# Patient Record
Sex: FEMALE | Race: WHITE | NOT HISPANIC OR LATINO | Employment: OTHER | URBAN - NONMETROPOLITAN AREA
[De-identification: names, ages, dates, MRNs, and addresses within clinical notes are randomized per-mention and may not be internally consistent; named-entity substitution may affect disease eponyms.]

---

## 2017-01-09 ENCOUNTER — HOSPITAL ENCOUNTER (OUTPATIENT)
Dept: GENERAL RADIOLOGY | Facility: HOSPITAL | Age: 82
Discharge: HOME | End: 2017-01-09
Attending: PHYSICIAN ASSISTANT

## 2017-01-19 ENCOUNTER — OFFICE VISIT (OUTPATIENT)
Dept: CARDIOLOGY | Facility: CLINIC | Age: 82
End: 2017-01-19

## 2017-01-19 ENCOUNTER — HOSPITAL ENCOUNTER (OUTPATIENT)
Dept: CARDIOLOGY | Facility: HOSPITAL | Age: 82
Discharge: HOME | End: 2017-01-19
Admitting: PHYSICIAN ASSISTANT

## 2017-01-19 VITALS
WEIGHT: 158 LBS | HEIGHT: 66 IN | BODY MASS INDEX: 25.39 KG/M2 | SYSTOLIC BLOOD PRESSURE: 128 MMHG | DIASTOLIC BLOOD PRESSURE: 50 MMHG

## 2017-01-19 VITALS
WEIGHT: 159.8 LBS | BODY MASS INDEX: 25.68 KG/M2 | HEART RATE: 70 BPM | DIASTOLIC BLOOD PRESSURE: 56 MMHG | HEIGHT: 66 IN | SYSTOLIC BLOOD PRESSURE: 122 MMHG

## 2017-01-19 DIAGNOSIS — I38 VALVULAR HEART DISEASE: ICD-10-CM

## 2017-01-19 DIAGNOSIS — I48.20 CHRONIC ATRIAL FIBRILLATION (HCC): ICD-10-CM

## 2017-01-19 DIAGNOSIS — I48.20 CHRONIC ATRIAL FIBRILLATION (HCC): Primary | ICD-10-CM

## 2017-01-19 LAB
BH CV ECHO MEAS - AO ROOT AREA (BSA CORRECTED): 1.7
BH CV ECHO MEAS - AO ROOT AREA: 7.1 CM^2
BH CV ECHO MEAS - AO ROOT DIAM: 3 CM
BH CV ECHO MEAS - BSA(HAYCOCK): 1.8 M^2
BH CV ECHO MEAS - BSA: 1.8 M^2
BH CV ECHO MEAS - BZI_BMI: 25.5 KILOGRAMS/M^2
BH CV ECHO MEAS - BZI_METRIC_HEIGHT: 167.6 CM
BH CV ECHO MEAS - BZI_METRIC_WEIGHT: 71.7 KG
BH CV ECHO MEAS - CONTRAST EF (2CH): 54 ML/M^2
BH CV ECHO MEAS - CONTRAST EF 4CH: 51.7 ML/M^2
BH CV ECHO MEAS - EDV(CUBED): 63 ML
BH CV ECHO MEAS - EDV(MOD-SP2): 63 ML
BH CV ECHO MEAS - EDV(MOD-SP4): 58 ML
BH CV ECHO MEAS - EDV(TEICH): 69.2 ML
BH CV ECHO MEAS - EF(CUBED): 51.8 %
BH CV ECHO MEAS - EF(MOD-SP2): 54 %
BH CV ECHO MEAS - EF(MOD-SP4): 51 %
BH CV ECHO MEAS - EF(TEICH): 44.3 %
BH CV ECHO MEAS - ESV(CUBED): 30.4 ML
BH CV ECHO MEAS - ESV(MOD-SP2): 29 ML
BH CV ECHO MEAS - ESV(MOD-SP4): 28 ML
BH CV ECHO MEAS - ESV(TEICH): 38.5 ML
BH CV ECHO MEAS - FS: 21.6 %
BH CV ECHO MEAS - IVS/LVPW: 0.91
BH CV ECHO MEAS - IVSD: 1.2 CM
BH CV ECHO MEAS - LA DIMENSION: 4.6 CM
BH CV ECHO MEAS - LA/AO: 1.5
BH CV ECHO MEAS - LV DIASTOLIC VOL/BSA (35-75): 32.1 ML/M^2
BH CV ECHO MEAS - LV MASS(C)D: 168.3 GRAMS
BH CV ECHO MEAS - LV MASS(C)DI: 93 GRAMS/M^2
BH CV ECHO MEAS - LV SYSTOLIC VOL/BSA (12-30): 15.5 ML/M^2
BH CV ECHO MEAS - LVIDD: 4 CM
BH CV ECHO MEAS - LVIDS: 3.1 CM
BH CV ECHO MEAS - LVLD AP2: 5.8 CM
BH CV ECHO MEAS - LVLD AP4: 5.8 CM
BH CV ECHO MEAS - LVLS AP2: 5.1 CM
BH CV ECHO MEAS - LVLS AP4: 5 CM
BH CV ECHO MEAS - LVPWD: 1.3 CM
BH CV ECHO MEAS - MV E MAX VEL: 111 CM/SEC
BH CV ECHO MEAS - PA ACC SLOPE: 449 CM/SEC^2
BH CV ECHO MEAS - PA ACC TIME: 0.12 SEC
BH CV ECHO MEAS - PA PR(ACCEL): 23.4 MMHG
BH CV ECHO MEAS - RAP SYSTOLE: 10 MMHG
BH CV ECHO MEAS - RVDD: 3 CM
BH CV ECHO MEAS - RVSP: 51 MMHG
BH CV ECHO MEAS - SI(CUBED): 18.1 ML/M^2
BH CV ECHO MEAS - SI(MOD-SP2): 18.8 ML/M^2
BH CV ECHO MEAS - SI(MOD-SP4): 16.6 ML/M^2
BH CV ECHO MEAS - SI(TEICH): 16.9 ML/M^2
BH CV ECHO MEAS - SV(CUBED): 32.7 ML
BH CV ECHO MEAS - SV(MOD-SP2): 34 ML
BH CV ECHO MEAS - SV(MOD-SP4): 30 ML
BH CV ECHO MEAS - SV(TEICH): 30.7 ML
BH CV ECHO MEAS - TAPSE (>1.6): 2 CM2
BH CV ECHO MEAS - TR MAX VEL: 315.8 CM/SEC
BH CV XLRA - RV BASE: 4.2 CM
BH CV XLRA - RV LENGTH: 7 CM
BH CV XLRA - RV MID: 4.4 CM
BH CV XLRA - TDI S': 7.6 CM/SEC
LEFT ATRIUM VOLUME INDEX: 43.6 ML/M2
LV EF 2D ECHO EST: 60 %

## 2017-01-19 PROCEDURE — 93306 TTE W/DOPPLER COMPLETE: CPT | Performed by: INTERNAL MEDICINE

## 2017-01-19 PROCEDURE — 93288 INTERROG EVL PM/LDLS PM IP: CPT | Performed by: PHYSICIAN ASSISTANT

## 2017-01-19 PROCEDURE — 93306 TTE W/DOPPLER COMPLETE: CPT

## 2017-01-19 PROCEDURE — 99213 OFFICE O/P EST LOW 20 MIN: CPT | Performed by: PHYSICIAN ASSISTANT

## 2017-01-19 NOTE — LETTER
January 19, 2017     ANN Gonsalez  [address] [place] [id number]    Patient: Lynne Sanchez   YOB: 1934   Date of Visit: 1/19/2017       Dear ANN Goss:    Thank you for referring Lynne Sacnhez to me for evaluation. Below are the relevant portions of my assessment and plan of care.    If you have questions, please do not hesitate to call me. I look forward to following Lynne along with you.         Sincerely,        ANN Lagunas        CC: No Recipients  ANN Lagunas  1/19/2017 10:43 AM  Signed       [facility] - Office Note  Lynne Sanchez         [address] [place] [address]  1934   [phone] (home)      LOCATION:  [facility].  Visit Type: Follow Up.    PCP:  ANN Gonsalez    01/19/17   Lynne Sanchez is a 82 y.o.  female  retired.      Chief Complaint: Follow Up on Afib, CHF with echo today. C/O dyspnea, fatigue.  1. Chronic atrial fibrillation, status post [employer]. Laureano pacemaker implantation and AV node ablation:  a. Diagnosed June 2005.  b. Status post ECV restoring normal sinus rhythm, June 2005.  c. Rythmol initiated 05/01/2006.  d. Previously digoxin toxicity.  e. GXT Cardiolite, 09/08/2005: No reversible ischemia. LV function not performed secondary to atrial fibrillation.   f. Status post successful external cardioversion on 10/01/2008, Tessa Downey MD.  g. EKG on 10/21/2008: Recurrence of atrial fibrillation with rate of 109.  h. Recurrent atrial fibrillation by EKG, 11/03/2008, asymptomatic.  i. Status post [employer]. Laureano pacemaker implantation and AV node ablation.   j. Echocardiogram 02/15/2010: Moderate MR, moderate TR. RVSP 50. EF greater than 55%, left atrial enlargement at 4.3 cm.  k. Echo 1/18/17:  EF 60%, Mod-severe MR, Mod-severe TR.  2. History of congestive heart failure.  3. Diabetes mellitus, type 2.  4. Surgical history - appendectomy.              No Known  "Allergies      Current Outpatient Prescriptions:   •  ferrous gluconate (FERGON) 324 MG tablet, daily., Disp: , Rfl:   •  furosemide (LASIX) 20 MG tablet, daily., Disp: , Rfl:   •  glyBURIDE (DIABETA) 5 MG tablet, Take 5 mg by mouth 2 (two) times a day., Disp: , Rfl:   •  latanoprost (XALATAN) 0.005 % ophthalmic solution, daily., Disp: , Rfl:   •  metFORMIN XR (GLUCOPHAGE-XR) 500 MG 24 hr tablet, 750 mg 2 (Two) Times a Day., Disp: , Rfl:   •  metoprolol tartrate (LOPRESSOR) 100 MG tablet, Take 1 tablet by mouth 2 (two) times a day., Disp: , Rfl:   •  ONE TOUCH ULTRA TEST test strip, daily., Disp: , Rfl:   •  warfarin (COUMADIN) 5 MG tablet, Daily. Pt takes 5 mg 6 days a week and 7.5 mg the other day, Disp: , Rfl:     HPI            The following portions of the patient's history were reviewed in the chart and updated as appropriate: allergies, current medications, past family history, past medical history, past social history, past surgical history and problem list.    Review of Systems   Constitution: Positive for malaise/fatigue.   Cardiovascular: Positive for dyspnea on exertion and leg swelling.   All other systems reviewed and are negative.            height is 66\" (167.6 cm) and weight is 159 lb 12.8 oz (72.5 kg). Her blood pressure is 122/56 and her pulse is 70.   Physical Exam   Constitutional: Vital signs are normal. She appears well-developed and well-nourished.   Cardiovascular: Normal rate, regular rhythm, S1 normal, S2 normal, normal heart sounds, intact distal pulses and normal pulses.    Pulmonary/Chest: Effort normal and breath sounds normal. She has no wheezes. She has no rhonchi. She has no rales.   Abdominal: Soft. Normal appearance and bowel sounds are normal. There is no hepatosplenomegaly.   Neurological: She is alert.   Extremities:  Trace pre-tibial and ankle edema BLE.        ECG 12 Lead  Date/Time: 1/19/2017 10:25 AM  Performed by: ABE CROWDER  Authorized by: ABE CROWDER "   Previous ECG: no previous ECG available  Rhythm: paced  Rate: normal  Clinical impression: abnormal ECG           St. Laureano pacemaker: The jugular pacing greater than 99%, threshold 0.875, impedance 350 ohms.  Battery voltage 2.75 which is approximate 2 years.    Assessment/ Plan   Chronic atrial fibrillation:  Stable and normal device check.  Continue warfarin.  RTC as directed.    Valvular heart disease:  Pt is now having symptoms worrisome for symptomatic MR.  I am going to discuss the case with Dr. Downey - i believe the patient is too well for a MitraClip.  Pt wants to wait and return from [place] and follow up then before discussing surgical options.      Other orders  -     ECG 12 Lead      Leslye Estrella PA-C

## 2017-01-19 NOTE — MR AVS SNAPSHOT
Lynne Sanchez   1/19/2017 10:00 AM   Office Visit    Dept Phone:  [phone]   Encounter #:  [id number]    Provider:  ANN Nolasco   Department:  [facility] CARDIOLOGY                Your Full Care Plan              Your Updated Medication List          This list is accurate as of: 1/19/17 10:39 AM.  Always use your most recent med list.                ferrous gluconate 324 MG tablet   Commonly known as:  FERGON       furosemide 20 MG tablet   Commonly known as:  LASIX       glyBURIDE 5 MG tablet   Commonly known as:  DIAbeta       latanoprost 0.005 % ophthalmic solution   Commonly known as:  XALATAN       metFORMIN  MG 24 hr tablet   Commonly known as:  GLUCOPHAGE-XR       metoprolol tartrate 100 MG tablet   Commonly known as:  LOPRESSOR       ONE TOUCH ULTRA TEST test strip   Generic drug:  glucose blood       warfarin 5 MG tablet   Commonly known as:  COUMADIN               We Performed the Following     ECG 12 Lead       You Were Diagnosed With        Codes Comments    Chronic atrial fibrillation    -  Primary ICD-10-CM: I48.2  ICD-9-CM: 427.31     Valvular heart disease     ICD-10-CM: I38  ICD-9-CM: 424.90       Instructions     None    Patient Instructions History      Upcoming Appointments     Visit Type Date Time Department    FOLLOW UP 1/19/2017 10:00 AM [facility]    [facility] ECHO 2D COMPLETE VT 1/19/2017  9:00 AM [facility] NONINVASIVE LAB    FOLLOW UP 5/4/2017 11:00 AM [facility]      MyChart Signup     Our records indicate that you have declined [facility] MyChart signup. If you would like to sign up for [employer], please email [email or web address] or call [phone] to obtain an activation code.             Other Info from Your Visit           Your Appointments     May 04, 2017 11:00 AM EDT   Follow Up with ANN Nolasco   [facility] CARDIOLOGY (--)    [facility]  "[address] [place] [id number] [phone]           Arrive 15 minutes prior to appointment.              Allergies     No Known Allergies      Vital Signs     Blood Pressure Pulse Height Weight Body Mass Index Smoking Status    122/56 (BP Location: Left arm, Patient Position: Sitting) 70 66\" (167.6 cm) 159 lb 12.8 oz (72.5 kg) 25.79 kg/m2 Never Smoker      Problems and Diagnoses Noted     Atrial fibrillation (irregular heartbeat)    Valvular heart disease        "

## 2017-05-04 ENCOUNTER — OFFICE VISIT (OUTPATIENT)
Dept: CARDIOLOGY | Facility: CLINIC | Age: 82
End: 2017-05-04

## 2017-05-04 VITALS
BODY MASS INDEX: 25.13 KG/M2 | SYSTOLIC BLOOD PRESSURE: 90 MMHG | WEIGHT: 156.4 LBS | HEART RATE: 70 BPM | DIASTOLIC BLOOD PRESSURE: 62 MMHG | HEIGHT: 66 IN

## 2017-05-04 DIAGNOSIS — I38 VALVULAR HEART DISEASE: ICD-10-CM

## 2017-05-04 DIAGNOSIS — I48.20 CHRONIC ATRIAL FIBRILLATION (HCC): Primary | ICD-10-CM

## 2017-05-04 PROCEDURE — 93288 INTERROG EVL PM/LDLS PM IP: CPT | Performed by: PHYSICIAN ASSISTANT

## 2017-05-04 RX ORDER — GLIMEPIRIDE 4 MG/1
4 TABLET ORAL 2 TIMES DAILY
COMMUNITY
Start: 2017-04-03 | End: 2020-02-04 | Stop reason: SDUPTHER

## 2017-05-04 RX ORDER — METOPROLOL TARTRATE 100 MG/1
100 TABLET ORAL 2 TIMES DAILY
Qty: 180 TABLET | Refills: 3 | Status: SHIPPED | OUTPATIENT
Start: 2017-05-04 | End: 2020-02-07 | Stop reason: SDUPTHER

## 2017-08-04 ENCOUNTER — OFFICE VISIT (OUTPATIENT)
Dept: ORTHOPEDIC SURGERY | Facility: CLINIC | Age: 82
End: 2017-08-04

## 2017-08-04 VITALS — BODY MASS INDEX: 25.23 KG/M2 | HEIGHT: 66 IN | RESPIRATION RATE: 16 BRPM | WEIGHT: 157 LBS

## 2017-08-04 DIAGNOSIS — L60.1 ONYCHOLYSIS: ICD-10-CM

## 2017-08-04 DIAGNOSIS — L97.521 ULCER OF FOOT, LEFT, LIMITED TO BREAKDOWN OF SKIN (HCC): Primary | ICD-10-CM

## 2017-08-04 DIAGNOSIS — M20.5X2 HALLUX LIMITUS, LEFT: ICD-10-CM

## 2017-08-04 PROCEDURE — 11730 AVULSION NAIL PLATE SIMPLE 1: CPT | Performed by: PODIATRIST

## 2017-08-04 PROCEDURE — 11042 DBRDMT SUBQ TIS 1ST 20SQCM/<: CPT | Performed by: PODIATRIST

## 2017-08-04 RX ORDER — METFORMIN HYDROCHLORIDE 750 MG/1
TABLET, EXTENDED RELEASE ORAL
Status: ON HOLD | COMMUNITY
Start: 2017-05-25 | End: 2017-10-03

## 2017-08-04 RX ORDER — WARFARIN SODIUM 1 MG/1
5 TABLET ORAL TAKE AS DIRECTED
Status: ON HOLD | COMMUNITY
Start: 2017-08-03 | End: 2017-10-04

## 2017-08-04 NOTE — PROGRESS NOTES
Subjective   Patient ID: Lynne Sanchez is a 82 y.o. female   Foot Ulcer of the Left Foot    She comes in today stating that she has a diabetic wound on her left great toe and she also has the toenail on the left great toe that's bothering her.  She tells me that the wound is been there couple weeks she thinks and she's not really sure how it happened where it came from but it started as a big callus and then turned to a crack and opened up.  She says her daughter is a nurse and told her to start soaking it and cleaning it with peroxide and boiling water and that's been which she's doing with it.  She also tells me the great toenail on the left foot is crooked and loose and hump Uche and bugs her.  History of Present Illness    Review of Systems   Constitutional: Negative for diaphoresis, fever and unexpected weight change.   HENT: Positive for sore throat. Negative for dental problem.    Eyes: Negative for visual disturbance.   Respiratory: Negative for shortness of breath.    Cardiovascular: Negative for chest pain.   Gastrointestinal: Positive for vomiting. Negative for abdominal pain, constipation, diarrhea and nausea.   Genitourinary: Negative for difficulty urinating and frequency.   Skin: Positive for wound.   Neurological: Negative for headaches.   Hematological: Bruises/bleeds easily.   All other systems reviewed and are negative.      Past Medical History:   Diagnosis Date   • Chronic atrial fibrillation    • Diabetes mellitus, type 2    • History of congestive heart failure         Past Surgical History:   Procedure Laterality Date   • APPENDECTOMY         No Known Allergies    Family History   Problem Relation Age of Onset   • No Known Problems Mother    • No Known Problems Father        Objective   Physical Exam   Constitutional: She is oriented to person, place, and time. She appears well-developed and well-nourished.   HENT:   Head: Normocephalic and atraumatic.   Eyes: EOM are normal. Pupils are  equal, round, and reactive to light.   Neck: Normal range of motion.   Cardiovascular: Normal rate.    Pulmonary/Chest: Effort normal.   Abdominal: Soft. Bowel sounds are normal.   Musculoskeletal: Normal range of motion.   Neurological: She is alert and oriented to person, place, and time. She has normal reflexes.   Skin: Skin is warm.   Psychiatric: She has a normal mood and affect. Her behavior is normal. Judgment and thought content normal.     Ortho Exam  Ortho Exam left  Lower extremity exam:  Vascular: Pulses are palpable faintly to the left lower extremity.  There are tortuous bulbous and engorged varicose veins about both lower extremities.  She has some mild purplish bruised ecchymotic areas on the medial and lateral aspect of the left calf.  Capillary refill time is less than 5 seconds, some pedal hair is noted  Neuro: Gross sensations intact.  There is also protective sensation.  Derm: There is normal proximal to distal cooling.  Nails on the left foot are thickened and incurvated and discolored and dystrophic with subungual debris.  The entire left hallux nail is loose there is a full-thickness ulceration on the plantar medial aspect of the left hallux IPJ that's dry and flaky around the perimeter the wound and even the base that as this dry white flaky appearance but once debrided it's granular and healthy.  It measures 2 x 1.  She also had a dry hyperkeratotic lesion on the tip of the left second digit  MSK: With the foot unloaded there is 30° of first MTPJ dorsiflexion but loaded there is roughly 5° of dorsiflexion.  Ankle joint range of motion is normal.  Manual muscle testing is normal.  She has rigid digital contractures of digits 2 through 5 on the left    Assessment/Plan left hallux onycholysis, left hallux wound, diabetes, functional hallux limitus left second digit callus  Independent Review of Radiographic Studies:      Laboratory and Other Studies:     Medical Decision Making:         Procedures full thickness excisional wound debridement was performed with a 15 blade of the left plantar medial hallux wound down into subcutaneous tissue removing callus dry flaky scaly tissue creating a healthy bleeding granular wound bed that measured 1 x 2.  Tissue nippers were also then used 2 trim and cut the loose portion of the left hallux nail and this resulted in a complete nail avulsion of the left first digit.  Was done without incident.  Marta dry sterile dressing was then applied over the left hallux nail bed and wound.  A Band-Aid was applied over the left second digit tip after the corners debrided.  [] No procedures were performed in office today.    Lynne was seen today for foot ulcer.    Diagnoses and all orders for this visit:    Ulcer of foot, left, limited to breakdown of skin  -     Ambulatory Referral to Home Health    Hallux limitus, left  -     Ambulatory Referral to Home Health    Onycholysis  -     Ambulatory Referral to Home Health        Recommendations/Plan:  Order home health for dressing supplies for her.  I also showed her and instructed her on how to do this herself if needed.  I don't want her to soak the foot or the toes and discontinue the use of the peroxide.  Want her to use the Marta every other day.  I'll see her back next week.  I also gave her a flat Darco shoe for weightbearing in hopes that that'll help offload the foot some.  Call with any questions or concerns.    Return in about 1 week (around 8/11/2017).  Patient agreeable to call or return sooner for any concerns.

## 2017-08-11 ENCOUNTER — OFFICE VISIT (OUTPATIENT)
Dept: ORTHOPEDIC SURGERY | Facility: CLINIC | Age: 82
End: 2017-08-11

## 2017-08-11 VITALS — WEIGHT: 157 LBS | BODY MASS INDEX: 24.64 KG/M2 | HEIGHT: 67 IN | RESPIRATION RATE: 16 BRPM

## 2017-08-11 DIAGNOSIS — M20.5X2 HALLUX LIMITUS, LEFT: Primary | ICD-10-CM

## 2017-08-11 DIAGNOSIS — L97.521 ULCER OF FOOT, LEFT, LIMITED TO BREAKDOWN OF SKIN (HCC): ICD-10-CM

## 2017-08-11 PROCEDURE — 11042 DBRDMT SUBQ TIS 1ST 20SQCM/<: CPT | Performed by: PODIATRIST

## 2017-08-11 NOTE — PROGRESS NOTES
Subjective   Patient ID: Lynne Sanchez is a 82 y.o. female with diabetes who returns again for a left great toe ulceration.  She is wearing her postoperative shoe.  She states that home health's been using the collagen dressing.  She's concerned slightly about the redness and swelling but says she's been up on it a lot agustin tomatoes.  She denies any constitutional symptoms.      History of Present Illness    Review of Systems   Constitutional: Negative for diaphoresis, fever and unexpected weight change.   HENT: Negative for dental problem and sore throat.    Eyes: Negative for visual disturbance.   Respiratory: Negative for shortness of breath.    Cardiovascular: Negative for chest pain.   Gastrointestinal: Negative for abdominal pain, constipation, diarrhea, nausea and vomiting.   Genitourinary: Negative for difficulty urinating and frequency.   Neurological: Negative for headaches.   Hematological: Does not bruise/bleed easily.   All other systems reviewed and are negative.      Past Medical History:   Diagnosis Date   • Chronic atrial fibrillation    • Diabetes mellitus, type 2    • History of congestive heart failure         Past Surgical History:   Procedure Laterality Date   • APPENDECTOMY         No Known Allergies    Objective   Physical Exam   Constitutional: She is oriented to person, place, and time. She appears well-developed and well-nourished.   HENT:   Head: Normocephalic and atraumatic.   Eyes: EOM are normal. Pupils are equal, round, and reactive to light.   Neck: Normal range of motion.   Cardiovascular: Normal rate.    Pulmonary/Chest: Effort normal.   Abdominal: Soft. Bowel sounds are normal.   Musculoskeletal: Normal range of motion.   Neurological: She is alert and oriented to person, place, and time. She has normal reflexes.   Skin: Skin is warm.   Psychiatric: She has a normal mood and affect. Her behavior is normal. Judgment and thought content normal.     Ortho Exam  Ortho Exam  The  left great toe has a slight amount of increased edema and erythema but this is blanchable.  The wound does not probe deep.  It's barely full-thickness.  There is a slight increase in hyperkeratosis around the perimeter the wound but the wound is granular in the base.  The hyperkeratotic area does have some slight loosening and is not well adhered underneath.  She is otherwise grossly neurovascularly intact to the left lower extremity    Assessment/Plan  left great toe ulceration, diabetes  Independent Review of Radiographic Studies:      Laboratory and Other Studies:     Medical Decision Making:        Procedures  Debridement Note    Location of debridement: Left great toe  Description of procedure: excisional  Type of instrument used: scalpel  Type of tissue removed: devitalized and non-viable  Appearance and size of the wound: down to fresh bleeding tissue  Depth of debridement: subcutaneous tissue    Wound measures one by one.  I sharply and excisionally debrided the wounds down to healthy bleeding tissue with a good healthy granular base we will continue with the local wound care.    There are no diagnoses linked to this encounter.      Recommendations/Plan:  After debridement I applied a Marta dressing with 4 x 4 and Coban and.  She says she has a hard time keeping Band-Aids on so I recommend she rapid with this.  I explained her that the redness and edema is a little bit concerning but it's most likely from her being up on it more.  We'll watch and monitor this closely and next week I feel like antibiotics were needed we can do that.  I discussed that we may also need to change her dressing/wound care to Santyl or something different depending on how it looks next week.  She is again been reminded to keep this clean and dry and not soak the toe or get it wet.  I'll see her back next week.    No Follow-up on file.  Patient agreeable to call or return sooner for any concerns.

## 2017-10-03 ENCOUNTER — APPOINTMENT (OUTPATIENT)
Dept: ULTRASOUND IMAGING | Facility: HOSPITAL | Age: 82
End: 2017-10-03

## 2017-10-03 ENCOUNTER — HOSPITAL ENCOUNTER (INPATIENT)
Facility: HOSPITAL | Age: 82
LOS: 7 days | Discharge: HOME HEALTH | End: 2017-10-12
Attending: EMERGENCY MEDICINE | Admitting: INTERNAL MEDICINE

## 2017-10-03 ENCOUNTER — APPOINTMENT (OUTPATIENT)
Dept: CT IMAGING | Facility: HOSPITAL | Age: 82
End: 2017-10-03

## 2017-10-03 DIAGNOSIS — D64.9 ANEMIA, UNSPECIFIED TYPE: ICD-10-CM

## 2017-10-03 DIAGNOSIS — D64.9 NORMOCYTIC ANEMIA: ICD-10-CM

## 2017-10-03 DIAGNOSIS — R79.1 SUPRATHERAPEUTIC INR: ICD-10-CM

## 2017-10-03 DIAGNOSIS — E11.9 TYPE 2 DIABETES MELLITUS TREATED WITHOUT INSULIN (HCC): Chronic | ICD-10-CM

## 2017-10-03 DIAGNOSIS — K29.50 CHRONIC GASTRITIS WITHOUT BLEEDING, UNSPECIFIED GASTRITIS TYPE: ICD-10-CM

## 2017-10-03 DIAGNOSIS — I48.20 CHRONIC ATRIAL FIBRILLATION (HCC): ICD-10-CM

## 2017-10-03 DIAGNOSIS — K92.1 MELENA: ICD-10-CM

## 2017-10-03 DIAGNOSIS — I73.9 PERIPHERAL ARTERIAL DISEASE (HCC): ICD-10-CM

## 2017-10-03 DIAGNOSIS — M86.9 OSTEOMYELITIS OF LEFT FOOT, UNSPECIFIED TYPE (HCC): Primary | ICD-10-CM

## 2017-10-03 PROBLEM — D68.318 COAGULATION DISORDER DUE TO CIRCULATING ANTICOAGULANTS (HCC): Chronic | Status: ACTIVE | Noted: 2017-10-03

## 2017-10-03 LAB
ALBUMIN SERPL-MCNC: 4 G/DL (ref 3.5–5)
ALBUMIN/GLOB SERPL: 1.2 G/DL (ref 1–2)
ALP SERPL-CCNC: 66 U/L (ref 38–126)
ALT SERPL W P-5'-P-CCNC: 22 U/L (ref 13–69)
ANION GAP SERPL CALCULATED.3IONS-SCNC: 18 MMOL/L
AST SERPL-CCNC: 21 U/L (ref 15–46)
BASOPHILS # BLD AUTO: 0.04 10*3/MM3 (ref 0–0.2)
BASOPHILS NFR BLD AUTO: 0.7 % (ref 0–2.5)
BILIRUB SERPL-MCNC: 0.4 MG/DL (ref 0.2–1.3)
BUN BLD-MCNC: 27 MG/DL (ref 7–20)
BUN/CREAT SERPL: 20.8 (ref 7.1–23.5)
CALCIUM SPEC-SCNC: 9.5 MG/DL (ref 8.4–10.2)
CHLORIDE SERPL-SCNC: 105 MMOL/L (ref 98–107)
CO2 SERPL-SCNC: 25 MMOL/L (ref 26–30)
CREAT BLD-MCNC: 1.3 MG/DL (ref 0.6–1.3)
D-LACTATE SERPL-SCNC: 1.8 MMOL/L (ref 0.5–2)
DEPRECATED RDW RBC AUTO: 50.7 FL (ref 37–54)
EOSINOPHIL # BLD AUTO: 0.13 10*3/MM3 (ref 0–0.7)
EOSINOPHIL NFR BLD AUTO: 2.2 % (ref 0–7)
ERYTHROCYTE [DISTWIDTH] IN BLOOD BY AUTOMATED COUNT: 16.7 % (ref 11.5–14.5)
GFR SERPL CREATININE-BSD FRML MDRD: 39 ML/MIN/1.73
GLOBULIN UR ELPH-MCNC: 3.3 GM/DL
GLUCOSE BLD-MCNC: 156 MG/DL (ref 74–98)
GLUCOSE BLDC GLUCOMTR-MCNC: 222 MG/DL (ref 70–130)
GLUCOSE BLDC GLUCOMTR-MCNC: 74 MG/DL (ref 70–130)
GLUCOSE BLDC GLUCOMTR-MCNC: 93 MG/DL (ref 70–130)
HCT VFR BLD AUTO: 25.4 % (ref 37–47)
HGB BLD-MCNC: 7.7 G/DL (ref 12–16)
IMM GRANULOCYTES # BLD: 0.04 10*3/MM3 (ref 0–0.06)
IMM GRANULOCYTES NFR BLD: 0.7 % (ref 0–0.6)
INR PPP: 4.22 (ref 0.9–1.1)
LYMPHOCYTES # BLD AUTO: 1.3 10*3/MM3 (ref 0.6–3.4)
LYMPHOCYTES NFR BLD AUTO: 22.1 % (ref 10–50)
MCH RBC QN AUTO: 25.2 PG (ref 27–31)
MCHC RBC AUTO-ENTMCNC: 30.3 G/DL (ref 30–37)
MCV RBC AUTO: 83 FL (ref 81–99)
MONOCYTES # BLD AUTO: 0.55 10*3/MM3 (ref 0–0.9)
MONOCYTES NFR BLD AUTO: 9.3 % (ref 0–12)
NEUTROPHILS # BLD AUTO: 3.83 10*3/MM3 (ref 2–6.9)
NEUTROPHILS NFR BLD AUTO: 65 % (ref 37–80)
NRBC BLD MANUAL-RTO: 0 /100 WBC (ref 0–0)
PLATELET # BLD AUTO: 217 10*3/MM3 (ref 130–400)
PMV BLD AUTO: 9.9 FL (ref 6–12)
POTASSIUM BLD-SCNC: 4 MMOL/L (ref 3.5–5.1)
PROT SERPL-MCNC: 7.3 G/DL (ref 6.3–8.2)
PROTHROMBIN TIME: 46.2 SECONDS (ref 9.3–12.1)
RBC # BLD AUTO: 3.06 10*6/MM3 (ref 4.2–5.4)
SODIUM BLD-SCNC: 144 MMOL/L (ref 137–145)
WBC NRBC COR # BLD: 5.89 10*3/MM3 (ref 4.8–10.8)

## 2017-10-03 PROCEDURE — 87186 SC STD MICRODIL/AGAR DIL: CPT | Performed by: NURSE PRACTITIONER

## 2017-10-03 PROCEDURE — G0378 HOSPITAL OBSERVATION PER HR: HCPCS

## 2017-10-03 PROCEDURE — 25010000002 VANCOMYCIN PER 500 MG: Performed by: FAMILY MEDICINE

## 2017-10-03 PROCEDURE — 99220 PR INITIAL OBSERVATION CARE/DAY 70 MINUTES: CPT | Performed by: FAMILY MEDICINE

## 2017-10-03 PROCEDURE — 82962 GLUCOSE BLOOD TEST: CPT

## 2017-10-03 PROCEDURE — 87070 CULTURE OTHR SPECIMN AEROBIC: CPT | Performed by: NURSE PRACTITIONER

## 2017-10-03 PROCEDURE — 80053 COMPREHEN METABOLIC PANEL: CPT | Performed by: NURSE PRACTITIONER

## 2017-10-03 PROCEDURE — 85025 COMPLETE CBC W/AUTO DIFF WBC: CPT | Performed by: NURSE PRACTITIONER

## 2017-10-03 PROCEDURE — 63710000001 INSULIN ASPART PER 5 UNITS: Performed by: FAMILY MEDICINE

## 2017-10-03 PROCEDURE — 73700 CT LOWER EXTREMITY W/O DYE: CPT

## 2017-10-03 PROCEDURE — 87186 SC STD MICRODIL/AGAR DIL: CPT | Performed by: INTERNAL MEDICINE

## 2017-10-03 PROCEDURE — 99221 1ST HOSP IP/OBS SF/LOW 40: CPT | Performed by: PODIATRIST

## 2017-10-03 PROCEDURE — 85610 PROTHROMBIN TIME: CPT | Performed by: NURSE PRACTITIONER

## 2017-10-03 PROCEDURE — 87070 CULTURE OTHR SPECIMN AEROBIC: CPT | Performed by: INTERNAL MEDICINE

## 2017-10-03 PROCEDURE — 87077 CULTURE AEROBIC IDENTIFY: CPT | Performed by: INTERNAL MEDICINE

## 2017-10-03 PROCEDURE — 93923 UPR/LXTR ART STDY 3+ LVLS: CPT

## 2017-10-03 PROCEDURE — 87077 CULTURE AEROBIC IDENTIFY: CPT | Performed by: NURSE PRACTITIONER

## 2017-10-03 PROCEDURE — 99284 EMERGENCY DEPT VISIT MOD MDM: CPT

## 2017-10-03 PROCEDURE — 83605 ASSAY OF LACTIC ACID: CPT | Performed by: NURSE PRACTITIONER

## 2017-10-03 RX ORDER — FERROUS SULFATE TAB EC 324 MG (65 MG FE EQUIVALENT) 324 (65 FE) MG
324 TABLET DELAYED RESPONSE ORAL 2 TIMES DAILY WITH MEALS
Status: DISCONTINUED | OUTPATIENT
Start: 2017-10-03 | End: 2017-10-12 | Stop reason: HOSPADM

## 2017-10-03 RX ORDER — SODIUM CHLORIDE 0.9 % (FLUSH) 0.9 %
1-10 SYRINGE (ML) INJECTION AS NEEDED
Status: DISCONTINUED | OUTPATIENT
Start: 2017-10-03 | End: 2017-10-12 | Stop reason: HOSPADM

## 2017-10-03 RX ORDER — METOPROLOL TARTRATE 100 MG/1
100 TABLET ORAL 2 TIMES DAILY
Status: DISCONTINUED | OUTPATIENT
Start: 2017-10-03 | End: 2017-10-12 | Stop reason: HOSPADM

## 2017-10-03 RX ORDER — NICOTINE POLACRILEX 4 MG
1 LOZENGE BUCCAL
Status: DISCONTINUED | OUTPATIENT
Start: 2017-10-03 | End: 2017-10-12 | Stop reason: HOSPADM

## 2017-10-03 RX ORDER — ONDANSETRON 2 MG/ML
4 INJECTION INTRAMUSCULAR; INTRAVENOUS EVERY 6 HOURS PRN
Status: DISCONTINUED | OUTPATIENT
Start: 2017-10-03 | End: 2017-10-12 | Stop reason: HOSPADM

## 2017-10-03 RX ORDER — SODIUM CHLORIDE 0.9 % (FLUSH) 0.9 %
10 SYRINGE (ML) INJECTION AS NEEDED
Status: DISCONTINUED | OUTPATIENT
Start: 2017-10-03 | End: 2017-10-12 | Stop reason: HOSPADM

## 2017-10-03 RX ORDER — DEXTROSE MONOHYDRATE 25 G/50ML
25 INJECTION, SOLUTION INTRAVENOUS
Status: DISCONTINUED | OUTPATIENT
Start: 2017-10-03 | End: 2017-10-12 | Stop reason: HOSPADM

## 2017-10-03 RX ORDER — WARFARIN SODIUM 7.5 MG/1
7.5 TABLET ORAL TAKE AS DIRECTED
COMMUNITY
End: 2017-10-12 | Stop reason: HOSPADM

## 2017-10-03 RX ORDER — LATANOPROST 50 UG/ML
1 SOLUTION/ DROPS OPHTHALMIC DAILY
Status: DISCONTINUED | OUTPATIENT
Start: 2017-10-04 | End: 2017-10-12 | Stop reason: HOSPADM

## 2017-10-03 RX ORDER — FAMOTIDINE 20 MG/1
20 TABLET, FILM COATED ORAL 2 TIMES DAILY
Status: DISCONTINUED | OUTPATIENT
Start: 2017-10-03 | End: 2017-10-12 | Stop reason: HOSPADM

## 2017-10-03 RX ORDER — CEPHALEXIN 500 MG/1
500 CAPSULE ORAL 3 TIMES DAILY
Status: ON HOLD | COMMUNITY
End: 2017-10-03

## 2017-10-03 RX ORDER — SODIUM CHLORIDE 9 MG/ML
75 INJECTION, SOLUTION INTRAVENOUS CONTINUOUS
Status: DISCONTINUED | OUTPATIENT
Start: 2017-10-03 | End: 2017-10-06

## 2017-10-03 RX ADMIN — FAMOTIDINE 20 MG: 20 TABLET, FILM COATED ORAL at 21:26

## 2017-10-03 RX ADMIN — METOPROLOL TARTRATE 100 MG: 100 TABLET ORAL at 21:26

## 2017-10-03 RX ADMIN — SODIUM CHLORIDE 75 ML/HR: 9 INJECTION, SOLUTION INTRAVENOUS at 17:25

## 2017-10-03 RX ADMIN — FERROUS SULFATE TAB EC 324 MG (65 MG FE EQUIVALENT) 324 MG: 324 (65 FE) TABLET DELAYED RESPONSE at 17:24

## 2017-10-03 RX ADMIN — INSULIN ASPART 5 UNITS: 100 INJECTION, SOLUTION INTRAVENOUS; SUBCUTANEOUS at 21:26

## 2017-10-03 RX ADMIN — VANCOMYCIN HYDROCHLORIDE 1750 MG: 500 INJECTION, POWDER, LYOPHILIZED, FOR SOLUTION INTRAVENOUS at 17:23

## 2017-10-04 LAB
ALBUMIN SERPL-MCNC: 3.5 G/DL (ref 3.5–5)
ALBUMIN/GLOB SERPL: 1.1 G/DL (ref 1–2)
ALP SERPL-CCNC: 61 U/L (ref 38–126)
ALT SERPL W P-5'-P-CCNC: 31 U/L (ref 13–69)
ANION GAP SERPL CALCULATED.3IONS-SCNC: 14 MMOL/L
AST SERPL-CCNC: 20 U/L (ref 15–46)
BASOPHILS # BLD AUTO: 0.02 10*3/MM3 (ref 0–0.2)
BASOPHILS NFR BLD AUTO: 0.4 % (ref 0–2.5)
BILIRUB SERPL-MCNC: 0.3 MG/DL (ref 0.2–1.3)
BUN BLD-MCNC: 20 MG/DL (ref 7–20)
BUN/CREAT SERPL: 16.7 (ref 7.1–23.5)
CALCIUM SPEC-SCNC: 8.5 MG/DL (ref 8.4–10.2)
CHLORIDE SERPL-SCNC: 111 MMOL/L (ref 98–107)
CO2 SERPL-SCNC: 24 MMOL/L (ref 26–30)
CREAT BLD-MCNC: 1.2 MG/DL (ref 0.6–1.3)
DEPRECATED RDW RBC AUTO: 50.4 FL (ref 37–54)
EOSINOPHIL # BLD AUTO: 0.07 10*3/MM3 (ref 0–0.7)
EOSINOPHIL NFR BLD AUTO: 1.3 % (ref 0–7)
ERYTHROCYTE [DISTWIDTH] IN BLOOD BY AUTOMATED COUNT: 16.5 % (ref 11.5–14.5)
GFR SERPL CREATININE-BSD FRML MDRD: 43 ML/MIN/1.73
GLOBULIN UR ELPH-MCNC: 3.1 GM/DL
GLUCOSE BLD-MCNC: 87 MG/DL (ref 74–98)
GLUCOSE BLDC GLUCOMTR-MCNC: 178 MG/DL (ref 70–130)
GLUCOSE BLDC GLUCOMTR-MCNC: 217 MG/DL (ref 70–130)
GLUCOSE BLDC GLUCOMTR-MCNC: 231 MG/DL (ref 70–130)
GLUCOSE BLDC GLUCOMTR-MCNC: 66 MG/DL (ref 70–130)
GLUCOSE BLDC GLUCOMTR-MCNC: 86 MG/DL (ref 70–130)
HCT VFR BLD AUTO: 25.4 % (ref 37–47)
HGB BLD-MCNC: 7.6 G/DL (ref 12–16)
IMM GRANULOCYTES # BLD: 0.02 10*3/MM3 (ref 0–0.06)
IMM GRANULOCYTES NFR BLD: 0.4 % (ref 0–0.6)
INR PPP: 4.49 (ref 0.9–1.1)
LYMPHOCYTES # BLD AUTO: 1.19 10*3/MM3 (ref 0.6–3.4)
LYMPHOCYTES NFR BLD AUTO: 21.6 % (ref 10–50)
MAGNESIUM SERPL-MCNC: 1.6 MG/DL (ref 1.6–2.3)
MCH RBC QN AUTO: 25 PG (ref 27–31)
MCHC RBC AUTO-ENTMCNC: 29.9 G/DL (ref 30–37)
MCV RBC AUTO: 83.6 FL (ref 81–99)
MONOCYTES # BLD AUTO: 0.4 10*3/MM3 (ref 0–0.9)
MONOCYTES NFR BLD AUTO: 7.2 % (ref 0–12)
NEUTROPHILS # BLD AUTO: 3.82 10*3/MM3 (ref 2–6.9)
NEUTROPHILS NFR BLD AUTO: 69.1 % (ref 37–80)
NRBC BLD MANUAL-RTO: 0 /100 WBC (ref 0–0)
PLAT MORPH BLD: NORMAL
PLATELET # BLD AUTO: 207 10*3/MM3 (ref 130–400)
PMV BLD AUTO: 9.9 FL (ref 6–12)
POTASSIUM BLD-SCNC: 4 MMOL/L (ref 3.5–5.1)
PROT SERPL-MCNC: 6.6 G/DL (ref 6.3–8.2)
PROTHROMBIN TIME: 49.2 SECONDS (ref 9.3–12.1)
RBC # BLD AUTO: 3.04 10*6/MM3 (ref 4.2–5.4)
RBC MORPH BLD: NORMAL
SODIUM BLD-SCNC: 145 MMOL/L (ref 137–145)
VANCOMYCIN SERPL-MCNC: 17.94 MCG/ML (ref 5–40)
WBC MORPH BLD: NORMAL
WBC NRBC COR # BLD: 5.52 10*3/MM3 (ref 4.8–10.8)

## 2017-10-04 PROCEDURE — G0378 HOSPITAL OBSERVATION PER HR: HCPCS

## 2017-10-04 PROCEDURE — 85025 COMPLETE CBC W/AUTO DIFF WBC: CPT | Performed by: FAMILY MEDICINE

## 2017-10-04 PROCEDURE — 85007 BL SMEAR W/DIFF WBC COUNT: CPT | Performed by: FAMILY MEDICINE

## 2017-10-04 PROCEDURE — 80053 COMPREHEN METABOLIC PANEL: CPT | Performed by: FAMILY MEDICINE

## 2017-10-04 PROCEDURE — 85610 PROTHROMBIN TIME: CPT | Performed by: FAMILY MEDICINE

## 2017-10-04 PROCEDURE — 83735 ASSAY OF MAGNESIUM: CPT | Performed by: FAMILY MEDICINE

## 2017-10-04 PROCEDURE — 63710000001 INSULIN ASPART PER 5 UNITS: Performed by: FAMILY MEDICINE

## 2017-10-04 PROCEDURE — 25010000002 CEFTRIAXONE PER 250 MG: Performed by: FAMILY MEDICINE

## 2017-10-04 PROCEDURE — 99226 PR SBSQ OBSERVATION CARE/DAY 35 MINUTES: CPT | Performed by: FAMILY MEDICINE

## 2017-10-04 PROCEDURE — 80202 ASSAY OF VANCOMYCIN: CPT | Performed by: FAMILY MEDICINE

## 2017-10-04 PROCEDURE — 82962 GLUCOSE BLOOD TEST: CPT

## 2017-10-04 PROCEDURE — 25010000002 VANCOMYCIN PER 500 MG: Performed by: FAMILY MEDICINE

## 2017-10-04 RX ORDER — WARFARIN SODIUM 5 MG/1
5 TABLET ORAL
COMMUNITY
End: 2017-10-12 | Stop reason: HOSPADM

## 2017-10-04 RX ORDER — ASPIRIN 325 MG
1 TABLET ORAL DAILY
COMMUNITY
End: 2022-03-04

## 2017-10-04 RX ADMIN — INSULIN ASPART 5 UNITS: 100 INJECTION, SOLUTION INTRAVENOUS; SUBCUTANEOUS at 20:56

## 2017-10-04 RX ADMIN — FERROUS SULFATE TAB EC 324 MG (65 MG FE EQUIVALENT) 324 MG: 324 (65 FE) TABLET DELAYED RESPONSE at 17:19

## 2017-10-04 RX ADMIN — FAMOTIDINE 20 MG: 20 TABLET, FILM COATED ORAL at 20:55

## 2017-10-04 RX ADMIN — SODIUM CHLORIDE 75 ML/HR: 9 INJECTION, SOLUTION INTRAVENOUS at 06:53

## 2017-10-04 RX ADMIN — VANCOMYCIN HYDROCHLORIDE 1250 MG: 500 INJECTION, POWDER, LYOPHILIZED, FOR SOLUTION INTRAVENOUS at 18:00

## 2017-10-04 RX ADMIN — INSULIN ASPART 3 UNITS: 100 INJECTION, SOLUTION INTRAVENOUS; SUBCUTANEOUS at 12:28

## 2017-10-04 RX ADMIN — FAMOTIDINE 20 MG: 20 TABLET, FILM COATED ORAL at 09:34

## 2017-10-04 RX ADMIN — INSULIN ASPART 5 UNITS: 100 INJECTION, SOLUTION INTRAVENOUS; SUBCUTANEOUS at 18:00

## 2017-10-04 RX ADMIN — FERROUS SULFATE TAB EC 324 MG (65 MG FE EQUIVALENT) 324 MG: 324 (65 FE) TABLET DELAYED RESPONSE at 09:34

## 2017-10-04 RX ADMIN — CEFTRIAXONE 1 G: 1 INJECTION, SOLUTION INTRAVENOUS at 17:18

## 2017-10-04 RX ADMIN — METOPROLOL TARTRATE 100 MG: 100 TABLET ORAL at 09:34

## 2017-10-04 RX ADMIN — SODIUM CHLORIDE 75 ML/HR: 9 INJECTION, SOLUTION INTRAVENOUS at 20:55

## 2017-10-04 RX ADMIN — LATANOPROST 1 DROP: 50 SOLUTION OPHTHALMIC at 09:34

## 2017-10-04 RX ADMIN — METOPROLOL TARTRATE 100 MG: 100 TABLET ORAL at 20:56

## 2017-10-05 LAB
ANION GAP SERPL CALCULATED.3IONS-SCNC: 14.8 MMOL/L
BACTERIA SPEC AEROBE CULT: ABNORMAL
BACTERIA SPEC AEROBE CULT: ABNORMAL
BASOPHILS # BLD AUTO: 0.02 10*3/MM3 (ref 0–0.2)
BASOPHILS NFR BLD AUTO: 0.3 % (ref 0–2.5)
BUN BLD-MCNC: 24 MG/DL (ref 7–20)
BUN/CREAT SERPL: 21.8 (ref 7.1–23.5)
CALCIUM SPEC-SCNC: 8.7 MG/DL (ref 8.4–10.2)
CHLORIDE SERPL-SCNC: 112 MMOL/L (ref 98–107)
CO2 SERPL-SCNC: 23 MMOL/L (ref 26–30)
CREAT BLD-MCNC: 1.1 MG/DL (ref 0.6–1.3)
DEPRECATED RDW RBC AUTO: 50.6 FL (ref 37–54)
EOSINOPHIL # BLD AUTO: 0.16 10*3/MM3 (ref 0–0.7)
EOSINOPHIL NFR BLD AUTO: 2.6 % (ref 0–7)
ERYTHROCYTE [DISTWIDTH] IN BLOOD BY AUTOMATED COUNT: 16.6 % (ref 11.5–14.5)
GFR SERPL CREATININE-BSD FRML MDRD: 48 ML/MIN/1.73
GLUCOSE BLD-MCNC: 108 MG/DL (ref 74–98)
GLUCOSE BLDC GLUCOMTR-MCNC: 125 MG/DL (ref 70–130)
GLUCOSE BLDC GLUCOMTR-MCNC: 180 MG/DL (ref 70–130)
GLUCOSE BLDC GLUCOMTR-MCNC: 196 MG/DL (ref 70–130)
GLUCOSE BLDC GLUCOMTR-MCNC: 313 MG/DL (ref 70–130)
HCT VFR BLD AUTO: 23.5 % (ref 37–47)
HGB BLD-MCNC: 7.1 G/DL (ref 12–16)
IMM GRANULOCYTES # BLD: 0.02 10*3/MM3 (ref 0–0.06)
IMM GRANULOCYTES NFR BLD: 0.3 % (ref 0–0.6)
INR PPP: 3.89 (ref 0.9–1.1)
LYMPHOCYTES # BLD AUTO: 1.36 10*3/MM3 (ref 0.6–3.4)
LYMPHOCYTES NFR BLD AUTO: 22.5 % (ref 10–50)
MCH RBC QN AUTO: 25.3 PG (ref 27–31)
MCHC RBC AUTO-ENTMCNC: 30.2 G/DL (ref 30–37)
MCV RBC AUTO: 83.6 FL (ref 81–99)
MONOCYTES # BLD AUTO: 0.44 10*3/MM3 (ref 0–0.9)
MONOCYTES NFR BLD AUTO: 7.3 % (ref 0–12)
NEUTROPHILS # BLD AUTO: 4.05 10*3/MM3 (ref 2–6.9)
NEUTROPHILS NFR BLD AUTO: 67 % (ref 37–80)
NRBC BLD MANUAL-RTO: 0 /100 WBC (ref 0–0)
PLATELET # BLD AUTO: 194 10*3/MM3 (ref 130–400)
PMV BLD AUTO: 9.7 FL (ref 6–12)
POTASSIUM BLD-SCNC: 4.8 MMOL/L (ref 3.5–5.1)
PROTHROMBIN TIME: 42.6 SECONDS (ref 9.3–12.1)
RBC # BLD AUTO: 2.81 10*6/MM3 (ref 4.2–5.4)
SODIUM BLD-SCNC: 145 MMOL/L (ref 137–145)
WBC NRBC COR # BLD: 6.05 10*3/MM3 (ref 4.8–10.8)

## 2017-10-05 PROCEDURE — 25010000002 VITAMIN K1 PER 1 MG: Performed by: FAMILY MEDICINE

## 2017-10-05 PROCEDURE — 63710000001 INSULIN ASPART PER 5 UNITS: Performed by: FAMILY MEDICINE

## 2017-10-05 PROCEDURE — 25010000002 CEFTRIAXONE PER 250 MG: Performed by: FAMILY MEDICINE

## 2017-10-05 PROCEDURE — 85025 COMPLETE CBC W/AUTO DIFF WBC: CPT | Performed by: FAMILY MEDICINE

## 2017-10-05 PROCEDURE — 82962 GLUCOSE BLOOD TEST: CPT

## 2017-10-05 PROCEDURE — 99233 SBSQ HOSP IP/OBS HIGH 50: CPT | Performed by: PODIATRIST

## 2017-10-05 PROCEDURE — 85610 PROTHROMBIN TIME: CPT | Performed by: FAMILY MEDICINE

## 2017-10-05 PROCEDURE — 99233 SBSQ HOSP IP/OBS HIGH 50: CPT | Performed by: FAMILY MEDICINE

## 2017-10-05 PROCEDURE — 25010000002 PROMETHAZINE PER 50 MG: Performed by: INTERNAL MEDICINE

## 2017-10-05 PROCEDURE — 80048 BASIC METABOLIC PNL TOTAL CA: CPT | Performed by: FAMILY MEDICINE

## 2017-10-05 PROCEDURE — 25010000002 ONDANSETRON PER 1 MG: Performed by: FAMILY MEDICINE

## 2017-10-05 RX ORDER — PROMETHAZINE HYDROCHLORIDE 25 MG/ML
12.5 INJECTION, SOLUTION INTRAMUSCULAR; INTRAVENOUS EVERY 6 HOURS PRN
Status: DISCONTINUED | OUTPATIENT
Start: 2017-10-05 | End: 2017-10-09

## 2017-10-05 RX ORDER — PROMETHAZINE HYDROCHLORIDE 25 MG/ML
12.5 INJECTION, SOLUTION INTRAMUSCULAR; INTRAVENOUS EVERY 6 HOURS PRN
Status: DISCONTINUED | OUTPATIENT
Start: 2017-10-05 | End: 2017-10-06

## 2017-10-05 RX ORDER — ATORVASTATIN CALCIUM 40 MG/1
40 TABLET, FILM COATED ORAL NIGHTLY
Status: DISCONTINUED | OUTPATIENT
Start: 2017-10-05 | End: 2017-10-12 | Stop reason: HOSPADM

## 2017-10-05 RX ORDER — CLOPIDOGREL BISULFATE 75 MG/1
75 TABLET ORAL DAILY
Status: DISCONTINUED | OUTPATIENT
Start: 2017-10-05 | End: 2017-10-12 | Stop reason: HOSPADM

## 2017-10-05 RX ORDER — PROMETHAZINE HYDROCHLORIDE 12.5 MG/1
12.5 TABLET ORAL EVERY 6 HOURS PRN
Status: DISCONTINUED | OUTPATIENT
Start: 2017-10-05 | End: 2017-10-09

## 2017-10-05 RX ORDER — ACETAMINOPHEN 325 MG/1
650 TABLET ORAL EVERY 6 HOURS PRN
Status: DISCONTINUED | OUTPATIENT
Start: 2017-10-05 | End: 2017-10-10

## 2017-10-05 RX ORDER — SACCHAROMYCES BOULARDII 250 MG
250 CAPSULE ORAL 2 TIMES DAILY
Status: DISCONTINUED | OUTPATIENT
Start: 2017-10-06 | End: 2017-10-12 | Stop reason: HOSPADM

## 2017-10-05 RX ORDER — PHYTONADIONE 10 MG/ML
2.5 INJECTION, EMULSION INTRAMUSCULAR; INTRAVENOUS; SUBCUTANEOUS ONCE
Status: COMPLETED | OUTPATIENT
Start: 2017-10-05 | End: 2017-10-05

## 2017-10-05 RX ADMIN — INSULIN ASPART 3 UNITS: 100 INJECTION, SOLUTION INTRAVENOUS; SUBCUTANEOUS at 21:15

## 2017-10-05 RX ADMIN — LATANOPROST 1 DROP: 50 SOLUTION OPHTHALMIC at 09:33

## 2017-10-05 RX ADMIN — FAMOTIDINE 20 MG: 20 TABLET, FILM COATED ORAL at 20:23

## 2017-10-05 RX ADMIN — ONDANSETRON 4 MG: 2 INJECTION INTRAMUSCULAR; INTRAVENOUS at 12:38

## 2017-10-05 RX ADMIN — INSULIN ASPART 5 UNITS: 100 INJECTION, SOLUTION INTRAVENOUS; SUBCUTANEOUS at 17:21

## 2017-10-05 RX ADMIN — SODIUM CHLORIDE 75 ML/HR: 9 INJECTION, SOLUTION INTRAVENOUS at 09:43

## 2017-10-05 RX ADMIN — ONDANSETRON 4 MG: 2 INJECTION INTRAMUSCULAR; INTRAVENOUS at 21:49

## 2017-10-05 RX ADMIN — FERROUS SULFATE TAB EC 324 MG (65 MG FE EQUIVALENT) 324 MG: 324 (65 FE) TABLET DELAYED RESPONSE at 09:33

## 2017-10-05 RX ADMIN — PHYTONADIONE 2.5 MG: 10 INJECTION, EMULSION INTRAMUSCULAR; INTRAVENOUS; SUBCUTANEOUS at 09:34

## 2017-10-05 RX ADMIN — CEFTRIAXONE 1 G: 1 INJECTION, SOLUTION INTRAVENOUS at 17:21

## 2017-10-05 RX ADMIN — ATORVASTATIN CALCIUM 40 MG: 40 TABLET, FILM COATED ORAL at 20:23

## 2017-10-05 RX ADMIN — PROMETHAZINE HYDROCHLORIDE 12.5 MG: 25 INJECTION INTRAMUSCULAR; INTRAVENOUS at 23:35

## 2017-10-05 RX ADMIN — ACETAMINOPHEN 650 MG: 325 TABLET, FILM COATED ORAL at 13:03

## 2017-10-05 RX ADMIN — FAMOTIDINE 20 MG: 20 TABLET, FILM COATED ORAL at 09:33

## 2017-10-05 RX ADMIN — FERROUS SULFATE TAB EC 324 MG (65 MG FE EQUIVALENT) 324 MG: 324 (65 FE) TABLET DELAYED RESPONSE at 17:21

## 2017-10-05 RX ADMIN — CLOPIDOGREL BISULFATE 75 MG: 75 TABLET ORAL at 09:33

## 2017-10-05 RX ADMIN — METOPROLOL TARTRATE 100 MG: 100 TABLET ORAL at 09:33

## 2017-10-05 RX ADMIN — METOPROLOL TARTRATE 100 MG: 100 TABLET ORAL at 20:23

## 2017-10-06 ENCOUNTER — APPOINTMENT (OUTPATIENT)
Dept: GENERAL RADIOLOGY | Facility: HOSPITAL | Age: 82
End: 2017-10-06

## 2017-10-06 ENCOUNTER — APPOINTMENT (OUTPATIENT)
Dept: CARDIOLOGY | Facility: HOSPITAL | Age: 82
End: 2017-10-06
Attending: INTERNAL MEDICINE

## 2017-10-06 LAB
ABO GROUP BLD: NORMAL
BASOPHILS # BLD AUTO: 0.03 10*3/MM3 (ref 0–0.2)
BASOPHILS NFR BLD AUTO: 0.4 % (ref 0–2.5)
BLD GP AB SCN SERPL QL: NEGATIVE
DEPRECATED RDW RBC AUTO: 53.1 FL (ref 37–54)
EOSINOPHIL # BLD AUTO: 0.03 10*3/MM3 (ref 0–0.7)
EOSINOPHIL NFR BLD AUTO: 0.4 % (ref 0–7)
ERYTHROCYTE [DISTWIDTH] IN BLOOD BY AUTOMATED COUNT: 17 % (ref 11.5–14.5)
GLUCOSE BLDC GLUCOMTR-MCNC: 124 MG/DL (ref 70–130)
GLUCOSE BLDC GLUCOMTR-MCNC: 164 MG/DL (ref 70–130)
GLUCOSE BLDC GLUCOMTR-MCNC: 166 MG/DL (ref 70–130)
GLUCOSE BLDC GLUCOMTR-MCNC: 237 MG/DL (ref 70–130)
GLUCOSE BLDC GLUCOMTR-MCNC: 269 MG/DL (ref 70–130)
HCT VFR BLD AUTO: 22.2 % (ref 37–47)
HEMOCCULT STL QL: NEGATIVE
HGB BLD-MCNC: 6.7 G/DL (ref 12–16)
IMM GRANULOCYTES # BLD: 0.08 10*3/MM3 (ref 0–0.06)
IMM GRANULOCYTES NFR BLD: 1.1 % (ref 0–0.6)
INR PPP: 2.35 (ref 0.9–1.1)
LYMPHOCYTES # BLD AUTO: 1.44 10*3/MM3 (ref 0.6–3.4)
LYMPHOCYTES NFR BLD AUTO: 20.1 % (ref 10–50)
MCH RBC QN AUTO: 25.7 PG (ref 27–31)
MCHC RBC AUTO-ENTMCNC: 30.2 G/DL (ref 30–37)
MCV RBC AUTO: 85.1 FL (ref 81–99)
MONOCYTES # BLD AUTO: 0.52 10*3/MM3 (ref 0–0.9)
MONOCYTES NFR BLD AUTO: 7.2 % (ref 0–12)
NEUTROPHILS # BLD AUTO: 5.08 10*3/MM3 (ref 2–6.9)
NEUTROPHILS NFR BLD AUTO: 70.8 % (ref 37–80)
NRBC BLD MANUAL-RTO: 0 /100 WBC (ref 0–0)
PLATELET # BLD AUTO: 181 10*3/MM3 (ref 130–400)
PMV BLD AUTO: 10.3 FL (ref 6–12)
PROTHROMBIN TIME: 25.7 SECONDS (ref 9.3–12.1)
RBC # BLD AUTO: 2.61 10*6/MM3 (ref 4.2–5.4)
RH BLD: POSITIVE
WBC NRBC COR # BLD: 7.18 10*3/MM3 (ref 4.8–10.8)

## 2017-10-06 PROCEDURE — C1751 CATH, INF, PER/CENT/MIDLINE: HCPCS

## 2017-10-06 PROCEDURE — 82962 GLUCOSE BLOOD TEST: CPT

## 2017-10-06 PROCEDURE — 86850 RBC ANTIBODY SCREEN: CPT | Performed by: FAMILY MEDICINE

## 2017-10-06 PROCEDURE — 63710000001 INSULIN ASPART PER 5 UNITS: Performed by: FAMILY MEDICINE

## 2017-10-06 PROCEDURE — P9016 RBC LEUKOCYTES REDUCED: HCPCS

## 2017-10-06 PROCEDURE — 71010 HC CHEST PA OR AP: CPT

## 2017-10-06 PROCEDURE — 63710000001 DIPHENHYDRAMINE PER 50 MG: Performed by: FAMILY MEDICINE

## 2017-10-06 PROCEDURE — 25010000002 CEFTRIAXONE PER 250 MG: Performed by: FAMILY MEDICINE

## 2017-10-06 PROCEDURE — 86920 COMPATIBILITY TEST SPIN: CPT

## 2017-10-06 PROCEDURE — 99232 SBSQ HOSP IP/OBS MODERATE 35: CPT | Performed by: INTERNAL MEDICINE

## 2017-10-06 PROCEDURE — 86900 BLOOD TYPING SEROLOGIC ABO: CPT | Performed by: FAMILY MEDICINE

## 2017-10-06 PROCEDURE — 36430 TRANSFUSION BLD/BLD COMPNT: CPT

## 2017-10-06 PROCEDURE — 86901 BLOOD TYPING SEROLOGIC RH(D): CPT | Performed by: FAMILY MEDICINE

## 2017-10-06 PROCEDURE — 82272 OCCULT BLD FECES 1-3 TESTS: CPT | Performed by: FAMILY MEDICINE

## 2017-10-06 PROCEDURE — 86900 BLOOD TYPING SEROLOGIC ABO: CPT

## 2017-10-06 PROCEDURE — C1894 INTRO/SHEATH, NON-LASER: HCPCS

## 2017-10-06 PROCEDURE — 25010000002 FUROSEMIDE PER 20 MG: Performed by: INTERNAL MEDICINE

## 2017-10-06 PROCEDURE — 93306 TTE W/DOPPLER COMPLETE: CPT

## 2017-10-06 PROCEDURE — 85610 PROTHROMBIN TIME: CPT | Performed by: FAMILY MEDICINE

## 2017-10-06 PROCEDURE — 02HV33Z INSERTION OF INFUSION DEVICE INTO SUPERIOR VENA CAVA, PERCUTANEOUS APPROACH: ICD-10-PCS | Performed by: INTERNAL MEDICINE

## 2017-10-06 PROCEDURE — 85025 COMPLETE CBC W/AUTO DIFF WBC: CPT | Performed by: INTERNAL MEDICINE

## 2017-10-06 PROCEDURE — 99233 SBSQ HOSP IP/OBS HIGH 50: CPT | Performed by: PODIATRIST

## 2017-10-06 RX ORDER — SODIUM CHLORIDE 0.9 % (FLUSH) 0.9 %
10 SYRINGE (ML) INJECTION AS NEEDED
Status: DISCONTINUED | OUTPATIENT
Start: 2017-10-06 | End: 2017-10-12 | Stop reason: HOSPADM

## 2017-10-06 RX ORDER — DIPHENHYDRAMINE HCL 25 MG
25 CAPSULE ORAL ONCE
Status: COMPLETED | OUTPATIENT
Start: 2017-10-06 | End: 2017-10-06

## 2017-10-06 RX ORDER — ACETAMINOPHEN 325 MG/1
650 TABLET ORAL ONCE
Status: COMPLETED | OUTPATIENT
Start: 2017-10-06 | End: 2017-10-06

## 2017-10-06 RX ORDER — FUROSEMIDE 10 MG/ML
40 INJECTION INTRAMUSCULAR; INTRAVENOUS ONCE
Status: COMPLETED | OUTPATIENT
Start: 2017-10-06 | End: 2017-10-06

## 2017-10-06 RX ADMIN — RDII 250 MG CAPSULE 250 MG: at 09:29

## 2017-10-06 RX ADMIN — CEFTRIAXONE 1 G: 1 INJECTION, SOLUTION INTRAVENOUS at 15:33

## 2017-10-06 RX ADMIN — CLOPIDOGREL BISULFATE 75 MG: 75 TABLET ORAL at 09:31

## 2017-10-06 RX ADMIN — METOPROLOL TARTRATE 100 MG: 100 TABLET ORAL at 09:30

## 2017-10-06 RX ADMIN — ATORVASTATIN CALCIUM 40 MG: 40 TABLET, FILM COATED ORAL at 21:03

## 2017-10-06 RX ADMIN — FUROSEMIDE 40 MG: 10 INJECTION, SOLUTION INTRAMUSCULAR; INTRAVENOUS at 15:03

## 2017-10-06 RX ADMIN — FERROUS SULFATE TAB EC 324 MG (65 MG FE EQUIVALENT) 324 MG: 324 (65 FE) TABLET DELAYED RESPONSE at 19:33

## 2017-10-06 RX ADMIN — ACETAMINOPHEN 650 MG: 325 TABLET, FILM COATED ORAL at 00:34

## 2017-10-06 RX ADMIN — FERROUS SULFATE TAB EC 324 MG (65 MG FE EQUIVALENT) 324 MG: 324 (65 FE) TABLET DELAYED RESPONSE at 09:30

## 2017-10-06 RX ADMIN — INSULIN ASPART 5 UNITS: 100 INJECTION, SOLUTION INTRAVENOUS; SUBCUTANEOUS at 18:47

## 2017-10-06 RX ADMIN — DIPHENHYDRAMINE HYDROCHLORIDE 25 MG: 25 CAPSULE ORAL at 10:53

## 2017-10-06 RX ADMIN — RDII 250 MG CAPSULE 250 MG: at 19:33

## 2017-10-06 RX ADMIN — LATANOPROST 1 DROP: 50 SOLUTION OPHTHALMIC at 09:32

## 2017-10-06 RX ADMIN — ACETAMINOPHEN 650 MG: 325 TABLET, FILM COATED ORAL at 10:54

## 2017-10-06 RX ADMIN — ACETAMINOPHEN 650 MG: 325 TABLET, FILM COATED ORAL at 10:53

## 2017-10-06 RX ADMIN — ACETAMINOPHEN 650 MG: 325 TABLET, FILM COATED ORAL at 21:05

## 2017-10-06 RX ADMIN — FAMOTIDINE 20 MG: 20 TABLET, FILM COATED ORAL at 09:31

## 2017-10-06 RX ADMIN — METOPROLOL TARTRATE 100 MG: 100 TABLET ORAL at 21:03

## 2017-10-06 RX ADMIN — FAMOTIDINE 20 MG: 20 TABLET, FILM COATED ORAL at 21:03

## 2017-10-06 RX ADMIN — INSULIN ASPART 3 UNITS: 100 INJECTION, SOLUTION INTRAVENOUS; SUBCUTANEOUS at 06:56

## 2017-10-07 LAB
ABO + RH BLD: NORMAL
ABO + RH BLD: NORMAL
ALBUMIN SERPL-MCNC: 3.8 G/DL (ref 3.5–5)
ANION GAP SERPL CALCULATED.3IONS-SCNC: 19.2 MMOL/L
BACTERIA SPEC AEROBE CULT: ABNORMAL
BH BB BLOOD EXPIRATION DATE: NORMAL
BH BB BLOOD EXPIRATION DATE: NORMAL
BH BB BLOOD TYPE BARCODE: 5100
BH BB BLOOD TYPE BARCODE: 5100
BH BB DISPENSE STATUS: NORMAL
BH BB DISPENSE STATUS: NORMAL
BH BB PRODUCT CODE: NORMAL
BH BB PRODUCT CODE: NORMAL
BH BB UNIT NUMBER: NORMAL
BH BB UNIT NUMBER: NORMAL
BUN BLD-MCNC: 25 MG/DL (ref 7–20)
BUN/CREAT SERPL: 20.8 (ref 7.1–23.5)
CALCIUM SPEC-SCNC: 9 MG/DL (ref 8.4–10.2)
CHLORIDE SERPL-SCNC: 109 MMOL/L (ref 98–107)
CO2 SERPL-SCNC: 22 MMOL/L (ref 26–30)
CREAT BLD-MCNC: 1.2 MG/DL (ref 0.6–1.3)
CROSSMATCH INTERPRETATION: NORMAL
CROSSMATCH INTERPRETATION: NORMAL
DEPRECATED RDW RBC AUTO: 51.8 FL (ref 37–54)
ERYTHROCYTE [DISTWIDTH] IN BLOOD BY AUTOMATED COUNT: 16.8 % (ref 11.5–14.5)
GFR SERPL CREATININE-BSD FRML MDRD: 43 ML/MIN/1.73
GLUCOSE BLD-MCNC: 182 MG/DL (ref 74–98)
GLUCOSE BLDC GLUCOMTR-MCNC: 145 MG/DL (ref 70–130)
GLUCOSE BLDC GLUCOMTR-MCNC: 196 MG/DL (ref 70–130)
GLUCOSE BLDC GLUCOMTR-MCNC: 239 MG/DL (ref 70–130)
GLUCOSE BLDC GLUCOMTR-MCNC: 246 MG/DL (ref 70–130)
GLUCOSE BLDC GLUCOMTR-MCNC: 258 MG/DL (ref 70–130)
HCT VFR BLD AUTO: 28.8 % (ref 37–47)
HGB BLD-MCNC: 9 G/DL (ref 12–16)
INR PPP: 1.62 (ref 0.9–1.1)
MCH RBC QN AUTO: 26.5 PG (ref 27–31)
MCHC RBC AUTO-ENTMCNC: 31.3 G/DL (ref 30–37)
MCV RBC AUTO: 84.7 FL (ref 81–99)
PHOSPHATE SERPL-MCNC: 3.5 MG/DL (ref 2.5–4.5)
PLATELET # BLD AUTO: 197 10*3/MM3 (ref 130–400)
PMV BLD AUTO: 10.2 FL (ref 6–12)
POTASSIUM BLD-SCNC: 4.2 MMOL/L (ref 3.5–5.1)
PROTHROMBIN TIME: 17.7 SECONDS (ref 9.3–12.1)
RBC # BLD AUTO: 3.4 10*6/MM3 (ref 4.2–5.4)
SODIUM BLD-SCNC: 146 MMOL/L (ref 137–145)
UNIT  ABO: NORMAL
UNIT  ABO: NORMAL
UNIT  RH: NORMAL
UNIT  RH: NORMAL
WBC NRBC COR # BLD: 7.59 10*3/MM3 (ref 4.8–10.8)

## 2017-10-07 PROCEDURE — 85610 PROTHROMBIN TIME: CPT | Performed by: FAMILY MEDICINE

## 2017-10-07 PROCEDURE — 63710000001 INSULIN ASPART PER 5 UNITS: Performed by: FAMILY MEDICINE

## 2017-10-07 PROCEDURE — 80069 RENAL FUNCTION PANEL: CPT | Performed by: INTERNAL MEDICINE

## 2017-10-07 PROCEDURE — 25010000002 ENOXAPARIN PER 10 MG: Performed by: INTERNAL MEDICINE

## 2017-10-07 PROCEDURE — 25010000002 CEFTRIAXONE PER 250 MG: Performed by: FAMILY MEDICINE

## 2017-10-07 PROCEDURE — 99232 SBSQ HOSP IP/OBS MODERATE 35: CPT | Performed by: INTERNAL MEDICINE

## 2017-10-07 PROCEDURE — 85027 COMPLETE CBC AUTOMATED: CPT | Performed by: INTERNAL MEDICINE

## 2017-10-07 PROCEDURE — 82962 GLUCOSE BLOOD TEST: CPT

## 2017-10-07 RX ADMIN — METOPROLOL TARTRATE 100 MG: 100 TABLET ORAL at 22:00

## 2017-10-07 RX ADMIN — FAMOTIDINE 20 MG: 20 TABLET, FILM COATED ORAL at 22:00

## 2017-10-07 RX ADMIN — INSULIN ASPART 3 UNITS: 100 INJECTION, SOLUTION INTRAVENOUS; SUBCUTANEOUS at 06:41

## 2017-10-07 RX ADMIN — METOPROLOL TARTRATE 100 MG: 100 TABLET ORAL at 09:47

## 2017-10-07 RX ADMIN — ATORVASTATIN CALCIUM 40 MG: 40 TABLET, FILM COATED ORAL at 22:00

## 2017-10-07 RX ADMIN — FERROUS SULFATE TAB EC 324 MG (65 MG FE EQUIVALENT) 324 MG: 324 (65 FE) TABLET DELAYED RESPONSE at 09:47

## 2017-10-07 RX ADMIN — LATANOPROST 1 DROP: 50 SOLUTION OPHTHALMIC at 09:46

## 2017-10-07 RX ADMIN — CEFTRIAXONE 1 G: 1 INJECTION, SOLUTION INTRAVENOUS at 15:51

## 2017-10-07 RX ADMIN — FERROUS SULFATE TAB EC 324 MG (65 MG FE EQUIVALENT) 324 MG: 324 (65 FE) TABLET DELAYED RESPONSE at 18:23

## 2017-10-07 RX ADMIN — CLOPIDOGREL BISULFATE 75 MG: 75 TABLET ORAL at 09:47

## 2017-10-07 RX ADMIN — FAMOTIDINE 20 MG: 20 TABLET, FILM COATED ORAL at 09:47

## 2017-10-07 RX ADMIN — INSULIN ASPART 5 UNITS: 100 INJECTION, SOLUTION INTRAVENOUS; SUBCUTANEOUS at 22:00

## 2017-10-07 RX ADMIN — RDII 250 MG CAPSULE 250 MG: at 09:47

## 2017-10-07 RX ADMIN — INSULIN ASPART 5 UNITS: 100 INJECTION, SOLUTION INTRAVENOUS; SUBCUTANEOUS at 12:22

## 2017-10-07 RX ADMIN — RDII 250 MG CAPSULE 250 MG: at 18:23

## 2017-10-07 RX ADMIN — ENOXAPARIN SODIUM 60 MG: 60 INJECTION SUBCUTANEOUS at 12:43

## 2017-10-08 LAB
ALBUMIN SERPL-MCNC: 3.3 G/DL (ref 3.5–5)
ANION GAP SERPL CALCULATED.3IONS-SCNC: 15.5 MMOL/L
BASOPHILS # BLD AUTO: 0.02 10*3/MM3 (ref 0–0.2)
BASOPHILS NFR BLD AUTO: 0.3 % (ref 0–2.5)
BUN BLD-MCNC: 25 MG/DL (ref 7–20)
BUN/CREAT SERPL: 22.7 (ref 7.1–23.5)
CALCIUM SPEC-SCNC: 9.1 MG/DL (ref 8.4–10.2)
CHLORIDE SERPL-SCNC: 108 MMOL/L (ref 98–107)
CO2 SERPL-SCNC: 25 MMOL/L (ref 26–30)
CREAT BLD-MCNC: 1.1 MG/DL (ref 0.6–1.3)
DEPRECATED RDW RBC AUTO: 52.3 FL (ref 37–54)
EOSINOPHIL # BLD AUTO: 0.22 10*3/MM3 (ref 0–0.7)
EOSINOPHIL NFR BLD AUTO: 3.6 % (ref 0–7)
ERYTHROCYTE [DISTWIDTH] IN BLOOD BY AUTOMATED COUNT: 16.9 % (ref 11.5–14.5)
GFR SERPL CREATININE-BSD FRML MDRD: 48 ML/MIN/1.73
GLUCOSE BLD-MCNC: 131 MG/DL (ref 74–98)
GLUCOSE BLDC GLUCOMTR-MCNC: 134 MG/DL (ref 70–130)
GLUCOSE BLDC GLUCOMTR-MCNC: 173 MG/DL (ref 70–130)
GLUCOSE BLDC GLUCOMTR-MCNC: 226 MG/DL (ref 70–130)
GLUCOSE BLDC GLUCOMTR-MCNC: 235 MG/DL (ref 70–130)
HCT VFR BLD AUTO: 27.3 % (ref 37–47)
HEMOCCULT STL QL: POSITIVE
HGB BLD-MCNC: 8.5 G/DL (ref 12–16)
IMM GRANULOCYTES # BLD: 0.07 10*3/MM3 (ref 0–0.06)
IMM GRANULOCYTES NFR BLD: 1.1 % (ref 0–0.6)
INR PPP: 1.39 (ref 0.9–1.1)
LYMPHOCYTES # BLD AUTO: 1.44 10*3/MM3 (ref 0.6–3.4)
LYMPHOCYTES NFR BLD AUTO: 23.4 % (ref 10–50)
MCH RBC QN AUTO: 26.4 PG (ref 27–31)
MCHC RBC AUTO-ENTMCNC: 31.1 G/DL (ref 30–37)
MCV RBC AUTO: 84.8 FL (ref 81–99)
MONOCYTES # BLD AUTO: 0.65 10*3/MM3 (ref 0–0.9)
MONOCYTES NFR BLD AUTO: 10.6 % (ref 0–12)
NEUTROPHILS # BLD AUTO: 3.76 10*3/MM3 (ref 2–6.9)
NEUTROPHILS NFR BLD AUTO: 61 % (ref 37–80)
NRBC BLD MANUAL-RTO: 0.3 /100 WBC (ref 0–0)
PHOSPHATE SERPL-MCNC: 3.9 MG/DL (ref 2.5–4.5)
PLATELET # BLD AUTO: 187 10*3/MM3 (ref 130–400)
PMV BLD AUTO: 10.2 FL (ref 6–12)
POTASSIUM BLD-SCNC: 4.5 MMOL/L (ref 3.5–5.1)
PROTHROMBIN TIME: 15.2 SECONDS (ref 9.3–12.1)
RBC # BLD AUTO: 3.22 10*6/MM3 (ref 4.2–5.4)
SODIUM BLD-SCNC: 144 MMOL/L (ref 137–145)
WBC NRBC COR # BLD: 6.16 10*3/MM3 (ref 4.8–10.8)

## 2017-10-08 PROCEDURE — 99232 SBSQ HOSP IP/OBS MODERATE 35: CPT | Performed by: INTERNAL MEDICINE

## 2017-10-08 PROCEDURE — 80069 RENAL FUNCTION PANEL: CPT | Performed by: INTERNAL MEDICINE

## 2017-10-08 PROCEDURE — 85610 PROTHROMBIN TIME: CPT | Performed by: FAMILY MEDICINE

## 2017-10-08 PROCEDURE — 25010000002 ENOXAPARIN PER 10 MG: Performed by: INTERNAL MEDICINE

## 2017-10-08 PROCEDURE — 82962 GLUCOSE BLOOD TEST: CPT

## 2017-10-08 PROCEDURE — 63710000001 INSULIN ASPART PER 5 UNITS: Performed by: FAMILY MEDICINE

## 2017-10-08 PROCEDURE — 85025 COMPLETE CBC W/AUTO DIFF WBC: CPT | Performed by: INTERNAL MEDICINE

## 2017-10-08 PROCEDURE — 25010000002 CEFTRIAXONE PER 250 MG: Performed by: FAMILY MEDICINE

## 2017-10-08 PROCEDURE — 99222 1ST HOSP IP/OBS MODERATE 55: CPT | Performed by: SURGERY

## 2017-10-08 PROCEDURE — 82272 OCCULT BLD FECES 1-3 TESTS: CPT | Performed by: SURGERY

## 2017-10-08 RX ADMIN — FERROUS SULFATE TAB EC 324 MG (65 MG FE EQUIVALENT) 324 MG: 324 (65 FE) TABLET DELAYED RESPONSE at 18:08

## 2017-10-08 RX ADMIN — METOPROLOL TARTRATE 100 MG: 100 TABLET ORAL at 08:43

## 2017-10-08 RX ADMIN — RDII 250 MG CAPSULE 250 MG: at 08:39

## 2017-10-08 RX ADMIN — FAMOTIDINE 20 MG: 20 TABLET, FILM COATED ORAL at 20:15

## 2017-10-08 RX ADMIN — RDII 250 MG CAPSULE 250 MG: at 18:08

## 2017-10-08 RX ADMIN — INSULIN ASPART 5 UNITS: 100 INJECTION, SOLUTION INTRAVENOUS; SUBCUTANEOUS at 21:10

## 2017-10-08 RX ADMIN — ATORVASTATIN CALCIUM 40 MG: 40 TABLET, FILM COATED ORAL at 20:15

## 2017-10-08 RX ADMIN — INSULIN ASPART 5 UNITS: 100 INJECTION, SOLUTION INTRAVENOUS; SUBCUTANEOUS at 11:33

## 2017-10-08 RX ADMIN — CLOPIDOGREL BISULFATE 75 MG: 75 TABLET ORAL at 08:39

## 2017-10-08 RX ADMIN — METOPROLOL TARTRATE 100 MG: 100 TABLET ORAL at 20:15

## 2017-10-08 RX ADMIN — FERROUS SULFATE TAB EC 324 MG (65 MG FE EQUIVALENT) 324 MG: 324 (65 FE) TABLET DELAYED RESPONSE at 08:39

## 2017-10-08 RX ADMIN — ENOXAPARIN SODIUM 60 MG: 60 INJECTION SUBCUTANEOUS at 10:54

## 2017-10-08 RX ADMIN — LATANOPROST 1 DROP: 50 SOLUTION OPHTHALMIC at 08:39

## 2017-10-08 RX ADMIN — FAMOTIDINE 20 MG: 20 TABLET, FILM COATED ORAL at 08:39

## 2017-10-08 RX ADMIN — Medication 10 ML: at 20:20

## 2017-10-08 RX ADMIN — INSULIN ASPART 3 UNITS: 100 INJECTION, SOLUTION INTRAVENOUS; SUBCUTANEOUS at 16:33

## 2017-10-08 RX ADMIN — CEFTRIAXONE 1 G: 1 INJECTION, SOLUTION INTRAVENOUS at 16:34

## 2017-10-09 ENCOUNTER — APPOINTMENT (OUTPATIENT)
Dept: NUCLEAR MEDICINE | Facility: HOSPITAL | Age: 82
End: 2017-10-09

## 2017-10-09 ENCOUNTER — ANESTHESIA (OUTPATIENT)
Dept: GASTROENTEROLOGY | Facility: HOSPITAL | Age: 82
End: 2017-10-09

## 2017-10-09 ENCOUNTER — ANESTHESIA EVENT (OUTPATIENT)
Dept: GASTROENTEROLOGY | Facility: HOSPITAL | Age: 82
End: 2017-10-09

## 2017-10-09 PROBLEM — I73.9 PERIPHERAL ARTERIAL DISEASE (HCC): Status: ACTIVE | Noted: 2017-10-09

## 2017-10-09 PROBLEM — K92.1 MELENA: Status: ACTIVE | Noted: 2017-10-03

## 2017-10-09 PROBLEM — K29.50 CHRONIC GASTRITIS: Status: ACTIVE | Noted: 2017-10-09

## 2017-10-09 LAB
ALBUMIN SERPL-MCNC: 3.7 G/DL (ref 3.5–5)
ANION GAP SERPL CALCULATED.3IONS-SCNC: 14.7 MMOL/L
BASOPHILS # BLD AUTO: 0.03 10*3/MM3 (ref 0–0.2)
BASOPHILS NFR BLD AUTO: 0.4 % (ref 0–2.5)
BUN BLD-MCNC: 30 MG/DL (ref 7–20)
BUN/CREAT SERPL: 27.3 (ref 7.1–23.5)
CALCIUM SPEC-SCNC: 9.7 MG/DL (ref 8.4–10.2)
CHLORIDE SERPL-SCNC: 105 MMOL/L (ref 98–107)
CO2 SERPL-SCNC: 28 MMOL/L (ref 26–30)
CREAT BLD-MCNC: 1.1 MG/DL (ref 0.6–1.3)
DEPRECATED RDW RBC AUTO: 53.1 FL (ref 37–54)
EOSINOPHIL # BLD AUTO: 0.22 10*3/MM3 (ref 0–0.7)
EOSINOPHIL NFR BLD AUTO: 3.2 % (ref 0–7)
ERYTHROCYTE [DISTWIDTH] IN BLOOD BY AUTOMATED COUNT: 17.2 % (ref 11.5–14.5)
GFR SERPL CREATININE-BSD FRML MDRD: 48 ML/MIN/1.73
GLUCOSE BLD-MCNC: 157 MG/DL (ref 74–98)
GLUCOSE BLDC GLUCOMTR-MCNC: 152 MG/DL (ref 70–130)
GLUCOSE BLDC GLUCOMTR-MCNC: 187 MG/DL (ref 70–130)
GLUCOSE BLDC GLUCOMTR-MCNC: 238 MG/DL (ref 70–130)
HCT VFR BLD AUTO: 29.5 % (ref 37–47)
HGB BLD-MCNC: 9.1 G/DL (ref 12–16)
IMM GRANULOCYTES # BLD: 0.05 10*3/MM3 (ref 0–0.06)
IMM GRANULOCYTES NFR BLD: 0.7 % (ref 0–0.6)
INR PPP: 1.32 (ref 0.9–1.1)
LYMPHOCYTES # BLD AUTO: 1.66 10*3/MM3 (ref 0.6–3.4)
LYMPHOCYTES NFR BLD AUTO: 24.1 % (ref 10–50)
MCH RBC QN AUTO: 26.1 PG (ref 27–31)
MCHC RBC AUTO-ENTMCNC: 30.8 G/DL (ref 30–37)
MCV RBC AUTO: 84.8 FL (ref 81–99)
MONOCYTES # BLD AUTO: 0.62 10*3/MM3 (ref 0–0.9)
MONOCYTES NFR BLD AUTO: 9 % (ref 0–12)
NEUTROPHILS # BLD AUTO: 4.32 10*3/MM3 (ref 2–6.9)
NEUTROPHILS NFR BLD AUTO: 62.6 % (ref 37–80)
NRBC BLD MANUAL-RTO: 0 /100 WBC (ref 0–0)
PHOSPHATE SERPL-MCNC: 4.1 MG/DL (ref 2.5–4.5)
PLATELET # BLD AUTO: 222 10*3/MM3 (ref 130–400)
PMV BLD AUTO: 9.7 FL (ref 6–12)
POTASSIUM BLD-SCNC: 4.7 MMOL/L (ref 3.5–5.1)
PROTHROMBIN TIME: 14.5 SECONDS (ref 9.3–12.1)
RBC # BLD AUTO: 3.48 10*6/MM3 (ref 4.2–5.4)
SODIUM BLD-SCNC: 143 MMOL/L (ref 137–145)
WBC NRBC COR # BLD: 6.9 10*3/MM3 (ref 4.8–10.8)

## 2017-10-09 PROCEDURE — 25010000002 CEFTAZIDIME PER 500 MG: Performed by: INTERNAL MEDICINE

## 2017-10-09 PROCEDURE — 82962 GLUCOSE BLOOD TEST: CPT

## 2017-10-09 PROCEDURE — 25010000002 PROPOFOL 200 MG/20ML EMULSION: Performed by: NURSE ANESTHETIST, CERTIFIED REGISTERED

## 2017-10-09 PROCEDURE — A9547 IN111 OXYQUINOLINE: HCPCS | Performed by: INTERNAL MEDICINE

## 2017-10-09 PROCEDURE — 63710000001 INSULIN ASPART PER 5 UNITS: Performed by: FAMILY MEDICINE

## 2017-10-09 PROCEDURE — 0 INDIUM-111 OXYQUINOLINE 1 MCI/ML SOLUTION: Performed by: INTERNAL MEDICINE

## 2017-10-09 PROCEDURE — 85025 COMPLETE CBC W/AUTO DIFF WBC: CPT | Performed by: INTERNAL MEDICINE

## 2017-10-09 PROCEDURE — 0DB68ZX EXCISION OF STOMACH, VIA NATURAL OR ARTIFICIAL OPENING ENDOSCOPIC, DIAGNOSTIC: ICD-10-PCS | Performed by: SURGERY

## 2017-10-09 PROCEDURE — 88305 TISSUE EXAM BY PATHOLOGIST: CPT | Performed by: SURGERY

## 2017-10-09 PROCEDURE — 99232 SBSQ HOSP IP/OBS MODERATE 35: CPT | Performed by: INTERNAL MEDICINE

## 2017-10-09 PROCEDURE — 0DB58ZX EXCISION OF ESOPHAGUS, VIA NATURAL OR ARTIFICIAL OPENING ENDOSCOPIC, DIAGNOSTIC: ICD-10-PCS | Performed by: SURGERY

## 2017-10-09 PROCEDURE — 85610 PROTHROMBIN TIME: CPT | Performed by: FAMILY MEDICINE

## 2017-10-09 PROCEDURE — 80069 RENAL FUNCTION PANEL: CPT | Performed by: INTERNAL MEDICINE

## 2017-10-09 RX ORDER — SODIUM CHLORIDE 9 MG/ML
75 INJECTION, SOLUTION INTRAVENOUS CONTINUOUS
Status: DISCONTINUED | OUTPATIENT
Start: 2017-10-09 | End: 2017-10-10

## 2017-10-09 RX ORDER — PROPOFOL 10 MG/ML
INJECTION, EMULSION INTRAVENOUS AS NEEDED
Status: DISCONTINUED | OUTPATIENT
Start: 2017-10-09 | End: 2017-10-09 | Stop reason: SURG

## 2017-10-09 RX ORDER — INDIUM IN-111 OXYQUINOLINE 1 UG/ML
SOLUTION INTRAVENOUS
Status: COMPLETED | OUTPATIENT
Start: 2017-10-09 | End: 2017-10-09

## 2017-10-09 RX ORDER — POLYETHYLENE GLYCOL 3350 17 G/17G
255 POWDER, FOR SOLUTION ORAL ONCE
Status: DISCONTINUED | OUTPATIENT
Start: 2017-10-09 | End: 2017-10-09

## 2017-10-09 RX ORDER — SODIUM CHLORIDE 0.9 % (FLUSH) 0.9 %
3 SYRINGE (ML) INJECTION AS NEEDED
Status: DISCONTINUED | OUTPATIENT
Start: 2017-10-09 | End: 2017-10-09

## 2017-10-09 RX ORDER — WARFARIN SODIUM 5 MG/1
5 TABLET ORAL
Status: DISCONTINUED | OUTPATIENT
Start: 2017-10-09 | End: 2017-10-09

## 2017-10-09 RX ORDER — SODIUM CHLORIDE, SODIUM LACTATE, POTASSIUM CHLORIDE, CALCIUM CHLORIDE 600; 310; 30; 20 MG/100ML; MG/100ML; MG/100ML; MG/100ML
1000 INJECTION, SOLUTION INTRAVENOUS CONTINUOUS PRN
Status: DISCONTINUED | OUTPATIENT
Start: 2017-10-09 | End: 2017-10-12 | Stop reason: HOSPADM

## 2017-10-09 RX ADMIN — INSULIN ASPART 5 UNITS: 100 INJECTION, SOLUTION INTRAVENOUS; SUBCUTANEOUS at 16:43

## 2017-10-09 RX ADMIN — ATORVASTATIN CALCIUM 40 MG: 40 TABLET, FILM COATED ORAL at 20:55

## 2017-10-09 RX ADMIN — RDII 250 MG CAPSULE 250 MG: at 18:34

## 2017-10-09 RX ADMIN — METOPROLOL TARTRATE 100 MG: 100 TABLET ORAL at 09:09

## 2017-10-09 RX ADMIN — METOPROLOL TARTRATE 100 MG: 100 TABLET ORAL at 20:55

## 2017-10-09 RX ADMIN — CEFTAZIDIME 1 G: 1 INJECTION, POWDER, FOR SOLUTION INTRAMUSCULAR; INTRAVENOUS at 16:43

## 2017-10-09 RX ADMIN — SODIUM CHLORIDE 75 ML/HR: 9 INJECTION, SOLUTION INTRAVENOUS at 16:31

## 2017-10-09 RX ADMIN — FERROUS SULFATE TAB EC 324 MG (65 MG FE EQUIVALENT) 324 MG: 324 (65 FE) TABLET DELAYED RESPONSE at 18:34

## 2017-10-09 RX ADMIN — INDIUM IN-111 OXYQUINOLINE: 1 SOLUTION INTRAVENOUS at 12:40

## 2017-10-09 RX ADMIN — INSULIN ASPART 3 UNITS: 100 INJECTION, SOLUTION INTRAVENOUS; SUBCUTANEOUS at 20:56

## 2017-10-09 RX ADMIN — PROPOFOL 50 MG: 10 INJECTION, EMULSION INTRAVENOUS at 12:06

## 2017-10-09 RX ADMIN — SODIUM CHLORIDE, POTASSIUM CHLORIDE, SODIUM LACTATE AND CALCIUM CHLORIDE 1000 ML: 600; 310; 30; 20 INJECTION, SOLUTION INTRAVENOUS at 11:36

## 2017-10-09 RX ADMIN — FAMOTIDINE 20 MG: 20 TABLET, FILM COATED ORAL at 20:55

## 2017-10-09 RX ADMIN — LIDOCAINE HYDROCHLORIDE 60 MG: 20 INJECTION, SOLUTION INTRAVENOUS at 12:02

## 2017-10-09 NOTE — ANESTHESIA POSTPROCEDURE EVALUATION
Patient: Lynne Sanchez    Procedure Summary     Date Anesthesia Start Anesthesia Stop Room / Location    10/09/17 1202 1211 [facility] ENDOSCOPY 3 / [facility]       Procedure Diagnosis Surgeon Provider    ESOPHAGOGASTRODUODENOSCOPY WITH BIOPSY (N/A Esophagus) Melena  (Melena [K92.1]) MD Justo Galvez CRNA          Anesthesia Type: MAC  Last vitals  \54       Temp     98.4   Pulse 85      Resp 12       SpO2     97     Post Anesthesia Care and Evaluation    Patient location during evaluation: PACU  Patient participation: complete - patient participated  Level of consciousness: awake  Pain score: 0  Pain management: adequate  Airway patency: patent  Anesthetic complications: No anesthetic complications  PONV Status: controlled  Cardiovascular status: acceptable and stable  Respiratory status: acceptable and room air  Hydration status: acceptable

## 2017-10-09 NOTE — ANESTHESIA PREPROCEDURE EVALUATION
Anesthesia Evaluation     Patient summary reviewed and Nursing notes reviewed          Airway   Dental      Pulmonary    Cardiovascular     (+) dysrhythmias Atrial Fib,     ROS comment: Technically adequate study  1) Mild LVH with low normal LV systolic function ( EF 50 to 55%)  2) Moderate to severe left atrial enlargement with normal LVedp   3) Thickened mitral leaflets with mild MR   4) Aortic sclerosis without stenosis - trace AI seen   5) Moderate TR with a PAsp of 60 mm of hg   6) Moderate to severe RV dilation with borderline RV Function   7) Dilated IVC   8) Global hypokinesis of the LV more so along the septal wall     Neuro/Psych  (+) weakness,    GI/Hepatic/Renal/Endo    (+)  diabetes mellitus type 2,     Musculoskeletal     (+) back pain,   Abdominal    Substance History      OB/GYN          Other   (+) arthritis                                   Anesthesia Plan    ASA 3     MAC     intravenous induction   Anesthetic plan and risks discussed with patient.    Plan discussed with CRNA.

## 2017-10-10 ENCOUNTER — APPOINTMENT (OUTPATIENT)
Dept: NUCLEAR MEDICINE | Facility: HOSPITAL | Age: 82
End: 2017-10-10

## 2017-10-10 LAB
ANION GAP SERPL CALCULATED.3IONS-SCNC: 15.2 MMOL/L
BASOPHILS # BLD AUTO: 0.02 10*3/MM3 (ref 0–0.2)
BASOPHILS NFR BLD AUTO: 0.3 % (ref 0–2.5)
BUN BLD-MCNC: 23 MG/DL (ref 7–20)
BUN/CREAT SERPL: 23 (ref 7.1–23.5)
CALCIUM SPEC-SCNC: 9.2 MG/DL (ref 8.4–10.2)
CHLORIDE SERPL-SCNC: 108 MMOL/L (ref 98–107)
CO2 SERPL-SCNC: 24 MMOL/L (ref 26–30)
CREAT BLD-MCNC: 1 MG/DL (ref 0.6–1.3)
DEPRECATED RDW RBC AUTO: 54.2 FL (ref 37–54)
DEPRECATED RDW RBC AUTO: 54.5 FL (ref 37–54)
EOSINOPHIL # BLD AUTO: 0.2 10*3/MM3 (ref 0–0.7)
EOSINOPHIL NFR BLD AUTO: 3.3 % (ref 0–7)
ERYTHROCYTE [DISTWIDTH] IN BLOOD BY AUTOMATED COUNT: 17.4 % (ref 11.5–14.5)
ERYTHROCYTE [DISTWIDTH] IN BLOOD BY AUTOMATED COUNT: 17.5 % (ref 11.5–14.5)
GFR SERPL CREATININE-BSD FRML MDRD: 53 ML/MIN/1.73
GLUCOSE BLD-MCNC: 140 MG/DL (ref 74–98)
GLUCOSE BLDC GLUCOMTR-MCNC: 150 MG/DL (ref 70–130)
GLUCOSE BLDC GLUCOMTR-MCNC: 193 MG/DL (ref 70–130)
GLUCOSE BLDC GLUCOMTR-MCNC: 285 MG/DL (ref 70–130)
HCT VFR BLD AUTO: 26.8 % (ref 37–47)
HCT VFR BLD AUTO: 27.9 % (ref 37–47)
HGB BLD-MCNC: 8.3 G/DL (ref 12–16)
HGB BLD-MCNC: 8.4 G/DL (ref 12–16)
IMM GRANULOCYTES # BLD: 0.05 10*3/MM3 (ref 0–0.06)
IMM GRANULOCYTES NFR BLD: 0.8 % (ref 0–0.6)
LYMPHOCYTES # BLD AUTO: 1.06 10*3/MM3 (ref 0.6–3.4)
LYMPHOCYTES NFR BLD AUTO: 17.6 % (ref 10–50)
MCH RBC QN AUTO: 25.9 PG (ref 27–31)
MCH RBC QN AUTO: 26.5 PG (ref 27–31)
MCHC RBC AUTO-ENTMCNC: 30.1 G/DL (ref 30–37)
MCHC RBC AUTO-ENTMCNC: 31 G/DL (ref 30–37)
MCV RBC AUTO: 85.6 FL (ref 81–99)
MCV RBC AUTO: 86.1 FL (ref 81–99)
MONOCYTES # BLD AUTO: 0.59 10*3/MM3 (ref 0–0.9)
MONOCYTES NFR BLD AUTO: 9.8 % (ref 0–12)
NEUTROPHILS # BLD AUTO: 4.11 10*3/MM3 (ref 2–6.9)
NEUTROPHILS NFR BLD AUTO: 68.2 % (ref 37–80)
NRBC BLD MANUAL-RTO: 0 /100 WBC (ref 0–0)
PLATELET # BLD AUTO: 168 10*3/MM3 (ref 130–400)
PLATELET # BLD AUTO: 169 10*3/MM3 (ref 130–400)
PMV BLD AUTO: 9.7 FL (ref 6–12)
PMV BLD AUTO: 9.9 FL (ref 6–12)
POTASSIUM BLD-SCNC: 4.2 MMOL/L (ref 3.5–5.1)
RBC # BLD AUTO: 3.13 10*6/MM3 (ref 4.2–5.4)
RBC # BLD AUTO: 3.24 10*6/MM3 (ref 4.2–5.4)
SODIUM BLD-SCNC: 143 MMOL/L (ref 137–145)
WBC NRBC COR # BLD: 6.03 10*3/MM3 (ref 4.8–10.8)
WBC NRBC COR # BLD: 7.12 10*3/MM3 (ref 4.8–10.8)

## 2017-10-10 PROCEDURE — 047W3ZZ DILATION OF LEFT FOOT ARTERY, PERCUTANEOUS APPROACH: ICD-10-PCS | Performed by: INTERNAL MEDICINE

## 2017-10-10 PROCEDURE — 82962 GLUCOSE BLOOD TEST: CPT

## 2017-10-10 PROCEDURE — 0 IOPAMIDOL PER 1 ML: Performed by: INTERNAL MEDICINE

## 2017-10-10 PROCEDURE — 25010000002 ONDANSETRON PER 1 MG: Performed by: INTERNAL MEDICINE

## 2017-10-10 PROCEDURE — 99233 SBSQ HOSP IP/OBS HIGH 50: CPT | Performed by: PODIATRIST

## 2017-10-10 PROCEDURE — 63710000001 INSULIN ASPART PER 5 UNITS: Performed by: FAMILY MEDICINE

## 2017-10-10 PROCEDURE — 25010000002 ADENOSINE PER 6 MG: Performed by: INTERNAL MEDICINE

## 2017-10-10 PROCEDURE — 047Q3ZZ DILATION OF LEFT ANTERIOR TIBIAL ARTERY, PERCUTANEOUS APPROACH: ICD-10-PCS | Performed by: INTERNAL MEDICINE

## 2017-10-10 PROCEDURE — 04CQ3ZZ EXTIRPATION OF MATTER FROM LEFT ANTERIOR TIBIAL ARTERY, PERCUTANEOUS APPROACH: ICD-10-PCS | Performed by: INTERNAL MEDICINE

## 2017-10-10 PROCEDURE — 99232 SBSQ HOSP IP/OBS MODERATE 35: CPT | Performed by: INTERNAL MEDICINE

## 2017-10-10 PROCEDURE — C1894 INTRO/SHEATH, NON-LASER: HCPCS | Performed by: INTERNAL MEDICINE

## 2017-10-10 PROCEDURE — C1725 CATH, TRANSLUMIN NON-LASER: HCPCS | Performed by: INTERNAL MEDICINE

## 2017-10-10 PROCEDURE — 25010000002 ONDANSETRON PER 1 MG: Performed by: FAMILY MEDICINE

## 2017-10-10 PROCEDURE — 85025 COMPLETE CBC W/AUTO DIFF WBC: CPT | Performed by: INTERNAL MEDICINE

## 2017-10-10 PROCEDURE — C1769 GUIDE WIRE: HCPCS | Performed by: INTERNAL MEDICINE

## 2017-10-10 PROCEDURE — 25010000002 ENOXAPARIN PER 10 MG: Performed by: SURGERY

## 2017-10-10 PROCEDURE — C1887 CATHETER, GUIDING: HCPCS | Performed by: INTERNAL MEDICINE

## 2017-10-10 PROCEDURE — C1724 CATH, TRANS ATHEREC,ROTATION: HCPCS | Performed by: INTERNAL MEDICINE

## 2017-10-10 PROCEDURE — 25010000002 CEFTAZIDIME PER 500 MG: Performed by: INTERNAL MEDICINE

## 2017-10-10 PROCEDURE — 25010000002 FENTANYL CITRATE (PF) 100 MCG/2ML SOLUTION: Performed by: INTERNAL MEDICINE

## 2017-10-10 PROCEDURE — 25010000002 HYDRALAZINE PER 20 MG: Performed by: INTERNAL MEDICINE

## 2017-10-10 PROCEDURE — 25010000002 MIDAZOLAM PER 1 MG: Performed by: INTERNAL MEDICINE

## 2017-10-10 PROCEDURE — C1760 CLOSURE DEV, VASC: HCPCS | Performed by: INTERNAL MEDICINE

## 2017-10-10 PROCEDURE — 80048 BASIC METABOLIC PNL TOTAL CA: CPT | Performed by: INTERNAL MEDICINE

## 2017-10-10 PROCEDURE — 85027 COMPLETE CBC AUTOMATED: CPT | Performed by: INTERNAL MEDICINE

## 2017-10-10 PROCEDURE — 75716 ARTERY X-RAYS ARMS/LEGS: CPT | Performed by: INTERNAL MEDICINE

## 2017-10-10 RX ORDER — FENTANYL CITRATE 50 UG/ML
INJECTION, SOLUTION INTRAMUSCULAR; INTRAVENOUS AS NEEDED
Status: DISCONTINUED | OUTPATIENT
Start: 2017-10-10 | End: 2017-10-10 | Stop reason: HOSPADM

## 2017-10-10 RX ORDER — LIDOCAINE HYDROCHLORIDE 10 MG/ML
INJECTION, SOLUTION INFILTRATION; PERINEURAL AS NEEDED
Status: DISCONTINUED | OUTPATIENT
Start: 2017-10-10 | End: 2017-10-10 | Stop reason: HOSPADM

## 2017-10-10 RX ORDER — MIDAZOLAM HYDROCHLORIDE 1 MG/ML
INJECTION INTRAMUSCULAR; INTRAVENOUS AS NEEDED
Status: DISCONTINUED | OUTPATIENT
Start: 2017-10-10 | End: 2017-10-10 | Stop reason: HOSPADM

## 2017-10-10 RX ORDER — ALPRAZOLAM 0.5 MG/1
0.5 TABLET ORAL 3 TIMES DAILY PRN
Status: DISCONTINUED | OUTPATIENT
Start: 2017-10-10 | End: 2017-10-12 | Stop reason: HOSPADM

## 2017-10-10 RX ORDER — CLOPIDOGREL BISULFATE 75 MG/1
600 TABLET ORAL ONCE
Status: COMPLETED | OUTPATIENT
Start: 2017-10-10 | End: 2017-10-10

## 2017-10-10 RX ORDER — SODIUM CHLORIDE 9 MG/ML
75 INJECTION, SOLUTION INTRAVENOUS CONTINUOUS
Status: DISCONTINUED | OUTPATIENT
Start: 2017-10-10 | End: 2017-10-12

## 2017-10-10 RX ORDER — HYDROCODONE BITARTRATE AND ACETAMINOPHEN 5; 325 MG/1; MG/1
1 TABLET ORAL EVERY 4 HOURS PRN
Status: DISCONTINUED | OUTPATIENT
Start: 2017-10-10 | End: 2017-10-12 | Stop reason: HOSPADM

## 2017-10-10 RX ORDER — ACETAMINOPHEN 325 MG/1
650 TABLET ORAL EVERY 4 HOURS PRN
Status: DISCONTINUED | OUTPATIENT
Start: 2017-10-10 | End: 2017-10-12 | Stop reason: HOSPADM

## 2017-10-10 RX ORDER — CLOPIDOGREL BISULFATE 75 MG/1
75 TABLET ORAL DAILY
Status: DISCONTINUED | OUTPATIENT
Start: 2017-10-11 | End: 2017-10-11

## 2017-10-10 RX ORDER — ADENOSINE 3 MG/ML
INJECTION, SOLUTION INTRAVENOUS AS NEEDED
Status: DISCONTINUED | OUTPATIENT
Start: 2017-10-10 | End: 2017-10-10 | Stop reason: HOSPADM

## 2017-10-10 RX ORDER — ONDANSETRON 2 MG/ML
INJECTION INTRAMUSCULAR; INTRAVENOUS AS NEEDED
Status: DISCONTINUED | OUTPATIENT
Start: 2017-10-10 | End: 2017-10-10 | Stop reason: HOSPADM

## 2017-10-10 RX ORDER — HYDRALAZINE HYDROCHLORIDE 20 MG/ML
INJECTION INTRAMUSCULAR; INTRAVENOUS AS NEEDED
Status: DISCONTINUED | OUTPATIENT
Start: 2017-10-10 | End: 2017-10-10 | Stop reason: HOSPADM

## 2017-10-10 RX ADMIN — SODIUM CHLORIDE 75 ML/HR: 9 INJECTION, SOLUTION INTRAVENOUS at 04:35

## 2017-10-10 RX ADMIN — METOPROLOL TARTRATE 100 MG: 100 TABLET ORAL at 21:59

## 2017-10-10 RX ADMIN — ENOXAPARIN SODIUM 70 MG: 80 INJECTION SUBCUTANEOUS at 09:15

## 2017-10-10 RX ADMIN — FAMOTIDINE 20 MG: 20 TABLET, FILM COATED ORAL at 21:59

## 2017-10-10 RX ADMIN — ATORVASTATIN CALCIUM 40 MG: 40 TABLET, FILM COATED ORAL at 21:59

## 2017-10-10 RX ADMIN — INSULIN ASPART 8 UNITS: 100 INJECTION, SOLUTION INTRAVENOUS; SUBCUTANEOUS at 21:59

## 2017-10-10 RX ADMIN — METOPROLOL TARTRATE 100 MG: 100 TABLET ORAL at 06:19

## 2017-10-10 RX ADMIN — CLOPIDOGREL BISULFATE 600 MG: 75 TABLET ORAL at 16:17

## 2017-10-10 RX ADMIN — ONDANSETRON 4 MG: 2 INJECTION INTRAMUSCULAR; INTRAVENOUS at 16:24

## 2017-10-10 RX ADMIN — SODIUM CHLORIDE 75 ML/HR: 9 INJECTION, SOLUTION INTRAVENOUS at 16:44

## 2017-10-10 RX ADMIN — CEFTAZIDIME 1 G: 1 INJECTION, POWDER, FOR SOLUTION INTRAMUSCULAR; INTRAVENOUS at 16:17

## 2017-10-10 RX ADMIN — CEFTAZIDIME 1 G: 1 INJECTION, POWDER, FOR SOLUTION INTRAMUSCULAR; INTRAVENOUS at 04:31

## 2017-10-10 RX ADMIN — LATANOPROST 1 DROP: 50 SOLUTION OPHTHALMIC at 06:00

## 2017-10-10 RX ADMIN — CLOPIDOGREL BISULFATE 75 MG: 75 TABLET ORAL at 09:15

## 2017-10-11 ENCOUNTER — APPOINTMENT (OUTPATIENT)
Dept: NUCLEAR MEDICINE | Facility: HOSPITAL | Age: 82
End: 2017-10-11

## 2017-10-11 LAB
ANION GAP SERPL CALCULATED.3IONS-SCNC: 14 MMOL/L
BASOPHILS # BLD AUTO: 0.03 10*3/MM3 (ref 0–0.2)
BASOPHILS NFR BLD AUTO: 0.4 % (ref 0–2.5)
BUN BLD-MCNC: 24 MG/DL (ref 7–20)
BUN/CREAT SERPL: 21.8 (ref 7.1–23.5)
CALCIUM SPEC-SCNC: 9.1 MG/DL (ref 8.4–10.2)
CHLORIDE SERPL-SCNC: 109 MMOL/L (ref 98–107)
CO2 SERPL-SCNC: 24 MMOL/L (ref 26–30)
CREAT BLD-MCNC: 1.1 MG/DL (ref 0.6–1.3)
DEPRECATED RDW RBC AUTO: 54.4 FL (ref 37–54)
EOSINOPHIL # BLD AUTO: 0.1 10*3/MM3 (ref 0–0.7)
EOSINOPHIL NFR BLD AUTO: 1.5 % (ref 0–7)
ERYTHROCYTE [DISTWIDTH] IN BLOOD BY AUTOMATED COUNT: 17.8 % (ref 11.5–14.5)
GFR SERPL CREATININE-BSD FRML MDRD: 48 ML/MIN/1.73
GLUCOSE BLD-MCNC: 113 MG/DL (ref 74–98)
GLUCOSE BLDC GLUCOMTR-MCNC: 130 MG/DL (ref 70–130)
GLUCOSE BLDC GLUCOMTR-MCNC: 221 MG/DL (ref 70–130)
GLUCOSE BLDC GLUCOMTR-MCNC: 227 MG/DL (ref 70–130)
GLUCOSE BLDC GLUCOMTR-MCNC: 256 MG/DL (ref 70–130)
HCT VFR BLD AUTO: 26.3 % (ref 37–47)
HGB BLD-MCNC: 8 G/DL (ref 12–16)
IMM GRANULOCYTES # BLD: 0.09 10*3/MM3 (ref 0–0.06)
IMM GRANULOCYTES NFR BLD: 1.3 % (ref 0–0.6)
LYMPHOCYTES # BLD AUTO: 1.25 10*3/MM3 (ref 0.6–3.4)
LYMPHOCYTES NFR BLD AUTO: 18.1 % (ref 10–50)
MCH RBC QN AUTO: 25.7 PG (ref 27–31)
MCHC RBC AUTO-ENTMCNC: 30.4 G/DL (ref 30–37)
MCV RBC AUTO: 84.6 FL (ref 81–99)
MONOCYTES # BLD AUTO: 0.56 10*3/MM3 (ref 0–0.9)
MONOCYTES NFR BLD AUTO: 8.1 % (ref 0–12)
NEUTROPHILS # BLD AUTO: 4.86 10*3/MM3 (ref 2–6.9)
NEUTROPHILS NFR BLD AUTO: 70.6 % (ref 37–80)
NRBC BLD MANUAL-RTO: 0 /100 WBC (ref 0–0)
PLATELET # BLD AUTO: 184 10*3/MM3 (ref 130–400)
PMV BLD AUTO: 10.1 FL (ref 6–12)
POTASSIUM BLD-SCNC: 4 MMOL/L (ref 3.5–5.1)
RBC # BLD AUTO: 3.11 10*6/MM3 (ref 4.2–5.4)
SODIUM BLD-SCNC: 143 MMOL/L (ref 137–145)
WBC NRBC COR # BLD: 6.89 10*3/MM3 (ref 4.8–10.8)

## 2017-10-11 PROCEDURE — 85025 COMPLETE CBC W/AUTO DIFF WBC: CPT | Performed by: INTERNAL MEDICINE

## 2017-10-11 PROCEDURE — 99232 SBSQ HOSP IP/OBS MODERATE 35: CPT | Performed by: INTERNAL MEDICINE

## 2017-10-11 PROCEDURE — 63710000001 INSULIN ASPART PER 5 UNITS: Performed by: FAMILY MEDICINE

## 2017-10-11 PROCEDURE — 82962 GLUCOSE BLOOD TEST: CPT

## 2017-10-11 PROCEDURE — 25010000002 ENOXAPARIN PER 10 MG: Performed by: SURGERY

## 2017-10-11 PROCEDURE — 80048 BASIC METABOLIC PNL TOTAL CA: CPT | Performed by: INTERNAL MEDICINE

## 2017-10-11 PROCEDURE — 25010000002 CEFTAZIDIME PER 500 MG: Performed by: INTERNAL MEDICINE

## 2017-10-11 RX ORDER — ASPIRIN 81 MG/1
81 TABLET ORAL DAILY
Status: DISCONTINUED | OUTPATIENT
Start: 2017-10-11 | End: 2017-10-12 | Stop reason: HOSPADM

## 2017-10-11 RX ADMIN — FAMOTIDINE 20 MG: 20 TABLET, FILM COATED ORAL at 21:06

## 2017-10-11 RX ADMIN — ATORVASTATIN CALCIUM 40 MG: 40 TABLET, FILM COATED ORAL at 21:06

## 2017-10-11 RX ADMIN — INSULIN ASPART 5 UNITS: 100 INJECTION, SOLUTION INTRAVENOUS; SUBCUTANEOUS at 12:22

## 2017-10-11 RX ADMIN — FAMOTIDINE 20 MG: 20 TABLET, FILM COATED ORAL at 10:13

## 2017-10-11 RX ADMIN — CEFTAZIDIME 1 G: 1 INJECTION, POWDER, FOR SOLUTION INTRAMUSCULAR; INTRAVENOUS at 18:30

## 2017-10-11 RX ADMIN — ENOXAPARIN SODIUM 70 MG: 80 INJECTION SUBCUTANEOUS at 10:18

## 2017-10-11 RX ADMIN — RDII 250 MG CAPSULE 250 MG: at 10:15

## 2017-10-11 RX ADMIN — METOPROLOL TARTRATE 100 MG: 100 TABLET ORAL at 10:14

## 2017-10-11 RX ADMIN — ASPIRIN 81 MG: 81 TABLET, COATED ORAL at 18:31

## 2017-10-11 RX ADMIN — CEFTAZIDIME 1 G: 1 INJECTION, POWDER, FOR SOLUTION INTRAMUSCULAR; INTRAVENOUS at 04:52

## 2017-10-11 RX ADMIN — RDII 250 MG CAPSULE 250 MG: at 18:31

## 2017-10-11 RX ADMIN — FERROUS SULFATE TAB EC 324 MG (65 MG FE EQUIVALENT) 324 MG: 324 (65 FE) TABLET DELAYED RESPONSE at 08:01

## 2017-10-11 RX ADMIN — LATANOPROST 1 DROP: 50 SOLUTION OPHTHALMIC at 10:16

## 2017-10-11 RX ADMIN — CLOPIDOGREL BISULFATE 75 MG: 75 TABLET ORAL at 10:12

## 2017-10-11 RX ADMIN — FERROUS SULFATE TAB EC 324 MG (65 MG FE EQUIVALENT) 324 MG: 324 (65 FE) TABLET DELAYED RESPONSE at 18:31

## 2017-10-11 RX ADMIN — METOPROLOL TARTRATE 100 MG: 100 TABLET ORAL at 21:06

## 2017-10-11 RX ADMIN — INSULIN ASPART 8 UNITS: 100 INJECTION, SOLUTION INTRAVENOUS; SUBCUTANEOUS at 21:07

## 2017-10-12 ENCOUNTER — APPOINTMENT (OUTPATIENT)
Dept: NUCLEAR MEDICINE | Facility: HOSPITAL | Age: 82
End: 2017-10-12

## 2017-10-12 VITALS
BODY MASS INDEX: 25.07 KG/M2 | WEIGHT: 156 LBS | RESPIRATION RATE: 16 BRPM | SYSTOLIC BLOOD PRESSURE: 141 MMHG | OXYGEN SATURATION: 98 % | HEIGHT: 66 IN | HEART RATE: 69 BPM | DIASTOLIC BLOOD PRESSURE: 71 MMHG | TEMPERATURE: 98.1 F

## 2017-10-12 LAB
ANION GAP SERPL CALCULATED.3IONS-SCNC: 15.2 MMOL/L
BASOPHILS # BLD AUTO: 0.02 10*3/MM3 (ref 0–0.2)
BASOPHILS NFR BLD AUTO: 0.3 % (ref 0–2.5)
BUN BLD-MCNC: 24 MG/DL (ref 7–20)
BUN/CREAT SERPL: 24 (ref 7.1–23.5)
CALCIUM SPEC-SCNC: 8.7 MG/DL (ref 8.4–10.2)
CHLORIDE SERPL-SCNC: 109 MMOL/L (ref 98–107)
CO2 SERPL-SCNC: 22 MMOL/L (ref 26–30)
CREAT BLD-MCNC: 1 MG/DL (ref 0.6–1.3)
DEPRECATED RDW RBC AUTO: 54.5 FL (ref 37–54)
EOSINOPHIL # BLD AUTO: 0.18 10*3/MM3 (ref 0–0.7)
EOSINOPHIL NFR BLD AUTO: 2.3 % (ref 0–7)
ERYTHROCYTE [DISTWIDTH] IN BLOOD BY AUTOMATED COUNT: 17.9 % (ref 11.5–14.5)
GFR SERPL CREATININE-BSD FRML MDRD: 53 ML/MIN/1.73
GLUCOSE BLD-MCNC: 163 MG/DL (ref 74–98)
GLUCOSE BLDC GLUCOMTR-MCNC: 162 MG/DL (ref 70–130)
GLUCOSE BLDC GLUCOMTR-MCNC: 177 MG/DL (ref 70–130)
HCT VFR BLD AUTO: 26.9 % (ref 37–47)
HGB BLD-MCNC: 8.3 G/DL (ref 12–16)
IMM GRANULOCYTES # BLD: 0.11 10*3/MM3 (ref 0–0.06)
IMM GRANULOCYTES NFR BLD: 1.4 % (ref 0–0.6)
LAB AP CASE REPORT: NORMAL
LYMPHOCYTES # BLD AUTO: 1.24 10*3/MM3 (ref 0.6–3.4)
LYMPHOCYTES NFR BLD AUTO: 16 % (ref 10–50)
Lab: NORMAL
MCH RBC QN AUTO: 26.2 PG (ref 27–31)
MCHC RBC AUTO-ENTMCNC: 30.9 G/DL (ref 30–37)
MCV RBC AUTO: 84.9 FL (ref 81–99)
MONOCYTES # BLD AUTO: 0.71 10*3/MM3 (ref 0–0.9)
MONOCYTES NFR BLD AUTO: 9.1 % (ref 0–12)
NEUTROPHILS # BLD AUTO: 5.51 10*3/MM3 (ref 2–6.9)
NEUTROPHILS NFR BLD AUTO: 70.9 % (ref 37–80)
NRBC BLD MANUAL-RTO: 0 /100 WBC (ref 0–0)
PATH REPORT.FINAL DX SPEC: NORMAL
PLATELET # BLD AUTO: 184 10*3/MM3 (ref 130–400)
PMV BLD AUTO: 10.5 FL (ref 6–12)
POTASSIUM BLD-SCNC: 4.2 MMOL/L (ref 3.5–5.1)
RBC # BLD AUTO: 3.17 10*6/MM3 (ref 4.2–5.4)
SODIUM BLD-SCNC: 142 MMOL/L (ref 137–145)
WBC NRBC COR # BLD: 7.77 10*3/MM3 (ref 4.8–10.8)

## 2017-10-12 PROCEDURE — 85025 COMPLETE CBC W/AUTO DIFF WBC: CPT | Performed by: INTERNAL MEDICINE

## 2017-10-12 PROCEDURE — 80048 BASIC METABOLIC PNL TOTAL CA: CPT | Performed by: INTERNAL MEDICINE

## 2017-10-12 PROCEDURE — 63710000001 INSULIN ASPART PER 5 UNITS: Performed by: FAMILY MEDICINE

## 2017-10-12 PROCEDURE — 25010000002 CEFTAZIDIME PER 500 MG: Performed by: INTERNAL MEDICINE

## 2017-10-12 PROCEDURE — 99233 SBSQ HOSP IP/OBS HIGH 50: CPT | Performed by: PODIATRIST

## 2017-10-12 PROCEDURE — 25010000002 ENOXAPARIN PER 10 MG: Performed by: SURGERY

## 2017-10-12 PROCEDURE — 78806 HC NM ABSCESS LOCALIZATION WHOLE BODY: CPT

## 2017-10-12 PROCEDURE — 82962 GLUCOSE BLOOD TEST: CPT

## 2017-10-12 PROCEDURE — 99239 HOSP IP/OBS DSCHRG MGMT >30: CPT | Performed by: INTERNAL MEDICINE

## 2017-10-12 RX ORDER — SACCHAROMYCES BOULARDII 250 MG
250 CAPSULE ORAL 2 TIMES DAILY
Qty: 60 CAPSULE | Refills: 0 | Status: SHIPPED | OUTPATIENT
Start: 2017-10-12 | End: 2018-05-02

## 2017-10-12 RX ORDER — PANTOPRAZOLE SODIUM 40 MG/1
40 TABLET, DELAYED RELEASE ORAL DAILY
Qty: 30 TABLET | Refills: 0 | Status: SHIPPED | OUTPATIENT
Start: 2017-10-12 | End: 2018-05-02

## 2017-10-12 RX ORDER — ATORVASTATIN CALCIUM 40 MG/1
40 TABLET, FILM COATED ORAL NIGHTLY
Qty: 30 TABLET | Refills: 0 | Status: SHIPPED | OUTPATIENT
Start: 2017-10-12 | End: 2018-07-13 | Stop reason: HOSPADM

## 2017-10-12 RX ORDER — CLOPIDOGREL BISULFATE 75 MG/1
75 TABLET ORAL DAILY
Qty: 30 TABLET | Refills: 0 | Status: SHIPPED | OUTPATIENT
Start: 2017-10-13 | End: 2018-07-13 | Stop reason: HOSPADM

## 2017-10-12 RX ORDER — ASPIRIN 81 MG/1
81 TABLET ORAL DAILY
Qty: 30 TABLET | Refills: 0 | Status: SHIPPED | OUTPATIENT
Start: 2017-10-13 | End: 2022-04-22 | Stop reason: SDUPTHER

## 2017-10-12 RX ADMIN — RDII 250 MG CAPSULE 250 MG: at 08:55

## 2017-10-12 RX ADMIN — INSULIN ASPART 3 UNITS: 100 INJECTION, SOLUTION INTRAVENOUS; SUBCUTANEOUS at 11:33

## 2017-10-12 RX ADMIN — LATANOPROST 1 DROP: 50 SOLUTION OPHTHALMIC at 08:56

## 2017-10-12 RX ADMIN — FERROUS SULFATE TAB EC 324 MG (65 MG FE EQUIVALENT) 324 MG: 324 (65 FE) TABLET DELAYED RESPONSE at 08:54

## 2017-10-12 RX ADMIN — METOPROLOL TARTRATE 100 MG: 100 TABLET ORAL at 08:54

## 2017-10-12 RX ADMIN — FAMOTIDINE 20 MG: 20 TABLET, FILM COATED ORAL at 08:55

## 2017-10-12 RX ADMIN — ASPIRIN 81 MG: 81 TABLET, COATED ORAL at 08:55

## 2017-10-12 RX ADMIN — INSULIN ASPART 3 UNITS: 100 INJECTION, SOLUTION INTRAVENOUS; SUBCUTANEOUS at 06:31

## 2017-10-12 RX ADMIN — ENOXAPARIN SODIUM 70 MG: 80 INJECTION SUBCUTANEOUS at 08:54

## 2017-10-12 RX ADMIN — CLOPIDOGREL BISULFATE 75 MG: 75 TABLET ORAL at 08:54

## 2017-10-12 RX ADMIN — CEFTAZIDIME 1 G: 1 INJECTION, POWDER, FOR SOLUTION INTRAMUSCULAR; INTRAVENOUS at 04:42

## 2017-10-16 ENCOUNTER — LAB REQUISITION (OUTPATIENT)
Dept: LAB | Facility: HOSPITAL | Age: 82
End: 2017-10-16

## 2017-10-16 DIAGNOSIS — M86.9 OSTEOMYELITIS (HCC): ICD-10-CM

## 2017-10-16 LAB
ANION GAP SERPL CALCULATED.3IONS-SCNC: 17 MMOL/L
BASOPHILS # BLD AUTO: 0.03 10*3/MM3 (ref 0–0.2)
BASOPHILS NFR BLD AUTO: 0.4 % (ref 0–2.5)
BUN BLD-MCNC: 17 MG/DL (ref 7–20)
BUN/CREAT SERPL: 17 (ref 7.1–23.5)
CALCIUM SPEC-SCNC: 8.9 MG/DL (ref 8.4–10.2)
CHLORIDE SERPL-SCNC: 108 MMOL/L (ref 98–107)
CO2 SERPL-SCNC: 23 MMOL/L (ref 26–30)
CREAT BLD-MCNC: 1 MG/DL (ref 0.6–1.3)
DEPRECATED RDW RBC AUTO: 58.1 FL (ref 37–54)
EOSINOPHIL # BLD AUTO: 0.22 10*3/MM3 (ref 0–0.7)
EOSINOPHIL NFR BLD AUTO: 2.9 % (ref 0–7)
ERYTHROCYTE [DISTWIDTH] IN BLOOD BY AUTOMATED COUNT: 18.7 % (ref 11.5–14.5)
GFR SERPL CREATININE-BSD FRML MDRD: 53 ML/MIN/1.73
GLUCOSE BLD-MCNC: 165 MG/DL (ref 74–98)
HCT VFR BLD AUTO: 24.9 % (ref 37–47)
HGB BLD-MCNC: 7.5 G/DL (ref 12–16)
IMM GRANULOCYTES # BLD: 0.08 10*3/MM3 (ref 0–0.06)
IMM GRANULOCYTES NFR BLD: 1.1 % (ref 0–0.6)
LYMPHOCYTES # BLD AUTO: 1.52 10*3/MM3 (ref 0.6–3.4)
LYMPHOCYTES NFR BLD AUTO: 20.1 % (ref 10–50)
MCH RBC QN AUTO: 26 PG (ref 27–31)
MCHC RBC AUTO-ENTMCNC: 30.1 G/DL (ref 30–37)
MCV RBC AUTO: 86.2 FL (ref 81–99)
MONOCYTES # BLD AUTO: 0.69 10*3/MM3 (ref 0–0.9)
MONOCYTES NFR BLD AUTO: 9.1 % (ref 0–12)
NEUTROPHILS # BLD AUTO: 5.04 10*3/MM3 (ref 2–6.9)
NEUTROPHILS NFR BLD AUTO: 66.4 % (ref 37–80)
NRBC BLD MANUAL-RTO: 0 /100 WBC (ref 0–0)
PLATELET # BLD AUTO: 199 10*3/MM3 (ref 130–400)
PMV BLD AUTO: 10.1 FL (ref 6–12)
POTASSIUM BLD-SCNC: 4 MMOL/L (ref 3.5–5.1)
RBC # BLD AUTO: 2.89 10*6/MM3 (ref 4.2–5.4)
SODIUM BLD-SCNC: 144 MMOL/L (ref 137–145)
WBC NRBC COR # BLD: 7.58 10*3/MM3 (ref 4.8–10.8)

## 2017-10-16 PROCEDURE — 80048 BASIC METABOLIC PNL TOTAL CA: CPT | Performed by: PHYSICAL MEDICINE & REHABILITATION

## 2017-10-16 PROCEDURE — 85025 COMPLETE CBC W/AUTO DIFF WBC: CPT | Performed by: PHYSICAL MEDICINE & REHABILITATION

## 2017-10-18 ENCOUNTER — TRANSCRIBE ORDERS (OUTPATIENT)
Dept: ULTRASOUND IMAGING | Facility: HOSPITAL | Age: 82
End: 2017-10-18

## 2017-10-18 DIAGNOSIS — N17.9 ACUTE KIDNEY INJURY (NONTRAUMATIC) (HCC): Primary | ICD-10-CM

## 2017-10-20 ENCOUNTER — OFFICE VISIT (OUTPATIENT)
Dept: ORTHOPEDIC SURGERY | Facility: CLINIC | Age: 82
End: 2017-10-20

## 2017-10-20 VITALS — RESPIRATION RATE: 16 BRPM | HEIGHT: 66 IN | BODY MASS INDEX: 25.07 KG/M2 | WEIGHT: 156 LBS

## 2017-10-20 DIAGNOSIS — L97.521 ULCER OF FOOT, LEFT, LIMITED TO BREAKDOWN OF SKIN (HCC): Primary | ICD-10-CM

## 2017-10-20 DIAGNOSIS — L60.1 ONYCHOLYSIS: ICD-10-CM

## 2017-10-20 DIAGNOSIS — M86.9 OSTEOMYELITIS OF ANKLE OR FOOT: ICD-10-CM

## 2017-10-20 DIAGNOSIS — Z79.4 TYPE 2 DIABETES MELLITUS WITH OTHER SKIN ULCER, WITH LONG-TERM CURRENT USE OF INSULIN (HCC): ICD-10-CM

## 2017-10-20 DIAGNOSIS — E11.622 TYPE 2 DIABETES MELLITUS WITH OTHER SKIN ULCER, WITH LONG-TERM CURRENT USE OF INSULIN (HCC): ICD-10-CM

## 2017-10-20 DIAGNOSIS — I73.9 PERIPHERAL VASCULAR DISEASE (HCC): ICD-10-CM

## 2017-10-20 PROCEDURE — 11042 DBRDMT SUBQ TIS 1ST 20SQCM/<: CPT | Performed by: PODIATRIST

## 2017-10-20 PROCEDURE — 99213 OFFICE O/P EST LOW 20 MIN: CPT | Performed by: PODIATRIST

## 2017-10-20 RX ORDER — EPINEPHRINE 0.3 MG/.3ML
INJECTION SUBCUTANEOUS
COMMUNITY
Start: 2017-10-12 | End: 2017-11-13

## 2017-10-20 NOTE — PROGRESS NOTES
Subjective   Patient ID: Lynne Sanchez is a 82 y.o. female she comes in today for her first follow-up visit after being discharged from the hospital.  She is getting daily IV antibiotics.  She's been just using Betadine wet-to-dry on the toe and weightbearing in her shoe.  She denies any complaints or constitutional symptoms.      History of Present Illness                                                   Review of Systems   Constitutional: Negative for diaphoresis, fever and unexpected weight change.   HENT: Negative for dental problem and sore throat.    Eyes: Negative for visual disturbance.   Respiratory: Negative for shortness of breath.    Cardiovascular: Negative for chest pain.   Gastrointestinal: Negative for abdominal pain, constipation, diarrhea, nausea and vomiting.   Genitourinary: Negative for difficulty urinating and frequency.   Skin: Positive for wound.   Neurological: Negative for headaches.   Hematological: Does not bruise/bleed easily.   All other systems reviewed and are negative.      Past Medical History:   Diagnosis Date   • CHF (congestive heart failure)    • Chronic atrial fibrillation    • Diabetes mellitus, type 2    • History of congestive heart failure         Past Surgical History:   Procedure Laterality Date   • APPENDECTOMY     • CARDIAC CATHETERIZATION N/A 10/10/2017    Procedure: Peripheral angiography;  Surgeon: Kan Pelaez MD;  Location: [facility] CATH INVASIVE LOCATION;  Service:    • CARDIAC CATHETERIZATION N/A 10/10/2017    Procedure: Angioplasty-peripheral;  Surgeon: Kan Pelaez MD;  Location: [facility] CATH INVASIVE LOCATION;  Service:    • CARDIAC CATHETERIZATION N/A 10/10/2017    Procedure: Atherectomy-peripheral;  Surgeon: Kan Pelaez MD;  Location: [facility] CATH INVASIVE LOCATION;  Service:    • ENDOSCOPY N/A 10/9/2017    Procedure: ESOPHAGOGASTRODUODENOSCOPY WITH COLD FORCEP BIOPSY;  Surgeon: Clifton Hyatt MD;  Location: [facility]  ENDOSCOPY;  Service:    • INTERVENTIONAL RADIOLOGY PROCEDURE N/A 10/10/2017    Procedure: Abdominal Aortagram with Runoff;  Surgeon: Kan Pelaez MD;  Location: [facility] INVASIVE LOCATION;  Service:        Social History     Social History   • Marital status:      Spouse name: N/A   • Number of children: N/A   • Years of education: N/A     Occupational History   • Not on file.     Social History Main Topics   • Smoking status: Never Smoker   • Smokeless tobacco: Never Used   • Alcohol use No   • Drug use: No   • Sexual activity: Defer     Other Topics Concern   • Not on file     Social History Narrative       Counseling given: Not Answered      All the above social hx, family hx, surgical history,medications, allergies, ros & HPI reviewed.    Allergies   Allergen Reactions   • Phenergan [Promethazine Hcl] Confusion       Objective   Physical Exam   Constitutional: She is oriented to person, place, and time. She appears well-developed and well-nourished.   HENT:   Head: Normocephalic and atraumatic.   Eyes: EOM are normal. Pupils are equal, round, and reactive to light.   Neck: Normal range of motion.   Cardiovascular: Normal rate.    Pulmonary/Chest: Effort normal.   Musculoskeletal: Normal range of motion.   Neurological: She is alert and oriented to person, place, and time. She has normal reflexes.   Skin: Skin is warm.   Psychiatric: She has a normal mood and affect. Her behavior is normal. Judgment and thought content normal.   Nursing note reviewed.    Ortho Exam  Ortho Exam  Left great toe wound itself is much improved.  It's for the most part healthy.  There is hyperkeratosis and callus around the perimeter of the wound.  There is small amount of fibrous tissue essentially but for the most part the base is granular.  It does probe somewhat but not to bone.  There is no pus or purulence or odor.  Hallux is not erythematous or edematous.  Pulses are palpable.  There is loss of protective  sensation.  She has 2+ pitting edema to bilateral lower extremities.  Venous varicose veins are noted bilaterally as well.      Assessment/Plan  great toe wound, osteomyelitis, diabetes, PAD, bilateral lower extremity edema  Independent Review of Radiographic Studies:      Laboratory and Other Studies:     Medical Decision Making:        Procedures  Debridement Note    Location of debridement: Left great toe  Description of procedure: excisional  Type of instrument used: henri  Type of tissue removed: necrotic, devitalized and non-viable  Appearance and size of the wound: down to fresh bleeding tissue  Depth of debridement: subcutaneous tissue    And measures 1.1 x 1.2  I sharply and excisionally debrided the wounds down to healthy bleeding tissue with a good healthy granular base we will continue with the local wound care.    Lynne was seen today for wound check and follow-up.    Diagnoses and all orders for this visit:    Ulcer of foot, left, limited to breakdown of skin    Onycholysis    Osteomyelitis of ankle or foot    Peripheral vascular disease    Type 2 diabetes mellitus with other skin ulcer, with long-term current use of insulin        Recommendations/Plan:  I recommend she either use compression wraps or use her compression socks/stockings for her swelling.  Her  said she has all ready seen the nephrologist once and has a follow-up appointment.  She is not sure when she is to see Dr. Pelaez yet.  I instructed her that bathing is okay but she has to keep the great toe clean and dry and not allow this to get wet.  She's been instructed on Marta dressing changes to the left great toe wound every other day.  Weightbearing the Darco shoe.  Follow-up 1 week.  Continue IV antibiotics.    Return in about 1 week (around 10/27/2017).  Patient agreeable to call or return sooner for any concerns.

## 2017-10-23 ENCOUNTER — LAB REQUISITION (OUTPATIENT)
Dept: LAB | Facility: HOSPITAL | Age: 82
End: 2017-10-23

## 2017-10-23 DIAGNOSIS — L03.116 CELLULITIS OF LEFT LOWER EXTREMITY: ICD-10-CM

## 2017-10-23 DIAGNOSIS — M86.9 OSTEOMYELITIS (HCC): ICD-10-CM

## 2017-10-23 LAB
ANION GAP SERPL CALCULATED.3IONS-SCNC: 18.5 MMOL/L
ANISOCYTOSIS BLD QL: NORMAL
BASOPHILS # BLD AUTO: 0.03 10*3/MM3 (ref 0–0.2)
BASOPHILS NFR BLD AUTO: 0.4 % (ref 0–2.5)
BUN BLD-MCNC: 21 MG/DL (ref 7–20)
BUN/CREAT SERPL: 17.5 (ref 7.1–23.5)
CALCIUM SPEC-SCNC: 9.5 MG/DL (ref 8.4–10.2)
CHLORIDE SERPL-SCNC: 102 MMOL/L (ref 98–107)
CO2 SERPL-SCNC: 27 MMOL/L (ref 26–30)
CREAT BLD-MCNC: 1.2 MG/DL (ref 0.6–1.3)
DEPRECATED RDW RBC AUTO: 60.9 FL (ref 37–54)
EOSINOPHIL # BLD AUTO: 0.26 10*3/MM3 (ref 0–0.7)
EOSINOPHIL NFR BLD AUTO: 3.8 % (ref 0–7)
ERYTHROCYTE [DISTWIDTH] IN BLOOD BY AUTOMATED COUNT: 19.2 % (ref 11.5–14.5)
GFR SERPL CREATININE-BSD FRML MDRD: 43 ML/MIN/1.73
GLUCOSE BLD-MCNC: 212 MG/DL (ref 74–98)
HCT VFR BLD AUTO: 26.4 % (ref 37–47)
HGB BLD-MCNC: 7.9 G/DL (ref 12–16)
HYPOCHROMIA BLD QL: NORMAL
IMM GRANULOCYTES # BLD: 0.03 10*3/MM3 (ref 0–0.06)
IMM GRANULOCYTES NFR BLD: 0.4 % (ref 0–0.6)
LYMPHOCYTES # BLD AUTO: 1.63 10*3/MM3 (ref 0.6–3.4)
LYMPHOCYTES NFR BLD AUTO: 23.9 % (ref 10–50)
MCH RBC QN AUTO: 26.2 PG (ref 27–31)
MCHC RBC AUTO-ENTMCNC: 29.9 G/DL (ref 30–37)
MCV RBC AUTO: 87.7 FL (ref 81–99)
MONOCYTES # BLD AUTO: 0.52 10*3/MM3 (ref 0–0.9)
MONOCYTES NFR BLD AUTO: 7.6 % (ref 0–12)
NEUTROPHILS # BLD AUTO: 4.34 10*3/MM3 (ref 2–6.9)
NEUTROPHILS NFR BLD AUTO: 63.9 % (ref 37–80)
NRBC BLD MANUAL-RTO: 0 /100 WBC (ref 0–0)
PLATELET # BLD AUTO: 203 10*3/MM3 (ref 130–400)
PMV BLD AUTO: 10.6 FL (ref 6–12)
POLYCHROMASIA BLD QL SMEAR: NORMAL
POTASSIUM BLD-SCNC: 4.5 MMOL/L (ref 3.5–5.1)
RBC # BLD AUTO: 3.01 10*6/MM3 (ref 4.2–5.4)
SMALL PLATELETS BLD QL SMEAR: ADEQUATE
SODIUM BLD-SCNC: 143 MMOL/L (ref 137–145)
WBC MORPH BLD: NORMAL
WBC NRBC COR # BLD: 6.81 10*3/MM3 (ref 4.8–10.8)

## 2017-10-23 PROCEDURE — 85025 COMPLETE CBC W/AUTO DIFF WBC: CPT | Performed by: INTERNAL MEDICINE

## 2017-10-23 PROCEDURE — 80048 BASIC METABOLIC PNL TOTAL CA: CPT | Performed by: INTERNAL MEDICINE

## 2017-10-23 PROCEDURE — 85007 BL SMEAR W/DIFF WBC COUNT: CPT | Performed by: INTERNAL MEDICINE

## 2017-10-24 ENCOUNTER — HOSPITAL ENCOUNTER (OUTPATIENT)
Dept: ULTRASOUND IMAGING | Facility: HOSPITAL | Age: 82
Discharge: HOME | End: 2017-10-24
Admitting: INTERNAL MEDICINE

## 2017-10-24 DIAGNOSIS — N17.9 ACUTE KIDNEY INJURY (NONTRAUMATIC) (HCC): ICD-10-CM

## 2017-10-24 PROCEDURE — 93975 VASCULAR STUDY: CPT

## 2017-10-27 ENCOUNTER — OFFICE VISIT (OUTPATIENT)
Dept: ORTHOPEDIC SURGERY | Facility: CLINIC | Age: 82
End: 2017-10-27

## 2017-10-27 VITALS — RESPIRATION RATE: 18 BRPM | BODY MASS INDEX: 25.07 KG/M2 | HEIGHT: 66 IN | WEIGHT: 156 LBS

## 2017-10-27 DIAGNOSIS — I73.9 PERIPHERAL VASCULAR DISEASE (HCC): ICD-10-CM

## 2017-10-27 DIAGNOSIS — E11.42 DIABETIC POLYNEUROPATHY ASSOCIATED WITH TYPE 2 DIABETES MELLITUS (HCC): ICD-10-CM

## 2017-10-27 DIAGNOSIS — L97.509 TYPE 2 DIABETES MELLITUS WITH FOOT ULCER, WITH LONG-TERM CURRENT USE OF INSULIN (HCC): ICD-10-CM

## 2017-10-27 DIAGNOSIS — Z79.4 TYPE 2 DIABETES MELLITUS WITH FOOT ULCER, WITH LONG-TERM CURRENT USE OF INSULIN (HCC): ICD-10-CM

## 2017-10-27 DIAGNOSIS — M86.9 OSTEOMYELITIS OF ANKLE OR FOOT: ICD-10-CM

## 2017-10-27 DIAGNOSIS — L97.521 ULCER OF FOOT, LEFT, LIMITED TO BREAKDOWN OF SKIN (HCC): Primary | ICD-10-CM

## 2017-10-27 DIAGNOSIS — E11.621 TYPE 2 DIABETES MELLITUS WITH FOOT ULCER, WITH LONG-TERM CURRENT USE OF INSULIN (HCC): ICD-10-CM

## 2017-10-27 PROCEDURE — 99213 OFFICE O/P EST LOW 20 MIN: CPT | Performed by: PODIATRIST

## 2017-10-27 NOTE — PROGRESS NOTES
Subjective   Patient ID: Lynne Sanchez is a 82 y.o. female   With left great toe osteomyelitis and wounds secondary to diabetes and PAD returns for follow-up.  Her  has been applying Marta to the wound every other day.  She is weightbearing in her Darco shoe    History of Present Illness                                                   Review of Systems   Constitutional: Negative for diaphoresis, fever and unexpected weight change.   HENT: Negative for dental problem and sore throat.    Eyes: Negative for visual disturbance.   Respiratory: Negative for shortness of breath.    Cardiovascular: Negative for chest pain.   Gastrointestinal: Negative for abdominal pain, constipation, diarrhea, nausea and vomiting.   Genitourinary: Negative for difficulty urinating and frequency.   Skin: Positive for wound.   Neurological: Negative for headaches.   Hematological: Does not bruise/bleed easily.   All other systems reviewed and are negative.      Past Medical History:   Diagnosis Date   • CHF (congestive heart failure)    • Chronic atrial fibrillation    • Diabetes mellitus, type 2    • History of congestive heart failure         Past Surgical History:   Procedure Laterality Date   • APPENDECTOMY     • CARDIAC CATHETERIZATION N/A 10/10/2017    Procedure: Peripheral angiography;  Surgeon: Kan Pelaez MD;  Location: [facility] CATH INVASIVE LOCATION;  Service:    • CARDIAC CATHETERIZATION N/A 10/10/2017    Procedure: Angioplasty-peripheral;  Surgeon: Kan Pelaez MD;  Location: [facility] CATH INVASIVE LOCATION;  Service:    • CARDIAC CATHETERIZATION N/A 10/10/2017    Procedure: Atherectomy-peripheral;  Surgeon: Kan Pelaez MD;  Location: [facility] CATH INVASIVE LOCATION;  Service:    • ENDOSCOPY N/A 10/9/2017    Procedure: ESOPHAGOGASTRODUODENOSCOPY WITH COLD FORCEP BIOPSY;  Surgeon: Clifton Hyatt MD;  Location: [facility] ENDOSCOPY;  Service:    • INTERVENTIONAL RADIOLOGY PROCEDURE N/A  10/10/2017    Procedure: Abdominal Aortagram with Runoff;  Surgeon: Kan Pelaez MD;  Location: [facility] INVASIVE LOCATION;  Service:        Social History     Social History   • Marital status:      Spouse name: N/A   • Number of children: N/A   • Years of education: N/A     Occupational History   • Not on file.     Social History Main Topics   • Smoking status: Never Smoker   • Smokeless tobacco: Never Used   • Alcohol use No   • Drug use: No   • Sexual activity: Defer     Other Topics Concern   • Not on file     Social History Narrative       Counseling given: Not Answered      All the above social hx, family hx, surgical history,medications, allergies, ros & HPI reviewed.    Allergies   Allergen Reactions   • Phenergan [Promethazine Hcl] Confusion       Objective   Physical Exam   Constitutional: She is oriented to person, place, and time. She appears well-developed and well-nourished.   HENT:   Head: Normocephalic and atraumatic.   Eyes: EOM are normal. Pupils are equal, round, and reactive to light.   Neck: Normal range of motion.   Pulmonary/Chest: Effort normal.   Musculoskeletal: Normal range of motion.   Neurological: She is alert and oriented to person, place, and time. She has normal reflexes.   Skin: Skin is warm.   Psychiatric: She has a normal mood and affect. Her behavior is normal. Judgment and thought content normal.   Nursing note and vitals reviewed.    Ortho Exam  Ortho Exam  Pulses are palpable to the left lower extremity.  There is 1-2+ pitting edema to the left lower extremity  Her wound is granular and healthy.  It measures 1.5 x 1.3.  There is minimal hyperkeratosis of the perimeter of the wound.  The base is granular.  There is no direct extension to bone or visible bone.  It is extending over against the medial border of the nailbed and nail.    Assessment/Plan  left great toe diabetic wound, osteomyelitis  Independent Review of Radiographic Studies:      Laboratory  and Other Studies:     Medical Decision Making:        Procedures  Debridement Note      There are no diagnoses linked to this encounter.      Recommendations/Plan:  I cleansed the wound and applied a Marta dry dressing.  She will continue with this until follow-up.  I'm still awaiting potential insurance approval and grafting and if we are able to get this approved we will begin nose.  Continue 3 times a week dressing changes.  Continue her IV antibiotics.  Follow-up in 2 weeks.    No Follow-up on file.  Patient agreeable to call or return sooner for any concerns.

## 2017-10-30 ENCOUNTER — OUTSIDE FACILITY SERVICE (OUTPATIENT)
Dept: INTERNAL MEDICINE | Facility: HOSPITAL | Age: 82
End: 2017-10-30

## 2017-10-30 ENCOUNTER — LAB REQUISITION (OUTPATIENT)
Dept: LAB | Facility: HOSPITAL | Age: 82
End: 2017-10-30

## 2017-10-30 DIAGNOSIS — E11.621 TYPE 2 DIABETES MELLITUS WITH FOOT ULCER (CODE) (HCC): ICD-10-CM

## 2017-10-30 DIAGNOSIS — E11.51 TYPE 2 DIABETES MELLITUS WITH DIABETIC PERIPHERAL ANGIOPATHY WITHOUT GANGRENE (HCC): ICD-10-CM

## 2017-10-30 DIAGNOSIS — M86.9 OSTEOMYELITIS (HCC): ICD-10-CM

## 2017-10-30 DIAGNOSIS — L97.529 NON-PRESSURE CHRONIC ULCER OF OTHER PART OF LEFT FOOT WITH UNSPECIFIED SEVERITY (HCC): ICD-10-CM

## 2017-10-30 LAB
ANION GAP SERPL CALCULATED.3IONS-SCNC: 8 MMOL/L (ref 3–11)
BUN BLD-MCNC: 32 MG/DL (ref 9–23)
BUN/CREAT SERPL: 20 (ref 7–25)
CALCIUM SPEC-SCNC: 9.1 MG/DL (ref 8.7–10.4)
CHLORIDE SERPL-SCNC: 101 MMOL/L (ref 99–109)
CO2 SERPL-SCNC: 29 MMOL/L (ref 20–31)
CREAT BLD-MCNC: 1.6 MG/DL (ref 0.6–1.3)
DEPRECATED RDW RBC AUTO: 63.4 FL (ref 37–54)
ERYTHROCYTE [DISTWIDTH] IN BLOOD BY AUTOMATED COUNT: 19.4 % (ref 11.3–14.5)
GFR SERPL CREATININE-BSD FRML MDRD: 31 ML/MIN/1.73
GLUCOSE BLD-MCNC: 166 MG/DL (ref 70–100)
HCT VFR BLD AUTO: 25.2 % (ref 34.5–44)
HGB BLD-MCNC: 7.6 G/DL (ref 11.5–15.5)
MCH RBC QN AUTO: 26.6 PG (ref 27–31)
MCHC RBC AUTO-ENTMCNC: 30.2 G/DL (ref 32–36)
MCV RBC AUTO: 88.1 FL (ref 80–99)
PLATELET # BLD AUTO: 160 10*3/MM3 (ref 150–450)
PMV BLD AUTO: 10.4 FL (ref 6–12)
POTASSIUM BLD-SCNC: 3.9 MMOL/L (ref 3.5–5.5)
RBC # BLD AUTO: 2.86 10*6/MM3 (ref 3.89–5.14)
SODIUM BLD-SCNC: 138 MMOL/L (ref 132–146)
WBC NRBC COR # BLD: 6.82 10*3/MM3 (ref 3.5–10.8)

## 2017-10-30 PROCEDURE — 85027 COMPLETE CBC AUTOMATED: CPT | Performed by: INTERNAL MEDICINE

## 2017-10-30 PROCEDURE — 80048 BASIC METABOLIC PNL TOTAL CA: CPT | Performed by: INTERNAL MEDICINE

## 2017-11-07 ENCOUNTER — APPOINTMENT (OUTPATIENT)
Dept: LAB | Facility: HOSPITAL | Age: 82
End: 2017-11-07

## 2017-11-07 ENCOUNTER — TRANSCRIBE ORDERS (OUTPATIENT)
Dept: LAB | Facility: HOSPITAL | Age: 82
End: 2017-11-07

## 2017-11-07 DIAGNOSIS — M86.9 OSTEOMYELITIS, UNSPECIFIED SITE, UNSPECIFIED TYPE (HCC): Primary | ICD-10-CM

## 2017-11-07 LAB
ANION GAP SERPL CALCULATED.3IONS-SCNC: 7 MMOL/L (ref 3–11)
BUN BLD-MCNC: 29 MG/DL (ref 9–23)
BUN/CREAT SERPL: 24.2 (ref 7–25)
CALCIUM SPEC-SCNC: 8.8 MG/DL (ref 8.7–10.4)
CHLORIDE SERPL-SCNC: 107 MMOL/L (ref 99–109)
CO2 SERPL-SCNC: 25 MMOL/L (ref 20–31)
CREAT BLD-MCNC: 1.2 MG/DL (ref 0.6–1.3)
DEPRECATED RDW RBC AUTO: 64.8 FL (ref 37–54)
ERYTHROCYTE [DISTWIDTH] IN BLOOD BY AUTOMATED COUNT: 19.3 % (ref 11.3–14.5)
GFR SERPL CREATININE-BSD FRML MDRD: 43 ML/MIN/1.73
GLUCOSE BLD-MCNC: 214 MG/DL (ref 70–100)
HCT VFR BLD AUTO: 22.7 % (ref 34.5–44)
HGB BLD-MCNC: 6.8 G/DL (ref 11.5–15.5)
MCH RBC QN AUTO: 27.1 PG (ref 27–31)
MCHC RBC AUTO-ENTMCNC: 30 G/DL (ref 32–36)
MCV RBC AUTO: 90.4 FL (ref 80–99)
PLATELET # BLD AUTO: 135 10*3/MM3 (ref 150–450)
PMV BLD AUTO: 10.3 FL (ref 6–12)
POTASSIUM BLD-SCNC: 4 MMOL/L (ref 3.5–5.5)
RBC # BLD AUTO: 2.51 10*6/MM3 (ref 3.89–5.14)
SODIUM BLD-SCNC: 139 MMOL/L (ref 132–146)
WBC NRBC COR # BLD: 5.79 10*3/MM3 (ref 3.5–10.8)

## 2017-11-07 PROCEDURE — 85027 COMPLETE CBC AUTOMATED: CPT | Performed by: INTERNAL MEDICINE

## 2017-11-07 PROCEDURE — 36415 COLL VENOUS BLD VENIPUNCTURE: CPT | Performed by: INTERNAL MEDICINE

## 2017-11-07 PROCEDURE — 80048 BASIC METABOLIC PNL TOTAL CA: CPT | Performed by: INTERNAL MEDICINE

## 2017-11-09 ENCOUNTER — HOSPITAL ENCOUNTER (OUTPATIENT)
Dept: INFUSION THERAPY | Facility: HOSPITAL | Age: 82
Setting detail: INFUSION SERIES
Discharge: HOME | End: 2017-11-09

## 2017-11-09 VITALS
HEART RATE: 97 BPM | HEIGHT: 66 IN | WEIGHT: 150 LBS | BODY MASS INDEX: 24.11 KG/M2 | SYSTOLIC BLOOD PRESSURE: 146 MMHG | OXYGEN SATURATION: 99 % | TEMPERATURE: 99.6 F | DIASTOLIC BLOOD PRESSURE: 55 MMHG | RESPIRATION RATE: 18 BRPM

## 2017-11-09 DIAGNOSIS — D64.9 ANEMIA, UNSPECIFIED TYPE: Primary | ICD-10-CM

## 2017-11-09 LAB
ABO GROUP BLD: NORMAL
BLD GP AB SCN SERPL QL: NEGATIVE
RH BLD: POSITIVE

## 2017-11-09 PROCEDURE — 86900 BLOOD TYPING SEROLOGIC ABO: CPT

## 2017-11-09 PROCEDURE — P9016 RBC LEUKOCYTES REDUCED: HCPCS

## 2017-11-09 PROCEDURE — 86850 RBC ANTIBODY SCREEN: CPT | Performed by: FAMILY MEDICINE

## 2017-11-09 PROCEDURE — 86920 COMPATIBILITY TEST SPIN: CPT

## 2017-11-09 PROCEDURE — 86900 BLOOD TYPING SEROLOGIC ABO: CPT | Performed by: FAMILY MEDICINE

## 2017-11-09 PROCEDURE — 36430 TRANSFUSION BLD/BLD COMPNT: CPT

## 2017-11-09 PROCEDURE — 86901 BLOOD TYPING SEROLOGIC RH(D): CPT | Performed by: FAMILY MEDICINE

## 2017-11-09 PROCEDURE — 36592 COLLECT BLOOD FROM PICC: CPT

## 2017-11-09 RX ORDER — SODIUM CHLORIDE 9 MG/ML
250 INJECTION, SOLUTION INTRAVENOUS AS NEEDED
Status: DISCONTINUED | OUTPATIENT
Start: 2017-11-09 | End: 2017-11-11 | Stop reason: HOSPADM

## 2017-11-09 RX ADMIN — SODIUM CHLORIDE 250 ML: 9 INJECTION, SOLUTION INTRAVENOUS at 09:11

## 2017-11-10 ENCOUNTER — OFFICE VISIT (OUTPATIENT)
Dept: ORTHOPEDIC SURGERY | Facility: CLINIC | Age: 82
End: 2017-11-10

## 2017-11-10 VITALS — WEIGHT: 150 LBS | RESPIRATION RATE: 18 BRPM | BODY MASS INDEX: 24.11 KG/M2 | HEIGHT: 66 IN

## 2017-11-10 DIAGNOSIS — Z79.4 TYPE 2 DIABETES MELLITUS WITH OTHER SKIN ULCER, WITH LONG-TERM CURRENT USE OF INSULIN (HCC): ICD-10-CM

## 2017-11-10 DIAGNOSIS — M20.5X2 HALLUX LIMITUS, LEFT: ICD-10-CM

## 2017-11-10 DIAGNOSIS — L97.521 ULCER OF FOOT, LEFT, LIMITED TO BREAKDOWN OF SKIN (HCC): ICD-10-CM

## 2017-11-10 DIAGNOSIS — E11.42 DIABETIC POLYNEUROPATHY ASSOCIATED WITH TYPE 2 DIABETES MELLITUS (HCC): Primary | ICD-10-CM

## 2017-11-10 DIAGNOSIS — E11.622 TYPE 2 DIABETES MELLITUS WITH OTHER SKIN ULCER, WITH LONG-TERM CURRENT USE OF INSULIN (HCC): ICD-10-CM

## 2017-11-10 DIAGNOSIS — I73.9 PERIPHERAL VASCULAR DISEASE (HCC): ICD-10-CM

## 2017-11-10 LAB
ABO + RH BLD: NORMAL
ABO + RH BLD: NORMAL
BH BB BLOOD EXPIRATION DATE: NORMAL
BH BB BLOOD EXPIRATION DATE: NORMAL
BH BB BLOOD TYPE BARCODE: 5100
BH BB BLOOD TYPE BARCODE: 5100
BH BB DISPENSE STATUS: NORMAL
BH BB DISPENSE STATUS: NORMAL
BH BB PRODUCT CODE: NORMAL
BH BB PRODUCT CODE: NORMAL
BH BB UNIT NUMBER: NORMAL
BH BB UNIT NUMBER: NORMAL
CROSSMATCH INTERPRETATION: NORMAL
CROSSMATCH INTERPRETATION: NORMAL
UNIT  ABO: NORMAL
UNIT  ABO: NORMAL
UNIT  RH: NORMAL
UNIT  RH: NORMAL

## 2017-11-10 PROCEDURE — 11042 DBRDMT SUBQ TIS 1ST 20SQCM/<: CPT | Performed by: PODIATRIST

## 2017-11-10 NOTE — PROGRESS NOTES
Subjective   Patient ID: Lynne Sanchez is a 82 y.o. female she returns today with her  for follow-up on her left great toe wound.  They state they have still been applying Marta to the wound.  She still receiving antibiotics.  Denies any complaints or constitutional symptoms.      History of Present Illness                                                   Review of Systems   Constitutional: Negative for diaphoresis, fever and unexpected weight change.   HENT: Negative for dental problem and sore throat.    Eyes: Negative for visual disturbance.   Respiratory: Negative for shortness of breath.    Cardiovascular: Negative for chest pain.   Gastrointestinal: Negative for abdominal pain, constipation, diarrhea, nausea and vomiting.   Genitourinary: Negative for difficulty urinating and frequency.   Skin: Positive for wound.   Neurological: Negative for headaches.   Hematological: Does not bruise/bleed easily.   All other systems reviewed and are negative.      Past Medical History:   Diagnosis Date   • CHF (congestive heart failure)    • Chronic atrial fibrillation    • Diabetes mellitus, type 2    • History of congestive heart failure         Past Surgical History:   Procedure Laterality Date   • APPENDECTOMY     • CARDIAC CATHETERIZATION N/A 10/10/2017    Procedure: Peripheral angiography;  Surgeon: Kan Pelaez MD;  Location: [facility] CATH INVASIVE LOCATION;  Service:    • CARDIAC CATHETERIZATION N/A 10/10/2017    Procedure: Angioplasty-peripheral;  Surgeon: Kan Pelaez MD;  Location: [facility] CATH INVASIVE LOCATION;  Service:    • CARDIAC CATHETERIZATION N/A 10/10/2017    Procedure: Atherectomy-peripheral;  Surgeon: Kan Pelaez MD;  Location: [facility] CATH INVASIVE LOCATION;  Service:    • ENDOSCOPY N/A 10/9/2017    Procedure: ESOPHAGOGASTRODUODENOSCOPY WITH COLD FORCEP BIOPSY;  Surgeon: Clifton Hyatt MD;  Location: [facility] ENDOSCOPY;  Service:    • INTERVENTIONAL RADIOLOGY  PROCEDURE N/A 10/10/2017    Procedure: Abdominal Aortagram with Runoff;  Surgeon: Kan Pelaez MD;  Location: [facility] INVASIVE LOCATION;  Service:        Social History     Social History   • Marital status:      Spouse name: N/A   • Number of children: N/A   • Years of education: N/A     Occupational History   • Not on file.     Social History Main Topics   • Smoking status: Never Smoker   • Smokeless tobacco: Never Used   • Alcohol use No   • Drug use: No   • Sexual activity: Defer     Other Topics Concern   • Not on file     Social History Narrative       Counseling given: Not Answered      I have reviewed all of the above social hx, family hx, surgical hx, medications, allergies & ROS and confirm that it is accurate.    Allergies   Allergen Reactions   • Phenergan [Promethazine Hcl] Confusion       Objective   Physical Exam  Ortho Exam  Ortho Exam  The left foot grossly neurovascularly intact loss of protective sensation.  The wound is showing skin bridging and callus formation around the perimeter of the wound.  The wound bed itself is becoming hyper-granular and more healthy.  It still abuts up against the adjacent aspect of the medial great toenail border.  Prior to debridement the wound measures 0.5 x 0.5.  There is no visible bone.  Still a very thin covering of granulation tissue and periosteum over the hallux IPJ.  No erythema or edema.  No acute clinical signs of infection today.      Assessment/Plan  left great toe wound, diabetes, PAD, osteomyelitis of the left great toe  Independent Review of Radiographic Studies:      Laboratory and Other Studies:     Medical Decision Making:        Procedures  Debridement Note    Location of debridement: Left great toe  Description of procedure: excisional  Type of instrument used: scalpel  Type of tissue removed: devitalized and non-viable  Appearance and size of the wound: down to fresh bleeding tissue  Depth of debridement: subcutaneous  tissue    The wound measures 0.6 x 0.7  I sharply and excisionally debrided the wounds down to healthy bleeding tissue with a good healthy granular base we will continue with the local wound care.    There are no diagnoses linked to this encounter.      Recommendations/Plan:  A Marta dry dressing was applied today.  To this point she's been denied any type of skin graft substitute so we will continue with Marta.  We will continue to monitor this closely.  Follow-up in one week.    No Follow-up on file.  Patient agreeable to call or return sooner for any concerns.

## 2017-11-13 ENCOUNTER — TRANSCRIBE ORDERS (OUTPATIENT)
Dept: LAB | Facility: HOSPITAL | Age: 82
End: 2017-11-13

## 2017-11-13 ENCOUNTER — OFFICE VISIT (OUTPATIENT)
Dept: CARDIOLOGY | Facility: CLINIC | Age: 82
End: 2017-11-13

## 2017-11-13 ENCOUNTER — APPOINTMENT (OUTPATIENT)
Dept: LAB | Facility: HOSPITAL | Age: 82
End: 2017-11-13

## 2017-11-13 VITALS
OXYGEN SATURATION: 99 % | WEIGHT: 155 LBS | HEART RATE: 87 BPM | DIASTOLIC BLOOD PRESSURE: 70 MMHG | BODY MASS INDEX: 24.91 KG/M2 | HEIGHT: 66 IN | SYSTOLIC BLOOD PRESSURE: 142 MMHG

## 2017-11-13 DIAGNOSIS — E11.69 DIABETIC FOOT ULCER WITH OSTEOMYELITIS (HCC): Primary | ICD-10-CM

## 2017-11-13 DIAGNOSIS — M65.9 SAPHO SYNDROME (HCC): ICD-10-CM

## 2017-11-13 DIAGNOSIS — L97.529 ULCER OF LEFT FOOT, UNSPECIFIED ULCER STAGE (HCC): ICD-10-CM

## 2017-11-13 DIAGNOSIS — L40.3 SAPHO SYNDROME (HCC): ICD-10-CM

## 2017-11-13 DIAGNOSIS — I50.22 CHRONIC SYSTOLIC CONGESTIVE HEART FAILURE (HCC): Primary | ICD-10-CM

## 2017-11-13 DIAGNOSIS — L70.9 SAPHO SYNDROME (HCC): ICD-10-CM

## 2017-11-13 DIAGNOSIS — I48.20 CHRONIC ATRIAL FIBRILLATION (HCC): ICD-10-CM

## 2017-11-13 DIAGNOSIS — M86.9 SAPHO SYNDROME (HCC): ICD-10-CM

## 2017-11-13 DIAGNOSIS — E78.2 MIXED HYPERLIPIDEMIA: ICD-10-CM

## 2017-11-13 DIAGNOSIS — L97.509 DIABETIC FOOT ULCER WITH OSTEOMYELITIS (HCC): Primary | ICD-10-CM

## 2017-11-13 DIAGNOSIS — I10 ESSENTIAL HYPERTENSION: ICD-10-CM

## 2017-11-13 DIAGNOSIS — M85.80 SAPHO SYNDROME (HCC): ICD-10-CM

## 2017-11-13 DIAGNOSIS — M86.9 DIABETIC FOOT ULCER WITH OSTEOMYELITIS (HCC): Primary | ICD-10-CM

## 2017-11-13 DIAGNOSIS — E11.621 DIABETIC FOOT ULCER WITH OSTEOMYELITIS (HCC): Primary | ICD-10-CM

## 2017-11-13 DIAGNOSIS — L03.116 CELLULITIS OF LEFT FOOT: ICD-10-CM

## 2017-11-13 DIAGNOSIS — I34.0 NON-RHEUMATIC MITRAL REGURGITATION: ICD-10-CM

## 2017-11-13 LAB
ANION GAP SERPL CALCULATED.3IONS-SCNC: 7 MMOL/L (ref 3–11)
BUN BLD-MCNC: 23 MG/DL (ref 9–23)
BUN/CREAT SERPL: 17.7 (ref 7–25)
CALCIUM SPEC-SCNC: 8.8 MG/DL (ref 8.7–10.4)
CHLORIDE SERPL-SCNC: 107 MMOL/L (ref 99–109)
CO2 SERPL-SCNC: 26 MMOL/L (ref 20–31)
CREAT BLD-MCNC: 1.3 MG/DL (ref 0.6–1.3)
DEPRECATED RDW RBC AUTO: 62.1 FL (ref 37–54)
ERYTHROCYTE [DISTWIDTH] IN BLOOD BY AUTOMATED COUNT: 18.6 % (ref 11.3–14.5)
GFR SERPL CREATININE-BSD FRML MDRD: 39 ML/MIN/1.73
GLUCOSE BLD-MCNC: 245 MG/DL (ref 70–100)
HCT VFR BLD AUTO: 27.5 % (ref 34.5–44)
HGB BLD-MCNC: 8.2 G/DL (ref 11.5–15.5)
MCH RBC QN AUTO: 27.1 PG (ref 27–31)
MCHC RBC AUTO-ENTMCNC: 29.8 G/DL (ref 32–36)
MCV RBC AUTO: 90.8 FL (ref 80–99)
PLATELET # BLD AUTO: 132 10*3/MM3 (ref 150–450)
PMV BLD AUTO: 10.2 FL (ref 6–12)
POTASSIUM BLD-SCNC: 3.8 MMOL/L (ref 3.5–5.5)
RBC # BLD AUTO: 3.03 10*6/MM3 (ref 3.89–5.14)
SODIUM BLD-SCNC: 140 MMOL/L (ref 132–146)
WBC NRBC COR # BLD: 6.14 10*3/MM3 (ref 3.5–10.8)

## 2017-11-13 PROCEDURE — 93288 INTERROG EVL PM/LDLS PM IP: CPT | Performed by: INTERNAL MEDICINE

## 2017-11-13 PROCEDURE — 99214 OFFICE O/P EST MOD 30 MIN: CPT | Performed by: INTERNAL MEDICINE

## 2017-11-13 PROCEDURE — 36415 COLL VENOUS BLD VENIPUNCTURE: CPT | Performed by: INTERNAL MEDICINE

## 2017-11-13 PROCEDURE — 80048 BASIC METABOLIC PNL TOTAL CA: CPT | Performed by: INTERNAL MEDICINE

## 2017-11-13 PROCEDURE — 85027 COMPLETE CBC AUTOMATED: CPT | Performed by: INTERNAL MEDICINE

## 2017-11-13 NOTE — PROGRESS NOTES
[facility] - Office Note  Lynne Sanchez         [address] [place] [id number]  1934   [phone] (home)      LOCATION:  [facility].  Visit Type: Follow Up.    PCP:  ANN Gonsalez    11/13/17   Lynne Sanchez is a 82 y.o.  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  PROBLEM LIST:  1. Chronic atrial fibrillation, status post [employer] Laureano pacemaker implantation and AV node ablation:  a. Diagnosed June 2005.  b. Status post ECV restoring normal sinus rhythm, June 2005.  c. Rythmol initiated 05/01/2006.  d. Previously digoxin toxicity.  e. GXT Cardiolite, 09/08/2005: No reversible ischemia. LV function not performed secondary to atrial fibrillation.   f. Status post successful external cardioversion on 10/01/2008, Tessa Downey MD.  g. EKG on 10/21/2008: Recurrence of atrial fibrillation with rate of 109.  h. Recurrent atrial fibrillation by EKG, 11/03/2008, asymptomatic.  i. Status post [employer] Laureano pacemaker implantation and AV node ablation.   j. Echocardiogram 02/15/2010: Moderate MR, moderate TR. RVSP 50. EF greater than 55%, left atrial enlargement at 4.3 cm.  k. Echo 1/18/17: EF 60%, Mod-severe MR, Mod-severe TR.  2. History of congestive heart failure.  3. Diabetes mellitus, type 2.  4. PVD- 10/17 L PTCA ant tib(Benigno)  5. Surgical history - appendectomy.   6. Anemia-NOS neg scopes 2017, h/o following                    Allergies   Allergen Reactions   • Phenergan [Promethazine Hcl] Confusion         Current Outpatient Prescriptions:   •  aspirin 81 MG EC tablet, Take 1 tablet by mouth Daily., Disp: 30 tablet, Rfl: 0  •  atorvastatin (LIPITOR) 40 MG tablet, Take 1 tablet by mouth Every Night., Disp: 30 tablet, Rfl: 0  •  cefTAZidime 2 g in sodium chloride 0.9 % 100 mL IVPB, Infuse 2 g into a venous catheter Daily for 34 days. Indications: Skin and Soft Tissue Infection, Disp: 34 each,  "Rfl: 0  •  clopidogrel (PLAVIX) 75 MG tablet, Take 1 tablet by mouth Daily., Disp: 30 tablet, Rfl: 0  •  ferrous gluconate (FERGON) 324 MG tablet, daily., Disp: , Rfl:   •  furosemide (LASIX) 20 MG tablet, daily., Disp: , Rfl:   •  glimepiride (AMARYL) 4 MG tablet, Daily., Disp: , Rfl:   •  latanoprost (XALATAN) 0.005 % ophthalmic solution, Administer 1 drop to both eyes Daily., Disp: , Rfl:   •  metoprolol tartrate (LOPRESSOR) 100 MG tablet, Take 1 tablet by mouth 2 (Two) Times a Day., Disp: 180 tablet, Rfl: 3  •  Multiple Vitamins-Minerals (MULTIVITAMIN GUMMIES ADULT) chewable tablet, Chew 1 tablet 2 (Two) Times a Day., Disp: , Rfl:   •  pantoprazole (PROTONIX) 40 MG EC tablet, Take 1 tablet by mouth Daily., Disp: 30 tablet, Rfl: 0  •  saccharomyces boulardii (FLORASTOR) 250 MG capsule, Take 1 capsule by mouth 2 (Two) Times a Day., Disp: 60 capsule, Rfl: 0    Atrial Fibrillation   Symptoms are negative for chest pain and syncope. Past medical history includes atrial fibrillation.       Here with  for device check and transition of care from [facility].  PT w litany of issues since last visit w admission to [facility] 10/17 w anemia and nonhealing L foot lesion.  Ultimately w pci to ant tib/abx has begun to heal.  Now having fu re anemiaw mult specialists    She and  discussing wintering in [place].    The following portions of the patient's history were reviewed in the chart and updated as appropriate: allergies, current medications, past family history, past medical history, past social history, past surgical history and problem list.    Review of Systems   Constitution: Positive for malaise/fatigue.   Cardiovascular: Negative for chest pain, dyspnea on exertion, near-syncope and syncope.   Respiratory: Negative for cough.          height is 66\" (167.6 cm) and weight is 155 lb (70.3 kg). Her blood pressure is 142/70 and her pulse is 87. Her oxygen saturation is 99%.   Physical Exam   Constitutional: She appears " well-developed and well-nourished.   Neck: Normal range of motion. Neck supple. No hepatojugular reflux and no JVD present. Carotid bruit is not present. No tracheal deviation present. No thyromegaly present.   Cardiovascular: Normal rate, regular rhythm, S1 normal, S2 normal, intact distal pulses and normal pulses.  PMI is not displaced.  Exam reveals no gallop, no distant heart sounds, no friction rub, no midsystolic click and no opening snap.    Murmur heard.  High-pitched blowing holosystolic murmur is present with a grade of 3/6  at the apex  Pulses:       Radial pulses are 2+ on the right side, and 2+ on the left side.        Dorsalis pedis pulses are 2+ on the right side, and 2+ on the left side.        Posterior tibial pulses are 2+ on the right side, and 2+ on the left side.   Pulmonary/Chest: Effort normal and breath sounds normal. She has no wheezes. She has no rales.   Abdominal: Soft. Bowel sounds are normal. She exhibits no mass. There is no tenderness. There is no guarding.   Musculoskeletal: She exhibits edema.         Procedures   St. Laureano pacemaker: RV pacing greater than 99%, threshold 0.75, impedance 366 ohms.  Battery voltage is 2.76 which is approximately one and a half years.  No events noted.  Assessment/ Plan   Chronic atrial fibrillation:  Normal device check.  Battery voltage is noted.  Return to clinic 6 months with a repeat St[employer] Laureano check or sooner when necessary.  She is off warfarin at current post LE pci.  I would rec restarting after 6 weeks DA  DAPT and stopping plavix then.    Valvular heart disease:  Mod + MR echo next yr.  At this time patient is well compensated and asymptomatic.  Reevaluate at time of follow-up.    Anemia- per heme onc      Demond Leon MD  11/13/2017 5:23 PM      EMR Dragon/Transcription disclaimer:   Much of this encounter note is an electronic transcription/translation of spoken language to printed text. The electronic translation of spoken language may  permit erroneous, or at times, nonsensical words or phrases to be inadvertently transcribed; Although I have reviewed the note for such errors, some may still exist.

## 2017-11-22 ENCOUNTER — TRANSCRIBE ORDERS (OUTPATIENT)
Dept: ADMINISTRATIVE | Facility: HOSPITAL | Age: 82
End: 2017-11-22

## 2017-11-22 LAB
BUN SERPL-MCNC: 32 MG/DL (ref 9–23)
BUN/CREAT SERPL: 20 (ref 7–25)
CALCIUM SERPL-MCNC: 9.1 MG/DL (ref 8.7–10.4)
CHLORIDE SERPL-SCNC: 101 MMOL/L (ref 99–109)
CO2 SERPL-SCNC: 29 MMOL/L (ref 20–31)
CREAT SERPL-MCNC: 1.6 MG/DL (ref 0.6–1.3)
ERYTHROCYTE [DISTWIDTH] IN BLOOD BY AUTOMATED COUNT: 19.4 % (ref 11.3–14.5)
GFR SERPLBLD CREATININE-BSD FMLA CKD-EPI: 31 ML/MIN/1.732
GLUCOSE SERPL-MCNC: 166 MG/DL (ref 70–100)
HCT VFR BLD AUTO: 25.2 % (ref 34.5–44)
HGB BLD-MCNC: 7.6 G/DL (ref 11.5–15.5)
MCH RBC QN AUTO: 26.6 PG (ref 27–31)
MCHC RBC AUTO-ENTMCNC: 30.2 G/DL (ref 32–36)
MCV RBC AUTO: 88.1 FL (ref 80–99)
POTASSIUM SERPL-SCNC: 3.9 MMOL/L (ref 3.5–5.5)
RBC # BLD AUTO: 2.86 10E6/MM3 (ref 3.89–5.14)
SODIUM SERPL-SCNC: 138 MMOL/L (ref 132–146)
WBC # BLD AUTO: 6.82 10E3/MM3 (ref 3.5–10.8)

## 2018-04-23 ENCOUNTER — TELEPHONE (OUTPATIENT)
Dept: CARDIOLOGY | Facility: CLINIC | Age: 83
End: 2018-04-23

## 2018-04-23 NOTE — TELEPHONE ENCOUNTER
Patient pcp office called and wants patients appt moved up if possible for SOB, edema, CHF. Informed them to send office visit info and will have scheduling call patient or them with appt.     Office note received

## 2018-04-25 ENCOUNTER — HOSPITAL ENCOUNTER (EMERGENCY)
Facility: HOSPITAL | Age: 83
Discharge: HOME | End: 2018-04-25
Attending: EMERGENCY MEDICINE | Admitting: EMERGENCY MEDICINE

## 2018-04-25 ENCOUNTER — APPOINTMENT (OUTPATIENT)
Dept: GENERAL RADIOLOGY | Facility: HOSPITAL | Age: 83
End: 2018-04-25
Attending: EMERGENCY MEDICINE

## 2018-04-25 VITALS
RESPIRATION RATE: 14 BRPM | SYSTOLIC BLOOD PRESSURE: 161 MMHG | DIASTOLIC BLOOD PRESSURE: 65 MMHG | HEIGHT: 65 IN | OXYGEN SATURATION: 98 % | BODY MASS INDEX: 25.69 KG/M2 | WEIGHT: 154.2 LBS | HEART RATE: 62 BPM | TEMPERATURE: 98.1 F

## 2018-04-25 DIAGNOSIS — R06.02 SHORTNESS OF BREATH: Primary | ICD-10-CM

## 2018-04-25 LAB
ALBUMIN SERPL-MCNC: 3.5 G/DL (ref 3.5–5)
ALBUMIN/GLOB SERPL: 1.2 G/DL (ref 1–2)
ALP SERPL-CCNC: 78 U/L (ref 38–126)
ALT SERPL W P-5'-P-CCNC: 28 U/L (ref 13–69)
ANION GAP SERPL CALCULATED.3IONS-SCNC: 16.2 MMOL/L (ref 10–20)
AST SERPL-CCNC: 23 U/L (ref 15–46)
BASOPHILS # BLD AUTO: 0.03 10*3/MM3 (ref 0–0.2)
BASOPHILS NFR BLD AUTO: 0.5 % (ref 0–2.5)
BILIRUB SERPL-MCNC: 0.5 MG/DL (ref 0.2–1.3)
BUN BLD-MCNC: 29 MG/DL (ref 7–20)
BUN/CREAT SERPL: 24.2 (ref 7.1–23.5)
CALCIUM SPEC-SCNC: 9.2 MG/DL (ref 8.4–10.2)
CHLORIDE SERPL-SCNC: 105 MMOL/L (ref 98–107)
CO2 SERPL-SCNC: 27 MMOL/L (ref 26–30)
CREAT BLD-MCNC: 1.2 MG/DL (ref 0.6–1.3)
DEPRECATED RDW RBC AUTO: 54.4 FL (ref 37–54)
EOSINOPHIL # BLD AUTO: 0.24 10*3/MM3 (ref 0–0.7)
EOSINOPHIL NFR BLD AUTO: 4 % (ref 0–7)
ERYTHROCYTE [DISTWIDTH] IN BLOOD BY AUTOMATED COUNT: 16.4 % (ref 11.5–14.5)
GFR SERPL CREATININE-BSD FRML MDRD: 43 ML/MIN/1.73
GLOBULIN UR ELPH-MCNC: 3 GM/DL
GLUCOSE BLD-MCNC: 268 MG/DL (ref 74–98)
HCT VFR BLD AUTO: 24.6 % (ref 37–47)
HGB BLD-MCNC: 7.5 G/DL (ref 12–16)
HOLD SPECIMEN: NORMAL
HOLD SPECIMEN: NORMAL
IMM GRANULOCYTES # BLD: 0.02 10*3/MM3 (ref 0–0.06)
IMM GRANULOCYTES NFR BLD: 0.3 % (ref 0–0.6)
LYMPHOCYTES # BLD AUTO: 1.55 10*3/MM3 (ref 0.6–3.4)
LYMPHOCYTES NFR BLD AUTO: 25.5 % (ref 10–50)
MCH RBC QN AUTO: 27.8 PG (ref 27–31)
MCHC RBC AUTO-ENTMCNC: 30.5 G/DL (ref 30–37)
MCV RBC AUTO: 91.1 FL (ref 81–99)
MONOCYTES # BLD AUTO: 0.64 10*3/MM3 (ref 0–0.9)
MONOCYTES NFR BLD AUTO: 10.5 % (ref 0–12)
NEUTROPHILS # BLD AUTO: 3.59 10*3/MM3 (ref 2–6.9)
NEUTROPHILS NFR BLD AUTO: 59.2 % (ref 37–80)
NRBC BLD MANUAL-RTO: 0 /100 WBC (ref 0–0)
NT-PROBNP SERPL-MCNC: 1270 PG/ML (ref 0–450)
PLATELET # BLD AUTO: 159 10*3/MM3 (ref 130–400)
PMV BLD AUTO: 10.4 FL (ref 6–12)
POTASSIUM BLD-SCNC: 4.2 MMOL/L (ref 3.5–5.1)
PROT SERPL-MCNC: 6.5 G/DL (ref 6.3–8.2)
RBC # BLD AUTO: 2.7 10*6/MM3 (ref 4.2–5.4)
SODIUM BLD-SCNC: 144 MMOL/L (ref 137–145)
TROPONIN I SERPL-MCNC: <0.012 NG/ML (ref 0–0.03)
WBC NRBC COR # BLD: 6.07 10*3/MM3 (ref 4.8–10.8)
WHOLE BLOOD HOLD SPECIMEN: NORMAL
WHOLE BLOOD HOLD SPECIMEN: NORMAL

## 2018-04-25 PROCEDURE — 84484 ASSAY OF TROPONIN QUANT: CPT | Performed by: EMERGENCY MEDICINE

## 2018-04-25 PROCEDURE — 25010000002 FUROSEMIDE PER 20 MG: Performed by: EMERGENCY MEDICINE

## 2018-04-25 PROCEDURE — 71046 X-RAY EXAM CHEST 2 VIEWS: CPT

## 2018-04-25 PROCEDURE — 93005 ELECTROCARDIOGRAM TRACING: CPT | Performed by: EMERGENCY MEDICINE

## 2018-04-25 PROCEDURE — 85025 COMPLETE CBC W/AUTO DIFF WBC: CPT | Performed by: EMERGENCY MEDICINE

## 2018-04-25 PROCEDURE — 83880 ASSAY OF NATRIURETIC PEPTIDE: CPT | Performed by: EMERGENCY MEDICINE

## 2018-04-25 PROCEDURE — 99283 EMERGENCY DEPT VISIT LOW MDM: CPT

## 2018-04-25 PROCEDURE — 96374 THER/PROPH/DIAG INJ IV PUSH: CPT

## 2018-04-25 PROCEDURE — 80053 COMPREHEN METABOLIC PANEL: CPT | Performed by: EMERGENCY MEDICINE

## 2018-04-25 RX ORDER — SODIUM CHLORIDE 0.9 % (FLUSH) 0.9 %
10 SYRINGE (ML) INJECTION AS NEEDED
Status: DISCONTINUED | OUTPATIENT
Start: 2018-04-25 | End: 2018-04-25 | Stop reason: HOSPADM

## 2018-04-25 RX ORDER — FUROSEMIDE 10 MG/ML
40 INJECTION INTRAMUSCULAR; INTRAVENOUS ONCE
Status: COMPLETED | OUTPATIENT
Start: 2018-04-25 | End: 2018-04-25

## 2018-04-25 RX ADMIN — FUROSEMIDE 40 MG: 10 INJECTION, SOLUTION INTRAMUSCULAR; INTRAVENOUS at 11:00

## 2018-04-25 NOTE — ED PROVIDER NOTES
Subjective   83-year-old female presenting with multiple complaints.  She states that for the last 2 months she has had slowly increasing exertional dyspnea, general weakness, dry cough.  Has also noted that her legs and been swelling.  She states that she had a chest x-ray performed recently and was called by her primary doctor and told that she was in heart failure and needed to come the emergency department immediately.  She denies any fevers, chest pain, nausea, vomiting or other complaints.  Of note she is scheduled to see her cardiologist tomorrow.            Review of Systems   Constitutional: Positive for fatigue. Negative for chills and fever.   HENT: Negative for congestion, rhinorrhea and sore throat.    Eyes: Negative for pain.   Respiratory: Positive for cough and shortness of breath.    Cardiovascular: Positive for leg swelling. Negative for chest pain and palpitations.   Gastrointestinal: Negative for abdominal pain, diarrhea, nausea and vomiting.   Genitourinary: Negative for dysuria.   Musculoskeletal: Negative for arthralgias.   Skin: Negative for rash.   Neurological: Negative for weakness and numbness.   Psychiatric/Behavioral: Negative for behavioral problems.       Past Medical History:   Diagnosis Date   • CHF (congestive heart failure)    • Chronic atrial fibrillation    • Diabetes mellitus, type 2    • History of congestive heart failure        Allergies   Allergen Reactions   • Phenergan [Promethazine Hcl] Confusion       Past Surgical History:   Procedure Laterality Date   • APPENDECTOMY     • CARDIAC CATHETERIZATION N/A 10/10/2017    Procedure: Peripheral angiography;  Surgeon: Kan Pelaez MD;  Location: [facility] CATH INVASIVE LOCATION;  Service:    • CARDIAC CATHETERIZATION N/A 10/10/2017    Procedure: Angioplasty-peripheral;  Surgeon: Kan Pelaez MD;  Location: [facility] CATH INVASIVE LOCATION;  Service:    • CARDIAC CATHETERIZATION N/A 10/10/2017    Procedure:  Atherectomy-peripheral;  Surgeon: Kan Pelaez MD;  Location: [facility] CATH INVASIVE LOCATION;  Service:    • ENDOSCOPY N/A 10/9/2017    Procedure: ESOPHAGOGASTRODUODENOSCOPY WITH COLD FORCEP BIOPSY;  Surgeon: Clifton Hyatt MD;  Location: [facility] ENDOSCOPY;  Service:    • INTERVENTIONAL RADIOLOGY PROCEDURE N/A 10/10/2017    Procedure: Abdominal Aortagram with Runoff;  Surgeon: Kan Pelaez MD;  Location: [facility] CATH INVASIVE LOCATION;  Service:        Family History   Problem Relation Age of Onset   • No Known Problems Mother    • No Known Problems Father        Social History     Social History   • Marital status:      Social History Main Topics   • Smoking status: Never Smoker   • Smokeless tobacco: Never Used   • Alcohol use No   • Drug use: No   • Sexual activity: Defer     Other Topics Concern   • Not on file           Objective   Physical Exam   Constitutional: She is oriented to person, place, and time. She appears well-developed and well-nourished. No distress.   HENT:   Head: Normocephalic and atraumatic.   Right Ear: External ear normal.   Left Ear: External ear normal.   Nose: Nose normal.   Mouth/Throat: Oropharynx is clear and moist.   Eyes: Conjunctivae and EOM are normal. Pupils are equal, round, and reactive to light.   Neck: Normal range of motion. Neck supple.   Cardiovascular: Normal rate, regular rhythm, normal heart sounds and intact distal pulses.    Pulmonary/Chest: Effort normal. No respiratory distress.   Rales at the bases   Abdominal: Soft. Bowel sounds are normal. She exhibits no distension. There is no tenderness. There is no rebound and no guarding.   Musculoskeletal: Normal range of motion. She exhibits no tenderness or deformity.   Trace lower extremity edema   Neurological: She is alert and oriented to person, place, and time.   Skin: Skin is warm and dry. No rash noted.   Psychiatric: She has a normal mood and affect. Her behavior is normal.   Nursing  note and vitals reviewed.      Procedures         ED Course  ED Course                  MDM  Number of Diagnoses or Management Options  Shortness of breath:   Diagnosis management comments: 83-year-old female with multiple complaints.  Well-developed, well-nourished elderly female in no distress with normal vital signs and exam as above.  I suspect she is volume overload.  We'll check labs, EKG and chest x-ray.  Disposition pending workup.    DDX: CHF, ACS, dietary indiscretion, electrolyte abnormality    EKG: Paced rhythm    Labs reveal multiple abnormalities though no significant changes from her baseline.  We'll give dose of Lasix here and have her follow-up tomorrow with her cardiologist as scheduled.  Patient is very happy with the plan.       Amount and/or Complexity of Data Reviewed  Clinical lab tests: reviewed  Tests in the radiology section of CPT®: reviewed  Decide to obtain previous medical records or to obtain history from someone other than the patient: yes        Final diagnoses:   Shortness of breath            Pan Mirza MD  04/25/18 3270

## 2018-04-26 ENCOUNTER — OFFICE VISIT (OUTPATIENT)
Dept: CARDIOLOGY | Facility: CLINIC | Age: 83
End: 2018-04-26

## 2018-04-26 VITALS
BODY MASS INDEX: 23.07 KG/M2 | WEIGHT: 147 LBS | DIASTOLIC BLOOD PRESSURE: 48 MMHG | HEIGHT: 67 IN | HEART RATE: 63 BPM | OXYGEN SATURATION: 94 % | SYSTOLIC BLOOD PRESSURE: 136 MMHG

## 2018-04-26 DIAGNOSIS — I38 VALVULAR HEART DISEASE: ICD-10-CM

## 2018-04-26 DIAGNOSIS — D64.9 NORMOCYTIC ANEMIA: ICD-10-CM

## 2018-04-26 DIAGNOSIS — I48.20 CHRONIC ATRIAL FIBRILLATION (HCC): Primary | ICD-10-CM

## 2018-04-26 PROCEDURE — 99214 OFFICE O/P EST MOD 30 MIN: CPT | Performed by: INTERNAL MEDICINE

## 2018-04-26 NOTE — PROGRESS NOTES
[facility] at [facility] - Office Note  Lynne Sanchez         [address] [place] [id number]  1934   [phone] (home)      LOCATION:  [place] office.  Visit Type: Follow Up.    VISIT DATE: 4/26/2018     PCP:  ANN Gonsalez    04/26/18   Lynne Sanchez is a 83 y.o.  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  PROBLEM LIST:  1. Chronic atrial fibrillation, status post [employer] Laureano pacemaker implantation and AV node ablation:  a. Diagnosed June 2005.  b. Status post ECV restoring normal sinus rhythm, June 2005.  c. Rythmol initiated 05/01/2006.  d. Previously digoxin toxicity.  e. GXT Cardiolite, 09/08/2005: No reversible ischemia. LV function not performed secondary to atrial fibrillation.   f. Status post successful external cardioversion on 10/01/2008, Tessa Downey MD.  g. EKG on 10/21/2008: Recurrence of atrial fibrillation with rate of 109.  h. Recurrent atrial fibrillation by EKG, 11/03/2008, asymptomatic.  i. Status post [employer] Laureano pacemaker implantation and AV node ablation.   j. Echocardiogram 02/15/2010: Moderate MR, moderate TR. RVSP 50. EF greater than 55%, left atrial enlargement at 4.3 cm.  k. Echo 1/18/17: EF 60%, Mod-severe MR, Mod-severe TR.  l. 10/6/17 echo: EF 50-55%, mild LVH, thickened mitral leaflets with mild MR, aortic sclerosis, trace AI, moderate TR with PA SP 60 mmHg, moderate to severe RV dilation, dilated IVC  2. History of congestive heart failure.  3. Diabetes mellitus, type 2.  4. PVD  a. - 10/17 L PTCA ant tib(Pelaez)  5. Surgical history - appendectomy.   6. Anemia-NOS neg scopes 2017, h/o following  a. [facility]       Allergies   Allergen Reactions   • Phenergan [Promethazine Hcl] Confusion         Current Outpatient Prescriptions:   •  aspirin 81 MG EC tablet, Take 1 tablet by mouth Daily., Disp: 30 tablet, Rfl: 0  •  atorvastatin (LIPITOR) 40 MG  "tablet, Take 1 tablet by mouth Every Night., Disp: 30 tablet, Rfl: 0  •  clopidogrel (PLAVIX) 75 MG tablet, Take 1 tablet by mouth Daily., Disp: 30 tablet, Rfl: 0  •  ferrous gluconate (FERGON) 324 MG tablet, daily., Disp: , Rfl:   •  furosemide (LASIX) 20 MG tablet, daily., Disp: , Rfl:   •  glimepiride (AMARYL) 4 MG tablet, Daily., Disp: , Rfl:   •  latanoprost (XALATAN) 0.005 % ophthalmic solution, Administer 1 drop to both eyes Daily., Disp: , Rfl:   •  metoprolol tartrate (LOPRESSOR) 100 MG tablet, Take 1 tablet by mouth 2 (Two) Times a Day., Disp: 180 tablet, Rfl: 3  •  Multiple Vitamins-Minerals (MULTIVITAMIN GUMMIES ADULT) chewable tablet, Chew 1 tablet 2 (Two) Times a Day., Disp: , Rfl:   •  pantoprazole (PROTONIX) 40 MG EC tablet, Take 1 tablet by mouth Daily., Disp: 30 tablet, Rfl: 0  •  saccharomyces boulardii (FLORASTOR) 250 MG capsule, Take 1 capsule by mouth 2 (Two) Times a Day., Disp: 60 capsule, Rfl: 0    Atrial Fibrillation   Symptoms include shortness of breath. Symptoms are negative for chest pain and syncope. Past medical history includes atrial fibrillation.   Shortness of Breath   Pertinent negatives include no chest pain or syncope.       Pt denies CP, dyspnea on exertion, orthopnea, PND, palpitations, lower extremity edema, syncope.  Recent swelling prompting ER eval c//w vol overload and marked anemia Hgb 7.8    The following portions of the patient's history were reviewed in the chart and updated as appropriate: allergies, current medications, past family history, past medical history, past social history, past surgical history and problem list.    Review of Systems   Constitution: Positive for malaise/fatigue.   Cardiovascular: Negative for chest pain, dyspnea on exertion, near-syncope and syncope.   Respiratory: Positive for shortness of breath. Negative for cough.          height is 170.2 cm (67\") and weight is 66.7 kg (147 lb). Her blood pressure is 136/48 and her pulse is 63. Her " oxygen saturation is 94%.      Physical Exam   Constitutional: She appears well-developed and well-nourished.   Neck: Normal range of motion. Neck supple. No hepatojugular reflux and no JVD present. Carotid bruit is not present. No tracheal deviation present. No thyromegaly present.   Cardiovascular: Normal rate, regular rhythm, S1 normal, S2 normal, intact distal pulses and normal pulses.  PMI is not displaced.  Exam reveals no gallop, no distant heart sounds, no friction rub, no midsystolic click and no opening snap.    Murmur heard.  High-pitched blowing holosystolic murmur is present with a grade of 3/6  at the apex  Pulses:       Radial pulses are 2+ on the right side, and 2+ on the left side.        Dorsalis pedis pulses are 2+ on the right side, and 2+ on the left side.        Posterior tibial pulses are 2+ on the right side, and 2+ on the left side.   Pulmonary/Chest: Effort normal and breath sounds normal. She has no wheezes. She has no rales.   Abdominal: Soft. Bowel sounds are normal. She exhibits no mass. There is no tenderness. There is no guarding.   Musculoskeletal: She exhibits edema.     Procedures   [employer] Laureano pacemaker: RV pacing greater than 99%, threshold 0.75, impedance 366 ohms.  Battery voltage at JOSE    No events noted.    Assessment/ Plan     Chronic atrial fibrillation:  Device at JOSE.  Will schedule generator change    Valvular heart disease:  Mod + MR echo next visit.  At this time patient is well compensated and relatively asymptomatic with near prohibitive surgical risk.  Reevaluate at time of follow-up and attempt to keep Hgb >9    Anemia- per heme onc Dr. Rodriguez    Scribed for Demond Leon MD by Sheree Saenz PA-C. 4/26/2018  1:24 PM   IDemond MD, personally performed the services described in this documentation as scribed by the above named individual in my presence, and it is both accurate and complete.  4/26/2018  1:25 PM

## 2018-05-02 ENCOUNTER — PREP FOR SURGERY (OUTPATIENT)
Dept: OTHER | Facility: HOSPITAL | Age: 83
End: 2018-05-02

## 2018-05-02 ENCOUNTER — APPOINTMENT (OUTPATIENT)
Dept: PREADMISSION TESTING | Facility: HOSPITAL | Age: 83
End: 2018-05-02

## 2018-05-02 DIAGNOSIS — Z45.010 PACEMAKER AT END OF BATTERY LIFE: ICD-10-CM

## 2018-05-02 DIAGNOSIS — Z45.010 PACEMAKER AT END OF BATTERY LIFE: Primary | ICD-10-CM

## 2018-05-02 LAB
ANION GAP SERPL CALCULATED.3IONS-SCNC: 6 MMOL/L (ref 3–11)
BUN BLD-MCNC: 30 MG/DL (ref 9–23)
BUN/CREAT SERPL: 21.4 (ref 7–25)
CALCIUM SPEC-SCNC: 9 MG/DL (ref 8.7–10.4)
CHLORIDE SERPL-SCNC: 106 MMOL/L (ref 99–109)
CO2 SERPL-SCNC: 27 MMOL/L (ref 20–31)
CREAT BLD-MCNC: 1.4 MG/DL (ref 0.6–1.3)
DEPRECATED RDW RBC AUTO: 52.6 FL (ref 37–54)
ERYTHROCYTE [DISTWIDTH] IN BLOOD BY AUTOMATED COUNT: 16.2 % (ref 11.3–14.5)
GFR SERPL CREATININE-BSD FRML MDRD: 36 ML/MIN/1.73
GLUCOSE BLD-MCNC: 275 MG/DL (ref 70–100)
HCT VFR BLD AUTO: 25.5 % (ref 34.5–44)
HGB BLD-MCNC: 7.9 G/DL (ref 11.5–15.5)
MAGNESIUM SERPL-MCNC: 2.2 MG/DL (ref 1.3–2.7)
MCH RBC QN AUTO: 27.1 PG (ref 27–31)
MCHC RBC AUTO-ENTMCNC: 31 G/DL (ref 32–36)
MCV RBC AUTO: 87.6 FL (ref 80–99)
PLATELET # BLD AUTO: 154 10*3/MM3 (ref 150–450)
PMV BLD AUTO: 9.2 FL (ref 6–12)
POTASSIUM BLD-SCNC: 4.8 MMOL/L (ref 3.5–5.5)
RBC # BLD AUTO: 2.91 10*6/MM3 (ref 3.89–5.14)
SODIUM BLD-SCNC: 139 MMOL/L (ref 132–146)
WBC NRBC COR # BLD: 6.1 10*3/MM3 (ref 3.5–10.8)

## 2018-05-02 PROCEDURE — 36415 COLL VENOUS BLD VENIPUNCTURE: CPT

## 2018-05-02 PROCEDURE — 83735 ASSAY OF MAGNESIUM: CPT | Performed by: PHYSICIAN ASSISTANT

## 2018-05-02 PROCEDURE — 85027 COMPLETE CBC AUTOMATED: CPT | Performed by: PHYSICIAN ASSISTANT

## 2018-05-02 PROCEDURE — 80048 BASIC METABOLIC PNL TOTAL CA: CPT | Performed by: PHYSICIAN ASSISTANT

## 2018-05-02 RX ORDER — CEFAZOLIN SODIUM 2 G/100ML
2 INJECTION, SOLUTION INTRAVENOUS ONCE
Status: CANCELLED | OUTPATIENT
Start: 2018-05-02

## 2018-05-02 RX ORDER — NITROGLYCERIN 0.4 MG/1
0.4 TABLET SUBLINGUAL
Status: CANCELLED | OUTPATIENT
Start: 2018-05-02

## 2018-05-02 RX ORDER — SODIUM CHLORIDE 0.9 % (FLUSH) 0.9 %
1-10 SYRINGE (ML) INJECTION AS NEEDED
Status: CANCELLED | OUTPATIENT
Start: 2018-05-02

## 2018-05-02 RX ORDER — ACETAMINOPHEN 325 MG/1
650 TABLET ORAL EVERY 4 HOURS PRN
Status: CANCELLED | OUTPATIENT
Start: 2018-05-02

## 2018-05-02 RX ORDER — FERROUS SULFATE 325(65) MG
325 TABLET ORAL
COMMUNITY
End: 2018-07-13 | Stop reason: HOSPADM

## 2018-05-02 RX ORDER — ONDANSETRON 2 MG/ML
4 INJECTION INTRAMUSCULAR; INTRAVENOUS EVERY 6 HOURS PRN
Status: CANCELLED | OUTPATIENT
Start: 2018-05-02

## 2018-05-02 NOTE — DISCHARGE INSTRUCTIONS
"Dear Patient,    Do NOT eat or drink anything after midnight except for sips of water with your AM prescription medications unless otherwise instructed by your physician.    Do NOT smoke after midnight the night before your procedure.    Glasses and jewelry may be worn, but dentures must be removed prior to your procedure.    Leave any items you consider valuable at home.      MORNING of your Procedure, please bring the following:     -Photo ID and insurance card(s)    -ALL medications in their ORIGINAL CONTAINERS    -Co-pay and/or deductible required by your insurance   -Copy of living will or power of  document (if not brought to    Pre-Admission Testing department)   -CPAP mask and tubing, not your machine (if applicable)   -Skin Prep Instruction Sheet (if applicable)    Family members may wait in [facility] waiting area during procedure.    Need to make arrangements for transportation prior to discharge.    A handout regarding \"Heart Healthy Eating\" was provided today to encourage healthy eating habits.    Booklet published by [employer] was given in Pre-Admission testing.  This booklet is for informational purposes only.  If you have any questions about your procedure, please speak with your physician.    "

## 2018-05-03 ENCOUNTER — HOSPITAL ENCOUNTER (OUTPATIENT)
Facility: HOSPITAL | Age: 83
Setting detail: HOSPITAL OUTPATIENT SURGERY
Discharge: HOME | End: 2018-05-03
Attending: INTERNAL MEDICINE | Admitting: INTERNAL MEDICINE

## 2018-05-03 VITALS
TEMPERATURE: 97.1 F | BODY MASS INDEX: 24.02 KG/M2 | WEIGHT: 149.47 LBS | DIASTOLIC BLOOD PRESSURE: 55 MMHG | SYSTOLIC BLOOD PRESSURE: 123 MMHG | RESPIRATION RATE: 18 BRPM | HEART RATE: 70 BPM | OXYGEN SATURATION: 99 % | HEIGHT: 66 IN

## 2018-05-03 DIAGNOSIS — I48.20 CHRONIC ATRIAL FIBRILLATION (HCC): ICD-10-CM

## 2018-05-03 LAB — GLUCOSE BLDC GLUCOMTR-MCNC: 261 MG/DL (ref 70–130)

## 2018-05-03 PROCEDURE — 82962 GLUCOSE BLOOD TEST: CPT

## 2018-05-03 PROCEDURE — 25010000002 FENTANYL CITRATE (PF) 100 MCG/2ML SOLUTION: Performed by: INTERNAL MEDICINE

## 2018-05-03 PROCEDURE — 25010000002 MIDAZOLAM PER 1 MG: Performed by: INTERNAL MEDICINE

## 2018-05-03 PROCEDURE — 25010000003 CEFAZOLIN IN DEXTROSE 2-4 GM/100ML-% SOLUTION: Performed by: PHYSICIAN ASSISTANT

## 2018-05-03 PROCEDURE — C1786 PMKR, SINGLE, RATE-RESP: HCPCS | Performed by: INTERNAL MEDICINE

## 2018-05-03 PROCEDURE — 33227 REMOVE&REPLACE PM GEN SINGL: CPT | Performed by: INTERNAL MEDICINE

## 2018-05-03 DEVICE — GEN PM ASSURITY MRI SR RF MERLINPK: Type: IMPLANTABLE DEVICE | Status: FUNCTIONAL

## 2018-05-03 RX ORDER — AMOXICILLIN 500 MG
1200 CAPSULE ORAL DAILY
COMMUNITY
End: 2019-12-16

## 2018-05-03 RX ORDER — NITROGLYCERIN 0.4 MG/1
0.4 TABLET SUBLINGUAL
Status: DISCONTINUED | OUTPATIENT
Start: 2018-05-03 | End: 2018-05-03 | Stop reason: HOSPADM

## 2018-05-03 RX ORDER — SODIUM CHLORIDE 0.9 % (FLUSH) 0.9 %
1-10 SYRINGE (ML) INJECTION AS NEEDED
Status: DISCONTINUED | OUTPATIENT
Start: 2018-05-03 | End: 2018-05-03 | Stop reason: HOSPADM

## 2018-05-03 RX ORDER — MIDAZOLAM HYDROCHLORIDE 1 MG/ML
INJECTION INTRAMUSCULAR; INTRAVENOUS AS NEEDED
Status: DISCONTINUED | OUTPATIENT
Start: 2018-05-03 | End: 2018-05-03 | Stop reason: HOSPADM

## 2018-05-03 RX ORDER — MAGNESIUM GLUCONATE 30 MG(550)
99 TABLET ORAL DAILY
COMMUNITY
End: 2018-08-15 | Stop reason: SDUPTHER

## 2018-05-03 RX ORDER — ACETAMINOPHEN 325 MG/1
650 TABLET ORAL EVERY 4 HOURS PRN
Status: DISCONTINUED | OUTPATIENT
Start: 2018-05-03 | End: 2018-05-03 | Stop reason: HOSPADM

## 2018-05-03 RX ORDER — MELATONIN
5000 DAILY
COMMUNITY
End: 2022-03-04

## 2018-05-03 RX ORDER — ANTIARTHRITIC COMBINATION NO.2 900 MG
1 TABLET ORAL DAILY
COMMUNITY
End: 2022-03-04

## 2018-05-03 RX ORDER — ONDANSETRON 2 MG/ML
4 INJECTION INTRAMUSCULAR; INTRAVENOUS EVERY 6 HOURS PRN
Status: DISCONTINUED | OUTPATIENT
Start: 2018-05-03 | End: 2018-05-03 | Stop reason: HOSPADM

## 2018-05-03 RX ORDER — FENTANYL CITRATE 50 UG/ML
INJECTION, SOLUTION INTRAMUSCULAR; INTRAVENOUS AS NEEDED
Status: DISCONTINUED | OUTPATIENT
Start: 2018-05-03 | End: 2018-05-03 | Stop reason: HOSPADM

## 2018-05-03 RX ORDER — LIDOCAINE HYDROCHLORIDE 10 MG/ML
INJECTION, SOLUTION EPIDURAL; INFILTRATION; INTRACAUDAL; PERINEURAL AS NEEDED
Status: DISCONTINUED | OUTPATIENT
Start: 2018-05-03 | End: 2018-05-03 | Stop reason: HOSPADM

## 2018-05-03 RX ORDER — CEFAZOLIN SODIUM 2 G/100ML
2 INJECTION, SOLUTION INTRAVENOUS ONCE
Status: COMPLETED | OUTPATIENT
Start: 2018-05-03 | End: 2018-05-03

## 2018-05-03 RX ADMIN — CEFAZOLIN SODIUM 2 G: 2 INJECTION, SOLUTION INTRAVENOUS at 10:36

## 2018-05-03 NOTE — H&P (VIEW-ONLY)
[facility] at [facility] - Office Note  Lynne Sanchez         [address] [place] [address]  1934   [phone] (home)      LOCATION:  [place] office.  Visit Type: Follow Up.    VISIT DATE: 4/26/2018     PCP:  ANN Gonsalez    04/26/18   Lynne Sanchez is a 83 y.o.  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  PROBLEM LIST:  1. Chronic atrial fibrillation, status post [employer] Laureano pacemaker implantation and AV node ablation:  a. Diagnosed June 2005.  b. Status post ECV restoring normal sinus rhythm, June 2005.  c. Rythmol initiated 05/01/2006.  d. Previously digoxin toxicity.  e. GXT Cardiolite, 09/08/2005: No reversible ischemia. LV function not performed secondary to atrial fibrillation.   f. Status post successful external cardioversion on 10/01/2008, Tessa Downey MD.  g. EKG on 10/21/2008: Recurrence of atrial fibrillation with rate of 109.  h. Recurrent atrial fibrillation by EKG, 11/03/2008, asymptomatic.  i. Status post [employer] Laureano pacemaker implantation and AV node ablation.   j. Echocardiogram 02/15/2010: Moderate MR, moderate TR. RVSP 50. EF greater than 55%, left atrial enlargement at 4.3 cm.  k. Echo 1/18/17: EF 60%, Mod-severe MR, Mod-severe TR.  l. 10/6/17 echo: EF 50-55%, mild LVH, thickened mitral leaflets with mild MR, aortic sclerosis, trace AI, moderate TR with PA SP 60 mmHg, moderate to severe RV dilation, dilated IVC  2. History of congestive heart failure.  3. Diabetes mellitus, type 2.  4. PVD  a. - 10/17 L PTCA ant tib(Pelaez)  5. Surgical history - appendectomy.   6. Anemia-NOS neg scopes 2017, h/o following  a. Per [facility]       Allergies   Allergen Reactions   • Phenergan [Promethazine Hcl] Confusion         Current Outpatient Prescriptions:   •  aspirin 81 MG EC tablet, Take 1 tablet by mouth Daily., Disp: 30 tablet, Rfl: 0  •  atorvastatin (LIPITOR) 40 MG  "tablet, Take 1 tablet by mouth Every Night., Disp: 30 tablet, Rfl: 0  •  clopidogrel (PLAVIX) 75 MG tablet, Take 1 tablet by mouth Daily., Disp: 30 tablet, Rfl: 0  •  ferrous gluconate (FERGON) 324 MG tablet, daily., Disp: , Rfl:   •  furosemide (LASIX) 20 MG tablet, daily., Disp: , Rfl:   •  glimepiride (AMARYL) 4 MG tablet, Daily., Disp: , Rfl:   •  latanoprost (XALATAN) 0.005 % ophthalmic solution, Administer 1 drop to both eyes Daily., Disp: , Rfl:   •  metoprolol tartrate (LOPRESSOR) 100 MG tablet, Take 1 tablet by mouth 2 (Two) Times a Day., Disp: 180 tablet, Rfl: 3  •  Multiple Vitamins-Minerals (MULTIVITAMIN GUMMIES ADULT) chewable tablet, Chew 1 tablet 2 (Two) Times a Day., Disp: , Rfl:   •  pantoprazole (PROTONIX) 40 MG EC tablet, Take 1 tablet by mouth Daily., Disp: 30 tablet, Rfl: 0  •  saccharomyces boulardii (FLORASTOR) 250 MG capsule, Take 1 capsule by mouth 2 (Two) Times a Day., Disp: 60 capsule, Rfl: 0    Atrial Fibrillation   Symptoms include shortness of breath. Symptoms are negative for chest pain and syncope. Past medical history includes atrial fibrillation.   Shortness of Breath   Pertinent negatives include no chest pain or syncope.       Pt denies CP, dyspnea on exertion, orthopnea, PND, palpitations, lower extremity edema, syncope.  Recent swelling prompting ER eval c//w vol overload and marked anemia Hgb 7.8    The following portions of the patient's history were reviewed in the chart and updated as appropriate: allergies, current medications, past family history, past medical history, past social history, past surgical history and problem list.    Review of Systems   Constitution: Positive for malaise/fatigue.   Cardiovascular: Negative for chest pain, dyspnea on exertion, near-syncope and syncope.   Respiratory: Positive for shortness of breath. Negative for cough.          height is 170.2 cm (67\") and weight is 66.7 kg (147 lb). Her blood pressure is 136/48 and her pulse is 63. Her " oxygen saturation is 94%.      Physical Exam   Constitutional: She appears well-developed and well-nourished.   Neck: Normal range of motion. Neck supple. No hepatojugular reflux and no JVD present. Carotid bruit is not present. No tracheal deviation present. No thyromegaly present.   Cardiovascular: Normal rate, regular rhythm, S1 normal, S2 normal, intact distal pulses and normal pulses.  PMI is not displaced.  Exam reveals no gallop, no distant heart sounds, no friction rub, no midsystolic click and no opening snap.    Murmur heard.  High-pitched blowing holosystolic murmur is present with a grade of 3/6  at the apex  Pulses:       Radial pulses are 2+ on the right side, and 2+ on the left side.        Dorsalis pedis pulses are 2+ on the right side, and 2+ on the left side.        Posterior tibial pulses are 2+ on the right side, and 2+ on the left side.   Pulmonary/Chest: Effort normal and breath sounds normal. She has no wheezes. She has no rales.   Abdominal: Soft. Bowel sounds are normal. She exhibits no mass. There is no tenderness. There is no guarding.   Musculoskeletal: She exhibits edema.     Procedures   [employer] Laureano pacemaker: RV pacing greater than 99%, threshold 0.75, impedance 366 ohms.  Battery voltage at JOSE    No events noted.    Assessment/ Plan     Chronic atrial fibrillation:  Device at JOSE.  Will schedule generator change    Valvular heart disease:  Mod + MR echo next visit.  At this time patient is well compensated and relatively asymptomatic with near prohibitive surgical risk.  Reevaluate at time of follow-up and attempt to keep Hgb >9    Anemia- per heme onc Dr. Rodriguez    Scribed for Demond Leon MD by Sheree Saenz PA-C. 4/26/2018  1:24 PM   IDemond MD, personally performed the services described in this documentation as scribed by the above named individual in my presence, and it is both accurate and complete.  4/26/2018  1:25 PM

## 2018-05-03 NOTE — INTERVAL H&P NOTE
H&P reviewed. The patient was examined and there are no changes to the H&P.      Pre-cardiac Catheterization Report  Cardiovascular Laboratory  [facility]    Patient:  Lynne Sanchez  :  1934  PCP:  ANN Gonsalez  PHONE:  [phone]    DATE: 5/3/2018      MEDICATIONS:  Prior to Admission medications    Medication Sig Start Date End Date Taking? Authorizing Provider   aspirin 81 MG EC tablet Take 1 tablet by mouth Daily. 10/13/17  Yes Isaías Mccallum MD   atorvastatin (LIPITOR) 40 MG tablet Take 1 tablet by mouth Every Night. 10/12/17  Yes Isaías Mccallum MD   Biotin 5000 MCG tablet Take 1 tablet by mouth Daily.   Yes Historical Provider, MD   cholecalciferol (VITAMIN D3) 1000 units tablet Take 1,000 Units by mouth Daily.   Yes Historical Provider, MD   clopidogrel (PLAVIX) 75 MG tablet Take 1 tablet by mouth Daily. 10/13/17  Yes Isaías Mccallum MD   ferrous sulfate 325 (65 FE) MG tablet Take 325 mg by mouth Daily With Breakfast.   Yes Historical Provider, MD   furosemide (LASIX) 20 MG tablet Take 20 mg by mouth Daily. 16  Yes Historical Provider, MD   glimepiride (AMARYL) 4 MG tablet 2 (Two) Times a Day. 4/3/17  Yes Historical Provider, MD   Lactobacillus (PROBIOTIC ACIDOPHILUS PO) Take  by mouth.   Yes Historical Provider, MD   latanoprost (XALATAN) 0.005 % ophthalmic solution Administer 1 drop to both eyes Daily. 7/15/16  Yes Historical Provider, MD   metoprolol tartrate (LOPRESSOR) 100 MG tablet Take 1 tablet by mouth 2 (Two) Times a Day. 17  Yes ANN Nolasco   Multiple Vitamins-Minerals (MULTIVITAMIN GUMMIES ADULT) chewable tablet Chew 1 tablet Daily.   Yes Historical Provider, MD   Omega-3 Fatty Acids (FISH OIL) 1200 MG capsule capsule Take 1,200 mg by mouth Daily.   Yes Historical Provider, MD   potassium gluconate 595 (99 K) MG tablet tablet Take 595 mg by mouth Daily.   Yes Historical Provider, MD       Past medical & surgical history, social and family  history reviewed in the electronic medical record.    Physical Exam:    Vitals:   Vitals:    05/03/18 0638   BP: 140/54   Pulse:    Resp:    Temp:    SpO2:     1    05/03/18  0636   Weight: 67.8 kg (149 lb 7.6 oz)   Body mass index is 24.13 kg/m².    GENERAL: No apparent distress.  No significant changes since last exam.  CHEST: Clear to auscultation bilaterally no stridor no wheeze.  CV: S1, S2, Regular without Murmurs, Rubs or Gallops  EXTREMITIES: No edema.             Results from last 7 days  Lab Units 05/02/18  1608   SODIUM mmol/L 139   POTASSIUM mmol/L 4.8   CHLORIDE mmol/L 106   CO2 mmol/L 27.0   BUN mg/dL 30*   CREATININE mg/dL 1.40*   GLUCOSE mg/dL 275*   CALCIUM mg/dL 9.0       Results from last 7 days  Lab Units 05/02/18  1608   WBC 10*3/mm3 6.10   HEMOGLOBIN g/dL 7.9*   HEMATOCRIT % 25.5*   PLATELETS 10*3/mm3 154     Lab Results   Component Value Date    AST 23 04/25/2018    ALT 28 04/25/2018           IMPRESSION:  Pacemaker JOSE    PLAN:  · Gen change

## 2018-05-10 ENCOUNTER — OFFICE VISIT (OUTPATIENT)
Dept: CARDIOLOGY | Facility: CLINIC | Age: 83
End: 2018-05-10

## 2018-05-10 DIAGNOSIS — I48.20 CHRONIC ATRIAL FIBRILLATION (HCC): Primary | ICD-10-CM

## 2018-05-10 PROCEDURE — 99024 POSTOP FOLLOW-UP VISIT: CPT | Performed by: INTERNAL MEDICINE

## 2018-05-10 NOTE — PROGRESS NOTES
05/10/2018    Lynne Sanchez, : 1934    Demond Leon MD    B/P:122/56 (Sitting)     Pulse: 71    Patient has fever: [] Temperature if indicated: 98.3    Wound Location: left Infraclavicular    Dressing Removed [x]        Old Dressing Appearance:  Clean, dry []                 Old, bloody drainage [x]                            Moist, serous drainage []                Moist, thick yellow/green drainage []       Wound Appearance: Redness []                  Drainage []                  Culture obtained []        Color: N/A     Consistency: N/a     Amount: none         Gloves used, wound cleansed with sterile 4x4 and peroxide []       MD notified [] MD orders:     Antibiotic started []  If checked, type   Other:     Appointment for follow-up scheduled for 3 months post procedure [x]    Future Appointments  Date Time Provider Department Center   2018 1:00 PM Demond Leon MD [facility] None   10/31/2018 1:00 PM [facility] ECHO/[facility]           Craig Turner MA, 05/10/18      MD Signature:______________________________ Completed By/Date:

## 2018-06-12 ENCOUNTER — CLINICAL SUPPORT NO REQUIREMENTS (OUTPATIENT)
Dept: CARDIOLOGY | Facility: CLINIC | Age: 83
End: 2018-06-12

## 2018-06-12 DIAGNOSIS — I48.20 CHRONIC ATRIAL FIBRILLATION (HCC): Primary | ICD-10-CM

## 2018-06-27 ENCOUNTER — HOSPITAL ENCOUNTER (INPATIENT)
Facility: HOSPITAL | Age: 83
LOS: 16 days | Discharge: NURSING, REHAB OR LONG-TERM CARE | End: 2018-07-13
Attending: FAMILY MEDICINE | Admitting: THORACIC SURGERY (CARDIOTHORACIC VASCULAR SURGERY)

## 2018-06-27 ENCOUNTER — APPOINTMENT (OUTPATIENT)
Dept: GENERAL RADIOLOGY | Facility: HOSPITAL | Age: 83
End: 2018-06-27

## 2018-06-27 DIAGNOSIS — Z78.9 IMPAIRED MOBILITY AND ADLS: ICD-10-CM

## 2018-06-27 DIAGNOSIS — I63.9 CEREBROVASCULAR ACCIDENT (CVA), UNSPECIFIED MECHANISM (HCC): ICD-10-CM

## 2018-06-27 DIAGNOSIS — M86.672 OTHER CHRONIC OSTEOMYELITIS OF LEFT FOOT (HCC): Primary | ICD-10-CM

## 2018-06-27 DIAGNOSIS — R47.01 APHASIA: ICD-10-CM

## 2018-06-27 DIAGNOSIS — Z89.422 S/P AMPUTATION OF LESSER TOE, LEFT (HCC): ICD-10-CM

## 2018-06-27 DIAGNOSIS — Z74.09 IMPAIRED FUNCTIONAL MOBILITY, BALANCE, GAIT, AND ENDURANCE: ICD-10-CM

## 2018-06-27 DIAGNOSIS — Z74.09 IMPAIRED MOBILITY AND ADLS: ICD-10-CM

## 2018-06-27 PROBLEM — L08.9 FOOT INFECTION: Status: ACTIVE | Noted: 2018-06-27

## 2018-06-27 LAB
ALBUMIN SERPL-MCNC: 4.19 G/DL (ref 3.2–4.8)
ALBUMIN/GLOB SERPL: 1.4 G/DL (ref 1.5–2.5)
ALP SERPL-CCNC: 81 U/L (ref 25–100)
ALT SERPL W P-5'-P-CCNC: 16 U/L (ref 7–40)
ANION GAP SERPL CALCULATED.3IONS-SCNC: 9 MMOL/L (ref 3–11)
AST SERPL-CCNC: 21 U/L (ref 0–33)
BASOPHILS # BLD AUTO: 0.03 10*3/MM3 (ref 0–0.2)
BASOPHILS NFR BLD AUTO: 0.6 % (ref 0–1)
BILIRUB SERPL-MCNC: 1.2 MG/DL (ref 0.3–1.2)
BNP SERPL-MCNC: 445 PG/ML (ref 0–100)
BUN BLD-MCNC: 19 MG/DL (ref 9–23)
BUN/CREAT SERPL: 13.8 (ref 7–25)
CALCIUM SPEC-SCNC: 9.3 MG/DL (ref 8.7–10.4)
CHLORIDE SERPL-SCNC: 103 MMOL/L (ref 99–109)
CO2 SERPL-SCNC: 27 MMOL/L (ref 20–31)
CREAT BLD-MCNC: 1.38 MG/DL (ref 0.6–1.3)
CRP SERPL-MCNC: 2.4 MG/DL (ref 0–1)
DEPRECATED RDW RBC AUTO: 55.8 FL (ref 37–54)
EOSINOPHIL # BLD AUTO: 0.07 10*3/MM3 (ref 0–0.3)
EOSINOPHIL NFR BLD AUTO: 1.4 % (ref 0–3)
ERYTHROCYTE [DISTWIDTH] IN BLOOD BY AUTOMATED COUNT: 17.9 % (ref 11.3–14.5)
ERYTHROCYTE [SEDIMENTATION RATE] IN BLOOD: 34 MM/HR (ref 0–30)
GFR SERPL CREATININE-BSD FRML MDRD: 37 ML/MIN/1.73
GLOBULIN UR ELPH-MCNC: 2.9 GM/DL
GLUCOSE BLD-MCNC: 203 MG/DL (ref 70–100)
GLUCOSE BLDC GLUCOMTR-MCNC: 200 MG/DL (ref 70–130)
GLUCOSE BLDC GLUCOMTR-MCNC: 229 MG/DL (ref 70–130)
HCT VFR BLD AUTO: 27.4 % (ref 34.5–44)
HGB BLD-MCNC: 8.4 G/DL (ref 11.5–15.5)
IMM GRANULOCYTES # BLD: 0.02 10*3/MM3 (ref 0–0.03)
IMM GRANULOCYTES NFR BLD: 0.4 % (ref 0–0.6)
INR PPP: 1.14 (ref 0.91–1.09)
LYMPHOCYTES # BLD AUTO: 1.07 10*3/MM3 (ref 0.6–4.8)
LYMPHOCYTES NFR BLD AUTO: 20.9 % (ref 24–44)
MAGNESIUM SERPL-MCNC: 1.9 MG/DL (ref 1.3–2.7)
MCH RBC QN AUTO: 25.9 PG (ref 27–31)
MCHC RBC AUTO-ENTMCNC: 30.7 G/DL (ref 32–36)
MCV RBC AUTO: 84.6 FL (ref 80–99)
MONOCYTES # BLD AUTO: 0.41 10*3/MM3 (ref 0–1)
MONOCYTES NFR BLD AUTO: 8 % (ref 0–12)
NEUTROPHILS # BLD AUTO: 3.54 10*3/MM3 (ref 1.5–8.3)
NEUTROPHILS NFR BLD AUTO: 69.1 % (ref 41–71)
PHOSPHATE SERPL-MCNC: 3.1 MG/DL (ref 2.4–5.1)
PLATELET # BLD AUTO: 143 10*3/MM3 (ref 150–450)
PMV BLD AUTO: 10.5 FL (ref 6–12)
POTASSIUM BLD-SCNC: 4.1 MMOL/L (ref 3.5–5.5)
PROCALCITONIN SERPL-MCNC: 0.06 NG/ML
PROT SERPL-MCNC: 7.1 G/DL (ref 5.7–8.2)
PROTHROMBIN TIME: 12 SECONDS (ref 9.6–11.5)
RBC # BLD AUTO: 3.24 10*6/MM3 (ref 3.89–5.14)
SODIUM BLD-SCNC: 139 MMOL/L (ref 132–146)
WBC NRBC COR # BLD: 5.12 10*3/MM3 (ref 3.5–10.8)

## 2018-06-27 PROCEDURE — 85025 COMPLETE CBC W/AUTO DIFF WBC: CPT | Performed by: HOSPITALIST

## 2018-06-27 PROCEDURE — 80053 COMPREHEN METABOLIC PANEL: CPT | Performed by: HOSPITALIST

## 2018-06-27 PROCEDURE — 84100 ASSAY OF PHOSPHORUS: CPT | Performed by: HOSPITALIST

## 2018-06-27 PROCEDURE — 25010000002 PIPERACILLIN SOD-TAZOBACTAM PER 1 G: Performed by: HOSPITALIST

## 2018-06-27 PROCEDURE — 82962 GLUCOSE BLOOD TEST: CPT

## 2018-06-27 PROCEDURE — 83880 ASSAY OF NATRIURETIC PEPTIDE: CPT | Performed by: HOSPITALIST

## 2018-06-27 PROCEDURE — 84145 PROCALCITONIN (PCT): CPT | Performed by: HOSPITALIST

## 2018-06-27 PROCEDURE — 83735 ASSAY OF MAGNESIUM: CPT | Performed by: HOSPITALIST

## 2018-06-27 PROCEDURE — 63710000001 INSULIN LISPRO (HUMAN) PER 5 UNITS: Performed by: HOSPITALIST

## 2018-06-27 PROCEDURE — 85652 RBC SED RATE AUTOMATED: CPT | Performed by: HOSPITALIST

## 2018-06-27 PROCEDURE — 73660 X-RAY EXAM OF TOE(S): CPT

## 2018-06-27 PROCEDURE — 25010000002 VANCOMYCIN 10 G RECONSTITUTED SOLUTION: Performed by: HOSPITALIST

## 2018-06-27 PROCEDURE — 85610 PROTHROMBIN TIME: CPT | Performed by: HOSPITALIST

## 2018-06-27 PROCEDURE — 86140 C-REACTIVE PROTEIN: CPT | Performed by: HOSPITALIST

## 2018-06-27 PROCEDURE — 99223 1ST HOSP IP/OBS HIGH 75: CPT | Performed by: HOSPITALIST

## 2018-06-27 RX ORDER — ASPIRIN 81 MG/1
81 TABLET ORAL DAILY
Status: DISCONTINUED | OUTPATIENT
Start: 2018-06-27 | End: 2018-06-29

## 2018-06-27 RX ORDER — NYSTATIN 100000 [USP'U]/G
POWDER TOPICAL EVERY 12 HOURS SCHEDULED
Status: DISCONTINUED | OUTPATIENT
Start: 2018-06-27 | End: 2018-07-13 | Stop reason: HOSPADM

## 2018-06-27 RX ORDER — NICOTINE POLACRILEX 4 MG
15 LOZENGE BUCCAL
Status: DISCONTINUED | OUTPATIENT
Start: 2018-06-27 | End: 2018-07-13 | Stop reason: HOSPADM

## 2018-06-27 RX ORDER — DEXTROSE MONOHYDRATE 25 G/50ML
25 INJECTION, SOLUTION INTRAVENOUS
Status: DISCONTINUED | OUTPATIENT
Start: 2018-06-27 | End: 2018-07-13 | Stop reason: HOSPADM

## 2018-06-27 RX ORDER — CLOPIDOGREL BISULFATE 75 MG/1
75 TABLET ORAL DAILY
Status: DISCONTINUED | OUTPATIENT
Start: 2018-06-27 | End: 2018-06-29

## 2018-06-27 RX ADMIN — CLOPIDOGREL BISULFATE 75 MG: 75 TABLET ORAL at 18:13

## 2018-06-27 RX ADMIN — ASPIRIN 81 MG: 81 TABLET, COATED ORAL at 18:13

## 2018-06-27 RX ADMIN — INSULIN LISPRO 3 UNITS: 100 INJECTION, SOLUTION INTRAVENOUS; SUBCUTANEOUS at 18:15

## 2018-06-27 RX ADMIN — INSULIN LISPRO 3 UNITS: 100 INJECTION, SOLUTION INTRAVENOUS; SUBCUTANEOUS at 21:44

## 2018-06-27 RX ADMIN — NYSTATIN: 100000 POWDER TOPICAL at 21:58

## 2018-06-27 RX ADMIN — TAZOBACTAM SODIUM AND PIPERACILLIN SODIUM 3.38 G: 375; 3 INJECTION, SOLUTION INTRAVENOUS at 18:14

## 2018-06-27 RX ADMIN — VANCOMYCIN HYDROCHLORIDE 1250 MG: 10 INJECTION, POWDER, LYOPHILIZED, FOR SOLUTION INTRAVENOUS at 19:14

## 2018-06-28 ENCOUNTER — APPOINTMENT (OUTPATIENT)
Dept: CARDIOLOGY | Facility: HOSPITAL | Age: 83
End: 2018-06-28
Attending: INTERNAL MEDICINE

## 2018-06-28 PROBLEM — R50.9 FEVER: Status: ACTIVE | Noted: 2018-06-28

## 2018-06-28 LAB
ANION GAP SERPL CALCULATED.3IONS-SCNC: 6 MMOL/L (ref 3–11)
BH CV ECHO MEAS - BSA(HAYCOCK): 1.8 M^2
BH CV ECHO MEAS - BSA: 1.7 M^2
BH CV ECHO MEAS - BZI_BMI: 23.6 KILOGRAMS/M^2
BH CV ECHO MEAS - BZI_METRIC_HEIGHT: 167.6 CM
BH CV ECHO MEAS - BZI_METRIC_WEIGHT: 66.2 KG
BH CV GRAFT BRACHIAL PRESSURE RIGHT: 124 MMHG
BH CV LEA LEFT ANT TIBIAL A DISTAL PSV: 109 CM/S
BH CV LEA LEFT ANT TIBIAL A PROX PSV: 165 CM/S
BH CV LEA LEFT CFA PROX PSV: 126 CM/S
BH CV LEA LEFT DFA PROX PSV: 106 CM/S
BH CV LEA LEFT POPITEAL A  DISTAL PSV: 64 CM/S
BH CV LEA LEFT POPITEAL A  PROX PSV: 99 CM/S
BH CV LEA LEFT PTA DISTAL PSV: 16 CM/S
BH CV LEA LEFT PTA PRESSURE: 62 MMHG
BH CV LEA LEFT PTA PROX PSV: 32 CM/S
BH CV LEA LEFT SFA DISTAL PSV: 115 CM/S
BH CV LEA LEFT SFA MID PSV: 101 CM/S
BH CV LEA LEFT SFA PROX PSV: 123 CM/S
BH CV LEA RIGHT PTA PRESSURE: 88 MMHG
BH CV LOWER ARTERIAL LEFT ABI RATIO: 0.5
BH CV LOWER ARTERIAL RIGHT ABI RATIO: 71
BH CV XLRA MEAS RIGHT PROX SCLA PSV: 114.7 CM/SEC
BUN BLD-MCNC: 19 MG/DL (ref 9–23)
BUN/CREAT SERPL: 13.2 (ref 7–25)
CALCIUM SPEC-SCNC: 8.2 MG/DL (ref 8.7–10.4)
CHLORIDE SERPL-SCNC: 105 MMOL/L (ref 99–109)
CO2 SERPL-SCNC: 27 MMOL/L (ref 20–31)
CREAT BLD-MCNC: 1.44 MG/DL (ref 0.6–1.3)
DEPRECATED RDW RBC AUTO: 56 FL (ref 37–54)
DIST ATA PSV RIGHT: -110 CM/SEC
DIST POP A PSV RIGHT: -65.2 CM/SEC
DIST PTA PSV RIGHT: 16.5 CM/SEC
DIST SFA PSV RIGHT: -115.5 CM/SEC
ERYTHROCYTE [DISTWIDTH] IN BLOOD BY AUTOMATED COUNT: 17.9 % (ref 11.3–14.5)
FERRITIN SERPL-MCNC: 54 NG/ML (ref 10–291)
GFR SERPL CREATININE-BSD FRML MDRD: 35 ML/MIN/1.73
GLUCOSE BLD-MCNC: 214 MG/DL (ref 70–100)
GLUCOSE BLDC GLUCOMTR-MCNC: 187 MG/DL (ref 70–130)
GLUCOSE BLDC GLUCOMTR-MCNC: 208 MG/DL (ref 70–130)
GLUCOSE BLDC GLUCOMTR-MCNC: 211 MG/DL (ref 70–130)
GLUCOSE BLDC GLUCOMTR-MCNC: 369 MG/DL (ref 70–130)
HBA1C MFR BLD: 8.5 % (ref 4.8–5.6)
HCT VFR BLD AUTO: 23.7 % (ref 34.5–44)
HGB BLD-MCNC: 7.3 G/DL (ref 11.5–15.5)
IRON 24H UR-MRATE: 21 MCG/DL (ref 50–175)
IRON SATN MFR SERPL: 7 % (ref 15–50)
Lab: 22 CM/SEC
MCH RBC QN AUTO: 25.9 PG (ref 27–31)
MCHC RBC AUTO-ENTMCNC: 30.8 G/DL (ref 32–36)
MCV RBC AUTO: 84 FL (ref 80–99)
MID ATA PSV RIGHT: 165.8 CM/SEC
MID PTA PSV RIGHT: 102 CM/SEC
MID SFA PSV RIGHT: 102.1 CM/SEC
PLATELET # BLD AUTO: 103 10*3/MM3 (ref 150–450)
PMV BLD AUTO: 10 FL (ref 6–12)
POTASSIUM BLD-SCNC: 4 MMOL/L (ref 3.5–5.5)
PROX PFA PSV RIGHT: -106.9 CM/SEC
PROX POP A PSV RIGHT: 99.8 CM/SEC
PROX PTA PSV RIGHT: 33 CM/SEC
PROX SFA PSV RIGHT: -124.1 CM/SEC
RBC # BLD AUTO: 2.82 10*6/MM3 (ref 3.89–5.14)
RIGHT CFA PROX SYS PSV: 126.5 CM/SEC
SODIUM BLD-SCNC: 138 MMOL/L (ref 132–146)
TIBC SERPL-MCNC: 305 MCG/DL (ref 250–450)
VANCOMYCIN SERPL-MCNC: 16.1 MCG/ML (ref 5–40)
WBC NRBC COR # BLD: 4.53 10*3/MM3 (ref 3.5–10.8)

## 2018-06-28 PROCEDURE — 83036 HEMOGLOBIN GLYCOSYLATED A1C: CPT | Performed by: HOSPITALIST

## 2018-06-28 PROCEDURE — 82962 GLUCOSE BLOOD TEST: CPT

## 2018-06-28 PROCEDURE — 81001 URINALYSIS AUTO W/SCOPE: CPT | Performed by: INTERNAL MEDICINE

## 2018-06-28 PROCEDURE — 99223 1ST HOSP IP/OBS HIGH 75: CPT | Performed by: THORACIC SURGERY (CARDIOTHORACIC VASCULAR SURGERY)

## 2018-06-28 PROCEDURE — 80048 BASIC METABOLIC PNL TOTAL CA: CPT | Performed by: HOSPITALIST

## 2018-06-28 PROCEDURE — 25010000002 PIPERACILLIN SOD-TAZOBACTAM PER 1 G: Performed by: HOSPITALIST

## 2018-06-28 PROCEDURE — 25010000002 LINEZOLID 600 MG/300ML SOLUTION: Performed by: INTERNAL MEDICINE

## 2018-06-28 PROCEDURE — 87040 BLOOD CULTURE FOR BACTERIA: CPT | Performed by: HOSPITALIST

## 2018-06-28 PROCEDURE — 25010000002 ONDANSETRON PER 1 MG: Performed by: NURSE PRACTITIONER

## 2018-06-28 PROCEDURE — 99232 SBSQ HOSP IP/OBS MODERATE 35: CPT | Performed by: INTERNAL MEDICINE

## 2018-06-28 PROCEDURE — 93926 LOWER EXTREMITY STUDY: CPT | Performed by: INTERNAL MEDICINE

## 2018-06-28 PROCEDURE — 87077 CULTURE AEROBIC IDENTIFY: CPT | Performed by: INTERNAL MEDICINE

## 2018-06-28 PROCEDURE — 63710000001 INSULIN DETEMIR PER 5 UNITS: Performed by: INTERNAL MEDICINE

## 2018-06-28 PROCEDURE — 83550 IRON BINDING TEST: CPT | Performed by: HOSPITALIST

## 2018-06-28 PROCEDURE — 93926 LOWER EXTREMITY STUDY: CPT

## 2018-06-28 PROCEDURE — 83540 ASSAY OF IRON: CPT | Performed by: HOSPITALIST

## 2018-06-28 PROCEDURE — 80202 ASSAY OF VANCOMYCIN: CPT | Performed by: HOSPITALIST

## 2018-06-28 PROCEDURE — 87186 SC STD MICRODIL/AGAR DIL: CPT | Performed by: INTERNAL MEDICINE

## 2018-06-28 PROCEDURE — 85027 COMPLETE CBC AUTOMATED: CPT | Performed by: HOSPITALIST

## 2018-06-28 PROCEDURE — 82728 ASSAY OF FERRITIN: CPT | Performed by: HOSPITALIST

## 2018-06-28 PROCEDURE — 87086 URINE CULTURE/COLONY COUNT: CPT | Performed by: INTERNAL MEDICINE

## 2018-06-28 RX ORDER — LATANOPROST 50 UG/ML
1 SOLUTION/ DROPS OPHTHALMIC NIGHTLY
Status: DISCONTINUED | OUTPATIENT
Start: 2018-06-28 | End: 2018-07-13 | Stop reason: HOSPADM

## 2018-06-28 RX ORDER — VANCOMYCIN HYDROCHLORIDE 1 G/200ML
1000 INJECTION, SOLUTION INTRAVENOUS ONCE
Status: DISCONTINUED | OUTPATIENT
Start: 2018-06-28 | End: 2018-06-28

## 2018-06-28 RX ORDER — METOPROLOL TARTRATE 100 MG/1
100 TABLET ORAL EVERY 12 HOURS SCHEDULED
Status: DISCONTINUED | OUTPATIENT
Start: 2018-06-28 | End: 2018-07-13 | Stop reason: HOSPADM

## 2018-06-28 RX ORDER — LINEZOLID 2 MG/ML
600 INJECTION, SOLUTION INTRAVENOUS EVERY 12 HOURS
Status: DISCONTINUED | OUTPATIENT
Start: 2018-06-28 | End: 2018-07-06

## 2018-06-28 RX ORDER — ACETAMINOPHEN 325 MG/1
650 TABLET ORAL EVERY 6 HOURS PRN
Status: DISCONTINUED | OUTPATIENT
Start: 2018-06-28 | End: 2018-07-13 | Stop reason: HOSPADM

## 2018-06-28 RX ORDER — VALACYCLOVIR HYDROCHLORIDE 500 MG/1
1000 TABLET, FILM COATED ORAL EVERY 12 HOURS SCHEDULED
Status: DISPENSED | OUTPATIENT
Start: 2018-06-28 | End: 2018-07-05

## 2018-06-28 RX ORDER — FUROSEMIDE 20 MG/1
20 TABLET ORAL DAILY
Status: DISCONTINUED | OUTPATIENT
Start: 2018-06-29 | End: 2018-07-13 | Stop reason: HOSPADM

## 2018-06-28 RX ORDER — ACETAMINOPHEN 650 MG
TABLET, EXTENDED RELEASE ORAL ONCE
Status: COMPLETED | OUTPATIENT
Start: 2018-06-28 | End: 2018-06-28

## 2018-06-28 RX ORDER — FERROUS SULFATE 325(65) MG
325 TABLET ORAL
Status: DISCONTINUED | OUTPATIENT
Start: 2018-06-29 | End: 2018-07-08

## 2018-06-28 RX ORDER — ATORVASTATIN CALCIUM 40 MG/1
40 TABLET, FILM COATED ORAL NIGHTLY
Status: DISCONTINUED | OUTPATIENT
Start: 2018-06-28 | End: 2018-06-29

## 2018-06-28 RX ORDER — L.ACID,PARA/B.BIFIDUM/S.THERM 8B CELL
1 CAPSULE ORAL DAILY
Status: DISCONTINUED | OUTPATIENT
Start: 2018-06-28 | End: 2018-07-13 | Stop reason: HOSPADM

## 2018-06-28 RX ORDER — ONDANSETRON 2 MG/ML
4 INJECTION INTRAMUSCULAR; INTRAVENOUS EVERY 6 HOURS PRN
Status: DISCONTINUED | OUTPATIENT
Start: 2018-06-28 | End: 2018-07-13 | Stop reason: HOSPADM

## 2018-06-28 RX ADMIN — TAZOBACTAM SODIUM AND PIPERACILLIN SODIUM 3.38 G: 375; 3 INJECTION, SOLUTION INTRAVENOUS at 08:50

## 2018-06-28 RX ADMIN — TAZOBACTAM SODIUM AND PIPERACILLIN SODIUM 3.38 G: 375; 3 INJECTION, SOLUTION INTRAVENOUS at 17:44

## 2018-06-28 RX ADMIN — ASPIRIN 81 MG: 81 TABLET, COATED ORAL at 08:50

## 2018-06-28 RX ADMIN — LATANOPROST 1 DROP: 50 SOLUTION OPHTHALMIC at 21:17

## 2018-06-28 RX ADMIN — CLOPIDOGREL BISULFATE 75 MG: 75 TABLET ORAL at 08:50

## 2018-06-28 RX ADMIN — INSULIN LISPRO 3 UNITS: 100 INJECTION, SOLUTION INTRAVENOUS; SUBCUTANEOUS at 17:44

## 2018-06-28 RX ADMIN — VALACYCLOVIR HYDROCHLORIDE 1000 MG: 500 TABLET, FILM COATED ORAL at 12:22

## 2018-06-28 RX ADMIN — NYSTATIN: 100000 POWDER TOPICAL at 21:17

## 2018-06-28 RX ADMIN — VALACYCLOVIR HYDROCHLORIDE 1000 MG: 500 TABLET, FILM COATED ORAL at 21:17

## 2018-06-28 RX ADMIN — INSULIN LISPRO 3 UNITS: 100 INJECTION, SOLUTION INTRAVENOUS; SUBCUTANEOUS at 12:26

## 2018-06-28 RX ADMIN — TAZOBACTAM SODIUM AND PIPERACILLIN SODIUM 3.38 G: 375; 3 INJECTION, SOLUTION INTRAVENOUS at 23:42

## 2018-06-28 RX ADMIN — NYSTATIN: 100000 POWDER TOPICAL at 09:00

## 2018-06-28 RX ADMIN — ONDANSETRON 4 MG: 2 INJECTION INTRAMUSCULAR; INTRAVENOUS at 00:48

## 2018-06-28 RX ADMIN — METOPROLOL TARTRATE 100 MG: 100 TABLET ORAL at 21:19

## 2018-06-28 RX ADMIN — POVIDONE-IODINE: 10 SOLUTION TOPICAL at 17:45

## 2018-06-28 RX ADMIN — ACETAMINOPHEN 650 MG: 325 TABLET, FILM COATED ORAL at 02:17

## 2018-06-28 RX ADMIN — TAZOBACTAM SODIUM AND PIPERACILLIN SODIUM 3.38 G: 375; 3 INJECTION, SOLUTION INTRAVENOUS at 00:15

## 2018-06-28 RX ADMIN — INSULIN LISPRO 3 UNITS: 100 INJECTION, SOLUTION INTRAVENOUS; SUBCUTANEOUS at 08:50

## 2018-06-28 RX ADMIN — INSULIN DETEMIR 5 UNITS: 100 INJECTION, SOLUTION SUBCUTANEOUS at 21:20

## 2018-06-28 RX ADMIN — Medication 1 CAPSULE: at 17:44

## 2018-06-28 RX ADMIN — INSULIN LISPRO 6 UNITS: 100 INJECTION, SOLUTION INTRAVENOUS; SUBCUTANEOUS at 21:17

## 2018-06-28 RX ADMIN — ATORVASTATIN CALCIUM 40 MG: 40 TABLET, FILM COATED ORAL at 21:17

## 2018-06-28 RX ADMIN — LINEZOLID 600 MG: 600 INJECTION, SOLUTION INTRAVENOUS at 16:02

## 2018-06-28 RX ADMIN — ACETAMINOPHEN 650 MG: 325 TABLET, FILM COATED ORAL at 19:28

## 2018-06-29 ENCOUNTER — APPOINTMENT (OUTPATIENT)
Dept: GENERAL RADIOLOGY | Facility: HOSPITAL | Age: 83
End: 2018-06-29

## 2018-06-29 ENCOUNTER — APPOINTMENT (OUTPATIENT)
Dept: NUCLEAR MEDICINE | Facility: HOSPITAL | Age: 83
End: 2018-06-29

## 2018-06-29 ENCOUNTER — APPOINTMENT (OUTPATIENT)
Dept: CT IMAGING | Facility: HOSPITAL | Age: 83
End: 2018-06-29

## 2018-06-29 PROBLEM — I63.322 CEREBROVASCULAR ACCIDENT (CVA) DUE TO THROMBOSIS OF LEFT ANTERIOR CEREBRAL ARTERY: Status: ACTIVE | Noted: 2018-06-29

## 2018-06-29 PROBLEM — M86.9 PYOGENIC INFLAMMATION OF BONE: Status: ACTIVE | Noted: 2018-06-27

## 2018-06-29 LAB
ANION GAP SERPL CALCULATED.3IONS-SCNC: 9 MMOL/L (ref 3–11)
ANION GAP SERPL CALCULATED.3IONS-SCNC: 9 MMOL/L (ref 3–11)
APTT PPP: 27.5 SECONDS (ref 55–70)
BACTERIA UR QL AUTO: ABNORMAL /HPF
BASOPHILS # BLD AUTO: 0.02 10*3/MM3 (ref 0–0.2)
BASOPHILS NFR BLD AUTO: 0.4 % (ref 0–1)
BILIRUB UR QL STRIP: NEGATIVE
BUN BLD-MCNC: 20 MG/DL (ref 9–23)
BUN BLD-MCNC: 24 MG/DL (ref 9–23)
BUN/CREAT SERPL: 11.9 (ref 7–25)
BUN/CREAT SERPL: 14.4 (ref 7–25)
CALCIUM SPEC-SCNC: 7.8 MG/DL (ref 8.7–10.4)
CALCIUM SPEC-SCNC: 7.8 MG/DL (ref 8.7–10.4)
CHLORIDE SERPL-SCNC: 104 MMOL/L (ref 99–109)
CHLORIDE SERPL-SCNC: 105 MMOL/L (ref 99–109)
CLARITY UR: ABNORMAL
CO2 SERPL-SCNC: 23 MMOL/L (ref 20–31)
CO2 SERPL-SCNC: 24 MMOL/L (ref 20–31)
COLOR UR: YELLOW
CREAT BLD-MCNC: 1.67 MG/DL (ref 0.6–1.3)
CREAT BLD-MCNC: 1.68 MG/DL (ref 0.6–1.3)
DEPRECATED RDW RBC AUTO: 56.9 FL (ref 37–54)
DEPRECATED RDW RBC AUTO: 58.2 FL (ref 37–54)
EOSINOPHIL # BLD AUTO: 0.1 10*3/MM3 (ref 0–0.3)
EOSINOPHIL NFR BLD AUTO: 2.2 % (ref 0–3)
ERYTHROCYTE [DISTWIDTH] IN BLOOD BY AUTOMATED COUNT: 18 % (ref 11.3–14.5)
ERYTHROCYTE [DISTWIDTH] IN BLOOD BY AUTOMATED COUNT: 18.1 % (ref 11.3–14.5)
GFR SERPL CREATININE-BSD FRML MDRD: 29 ML/MIN/1.73
GFR SERPL CREATININE-BSD FRML MDRD: 29 ML/MIN/1.73
GLUCOSE BLD-MCNC: 243 MG/DL (ref 70–100)
GLUCOSE BLD-MCNC: 244 MG/DL (ref 70–100)
GLUCOSE BLDC GLUCOMTR-MCNC: 155 MG/DL (ref 70–130)
GLUCOSE BLDC GLUCOMTR-MCNC: 223 MG/DL (ref 70–130)
GLUCOSE BLDC GLUCOMTR-MCNC: 226 MG/DL (ref 70–130)
GLUCOSE BLDC GLUCOMTR-MCNC: 238 MG/DL (ref 70–130)
GLUCOSE BLDC GLUCOMTR-MCNC: 240 MG/DL (ref 70–130)
GLUCOSE BLDC GLUCOMTR-MCNC: 283 MG/DL (ref 70–130)
GLUCOSE UR STRIP-MCNC: NEGATIVE MG/DL
HCT VFR BLD AUTO: 25.2 % (ref 34.5–44)
HCT VFR BLD AUTO: 25.8 % (ref 34.5–44)
HGB BLD-MCNC: 7.7 G/DL (ref 11.5–15.5)
HGB BLD-MCNC: 7.7 G/DL (ref 11.5–15.5)
HGB UR QL STRIP.AUTO: ABNORMAL
HYALINE CASTS UR QL AUTO: ABNORMAL /LPF
IMM GRANULOCYTES # BLD: 0.01 10*3/MM3 (ref 0–0.03)
IMM GRANULOCYTES NFR BLD: 0.2 % (ref 0–0.6)
INR PPP: 1.17 (ref 0.91–1.09)
KETONES UR QL STRIP: NEGATIVE
LEUKOCYTE ESTERASE UR QL STRIP.AUTO: ABNORMAL
LYMPHOCYTES # BLD AUTO: 1.34 10*3/MM3 (ref 0.6–4.8)
LYMPHOCYTES NFR BLD AUTO: 29.8 % (ref 24–44)
MCH RBC QN AUTO: 26.3 PG (ref 27–31)
MCH RBC QN AUTO: 26.3 PG (ref 27–31)
MCHC RBC AUTO-ENTMCNC: 29.8 G/DL (ref 32–36)
MCHC RBC AUTO-ENTMCNC: 30.6 G/DL (ref 32–36)
MCV RBC AUTO: 86 FL (ref 80–99)
MCV RBC AUTO: 88.1 FL (ref 80–99)
MONOCYTES # BLD AUTO: 0.47 10*3/MM3 (ref 0–1)
MONOCYTES NFR BLD AUTO: 10.4 % (ref 0–12)
NEUTROPHILS # BLD AUTO: 2.56 10*3/MM3 (ref 1.5–8.3)
NEUTROPHILS NFR BLD AUTO: 57 % (ref 41–71)
NITRITE UR QL STRIP: POSITIVE
PH UR STRIP.AUTO: 6 [PH] (ref 5–8)
PLATELET # BLD AUTO: 103 10*3/MM3 (ref 150–450)
PLATELET # BLD AUTO: 105 10*3/MM3 (ref 150–450)
PMV BLD AUTO: 9.4 FL (ref 6–12)
PMV BLD AUTO: 9.6 FL (ref 6–12)
POTASSIUM BLD-SCNC: 3.7 MMOL/L (ref 3.5–5.5)
POTASSIUM BLD-SCNC: 3.7 MMOL/L (ref 3.5–5.5)
PROT UR QL STRIP: ABNORMAL
PROTHROMBIN TIME: 12.3 SECONDS (ref 9.6–11.5)
RBC # BLD AUTO: 2.93 10*6/MM3 (ref 3.89–5.14)
RBC # BLD AUTO: 2.93 10*6/MM3 (ref 3.89–5.14)
RBC # UR: ABNORMAL /HPF
REF LAB TEST METHOD: ABNORMAL
SODIUM BLD-SCNC: 137 MMOL/L (ref 132–146)
SODIUM BLD-SCNC: 137 MMOL/L (ref 132–146)
SP GR UR STRIP: 1.02 (ref 1–1.03)
SQUAMOUS #/AREA URNS HPF: ABNORMAL /HPF
UROBILINOGEN UR QL STRIP: ABNORMAL
WBC NRBC COR # BLD: 4.5 10*3/MM3 (ref 3.5–10.8)
WBC NRBC COR # BLD: 5.32 10*3/MM3 (ref 3.5–10.8)
WBC UR QL AUTO: ABNORMAL /HPF

## 2018-06-29 PROCEDURE — 25010000002 HEPARIN (PORCINE) PER 1000 UNITS: Performed by: INTERNAL MEDICINE

## 2018-06-29 PROCEDURE — 25010000002 PIPERACILLIN SOD-TAZOBACTAM PER 1 G: Performed by: HOSPITALIST

## 2018-06-29 PROCEDURE — 99291 CRITICAL CARE FIRST HOUR: CPT | Performed by: NURSE PRACTITIONER

## 2018-06-29 PROCEDURE — 25010000002 ALTEPLASE PER 1 MG: Performed by: PSYCHIATRY & NEUROLOGY

## 2018-06-29 PROCEDURE — 99291 CRITICAL CARE FIRST HOUR: CPT | Performed by: INTERNAL MEDICINE

## 2018-06-29 PROCEDURE — 85025 COMPLETE CBC W/AUTO DIFF WBC: CPT | Performed by: INTERNAL MEDICINE

## 2018-06-29 PROCEDURE — 73502 X-RAY EXAM HIP UNI 2-3 VIEWS: CPT

## 2018-06-29 PROCEDURE — 0 TECHNETIUM MEDRONATE KIT: Performed by: INTERNAL MEDICINE

## 2018-06-29 PROCEDURE — 0 IOPAMIDOL PER 1 ML: Performed by: INTERNAL MEDICINE

## 2018-06-29 PROCEDURE — 78315 BONE IMAGING 3 PHASE: CPT

## 2018-06-29 PROCEDURE — 99232 SBSQ HOSP IP/OBS MODERATE 35: CPT | Performed by: INTERNAL MEDICINE

## 2018-06-29 PROCEDURE — 93010 ELECTROCARDIOGRAM REPORT: CPT | Performed by: INTERNAL MEDICINE

## 2018-06-29 PROCEDURE — 93005 ELECTROCARDIOGRAM TRACING: CPT | Performed by: NURSE PRACTITIONER

## 2018-06-29 PROCEDURE — A9503 TC99M MEDRONATE: HCPCS | Performed by: INTERNAL MEDICINE

## 2018-06-29 PROCEDURE — 82962 GLUCOSE BLOOD TEST: CPT

## 2018-06-29 PROCEDURE — 63710000001 INSULIN LISPRO (HUMAN) PER 5 UNITS: Performed by: INTERNAL MEDICINE

## 2018-06-29 PROCEDURE — 70450 CT HEAD/BRAIN W/O DYE: CPT

## 2018-06-29 PROCEDURE — 80048 BASIC METABOLIC PNL TOTAL CA: CPT | Performed by: NURSE PRACTITIONER

## 2018-06-29 PROCEDURE — 25010000002 LINEZOLID 600 MG/300ML SOLUTION: Performed by: INTERNAL MEDICINE

## 2018-06-29 PROCEDURE — 0042T HC CT CEREBRAL PERFUSION W/WO CONTRAST: CPT

## 2018-06-29 PROCEDURE — 63710000001 INSULIN DETEMIR PER 5 UNITS: Performed by: INTERNAL MEDICINE

## 2018-06-29 PROCEDURE — 3E03317 INTRODUCTION OF OTHER THROMBOLYTIC INTO PERIPHERAL VEIN, PERCUTANEOUS APPROACH: ICD-10-PCS | Performed by: NEUROLOGICAL SURGERY

## 2018-06-29 PROCEDURE — 85027 COMPLETE CBC AUTOMATED: CPT | Performed by: NURSE PRACTITIONER

## 2018-06-29 PROCEDURE — 80048 BASIC METABOLIC PNL TOTAL CA: CPT | Performed by: INTERNAL MEDICINE

## 2018-06-29 PROCEDURE — 85610 PROTHROMBIN TIME: CPT | Performed by: NURSE PRACTITIONER

## 2018-06-29 PROCEDURE — 99231 SBSQ HOSP IP/OBS SF/LOW 25: CPT | Performed by: THORACIC SURGERY (CARDIOTHORACIC VASCULAR SURGERY)

## 2018-06-29 PROCEDURE — 85730 THROMBOPLASTIN TIME PARTIAL: CPT | Performed by: NURSE PRACTITIONER

## 2018-06-29 RX ORDER — SODIUM CHLORIDE 9 MG/ML
100 INJECTION, SOLUTION INTRAVENOUS ONCE
Status: COMPLETED | OUTPATIENT
Start: 2018-06-29 | End: 2018-07-05

## 2018-06-29 RX ORDER — ASPIRIN 300 MG/1
300 SUPPOSITORY RECTAL DAILY
Status: DISCONTINUED | OUTPATIENT
Start: 2018-06-30 | End: 2018-07-01

## 2018-06-29 RX ORDER — SODIUM CHLORIDE 0.9 % (FLUSH) 0.9 %
1-10 SYRINGE (ML) INJECTION AS NEEDED
Status: DISCONTINUED | OUTPATIENT
Start: 2018-06-29 | End: 2018-07-13 | Stop reason: HOSPADM

## 2018-06-29 RX ORDER — HEPARIN SODIUM 5000 [USP'U]/ML
5000 INJECTION, SOLUTION INTRAVENOUS; SUBCUTANEOUS EVERY 8 HOURS SCHEDULED
Status: DISCONTINUED | OUTPATIENT
Start: 2018-06-29 | End: 2018-06-29

## 2018-06-29 RX ORDER — TC 99M MEDRONATE 20 MG/10ML
21.6 INJECTION, POWDER, LYOPHILIZED, FOR SOLUTION INTRAVENOUS
Status: COMPLETED | OUTPATIENT
Start: 2018-06-29 | End: 2018-06-29

## 2018-06-29 RX ORDER — ASPIRIN 325 MG
325 TABLET ORAL DAILY
Status: DISCONTINUED | OUTPATIENT
Start: 2018-06-30 | End: 2018-07-01

## 2018-06-29 RX ORDER — ATORVASTATIN CALCIUM 40 MG/1
80 TABLET, FILM COATED ORAL NIGHTLY
Status: DISCONTINUED | OUTPATIENT
Start: 2018-06-29 | End: 2018-07-13 | Stop reason: HOSPADM

## 2018-06-29 RX ADMIN — INSULIN DETEMIR 5 UNITS: 100 INJECTION, SOLUTION SUBCUTANEOUS at 22:54

## 2018-06-29 RX ADMIN — NYSTATIN: 100000 POWDER TOPICAL at 08:49

## 2018-06-29 RX ADMIN — Medication 21.6 MILLICURIE: at 11:05

## 2018-06-29 RX ADMIN — LINEZOLID 600 MG: 600 INJECTION, SOLUTION INTRAVENOUS at 05:31

## 2018-06-29 RX ADMIN — ASPIRIN 81 MG: 81 TABLET, COATED ORAL at 08:49

## 2018-06-29 RX ADMIN — NYSTATIN: 100000 POWDER TOPICAL at 22:54

## 2018-06-29 RX ADMIN — INSULIN LISPRO 3 UNITS: 100 INJECTION, SOLUTION INTRAVENOUS; SUBCUTANEOUS at 17:14

## 2018-06-29 RX ADMIN — INSULIN LISPRO 2 UNITS: 100 INJECTION, SOLUTION INTRAVENOUS; SUBCUTANEOUS at 15:19

## 2018-06-29 RX ADMIN — ALTEPLASE 54 MG: KIT at 22:05

## 2018-06-29 RX ADMIN — VALACYCLOVIR HYDROCHLORIDE 1000 MG: 500 TABLET, FILM COATED ORAL at 08:49

## 2018-06-29 RX ADMIN — CLOPIDOGREL BISULFATE 75 MG: 75 TABLET ORAL at 08:49

## 2018-06-29 RX ADMIN — LINEZOLID 600 MG: 600 INJECTION, SOLUTION INTRAVENOUS at 17:20

## 2018-06-29 RX ADMIN — INSULIN LISPRO 2 UNITS: 100 INJECTION, SOLUTION INTRAVENOUS; SUBCUTANEOUS at 17:14

## 2018-06-29 RX ADMIN — LATANOPROST 1 DROP: 50 SOLUTION OPHTHALMIC at 22:54

## 2018-06-29 RX ADMIN — Medication 1 CAPSULE: at 08:49

## 2018-06-29 RX ADMIN — WATER 6 MG: 1 INJECTION INTRAMUSCULAR; INTRAVENOUS; SUBCUTANEOUS at 22:05

## 2018-06-29 RX ADMIN — INSULIN LISPRO 2 UNITS: 100 INJECTION, SOLUTION INTRAVENOUS; SUBCUTANEOUS at 08:49

## 2018-06-29 RX ADMIN — IOPAMIDOL 50 ML: 755 INJECTION, SOLUTION INTRAVENOUS at 20:56

## 2018-06-29 RX ADMIN — TAZOBACTAM SODIUM AND PIPERACILLIN SODIUM 3.38 G: 375; 3 INJECTION, SOLUTION INTRAVENOUS at 17:13

## 2018-06-29 RX ADMIN — INSULIN LISPRO 3 UNITS: 100 INJECTION, SOLUTION INTRAVENOUS; SUBCUTANEOUS at 12:30

## 2018-06-29 RX ADMIN — TAZOBACTAM SODIUM AND PIPERACILLIN SODIUM 3.38 G: 375; 3 INJECTION, SOLUTION INTRAVENOUS at 08:50

## 2018-06-29 RX ADMIN — Medication 325 MG: at 08:49

## 2018-06-29 RX ADMIN — HEPARIN SODIUM 5000 UNITS: 5000 INJECTION, SOLUTION INTRAVENOUS; SUBCUTANEOUS at 14:08

## 2018-06-29 RX ADMIN — FUROSEMIDE 20 MG: 20 TABLET ORAL at 08:49

## 2018-06-29 RX ADMIN — METOPROLOL TARTRATE 100 MG: 100 TABLET ORAL at 08:49

## 2018-06-30 ENCOUNTER — APPOINTMENT (OUTPATIENT)
Dept: CARDIOLOGY | Facility: HOSPITAL | Age: 83
End: 2018-06-30
Attending: PSYCHIATRY & NEUROLOGY

## 2018-06-30 ENCOUNTER — APPOINTMENT (OUTPATIENT)
Dept: CT IMAGING | Facility: HOSPITAL | Age: 83
End: 2018-06-30

## 2018-06-30 LAB
ANION GAP SERPL CALCULATED.3IONS-SCNC: 8 MMOL/L (ref 3–11)
ARTICHOKE IGE QN: 25 MG/DL (ref 0–130)
BASOPHILS # BLD AUTO: 0.04 10*3/MM3 (ref 0–0.2)
BASOPHILS NFR BLD AUTO: 0.7 % (ref 0–1)
BH CV ECHO MEAS - AI DEC SLOPE: 210.3 CM/SEC^2
BH CV ECHO MEAS - AI MAX PG: 52.3 MMHG
BH CV ECHO MEAS - AI MAX VEL: 361.5 CM/SEC
BH CV ECHO MEAS - AI P1/2T: 503.5 MSEC
BH CV ECHO MEAS - AO MAX PG (FULL): 8.4 MMHG
BH CV ECHO MEAS - AO MAX PG: 11 MMHG
BH CV ECHO MEAS - AO MEAN PG (FULL): 4.3 MMHG
BH CV ECHO MEAS - AO MEAN PG: 5.7 MMHG
BH CV ECHO MEAS - AO ROOT AREA (BSA CORRECTED): 1.9
BH CV ECHO MEAS - AO ROOT AREA: 8.2 CM^2
BH CV ECHO MEAS - AO ROOT DIAM: 3.2 CM
BH CV ECHO MEAS - AO V2 MAX: 162.3 CM/SEC
BH CV ECHO MEAS - AO V2 MEAN: 110.4 CM/SEC
BH CV ECHO MEAS - AO V2 VTI: 38.5 CM
BH CV ECHO MEAS - AVA(I,A): 1.3 CM^2
BH CV ECHO MEAS - AVA(I,D): 1.3 CM^2
BH CV ECHO MEAS - AVA(V,A): 1.3 CM^2
BH CV ECHO MEAS - AVA(V,D): 1.3 CM^2
BH CV ECHO MEAS - BSA(HAYCOCK): 1.8 M^2
BH CV ECHO MEAS - BSA(HAYCOCK): 1.8 M^2
BH CV ECHO MEAS - BSA: 1.7 M^2
BH CV ECHO MEAS - BSA: 1.7 M^2
BH CV ECHO MEAS - BZI_BMI: 24.5 KILOGRAMS/M^2
BH CV ECHO MEAS - BZI_BMI: 24.5 KILOGRAMS/M^2
BH CV ECHO MEAS - BZI_METRIC_HEIGHT: 165.1 CM
BH CV ECHO MEAS - BZI_METRIC_HEIGHT: 165.1 CM
BH CV ECHO MEAS - BZI_METRIC_WEIGHT: 66.7 KG
BH CV ECHO MEAS - BZI_METRIC_WEIGHT: 66.7 KG
BH CV ECHO MEAS - CONTRAST EF (2CH): 66.3 ML/M^2
BH CV ECHO MEAS - CONTRAST EF 4CH: 63.6 ML/M^2
BH CV ECHO MEAS - EDV(CUBED): 55.7 ML
BH CV ECHO MEAS - EDV(MOD-SP2): 101 ML
BH CV ECHO MEAS - EDV(MOD-SP4): 66 ML
BH CV ECHO MEAS - EDV(TEICH): 62.7 ML
BH CV ECHO MEAS - EF(CUBED): 83.3 %
BH CV ECHO MEAS - EF(MOD-SP2): 66.3 %
BH CV ECHO MEAS - EF(MOD-SP4): 63.6 %
BH CV ECHO MEAS - EF(TEICH): 76.9 %
BH CV ECHO MEAS - ESV(CUBED): 9.3 ML
BH CV ECHO MEAS - ESV(MOD-SP2): 34 ML
BH CV ECHO MEAS - ESV(MOD-SP4): 24 ML
BH CV ECHO MEAS - ESV(TEICH): 14.5 ML
BH CV ECHO MEAS - FS: 44.9 %
BH CV ECHO MEAS - IVS/LVPW: 1
BH CV ECHO MEAS - IVSD: 1.2 CM
BH CV ECHO MEAS - LA DIMENSION: 5.3 CM
BH CV ECHO MEAS - LA/AO: 1.6
BH CV ECHO MEAS - LAT PEAK E' VEL: 12.4 CM/SEC
BH CV ECHO MEAS - LV DIASTOLIC VOL/BSA (35-75): 38 ML/M^2
BH CV ECHO MEAS - LV MASS(C)D: 155.4 GRAMS
BH CV ECHO MEAS - LV MASS(C)DI: 89.5 GRAMS/M^2
BH CV ECHO MEAS - LV MAX PG: 2.6 MMHG
BH CV ECHO MEAS - LV MEAN PG: 1.4 MMHG
BH CV ECHO MEAS - LV SYSTOLIC VOL/BSA (12-30): 13.8 ML/M^2
BH CV ECHO MEAS - LV V1 MAX: 81 CM/SEC
BH CV ECHO MEAS - LV V1 MEAN: 54.7 CM/SEC
BH CV ECHO MEAS - LV V1 VTI: 19.1 CM
BH CV ECHO MEAS - LVIDD: 3.8 CM
BH CV ECHO MEAS - LVIDS: 2.1 CM
BH CV ECHO MEAS - LVLD AP2: 6.1 CM
BH CV ECHO MEAS - LVLD AP4: 5.7 CM
BH CV ECHO MEAS - LVLS AP2: 5.8 CM
BH CV ECHO MEAS - LVLS AP4: 5.4 CM
BH CV ECHO MEAS - LVOT AREA (M): 2.8 CM^2
BH CV ECHO MEAS - LVOT AREA: 2.6 CM^2
BH CV ECHO MEAS - LVOT DIAM: 1.8 CM
BH CV ECHO MEAS - LVPWD: 1.2 CM
BH CV ECHO MEAS - MED PEAK E' VEL: 6.58 CM/SEC
BH CV ECHO MEAS - MV DEC TIME: 0.16 SEC
BH CV ECHO MEAS - MV E MAX VEL: 120.4 CM/SEC
BH CV ECHO MEAS - PA ACC SLOPE: 511.9 CM/SEC^2
BH CV ECHO MEAS - PA ACC TIME: 0.11 SEC
BH CV ECHO MEAS - PA PR(ACCEL): 29.9 MMHG
BH CV ECHO MEAS - PULM DIAS VEL: 84.8 CM/SEC
BH CV ECHO MEAS - PULM S/D: 0.35
BH CV ECHO MEAS - PULM SYS VEL: 30.1 CM/SEC
BH CV ECHO MEAS - RVDD: 3.4 CM
BH CV ECHO MEAS - RVSP: 90 MMHG
BH CV ECHO MEAS - SI(AO): 181.7 ML/M^2
BH CV ECHO MEAS - SI(CUBED): 26.7 ML/M^2
BH CV ECHO MEAS - SI(LVOT): 28.9 ML/M^2
BH CV ECHO MEAS - SI(MOD-SP2): 38.6 ML/M^2
BH CV ECHO MEAS - SI(MOD-SP4): 24.2 ML/M^2
BH CV ECHO MEAS - SI(TEICH): 27.8 ML/M^2
BH CV ECHO MEAS - SV(AO): 315.3 ML
BH CV ECHO MEAS - SV(CUBED): 46.4 ML
BH CV ECHO MEAS - SV(LVOT): 50.2 ML
BH CV ECHO MEAS - SV(MOD-SP2): 67 ML
BH CV ECHO MEAS - SV(MOD-SP4): 42 ML
BH CV ECHO MEAS - SV(TEICH): 48.2 ML
BH CV ECHO MEAS - TAPSE (>1.6): 1.3 CM2
BH CV ECHO MEAS - TR MAX V: 82 MMHG
BH CV ECHO MEAS - TR MAX VEL: 425.8 CM/SEC
BH CV ECHO MEASUREMENTS AVERAGE E/E' RATIO: 12.69
BH CV VAS BP LEFT ARM: NORMAL MMHG
BH CV XLRA - RV BASE: 4.2 CM
BH CV XLRA - RV LENGTH: 6.5 CM
BH CV XLRA - RV MID: 3.7 CM
BH CV XLRA - TDI S': 8.6 CM/SEC
BH CV XLRA MEAS LEFT CCA RATIO VEL: 92.4 CM/SEC
BH CV XLRA MEAS LEFT DIST CCA EDV: 20.1 CM/SEC
BH CV XLRA MEAS LEFT DIST CCA PSV: 85.5 CM/SEC
BH CV XLRA MEAS LEFT DIST ICA EDV: 40.4 CM/SEC
BH CV XLRA MEAS LEFT DIST ICA PSV: 204.3 CM/SEC
BH CV XLRA MEAS LEFT ICA RATIO VEL: 142 CM/SEC
BH CV XLRA MEAS LEFT ICA/CCA RATIO: 1.5
BH CV XLRA MEAS LEFT MID CCA EDV: 17.6 CM/SEC
BH CV XLRA MEAS LEFT MID CCA PSV: 93 CM/SEC
BH CV XLRA MEAS LEFT MID ICA EDV: 39.4 CM/SEC
BH CV XLRA MEAS LEFT MID ICA PSV: 142 CM/SEC
BH CV XLRA MEAS LEFT PROX CCA EDV: 11.9 CM/SEC
BH CV XLRA MEAS LEFT PROX CCA PSV: 85.5 CM/SEC
BH CV XLRA MEAS LEFT PROX ECA PSV: 146.3 CM/SEC
BH CV XLRA MEAS LEFT PROX ICA EDV: 21.6 CM/SEC
BH CV XLRA MEAS LEFT PROX ICA PSV: 118.7 CM/SEC
BH CV XLRA MEAS LEFT PROX SCLA PSV: 189.4 CM/SEC
BH CV XLRA MEAS LEFT VERTEBRAL A EDV: 14.2 CM/SEC
BH CV XLRA MEAS LEFT VERTEBRAL A PSV: 81 CM/SEC
BH CV XLRA MEAS RIGHT CCA RATIO VEL: 115 CM/SEC
BH CV XLRA MEAS RIGHT DIST CCA EDV: 13.3 CM/SEC
BH CV XLRA MEAS RIGHT DIST CCA PSV: 106.1 CM/SEC
BH CV XLRA MEAS RIGHT DIST ICA EDV: 49.4 CM/SEC
BH CV XLRA MEAS RIGHT DIST ICA PSV: 199.8 CM/SEC
BH CV XLRA MEAS RIGHT ICA RATIO VEL: 144 CM/SEC
BH CV XLRA MEAS RIGHT ICA/CCA RATIO: 1.3
BH CV XLRA MEAS RIGHT MID CCA EDV: 21.6 CM/SEC
BH CV XLRA MEAS RIGHT MID CCA PSV: 115.9 CM/SEC
BH CV XLRA MEAS RIGHT MID ICA EDV: 35.4 CM/SEC
BH CV XLRA MEAS RIGHT MID ICA PSV: 144.6 CM/SEC
BH CV XLRA MEAS RIGHT PROX CCA EDV: 13.3 CM/SEC
BH CV XLRA MEAS RIGHT PROX CCA PSV: 99.9 CM/SEC
BH CV XLRA MEAS RIGHT PROX ECA PSV: 130.1 CM/SEC
BH CV XLRA MEAS RIGHT PROX ICA EDV: 31.4 CM/SEC
BH CV XLRA MEAS RIGHT PROX ICA PSV: 135.9 CM/SEC
BH CV XLRA MEAS RIGHT PROX SCLA PSV: 174.8 CM/SEC
BH CV XLRA MEAS RIGHT VERTEBRAL A EDV: 24.4 CM/SEC
BH CV XLRA MEAS RIGHT VERTEBRAL A PSV: 96.6 CM/SEC
BUN BLD-MCNC: 25 MG/DL (ref 9–23)
BUN/CREAT SERPL: 16.6 (ref 7–25)
CALCIUM SPEC-SCNC: 8.2 MG/DL (ref 8.7–10.4)
CHLORIDE SERPL-SCNC: 105 MMOL/L (ref 99–109)
CHOLEST SERPL-MCNC: 64 MG/DL (ref 0–200)
CO2 SERPL-SCNC: 26 MMOL/L (ref 20–31)
CREAT BLD-MCNC: 1.51 MG/DL (ref 0.6–1.3)
DEPRECATED RDW RBC AUTO: 55.8 FL (ref 37–54)
EOSINOPHIL # BLD AUTO: 0.14 10*3/MM3 (ref 0–0.3)
EOSINOPHIL NFR BLD AUTO: 2.4 % (ref 0–3)
ERYTHROCYTE [DISTWIDTH] IN BLOOD BY AUTOMATED COUNT: 18 % (ref 11.3–14.5)
GFR SERPL CREATININE-BSD FRML MDRD: 33 ML/MIN/1.73
GLUCOSE BLD-MCNC: 167 MG/DL (ref 70–100)
GLUCOSE BLDC GLUCOMTR-MCNC: 165 MG/DL (ref 70–130)
GLUCOSE BLDC GLUCOMTR-MCNC: 184 MG/DL (ref 70–130)
GLUCOSE BLDC GLUCOMTR-MCNC: 257 MG/DL (ref 70–130)
GLUCOSE BLDC GLUCOMTR-MCNC: 93 MG/DL (ref 70–130)
HCT VFR BLD AUTO: 25.5 % (ref 34.5–44)
HDLC SERPL-MCNC: 25 MG/DL (ref 40–60)
HGB BLD-MCNC: 7.8 G/DL (ref 11.5–15.5)
IMM GRANULOCYTES # BLD: 0.02 10*3/MM3 (ref 0–0.03)
IMM GRANULOCYTES NFR BLD: 0.3 % (ref 0–0.6)
LEFT ARM BP: NORMAL MMHG
LEFT ATRIUM VOLUME INDEX: 74.1 ML/M2
LV EF 2D ECHO EST: 60 %
LYMPHOCYTES # BLD AUTO: 1.67 10*3/MM3 (ref 0.6–4.8)
LYMPHOCYTES NFR BLD AUTO: 29.2 % (ref 24–44)
MAGNESIUM SERPL-MCNC: 2.1 MG/DL (ref 1.3–2.7)
MCH RBC QN AUTO: 25.8 PG (ref 27–31)
MCHC RBC AUTO-ENTMCNC: 30.6 G/DL (ref 32–36)
MCV RBC AUTO: 84.4 FL (ref 80–99)
MONOCYTES # BLD AUTO: 0.7 10*3/MM3 (ref 0–1)
MONOCYTES NFR BLD AUTO: 12.2 % (ref 0–12)
NEUTROPHILS # BLD AUTO: 3.17 10*3/MM3 (ref 1.5–8.3)
NEUTROPHILS NFR BLD AUTO: 55.5 % (ref 41–71)
PHOSPHATE SERPL-MCNC: 3.4 MG/DL (ref 2.4–5.1)
PLATELET # BLD AUTO: 121 10*3/MM3 (ref 150–450)
PMV BLD AUTO: 10.4 FL (ref 6–12)
POTASSIUM BLD-SCNC: 3.8 MMOL/L (ref 3.5–5.5)
RBC # BLD AUTO: 3.02 10*6/MM3 (ref 3.89–5.14)
SODIUM BLD-SCNC: 139 MMOL/L (ref 132–146)
TRIGL SERPL-MCNC: 78 MG/DL (ref 0–150)
WBC NRBC COR # BLD: 5.72 10*3/MM3 (ref 3.5–10.8)

## 2018-06-30 PROCEDURE — 93306 TTE W/DOPPLER COMPLETE: CPT

## 2018-06-30 PROCEDURE — 92610 EVALUATE SWALLOWING FUNCTION: CPT

## 2018-06-30 PROCEDURE — 63710000001 INSULIN LISPRO (HUMAN) PER 5 UNITS: Performed by: INTERNAL MEDICINE

## 2018-06-30 PROCEDURE — 84100 ASSAY OF PHOSPHORUS: CPT | Performed by: NURSE PRACTITIONER

## 2018-06-30 PROCEDURE — 97165 OT EVAL LOW COMPLEX 30 MIN: CPT

## 2018-06-30 PROCEDURE — 99233 SBSQ HOSP IP/OBS HIGH 50: CPT | Performed by: INTERNAL MEDICINE

## 2018-06-30 PROCEDURE — 25010000002 PIPERACILLIN SOD-TAZOBACTAM PER 1 G: Performed by: HOSPITALIST

## 2018-06-30 PROCEDURE — 70450 CT HEAD/BRAIN W/O DYE: CPT

## 2018-06-30 PROCEDURE — 92523 SPEECH SOUND LANG COMPREHEN: CPT

## 2018-06-30 PROCEDURE — 80048 BASIC METABOLIC PNL TOTAL CA: CPT | Performed by: INTERNAL MEDICINE

## 2018-06-30 PROCEDURE — 99231 SBSQ HOSP IP/OBS SF/LOW 25: CPT | Performed by: THORACIC SURGERY (CARDIOTHORACIC VASCULAR SURGERY)

## 2018-06-30 PROCEDURE — 93880 EXTRACRANIAL BILAT STUDY: CPT | Performed by: INTERNAL MEDICINE

## 2018-06-30 PROCEDURE — 83735 ASSAY OF MAGNESIUM: CPT | Performed by: NURSE PRACTITIONER

## 2018-06-30 PROCEDURE — 99223 1ST HOSP IP/OBS HIGH 75: CPT | Performed by: PSYCHIATRY & NEUROLOGY

## 2018-06-30 PROCEDURE — 93306 TTE W/DOPPLER COMPLETE: CPT | Performed by: INTERNAL MEDICINE

## 2018-06-30 PROCEDURE — 82962 GLUCOSE BLOOD TEST: CPT

## 2018-06-30 PROCEDURE — 63710000001 INSULIN DETEMIR PER 5 UNITS: Performed by: INTERNAL MEDICINE

## 2018-06-30 PROCEDURE — 97162 PT EVAL MOD COMPLEX 30 MIN: CPT

## 2018-06-30 PROCEDURE — 85025 COMPLETE CBC W/AUTO DIFF WBC: CPT | Performed by: NURSE PRACTITIONER

## 2018-06-30 PROCEDURE — 25010000002 LINEZOLID 600 MG/300ML SOLUTION: Performed by: INTERNAL MEDICINE

## 2018-06-30 PROCEDURE — 80061 LIPID PANEL: CPT | Performed by: PSYCHIATRY & NEUROLOGY

## 2018-06-30 PROCEDURE — 93880 EXTRACRANIAL BILAT STUDY: CPT

## 2018-06-30 RX ADMIN — Medication 325 MG: at 10:12

## 2018-06-30 RX ADMIN — FUROSEMIDE 20 MG: 20 TABLET ORAL at 10:12

## 2018-06-30 RX ADMIN — LINEZOLID 600 MG: 600 INJECTION, SOLUTION INTRAVENOUS at 05:55

## 2018-06-30 RX ADMIN — INSULIN DETEMIR 5 UNITS: 100 INJECTION, SOLUTION SUBCUTANEOUS at 20:00

## 2018-06-30 RX ADMIN — METOPROLOL TARTRATE 100 MG: 100 TABLET ORAL at 20:00

## 2018-06-30 RX ADMIN — NYSTATIN: 100000 POWDER TOPICAL at 09:08

## 2018-06-30 RX ADMIN — VALACYCLOVIR HYDROCHLORIDE 1000 MG: 500 TABLET, FILM COATED ORAL at 10:12

## 2018-06-30 RX ADMIN — TAZOBACTAM SODIUM AND PIPERACILLIN SODIUM 3.38 G: 375; 3 INJECTION, SOLUTION INTRAVENOUS at 17:36

## 2018-06-30 RX ADMIN — INSULIN LISPRO 2 UNITS: 100 INJECTION, SOLUTION INTRAVENOUS; SUBCUTANEOUS at 12:39

## 2018-06-30 RX ADMIN — TAZOBACTAM SODIUM AND PIPERACILLIN SODIUM 3.38 G: 375; 3 INJECTION, SOLUTION INTRAVENOUS at 09:09

## 2018-06-30 RX ADMIN — Medication 1 CAPSULE: at 10:11

## 2018-06-30 RX ADMIN — LINEZOLID 600 MG: 600 INJECTION, SOLUTION INTRAVENOUS at 18:24

## 2018-06-30 RX ADMIN — TAZOBACTAM SODIUM AND PIPERACILLIN SODIUM 3.38 G: 375; 3 INJECTION, SOLUTION INTRAVENOUS at 02:57

## 2018-06-30 RX ADMIN — NYSTATIN: 100000 POWDER TOPICAL at 20:00

## 2018-06-30 RX ADMIN — INSULIN LISPRO 4 UNITS: 100 INJECTION, SOLUTION INTRAVENOUS; SUBCUTANEOUS at 22:38

## 2018-06-30 RX ADMIN — INSULIN LISPRO 2 UNITS: 100 INJECTION, SOLUTION INTRAVENOUS; SUBCUTANEOUS at 16:47

## 2018-06-30 RX ADMIN — LATANOPROST 1 DROP: 50 SOLUTION OPHTHALMIC at 22:38

## 2018-06-30 RX ADMIN — METOPROLOL TARTRATE 100 MG: 100 TABLET ORAL at 10:11

## 2018-06-30 RX ADMIN — ATORVASTATIN CALCIUM 80 MG: 40 TABLET, FILM COATED ORAL at 20:00

## 2018-06-30 RX ADMIN — VALACYCLOVIR HYDROCHLORIDE 1000 MG: 500 TABLET, FILM COATED ORAL at 20:00

## 2018-07-01 LAB
ANION GAP SERPL CALCULATED.3IONS-SCNC: 9 MMOL/L (ref 3–11)
BACTERIA SPEC AEROBE CULT: ABNORMAL
BUN BLD-MCNC: 15 MG/DL (ref 9–23)
BUN/CREAT SERPL: 11 (ref 7–25)
CALCIUM SPEC-SCNC: 8.1 MG/DL (ref 8.7–10.4)
CHLORIDE SERPL-SCNC: 107 MMOL/L (ref 99–109)
CO2 SERPL-SCNC: 24 MMOL/L (ref 20–31)
CREAT BLD-MCNC: 1.36 MG/DL (ref 0.6–1.3)
DEPRECATED RDW RBC AUTO: 55.6 FL (ref 37–54)
ERYTHROCYTE [DISTWIDTH] IN BLOOD BY AUTOMATED COUNT: 17.8 % (ref 11.3–14.5)
GFR SERPL CREATININE-BSD FRML MDRD: 37 ML/MIN/1.73
GLUCOSE BLD-MCNC: 97 MG/DL (ref 70–100)
GLUCOSE BLDC GLUCOMTR-MCNC: 140 MG/DL (ref 70–130)
GLUCOSE BLDC GLUCOMTR-MCNC: 179 MG/DL (ref 70–130)
GLUCOSE BLDC GLUCOMTR-MCNC: 199 MG/DL (ref 70–130)
GLUCOSE BLDC GLUCOMTR-MCNC: 290 MG/DL (ref 70–130)
HCT VFR BLD AUTO: 23.7 % (ref 34.5–44)
HCT VFR BLD AUTO: 24.8 % (ref 34.5–44)
HGB BLD-MCNC: 7.2 G/DL (ref 11.5–15.5)
HGB BLD-MCNC: 7.4 G/DL (ref 11.5–15.5)
MCH RBC QN AUTO: 25.8 PG (ref 27–31)
MCHC RBC AUTO-ENTMCNC: 30.4 G/DL (ref 32–36)
MCV RBC AUTO: 84.9 FL (ref 80–99)
PLATELET # BLD AUTO: 134 10*3/MM3 (ref 150–450)
PMV BLD AUTO: 10 FL (ref 6–12)
POTASSIUM BLD-SCNC: 3.5 MMOL/L (ref 3.5–5.5)
RBC # BLD AUTO: 2.79 10*6/MM3 (ref 3.89–5.14)
SODIUM BLD-SCNC: 140 MMOL/L (ref 132–146)
WBC NRBC COR # BLD: 5.16 10*3/MM3 (ref 3.5–10.8)

## 2018-07-01 PROCEDURE — 85014 HEMATOCRIT: CPT | Performed by: INTERNAL MEDICINE

## 2018-07-01 PROCEDURE — 25010000002 LINEZOLID 600 MG/300ML SOLUTION: Performed by: INTERNAL MEDICINE

## 2018-07-01 PROCEDURE — 99232 SBSQ HOSP IP/OBS MODERATE 35: CPT | Performed by: PSYCHIATRY & NEUROLOGY

## 2018-07-01 PROCEDURE — 80048 BASIC METABOLIC PNL TOTAL CA: CPT | Performed by: INTERNAL MEDICINE

## 2018-07-01 PROCEDURE — 63710000001 INSULIN DETEMIR PER 5 UNITS: Performed by: INTERNAL MEDICINE

## 2018-07-01 PROCEDURE — 25010000002 PIPERACILLIN SOD-TAZOBACTAM PER 1 G: Performed by: HOSPITALIST

## 2018-07-01 PROCEDURE — 99233 SBSQ HOSP IP/OBS HIGH 50: CPT | Performed by: INTERNAL MEDICINE

## 2018-07-01 PROCEDURE — 85027 COMPLETE CBC AUTOMATED: CPT | Performed by: INTERNAL MEDICINE

## 2018-07-01 PROCEDURE — 85018 HEMOGLOBIN: CPT | Performed by: INTERNAL MEDICINE

## 2018-07-01 PROCEDURE — 82962 GLUCOSE BLOOD TEST: CPT

## 2018-07-01 RX ADMIN — LINEZOLID 600 MG: 600 INJECTION, SOLUTION INTRAVENOUS at 06:08

## 2018-07-01 RX ADMIN — LINEZOLID 600 MG: 600 INJECTION, SOLUTION INTRAVENOUS at 18:13

## 2018-07-01 RX ADMIN — ATORVASTATIN CALCIUM 80 MG: 40 TABLET, FILM COATED ORAL at 20:54

## 2018-07-01 RX ADMIN — VALACYCLOVIR HYDROCHLORIDE 1000 MG: 500 TABLET, FILM COATED ORAL at 20:53

## 2018-07-01 RX ADMIN — CEFEPIME HYDROCHLORIDE 1 G: 1 INJECTION, POWDER, FOR SOLUTION INTRAMUSCULAR; INTRAVENOUS at 20:59

## 2018-07-01 RX ADMIN — INSULIN LISPRO 2 UNITS: 100 INJECTION, SOLUTION INTRAVENOUS; SUBCUTANEOUS at 18:14

## 2018-07-01 RX ADMIN — CEFEPIME HYDROCHLORIDE 1 G: 1 INJECTION, POWDER, FOR SOLUTION INTRAMUSCULAR; INTRAVENOUS at 14:26

## 2018-07-01 RX ADMIN — TAZOBACTAM SODIUM AND PIPERACILLIN SODIUM 3.38 G: 375; 3 INJECTION, SOLUTION INTRAVENOUS at 02:26

## 2018-07-01 RX ADMIN — METRONIDAZOLE 500 MG: 500 INJECTION, SOLUTION INTRAVENOUS at 14:25

## 2018-07-01 RX ADMIN — NYSTATIN: 100000 POWDER TOPICAL at 20:53

## 2018-07-01 RX ADMIN — INSULIN LISPRO 2 UNITS: 100 INJECTION, SOLUTION INTRAVENOUS; SUBCUTANEOUS at 07:56

## 2018-07-01 RX ADMIN — Medication 325 MG: at 08:00

## 2018-07-01 RX ADMIN — FUROSEMIDE 20 MG: 20 TABLET ORAL at 08:00

## 2018-07-01 RX ADMIN — TAZOBACTAM SODIUM AND PIPERACILLIN SODIUM 3.38 G: 375; 3 INJECTION, SOLUTION INTRAVENOUS at 09:57

## 2018-07-01 RX ADMIN — NYSTATIN: 100000 POWDER TOPICAL at 08:00

## 2018-07-01 RX ADMIN — INSULIN LISPRO 4 UNITS: 100 INJECTION, SOLUTION INTRAVENOUS; SUBCUTANEOUS at 20:53

## 2018-07-01 RX ADMIN — INSULIN LISPRO 2 UNITS: 100 INJECTION, SOLUTION INTRAVENOUS; SUBCUTANEOUS at 07:57

## 2018-07-01 RX ADMIN — Medication 1 CAPSULE: at 08:00

## 2018-07-01 RX ADMIN — INSULIN LISPRO 2 UNITS: 100 INJECTION, SOLUTION INTRAVENOUS; SUBCUTANEOUS at 12:14

## 2018-07-01 RX ADMIN — VALACYCLOVIR HYDROCHLORIDE 1000 MG: 500 TABLET, FILM COATED ORAL at 08:00

## 2018-07-01 RX ADMIN — INSULIN DETEMIR 5 UNITS: 100 INJECTION, SOLUTION SUBCUTANEOUS at 20:52

## 2018-07-01 RX ADMIN — APIXABAN 2.5 MG: 2.5 TABLET, FILM COATED ORAL at 14:24

## 2018-07-01 RX ADMIN — ASPIRIN 325 MG ORAL TABLET 325 MG: 325 PILL ORAL at 02:28

## 2018-07-01 RX ADMIN — METOPROLOL TARTRATE 100 MG: 100 TABLET ORAL at 20:53

## 2018-07-01 RX ADMIN — LATANOPROST 1 DROP: 50 SOLUTION OPHTHALMIC at 20:53

## 2018-07-01 RX ADMIN — APIXABAN 2.5 MG: 2.5 TABLET, FILM COATED ORAL at 20:54

## 2018-07-01 NOTE — PLAN OF CARE
Problem: Fall Risk (Adult)  Goal: Identify Related Risk Factors and Signs and Symptoms  Outcome: Ongoing (interventions implemented as appropriate)   06/30/18 1754   Fall Risk (Adult)   Related Risk Factors (Fall Risk) gait/mobility problems;history of falls   Signs and Symptoms (Fall Risk) presence of risk factors     Goal: Absence of Fall  Outcome: Ongoing (interventions implemented as appropriate)   06/30/18 1754   Fall Risk (Adult)   Absence of Fall making progress toward outcome       Problem: Skin Injury Risk (Adult)  Goal: Identify Related Risk Factors and Signs and Symptoms  Outcome: Ongoing (interventions implemented as appropriate)   06/30/18 1754   Skin Injury Risk (Adult)   Related Risk Factors (Skin Injury Risk) critical care admission;medication;moisture     Goal: Skin Health and Integrity  Outcome: Ongoing (interventions implemented as appropriate)   06/30/18 1754   Skin Injury Risk (Adult)   Skin Health and Integrity making progress toward outcome       Problem: Patient Care Overview  Goal: Plan of Care Review  Outcome: Ongoing (interventions implemented as appropriate)   06/30/18 1754 07/01/18 0800 07/01/18 1901   Coping/Psychosocial   Plan of Care Reviewed With --  patient --    Plan of Care Review   Progress improving --  --    OTHER   Outcome Summary --  --  Pt up to chair today,, improving, Eliquis started today.       Problem: Infection, Risk/Actual (Adult)  Goal: Identify Related Risk Factors and Signs and Symptoms  Outcome: Ongoing (interventions implemented as appropriate)   06/30/18 1754   Infection, Risk/Actual (Adult)   Related Risk Factors (Infection, Risk/Actual) medication effects;treatment plan   Signs and Symptoms (Infection, Risk/Actual) weakness     Goal: Infection Prevention/Resolution  Outcome: Ongoing (interventions implemented as appropriate)   06/30/18 1754   Infection, Risk/Actual (Adult)   Infection Prevention/Resolution making progress toward outcome      06/30/18 1754    Infection, Risk/Actual (Adult)   Infection Prevention/Resolution making progress toward outcome

## 2018-07-01 NOTE — PROGRESS NOTES
Intensive Care Follow-up      LOS: 4 days     Ms. Lynne Sanchez, 83 y.o. female is followed for: Osteomyelitis of left foot with CVA      Subjective - Interval History     Patient is awake and alert and seems in good spirits and denies any new complaints.  Apparently she has had long-standing problems with anemia that is felt to be due to bone marrow insufficiency.  She is followed by Dr. Rodriguez.    The patient's relevant past medical, surgical and social history were reviewed and updated in Epic as appropriate.     Objective     Infusions:    niCARdipine 5-15 mg/hr   phenylephrine (CIARA-SYNEPHRINE) 50 mg/250 (0.2 mg/mL) infusion 0.5-3 mcg/kg/min     Medications:    apixaban 2.5 mg Oral Q12H   atorvastatin 80 mg Oral Nightly   cefepime 1 g Intravenous Once   cefepime 1 g Intravenous Q12H   ferrous sulfate 325 mg Oral Daily With Breakfast   furosemide 20 mg Oral Daily   insulin detemir 5 Units Subcutaneous Nightly   insulin lispro 0-7 Units Subcutaneous 4x Daily With Meals & Nightly   insulin lispro 2 Units Subcutaneous TID With Meals   lactobacillus acidophilus 1 capsule Oral Daily   latanoprost 1 drop Both Eyes Nightly   Linezolid 600 mg Intravenous Q12H   metoprolol tartrate 100 mg Oral Q12H   metroNIDAZOLE 500 mg Intravenous Q8H   nystatin  Topical Q12H   sodium chloride 100 mL Intravenous Once   valACYclovir 1,000 mg Oral Q12H       Vital Sign Min/Max for last 24 hours  Temp  Min: 97.5 °F (36.4 °C)  Max: 98.1 °F (36.7 °C)   BP  Min: 78/57  Max: 135/66   Pulse  Min: 68  Max: 76   Resp  Min: 16  Max: 20   SpO2  Min: 93 %  Max: 100 %   No Data Recorded      Intake/Output Summary (Last 24 hours) at 07/01/18 1228  Last data filed at 07/01/18 1000   Gross per 24 hour   Intake             1880 ml   Output                0 ml   Net             1880 ml           Physical Exam:   GENERAL: Awake and alert   HEENT: Normocephalic   LUNGS: Fairly clear   HEART: Normal S1 and S2   ABDOMEN: Soft   EXTREMITIES: No  edema   NEURO/PSYCH: Appears to have symmetrical strength today      Results from last 7 days  Lab Units 07/01/18  0453 06/30/18  0431 06/29/18  2041   WBC 10*3/mm3 5.16 5.72 5.32   HEMOGLOBIN g/dL 7.2* 7.8* 7.7*   PLATELETS 10*3/mm3 134* 121* 103*       Results from last 7 days  Lab Units 07/01/18  0453 06/30/18  0431 06/29/18  2041 06/27/18  1653   SODIUM mmol/L 140 139 137  < > 139   POTASSIUM mmol/L 3.5 3.8 3.7  < > 4.1   CO2 mmol/L 24.0 26.0 23.0  < > 27.0   BUN mg/dL 15 25* 24*  < > 19   CREATININE mg/dL 1.36* 1.51* 1.67*  < > 1.38*   MAGNESIUM mg/dL  --  2.1  --   --  1.9   PHOSPHORUS mg/dL  --  3.4  --   --  3.1   GLUCOSE mg/dL 97 167* 244*  < > 203*   < > = values in this interval not displayed.  Estimated Creatinine Clearance: 33 mL/min (A) (by C-G formula based on SCr of 1.36 mg/dL (H)).      Results from last 7 days  Lab Units 06/28/18  0326   HEMOGLOBIN A1C % 8.50*           Lab Results   Component Value Date    LACTATE 1.8 10/03/2017          Images: CT scan last night was unchanged.    I reviewed the patient's results and images.     Impression      Hospital Problem List     * (Principal)Osteomyelitis of left foot    Chronic atrial fibrillation    Overview Signed 12/22/2016  1:51 PM by Pan Saunders     1. Chronic atrial fibrillation, status post [employer] pacemaker implantation and AV node ablation:  a. Diagnosed June 2005.  b. Status post ECV restoring normal sinus rhythm, June 2005.  c. Rythmol initiated 05/01/2006.  d. Previously digoxin toxicity.  e. GXT Cardiolite, 09/08/2005:  No reversible ischemia.  LV function not performed secondary to atrial fibrillation.   f. Status post successful external cardioversion on 10/01/2008, Tessa Downey MD.  g. EKG on 10/21/2008:  Recurrence of atrial fibrillation with rate of 109.  h. Recurrent atrial fibrillation by EKG, 11/03/2008, asymptomatic.  i. Status post [employer] pacemaker implantation and AV node ablation.   j. Echocardiogram 02/15/2010:   Moderate MR, moderate TR.  RVSP 50.  EF greater than 55%, left atrial enlargement at 4.3 cm.           Type 2 diabetes mellitus treated without insulin (Chronic)          Normocytic anemia    Peripheral arterial disease    Foot infection    Fever    Pyogenic inflammation of bone    Overview Signed 6/29/2018  2:35 PM by ANN Claudio     Added automatically from request for surgery [id number]         Cerebrovascular accident (CVA) due to thrombosis of left anterior cerebral artery               Plan        CT scan last evening showed no significant changes.  Dr. Wade apparently is not planning amputation soon.  We will start Eliquis as suggested by Dr. Alvares.  Will follow H&H carefully.  We'll continue other supportive measures.     Plan of care and goals reviewed with nurse at daily rounds.   I discussed the patient's findings and my recommendations with patient, family and nursing staff       Aaron Zee MD  Pulmonary and Critical Care Medicine  07/01/18 12:28 PM

## 2018-07-01 NOTE — PROGRESS NOTES
Daily Progress Note  Neurology     LOS: 4 days     Subjective     Chief Complaint: Toe osteomyelitis    Interval History:  No acute events overnight    ROS: Negative for fever    Objective     Vital signs in last 24 hours:  Temp:  [97.5 °F (36.4 °C)-98.1 °F (36.7 °C)] 98.1 °F (36.7 °C)  Heart Rate:  [68-76] 69  Resp:  [16-20] 20  BP: ()/(41-90) 115/59      Physical Exam:   General: Sitting in bedside chair with eyes open. In NAD.     Respiratory: Respirations unlabored   CV: RRR       Neurologic Exam:   Mental status: Awake, alert, Follows commands. Speech fluent   CN: II-XII intact                       Results from last 7 days  Lab Units 07/01/18  1255 07/01/18  0453   WBC 10*3/mm3  --  5.16   HEMOGLOBIN g/dL 7.4* 7.2*   HEMATOCRIT % 24.8* 23.7*   PLATELETS 10*3/mm3  --  134*           Results Review:    Labs reviewed  Repeat CT head report reviewed  TTE and carotid duplex reports reviewed    Assessment/Plan     Principal Problem:    Osteomyelitis of left foot  Active Problems:    Chronic atrial fibrillation    Type 2 diabetes mellitus treated without insulin    Normocytic anemia    Peripheral arterial disease    Foot infection    Fever    Pyogenic inflammation of bone    Cerebrovascular accident (CVA) due to thrombosis of left anterior cerebral artery        1.  Acute ischemic stroke = On Eliquis and atorvastatin.       2.  Hypertension = continue meds     3.  Diabetes mellitus = A1c 8.5.  Continue meds and glycemic monitoring     4.  Hyperlipidemia = LDL 25.  On atorvastatin     5.  A. Fib =on Eliquis    No further recommendations. Will follow as needed. On discharge follow-up in neurology clinic with BILL Buitrago or BILL Martino, first available appointment    Alisson Alvares MD  07/01/18  2:39 PM

## 2018-07-01 NOTE — PROGRESS NOTES
[facility] Progress Note        Antibiotics:  Anti-Infectives     Ordered     Dose/Rate Route Frequency Start Stop    06/28/18 1046  Linezolid (ZYVOX) 600 mg 300 mL     Ordering Provider:  Pan Painting MD    600 mg  300 mL/hr over 60 Minutes Intravenous Every 12 Hours 06/28/18 1700 07/05/18 0559    06/28/18 1046  valACYclovir (VALTREX) tablet 1,000 mg     Ordering Provider:  Pan Painting MD    1,000 mg Oral Every 12 Hours Scheduled 06/28/18 1130 07/05/18 0859    06/27/18 1736  piperacillin-tazobactam (ZOSYN) 3.375 g in iso-osmotic dextrose 50 ml (premix)     Ordering Provider:  Jung Zeng MD    3.375 g  over 4 Hours Intravenous Every 8 Hours 06/28/18 0000 07/04/18 1759    06/27/18 1739  vancomycin 1250 mg/250 mL 0.9% NS IVPB ([facility])     Ordering Provider:  Jung Zeng MD    20 mg/kg × 66.7 kg  over 90 Minutes Intravenous Once 06/27/18 1845 06/27/18 2044    06/27/18 1736  piperacillin-tazobactam (ZOSYN) 3.375 g in iso-osmotic dextrose 50 ml (premix)     Ordering Provider:  Jung Zeng MD    3.375 g  over 30 Minutes Intravenous Once 06/27/18 1800 06/27/18 1844          CC: left foot red, uti, right leg shingles    HPI:  Patient is a 83 y.o.  Yr old female with history of peripheral vascular disease with intervention at [facility] by Dr. Pelaez October 2017 described as to the left anterior circulation.  In addition, in October 2017 she was diagnosed with left hallux osteomyelitis by CT scan/tagged white blood cell scan and cultures with Enterobacter/pseudomonas aeruginosa.  Discharge summary mentions that Dr. Wallace recommended debridement and possible amputation but patient was not interested and was transitioned to [facility] for 6 weeks.  She is admitted to [facility] June 27, 2018 with acute left foot cellulitis, chronic left hallux callus/crusted with acute right sacral/posterior leg shingles and dysuria.  In addition she had fallen onto her right hip at Father's Day  with  "bruise.     She reported acute onset left foot/hallux redness evolving over 24 hours PTA and no other new trauma.  No open wound or active drainage.      6/30/18 moved to ICU overnight after code 19 and neuro workup with change in speech/left facial droop and left pronator drift; CT perfusion study with left MCA ischemia    7/1/18 seen early and sleepy;  Per nursing, stable right post leg eruption to the right of midline in a sacral dermatomal distribution from the spine down the posterior right leg.  No new skin lesions; otherwise sleepy at time of my visit and not cooperative with ROS      Per nursing, No headache photophobia or neck stiffness.  No shortness of breath cough or hemoptysis.  No nausea vomiting diarrhea or abdominal pain.    ROS:      7/1/18 per nursing, no f/c/s. No n/v/d. No new ADR to Abx.     Constitutional-- Appetite diminished with generalized malaise and fatigue   Heent-- No epistaxis or oral sores.   No flashers, floaters or eye pain. No odynophagia or dysphagia. No headache, photophobia or neck stiffness.  CV-- No chest pain, palpitation or syncope  Resp-- No SOB/cough/Hemoptysis  GI- No nausea, vomiting, or diarrhea.  No hematochezia, melena, or hematemesis. Denies jaundice or chronic liver disease.  -- Denies hesitancy or flank pain.  Lymph- no swollen lymph nodes in neck/axilla or groin.   Heme- No active bruising or bleeding; no Hx of DVT or PE.  MS-- no swelling or pain in the bones or joints of arms/legs.  No new back pain.  Neuro-- as above     Full 12 point review of systems reviewed and negative otherwise for acute complaints, except for above    PE:   /52   Pulse 70   Temp 97.8 °F (36.6 °C) (Oral)   Resp 20   Ht 165.1 cm (65\")   Wt 66.7 kg (147 lb)   SpO2 93%   BMI 24.46 kg/m²     GENERAL: sleepy, in no acute distress.   HEENT: Normocephalic, atraumatic.  PERRL. EOMI. No conjunctival injection. No icterus. Oropharynx clear without evidence of thrush or exudate.  Poor " dentition NECK: Supple without nuchal rigidity. No mass.  LYMPH: No cervical, axillary or inguinal lymphadenopathy.  HEART: RRR; No murmur, rubs, gallops.   LUNGS: Clear to auscultation bilaterally without wheezing, rales, rhonchi. Normal respiratory effort. Nonlabored. No dullness.  ABDOMEN: Soft, nontender, nondistended. Positive bowel sounds. No rebound or guarding. NO mass or HSM.  EXT:  No cyanosis, clubbing or edema. No cord.  : Genitalia generally unremarkable.  Without Main catheter.  MSK: FROM without joint effusions noted arms/legs.    SKIN: See below    NEURO: sleepy; not cooperative with detailed neuro exam for me at time of my visit    Left foot/hallux with faint erythema.  Left hallux with callus/crust at the distal phalanx but no discrete mass bulge or fluctuance.  No crepitus or bulla.     Right posterior leg/low back to the right of midline in sacral distribution with dermatomal shingles, some blisters, some crust and no surrounding induration or warmth.  No mass bulge or fluctuance.  No crepitus.     Right hip with bruise after fall    Laboratory Data      Results from last 7 days  Lab Units 07/01/18  0453 06/30/18  0431 06/29/18  2041   WBC 10*3/mm3 5.16 5.72 5.32   HEMOGLOBIN g/dL 7.2* 7.8* 7.7*   HEMATOCRIT % 23.7* 25.5* 25.8*   PLATELETS 10*3/mm3 134* 121* 103*       Results from last 7 days  Lab Units 07/01/18  0453   SODIUM mmol/L 140   POTASSIUM mmol/L 3.5   CHLORIDE mmol/L 107   CO2 mmol/L 24.0   BUN mg/dL 15   CREATININE mg/dL 1.36*   GLUCOSE mg/dL 97   CALCIUM mg/dL 8.1*       Results from last 7 days  Lab Units 06/27/18  1653   ALK PHOS U/L 81   BILIRUBIN mg/dL 1.2   ALT (SGPT) U/L 16   AST (SGOT) U/L 21       Results from last 7 days  Lab Units 06/27/18  1653   SED RATE mm/hr 34*       Results from last 7 days  Lab Units 06/27/18  1653   CRP mg/dL 2.40*       Estimated Creatinine Clearance: 33 mL/min (A) (by C-G formula based on SCr of 1.36 mg/dL  (H)).      Microbiology:      Radiology:  Imaging Results (last 72 hours)     Procedure Component Value Units Date/Time    XR Toe 2+ View Left [[id number]] Collected:  06/27/18 1803     Updated:  06/27/18 2121    Narrative:       EXAM:  XR Left Toe(s), 2 or More Views    EXAM DATE/TIME:  6/27/2018 6:03 PM    CLINICAL HISTORY:  83 years old, female; Signs and symptoms; Other: Left 1st   digit ulcer; Additional info: Left diabetic toe wound    TECHNIQUE:  Frontal, lateral and oblique views of toe(s) of the left foot.    COMPARISON:  No relevant prior studies available.    FINDINGS:    Bones/joints:  No acute osseous abnormality.  No evidence of bony destructive   process.  Bony demineralization and degenerative bony changes.  No dislocation.    Soft tissues:  Soft tissue ulcer along the medial distal left great toe.    Vasculature:  Small vessel arterial calcification.      Impression:       1.  Soft tissue ulcer along the medial distal left great toe.  2.  No radiographic evidence of osteomyelitis at this time.    THIS DOCUMENT HAS BEEN ELECTRONICALLY SIGNED BY RONI SANTIAGO JR. MD            Impression:    --acute neuro change as above 6/29-6/30, moved to ICU with further workup per neuro/critical care team with concern for Left MCA ischemia per radiology on CT perfusion study    --Acute left foot/hallux cellulitis.  In setting of chronic left hallux crust/callus and imaging from October 2017 suggesting left hallux osteomyelitis.  That imaging was at [facility].  She attempted antibiotics and a more conservative route but symptoms have recurred.  I'm not optimistic for chronic healing with antibiotics and further conservative measures.  Dr. Carrington has seen patient and help define option/timing/threshold for amputation.  Bone scan is abnormal at left hallux per my discussion with radiology, Dr Lunsford and he is issuing addendum to his initial report     --Acute right sacral shingles.  Valtrex initiated.  This appears  dermatomal.  Contact isolation initiated     --Acute dysuria and urinary incontinence with UTI and abnormal U/A.  Culture pending     --Chronic peripheral vascular disease.  Left lower extremity revascularization October 2017 with current extent to be clarified by Dr. Carrington.  I discussed with him.     --Sick sinus syndrome/chronic atrial fibrillation with past AV node ablation/permanent pacemaker.     --Chronic kidney disease described as stage III.  Monitor to help guide further adjustments in antimicrobials     --Diabetes type 2.  You need to tightly control blood sugar to give best chance for healing     --Thrombocytopenia.  Monitor, may be chronic     --Subacute fall onto right hip and around Father's Day.  Has bruise.  Further workup radiographically or surgically per internal medicine at their discretion        PLAN:     --IV Zyvox/Zosyn, oral Valtrex     --Check/review labs cultures and scans     --Contact precautions in place given dermatomal shingles     --Discussed with  Dr. Carrington and Dr lunsford     --Discussed with microbiology     --Partial history per nursing staff     --Discussed with wound care team    --Bone Scan noted and d/w Dr Lunsford/Zeb Painting MD  7/1/2018

## 2018-07-02 LAB
GLUCOSE BLDC GLUCOMTR-MCNC: 154 MG/DL (ref 70–130)
GLUCOSE BLDC GLUCOMTR-MCNC: 179 MG/DL (ref 70–130)
GLUCOSE BLDC GLUCOMTR-MCNC: 181 MG/DL (ref 70–130)
GLUCOSE BLDC GLUCOMTR-MCNC: 94 MG/DL (ref 70–130)
HCT VFR BLD AUTO: 24.3 % (ref 34.5–44)
HGB BLD-MCNC: 7.5 G/DL (ref 11.5–15.5)

## 2018-07-02 PROCEDURE — 99232 SBSQ HOSP IP/OBS MODERATE 35: CPT | Performed by: INTERNAL MEDICINE

## 2018-07-02 PROCEDURE — 85014 HEMATOCRIT: CPT | Performed by: INTERNAL MEDICINE

## 2018-07-02 PROCEDURE — 25010000002 ONDANSETRON PER 1 MG: Performed by: NURSE PRACTITIONER

## 2018-07-02 PROCEDURE — 63710000001 INSULIN DETEMIR PER 5 UNITS: Performed by: INTERNAL MEDICINE

## 2018-07-02 PROCEDURE — 92507 TX SP LANG VOICE COMM INDIV: CPT

## 2018-07-02 PROCEDURE — 99231 SBSQ HOSP IP/OBS SF/LOW 25: CPT | Performed by: THORACIC SURGERY (CARDIOTHORACIC VASCULAR SURGERY)

## 2018-07-02 PROCEDURE — 82962 GLUCOSE BLOOD TEST: CPT

## 2018-07-02 PROCEDURE — 85018 HEMOGLOBIN: CPT | Performed by: INTERNAL MEDICINE

## 2018-07-02 PROCEDURE — 25010000002 LINEZOLID 600 MG/300ML SOLUTION: Performed by: INTERNAL MEDICINE

## 2018-07-02 PROCEDURE — 97530 THERAPEUTIC ACTIVITIES: CPT

## 2018-07-02 RX ORDER — POTASSIUM CHLORIDE 750 MG/1
40 CAPSULE, EXTENDED RELEASE ORAL AS NEEDED
Status: DISCONTINUED | OUTPATIENT
Start: 2018-07-02 | End: 2018-07-13 | Stop reason: HOSPADM

## 2018-07-02 RX ORDER — POTASSIUM CHLORIDE 1.5 G/1.77G
40 POWDER, FOR SOLUTION ORAL AS NEEDED
Status: DISCONTINUED | OUTPATIENT
Start: 2018-07-02 | End: 2018-07-13 | Stop reason: HOSPADM

## 2018-07-02 RX ADMIN — Medication 325 MG: at 15:03

## 2018-07-02 RX ADMIN — INSULIN DETEMIR 5 UNITS: 100 INJECTION, SOLUTION SUBCUTANEOUS at 21:13

## 2018-07-02 RX ADMIN — INSULIN LISPRO 2 UNITS: 100 INJECTION, SOLUTION INTRAVENOUS; SUBCUTANEOUS at 20:22

## 2018-07-02 RX ADMIN — VALACYCLOVIR HYDROCHLORIDE 1000 MG: 500 TABLET, FILM COATED ORAL at 15:01

## 2018-07-02 RX ADMIN — LATANOPROST 1 DROP: 50 SOLUTION OPHTHALMIC at 21:12

## 2018-07-02 RX ADMIN — NYSTATIN: 100000 POWDER TOPICAL at 09:51

## 2018-07-02 RX ADMIN — INSULIN LISPRO 2 UNITS: 100 INJECTION, SOLUTION INTRAVENOUS; SUBCUTANEOUS at 21:11

## 2018-07-02 RX ADMIN — METRONIDAZOLE 500 MG: 500 INJECTION, SOLUTION INTRAVENOUS at 00:21

## 2018-07-02 RX ADMIN — ATORVASTATIN CALCIUM 80 MG: 40 TABLET, FILM COATED ORAL at 21:11

## 2018-07-02 RX ADMIN — ONDANSETRON 4 MG: 2 INJECTION INTRAMUSCULAR; INTRAVENOUS at 16:33

## 2018-07-02 RX ADMIN — Medication 1 CAPSULE: at 15:02

## 2018-07-02 RX ADMIN — LINEZOLID 600 MG: 600 INJECTION, SOLUTION INTRAVENOUS at 20:27

## 2018-07-02 RX ADMIN — INSULIN LISPRO 2 UNITS: 100 INJECTION, SOLUTION INTRAVENOUS; SUBCUTANEOUS at 12:17

## 2018-07-02 RX ADMIN — INSULIN LISPRO 2 UNITS: 100 INJECTION, SOLUTION INTRAVENOUS; SUBCUTANEOUS at 09:45

## 2018-07-02 RX ADMIN — METOPROLOL TARTRATE 100 MG: 100 TABLET ORAL at 21:10

## 2018-07-02 RX ADMIN — INSULIN LISPRO 2 UNITS: 100 INJECTION, SOLUTION INTRAVENOUS; SUBCUTANEOUS at 12:15

## 2018-07-02 RX ADMIN — POTASSIUM CHLORIDE 40 MEQ: 750 CAPSULE, EXTENDED RELEASE ORAL at 17:21

## 2018-07-02 RX ADMIN — VALACYCLOVIR HYDROCHLORIDE 1000 MG: 500 TABLET, FILM COATED ORAL at 21:11

## 2018-07-02 RX ADMIN — METRONIDAZOLE 500 MG: 500 INJECTION, SOLUTION INTRAVENOUS at 09:44

## 2018-07-02 RX ADMIN — LINEZOLID 600 MG: 600 INJECTION, SOLUTION INTRAVENOUS at 05:48

## 2018-07-02 RX ADMIN — INSULIN LISPRO 2 UNITS: 100 INJECTION, SOLUTION INTRAVENOUS; SUBCUTANEOUS at 09:46

## 2018-07-02 RX ADMIN — NYSTATIN: 100000 POWDER TOPICAL at 21:11

## 2018-07-02 RX ADMIN — FUROSEMIDE 20 MG: 20 TABLET ORAL at 15:04

## 2018-07-02 RX ADMIN — METRONIDAZOLE 500 MG: 500 INJECTION, SOLUTION INTRAVENOUS at 16:40

## 2018-07-02 RX ADMIN — CEFEPIME HYDROCHLORIDE 1 G: 1 INJECTION, POWDER, FOR SOLUTION INTRAMUSCULAR; INTRAVENOUS at 09:49

## 2018-07-02 RX ADMIN — CEFEPIME HYDROCHLORIDE 1 G: 1 INJECTION, POWDER, FOR SOLUTION INTRAMUSCULAR; INTRAVENOUS at 21:12

## 2018-07-02 RX ADMIN — METOPROLOL TARTRATE 100 MG: 100 TABLET ORAL at 15:03

## 2018-07-02 NOTE — PLAN OF CARE
Problem: Patient Care Overview  Goal: Plan of Care Review  Outcome: Ongoing (interventions implemented as appropriate)   07/02/18 0802   Coping/Psychosocial   Plan of Care Reviewed With patient   Plan of Care Review   Progress no change   OTHER   Outcome Summary Pt ambulated 25 ft with straight cane CGA; distance limited primarily by motivation and sadness this session due to pending surgery. Educated pt on importance of maintaining mobility to return to PLOF. Pt with decreased balance this AM, req increase UE support during ambulation for stability. Will continue to progress per PT POC as pt is appropriate.

## 2018-07-02 NOTE — PLAN OF CARE
Problem: Fall Risk (Adult)  Goal: Absence of Fall  Outcome: Ongoing (interventions implemented as appropriate)      Problem: Skin Injury Risk (Adult)  Goal: Skin Health and Integrity  Outcome: Ongoing (interventions implemented as appropriate)      Problem: Patient Care Overview  Goal: Plan of Care Review  Outcome: Ongoing (interventions implemented as appropriate)      Problem: Infection, Risk/Actual (Adult)  Goal: Identify Related Risk Factors and Signs and Symptoms  Outcome: Ongoing (interventions implemented as appropriate)    Goal: Infection Prevention/Resolution  Outcome: Ongoing (interventions implemented as appropriate)

## 2018-07-02 NOTE — PROGRESS NOTES
INTENSIVIST   PROGRESS NOTE     Hospital:  LOS: 5 days     Ms. Lynne Sanchez, 83 y.o. female is followed for a Chief Complaint of: Left Toe osteomyelitis, CVA s/p TPA      Subjective   S   Ms. Sanchez is an 84yo F who was admitted to the [facility] on 6/27/18 due to concerns of a worsening wound on the left great toe. Cultures from a previous revasculariztion procedure grew Enterobacter/Pseudomonas. Debridement with possible amputation was recommended at that time, but the patient was not interested and was transitioned to Fortaz for 6 weeks.     She was seen by ID and was started on Zyvox, Zosyn, and oral Valtrex for acute right sacral shingles. CT surgery has been following for potential amputation of the left great toe. Her Plavix has been on hold.     On the evening of 6/29, she had a sudden onset of slurred speech, aphasia and facial droop. A code stroke was called and her NIHSS was 3 initially and worsened to 6 prior to TPA. CT head was negative. CT perfusion was performed and Dr. Baxter reviewed the scan and determined that no intervention was needed. Neurology was consulted and recommended TPA. She has continued to be monitored in the ICU.     Interval History:  No events overnight. She continues to have intermittent nausea without vomiting.        The patient's relevant past medical, surgical and social history were reviewed and updated in Epic as appropriate.      ROS:   Constitutional: Negative for fever.   Respiratory: Negative for dyspnea.   Cardiovascular: Negative for chest pain.   Gastrointestinal: Positive for nausea. Negative for vomiting and diarrhea.        Objective   O     Vitals:  Temp  Min: 98 °F (36.7 °C)  Max: 98.3 °F (36.8 °C)  BP  Min: 110/56  Max: 157/80  Pulse  Min: 69  Max: 75  Resp  Min: 12  Max: 20  SpO2  Min: 93 %  Max: 99 % No Data Recorded    Intake/Ouptut 24 hrs (7:00AM - 6:59 AM)  Intake & Output (last 3 days)       06/29 0701 - 06/30 0700 06/30 0701 - 07/01 0700  07/01 0701 - 07/02 0700 07/02 0701 - 07/03 0700    P.O. 550  480     I.V. (mL/kg) 135 (2) 962 (14.4) 653.6 (9.8)     IV Piggyback      Total Intake(mL/kg) 1085 (16.3) 1362 (20.4) 2133.6 (32)     Urine (mL/kg/hr) 450 (0.3)  500 (0.3)     Total Output 450   500      Net +635 +1362 +1633.6              Unmeasured Urine Occurrence  1 x 1 x     Unmeasured Stool Occurrence  1 x              Physical Examination  Telemetry:  Normal sinus rhythm.    Constitutional:  No acute distress.   Cardiovascular: Normal rate, regular and rhythm. Normal heart sounds.  No murmurs, gallop or rub.   Respiratory: No respiratory distress. Normal respiratory effort.  Normal breath sounds  Clear to ascultation and percussion.    Abdominal:  Soft. No masses. Non-tender. No distension. No HSM.   Extremities: No digital cyanosis. No clubbing.  No peripheral edema.   Neurological:   Alert and Oriented to person, place, and time.                Results from last 7 days  Lab Units 07/02/18  0352 07/01/18  1255 07/01/18  0453 06/30/18  0431 06/29/18  2041   WBC 10*3/mm3  --   --  5.16 5.72 5.32   HEMOGLOBIN g/dL 7.5* 7.4* 7.2* 7.8* 7.7*   MCV fL  --   --  84.9 84.4 88.1   PLATELETS 10*3/mm3  --   --  134* 121* 103*       Results from last 7 days  Lab Units 07/01/18  0453 06/30/18  0431 06/29/18  2041 06/27/18  1653   SODIUM mmol/L 140 139 137  < > 139   POTASSIUM mmol/L 3.5 3.8 3.7  < > 4.1   CO2 mmol/L 24.0 26.0 23.0  < > 27.0   CREATININE mg/dL 1.36* 1.51* 1.67*  < > 1.38*   GLUCOSE mg/dL 97 167* 244*  < > 203*   MAGNESIUM mg/dL  --  2.1  --   --  1.9   PHOSPHORUS mg/dL  --  3.4  --   --  3.1   < > = values in this interval not displayed.  Estimated Creatinine Clearance: 33 mL/min (A) (by C-G formula based on SCr of 1.36 mg/dL (H)).    Results from last 7 days  Lab Units 06/27/18  1653   ALK PHOS U/L 81   BILIRUBIN mg/dL 1.2   ALT (SGPT) U/L 16   AST (SGOT) U/L 21             Images:  Imaging Results (last 24 hours)     ** No results  found for the last 24 hours. **            Results: Reviewed.  I reviewed the patient's new laboratory and imaging results.  I independently reviewed the patient's new images.    Medications: Reviewed.    Assessment/Plan   A / P     Ms. Sanchez is an 84yo F who was admitted for left great toe osteomyelitis and then had a CVA requiring TPA on 6/29. She has been monitored in the ICU.     Nutrition:   NPO Diet  Advance Directives:   Code Status and Medical Interventions:   Ordered at: 06/28/18 1008     Level Of Support Discussed With:    Patient     Code Status:    CPR     Medical Interventions (Level of Support Prior to Arrest):    Full       Hospital Problem List     * (Principal)Osteomyelitis of left foot    Chronic atrial fibrillation    Overview Signed 12/22/2016  1:51 PM by Pan Saunders     1. Chronic atrial fibrillation, status post [employer] pacemaker implantation and AV node ablation:  a. Diagnosed June 2005.  b. Status post ECV restoring normal sinus rhythm, June 2005.  c. Rythmol initiated 05/01/2006.  d. Previously digoxin toxicity.  e. GXT Cardiolite, 09/08/2005:  No reversible ischemia.  LV function not performed secondary to atrial fibrillation.   f. Status post successful external cardioversion on 10/01/2008, Tessa Downey MD.  g. EKG on 10/21/2008:  Recurrence of atrial fibrillation with rate of 109.  h. Recurrent atrial fibrillation by EKG, 11/03/2008, asymptomatic.  i. Status post [employer] pacemaker implantation and AV node ablation.   j. Echocardiogram 02/15/2010:  Moderate MR, moderate TR.  RVSP 50.  EF greater than 55%, left atrial enlargement at 4.3 cm.           Type 2 diabetes mellitus treated without insulin (Chronic)    Normocytic anemia    Peripheral arterial disease    Foot infection    Fever    Pyogenic inflammation of bone    Overview Signed 6/29/2018  2:35 PM by ANN Claudio     Added automatically from request for surgery [id number]         Cerebrovascular accident (CVA) due  to thrombosis of left anterior cerebral artery          Assessment / Plan:    1. Continue antibiotics per ID.   2. Continue to monitor in the ICU secondary to impending surgery.   3. BP well controlled.   4. Neurology following for recent stroke.   5. AM labs    Plan of care and goals reviewed during interdisciplinary rounds.  I discussed the patient's findings and my recommendations with patient and nursing staff    Time: was greater than 25 minutes.    Angelica Case, DO    Intensive Care Medicine and Pulmonary Medicine

## 2018-07-02 NOTE — THERAPY TREATMENT NOTE
Acute Care - Physical Therapy Treatment Note  [facility]     Patient Name: Lynne Sanchez  : 1934  MRN: [id number]  Today's Date: 2018  Onset of Illness/Injury or Date of Surgery: 18  Date of Referral to PT: 18  Referring Physician: MD Dia    Admit Date: 2018    Visit Dx:    ICD-10-CM ICD-9-CM   1. Other chronic osteomyelitis of left foot M86.672 730.17   2. Cerebrovascular accident (CVA), unspecified mechanism I63.9 434.91   3. Impaired mobility and ADLs Z74.09 799.89   4. Impaired functional mobility, balance, gait, and endurance Z74.09 V49.89   5. 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   • Melena   • Chronic gastritis   • Peripheral arterial disease   • Foot infection   • Fever   • Pyogenic inflammation of bone   • Cerebrovascular accident (CVA) due to thrombosis of left anterior cerebral artery       Therapy Treatment          Rehabilitation Treatment Summary     Row Name 18 0859             Treatment Time/Intention    Discipline (P)  physical therapist  -JESSICA      Document Type (P)  therapy note (daily note)  -JESSICA      Subjective Information (P)  complains of;weakness;fatigue  -JESSICA      Mode of Treatment (P)  physical therapy  -JESSICA      Patient Effort (P)  good  -JESSICA      Existing Precautions/Restrictions (P)  fall  -JESSICA      Recorded by [JESSICA] Dillon Lawrence, PT Student 18 0951      Row Name 18 0859             Vital Signs    Pre Systolic BP Rehab (P)  151  -JESSICA      Pre Treatment Diastolic BP (P)  84  -JESSICA      Pretreatment Heart Rate (beats/min) (P)  70  -JESSICA      Posttreatment Heart Rate (beats/min) (P)  72  -JESSICA      Pre SpO2 (%) (P)  96  -JESSICA      O2 Delivery Pre Treatment (P)   room air  -JESSICA      Post SpO2 (%) (P)  96  -JESSICA      O2 Delivery Post Treatment (P)  room air  -JESSICA      Pre Patient Position (P)  Supine  -JESSICA      Intra Patient Position (P)  Standing  -JESSICA      Post Patient Position (P)  Sitting  -JESSICA      Recorded by [JESSICA] Dillon Lawrence, PT Student 07/02/18 0951      Row Name 07/02/18 0859             Cognitive Assessment/Intervention    Additional Documentation (P)  Cognitive Assessment/Intervention (Group)  -JESSICA      Recorded by [JESSICA] Dillon Lawrence, PT Student 07/02/18 0951      Row Name 07/02/18 0859             Cognitive Assessment/Intervention- PT/OT    Affect/Mental Status (Cognitive) (P)  sad/depressed affect  -JESSICA      Orientation Status (Cognition) (P)  oriented x 4  -JESSICA      Follows Commands (Cognition) (P)  follows one step commands;over 90% accuracy;verbal cues/prompting required  -JESSICA      Cognitive Function (Cognitive) (P)  safety deficit  -JESSICA      Safety Deficit (Cognitive) (P)  mild deficit;awareness of need for assistance;insight into deficits/self awareness;safety precautions awareness  -JESSICA      Personal Safety Interventions (P)  fall prevention program maintained;gait belt;nonskid shoes/slippers when out of bed  -JESSICA      Recorded by [JESSICA] Dillon Lawrence, PT Student 07/02/18 0951      Row Name 07/02/18 0859             Safety Issues, Functional Mobility    Safety Issues Affecting Function (Mobility) (P)  ability to follow commands;awareness of need for assistance;insight into deficits/self awareness;positioning of assistive device;safety precaution awareness;sequencing abilities  -JESSICA      Impairments Affecting Function (Mobility) (P)  balance;coordination;endurance/activity tolerance;strength  -JB2      Recorded by [JESSICA] Dillon Lawrence, PT Student 07/02/18 0951  [JB2] Dillon Lawrence, PT Student 07/02/18 1000      Row Name 07/02/18 0859             Bed Mobility Assessment/Treatment    Bed Mobility Assessment/Treatment (P)  supine-sit  -JESSICA      Supine-Sit [place] (Bed  Mobility) (P)  contact guard;verbal cues  -JESSICA      Assistive Device (Bed Mobility) (P)  bed rails;head of bed elevated  -JESSICA      Comment (Bed Mobility) (P)  VC's for sequencing and hand placement.   -JESSICA      Recorded by [JESSICA] Dillon Lawrence, PT Student 07/02/18 1000      Row Name 07/02/18 0859             Transfer Assessment/Treatment    Comment (Transfers) (P)  Req VC's to prevent pt from performing STS prior to lines being in safe position. VC's for sequencing with AD and for upright posture.  -JESSICA      Sit-Stand [place] (Transfers) (P)  minimum assist (75% patient effort);verbal cues  -JESSICA      Stand-Sit [place] (Transfers) (P)  minimum assist (75% patient effort);verbal cues  -JESSICA      [place] Level (Toilet Transfer) (P)  minimum assist (75% patient effort);verbal cues  -JESSICA      Assistive Device (Toilet Transfer) (P)  cane, straight  -JESSICA      Recorded by [JESSICA] Dillon Lawrence, PT Student 07/02/18 1000      Row Name 07/02/18 0859             Sit-Stand Transfer    Assistive Device (Sit-Stand Transfers) (P)  cane, straight  -JESSICA      Recorded by [JESSICA] Dillon Lawrence, PT Student 07/02/18 1000      Row Name 07/02/18 0859             Stand-Sit Transfer    Assistive Device (Stand-Sit Transfers) (P)  cane, straight  -JESSICA      Recorded by [JESSICA] Dillon Lawrence, PT Student 07/02/18 1000      Row Name 07/02/18 0859             Toilet Transfer    Type (Toilet Transfer) (P)  stand pivot/stand step  -JESSICA      Recorded by [JESSICA] Dillon Lawrence, PT Student 07/02/18 1000      Row Name 07/02/18 0859             Gait/Stairs Assessment/Training    Gait/Stairs Assessment/Training (P)  gait/ambulation assistive device  -JESSICA      [place] Level (Gait) (P)  minimum assist (75% patient effort);verbal cues  -JESSICA      Assistive Device (Gait) (P)  cane, straight  -JESSICA      Distance in Feet (Gait) (P)  25  -JESSICA      Deviations/Abnormal Patterns (Gait) (P)  gait speed decreased;other (see comments)   step length decreased  -JESSICA      Bilateral  Gait Deviations (P)  forward flexed posture  -JESSICA      Comment (Gait/Stairs) (P)  VC's for upright posture and hand placement with cane. Pt with decreased stability this session; relying heavily on cane and UE support during ambulation. Offered pt RWx for increased stability, pt preferred using cane. Pt ambulated to bathroom and back to chair; distance limited by pt motivation.  -JESSICA      Recorded by [JESSICA] Dillon Lawrence, PT Student 07/02/18 1000      Row Name 07/02/18 0859             General ROM    GENERAL ROM COMMENTS (P)  BLE WFL  -JESSICA      Recorded by [JESSICA] Dillon Lawrence, PT Student 07/02/18 1000      Row Name 07/02/18 0859             Motor Skills Assessment/Interventions    Additional Documentation (P)  Therapeutic Exercise (Group)  -JESSICA      Recorded by [JESSICA] Dillon Lawrence, PT Student 07/02/18 1006      Row Name 07/02/18 0859             Therapeutic Exercise    96835 - PT Therapeutic Activity Minutes (P)  28  -JESSICA      Recorded by [JESSICA] Dillon Lawrence, PT Student 07/02/18 1007      Row Name 07/02/18 0859             Balance    Balance (P)  dynamic standing balance  -JESSICA      Recorded by [JESSICA] Dillon Lawrence, PT Student 07/02/18 1000      Row Name 07/02/18 0859             Static Sitting Balance    Level of [place] (Unsupported Sitting, Static Balance) (P)  supervision  -JESSICA      Sitting Position (Unsupported Sitting, Static Balance) (P)  sitting on edge of bed  -JESSICA      Recorded by [JESSICA] Dillon Lawrence, PT Student 07/02/18 1000      Row Name 07/02/18 0859             Static Standing Balance    Level of [place] (Supported Standing, Static Balance) (P)  contact guard assist  -JESSICA      Assistive Device Utilized (Supported Standing, Static Balance) (P)  straight cane  -JESSICA      Recorded by [JESSICA] Dillon Lawrence, PT Student 07/02/18 1000      Row Name 07/02/18 0859             Dynamic Standing Balance    Level of [place], Reaches Outside Midline (Standing, Dynamic Balance) (P)  minimal assist, 75% patient effort  -JESSICA       Time Able to Maintain Position, Reaches Outside Midline (Standing, Dynamic Balance) (P)  2 to 3 minutes  -JESSICA      Comment, Reaches Outside Midline (Standing, Dynamic Balance) (P)  During toileting  -JB2      Recorded by [JESSICA] Dillon Lawrence, PT Student 07/02/18 1000  [JB2] Dillon Lawrence, PT Student 07/02/18 1001      Row Name 07/02/18 0859             Positioning and Restraints    Pre-Treatment Position (P)  in bed  -JESSICA      Post Treatment Position (P)  chair  -JESSICA      In Chair (P)  reclined;sitting;call light within reach;encouraged to call for assist;exit alarm on;waffle cushion;on mechanical lift sling;legs elevated;heels elevated  -JESSICA      Recorded by [JESSICA] Dillon Lawrence, PT Student 07/02/18 1001      Row Name 07/02/18 0859             Pain Scale: Numbers Pre/Post-Treatment    Pain Scale: Numbers, Pretreatment (P)  0/10 - no pain  -JESSICA      Pain Scale: Numbers, Post-Treatment (P)  0/10 - no pain  -JESSICA      Recorded by [JESSICA] Dillon Lawrence, PT Student 07/02/18 1001      Row Name                Wound 06/27/18 1659 Left toe pressure injury;diabetic/neuropathic ulceration    Wound - Properties Group Date first assessed: 06/27/18 [KA] Time first assessed: 1659 [KA] Present On Admission : yes [KA] Side: Left [KA] Location: toe [KA] Type: pressure injury;diabetic/neuropathic ulceration [KA] Additional Comments: possible arterial ulcer; pt pt skin tear from tape removal [CP] Recorded by:  [CP] BILL Magallanes 06/28/18 1335 [KA] Anel Woodruff RN 06/27/18 1700    Row Name                Wound 06/28/18 1030 Right distal toe other (see comments)    Wound - Properties Group Date first assessed: 06/28/18 [CP] Time first assessed: 1030 [CP] Present On Admission : yes;picture taken [CP] Side: Right [CP] Orientation: distal [CP] Location: toe [CP] Type: other (see comments) [CP] Additional Comments: unknown origin; possible tape inury; right great toe [CP] Recorded by:  [CP] BILL Magallanes 06/28/18 1340    Row  Name                Wound 06/28/18 1030 Right elbow abrasion    Wound - Properties Group Date first assessed: 06/28/18 [CP] Time first assessed: 1030 [CP] Present On Admission : yes;picture taken [CP] Side: Right [CP] Location: elbow [CP] Type: abrasion [CP] Recorded by:  [CP] Lorraine JUAN Ilia, APRN 06/28/18 1344    Row Name                Wound 06/28/18 1030 Left lower arm skin tear    Wound - Properties Group Date first assessed: 06/28/18 [CP] Time first assessed: 1030 [CP] Present On Admission : yes [CP] Side: Left [CP] Orientation: lower [CP] Location: arm [CP] Type: skin tear [CP] Additional Comments: forearm and elbow [CP] Recorded by:  [CP] Lorraine Morillo, APRN 06/28/18 1346    Row Name 07/02/18 0859             Coping    Observed Emotional State (P)  grieving;frustrated  -JESSICA      Verbalized Emotional State (P)  sadness  -JESSICA      Recorded by [JESSICA] Dillon Lawrence, PT Student 07/02/18 1001      Row Name 07/02/18 0859             Outcome Summary/Treatment Plan (PT)    Daily Summary of Progress (PT) (P)  progress towards functional goals is fair  -JESSICA      Recorded by [JESSICA] Dillon Lawrence, PT Student 07/02/18 1001        User Key  (r) = Recorded By, (t) = Taken By, (c) = Cosigned By    Initials Name Effective Dates Discipline    CP Lorraine MARTINEZ Ilia, APRN 06/08/18 -  Nurse    SYLVIA Woodruff, RN 06/23/17 -  Nurse    JESSICA Lawrence, PT Student 05/15/18 -  PT          Rash 06/28/18 1030 Right posterior leg vesicle (Active)   Distribution regional 7/2/2018  4:00 AM   Configuration/Shape asymmetric 7/2/2018  4:00 AM   Borders indistinct 7/2/2018  4:00 AM   Characteristics dry 7/2/2018  4:00 AM   Color purple;red 7/2/2018  4:00 AM       Wound 06/27/18 1659 Left toe pressure injury;diabetic/neuropathic ulceration (Active)   Dressing Appearance open to air 7/2/2018  4:00 AM   Periwound intact;dry;pink;blanchable 7/2/2018  4:00 AM   Edges irregular 7/2/2018  4:00 AM   Drainage Amount none 7/2/2018  4:00 AM        Wound 06/28/18 1030 Right distal toe other (see comments) (Active)   Dressing Appearance open to air 7/2/2018  4:00 AM   Periwound intact;dry;pink;blanchable 7/2/2018  4:00 AM   Edges irregular 7/2/2018  4:00 AM   Drainage Amount none 7/2/2018  4:00 AM       Wound 06/28/18 1030 Right elbow abrasion (Active)   Dressing Appearance dry;intact 7/2/2018  4:00 AM   Drainage Amount none 7/2/2018  4:00 AM       Wound 06/28/18 1030 Left lower arm skin tear (Active)   Dressing Appearance dry;intact 7/2/2018  4:00 AM   Drainage Amount none 7/2/2018  4:00 AM             Physical Therapy Education     Title: PT OT SLP Therapies (Active)     Topic: Physical Therapy (Active)     Point: Mobility training (Active)    Learning Progress Summary     Learner Status Readiness Method Response Comment Documented by    Patient Active Acceptance E NR Educated pt on importance of mobility to return home and to PLOF. Informed pt of the benefits of maintaining mobility prior to possibility of surgery to ease recovery. JESSICA 07/02/18 0859     Active Acceptance E,D NR  LS 06/30/18 1210    Significant Other Active Acceptance E,D NR  LS 06/30/18 1210          Point: Home exercise program (Active)    Learning Progress Summary     Learner Status Readiness Method Response Comment Documented by    Patient Active Acceptance E NR Educated pt on importance of mobility to return home and to PLOF. Informed pt of the benefits of maintaining mobility prior to possibility of surgery to ease recovery. JESSICA 07/02/18 0859          Point: Body mechanics (Active)    Learning Progress Summary     Learner Status Readiness Method Response Comment Documented by    Patient Active Acceptance E NR Educated pt on importance of mobility to return home and to PLOF. Informed pt of the benefits of maintaining mobility prior to possibility of surgery to ease recovery. JESSICA 07/02/18 0859     Active Acceptance E,D NR  LS 06/30/18 1210    Significant Other Active Acceptance E,D NR  LS  06/30/18 1210          Point: Precautions (Active)    Learning Progress Summary     Learner Status Readiness Method Response Comment Documented by    Patient Active Acceptance E NR Educated pt on importance of mobility to return home and to PLOF. Informed pt of the benefits of maintaining mobility prior to possibility of surgery to ease recovery. JESSICA 07/02/18 0859     Active Acceptance E,D NR   06/30/18 1210    Significant Other Active Acceptance E,D NR   06/30/18 1210                      User Key     Initials Effective Dates Name Provider Type Discipline     06/19/15 -  Loly Telles, PT Physical Therapist PT     05/15/18 -  Dillon Lawrence, PT Student PT Student PT                    PT Recommendation and Plan     Outcome Summary/Treatment Plan (PT)  Daily Summary of Progress (PT): (P) progress towards functional goals is fair  Plan of Care Reviewed With: (P) patient  Progress: (P) no change  Outcome Summary: (P) Pt ambulated 25 ft with straight cane CGA; distance limited primarily by motivation and sadness this session due to pending surgery. Educated pt on importance of maintaining mobility to return to PLOF. Pt with decreased balance this AM, req increase UE support during ambulation for stability. Will continue to progress per PT POC as pt is appropriate.          Outcome Measures     Row Name 07/02/18 0859 06/30/18 1046 06/30/18 0813       How much help from another person do you currently need...    Turning from your back to your side while in flat bed without using bedrails? (P)  3  -JESSICA 3  -LS  --    Moving from lying on back to sitting on the side of a flat bed without bedrails? (P)  3  -JESSICA 3  -LS  --    Moving to and from a bed to a chair (including a wheelchair)? (P)  3  -JESSICA 3  -LS  --    Standing up from a chair using your arms (e.g., wheelchair, bedside chair)? (P)  3  -JESSICA 3  -LS  --    Climbing 3-5 steps with a railing? (P)  2  -JESSICA 2  -LS  --    To walk in hospital room? (P)  3  -JESSICA 3  -LS  --     AM-PAC 6 Clicks Score (P)  17  -JESSICA 17  -LS  --       How much help from another is currently needed...    Putting on and taking off regular lower body clothing?  --  -- 3  -JR    Bathing (including washing, rinsing, and drying)  --  -- 2  -JR    Toileting (which includes using toilet bed pan or urinal)  --  -- 3  -JR    Putting on and taking off regular upper body clothing  --  -- 2  -JR    Taking care of personal grooming (such as brushing teeth)  --  -- 3  -JR    Eating meals  --  -- 3  -JR    Score  --  -- 16  -JR       Modified Mattie Scale    Modified Mattie Scale  -- 3 - Moderate disability.  Requiring some help, but able to walk without assistance.  -LS 4 - Moderately severe disability.  Unable to walk without assistance, and unable to attend to own bodily needs without assistance.  -       Functional Assessment    Outcome Measure Options (P)  AM-PAC 6 Clicks Basic Mobility (PT)  -JESSICA AM-PAC 6 Clicks Basic Mobility (PT)  - AM-PAC 6 Clicks Daily Activity (OT);Modified [place]  -      User Key  (r) = Recorded By, (t) = Taken By, (c) = Cosigned By    Initials Name Provider Type    JR Rachel Ramirez, OT Occupational Therapist    PAN Telles, PT Physical Therapist    JESSICA Lawrence, PT Student PT Student           Time Calculation:         PT Charges     Row Name 07/02/18 0859             Time Calculation    Start Time (P)  0859  -      PT Received On (P)  07/02/18  -      PT Goal Re-Cert Due Date (P)  07/10/18  -         Time Calculation- PT    Total Timed Code Minutes- PT (P)  28 minute(s)  -JESSICA         Timed Charges    30682 - PT Therapeutic Activity Minutes (P)  28  -        User Key  (r) = Recorded By, (t) = Taken By, (c) = Cosigned By    Initials Name Provider Type    JESSICA Lawrence, PT Student PT Student        Therapy Suggested Charges     Code   Minutes Charges    61204 (CPT®)  Pt Neuromusc Re Education Ea 15 Min      58032 (CPT®) Hc Pt Ther Proc Ea 15 Min      78798 (CPT®)   Gait Training Ea 15 Min      18738 (CPT®) Hc Pt Therapeutic Act Ea 15 Min 28 2    72301 (CPT®) Hc Pt Manual Therapy Ea 15 Min      63299 (CPT®) Hc Pt Iontophoresis Ea 15 Min      86283 (CPT®) Hc Pt Elec Stim Ea-Per 15 Min      00188 (CPT®) Hc Pt Ultrasound Ea 15 Min      70494 (CPT®) Hc Pt Self Care/Mgmt/Train Ea 15 Min      Total  28 2        Therapy Charges for Today     Code Description Service Date Service Provider Modifiers Qty    27074524254 HC PT THERAPEUTIC ACT EA 15 MIN 7/2/2018 Dillon Lawrence, PT Student GP 2    [id number]  PT THER SUPP EA 15 MIN 7/2/2018 Dillon Lawrence, PT Student GP 2          PT G-Codes  Outcome Measure Options: (P) AM-PAC 6 Clicks Basic Mobility (PT)    Dillon Lawrence, PT Student  7/2/2018

## 2018-07-02 NOTE — CONSULTS
Adult Nutrition  Assessment/PES    Patient Name:  Lynne Sanchez  YOB: 1934  MRN: [id number]  Admit Date:  6/27/2018    Assessment Date:  7/2/2018    Comments:  Pt interview/assessment completed. She does report decreased appetite, intake and wt loss but does not meet criteria malnutrition.  RD will follow closely and provide supplementation when appropriate.          Reason for Assessment     Row Name 07/02/18 1302          Reason for Assessment    Reason For Assessment identified at risk by screening criteria;per organizational policy   MDR; nutr screen assessment; 30 mins     Diagnosis neurologic conditions;infection/sepsis;cardiac disease;diabetes diagnosis/complications   pt adm w/ osteomyelitis of L toe w/ pending amputation; s/p CVA 6/29. Hx: VHD, a. fib, DM-T2, anemia     Identified At Risk by Screening Criteria reduced oral intake over the last month             Nutrition/Diet History     Row Name 07/02/18 1305          Nutrition/Diet History    Factors Affecting Nutritional Intake nausea   RN/ MD report surgeon awaiting husbands arrival to discuss probable surgery. Pt reports decreased appetite and some wt loss , amt unknown, she r/t  a long period of nausea. She is willing to drink supplements if needed.             Anthropometrics     Row Name 07/02/18 1316          Usual Body Weight (UBW)    Usual Body Weight 69.9 kg (154 lb)   EHR doc. 4/25/2018     % of Usual Body Weight Assessment not applicable     Weight Loss unintentional     Weight Loss Time Frame 2 months - 5% from UBW             Labs/Tests/Procedures/Meds     Row Name 07/02/18 1317          Labs/Procedures/Meds    Lab Results Reviewed reviewed, pertinent        Diagnostic Tests/Procedures    Diagnostic Test/Procedure Reviewed reviewed, pertinent        Medications    Pertinent Medications Reviewed reviewed, pertinent             Physical Findings     Row Name 07/02/18 1318          Physical Findings    Overall Physical  Appearance loss of muscle mass               Nutrition Prescription Ordered     Row Name 07/02/18 1319          Nutrition Prescription PO    Current PO Diet NPO               Problem/Interventions:        Problem 1     Row Name 07/02/18 1322          Nutrition Diagnoses Problem 1    Problem 1 Unintended Weight Loss     Etiology (related to) Factors Affecting Nutrition     Appetite Poor     Reported GI Symptoms Other   chronic nausea over past few months     Signs/Symptoms (evidenced by) Unintended Weight Change     Unintended Weight Change Loss     Number of Pounds Lost 7 lbs reflects a 5 % loss of Usual body wt     Weight loss time period 2                      Intervention Goal     Row Name 07/02/18 1325          Intervention Goal    General Nutrition support treatment;Meet nutritional needs for age/condition     PO Initiate feeding;Increase intake   once surgery status decided             Nutrition Intervention     Row Name 07/02/18 1325          Nutrition Intervention    RD/Tech Action Follow Tx progress;Care plan reviewd;Other (comment)   Pt interview completed; pt willing to accept Boost HP strawberry supplement when diet is allowed               Education/Evaluation     Row Name 07/02/18 1326          Monitor/Evaluation    Monitor Per protocol;Skin status;Symptoms;PO intake;Supplement intake;Pertinent labs         Electronically signed by:  Sarah Beth Becerril MS,RD,LD  07/02/18 1:28 PM

## 2018-07-02 NOTE — PLAN OF CARE
Problem: Fall Risk (Adult)  Goal: Identify Related Risk Factors and Signs and Symptoms  Outcome: Outcome(s) achieved Date Met: 07/02/18    Goal: Absence of Fall  Outcome: Ongoing (interventions implemented as appropriate)      Problem: Skin Injury Risk (Adult)  Goal: Identify Related Risk Factors and Signs and Symptoms  Outcome: Outcome(s) achieved Date Met: 07/02/18    Goal: Skin Health and Integrity  Outcome: Ongoing (interventions implemented as appropriate)      Problem: Patient Care Overview  Goal: Plan of Care Review  Outcome: Ongoing (interventions implemented as appropriate)

## 2018-07-02 NOTE — PLAN OF CARE
Problem: Patient Care Overview  Goal: Plan of Care Review  Outcome: Ongoing (interventions implemented as appropriate)   07/02/18 1048   Coping/Psychosocial   Plan of Care Reviewed With patient   SLP treatment completed. Pt feeling too nauseated for full tx session. Reports she is at/near cog-comm baseline function. SLP will re-assess cog-comm skills at next session. Pt agreeable. Please see note for further details and recommendations.

## 2018-07-02 NOTE — PROGRESS NOTES
[facility] Progress Note        Antibiotics:  Anti-Infectives     Ordered     Dose/Rate Route Frequency Start Stop    07/01/18 1152  cefepime (MAXIPIME) 1 g/100 mL 0.9% NS IVPB (mbp)     Ordering Provider:  Pan Painting MD    1 g  over 4 Hours Intravenous Every 12 Hours 07/01/18 2100 07/08/18 2059    07/01/18 1152  metroNIDAZOLE (FLAGYL) IVPB 500 mg     Ordering Provider:  Pan Painting MD    500 mg  over 60 Minutes Intravenous Every 8 Hours 07/01/18 1400 07/08/18 1559    07/01/18 1152  cefepime (MAXIPIME) 1 g/100 mL 0.9% NS IVPB (mbp)     Ordering Provider:  Pan Painting MD    1 g  200 mL/hr over 30 Minutes Intravenous Once 07/01/18 1300 07/01/18 1456    06/28/18 1046  Linezolid (ZYVOX) 600 mg 300 mL     Ordering Provider:  Pan Painting MD    600 mg  300 mL/hr over 60 Minutes Intravenous Every 12 Hours 06/28/18 1700 07/05/18 0559    06/28/18 1046  valACYclovir (VALTREX) tablet 1,000 mg     Ordering Provider:  Pan Painting MD    1,000 mg Oral Every 12 Hours Scheduled 06/28/18 1130 07/05/18 0859    06/27/18 1739  vancomycin 1250 mg/250 mL 0.9% NS IVPB ([facility])     Ordering Provider:  Jung Zeng MD    20 mg/kg × 66.7 kg  over 90 Minutes Intravenous Once 06/27/18 1845 06/27/18 2044    06/27/18 1736  piperacillin-tazobactam (ZOSYN) 3.375 g in iso-osmotic dextrose 50 ml (premix)     Ordering Provider:  Jung Zeng MD    3.375 g  over 30 Minutes Intravenous Once 06/27/18 1800 06/27/18 1844          CC: left foot red, uti, right leg shingles    HPI:  Patient is a 83 y.o.  Yr old female with history of peripheral vascular disease with intervention at [facility] by Dr. Pelaez October 2017 described as to the left anterior circulation.  In addition, in October 2017 she was diagnosed with left hallux osteomyelitis by CT scan/tagged white blood cell scan and cultures with Enterobacter/pseudomonas aeruginosa.  Discharge summary mentions that Dr. Wallace recommended debridement and possible  amputation but patient was not interested and was transitioned to [facility] for 6 weeks.  She is admitted to [facility] June 27, 2018 with acute left foot cellulitis, chronic left hallux callus/crusted with acute right sacral/posterior leg shingles and dysuria.  In addition she had fallen onto her right hip at Father's Day  with bruise.     She reported acute onset left foot/hallux redness evolving over 24 hours PTA and no other new trauma.  No open wound or active drainage.      6/30/18 moved to ICU overnight after code 19 and neuro workup with change in speech/left facial droop and left pronator drift; CT perfusion study with left MCA ischemia    7/2/18 nauseated  Per nursing, stable right post leg eruption to the right of midline in a sacral dermatomal distribution from the spine down the posterior right leg, more crusted with no new blisters.  No new skin lesions; otherwise sleepy at time of my visit and not cooperative with ROS      Per nursing, No headache photophobia or neck stiffness.  No shortness of breath cough or hemoptysis.  No nausea vomiting diarrhea or abdominal pain.    ROS:      7/2/18 per nursing, no f/c/s. No n/v/d. No new ADR to Abx.     Constitutional-- Appetite diminished with generalized malaise and fatigue   Heent-- No epistaxis or oral sores.   No flashers, floaters or eye pain. No odynophagia or dysphagia. No headache, photophobia or neck stiffness.  CV-- No chest pain, palpitation or syncope  Resp-- No SOB/cough/Hemoptysis  GI- No vomiting, or diarrhea.  No hematochezia, melena, or hematemesis. Denies jaundice or chronic liver disease.  -- Denies hesitancy or flank pain.  Lymph- no swollen lymph nodes in neck/axilla or groin.   Heme- No active bruising or bleeding; no Hx of DVT or PE.  MS-- no swelling or pain in the bones or joints of arms/legs.  No new back pain.  Neuro-- as above     Full 12 point review of systems reviewed and negative otherwise for acute complaints,  "except for above    PE:   BP (!) 133/101   Pulse 73   Temp 98.3 °F (36.8 °C) (Oral)   Resp 12   Ht 165.1 cm (65\")   Wt 66.7 kg (147 lb)   SpO2 95%   BMI 24.46 kg/m²     GENERAL: awake, in no acute distress.   HEENT: Normocephalic, atraumatic.  PERRL. EOMI. No conjunctival injection. No icterus. Oropharynx clear without evidence of thrush or exudate.  Poor dentition NECK: Supple without nuchal rigidity. No mass.  LYMPH: No cervical, axillary or inguinal lymphadenopathy.  HEART: RRR; No murmur, rubs, gallops.   LUNGS: Clear to auscultation bilaterally without wheezing, rales, rhonchi. Normal respiratory effort. Nonlabored. No dullness.  ABDOMEN: Soft, nontender, nondistended. Positive bowel sounds. No rebound or guarding. NO mass or HSM.  EXT:  No cyanosis, clubbing or edema. No cord.  : Genitalia generally unremarkable.  Without Main catheter.  MSK: FROM without joint effusions noted arms/legs.    SKIN: See below    NEURO: awake but  not cooperative with detailed neuro exam for me at time of my visit    Left foot/hallux with faint erythema.  Left hallux with callus/crust at the distal phalanx but no discrete mass bulge or fluctuance.  No crepitus or bulla.     Right posterior leg/low back to the right of midline in sacral distribution with dermatomal shingles, some blisters, some crust and no surrounding induration or warmth.  No mass bulge or fluctuance.  No crepitus.     Right hip with bruise after fall    Laboratory Data      Results from last 7 days  Lab Units 07/02/18  0352 07/01/18  1255 07/01/18  0453 06/30/18  0431 06/29/18  2041   WBC 10*3/mm3  --   --  5.16 5.72 5.32   HEMOGLOBIN g/dL 7.5* 7.4* 7.2* 7.8* 7.7*   HEMATOCRIT % 24.3* 24.8* 23.7* 25.5* 25.8*   PLATELETS 10*3/mm3  --   --  134* 121* 103*       Results from last 7 days  Lab Units 07/01/18  0453   SODIUM mmol/L 140   POTASSIUM mmol/L 3.5   CHLORIDE mmol/L 107   CO2 mmol/L 24.0   BUN mg/dL 15   CREATININE mg/dL 1.36*   GLUCOSE mg/dL 97 "   CALCIUM mg/dL 8.1*       Results from last 7 days  Lab Units 06/27/18  1653   ALK PHOS U/L 81   BILIRUBIN mg/dL 1.2   ALT (SGPT) U/L 16   AST (SGOT) U/L 21       Results from last 7 days  Lab Units 06/27/18  1653   SED RATE mm/hr 34*       Results from last 7 days  Lab Units 06/27/18  1653   CRP mg/dL 2.40*       Estimated Creatinine Clearance: 33 mL/min (A) (by C-G formula based on SCr of 1.36 mg/dL (H)).      Microbiology:      Radiology:  Imaging Results (last 72 hours)     Procedure Component Value Units Date/Time    XR Toe 2+ View Left [[id number]] Collected:  06/27/18 1803     Updated:  06/27/18 2121    Narrative:       EXAM:  XR Left Toe(s), 2 or More Views    EXAM DATE/TIME:  6/27/2018 6:03 PM    CLINICAL HISTORY:  83 years old, female; Signs and symptoms; Other: Left 1st   digit ulcer; Additional info: Left diabetic toe wound    TECHNIQUE:  Frontal, lateral and oblique views of toe(s) of the left foot.    COMPARISON:  No relevant prior studies available.    FINDINGS:    Bones/joints:  No acute osseous abnormality.  No evidence of bony destructive   process.  Bony demineralization and degenerative bony changes.  No dislocation.    Soft tissues:  Soft tissue ulcer along the medial distal left great toe.    Vasculature:  Small vessel arterial calcification.      Impression:       1.  Soft tissue ulcer along the medial distal left great toe.  2.  No radiographic evidence of osteomyelitis at this time.    THIS DOCUMENT HAS BEEN ELECTRONICALLY SIGNED BY RONI SANTIAGO JR. MD            Impression:    --acute neuro change as above 6/29-6/30, moved to ICU with further workup per neuro/critical care team with concern for Left MCA ischemia per radiology on CT perfusion study    --Acute left foot/hallux cellulitis.  In setting of chronic left hallux crust/callus and imaging from October 2017 suggesting left hallux osteomyelitis.  That imaging was at [facility].  She attempted antibiotics and a more conservative  route but symptoms have recurred.  I'm not optimistic for chronic healing with antibiotics and further conservative measures.  Dr. Carrington has seen patient and help define option/timing/threshold for amputation.  Bone scan is abnormal at left hallux per my discussion with radiology, Dr Lunsford and he is issuing addendum to his initial report     --Acute right sacral shingles.  Valtrex initiated.  This appears dermatomal.  Contact isolation initiated     --Acute dysuria and urinary incontinence with UTI and abnormal U/A.  kleb species     --Chronic peripheral vascular disease.  Left lower extremity revascularization October 2017 with current extent to be clarified by Dr. Carrington.  I discussed with him.     --Sick sinus syndrome/chronic atrial fibrillation with past AV node ablation/permanent pacemaker.     --Chronic kidney disease described as stage III.  Monitor to help guide further adjustments in antimicrobials     --Diabetes type 2.  You need to tightly control blood sugar to give best chance for healing     --Thrombocytopenia.  Monitor, may be chronic     --Subacute fall onto right hip and around Father's Day.  Has bruise.  Further workup radiographically or surgically per internal medicine at their discretion        PLAN:     --IV Zyvox/cefepime/flagyl,  oral Valtrex     --Check/review labs cultures and scans     --Contact precautions in place given dermatomal shingles     --Discussed with  Dr. Carrington and pharmacy     --Discussed with microbiology     --Partial history per nursing staff     --Discussed with wound care team    --Bone Scan noted and d/w Dr Lunsford/Zeb Painting MD  7/2/2018

## 2018-07-02 NOTE — PROGRESS NOTES
Cardiothoracic Surgery Progress Note      POD #:     LOS: 5 days      Subjective: Patient's very alert today and we had a long discussion regarding the left great toe amputation that we have scheduled for today.  She seems agreeable to proceed ahead but once hemostasis to talk once again with her  who will be coming later today.  We are going to keep her on the operating schedule for today    Chief Complaint: Still feels somewhat weak regarding her recent CVA    Objective:  Vital Signs  Temp:  [98 °F (36.7 °C)-98.3 °F (36.8 °C)] 98.3 °F (36.8 °C)  Heart Rate:  [69-74] 73  Resp:  [12-20] 12  BP: (110-155)/() 133/101    Physical Exam:   General Appearance: Awake and alert and oriented very conversive   Lungs:   Heart:   Skin:   Incision:   Left great toe callus is unchanged.  No drainage noted and there is no erythema or any indication of any ongoing cellulitis  Results: Confusion over the bone scan results have been resolved.  Dr. Painting talked with Dr. Lunsford of radiology the leading noted was a left great toe that was of our concern and after he reviewed that he felt as though there is still strong evidence by bone scan that there is still ongoing osteomyelitis of the left great toe at the distal phalanx    Results from last 7 days  Lab Units 07/02/18  0352  07/01/18  0453   WBC 10*3/mm3  --   --  5.16   HEMOGLOBIN g/dL 7.5*  < > 7.2*   HEMATOCRIT % 24.3*  < > 23.7*   PLATELETS 10*3/mm3  --   --  134*   < > = values in this interval not displayed.    Results from last 7 days  Lab Units 07/01/18  0453   SODIUM mmol/L 140   POTASSIUM mmol/L 3.5   CHLORIDE mmol/L 107   CO2 mmol/L 24.0   BUN mg/dL 15   CREATININE mg/dL 1.36*   GLUCOSE mg/dL 97   CALCIUM mg/dL 8.1*         Assessment:#1.  Probable recurrent osteomyelitis of the left distal phalanx of the great toe.  #2.  Recent left middle cerebral artery CVA-seems to have resolved any clinical symptoms except for some facial droop  #3.  Severe peripheral vas  disease left lower extremity with successful endovascular stenting of the left anterior tibial artery with now palpable left dorsalis pedis pulse  Plan: Plan to proceed ahead later today with left great toe amputation once the  has arrived and we have discussed further with him and we will keep her nothing by mouth for present.      Prakash Carrington MD - 07/02/18 - 7:44 AM

## 2018-07-02 NOTE — THERAPY TREATMENT NOTE
Acute Care - Speech Language Pathology Treatment Note  [facility]     Patient Name: Lynne Sanchez  : 1934  MRN: [id number]  Today's Date: 2018         Admit Date: 2018    Visit Dx:      ICD-10-CM ICD-9-CM   1. Other chronic osteomyelitis of left foot M86.672 730.17   2. Cerebrovascular accident (CVA), unspecified mechanism I63.9 434.91   3. Impaired mobility and ADLs Z74.09 799.89   4. Impaired functional mobility, balance, gait, and endurance Z74.09 V49.89   5. 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   • Melena   • Chronic gastritis   • Peripheral arterial disease   • Foot infection   • Fever   • Pyogenic inflammation of bone   • Cerebrovascular accident (CVA) due to thrombosis of left anterior cerebral artery        Therapy Treatment    Therapy Treatment / Health Promotion    Treatment Time/Intention  Discipline: speech language pathologist (18 1010 : Radha Jackson MS CCC-SLP)  Document Type: therapy note (daily note) (18 1010 : MS JARETT Pearson)  Subjective Information: complains of, nausea/vomiting (18 1010 : MS JARETT Pearson)  Patient Effort: good (18 1010 : MS JARETT Pearson)  Plan of Care Review  Plan of Care Reviewed With: patient (18 1048 : Radha M Moynahan, MS CCC-SLP)    Vitals/Pain/Safety  Pain Scale: FACES Pre/Post-Treatment  Pain: FACES Scale, Pretreatment: 6-->hurts even more (18 1010 : MS JARETT Pearson)  Pain: FACES Scale, Post-Treatment: 6-->hurts even more (nausea. RN managing) (18 1010 : MS JARETT Pearson)    Cognition, Communication, Swallow       Outcome  Summary  Outcome Summary/Treatment Plan (SLP)  Plan for Continued Treatment (SLP): Will re-assess cog-comm skills at next session for ? continued needs (07/02/18 1010 : Radha Jackson MS CCC-SLP)      EDUCATION  The patient has been educated in the following areas:   Cognitive Impairment Communication Impairment.    SLP Recommendation and Plan                            Plan of Care Reviewed With: patient  Plan of Care Review  Plan of Care Reviewed With: patient  Plan for Continued Treatment (SLP): Will re-assess cog-comm skills at next session for ? continued needs          SLP GOALS     Row Name 07/02/18 1010 06/30/18 1330          Comprehend Narrative Discourse Goal 1 (SLP)    Improve Comprehension of Narrative Discourse Goal 1 (SLP) respond appropriately to paragraph level conversational discourse;80%;with minimal cues (75-90%)  -SM respond appropriately to paragraph level conversational discourse;80%;with minimal cues (75-90%)  -LR     Time Frame (Comprehend Narrative Discourse Goal 1, SLP) by discharge  -SM by discharge  -LR     Progress/Outcomes (Comprehend Narrative Discourse Goal 1, SLP) goal ongoing  -SM  --        Comprehension at Paragraph Level Goal 1 (SLP)    Improve Reading Comprehension at Paragraph Level Goal 1 (SLP) answer questions re: paragraph read;answer written questions re: home management (mail, email, recipes, medicine, procedures);moderately complex;80%;with minimal cues (75-90%)  -SM answer questions re: paragraph read;answer written questions re: home management (mail, email, recipes, medicine, procedures);moderately complex;80%;with minimal cues (75-90%)  -LR     Time Frame (Reading Comprehension at Paragraph Level Goal 1, SLP) by discharge  -SM by discharge  -LR     Progress/Outcomes (Reading Comprehension at Paragraph Level Goal 1, SLP) goal ongoing  -SM  --        Word Retrieval Skills Goal 1 (SLP)    Improve Word Retrieval Skills By Goal 1 (SLP) completing functional word  finding tasks;80%;with minimal cues (75-90%)  -SM completing functional word finding tasks;80%;with minimal cues (75-90%)  -LR     Time Frame (Word Retrieval Goal 1, SLP) by discharge  -SM by discharge  -LR     Progress (Word Retrieval Skills Goal 1, SLP)  -- 80%;with minimal cues (75-90%)  -LR     Progress/Outcomes (Word Retrieval Goal 1, SLP) goal ongoing  -SM  --        Memory Skills Goal 1 (SLP)    Improve Memory Skills Through Goal 1 (SLP) listen to a paragraph and answer questions;recall details of the day;80%;with minimal cues (75-90%)  -SM listen to a paragraph and answer questions;recall details of the day;80%;with minimal cues (75-90%)  -LR     Time Frame (Memory Skills Goal 1, SLP) by discharge  -SM by discharge  -LR     Progress (Memory Skills Goal 1, SLP)  -- 80%;with minimal cues (75-90%)  -LR     Progress/Outcomes (Memory Skills Goal 1, SLP) goal ongoing  -SM  --        Additional Goal 1 (SLP)    Additional Goal 1, SLP LTG: Improve cog-comm skills in odered to participate in care while in hopsital setting with 100% accruacy and no cues  -SM  --     Barriers (Additional Goal 1, SLP) SLP prepped for tx, pt c/o nausea, prefers to decline full tx work today. SLP discussed POC, pt feels at/near baseline function. Agrees for SLP to return and re-assess cog-comm skills at next session.   -SM  --       User Key  (r) = Recorded By, (t) = Taken By, (c) = Cosigned By    Initials Name Provider Type    GUICHO Jackson, MS CCC-SLP Speech and Language Pathologist    LR Kasie Denton, MS CCC-SLP Speech and Language Pathologist                Time Calculation:           Time Calculation- SLP     Row Name 07/02/18 1049             Time Calculation- SLP    SLP Start Time 1010  -      SLP Received On 07/02/18  -        User Key  (r) = Recorded By, (t) = Taken By, (c) = Cosigned By    Initials Name Provider Type    GUICHO Jackson MS CCC-SLP Speech and Language Pathologist          Therapy Charges for  Today     Code Description Service Date Service Provider Modifiers Qty    16242921617 HC ST TREATMENT SPEECH 2 7/2/2018 Radha Jackson, MS CCC-SLP GN 1                     Radha Jackson, MS BRIGGS-SLP  7/2/2018

## 2018-07-03 LAB
ANION GAP SERPL CALCULATED.3IONS-SCNC: 8 MMOL/L (ref 3–11)
BACTERIA SPEC AEROBE CULT: NORMAL
BACTERIA SPEC AEROBE CULT: NORMAL
BUN BLD-MCNC: 15 MG/DL (ref 9–23)
BUN/CREAT SERPL: 12.8 (ref 7–25)
CALCIUM SPEC-SCNC: 7.8 MG/DL (ref 8.7–10.4)
CHLORIDE SERPL-SCNC: 109 MMOL/L (ref 99–109)
CO2 SERPL-SCNC: 22 MMOL/L (ref 20–31)
CREAT BLD-MCNC: 1.17 MG/DL (ref 0.6–1.3)
DEPRECATED RDW RBC AUTO: 54.6 FL (ref 37–54)
ERYTHROCYTE [DISTWIDTH] IN BLOOD BY AUTOMATED COUNT: 17.8 % (ref 11.3–14.5)
GFR SERPL CREATININE-BSD FRML MDRD: 44 ML/MIN/1.73
GLUCOSE BLD-MCNC: 89 MG/DL (ref 70–100)
GLUCOSE BLDC GLUCOMTR-MCNC: 146 MG/DL (ref 70–130)
GLUCOSE BLDC GLUCOMTR-MCNC: 150 MG/DL (ref 70–130)
GLUCOSE BLDC GLUCOMTR-MCNC: 180 MG/DL (ref 70–130)
GLUCOSE BLDC GLUCOMTR-MCNC: 214 MG/DL (ref 70–130)
HCT VFR BLD AUTO: 24.3 % (ref 34.5–44)
HGB BLD-MCNC: 7.4 G/DL (ref 11.5–15.5)
MAGNESIUM SERPL-MCNC: 2.1 MG/DL (ref 1.3–2.7)
MCH RBC QN AUTO: 25.8 PG (ref 27–31)
MCHC RBC AUTO-ENTMCNC: 30.5 G/DL (ref 32–36)
MCV RBC AUTO: 84.7 FL (ref 80–99)
PHOSPHATE SERPL-MCNC: 2.8 MG/DL (ref 2.4–5.1)
PLATELET # BLD AUTO: 155 10*3/MM3 (ref 150–450)
PMV BLD AUTO: 9.5 FL (ref 6–12)
POTASSIUM BLD-SCNC: 4.3 MMOL/L (ref 3.5–5.5)
RBC # BLD AUTO: 2.87 10*6/MM3 (ref 3.89–5.14)
SODIUM BLD-SCNC: 139 MMOL/L (ref 132–146)
WBC NRBC COR # BLD: 5.13 10*3/MM3 (ref 3.5–10.8)

## 2018-07-03 PROCEDURE — 97116 GAIT TRAINING THERAPY: CPT

## 2018-07-03 PROCEDURE — 84100 ASSAY OF PHOSPHORUS: CPT | Performed by: INTERNAL MEDICINE

## 2018-07-03 PROCEDURE — 99233 SBSQ HOSP IP/OBS HIGH 50: CPT | Performed by: INTERNAL MEDICINE

## 2018-07-03 PROCEDURE — 83735 ASSAY OF MAGNESIUM: CPT | Performed by: INTERNAL MEDICINE

## 2018-07-03 PROCEDURE — 97110 THERAPEUTIC EXERCISES: CPT

## 2018-07-03 PROCEDURE — 99231 SBSQ HOSP IP/OBS SF/LOW 25: CPT | Performed by: THORACIC SURGERY (CARDIOTHORACIC VASCULAR SURGERY)

## 2018-07-03 PROCEDURE — 63710000001 INSULIN DETEMIR PER 5 UNITS: Performed by: INTERNAL MEDICINE

## 2018-07-03 PROCEDURE — 97535 SELF CARE MNGMENT TRAINING: CPT

## 2018-07-03 PROCEDURE — G0108 DIAB MANAGE TRN  PER INDIV: HCPCS

## 2018-07-03 PROCEDURE — 80048 BASIC METABOLIC PNL TOTAL CA: CPT | Performed by: INTERNAL MEDICINE

## 2018-07-03 PROCEDURE — 85027 COMPLETE CBC AUTOMATED: CPT | Performed by: INTERNAL MEDICINE

## 2018-07-03 PROCEDURE — 25010000002 LINEZOLID 600 MG/300ML SOLUTION: Performed by: INTERNAL MEDICINE

## 2018-07-03 PROCEDURE — 63710000001 INSULIN LISPRO (HUMAN) PER 5 UNITS: Performed by: INTERNAL MEDICINE

## 2018-07-03 PROCEDURE — 82962 GLUCOSE BLOOD TEST: CPT

## 2018-07-03 RX ADMIN — INSULIN LISPRO 2 UNITS: 100 INJECTION, SOLUTION INTRAVENOUS; SUBCUTANEOUS at 17:49

## 2018-07-03 RX ADMIN — LINEZOLID 600 MG: 600 INJECTION, SOLUTION INTRAVENOUS at 17:41

## 2018-07-03 RX ADMIN — METRONIDAZOLE 500 MG: 500 INJECTION, SOLUTION INTRAVENOUS at 00:39

## 2018-07-03 RX ADMIN — METRONIDAZOLE 500 MG: 500 INJECTION, SOLUTION INTRAVENOUS at 09:40

## 2018-07-03 RX ADMIN — INSULIN LISPRO 3 UNITS: 100 INJECTION, SOLUTION INTRAVENOUS; SUBCUTANEOUS at 21:40

## 2018-07-03 RX ADMIN — INSULIN LISPRO 2 UNITS: 100 INJECTION, SOLUTION INTRAVENOUS; SUBCUTANEOUS at 17:50

## 2018-07-03 RX ADMIN — LATANOPROST 1 DROP: 50 SOLUTION OPHTHALMIC at 21:40

## 2018-07-03 RX ADMIN — INSULIN LISPRO 2 UNITS: 100 INJECTION, SOLUTION INTRAVENOUS; SUBCUTANEOUS at 09:42

## 2018-07-03 RX ADMIN — FUROSEMIDE 20 MG: 20 TABLET ORAL at 09:40

## 2018-07-03 RX ADMIN — LINEZOLID 600 MG: 600 INJECTION, SOLUTION INTRAVENOUS at 05:05

## 2018-07-03 RX ADMIN — ATORVASTATIN CALCIUM 80 MG: 40 TABLET, FILM COATED ORAL at 21:40

## 2018-07-03 RX ADMIN — CEFEPIME HYDROCHLORIDE 1 G: 1 INJECTION, POWDER, FOR SOLUTION INTRAMUSCULAR; INTRAVENOUS at 21:40

## 2018-07-03 RX ADMIN — METOPROLOL TARTRATE 100 MG: 100 TABLET ORAL at 21:40

## 2018-07-03 RX ADMIN — CEFEPIME HYDROCHLORIDE 1 G: 1 INJECTION, POWDER, FOR SOLUTION INTRAMUSCULAR; INTRAVENOUS at 09:40

## 2018-07-03 RX ADMIN — Medication 1 CAPSULE: at 09:39

## 2018-07-03 RX ADMIN — METRONIDAZOLE 500 MG: 500 INJECTION, SOLUTION INTRAVENOUS at 17:41

## 2018-07-03 RX ADMIN — INSULIN DETEMIR 5 UNITS: 100 INJECTION, SOLUTION SUBCUTANEOUS at 21:41

## 2018-07-03 RX ADMIN — METOPROLOL TARTRATE 100 MG: 100 TABLET ORAL at 09:39

## 2018-07-03 RX ADMIN — INSULIN LISPRO 2 UNITS: 100 INJECTION, SOLUTION INTRAVENOUS; SUBCUTANEOUS at 11:13

## 2018-07-03 RX ADMIN — NYSTATIN: 100000 POWDER TOPICAL at 21:40

## 2018-07-03 RX ADMIN — INSULIN LISPRO 2 UNITS: 100 INJECTION, SOLUTION INTRAVENOUS; SUBCUTANEOUS at 09:41

## 2018-07-03 RX ADMIN — VALACYCLOVIR HYDROCHLORIDE 1000 MG: 500 TABLET, FILM COATED ORAL at 09:39

## 2018-07-03 RX ADMIN — VALACYCLOVIR HYDROCHLORIDE 1000 MG: 500 TABLET, FILM COATED ORAL at 21:40

## 2018-07-03 RX ADMIN — Medication 325 MG: at 09:39

## 2018-07-03 NOTE — PLAN OF CARE
Problem: Patient Care Overview  Goal: Plan of Care Review  Outcome: Ongoing (interventions implemented as appropriate)   07/03/18 1523   Coping/Psychosocial   Plan of Care Reviewed With patient   Plan of Care Review   Progress improving   OTHER   Outcome Summary Pt. met one goal and partially met 2 goals. Pt. would benefit safety bars in bathroom at home and wx over cane use at this time. Pt. with incontinence bowels.

## 2018-07-03 NOTE — THERAPY TREATMENT NOTE
Acute Care - Occupational Therapy Treatment Note   Lui     Patient Name: Lynne Sanchez  : 1934  MRN: [id number]  Today's Date: 7/3/2018  Onset of Illness/Injury or Date of Surgery: 18  Date of Referral to OT: 18  Referring Physician: MD Dia    Admit Date: 2018       ICD-10-CM ICD-9-CM   1. Other chronic osteomyelitis of left foot (CMS/HCC) M86.672 730.17   2. Cerebrovascular accident (CVA), unspecified mechanism (CMS/HCC) I63.9 434.91   3. Impaired mobility and ADLs Z74.09 799.89   4. Impaired functional mobility, balance, gait, and endurance Z74.09 V49.89   5. 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/HCC)   • Melena   • Chronic gastritis   • Peripheral arterial disease (CMS/HCC)   • Foot infection   • Fever   • Pyogenic inflammation of bone (CMS/HCC)   • Cerebrovascular accident (CVA) due to thrombosis of left anterior cerebral artery (CMS/HCC)     Past Medical History:   Diagnosis Date   • Anemia    • CHF (congestive heart failure) (CMS/HCC)    • Chronic atrial fibrillation (CMS/HCC)    • Diabetes mellitus, type 2 (CMS/HCC)    • Glaucoma    • History of congestive heart failure    • History of transfusion    • Hyperlipidemia    • Hypertension    • Kidney disease     patient not aware of what exact diagnosis is      Past Surgical History:   Procedure Laterality Date   • APPENDECTOMY     • BONE MARROW ASPIRATION     • CARDIAC ABLATION     • CARDIAC CATHETERIZATION N/A 10/10/2017    Procedure: Peripheral angiography;  Surgeon: Kan Pelaez MD;  Location: [facility] CATH INVASIVE LOCATION;  Service:    • CARDIAC CATHETERIZATION N/A 10/10/2017     Procedure: Angioplasty-peripheral;  Surgeon: Kan Pelaez MD;  Location:  [facility] CATH INVASIVE LOCATION;  Service:    • CARDIAC CATHETERIZATION N/A 10/10/2017    Procedure: Atherectomy-peripheral;  Surgeon: Kan Pelaez MD;  Location:  [facility] CATH INVASIVE LOCATION;  Service:    • CARDIAC ELECTROPHYSIOLOGY PROCEDURE N/A 5/3/2018    Procedure: generator change;  Surgeon: Zan Poole MD;  Location:  [facility] CATH INVASIVE LOCATION;  Service: Cardiovascular   • CARDIOVERSION     • COLONOSCOPY     • ENDOSCOPY N/A 10/9/2017    Procedure: ESOPHAGOGASTRODUODENOSCOPY WITH COLD FORCEP BIOPSY;  Surgeon: Clifton Hyatt MD;  Location: [facility] ENDOSCOPY;  Service:    • EYE SURGERY Bilateral    • INTERVENTIONAL RADIOLOGY PROCEDURE N/A 10/10/2017    Procedure: Abdominal Aortagram with Runoff;  Surgeon: Kan Pelaez MD;  Location:  [facility] CATH INVASIVE LOCATION;  Service:    • PACEMAKER IMPLANTATION         Therapy Treatment          Rehabilitation Treatment Summary     Row Name 07/03/18 0800             Treatment Time/Intention    Discipline occupational therapist  -JESSICA      Document Type therapy note (daily note)  -JESSICA      Subjective Information no complaints  -JESSICA      Mode of Treatment individual therapy;occupational therapy  -JESSICA      Patient/Family Observations Pt. noted with BM in underwear when [place] cath removed.  Pt. with IV LUE. Spouse present during session  -JESSICA      Care Plan Review care plan/treatment goals reviewed;risks/benefits reviewed  -JESSICA      Care Plan Review, Other Participant(s) spouse  -JESSICA      Patient Effort good  -JESSICA      Existing Precautions/Restrictions fall  -JESSICA      Recorded by [JESSICA] Sandra Varma, OT 07/03/18 0851      Row Name 07/03/18 0800             Vital Signs    Pre Systolic BP Rehab 118  -JESSICA      Pre Treatment Diastolic BP 79  -JESSICA      Post Systolic BP Rehab 113  -JESSICA      Post Treatment Diastolic BP 98  -JESSICA      Pretreatment Heart Rate (beats/min) 70  -JESSICA       Posttreatment Heart Rate (beats/min) 70  -JESSICA      Pre SpO2 (%) 96  -JESSICA      O2 Delivery Pre Treatment room air  -JESSICA      Post SpO2 (%) 97  -JESSICA      O2 Delivery Post Treatment room air  -JESSICA      Pre Patient Position Supine  -JESSICA      Intra Patient Position Standing  -JESSICA      Post Patient Position Sitting  -JESSICA      Recorded by [JESSICA] Sandra Varma OT 07/03/18 0851      Row Name 07/03/18 0800             Cognitive Assessment/Intervention- PT/OT    Orientation Status (Cognition) oriented x 4  -JESSICA      Follows Commands (Cognition) follows one step commands;WFL  -JESSICA      Cognitive Function (Cognitive) WFL  -JESSICA      Personal Safety Interventions fall prevention program maintained;gait belt;nonskid shoes/slippers when out of bed  -JESSICA      Recorded by [JESSICA] Sandra Varma, OT 07/03/18 0851      Row Name 07/03/18 0800             Safety Issues, Functional Mobility    Impairments Affecting Function (Mobility) balance;endurance/activity tolerance;strength  -JESSICA      Recorded by [JESSICA] Sandra Varma OT 07/03/18 0851      Row Name 07/03/18 0800             Bed Mobility Assessment/Treatment    Bed Mobility Assessment/Treatment supine-sit;scooting/bridging  -JESSICA      Scooting/Bridging [place] (Bed Mobility) supervision  -JESSICA      Assistive Device (Bed Mobility) bed rails;head of bed elevated  -JESSICA      Comment (Bed Mobility) good sequencing today  -JESSICA      Recorded by [JESSICA] Sandra Varma OT 07/03/18 0851      Row Name 07/03/18 0800             Functional Mobility    Functional Mobility- Ind. Level contact guard assist  -JESSICA      Functional Mobility- Device straight cane   pt. also holding to IV pole OT pushing  -JESSICA      Functional Mobility-Distance (Feet) 25  -JESSICA      Functional Mobility- Safety Issues --   decreased balance and strength  -JESSICA      Functional Mobility- Comment Pt. would benefit use of rx wx.  -JESSICA      Recorded by [JESSICA] Sandra Varma OT 07/03/18 0851      Row Name 07/03/18 0800             Transfer  Assessment/Treatment    Transfer Assessment/Treatment sit-stand transfer;stand-sit transfer  -JESSICA      Recorded by [JESSICA] Sandra Varma, OT 07/03/18 0851      Row Name 07/03/18 0800             Sit-Stand Transfer    Sit-Stand [place] (Transfers) contact guard  -JESSICA      Assistive Device (Sit-Stand Transfers) cane, straight  -JESSICA      Recorded by [JESSICA] Sandra Varma, OT 07/03/18 0851      Row Name 07/03/18 0800             Stand-Sit Transfer    Stand-Sit [place] (Transfers) contact guard  -JESSICA      Assistive Device (Stand-Sit Transfers) cane, straight  -JESSICA      Recorded by [JESSICA] Sandra Varma, OT 07/03/18 0851      Row Name 07/03/18 0800             Toilet Transfer    Type (Toilet Transfer) sit-stand;stand-sit  -JESSICA      [place] Level (Toilet Transfer) contact guard  -JESSICA      Assistive Device (Toilet Transfer) grab bars/safety frame;cane, straight   without safety bar pt. would need increased assist.  -JESSICA      Recorded by [JESSICA] Sandra Varma, OT 07/03/18 0851      Row Name 07/03/18 0800             ADL Assessment/Intervention    40941 - OT Self Care/Mgmt Minutes 30  -JESSICA      BADL Assessment/Intervention toileting;lower body dressing;grooming  -JESSICA      Recorded by [JESSICA] Sandra Varma, OT 07/03/18 0851      Row Name 07/03/18 0800             Lower Body Dressing Assessment/Training    Lower Body Dressing [place] Level doff;don;pants/bottoms;socks;moderate assist (50% patient effort);dependent (less than 25% patient effort)  -JESSICA      Lower Body Dressing Position edge of bed sitting   on toilet  -JESSICA      Comment (Lower Body Dressing) Pt. could not kayley socks at high EOB level.  Will need try lower surface next session.  Pt. mod assist to kayley and doff undwear on toilet.  -JESSICA      Recorded by [JESSICA] Sandra Varma, OT 07/03/18 0851      Row Name 07/03/18 0800             Grooming Assessment/Training    [place] Level (Grooming) wash face, hands  -JESSICA      Grooming Position supported sitting  -JESSICA      Comment  (Grooming) washed hands with wipe sitting on toilet  -JESSICA      Recorded by [JESSICA] Sandra Varma, OT 07/03/18 0851      Row Name 07/03/18 0800             Toileting Assessment/Training    [place] Level (Toileting) moderate assist (50% patient effort);verbal cues;nonverbal cues (demo/gesture);change pad/brief;adjust/manage clothing;toileting skills  -JESSICA      Assistive Devices (Toileting) grab bar/safety frame  -JESSICA      Toileting Position supported standing;supported sitting  -JESSICA      Comment (Toileting) Pt. was able to hold to grab bar and clean self part way, but so soiled OT had to assist.  -JESSICA      Recorded by [JESSICA] Sandra Varma OT 07/03/18 0851      Row Name 07/03/18 0800             BADL Safety/Performance    Impairments, BADL Safety/Performance balance;strength  -JESSICA      Skilled BADL Treatment/Intervention --   benefit safety bar placement at home.  -JESSICA      Recorded by [JESSICA] Sandra Varma OT 07/03/18 0851      Row Name 07/03/18 0800             Therapeutic Exercise    82361 - OT Therapeutic Exercise Minutes 8  -JESSICA      Recorded by [JESSICA] Sandra Varma OT 07/03/18 0851      Row Name 07/03/18 0800             Static Sitting Balance    Level of [place] (Unsupported Sitting, Static Balance) independent  -JESSICA      Sitting Position (Unsupported Sitting, Static Balance) sitting on edge of bed  -JESSICA      Recorded by [JESSICA] Sandra Varma OT 07/03/18 0851      Row Name 07/03/18 0800             Static Standing Balance    Level of [place] (Supported Standing, Static Balance) contact guard assist  -JESSICA      Assistive Device Utilized (Supported Standing, Static Balance) straight cane  -JESSICA      Recorded by [JESSICA] Sandra Varma OT 07/03/18 0851      Row Name 07/03/18 0800             Dynamic Standing Balance    Level of [place], Reaches Outside Midline (Standing, Dynamic Balance) contact guard assist   with grab bar or cane and IV pole  -JESSICA      Recorded by [JESSICA] Sandra Varma OT 07/03/18 0851      Row Name  07/03/18 0800             Positioning and Restraints    Pre-Treatment Position in bed  -JESSICA      Post Treatment Position chair  -JESSICA      In Chair reclined;call light within reach;encouraged to call for assist;exit alarm on;legs elevated;with family/caregiver  -JESSICA      Recorded by [JESSICA] Sandra Varma, OT 07/03/18 0851      Row Name 07/03/18 0800             Pain Scale: Numbers Pre/Post-Treatment    Pain Scale: Numbers, Pretreatment 0/10 - no pain  -JESSICA      Pain Scale: Numbers, Post-Treatment 0/10 - no pain  -JESSICA      Recorded by [JESSICA] Sandra Varma, OT 07/03/18 0851      Row Name 07/03/18 0800             Vision Assessment/Intervention    Visual Impairment/Limitations corrective lenses full time  -JESSICA      Recorded by [JESSICA] Sandra Varma, OT 07/03/18 0851      Row Name                Wound 06/27/18 1659 Left toe pressure injury;diabetic/neuropathic ulceration    Wound - Properties Group Date first assessed: 06/27/18 [KA] Time first assessed: 1659 [KA] Present On Admission : yes [KA] Side: Left [KA] Location: toe [KA] Type: pressure injury;diabetic/neuropathic ulceration [KA] Additional Comments: possible arterial ulcer; pt pt skin tear from tape removal [CP] Recorded by:  [CP] BILL Magallanes 06/28/18 1335 [KA] Anel Woodruff RN 06/27/18 1700    Row Name                Wound 06/28/18 1030 Right distal toe other (see comments)    Wound - Properties Group Date first assessed: 06/28/18 [CP] Time first assessed: 1030 [CP] Present On Admission : yes;picture taken [CP] Side: Right [CP] Orientation: distal [CP] Location: toe [CP] Type: other (see comments) [CP] Additional Comments: unknown origin; possible tape inury; right great toe [CP] Recorded by:  [CP] BILL Magallanes 06/28/18 1340    Row Name                Wound 06/28/18 1030 Right elbow abrasion    Wound - Properties Group Date first assessed: 06/28/18 [CP] Time first assessed: 1030 [CP] Present On Admission : yes;picture taken [CP] Side: Right [CP]  Location: elbow [CP] Type: abrasion [CP] Recorded by:  [CP] Lorraine Morillo, APRN 06/28/18 1344    Row Name                Wound 06/28/18 1030 Left lower arm skin tear    Wound - Properties Group Date first assessed: 06/28/18 [CP] Time first assessed: 1030 [CP] Present On Admission : yes [CP] Side: Left [CP] Orientation: lower [CP] Location: arm [CP] Type: skin tear [CP] Additional Comments: forearm and elbow [CP] Recorded by:  [CP] Lorraine Morillo, APRN 06/28/18 1346    Row Name 07/03/18 0800             Outcome Summary/Treatment Plan (OT)    Daily Summary of Progress (OT) progress toward functional goals is good  -JESSICA      Barriers to Overall Progress (OT) diarrhea  -JESSICA      Plan for Continued Treatment (OT) cont. progress to goals  -JESSICA      Recorded by [JESSICA] Sandra Varma, OT 07/03/18 0851        User Key  (r) = Recorded By, (t) = Taken By, (c) = Cosigned By    Initials Name Effective Dates Discipline    JESSICA Sandra Varma, OT 06/08/18 -  OT    CP Lorraine Morillo, APRN 06/08/18 -  Nurse    SYLVIA Woodruff RN 06/23/17 -  Nurse        Rash 06/27/18 2044 Left lower breast other (see comments) (Active)   Color pink 7/2/2018  6:00 PM   Care, Rash open to air 7/2/2018  6:00 PM       Rash 06/28/18 1030 Right posterior leg vesicle (Active)   Distribution regional 7/3/2018  6:00 AM   Configuration/Shape asymmetric 7/3/2018  6:00 AM   Borders indistinct 7/3/2018  6:00 AM   Characteristics dry 7/3/2018  6:00 AM   Color purple;red 7/3/2018  6:00 AM       Wound 06/27/18 1659 Left toe pressure injury;diabetic/neuropathic ulceration (Active)   Dressing Appearance open to air 7/3/2018  6:00 AM   Base dry 7/2/2018 10:00 AM   Periwound intact;dry;pink;blanchable 7/3/2018  6:00 AM   Edges irregular 7/3/2018  6:00 AM   Drainage Amount none 7/3/2018  6:00 AM   Dressing Care, Wound open to air 7/2/2018 10:00 AM       Wound 06/28/18 1030 Right distal toe other (see comments) (Active)   Dressing Appearance open to air  7/3/2018  6:00 AM   Periwound intact;dry;pink;blanchable 7/3/2018  6:00 AM   Edges irregular 7/3/2018  6:00 AM   Drainage Amount none 7/3/2018  6:00 AM   Dressing Care, Wound open to air 7/2/2018 10:00 AM       Wound 06/28/18 1030 Right elbow abrasion (Active)   Dressing Appearance dry;intact 7/3/2018  6:00 AM   Drainage Amount none 7/3/2018  6:00 AM   Care, Wound irrigated with;sterile normal saline;other (see comments) 7/2/2018  2:00 PM   Dressing Care, Wound foam 7/2/2018  2:00 PM       Wound 06/28/18 1030 Left lower arm skin tear (Active)   Dressing Appearance dry;intact 7/3/2018  6:00 AM   Drainage Amount none 7/3/2018  6:00 AM   Care, Wound irrigated with;sterile normal saline 7/2/2018  2:00 PM   Dressing Care, Wound non-adherent 7/2/2018  6:00 PM             OT Rehab Goals     Row Name 07/03/18 0800             Bed Mobility Goal 1 (OT)    Progress/Outcomes (Bed Mobility Goal 1, OT) goal partially met   met supine to sit and scooting to EOB  -JESSICA         Transfer Goal 1 (OT)    Progress/Outcome (Transfer Goal 1, OT) goal partially met   met off bed, on and off toilet bar, in chair  -JESSICA         Dressing Goal 1 (OT)    Progress/Outcome (Dressing Goal 1, OT) goal ongoing  -JESSICA         Orientation Goal 1 (OT)    Progress/Outcome (Orientation Goal 1, OT) goal met  -JESSICA        User Key  (r) = Recorded By, (t) = Taken By, (c) = Cosigned By    Initials Name Provider Type Discipline    JESSICA Varma OT Occupational Therapist OT        Occupational Therapy Education     Title: PT OT SLP Therapies (Active)     Topic: Occupational Therapy (Active)     Point: ADL training (Done)     Description: Instruct learner(s) on proper safety adaptation and remediation techniques during self care or transfers.   Instruct in proper use of assistive devices.   Learning Progress Summary     Learner Status Readiness Method Response Comment Documented by    Patient Done Acceptance E VU grab bar benefit at home for safety and indep.  and  need wx use increased safety  07/03/18 0851     Active Acceptance E NR Educated pt regarding role of therapy, transfer techniques, safety precautions and need for continued therapy. Also educated pt regarding call ashley.  06/30/18 0907    Family Done Acceptance E VU grab bar benefit at home for safety and indep.  and need wx use increased safety  07/03/18 0851          Point: Precautions (Done)     Description: Instruct learner(s) on prescribed precautions during self-care and functional transfers.   Learning Progress Summary     Learner Status Readiness Method Response Comment Documented by    Patient Done Acceptance E VU grab bar benefit at home for safety and indep.  and need wx use increased safety  07/03/18 0851    Family Done Acceptance E VU grab bar benefit at home for safety and indep.  and need wx use increased safety  07/03/18 0851                      User Key     Initials Effective Dates Name Provider Type Discipline     06/08/18 -  Sandra Varma, OT Occupational Therapist OT     06/22/15 -  Rachel Ramirez, OT Occupational Therapist OT                OT Recommendation and Plan  Outcome Summary/Treatment Plan (OT)  Daily Summary of Progress (OT): progress toward functional goals is good  Barriers to Overall Progress (OT): diarrhea  Plan for Continued Treatment (OT): cont. progress to goals  Daily Summary of Progress (OT): progress toward functional goals is good  Plan of Care Review  Plan of Care Reviewed With: patient  Plan of Care Reviewed With: patient  Outcome Summary: Pt. met one goal and partially met 2 goals.  Pt. would benefit safety bars in bathroom at home and wx over cane use at this time.  Pt. with incontinence bowels.        Outcome Measures     Row Name 07/03/18 0800 07/02/18 0859 06/30/18 1046       How much help from another person do you currently need...    Turning from your back to your side while in flat bed without using bedrails?  -- 3  -LS (r) JESSICA (t) LS (c) 3  -LS     Moving from lying on back to sitting on the side of a flat bed without bedrails?  -- 3  -LS (r) JESSICA (t) LS (c) 3  -LS    Moving to and from a bed to a chair (including a wheelchair)?  -- 3  -LS (r) JESSICA (t) LS (c) 3  -LS    Standing up from a chair using your arms (e.g., wheelchair, bedside chair)?  -- 3  -LS (r) JESSICA (t) LS (c) 3  -LS    Climbing 3-5 steps with a railing?  -- 2  -LS (r) JESSICA (t) LS (c) 2  -LS    To walk in hospital room?  -- 3  -LS (r) JESSICA (t) LS (c) 3  -LS    AM-PAC 6 Clicks Score  -- 17  -LS (r) JESSICA (t) 17  -LS       How much help from another is currently needed...    Putting on and taking off regular lower body clothing? 2  -JBA  --  --    Bathing (including washing, rinsing, and drying) 2  -JBA  --  --    Toileting (which includes using toilet bed pan or urinal) 2  -JBA  --  --    Putting on and taking off regular upper body clothing 3  -JBA  --  --    Taking care of personal grooming (such as brushing teeth) 3  -JBA  --  --    Eating meals 4  -JBA  --  --    Score 16  -JBA  --  --       Modified [place] Scale    Modified [place] Scale 3 - Moderate disability.  Requiring some help, but able to walk without assistance.  -JBA  -- 3 - Moderate disability.  Requiring some help, but able to walk without assistance.  -LS       Functional Assessment    Outcome Measure Options AM-PAC 6 Clicks Daily Activity (OT)  -JBA AM-PAC 6 Clicks Basic Mobility (PT)  -LS (r) JESSICA (t) LS (c) AM-PAC 6 Clicks Basic Mobility (PT)  -LS      User Key  (r) = Recorded By, (t) = Taken By, (c) = Cosigned By    Initials Name Provider Type    DAVID Varma, OT Occupational Therapist    LS Loly Telles, PT Physical Therapist    JESSICA Dillon Lawrence, PT Student PT Student           Time Calculation:         Time Calculation- OT     Row Name 07/03/18 0854 07/03/18 0800          Time Calculation- OT    OT Received On 07/03/18  -JESSICA  --     OT Goal Re-Cert Due Date 07/10/18  -JESSICA  --        Timed Charges    39609 - OT Therapeutic Exercise  Minutes  -- 8  -JESSICA     68991 - OT Self Care/Mgmt Minutes  -- 30  -JESSICA       User Key  (r) = Recorded By, (t) = Taken By, (c) = Cosigned By    Initials Name Provider Type    JESSICA Varma OT Occupational Therapist           Therapy Suggested Charges     Code   Minutes Charges    58336 (CPT®) Hc Ot Neuromusc Re Education Ea 15 Min      29080 (CPT®) Hc Ot Ther Proc Ea 15 Min 8 1    84997 (CPT®) Hc Gait Training Ea 15 Min      19113 (CPT®) Hc Ot Therapeutic Act Ea 15 Min      06326 (CPT®) Hc Ot Manual Therapy Ea 15 Min      83460 (CPT®) Hc Ot Iontophoresis Ea 15 Min      96478 (CPT®) Hc Ot Elec Stim Ea-Per 15 Min      60364 (CPT®) Hc Ot Ultrasound Ea 15 Min      57817 (CPT®) Hc Ot Self Care/Mgmt/Train Ea 15 Min 30 2    Total  38 3        Therapy Charges for Today     Code Description Service Date Service Provider Modifiers Qty    07950381414 HC OT THER PROC EA 15 MIN 7/3/2018 Sandra Varma OT GO 1    16502939019 HC OT SELF CARE/MGMT/TRAIN EA 15 MIN 7/3/2018 Sandra Varma OT GO 2               Sandra Varma OT  7/3/2018

## 2018-07-03 NOTE — PLAN OF CARE
Problem: Fall Risk (Adult)  Goal: Absence of Fall  Outcome: Ongoing (interventions implemented as appropriate)      Problem: Skin Injury Risk (Adult)  Goal: Skin Health and Integrity  Outcome: Ongoing (interventions implemented as appropriate)      Problem: Infection, Risk/Actual (Adult)  Goal: Identify Related Risk Factors and Signs and Symptoms  Outcome: Outcome(s) achieved Date Met: 07/03/18    Goal: Infection Prevention/Resolution  Outcome: Ongoing (interventions implemented as appropriate)

## 2018-07-03 NOTE — PLAN OF CARE
Problem: Patient Care Overview  Goal: Plan of Care Review  Outcome: Ongoing (interventions implemented as appropriate)   07/03/18 1044   Coping/Psychosocial   Plan of Care Reviewed With patient;spouse   Plan of Care Review   Progress improving   OTHER   Outcome Summary Pt demonstrated ability to progress forward ambulation distance to 85 total ft with use of RWx and min A; noted improvement in gait safety/quality with RWx use. Edu pt re: LE seated HEP. WIll cont to progress as clinically warranted.

## 2018-07-03 NOTE — PLAN OF CARE
Problem: Fall Risk (Adult)  Goal: Absence of Fall  Outcome: Ongoing (interventions implemented as appropriate)      Problem: Skin Injury Risk (Adult)  Goal: Skin Health and Integrity  Outcome: Ongoing (interventions implemented as appropriate)      Problem: Patient Care Overview  Goal: Plan of Care Review  Outcome: Ongoing (interventions implemented as appropriate)    Goal: Individualization and Mutuality  Outcome: Ongoing (interventions implemented as appropriate)    Goal: Discharge Needs Assessment  Outcome: Ongoing (interventions implemented as appropriate)    Goal: Interprofessional Rounds/Family Conf  Outcome: Ongoing (interventions implemented as appropriate)      Problem: Infection, Risk/Actual (Adult)  Goal: Infection Prevention/Resolution  Outcome: Ongoing (interventions implemented as appropriate)

## 2018-07-03 NOTE — PROGRESS NOTES
[facility] Progress Note        Antibiotics:  Anti-Infectives     Ordered     Dose/Rate Route Frequency Start Stop    07/01/18 1152  cefepime (MAXIPIME) 1 g/100 mL 0.9% NS IVPB (mbp)     Ordering Provider:  Pan Painting MD    1 g  over 4 Hours Intravenous Every 12 Hours 07/01/18 2100 07/08/18 2059    07/01/18 1152  metroNIDAZOLE (FLAGYL) IVPB 500 mg     Ordering Provider:  Pan Painting MD    500 mg  over 60 Minutes Intravenous Every 8 Hours 07/01/18 1400 07/08/18 1559    07/01/18 1152  cefepime (MAXIPIME) 1 g/100 mL 0.9% NS IVPB (mbp)     Ordering Provider:  Pan Painting MD    1 g  200 mL/hr over 30 Minutes Intravenous Once 07/01/18 1300 07/01/18 1456    06/28/18 1046  Linezolid (ZYVOX) 600 mg 300 mL     Ordering Provider:  Pan Painting MD    600 mg  300 mL/hr over 60 Minutes Intravenous Every 12 Hours 06/28/18 1700 07/05/18 0559    06/28/18 1046  valACYclovir (VALTREX) tablet 1,000 mg     Ordering Provider:  Pan Painting MD    1,000 mg Oral Every 12 Hours Scheduled 06/28/18 1130 07/05/18 0859    06/27/18 1739  vancomycin 1250 mg/250 mL 0.9% NS IVPB ([facility])     Ordering Provider:  Jung Zeng MD    20 mg/kg × 66.7 kg  over 90 Minutes Intravenous Once 06/27/18 1845 06/27/18 2044    06/27/18 1736  piperacillin-tazobactam (ZOSYN) 3.375 g in iso-osmotic dextrose 50 ml (premix)     Ordering Provider:  Jung Zeng MD    3.375 g  over 30 Minutes Intravenous Once 06/27/18 1800 06/27/18 1844          CC: left foot red, uti, right leg shingles    HPI:  Patient is a 83 y.o.  Yr old female with history of peripheral vascular disease with intervention at [facility] by Dr. Pelaez October 2017 described as to the left anterior circulation.  In addition, in October 2017 she was diagnosed with left hallux osteomyelitis by CT scan/tagged white blood cell scan and cultures with Enterobacter/pseudomonas aeruginosa.  Discharge summary mentions that Dr. Wallace recommended debridement and possible  amputation but patient was not interested and was transitioned to [facility] for 6 weeks.  She is admitted to [facility] June 27, 2018 with acute left foot cellulitis, chronic left hallux callus/crusted with acute right sacral/posterior leg shingles and dysuria.  In addition she had fallen onto her right hip at Father's Day  with bruise.     She reported acute onset left foot/hallux redness evolving over 24 hours PTA and no other new trauma.  No open wound or active drainage.      6/30/18 moved to ICU overnight after code 19 and neuro workup with change in speech/left facial droop and left pronator drift; CT perfusion study with left MCA ischemia    7/3/18 less nauseated  Per nursing, stable right post leg eruption to the right of midline in a sacral dermatomal distribution from the spine down the posterior right leg, more crusted with no new blisters.  No new skin lesions; otherwise sleepy at time of my visit and not cooperative with ROS      Per nursing, No headache photophobia or neck stiffness.  No shortness of breath cough or hemoptysis.  No nausea vomiting diarrhea or abdominal pain.    ROS:      7/3/18 per nursing, no f/c/s. No n/v/d. No new ADR to Abx.     Constitutional-- Appetite diminished with generalized malaise and fatigue   Heent-- No epistaxis or oral sores.   No flashers, floaters or eye pain. No odynophagia or dysphagia. No headache, photophobia or neck stiffness.  CV-- No chest pain, palpitation or syncope  Resp-- No SOB/cough/Hemoptysis  GI- No vomiting, or diarrhea.  No hematochezia, melena, or hematemesis. Denies jaundice or chronic liver disease.  -- Denies hesitancy or flank pain.  Lymph- no swollen lymph nodes in neck/axilla or groin.   Heme- No active bruising or bleeding; no Hx of DVT or PE.  MS-- no swelling or pain in the bones or joints of arms/legs.  No new back pain.  Neuro-- as above     Full 12 point review of systems reviewed and negative otherwise for acute complaints,  "except for above    PE:   /50   Pulse 71   Temp 98.5 °F (36.9 °C) (Oral)   Resp 12   Ht 165.1 cm (65\")   Wt 66.7 kg (147 lb)   SpO2 97%   BMI 24.46 kg/m²     GENERAL: awake, in no acute distress.   HEENT: Normocephalic, atraumatic.  PERRL. EOMI. No conjunctival injection. No icterus. Oropharynx clear without evidence of thrush or exudate.  Poor dentition NECK: Supple without nuchal rigidity. No mass.  LYMPH: No cervical, axillary or inguinal lymphadenopathy.  HEART: RRR; No murmur, rubs, gallops.   LUNGS: Clear to auscultation bilaterally without wheezing, rales, rhonchi. Normal respiratory effort. Nonlabored. No dullness.  ABDOMEN: Soft, nontender, nondistended. Positive bowel sounds. No rebound or guarding. NO mass or HSM.  EXT:  No cyanosis, clubbing or edema. No cord.  : Genitalia generally unremarkable.  Without Main catheter.  MSK: FROM without joint effusions noted arms/legs.    SKIN: See below    NEURO: awake but  not cooperative with detailed neuro exam for me at time of my visit    Left foot/hallux with faint erythema.  Left hallux with callus/crust at the distal phalanx but no discrete mass bulge or fluctuance.  No crepitus or bulla.     Right posterior leg/low back to the right of midline in sacral distribution with dermatomal shingles, some blisters, some crust and no surrounding induration or warmth.  No mass bulge or fluctuance.  No crepitus.     Right hip with bruise after fall    Laboratory Data      Results from last 7 days  Lab Units 07/03/18  0349 07/02/18  0352 07/01/18  1255 07/01/18  0453 06/30/18  0431   WBC 10*3/mm3 5.13  --   --  5.16 5.72   HEMOGLOBIN g/dL 7.4* 7.5* 7.4* 7.2* 7.8*   HEMATOCRIT % 24.3* 24.3* 24.8* 23.7* 25.5*   PLATELETS 10*3/mm3 155  --   --  134* 121*       Results from last 7 days  Lab Units 07/03/18  0349   SODIUM mmol/L 139   POTASSIUM mmol/L 4.3   CHLORIDE mmol/L 109   CO2 mmol/L 22.0   BUN mg/dL 15   CREATININE mg/dL 1.17   GLUCOSE mg/dL 89   CALCIUM " mg/dL 7.8*       Results from last 7 days  Lab Units 06/27/18  1653   ALK PHOS U/L 81   BILIRUBIN mg/dL 1.2   ALT (SGPT) U/L 16   AST (SGOT) U/L 21       Results from last 7 days  Lab Units 06/27/18  1653   SED RATE mm/hr 34*       Results from last 7 days  Lab Units 06/27/18  1653   CRP mg/dL 2.40*       Estimated Creatinine Clearance: 38.4 mL/min (by C-G formula based on SCr of 1.17 mg/dL).      Microbiology:      Radiology:  Imaging Results (last 72 hours)     Procedure Component Value Units Date/Time    XR Toe 2+ View Left [[id number]] Collected:  06/27/18 1803     Updated:  06/27/18 2121    Narrative:       EXAM:  XR Left Toe(s), 2 or More Views    EXAM DATE/TIME:  6/27/2018 6:03 PM    CLINICAL HISTORY:  83 years old, female; Signs and symptoms; Other: Left 1st   digit ulcer; Additional info: Left diabetic toe wound    TECHNIQUE:  Frontal, lateral and oblique views of toe(s) of the left foot.    COMPARISON:  No relevant prior studies available.    FINDINGS:    Bones/joints:  No acute osseous abnormality.  No evidence of bony destructive   process.  Bony demineralization and degenerative bony changes.  No dislocation.    Soft tissues:  Soft tissue ulcer along the medial distal left great toe.    Vasculature:  Small vessel arterial calcification.      Impression:       1.  Soft tissue ulcer along the medial distal left great toe.  2.  No radiographic evidence of osteomyelitis at this time.    THIS DOCUMENT HAS BEEN ELECTRONICALLY SIGNED BY RONI SANTIAGO JR. MD            Impression:    --acute neuro change as above 6/29-6/30, moved to ICU with further workup per neuro/critical care team with concern for Left MCA ischemia per radiology on CT perfusion study    --Acute left foot/hallux cellulitis.  In setting of chronic left hallux crust/callus and imaging from October 2017 suggesting left hallux osteomyelitis.  That imaging was at [facility].  She attempted antibiotics and a more conservative route but symptoms  have recurred.  I'm not optimistic for chronic healing with antibiotics and further conservative measures.  Dr. Carrington has seen patient and help define option/timing/threshold for amputation.  Bone scan is abnormal at left hallux per my discussion with radiology, Dr Lunsford Consistent with clinical concern for left hallux osteomyelitis per discussion with him     --Acute right sacral shingles.  Valtrex initiated.  This appears dermatomal.  Contact isolation initiated     --Acute dysuria and urinary incontinence with UTI and abnormal U/A.  kleb species     --Chronic peripheral vascular disease.  Left lower extremity revascularization October 2017 with current extent to be clarified by Dr. Carrington.  I discussed with him.     --Sick sinus syndrome/chronic atrial fibrillation with past AV node ablation/permanent pacemaker.     --Chronic kidney disease described as stage III.  Monitor to help guide further adjustments in antimicrobials     --Diabetes type 2.  You need to tightly control blood sugar to give best chance for healing     --Thrombocytopenia.  Monitor, may be chronic     --Subacute fall onto right hip and around Father's Day.  Has bruise.  Further workup radiographically or surgically per internal medicine at their discretion        PLAN:     --IV Zyvox/cefepime/flagyl,  oral Valtrex     --Check/review labs cultures and scans     --Contact precautions in place given dermatomal shingles     --Discussed with  Dr. Carrington and pharmacy     --Discussed with microbiology     --Partial history per nursing staff     --Discussed with wound care team    --Bone Scan noted and d/w Dr Lunsford/Zeb    --Probable amputation per Dr. Carrington if patient agrees      Pan Painting MD  7/3/2018

## 2018-07-03 NOTE — THERAPY TREATMENT NOTE
Acute Care - Physical Therapy Treatment Note  [facility]     Patient Name: Lynne Sanchez  : 1934  MRN: [id number]  Today's Date: 7/3/2018  Onset of Illness/Injury or Date of Surgery: 18  Date of Referral to PT: 18  Referring Physician: MD Dia    Admit Date: 2018    Visit Dx:    ICD-10-CM ICD-9-CM   1. Other chronic osteomyelitis of left foot (CMS/HCC) M86.672 730.17   2. Cerebrovascular accident (CVA), unspecified mechanism (CMS/HCC) I63.9 434.91   3. Impaired mobility and ADLs Z74.09 799.89   4. Impaired functional mobility, balance, gait, and endurance Z74.09 V49.89   5. 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/HCC)   • Melena   • Chronic gastritis   • Peripheral arterial disease (CMS/HCC)   • Foot infection   • Fever   • Pyogenic inflammation of bone (CMS/HCC)   • Cerebrovascular accident (CVA) due to thrombosis of left anterior cerebral artery (CMS/HCC)       Therapy Treatment          Rehabilitation Treatment Summary     Row Name 18 1044 18 0800          Treatment Time/Intention    Discipline physical therapist  -LS occupational therapist  -JESSICA     Document Type therapy note (daily note)  -LS therapy note (daily note)  -JESSICA     Subjective Information no complaints  -LS no complaints  -JESSICA     Mode of Treatment physical therapy  -LS individual therapy;occupational therapy  -JESSICA     Patient/Family Observations  -- Pt. noted with BM in underwear when [place] cath removed.  Pt. with IV LUE. Spouse present during session  -JESSICA     Care Plan Review  -- care plan/treatment goals reviewed;risks/benefits reviewed  -JESSICA     Care Plan Review, Other Participant(s)   -- spouse  -JESSICA     Patient Effort good  -LS good  -JESSICA     Existing Precautions/Restrictions fall   airborne; shingles  -LS fall  -JESSICA     Recorded by [LS] Loly Telles, PT 07/03/18 1318 [JESSICA] Sandra Varma, OT 07/03/18 0851     Row Name 07/03/18 1044 07/03/18 0800          Vital Signs    Pre Systolic BP Rehab 134  -  -JESSICA     Pre Treatment Diastolic BP 63  -LS 79  -JESSICA     Post Systolic BP Rehab  -- 113  -JESSICA     Post Treatment Diastolic BP  -- 98  -JESSICA     Pretreatment Heart Rate (beats/min) 70  -LS 70  -JESSICA     Posttreatment Heart Rate (beats/min)  -- 70  -JESSICA     Pre SpO2 (%) 98  -LS 96  -JESSICA     O2 Delivery Pre Treatment room air  -LS room air  -JESSICA     Post SpO2 (%)  -- 97  -JESSICA     O2 Delivery Post Treatment  -- room air  -JESSICA     Pre Patient Position  -- Supine  -JESSICA     Intra Patient Position  -- Standing  -JESSICA     Post Patient Position  -- Sitting  -JESSICA     Recorded by [LS] Loly Telles, PT 07/03/18 1318 [JESSICA] Sandra Varma, OT 07/03/18 0851     Row Name 07/03/18 1044 07/03/18 0800          Cognitive Assessment/Intervention- PT/OT    Affect/Mental Status (Cognitive) WFL  -LS  --     Orientation Status (Cognition) oriented x 4  -LS oriented x 4  -JESSICA     Follows Commands (Cognition) follows one step commands;over 90% accuracy;physical/tactile prompts required;repetition of directions required;verbal cues/prompting required  - follows one step commands;L  -JESSICA     Cognitive Function (Cognitive)  -- WFL  -JESSICA     Safety Deficit (Cognitive) mild deficit;insight into deficits/self awareness;safety precautions awareness  -LS  --     Personal Safety Interventions fall prevention program maintained;gait belt;nonskid shoes/slippers when out of bed  -LS fall prevention program maintained;gait belt;nonskid shoes/slippers when out of bed  -JESSICA     Recorded by [LS] Loly Telles, PT 07/03/18 1318 [JESSICA] Sandra Varma, OT 07/03/18 0851     Row Name 07/03/18 0800             Safety Issues, Functional Mobility    Impairments  Affecting Function (Mobility) balance;endurance/activity tolerance;strength  -JESSICA      Recorded by [JESSICA] Sandra Varma, OT 07/03/18 0851      Row Name 07/03/18 1044 07/03/18 0800          Bed Mobility Assessment/Treatment    Bed Mobility Assessment/Treatment  -- supine-sit;scooting/bridging  -JESSICA     Scooting/Bridging [place] (Bed Mobility)  -- supervision  -JESSICA     Assistive Device (Bed Mobility)  -- bed rails;head of bed elevated  -JESSICA     Comment (Bed Mobility) [facility]  -LS good sequencing today  -JESSICA     Recorded by [LS] Loly Telles, PT 07/03/18 1318 [JESSICA] Sandra Varma, OT 07/03/18 0851     Row Name 07/03/18 0800             Functional Mobility    Functional Mobility- Ind. Level contact guard assist  -JESSICA      Functional Mobility- Device straight cane   pt. also holding to IV pole OT pushing  -JESSICA      Functional Mobility-Distance (Feet) 25  -JESSICA      Functional Mobility- Safety Issues --   decreased balance and strength  -JESSICA      Functional Mobility- Comment Pt. would benefit use of rx wx.  -JESSICA      Recorded by [JESSICA] Sandra Varma, OT 07/03/18 0851      Row Name 07/03/18 1044 07/03/18 0800          Transfer Assessment/Treatment    Transfer Assessment/Treatment sit-stand transfer;stand-sit transfer  -LS sit-stand transfer;stand-sit transfer  -JESSICA     Recorded by [LS] Loly Telles, PT 07/03/18 1318 [JESSICA] Sandra Varma, OT 07/03/18 0851     Row Name 07/03/18 1044 07/03/18 0800          Sit-Stand Transfer    Sit-Stand [place] (Transfers) contact guard;verbal cues  -LS contact guard  -JESSICA     Assistive Device (Sit-Stand Transfers) walker, front-wheeled  -LS cane, straight  -JESSICA     Recorded by [LS] Loly Telles, PT 07/03/18 1318 [JESSICA] Sandra Varma, OT 07/03/18 0851     Row Name 07/03/18 1044 07/03/18 0800          Stand-Sit Transfer    Stand-Sit [place] (Transfers) contact guard;verbal cues  -LS contact guard  -JESSICA     Assistive Device (Stand-Sit Transfers) walker, front-wheeled  -LS cane, straight  -JESSICA      Recorded by [LS] Loly Telles, PT 07/03/18 1318 [JESSICA] Sandra Varma, OT 07/03/18 0851     Row Name 07/03/18 0800             Toilet Transfer    Type (Toilet Transfer) sit-stand;stand-sit  -JESSICA      [place] Level (Toilet Transfer) contact guard  -JESSICA      Assistive Device (Toilet Transfer) grab bars/safety frame;cane, straight   without safety bar pt. would need increased assist.  -JESSICA      Recorded by [JESSICA] Sandra Varma, OT 07/03/18 0851      Row Name 07/03/18 1044             Gait/Stairs Assessment/Training    82178 - Gait Training Minutes  15  -LS      Gait/Stairs Assessment/Training distance ambulated  -LS2      [place] Level (Gait) minimum assist (75% patient effort);verbal cues  -LS2      Assistive Device (Gait) walker, front-wheeled  -LS2      Distance in Feet (Gait) 85  -LS2      Deviations/Abnormal Patterns (Gait) stride length decreased;gait speed decreased  -LS2      Bilateral Gait Deviations forward flexed posture  -LS2      Comment (Gait/Stairs) VC's for upright posture and keeping Rwx close to body. Progressed to moments of CGA but req'd min A with turns due to noted balance deficits.   -LS2      Recorded by [LS] Loly Telles, PT 07/03/18 1322  [LS2] Loly Telles, PT 07/03/18 1318      Row Name 07/03/18 0800             ADL Assessment/Intervention    75852 - OT Self Care/Mgmt Minutes 30  -JESSICA      BADL Assessment/Intervention toileting;lower body dressing;grooming  -JESSICA      Recorded by [JESSICA] Sandra Varma, OT 07/03/18 0851      Row Name 07/03/18 0800             Lower Body Dressing Assessment/Training    Lower Body Dressing [place] Level doff;don;pants/bottoms;socks;moderate assist (50% patient effort);dependent (less than 25% patient effort)  -JESSICA      Lower Body Dressing Position edge of bed sitting   on toilet  -JESSICA      Comment (Lower Body Dressing) Pt. could not kayley socks at high EOB level.  Will need try lower surface next session.  Pt. mod assist to kayley and doff undwear on toilet.   -JESSICA      Recorded by [JESSICA] Sandra Varma, OT 07/03/18 0851      Row Name 07/03/18 0800             Grooming Assessment/Training    [place] Level (Grooming) wash face, hands  -JESSICA      Grooming Position supported sitting  -JESSICA      Comment (Grooming) washed hands with wipe sitting on toilet  -JESSICA      Recorded by [JESSICA] Sandra Varma, OT 07/03/18 0851      Row Name 07/03/18 0800             Toileting Assessment/Training    [place] Level (Toileting) moderate assist (50% patient effort);verbal cues;nonverbal cues (demo/gesture);change pad/brief;adjust/manage clothing;toileting skills  -JESSICA      Assistive Devices (Toileting) grab bar/safety frame  -JESSICA      Toileting Position supported standing;supported sitting  -JESSICA      Comment (Toileting) Pt. was able to hold to grab bar and clean self part way, but so soiled OT had to assist.  -JESSICA      Recorded by [JESSICA] Sandra Varma, OT 07/03/18 0851      Row Name 07/03/18 0800             BADL Safety/Performance    Impairments, BADL Safety/Performance balance;strength  -JESSICA      Skilled BADL Treatment/Intervention --   benefit safety bar placement at home.  -JESSICA      Recorded by [JESSICA] Sandra Varma, OT 07/03/18 0851      Row Name 07/03/18 1044 07/03/18 0800          Therapeutic Exercise    Therapeutic Exercise seated, lower extremities  -LS  --     Additional Documentation Therapeutic Exercise (Row)  -LS  --     35851 - PT Therapeutic Exercise Minutes 9  -LS2  --     35454 - PT Therapeutic Activity Minutes --  -LS2  --     83641 - OT Therapeutic Exercise Minutes  -- 8  -JESSICA     Recorded by [LS] Loly Telles, PT 07/03/18 1318  [LS2] Loly Telles, PT 07/03/18 1322 [JESSICA] Sandra Varma, OT 07/03/18 0851     Row Name 07/03/18 1044             Lower Extremity Seated Therapeutic Exercise    Performed, Seated Lower Extremity (Therapeutic Exercise) hip flexion/extension;ankle dorsiflexion/plantarflexion;LAQ (long arc quad), knee extension  -LS      Exercise Type, Seated Lower Extremity  (Therapeutic Exercise) AROM (active range of motion)  -LS      Expected Outcomes, Seated Lower Extremity (Therapeutic Exercise) improve functional tolerance, household activity  -LS      Sets/Reps Detail, Seated Lower Extremity (Therapeutic Exercise) 1/10  -LS      Recorded by [LS] Loly Telles, PT 07/03/18 1318      Row Name 07/03/18 1044 07/03/18 0800          Static Sitting Balance    Level of [place] (Unsupported Sitting, Static Balance) supervision  -LS independent  -JESSICA     Sitting Position (Unsupported Sitting, Static Balance) sitting in chair  -LS sitting on edge of bed  -JESSICA     Recorded by [LS] Loly Telles, PT 07/03/18 1318 [JESSICA] Sandra Varma, OT 07/03/18 0851     Row Name 07/03/18 1044 07/03/18 0800          Static Standing Balance    Level of [place] (Supported Standing, Static Balance) contact guard assist  -LS contact guard assist  -JESSICA     Assistive Device Utilized (Supported Standing, Static Balance) rolling walker  -LS straight cane  -JESSICA     Recorded by [LS] Loly Telles, PT 07/03/18 1318 [JESSICA] Sandra Varma, OT 07/03/18 0851     Row Name 07/03/18 0800             Dynamic Standing Balance    Level of [place], Reaches Outside Midline (Standing, Dynamic Balance) contact guard assist   with grab bar or cane and IV pole  -JESSICA      Recorded by [JESSICA] Sandra Varma, OT 07/03/18 0851      Row Name 07/03/18 1044 07/03/18 0800          Positioning and Restraints    Pre-Treatment Position sitting in chair/recliner  -LS in bed  -JESSICA     Post Treatment Position chair  -LS chair  -JESSICA     In Chair notified nsg;reclined;call light within reach;encouraged to call for assist;exit alarm on;with family/caregiver;waffle cushion;legs elevated;heels elevated  -LS reclined;call light within reach;encouraged to call for assist;exit alarm on;legs elevated;with family/caregiver  -JESSICA     Recorded by [LS] Loly Telles, PT 07/03/18 1318 [JESSICA] Sandra Varma, OT 07/03/18 0851     Row Name 07/03/18 1044 07/03/18  0800          Pain Scale: Numbers Pre/Post-Treatment    Pain Scale: Numbers, Pretreatment 5/10  -LS 0/10 - no pain  -JESSICA     Pain Scale: Numbers, Post-Treatment 0/10 - no pain  -LS 0/10 - no pain  -JESSICA     Pain Location --   buttocks; shingles location  -LS  --     Pre/Post Treatment Pain Comment improved  -LS  --     Pain Intervention(s) Repositioned;Ambulation/increased activity  -LS  --     Recorded by [LS] Loly Telles, PT 07/03/18 1318 [JESSICA] Sandra Varma, OT 07/03/18 0851     Row Name 07/03/18 0800             Vision Assessment/Intervention    Visual Impairment/Limitations corrective lenses full time  -JESSICA      Recorded by [JESSICA] Sandra Varma, OT 07/03/18 0851      Row Name                Wound 06/27/18 1659 Left toe pressure injury;diabetic/neuropathic ulceration    Wound - Properties Group Date first assessed: 06/27/18 [KA] Time first assessed: 1659 [KA] Present On Admission : yes [KA] Side: Left [KA] Location: toe [KA] Type: pressure injury;diabetic/neuropathic ulceration [KA] Additional Comments: possible arterial ulcer; pt pt skin tear from tape removal [CP] Recorded by:  [CP] BILL Magallanes 06/28/18 1335 [KA] Anel Woodruff RN 06/27/18 1700    Row Name                Wound 06/28/18 1030 Right distal toe other (see comments)    Wound - Properties Group Date first assessed: 06/28/18 [CP] Time first assessed: 1030 [CP] Present On Admission : yes;picture taken [CP] Side: Right [CP] Orientation: distal [CP] Location: toe [CP] Type: other (see comments) [CP] Additional Comments: unknown origin; possible tape inury; right great toe [CP] Recorded by:  [CP] BILL Magallanes 06/28/18 1340    Row Name                Wound 06/28/18 1030 Right elbow abrasion    Wound - Properties Group Date first assessed: 06/28/18 [CP] Time first assessed: 1030 [CP] Present On Admission : yes;picture taken [CP] Side: Right [CP] Location: elbow [CP] Type: abrasion [CP] Recorded by:  [CP] BILL Magallanes  06/28/18 1344    Row Name                Wound 06/28/18 1030 Left lower arm skin tear    Wound - Properties Group Date first assessed: 06/28/18 [CP] Time first assessed: 1030 [CP] Present On Admission : yes [CP] Side: Left [CP] Orientation: lower [CP] Location: arm [CP] Type: skin tear [CP] Additional Comments: forearm and elbow [CP] Recorded by:  [CP] Lorraine Morillo, APRN 06/28/18 1346    Row Name 07/03/18 1044             Plan of Care Review    Plan of Care Reviewed With patient;spouse  -LS      Recorded by [LS] Loly Telles, PT 07/03/18 1318      Row Name 07/03/18 0800             Outcome Summary/Treatment Plan (OT)    Daily Summary of Progress (OT) progress toward functional goals is good  -JESSICA      Barriers to Overall Progress (OT) diarrhea  -JESSICA      Plan for Continued Treatment (OT) cont. progress to goals  -JESSICA      Recorded by [JESSICA] Sandra Varma, OT 07/03/18 0851      Row Name 07/03/18 1044             Outcome Summary/Treatment Plan (PT)    Daily Summary of Progress (PT) progress toward functional goals is good  -LS      Recorded by [LS] Loly Telles, PT 07/03/18 1318        User Key  (r) = Recorded By, (t) = Taken By, (c) = Cosigned By    Initials Name Effective Dates Discipline    JESSICA Sandra Varma, OT 06/08/18 -  OT    CP Lorraine Morillo, APRN 06/08/18 -  Nurse    LS Loly Telles, PT 06/19/15 -  PT    KA Anel Woodruff RN 06/23/17 -  Nurse          Rash 06/27/18 2044 Left lower breast other (see comments) (Active)   Color pink 7/2/2018  6:00 PM   Care, Rash open to air 7/2/2018  6:00 PM       Rash 06/28/18 1030 Right posterior leg vesicle (Active)   Distribution regional 7/3/2018  6:00 AM   Configuration/Shape asymmetric 7/3/2018  6:00 AM   Borders indistinct 7/3/2018  6:00 AM   Characteristics dry 7/3/2018  6:00 AM   Color purple;red 7/3/2018  6:00 AM       Wound 06/27/18 1659 Left toe pressure injury;diabetic/neuropathic ulceration (Active)   Dressing Appearance open to air 7/3/2018  6:00 AM    Periwound intact;dry;pink;blanchable 7/3/2018  6:00 AM   Edges irregular 7/3/2018  6:00 AM   Drainage Amount none 7/3/2018  6:00 AM       Wound 06/28/18 1030 Right distal toe other (see comments) (Active)   Dressing Appearance open to air 7/3/2018  6:00 AM   Periwound intact;dry;pink;blanchable 7/3/2018  6:00 AM   Edges irregular 7/3/2018  6:00 AM   Drainage Amount none 7/3/2018  6:00 AM       Wound 06/28/18 1030 Right elbow abrasion (Active)   Dressing Appearance dry;intact 7/3/2018  6:00 AM   Drainage Amount none 7/3/2018  6:00 AM   Care, Wound irrigated with;sterile normal saline;other (see comments) 7/2/2018  2:00 PM   Dressing Care, Wound foam 7/2/2018  2:00 PM       Wound 06/28/18 1030 Left lower arm skin tear (Active)   Dressing Appearance dry;intact 7/3/2018  6:00 AM   Drainage Amount none 7/3/2018  6:00 AM   Care, Wound irrigated with;sterile normal saline 7/2/2018  2:00 PM   Dressing Care, Wound non-adherent 7/2/2018  6:00 PM             Physical Therapy Education     Title: PT OT SLP Therapies (Active)     Topic: Physical Therapy (Active)     Point: Mobility training (Active)    Learning Progress Summary     Learner Status Readiness Method Response Comment Documented by    Patient Active Acceptance E NR Educated pt on importance of mobility to return home and to PLOF. Informed pt of the benefits of maintaining mobility prior to possibility of surgery to ease recovery. JESSICA 07/02/18 0859     Active Acceptance E,D NR   06/30/18 1210    Significant Other Active Acceptance E,D NR   06/30/18 1210          Point: Home exercise program (Active)    Learning Progress Summary     Learner Status Readiness Method Response Comment Documented by    Patient Active Acceptance E NR Educated pt on importance of mobility to return home and to PLOF. Informed pt of the benefits of maintaining mobility prior to possibility of surgery to ease recovery. JESSICA 07/02/18 0859          Point: Body mechanics (Active)    Learning  Progress Summary     Learner Status Readiness Method Response Comment Documented by    Patient Active Acceptance E NR Educated pt on importance of mobility to return home and to PLOF. Informed pt of the benefits of maintaining mobility prior to possibility of surgery to ease recovery. JESSICA 07/02/18 0859     Active Acceptance E,D NR   06/30/18 1210    Significant Other Active Acceptance E,D NR   06/30/18 1210          Point: Precautions (Active)    Learning Progress Summary     Learner Status Readiness Method Response Comment Documented by    Patient Active Acceptance E NR Educated pt on importance of mobility to return home and to PLOF. Informed pt of the benefits of maintaining mobility prior to possibility of surgery to ease recovery. JESSICA 07/02/18 0859     Active Acceptance E,D NR   06/30/18 1210    Significant Other Active Acceptance E,D NR   06/30/18 1210                      User Key     Initials Effective Dates Name Provider Type Discipline     06/19/15 -  Loly Telles, PT Physical Therapist PT     05/15/18 -  Dillon Lawrence, PT Student PT Student PT                    PT Recommendation and Plan  Anticipated Discharge Disposition (PT): home with home health, home with assist  Planned Therapy Interventions (PT Eval): balance training, bed mobility training, gait training, home exercise program, patient/family education, strengthening, stair training, transfer training  Therapy Frequency (PT Clinical Impression): daily  Outcome Summary/Treatment Plan (PT)  Daily Summary of Progress (PT): progress toward functional goals is good  Anticipated Discharge Disposition (PT): home with home health, home with assist  Plan of Care Reviewed With: patient, spouse  Progress: improving  Outcome Summary: Pt demonstrated ability to progress forward ambulation distance to 85 total ft with use of RWx and min A; noted improvement in gait safety/quality with RWx use. Edu pt re: LE seated HEP. WIll cont to progress as  clinically warranted.           Outcome Measures     Row Name 07/03/18 1044 07/03/18 0800 07/02/18 0859       How much help from another person do you currently need...    Turning from your back to your side while in flat bed without using bedrails? 3  -LS  -- 3  -LS (r) JESSICA (t) LS (c)    Moving from lying on back to sitting on the side of a flat bed without bedrails? 3  -LS  -- 3  -LS (r) JESSICA (t) LS (c)    Moving to and from a bed to a chair (including a wheelchair)? 3  -LS  -- 3  -LS (r) JESSICA (t) LS (c)    Standing up from a chair using your arms (e.g., wheelchair, bedside chair)? 3  -LS  -- 3  -LS (r) JESSICA (t) LS (c)    Climbing 3-5 steps with a railing? 2  -LS  -- 2  -LS (r) JESSICA (t) LS (c)    To walk in hospital room? 3  -LS  -- 3  -LS (r) JESSICA (t) LS (c)    AM-PAC 6 Clicks Score 17  -LS  -- 17  -LS (r) JESSICA (t)       How much help from another is currently needed...    Putting on and taking off regular lower body clothing?  -- 2  -JBA  --    Bathing (including washing, rinsing, and drying)  -- 2  -JBA  --    Toileting (which includes using toilet bed pan or urinal)  -- 2  -JBA  --    Putting on and taking off regular upper body clothing  -- 3  -JBA  --    Taking care of personal grooming (such as brushing teeth)  -- 3  -JBA  --    Eating meals  -- 4  -JBA  --    Score  -- 16  -JBA  --       Modified Mattie Scale    Modified [place] Scale  -- 3 - Moderate disability.  Requiring some help, but able to walk without assistance.  -JBA  --       Functional Assessment    Outcome Measure Options AM-PAC 6 Clicks Basic Mobility (PT)  -LS AM-PAC 6 Clicks Daily Activity (OT)  -JBA AM-PAC 6 Clicks Basic Mobility (PT)  -LS (r) JESSICA (t) LS (c)      User Key  (r) = Recorded By, (t) = Taken By, (c) = Cosigned By    Initials Name Provider Type    JBA Sandra Hillary Varma, OT Occupational Therapist    LS Loly Telles, PT Physical Therapist    JESSICA Lawrence, PT Student PT Student           Time Calculation:         PT Charges     Row Name  07/03/18 1044             Time Calculation    Start Time 1044  -LS      PT Received On 07/03/18  -         Time Calculation- PT    Total Timed Code Minutes- PT 24 minute(s)  -LS         Timed Charges    00207 - PT Therapeutic Exercise Minutes 9  -LS      28582 - Gait Training Minutes  15  -LS      68801 - PT Therapeutic Activity Minutes --  -        User Key  (r) = Recorded By, (t) = Taken By, (c) = Cosigned By    Initials Name Provider Type     Loly Telles, PT Physical Therapist        Therapy Suggested Charges     Code   Minutes Charges    92186 (CPT®) Hc Pt Neuromusc Re Education Ea 15 Min      76890 (CPT®) Hc Pt Ther Proc Ea 15 Min 9 1    43644 (CPT®) Hc Gait Training Ea 15 Min 15 1    73643 (CPT®) Hc Pt Therapeutic Act Ea 15 Min      11735 (CPT®) Hc Pt Manual Therapy Ea 15 Min      83561 (CPT®) Hc Pt Iontophoresis Ea 15 Min      64789 (CPT®) Hc Pt Elec Stim Ea-Per 15 Min      95610 (CPT®) Hc Pt Ultrasound Ea 15 Min      73973 (CPT®) Hc Pt Self Care/Mgmt/Train Ea 15 Min      Total  24 2        Therapy Charges for Today     Code Description Service Date Service Provider Modifiers Qty    30168245042 HC PT THER PROC EA 15 MIN 7/3/2018 Loly Telles, PT GP 1    46222662666 HC GAIT TRAINING EA 15 MIN 7/3/2018 Loly Telles, PT GP 1          PT G-Codes  Outcome Measure Options: AM-PAC 6 Clicks Basic Mobility (PT)    Loly Telles, PT  7/3/2018

## 2018-07-03 NOTE — PROGRESS NOTES
INTENSIVIST   PROGRESS NOTE     Hospital:  LOS: 6 days     Ms. Lynne Sanchez, 83 y.o. female is followed for a Chief Complaint of: Left Toe osteomyelitis, CVA s/p TPA      Subjective   S   Ms. Sanchez is an 84yo F who was admitted to the [facility] on 6/27/18 due to concerns of a worsening wound on the left great toe. Cultures from a previous revasculariztion procedure grew Enterobacter/Pseudomonas. Debridement with possible amputation was recommended at that time, but the patient was not interested and was transitioned to Fortaz for 6 weeks.     She was seen by ID and was started on Zyvox, Zosyn, and oral Valtrex for acute right sacral shingles. CT surgery has been following for potential amputation of the left great toe. Her Plavix has been on hold.     On the evening of 6/29, she had a sudden onset of slurred speech, aphasia and facial droop. A code stroke was called and her NIHSS was 3 initially and worsened to 6 prior to TPA. CT head was negative. CT perfusion was performed and Dr. Baxter reviewed the scan and determined that no intervention was needed. Neurology was consulted and recommended TPA. She has continued to be monitored in the ICU.     Interval History:  No events overnight. She has not decided if she wants to undergo amputation of her left great toe.        The patient's relevant past medical, surgical and social history were reviewed and updated in Epic as appropriate.      ROS:   Constitutional: Negative for fever.   Respiratory: Negative for dyspnea.   Cardiovascular: Negative for chest pain.   Gastrointestinal: Negative for nausea, vomiting and diarrhea.        Objective   O     Vitals:  Temp  Min: 98 °F (36.7 °C)  Max: 98.5 °F (36.9 °C)  BP  Min: 96/45  Max: 152/100  Pulse  Min: 69  Max: 73  Resp  Min: 12  Max: 16  SpO2  Min: 94 %  Max: 100 % No Data Recorded    Intake/Ouptut 24 hrs (7:00AM - 6:59 AM)  Intake & Output (last 3 days)       06/30 0701 - 07/01 0700 07/01 0701 -  07/02 0700 07/02 0701 - 07/03 0700 07/03 0701 - 07/04 0700    P.O.  480 125     I.V. (mL/kg) 962 (14.4) 653.6 (9.8) 577.4 (8.7)     IV Piggyback 400 1000 500     Total Intake(mL/kg) 1362 (20.4) 2133.6 (32) 1202.4 (18)     Urine (mL/kg/hr)  500 (0.3) 400 (0.2)     Stool   0 (0)     Total Output   500 400      Net +1362 +1633.6 +802.4              Unmeasured Urine Occurrence 1 x 1 x 2 x     Unmeasured Stool Occurrence 1 x  2 x             Physical Examination  Telemetry:  Normal sinus rhythm.    Constitutional:  No acute distress.   Cardiovascular: Normal rate, regular and rhythm. Normal heart sounds.  No murmurs, gallop or rub.   Respiratory: No respiratory distress. Normal respiratory effort.  Normal breath sounds  Clear to ascultation and percussion.    Abdominal:  Soft. No masses. Non-tender. No distension. No HSM.   Extremities: No digital cyanosis. No clubbing.  No peripheral edema.   Neurological:   Alert and Oriented to person, place, and time.                Results from last 7 days  Lab Units 07/03/18  0349 07/02/18  0352 07/01/18  1255 07/01/18  0453 06/30/18  0431   WBC 10*3/mm3 5.13  --   --  5.16 5.72   HEMOGLOBIN g/dL 7.4* 7.5* 7.4* 7.2* 7.8*   MCV fL 84.7  --   --  84.9 84.4   PLATELETS 10*3/mm3 155  --   --  134* 121*       Results from last 7 days  Lab Units 07/03/18  0349 07/01/18  0453 06/30/18  0431  06/27/18  1653   SODIUM mmol/L 139 140 139  < > 139   POTASSIUM mmol/L 4.3 3.5 3.8  < > 4.1   CO2 mmol/L 22.0 24.0 26.0  < > 27.0   CREATININE mg/dL 1.17 1.36* 1.51*  < > 1.38*   GLUCOSE mg/dL 89 97 167*  < > 203*   MAGNESIUM mg/dL 2.1  --  2.1  --  1.9   PHOSPHORUS mg/dL 2.8  --  3.4  --  3.1   < > = values in this interval not displayed.  Estimated Creatinine Clearance: 38.4 mL/min (by C-G formula based on SCr of 1.17 mg/dL).    Results from last 7 days  Lab Units 06/27/18  1653   ALK PHOS U/L 81   BILIRUBIN mg/dL 1.2   ALT (SGPT) U/L 16   AST (SGOT) U/L 21             Images:  Imaging Results (last  24 hours)     ** No results found for the last 24 hours. **            Results: Reviewed.  I reviewed the patient's new laboratory and imaging results.  I independently reviewed the patient's new images.    Medications: Reviewed.    Assessment/Plan   A / P     Ms. Sanchez is an 82yo F who was admitted for left great toe osteomyelitis and then had a CVA requiring TPA on 6/29. She has been monitored in the ICU. She is being followed by ID and Dr. Carrington for her left great toe osteomyelitis. She cannot decide if she wants to purse amputation or not.     Nutrition:   Diet Regular; Cardiac  Advance Directives:   Code Status and Medical Interventions:   Ordered at: 06/28/18 1008     Level Of Support Discussed With:    Patient     Code Status:    CPR     Medical Interventions (Level of Support Prior to Arrest):    Full       Hospital Problem List     * (Principal)Osteomyelitis of left foot (CMS/HCC)    Chronic atrial fibrillation (CMS/HCC)    Overview Signed 12/22/2016  1:51 PM by Pan Saunders     1. Chronic atrial fibrillation, status post [employer] pacemaker implantation and AV node ablation:  a. Diagnosed June 2005.  b. Status post ECV restoring normal sinus rhythm, June 2005.  c. Rythmol initiated 05/01/2006.  d. Previously digoxin toxicity.  e. GXT Cardiolite, 09/08/2005:  No reversible ischemia.  LV function not performed secondary to atrial fibrillation.   f. Status post successful external cardioversion on 10/01/2008, Tessa Downey MD.  g. EKG on 10/21/2008:  Recurrence of atrial fibrillation with rate of 109.  h. Recurrent atrial fibrillation by EKG, 11/03/2008, asymptomatic.  i. Status post [employer] pacemaker implantation and AV node ablation.   j. Echocardiogram 02/15/2010:  Moderate MR, moderate TR.  RVSP 50.  EF greater than 55%, left atrial enlargement at 4.3 cm.           Type 2 diabetes mellitus treated without insulin (CMS/HCC) (Chronic)    Normocytic anemia    Peripheral arterial disease (CMS/HCC)     Foot infection    Fever    Pyogenic inflammation of bone (CMS/[facility])    Overview Signed 6/29/2018  2:35 PM by ANN Claudio     Added automatically from request for surgery [id number]         Cerebrovascular accident (CVA) due to thrombosis of left anterior cerebral artery (CMS/[facility])          Assessment / Plan:    1. Continue antibiotics per ID.   2. Patient to decide regarding amputation. Dr. Carrington continues to follow.    3. Hold Eliquis until patient decides regarding surgery. May be restarted if she decides not to pursue surgery.   4. BP well controlled.   5. Neurology following for recent stroke.   6. Okay to transfer to telemetry when a bed is available.   7. AM labs    Plan of care and goals reviewed during interdisciplinary rounds.  I discussed the patient's findings and my recommendations with patient and nursing staff    Time: was greater than 35 minutes.    Angelica Estrella, DO    Intensive Care Medicine and Pulmonary Medicine

## 2018-07-03 NOTE — PROGRESS NOTES
"Cardiothoracic Surgery Progress Note      POD #:     LOS: 6 days      Subjective: Patient is more alert this morning.  She is going to discuss the possible amputation of the left great toe with Dr. Wisdom of infectious disease before she makes a final decision on proceeding with left great toe amputation    Chief Complaint: Minimal complaint of left great toe pain    Objective:  Vital Signs  Temp:  [98 °F (36.7 °C)-98.5 °F (36.9 °C)] 98.5 °F (36.9 °C)  Heart Rate:  [69-75] 71  Resp:  [12-16] 12  BP: ()/() 125/50    Physical Exam:   General Appearance: Patient appears more alert today and oriented ×3   Lungs:   Heart:   Skin:   Incision: Left great toe was scab is unchanged with no drainage noted and there is no erythema or evidence of cellulitis     Results: Laboratory results below noted    Results from last 7 days  Lab Units 07/03/18  0349   WBC 10*3/mm3 5.13   HEMOGLOBIN g/dL 7.4*   HEMATOCRIT % 24.3*   PLATELETS 10*3/mm3 155       Results from last 7 days  Lab Units 07/03/18  0349   SODIUM mmol/L 139   POTASSIUM mmol/L 4.3   CHLORIDE mmol/L 109   CO2 mmol/L 22.0   BUN mg/dL 15   CREATININE mg/dL 1.17   GLUCOSE mg/dL 89   CALCIUM mg/dL 7.8*         Assessment:Recurrent osteomyelitis of the left distal phalanx of the great toe  #2.  Recent left middle cerebral artery CVA-almost all symptoms have resolved with some facial droop remaining right side #3.  Severe peripheral vascular disease left lower extremity and successive endograft procedures with stenting and angioplasty.  Currently patient has a\" left dorsalis pedis pulse      Plan: Patient is going to establish was to proceed ahead with a left great toe amputation at this time or not she was discussed this with Dr. Painting of infectious disease before making a final decision.  We will await her decision on possible amputation left great toe.      Prakash Carrington MD - 07/03/18 - 9:04 AM        "

## 2018-07-03 NOTE — PROGRESS NOTES
Continued Stay Note  [facility]     Patient Name: Lynne Sanchez  MRN: [id number]  Today's Date: 7/3/2018    Admit Date: 6/27/2018          Discharge Plan     Row Name 07/03/18 1342       Plan    Plan Home at discharge    Patient/Family in Agreement with Plan yes    Plan Comments Patient may go for left toe amputation, she is trying to decide if she wants to have the amputation. Per P.T. note patient did ambulate 85ft with RW on 7-3. Patient may need SNF if she goes for surgery.    Final Discharge Disposition Code 01 - home or self-care              Discharge Codes    No documentation.       Expected Discharge Date and Time     Expected Discharge Date Expected Discharge Time    Jul 2, 2018             Nikole Wilson RN

## 2018-07-03 NOTE — CONSULTS
"Diabetes Education  Assessment/Teaching    Patient Name:  Lynne Sanchez  YOB: 1934  MRN: [id number]  Admit Date:  6/27/2018      Assessment Date:  7/3/2018    Most Recent Value   General Information    Referral From:  A1c   Height  165.1 cm (65\")   Weight  66.7 kg (147 lb)   Weight Method  Bed scale   Pregnancy Assessment   Diabetes History   What type of diabetes do you have?  Type 2   Length of Diabetes Diagnosis  10 + years   Current DM knowledge  good   Do you test your blood sugar at home?  yes   Frequency of checks  every morning    Meter type  One Touch    Who performs the test?  self    Typical readings  \"100's\"   Have you had low blood sugar? (<70mg/dl)  no   How often do you check your feet?  weekly   Do you have any diabetes complications?  circulation problems, heart disease, neuropathy   Education Preferences   What areas of diabetes would you like to learn about?  avoiding high blood sugar, diabetes complications, understanding diabetes   Nutrition Information   Are you currently following a special meal plan?  frequently [pt states she monitors carb intake and portions]   Assessment Topics   Healthy Eating - Assessment  Competent   Being Active - Assessment  Competent [performs own activities of daily living ]   Taking Medication - Assessment  Competent [denies skipping any medications ]   Problem Solving - Assessment  Competent   Reducing Risk - Assessment  Needs education   Healthy Coping - Assessment  Competent   Monitoring - Assessment  Competent   DM Goals            Most Recent Value   DM Education Needs   Meter  Has own   Meter Type  One Touch   Frequency of Testing  AC   Medication  Oral   Problem Solving  Hyperglycemia, Sick days, Signs, Symptoms, Treatment, Hypoglycemia   Reducing Risks  A1C testing, Eye exam, Cardiovascular, Foot care   Healthy Coping  Appropriate   Discharge Plan  Home   Motivation  Engaged   Teaching Method  Explanation, Handouts, Discussion   Patient " Response  Verbalized understanding            Patient and spouse granted permission to be seen by diabetes education. Discussed type 2 diabetes self-management, risk factors, and importance of blood glucose control to reduce complications. Target blood glucose readings and A1c goals per ADA were reviewed. Reviewed with patient current A1c (8.5%). Signs, symptoms and treatment of hyperglycemia and hypoglycemia were discussed. Lifestyle changes such as physical activity with MD approval and healthy eating were encouraged. Pt states she is active with house work and activities of daily living and monitors carb intake and portion sizes. Stressed the importance of strict blood sugar control after surgery to prevent complications such as infection and to promote healing of incision.           Electronically signed by:  Celsa Arrington RN  07/03/18 1:46 PM

## 2018-07-04 LAB
ANION GAP SERPL CALCULATED.3IONS-SCNC: 5 MMOL/L (ref 3–11)
BUN BLD-MCNC: 18 MG/DL (ref 9–23)
BUN/CREAT SERPL: 15.9 (ref 7–25)
CALCIUM SPEC-SCNC: 8 MG/DL (ref 8.7–10.4)
CHLORIDE SERPL-SCNC: 109 MMOL/L (ref 99–109)
CO2 SERPL-SCNC: 22 MMOL/L (ref 20–31)
CREAT BLD-MCNC: 1.13 MG/DL (ref 0.6–1.3)
DEPRECATED RDW RBC AUTO: 55.4 FL (ref 37–54)
ERYTHROCYTE [DISTWIDTH] IN BLOOD BY AUTOMATED COUNT: 17.8 % (ref 11.3–14.5)
GFR SERPL CREATININE-BSD FRML MDRD: 46 ML/MIN/1.73
GLUCOSE BLD-MCNC: 123 MG/DL (ref 70–100)
GLUCOSE BLDC GLUCOMTR-MCNC: 119 MG/DL (ref 70–130)
GLUCOSE BLDC GLUCOMTR-MCNC: 138 MG/DL (ref 70–130)
GLUCOSE BLDC GLUCOMTR-MCNC: 212 MG/DL (ref 70–130)
GLUCOSE BLDC GLUCOMTR-MCNC: 229 MG/DL (ref 70–130)
HCT VFR BLD AUTO: 24.2 % (ref 34.5–44)
HGB BLD-MCNC: 7.4 G/DL (ref 11.5–15.5)
MAGNESIUM SERPL-MCNC: 2.1 MG/DL (ref 1.3–2.7)
MCH RBC QN AUTO: 26 PG (ref 27–31)
MCHC RBC AUTO-ENTMCNC: 30.6 G/DL (ref 32–36)
MCV RBC AUTO: 84.9 FL (ref 80–99)
PHOSPHATE SERPL-MCNC: 2.6 MG/DL (ref 2.4–5.1)
PLATELET # BLD AUTO: 149 10*3/MM3 (ref 150–450)
PMV BLD AUTO: 9.3 FL (ref 6–12)
POTASSIUM BLD-SCNC: 4.1 MMOL/L (ref 3.5–5.5)
RBC # BLD AUTO: 2.85 10*6/MM3 (ref 3.89–5.14)
SODIUM BLD-SCNC: 136 MMOL/L (ref 132–146)
WBC NRBC COR # BLD: 5.47 10*3/MM3 (ref 3.5–10.8)

## 2018-07-04 PROCEDURE — 82962 GLUCOSE BLOOD TEST: CPT

## 2018-07-04 PROCEDURE — 84100 ASSAY OF PHOSPHORUS: CPT | Performed by: INTERNAL MEDICINE

## 2018-07-04 PROCEDURE — 99232 SBSQ HOSP IP/OBS MODERATE 35: CPT | Performed by: INTERNAL MEDICINE

## 2018-07-04 PROCEDURE — 99024 POSTOP FOLLOW-UP VISIT: CPT | Performed by: THORACIC SURGERY (CARDIOTHORACIC VASCULAR SURGERY)

## 2018-07-04 PROCEDURE — 85027 COMPLETE CBC AUTOMATED: CPT | Performed by: INTERNAL MEDICINE

## 2018-07-04 PROCEDURE — 80048 BASIC METABOLIC PNL TOTAL CA: CPT | Performed by: INTERNAL MEDICINE

## 2018-07-04 PROCEDURE — 97116 GAIT TRAINING THERAPY: CPT

## 2018-07-04 PROCEDURE — 99024 POSTOP FOLLOW-UP VISIT: CPT | Performed by: PHYSICIAN ASSISTANT

## 2018-07-04 PROCEDURE — 63710000001 INSULIN DETEMIR PER 5 UNITS: Performed by: INTERNAL MEDICINE

## 2018-07-04 PROCEDURE — 25010000002 LINEZOLID 600 MG/300ML SOLUTION: Performed by: INTERNAL MEDICINE

## 2018-07-04 PROCEDURE — 83735 ASSAY OF MAGNESIUM: CPT | Performed by: INTERNAL MEDICINE

## 2018-07-04 RX ADMIN — LINEZOLID 600 MG: 600 INJECTION, SOLUTION INTRAVENOUS at 18:02

## 2018-07-04 RX ADMIN — INSULIN LISPRO 3 UNITS: 100 INJECTION, SOLUTION INTRAVENOUS; SUBCUTANEOUS at 12:36

## 2018-07-04 RX ADMIN — METOPROLOL TARTRATE 100 MG: 100 TABLET ORAL at 21:53

## 2018-07-04 RX ADMIN — FUROSEMIDE 20 MG: 20 TABLET ORAL at 08:57

## 2018-07-04 RX ADMIN — NYSTATIN: 100000 POWDER TOPICAL at 08:59

## 2018-07-04 RX ADMIN — VALACYCLOVIR HYDROCHLORIDE 1000 MG: 500 TABLET, FILM COATED ORAL at 21:53

## 2018-07-04 RX ADMIN — NYSTATIN: 100000 POWDER TOPICAL at 21:53

## 2018-07-04 RX ADMIN — INSULIN LISPRO 2 UNITS: 100 INJECTION, SOLUTION INTRAVENOUS; SUBCUTANEOUS at 18:00

## 2018-07-04 RX ADMIN — METRONIDAZOLE 500 MG: 500 INJECTION, SOLUTION INTRAVENOUS at 17:58

## 2018-07-04 RX ADMIN — METOPROLOL TARTRATE 100 MG: 100 TABLET ORAL at 08:57

## 2018-07-04 RX ADMIN — Medication 1 CAPSULE: at 08:57

## 2018-07-04 RX ADMIN — INSULIN LISPRO 2 UNITS: 100 INJECTION, SOLUTION INTRAVENOUS; SUBCUTANEOUS at 12:36

## 2018-07-04 RX ADMIN — INSULIN LISPRO 2 UNITS: 100 INJECTION, SOLUTION INTRAVENOUS; SUBCUTANEOUS at 08:59

## 2018-07-04 RX ADMIN — METRONIDAZOLE 500 MG: 500 INJECTION, SOLUTION INTRAVENOUS at 01:20

## 2018-07-04 RX ADMIN — Medication 325 MG: at 08:57

## 2018-07-04 RX ADMIN — ATORVASTATIN CALCIUM 80 MG: 40 TABLET, FILM COATED ORAL at 21:53

## 2018-07-04 RX ADMIN — METRONIDAZOLE 500 MG: 500 INJECTION, SOLUTION INTRAVENOUS at 12:35

## 2018-07-04 RX ADMIN — INSULIN DETEMIR 5 UNITS: 100 INJECTION, SOLUTION SUBCUTANEOUS at 21:52

## 2018-07-04 RX ADMIN — ACETAMINOPHEN 650 MG: 325 TABLET, FILM COATED ORAL at 21:53

## 2018-07-04 RX ADMIN — CEFEPIME HYDROCHLORIDE 1 G: 1 INJECTION, POWDER, FOR SOLUTION INTRAMUSCULAR; INTRAVENOUS at 21:53

## 2018-07-04 RX ADMIN — VALACYCLOVIR HYDROCHLORIDE 1000 MG: 500 TABLET, FILM COATED ORAL at 08:57

## 2018-07-04 RX ADMIN — LINEZOLID 600 MG: 600 INJECTION, SOLUTION INTRAVENOUS at 09:02

## 2018-07-04 RX ADMIN — METRONIDAZOLE 500 MG: 500 INJECTION, SOLUTION INTRAVENOUS at 23:57

## 2018-07-04 RX ADMIN — CEFEPIME HYDROCHLORIDE 1 G: 1 INJECTION, POWDER, FOR SOLUTION INTRAMUSCULAR; INTRAVENOUS at 12:35

## 2018-07-04 RX ADMIN — INSULIN LISPRO 3 UNITS: 100 INJECTION, SOLUTION INTRAVENOUS; SUBCUTANEOUS at 21:54

## 2018-07-04 RX ADMIN — CEFUROXIME 1.5 G: 1.5 INJECTION, POWDER, FOR SOLUTION INTRAVENOUS at 23:57

## 2018-07-04 NOTE — PROGRESS NOTES
[facility]  PROGRESS NOTE    Patient Name: Lynne Sanchez  : 1934  MRN: [id number]    Date of Admission: 2018  Length of Stay: 7  Primary Care Physician: ANN Gonsalez    Subjective   Subjective     CC: DMII      HPI:  Feels well today, eating ok, agreeable to amputation tomorrow.    Review of Systems  Gen- No fevers, chills  CV- No chest pain, palpitations  Resp- No cough, dyspnea  GI- No N/V/D, abd pain    Otherwise ROS is negative except as mentioned in the HPI.    Objective   Objective     Vital Signs:   Temp:  [97.7 °F (36.5 °C)-98.9 °F (37.2 °C)] 98.1 °F (36.7 °C)  Heart Rate:  [69-85] 69  Resp:  [16] 16  BP: (109-157)/(39-73) 146/64  Total (NIH Stroke Scale): 0     Physical Exam:    Constitutional -no acute distress, non toxic, in bed, eating breakfast, thin female  HEENT- NCAT, mucous membranes moist  CV-RRR, S1 S2 normal, no m/r/g  Resp-CTAB, no wheezes, rhonchi or rales  Abd-soft, non-tender, non-distended, normo active bowel sounds  Ext-No lower extremity cyanosis, clubbing or edema bilaterally  Neuro-alert and oriented, speech clear, moves all extremities   Psych-normal affect   Skin- eschars both great toes      Results Reviewed:  I have personally reviewed current lab, radiology, and data and agree.      Results from last 7 days  Lab Units 18  0451 18  0349 18  0352  18  0453  18  2041  18  1653   WBC 10*3/mm3 5.47 5.13  --   --  5.16  < > 5.32  < > 5.12   HEMOGLOBIN g/dL 7.4* 7.4* 7.5*  < > 7.2*  < > 7.7*  < > 8.4*   HEMATOCRIT % 24.2* 24.3* 24.3*  < > 23.7*  < > 25.8*  < > 27.4*   PLATELETS 10*3/mm3 149* 155  --   --  134*  < > 103*  < > 143*   INR   --   --   --   --   --   --  1.17*  --  1.14*   < > = values in this interval not displayed.    Results from last 7 days  Lab Units 18  0451 18  0349 18  0453  18  1653   SODIUM mmol/L 136 139 140  < > 139   POTASSIUM  mmol/L 4.1 4.3 3.5  < > 4.1   CHLORIDE mmol/L 109 109 107  < > 103   CO2 mmol/L 22.0 22.0 24.0  < > 27.0   BUN mg/dL 18 15 15  < > 19   CREATININE mg/dL 1.13 1.17 1.36*  < > 1.38*   GLUCOSE mg/dL 123* 89 97  < > 203*   CALCIUM mg/dL 8.0* 7.8* 8.1*  < > 9.3   ALT (SGPT) U/L  --   --   --   --  16   AST (SGOT) U/L  --   --   --   --  21   < > = values in this interval not displayed.  Estimated Creatinine Clearance: 39.7 mL/min (by C-G formula based on SCr of 1.13 mg/dL).  No results found for: BNP    Microbiology Results Abnormal     Procedure Component Value - Date/Time    Blood Culture - Blood, [[id number]]  (Normal) Collected:  06/28/18 0320    Lab Status:  Final result Specimen:  Blood from Arm, Right Updated:  07/03/18 0400     Blood Culture No growth at 5 days    Blood Culture - Blood, [[id number]]  (Normal) Collected:  06/28/18 0330    Lab Status:  Final result Specimen:  Blood from Arm, Left Updated:  07/03/18 0400     Blood Culture No growth at 5 days    Urine Culture - Urine, [[id number]]  (Abnormal)  (Susceptibility) Collected:  06/28/18 2300    Lab Status:  Final result Specimen:  Urine from Urine, Clean Catch Updated:  07/01/18 1120     Urine Culture 60,000-70,000 CFU/mL Klebsiella aerogenes (A)    Susceptibility      Klebsiella aerogenes     PRIYA     Aztreonam 16 ug/ml Intermediate     Cefepime <=8 ug/ml Susceptible     Cefotaxime 16 ug/ml Intermediate     Ceftriaxone 32 ug/ml Intermediate     Ertapenem <=1 ug/ml Susceptible     Gentamicin <=4 ug/ml Susceptible     Levofloxacin <=2 ug/ml Susceptible     Meropenem <=1 ug/ml Susceptible     Nitrofurantoin <=32 ug/ml Susceptible     Piperacillin + Tazobactam 64 ug/ml Intermediate     Tetracycline <=4 ug/ml Susceptible     Tobramycin <=4 ug/ml Susceptible     Trimethoprim + Sulfamethoxazole <=2/38 ug/ml Susceptible                          Imaging Results (last 24 hours)     ** No results found for the last 24 hours. **        Results for orders placed during  the hospital encounter of 06/27/18   Adult Transthoracic Echo Complete W/ Cont if Necessary Per Protocol (With Agitated Saline)    Narrative · Left ventricular systolic function is normal. Estimated EF = 60%.  · Mild aortic valve regurgitation is present.  · Moderate mitral valve regurgitation is present  · Moderate to severe tricuspid valve regurgitation is present.  · Calculated right ventricular systolic pressure from tricuspid   regurgitation is 90 mmHg. Severe pulmonary hypertension is present  · Calculated right ventricular systolic pressure from tricuspid   regurgitation is 90 mmHg.          I have reviewed the medications.    Assessment/Plan   Assessment / Plan     Hospital Problem List     * (Principal)Osteomyelitis of left foot (CMS/HCC)    Chronic atrial fibrillation (CMS/HCC)    Overview Signed 12/22/2016  1:51 PM by Pan Saunders     1. Chronic atrial fibrillation, status post [employer] pacemaker implantation and AV node ablation:  a. Diagnosed June 2005.  b. Status post ECV restoring normal sinus rhythm, June 2005.  c. Rythmol initiated 05/01/2006.  d. Previously digoxin toxicity.  e. GXT Cardiolite, 09/08/2005:  No reversible ischemia.  LV function not performed secondary to atrial fibrillation.   f. Status post successful external cardioversion on 10/01/2008, Tessa Downey MD.  g. EKG on 10/21/2008:  Recurrence of atrial fibrillation with rate of 109.  h. Recurrent atrial fibrillation by EKG, 11/03/2008, asymptomatic.  i. Status post [employer] pacemaker implantation and AV node ablation.   j. Echocardiogram 02/15/2010:  Moderate MR, moderate TR.  RVSP 50.  EF greater than 55%, left atrial enlargement at 4.3 cm.           Type 2 diabetes mellitus treated without insulin (CMS/HCC) (Chronic)    Normocytic anemia    Peripheral arterial disease (CMS/HCC)    Foot infection    Fever    Pyogenic inflammation of bone (CMS/HCC)    Overview Signed 6/29/2018  2:35 PM by ANN Claudio     Added  automatically from request for surgery [id number]         Cerebrovascular accident (CVA) due to thrombosis of left anterior cerebral artery (CMS/HCC)             Brief Hospital Course to date:  Lynne Sanchez is a 83 y.o. female with history of DMII, PVD, Afib/SSS/PPM, CKDIII, Anemia and osteomyelitis presented for treatment of foot wound but hospitalization complicated by acute stroke and shingles.      Assessment & Plan:    Osteomyelitis  - great toe amputation in am  - continue empiric antibiotics    Acute CVA  - s/p tPA  - restart eliquis when OK with surgery  - antiplatelet agent  - statin    Afib  - NOAC held in anticipation of surgery tomorrow    Anemia  - iron def, currently on PO iron, consider IV iron post-op  - prior EGD 2017 showed only Barretts esophagus    DMII    Sss/PPM    PAD    CKDIII    DVT Prophylaxis:  NOAC    CODE STATUS:   Code Status and Medical Interventions:   Ordered at: 06/28/18 1008     Level Of Support Discussed With:    Patient     Code Status:    CPR     Medical Interventions (Level of Support Prior to Arrest):    Full       Disposition: I expect the patient to be discharged home vs snf/rehab in 2-3 days.      Electronically signed by Tima Coffman MD, 07/04/18, 10:09 AM.

## 2018-07-04 NOTE — PLAN OF CARE
Problem: Patient Care Overview  Goal: Plan of Care Review  Outcome: Ongoing (interventions implemented as appropriate)   07/04/18 1314   Coping/Psychosocial   Plan of Care Reviewed With patient;spouse   Plan of Care Review   Progress improving   OTHER   Outcome Summary Patient ambulated 200 feet with RW, limited by fatigue. CGA for all mobility. Will place patient on HOLD for PT and await resume PT orders after surgery tomorrow.

## 2018-07-04 NOTE — PLAN OF CARE
Problem: Patient Care Overview  Goal: Plan of Care Review  Outcome: Ongoing (interventions implemented as appropriate)   07/04/18 1901   Coping/Psychosocial   Plan of Care Reviewed With patient;spouse   Plan of Care Review   Progress no change   OTHER   Outcome Summary Patient has had little appetite today and admits to being worried/concerned about tomorrow's surgery. She has had little to no pain and has requested no pain medicine. Her  is at bedside and seems very supportive.

## 2018-07-04 NOTE — PROGRESS NOTES
[facility] Progress Note        Antibiotics:  Anti-Infectives     Ordered     Dose/Rate Route Frequency Start Stop    07/04/18 0730  cefuroxime (ZINACEF) 1.5 g/100 mL 0.9% NS IVPB (mbp)     Ordering Provider:  ANN Pereira    1.5 g  over 30 Minutes Intravenous Every 8 Hours 07/05/18 0000 07/06/18 0759    07/01/18 1152  cefepime (MAXIPIME) 1 g/100 mL 0.9% NS IVPB (mbp)     Ordering Provider:  Pan Painting MD    1 g  over 4 Hours Intravenous Every 12 Hours 07/01/18 2100 07/08/18 2059    07/01/18 1152  metroNIDAZOLE (FLAGYL) IVPB 500 mg     Ordering Provider:  Pan Painting MD    500 mg  over 60 Minutes Intravenous Every 8 Hours 07/01/18 1400 07/08/18 1559    07/01/18 1152  cefepime (MAXIPIME) 1 g/100 mL 0.9% NS IVPB (mbp)     Ordering Provider:  Pan Painting MD    1 g  200 mL/hr over 30 Minutes Intravenous Once 07/01/18 1300 07/01/18 1456    06/28/18 1046  Linezolid (ZYVOX) 600 mg 300 mL     Ordering Provider:  Pan Painting MD    600 mg  300 mL/hr over 60 Minutes Intravenous Every 12 Hours 06/28/18 1700 07/05/18 0559    06/28/18 1046  valACYclovir (VALTREX) tablet 1,000 mg     Ordering Provider:  Pan Painting MD    1,000 mg Oral Every 12 Hours Scheduled 06/28/18 1130 07/05/18 0859    06/27/18 1739  vancomycin 1250 mg/250 mL 0.9% NS IVPB ([facility])     Ordering Provider:  Jung Zeng MD    20 mg/kg × 66.7 kg  over 90 Minutes Intravenous Once 06/27/18 1845 06/27/18 2044    06/27/18 1736  piperacillin-tazobactam (ZOSYN) 3.375 g in iso-osmotic dextrose 50 ml (premix)     Ordering Provider:  Jung Zeng MD    3.375 g  over 30 Minutes Intravenous Once 06/27/18 1800 06/27/18 1844          CC: left foot red, uti, right leg shingles    HPI:  Patient is a 83 y.o.  Yr old female with history of peripheral vascular disease with intervention at [facility] by Dr. Pelaez October 2017 described as to the left anterior circulation.  In addition, in October 2017 she was diagnosed with left  hallux osteomyelitis by CT scan/tagged white blood cell scan and cultures with Enterobacter/pseudomonas aeruginosa.  Discharge summary mentions that Dr. Wallace recommended debridement and possible amputation but patient was not interested and was transitioned to [facility] for 6 weeks.  She is admitted to [facility] June 27, 2018 with acute left foot cellulitis, chronic left hallux callus/crusted with acute right sacral/posterior leg shingles and dysuria.  In addition she had fallen onto her right hip at Father's Day  with bruise.     She reported acute onset left foot/hallux redness evolving over 24 hours PTA and no other new trauma.  No open wound or active drainage.      6/30/18 moved to ICU overnight after code 19 and neuro workup with change in speech/left facial droop and left pronator drift; CT perfusion study with left MCA ischemia    7/4/18 she prefers moving forward with surgery on the left hallux.  Per nursing, improved right post leg eruption to the right of midline in a sacral dermatomal distribution from the spine down the posterior right leg,  crusted with no new blisters.  No new skin lesions; otherwise sleepy at time of my visit and not cooperative with ROS      Per nursing, No headache photophobia or neck stiffness.  No shortness of breath cough or hemoptysis.  No nausea vomiting diarrhea or abdominal pain.    ROS:      7/4/18 per nursing, no f/c/s. No n/v/d. No new ADR to Abx.     Constitutional-- Appetite diminished with generalized malaise and fatigue   Heent-- No epistaxis or oral sores.   No flashers, floaters or eye pain. No odynophagia or dysphagia. No headache, photophobia or neck stiffness.  CV-- No chest pain, palpitation or syncope  Resp-- No SOB/cough/Hemoptysis  GI- No vomiting, or diarrhea.  No hematochezia, melena, or hematemesis. Denies jaundice or chronic liver disease.  -- Denies hesitancy or flank pain.  Lymph- no swollen lymph nodes in neck/axilla or groin.   Heme- No  "active bruising or bleeding; no Hx of DVT or PE.  MS-- no swelling or pain in the bones or joints of arms/legs.  No new back pain.  Neuro-- as above     Full 12 point review of systems reviewed and negative otherwise for acute complaints, except for above    PE:   /64 (BP Location: Left arm, Patient Position: Lying)   Pulse 69   Temp 98.1 °F (36.7 °C) (Oral)   Resp 16   Ht 165.1 cm (65\")   Wt 66.7 kg (147 lb)   SpO2 97%   BMI 24.46 kg/m²     GENERAL: awake, in no acute distress.   HEENT: Normocephalic, atraumatic.  PERRL. EOMI. No conjunctival injection. No icterus. Oropharynx clear without evidence of thrush or exudate.  Poor dentition NECK: Supple without nuchal rigidity. No mass.  LYMPH: No cervical, axillary or inguinal lymphadenopathy.  HEART: RRR; No murmur, rubs, gallops.   LUNGS: Clear to auscultation bilaterally without wheezing, rales, rhonchi. Normal respiratory effort. Nonlabored. No dullness.  ABDOMEN: Soft, nontender, nondistended. Positive bowel sounds. No rebound or guarding. NO mass or HSM.  EXT:  No cyanosis, clubbing or edema. No cord.  : Genitalia generally unremarkable.  Without Main catheter.  MSK: FROM without joint effusions noted arms/legs.    SKIN: See below    NEURO: awake but  not cooperative with detailed neuro exam for me at time of my visit    Left foot/hallux with faint erythema.  Left hallux with callus/crust at the distal phalanx but no discrete mass bulge or fluctuance.  No crepitus or bulla.     Right posterior leg/low back to the right of midline in sacral distribution with dermatomal shingles, crusted and no surrounding induration or warmth.  No mass bulge or fluctuance.  No crepitus.  No new vesicles/bulla     Right hip with bruise after fall    Laboratory Data      Results from last 7 days  Lab Units 07/04/18  0451 07/03/18  0349 07/02/18  0352  07/01/18  0453   WBC 10*3/mm3 5.47 5.13  --   --  5.16   HEMOGLOBIN g/dL 7.4* 7.4* 7.5*  < > 7.2*   HEMATOCRIT % " 24.2* 24.3* 24.3*  < > 23.7*   PLATELETS 10*3/mm3 149* 155  --   --  134*   < > = values in this interval not displayed.    Results from last 7 days  Lab Units 07/04/18  0451   SODIUM mmol/L 136   POTASSIUM mmol/L 4.1   CHLORIDE mmol/L 109   CO2 mmol/L 22.0   BUN mg/dL 18   CREATININE mg/dL 1.13   GLUCOSE mg/dL 123*   CALCIUM mg/dL 8.0*       Results from last 7 days  Lab Units 06/27/18  1653   ALK PHOS U/L 81   BILIRUBIN mg/dL 1.2   ALT (SGPT) U/L 16   AST (SGOT) U/L 21       Results from last 7 days  Lab Units 06/27/18  1653   SED RATE mm/hr 34*       Results from last 7 days  Lab Units 06/27/18  1653   CRP mg/dL 2.40*       Estimated Creatinine Clearance: 39.7 mL/min (by C-G formula based on SCr of 1.13 mg/dL).      Microbiology:      Radiology:  Imaging Results (last 72 hours)     Procedure Component Value Units Date/Time    XR Toe 2+ View Left [[id number]] Collected:  06/27/18 1803     Updated:  06/27/18 2121    Narrative:       EXAM:  XR Left Toe(s), 2 or More Views    EXAM DATE/TIME:  6/27/2018 6:03 PM    CLINICAL HISTORY:  83 years old, female; Signs and symptoms; Other: Left 1st   digit ulcer; Additional info: Left diabetic toe wound    TECHNIQUE:  Frontal, lateral and oblique views of toe(s) of the left foot.    COMPARISON:  No relevant prior studies available.    FINDINGS:    Bones/joints:  No acute osseous abnormality.  No evidence of bony destructive   process.  Bony demineralization and degenerative bony changes.  No dislocation.    Soft tissues:  Soft tissue ulcer along the medial distal left great toe.    Vasculature:  Small vessel arterial calcification.      Impression:       1.  Soft tissue ulcer along the medial distal left great toe.  2.  No radiographic evidence of osteomyelitis at this time.    THIS DOCUMENT HAS BEEN ELECTRONICALLY SIGNED BY RONI SANTIAGO JR. MD            Impression:    --acute neuro change as above 6/29-6/30, moved to ICU with further workup per neuro/critical care team with  concern for Left MCA ischemia per radiology on CT perfusion study    --Acute left foot/hallux cellulitis.  In setting of chronic left hallux crust/callus and imaging from October 2017 suggesting chronic left hallux osteomyelitis.  That imaging was at [facility].  She attempted antibiotics and a more conservative route but symptoms have recurred.  I'm not optimistic for chronic healing with antibiotics and further conservative measures.  Dr. Carrington has seen patient and help define option/timing/threshold for amputation.  Bone scan is abnormal at left hallux per my discussion with radiology, Dr Lunsford Consistent with clinical concern for left hallux osteomyelitis per discussion with him; surgery planned per patient preference with Dr. Carrington     --Acute right sacral shingles.  s/p Valtrex.  This appears dermatomal.  Contact isolation initiated     --Acute dysuria and urinary incontinence with UTI and abnormal U/A.  kleb species     --Chronic peripheral vascular disease.  Left lower extremity revascularization October 2017 with current extent to be clarified by Dr. Carrington.  I discussed with him.     --Sick sinus syndrome/chronic atrial fibrillation with past AV node ablation/permanent pacemaker.     --Chronic kidney disease described as stage III.  Monitor to help guide further adjustments in antimicrobials     --Diabetes type 2.  You need to tightly control blood sugar to give best chance for healing     --Thrombocytopenia.  Monitor, may be chronic     --Subacute fall onto right hip and around Father's Day.  Has bruise.  Further workup radiographically or surgically per internal medicine at their discretion        PLAN:     --IV Zyvox/cefepime/flagyl for now;  oral Valtrex nearing completion     --Check/review labs cultures and scans     --Contact precautions in place given dermatomal shingles     --Discussed with   pharmacy     --Discussed with microbiology     --Partial history per nursing staff    --Bone Scan noted  and d/w Dr Lunsford/Zbe    --Probable amputation per Dr. Carrington/patient      Pan Painting MD  7/4/2018

## 2018-07-04 NOTE — PROGRESS NOTES
CTS Progress Note    POD  s/p        LOS: 7 days   Patient Care Team:  ANN Gonsalez as PCP - General    Subjective  Has decided to have the great toe amputated after talking with ID.    CC: pre-op amputation    Objective    Vital Signs  Temp:  [97.7 °F (36.5 °C)-98.9 °F (37.2 °C)] 97.7 °F (36.5 °C)  Heart Rate:  [69-85] 69  Resp:  [14-18] 16  BP: ()/(39-79) 142/63    Physical Exam:   General Appearance: alert, appears stated age and cooperative   Lungs: clear to auscultation, respirations regular, respirations even and respirations unlabored   Heart: regular rhythm & normal rate, normal S1, S2, no murmur, no troy, no rub and no click   Skin: warm, dry   Abdomen: soft, +B.S.  Results     Results from last 7 days  Lab Units 07/04/18  0451   WBC 10*3/mm3 5.47   HEMOGLOBIN g/dL 7.4*   HEMATOCRIT % 24.2*   PLATELETS 10*3/mm3 149*       Results from last 7 days  Lab Units 07/04/18  0451   SODIUM mmol/L 136   POTASSIUM mmol/L 4.1   CHLORIDE mmol/L 109   CO2 mmol/L 22.0   BUN mg/dL 18   CREATININE mg/dL 1.13   GLUCOSE mg/dL 123*   CALCIUM mg/dL 8.0*           Imaging Results (last 24 hours)     ** No results found for the last 24 hours. **          Assessment#1.  Osteomyelitis of the distal phalanx of the left great toe    Principal Problem:    Osteomyelitis of left foot (CMS/HCC)  Active Problems:    Chronic atrial fibrillation (CMS/HCC)    Type 2 diabetes mellitus treated without insulin (CMS/HCC)    Normocytic anemia    Peripheral arterial disease (CMS/HCC)    Foot infection    Fever    Pyogenic inflammation of bone (CMS/HCC)    Cerebrovascular accident (CVA) due to thrombosis of left anterior cerebral artery (CMS/HCC)  for amputation in am      Plan   Amputation in am .  I am instructed the patient on the surgical procedure and the risk involved to include bleeding, infection, stroke, heart attack, and death as well as possible postop phantom pain and she appears to understand and agrees to  proceed ahead tomorrow with the surgery    ANN Daniels  07/04/18  7:26 AM

## 2018-07-04 NOTE — THERAPY TREATMENT NOTE
Acute Care - Physical Therapy Treatment Note  [facility]     Patient Name: Lynne Sanchez  : 1934  MRN: [id number]  Today's Date: 2018  Onset of Illness/Injury or Date of Surgery: 18  Date of Referral to PT: 18  Referring Physician: MD Dia    Admit Date: 2018    Visit Dx:    ICD-10-CM ICD-9-CM   1. Other chronic osteomyelitis of left foot (CMS/HCC) M86.672 730.17   2. Cerebrovascular accident (CVA), unspecified mechanism (CMS/HCC) I63.9 434.91   3. Impaired mobility and ADLs Z74.09 799.89   4. Impaired functional mobility, balance, gait, and endurance Z74.09 V49.89   5. 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 (CMS/HCC)   • Melena   • Chronic gastritis   • Peripheral arterial disease (CMS/HCC)   • Foot infection   • Fever   • Pyogenic inflammation of bone (CMS/HCC)   • Cerebrovascular accident (CVA) due to thrombosis of left anterior cerebral artery (CMS/HCC)       Therapy Treatment          Rehabilitation Treatment Summary     Row Name 18 1532             Treatment Time/Intention    Discipline physical therapist  -LR      Document Type therapy note (daily note)  -LR      Subjective Information no complaints  -LR      Mode of Treatment physical therapy;individual therapy  -LR      Patient/Family Observations Patient sitting reclined [facility] on arrival.  present. IV, exit alarm.   -LR      Care Plan Review care plan/treatment goals reviewed;risks/benefits reviewed;current/potential barriers reviewed;patient/other agree to care plan  -LR      Care Plan Review, Other Participant(s) spouse  -LR      Patient Effort excellent  -LR      Existing  Precautions/Restrictions fall  -LR      Recorded by [LR] Vilma Edmonds, PT 07/04/18 1553      Row Name 07/04/18 1532             Cognitive Assessment/Intervention- PT/OT    Affect/Mental Status (Cognitive) WFL  -LR      Orientation Status (Cognition) oriented x 4  -LR      Follows Commands (Cognition) WFL;follows one step commands;over 90% accuracy;verbal cues/prompting required  -LR      Safety Deficit (Cognitive) mild deficit;at risk behavior observed;insight into deficits/self awareness;safety precautions awareness;safety precautions follow-through/compliance  -LR      Recorded by [LR] Vilma Edmonds, PT 07/04/18 1553      Row Name 07/04/18 1532             Safety Issues, Functional Mobility    Safety Issues Affecting Function (Mobility) awareness of need for assistance;insight into deficits/self awareness;positioning of assistive device;safety precaution awareness;safety precautions follow-through/compliance;sequencing abilities  -LR      Recorded by [LR] Vilma Edmonds, PT 07/04/18 1553      Row Name 07/04/18 1532             Bed Mobility Assessment/Treatment    Supine-Sit [place] (Bed Mobility) not tested   [facility] on arrival.   -LR      Comment (Bed Mobility) [facility] at end of treatment.   -LR      Recorded by [LR] Vilma Edmonds, PT 07/04/18 1553      Row Name 07/04/18 1532             Transfer Assessment/Treatment    Transfer Assessment/Treatment sit-stand transfer;stand-sit transfer  -LR      Comment (Transfers) Verbal cues for correct hand placement with t/f. Patient held onto RW as she returned to sitting despite cues to reach back.   -LR      Recorded by [LR] Vilma Edmonds, PT 07/04/18 1553      Row Name 07/04/18 1532             Sit-Stand Transfer    Sit-Stand [place] (Transfers) verbal cues;contact guard  -LR      Assistive Device (Sit-Stand Transfers) walker, front-wheeled  -LR      Recorded by [LR] Vilma Edmonds, PT 07/04/18 1553      Row Name 07/04/18  1532             Stand-Sit Transfer    Stand-Sit [place] (Transfers) verbal cues;contact guard  -LR      Assistive Device (Stand-Sit Transfers) walker, front-wheeled  -LR      Recorded by [LR] Vilma Edmonds, PT 07/04/18 1553      Row Name 07/04/18 1532             Gait/Stairs Assessment/Training    31265 - Gait Training Minutes  16  -LR      [place] Level (Gait) verbal cues;contact guard  -LR      Assistive Device (Gait) walker, front-wheeled  -LR      Distance in Feet (Gait) 200  -LR      Pattern (Gait) step-through  -LR      Deviations/Abnormal Patterns (Gait) bilateral deviations;roby decreased;gait speed decreased;stride length decreased  -LR      Bilateral Gait Deviations heel strike decreased  -LR      Comment (Gait/Stairs) Patient ambulated with step through gait pattern at slow pace. Verbal cues to keep RW closer and to stay inside RW. Improved with cues for correction. Gait limited by fatigue.   -LR      Recorded by [LR] Vilma Edmonds, PT 07/04/18 1553      Row Name 07/04/18 1532             Positioning and Restraints    Pre-Treatment Position sitting in chair/recliner  -LR      Post Treatment Position chair  -LR      In Chair notified nsg;reclined;sitting;call light within reach;encouraged to call for assist;exit alarm on;with family/caregiver;legs elevated  -LR      Recorded by [LR] Vilma Edmonds, PT 07/04/18 1553      Row Name 07/04/18 1532             Pain Assessment    Additional Documentation Pain Scale: Numbers Pre/Post-Treatment (Group)  -LR      Recorded by [LR] Vilma Edmonds, PT 07/04/18 1553      Row Name 07/04/18 1532             Pain Scale: Numbers Pre/Post-Treatment    Pain Scale: Numbers, Pretreatment 0/10 - no pain  -LR      Pain Scale: Numbers, Post-Treatment 0/10 - no pain  -LR      Recorded by [LR] Vilma Edmonds, PT 07/04/18 1553      Row Name                Wound 06/27/18 1659 Left toe pressure injury;diabetic/neuropathic ulceration     Wound - Properties Group Date first assessed: 06/27/18 [KA] Time first assessed: 1659 [KA] Present On Admission : yes [KA] Side: Left [KA] Location: toe [KA] Type: pressure injury;diabetic/neuropathic ulceration [KA] Additional Comments: possible arterial ulcer; pt pt skin tear from tape removal [CP] Recorded by:  [CP] BILL Magallanes 06/28/18 1335 [KA] Anel Woodruff RN 06/27/18 1700    Row Name                Wound 06/28/18 1030 Right distal toe other (see comments)    Wound - Properties Group Date first assessed: 06/28/18 [CP] Time first assessed: 1030 [CP] Present On Admission : yes;picture taken [CP] Side: Right [CP] Orientation: distal [CP] Location: toe [CP] Type: other (see comments) [CP] Additional Comments: unknown origin; possible tape inury; right great toe [CP] Recorded by:  [CP] BILL Magallanes 06/28/18 1340    Row Name                Wound 06/28/18 1030 Right elbow abrasion    Wound - Properties Group Date first assessed: 06/28/18 [CP] Time first assessed: 1030 [CP] Present On Admission : yes;picture taken [CP] Side: Right [CP] Location: elbow [CP] Type: abrasion [CP] Recorded by:  [CP] BILL Magallanes 06/28/18 1344    Row Name                Wound 06/28/18 1030 Left lower arm skin tear    Wound - Properties Group Date first assessed: 06/28/18 [CP] Time first assessed: 1030 [CP] Present On Admission : yes [CP] Side: Left [CP] Orientation: lower [CP] Location: arm [CP] Type: skin tear [CP] Additional Comments: forearm and elbow [CP] Recorded by:  [CP] BILL Magallanes 06/28/18 1346    Row Name 07/04/18 1532             Coping    Observed Emotional State accepting;cooperative  -LR      Verbalized Emotional State acceptance  -LR      Recorded by [LR] Vilma Edmonds, PT 07/04/18 1553      Row Name 07/04/18 1532             Plan of Care Review    Plan of Care Reviewed With patient;spouse  -LR      Recorded by [LR] Vilma Edmonds, PT 07/04/18 0511       Row Name 07/04/18 1532             Outcome Summary/Treatment Plan (PT)    Daily Summary of Progress (PT) progress toward functional goals as expected  -LR      Recorded by [LR] Vilma Edmonds, PT 07/04/18 2819        User Key  (r) = Recorded By, (t) = Taken By, (c) = Cosigned By    Initials Name Effective Dates Discipline    CP Lorraine Morillo, APRN 06/08/18 -  Nurse    LR Vilma Edmonds, PT 06/19/15 -  PT    KA Anel Woodruff RN 06/23/17 -  Nurse          Rash 06/28/18 1030 Right posterior leg vesicle (Active)   Distribution regional 7/4/2018  8:55 AM   Configuration/Shape asymmetric 7/4/2018  8:55 AM   Borders indistinct 7/4/2018  8:55 AM   Characteristics dry 7/4/2018  8:55 AM   Color purple;red 7/4/2018  8:55 AM       Wound 06/27/18 1659 Left toe pressure injury;diabetic/neuropathic ulceration (Active)   Dressing Appearance open to air 7/4/2018  8:55 AM   Periwound intact;dry;pink;blanchable 7/4/2018  8:55 AM   Edges irregular 7/4/2018  8:55 AM   Drainage Amount none 7/4/2018  8:55 AM   Dressing Care, Wound open to air 7/3/2018  8:58 PM       Wound 06/28/18 1030 Right distal toe other (see comments) (Active)   Dressing Appearance open to air 7/4/2018  8:55 AM   Periwound intact;dry;pink;blanchable 7/4/2018  8:55 AM   Edges irregular 7/4/2018  8:55 AM   Drainage Amount none 7/4/2018  8:55 AM   Dressing Care, Wound open to air 7/3/2018  8:58 PM       Wound 06/28/18 1030 Right elbow abrasion (Active)   Dressing Appearance dry;intact 7/4/2018  8:55 AM   Drainage Amount none 7/4/2018  8:55 AM       Wound 06/28/18 1030 Left lower arm skin tear (Active)   Dressing Appearance dry;intact 7/4/2018  8:55 AM   Drainage Amount none 7/4/2018  8:55 AM             Physical Therapy Education     Title: PT OT SLP Therapies (Active)     Topic: Physical Therapy (Done)     Point: Mobility training (Done)    Learning Progress Summary     Learner Status Readiness Method Response Comment Documented by    Patient Done  Acceptance E,D VU,NR Educated on correct gait mechanics and progression of POC. LR 07/04/18 1553     Active Acceptance E NR Educated pt on importance of mobility to return home and to PLOF. Informed pt of the benefits of maintaining mobility prior to possibility of surgery to ease recovery. JESSICA 07/02/18 0859     Active Acceptance E,D NR   06/30/18 1210    Significant Other Done Acceptance E,D VU,NR Educated on correct gait mechanics and progression of POC. LR 07/04/18 1553     Active Acceptance E,D NR   06/30/18 1210          Point: Home exercise program (Done)    Learning Progress Summary     Learner Status Readiness Method Response Comment Documented by    Patient Done Acceptance E,D VU,NR Educated on correct gait mechanics and progression of POC. LR 07/04/18 1553     Active Acceptance E NR Educated pt on importance of mobility to return home and to PLOF. Informed pt of the benefits of maintaining mobility prior to possibility of surgery to ease recovery. JESSICA 07/02/18 0859    Significant Other Done Acceptance E,D VU,NR Educated on correct gait mechanics and progression of POC. LR 07/04/18 1553          Point: Body mechanics (Done)    Learning Progress Summary     Learner Status Readiness Method Response Comment Documented by    Patient Done Acceptance E,D VU,NR Educated on correct gait mechanics and progression of POC.  07/04/18 1553     Active Acceptance E NR Educated pt on importance of mobility to return home and to PLOF. Informed pt of the benefits of maintaining mobility prior to possibility of surgery to ease recovery. JESSICA 07/02/18 0859     Active Acceptance E,D NR   06/30/18 1210    Significant Other Done Acceptance E,D VU,NR Educated on correct gait mechanics and progression of POC. LR 07/04/18 1553     Active Acceptance E,D NR   06/30/18 1210          Point: Precautions (Done)    Learning Progress Summary     Learner Status Readiness Method Response Comment Documented by    Patient Done Acceptance  E,D YESENIA VELAZQUEZ Educated on correct gait mechanics and progression of POC. LR 07/04/18 1553     Active Acceptance E NR Educated pt on importance of mobility to return home and to PLOF. Informed pt of the benefits of maintaining mobility prior to possibility of surgery to ease recovery. JESSICA 07/02/18 0859     Active Acceptance E,D NR   06/30/18 1210    Significant Other Done Acceptance E,D CAROLINE,NR Educated on correct gait mechanics and progression of POC. LR 07/04/18 1553     Active Acceptance E,D NR   06/30/18 1210                      User Key     Initials Effective Dates Name Provider Type Discipline     06/19/15 -  Vimla Edmonds, PT Physical Therapist PT     06/19/15 -  Loly Telles, PT Physical Therapist PT     05/15/18 -  Dillon Lawrence, PT Student PT Student PT                    PT Recommendation and Plan     Outcome Summary/Treatment Plan (PT)  Daily Summary of Progress (PT): progress toward functional goals as expected  Plan of Care Reviewed With: patient, spouse  Progress: improving  Outcome Summary: Patient ambulated 200 feet with RW, limited by fatigue. CGA for all mobility. Will place patient on HOLD for PT and await resume PT orders after surgery tomorrow.           Outcome Measures     Row Name 07/04/18 1532 07/03/18 1044 07/03/18 0800       How much help from another person do you currently need...    Turning from your back to your side while in flat bed without using bedrails? 3  -LR 3  -LS  --    Moving from lying on back to sitting on the side of a flat bed without bedrails? 3  -LR 3  -LS  --    Moving to and from a bed to a chair (including a wheelchair)? 3  -LR 3  -LS  --    Standing up from a chair using your arms (e.g., wheelchair, bedside chair)? 3  -LR 3  -LS  --    Climbing 3-5 steps with a railing? 3  -LR 2  -LS  --    To walk in hospital room? 3  -LR 3  -LS  --    AM-PAC 6 Clicks Score 18  -LR 17  -LS  --       How much help from another is currently needed...    Putting on and  taking off regular lower body clothing?  --  -- 2  -JESSICA    Bathing (including washing, rinsing, and drying)  --  -- 2  -JESSICA    Toileting (which includes using toilet bed pan or urinal)  --  -- 2  -JESSICA    Putting on and taking off regular upper body clothing  --  -- 3  -JESSICA    Taking care of personal grooming (such as brushing teeth)  --  -- 3  -JESSICA    Eating meals  --  -- 4  -JESSICA    Score  --  -- 16  -JESSICA       Modified Mobile Scale    Modified Mobile Scale  --  -- 3 - Moderate disability.  Requiring some help, but able to walk without assistance.  -JESSICA       Functional Assessment    Outcome Measure Options AM-PAC 6 Clicks Basic Mobility (PT)  -LR AM-PAC 6 Clicks Basic Mobility (PT)  -LS AM-PAC 6 Clicks Daily Activity (OT)  -JESSICA    Row Name 07/02/18 0859             How much help from another person do you currently need...    Turning from your back to your side while in flat bed without using bedrails? 3  -LS (r) JBA (t) LS (c)      Moving from lying on back to sitting on the side of a flat bed without bedrails? 3  -LS (r) JBA (t) LS (c)      Moving to and from a bed to a chair (including a wheelchair)? 3  -LS (r) JBA (t) LS (c)      Standing up from a chair using your arms (e.g., wheelchair, bedside chair)? 3  -LS (r) JBA (t) LS (c)      Climbing 3-5 steps with a railing? 2  -LS (r) JBA (t) LS (c)      To walk in hospital room? 3  -LS (r) JBA (t) LS (c)      AM-PAC 6 Clicks Score 17  -LS (r) JBA (t)         Functional Assessment    Outcome Measure Options AM-PAC 6 Clicks Basic Mobility (PT)  -LS (r) JBA (t) LS (c)        User Key  (r) = Recorded By, (t) = Taken By, (c) = Cosigned By    Initials Name Provider Type    JESSICA Sandra Varma, OT Occupational Therapist    LR Vilma Edmonds, PT Physical Therapist    LS Loly Telles, PT Physical Therapist    DAVID Lawrence, PT Student PT Student           Time Calculation:         PT Charges     Row Name 07/04/18 1532             Time Calculation    Start Time 1532  -LR       PT Received On 07/04/18  -LR      PT Goal Re-Cert Due Date 07/10/18  -LR         Time Calculation- PT    Total Timed Code Minutes- PT 16 minute(s)  -LR         Timed Charges    73269 - Gait Training Minutes  16  -LR        User Key  (r) = Recorded By, (t) = Taken By, (c) = Cosigned By    Initials Name Provider Type    LR Vilma Edmonds, PT Physical Therapist        Therapy Suggested Charges     Code   Minutes Charges    75948 (CPT®) Hc Pt Neuromusc Re Education Ea 15 Min      95557 (CPT®) Hc Pt Ther Proc Ea 15 Min      98076 (CPT®) Hc Gait Training Ea 15 Min 16 1    18149 (CPT®) Hc Pt Therapeutic Act Ea 15 Min      96838 (CPT®) Hc Pt Manual Therapy Ea 15 Min      03003 (CPT®) Hc Pt Iontophoresis Ea 15 Min      60558 (CPT®) Hc Pt Elec Stim Ea-Per 15 Min      51402 (CPT®) Hc Pt Ultrasound Ea 15 Min      00689 (CPT®) Hc Pt Self Care/Mgmt/Train Ea 15 Min      Total  16 1        Therapy Charges for Today     Code Description Service Date Service Provider Modifiers Qty    41074664149 HC GAIT TRAINING EA 15 MIN 7/4/2018 Vilma Edmonds, PT GP 1          PT G-Codes  Outcome Measure Options: AM-PAC 6 Clicks Basic Mobility (PT)    Vilma Edmonds, PT  7/4/2018

## 2018-07-05 ENCOUNTER — ANESTHESIA (OUTPATIENT)
Dept: PERIOP | Facility: HOSPITAL | Age: 83
End: 2018-07-05

## 2018-07-05 ENCOUNTER — ANESTHESIA EVENT (OUTPATIENT)
Dept: PERIOP | Facility: HOSPITAL | Age: 83
End: 2018-07-05

## 2018-07-05 LAB
GLUCOSE BLDC GLUCOMTR-MCNC: 146 MG/DL (ref 70–130)
GLUCOSE BLDC GLUCOMTR-MCNC: 177 MG/DL (ref 70–130)
GLUCOSE BLDC GLUCOMTR-MCNC: 198 MG/DL (ref 70–130)
GLUCOSE BLDC GLUCOMTR-MCNC: 245 MG/DL (ref 70–130)

## 2018-07-05 PROCEDURE — 88304 TISSUE EXAM BY PATHOLOGIST: CPT | Performed by: THORACIC SURGERY (CARDIOTHORACIC VASCULAR SURGERY)

## 2018-07-05 PROCEDURE — 87206 SMEAR FLUORESCENT/ACID STAI: CPT | Performed by: THORACIC SURGERY (CARDIOTHORACIC VASCULAR SURGERY)

## 2018-07-05 PROCEDURE — 0Y6Q0Z1 DETACHMENT AT LEFT 1ST TOE, HIGH, OPEN APPROACH: ICD-10-PCS | Performed by: THORACIC SURGERY (CARDIOTHORACIC VASCULAR SURGERY)

## 2018-07-05 PROCEDURE — 82962 GLUCOSE BLOOD TEST: CPT

## 2018-07-05 PROCEDURE — 25010000002 FENTANYL CITRATE (PF) 100 MCG/2ML SOLUTION: Performed by: NURSE ANESTHETIST, CERTIFIED REGISTERED

## 2018-07-05 PROCEDURE — 63710000001 INSULIN DETEMIR PER 5 UNITS: Performed by: INTERNAL MEDICINE

## 2018-07-05 PROCEDURE — 87147 CULTURE TYPE IMMUNOLOGIC: CPT | Performed by: THORACIC SURGERY (CARDIOTHORACIC VASCULAR SURGERY)

## 2018-07-05 PROCEDURE — 88305 TISSUE EXAM BY PATHOLOGIST: CPT | Performed by: THORACIC SURGERY (CARDIOTHORACIC VASCULAR SURGERY)

## 2018-07-05 PROCEDURE — 25010000002 ONDANSETRON PER 1 MG: Performed by: NURSE PRACTITIONER

## 2018-07-05 PROCEDURE — 25010000002 ONDANSETRON PER 1 MG: Performed by: ANESTHESIOLOGY

## 2018-07-05 PROCEDURE — 87106 FUNGI IDENTIFICATION YEAST: CPT | Performed by: THORACIC SURGERY (CARDIOTHORACIC VASCULAR SURGERY)

## 2018-07-05 PROCEDURE — 87116 MYCOBACTERIA CULTURE: CPT | Performed by: THORACIC SURGERY (CARDIOTHORACIC VASCULAR SURGERY)

## 2018-07-05 PROCEDURE — 87186 SC STD MICRODIL/AGAR DIL: CPT | Performed by: THORACIC SURGERY (CARDIOTHORACIC VASCULAR SURGERY)

## 2018-07-05 PROCEDURE — 25010000002 CEFUROXIME: Performed by: PHYSICIAN ASSISTANT

## 2018-07-05 PROCEDURE — 88311 DECALCIFY TISSUE: CPT | Performed by: THORACIC SURGERY (CARDIOTHORACIC VASCULAR SURGERY)

## 2018-07-05 PROCEDURE — 87205 SMEAR GRAM STAIN: CPT | Performed by: THORACIC SURGERY (CARDIOTHORACIC VASCULAR SURGERY)

## 2018-07-05 PROCEDURE — 25010000002 LINEZOLID 600 MG/300ML SOLUTION: Performed by: INTERNAL MEDICINE

## 2018-07-05 PROCEDURE — 87102 FUNGUS ISOLATION CULTURE: CPT | Performed by: THORACIC SURGERY (CARDIOTHORACIC VASCULAR SURGERY)

## 2018-07-05 PROCEDURE — 87070 CULTURE OTHR SPECIMN AEROBIC: CPT | Performed by: THORACIC SURGERY (CARDIOTHORACIC VASCULAR SURGERY)

## 2018-07-05 PROCEDURE — 87176 TISSUE HOMOGENIZATION CULTR: CPT | Performed by: THORACIC SURGERY (CARDIOTHORACIC VASCULAR SURGERY)

## 2018-07-05 PROCEDURE — 28810 AMPUTATION TOE & METATARSAL: CPT | Performed by: THORACIC SURGERY (CARDIOTHORACIC VASCULAR SURGERY)

## 2018-07-05 PROCEDURE — 25010000002 PROPOFOL 10 MG/ML EMULSION: Performed by: NURSE ANESTHETIST, CERTIFIED REGISTERED

## 2018-07-05 PROCEDURE — 87075 CULTR BACTERIA EXCEPT BLOOD: CPT | Performed by: THORACIC SURGERY (CARDIOTHORACIC VASCULAR SURGERY)

## 2018-07-05 PROCEDURE — 87077 CULTURE AEROBIC IDENTIFY: CPT | Performed by: THORACIC SURGERY (CARDIOTHORACIC VASCULAR SURGERY)

## 2018-07-05 PROCEDURE — 99233 SBSQ HOSP IP/OBS HIGH 50: CPT | Performed by: INTERNAL MEDICINE

## 2018-07-05 RX ORDER — FAMOTIDINE 10 MG/ML
20 INJECTION, SOLUTION INTRAVENOUS ONCE
Status: CANCELLED | OUTPATIENT
Start: 2018-07-05 | End: 2018-07-05

## 2018-07-05 RX ORDER — PROPOFOL 10 MG/ML
VIAL (ML) INTRAVENOUS AS NEEDED
Status: DISCONTINUED | OUTPATIENT
Start: 2018-07-05 | End: 2018-07-05 | Stop reason: SURG

## 2018-07-05 RX ORDER — FENTANYL CITRATE 50 UG/ML
50 INJECTION, SOLUTION INTRAMUSCULAR; INTRAVENOUS
Status: DISCONTINUED | OUTPATIENT
Start: 2018-07-05 | End: 2018-07-05 | Stop reason: HOSPADM

## 2018-07-05 RX ORDER — BUPIVACAINE HYDROCHLORIDE 5 MG/ML
INJECTION, SOLUTION PERINEURAL AS NEEDED
Status: DISCONTINUED | OUTPATIENT
Start: 2018-07-05 | End: 2018-07-05 | Stop reason: HOSPADM

## 2018-07-05 RX ORDER — ONDANSETRON 2 MG/ML
4 INJECTION INTRAMUSCULAR; INTRAVENOUS ONCE AS NEEDED
Status: DISCONTINUED | OUTPATIENT
Start: 2018-07-05 | End: 2018-07-05 | Stop reason: HOSPADM

## 2018-07-05 RX ORDER — SODIUM CHLORIDE 0.9 % (FLUSH) 0.9 %
1-10 SYRINGE (ML) INJECTION AS NEEDED
Status: DISCONTINUED | OUTPATIENT
Start: 2018-07-05 | End: 2018-07-05 | Stop reason: HOSPADM

## 2018-07-05 RX ORDER — LIDOCAINE HYDROCHLORIDE 10 MG/ML
0.5 INJECTION, SOLUTION EPIDURAL; INFILTRATION; INTRACAUDAL; PERINEURAL ONCE AS NEEDED
Status: DISCONTINUED | OUTPATIENT
Start: 2018-07-05 | End: 2018-07-05 | Stop reason: HOSPADM

## 2018-07-05 RX ORDER — ACETAMINOPHEN 650 MG
TABLET, EXTENDED RELEASE ORAL ONCE
Status: COMPLETED | OUTPATIENT
Start: 2018-07-05 | End: 2018-07-05

## 2018-07-05 RX ORDER — HYDROMORPHONE HYDROCHLORIDE 1 MG/ML
0.5 INJECTION, SOLUTION INTRAMUSCULAR; INTRAVENOUS; SUBCUTANEOUS
Status: DISCONTINUED | OUTPATIENT
Start: 2018-07-05 | End: 2018-07-05 | Stop reason: HOSPADM

## 2018-07-05 RX ORDER — SODIUM CHLORIDE, SODIUM LACTATE, POTASSIUM CHLORIDE, CALCIUM CHLORIDE 600; 310; 30; 20 MG/100ML; MG/100ML; MG/100ML; MG/100ML
9 INJECTION, SOLUTION INTRAVENOUS CONTINUOUS
Status: DISCONTINUED | OUTPATIENT
Start: 2018-07-05 | End: 2018-07-08

## 2018-07-05 RX ORDER — FENTANYL CITRATE 50 UG/ML
INJECTION, SOLUTION INTRAMUSCULAR; INTRAVENOUS AS NEEDED
Status: DISCONTINUED | OUTPATIENT
Start: 2018-07-05 | End: 2018-07-05 | Stop reason: SURG

## 2018-07-05 RX ORDER — ONDANSETRON 2 MG/ML
4 INJECTION INTRAMUSCULAR; INTRAVENOUS EVERY 6 HOURS PRN
Status: DISCONTINUED | OUTPATIENT
Start: 2018-07-05 | End: 2018-07-05 | Stop reason: HOSPADM

## 2018-07-05 RX ORDER — PROPOFOL 10 MG/ML
VIAL (ML) INTRAVENOUS CONTINUOUS PRN
Status: DISCONTINUED | OUTPATIENT
Start: 2018-07-05 | End: 2018-07-05 | Stop reason: SURG

## 2018-07-05 RX ORDER — LIDOCAINE HYDROCHLORIDE 10 MG/ML
INJECTION, SOLUTION INFILTRATION; PERINEURAL AS NEEDED
Status: DISCONTINUED | OUTPATIENT
Start: 2018-07-05 | End: 2018-07-05 | Stop reason: SURG

## 2018-07-05 RX ORDER — FAMOTIDINE 20 MG/1
20 TABLET, FILM COATED ORAL ONCE
Status: COMPLETED | OUTPATIENT
Start: 2018-07-05 | End: 2018-07-05

## 2018-07-05 RX ADMIN — INSULIN LISPRO 2 UNITS: 100 INJECTION, SOLUTION INTRAVENOUS; SUBCUTANEOUS at 13:20

## 2018-07-05 RX ADMIN — LIDOCAINE HYDROCHLORIDE 50 MG: 10 INJECTION, SOLUTION INFILTRATION; PERINEURAL at 09:09

## 2018-07-05 RX ADMIN — SODIUM CHLORIDE, POTASSIUM CHLORIDE, SODIUM LACTATE AND CALCIUM CHLORIDE: 600; 310; 30; 20 INJECTION, SOLUTION INTRAVENOUS at 09:14

## 2018-07-05 RX ADMIN — FENTANYL CITRATE 25 MCG: 50 INJECTION, SOLUTION INTRAMUSCULAR; INTRAVENOUS at 10:06

## 2018-07-05 RX ADMIN — FENTANYL CITRATE 25 MCG: 50 INJECTION, SOLUTION INTRAMUSCULAR; INTRAVENOUS at 09:08

## 2018-07-05 RX ADMIN — ONDANSETRON 4 MG: 2 INJECTION INTRAMUSCULAR; INTRAVENOUS at 20:36

## 2018-07-05 RX ADMIN — METOPROLOL TARTRATE 100 MG: 100 TABLET ORAL at 20:36

## 2018-07-05 RX ADMIN — PROPOFOL 25 MCG/KG/MIN: 10 INJECTION, EMULSION INTRAVENOUS at 09:09

## 2018-07-05 RX ADMIN — FAMOTIDINE 20 MG: 20 TABLET, FILM COATED ORAL at 07:38

## 2018-07-05 RX ADMIN — POVIDONE-IODINE: 10 SOLUTION TOPICAL at 17:22

## 2018-07-05 RX ADMIN — ONDANSETRON 4 MG: 2 INJECTION, SOLUTION INTRAMUSCULAR; INTRAVENOUS at 08:32

## 2018-07-05 RX ADMIN — LINEZOLID 600 MG: 600 INJECTION, SOLUTION INTRAVENOUS at 19:20

## 2018-07-05 RX ADMIN — FENTANYL CITRATE 25 MCG: 50 INJECTION, SOLUTION INTRAMUSCULAR; INTRAVENOUS at 09:20

## 2018-07-05 RX ADMIN — FENTANYL CITRATE 25 MCG: 50 INJECTION, SOLUTION INTRAMUSCULAR; INTRAVENOUS at 09:52

## 2018-07-05 RX ADMIN — INSULIN LISPRO 2 UNITS: 100 INJECTION, SOLUTION INTRAVENOUS; SUBCUTANEOUS at 13:21

## 2018-07-05 RX ADMIN — CEFUROXIME 1.5 G: 1.5 INJECTION, POWDER, FOR SOLUTION INTRAVENOUS at 16:51

## 2018-07-05 RX ADMIN — NYSTATIN: 100000 POWDER TOPICAL at 20:36

## 2018-07-05 RX ADMIN — METRONIDAZOLE 500 MG: 500 INJECTION, SOLUTION INTRAVENOUS at 09:10

## 2018-07-05 RX ADMIN — METRONIDAZOLE 500 MG: 500 INJECTION, SOLUTION INTRAVENOUS at 16:51

## 2018-07-05 RX ADMIN — PROPOFOL 20 MG: 10 INJECTION, EMULSION INTRAVENOUS at 09:09

## 2018-07-05 RX ADMIN — CEFEPIME HYDROCHLORIDE 1 G: 1 INJECTION, POWDER, FOR SOLUTION INTRAMUSCULAR; INTRAVENOUS at 20:36

## 2018-07-05 RX ADMIN — ATORVASTATIN CALCIUM 80 MG: 40 TABLET, FILM COATED ORAL at 20:37

## 2018-07-05 RX ADMIN — SODIUM CHLORIDE: 9 INJECTION, SOLUTION INTRAVENOUS at 09:07

## 2018-07-05 RX ADMIN — INSULIN DETEMIR 8 UNITS: 100 INJECTION, SOLUTION SUBCUTANEOUS at 20:31

## 2018-07-05 RX ADMIN — INSULIN LISPRO 3 UNITS: 100 INJECTION, SOLUTION INTRAVENOUS; SUBCUTANEOUS at 20:45

## 2018-07-05 RX ADMIN — LINEZOLID 600 MG: 600 INJECTION, SOLUTION INTRAVENOUS at 06:15

## 2018-07-05 NOTE — ANESTHESIA POSTPROCEDURE EVALUATION
Patient: Lynne Sanchez    Procedure Summary     Date:  07/05/18 Room / Location:   [facility] /  [facility]    Anesthesia Start:  0907 Anesthesia Stop:      Procedure:  Left Great toe amputation (Left Fingers) Diagnosis:       Other chronic osteomyelitis of left foot (CMS/HCC)      (Other chronic osteomyelitis of left foot (CMS/HCC) [M86.672])    Surgeon:  Prakash Carrington MD Provider:  Migel Bower MD    Anesthesia Type:  MAC, other ASA Status:  4          Anesthesia Type: MAC, other  Last vitals  BP   145/62 (07/05/18 0734)   Temp   98.7 °F (37.1 °C) (07/05/18 0734)   Pulse   70 (07/05/18 0734)   Resp   16 (07/05/18 0734)     SpO2   98 % (07/05/18 0734)     Post Anesthesia Care and Evaluation    Patient location during evaluation: PACU  Patient participation: complete - patient participated  Level of consciousness: awake and alert  Pain score: 0  Pain management: adequate  Airway patency: patent  Anesthetic complications: No anesthetic complications  PONV Status: none  Cardiovascular status: hemodynamically stable and acceptable  Respiratory status: nonlabored ventilation, acceptable and nasal cannula  Hydration status: acceptable    Comments: 142/64 99% 70 98*F 16

## 2018-07-05 NOTE — PROGRESS NOTES
Adult Nutrition  Assessment/PES    Patient Name:  Lynne Sanchez  YOB: 1934  MRN: [id number]  Admit Date:  6/27/2018    Assessment Date:  7/5/2018    Comments:            Reason for Assessment     Row Name 07/05/18 1728          Reason for Assessment    Reason For Assessment follow-up protocol   25 mins     Diagnosis --   per notes this adm                       Nutrition Prescription Ordered     Row Name 07/05/18 1728          Nutrition Prescription PO    Current PO Diet Regular     Supplement Boost Glucose Control     Supplement Frequency 2 times a day   pt  is drinking supplement per family     Common Modifiers Consistent Carbohydrate             Evaluation of Received Nutrient/Fluid Intake     Row Name 07/05/18 1729          PO Evaluation    Number of Meals 5     % PO Intake 60             Problem/Interventions:          Problem 2     Row Name 07/05/18 1729          Nutrition Diagnoses Problem 2    Problem 2 Inadequate Intake/Infusion     Etiology (related to) MNT for Treatment/Condition     Signs/Symptoms (evidenced by) PO Intake     Percent (%) intake recorded 60 %     Over number of meals 5                   Intervention Goal     Row Name 07/05/18 1730          Intervention Goal    PO Increase intake             Nutrition Intervention     Row Name 07/05/18 1730          Nutrition Intervention    RD/Tech Action Follow Tx progress;Supplement provided;Encourage intake               Education/Evaluation     Row Name 07/05/18 1730          Monitor/Evaluation    Monitor Per protocol         Electronically signed by:  Jyoti Mills MS,RD,LD  07/05/18 5:30 PM

## 2018-07-05 NOTE — PLAN OF CARE
Problem: Patient Care Overview  Goal: Plan of Care Review  Outcome: Ongoing (interventions implemented as appropriate)   07/05/18 7776   Coping/Psychosocial   Plan of Care Reviewed With patient   Plan of Care Review   Progress improving   OTHER   Outcome Summary No adverse signs or symptoms related to surgery. She complains of no pain and is calm and cooperative. vital signs have remained stable. Patient still has little appetite.

## 2018-07-05 NOTE — ANESTHESIA PREPROCEDURE EVALUATION
" Anesthesia Evaluation     NPO Solid Status: > 8 hours  NPO Liquid Status: > 8 hours           Airway   Mallampati: II  TM distance: >3 FB  No difficulty expected  Dental      Pulmonary    (+) decreased breath sounds,   (-) asthma, not a smoker  Cardiovascular     ECG reviewed    (+) pacemaker (New pacemaker 5 weeks ago), hypertension, dysrhythmias (H/O ablation),       Neuro/Psych  (+) TIA (5 days ago patien had slurred speech, has had work up),     (-) seizures  GI/Hepatic/Renal/Endo    (+)   diabetes mellitus (NIDDM),   (-) liver disease, no renal disease    Musculoskeletal     Abdominal    Substance History      OB/GYN          Other        ROS/Med Hx Other:     Severe Pulmonary Hypertension      Lynne Sanchez   Echocardiogram   Order# [id number]   Reading physician: Tessa Downey MD Ordering physician: Alisson Alvares MD Study date: 18  Patient Information     Patient Name  Lynne Sanchez MRN  [id number] Sex  Female  (Age)  1934 (83 y.o.)  Admission Information     Admission Date/Time Discharge Date/Time Room/Bed  18  1607  [facility]  Sedation Narrator Report     Sedation Narrator Report  Interpretation Summary     · Left ventricular systolic function is normal. Estimated EF = 60%.  · Mild aortic valve regurgitation is present.  · Moderate mitral valve regurgitation is present  · Moderate to severe tricuspid valve regurgitation is present.  · Calculated right ventricular systolic pressure from tricuspid regurgitation is 90 mmHg. Severe pulmonary hypertension is present  · Calculated right ventricular systolic pressure from tricuspid regurgitation is 90 mmHg.     Patient Height & Weight     Height Weight BSA (Calculated - sq m) BMI (kg/m2) Pulse  165.1 cm (65\") 66.7 kg (147 lb) 1.74 sq meters 24.51 70                    Anesthesia Plan    ASA 4     MAC and other   (MAC,, toe block  Block)  intravenous induction     Plan discussed with CRNA.      "

## 2018-07-05 NOTE — OP NOTE
Procedure Note    Patient Name:  Lynne Sanchez    Date of Surgery: 07/05/2018      Pre-op Diagnosis: Osteomyelitis of the distal phalanx of the  left great toe    Post-op Diagnosis:   Same time [facility]    Surgeon(s):  Prakash Carrington MD    Assistant(s):    Anesthesia: MAC with conscious IV sedation and local half percent Marcaine( 3 ML's)    Indications: The patient is an adult-onset diabetes with severe peripheral vascular disease of the left lower extremity.  In December 2017 she had endovascular procedure of the left lower extremity including angioplasty and stenting of the anterior tibial artery of the lower leg into the ankle and this was done by Dr. Pelaez at [facility] and the patient had excellent results with a palpable dorsalis pedis pulse post procedure.  The procedure have been done for an ulceration of the left great toe that would not heal and bone scan was positive for osteomyelitis post endovascular revascularization the patient had 5 weeks of intravenous antibiotics with improvement of the ulceration.  However, recently the ulceration is recurred and the patient has a bone scan again positive for osteomyelitis of the distal phalanx of the left great toe.  The patient does have a permanent pacemaker which is concerning to infectious disease for possible infected foci possibility from the osteomyelitis of the left great toe and they felt as though amputation was indicated.  I will saw the patient consultation and agreed with the assessment and recommendation of infectious disease.  After giving much thought to this the patient has agreed to proceed ahead at this time.  There is  risks that  was explained to the patient: bleeding, infection at the amputation site, heart attack stroke or death and postop phantom pain.  The patient agreed to proceed ahead.  Of note is the fact that the patient did have a mild embolic stroke approximately 1 week ago to the left middle cerebral artery with  almost complete recovery neurologically after approximately 24 hours.    Procedure(s): Amputation of the left great toe through the distal half of the proximal phalangeal bone with primary closure    The patient underwent conscious IV sedation with Diprivan and fentanyl intravenously.  Timeout was called to verify the procedure to be done, left great toe amputation, and then the patient underwent prepping with ChloraPrep of the left foot and ankle and lower leg to the upper calf.  Skin incision was made at the base of the great toe in a fishmouth fashion to create dorsal and plantar flaps.  Incision was carried on down to the phalangeal joint space and with the Bovie cautery and the toe was disarticulated between the proximal and distal phalanx and the joint space and was removed from the operative field.  Subcutaneous skin flaps were created gently with the 15 knife blade for the dorsal and plantar skin flaps.  The flexor and extensor tendons were identified and dissected free from surrounding soft tissues brought to lie on extension with the bone rongeur and cut sharply with the knife blade and allowed to retract into the wound wound.  There appeared to be adequate bleeding in the soft tissues but no digital artery was verified with any pulsatile flow.  One half percent Marcaine was infiltrated into the deep soft tissues( approxi-3 ML's) utilized.  This was done at the beginning of the procedure prior to making the fishmouth skin incision..  The open amputation site was then irrigated with 500 ML's of bacitracin antibiotic solution using the Asepto syringe.  The dorsal and plantar skin flaps were then reapproximated with interrupted 4-0 nylon sutures.  There was no tension on the dorsal and plantar flaps.  The incision was then covered with Adaptic gauze dressing followed by 4 x 4 fluffs over the incision and 4 x 4's between the toes with Curlex wrap and a #8 Spandage tube net dressing to hold the Kerlix in place.   The patient was fully awakened from her conscious IV sedation and was taken to recovery room with stable vital signs.  A soft tissue sample of the toe in the area of the ulceration was sent for aerobic and anaerobic culture and Gram stain, AFB smear and culture, and fungal smear and culture.  The distal half of the proximal phalangeal bone was then sent separately for pathological identification and evaluation and the great toe with the distal phalangeal bone attached was also sent for pathological evaluation.    Estimated Blood Loss: Approximately 1 mL    Findings: The soft tissues of the toe in the area of the amputation were normal and bled readily but no pulsatile flow was seen in any digital arteries.  The phalangeal bone in the area of the amputation appeared normal.    Complications:No immediate complications occurred       Prakash Carrington MD     Date: 7/5/2018 Time: 10:27 AM

## 2018-07-05 NOTE — SIGNIFICANT NOTE
07/05/18 0945   SLP Deferred Reason   SLP Deferred Reason Patient unavailable for treatment  (Pt off floor for surgery. SLP will f/u tomorrow for re-assessment of cog-com skills to determine if any further tx needs (previously showed mild aphasia and memory/recall deficits). )

## 2018-07-05 NOTE — PROGRESS NOTES
Continued Stay Note   Lui     Patient Name: Lynne Sanchez  MRN: [id number]  Today's Date: 7/5/2018    Admit Date: 6/27/2018          Discharge Plan     Row Name 07/05/18 1537       Plan    Plan Home vs SNF    Patient/Family in Agreement with Plan yes    Plan Comments Case mgt con't to follow. Since she had surgery today, will f/u on 7/6 to help determined d/c needs and if her care be be managed at her home vs SNF.              Discharge Codes    No documentation.       Expected Discharge Date and Time     Expected Discharge Date Expected Discharge Time    Jul 6, 2018             Sonja C Kellerman, RN

## 2018-07-05 NOTE — NURSING NOTE
Pt seen for wound and skin care needs. Pt has a wound on her right great toe due to an open callus. Pt has fungal infection on several toenails including the right great toe. Education done for wound care treatment with betadine, special orthotics, and treatment for fungal infection on toenails. Waffle mattress recommended.  present and eager to help. WOCN will f/u and please call with any concerns. SHAD Ji aware of POC.

## 2018-07-05 NOTE — PLAN OF CARE
Problem: Patient Care Overview  Goal: Plan of Care Review  Outcome: Ongoing (interventions implemented as appropriate)   07/04/18 2015 07/05/18 0344   Coping/Psychosocial   Plan of Care Reviewed With patient --    Plan of Care Review   Progress --  improving   OTHER   Outcome Summary --  Patient rested well this shift. Surgery scheduled for today. No significant events this shift, will continue to monitor

## 2018-07-05 NOTE — PROGRESS NOTES
[facility] Progress Note        Antibiotics:  Anti-Infectives     Ordered     Dose/Rate Route Frequency Start Stop    07/04/18 0730  cefuroxime (ZINACEF) 1.5 g/100 mL 0.9% NS IVPB (mbp)     Ordering Provider:  ANN Pereira    1.5 g  over 30 Minutes Intravenous Every 8 Hours 07/05/18 0000 07/06/18 0759    07/01/18 1152  cefepime (MAXIPIME) 1 g/100 mL 0.9% NS IVPB (mbp)     Ordering Provider:  Pan Painting MD    1 g  over 4 Hours Intravenous Every 12 Hours 07/01/18 2100 07/08/18 2059    07/01/18 1152  metroNIDAZOLE (FLAGYL) IVPB 500 mg     Ordering Provider:  Pan Painting MD    500 mg  over 60 Minutes Intravenous Every 8 Hours 07/01/18 1400 07/08/18 1559    07/01/18 1152  cefepime (MAXIPIME) 1 g/100 mL 0.9% NS IVPB (mbp)     Ordering Provider:  Pan Painting MD    1 g  200 mL/hr over 30 Minutes Intravenous Once 07/01/18 1300 07/01/18 1456    06/28/18 1046  Linezolid (ZYVOX) 600 mg 300 mL     Pan Painting MD reviewed the order on 07/04/18 1003.   Ordering Provider:  Pan Painting MD    600 mg  300 mL/hr over 60 Minutes Intravenous Every 12 Hours 06/28/18 1700 07/11/18 0959    06/28/18 1046  valACYclovir (VALTREX) tablet 1,000 mg     Ordering Provider:  Pan Painting MD    1,000 mg Oral Every 12 Hours Scheduled 06/28/18 1130 07/05/18 0859    06/27/18 1739  vancomycin 1250 mg/250 mL 0.9% NS IVPB ([facility])     Ordering Provider:  Jung Zeng MD    20 mg/kg × 66.7 kg  over 90 Minutes Intravenous Once 06/27/18 1845 06/27/18 2044    06/27/18 1736  piperacillin-tazobactam (ZOSYN) 3.375 g in iso-osmotic dextrose 50 ml (premix)     Ordering Provider:  Jung Zeng MD    3.375 g  over 30 Minutes Intravenous Once 06/27/18 1800 06/27/18 1844          CC: left foot red, uti, right leg shingles    HPI:  Patient is a 83 y.o.  Yr old female with history of peripheral vascular disease with intervention at [facility] by Dr. Pelaez October 2017 described as to the left anterior  circulation.  In addition, in October 2017 she was diagnosed with left hallux osteomyelitis by CT scan/tagged white blood cell scan and cultures with Enterobacter/pseudomonas aeruginosa.  Discharge summary mentions that Dr. Wallace recommended debridement and possible amputation but patient was not interested and was transitioned to [facility] for 6 weeks.  She is admitted to [facility] June 27, 2018 with acute left foot cellulitis, chronic left hallux callus/crusted with acute right sacral/posterior leg shingles and dysuria.  In addition she had fallen onto her right hip at Father's Day  with bruise.     She reported acute onset left foot/hallux redness evolving over 24 hours PTA and no other new trauma.  No open wound or active drainage.      6/30/18 moved to ICU overnight after code 19 and neuro workup with change in speech/left facial droop and left pronator drift; CT perfusion study with left MCA ischemia    7/5/18 surgery with left hallux amp across prox phalynx and bone focus removed per d/w Dr Carrington.    7/5/18 sleepy postop;   Per nursing, improved right post leg eruption to the right of midline in a sacral dermatomal distribution from the spine down the posterior right leg,  crusted with no new blisters.  No new skin lesions; otherwise sleepy at time of my visit and not cooperative with ROS      Per nursing, No headache photophobia or neck stiffness.  No shortness of breath cough or hemoptysis.  No nausea vomiting diarrhea or abdominal pain.    ROS:      7/5/18 per nursing, no f/c/s. No n/v/d. No new ADR to Abx.     Constitutional-- Appetite diminished with generalized malaise and fatigue   Heent-- No epistaxis or oral sores.   No flashers, floaters or eye pain. No odynophagia or dysphagia. No headache, photophobia or neck stiffness.  CV-- No chest pain, palpitation or syncope  Resp-- No SOB/cough/Hemoptysis  GI- No vomiting, or diarrhea.  No hematochezia, melena, or hematemesis. Denies jaundice or  "chronic liver disease.  -- Denies hesitancy or flank pain.  Lymph- no swollen lymph nodes in neck/axilla or groin.   Heme- No active bruising or bleeding; no Hx of DVT or PE.  MS-- no swelling or pain in the bones or joints of arms/legs.  No new back pain.  Neuro-- as above     Full 12 point review of systems reviewed and negative otherwise for acute complaints, except for above    PE:   /73   Pulse 69   Temp 98 °F (36.7 °C) (Temporal Artery )   Resp 20   Ht 165.1 cm (65\")   Wt 66.7 kg (147 lb)   SpO2 98%   BMI 24.46 kg/m²     GENERAL: sleepy, in no acute distress.   HEENT: Normocephalic, atraumatic.  PERRL. EOMI. No conjunctival injection. No icterus. Oropharynx clear without evidence of thrush or exudate.  Poor dentition NECK: Supple without nuchal rigidity. No mass.  LYMPH: No cervical, axillary or inguinal lymphadenopathy.  HEART: RRR; No murmur, rubs, gallops.   LUNGS: Clear to auscultation bilaterally without wheezing, rales, rhonchi. Normal respiratory effort. Nonlabored. No dullness.  ABDOMEN: Soft, nontender, nondistended. Positive bowel sounds. No rebound or guarding. NO mass or HSM.  EXT:  No cyanosis, clubbing or edema. No cord.  : Genitalia generally unremarkable.  Without Main catheter.  MSK: FROM without joint effusions noted arms/legs.    SKIN: See below    NEURO: awake but  not cooperative with detailed neuro exam for me at time of my visit    Left foot/hallux with faint erythema.  Left hallux with callus/crust at the distal phalanx but no discrete mass bulge or fluctuance.  No crepitus or bulla.     Right posterior leg/low back to the right of midline in sacral distribution with dermatomal shingles, crusted and no surrounding induration or warmth.  No mass bulge or fluctuance.  No crepitus.  No new vesicles/bulla     Right hip with bruise after fall    Laboratory Data      Results from last 7 days  Lab Units 07/04/18  0451 07/03/18  0349 07/02/18  0352  07/01/18  0453   WBC " 10*3/mm3 5.47 5.13  --   --  5.16   HEMOGLOBIN g/dL 7.4* 7.4* 7.5*  < > 7.2*   HEMATOCRIT % 24.2* 24.3* 24.3*  < > 23.7*   PLATELETS 10*3/mm3 149* 155  --   --  134*   < > = values in this interval not displayed.    Results from last 7 days  Lab Units 07/04/18  0451   SODIUM mmol/L 136   POTASSIUM mmol/L 4.1   CHLORIDE mmol/L 109   CO2 mmol/L 22.0   BUN mg/dL 18   CREATININE mg/dL 1.13   GLUCOSE mg/dL 123*   CALCIUM mg/dL 8.0*                   Estimated Creatinine Clearance: 39.7 mL/min (by C-G formula based on SCr of 1.13 mg/dL).      Microbiology:      Radiology:  Imaging Results (last 72 hours)     Procedure Component Value Units Date/Time    XR Toe 2+ View Left [[id number]] Collected:  06/27/18 1803     Updated:  06/27/18 2121    Narrative:       EXAM:  XR Left Toe(s), 2 or More Views    EXAM DATE/TIME:  6/27/2018 6:03 PM    CLINICAL HISTORY:  83 years old, female; Signs and symptoms; Other: Left 1st   digit ulcer; Additional info: Left diabetic toe wound    TECHNIQUE:  Frontal, lateral and oblique views of toe(s) of the left foot.    COMPARISON:  No relevant prior studies available.    FINDINGS:    Bones/joints:  No acute osseous abnormality.  No evidence of bony destructive   process.  Bony demineralization and degenerative bony changes.  No dislocation.    Soft tissues:  Soft tissue ulcer along the medial distal left great toe.    Vasculature:  Small vessel arterial calcification.      Impression:       1.  Soft tissue ulcer along the medial distal left great toe.  2.  No radiographic evidence of osteomyelitis at this time.    THIS DOCUMENT HAS BEEN ELECTRONICALLY SIGNED BY RONI SANTIAGO JR. MD            Impression:    --acute neuro change as above 6/29-6/30, moved to ICU with further workup per neuro/critical care team with concern for Left MCA ischemia per radiology on CT perfusion study    --Acute left foot/hallux cellulitis.  In setting of chronic left hallux crust/callus and imaging from October 2017  suggesting chronic left hallux osteomyelitis.  That imaging was at [facility].  She attempted antibiotics and a more conservative route but symptoms have recurred.  I'm not optimistic for chronic healing with antibiotics and further conservative measures.  Dr. Carrington following.  Bone scan is abnormal at left hallux per my discussion with radiology, Dr Lunsford Consistent with clinical concern for left hallux osteomyelitis per discussion with him; surgery 7/5 by Dr. Carrington     --Acute right sacral shingles.  s/p Valtrex.  This appears dermatomal.  Contact isolation initiated; crusted     --Acute dysuria and urinary incontinence with UTI and abnormal U/A.  kleb species     --Chronic peripheral vascular disease.  Left lower extremity revascularization October 2017 with current extent to be clarified by Dr. Carrington.  I discussed with him.     --Sick sinus syndrome/chronic atrial fibrillation with past AV node ablation/permanent pacemaker.     --Chronic kidney disease described as stage III.  Monitor to help guide further adjustments in antimicrobials     --Diabetes type 2.  You need to tightly control blood sugar to give best chance for healing     --Thrombocytopenia.  Monitor, may be chronic     --Subacute fall onto right hip and around Father's Day.  Has bruise.  Further workup radiographically or surgically per internal medicine at their discretion        PLAN:     --IV Zyvox/cefepime/flagyl for now and likely until discharge then consider stop;  S/p oral Valtrex      --Check/review labs cultures and scans     --Contact precautions in place given dermatomal shingles     --Discussed with   pharmacy     --Discussed with microbiology     --Partial history per nursing staff    --Bone Scan noted and d/w Dr Lunsford/Zeb    --d/w Dr Zeb Painting MD  7/5/2018

## 2018-07-05 NOTE — PROGRESS NOTES
[facility] Medicine Services  PROGRESS NOTE    Patient Name: Lynne Sanchez  : 1934  MRN: [id number]    Date of Admission: 2018  Length of Stay: 8  Primary Care Physician: ANN Gonsalez    Subjective   Subjective     CC:   F/u toe osteo      HPI:   present.  Seen after surgery.  She has no complaints.  Not much pain currently.    Review of Systems  Gen- No fevers, chills  CV- No chest pain, palpitations  Resp- No cough, dyspnea  GI- No N/V/D, abd pain    Otherwise ROS is negative except as mentioned in the HPI.    Objective   Objective     Vital Signs:   Temp:  [97.6 °F (36.4 °C)-98.7 °F (37.1 °C)] 97.6 °F (36.4 °C)  Heart Rate:  [69-75] 71  Resp:  [12-20] 20  BP: (134-161)/(54-75) 161/75  Total (NIH Stroke Scale): 0     Physical Exam:    Constitutional -no acute distress, non toxic, in bed, tjin, lying in bed  HEENT- NCAT, mucous membranes moist  CV-RRR, S1 S2 normal, no m/r/g  Resp-CTAB, no wheezes, rhonchi or rales  Abd-soft, non-tender, non-distended, normo active bowel sounds  Ext-left foot heavily bandanged  Neuro-alert and oriented, speech clear, moves all extremities , no deficits  Psych-normal affect   Skin- no rash    Results Reviewed:  I have personally reviewed current lab, radiology, and data and agree.      Results from last 7 days  Lab Units 18  0451 18  0349 18  0352  18  0453  18  2041   WBC 10*3/mm3 5.47 5.13  --   --  5.16  < > 5.32   HEMOGLOBIN g/dL 7.4* 7.4* 7.5*  < > 7.2*  < > 7.7*   HEMATOCRIT % 24.2* 24.3* 24.3*  < > 23.7*  < > 25.8*   PLATELETS 10*3/mm3 149* 155  --   --  134*  < > 103*   INR   --   --   --   --   --   --  1.17*   < > = values in this interval not displayed.    Results from last 7 days  Lab Units 18  0451 18  0349 18  0453   SODIUM mmol/L 136 139 140   POTASSIUM mmol/L 4.1 4.3 3.5   CHLORIDE mmol/L 109 109 107   CO2 mmol/L 22.0 22.0 24.0   BUN mg/dL 18 15 15    CREATININE mg/dL 1.13 1.17 1.36*   GLUCOSE mg/dL 123* 89 97   CALCIUM mg/dL 8.0* 7.8* 8.1*     Estimated Creatinine Clearance: 39.7 mL/min (by C-G formula based on SCr of 1.13 mg/dL).  No results found for: BNP    Microbiology Results Abnormal     Procedure Component Value - Date/Time    Blood Culture - Blood, [[id number]]  (Normal) Collected:  06/28/18 0320    Lab Status:  Final result Specimen:  Blood from Arm, Right Updated:  07/03/18 0400     Blood Culture No growth at 5 days    Blood Culture - Blood, [[id number]]  (Normal) Collected:  06/28/18 0330    Lab Status:  Final result Specimen:  Blood from Arm, Left Updated:  07/03/18 0400     Blood Culture No growth at 5 days    Urine Culture - Urine, [[id number]]  (Abnormal)  (Susceptibility) Collected:  06/28/18 2300    Lab Status:  Final result Specimen:  Urine from Urine, Clean Catch Updated:  07/01/18 1120     Urine Culture 60,000-70,000 CFU/mL Klebsiella aerogenes (A)    Susceptibility      Klebsiella aerogenes     PRIYA     Aztreonam 16 ug/ml Intermediate     Cefepime <=8 ug/ml Susceptible     Cefotaxime 16 ug/ml Intermediate     Ceftriaxone 32 ug/ml Intermediate     Ertapenem <=1 ug/ml Susceptible     Gentamicin <=4 ug/ml Susceptible     Levofloxacin <=2 ug/ml Susceptible     Meropenem <=1 ug/ml Susceptible     Nitrofurantoin <=32 ug/ml Susceptible     Piperacillin + Tazobactam 64 ug/ml Intermediate     Tetracycline <=4 ug/ml Susceptible     Tobramycin <=4 ug/ml Susceptible     Trimethoprim + Sulfamethoxazole <=2/38 ug/ml Susceptible                          Imaging Results (last 24 hours)     ** No results found for the last 24 hours. **        Results for orders placed during the hospital encounter of 06/27/18   Adult Transthoracic Echo Complete W/ Cont if Necessary Per Protocol (With Agitated Saline)    Narrative · Left ventricular systolic function is normal. Estimated EF = 60%.  · Mild aortic valve regurgitation is present.  · Moderate mitral valve  regurgitation is present  · Moderate to severe tricuspid valve regurgitation is present.  · Calculated right ventricular systolic pressure from tricuspid   regurgitation is 90 mmHg. Severe pulmonary hypertension is present  · Calculated right ventricular systolic pressure from tricuspid   regurgitation is 90 mmHg.          I have reviewed the medications.    Assessment/Plan   Assessment / Plan     Hospital Problem List     * (Principal)Osteomyelitis of left foot (CMS/HCC)    Chronic atrial fibrillation (CMS/HCC)    Overview Signed 12/22/2016  1:51 PM by Pan Saunders     1. Chronic atrial fibrillation, status post [employer] pacemaker implantation and AV node ablation:  a. Diagnosed June 2005.  b. Status post ECV restoring normal sinus rhythm, June 2005.  c. Rythmol initiated 05/01/2006.  d. Previously digoxin toxicity.  e. GXT Cardiolite, 09/08/2005:  No reversible ischemia.  LV function not performed secondary to atrial fibrillation.   f. Status post successful external cardioversion on 10/01/2008, Tessa Downey MD.  g. EKG on 10/21/2008:  Recurrence of atrial fibrillation with rate of 109.  h. Recurrent atrial fibrillation by EKG, 11/03/2008, asymptomatic.  i. Status post [employer] pacemaker implantation and AV node ablation.   j. Echocardiogram 02/15/2010:  Moderate MR, moderate TR.  RVSP 50.  EF greater than 55%, left atrial enlargement at 4.3 cm.           Type 2 diabetes mellitus treated without insulin (CMS/HCC) (Chronic)    Normocytic anemia    Peripheral arterial disease (CMS/HCC)    Foot infection    Fever    Pyogenic inflammation of bone (CMS/HCC)    Overview Signed 6/29/2018  2:35 PM by ANN Claudio     Added automatically from request for surgery [id number]         Cerebrovascular accident (CVA) due to thrombosis of left anterior cerebral artery (CMS/HCC)             Brief Hospital Course to date:  Lynne Sanchez is a 83 y.o. female with history of DMII, PVD, Afib/SSS/PPM, CKDIII, Anemia and  osteomyelitis presented for treatment of foot wound but hospitalization complicated by acute stroke and shingles.      Assessment & Plan:    Osteomyelitis  - s/p left great toe amputation  - continue abx per Dr. Belcher    Acute CVA after admission  - s/p tPA  - eliquis when OK with surgery  - ASA, statin    Afib  - previously no AC.  Plan NOAC when ok with surgery    Anemia  - iron def, currently on PO iron, consider IV iron post-op  - prior EGD 2017 showed only Barretts esophagus    DMII  - adjust basal/bolus insulin for better control    Sss/PPM    PAD    CKDIII    Dispo - PT to see post-op; home vs rehab TBD      DVT Prophylaxis:  NOAC soon    CODE STATUS:   Code Status and Medical Interventions:   Ordered at: 06/28/18 1008     Level Of Support Discussed With:    Patient     Code Status:    CPR     Medical Interventions (Level of Support Prior to Arrest):    Full       Disposition: I expect the patient to be discharged home vs snf/rehab in 2-3 days.    Electronically signed by Randy Newell MD, 07/05/18, 1:18 PM.

## 2018-07-06 LAB
BASOPHILS # BLD AUTO: 0.02 10*3/MM3 (ref 0–0.2)
BASOPHILS NFR BLD AUTO: 0.3 % (ref 0–1)
DEPRECATED RDW RBC AUTO: 53.2 FL (ref 37–54)
EOSINOPHIL # BLD AUTO: 0.1 10*3/MM3 (ref 0–0.3)
EOSINOPHIL NFR BLD AUTO: 1.5 % (ref 0–3)
ERYTHROCYTE [DISTWIDTH] IN BLOOD BY AUTOMATED COUNT: 17.8 % (ref 11.3–14.5)
GLUCOSE BLDC GLUCOMTR-MCNC: 116 MG/DL (ref 70–130)
GLUCOSE BLDC GLUCOMTR-MCNC: 120 MG/DL (ref 70–130)
GLUCOSE BLDC GLUCOMTR-MCNC: 159 MG/DL (ref 70–130)
GLUCOSE BLDC GLUCOMTR-MCNC: 173 MG/DL (ref 70–130)
HCT VFR BLD AUTO: 24.3 % (ref 34.5–44)
HGB BLD-MCNC: 7.7 G/DL (ref 11.5–15.5)
IMM GRANULOCYTES # BLD: 0.01 10*3/MM3 (ref 0–0.03)
IMM GRANULOCYTES NFR BLD: 0.1 % (ref 0–0.6)
LYMPHOCYTES # BLD AUTO: 1.02 10*3/MM3 (ref 0.6–4.8)
LYMPHOCYTES NFR BLD AUTO: 15.2 % (ref 24–44)
MCH RBC QN AUTO: 26.5 PG (ref 27–31)
MCHC RBC AUTO-ENTMCNC: 31.7 G/DL (ref 32–36)
MCV RBC AUTO: 83.5 FL (ref 80–99)
MONOCYTES # BLD AUTO: 0.48 10*3/MM3 (ref 0–1)
MONOCYTES NFR BLD AUTO: 7.2 % (ref 0–12)
NEUTROPHILS # BLD AUTO: 5.07 10*3/MM3 (ref 1.5–8.3)
NEUTROPHILS NFR BLD AUTO: 75.8 % (ref 41–71)
PLATELET # BLD AUTO: 135 10*3/MM3 (ref 150–450)
PMV BLD AUTO: 9.4 FL (ref 6–12)
RBC # BLD AUTO: 2.91 10*6/MM3 (ref 3.89–5.14)
WBC NRBC COR # BLD: 6.69 10*3/MM3 (ref 3.5–10.8)

## 2018-07-06 PROCEDURE — 85025 COMPLETE CBC W/AUTO DIFF WBC: CPT | Performed by: NURSE PRACTITIONER

## 2018-07-06 PROCEDURE — 25010000002 CEFUROXIME: Performed by: PHYSICIAN ASSISTANT

## 2018-07-06 PROCEDURE — 25010000002 LINEZOLID 600 MG/300ML SOLUTION: Performed by: INTERNAL MEDICINE

## 2018-07-06 PROCEDURE — 82962 GLUCOSE BLOOD TEST: CPT

## 2018-07-06 PROCEDURE — 63710000001 INSULIN DETEMIR PER 5 UNITS: Performed by: INTERNAL MEDICINE

## 2018-07-06 PROCEDURE — 99232 SBSQ HOSP IP/OBS MODERATE 35: CPT | Performed by: NURSE PRACTITIONER

## 2018-07-06 PROCEDURE — 92523 SPEECH SOUND LANG COMPREHEN: CPT

## 2018-07-06 RX ADMIN — METRONIDAZOLE 500 MG: 500 INJECTION, SOLUTION INTRAVENOUS at 17:13

## 2018-07-06 RX ADMIN — INSULIN LISPRO 2 UNITS: 100 INJECTION, SOLUTION INTRAVENOUS; SUBCUTANEOUS at 11:42

## 2018-07-06 RX ADMIN — FUROSEMIDE 20 MG: 20 TABLET ORAL at 09:15

## 2018-07-06 RX ADMIN — Medication 1 CAPSULE: at 09:15

## 2018-07-06 RX ADMIN — METRONIDAZOLE 500 MG: 500 INJECTION, SOLUTION INTRAVENOUS at 09:16

## 2018-07-06 RX ADMIN — METRONIDAZOLE 500 MG: 500 INJECTION, SOLUTION INTRAVENOUS at 00:33

## 2018-07-06 RX ADMIN — METRONIDAZOLE 500 MG: 500 INJECTION, SOLUTION INTRAVENOUS at 22:50

## 2018-07-06 RX ADMIN — LINEZOLID 600 MG: 600 INJECTION, SOLUTION INTRAVENOUS at 05:50

## 2018-07-06 RX ADMIN — METOPROLOL TARTRATE 100 MG: 100 TABLET ORAL at 09:15

## 2018-07-06 RX ADMIN — NYSTATIN: 100000 POWDER TOPICAL at 09:16

## 2018-07-06 RX ADMIN — Medication 325 MG: at 09:15

## 2018-07-06 RX ADMIN — ACETAMINOPHEN 650 MG: 325 TABLET, FILM COATED ORAL at 22:50

## 2018-07-06 RX ADMIN — ATORVASTATIN CALCIUM 80 MG: 40 TABLET, FILM COATED ORAL at 20:29

## 2018-07-06 RX ADMIN — CEFUROXIME 1.5 G: 1.5 INJECTION, POWDER, FOR SOLUTION INTRAVENOUS at 00:33

## 2018-07-06 RX ADMIN — INSULIN DETEMIR 8 UNITS: 100 INJECTION, SOLUTION SUBCUTANEOUS at 20:29

## 2018-07-06 RX ADMIN — CEFEPIME HYDROCHLORIDE 1 G: 1 INJECTION, POWDER, FOR SOLUTION INTRAMUSCULAR; INTRAVENOUS at 20:29

## 2018-07-06 RX ADMIN — INSULIN LISPRO 2 UNITS: 100 INJECTION, SOLUTION INTRAVENOUS; SUBCUTANEOUS at 09:16

## 2018-07-06 RX ADMIN — CEFEPIME HYDROCHLORIDE 1 G: 1 INJECTION, POWDER, FOR SOLUTION INTRAMUSCULAR; INTRAVENOUS at 09:16

## 2018-07-06 RX ADMIN — METOPROLOL TARTRATE 100 MG: 100 TABLET ORAL at 20:29

## 2018-07-06 NOTE — PROGRESS NOTES
Continued Stay Note  [facility]     Patient Name: Lynne Sanchez  MRN: [id number]  Today's Date: 7/6/2018    Admit Date: 6/27/2018          Discharge Plan     Row Name 07/06/18 1642       Plan    Plan Home vs skilled rehab.    Patient/Family in Agreement with Plan yes    Plan Comments Case mgt con't to follow. I met with Mrs Sanchez and . Her plan is to go home, but will need to determine her mobility status. She was walking 200ft with PT with a rolling walker prior to surgery. Arrangements made for a rolling walker to be delivered from [facility]. Will await re eval from PT to determined d/c needs.              Discharge Codes    No documentation.       Expected Discharge Date and Time     Expected Discharge Date Expected Discharge Time    Jul 9, 2018             Sonja C Kellerman, RN

## 2018-07-06 NOTE — THERAPY DISCHARGE NOTE
Acute Care - Speech Language Pathology Initial Eval/Discharge  [facility]   Cognitive-Communication Evaluation     Patient Name: Lynne Sanchez  : 1934  MRN: [id number]  Today's Date: 2018  Onset of Illness/Injury or Date of Surgery: 18     Referring Physician: MD Dia      Admit Date: 2018     Visit Dx:    ICD-10-CM ICD-9-CM   1. Other chronic osteomyelitis of left foot (CMS/HCC) M86.672 730.17   2. Cerebrovascular accident (CVA), unspecified mechanism (CMS/HCC) I63.9 434.91   3. Impaired mobility and ADLs Z74.09 799.89   4. Impaired functional mobility, balance, gait, and endurance Z74.09 V49.89   5. 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/HCC)   • Melena   • Chronic gastritis   • Peripheral arterial disease (CMS/HCC)   • Foot infection   • Fever   • Pyogenic inflammation of bone (CMS/HCC)   • Cerebrovascular accident (CVA) due to thrombosis of left anterior cerebral artery (CMS/HCC)     Past Medical History:   Diagnosis Date   • Anemia    • CHF (congestive heart failure) (CMS/HCC)    • Chronic atrial fibrillation (CMS/HCC)    • Diabetes mellitus, type 2 (CMS/HCC)    • Glaucoma    • History of transfusion    • Hyperlipidemia    • Hypertension    • Kidney disease     patient not aware of what exact diagnosis is      Past Surgical History:   Procedure Laterality Date   • APPENDECTOMY     • BONE MARROW ASPIRATION     • CARDIAC ABLATION     • CARDIAC CATHETERIZATION N/A 10/10/2017    Procedure: Peripheral angiography;  Surgeon: Kan Pelaez MD;  Location: [facility] CATH INVASIVE LOCATION;  Service:    • CARDIAC CATHETERIZATION N/A 10/10/2017    Procedure:  Angioplasty-peripheral;  Surgeon: Kan Pelaez MD;  Location:  [facility] CATH INVASIVE LOCATION;  Service:    • CARDIAC CATHETERIZATION N/A 10/10/2017    Procedure: Atherectomy-peripheral;  Surgeon: Kan Pelaez MD;  Location:  [facility] CATH INVASIVE LOCATION;  Service:    • CARDIAC ELECTROPHYSIOLOGY PROCEDURE N/A 5/3/2018    Procedure: generator change;  Surgeon: Zan Poole MD;  Location:  [facility] CATH INVASIVE LOCATION;  Service: Cardiovascular   • CARDIOVERSION     • COLONOSCOPY     • ENDOSCOPY N/A 10/9/2017    Procedure: ESOPHAGOGASTRODUODENOSCOPY WITH COLD FORCEP BIOPSY;  Surgeon: Clifton Hyatt MD;  Location: [facility] ENDOSCOPY;  Service:    • EYE SURGERY Bilateral    • INTERVENTIONAL RADIOLOGY PROCEDURE N/A 10/10/2017    Procedure: Abdominal Aortagram with Runoff;  Surgeon: Kan Pelaez MD;  Location:  [facility] CATH INVASIVE LOCATION;  Service:    • PACEMAKER IMPLANTATION            SLP EVALUATION (last 72 hours)      SLP SLC Evaluation     Row Name 07/06/18 0915                   Communication Assessment/Intervention    Document Type re-evaluation  -AC        Subjective Information no complaints  -AC        Patient Observations alert;cooperative  -AC        Patient/Family Observations Pt alert, cooperative. No family present.  -AC        Patient Effort good  -AC           General Information    Patient Profile Reviewed yes  -AC        Plans/Goals Discussed with patient;agreed upon  -AC        Barriers to Rehab none identified  -AC        Patient's Goals for Discharge patient did not state  -AC           Pain Assessment    Additional Documentation Pain Scale: Numbers Pre/Post-Treatment (Group)  -AC           Pain Scale: Numbers Pre/Post-Treatment    Pain Scale: Numbers, Pretreatment 0/10 - no pain  -AC        Pain Scale: Numbers, Post-Treatment 0/10 - no pain  -AC           Auditory Comprehension Assessment/Intervention    Auditory Comprehension (Communication) WFL  -AC        Answers  "Questions (Communication) WFL;simple;yes/no;wh questions;personal  -AC        Able to Follow Commands (Communication) WFL;2-step  -AC        Narrative Discourse WFL;conversational level  -AC           Reading Comprehension Assessment/Intervention    Reading Comprehension (Communication) WFL  -AC        Paragraph Level WFL  -AC        Reading Comprehension, Comment Pt able to read paragraph and answer open-ended ?s w/ 90% acc and mult choice ?s w/ 100% acc w/o cues.   -AC           Expression Assessment/Intervention    Expression Assessment/Intervention graphic expression  -AC           Verbal Expression Assessment/Intervention    Verbal Expression WFL  -AC        Conversational Discourse/Fluency WFL  -AC        Verbal Expression, Comment No word finding difficulty noted during conv speech. Pt reported verbal expression has returned to baseline.  -AC           Graphic Expression Assessment/Intervention    Graphic Expression WFL;dominant hand;other (see comments)   R hand  -AC        Biographical Information WFL;name  -        Graphic Expression, Comment Pt able to generate complex written sentence using 2 given words: \"if\" and \"call.\"  -AC           Cognitive Assessment Intervention- SLP    Cognitive Function (Cognition) WFL  -AC        Orientation Status (Cognition) WFL;awareness of basic personal information;person;place;situation  -        Memory (Cognitive) WFL;immediate;delayed;other (see comments)   immediate recall: 5/5 words, delay recall (5 min): 5/5 words  -        Attention (Cognitive) WFL;other (see comments)   for assessment tasks  -        Thought Organization (Cognitive) WFL;verbal sequencing;other (see comments)   pt able to adequately sequence recipe  -AC        Problem Solving (Cognitive) WFL;temporal  -AC           SLP Clinical Impressions    SLP Diagnosis Functional cognitive-communication skills. Pt's short-term memory and language skills have improved. Pt reported she has returned to " baseline & agrees that no further SLP services warranted @ this time.   -AC        Criteria for Skilled Therapy Interventions Met no problems identified which require skilled intervention;baseline status  -AC          User Key  (r) = Recorded By, (t) = Taken By, (c) = Cosigned By    Initials Name Effective Dates    AC Shannan Hairston, MS CCC-SLP 07/27/17 -            EDUCATION  The patient has been educated in the following areas:   Cognitive Impairment Communication Impairment.    SLP Recommendation and Plan  SLP Diagnosis: Functional cognitive-communication skills. Pt's short-term memory and language skills have improved. Pt reported she has returned to baseline & agrees that no further SLP services warranted @ this time.      Criteria for Skilled Therapy Interventions Met: no problems identified which require skilled intervention, baseline status  Anticipated Dischage Disposition: unknown       Plan of Care Reviewed With: patient  Plan of Care Review  Plan of Care Reviewed With: patient          SLP GOALS     Row Name 07/06/18 0915             Comprehend Narrative Discourse Goal 1 (SLP)    Improve Comprehension of Narrative Discourse Goal 1 (SLP) respond appropriately to paragraph level conversational discourse;80%;with minimal cues (75-90%)  -AC      Time Frame (Comprehend Narrative Discourse Goal 1, SLP) by discharge  -AC      Progress/Outcomes (Comprehend Narrative Discourse Goal 1, SLP) goal met  -AC         Comprehension at Paragraph Level Goal 1 (SLP)    Improve Reading Comprehension at Paragraph Level Goal 1 (SLP) answer questions re: paragraph read;answer written questions re: home management (mail, email, recipes, medicine, procedures);moderately complex;80%;with minimal cues (75-90%)  -AC      Time Frame (Reading Comprehension at Paragraph Level Goal 1, SLP) by discharge  -AC      Progress/Outcomes (Reading Comprehension at Paragraph Level Goal 1, SLP) goal met  -AC         Word Retrieval Skills Goal 1  (SLP)    Improve Word Retrieval Skills By Goal 1 (SLP) completing functional word finding tasks;80%;with minimal cues (75-90%)  -AC      Time Frame (Word Retrieval Goal 1, SLP) by discharge  -AC      Progress/Outcomes (Word Retrieval Goal 1, SLP) goal met  -AC         Memory Skills Goal 1 (SLP)    Improve Memory Skills Through Goal 1 (SLP) listen to a paragraph and answer questions;recall details of the day;80%;with minimal cues (75-90%)  -AC      Time Frame (Memory Skills Goal 1, SLP) by discharge  -AC      Progress/Outcomes (Memory Skills Goal 1, SLP) goal met  -AC         Additional Goal 1 (SLP)    Additional Goal 1, SLP LTG: Improve cog-comm skills in odered to participate in care while in hopsital setting with 100% accruacy and no cues  -AC      Time Frame (Additional Goal 1, SLP) by discharge  -AC      Progress/Outcomes (Additional Goal 1, SLP) goal met  -AC        User Key  (r) = Recorded By, (t) = Taken By, (c) = Cosigned By    Initials Name Provider Type    RICHA Hairston MS CCC-SLP Speech and Language Pathologist          Time Calculation:         Time Calculation- SLP     Row Name 07/06/18 1001             Time Calculation- SLP    SLP Start Time 0915  -      SLP Received On 07/06/18  -        User Key  (r) = Recorded By, (t) = Taken By, (c) = Cosigned By    Initials Name Provider Type    RICHA Hairston MS CCC-SLP Speech and Language Pathologist          Therapy Charges for Today     Code Description Service Date Service Provider Modifiers Qty    [id number] HC ST EVAL SPEECH AND PROD W LANG  3 7/6/2018 Shannan Hairston MS CCC-SLP GN 1           SLP Discharge Summary  Anticipated Dischage Disposition: unknown  Reason for Discharge: all goals and outcomes met, no further needs identified  Progress Toward Achieving Short/long Term Goals: all goals met within established timelines    MS HEIDY CortesSLP  7/6/2018

## 2018-07-06 NOTE — PLAN OF CARE
Problem: Patient Care Overview  Goal: Plan of Care Review  Outcome: Ongoing (interventions implemented as appropriate)   07/05/18 2039 07/06/18 0307   Coping/Psychosocial   Plan of Care Reviewed With patient --    Plan of Care Review   Progress --  no change   OTHER   Outcome Summary --  Patient rested well this shift. So far, no c/o pain. Mild nausea at beginning of shift, patient encouraged to try to eat crackers. PRN nausea medication administered x1 as of this point. Poor appetite. No significant events so far this shift, will continue to monitor.

## 2018-07-06 NOTE — PROGRESS NOTES
[facility] Medicine Services  PROGRESS NOTE    Patient Name: Lynne Sanchez  : 1934  MRN: [id number]    Date of Admission: 2018  Length of Stay: 9  Primary Care Physician: NAN Gonsalez    Subjective   Subjective     CC:   F/u toe osteo      HPI:  Patient is seen resting up in bed dozing intermittently.  Easily awakens.  No family at bedside.  When awake she is alert and oriented.  States that she is having no pain in her right foot.  Her right posterior leg shingles are healing and are pain-free currently.  She does complain of generalized weakness.  Denies any focal neurologic deficits from her recent stroke.  Rating diet.  No nausea or vomiting.        Review of Systems  Gen- No fevers, chills. + gen weak   CV- No chest pain, palpitations  Resp- No cough, dyspnea  GI- No N/V/D, abd pain    Otherwise ROS is negative except as mentioned in the HPI.    Objective   Objective     Vital Signs:   Temp:  [97.4 °F (36.3 °C)-98.5 °F (36.9 °C)] 97.4 °F (36.3 °C)  Heart Rate:  [69] 69  Resp:  [12-16] 16  BP: (142-158)/(57-70) 158/62  Total (NIH Stroke Scale): 0     Physical Exam:  Constitutional: No acute distress, Upright in bed dozing intermittently.  Easily awakens.  No family at bedside.  Eyes: PERRLA, sclerae anicteric, no conjunctival injection  HENT: NCAT, mucous membranes moist  Neck: Supple, trachea midline  Respiratory: Clear to auscultation bilaterally A/P, nonlabored respirations   Cardiovascular: RRR, no murmurs, rubs, or gallops, palpable pedal pulses bilaterally  Gastrointestinal: Positive bowel sounds, soft, nontender, nondistended  Musculoskeletal: No bilateral ankle edema, no clubbing or cyanosis to extremities.  DICKENS spont.   Psychiatric: Appropriate affect, cooperative, calm and pleasant  Neurologic: Oriented x 3, strength symmetric in all extremities, Cranial Nerves grossly intact to confrontation, speech clear and appropriate.  Follows  commands.  Skin: No rashes.  Skin warm and dry.  Right posterior leg healing shingle sites with no vesicles or drainage.  Left foot dressing dry and intact with exposed toes warm and dry.      Results Reviewed:  I have personally reviewed current lab, radiology, and data and agree.      Results from last 7 days  Lab Units 07/04/18  0451 07/03/18  0349 07/02/18  0352  07/01/18  0453  06/29/18  2041   WBC 10*3/mm3 5.47 5.13  --   --  5.16  < > 5.32   HEMOGLOBIN g/dL 7.4* 7.4* 7.5*  < > 7.2*  < > 7.7*   HEMATOCRIT % 24.2* 24.3* 24.3*  < > 23.7*  < > 25.8*   PLATELETS 10*3/mm3 149* 155  --   --  134*  < > 103*   INR   --   --   --   --   --   --  1.17*   < > = values in this interval not displayed.    Results from last 7 days  Lab Units 07/04/18 0451 07/03/18 0349 07/01/18  0453   SODIUM mmol/L 136 139 140   POTASSIUM mmol/L 4.1 4.3 3.5   CHLORIDE mmol/L 109 109 107   CO2 mmol/L 22.0 22.0 24.0   BUN mg/dL 18 15 15   CREATININE mg/dL 1.13 1.17 1.36*   GLUCOSE mg/dL 123* 89 97   CALCIUM mg/dL 8.0* 7.8* 8.1*     Estimated Creatinine Clearance: 39.7 mL/min (by C-G formula based on SCr of 1.13 mg/dL).  No results found for: BNP    Microbiology Results Abnormal     Procedure Component Value - Date/Time    Anaerobic Culture - Tissue, Toe, Left [[id number]]  (Normal) Collected:  07/05/18 1023    Lab Status:  Preliminary result Specimen:  Tissue from Toe, Left Updated:  07/06/18 1127     Culture No anaerobes isolated    Tissue / Bone Culture - Tissue, Toe, Left [[id number]]  (Abnormal) Collected:  07/05/18 1023    Lab Status:  Preliminary result Specimen:  Tissue from Toe, Left Updated:  07/06/18 0731     Tissue Culture Scant growth (1+) Gram Negative Bacilli (A)     Gram Stain Result Occasional WBCs seen      No organisms seen    Blood Culture - Blood, [[id number]]  (Normal) Collected:  06/28/18 0320    Lab Status:  Final result Specimen:  Blood from Arm, Right Updated:  07/03/18 0400     Blood Culture No growth at 5 days     Blood Culture - Blood, [[id number]]  (Normal) Collected:  06/28/18 0330    Lab Status:  Final result Specimen:  Blood from Arm, Left Updated:  07/03/18 0400     Blood Culture No growth at 5 days    Urine Culture - Urine, [[id number]]  (Abnormal)  (Susceptibility) Collected:  06/28/18 2300    Lab Status:  Final result Specimen:  Urine from Urine, Clean Catch Updated:  07/01/18 1120     Urine Culture 60,000-70,000 CFU/mL Klebsiella aerogenes (A)    Susceptibility      Klebsiella aerogenes     PRIYA     Aztreonam 16 ug/ml Intermediate     Cefepime <=8 ug/ml Susceptible     Cefotaxime 16 ug/ml Intermediate     Ceftriaxone 32 ug/ml Intermediate     Ertapenem <=1 ug/ml Susceptible     Gentamicin <=4 ug/ml Susceptible     Levofloxacin <=2 ug/ml Susceptible     Meropenem <=1 ug/ml Susceptible     Nitrofurantoin <=32 ug/ml Susceptible     Piperacillin + Tazobactam 64 ug/ml Intermediate     Tetracycline <=4 ug/ml Susceptible     Tobramycin <=4 ug/ml Susceptible     Trimethoprim + Sulfamethoxazole <=2/38 ug/ml Susceptible                          Imaging Results (last 24 hours)     ** No results found for the last 24 hours. **        Results for orders placed during the hospital encounter of 06/27/18   Adult Transthoracic Echo Complete W/ Cont if Necessary Per Protocol (With Agitated Saline)    Narrative · Left ventricular systolic function is normal. Estimated EF = 60%.  · Mild aortic valve regurgitation is present.  · Moderate mitral valve regurgitation is present  · Moderate to severe tricuspid valve regurgitation is present.  · Calculated right ventricular systolic pressure from tricuspid   regurgitation is 90 mmHg. Severe pulmonary hypertension is present  · Calculated right ventricular systolic pressure from tricuspid   regurgitation is 90 mmHg.          I have reviewed the medications.    Assessment/Plan   Assessment / Plan     Hospital Problem List     * (Principal)Osteomyelitis of left foot (CMS/HCC)    Chronic atrial  fibrillation (CMS/[facility])    Overview Signed 12/22/2016  1:51 PM by Pan Saunders     1. Chronic atrial fibrillation, status post [employer] pacemaker implantation and AV node ablation:  a. Diagnosed June 2005.  b. Status post ECV restoring normal sinus rhythm, June 2005.  c. Rythmol initiated 05/01/2006.  d. Previously digoxin toxicity.  e. GXT Cardiolite, 09/08/2005:  No reversible ischemia.  LV function not performed secondary to atrial fibrillation.   f. Status post successful external cardioversion on 10/01/2008, Tessa Downey MD.  g. EKG on 10/21/2008:  Recurrence of atrial fibrillation with rate of 109.  h. Recurrent atrial fibrillation by EKG, 11/03/2008, asymptomatic.  i. Status post [employer] pacemaker implantation and AV node ablation.   j. Echocardiogram 02/15/2010:  Moderate MR, moderate TR.  RVSP 50.  EF greater than 55%, left atrial enlargement at 4.3 cm.           Type 2 diabetes mellitus treated without insulin (CMS/[facility]) (Chronic)    Normocytic anemia    Peripheral arterial disease (CMS/[facility])    Foot infection    Fever    Pyogenic inflammation of bone (CMS/[facility])    Overview Signed 6/29/2018  2:35 PM by ANN Claudio     Added automatically from request for surgery [id number]         Cerebrovascular accident (CVA) due to thrombosis of left anterior cerebral artery (CMS/[facility])             Brief Hospital Course to date:  Lynne Sanchez is a 83 y.o. female with history of DMII, PVD, Afib/SSS/PPM, CKDIII, Anemia and osteomyelitis presented for treatment of foot wound but hospitalization complicated by acute stroke and shingles.      Assessment & Plan:    Osteomyelitis  - s/p left great toe amputation per Dr Carrington yesterday 7/5  - ID following.  continue abx per Dr. Belcher    Acute CVA after admission  - s/p tPA.  No obvious deficit.  C/o only generalized weakness.    - eliquis when OK with surgery (h/h still low)  - ASA, statin    Afib  - previously no AC.  Plan NOAC when ok with surgery    Anemia  -  iron def, currently on PO iron, consider IV iron post-op  - prior EGD 2017 showed only Barretts esophagus  -H/H remains low however stable and asymptomatic.  --am labs     DMII (A1c 8.5)  - adjust basal/bolus insulin for better control  --improved today.    --continue to monitor     Sss/PPM    PAD    CKDIII  --creatinine stable at 1.13  --monitor labs     Dispo - PT has seen.  Plan is to transfer to rehab likely early next week.  TBD per CTS recs      DVT Prophylaxis:  NOAC soon    CODE STATUS:   Code Status and Medical Interventions:   Ordered at: 06/28/18 1008     Level Of Support Discussed With:    Patient     Code Status:    CPR     Medical Interventions (Level of Support Prior to Arrest):    Full       Disposition: I expect the patient to be discharged home vs snf/rehab in 2-3 days.    Electronically signed by BILL Beck, 07/06/18, 2:15 PM.

## 2018-07-06 NOTE — PLAN OF CARE
Problem: Patient Care Overview  Goal: Plan of Care Review  Outcome: Ongoing (interventions implemented as appropriate)   07/06/18 1000   Coping/Psychosocial   Plan of Care Reviewed With patient     SLP re-evaluation completed. Will sign-off as cognitive-communication skills have greatly improved - now WFL. Pt reported she is @ baseline & agreed no further SLP services warranted. Please see note for further details and recommendations.

## 2018-07-06 NOTE — PROGRESS NOTES
[facility] Progress Note        Antibiotics:  Anti-Infectives     Ordered     Dose/Rate Route Frequency Start Stop    07/04/18 0730  cefuroxime (ZINACEF) 1.5 g/100 mL 0.9% NS IVPB (mbp)     Ordering Provider:  ANN Pereira    1.5 g  over 30 Minutes Intravenous Every 8 Hours 07/05/18 0000 07/06/18 0759    07/01/18 1152  cefepime (MAXIPIME) 1 g/100 mL 0.9% NS IVPB (mbp)     Ordering Provider:  Pan Painting MD    1 g  over 4 Hours Intravenous Every 12 Hours 07/01/18 2100 07/08/18 2059    07/01/18 1152  metroNIDAZOLE (FLAGYL) IVPB 500 mg     Ordering Provider:  Pan Painting MD    500 mg  over 60 Minutes Intravenous Every 8 Hours 07/01/18 1400 07/08/18 1559    07/01/18 1152  cefepime (MAXIPIME) 1 g/100 mL 0.9% NS IVPB (mbp)     Ordering Provider:  Pan Painting MD    1 g  200 mL/hr over 30 Minutes Intravenous Once 07/01/18 1300 07/01/18 1456    06/28/18 1046  valACYclovir (VALTREX) tablet 1,000 mg     Ordering Provider:  Pan Painting MD    1,000 mg Oral Every 12 Hours Scheduled 06/28/18 1130 07/05/18 0859    06/27/18 1739  vancomycin 1250 mg/250 mL 0.9% NS IVPB ([facility])     Ordering Provider:  Jung Zeng MD    20 mg/kg × 66.7 kg  over 90 Minutes Intravenous Once 06/27/18 1845 06/27/18 2044    06/27/18 1736  piperacillin-tazobactam (ZOSYN) 3.375 g in iso-osmotic dextrose 50 ml (premix)     Ordering Provider:  Jung Zeng MD    3.375 g  over 30 Minutes Intravenous Once 06/27/18 1800 06/27/18 1844          CC: left foot red, uti, right leg shingles    HPI:  Patient is a 83 y.o.  Yr old female with history of peripheral vascular disease with intervention at [facility] by Dr. Pelaez October 2017 described as to the left anterior circulation.  In addition, in October 2017 she was diagnosed with left hallux osteomyelitis by CT scan/tagged white blood cell scan and cultures with Enterobacter/pseudomonas aeruginosa.  Discharge summary mentions that Dr. Wallace recommended debridement and  possible amputation but patient was not interested and was transitioned to [facility] for 6 weeks.  She is admitted to [facility] June 27, 2018 with acute left foot cellulitis, chronic left hallux callus/crusted with acute right sacral/posterior leg shingles and dysuria.  In addition she had fallen onto her right hip at Father's Day  with bruise.     She reported acute onset left foot/hallux redness evolving over 24 hours PTA and no other new trauma.  No open wound or active drainage.      6/30/18 moved to ICU overnight after code 19 and neuro workup with change in speech/left facial droop and left pronator drift; CT perfusion study with left MCA ischemia    7/5/18 surgery with left hallux amp across prox phalynx and bone focus removed per d/w Dr Carrington.    7/6/18 seen early and sleepy.   Per nursing, improved right post leg eruption to the right of midline in a sacral dermatomal distribution from the spine down the posterior right leg,  crusted with no new blisters.  No new skin lesions; otherwise sleepy at time of my visit and not cooperative with ROS      Per nursing, No headache photophobia or neck stiffness.  No shortness of breath cough or hemoptysis.  No nausea vomiting diarrhea or abdominal pain.    ROS:      7/6/18 per nursing, no f/c/s. No n/v/d. No new ADR to Abx.     Constitutional-- Appetite diminished with generalized malaise and fatigue   Heent-- No epistaxis or oral sores.   No flashers, floaters or eye pain. No odynophagia or dysphagia. No headache, photophobia or neck stiffness.  CV-- No chest pain, palpitation or syncope  Resp-- No SOB/cough/Hemoptysis  GI- No vomiting, or diarrhea.  No hematochezia, melena, or hematemesis. Denies jaundice or chronic liver disease.  -- Denies hesitancy or flank pain.  Lymph- no swollen lymph nodes in neck/axilla or groin.   Heme- No active bruising or bleeding; no Hx of DVT or PE.  MS-- no swelling or pain in the bones or joints of arms/legs.  No new  "back pain.  Neuro-- as above     Full 12 point review of systems reviewed and negative otherwise for acute complaints, except for above    PE:   /70 (BP Location: Left arm, Patient Position: Lying)   Pulse 69   Temp 98.5 °F (36.9 °C) (Oral)   Resp 12   Ht 165.1 cm (65\")   Wt 66.7 kg (147 lb)   SpO2 99%   BMI 24.46 kg/m²     GENERAL: sleepy, in no acute distress.   HEENT: Normocephalic, atraumatic.  PERRL. EOMI. No conjunctival injection. No icterus. Oropharynx clear without evidence of thrush or exudate.  Poor dentition NECK: Supple without nuchal rigidity. No mass.  LYMPH: No cervical, axillary or inguinal lymphadenopathy.  HEART: RRR; No murmur, rubs, gallops.   LUNGS: Clear to auscultation bilaterally without wheezing, rales, rhonchi. Normal respiratory effort. Nonlabored. No dullness.  ABDOMEN: Soft, nontender, nondistended. Positive bowel sounds. No rebound or guarding. NO mass or HSM.  EXT:  No cyanosis, clubbing or edema. No cord.  : Genitalia generally unremarkable.  Without Main catheter.  MSK: FROM without joint effusions noted arms/legs.    SKIN: See below    NEURO: awake but  not cooperative with detailed neuro exam for me at time of my visit    Left foot/hallux with faint erythema.  Left hallux with callus/crust at the distal phalanx but no discrete mass bulge or fluctuance.  No crepitus or bulla.     Right posterior leg/low back to the right of midline in sacral distribution with dermatomal shingles, crusted and no surrounding induration or warmth.  No mass bulge or fluctuance.  No crepitus.  No new vesicles/bulla     Right hip with bruise after fall    Laboratory Data      Results from last 7 days  Lab Units 07/04/18  0451 07/03/18  0349 07/02/18  0352  07/01/18  0453   WBC 10*3/mm3 5.47 5.13  --   --  5.16   HEMOGLOBIN g/dL 7.4* 7.4* 7.5*  < > 7.2*   HEMATOCRIT % 24.2* 24.3* 24.3*  < > 23.7*   PLATELETS 10*3/mm3 149* 155  --   --  134*   < > = values in this interval not " displayed.    Results from last 7 days  Lab Units 07/04/18  0451   SODIUM mmol/L 136   POTASSIUM mmol/L 4.1   CHLORIDE mmol/L 109   CO2 mmol/L 22.0   BUN mg/dL 18   CREATININE mg/dL 1.13   GLUCOSE mg/dL 123*   CALCIUM mg/dL 8.0*                   Estimated Creatinine Clearance: 39.7 mL/min (by C-G formula based on SCr of 1.13 mg/dL).      Microbiology:      Radiology:  Imaging Results (last 72 hours)     Procedure Component Value Units Date/Time    XR Toe 2+ View Left [[id number]] Collected:  06/27/18 1803     Updated:  06/27/18 2121    Narrative:       EXAM:  XR Left Toe(s), 2 or More Views    EXAM DATE/TIME:  6/27/2018 6:03 PM    CLINICAL HISTORY:  83 years old, female; Signs and symptoms; Other: Left 1st   digit ulcer; Additional info: Left diabetic toe wound    TECHNIQUE:  Frontal, lateral and oblique views of toe(s) of the left foot.    COMPARISON:  No relevant prior studies available.    FINDINGS:    Bones/joints:  No acute osseous abnormality.  No evidence of bony destructive   process.  Bony demineralization and degenerative bony changes.  No dislocation.    Soft tissues:  Soft tissue ulcer along the medial distal left great toe.    Vasculature:  Small vessel arterial calcification.      Impression:       1.  Soft tissue ulcer along the medial distal left great toe.  2.  No radiographic evidence of osteomyelitis at this time.    THIS DOCUMENT HAS BEEN ELECTRONICALLY SIGNED BY RONI SANTIAGO JR. MD            Impression:    --acute neuro change as above 6/29-6/30, moved to ICU with further workup per neuro/critical care team with concern for Left MCA ischemia per radiology on CT perfusion study    --Acute left foot/hallux cellulitis.  In setting of chronic left hallux crust/callus and imaging from October 2017 suggesting chronic left hallux osteomyelitis.  That imaging was at [facility].  She attempted antibiotics and a more conservative route but symptoms have recurred.  I'm not optimistic for chronic  healing with antibiotics and further conservative measures.  Dr. Carrington following.  Bone scan is abnormal at left hallux per my discussion with radiology, Dr Lunsford Consistent with clinical concern for left hallux osteomyelitis per discussion with him; surgery 7/5 by Dr. Carrington     --Acute right sacral shingles.  s/p Valtrex.  This appears dermatomal.  Contact isolation initiated; crusted     --Acute dysuria and urinary incontinence with UTI and abnormal U/A.  kleb species     --Chronic peripheral vascular disease.  Left lower extremity revascularization October 2017 with current extent to be clarified by Dr. Carrington.  I discussed with him.     --Sick sinus syndrome/chronic atrial fibrillation with past AV node ablation/permanent pacemaker.     --Chronic kidney disease described as stage III.  Monitor to help guide further adjustments in antimicrobials     --Diabetes type 2.  You need to tightly control blood sugar to give best chance for healing     --Thrombocytopenia.  Monitor, may be chronic     --Subacute fall onto right hip and around Father's Day.  Has bruise.  Further workup radiographically or surgically per internal medicine at their discretion        PLAN:     --IVcefepime/flagyl for now and likely until discharge then consider stop;  S/p oral Valtrex     --Zyvox stopped as no MDR GPC in culture so far.  If MDR GPC isolated in future or new process more suggestive of MRSA evolves then you could give consideration to restarting     --Check/review labs cultures and scans     --Contact precautions in place given dermatomal shingles     --Discussed with   pharmacy     --Discussed with microbiology     --Partial history per nursing staff    --Bone Scan noted and d/w Dr Lunsford/Zeb    --d/w Dr Carrington    --Discussed with Dr. Newell; I anticipate antibiotics stopping at discharge if surgical site appears okay with no suppurative or cellulitic complication.  UTI should be treated at that point.  If new process or pathogen  or deterioration then consideration may need to be given to alternative plan.    --I am out of town until July 16.  Partners to  Monday, July 9.  Call if needed sooner.      Pan Painting MD  7/6/2018

## 2018-07-07 LAB
ANION GAP SERPL CALCULATED.3IONS-SCNC: 7 MMOL/L (ref 3–11)
BUN BLD-MCNC: 19 MG/DL (ref 9–23)
BUN/CREAT SERPL: 16.8 (ref 7–25)
CALCIUM SPEC-SCNC: 8.2 MG/DL (ref 8.7–10.4)
CHLORIDE SERPL-SCNC: 108 MMOL/L (ref 99–109)
CO2 SERPL-SCNC: 22 MMOL/L (ref 20–31)
CREAT BLD-MCNC: 1.13 MG/DL (ref 0.6–1.3)
GFR SERPL CREATININE-BSD FRML MDRD: 46 ML/MIN/1.73
GLUCOSE BLD-MCNC: 85 MG/DL (ref 70–100)
GLUCOSE BLDC GLUCOMTR-MCNC: 105 MG/DL (ref 70–130)
GLUCOSE BLDC GLUCOMTR-MCNC: 110 MG/DL (ref 70–130)
GLUCOSE BLDC GLUCOMTR-MCNC: 111 MG/DL (ref 70–130)
GLUCOSE BLDC GLUCOMTR-MCNC: 173 MG/DL (ref 70–130)
POTASSIUM BLD-SCNC: 3.8 MMOL/L (ref 3.5–5.5)
SODIUM BLD-SCNC: 137 MMOL/L (ref 132–146)

## 2018-07-07 PROCEDURE — 99233 SBSQ HOSP IP/OBS HIGH 50: CPT | Performed by: NURSE PRACTITIONER

## 2018-07-07 PROCEDURE — 63710000001 INSULIN DETEMIR PER 5 UNITS: Performed by: INTERNAL MEDICINE

## 2018-07-07 PROCEDURE — 25010000002 ONDANSETRON PER 1 MG: Performed by: NURSE PRACTITIONER

## 2018-07-07 PROCEDURE — 82962 GLUCOSE BLOOD TEST: CPT

## 2018-07-07 PROCEDURE — 99024 POSTOP FOLLOW-UP VISIT: CPT | Performed by: THORACIC SURGERY (CARDIOTHORACIC VASCULAR SURGERY)

## 2018-07-07 PROCEDURE — 80048 BASIC METABOLIC PNL TOTAL CA: CPT | Performed by: NURSE PRACTITIONER

## 2018-07-07 RX ORDER — GABAPENTIN 300 MG/1
300 CAPSULE ORAL DAILY
Status: DISCONTINUED | OUTPATIENT
Start: 2018-07-07 | End: 2018-07-13 | Stop reason: HOSPADM

## 2018-07-07 RX ADMIN — CEFEPIME HYDROCHLORIDE 1 G: 1 INJECTION, POWDER, FOR SOLUTION INTRAMUSCULAR; INTRAVENOUS at 08:50

## 2018-07-07 RX ADMIN — METRONIDAZOLE 500 MG: 500 INJECTION, SOLUTION INTRAVENOUS at 16:28

## 2018-07-07 RX ADMIN — CEFEPIME HYDROCHLORIDE 1 G: 1 INJECTION, POWDER, FOR SOLUTION INTRAMUSCULAR; INTRAVENOUS at 21:26

## 2018-07-07 RX ADMIN — GABAPENTIN 300 MG: 300 CAPSULE ORAL at 11:27

## 2018-07-07 RX ADMIN — ATORVASTATIN CALCIUM 80 MG: 40 TABLET, FILM COATED ORAL at 21:26

## 2018-07-07 RX ADMIN — ONDANSETRON 4 MG: 2 INJECTION INTRAMUSCULAR; INTRAVENOUS at 09:53

## 2018-07-07 RX ADMIN — METOPROLOL TARTRATE 100 MG: 100 TABLET ORAL at 21:26

## 2018-07-07 RX ADMIN — FUROSEMIDE 20 MG: 20 TABLET ORAL at 08:51

## 2018-07-07 RX ADMIN — METRONIDAZOLE 500 MG: 500 INJECTION, SOLUTION INTRAVENOUS at 08:52

## 2018-07-07 RX ADMIN — Medication 325 MG: at 08:51

## 2018-07-07 RX ADMIN — INSULIN LISPRO 2 UNITS: 100 INJECTION, SOLUTION INTRAVENOUS; SUBCUTANEOUS at 21:26

## 2018-07-07 RX ADMIN — Medication 1 CAPSULE: at 08:51

## 2018-07-07 RX ADMIN — INSULIN DETEMIR 8 UNITS: 100 INJECTION, SOLUTION SUBCUTANEOUS at 21:26

## 2018-07-07 RX ADMIN — METOPROLOL TARTRATE 100 MG: 100 TABLET ORAL at 08:51

## 2018-07-07 RX ADMIN — NYSTATIN: 100000 POWDER TOPICAL at 09:06

## 2018-07-07 RX ADMIN — LATANOPROST 1 DROP: 50 SOLUTION OPHTHALMIC at 21:26

## 2018-07-07 NOTE — PROGRESS NOTES
Cardiothoracic Surgery Progress Note      POD #: 2-left great toe amputation with primary closure     LOS: 10 days      Subjective: Awake alert very talkative    Chief Complaint: Minimal postop surgical pain left great toe surgical site    Objective:  Vital Signs  Temp:  [97.1 °F (36.2 °C)-98.2 °F (36.8 °C)] 97.8 °F (36.6 °C)  Heart Rate:  [69-72] 69  Resp:  [14-18] 14  BP: (136-158)/(58-71) 155/71    Physical Exam:   General Appearance:    Lungs:   Heart:   Skin:   Incision: Amputation site dressing changed.  Sutures intact.  Dorsal plantar skin flaps are viable     Results: Laboratory results below noted    Results from last 7 days  Lab Units 07/06/18  1501   WBC 10*3/mm3 6.69   HEMOGLOBIN g/dL 7.7*   HEMATOCRIT % 24.3*   PLATELETS 10*3/mm3 135*       Results from last 7 days  Lab Units 07/07/18  0430   SODIUM mmol/L 137   POTASSIUM mmol/L 3.8   CHLORIDE mmol/L 108   CO2 mmol/L 22.0   BUN mg/dL 19   CREATININE mg/dL 1.13   GLUCOSE mg/dL 85   CALCIUM mg/dL 8.2*         Assessment:#1.  Postop day 2 left great toe amputation through the proximal phalangeal bone with primary closure  #2.  Recent stroke  #3.  Diabetes mellitus with peripheral neuropathy  #4.  Extensive peripheral vascular disease status post endovascular stent[facility] of her left anterior tibial artery [facility]  Plan: Daily dressing changes.  Per my concerns patient may be transferred to a rehabilitation facility at any time medical management comorbidities per hospitalist and antibiotic therapy per infectious disease      Prakash Carrington MD - 07/07/18 - 7:24 AM

## 2018-07-07 NOTE — PLAN OF CARE
Problem: Patient Care Overview  Goal: Plan of Care Review  Outcome: Ongoing (interventions implemented as appropriate)   07/07/18 0639   Coping/Psychosocial   Plan of Care Reviewed With patient   Plan of Care Review   Progress no change   OTHER   Outcome Summary pt rested most of the night with minimum pain, VSS will continue to monitor.

## 2018-07-07 NOTE — PROGRESS NOTES
[facility] Medicine Services  PROGRESS NOTE    Patient Name: Lynne Sanchez  : 1934  MRN: [id number]    Date of Admission: 2018  Length of Stay: 10  Primary Care Physician: ANN Gonsalez    Subjective   Subjective     CC:  Follow up left toe osteo, recent shingles    HPI:  Patient sitting up in bed.  Reports she did not sleep well due to pain in right hip from recent shingles.  Tylenol did not help.  Pain has somewhat eased off this morning, though she is asking for something for pain.  Denies any pain in left foot (s/p Left great toe amp).  Continues to have generalized weakness.  Appetite is fair.     Review of Systems  Gen- No fevers, chills. + gen weak   CV- No chest pain, palpitations  Resp- No cough, dyspnea  GI- No N/V/D, abd pain    Otherwise ROS is negative except as mentioned in the HPI.    Objective   Objective     Vital Signs:   Temp:  [97.1 °F (36.2 °C)-98.2 °F (36.8 °C)] 97.8 °F (36.6 °C)  Heart Rate:  [69-72] 69  Resp:  [14-18] 14  BP: (136-155)/(58-71) 155/71  Total (NIH Stroke Scale): 0     Physical Exam:  Constitutional: No acute distress, Upright in bed.  No family at bedside.  Eyes: PERRLA, sclerae anicteric, no conjunctival injection  HENT: NCAT, mucous membranes moist  Neck: Supple, trachea midline  Respiratory: Clear to auscultation bilaterally A/P, nonlabored respirations   Cardiovascular: RRR, no murmurs, rubs, or gallops, palpable pedal pulses bilaterally  Gastrointestinal: Positive bowel sounds, soft, nontender, nondistended  Musculoskeletal: No bilateral ankle edema, no clubbing or cyanosis to extremities.  DICKENS spont.   Psychiatric: Appropriate affect, cooperative, calm and pleasant  Neurologic: Oriented x 3, strength symmetric in all extremities, Cranial Nerves grossly intact to confrontation, speech clear and appropriate.  Follows commands.  Skin: Skin warm and dry.  Right posterior leg healing shingle sites with scabbing, no  vesicles or drainage.  Left foot dressing dry and intact with exposed toes warm and dry.       Results Reviewed:  I have personally reviewed current lab, radiology, and data and agree.      Results from last 7 days  Lab Units 07/06/18  1501 07/04/18  0451 07/03/18  0349   WBC 10*3/mm3 6.69 5.47 5.13   HEMOGLOBIN g/dL 7.7* 7.4* 7.4*   HEMATOCRIT % 24.3* 24.2* 24.3*   PLATELETS 10*3/mm3 135* 149* 155       Results from last 7 days  Lab Units 07/07/18  0430 07/04/18  0451 07/03/18  0349   SODIUM mmol/L 137 136 139   POTASSIUM mmol/L 3.8 4.1 4.3   CHLORIDE mmol/L 108 109 109   CO2 mmol/L 22.0 22.0 22.0   BUN mg/dL 19 18 15   CREATININE mg/dL 1.13 1.13 1.17   GLUCOSE mg/dL 85 123* 89   CALCIUM mg/dL 8.2* 8.0* 7.8*     Estimated Creatinine Clearance: 39.7 mL/min (by C-G formula based on SCr of 1.13 mg/dL).  No results found for: BNP    Microbiology Results Abnormal     Procedure Component Value - Date/Time    Tissue / Bone Culture - Tissue, Toe, Left [[id number]]  (Abnormal)  (Susceptibility) Collected:  07/05/18 1023    Lab Status:  Preliminary result Specimen:  Tissue from Toe, Left Updated:  07/07/18 0901     Tissue Culture Scant growth (1+) Enterobacter cloacae (A)      Light growth (2+) Staphylococcus, coagulase negative (A)     Gram Stain Result Occasional WBCs seen      No organisms seen    Susceptibility      Enterobacter cloacae     PRIYA (Preliminary)     Aztreonam <=8 ug/ml Susceptible     Cefepime <=8 ug/ml Susceptible     Cefotaxime <=2 ug/ml Susceptible     Ceftriaxone <=8 ug/ml Susceptible     Ertapenem <=1 ug/ml Susceptible     Gentamicin <=4 ug/ml Susceptible     Levofloxacin <=2 ug/ml Susceptible     Meropenem <=1 ug/ml Susceptible     Piperacillin + Tazobactam <=16 ug/ml Susceptible     Tetracycline <=4 ug/ml Susceptible     Tobramycin <=4 ug/ml Susceptible     Trimethoprim + Sulfamethoxazole <=2/38 ug/ml Susceptible                    AFB Culture - Tissue, Toe, Left [[id number]] Collected:  07/05/18 1023     Lab Status:  Preliminary result Specimen:  Tissue from Toe, Left Updated:  07/06/18 1443     AFB Stain No acid fast bacilli seen on concentrated smear    Anaerobic Culture - Tissue, Toe, Left [[id number]]  (Normal) Collected:  07/05/18 1023    Lab Status:  Preliminary result Specimen:  Tissue from Toe, Left Updated:  07/06/18 1127     Culture No anaerobes isolated    Blood Culture - Blood, [[id number]]  (Normal) Collected:  06/28/18 0320    Lab Status:  Final result Specimen:  Blood from Arm, Right Updated:  07/03/18 0400     Blood Culture No growth at 5 days    Blood Culture - Blood, [[id number]]  (Normal) Collected:  06/28/18 0330    Lab Status:  Final result Specimen:  Blood from Arm, Left Updated:  07/03/18 0400     Blood Culture No growth at 5 days    Urine Culture - Urine, [[id number]]  (Abnormal)  (Susceptibility) Collected:  06/28/18 2300    Lab Status:  Final result Specimen:  Urine from Urine, Clean Catch Updated:  07/01/18 1120     Urine Culture 60,000-70,000 CFU/mL Klebsiella aerogenes (A)    Susceptibility      Klebsiella aerogenes     PRIYA     Aztreonam 16 ug/ml Intermediate     Cefepime <=8 ug/ml Susceptible     Cefotaxime 16 ug/ml Intermediate     Ceftriaxone 32 ug/ml Intermediate     Ertapenem <=1 ug/ml Susceptible     Gentamicin <=4 ug/ml Susceptible     Levofloxacin <=2 ug/ml Susceptible     Meropenem <=1 ug/ml Susceptible     Nitrofurantoin <=32 ug/ml Susceptible     Piperacillin + Tazobactam 64 ug/ml Intermediate     Tetracycline <=4 ug/ml Susceptible     Tobramycin <=4 ug/ml Susceptible     Trimethoprim + Sulfamethoxazole <=2/38 ug/ml Susceptible                          Imaging Results (last 24 hours)     ** No results found for the last 24 hours. **        Results for orders placed during the hospital encounter of 06/27/18   Adult Transthoracic Echo Complete W/ Cont if Necessary Per Protocol (With Agitated Saline)    Narrative · Left ventricular systolic function is normal. Estimated EF =  60%.  · Mild aortic valve regurgitation is present.  · Moderate mitral valve regurgitation is present  · Moderate to severe tricuspid valve regurgitation is present.  · Calculated right ventricular systolic pressure from tricuspid   regurgitation is 90 mmHg. Severe pulmonary hypertension is present  · Calculated right ventricular systolic pressure from tricuspid   regurgitation is 90 mmHg.          I have reviewed the medications.    Assessment/Plan   Assessment / Plan     Hospital Problem List     * (Principal)Osteomyelitis of left foot (CMS/HCC)    Chronic atrial fibrillation (CMS/HCC)    Overview Signed 12/22/2016  1:51 PM by Pan Saunders     1. Chronic atrial fibrillation, status post [employer] pacemaker implantation and AV node ablation:  a. Diagnosed June 2005.  b. Status post ECV restoring normal sinus rhythm, June 2005.  c. Rythmol initiated 05/01/2006.  d. Previously digoxin toxicity.  e. GXT Cardiolite, 09/08/2005:  No reversible ischemia.  LV function not performed secondary to atrial fibrillation.   f. Status post successful external cardioversion on 10/01/2008, Tessa Downey MD.  g. EKG on 10/21/2008:  Recurrence of atrial fibrillation with rate of 109.  h. Recurrent atrial fibrillation by EKG, 11/03/2008, asymptomatic.  i. Status post [employer] pacemaker implantation and AV node ablation.   j. Echocardiogram 02/15/2010:  Moderate MR, moderate TR.  RVSP 50.  EF greater than 55%, left atrial enlargement at 4.3 cm.           Type 2 diabetes mellitus treated without insulin (CMS/HCC) (Chronic)    Normocytic anemia    Peripheral arterial disease (CMS/HCC)    Foot infection    Fever    Pyogenic inflammation of bone (CMS/HCC)    Overview Signed 6/29/2018  2:35 PM by ANN Claudio     Added automatically from request for surgery [id number]         Cerebrovascular accident (CVA) due to thrombosis of left anterior cerebral artery (CMS/HCC)             Brief Hospital Course to date:  Lynne Sanchez is a  83 y.o. female with history of DMII, PVD, Afib/SSS/PPM, CKDIII, Anemia and osteomyelitis presented for treatment of foot wound but hospitalization complicated by acute stroke and shingles.      Assessment & Plan:    Osteomyelitis  - s/p left great toe amputation per Dr Carrington 7/5.  Daily dressing changes.   - ID following.  continue abx per Dr. Ye Geiger with ongoing pain  - s/p valtrex x 7 d  - trial gabapentin 300mg daily for now, titrate if patient tolerates.      Acute CVA after admission  - s/p tPA.  No obvious deficit.  C/o only generalized weakness.    - eliquis when OK with surgery (h/h still low)  - continue ASA, statin     Afib  - previously no AC.  Plan NOAC when ok with surgery     Anemia  - iron def, currently on PO iron, consider IV iron post-op  - prior EGD 2017 showed only Barretts esophagus  -H/H remains low however stable and asymptomatic.  --am labs      DMII (A1c 8.5)  - adjust basal/bolus insulin for better control  --improved today.    --continue to monitor      Sss/PPM     PAD     CKDIII  --creatinine stable at 1.13  --monitor labs     DVT Prophylaxis:  NOAC soon    CODE STATUS:   Code Status and Medical Interventions:   Ordered at: 06/28/18 1008     Level Of Support Discussed With:    Patient     Code Status:    CPR     Medical Interventions (Level of Support Prior to Arrest):    Full       Disposition: I expect the patient to be discharged home vs snf/rehab in 2 days.       Electronically signed by BILL Gotti, 07/07/18, 9:26 AM.

## 2018-07-08 PROBLEM — B02.9 HERPES ZOSTER WITHOUT COMPLICATION: Status: ACTIVE | Noted: 2018-07-08

## 2018-07-08 PROBLEM — D69.6 THROMBOCYTOPENIA (HCC): Status: ACTIVE | Noted: 2018-07-08

## 2018-07-08 LAB
ANION GAP SERPL CALCULATED.3IONS-SCNC: 5 MMOL/L (ref 3–11)
BASOPHILS # BLD AUTO: 0.03 10*3/MM3 (ref 0–0.2)
BASOPHILS NFR BLD AUTO: 0.4 % (ref 0–1)
BUN BLD-MCNC: 22 MG/DL (ref 9–23)
BUN/CREAT SERPL: 21 (ref 7–25)
CALCIUM SPEC-SCNC: 7.8 MG/DL (ref 8.7–10.4)
CHLORIDE SERPL-SCNC: 110 MMOL/L (ref 99–109)
CO2 SERPL-SCNC: 24 MMOL/L (ref 20–31)
CREAT BLD-MCNC: 1.05 MG/DL (ref 0.6–1.3)
DEPRECATED RDW RBC AUTO: 53.4 FL (ref 37–54)
EOSINOPHIL # BLD AUTO: 0.16 10*3/MM3 (ref 0–0.3)
EOSINOPHIL NFR BLD AUTO: 2.3 % (ref 0–3)
ERYTHROCYTE [DISTWIDTH] IN BLOOD BY AUTOMATED COUNT: 17.9 % (ref 11.3–14.5)
GFR SERPL CREATININE-BSD FRML MDRD: 50 ML/MIN/1.73
GLUCOSE BLD-MCNC: 74 MG/DL (ref 70–100)
GLUCOSE BLDC GLUCOMTR-MCNC: 141 MG/DL (ref 70–130)
GLUCOSE BLDC GLUCOMTR-MCNC: 160 MG/DL (ref 70–130)
GLUCOSE BLDC GLUCOMTR-MCNC: 181 MG/DL (ref 70–130)
GLUCOSE BLDC GLUCOMTR-MCNC: 84 MG/DL (ref 70–130)
HCT VFR BLD AUTO: 22.5 % (ref 34.5–44)
HGB BLD-MCNC: 7.1 G/DL (ref 11.5–15.5)
IMM GRANULOCYTES # BLD: 0.02 10*3/MM3 (ref 0–0.03)
IMM GRANULOCYTES NFR BLD: 0.3 % (ref 0–0.6)
LYMPHOCYTES # BLD AUTO: 1.62 10*3/MM3 (ref 0.6–4.8)
LYMPHOCYTES NFR BLD AUTO: 22.9 % (ref 24–44)
MCH RBC QN AUTO: 26.4 PG (ref 27–31)
MCHC RBC AUTO-ENTMCNC: 31.6 G/DL (ref 32–36)
MCV RBC AUTO: 83.6 FL (ref 80–99)
MONOCYTES # BLD AUTO: 0.58 10*3/MM3 (ref 0–1)
MONOCYTES NFR BLD AUTO: 8.2 % (ref 0–12)
NEUTROPHILS # BLD AUTO: 4.67 10*3/MM3 (ref 1.5–8.3)
NEUTROPHILS NFR BLD AUTO: 66.2 % (ref 41–71)
PLATELET # BLD AUTO: 75 10*3/MM3 (ref 150–450)
PMV BLD AUTO: 9 FL (ref 6–12)
POTASSIUM BLD-SCNC: 3.7 MMOL/L (ref 3.5–5.5)
RBC # BLD AUTO: 2.69 10*6/MM3 (ref 3.89–5.14)
SODIUM BLD-SCNC: 139 MMOL/L (ref 132–146)
WBC NRBC COR # BLD: 7.06 10*3/MM3 (ref 3.5–10.8)

## 2018-07-08 PROCEDURE — 80048 BASIC METABOLIC PNL TOTAL CA: CPT | Performed by: NURSE PRACTITIONER

## 2018-07-08 PROCEDURE — 85025 COMPLETE CBC W/AUTO DIFF WBC: CPT | Performed by: NURSE PRACTITIONER

## 2018-07-08 PROCEDURE — 97164 PT RE-EVAL EST PLAN CARE: CPT

## 2018-07-08 PROCEDURE — 63710000001 INSULIN DETEMIR PER 5 UNITS: Performed by: INTERNAL MEDICINE

## 2018-07-08 PROCEDURE — 97110 THERAPEUTIC EXERCISES: CPT

## 2018-07-08 PROCEDURE — 99024 POSTOP FOLLOW-UP VISIT: CPT | Performed by: THORACIC SURGERY (CARDIOTHORACIC VASCULAR SURGERY)

## 2018-07-08 PROCEDURE — 82962 GLUCOSE BLOOD TEST: CPT

## 2018-07-08 PROCEDURE — 99232 SBSQ HOSP IP/OBS MODERATE 35: CPT | Performed by: INTERNAL MEDICINE

## 2018-07-08 RX ORDER — PANTOPRAZOLE SODIUM 40 MG/1
40 TABLET, DELAYED RELEASE ORAL
Status: DISCONTINUED | OUTPATIENT
Start: 2018-07-08 | End: 2018-07-13 | Stop reason: HOSPADM

## 2018-07-08 RX ORDER — FERROUS SULFATE 325(65) MG
325 TABLET ORAL 2 TIMES DAILY WITH MEALS
Status: DISCONTINUED | OUTPATIENT
Start: 2018-07-08 | End: 2018-07-13 | Stop reason: HOSPADM

## 2018-07-08 RX ORDER — PANTOPRAZOLE SODIUM 40 MG/10ML
40 INJECTION, POWDER, LYOPHILIZED, FOR SOLUTION INTRAVENOUS EVERY 12 HOURS SCHEDULED
Status: DISCONTINUED | OUTPATIENT
Start: 2018-07-08 | End: 2018-07-08

## 2018-07-08 RX ORDER — ASPIRIN 81 MG/1
81 TABLET ORAL DAILY
Status: DISCONTINUED | OUTPATIENT
Start: 2018-07-08 | End: 2018-07-13 | Stop reason: HOSPADM

## 2018-07-08 RX ADMIN — ASPIRIN 81 MG: 81 TABLET, COATED ORAL at 09:01

## 2018-07-08 RX ADMIN — NYSTATIN: 100000 POWDER TOPICAL at 22:14

## 2018-07-08 RX ADMIN — PANTOPRAZOLE SODIUM 40 MG: 40 TABLET, DELAYED RELEASE ORAL at 09:00

## 2018-07-08 RX ADMIN — INSULIN LISPRO 2 UNITS: 100 INJECTION, SOLUTION INTRAVENOUS; SUBCUTANEOUS at 17:42

## 2018-07-08 RX ADMIN — CEFEPIME HYDROCHLORIDE 1 G: 1 INJECTION, POWDER, FOR SOLUTION INTRAMUSCULAR; INTRAVENOUS at 09:00

## 2018-07-08 RX ADMIN — PANTOPRAZOLE SODIUM 40 MG: 40 INJECTION, POWDER, FOR SOLUTION INTRAVENOUS at 06:54

## 2018-07-08 RX ADMIN — INSULIN DETEMIR 5 UNITS: 100 INJECTION, SOLUTION SUBCUTANEOUS at 22:14

## 2018-07-08 RX ADMIN — Medication 1 CAPSULE: at 09:00

## 2018-07-08 RX ADMIN — METRONIDAZOLE 500 MG: 500 INJECTION, SOLUTION INTRAVENOUS at 16:20

## 2018-07-08 RX ADMIN — METRONIDAZOLE 500 MG: 500 INJECTION, SOLUTION INTRAVENOUS at 01:21

## 2018-07-08 RX ADMIN — Medication 325 MG: at 09:01

## 2018-07-08 RX ADMIN — METRONIDAZOLE 500 MG: 500 INJECTION, SOLUTION INTRAVENOUS at 09:00

## 2018-07-08 RX ADMIN — ATORVASTATIN CALCIUM 80 MG: 40 TABLET, FILM COATED ORAL at 22:13

## 2018-07-08 RX ADMIN — METOPROLOL TARTRATE 100 MG: 100 TABLET ORAL at 22:13

## 2018-07-08 RX ADMIN — NYSTATIN: 100000 POWDER TOPICAL at 09:00

## 2018-07-08 RX ADMIN — Medication 5 MG: at 22:22

## 2018-07-08 RX ADMIN — FUROSEMIDE 20 MG: 20 TABLET ORAL at 09:01

## 2018-07-08 RX ADMIN — LATANOPROST 1 DROP: 50 SOLUTION OPHTHALMIC at 22:13

## 2018-07-08 RX ADMIN — Medication 325 MG: at 17:42

## 2018-07-08 RX ADMIN — METOPROLOL TARTRATE 100 MG: 100 TABLET ORAL at 09:01

## 2018-07-08 RX ADMIN — GABAPENTIN 300 MG: 300 CAPSULE ORAL at 09:01

## 2018-07-08 RX ADMIN — CEFEPIME HYDROCHLORIDE 1 G: 1 INJECTION, POWDER, FOR SOLUTION INTRAMUSCULAR; INTRAVENOUS at 22:13

## 2018-07-08 RX ADMIN — INSULIN LISPRO 2 UNITS: 100 INJECTION, SOLUTION INTRAVENOUS; SUBCUTANEOUS at 22:14

## 2018-07-08 NOTE — PROGRESS NOTES
[facility] Medicine Services  PROGRESS NOTE    Patient Name: Lynne Sanchez  : 1934  MRN: [id number]    Date of Admission: 2018  Length of Stay: 11  Primary Care Physician: ANN Gonsalez    Subjective   Subjective     CC:  Follow up left toe osteo, recent shingles    HPI:  No family present.  Wants something for sleep tonight.  Had some diarrhea overnight, but didn't see and blood or melena.  Per RN it was dark, and guaic positive.    Review of Systems  Gen- No fevers, chills. + gen weak   CV- No chest pain, palpitations  Resp- No cough, dyspnea  GI- No N/V/D, abd pain    Otherwise ROS is negative except as mentioned in the HPI.    Objective   Objective     Vital Signs:   Temp:  [97.1 °F (36.2 °C)-98.8 °F (37.1 °C)] 98.3 °F (36.8 °C)  Heart Rate:  [67-79] 69  Resp:  [14-18] 14  BP: (136-160)/(66-85) 136/66  Total (NIH Stroke Scale): 0     Physical Exam:  Constitutional: No acute distress, awake and alert  HENT: NCAT, mucous membranes moist  Respiratory: Clear to auscultation bilaterally A/P, nonlabored respirations   Cardiovascular: RRR, no murmurs, rubs, or gallops  Gastrointestinal: Positive bowel sounds, soft, nontender, nondistended  Musculoskeletal: No bilateral ankle edema, no clubbing or cyanosis to extremities.    Psychiatric: Appropriate affect, cooperative, calm and pleasant  Neurologic: Oriented x 3, no focal deficits  Skin: Skin warm and dry.  Right posterior leg healing shingle sites with scabbing, no vesicles or drainage.  Left foot dressing dry and intact with exposed toes warm and dry.     Results Reviewed:  I have personally reviewed current lab, radiology, and data and agree.      Results from last 7 days  Lab Units 18  0423 18  1501 18  0451   WBC 10*3/mm3 7.06 6.69 5.47   HEMOGLOBIN g/dL 7.1* 7.7* 7.4*   HEMATOCRIT % 22.5* 24.3* 24.2*   PLATELETS 10*3/mm3 75* 135* 149*       Results from last 7 days  Lab Units  07/08/18  0423 07/07/18  0430 07/04/18  0451   SODIUM mmol/L 139 137 136   POTASSIUM mmol/L 3.7 3.8 4.1   CHLORIDE mmol/L 110* 108 109   CO2 mmol/L 24.0 22.0 22.0   BUN mg/dL 22 19 18   CREATININE mg/dL 1.05 1.13 1.13   GLUCOSE mg/dL 74 85 123*   CALCIUM mg/dL 7.8* 8.2* 8.0*     Estimated Creatinine Clearance: 42.7 mL/min (by C-G formula based on SCr of 1.05 mg/dL).  No results found for: BNP    Microbiology Results Abnormal     Procedure Component Value - Date/Time    Tissue / Bone Culture - Tissue, Toe, Left [[id number]]  (Abnormal)  (Susceptibility) Collected:  07/05/18 1023    Lab Status:  Preliminary result Specimen:  Tissue from Toe, Left Updated:  07/07/18 0901     Tissue Culture Scant growth (1+) Enterobacter cloacae (A)      Light growth (2+) Staphylococcus, coagulase negative (A)     Gram Stain Result Occasional WBCs seen      No organisms seen    Susceptibility      Enterobacter cloacae     PRIYA (Preliminary)     Aztreonam <=8 ug/ml Susceptible     Cefepime <=8 ug/ml Susceptible     Cefotaxime <=2 ug/ml Susceptible     Ceftriaxone <=8 ug/ml Susceptible     Ertapenem <=1 ug/ml Susceptible     Gentamicin <=4 ug/ml Susceptible     Levofloxacin <=2 ug/ml Susceptible     Meropenem <=1 ug/ml Susceptible     Piperacillin + Tazobactam <=16 ug/ml Susceptible     Tetracycline <=4 ug/ml Susceptible     Tobramycin <=4 ug/ml Susceptible     Trimethoprim + Sulfamethoxazole <=2/38 ug/ml Susceptible                    AFB Culture - Tissue, Toe, Left [[id number]] Collected:  07/05/18 1023    Lab Status:  Preliminary result Specimen:  Tissue from Toe, Left Updated:  07/06/18 1443     AFB Stain No acid fast bacilli seen on concentrated smear    Anaerobic Culture - Tissue, Toe, Left [[id number]]  (Normal) Collected:  07/05/18 1023    Lab Status:  Preliminary result Specimen:  Tissue from Toe, Left Updated:  07/06/18 1127     Culture No anaerobes isolated    Blood Culture - Blood, [[id number]]  (Normal) Collected:  06/28/18 0320     Lab Status:  Final result Specimen:  Blood from Arm, Right Updated:  07/03/18 0400     Blood Culture No growth at 5 days    Blood Culture - Blood, [[id number]]  (Normal) Collected:  06/28/18 0330    Lab Status:  Final result Specimen:  Blood from Arm, Left Updated:  07/03/18 0400     Blood Culture No growth at 5 days    Urine Culture - Urine, [[id number]]  (Abnormal)  (Susceptibility) Collected:  06/28/18 2300    Lab Status:  Final result Specimen:  Urine from Urine, Clean Catch Updated:  07/01/18 1120     Urine Culture 60,000-70,000 CFU/mL Klebsiella aerogenes (A)    Susceptibility      Klebsiella aerogenes     PRIYA     Aztreonam 16 ug/ml Intermediate     Cefepime <=8 ug/ml Susceptible     Cefotaxime 16 ug/ml Intermediate     Ceftriaxone 32 ug/ml Intermediate     Ertapenem <=1 ug/ml Susceptible     Gentamicin <=4 ug/ml Susceptible     Levofloxacin <=2 ug/ml Susceptible     Meropenem <=1 ug/ml Susceptible     Nitrofurantoin <=32 ug/ml Susceptible     Piperacillin + Tazobactam 64 ug/ml Intermediate     Tetracycline <=4 ug/ml Susceptible     Tobramycin <=4 ug/ml Susceptible     Trimethoprim + Sulfamethoxazole <=2/38 ug/ml Susceptible                          Imaging Results (last 24 hours)     ** No results found for the last 24 hours. **        Results for orders placed during the hospital encounter of 06/27/18   Adult Transthoracic Echo Complete W/ Cont if Necessary Per Protocol (With Agitated Saline)    Narrative · Left ventricular systolic function is normal. Estimated EF = 60%.  · Mild aortic valve regurgitation is present.  · Moderate mitral valve regurgitation is present  · Moderate to severe tricuspid valve regurgitation is present.  · Calculated right ventricular systolic pressure from tricuspid   regurgitation is 90 mmHg. Severe pulmonary hypertension is present  · Calculated right ventricular systolic pressure from tricuspid   regurgitation is 90 mmHg.          I have reviewed the  medications.    Assessment/Plan   Assessment / Plan     Hospital Problem List     * (Principal)Osteomyelitis of left foot (CMS/HCC)    Cerebrovascular accident (CVA) due to thrombosis of left anterior cerebral artery (CMS/[facility])    Chronic atrial fibrillation (CMS/[facility])    Overview Signed 12/22/2016  1:51 PM by Pan Saunders     1. Chronic atrial fibrillation, status post [employer] pacemaker implantation and AV node ablation:  a. Diagnosed June 2005.  b. Status post ECV restoring normal sinus rhythm, June 2005.  c. Rythmol initiated 05/01/2006.  d. Previously digoxin toxicity.  e. GXT Cardiolite, 09/08/2005:  No reversible ischemia.  LV function not performed secondary to atrial fibrillation.   f. Status post successful external cardioversion on 10/01/2008, Tessa Downey MD.  g. EKG on 10/21/2008:  Recurrence of atrial fibrillation with rate of 109.  h. Recurrent atrial fibrillation by EKG, 11/03/2008, asymptomatic.  i. Status post [employer] pacemaker implantation and AV node ablation.   j. Echocardiogram 02/15/2010:  Moderate MR, moderate TR.  RVSP 50.  EF greater than 55%, left atrial enlargement at 4.3 cm.           Type 2 diabetes mellitus treated without insulin (CMS/[facility]) (Chronic)    Normocytic anemia    Peripheral arterial disease (CMS/[facility])    Herpes zoster without complication             Brief Hospital Course to date:  Lynne Sanchez is a 83 y.o. female with history of DMII, PVD, Afib/SSS/PPM, CKDIII, Anemia and osteomyelitis presented for treatment of foot wound but hospitalization complicated by acute stroke and shingles.      Assessment & Plan:    Osteomyelitis  - s/p left great toe amputation per Dr Carrington 7/5.  Daily dressing changes.   - ID following.  continue abx per Dr. Belcher, plan is to stop at the time of discharge.    Shingles with ongoing pain  - s/p valtrex x 7 d  - trial gabapentin 300mg daily for now, titrate if patient tolerates.      Acute CVA after admission  - s/p tPA.  No obvious  deficit.  C/o only generalized weakness.    - eliquis when O  - continue ASA, statin     Afib  - previously no ASA/plavix.  Long discussion today with patient re: risk/benefits of eliquis.  She does not want another stroke, and wants to try.  She understands the risk of worsening anemia or bleeding.  However, want to make sure plts not dropping further.     Anemia  - chronic anemia dating back to at least 2015.  + occult blood but on oral iron.  Prior EGD 2017 showed only Barretts esophagus with gastritis.  Will place back on PPI.  -H/H remains low however stable and asymptomatic.     Thombocytopenia  - history of borderline low in past, but now down to 75.  Will watch closely.     DMII (A1c 8.5)  - some borderline glc, will decrease levemir dose at night and stop prandial insulin     Sss/PPM     PAD     CKDIII  --creatinine stable at 1.13  --monitor labs     DVT Prophylaxis:  Mechanical for now    CODE STATUS:   Code Status and Medical Interventions:   Ordered at: 06/28/18 1008     Level Of Support Discussed With:    Patient     Code Status:    CPR     Medical Interventions (Level of Support Prior to Arrest):    Full       Disposition: I expect the patient to be discharged rehab TBD.    Electronically signed by Randy Newell MD, 07/08/18, 7:42 AM.

## 2018-07-08 NOTE — PROGRESS NOTES
Cardiothoracic Surgery Progress Note      POD #: 3-left great toe amputation and primary closure     LOS: 11 days      Subjective: Up in the recliner chair states she feels good    Chief Complaint: Minimal toe amputation site  pain    Objective:  Vital Signs  Temp:  [98.1 °F (36.7 °C)-98.8 °F (37.1 °C)] 98.2 °F (36.8 °C)  Heart Rate:  [67-79] 69  Resp:  [14-18] 18  BP: (130-159)/(62-85) 130/62    Physical Exam:   General Appearance:    Lungs:   Heart:   Skin:   Incision incision dressing changed sutures intact dorsal and plantar flaps are viable:     Results:    Results from last 7 days  Lab Units 07/08/18  0423   WBC 10*3/mm3 7.06   HEMOGLOBIN g/dL 7.1*   HEMATOCRIT % 22.5*   PLATELETS 10*3/mm3 75*       Results from last 7 days  Lab Units 07/08/18  0423   SODIUM mmol/L 139   POTASSIUM mmol/L 3.7   CHLORIDE mmol/L 110*   CO2 mmol/L 24.0   BUN mg/dL 22   CREATININE mg/dL 1.05   GLUCOSE mg/dL 74   CALCIUM mg/dL 7.8*         Assessment:#1.  Postop day 3 left great toe amputation through the proximal phalangeal bone with primary closure  #2.  Recent stroke  #3.  Diabetes mellitus and peripheral neuropathy  #4 extensive peripheral vascular disease status post endovascular stent[facility] and angi[facility] of left anterior tibial artery [facility]      Plan: Continue daily dressing changes.  Ambulation with toe unloading shoe.  I would like to see her ambulate with a knee scooter also but I do not think she will be able to manage with her overall debilitated situation.      Prakash Carrington MD - 07/08/18 - 11:55 AM

## 2018-07-08 NOTE — THERAPY RE-EVALUATION
Acute Care - Physical Therapy Re-Evaluation  [facility]     Patient Name: Lynne Sanchez  : 1934  MRN: [id number]  Today's Date: 2018   Onset of Illness/Injury or Date of Surgery: 18  Date of Referral to PT: 18  Referring Physician: MD Zeb      Admit Date: 2018    Visit Dx:     ICD-10-CM ICD-9-CM   1. Other chronic osteomyelitis of left foot (CMS/HCC) M86.672 730.17   2. Cerebrovascular accident (CVA), unspecified mechanism (CMS/HCC) I63.9 434.91   3. Impaired mobility and ADLs Z74.09 799.89   4. Impaired functional mobility, balance, gait, and endurance Z74.09 V49.89   5. Aphasia R47.01 784.3   6. 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 (CMS/HCC)   • Melena   • Chronic gastritis   • Peripheral arterial disease (CMS/HCC)   • Cerebrovascular accident (CVA) due to thrombosis of left anterior cerebral artery (CMS/HCC)   • Herpes zoster without complication   • Thrombocytopenia (CMS/HCC)     Past Medical History:   Diagnosis Date   • Anemia    • CHF (congestive heart failure) (CMS/HCC)    • Chronic atrial fibrillation (CMS/HCC)    • Diabetes mellitus, type 2 (CMS/HCC)    • Glaucoma    • History of transfusion    • Hyperlipidemia    • Hypertension    • Kidney disease     patient not aware of what exact diagnosis is      Past Surgical History:   Procedure Laterality Date   • AMPUTATION DIGIT Left 2018    Procedure: Left Great toe amputation;  Surgeon: Prakash Carrington MD;  Location: [facility];  Service: Vascular   • APPENDECTOMY     • BONE MARROW ASPIRATION     • CARDIAC ABLATION     • CARDIAC CATHETERIZATION N/A 10/10/2017     Procedure: Peripheral angiography;  Surgeon: Kan Pelaez MD;  Location: [facility] CATH INVASIVE LOCATION;  Service:    • CARDIAC CATHETERIZATION N/A 10/10/2017    Procedure: Angioplasty-peripheral;  Surgeon: Kan Pelaez MD;  Location: [facility] CATH INVASIVE LOCATION;  Service:    • CARDIAC CATHETERIZATION N/A 10/10/2017    Procedure: Atherectomy-peripheral;  Surgeon: Kan Pelaez MD;  Location: [facility] CATH INVASIVE LOCATION;  Service:    • CARDIAC ELECTROPHYSIOLOGY PROCEDURE N/A 5/3/2018    Procedure: generator change;  Surgeon: Zan Poole MD;  Location:  [facility] CATH INVASIVE LOCATION;  Service: Cardiovascular   • CARDIOVERSION     • COLONOSCOPY     • ENDOSCOPY N/A 10/9/2017    Procedure: ESOPHAGOGASTRODUODENOSCOPY WITH COLD FORCEP BIOPSY;  Surgeon: Clifton Hyatt MD;  Location: [facility] ENDOSCOPY;  Service:    • EYE SURGERY Bilateral    • INTERVENTIONAL RADIOLOGY PROCEDURE N/A 10/10/2017    Procedure: Abdominal Aortagram with Runoff;  Surgeon: Kan Pelaez MD;  Location: [facility] CATH INVASIVE LOCATION;  Service:    • PACEMAKER IMPLANTATION          PT ASSESSMENT (last 12 hours)      Physical Therapy Evaluation     Row Name 07/08/18 1030          PT Evaluation Time/Intention    Subjective Information complains of;dizziness   lightheadedness while supine in bed, persisted with sitting   -LR     Document Type re-evaluation  -LR     Mode of Treatment physical therapy;individual therapy  -LR     Patient Effort good  -LR     Symptoms Noted During/After Treatment dizziness;other (see comments)  -LR     Comment Lightheadedness increased with sitting up.   -LR     Row Name 07/08/18 1030          General Information    Patient Profile Reviewed? yes  -LR     Onset of Illness/Injury or Date of Surgery 07/05/18  -LR     Referring Physician MD Zeb  -LR     Patient Observations alert;cooperative;agree to therapy  -LR     General Observations of Patient Patient supine in bed upon arrival.  IV, pulse ox, tele, exit alarm, bandage to L great toe.   -LR     Prior Level of Function --   See initial eval  -LR     Equipment Currently Used at Home --   see initial eval  -LR     Pertinent History of Current Functional Problem See initial eval for admission history. Patient now presents s/p amputation L great toe through distal half of proximal phalangeal bone with primary closure on 7/5/18 d/t osteomyelitis of distal phalanx of L great toe.    -LR     Existing Precautions/Restrictions fall;partial weight bearing;other (see comments)   weight bear through heel only  -LR     Equipment Issued to Patient other (see comments)   offloading shoe  -LR     Equipment Ordered for Patient --   none  -LR     Risks Reviewed patient:;LOB;nausea/vomiting;dizziness;increased discomfort;lines disloged  -LR     Benefits Reviewed patient:;improve function;increase independence;increase strength;increase balance;decrease pain;increase knowledge  -LR     Barriers to Rehab medically complex  -LR     Row Name 07/08/18 1030          Relationship/Environment    Primary Source of Support/Comfort spouse  -LR     Lives With spouse   reports  available to assist at all times upon d/c  -LR     Row Name 07/08/18 1030          Resource/Environmental Concerns    Current Living Arrangements home/apartment/condo  -LR     Resource/Environmental Concerns none  -LR     Row Name 07/08/18 1030          Home Main Entrance    Number of Stairs, Main Entrance one  -LR     Stair Railings, Main Entrance none  -LR     Stairs Comment, Main Entrance Patient reports one step through garage to enter home.   -LR     Row Name 07/08/18 1030          Cognitive Assessment/Intervention- PT/OT    Affect/Mental Status (Cognitive) WFL  -LR     Orientation Status (Cognition) oriented x 4  -LR     Follows Commands (Cognition) follows one step commands;over 90% accuracy;verbal cues/prompting required  -LR     Safety Deficit (Cognitive) safety precautions  awareness;safety precautions follow-through/compliance  -LR     Row Name 07/08/18 1030          Safety Issues, Functional Mobility    Safety Issues Affecting Function (Mobility) safety precaution awareness;safety precautions follow-through/compliance  -LR     Row Name 07/08/18 1030          Mobility Assessment/Treatment    Extremity Weight-bearing Status left lower extremity  -LR     Left Lower Extremity (Weight-bearing Status) partial weight-bearing (PWB);touch down weight-bearing (TDWB);other (see comments)   spoke with Dr. Carrington in room, as little weight as possible  -LR     Row Name 07/08/18 1030          Bed Mobility Assessment/Treatment    Bed Mobility Assessment/Treatment supine-sit;sit-supine  -LR     Supine-Sit [place] (Bed Mobility) verbal cues;supervision  -LR     Sit-Supine [place] (Bed Mobility) not tested   [facility] at end of re-eval  -LR     Bed Mobility, Safety Issues decreased use of legs for bridging/pushing  -LR     Assistive Device (Bed Mobility) head of bed elevated;bed rails  -LR     Comment (Bed Mobility) Donned offloading shoe to L foot while supine. No assist required with t/f. Patient c/o increased lightheadedness with sitting up. Did not improved with rest sitting EOB. Patient agreeable to stand and get in chair.   -LR     Row Name 07/08/18 1030          Transfer Assessment/Treatment    Transfer Assessment/Treatment sit-stand transfer;stand-sit transfer;bed-chair transfer  -LR     Comment (Transfers) Verbal cues for correct hand placement with t/f and bore as little weight as possible on L foot with t/f.   -LR     Bed-Chair [place] (Transfers) verbal cues;contact guard  -LR     Assistive Device (Bed-Chair Transfers) walker, front-wheeled  -LR     Sit-Stand [place] (Transfers) verbal cues;contact guard  -LR     Stand-Sit [place] (Transfers) verbal cues;contact guard  -LR     Row Name 07/08/18 1030          Sit-Stand Transfer    Assistive Device (Sit-Stand Transfers)  walker, front-wheeled  -LR     Row Name 07/08/18 1030          Stand-Sit Transfer    Assistive Device (Stand-Sit Transfers) walker, front-wheeled  -LR     Row Name 07/08/18 1030          Gait/Stairs Assessment/Training    37257 - Gait Training Minutes  2  -LR     [place] Level (Gait) verbal cues;contact guard  -LR     Assistive Device (Gait) walker, front-wheeled  -LR     Distance in Feet (Gait) 2  -LR     Pattern (Gait) step-to  -LR     Deviations/Abnormal Patterns (Gait) left sided deviations;antalgic;bilateral deviations;roby decreased;gait speed decreased;stride length decreased  -LR     Bilateral Gait Deviations forward flexed posture  -LR     Left Sided Gait Deviations weight shift ability decreased  -LR     Comment (Gait/Stairs) Patient able to take a few steps from bed to chair with cues for very little weight bearing through L foot, primarily through heel.   -LR     Row Name 07/08/18 1030          General ROM    RT Lower Ext Comment  -LR     LT Lower Ext Comment  -     Row Name 07/08/18 1030          Right Lower Ext    RT Lower Extremity Comments R LE AROM WFL  -LR     Row Name 07/08/18 1030          Left Lower Ext    LT Lower Extremity Comments L LE AROM WFL  -LR     Row Name 07/08/18 1030          General Assessment (Manual Muscle Testing)    General Manual Muscle Testing (MMT) Assessment lower extremity strength deficits identified  -     Row Name 07/08/18 1030          Lower Extremity (Manual Muscle Testing)    Comment, MMT: Lower Extremity B LEs functionally 4/5  -     Row Name 07/08/18 1030          Motor Assessment/Intervention    Additional Documentation Therapeutic Exercise (Group);Therapeutic Exercise Interventions (Group)  -     Row Name 07/08/18 1030          Therapeutic Exercise    97220 - PT Therapeutic Exercise Minutes 8  -     Row Name 07/08/18 1030          Therapeutic Exercise    Lower Extremity (Therapeutic Exercise) gluteal sets;heel slides, bilateral;LAQ (long arc  quad), bilateral;marching while seated;quad sets, bilateral;SLR (straight leg raise), bilateral  -LR     Lower Extremity Range of Motion (Therapeutic Exercise) hip abduction/adduction, bilateral;ankle dorsiflexion/plantar flexion, bilateral  -LR     Exercise Type (Therapeutic Exercise) AROM (active range of motion);isometric contraction, static;isotonic contraction, concentric  -LR     Position (Therapeutic Exercise) seated  -LR     Sets/Reps (Therapeutic Exercise) x10 reps each  -LR     Comment (Therapeutic Exercise) cues for technique  -LR     Row Name 07/08/18 1030          Pain Assessment    Additional Documentation Pain Scale: Numbers Pre/Post-Treatment (Group)  -LR     Row Name 07/08/18 1030          Pain Scale: Numbers Pre/Post-Treatment    Pain Scale: Numbers, Pretreatment 0/10 - no pain  -LR     Pain Scale: Numbers, Post-Treatment 0/10 - no pain  -LR     Row Name             Wound 06/27/18 1659 Left toe pressure injury;diabetic/neuropathic ulceration    Wound - Properties Group Date first assessed: 06/27/18  -KA Time first assessed: 1659  -KA Present On Admission : yes  -KA Side: Left  -KA Location: toe  -KA Type: pressure injury;diabetic/neuropathic ulceration  -KA Additional Comments: possible arterial ulcer; pt pt skin tear from tape removal  -CP    Row Name             Wound 06/28/18 1030 Right distal toe other (see comments)    Wound - Properties Group Date first assessed: 06/28/18  -CP Time first assessed: 1030  -CP Present On Admission : yes;picture taken  -CP Side: Right  -CP Orientation: distal  -CP Location: toe  -CP Type: other (see comments)  -CP Additional Comments: unknown origin; possible tape inury; right great toe  -CP    Row Name             Wound 06/28/18 1030 Right elbow abrasion    Wound - Properties Group Date first assessed: 06/28/18  -CP Time first assessed: 1030  -CP Present On Admission : yes;picture taken  -CP Side: Right  -CP Location: elbow  -CP Type: abrasion  -CP    Row Name              Wound 06/28/18 1030 Left lower arm skin tear    Wound - Properties Group Date first assessed: 06/28/18  -CP Time first assessed: 1030  -CP Present On Admission : yes  -CP Side: Left  -CP Orientation: lower  -CP Location: arm  -CP Type: skin tear  -CP Additional Comments: forearm and elbow  -CP    Row Name             Wound 07/05/18 1044 Left leg incision    Wound - Properties Group Date first assessed: 07/05/18  -BM Time first assessed: 1044  -BM Side: Left  -BM Location: leg  -BM Type: incision  -BM    Row Name             Wound 07/05/18 1130 Left toe other (see comments)    Wound - Properties Group Date first assessed: 07/05/18  -KL Time first assessed: 1130  -KL Present On Admission : no  -KL Side: Left  -KL Location: toe  -KL Type: other (see comments)  -KL, amputation  Wound Outcome: Amputation  -KL    Row Name 07/08/18 1030          Coping    Observed Emotional State accepting;cooperative  -LR     Verbalized Emotional State acceptance  -LR     Row Name 07/08/18 1030          Plan of Care Review    Plan of Care Reviewed With patient  -LR     Row Name 07/08/18 1030          Physical Therapy Clinical Impression    Date of Referral to PT 07/07/18  -LR     PT Diagnosis (PT Clinical Impression) s/p amputation L great toe through distal half of proximal phalangeal bone with primary closure  -LR     Prognosis (PT Clinical Impression) good  -LR     Patient/Family Goals Statement (PT Clinical Impression) go home,   -LR     Criteria for Skilled Interventions Met (PT Clinical Impression) yes;treatment indicated  -LR     Rehab Potential (PT Clinical Summary) good, to achieve stated therapy goals  -LR     Care Plan Review (PT) evaluation/treatment results reviewed;care plan/treatment goals reviewed;risks/benefits reviewed;current/potential barriers reviewed;patient/other agree to care plan  -LR     Row Name 07/08/18 1030          Vital Signs    Pre Systolic BP Rehab 114  -LR     Pre Treatment Diastolic BP 56   -LR     Intra Systolic BP Rehab 101  -LR     Intra Treatment Diastolic BP 48  -LR     Pre Patient Position Supine  -LR     Intra Patient Position Sitting  -LR     Row Name 07/08/18 1030          Physical Therapy Goals    Bed Mobility Goal Selection (PT) bed mobility, PT goal 1  -LR     Transfer Goal Selection (PT) transfer, PT goal 1  -LR     Gait Training Goal Selection (PT) gait training, PT goal 1  -LR     Stairs Goal Selection (PT) stairs, PT goal 1  -LR     Row Name 07/08/18 1030          Positioning and Restraints    Pre-Treatment Position in bed  -LR     Post Treatment Position chair  -LR     In Chair notified nsg;reclined;sitting;call light within reach;encouraged to call for assist;exit alarm on;with nsg;legs elevated  -LR     Row Name 07/08/18 1030          Living Environment    Home Accessibility not wheelchair accessible;stairs to enter home;tub/shower is not walk in  -LR       User Key  (r) = Recorded By, (t) = Taken By, (c) = Cosigned By    Initials Name Provider Type    BILL Adler Nurse Practitioner    LR Vilma Edmonds, PT Physical Therapist    JAZZ Gambino, RN Registered Nurse    SYLVIA Woodruff, RN Registered Nurse    ROBERT Ozuna, RN Registered Nurse          Physical Therapy Education     Title: PT OT SLP Therapies (Active)     Topic: Physical Therapy (Done)     Point: Mobility training (Done)    Learning Progress Summary     Learner Status Readiness Method Response Comment Documented by    Patient Done Acceptance E,D CAROLINE,NR Educated on weight bearing status, correct gait mechanics, and HEP. LR 07/08/18 1147     Done Acceptance E,D CAROLINENR Educated on correct gait mechanics and progression of POC. LR 07/04/18 1553     Active Acceptance E NR Educated pt on importance of mobility to return home and to PLOF. Informed pt of the benefits of maintaining mobility prior to possibility of surgery to ease recovery. JESSICA 07/02/18 0859     Active Acceptance E,D NR  LS 06/30/18  1210    Significant Other Done Acceptance E,D VU,NR Educated on correct gait mechanics and progression of POC. LR 07/04/18 1553     Active Acceptance E,D NR   06/30/18 1210          Point: Home exercise program (Done)    Learning Progress Summary     Learner Status Readiness Method Response Comment Documented by    Patient Done Acceptance E,D VU,NR Educated on weight bearing status, correct gait mechanics, and HEP.  07/08/18 1147     Done Acceptance E,D VU,NR Educated on correct gait mechanics and progression of POC.  07/04/18 1553     Active Acceptance E NR Educated pt on importance of mobility to return home and to PLOF. Informed pt of the benefits of maintaining mobility prior to possibility of surgery to ease recovery. JESSICA 07/02/18 0859    Significant Other Done Acceptance E,D VU,NR Educated on correct gait mechanics and progression of POC.  07/04/18 1553          Point: Body mechanics (Done)    Learning Progress Summary     Learner Status Readiness Method Response Comment Documented by    Patient Done Acceptance E,D VU,NR Educated on weight bearing status, correct gait mechanics, and HEP.  07/08/18 1147     Done Acceptance E,D VU,NR Educated on correct gait mechanics and progression of POC.  07/04/18 1553     Active Acceptance E NR Educated pt on importance of mobility to return home and to PLOF. Informed pt of the benefits of maintaining mobility prior to possibility of surgery to ease recovery. JESSICA 07/02/18 0859     Active Acceptance E,D NR   06/30/18 1210    Significant Other Done Acceptance E,D VU,NR Educated on correct gait mechanics and progression of POC.  07/04/18 1553     Active Acceptance E,D NR   06/30/18 1210          Point: Precautions (Done)    Learning Progress Summary     Learner Status Readiness Method Response Comment Documented by    Patient Done Acceptance E,D VU,NR Educated on weight bearing status, correct gait mechanics, and HEP.  07/08/18 1147     Done Acceptance E,D  VU,NR Educated on correct gait mechanics and progression of POC. LR 07/04/18 1553     Active Acceptance E NR Educated pt on importance of mobility to return home and to PLOF. Informed pt of the benefits of maintaining mobility prior to possibility of surgery to ease recovery. JESSICA 07/02/18 0859     Active Acceptance E,D NR  LS 06/30/18 1210    Significant Other Done Acceptance E,D VU,NR Educated on correct gait mechanics and progression of POC. LR 07/04/18 1553     Active Acceptance E,D NR   06/30/18 1210                      User Key     Initials Effective Dates Name Provider Type Discipline     06/19/15 -  Vilma Edmonds, PT Physical Therapist PT     06/19/15 -  Loly Telles, PT Physical Therapist PT    JESSICA 05/15/18 -  Dillon Lawrence, PT Student PT Student PT                PT Recommendation and Plan  Anticipated Discharge Disposition (PT): home with assist, home with home health  Planned Therapy Interventions (PT Eval): balance training, bed mobility training, gait training, home exercise program, patient/family education, ROM (range of motion), stair training, strengthening, transfer training  Therapy Frequency (PT Clinical Impression): daily  Outcome Summary/Treatment Plan (PT)  Daily Summary of Progress (PT): progress toward functional goals as expected  Anticipated Equipment Needs at Discharge (PT): front wheeled walker, bedside commode  Anticipated Discharge Disposition (PT): home with assist, home with home health  Plan of Care Reviewed With: patient  Progress: improving  Outcome Summary: Patient able to take steps from bed to chair today, limited by persistent lightheadedness. CGA for all OOB mobility and t/f supine to sit modified independently. Recommend d/c home with spouse and HHPT if patient can remain on first floor of home and progress to ambulating distances similar to ambulating from room to room at home. If not, will need rehab placement. Will continue to progress as able.            Outcome Measures     Row Name 07/08/18 1030             How much help from another person do you currently need...    Turning from your back to your side while in flat bed without using bedrails? 4  -LR      Moving from lying on back to sitting on the side of a flat bed without bedrails? 4  -LR      Moving to and from a bed to a chair (including a wheelchair)? 3  -LR      Standing up from a chair using your arms (e.g., wheelchair, bedside chair)? 3  -LR      Climbing 3-5 steps with a railing? 2  -LR      To walk in hospital room? 3  -LR      AM-PAC 6 Clicks Score 19  -LR         Functional Assessment    Outcome Measure Options AM-PAC 6 Clicks Basic Mobility (PT)  -LR        User Key  (r) = Recorded By, (t) = Taken By, (c) = Cosigned By    Initials Name Provider Type    LR Vilma Edmonds, PT Physical Therapist           Time Calculation:         PT Charges     Row Name 07/08/18 1030             Time Calculation    Start Time 1030  -LR      PT Received On 07/08/18  -LR      PT Goal Re-Cert Due Date 07/18/18  -LR         Time Calculation- PT    Total Timed Code Minutes- PT 10 minute(s)  -LR         Timed Charges    78197 - PT Therapeutic Exercise Minutes 8  -LR      04257 - Gait Training Minutes  2  -LR        User Key  (r) = Recorded By, (t) = Taken By, (c) = Cosigned By    Initials Name Provider Type    LR Vilma Edmonds, PT Physical Therapist        Therapy Suggested Charges     Code   Minutes Charges    31400 (CPT®)  Pt Neuromusc Re Education Ea 15 Min      71638 (CPT®) Hc Pt Ther Proc Ea 15 Min 8 1    40878 (CPT®) Hc Gait Training Ea 15 Min 2     71513 (CPT®) Hc Pt Therapeutic Act Ea 15 Min      07116 (CPT®) Hc Pt Manual Therapy Ea 15 Min      90566 (CPT®) Hc Pt Iontophoresis Ea 15 Min      34504 (CPT®) Hc Pt Elec Stim Ea-Per 15 Min      12934 (CPT®) Hc Pt Ultrasound Ea 15 Min      42890 (CPT®) Hc Pt Self Care/Mgmt/Train Ea 15 Min      Total  10 1        Therapy Charges for Today     Code  Description Service Date Service Provider Modifiers Qty    53653125730 HC PT THER PROC EA 15 MIN 7/8/2018 Vilma Edmonds, PT GP 1    71969314868 HC PT RE-EVAL ESTABLISHED PLAN 2 7/8/2018 Vilma Edmonds, PT GP 1          PT G-Codes  Outcome Measure Options: AM-PAC 6 Clicks Basic Mobility (PT)      Vilma Edmonds, PT  7/8/2018

## 2018-07-08 NOTE — PLAN OF CARE
Problem: Patient Care Overview  Goal: Plan of Care Review  Outcome: Ongoing (interventions implemented as appropriate)   07/08/18 3477   Coping/Psychosocial   Plan of Care Reviewed With patient   Plan of Care Review   Progress improving   OTHER   Outcome Summary VSS, worked well with PT, sat in chair for a few hours today. No complaints of pain. Continue to monitor.

## 2018-07-08 NOTE — PLAN OF CARE
Problem: Patient Care Overview  Goal: Plan of Care Review  Outcome: Ongoing (interventions implemented as appropriate)   07/08/18 0648   Coping/Psychosocial   Plan of Care Reviewed With patient   Plan of Care Review   Progress no change   OTHER   Outcome Summary Pt has had several BMs throughout the night, Testing the stool came back positive from blood, possible upper GI bleed. will continue to monitor.

## 2018-07-08 NOTE — PLAN OF CARE
Problem: Patient Care Overview  Goal: Plan of Care Review  Outcome: Ongoing (interventions implemented as appropriate)   07/08/18 1030   Coping/Psychosocial   Plan of Care Reviewed With patient   Plan of Care Review   Progress improving   OTHER   Outcome Summary Patient able to take steps from bed to chair today, limited by persistent lightheadedness. CGA for all OOB mobility and t/f supine to sit modified independently. Recommend d/c home with spouse and HHPT if patient can remain on first floor of home and progress to ambulating distances similar to ambulating from room to room at home. If not, will need rehab placement. Will continue to progress as able.

## 2018-07-09 LAB
ABO GROUP BLD: NORMAL
BACTERIA SPEC AEROBE CULT: ABNORMAL
BACTERIA SPEC AEROBE CULT: ABNORMAL
BLD GP AB SCN SERPL QL: NEGATIVE
CYTO UR: NORMAL
DEPRECATED RDW RBC AUTO: 54.2 FL (ref 37–54)
ERYTHROCYTE [DISTWIDTH] IN BLOOD BY AUTOMATED COUNT: 18.1 % (ref 11.3–14.5)
GLUCOSE BLDC GLUCOMTR-MCNC: 159 MG/DL (ref 70–130)
GLUCOSE BLDC GLUCOMTR-MCNC: 175 MG/DL (ref 70–130)
GLUCOSE BLDC GLUCOMTR-MCNC: 203 MG/DL (ref 70–130)
GLUCOSE BLDC GLUCOMTR-MCNC: 240 MG/DL (ref 70–130)
GRAM STN SPEC: ABNORMAL
GRAM STN SPEC: ABNORMAL
HCT VFR BLD AUTO: 21.4 % (ref 34.5–44)
HGB BLD-MCNC: 6.7 G/DL (ref 11.5–15.5)
LAB AP CASE REPORT: NORMAL
LAB AP CLINICAL INFORMATION: NORMAL
MCH RBC QN AUTO: 25.9 PG (ref 27–31)
MCHC RBC AUTO-ENTMCNC: 31.3 G/DL (ref 32–36)
MCV RBC AUTO: 82.6 FL (ref 80–99)
PATH REPORT.FINAL DX SPEC: NORMAL
PATH REPORT.GROSS SPEC: NORMAL
PLATELET # BLD AUTO: 51 10*3/MM3 (ref 150–450)
PMV BLD AUTO: 9.7 FL (ref 6–12)
RBC # BLD AUTO: 2.59 10*6/MM3 (ref 3.89–5.14)
RH BLD: POSITIVE
T&S EXPIRATION DATE: NORMAL
WBC NRBC COR # BLD: 6.51 10*3/MM3 (ref 3.5–10.8)

## 2018-07-09 PROCEDURE — 86920 COMPATIBILITY TEST SPIN: CPT

## 2018-07-09 PROCEDURE — 86900 BLOOD TYPING SEROLOGIC ABO: CPT

## 2018-07-09 PROCEDURE — 99232 SBSQ HOSP IP/OBS MODERATE 35: CPT | Performed by: INTERNAL MEDICINE

## 2018-07-09 PROCEDURE — 86850 RBC ANTIBODY SCREEN: CPT | Performed by: INTERNAL MEDICINE

## 2018-07-09 PROCEDURE — P9016 RBC LEUKOCYTES REDUCED: HCPCS

## 2018-07-09 PROCEDURE — 86900 BLOOD TYPING SEROLOGIC ABO: CPT | Performed by: INTERNAL MEDICINE

## 2018-07-09 PROCEDURE — 36430 TRANSFUSION BLD/BLD COMPNT: CPT

## 2018-07-09 PROCEDURE — 85027 COMPLETE CBC AUTOMATED: CPT | Performed by: INTERNAL MEDICINE

## 2018-07-09 PROCEDURE — 86901 BLOOD TYPING SEROLOGIC RH(D): CPT | Performed by: INTERNAL MEDICINE

## 2018-07-09 PROCEDURE — 63710000001 INSULIN DETEMIR PER 5 UNITS: Performed by: INTERNAL MEDICINE

## 2018-07-09 PROCEDURE — 82962 GLUCOSE BLOOD TEST: CPT

## 2018-07-09 PROCEDURE — 97530 THERAPEUTIC ACTIVITIES: CPT

## 2018-07-09 PROCEDURE — 99024 POSTOP FOLLOW-UP VISIT: CPT | Performed by: THORACIC SURGERY (CARDIOTHORACIC VASCULAR SURGERY)

## 2018-07-09 RX ADMIN — PANTOPRAZOLE SODIUM 40 MG: 40 TABLET, DELAYED RELEASE ORAL at 07:21

## 2018-07-09 RX ADMIN — INSULIN DETEMIR 5 UNITS: 100 INJECTION, SOLUTION SUBCUTANEOUS at 21:10

## 2018-07-09 RX ADMIN — METOPROLOL TARTRATE 100 MG: 100 TABLET ORAL at 08:16

## 2018-07-09 RX ADMIN — INSULIN LISPRO 2 UNITS: 100 INJECTION, SOLUTION INTRAVENOUS; SUBCUTANEOUS at 18:04

## 2018-07-09 RX ADMIN — METRONIDAZOLE 500 MG: 500 INJECTION, SOLUTION INTRAVENOUS at 08:17

## 2018-07-09 RX ADMIN — METRONIDAZOLE 500 MG: 500 INJECTION, SOLUTION INTRAVENOUS at 16:25

## 2018-07-09 RX ADMIN — Medication 325 MG: at 08:15

## 2018-07-09 RX ADMIN — Medication 1 CAPSULE: at 08:16

## 2018-07-09 RX ADMIN — Medication 325 MG: at 18:04

## 2018-07-09 RX ADMIN — CEFEPIME HYDROCHLORIDE 1 G: 1 INJECTION, POWDER, FOR SOLUTION INTRAMUSCULAR; INTRAVENOUS at 09:51

## 2018-07-09 RX ADMIN — INSULIN LISPRO 3 UNITS: 100 INJECTION, SOLUTION INTRAVENOUS; SUBCUTANEOUS at 11:43

## 2018-07-09 RX ADMIN — FUROSEMIDE 20 MG: 20 TABLET ORAL at 08:16

## 2018-07-09 RX ADMIN — LATANOPROST 1 DROP: 50 SOLUTION OPHTHALMIC at 23:18

## 2018-07-09 RX ADMIN — METRONIDAZOLE 500 MG: 500 INJECTION, SOLUTION INTRAVENOUS at 02:12

## 2018-07-09 RX ADMIN — ATORVASTATIN CALCIUM 80 MG: 40 TABLET, FILM COATED ORAL at 21:10

## 2018-07-09 RX ADMIN — GABAPENTIN 300 MG: 300 CAPSULE ORAL at 08:16

## 2018-07-09 RX ADMIN — METOPROLOL TARTRATE 100 MG: 100 TABLET ORAL at 21:10

## 2018-07-09 RX ADMIN — INSULIN LISPRO 2 UNITS: 100 INJECTION, SOLUTION INTRAVENOUS; SUBCUTANEOUS at 08:15

## 2018-07-09 RX ADMIN — INSULIN LISPRO 3 UNITS: 100 INJECTION, SOLUTION INTRAVENOUS; SUBCUTANEOUS at 23:18

## 2018-07-09 RX ADMIN — NYSTATIN: 100000 POWDER TOPICAL at 08:16

## 2018-07-09 RX ADMIN — Medication 5 MG: at 21:15

## 2018-07-09 RX ADMIN — NYSTATIN: 100000 POWDER TOPICAL at 21:11

## 2018-07-09 RX ADMIN — ASPIRIN 81 MG: 81 TABLET, COATED ORAL at 08:17

## 2018-07-09 NOTE — PROGRESS NOTES
Cardiothoracic Surgery Progress Note      POD #: 4-left great toe amputation and primary closure     LOS: 12 days      Subjective: Awake and alert    Chief Complaint: Minimal surgical site pain    Objective:  Vital Signs  Temp:  [98 °F (36.7 °C)-98.6 °F (37 °C)] 98.5 °F (36.9 °C)  Heart Rate:  [69-73] 70  Resp:  [12-16] 16  BP: (109-134)/(47-90) 131/54    Physical Exam:   General Appearance:    Lungs:   Heart:   Skin:   Incision: Surgical site dressing changed.  Skin sutures intact, incision appears to be healing well, dorsal and plantar flaps are viable.     Results: Laboratory results below noted specifically hematocrit of 21.4%    Results from last 7 days  Lab Units 07/09/18  0819   WBC 10*3/mm3 6.51   HEMOGLOBIN g/dL 6.7*   HEMATOCRIT % 21.4*   PLATELETS 10*3/mm3 51*       Results from last 7 days  Lab Units 07/08/18  0423   SODIUM mmol/L 139   POTASSIUM mmol/L 3.7   CHLORIDE mmol/L 110*   CO2 mmol/L 24.0   BUN mg/dL 22   CREATININE mg/dL 1.05   GLUCOSE mg/dL 74   CALCIUM mg/dL 7.8*         Assessment:#1.  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  Diabetes mellitus and peripheral neuropathy  #4.  Extensive peripheral vas disease status post recent Endo vascular stenting and angioplasty left anterior tibial artery per Dr. Pelae[facility]-6 months ago  5.  Preop and postop anemia  Plan: Patient may need a transfusion-leave that decision up to the hospitalist.  Rehabilitation facility would be the best option for this patient if possible  Medical management per hospitalist    Prakash Carrington MD - 07/09/18 - 4:36 PM

## 2018-07-09 NOTE — PLAN OF CARE
Problem: Fall Risk (Adult)  Goal: Identify Related Risk Factors and Signs and Symptoms  Outcome: Ongoing (interventions implemented as appropriate)   07/09/18 1715   Fall Risk (Adult)   Related Risk Factors (Fall Risk) age-related changes;history of falls;impaired vision;sleep pattern alteration;gait/mobility problems;fatigue/slow reaction     Goal: Absence of Fall  Outcome: Ongoing (interventions implemented as appropriate)   07/09/18 1715   Fall Risk (Adult)   Absence of Fall making progress toward outcome       Problem: Skin Injury Risk (Adult)  Goal: Skin Health and Integrity  Outcome: Ongoing (interventions implemented as appropriate)   07/09/18 1715   Skin Injury Risk (Adult)   Skin Health and Integrity making progress toward outcome       Problem: Patient Care Overview  Goal: Plan of Care Review  Outcome: Ongoing (interventions implemented as appropriate)   07/09/18 1715   Coping/Psychosocial   Plan of Care Reviewed With pat   07/09/18 1715   Coping/Psychosocial   Plan of Care Reviewed With patient   Plan of Care Review   Progress improving   OTHER   Outcome Summary Patient required two units of PRBC due to decreased hemaglobin (7.1) and hematocrit (22.5) levels. No reaction to transfusion noted. Wounds present on R elbow and L arm have shown significant improvement dressing changed per order. Vital signs stable. no complaints of pain   ient       Problem: Infection, Risk/Actual (Adult)  Goal: Identify Related Risk Factors and Signs and Symptoms  Outcome: Ongoing (interventions implemented as appropriate)   07/09/18 1715   Infection, Risk/Actual (Adult)   Related Risk Factors (Infection, Risk/Actual) age extremes;skin integrity impairment;sleep disturbance;chronic illness/condition   Signs and Symptoms (Infection, Risk/Actual) weakness     Goal: Infection Prevention/Resolution  Outcome: Ongoing (interventions implemented as appropriate)   07/09/18 1715   Infection, Risk/Actual (Adult)   Infection  Prevention/Resolution making progress toward outcome

## 2018-07-09 NOTE — PLAN OF CARE
Problem: Patient Care Overview  Goal: Plan of Care Review  Outcome: Ongoing (interventions implemented as appropriate)   07/09/18 1696   Coping/Psychosocial   Plan of Care Reviewed With patient   Plan of Care Review   Progress improving   OTHER   Outcome Summary Pt transferred bed to chair, chair to BSC, BSC to chair with Deny and RW, limited by fatigue. Pt requires cues for WB status and assist to maintain. Will continue to progress mobility as able

## 2018-07-09 NOTE — PROGRESS NOTES
Continued Stay Note  [facility]     Patient Name: Lynne Sanchez  MRN: [id number]  Today's Date: 7/9/2018    Admit Date: 6/27/2018          Discharge Plan     Row Name 07/09/18 1102       Plan    Plan Comments Pt now reports she would like to consider rehab. Referrals have been sent to facilities in [facility] at her request.              Discharge Codes    No documentation.       Expected Discharge Date and Time     Expected Discharge Date Expected Discharge Time    Jul 9, 2018             Kat Rosenbaum RN

## 2018-07-09 NOTE — THERAPY TREATMENT NOTE
Acute Care - Physical Therapy Treatment Note  [facility]     Patient Name: Lynne Sanchez  : 1934  MRN: [id number]  Today's Date: 2018  Onset of Illness/Injury or Date of Surgery: 18  Date of Referral to PT: 18  Referring Physician: MD Zeb    Admit Date: 2018    Visit Dx:    ICD-10-CM ICD-9-CM   1. Other chronic osteomyelitis of left foot (CMS/[facility]) M86.672 730.17   2. Cerebrovascular accident (CVA), unspecified mechanism (CMS/[facility]) I63.9 434.91   3. Impaired mobility and ADLs Z74.09 799.89   4. Impaired functional mobility, balance, gait, and endurance Z74.09 V49.89   5. Aphasia R47.01 784.3   6. S/P amputation of lesser toe, left (CMS/[facility]) Z89.422 V49.72     Patient Active Problem List   Diagnosis   • Chronic atrial fibrillation (CMS/[facility])   • Valvular heart disease   • Type 2 diabetes mellitus treated without insulin (CMS/[facility])   • History of congestive heart failure   • Osteomyelitis of left foot (CMS/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)   • Melena   • Chronic gastritis   • Peripheral arterial disease (CMS/[facility])   • Cerebrovascular accident (CVA) due to thrombosis of left anterior cerebral artery (CMS/[facility])   • Herpes zoster without complication   • Thrombocytopenia (CMS/[facility])       Therapy Treatment          Rehabilitation Treatment Summary     Row Name 18 1645             Treatment Time/Intention    Discipline physical therapist  -      Document Type therapy note (daily note)  -      Subjective Information no complaints  -      Mode of Treatment physical therapy  -MJ      Patient/Family Observations Pt supine in bed, receiving blood. IV, exit alarm, tele, gauze/bandage to L foot  -      Care Plan Review patient/other agree to care plan  -MJ      Patient Effort good  -      Existing Precautions/Restrictions fall;weight bearing;other (see comments)   L toe offloading shoe, WB through L heel  -      Recorded  by [MJ] Jaswinder Wadsworth, PT 07/09/18 1713      Row Name 07/09/18 1645             Cognitive Assessment/Intervention- PT/OT    Affect/Mental Status (Cognitive) WFL  -MJ      Orientation Status (Cognition) oriented x 4  -MJ      Follows Commands (Cognition) follows one step commands;over 90% accuracy;verbal cues/prompting required  -MJ      Safety Deficit (Cognitive) mild deficit  -MJ      Recorded by [MJ] Jaswinder Wadsworth, PT 07/09/18 1713      Row Name 07/09/18 1645             Mobility Assessment/Intervention    Extremity Weight-bearing Status left lower extremity  -MJ      Left Lower Extremity (Weight-bearing Status) partial weight-bearing (PWB);touch down weight-bearing (TDWB)   as little weight as possible per Dr. Carrington  -MJ      Recorded by [MJ] Jaswinder Wadsworth, PT 07/09/18 1713      Row Name 07/09/18 1645             Bed Mobility Assessment/Treatment    Supine-Sit [place] (Bed Mobility) supervision;verbal cues  -MJ      Sit-Supine [place] (Bed Mobility) not tested   Pt UIC  -MJ      Bed Mobility, Safety Issues decreased use of legs for bridging/pushing  -MJ      Assistive Device (Bed Mobility) bed rails;head of bed elevated  -MJ      Comment (Bed Mobility) Off-loading shoe donned in supine. Verbal cues for sequencing, increased time and effort to perform  -MJ      Recorded by [MJ] Jaswinder Wadsworth, PT 07/09/18 1713      Row Name 07/09/18 1645             Transfer Assessment/Treatment    Transfer Assessment/Treatment sit-stand transfer;stand-sit transfer;toilet transfer  -MJ      Maintains Weight-bearing Status (Transfers) able to maintain;verbal cues to maintain  -MJ      Comment (Transfers) Verbal cues for correct hand placement and for WB status. While assisting pt to bed to chair loose stool noted. Performed stand-step transfer to BSC, verbal cues to reach back for BSC prior to t/f. Pt dependent with hygiene after toileting  -MJ      Recorded by [MJ] Jaswinder Wadsworth, PT 07/09/18 1713      Row Name 07/09/18 1645              Sit-Stand Transfer    Sit-Stand [place] (Transfers) contact guard;verbal cues  -MJ      Assistive Device (Sit-Stand Transfers) walker, front-wheeled  -MJ      Recorded by [MJ] Jaswinder Wadsworth, PT 07/09/18 1713      Row Name 07/09/18 1645             Stand-Sit Transfer    Stand-Sit [place] (Transfers) contact guard;verbal cues  -MJ      Assistive Device (Stand-Sit Transfers) walker, front-wheeled  -MJ      Recorded by [MJ] Jaswinder Wadsworth, PT 07/09/18 1713      Row Name 07/09/18 1645             Toilet Transfer    Type (Toilet Transfer) stand pivot/stand step  -MJ      [place] Level (Toilet Transfer) minimum assist (75% patient effort);1 person to manage equipment;verbal cues  -MJ      Assistive Device (Toilet Transfer) commode, bedside without drop arms;walker, front-wheeled  -MJ      Recorded by [MJ] Jaswinder Wadsworth, PT 07/09/18 1713      Row Name 07/09/18 1645             Gait/Stairs Assessment/Training    46365 - Gait Training Minutes  5  -MJ      [place] Level (Gait) minimum assist (75% patient effort);1 person to manage equipment;verbal cues  -MJ      Assistive Device (Gait) walker, front-wheeled  -MJ      Distance in Feet (Gait) 3  -MJ      Pattern (Gait) step-to  -MJ      Comment (Gait/Stairs) Pt takes small steps from bed to chair, chair to BSC and back to chair. Cues to weight bear only through heel. Pt unsteady and with LOB backward requiring Deny to correct. Gait limited by fatigue  -MJ      Recorded by [MJ] Jaswinder Wadsworth, PT 07/09/18 1713      Row Name 07/09/18 1645             Therapeutic Exercise    34217 - PT Therapeutic Activity Minutes 10  -MJ      Recorded by [MJ] Jaswinder Wadsworth, PT 07/09/18 1713      Row Name 07/09/18 1645             Therapeutic Exercise    Comment (Therapeutic Exercise) Deferred. pt too fatigued after transfers  -MJ      Recorded by [MJ] Jaswinder Wadsworth, PT 07/09/18 1713      Row Name 07/09/18 1645             Balance    Balance static standing balance  -MJ      Recorded by  [MJ] Jaswinder Ananth, PT 07/09/18 1713      Row Name 07/09/18 1645             Static Standing Balance    Level of [place] (Supported Standing, Static Balance) contact guard assist  -MJ      Time Able to Maintain Position (Supported Standing, Static Balance) 1 to 2 minutes  -MJ      Assistive Device Utilized (Supported Standing, Static Balance) rolling walker  -MJ      Comment (Supported Standing, Static Balance) Pt stood to be cleaned after incontinent episode of loose stool  -MJ      Recorded by [MJ] Jaswinder Wadsworth, PT 07/09/18 1713      Row Name 07/09/18 1645             Positioning and Restraints    Pre-Treatment Position in bed  -MJ      Post Treatment Position chair  -MJ      In Chair notified nsg;reclined;call light within reach;encouraged to call for assist;exit alarm on;LLE elevated  -MJ      Recorded by [MJ] Jaswinder Wadsworth, PT 07/09/18 1713      Row Name 07/09/18 1645             Pain Scale: Numbers Pre/Post-Treatment    Pain Scale: Numbers, Pretreatment 0/10 - no pain  -MJ      Pain Scale: Numbers, Post-Treatment 0/10 - no pain  -MJ      Recorded by [MJ] Jaswinder Wadsworth, PT 07/09/18 1713      Row Name                Wound 06/27/18 1659 Left toe pressure injury;diabetic/neuropathic ulceration    Wound - Properties Group Date first assessed: 06/27/18 [KA] Time first assessed: 1659 [KA] Present On Admission : yes [KA] Side: Left [KA] Location: toe [KA] Type: pressure injury;diabetic/neuropathic ulceration [KA] Additional Comments: possible arterial ulcer; pt pt skin tear from tape removal [CP] Recorded by:  [CP] BILL Magallanes 06/28/18 1335 [KA] Anel Woodruff RN 06/27/18 1700    Row Name                Wound 06/28/18 1030 Right distal toe other (see comments)    Wound - Properties Group Date first assessed: 06/28/18 [CP] Time first assessed: 1030 [CP] Present On Admission : yes;picture taken [CP] Side: Right [CP] Orientation: distal [CP] Location: toe [CP] Type: other (see comments) [CP] Additional  Comments: unknown origin; possible tape inury; right great toe [CP] Recorded by:  [CP] BILL Magallanes 06/28/18 1340    Row Name                Wound 06/28/18 1030 Right elbow abrasion    Wound - Properties Group Date first assessed: 06/28/18 [CP] Time first assessed: 1030 [CP] Present On Admission : yes;picture taken [CP] Side: Right [CP] Location: elbow [CP] Type: abrasion [CP] Recorded by:  [CP] BILL Magallanes 06/28/18 1344    Row Name                Wound 06/28/18 1030 Left lower arm skin tear    Wound - Properties Group Date first assessed: 06/28/18 [CP] Time first assessed: 1030 [CP] Present On Admission : yes [CP] Side: Left [CP] Orientation: lower [CP] Location: arm [CP] Type: skin tear [CP] Additional Comments: forearm and elbow [CP] Recorded by:  [CP] BILL Magallanes 06/28/18 1346    Row Name                Wound 07/05/18 1044 Left leg incision    Wound - Properties Group Date first assessed: 07/05/18 [BM] Time first assessed: 1044 [BM] Side: Left [BM] Location: leg [BM] Type: incision [BM] Recorded by:  [BM] Leonie Ozuna RN 07/05/18 1044    Row Name                Wound 07/05/18 1130 Left toe other (see comments)    Wound - Properties Group Date first assessed: 07/05/18 [KL] Time first assessed: 1130 [KL] Present On Admission : no [KL] Side: Left [KL] Location: toe [KL] Type: other (see comments) [KL], amputation  Wound Outcome: Amputation [KL] Recorded by:  [KL] Leslye Gambino RN 07/05/18 1556    Row Name 07/09/18 1645             Plan of Care Review    Plan of Care Reviewed With patient  -MJ      Recorded by [JOSR] Jaswinder Wadsworth, OSMAR 07/09/18 1713      Row Name 07/09/18 1645             Outcome Summary/Treatment Plan (PT)    Daily Summary of Progress (PT) progress toward functional goals is gradual  -MJ      Recorded by [JOSR] Jaswinder Wadsworth, PT 07/09/18 1713        User Key  (r) = Recorded By, (t) = Taken By, (c) = Cosigned By    Initials Name Effective Dates Discipline    CP  Lorraine Morillo, APRN 06/08/18 -  Nurse    JOSR Wadsworth, PT 04/03/18 -  PT    JAZZ Gambino, RN 06/16/16 -  Nurse    SYLVIA Woodruff, RN 06/23/17 -  Nurse    ROBERT Ozuna, SHAD 08/17/17 -  Nurse          Rash 06/28/18 1030 Right posterior leg vesicle (Active)   Distribution regional 7/9/2018  8:00 AM   Configuration/Shape asymmetric 7/9/2018  8:00 AM   Borders irregular 7/9/2018  8:00 AM   Characteristics dry;peeling;crusted 7/9/2018  8:00 AM   Care, Rash other (see comments) 7/9/2018  8:00 AM       Wound 06/27/18 1659 Left toe pressure injury;diabetic/neuropathic ulceration (Active)   Dressing Appearance dry;intact 7/9/2018  8:00 AM   Closure JIMMY 7/9/2018  8:00 AM   Drainage Amount none 7/8/2018 10:14 PM   Dressing Care, Wound gauze 7/8/2018 10:14 PM       Wound 06/28/18 1030 Right distal toe other (see comments) (Active)   Dressing Appearance open to air 7/9/2018  8:00 AM   Base dry;scab 7/9/2018  8:00 AM   Periwound intact;dry 7/9/2018  8:00 AM   Periwound Temperature warm 7/9/2018  8:00 AM   Periwound Skin Turgor firm 7/9/2018  8:00 AM   Edges irregular 7/9/2018  8:00 AM   Drainage Amount none 7/8/2018 10:14 PM   Dressing Care, Wound open to air 7/8/2018 10:14 PM       Wound 06/28/18 1030 Right elbow abrasion (Active)   Dressing Appearance dry;intact 7/9/2018  8:00 AM   Base clean;dry;scab 7/9/2018  8:00 AM   Periwound Temperature warm 7/9/2018  8:00 AM   Periwound Skin Turgor firm 7/9/2018  8:00 AM   Edges irregular 7/9/2018  8:00 AM   Drainage Amount none 7/8/2018 10:14 PM   Dressing Care, Wound dressing changed;non-adherent;foam;petroleum-based 7/9/2018  1:44 PM       Wound 06/28/18 1030 Left lower arm skin tear (Active)   Dressing Appearance dry;intact 7/9/2018  8:00 AM   Base scab;dry 7/9/2018  8:00 AM   Periwound dry;intact;pink 7/9/2018  8:00 AM   Periwound Temperature warm 7/9/2018  8:00 AM   Periwound Skin Turgor soft 7/9/2018  8:00 AM   Edges irregular 7/9/2018  8:00 AM   Drainage Amount  none 7/8/2018 10:14 PM   Dressing Care, Wound dressing changed;foam;petroleum-based 7/9/2018  1:44 PM       Wound 07/05/18 1044 Left leg incision (Active)   Dressing Appearance dry;intact;no drainage 7/9/2018  8:00 AM   Closure JIMMY 7/9/2018  8:00 AM       Wound 07/05/18 1130 Left toe other (see comments) (Active)   Dressing Appearance dry;intact;no drainage 7/9/2018  8:00 AM   Closure JIMMY 7/9/2018  8:00 AM             Physical Therapy Education     Title: PT OT SLP Therapies (Active)     Topic: Physical Therapy (Done)     Point: Mobility training (Done)    Learning Progress Summary     Learner Status Readiness Method Response Comment Documented by    Patient Done Acceptance E,D VU,NR Reviewed WB status, benefits of mobility  07/09/18 1713     Done Acceptance E,D VU,NR Educated on weight bearing status, correct gait mechanics, and HEP.  07/08/18 1147     Done Acceptance E,D VU,NR Educated on correct gait mechanics and progression of POC.  07/04/18 1553     Active Acceptance E NR Educated pt on importance of mobility to return home and to PLOF. Informed pt of the benefits of maintaining mobility prior to possibility of surgery to ease recovery. JESSICA 07/02/18 0859     Active Acceptance E,D NR   06/30/18 1210    Significant Other Done Acceptance E,D VU,NR Educated on correct gait mechanics and progression of POC.  07/04/18 1553     Active Acceptance E,D NR   06/30/18 1210          Point: Home exercise program (Done)    Learning Progress Summary     Learner Status Readiness Method Response Comment Documented by    Patient Done Acceptance E,D VU,NR Reviewed WB status, benefits of mobility  07/09/18 1713     Done Acceptance E,D VU,NR Educated on weight bearing status, correct gait mechanics, and HEP.  07/08/18 1147     Done Acceptance E,D VU,NR Educated on correct gait mechanics and progression of POC.  07/04/18 1553     Active Acceptance E NR Educated pt on importance of mobility to return home and to  PLOF. Informed pt of the benefits of maintaining mobility prior to possibility of surgery to ease recovery. JESSICA 07/02/18 0859    Significant Other Done Acceptance E,D VU,NR Educated on correct gait mechanics and progression of POC.  07/04/18 1553          Point: Body mechanics (Done)    Learning Progress Summary     Learner Status Readiness Method Response Comment Documented by    Patient Done Acceptance E,D VU,NR Reviewed WB status, benefits of mobility  07/09/18 1713     Done Acceptance E,D VU,NR Educated on weight bearing status, correct gait mechanics, and HEP.  07/08/18 1147     Done Acceptance E,D VU,NR Educated on correct gait mechanics and progression of POC.  07/04/18 1553     Active Acceptance E NR Educated pt on importance of mobility to return home and to PLOF. Informed pt of the benefits of maintaining mobility prior to possibility of surgery to ease recovery.  07/02/18 0859     Active Acceptance E,D NR   06/30/18 1210    Significant Other Done Acceptance E,D VU,NR Educated on correct gait mechanics and progression of POC.  07/04/18 1553     Active Acceptance E,D NR   06/30/18 1210          Point: Precautions (Done)    Learning Progress Summary     Learner Status Readiness Method Response Comment Documented by    Patient Done Acceptance E,D VU,NR Reviewed WB status, benefits of mobility  07/09/18 1713     Done Acceptance E,D VU,NR Educated on weight bearing status, correct gait mechanics, and HEP.  07/08/18 1147     Done Acceptance E,D VU,NR Educated on correct gait mechanics and progression of POC.  07/04/18 1553     Active Acceptance E NR Educated pt on importance of mobility to return home and to PLOF. Informed pt of the benefits of maintaining mobility prior to possibility of surgery to ease recovery.  07/02/18 0859     Active Acceptance E,D NR   06/30/18 1210    Significant Other Done Acceptance E,D VU,NR Educated on correct gait mechanics and progression of POC.   07/04/18 1553     Active Acceptance E,D NR   06/30/18 1210                      User Key     Initials Effective Dates Name Provider Type Discipline    LR 06/19/15 -  Vilma Edmonds, PT Physical Therapist PT    LS 06/19/15 -  Loly Telles, PT Physical Therapist PT     04/03/18 -  Jaswinder Wadsworth, PT Physical Therapist PT    JESSICA 05/15/18 -  Dillon Lawrence, PT Student PT Student PT                    PT Recommendation and Plan     Outcome Summary/Treatment Plan (PT)  Daily Summary of Progress (PT): progress toward functional goals is gradual  Plan of Care Reviewed With: patient  Progress: improving  Outcome Summary: Pt transferred bed to chair, chair to BSC, BSC to chair with Deny and RW, limited by fatigue. Pt requires cues for WB status and assist to maintain. Will continue to progress mobility as able          Outcome Measures     Row Name 07/09/18 1645 07/08/18 1030          How much help from another person do you currently need...    Turning from your back to your side while in flat bed without using bedrails? 4  -MJ 4  -LR     Moving from lying on back to sitting on the side of a flat bed without bedrails? 3  -MJ 4  -LR     Moving to and from a bed to a chair (including a wheelchair)? 3  -MJ 3  -LR     Standing up from a chair using your arms (e.g., wheelchair, bedside chair)? 3  -MJ 3  -LR     Climbing 3-5 steps with a railing? 2  -MJ 2  -LR     To walk in hospital room? 3  -MJ 3  -LR     AM-PAC 6 Clicks Score 18  -MJ 19  -LR        Functional Assessment    Outcome Measure Options AM-PAC 6 Clicks Basic Mobility (PT)  -MJ AM-PAC 6 Clicks Basic Mobility (PT)  -LR       User Key  (r) = Recorded By, (t) = Taken By, (c) = Cosigned By    Initials Name Provider Type    LR Vilma Edmonds, PT Physical Therapist    MJ Jaswinder Wadsworth, PT Physical Therapist           Time Calculation:         PT Charges     Row Name 07/09/18 1645             Time Calculation    Start Time 1645  -MJ      PT Received On 07/09/18   -MJ      PT Goal Re-Cert Due Date 07/18/18  -MJ         Time Calculation- PT    Total Timed Code Minutes- PT 15 minute(s)  -MJ         Timed Charges    97406 - Gait Training Minutes  5  -MJ      53994 - PT Therapeutic Activity Minutes 10  -MJ        User Key  (r) = Recorded By, (t) = Taken By, (c) = Cosigned By    Initials Name Provider Type    MJ Jaswinder Wadsworth, PT Physical Therapist        Therapy Suggested Charges     Code   Minutes Charges    31245 (CPT®) Hc Pt Neuromusc Re Education Ea 15 Min      72085 (CPT®) Hc Pt Ther Proc Ea 15 Min      96650 (CPT®) Hc Gait Training Ea 15 Min 5     19283 (CPT®) Hc Pt Therapeutic Act Ea 15 Min 10 1    43037 (CPT®) Hc Pt Manual Therapy Ea 15 Min      36915 (CPT®) Hc Pt Iontophoresis Ea 15 Min      68249 (CPT®) Hc Pt Elec Stim Ea-Per 15 Min      68986 (CPT®) Hc Pt Ultrasound Ea 15 Min      44403 (CPT®) Hc Pt Self Care/Mgmt/Train Ea 15 Min      Total  15 1        Therapy Charges for Today     Code Description Service Date Service Provider Modifiers Qty    52820799865 HC PT THERAPEUTIC ACT EA 15 MIN 7/9/2018 Jaswinder Wadsworth, PT GP 1    77361253818 HC PT THER SUPP EA 15 MIN 7/9/2018 Jaswinder Wadsworth, PT GP 1          PT G-Codes  Outcome Measure Options: AM-PAC 6 Clicks Basic Mobility (PT)    Jaswinder Wadsworth PT  7/9/2018

## 2018-07-09 NOTE — PROGRESS NOTES
[facility] Medicine Services  PROGRESS NOTE    Patient Name: Lynne Sanchez  : 1934  MRN: [id number]    Date of Admission: 2018  Length of Stay: 12  Primary Care Physician: ANN Gonsalez    Subjective   Subjective     CC:  Follow up left toe osteo, recent shingles    HPI:  No family present.   No issues overnight.  Worked with PT yesterday and was noted to be dizzy.  Diarrhea has improved.    Review of Systems  Gen- No fevers, chills. + gen weak   CV- No chest pain, palpitations  Resp- No cough, dyspnea  GI- No N/V/D, abd pain    Otherwise ROS is negative except as mentioned in the HPI.    Objective   Objective     Vital Signs:   Temp:  [98 °F (36.7 °C)-98.2 °F (36.8 °C)] 98.1 °F (36.7 °C)  Heart Rate:  [69-73] 70  Resp:  [12-17] 16  BP: (117-139)/(49-65) 128/49  Total (NIH Stroke Scale): 0     Physical Exam:  Constitutional: No acute distress, awake and alert, age appropriate  HENT: NCAT, mucous membranes moist  Respiratory: Clear to auscultation bilaterally A/P, nonlabored respirations   Cardiovascular: RRR, no murmurs, rubs, or gallops  Gastrointestinal: Positive bowel sounds, soft, nontender, nondistended  Musculoskeletal: Left foot heavily bandaged, toes exposed and warm    Psychiatric: Appropriate affect, cooperative, calm and pleasant  Neurologic: Oriented x 3, no focal deficits  Skin: Skin warm and dry.  Right posterior leg healing shingle sites with scabbing, no vesicles or drainage.       Results Reviewed:  I have personally reviewed current lab, radiology, and data and agree.      Results from last 7 days  Lab Units 18  0819 18  0423 18  1501   WBC 10*3/mm3 6.51 7.06 6.69   HEMOGLOBIN g/dL 6.7* 7.1* 7.7*   HEMATOCRIT % 21.4* 22.5* 24.3*   PLATELETS 10*3/mm3 51* 75* 135*       Results from last 7 days  Lab Units 18  0423 18  0430 18  0451   SODIUM mmol/L 139 137 136   POTASSIUM mmol/L 3.7 3.8 4.1   CHLORIDE mmol/L  110* 108 109   CO2 mmol/L 24.0 22.0 22.0   BUN mg/dL 22 19 18   CREATININE mg/dL 1.05 1.13 1.13   GLUCOSE mg/dL 74 85 123*   CALCIUM mg/dL 7.8* 8.2* 8.0*     Estimated Creatinine Clearance: 42.7 mL/min (by C-G formula based on SCr of 1.05 mg/dL).  No results found for: BNP    Microbiology Results Abnormal     Procedure Component Value - Date/Time    Tissue / Bone Culture - Tissue, Toe, Left [[id number]]  (Abnormal)  (Susceptibility) Collected:  07/05/18 1023    Lab Status:  Preliminary result Specimen:  Tissue from Toe, Left Updated:  07/08/18 1144     Tissue Culture Scant growth (1+) Enterobacter cloacae (A)      Light growth (2+) Staphylococcus, coagulase negative (A)     Gram Stain Result Occasional WBCs seen      No organisms seen    Susceptibility      Enterobacter cloacae     PRIYA (Preliminary)     Aztreonam <=8 ug/ml Susceptible     Cefepime <=8 ug/ml Susceptible     Cefotaxime <=2 ug/ml Susceptible     Ceftriaxone <=8 ug/ml Susceptible     Ertapenem <=1 ug/ml Susceptible     Gentamicin <=4 ug/ml Susceptible     Levofloxacin <=2 ug/ml Susceptible     Meropenem <=1 ug/ml Susceptible     Piperacillin + Tazobactam <=16 ug/ml Susceptible     Tetracycline <=4 ug/ml Susceptible     Tobramycin <=4 ug/ml Susceptible     Trimethoprim + Sulfamethoxazole <=2/38 ug/ml Susceptible                    AFB Culture - Tissue, Toe, Left [[id number]] Collected:  07/05/18 1023    Lab Status:  Preliminary result Specimen:  Tissue from Toe, Left Updated:  07/06/18 1443     AFB Stain No acid fast bacilli seen on concentrated smear    Anaerobic Culture - Tissue, Toe, Left [[id number]]  (Normal) Collected:  07/05/18 1023    Lab Status:  Preliminary result Specimen:  Tissue from Toe, Left Updated:  07/06/18 1127     Culture No anaerobes isolated    Blood Culture - Blood, [[id number]]  (Normal) Collected:  06/28/18 0320    Lab Status:  Final result Specimen:  Blood from Arm, Right Updated:  07/03/18 0400     Blood Culture No growth at 5 days     Blood Culture - Blood, [[id number]]  (Normal) Collected:  06/28/18 0330    Lab Status:  Final result Specimen:  Blood from Arm, Left Updated:  07/03/18 0400     Blood Culture No growth at 5 days    Urine Culture - Urine, [[id number]]  (Abnormal)  (Susceptibility) Collected:  06/28/18 2300    Lab Status:  Final result Specimen:  Urine from Urine, Clean Catch Updated:  07/01/18 1120     Urine Culture 60,000-70,000 CFU/mL Klebsiella aerogenes (A)    Susceptibility      Klebsiella aerogenes     PRIYA     Aztreonam 16 ug/ml Intermediate     Cefepime <=8 ug/ml Susceptible     Cefotaxime 16 ug/ml Intermediate     Ceftriaxone 32 ug/ml Intermediate     Ertapenem <=1 ug/ml Susceptible     Gentamicin <=4 ug/ml Susceptible     Levofloxacin <=2 ug/ml Susceptible     Meropenem <=1 ug/ml Susceptible     Nitrofurantoin <=32 ug/ml Susceptible     Piperacillin + Tazobactam 64 ug/ml Intermediate     Tetracycline <=4 ug/ml Susceptible     Tobramycin <=4 ug/ml Susceptible     Trimethoprim + Sulfamethoxazole <=2/38 ug/ml Susceptible                          Imaging Results (last 24 hours)     ** No results found for the last 24 hours. **        Results for orders placed during the hospital encounter of 06/27/18   Adult Transthoracic Echo Complete W/ Cont if Necessary Per Protocol (With Agitated Saline)    Narrative · Left ventricular systolic function is normal. Estimated EF = 60%.  · Mild aortic valve regurgitation is present.  · Moderate mitral valve regurgitation is present  · Moderate to severe tricuspid valve regurgitation is present.  · Calculated right ventricular systolic pressure from tricuspid   regurgitation is 90 mmHg. Severe pulmonary hypertension is present  · Calculated right ventricular systolic pressure from tricuspid   regurgitation is 90 mmHg.          I have reviewed the medications.    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 Chronic atrial fibrillation, status post [employer] pacemaker implantation and AV node ablation:  a. Diagnosed June 2005.  b. Status post ECV restoring normal sinus rhythm, June 2005.  c. Rythmol initiated 05/01/2006.  d. Previously digoxin toxicity.  e. GXT Cardiolite, 09/08/2005:  No reversible ischemia.  LV function not performed secondary to atrial fibrillation.   f. Status post successful external cardioversion on 10/01/2008, Tessa Downey MD.  g. EKG on 10/21/2008:  Recurrence of atrial fibrillation with rate of 109.  h. Recurrent atrial fibrillation by EKG, 11/03/2008, asymptomatic.  i. Status post [employer] pacemaker implantation and AV node ablation.   j. Echocardiogram 02/15/2010:  Moderate MR, moderate TR.  RVSP 50.  EF greater than 55%, left atrial enlargement at 4.3 cm.           Type 2 diabetes mellitus treated without insulin (CMS/[facility]) (Chronic)    Normocytic anemia    Peripheral arterial disease (CMS/HCC)    Herpes zoster without complication    Thrombocytopenia (CMS/HCC)             Brief Hospital Course to date:  Lynne Sanchez is a 83 y.o. female with history of DMII, PVD, Afib/SSS/PPM, CKDIII, Anemia and osteomyelitis presented for treatment of foot wound but hospitalization complicated by acute stroke and shingles.      Assessment & Plan:    Osteomyelitis  - s/p left great toe amputation per Dr Carrington 7/5.  Daily dressing changes.   - ID following.  Plan was to stop abx at discharge per Dr. Belcher.    Shingles with ongoing pain  - s/p valtrex x 7 d  - trial gabapentin 300mg daily for now, titrate if patient tolerates.      Acute CVA after admission  - s/p tPA.  No obvious deficit.  C/o only generalized weakness.    - eliquis when O  - continue ASA, statin     Afib  - previously no ASA/plavix.  Consider eliquis  when platelets recover and Hgb stable     Anemia  - chronic anemia dating back to at least 2015.  + occult blood but on oral iron.  Prior EGD 2017 showed only Barretts esophagus with gastritis.  Will place back on PPI.  -H/H contiunues trend down.  Now <7 and given symptoms of dizziness, will give 2 units today and follow.  No convincing evidence of acute blood loss    Thombocytopenia  - history of borderline low in past, but now down further to 51 today.  Doubt HIT.  Could be related to infection and/or abx.  Would be in favor of stopping abx to remove that variable, will d/w ID today.  If continues to drop, may need to have heme eval.  Could have marrow process considering chronic anemia.     DMII (A1c 8.5)  - continue current insulin, acceptable glc     Sss/PPM     PAD     CKDIII  --creatinine stable at 1.13  --monitor labs     Disposition: Patient is interested in acute rehab at this time of discharge, will notify CM.    DVT Prophylaxis:  Mechanical for now    CODE STATUS:   Code Status and Medical Interventions:   Ordered at: 06/28/18 1008     Level Of Support Discussed With:    Patient     Code Status:    CPR     Medical Interventions (Level of Support Prior to Arrest):    Full       Electronically signed by Randy Newell MD, 07/09/18, 8:54 AM.

## 2018-07-09 NOTE — DISCHARGE PLACEMENT REQUEST
"Mel Sanchez (83 y.o. Female)     Looking for short term rehab.  Kat Rosenbaum RN  [phone]        Date of Birth Social Security Number Address Home Phone MRN    1934  [address] [place] [address] [phone] [id number]    Protestant Marital Status          Sikhism        Admission Date Admission Type Admitting Provider Attending Provider Department, Room/Bed    6/27/18 Elective Randy Newell MD Dossett, Lee M, MD [facility] 3H, S383/1    Discharge Date Discharge Disposition Discharge Destination                       Attending Provider:  Randy Newell MD    Allergies:  Phenergan [Promethazine Hcl]    Isolation:  None   Infection:  None   Code Status:  CPR    Ht:  165.1 cm (65\")   Wt:  66.7 kg (147 lb)    Admission Cmt:  None   Principal Problem:  Osteomyelitis of left foot (CMS/HCC) [M86.9]                 Active Insurance as of 6/27/2018     Primary Coverage     Payor Plan Insurance Group Employer/Plan Group    HUMANA MEDICARE REPLACEMENT HUMANA MEDICARE REPL [id number]     Payor Plan Address Payor Plan Phone Number Effective From Effective To    [address] [address] [phone] 1/1/2014     [facility] [id number]       Subscriber Name Subscriber Birth Date Member ID       MEL SANCHEZ 1934 [id number]                 Emergency Contacts      (Rel.) Home Phone Work Phone Mobile Phone    Marco A Sanchez (Son) [phone] -- --    Brennan Mendenhall (Spouse) [phone] -- [phone]            Emergency Contact Information     Name Relation Home Work Mobile    Marco A Sanchez Son [phone]      Brennan Mendenhall Spouse [phone]          Insurance Information                HUMANA MEDICARE REPLACEMENT/HUMANA MEDICARE REPL Phone: [phone]    Subscriber: Mel Sanchez Subscriber#: [id number]    Group#: [id number] Precert#:              History & Physical      Jung Zeng MD at 6/27/2018  5:40 PM              [facility] " Hospital Medicine Services  HISTORY AND PHYSICAL    Patient Name: Lynne Sanchez  : 1934  MRN: [id number]  Primary Care Physician: Demond Pablo MD - Internal Medicine - [facility] - [place], [place]    Subjective   Subjective     Chief Complaint:  Transfer from PCP office for concern about recurrent osteomyelitis    HPI:  Lynne Sanchez is a 83 y.o. female with history of peripheral vascular disease and possible poorly controlled DM and Osteomyelitis in the fall of last year who was sent in from PCPs office due to concerns about worsening foot wound on left toe.  It sounds like a revascularization procedure was done by Dr. Pelaez to improve blood flow to the left foot.      She says she has some pain in the foot at night and was sent in for concerns about recurrent osteomyelitis.      She has a pacemaker that was checked by Dr. Leon in 2018.    Review of Systems     Gen- No fevers, chills  CV- No chest pain, palpitations  Resp- No cough, dyspnea  GI- No N/V/D, abd pain    Otherwise 10-system ROS reviewed and is negative except as mentioned in the HPI.    Personal History     Past Medical History:   Diagnosis Date   • Anemia    • CHF (congestive heart failure)    • Chronic atrial fibrillation    • Diabetes mellitus, type 2    • Glaucoma    • History of congestive heart failure    • History of transfusion    • Hyperlipidemia    • Hypertension    • Kidney disease     patient not aware of what exact diagnosis is    Peripheral Vascular Disease    Past Surgical History:   Procedure Laterality Date   • APPENDECTOMY     • BONE MARROW ASPIRATION     • CARDIAC ABLATION     • CARDIAC CATHETERIZATION N/A 10/10/2017    Procedure: Peripheral angiography;  Surgeon: Kan Pelaez MD;  Location: [facility] CATH INVASIVE LOCATION;  Service:    • CARDIAC CATHETERIZATION N/A 10/10/2017    Procedure: Angioplasty-peripheral;  Surgeon: Kan Pelaez MD;  Location: [facility] CATH INVASIVE LOCATION;   Service:    • CARDIAC CATHETERIZATION N/A 10/10/2017    Procedure: Atherectomy-peripheral;  Surgeon: Kan Pelaez MD;  Location: [facility] CATH INVASIVE LOCATION;  Service:    • CARDIAC ELECTROPHYSIOLOGY PROCEDURE N/A 5/3/2018    Procedure: generator change;  Surgeon: Zan Poole MD;  Location:  [facility] CATH INVASIVE LOCATION;  Service: Cardiovascular   • CARDIOVERSION     • COLONOSCOPY     • ENDOSCOPY N/A 10/9/2017    Procedure: ESOPHAGOGASTRODUODENOSCOPY WITH COLD FORCEP BIOPSY;  Surgeon: Clifton Hyatt MD;  Location: [facility] ENDOSCOPY;  Service:    • EYE SURGERY Bilateral    • INTERVENTIONAL RADIOLOGY PROCEDURE N/A 10/10/2017    Procedure: Abdominal Aortagram with Runoff;  Surgeon: Kan Pelaez MD;  Location: [facility] CATH INVASIVE LOCATION;  Service:    • PACEMAKER IMPLANTATION         Family History: family history includes No Known Problems in her father and mother.   Father - colostomy  Mother - Dementia    Social History:  reports that she has never smoked. She has never used smokeless tobacco. She reports that she does not drink alcohol or use drugs.  Social History     Social History Narrative   • No narrative on file   denies smoking history  Denies drug abuse history  Denies alcohol abuse history    3 kids  12th grade level of education with some college      Medications:  Prescriptions Prior to Admission   Medication Sig Dispense Refill Last Dose   • aspirin 81 MG EC tablet Take 1 tablet by mouth Daily. 30 tablet 0 6/26/2018 at Unknown time   • atorvastatin (LIPITOR) 40 MG tablet Take 1 tablet by mouth Every Night. 30 tablet 0 6/26/2018 at Unknown time   • Biotin 5000 MCG tablet Take 1 tablet by mouth Daily.   6/26/2018 at Unknown time   • cholecalciferol (VITAMIN D3) 1000 units tablet Take 1,000 Units by mouth Daily.   6/26/2018 at Unknown time   • clopidogrel (PLAVIX) 75 MG tablet Take 1 tablet by mouth Daily. 30 tablet 0 6/26/2018 at Unknown time   • ferrous sulfate 325 (65 FE)  MG tablet Take 325 mg by mouth Daily With Breakfast.   6/26/2018 at Unknown time   • furosemide (LASIX) 20 MG tablet Take 20 mg by mouth Daily.   6/26/2018 at Unknown time   • glimepiride (AMARYL) 4 MG tablet 2 (Two) Times a Day.   6/26/2018 at Unknown time   • Lactobacillus (PROBIOTIC ACIDOPHILUS PO) Take  by mouth.   6/26/2018 at Unknown time   • latanoprost (XALATAN) 0.005 % ophthalmic solution Administer 1 drop to both eyes Daily.   6/26/2018 at Unknown time   • metoprolol tartrate (LOPRESSOR) 100 MG tablet Take 1 tablet by mouth 2 (Two) Times a Day. 180 tablet 3 6/26/2018 at Unknown time   • Multiple Vitamins-Minerals (MULTIVITAMIN GUMMIES ADULT) chewable tablet Chew 1 tablet Daily.   6/26/2018 at Unknown time   • Omega-3 Fatty Acids (FISH OIL) 1200 MG capsule capsule Take 1,200 mg by mouth Daily.   6/26/2018 at Unknown time   • potassium gluconate 595 (99 K) MG tablet tablet Take 99 mg by mouth Daily.   6/26/2018 at Unknown time       Allergies   Allergen Reactions   • Phenergan [Promethazine Hcl] Confusion       Objective   Objective     Vital Signs:   Temp:  [98.5 °F (36.9 °C)] 98.5 °F (36.9 °C)  Heart Rate:  [69] 69  Resp:  [20] 20  BP: (139-145)/(59-61) 139/59      Physical Exam     Constitutional: No acute distress, awake, alert  Eyes: PERRLA, sclerae anicteric, no conjunctival injection  HENT: NCAT, mucous membranes moist  Neck: Supple, no thyromegaly, no lymphadenopathy, trachea midline  Respiratory: Clear to auscultation bilaterally, nonlabored respirations   Cardiovascular: RRR, no murmurs, rubs, or gallops, palpable pedal pulses bilaterally  Gastrointestinal: Positive bowel sounds, soft, nontender, nondistended  Musculoskeletal: No bilateral ankle edema. Left great toe with deep appearing neuropathic ulcer without drainage or induration/fluctuance  Psychiatric: Appropriate affect, cooperative  Neurologic: Oriented x 3, strength symmetric in all extremities, Cranial Nerves grossly intact to  confrontation, speech clear  Skin: No rashes      Results Reviewed:  I have personally reviewed current lab, radiology, and data and agree.      Results from last 7 days  Lab Units 06/27/18  1653   WBC 10*3/mm3 5.12   HEMOGLOBIN g/dL 8.4*   HEMATOCRIT % 27.4*   PLATELETS 10*3/mm3 143*   INR  1.14*       Results from last 7 days  Lab Units 06/27/18  1653   SODIUM mmol/L 139   POTASSIUM mmol/L 4.1   CHLORIDE mmol/L 103   CO2 mmol/L 27.0   BUN mg/dL 19   CREATININE mg/dL 1.38*   GLUCOSE mg/dL 203*   CALCIUM mg/dL 9.3   ALT (SGPT) U/L 16   AST (SGOT) U/L 21     Estimated Creatinine Clearance: 32.5 mL/min (A) (by C-G formula based on SCr of 1.38 mg/dL (H)).  Brief Urine Lab Results     None        No results found for: BNP  Imaging Results (last 24 hours)     ** No results found for the last 24 hours. **        Results for orders placed during the hospital encounter of 10/03/17   Adult Transthoracic Echo Complete W/ Cont if Necessary Per Protocol    Narrative Technically adequate study  1) Mild LVH with low normal LV systolic function ( EF 50 to 55%)  2) Moderate to severe left atrial enlargement with normal LVedp   3) Thickened mitral leaflets with mild MR   4) Aortic sclerosis without stenosis - trace AI seen   5) Moderate TR with a PAsp of 60 mm of hg   6) Moderate to severe RV dilation with borderline RV Function   7) Dilated IVC   8) Global hypokinesis of the LV more so along the septal wall        Assessment/Plan   Assessment / Plan     Hospital Problem List     Foot infection        83 year old female with history of diabetic foot wound and osteomyelitis in Oct 2017 treated and with revascularization with Dr. Pelaez.  Sent in from PCP office for higher level of care.    She has a pacemaker.    Assessment & Plan:    History of Osteomyelitis of Left Foot  - XR left foot - XR probably will not be sensitive enough but will start with this  - MRI left foot /toe  Diabetic Foot Wound  - concern for osteomyelitis since  this was treated in Oct   - patient has a pacemaker making MRI imaging challenging  - empiric abx  - ID evaluation  Peripheral Vascular Disease  - previously treated left leg by Dr. Pelaez with endovascular repair (angioplasty or stenting - unclear)  SSS/Chronic Atrial Fibrillation  - s/p AV node ablation / pacemaker implantation  Chronic Kidney Disease  - probable stage III (3) ckd  DM  - Insulin SS  - hemoglobin A1C  Pacemaker  - had a pacemaker for perhaps 10 yrs  - checked recently by Dr. Leon  - may need cardiology assistance to do MRI imaging of foot due to concerns for another 6 months of IV abx    DVT prophylaxis: heparin    CODE STATUS:    There are no questions and answers to display.       Admission Status:  I believe this patient meets INPATIENT status due to the need for care which can only be reasonably provided in an hospital setting such as aggressive/expedited ancillary services and/or consultation services, the necessity for IV medications, close physician monitoring and/or the possible need for procedures.  In such, I feel patient’s risk for adverse outcomes and need for care warrant INPATIENT evaluation and predict the patient’s care encounter to likely last beyond 2 midnights.    Electronically signed by Jung Zeng MD, 06/27/18, 5:40 PM.        Electronically signed by Jung Zeng MD at 6/28/2018  6:16 PM       Hospital Medications (active)       Dose Frequency Start End    acetaminophen (TYLENOL) tablet 650 mg 650 mg Every 6 Hours PRN 6/28/2018     Sig - Route: Take 2 tablets by mouth Every 6 (Six) Hours As Needed for Mild Pain  or Fever. - Oral    aspirin EC tablet 81 mg 81 mg Daily 7/8/2018     Sig - Route: Take 1 tablet by mouth Daily. - Oral    atorvastatin (LIPITOR) tablet 80 mg 80 mg Nightly 6/29/2018     Sig - Route: Take 2 tablets by mouth Every Night. - Oral    cefepime (MAXIPIME) 1 g/100 mL 0.9% NS IVPB (mbp) 1 g Every 12 Hours 7/1/2018 7/15/2018    Sig - Route: Infuse 100 mL  into a venous catheter Every 12 (Twelve) Hours. - Intravenous    dextrose (D50W) solution 25 g 25 g Every 15 Minutes PRN 6/27/2018     Sig - Route: Infuse 50 mL into a venous catheter Every 15 (Fifteen) Minutes As Needed for Low Blood Sugar (Blood Sugar Less Than 70). - Intravenous    dextrose (GLUTOSE) oral gel 15 g 15 g Every 15 Minutes PRN 6/27/2018     Sig - Route: Take 15 g by mouth Every 15 (Fifteen) Minutes As Needed for Low Blood Sugar (Blood sugar less than 70). - Oral    ferrous sulfate tablet 325 mg 325 mg 2 Times Daily With Meals 7/8/2018     Sig - Route: Take 1 tablet by mouth 2 (Two) Times a Day With Meals. - Oral    furosemide (LASIX) tablet 20 mg 20 mg Daily 6/29/2018     Sig - Route: Take 1 tablet by mouth Daily. - Oral    gabapentin (NEURONTIN) capsule 300 mg 300 mg Daily 7/7/2018     Sig - Route: Take 1 capsule by mouth Daily. - Oral    glucagon (human recombinant) (GLUCAGEN DIAGNOSTIC) injection 1 mg 1 mg As Needed 6/27/2018     Sig - Route: Inject 1 mg under the skin As Needed (Blood Glucose Less Than 70). - Subcutaneous    insulin detemir (LEVEMIR) injection 5 Units 5 Units Nightly 7/8/2018     Sig - Route: Inject 5 Units under the skin Every Night. - Subcutaneous    insulin lispro (humaLOG) injection 0-7 Units 0-7 Units 4 Times Daily With Meals & Nightly 6/30/2018     Sig - Route: Inject 0-7 Units under the skin 4 (Four) Times a Day With Meals & at Bedtime. - Subcutaneous    lactobacillus acidophilus (RISAQUAD) capsule 1 capsule 1 capsule Daily 6/28/2018     Sig - Route: Take 1 capsule by mouth Daily. - Oral    latanoprost (XALATAN) 0.005 % ophthalmic solution 1 drop 1 drop Nightly 6/28/2018     Sig - Route: Administer 1 drop to both eyes Every Night. - Both Eyes    melatonin sublingual tablet 5 mg 5 mg Nightly PRN 7/8/2018     Sig - Route: Place 1 tablet under the tongue At Night As Needed for Sleep. - Sublingual    metoprolol tartrate (LOPRESSOR) tablet 100 mg 100 mg Every 12 Hours Scheduled  2018     Sig - Route: Take 1 tablet by mouth Every 12 (Twelve) Hours. - Oral    metroNIDAZOLE (FLAGYL) IVPB 500 mg 500 mg Every 8 Hours 2018 7/15/2018    Sig - Route: Infuse 100 mL into a venous catheter Every 8 (Eight) Hours. - Intravenous    nystatin (MYCOSTATIN) powder  Every 12 Hours Scheduled 2018     Sig - Route: Apply  topically Every 12 (Twelve) Hours. - Topical    ondansetron (ZOFRAN) injection 4 mg 4 mg Every 6 Hours PRN 2018     Sig - Route: Infuse 2 mL into a venous catheter Every 6 (Six) Hours As Needed for Nausea or Vomiting. - Intravenous    pantoprazole (PROTONIX) EC tablet 40 mg 40 mg Every Early Morning 2018     Sig - Route: Take 1 tablet by mouth Every Morning. - Oral    potassium chloride (KLOR-CON) packet 40 mEq 40 mEq As Needed 2018     Sig - Route: Take 40 mEq by mouth As Needed (potassium replacement, see admin instructions). - Oral    Cosign for Ordering: Accepted by Angelica Estrella DO on 7/3/2018  7:09 AM    potassium chloride (MICRO-K) CR capsule 40 mEq 40 mEq As Needed 2018     Sig - Route: Take 4 capsules by mouth As Needed (potassium replacement.  see admin instructions). - Oral    Cosign for Ordering: Accepted by Angelica Estrella DO on 7/3/2018  7:09 AM    sodium chloride 0.9 % flush 1-10 mL 1-10 mL As Needed 2018     Sig - Route: Infuse 1-10 mL into a venous catheter As Needed for Line Care. - Intravenous             Physician Progress Notes (last 24 hours) (Notes from 2018 11:03 AM through 2018 11:03 AM)      Randy Newell MD at 2018  8:53 AM              [facility] Medicine Services  PROGRESS NOTE    Patient Name: Lynne Sanchez  : 1934  MRN: [id number]    Date of Admission: 2018  Length of Stay: 12  Primary Care Physician: ANN Gonsalez    Subjective   Subjective     CC:  Follow up left toe osteo, recent shingles    HPI:  No family present.   No issues overnight.  Worked with PT  yesterday and was noted to be dizzy.  Diarrhea has improved.    Review of Systems  Gen- No fevers, chills. + gen weak   CV- No chest pain, palpitations  Resp- No cough, dyspnea  GI- No N/V/D, abd pain    Otherwise ROS is negative except as mentioned in the HPI.    Objective   Objective     Vital Signs:   Temp:  [98 °F (36.7 °C)-98.2 °F (36.8 °C)] 98.1 °F (36.7 °C)  Heart Rate:  [69-73] 70  Resp:  [12-17] 16  BP: (117-139)/(49-65) 128/49  Total (NIH Stroke Scale): 0     Physical Exam:  Constitutional: No acute distress, awake and alert, age appropriate  HENT: NCAT, mucous membranes moist  Respiratory: Clear to auscultation bilaterally A/P, nonlabored respirations   Cardiovascular: RRR, no murmurs, rubs, or gallops  Gastrointestinal: Positive bowel sounds, soft, nontender, nondistended  Musculoskeletal: Left foot heavily bandaged, toes exposed and warm    Psychiatric: Appropriate affect, cooperative, calm and pleasant  Neurologic: Oriented x 3, no focal deficits  Skin: Skin warm and dry.  Right posterior leg healing shingle sites with scabbing, no vesicles or drainage.       Results Reviewed:  I have personally reviewed current lab, radiology, and data and agree.      Results from last 7 days  Lab Units 07/09/18  0819 07/08/18  0423 07/06/18  1501   WBC 10*3/mm3 6.51 7.06 6.69   HEMOGLOBIN g/dL 6.7* 7.1* 7.7*   HEMATOCRIT % 21.4* 22.5* 24.3*   PLATELETS 10*3/mm3 51* 75* 135*       Results from last 7 days  Lab Units 07/08/18  0423 07/07/18  0430 07/04/18  0451   SODIUM mmol/L 139 137 136   POTASSIUM mmol/L 3.7 3.8 4.1   CHLORIDE mmol/L 110* 108 109   CO2 mmol/L 24.0 22.0 22.0   BUN mg/dL 22 19 18   CREATININE mg/dL 1.05 1.13 1.13   GLUCOSE mg/dL 74 85 123*   CALCIUM mg/dL 7.8* 8.2* 8.0*     Estimated Creatinine Clearance: 42.7 mL/min (by C-G formula based on SCr of 1.05 mg/dL).  No results found for: BNP    Microbiology Results Abnormal     Procedure Component Value - Date/Time    Tissue / Bone Culture - Tissue, Toe,  Left [[id number]]  (Abnormal)  (Susceptibility) Collected:  07/05/18 1023    Lab Status:  Preliminary result Specimen:  Tissue from Toe, Left Updated:  07/08/18 1144     Tissue Culture Scant growth (1+) Enterobacter cloacae (A)      Light growth (2+) Staphylococcus, coagulase negative (A)     Gram Stain Result Occasional WBCs seen      No organisms seen    Susceptibility      Enterobacter cloacae     PRIYA (Preliminary)     Aztreonam <=8 ug/ml Susceptible     Cefepime <=8 ug/ml Susceptible     Cefotaxime <=2 ug/ml Susceptible     Ceftriaxone <=8 ug/ml Susceptible     Ertapenem <=1 ug/ml Susceptible     Gentamicin <=4 ug/ml Susceptible     Levofloxacin <=2 ug/ml Susceptible     Meropenem <=1 ug/ml Susceptible     Piperacillin + Tazobactam <=16 ug/ml Susceptible     Tetracycline <=4 ug/ml Susceptible     Tobramycin <=4 ug/ml Susceptible     Trimethoprim + Sulfamethoxazole <=2/38 ug/ml Susceptible                    AFB Culture - Tissue, Toe, Left [[id number]] Collected:  07/05/18 1023    Lab Status:  Preliminary result Specimen:  Tissue from Toe, Left Updated:  07/06/18 1443     AFB Stain No acid fast bacilli seen on concentrated smear    Anaerobic Culture - Tissue, Toe, Left [[id number]]  (Normal) Collected:  07/05/18 1023    Lab Status:  Preliminary result Specimen:  Tissue from Toe, Left Updated:  07/06/18 1127     Culture No anaerobes isolated    Blood Culture - Blood, [[id number]]  (Normal) Collected:  06/28/18 0320    Lab Status:  Final result Specimen:  Blood from Arm, Right Updated:  07/03/18 0400     Blood Culture No growth at 5 days    Blood Culture - Blood, [[id number]]  (Normal) Collected:  06/28/18 0330    Lab Status:  Final result Specimen:  Blood from Arm, Left Updated:  07/03/18 0400     Blood Culture No growth at 5 days    Urine Culture - Urine, [[id number]]  (Abnormal)  (Susceptibility) Collected:  06/28/18 2300    Lab Status:  Final result Specimen:  Urine from Urine, Clean Catch Updated:  07/01/18 1120      Urine Culture 60,000-70,000 CFU/mL Klebsiella aerogenes (A)    Susceptibility      Klebsiella aerogenes     PRIYA     Aztreonam 16 ug/ml Intermediate     Cefepime <=8 ug/ml Susceptible     Cefotaxime 16 ug/ml Intermediate     Ceftriaxone 32 ug/ml Intermediate     Ertapenem <=1 ug/ml Susceptible     Gentamicin <=4 ug/ml Susceptible     Levofloxacin <=2 ug/ml Susceptible     Meropenem <=1 ug/ml Susceptible     Nitrofurantoin <=32 ug/ml Susceptible     Piperacillin + Tazobactam 64 ug/ml Intermediate     Tetracycline <=4 ug/ml Susceptible     Tobramycin <=4 ug/ml Susceptible     Trimethoprim + Sulfamethoxazole <=2/38 ug/ml Susceptible                          Imaging Results (last 24 hours)     ** No results found for the last 24 hours. **        Results for orders placed during the hospital encounter of 06/27/18   Adult Transthoracic Echo Complete W/ Cont if Necessary Per Protocol (With Agitated Saline)    Narrative · Left ventricular systolic function is normal. Estimated EF = 60%.  · Mild aortic valve regurgitation is present.  · Moderate mitral valve regurgitation is present  · Moderate to severe tricuspid valve regurgitation is present.  · Calculated right ventricular systolic pressure from tricuspid   regurgitation is 90 mmHg. Severe pulmonary hypertension is present  · Calculated right ventricular systolic pressure from tricuspid   regurgitation is 90 mmHg.          I have reviewed the medications.    Assessment/Plan   Assessment / Plan     Hospital Problem List     * (Principal)Osteomyelitis of left foot (CMS/HCC)    Cerebrovascular accident (CVA) due to thrombosis of left anterior cerebral artery (CMS/HCC)    Chronic atrial fibrillation (CMS/HCC)    Overview Signed 12/22/2016  1:51 PM by Pan Saunders     1. Chronic atrial fibrillation, status post [employer] pacemaker implantation and AV node ablation:  a. Diagnosed June 2005.  b. Status post ECV restoring normal sinus rhythm, June 2005.  c. Rythmol initiated  05/01/2006.  d. Previously digoxin toxicity.  e. GXT Cardiolite, 09/08/2005:  No reversible ischemia.  LV function not performed secondary to atrial fibrillation.   f. Status post successful external cardioversion on 10/01/2008, Tessa Downey MD.  g. EKG on 10/21/2008:  Recurrence of atrial fibrillation with rate of 109.  h. Recurrent atrial fibrillation by EKG, 11/03/2008, asymptomatic.  i. Status post [employer] pacemaker implantation and AV node ablation.   j. Echocardiogram 02/15/2010:  Moderate MR, moderate TR.  RVSP 50.  EF greater than 55%, left atrial enlargement at 4.3 cm.           Type 2 diabetes mellitus treated without insulin (CMS/HCC) (Chronic)    Normocytic anemia    Peripheral arterial disease (CMS/HCC)    Herpes zoster without complication    Thrombocytopenia (CMS/HCC)             Brief Hospital Course to date:  Lynne Sanchez is a 83 y.o. female with history of DMII, PVD, Afib/SSS/PPM, CKDIII, Anemia and osteomyelitis presented for treatment of foot wound but hospitalization complicated by acute stroke and shingles.      Assessment & Plan:    Osteomyelitis  - s/p left great toe amputation per Dr Carrington 7/5.  Daily dressing changes.   - ID following.   Plan was to stop abx at discharge per Dr. Belcher.    Shingles with ongoing pain  - s/p valtrex x 7 d  - trial gabapentin 300mg daily for now, titrate if patient tolerates.      Acute CVA after admission  - s/p tPA.  No obvious deficit.  C/o only generalized weakness.    - eliquis when O  - continue ASA, statin     Afib  - previously no ASA/plavix.   Consider eliquis when platelets recover and Hgb stable     Anemia  - chronic anemia dating back to at least 2015.  + occult blood but on oral iron.  Prior EGD 2017 showed only Barretts esophagus with gastritis.  Will place back on PPI.  -H/H contiunues trend down.  Now <7 and given symptoms of dizziness, will give 2 units today and follow.  No convincing evidence of acute blood  loss    Thombocytopenia  - history of borderline low in past, but now down further to 51 today.  Doubt HIT.  Could be related to infection and/or abx.  Would be in favor of stopping abx to remove that variable, will d/w ID today.  If continues to drop, may need to have heme eval.  Could have marrow process considering chronic anemia.     DMII (A1c 8.5)  - continue current insulin, acceptable glc     Sss/PPM     PAD     CKDIII  --creatinine stable at 1.13  --monitor labs     Disposition: Patient is interested in acute rehab at this time of discharge, will notify CM.    DVT Prophylaxis:  Mechanical for now    CODE STATUS:   Code Status and Medical Interventions:   Ordered at: 06/28/18 1008     Level Of Support Discussed With:    Patient     Code Status:    CPR     Medical Interventions (Level of Support Prior to Arrest):    Full       Electronically signed by Randy Newell MD, 07/09/18, 8:54 AM.        Electronically signed by Randy Newell MD at 7/9/2018  9:00 AM     Prakash Carrington MD at 7/8/2018 11:55 AM          Cardiothoracic Surgery Progress Note      POD #: 3-left great toe amputation and primary closure     LOS: 11 days      Subjective: Up in the recliner chair states she feels good    Chief Complaint: Minimal toe amputation site  pain    Objective:  Vital Signs  Temp:  [98.1 °F (36.7 °C)-98.8 °F (37.1 °C)] 98.2 °F (36.8 °C)  Heart Rate:  [67-79] 69  Resp:  [14-18] 18  BP: (130-159)/(62-85) 130/62    Physical Exam:   General Appearance:    Lungs:   Heart:   Skin:   Incision incision dressing changed sutures intact dorsal and plantar flaps are viable:     Results:    Results from last 7 days  Lab Units 07/08/18  0423   WBC 10*3/mm3 7.06   HEMOGLOBIN g/dL 7.1*   HEMATOCRIT % 22.5*   PLATELETS 10*3/mm3 75*       Results from last 7 days  Lab Units 07/08/18  0423   SODIUM mmol/L 139   POTASSIUM mmol/L 3.7   CHLORIDE mmol/L 110*   CO2 mmol/L 24.0   BUN mg/dL 22   CREATININE mg/dL 1.05   GLUCOSE mg/dL 74   CALCIUM  mg/dL 7.8*         Assessment:#1.  Postop day 3 left great toe amputation through the proximal phalangeal bone with primary closure  #2.  Recent stroke  #3.  Diabetes mellitus and peripheral neuropathy  #4 extensive peripheral vascular disease status post endovascular stenting and angioplasty of left anterior tibial artery [facility]      Plan: Continue daily dressing changes.  Ambulation with toe unloading shoe.  I would like to see her ambulate with a knee scooter also but I do not think she will be able to manage with her overall debilitated situation.      Prakash Carrington MD - 18 - 11:55 AM          Electronically signed by Prakash Carrington MD at 2018 11:57 AM          Physical Therapy Notes (last 24 hours) (Notes from 2018 11:03 AM through 2018 11:03 AM)      Vilma Edmonds, PT at 2018 11:51 AM  Version 1 of 1         Problem: Patient Care Overview  Goal: Plan of Care Review  Outcome: Ongoing (interventions implemented as appropriate)   18 1030   Coping/Psychosocial   Plan of Care Reviewed With patient   Plan of Care Review   Progress improving   OTHER   Outcome Summary Patient able to take steps from bed to chair today, limited by persistent lightheadedness. CGA for all OOB mobility and t/f supine to sit modified independently. Recommend d/c home with spouse and HHPT if patient can remain on first floor of home and progress to ambulating distances similar to ambulating from room to room at home. If not, will need rehab placement. Will continue to progress as able.            Electronically signed by Vilma Edmonds PT at 2018 11:51 AM     Vilma Edmonds PT at 2018 11:52 AM  Version 1 of 1         Acute Care - Physical Therapy Re-Evaluation  [facility]     Patient Name: Lynne Sanchez  : 1934  MRN: [id number]  Today's Date: 2018   Onset of Illness/Injury or Date of Surgery: 18  Date of  Referral to PT: 07/07/18  Referring Physician: MD Zeb      Admit Date: 6/27/2018    Visit Dx:     ICD-10-CM ICD-9-CM   1. Other chronic osteomyelitis of left foot (CMS/formerly [facility]) M86.672 730.17   2. Cerebrovascular accident (CVA), unspecified mechanism (CMS/formerly [facility]) I63.9 434.91   3. Impaired mobility and ADLs Z74.09 799.89   4. Impaired functional mobility, balance, gait, and endurance Z74.09 V49.89   5. Aphasia R47.01 784.3   6. 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 (CMS/formerly [facility])   • Melena   • Chronic gastritis   • Peripheral arterial disease (CMS/formerly [facility])   • Cerebrovascular accident (CVA) due to thrombosis of left anterior cerebral artery (CMS/formerly [facility])   • Herpes zoster without complication   • Thrombocytopenia (CMS/formerly [facility])     Past Medical History:   Diagnosis Date   • Anemia    • CHF (congestive heart failure) (CMS/formerly [facility])    • Chronic atrial fibrillation (CMS/formerly [facility])    • Diabetes mellitus, type 2 (CMS/formerly [facility])    • Glaucoma    • History of transfusion    • Hyperlipidemia    • Hypertension    • Kidney disease     patient not aware of what exact diagnosis is      Past Surgical History:   Procedure Laterality Date   • AMPUTATION DIGIT Left 7/5/2018    Procedure: Left Great toe amputation;  Surgeon: Prakash Carrington MD;  Location: [facility] OR;  Service: Vascular   • APPENDECTOMY     • BONE MARROW ASPIRATION     • CARDIAC ABLATION     • CARDIAC CATHETERIZATION N/A 10/10/2017    Procedure: Peripheral angiography;  Surgeon: Kan Pelaez MD;  Location: [facility] CATH INVASIVE LOCATION;  Service:    • CARDIAC CATHETERIZATION N/A 10/10/2017    Procedure: Angioplasty-peripheral;  Surgeon:  Kan Pelaez MD;  Location: [facility] CATH INVASIVE LOCATION;  Service:    • CARDIAC CATHETERIZATION N/A 10/10/2017    Procedure: Atherectomy-peripheral;  Surgeon: Kan Pelaez MD;  Location:  [facility] CATH INVASIVE LOCATION;  Service:    • CARDIAC ELECTROPHYSIOLOGY PROCEDURE N/A 5/3/2018    Procedure: generator change;  Surgeon: Zan Poole MD;  Location:  [facility] CATH INVASIVE LOCATION;  Service: Cardiovascular   • CARDIOVERSION     • COLONOSCOPY     • ENDOSCOPY N/A 10/9/2017    Procedure: ESOPHAGOGASTRODUODENOSCOPY WITH COLD FORCEP BIOPSY;  Surgeon: Clifton Hyatt MD;  Location: [facility] ENDOSCOPY;  Service:    • EYE SURGERY Bilateral    • INTERVENTIONAL RADIOLOGY PROCEDURE N/A 10/10/2017    Procedure: Abdominal Aortagram with Runoff;  Surgeon: Kan Pelaez MD;  Location: [facility] CATH INVASIVE LOCATION;  Service:    • PACEMAKER IMPLANTATION          PT ASSESSMENT (last 12 hours)      Physical Therapy Evaluation     Row Name 07/08/18 1030          PT Evaluation Time/Intention    Subjective Information complains of;dizziness   lightheadedness while supine in bed, persisted with sitting   -LR     Document Type re-evaluation  -LR     Mode of Treatment physical therapy;individual therapy  -LR     Patient Effort good  -LR     Symptoms Noted During/After Treatment dizziness;other (see comments)  -LR     Comment Lightheadedness increased with sitting up.   -LR     Row Name 07/08/18 1030          General Information    Patient Profile Reviewed? yes  -LR     Onset of Illness/Injury or Date of Surgery 07/05/18  -LR     Referring Physician MD Zeb  -LR     Patient Observations alert;cooperative;agree to therapy  -LR     General Observations of Patient Patient supine in bed upon arrival. IV, pulse ox, tele, exit alarm, bandage to L great toe.   -LR     Prior Level of Function --   See initial eval  -LR     Equipment Currently Used at Home --   see initial eval  -LR     Pertinent History of Current  Functional Problem See initial eval for admission history. Patient now presents s/p amputation L great toe through distal half of proximal phalangeal bone with primary closure on 7/5/18 d/t osteomyelitis of distal phalanx of L great toe.    -LR     Existing Precautions/Restrictions fall;partial weight bearing;other (see comments)   weight bear through heel only  -LR     Equipment Issued to Patient other (see comments)   offloading shoe  -LR     Equipment Ordered for Patient --   none  -LR     Risks Reviewed patient:;LOB;nausea/vomiting;dizziness;increased discomfort;lines disloged  -LR     Benefits Reviewed patient:;improve function;increase independence;increase strength;increase balance;decrease pain;increase knowledge  -LR     Barriers to Rehab medically complex  -LR     Row Name 07/08/18 1030          Relationship/Environment    Primary Source of Support/Comfort spouse  -LR     Lives With spouse   reports  available to assist at all times upon d/c  -LR     Row Name 07/08/18 1030          Resource/Environmental Concerns    Current Living Arrangements home/apartment/condo  -LR     Resource/Environmental Concerns none  -LR     Row Name 07/08/18 1030          Home Main Entrance    Number of Stairs, Main Entrance one  -LR     Stair Railings, Main Entrance none  -LR     Stairs Comment, Main Entrance Patient reports one step through garage to enter home.   -LR     Row Name 07/08/18 1030          Cognitive Assessment/Intervention- PT/OT    Affect/Mental Status (Cognitive) WFL  -LR     Orientation Status (Cognition) oriented x 4  -LR     Follows Commands (Cognition) follows one step commands;over 90% accuracy;verbal cues/prompting required  -LR     Safety Deficit (Cognitive) safety precautions awareness;safety precautions follow-through/compliance  -LR     Row Name 07/08/18 1030          Safety Issues, Functional Mobility    Safety Issues Affecting Function (Mobility) safety precaution awareness;safety precautions  follow-through/compliance  -LR     Row Name 07/08/18 1030          Mobility Assessment/Treatment    Extremity Weight-bearing Status left lower extremity  -LR     Left Lower Extremity (Weight-bearing Status) partial weight-bearing (PWB);touch down weight-bearing (TDWB);other (see comments)   spoke with Dr. Carrington in room, as little weight as possible  -LR     Row Name 07/08/18 1030          Bed Mobility Assessment/Treatment    Bed Mobility Assessment/Treatment supine-sit;sit-supine  -LR     Supine-Sit [place] (Bed Mobility) verbal cues;supervision  -LR     Sit-Supine [place] (Bed Mobility) not tested   [facility] at end of re-eval  -LR     Bed Mobility, Safety Issues decreased use of legs for bridging/pushing  -LR     Assistive Device (Bed Mobility) head of bed elevated;bed rails  -LR     Comment (Bed Mobility) Donned offloading shoe to L foot while supine. No assist required with t/f. Patient c/o increased lightheadedness with sitting up. Did not improved with rest sitting EOB. Patient agreeable to stand and get in chair.   -LR     Row Name 07/08/18 1030          Transfer Assessment/Treatment    Transfer Assessment/Treatment sit-stand transfer;stand-sit transfer;bed-chair transfer  -LR     Comment (Transfers) Verbal cues for correct hand placement with t/f and bore as little weight as possible on L foot with t/f.   -LR     Bed-Chair [place] (Transfers) verbal cues;contact guard  -LR     Assistive Device (Bed-Chair Transfers) walker, front-wheeled  -LR     Sit-Stand [place] (Transfers) verbal cues;contact guard  -LR     Stand-Sit [place] (Transfers) verbal cues;contact guard  -LR     Row Name 07/08/18 1030          Sit-Stand Transfer    Assistive Device (Sit-Stand Transfers) walker, front-wheeled  -LR     Row Name 07/08/18 1030          Stand-Sit Transfer    Assistive Device (Stand-Sit Transfers) walker, front-wheeled  -LR     Row Name 07/08/18 1030          Gait/Stairs Assessment/Training    56022 -  Gait Training Minutes  2  -LR     [place] Level (Gait) verbal cues;contact guard  -LR     Assistive Device (Gait) walker, front-wheeled  -LR     Distance in Feet (Gait) 2  -LR     Pattern (Gait) step-to  -LR     Deviations/Abnormal Patterns (Gait) left sided deviations;antalgic;bilateral deviations;roby decreased;gait speed decreased;stride length decreased  -LR     Bilateral Gait Deviations forward flexed posture  -LR     Left Sided Gait Deviations weight shift ability decreased  -LR     Comment (Gait/Stairs) Patient able to take a few steps from bed to chair with cues for very little weight bearing through L foot, primarily through heel.   -LR     Row Name 07/08/18 1030          General ROM    RT Lower Ext Comment  -LR     LT Lower Ext Comment  -LR     Row Name 07/08/18 1030          Right Lower Ext    RT Lower Extremity Comments R LE AROM WFL  -LR     Row Name 07/08/18 1030          Left Lower Ext    LT Lower Extremity Comments L LE AROM WFL  -LR     Row Name 07/08/18 1030          General Assessment (Manual Muscle Testing)    General Manual Muscle Testing (MMT) Assessment lower extremity strength deficits identified  -LR     Row Name 07/08/18 1030          Lower Extremity (Manual Muscle Testing)    Comment, MMT: Lower Extremity B LEs functionally 4/5  -LR     Row Name 07/08/18 1030          Motor Assessment/Intervention    Additional Documentation Therapeutic Exercise (Group);Therapeutic Exercise Interventions (Group)  -LR     Row Name 07/08/18 1030          Therapeutic Exercise    52034 - PT Therapeutic Exercise Minutes 8  -LR     Row Name 07/08/18 1030          Therapeutic Exercise    Lower Extremity (Therapeutic Exercise) gluteal sets;heel slides, bilateral;LAQ (long arc quad), bilateral;marching while seated;quad sets, bilateral;SLR (straight leg raise), bilateral  -LR     Lower Extremity Range of Motion (Therapeutic Exercise) hip abduction/adduction, bilateral;ankle dorsiflexion/plantar flexion,  bilateral  -LR     Exercise Type (Therapeutic Exercise) AROM (active range of motion);isometric contraction, static;isotonic contraction, concentric  -LR     Position (Therapeutic Exercise) seated  -LR     Sets/Reps (Therapeutic Exercise) x10 reps each  -LR     Comment (Therapeutic Exercise) cues for technique  -LR     Row Name 07/08/18 1030          Pain Assessment    Additional Documentation Pain Scale: Numbers Pre/Post-Treatment (Group)  -LR     Row Name 07/08/18 1030          Pain Scale: Numbers Pre/Post-Treatment    Pain Scale: Numbers, Pretreatment 0/10 - no pain  -LR     Pain Scale: Numbers, Post-Treatment 0/10 - no pain  -LR     Row Name             Wound 06/27/18 1659 Left toe pressure injury;diabetic/neuropathic ulceration    Wound - Properties Group Date first assessed: 06/27/18  -KA Time first assessed: 1659  -KA Present On Admission : yes  -KA Side: Left  -KA Location: toe  -KA Type: pressure injury;diabetic/neuropathic ulceration  -KA Additional Comments: possible arterial ulcer; pt pt skin tear from tape removal  -CP    Row Name             Wound 06/28/18 1030 Right distal toe other (see comments)    Wound - Properties Group Date first assessed: 06/28/18  -CP Time first assessed: 1030  -CP Present On Admission : yes;picture taken  -CP Side: Right  -CP Orientation: distal  -CP Location: toe  -CP Type: other (see comments)  -CP Additional Comments: unknown origin; possible tape inury; right great toe  -CP    Row Name             Wound 06/28/18 1030 Right elbow abrasion    Wound - Properties Group Date first assessed: 06/28/18  -CP Time first assessed: 1030  -CP Present On Admission : yes;picture taken  -CP Side: Right  -CP Location: elbow  -CP Type: abrasion  -CP    Row Name             Wound 06/28/18 1030 Left lower arm skin tear    Wound - Properties Group Date first assessed: 06/28/18  -CP Time first assessed: 1030  -CP Present On Admission : yes  -CP Side: Left  -CP Orientation: lower  -CP Location:  arm  -CP Type: skin tear  -CP Additional Comments: forearm and elbow  -CP    Row Name             Wound 07/05/18 1044 Left leg incision    Wound - Properties Group Date first assessed: 07/05/18  -BM Time first assessed: 1044  -BM Side: Left  -BM Location: leg  -BM Type: incision  -BM    Row Name             Wound 07/05/18 1130 Left toe other (see comments)    Wound - Properties Group Date first assessed: 07/05/18  -KL Time first assessed: 1130  -KL Present On Admission : no  -KL Side: Left  -KL Location: toe  -KL Type: other (see comments)  -KL, amputation  Wound Outcome: Amputation  -KL    Row Name 07/08/18 1030          Coping    Observed Emotional State accepting;cooperative  -LR     Verbalized Emotional State acceptance  -LR     Row Name 07/08/18 1030          Plan of Care Review    Plan of Care Reviewed With patient  -LR     Row Name 07/08/18 1030          Physical Therapy Clinical Impression    Date of Referral to PT 07/07/18  -LR     PT Diagnosis (PT Clinical Impression) s/p amputation L great toe through distal half of proximal phalangeal bone with primary closure  -LR     Prognosis (PT Clinical Impression) good  -LR     Patient/Family Goals Statement (PT Clinical Impression) go home,   -LR     Criteria for Skilled Interventions Met (PT Clinical Impression) yes;treatment indicated  -LR     Rehab Potential (PT Clinical Summary) good, to achieve stated therapy goals  -LR     Care Plan Review (PT) evaluation/treatment results reviewed;care plan/treatment goals reviewed;risks/benefits reviewed;current/potential barriers reviewed;patient/other agree to care plan  -LR     Row Name 07/08/18 1030          Vital Signs    Pre Systolic BP Rehab 114  -LR     Pre Treatment Diastolic BP 56  -LR     Intra Systolic BP Rehab 101  -LR     Intra Treatment Diastolic BP 48  -LR     Pre Patient Position Supine  -LR     Intra Patient Position Sitting  -LR     Row Name 07/08/18 1030          Physical Therapy Goals    Bed Mobility  Goal Selection (PT) bed mobility, PT goal 1  -LR     Transfer Goal Selection (PT) transfer, PT goal 1  -LR     Gait Training Goal Selection (PT) gait training, PT goal 1  -LR     Stairs Goal Selection (PT) stairs, PT goal 1  -LR     Row Name 07/08/18 1030          Positioning and Restraints    Pre-Treatment Position in bed  -LR     Post Treatment Position chair  -LR     In Chair notified nsg;reclined;sitting;call light within reach;encouraged to call for assist;exit alarm on;with nsg;legs elevated  -LR     Row Name 07/08/18 1030          Living Environment    Home Accessibility not wheelchair accessible;stairs to enter home;tub/shower is not walk in  -LR       User Key  (r) = Recorded By, (t) = Taken By, (c) = Cosigned By    Initials Name Provider Type    CP Lorraine Morillo APRN Nurse Practitioner    LR Vilma Edmonds, PT Physical Therapist    JAZZ Gambino, RN Registered Nurse    SYLVIA Woodruff, RN Registered Nurse    ROBERT Ozuna, RN Registered Nurse          Physical Therapy Education     Title: PT OT SLP Therapies (Active)     Topic: Physical Therapy (Done)     Point: Mobility training (Done)    Learning Progress Summary     Learner Status Readiness Method Response Comment Documented by    Patient Done Acceptance E,D VU,NR Educated on weight bearing status, correct gait mechanics, and HEP.  07/08/18 1147     Done Acceptance E,D VU,NR Educated on correct gait mechanics and progression of POC.  07/04/18 1553     Active Acceptance E NR Educated pt on importance of mobility to return home and to PLOF. Informed pt of the benefits of maintaining mobility prior to possibility of surgery to ease recovery. JESSICA 07/02/18 0859     Active Acceptance E,D NR   06/30/18 1210    Significant Other Done Acceptance E,D VU,NR Educated on correct gait mechanics and progression of POC.  07/04/18 1553     Active Acceptance E,D NR   06/30/18 1210          Point: Home exercise program (Done)    Learning  Progress Summary     Learner Status Readiness Method Response Comment Documented by    Patient Done Acceptance E,D VU,NR Educated on weight bearing status, correct gait mechanics, and HEP. LR 07/08/18 1147     Done Acceptance E,D VU,NR Educated on correct gait mechanics and progression of POC. LR 07/04/18 1553     Active Acceptance E NR Educated pt on importance of mobility to return home and to PLOF. Informed pt of the benefits of maintaining mobility prior to possibility of surgery to ease recovery. JESSICA 07/02/18 0859    Significant Other Done Acceptance E,D VU,NR Educated on correct gait mechanics and progression of POC. LR 07/04/18 1553          Point: Body mechanics (Done)    Learning Progress Summary     Learner Status Readiness Method Response Comment Documented by    Patient Done Acceptance E,D VU,NR Educated on weight bearing status, correct gait mechanics, and HEP. LR 07/08/18 1147     Done Acceptance E,D VU,NR Educated on correct gait mechanics and progression of POC.  07/04/18 1553     Active Acceptance E NR Educated pt on importance of mobility to return home and to PLOF. Informed pt of the benefits of maintaining mobility prior to possibility of surgery to ease recovery. JESSICA 07/02/18 0859     Active Acceptance E,D NR  LS 06/30/18 1210    Significant Other Done Acceptance E,D VU,NR Educated on correct gait mechanics and progression of POC. LR 07/04/18 1553     Active Acceptance E,D NR  LS 06/30/18 1210          Point: Precautions (Done)    Learning Progress Summary     Learner Status Readiness Method Response Comment Documented by    Patient Done Acceptance E,D VU,NR Educated on weight bearing status, correct gait mechanics, and HEP. LR 07/08/18 1147     Done Acceptance E,D VU,NR Educated on correct gait mechanics and progression of POC.  07/04/18 1553     Active Acceptance E NR Educated pt on importance of mobility to return home and to PLOF. Informed pt of the benefits of maintaining mobility prior  to possibility of surgery to ease recovery. JESSICA 07/02/18 0859     Active Acceptance E,D NR  LS 06/30/18 1210    Significant Other Done Acceptance E,D CAROLINE,NR Educated on correct gait mechanics and progression of POC. LR 07/04/18 1553     Active Acceptance E,D NR  LS 06/30/18 1210                      User Key     Initials Effective Dates Name Provider Type Discipline    LR 06/19/15 -  Vilma Edmonds, PT Physical Therapist PT    LS 06/19/15 -  Loly Telles, PT Physical Therapist PT    JESSICA 05/15/18 -  Dillon Lawrence, PT Student PT Student PT                PT Recommendation and Plan  Anticipated Discharge Disposition (PT): home with assist, home with home health  Planned Therapy Interventions (PT Eval): balance training, bed mobility training, gait training, home exercise program, patient/family education, ROM (range of motion), stair training, strengthening, transfer training  Therapy Frequency (PT Clinical Impression): daily  Outcome Summary/Treatment Plan (PT)  Daily Summary of Progress (PT): progress toward functional goals as expected  Anticipated Equipment Needs at Discharge (PT): front wheeled walker, bedside commode  Anticipated Discharge Disposition (PT): home with assist, home with home health  Plan of Care Reviewed With: patient  Progress: improving  Outcome Summary: Patient able to take steps from bed to chair today, limited by persistent lightheadedness. CGA for all OOB mobility and t/f supine to sit modified independently. Recommend d/c home with spouse and HHPT if patient can remain on first floor of home and progress to ambulating distances similar to ambulating from room to room at home. If not, will need rehab placement. Will continue to progress as able.           Outcome Measures     Row Name 07/08/18 1030             How much help from another person do you currently need...    Turning from your back to your side while in flat bed without using bedrails? 4  -LR      Moving from lying on back to  sitting on the side of a flat bed without bedrails? 4  -LR      Moving to and from a bed to a chair (including a wheelchair)? 3  -LR      Standing up from a chair using your arms (e.g., wheelchair, bedside chair)? 3  -LR      Climbing 3-5 steps with a railing? 2  -LR      To walk in hospital room? 3  -LR      AM-PAC 6 Clicks Score 19  -LR         Functional Assessment    Outcome Measure Options AM-PAC 6 Clicks Basic Mobility (PT)  -LR        User Key  (r) = Recorded By, (t) = Taken By, (c) = Cosigned By    Initials Name Provider Type    LR Vilma Edmonds, PT Physical Therapist           Time Calculation:         PT Charges     Row Name 07/08/18 1030             Time Calculation    Start Time 1030  -LR      PT Received On 07/08/18  -LR      PT Goal Re-Cert Due Date 07/18/18  -LR         Time Calculation- PT    Total Timed Code Minutes- PT 10 minute(s)  -LR         Timed Charges    81715 - PT Therapeutic Exercise Minutes 8  -LR      38560 - Gait Training Minutes  2  -LR        User Key  (r) = Recorded By, (t) = Taken By, (c) = Cosigned By    Initials Name Provider Type    LR Vilma Edmonds, PT Physical Therapist        Therapy Suggested Charges     Code   Minutes Charges    08561 (CPT®) Hc Pt Neuromusc Re Education Ea 15 Min      85107 (CPT®) Hc Pt Ther Proc Ea 15 Min 8 1    57466 (CPT®) Hc Gait Training Ea 15 Min 2     02247 (CPT®) Hc Pt Therapeutic Act Ea 15 Min      93750 (CPT®) Hc Pt Manual Therapy Ea 15 Min      58265 (CPT®) Hc Pt Iontophoresis Ea 15 Min      68838 (CPT®) Hc Pt Elec Stim Ea-Per 15 Min      52352 (CPT®) Hc Pt Ultrasound Ea 15 Min      76131 (CPT®) Hc Pt Self Care/Mgmt/Train Ea 15 Min      Total  10 1        Therapy Charges for Today     Code Description Service Date Service Provider Modifiers Qty    09434298588 HC PT THER PROC EA 15 MIN 7/8/2018 Vilma Edmonds, PT GP 1    86066906621 HC PT RE-EVAL ESTABLISHED PLAN 2 7/8/2018 Vilma Edmonds, PT GP 1          PT  G-Codes  Outcome Measure Options: AM-PAC 6 Clicks Basic Mobility (PT)      Vilma Edmonds, PT  7/8/2018         Electronically signed by Vilma Edmonds, PT at 7/8/2018 11:52 AM

## 2018-07-09 NOTE — PLAN OF CARE
Problem: Fall Risk (Adult)  Goal: Identify Related Risk Factors and Signs and Symptoms  Outcome: Ongoing (interventions implemented as appropriate)   07/09/18 0506   Fall Risk (Adult)   Related Risk Factors (Fall Risk) fatigue/slow reaction;gait/mobility problems;history of falls   Signs and Symptoms (Fall Risk) presence of risk factors     Goal: Absence of Fall  Outcome: Ongoing (interventions implemented as appropriate)   07/09/18 0506   Fall Risk (Adult)   Absence of Fall making progress toward outcome       Problem: Patient Care Overview  Goal: Plan of Care Review  Outcome: Ongoing (interventions implemented as appropriate)   07/09/18 0506   Coping/Psychosocial   Plan of Care Reviewed With patient   Plan of Care Review   Progress improving   OTHER   Outcome Summary Patient denied pain, rested well during the night. VS were WNL, left foot dressing in place clean, dry and intact, extremity elevated on pillow.        Problem: Infection, Risk/Actual (Adult)  Goal: Identify Related Risk Factors and Signs and Symptoms  Outcome: Ongoing (interventions implemented as appropriate)   07/09/18 0506   Infection, Risk/Actual (Adult)   Related Risk Factors (Infection, Risk/Actual) surgery/procedure;tissue perfusion altered;skin integrity impairment     Goal: Infection Prevention/Resolution  Outcome: Ongoing (interventions implemented as appropriate)   07/09/18 0506   Infection, Risk/Actual (Adult)   Infection Prevention/Resolution making progress toward outcome

## 2018-07-10 LAB
ABO + RH BLD: NORMAL
ABO + RH BLD: NORMAL
ACANTHOCYTES BLD QL SMEAR: NORMAL
BASOPHILS # BLD AUTO: 0.04 10*3/MM3 (ref 0–0.2)
BASOPHILS NFR BLD AUTO: 0.5 % (ref 0–1)
BH BB BLOOD EXPIRATION DATE: NORMAL
BH BB BLOOD EXPIRATION DATE: NORMAL
BH BB BLOOD TYPE BARCODE: 5100
BH BB BLOOD TYPE BARCODE: 5100
BH BB DISPENSE STATUS: NORMAL
BH BB DISPENSE STATUS: NORMAL
BH BB PRODUCT CODE: NORMAL
BH BB PRODUCT CODE: NORMAL
BH BB UNIT NUMBER: NORMAL
BH BB UNIT NUMBER: NORMAL
CROSSMATCH INTERPRETATION: NORMAL
CROSSMATCH INTERPRETATION: NORMAL
DEPRECATED RDW RBC AUTO: 51.2 FL (ref 37–54)
ELLIPTOCYTES BLD QL SMEAR: NORMAL
EOSINOPHIL # BLD AUTO: 0.21 10*3/MM3 (ref 0–0.3)
EOSINOPHIL NFR BLD AUTO: 2.6 % (ref 0–3)
ERYTHROCYTE [DISTWIDTH] IN BLOOD BY AUTOMATED COUNT: 17.2 % (ref 11.3–14.5)
GLUCOSE BLDC GLUCOMTR-MCNC: 122 MG/DL (ref 70–130)
GLUCOSE BLDC GLUCOMTR-MCNC: 213 MG/DL (ref 70–130)
GLUCOSE BLDC GLUCOMTR-MCNC: 240 MG/DL (ref 70–130)
GLUCOSE BLDC GLUCOMTR-MCNC: 264 MG/DL (ref 70–130)
HCT VFR BLD AUTO: 27.7 % (ref 34.5–44)
HGB BLD-MCNC: 9 G/DL (ref 11.5–15.5)
HYPOCHROMIA BLD QL: NORMAL
IMM GRANULOCYTES # BLD: 0.05 10*3/MM3 (ref 0–0.03)
IMM GRANULOCYTES NFR BLD: 0.6 % (ref 0–0.6)
LYMPHOCYTES # BLD AUTO: 2.28 10*3/MM3 (ref 0.6–4.8)
LYMPHOCYTES NFR BLD AUTO: 28.2 % (ref 24–44)
MCH RBC QN AUTO: 26.9 PG (ref 27–31)
MCHC RBC AUTO-ENTMCNC: 32.5 G/DL (ref 32–36)
MCV RBC AUTO: 82.7 FL (ref 80–99)
MONOCYTES # BLD AUTO: 1.34 10*3/MM3 (ref 0–1)
MONOCYTES NFR BLD AUTO: 16.6 % (ref 0–12)
NEUTROPHILS # BLD AUTO: 4.21 10*3/MM3 (ref 1.5–8.3)
NEUTROPHILS NFR BLD AUTO: 52.1 % (ref 41–71)
PLAT MORPH BLD: NORMAL
PLATELET # BLD AUTO: 38 10*3/MM3 (ref 150–450)
RBC # BLD AUTO: 3.35 10*6/MM3 (ref 3.89–5.14)
SCHISTOCYTES BLD QL SMEAR: NORMAL
SMUDGE CELLS BLD QL SMEAR: NORMAL
UNIT  ABO: NORMAL
UNIT  ABO: NORMAL
UNIT  RH: NORMAL
UNIT  RH: NORMAL
WBC NRBC COR # BLD: 8.08 10*3/MM3 (ref 3.5–10.8)

## 2018-07-10 PROCEDURE — 85007 BL SMEAR W/DIFF WBC COUNT: CPT | Performed by: INTERNAL MEDICINE

## 2018-07-10 PROCEDURE — 63710000001 INSULIN DETEMIR PER 5 UNITS: Performed by: INTERNAL MEDICINE

## 2018-07-10 PROCEDURE — 99232 SBSQ HOSP IP/OBS MODERATE 35: CPT | Performed by: INTERNAL MEDICINE

## 2018-07-10 PROCEDURE — 85025 COMPLETE CBC W/AUTO DIFF WBC: CPT | Performed by: INTERNAL MEDICINE

## 2018-07-10 PROCEDURE — 99223 1ST HOSP IP/OBS HIGH 75: CPT | Performed by: INTERNAL MEDICINE

## 2018-07-10 PROCEDURE — 99024 POSTOP FOLLOW-UP VISIT: CPT | Performed by: THORACIC SURGERY (CARDIOTHORACIC VASCULAR SURGERY)

## 2018-07-10 PROCEDURE — 82962 GLUCOSE BLOOD TEST: CPT

## 2018-07-10 RX ADMIN — INSULIN LISPRO 3 UNITS: 100 INJECTION, SOLUTION INTRAVENOUS; SUBCUTANEOUS at 20:46

## 2018-07-10 RX ADMIN — Medication 5 MG: at 23:05

## 2018-07-10 RX ADMIN — METOPROLOL TARTRATE 100 MG: 100 TABLET ORAL at 08:24

## 2018-07-10 RX ADMIN — FUROSEMIDE 20 MG: 20 TABLET ORAL at 08:25

## 2018-07-10 RX ADMIN — METOPROLOL TARTRATE 100 MG: 100 TABLET ORAL at 20:46

## 2018-07-10 RX ADMIN — INSULIN LISPRO 3 UNITS: 100 INJECTION, SOLUTION INTRAVENOUS; SUBCUTANEOUS at 11:44

## 2018-07-10 RX ADMIN — GABAPENTIN 300 MG: 300 CAPSULE ORAL at 08:24

## 2018-07-10 RX ADMIN — Medication 325 MG: at 08:24

## 2018-07-10 RX ADMIN — ATORVASTATIN CALCIUM 80 MG: 40 TABLET, FILM COATED ORAL at 20:46

## 2018-07-10 RX ADMIN — PANTOPRAZOLE SODIUM 40 MG: 40 TABLET, DELAYED RELEASE ORAL at 06:49

## 2018-07-10 RX ADMIN — NYSTATIN: 100000 POWDER TOPICAL at 20:48

## 2018-07-10 RX ADMIN — INSULIN LISPRO 4 UNITS: 100 INJECTION, SOLUTION INTRAVENOUS; SUBCUTANEOUS at 17:18

## 2018-07-10 RX ADMIN — LATANOPROST 1 DROP: 50 SOLUTION OPHTHALMIC at 20:46

## 2018-07-10 RX ADMIN — Medication 325 MG: at 18:08

## 2018-07-10 RX ADMIN — NYSTATIN: 100000 POWDER TOPICAL at 08:28

## 2018-07-10 RX ADMIN — Medication 1 CAPSULE: at 08:25

## 2018-07-10 RX ADMIN — INSULIN DETEMIR 5 UNITS: 100 INJECTION, SOLUTION SUBCUTANEOUS at 20:47

## 2018-07-10 RX ADMIN — ASPIRIN 81 MG: 81 TABLET, COATED ORAL at 08:25

## 2018-07-10 NOTE — PROGRESS NOTES
[facility] Medicine Services  PROGRESS NOTE    Patient Name: Lynne Sanchez  : 1934  MRN: [id number]    Date of Admission: 2018  Length of Stay: 13  Primary Care Physician: ANN Gonsalez    Subjective   Subjective     CC:  Follow up left toe osteo, recent shingles    HPI:  No family present.   No issues overnight.  Says she does feel better after the blood yesterday.    Review of Systems  Gen- No fevers, chills. + gen weak   CV- No chest pain, palpitations  Resp- No cough, dyspnea  GI- No N/V/D, abd pain    Otherwise ROS is negative except as mentioned in the HPI.    Objective   Objective     Vital Signs:   Temp:  [97.3 °F (36.3 °C)-98.6 °F (37 °C)] 98.1 °F (36.7 °C)  Heart Rate:  [69-73] 73  Resp:  [16] 16  BP: (109-152)/(47-90) 145/65  Total (NIH Stroke Scale): 0     Physical Exam:  Constitutional: No acute distress, awake and alert, age appropriate  HENT: NCAT, mucous membranes moist  Respiratory: Clear to auscultation bilaterally A/P, nonlabored respirations   Cardiovascular: RRR, no murmurs, rubs, or gallops  Gastrointestinal: Positive bowel sounds, soft, nontender, nondistended  Musculoskeletal: Left foot heavily bandaged, toes exposed and warm    Psychiatric: Appropriate affect, cooperative, calm and pleasant  Neurologic: Oriented x 3, no focal deficits  Skin: Skin warm and dry.  Right posterior leg healing shingle sites with scabbing, no vesicles or drainage.  Exam unchanged       Results Reviewed:  I have personally reviewed current lab, radiology, and data and agree.      Results from last 7 days  Lab Units 07/10/18  0523 18  0819 18  0423   WBC 10*3/mm3 8.08 6.51 7.06   HEMOGLOBIN g/dL 9.0* 6.7* 7.1*   HEMATOCRIT % 27.7* 21.4* 22.5*   PLATELETS 10*3/mm3 38* 51* 75*       Results from last 7 days  Lab Units 18  0423 18  0430 18  0451   SODIUM mmol/L 139 137 136   POTASSIUM mmol/L 3.7 3.8 4.1   CHLORIDE mmol/L 110* 108  109   CO2 mmol/L 24.0 22.0 22.0   BUN mg/dL 22 19 18   CREATININE mg/dL 1.05 1.13 1.13   GLUCOSE mg/dL 74 85 123*   CALCIUM mg/dL 7.8* 8.2* 8.0*     Estimated Creatinine Clearance: 42.7 mL/min (by C-G formula based on SCr of 1.05 mg/dL).  No results found for: BNP    Microbiology Results Abnormal     Procedure Component Value - Date/Time    Tissue / Bone Culture - Tissue, Toe, Left [[id number]]  (Abnormal)  (Susceptibility) Collected:  07/05/18 1023    Lab Status:  Final result Specimen:  Tissue from Toe, Left Updated:  07/09/18 1358     Tissue Culture Scant growth (1+) Enterobacter cloacae (A)      Light growth (2+) Staphylococcus haemolyticus (A)     Gram Stain Result Occasional WBCs seen      No organisms seen    Susceptibility      Enterobacter cloacae    Staphylococcus haemolyticus       PRIYA    PRIYA       Aztreonam <=8 ug/ml Susceptible             Cefepime <=8 ug/ml Susceptible             Cefotaxime <=2 ug/ml Susceptible             Ceftriaxone <=8 ug/ml Susceptible             Clindamycin       <=0.5 ug/ml Susceptible       Daptomycin       <=0.5 ug/ml Susceptible       Ertapenem <=1 ug/ml Susceptible             Erythromycin       <=0.5 ug/ml Susceptible       Gentamicin <=4 ug/ml Susceptible    <=4 ug/ml Susceptible       Levofloxacin <=2 ug/ml Susceptible    <=1 ug/ml Susceptible  [1]        Linezolid       <=1 ug/ml Susceptible       Meropenem <=1 ug/ml Susceptible             Oxacillin       1 ug/ml Resistant       Penicillin G       >8 ug/ml Resistant       Piperacillin + Tazobactam <=16 ug/ml Susceptible             Quinupristin + Dalfopristin       <=0.5 ug/ml Susceptible       Rifampin       <=1 ug/ml Susceptible       Tetracycline <=4 ug/ml Susceptible    <=4 ug/ml Susceptible       Tobramycin <=4 ug/ml Susceptible             Trimethoprim + Sulfamethoxazole <=2/38 ug/ml Susceptible    <=0.5/9.5 ug/ml Susceptible       Vancomycin       1 ug/ml Susceptible              [1]   Staphylococcus species may  develop resistance during prolonged therapy with quinolones.  Isolates that are initially susceptible may become resistant within three to four days after initiation of therapy. Testing of repeat isolates may be warranted.                   AFB Culture - Tissue, Toe, Left [[id number]] Collected:  07/05/18 1023    Lab Status:  Preliminary result Specimen:  Tissue from Toe, Left Updated:  07/06/18 1443     AFB Stain No acid fast bacilli seen on concentrated smear    Anaerobic Culture - Tissue, Toe, Left [[id number]]  (Normal) Collected:  07/05/18 1023    Lab Status:  Preliminary result Specimen:  Tissue from Toe, Left Updated:  07/06/18 1127     Culture No anaerobes isolated    Blood Culture - Blood, [[id number]]  (Normal) Collected:  06/28/18 0320    Lab Status:  Final result Specimen:  Blood from Arm, Right Updated:  07/03/18 0400     Blood Culture No growth at 5 days    Blood Culture - Blood, [[id number]]  (Normal) Collected:  06/28/18 0330    Lab Status:  Final result Specimen:  Blood from Arm, Left Updated:  07/03/18 0400     Blood Culture No growth at 5 days    Urine Culture - Urine, [[id number]]  (Abnormal)  (Susceptibility) Collected:  06/28/18 2300    Lab Status:  Final result Specimen:  Urine from Urine, Clean Catch Updated:  07/01/18 1120     Urine Culture 60,000-70,000 CFU/mL Klebsiella aerogenes (A)    Susceptibility      Klebsiella aerogenes     PRIYA     Aztreonam 16 ug/ml Intermediate     Cefepime <=8 ug/ml Susceptible     Cefotaxime 16 ug/ml Intermediate     Ceftriaxone 32 ug/ml Intermediate     Ertapenem <=1 ug/ml Susceptible     Gentamicin <=4 ug/ml Susceptible     Levofloxacin <=2 ug/ml Susceptible     Meropenem <=1 ug/ml Susceptible     Nitrofurantoin <=32 ug/ml Susceptible     Piperacillin + Tazobactam 64 ug/ml Intermediate     Tetracycline <=4 ug/ml Susceptible     Tobramycin <=4 ug/ml Susceptible     Trimethoprim + Sulfamethoxazole <=2/38 ug/ml Susceptible                          Imaging Results  (last 24 hours)     ** No results found for the last 24 hours. **        Results for orders placed during the hospital encounter of 06/27/18   Adult Transthoracic Echo Complete W/ Cont if Necessary Per Protocol (With Agitated Saline)    Narrative · Left ventricular systolic function is normal. Estimated EF = 60%.  · Mild aortic valve regurgitation is present.  · Moderate mitral valve regurgitation is present  · Moderate to severe tricuspid valve regurgitation is present.  · Calculated right ventricular systolic pressure from tricuspid   regurgitation is 90 mmHg. Severe pulmonary hypertension is present  · Calculated right ventricular systolic pressure from tricuspid   regurgitation is 90 mmHg.          I have reviewed the medications.    Assessment/Plan   Assessment / Plan     Hospital Problem List     * (Principal)Osteomyelitis of left foot (CMS/HCC)    Cerebrovascular accident (CVA) due to thrombosis of left anterior cerebral artery (CMS/[facility])    Chronic atrial fibrillation (CMS/[facility])    Overview Signed 12/22/2016  1:51 PM by Pan Saunders     1. Chronic atrial fibrillation, status post [employer] pacemaker implantation and AV node ablation:  a. Diagnosed June 2005.  b. Status post ECV restoring normal sinus rhythm, June 2005.  c. Rythmol initiated 05/01/2006.  d. Previously digoxin toxicity.  e. GXT Cardiolite, 09/08/2005:  No reversible ischemia.  LV function not performed secondary to atrial fibrillation.   f. Status post successful external cardioversion on 10/01/2008, Tessa Downey MD.  g. EKG on 10/21/2008:  Recurrence of atrial fibrillation with rate of 109.  h. Recurrent atrial fibrillation by EKG, 11/03/2008, asymptomatic.  i. Status post [employer] pacemaker implantation and AV node ablation.   j. Echocardiogram 02/15/2010:  Moderate MR, moderate TR.  RVSP 50.  EF greater than 55%, left atrial enlargement at 4.3 cm.           Type 2 diabetes mellitus treated without insulin (CMS/[facility]) (Chronic)     Normocytic anemia    Peripheral arterial disease (CMS/HCC)    Herpes zoster without complication    Thrombocytopenia (CMS/HCC)             Brief Hospital Course to date:  Lynne Sanchez is a 83 y.o. female with history of DMII, PVD, Afib/SSS/PPM, CKDIII, Anemia and osteomyelitis presented for treatment of foot wound but hospitalization complicated by acute stroke and shingles.      Assessment & Plan:    Osteomyelitis  - s/p left great toe amputation per Dr Carrington 7/5.  Daily dressing changes.   - ID following.  Plan was to stop abx at discharge per Dr. Belcher.    Shingles with ongoing pain  - s/p valtrex x 7 d  - trial gabapentin 300mg daily for now, titrate if patient tolerates.      Acute CVA after admission  - s/p tPA.  No obvious deficit.  C/o only generalized weakness.    - eliquis when O  - continue ASA, statin     Afib  - previously no ASA/plavix.  Consider eliquis when platelets recover and Hgb stable     Anemia  - chronic anemia dating back to at least 2015.  + occult blood but on oral iron.  Prior EGD 2017 showed only Barretts esophagus with gastritis.  Will place back on PPI.  -H/H contiunues trend down.  Gave 2 units PRBC on 7/9 with appropriate response    Thombocytopenia  - history of borderline low in past, continues to fall.  Down to 38 today.  She tells me she had BM bx 2017 with Dr. Rodriguez.  Will try to get those records.  Will ask heme to see today as well.  Could be related to abx (now stopped).  Doubt HIT.       DMII (A1c 8.5)  - continue current insulin, acceptable glc     Sss/PPM     PAD     CKDIII  --creatinine stable at 1.13  --monitor labs     Disposition: Patient is interested in acute rehab at this time of discharge.  Not medically ready yet.    DVT Prophylaxis:  Mechanical for now    CODE STATUS:   Code Status and Medical Interventions:   Ordered at: 06/28/18 1008     Level Of Support Discussed With:    Patient     Code Status:    CPR     Medical Interventions (Level of Support Prior to  Arrest):    Full       Electronically signed by Randy Newell MD, 07/10/18, 8:05 AM.

## 2018-07-10 NOTE — PROGRESS NOTES
Cardiothoracic Surgery Progress Note      POD #: 5-left great toe amputation and primary closure     LOS: 13 days      Subjective: Awake and alert    Chief Complaint: Multiple postop surgical site pain    Objective:  Vital Signs  Temp:  [97.3 °F (36.3 °C)-98.8 °F (37.1 °C)] 98.8 °F (37.1 °C)  Heart Rate:  [69-73] 70  Resp:  [14-16] 14  BP: (109-152)/(53-90) 140/76    Physical Exam:   General Appearance:    Lungs:   Heart:   Skin:   Incision: Surgical amputation site dressing changed.  Sutures intact, dorsal and plantar skin flaps viable     Results: Laboratory results below are noted    Results from last 7 days  Lab Units 07/10/18  0523   WBC 10*3/mm3 8.08   HEMOGLOBIN g/dL 9.0*   HEMATOCRIT % 27.7*   PLATELETS 10*3/mm3 38*       Results from last 7 days  Lab Units 07/08/18  0423   SODIUM mmol/L 139   POTASSIUM mmol/L 3.7   CHLORIDE mmol/L 110*   CO2 mmol/L 24.0   BUN mg/dL 22   CREATININE mg/dL 1.05   GLUCOSE mg/dL 74   CALCIUM mg/dL 7.8*         Assessment:1.  Post op day 5 left great toe amputation with primary closure, amputation site appears be healing well    #2.  Recent stroke with almost complete recovery  #3.  Diabetes mellitus with severe peripheral neuropathy  #4.  Extensive peripheral vascular disease status post endovascular stenting and angioplasty left anterior tibial artery per Dr. Pelaez at [facility]    Plan: Medical management per hospitalist.  Patient may be transferred to a rehabilitation facility or  home health.  I will follow up in the office in approximately 2-3 weeks      Prakash Carrington MD - 07/10/18 - 1:50 PM         Nasal mucosa clear.  Mouth with normal mucosa  Throat has no vesicles, no oropharyngeal exudates and uvula is midline.

## 2018-07-10 NOTE — PROGRESS NOTES
[facility] Progress Note        Antibiotics:  Anti-Infectives     Ordered     Dose/Rate Route Frequency Start Stop    07/04/18 0730  cefuroxime (ZINACEF) 1.5 g/100 mL 0.9% NS IVPB (mbp)     Ordering Provider:  ANN Pereira    1.5 g  over 30 Minutes Intravenous Every 8 Hours 07/05/18 0000 07/06/18 0759    07/01/18 1152  cefepime (MAXIPIME) 1 g/100 mL 0.9% NS IVPB (mbp)     Ordering Provider:  Pan Painting MD    1 g  200 mL/hr over 30 Minutes Intravenous Once 07/01/18 1300 07/01/18 1456    06/28/18 1046  valACYclovir (VALTREX) tablet 1,000 mg     Ordering Provider:  Pan Painting MD    1,000 mg Oral Every 12 Hours Scheduled 06/28/18 1130 07/05/18 0859    06/27/18 1739  vancomycin 1250 mg/250 mL 0.9% NS IVPB ([facility])     Ordering Provider:  Jung Zeng MD    20 mg/kg × 66.7 kg  over 90 Minutes Intravenous Once 06/27/18 1845 06/27/18 2044    06/27/18 1736  piperacillin-tazobactam (ZOSYN) 3.375 g in iso-osmotic dextrose 50 ml (premix)     Ordering Provider:  Jung Zeng MD    3.375 g  over 30 Minutes Intravenous Once 06/27/18 1800 06/27/18 1844          CC: left foot red, uti, right leg shingles    HPI:  Patient is a 83 y.o.  Yr old female with history of peripheral vascular disease with intervention at [facility] by Dr. Pelaez October 2017 described as to the left anterior circulation.  In addition, in October 2017 she was diagnosed with left hallux osteomyelitis by CT scan/tagged white blood cell scan and cultures with Enterobacter/pseudomonas aeruginosa.  Discharge summary mentions that Dr. Wallace recommended debridement and possible amputation but patient was not interested and was transitioned to [facility] for 6 weeks.  She is admitted to [facility] June 27, 2018 with acute left foot cellulitis, chronic left hallux callus/crusted with acute right sacral/posterior leg shingles and dysuria.  In addition she had fallen onto her right hip at Father's Day  with bruise.     She  "reported acute onset left foot/hallux redness evolving over 24 hours PTA and no other new trauma.  No open wound or active drainage.      6/30/18 moved to ICU overnight after code 19 and neuro workup with change in speech/left facial droop and left pronator drift; CT perfusion study with left MCA ischemia    7/5/18 surgery with left hallux amp across prox phalynx and bone focus removed per d/w Dr Carrington.    7/5/18 sleepy postop;   Per nursing, improved right post leg eruption to the right of midline in a sacral dermatomal distribution from the spine down the posterior right leg,  crusted with no new blisters.  No new skin lesions; otherwise sleepy at time of my visit and not cooperative with ROS      7/9/18:  Doing well. No longer having dysuria or increased urinary frequency. Incision site is doing well with good pain control. Tolerating abx. Having some loose BMs but no watery diarrhea. No bleeding but anemia worsening on cbc. Underwent blood transfusion today.    ROS:      7/5/18 per nursing, no f/c/s. No n/v. No new ADR to Abx.         PE:   /66 (BP Location: Left arm, Patient Position: Lying)   Pulse 69   Temp 98.6 °F (37 °C) (Oral)   Resp 16   Ht 165.1 cm (65\")   Wt 66.7 kg (147 lb)   SpO2 99%   BMI 24.46 kg/m²     GENERAL: sleepy, in no acute distress.   HEENT: Normocephalic, atraumatic.  PERRL.  No conjunctival injection. No icterus. Oropharynx clear without evidence of thrush or exudate.  Poor dentition  LYMPH: No cervical, axillary or inguinal lymphadenopathy.  HEART: RRR; No murmur, rubs, gallops.   LUNGS: Clear to auscultation bilaterally without wheezing, rales, rhonchi. Normal respiratory effort. Nonlabored.   ABDOMEN: Soft, nontender, nondistended. Positive bowel sounds. No rebound or guarding.   EXT:  No cyanosis, clubbing or edema.   :  Without Main catheter.  MSK: FROM without joint effusions noted arms/legs.    SKIN: left foot s/p amputation of left great toe with incision site " c/d/i, no drainage or erythema.   NEURO: AAOX4. Normal speech. Moving all extremities.      Laboratory Data      Results from last 7 days  Lab Units 07/09/18  0819 07/08/18  0423 07/06/18  1501   WBC 10*3/mm3 6.51 7.06 6.69   HEMOGLOBIN g/dL 6.7* 7.1* 7.7*   HEMATOCRIT % 21.4* 22.5* 24.3*   PLATELETS 10*3/mm3 51* 75* 135*       Results from last 7 days  Lab Units 07/08/18  0423   SODIUM mmol/L 139   POTASSIUM mmol/L 3.7   CHLORIDE mmol/L 110*   CO2 mmol/L 24.0   BUN mg/dL 22   CREATININE mg/dL 1.05   GLUCOSE mg/dL 74   CALCIUM mg/dL 7.8*                   Estimated Creatinine Clearance: 42.7 mL/min (by C-G formula based on SCr of 1.05 mg/dL).      Microbiology:  Bone/tissue cx: Enterobacter cloacae, staph epi  Urine cx: Klebsiella aerogenes    Radiology:  Imaging Results (last 72 hours)     Procedure Component Value Units Date/Time    XR Toe 2+ View Left [[id number]] Collected:  06/27/18 1803     Updated:  06/27/18 2121    Narrative:       EXAM:  XR Left Toe(s), 2 or More Views    EXAM DATE/TIME:  6/27/2018 6:03 PM    CLINICAL HISTORY:  83 years old, female; Signs and symptoms; Other: Left 1st   digit ulcer; Additional info: Left diabetic toe wound    TECHNIQUE:  Frontal, lateral and oblique views of toe(s) of the left foot.    COMPARISON:  No relevant prior studies available.    FINDINGS:    Bones/joints:  No acute osseous abnormality.  No evidence of bony destructive   process.  Bony demineralization and degenerative bony changes.  No dislocation.    Soft tissues:  Soft tissue ulcer along the medial distal left great toe.    Vasculature:  Small vessel arterial calcification.      Impression:       1.  Soft tissue ulcer along the medial distal left great toe.  2.  No radiographic evidence of osteomyelitis at this time.    THIS DOCUMENT HAS BEEN ELECTRONICALLY SIGNED BY RONI SANTIAGO JR. MD            Impression:    --acute neuro change as above 6/29-6/30, moved to ICU with further workup per neuro/critical care team  with concern for Left MCA ischemia per radiology on CT perfusion study    --Acute left foot/hallux cellulitis/osteomyelitis-s/p amputation of left great toe by Dr. Wade.      --Acute right sacral shingles.  s/p Valtrex.  This appears dermatomal.  Contact isolation initiated; crusted     --Acute dysuria and urinary incontinence with UTI and abnormal U/A.  kleb species     --Chronic peripheral vascular disease.  Left lower extremity revascularization October 2017 with current extent to be clarified by Dr. Carrington.      --Sick sinus syndrome/chronic atrial fibrillation with past AV node ablation/permanent pacemaker.     --Chronic kidney disease described as stage III.  Monitor to help guide further adjustments in antimicrobials     --Diabetes type 2.  You need to tightly control blood sugar to give best chance for healing     --Thrombocytopenia.  Monitor, may be chronic     --Subacute fall onto right hip and around Father's Day.  Has bruise.  Further workup radiographically or surgically per internal medicine at their discretion    --Anemia: worsened acutely. Now requiring transfusion. No signs of bleeding    PLAN:     --IV cefepime/flagyl for now and likely until discharge then consider stop; S/p Linezolid; S/p oral Valtrex     - Monitor daily cbc with diff     --Check/review labs cultures and scans     --Contact precautions in place given dermatomal shingles    I will continue to follow along. Please call with questions.    We are monitoring for antibiotic associated toxicities.      Gerardo Ziegler MD  7/9/2018

## 2018-07-10 NOTE — PLAN OF CARE
Problem: Fall Risk (Adult)  Goal: Identify Related Risk Factors and Signs and Symptoms  Outcome: Ongoing (interventions implemented as appropriate)   07/10/18 1734   Fall Risk (Adult)   Related Risk Factors (Fall Risk) age-related changes;gait/mobility problems;impaired vision;fatigue/slow reaction     Goal: Absence of Fall  Outcome: Ongoing (interventions implemented as appropriate)   07/10/18 1734   Fall Risk (Adult)   Absence of Fall making progress toward outcome       Problem: Skin Injury Risk (Adult)  Goal: Skin Health and Integrity  Outcome: Ongoing (interventions implemented as appropriate)   07/10/18 1734   Skin Injury Risk (Adult)   Skin Health and Integrity making progress toward outcome       Problem: Patient Care Overview  Goal: Plan of Care Review  Outcome: Ongoing (interventions implemented as appropriate)   07/10/18 1734   Coping/Psychosocial   Plan of Care Reviewed With patient   OTHER   Outcome Summary Pt was able to ambulate to chair and bedside commode. Reported no pain vitals within normal range. Hematocrit and hemaglobin levels increased.        Problem: Infection, Risk/Actual (Adult)  Goal: Identify Related Risk Factors and Signs and Symptoms  Outcome: Ongoing (interventions implemented as appropriate)   07/10/18 1734   Infection, Risk/Actual (Adult)   Related Risk Factors (Infection, Risk/Actual) age extremes;chronic illness/condition;skin integrity impairment   Signs and Symptoms (Infection, Risk/Actual) weakness     Goal: Infection Prevention/Resolution  Outcome: Ongoing (interventions implemented as appropriate)   07/10/18 1734   Infection, Risk/Actual (Adult)   Infection Prevention/Resolution making progress toward outcome

## 2018-07-10 NOTE — PROGRESS NOTES
[facility] Progress Note        Antibiotics:  Anti-Infectives     Ordered     Dose/Rate Route Frequency Start Stop    07/04/18 0730  cefuroxime (ZINACEF) 1.5 g/100 mL 0.9% NS IVPB (mbp)     Ordering Provider:  ANN Pereira    1.5 g  over 30 Minutes Intravenous Every 8 Hours 07/05/18 0000 07/06/18 0759    07/01/18 1152  cefepime (MAXIPIME) 1 g/100 mL 0.9% NS IVPB (mbp)     Ordering Provider:  Pan Painting MD    1 g  200 mL/hr over 30 Minutes Intravenous Once 07/01/18 1300 07/01/18 1456    06/28/18 1046  valACYclovir (VALTREX) tablet 1,000 mg     Ordering Provider:  Pan Painting MD    1,000 mg Oral Every 12 Hours Scheduled 06/28/18 1130 07/05/18 0859    06/27/18 1739  vancomycin 1250 mg/250 mL 0.9% NS IVPB ([facility])     Ordering Provider:  Jung Zeng MD    20 mg/kg × 66.7 kg  over 90 Minutes Intravenous Once 06/27/18 1845 06/27/18 2044    06/27/18 1736  piperacillin-tazobactam (ZOSYN) 3.375 g in iso-osmotic dextrose 50 ml (premix)     Ordering Provider:  Jung Zeng MD    3.375 g  over 30 Minutes Intravenous Once 06/27/18 1800 06/27/18 1844          CC: left foot red, uti, right leg shingles    HPI:  Patient is a 83 y.o.  Yr old female with history of peripheral vascular disease with intervention at [facility] by Dr. Pelaez October 2017 described as to the left anterior circulation.  In addition, in October 2017 she was diagnosed with left hallux osteomyelitis by CT scan/tagged white blood cell scan and cultures with Enterobacter/pseudomonas aeruginosa.  Discharge summary mentions that Dr. Wallace recommended debridement and possible amputation but patient was not interested and was transitioned to [facility] for 6 weeks.  She is admitted to [facility] June 27, 2018 with acute left foot cellulitis, chronic left hallux callus/crusted with acute right sacral/posterior leg shingles and dysuria.  In addition she had fallen onto her right hip at Father's Day  with bruise.     She  "reported acute onset left foot/hallux redness evolving over 24 hours PTA and no other new trauma.  No open wound or active drainage.      6/30/18 moved to ICU overnight after code 19 and neuro workup with change in speech/left facial droop and left pronator drift; CT perfusion study with left MCA ischemia    7/5/18 surgery with left hallux amp across prox phalynx and bone focus removed per d/w Dr Carrington.    7/5/18 sleepy postop;   Per nursing, improved right post leg eruption to the right of midline in a sacral dermatomal distribution from the spine down the posterior right leg,  crusted with no new blisters.  No new skin lesions; otherwise sleepy at time of my visit and not cooperative with ROS      7/9/18:  Doing well. No longer having dysuria or increased urinary frequency. Incision site is doing well with good pain control. Tolerating abx. Having some loose BMs but no watery diarrhea. No bleeding but anemia worsening on cbc. Underwent blood transfusion today.    7/10:  Patient is doing well. No signs of infection (no fevers, no leukocytosis). No significant pain over foot. No n/v or diarrhea.  No ADR to abx. Anemia improved. No bleeding.      PE:   /76 (BP Location: Left arm, Patient Position: Lying)   Pulse 70   Temp 98.8 °F (37.1 °C) (Oral)   Resp 14   Ht 165.1 cm (65\")   Wt 66.7 kg (147 lb)   SpO2 97%   BMI 24.46 kg/m²     GENERAL:awake and alert, in no acute distress.   HEENT: Normocephalic, atraumatic.  No conjunctival injection. No icterus. Oropharynx clear without evidence of thrush or exudate.  Poor dentition  HEART: RRR; No murmur, rubs, gallops.   LUNGS: Clear to auscultation bilaterally without wheezing, rales, rhonchi. Normal respiratory effort. Nonlabored.   ABDOMEN: Soft, nontender, nondistended. Positive bowel sounds. No rebound or guarding.   EXT:  No cyanosis, clubbing or edema.   :  Without Main catheter.  MSK: FROM without joint effusions noted arms/legs.    SKIN: left foot s/p " amputation of left great toe with incision site c/d/i, no drainage or erythema.   NEURO: AAOX4. Normal speech. Moving all extremities.      Laboratory Data      Results from last 7 days  Lab Units 07/10/18  0523 07/09/18  0819 07/08/18  0423   WBC 10*3/mm3 8.08 6.51 7.06   HEMOGLOBIN g/dL 9.0* 6.7* 7.1*   HEMATOCRIT % 27.7* 21.4* 22.5*   PLATELETS 10*3/mm3 38* 51* 75*       Results from last 7 days  Lab Units 07/08/18  0423   SODIUM mmol/L 139   POTASSIUM mmol/L 3.7   CHLORIDE mmol/L 110*   CO2 mmol/L 24.0   BUN mg/dL 22   CREATININE mg/dL 1.05   GLUCOSE mg/dL 74   CALCIUM mg/dL 7.8*                   Estimated Creatinine Clearance: 42.7 mL/min (by C-G formula based on SCr of 1.05 mg/dL).      Microbiology:  Bone/tissue cx: Enterobacter cloacae, staph epi  Urine cx: Klebsiella aerogenes    Radiology:  Imaging Results (last 72 hours)     Procedure Component Value Units Date/Time    XR Toe 2+ View Left [[id number]] Collected:  06/27/18 1803     Updated:  06/27/18 2121    Narrative:       EXAM:  XR Left Toe(s), 2 or More Views    EXAM DATE/TIME:  6/27/2018 6:03 PM    CLINICAL HISTORY:  83 years old, female; Signs and symptoms; Other: Left 1st   digit ulcer; Additional info: Left diabetic toe wound    TECHNIQUE:  Frontal, lateral and oblique views of toe(s) of the left foot.    COMPARISON:  No relevant prior studies available.    FINDINGS:    Bones/joints:  No acute osseous abnormality.  No evidence of bony destructive   process.  Bony demineralization and degenerative bony changes.  No dislocation.    Soft tissues:  Soft tissue ulcer along the medial distal left great toe.    Vasculature:  Small vessel arterial calcification.      Impression:       1.  Soft tissue ulcer along the medial distal left great toe.  2.  No radiographic evidence of osteomyelitis at this time.    THIS DOCUMENT HAS BEEN ELECTRONICALLY SIGNED BY RONI SANTIAGO JR. MD            Impression:    --acute neuro change as above 6/29-6/30, moved to ICU  with further workup per neuro/critical care team with concern for Left MCA ischemia per radiology on CT perfusion study    --Acute left foot/hallux cellulitis/osteomyelitis-s/p amputation of left great toe by Dr. Wade.  Spoke with Dr. Wade today and he believes that entire focus of infection was removed.     --Acute right sacral shingles.  s/p Valtrex.  This appears dermatomal.  Contact isolation initiated; crusted     --Acute dysuria and urinary incontinence with UTI and abnormal U/A.  kleb species     --Chronic peripheral vascular disease.  Left lower extremity revascularization October 2017 with current extent to be clarified by Dr. Carrington.      --Sick sinus syndrome/chronic atrial fibrillation with past AV node ablation/permanent pacemaker.     --Chronic kidney disease described as stage III.  Monitor to help guide further adjustments in antimicrobials     --Diabetes type 2.  You need to tightly control blood sugar to give best chance for healing     --Thrombocytopenia.  Monitor, worsened today. Stop abx in case they could be contributing. Could have been Linezolid although now off this medications. Hopefully this will rebound soon.     --Subacute fall onto right hip and around Father's Day.  Has bruise.  Further workup radiographically or surgically per internal medicine at their discretion    --Anemia: worsened 7/9. Improved after transfusion. No signs of bleeding. Could have been Linezolid although is off this medication. Hopefully will rebound soon.    PLAN:     --Agree with stopping IV Cefepime and Flagyl (stopped 7/9); S/p Linezolid; S/p oral Valtrex     - Monitor daily cbc with diff given anemia and worsening thrombocytopenia- concerned for antibiotic toxicity     --Check/review labs cultures and scans     --Contact precautions in place given dermatomal shingles    I will continue to follow along. Please call with questions.    We are monitoring closely for antibiotic associated toxicities. I have  discussed the patient's case with Dr. Wade today.      Gerardo Ziegler MD  7/10/2018

## 2018-07-10 NOTE — PLAN OF CARE
Problem: Patient Care Overview  Goal: Plan of Care Review   07/10/18 0720   Coping/Psychosocial   Plan of Care Reviewed With patient   Plan of Care Review   Progress no change   OTHER   Outcome Summary Pt did well through the night with minimum pain, VSS, anticipating checking lab values this morning to follow up on the transfusion she got yesterday. Will continue to monitor.        Problem: Infection, Risk/Actual (Adult)  Goal: Identify Related Risk Factors and Signs and Symptoms  Outcome: Ongoing (interventions implemented as appropriate)

## 2018-07-10 NOTE — CONSULTS
Subjective     PROBLEM LIST:  1.  Thrombocytopenia.  2.  Chronic anemia, details incomplete  3.  Osteomyelitis of the foot  4.  Peripheral arterial disease  5.  Recent CVA  6.  Chronic atrial fibrillation    CHIEF COMPLAINT: Low platelets      HISTORY OF PRESENT ILLNESS:  The patient is a 83 y.o. year old female, referred for thrombocytopenia.  She was admitted for osteomyelitis after previous recent admission for the same problem.  During this admission she underwent amputation of the infected toe.  Her hospitalization has been complicated by a CVA on 6/29/18 with almost complete resolution of her neurologic deficits.  She reports a long history of anemia and she's been evaluated in the past by Dr. Real Rodriguez for this.  She doesn't recall a problem with low platelets however.  She notes that she is having some bruising.  She otherwise describes fatigue and weakness as her Main symptoms.  She hasn't noticed any rashes or any joint redness or swelling.  She's been treated with multiple different medicines during her hospitalization as detailed in the electronic record including brief exposure to both porcine heparin and enoxaparin as well as multiple antibiotics including cefepime, ceftazidime, vancomycin, and Zosyn.    REVIEW OF SYSTEMS:  A 14 point review of systems was performed and is negative except as noted above.    Past Medical History:   Diagnosis Date   • Anemia    • CHF (congestive heart failure) (CMS/HCC)    • Chronic atrial fibrillation (CMS/HCC)    • Diabetes mellitus, type 2 (CMS/HCC)    • Glaucoma    • History of transfusion    • Hyperlipidemia    • Hypertension    • Kidney disease     patient not aware of what exact diagnosis is            No current facility-administered medications on file prior to encounter.      Current Outpatient Prescriptions on File Prior to Encounter   Medication Sig Dispense Refill   • aspirin 81 MG EC tablet Take 1 tablet by mouth Daily. 30 tablet 0   • atorvastatin  (LIPITOR) 40 MG tablet Take 1 tablet by mouth Every Night. 30 tablet 0   • Biotin 5000 MCG tablet Take 1 tablet by mouth Daily.     • cholecalciferol (VITAMIN D3) 1000 units tablet Take 1,000 Units by mouth Daily.     • clopidogrel (PLAVIX) 75 MG tablet Take 1 tablet by mouth Daily. 30 tablet 0   • ferrous sulfate 325 (65 FE) MG tablet Take 325 mg by mouth Daily With Breakfast.     • furosemide (LASIX) 20 MG tablet Take 20 mg by mouth 2 (Two) Times a Day.     • glimepiride (AMARYL) 4 MG tablet Take 4 mg by mouth 2 (Two) Times a Day.     • Lactobacillus (PROBIOTIC ACIDOPHILUS PO) Take  by mouth.     • latanoprost (XALATAN) 0.005 % ophthalmic solution Administer 1 drop to both eyes Daily.     • metoprolol tartrate (LOPRESSOR) 100 MG tablet Take 1 tablet by mouth 2 (Two) Times a Day. 180 tablet 3   • Multiple Vitamins-Minerals (MULTIVITAMIN GUMMIES ADULT) chewable tablet Chew 1 tablet Daily.     • Omega-3 Fatty Acids (FISH OIL) 1200 MG capsule capsule Take 1,200 mg by mouth Daily.     • potassium gluconate 595 (99 K) MG tablet tablet Take 99 mg by mouth Daily.         Allergies   Allergen Reactions   • Phenergan [Promethazine Hcl] Confusion       Past Surgical History:   Procedure Laterality Date   • AMPUTATION DIGIT Left 7/5/2018    Procedure: Left Great toe amputation;  Surgeon: Prakash Carrington MD;  Location: [facility];  Service: Vascular   • APPENDECTOMY     • BONE MARROW ASPIRATION     • CARDIAC ABLATION     • CARDIAC CATHETERIZATION N/A 10/10/2017    Procedure: Peripheral angiography;  Surgeon: Kan Pelaez MD;  Location: [facility] CATH INVASIVE LOCATION;  Service:    • CARDIAC CATHETERIZATION N/A 10/10/2017    Procedure: Angioplasty-peripheral;  Surgeon: Kan Pelaez MD;  Location: [facility] CATH INVASIVE LOCATION;  Service:    • CARDIAC CATHETERIZATION N/A 10/10/2017    Procedure: Atherectomy-peripheral;  Surgeon: Kan Pelaez MD;  Location: [facility] CATH INVASIVE LOCATION;  Service:    •  "CARDIAC ELECTROPHYSIOLOGY PROCEDURE N/A 5/3/2018    Procedure: generator change;  Surgeon: Zan Poole MD;  Location:  [facility] CATH INVASIVE LOCATION;  Service: Cardiovascular   • CARDIOVERSION     • COLONOSCOPY     • ENDOSCOPY N/A 10/9/2017    Procedure: ESOPHAGOGASTRODUODENOSCOPY WITH COLD FORCEP BIOPSY;  Surgeon: Clifton Hyatt MD;  Location: [facility] ENDOSCOPY;  Service:    • EYE SURGERY Bilateral    • INTERVENTIONAL RADIOLOGY PROCEDURE N/A 10/10/2017    Procedure: Abdominal Aortagram with Runoff;  Surgeon: Kan Pelaez MD;  Location:  [facility] CATH INVASIVE LOCATION;  Service:    • PACEMAKER IMPLANTATION         Social History     Social History   • Marital status:      Social History Main Topics   • Smoking status: Never Smoker   • Smokeless tobacco: Never Used   • Alcohol use No   • Drug use: No   • Sexual activity: Defer     Other Topics Concern   • Not on file   She lives in [place] with her .  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    Family History   Problem Relation Age of Onset   • No Known Problems Mother    • No Known Problems Father    She is not aware of any blood disorders in her family except a sister who had some type of thrombocythemia    Objective     /59 (BP Location: Left arm, Patient Position: Sitting)   Pulse 75   Temp 98.2 °F (36.8 °C) (Temporal Artery )   Resp 16   Ht 165.1 cm (65\")   Wt 66.7 kg (147 lb)   SpO2 98%   BMI 24.46 kg/m²     General: well appearing female in no acute distress  Neuro: alert and oriented, answers all questions appropriately  HEENT: sclera anicteric, oropharynx clear  Lymphatics: no cervical, supraclavicular, or axillary adenopathy  Cardiovascular: regular rate and rhythm, no murmurs  Lungs: clear to auscultation bilaterally  Abdomen: soft, nontender, nondistended.  No palpable organomegaly and in particular I did not feel a spleen tip  Extremeties: no lower extremity edema, left foot " bandaged  Skin: no rashes, lesions,  or petechiae, but she has some shallow ecchymoses on her arms including at venipuncture sites  Joints: No erythema or effusion or any chronic joint deformities  Psych: mood and affect appropriate    Labs: I reviewed all triple labs in [facility] including from her prior hospitalization.  Some highlights include  Labs today 7/10/2018 showing platelets 38,000 with hemoglobin 9 and white count normal at 8.08  7/9/2018 showed hemoglobin 6.7 with platelets 51,000  7/8/2018 showed hemoglobin 7.1 with platelets 75,000  7/3/2018 showed hemoglobin 7.4 with platelets 155,000    Labs from 4/25/2018 and 5/2/2018 showed normal platelet counts but on 6/27/2018 platelets were 143,000 and over the next few days fell to 103,000    The blood smear today reviewed by the  showed multiple abnormal red cell forms including smudge cells, acanthacytes, elliptocytes, schistocytes, and hyperchromia.    Assessment/Plan     Lynne Sanchez is a 83 y.o. year old female with recurring thrombocytopenia that has waxed and waned over the last few weeks in the setting of her chronic anemia and her chronic infection.  I suspect this is either related to her chronic infection or to some of her meds that have been started and stopped.  She's been on Plavix on 2 different occasions and she is currently on aspirin and she's had multiple antibiotics mentioned above.  The time course does not fit well with HIT (heparin induced thrombocytopenia) and I would not start empiric treatment for this.  The time course favors cefepime but this is certainly difficult to ascertain given the multiple changes in her meds during her complicated hospitalization.  This does not fit well with ITP but could be exacerbation of an underlying marrow disorder such as an early myelodysplastic syndrome.  Lastly, I don't think this represents a microangiopathy such as TTP or HUS.    Plan: 1.  I'm going to suggest conservative management.  I  "will review her blood smear in the morning and compare it to 2 days.  Also, will we can check her LDH and follow her d-dimer to look for evidence of any intravascular process such as DIC or TTP.  2.  I agree with monitoring her platelet count closely.  3.  She doesn't have any obvious \"culprit\" meds to stop but of course we will try to minimize exposure to different meds as possible.  4.  I think we should continue her aspirin if at all possible and I would favor continuing this until her platelets are 20,000 or less but with careful monitoring is you are doing.  I discussed the risks and benefits of this with her and she seems to understand the trade-offs involved.    Thanks for asking me to see this challenging case with you         No name on file.    7/10/2018        "

## 2018-07-11 LAB
BASOPHILS # BLD AUTO: 0.1 10*3/MM3 (ref 0–0.2)
BASOPHILS NFR BLD AUTO: 1 % (ref 0–1)
D DIMER PPP FEU-MCNC: 0.68 MG/L (FEU) (ref 0–0.5)
DEPRECATED RDW RBC AUTO: 52.7 FL (ref 37–54)
EOSINOPHIL # BLD AUTO: 0.24 10*3/MM3 (ref 0–0.3)
EOSINOPHIL NFR BLD AUTO: 2.5 % (ref 0–3)
ERYTHROCYTE [DISTWIDTH] IN BLOOD BY AUTOMATED COUNT: 17.3 % (ref 11.3–14.5)
GLUCOSE BLDC GLUCOMTR-MCNC: 208 MG/DL (ref 70–130)
GLUCOSE BLDC GLUCOMTR-MCNC: 233 MG/DL (ref 70–130)
GLUCOSE BLDC GLUCOMTR-MCNC: 244 MG/DL (ref 70–130)
GLUCOSE BLDC GLUCOMTR-MCNC: 334 MG/DL (ref 70–130)
HCT VFR BLD AUTO: 26.7 % (ref 34.5–44)
HGB BLD-MCNC: 8.4 G/DL (ref 11.5–15.5)
IMM GRANULOCYTES # BLD: 0.12 10*3/MM3 (ref 0–0.03)
IMM GRANULOCYTES NFR BLD: 1.2 % (ref 0–0.6)
LDH SERPL-CCNC: 225 U/L (ref 120–246)
LYMPHOCYTES # BLD AUTO: 2.83 10*3/MM3 (ref 0.6–4.8)
LYMPHOCYTES NFR BLD AUTO: 29.4 % (ref 24–44)
MCH RBC QN AUTO: 26.5 PG (ref 27–31)
MCHC RBC AUTO-ENTMCNC: 31.5 G/DL (ref 32–36)
MCV RBC AUTO: 84.2 FL (ref 80–99)
MONOCYTES # BLD AUTO: 1.77 10*3/MM3 (ref 0–1)
MONOCYTES NFR BLD AUTO: 18.4 % (ref 0–12)
NEUTROPHILS # BLD AUTO: 4.57 10*3/MM3 (ref 1.5–8.3)
NEUTROPHILS NFR BLD AUTO: 47.5 % (ref 41–71)
PLATELET # BLD AUTO: 37 10*3/MM3 (ref 150–450)
RBC # BLD AUTO: 3.17 10*6/MM3 (ref 3.89–5.14)
RETICS/RBC NFR AUTO: 0.13 % (ref 0.5–1.5)
WBC NRBC COR # BLD: 9.63 10*3/MM3 (ref 3.5–10.8)

## 2018-07-11 PROCEDURE — 85025 COMPLETE CBC W/AUTO DIFF WBC: CPT | Performed by: INTERNAL MEDICINE

## 2018-07-11 PROCEDURE — 83615 LACTATE (LD) (LDH) ENZYME: CPT | Performed by: INTERNAL MEDICINE

## 2018-07-11 PROCEDURE — 99232 SBSQ HOSP IP/OBS MODERATE 35: CPT | Performed by: INTERNAL MEDICINE

## 2018-07-11 PROCEDURE — 97116 GAIT TRAINING THERAPY: CPT

## 2018-07-11 PROCEDURE — 82962 GLUCOSE BLOOD TEST: CPT

## 2018-07-11 PROCEDURE — 85045 AUTOMATED RETICULOCYTE COUNT: CPT | Performed by: INTERNAL MEDICINE

## 2018-07-11 PROCEDURE — 99024 POSTOP FOLLOW-UP VISIT: CPT | Performed by: THORACIC SURGERY (CARDIOTHORACIC VASCULAR SURGERY)

## 2018-07-11 PROCEDURE — 63710000001 INSULIN DETEMIR PER 5 UNITS: Performed by: INTERNAL MEDICINE

## 2018-07-11 PROCEDURE — 97530 THERAPEUTIC ACTIVITIES: CPT

## 2018-07-11 PROCEDURE — 85379 FIBRIN DEGRADATION QUANT: CPT | Performed by: INTERNAL MEDICINE

## 2018-07-11 PROCEDURE — 99233 SBSQ HOSP IP/OBS HIGH 50: CPT | Performed by: INTERNAL MEDICINE

## 2018-07-11 RX ORDER — ACETAMINOPHEN 650 MG
TABLET, EXTENDED RELEASE ORAL DAILY
Status: DISCONTINUED | OUTPATIENT
Start: 2018-07-11 | End: 2018-07-13 | Stop reason: HOSPADM

## 2018-07-11 RX ADMIN — METOPROLOL TARTRATE 100 MG: 100 TABLET ORAL at 21:18

## 2018-07-11 RX ADMIN — INSULIN LISPRO 3 UNITS: 100 INJECTION, SOLUTION INTRAVENOUS; SUBCUTANEOUS at 21:18

## 2018-07-11 RX ADMIN — GABAPENTIN 300 MG: 300 CAPSULE ORAL at 08:40

## 2018-07-11 RX ADMIN — INSULIN LISPRO 5 UNITS: 100 INJECTION, SOLUTION INTRAVENOUS; SUBCUTANEOUS at 13:26

## 2018-07-11 RX ADMIN — Medication 5 MG: at 21:22

## 2018-07-11 RX ADMIN — INSULIN DETEMIR 5 UNITS: 100 INJECTION, SOLUTION SUBCUTANEOUS at 21:18

## 2018-07-11 RX ADMIN — Medication 325 MG: at 08:41

## 2018-07-11 RX ADMIN — Medication 1 CAPSULE: at 08:40

## 2018-07-11 RX ADMIN — ATORVASTATIN CALCIUM 80 MG: 40 TABLET, FILM COATED ORAL at 21:18

## 2018-07-11 RX ADMIN — PANTOPRAZOLE SODIUM 40 MG: 40 TABLET, DELAYED RELEASE ORAL at 06:05

## 2018-07-11 RX ADMIN — POVIDONE IODINE 10%: 100 LIQUID TOPICAL at 13:26

## 2018-07-11 RX ADMIN — METOPROLOL TARTRATE 100 MG: 100 TABLET ORAL at 08:41

## 2018-07-11 RX ADMIN — FUROSEMIDE 20 MG: 20 TABLET ORAL at 08:41

## 2018-07-11 RX ADMIN — Medication 325 MG: at 18:05

## 2018-07-11 RX ADMIN — ASPIRIN 81 MG: 81 TABLET, COATED ORAL at 08:40

## 2018-07-11 RX ADMIN — INSULIN LISPRO 3 UNITS: 100 INJECTION, SOLUTION INTRAVENOUS; SUBCUTANEOUS at 18:05

## 2018-07-11 RX ADMIN — LATANOPROST 1 DROP: 50 SOLUTION OPHTHALMIC at 21:18

## 2018-07-11 RX ADMIN — INSULIN LISPRO 3 UNITS: 100 INJECTION, SOLUTION INTRAVENOUS; SUBCUTANEOUS at 08:41

## 2018-07-11 RX ADMIN — NYSTATIN: 100000 POWDER TOPICAL at 08:42

## 2018-07-11 NOTE — PLAN OF CARE
Problem: Patient Care Overview  Goal: Plan of Care Review  Outcome: Ongoing (interventions implemented as appropriate)   07/11/18 1115   Coping/Psychosocial   Plan of Care Reviewed With patient;other (see comments)  (Joshua KULKARNI)   Plan of Care Review   Progress improving   OTHER   Outcome Summary WOC nurse f/u to reassess wound right great toe and skin tears. Distal right great toe with scabbed, dry wound; cleaned with NS; painted with Betadine; AIDEN. Right elbow skin tear epithelialized, but remains fragile; covered with foam low-adherent dressing for protection. Left elbow and left forearm skin tears with small scabs; cleaned with NS, Xeroform applied; covered with foam dressing. See orders and LDA's. Skin interventions in place. WOC nurse will s/o. Please contact WOC nurse as needed for concerns.

## 2018-07-11 NOTE — THERAPY TREATMENT NOTE
Acute Care - Physical Therapy Treatment Note  [facility]     Patient Name: Lynne Sanchez  : 1934  MRN: [id number]  Today's Date: 2018  Onset of Illness/Injury or Date of Surgery: 18  Date of Referral to PT: 18  Referring Physician: MD Zeb    Admit Date: 2018    Visit Dx:    ICD-10-CM ICD-9-CM   1. Other chronic osteomyelitis of left foot (CMS/[facility]) M86.672 730.17   2. Cerebrovascular accident (CVA), unspecified mechanism (CMS/[facility]) I63.9 434.91   3. Impaired mobility and ADLs Z74.09 799.89   4. Impaired functional mobility, balance, gait, and endurance Z74.09 V49.89   5. Aphasia R47.01 784.3   6. 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 (CMS/[facility])   • Melena   • Chronic gastritis   • Peripheral arterial disease (CMS/[facility])   • Cerebrovascular accident (CVA) due to thrombosis of left anterior cerebral artery (CMS/[facility])   • Herpes zoster without complication   • Thrombocytopenia (CMS/[facility])       Therapy Treatment          Rehabilitation Treatment Summary     Row Name 18 1617             Treatment Time/Intention    Discipline physical therapist  -DM      Document Type therapy note (daily note)  -DM      Subjective Information complains of;pain  -DM      Mode of Treatment physical therapy  -DM      Care Plan Review care plan/treatment goals reviewed;patient/other agree to care plan  -DM      Care Plan Review, Other Participant(s) spouse  -DM      Therapy Frequency (PT Clinical Impression) daily  -DM      Patient Effort good  -DM      Comment UP TO BSC only w/ PCT  -DM      Existing Precautions/Restrictions fall;weight bearing;other  (see comments)   L toe offloading shoe,WB L heel([email or web address])  -DM      Treatment Considerations/Comments new CVA/aphasia 6-29 & SHINGLES  -DM      Recorded by [DM] Delfina Renteria, PT 07/11/18 1723      Row Name 07/11/18 1617             Vital Signs    Pre Systolic BP Rehab 141  -DM      Pre Treatment Diastolic BP 69  -DM      Post Systolic BP Rehab 127  -DM      Post Treatment Diastolic BP 53  -DM      Pretreatment Heart Rate (beats/min) 69  -DM      Intratreatment Heart Rate (beats/min) 72  -DM      Posttreatment Heart Rate (beats/min) 71  -DM      Pre SpO2 (%) 99  -DM      O2 Delivery Pre Treatment room air  -DM      Post SpO2 (%) 92  -DM      O2 Delivery Post Treatment room air  -DM      Pre Patient Position Sitting  -DM      Intra Patient Position Standing  -DM      Post Patient Position Sitting  -DM      Recorded by [DM] Delfina Renteria, PT 07/11/18 1723      Row Name 07/11/18 1617             Cognitive Assessment/Intervention    Additional Documentation Cognitive Assessment/Intervention (Group)  -DM      Recorded by [DM] Delfina Renteria, PT 07/11/18 1723      Row Name 07/11/18 1617             Cognitive Assessment/Intervention- PT/OT    Affect/Mental Status (Cognitive) WFL  -DM      Orientation Status (Cognition) oriented x 4  -DM      Follows Commands (Cognition) follows one step commands;over 90% accuracy;verbal cues/prompting required  -DM      Cognitive Function (Cognitive) WFL  -DM      Memory Deficit (Cognitive) mild deficit  -DM      Safety Deficit (Cognitive) mild deficit;safety precautions follow-through/compliance  -DM      Personal Safety Interventions fall prevention program maintained;gait belt;nonskid shoes/slippers when out of bed  -DM      Recorded by [DM] Delfina Renteria, PT 07/11/18 1723      Row Name 07/11/18 1617             Safety Issues, Functional Mobility    Impairments Affecting Function (Mobility) balance;endurance/activity tolerance;pain;strength  -DM      Recorded  by [DM] Delfina ROSY Myra, PT 07/11/18 1723      Row Name 07/11/18 1617             Mobility Assessment/Intervention    Extremity Weight-bearing Status left lower extremity  -DM      Left Lower Extremity (Weight-bearing Status) touch down weight-bearing (TDWB)   as little weight as possible per Dr. Carrington;toe offloading sh  -DM      Recorded by [DM] Delfina ROSY Myra, PT 07/11/18 1723      Row Name 07/11/18 1617             Bed Mobility Assessment/Treatment    Comment (Bed Mobility) UIC  -DM      Recorded by [DM] Delfina Renteria, PT 07/11/18 1723      Row Name 07/11/18 1617             Transfer Assessment/Treatment    Transfer Assessment/Treatment sit-stand transfer;stand-sit transfer  -DM      Maintains Weight-bearing Status (Transfers) able to maintain  -DM      Recorded by [DM] Delfina Renteria, PT 07/11/18 1723      Row Name 07/11/18 1617             Sit-Stand Transfer    Sit-Stand [place] (Transfers) contact guard;verbal cues   DONNED L toe offload shoe p/t standing  -DM      Assistive Device (Sit-Stand Transfers) walker, front-wheeled  -DM      Recorded by [DM] Delfina Renteria, PT 07/11/18 1723      Row Name 07/11/18 1617             Stand-Sit Transfer    Stand-Sit [place] (Transfers) contact guard;verbal cues  -DM      Assistive Device (Stand-Sit Transfers) walker, front-wheeled  -DM      Recorded by [DM] Delfina Renteria, PT 07/11/18 1723      Row Name 07/11/18 1617             Gait/Stairs Assessment/Training    24916 - Gait Training Minutes  16  -DM      Gait/Stairs Assessment/Training gait/ambulation independence;gait/ambulation assistive device;distance ambulated;gait pattern;gait deviations;maintains weight-bearing status  -DM      [place] Level (Gait) verbal cues;minimum assist (75% patient effort)   min A to guide R wx,mitch w/ turns  -DM      Assistive Device (Gait) walker, front-wheeled  -DM      Distance in Feet (Gait) 60   20 ft x 3, w/ 2 min.sit rest x 2  -DM      Pattern (Gait) step-to  -DM       Deviations/Abnormal Patterns (Gait) left sided deviations;antalgic;base of support, narrow;festinating/shuffling;stride length decreased   dec.step length  -DM      Bilateral Gait Deviations forward flexed posture;heel strike decreased  -DM      Comment (Gait/Stairs) cues to inc.step length,observe WB Precaut.  -DM      Recorded by [DM] Delfina Renteria, PT 07/11/18 1723      Row Name 07/11/18 1617             Motor Skills Assessment/Interventions    Additional Documentation Therapeutic Exercise (Group);Therapeutic Exercise Interventions (Group)  -DM      Recorded by [DM] Delfina Renteria, PT 07/11/18 1723      Row Name 07/11/18 1617             Therapeutic Exercise    Therapeutic Exercise seated, lower extremities;seated, upper extremities  -DM      84797 - PT Therapeutic Activity Minutes 23  -DM      Recorded by [DM] Delfina Renteria, PT 07/11/18 1723      Row Name 07/11/18 1617             Upper Extremity Seated Therapeutic Exercise    Performed, Seated Upper Extremity (Therapeutic Exercise) shoulder flexion/extension;shoulder abduction/adduction;shoulder horizontal abduction/adduction;elbow flexion/extension  -DM      Exercise Type, Seated Upper Extremity (Therapeutic Exercise) AROM (active range of motion)  -DM      Sets/Reps Detail, Seated Upper Extremity (Therapeutic Exercise) 1/10  -DM      Recorded by [DM] Delfina Renteria, PT 07/11/18 1723      Row Name 07/11/18 1617             Lower Extremity Seated Therapeutic Exercise    Performed, Seated Lower Extremity (Therapeutic Exercise) hip flexion/extension;hip abduction/adduction;hip external/internal rotation;ankle dorsiflexion/plantarflexion;LAQ (long arc quad), knee extension;SAQ (short arc quad), knee extension   paty, abdom sets,SLR  -DM      Exercise Type, Seated Lower Extremity (Therapeutic Exercise) AROM (active range of motion);isometric contraction, static  -DM      Sets/Reps Detail, Seated Lower Extremity (Therapeutic Exercise) 1/10  -DM       Recorded by [DM] Delfina Renteria, PT 07/11/18 1723      Row Name 07/11/18 1617             Static Sitting Balance    Level of [place] (Unsupported Sitting, Static Balance) supervision  -DM      Sitting Position (Unsupported Sitting, Static Balance) sitting in chair  -DM      Time Able to Maintain Position (Unsupported Sitting, Static Balance) more than 5 minutes   LE exer;donning offload shoe;BP Reading  -DM      Recorded by [DM] Delfina Renteria, PT 07/11/18 1723      Row Name 07/11/18 1617             Static Standing Balance    Level of [place] (Supported Standing, Static Balance) contact guard assist  -DM      Time Able to Maintain Position (Supported Standing, Static Balance) 1 to 2 minutes  -DM      Assistive Device Utilized (Supported Standing, Static Balance) rolling walker  -DM      Comment (Supported Standing, Static Balance) PLB; TRUNK ext  -DM      Recorded by [DM] Delfina Renteria, PT 07/11/18 1723      Row Name 07/11/18 1617             Dynamic Standing Balance    Level of [place], Reaches Outside Midline (Standing, Dynamic Balance) contact guard assist  -DM      Time Able to Maintain Position, Reaches Outside Midline (Standing, Dynamic Balance) 45 to 60 seconds  -DM      Recorded by [DM] Delfina Renteria, PT 07/11/18 1723      Row Name 07/11/18 1617             Pain Assessment    Additional Documentation Pain Scale: Numbers Pre/Post-Treatment (Group)  -DM      Recorded by [DM] Delfina Renteria, PT 07/11/18 1723      Row Name 07/11/18 1617             Pain Scale: Numbers Pre/Post-Treatment    Pain Scale: Numbers, Pretreatment 0/10 - no pain  -DM      Pain Scale: Numbers, Post-Treatment 1/10  -DM      Pain Location - Side Left  -DM      Pain Location foot  -DM      Pain Intervention(s) Repositioned;Rest  -DM      Recorded by [DM] Delfina Renteria, PT 07/11/18 1723      Row Name                Wound 06/27/18 1659 Left toe pressure injury;diabetic/neuropathic ulceration    Wound - Properties  Group Date first assessed: 06/27/18 [KA] Time first assessed: 1659 [KA] Present On Admission : yes [KA] Side: Left [KA] Location: toe [KA] Type: pressure injury;diabetic/neuropathic ulceration [KA] Additional Comments: possible arterial ulcer; pt pt skin tear from tape removal [CP] Recorded by:  [CP] BILL Magallanes 06/28/18 1335 [KA] Anel Woodruff RN 06/27/18 1700    Row Name                Wound 06/28/18 1030 Right distal toe other (see comments)    Wound - Properties Group Date first assessed: 06/28/18 [CP] Time first assessed: 1030 [CP] Present On Admission : yes;picture taken [CP] Side: Right [CP] Orientation: distal [CP] Location: toe [CP] Type: other (see comments) [CP] Additional Comments: unknown origin; possible tape inury; right great toe [CP] Recorded by:  [CP] BILL Magallanes 06/28/18 1340    Row Name                Wound 06/28/18 1030 Right elbow abrasion    Wound - Properties Group Date first assessed: 06/28/18 [CP] Time first assessed: 1030 [CP] Present On Admission : yes;picture taken [CP] Side: Right [CP] Location: elbow [CP] Type: abrasion [CP] Recorded by:  [CP] BILL Magallanes 06/28/18 1344    Row Name                Wound 06/28/18 1030 Left lower arm skin tear    Wound - Properties Group Date first assessed: 06/28/18 [CP] Time first assessed: 1030 [CP] Present On Admission : yes [CP] Side: Left [CP] Orientation: lower [CP] Location: arm [CP] Type: skin tear [CP] Additional Comments: forearm and elbow [CP] Recorded by:  [CP] BILL Magallanes 06/28/18 1346    Row Name                Wound 07/05/18 1044 Left leg incision    Wound - Properties Group Date first assessed: 07/05/18 [BM] Time first assessed: 1044 [BM] Side: Left [BM] Location: leg [BM] Type: incision [BM] Recorded by:  [BM] Leonie Ozuna RN 07/05/18 1044    Row Name                Wound 07/05/18 1130 Left toe other (see comments)    Wound - Properties Group Date first assessed: 07/05/18 [KL]  Time first assessed: 1130 [KL] Present On Admission : no [KL] Side: Left [KL] Location: toe [KL] Type: other (see comments) [KL], amputation  Wound Outcome: Amputation [KL] Recorded by:  [KL] Leslye Gambino RN 07/05/18 1556    Row Name 07/11/18 1617             Plan of Care Review    Plan of Care Reviewed With patient;spouse  -DM      Recorded by [DM] Delfina Renteria, PT 07/11/18 1723      Row Name 07/11/18 1617             Outcome Summary/Treatment Plan (PT)    Daily Summary of Progress (PT) progress toward functional goals is good  -DM      Anticipated Equipment Needs at Discharge (PT) front wheeled walker  -DM      Anticipated Discharge Disposition (PT) skilled nursing facility  -DM      Recorded by [DM] Delfina Renteria, PT 07/11/18 1723        User Key  (r) = Recorded By, (t) = Taken By, (c) = Cosigned By    Initials Name Effective Dates Discipline    DM Delfina Renteria, PT 06/19/15 -  PT    CP Lorraine Morillo, APRN 06/08/18 -  Nurse    JAZZ Gambino RN 06/16/16 -  Nurse    SYLVIA Woodruff RN 06/23/17 -  Nurse    ROBERT Ozuna RN 08/17/17 -  Nurse          Rash 06/27/18 2044 Left lower breast other (see comments) (Active)   Distribution localized 7/11/2018  7:45 AM   Configuration/Shape asymmetric 7/11/2018  7:45 AM   Borders irregular 7/11/2018  7:45 AM   Characteristics scaly 7/11/2018  7:45 AM   Color pink 7/11/2018  7:45 AM       Rash 06/28/18 1030 Right posterior leg vesicle (Active)   Distribution regional 7/11/2018  7:45 AM   Configuration/Shape asymmetric 7/11/2018  7:45 AM   Borders irregular 7/11/2018  7:45 AM   Characteristics dry;crusted;scaly 7/11/2018  7:45 AM   Color red 7/11/2018  7:45 AM       Wound 06/27/18 1659 Left toe pressure injury;diabetic/neuropathic ulceration (Active)   Dressing Appearance dry;intact;no drainage 7/11/2018  7:45 AM   Closure JIMMY 7/11/2018  7:45 AM   Base dry 7/11/2018  2:00 AM   Periwound intact;dry;pink;blanchable 7/10/2018  8:00 PM   Dressing Care,  Wound other (see comments);gauze, dry 7/11/2018  7:45 AM       Wound 06/28/18 1030 Right distal toe other (see comments) (Active)   Dressing Appearance open to air 7/11/2018 11:15 AM   Base clean;dry;scab;pink 7/11/2018 11:15 AM   Periwound intact;dry;pink;blanchable 7/11/2018 11:15 AM   Periwound Temperature warm 7/11/2018  7:45 AM   Periwound Skin Turgor firm 7/11/2018  7:45 AM   Edges irregular 7/11/2018 11:15 AM   Drainage Amount none 7/11/2018 11:15 AM   Care, Wound cleansed with;sterile normal saline 7/11/2018 11:15 AM   Dressing Care, Wound open to air 7/11/2018 11:15 AM       Wound 06/28/18 1030 Right elbow abrasion (Active)   Dressing Appearance dry;intact 7/11/2018 11:15 AM   Base clean;dry;epithelialization;pink 7/11/2018 11:15 AM   Periwound intact;dry;pink;blanchable 7/11/2018 11:15 AM   Periwound Temperature warm 7/11/2018  7:45 AM   Periwound Skin Turgor firm 7/11/2018  7:45 AM   Edges irregular 7/11/2018  7:45 AM   Drainage Amount none 7/11/2018 11:15 AM   Care, Wound cleansed with;sterile normal saline 7/11/2018 11:15 AM   Dressing Care, Wound dressing changed;foam;low-adherent 7/11/2018 11:15 AM       Wound 06/28/18 1030 Left lower arm skin tear (Active)   Dressing Appearance dry;intact 7/11/2018 11:15 AM   Base clean;dry;pink;scab 7/11/2018 11:15 AM   Periwound intact;dry;pink 7/11/2018 11:15 AM   Periwound Temperature warm 7/11/2018  7:45 AM   Periwound Skin Turgor soft 7/11/2018  7:45 AM   Edges irregular 7/11/2018 11:15 AM   Wound Length (cm) 0.8 cm 7/11/2018 11:15 AM   Wound Width (cm) 0.3 cm 7/11/2018 11:15 AM   Wound Depth (cm) 0.1 cm 7/11/2018 11:15 AM   Drainage Amount none 7/11/2018 11:15 AM   Care, Wound cleansed with;sterile normal saline 7/11/2018 11:15 AM   Dressing Care, Wound dressing changed;foam;low-adherent;petroleum-based 7/11/2018 11:15 AM       Wound 07/05/18 1044 Left leg incision (Active)   Dressing Appearance dry;intact;no drainage 7/11/2018  7:45 AM   Closure JIMMY 7/11/2018   7:45 AM       Wound 07/05/18 1130 Left toe other (see comments) (Active)   Dressing Appearance dry;intact;no drainage 7/11/2018  7:45 AM   Dressing Care, Wound gauze, dry 7/11/2018  7:45 AM             Physical Therapy Education     Title: PT OT SLP Therapies (Active)     Topic: Physical Therapy (Active)     Point: Mobility training (Active)    Learning Progress Summary     Learner Status Readiness Method Response Comment Documented by    Patient Active Eager ED NR   07/11/18 1724     Done Acceptance E,D VU,NR Reviewed WB status, benefits of mobility  07/09/18 1713     Done Acceptance E,D VU,NR Educated on weight bearing status, correct gait mechanics, and HEP.  07/08/18 1147     Done Acceptance E,D VU,NR Educated on correct gait mechanics and progression of POC.  07/04/18 1553     Active Acceptance E NR Educated pt on importance of mobility to return home and to PLOF. Informed pt of the benefits of maintaining mobility prior to possibility of surgery to ease recovery. JESSICA 07/02/18 0859     Active Acceptance E,D NR   06/30/18 1210    Significant Other Active Eager ED NR   07/11/18 1724     Done Acceptance E,D VU,NR Educated on correct gait mechanics and progression of POC.  07/04/18 1553     Active Acceptance E,D NR   06/30/18 1210          Point: Home exercise program (Active)    Learning Progress Summary     Learner Status Readiness Method Response Comment Documented by    Patient Active Yahaira SHAHD NR   07/11/18 1724     Done Acceptance E,D VU,NR Reviewed WB status, benefits of mobility  07/09/18 1713     Done Acceptance E,D VU,NR Educated on weight bearing status, correct gait mechanics, and HEP.  07/08/18 1147     Done Acceptance E,D VU,NR Educated on correct gait mechanics and progression of POC.  07/04/18 1553     Active Acceptance E NR Educated pt on importance of mobility to return home and to PLOF. Informed pt of the benefits of maintaining mobility prior to possibility of surgery  to ease recovery.  07/02/18 0859    Significant Other Active Eager E,D NR   07/11/18 1724     Done Acceptance E,D VU,NR Educated on correct gait mechanics and progression of POC.  07/04/18 1553          Point: Body mechanics (Active)    Learning Progress Summary     Learner Status Readiness Method Response Comment Documented by    Patient Active Eager E,D NR   07/11/18 1724     Done Acceptance E,D VU,NR Reviewed WB status, benefits of mobility  07/09/18 1713     Done Acceptance E,D VU,NR Educated on weight bearing status, correct gait mechanics, and HEP.  07/08/18 1147     Done Acceptance E,D VU,NR Educated on correct gait mechanics and progression of POC.  07/04/18 1553     Active Acceptance E NR Educated pt on importance of mobility to return home and to PLOF. Informed pt of the benefits of maintaining mobility prior to possibility of surgery to ease recovery. JESSICA 07/02/18 0859     Active Acceptance E,D NR   06/30/18 1210    Significant Other Active Eager ED NR   07/11/18 1724     Done Acceptance E,D VU,NR Educated on correct gait mechanics and progression of POC.  07/04/18 1553     Active Acceptance E,D NR   06/30/18 1210          Point: Precautions (Active)    Learning Progress Summary     Learner Status Readiness Method Response Comment Documented by    Patient Active Eager E,D NR   07/11/18 1724     Done Acceptance E,D VU,NR Reviewed WB status, benefits of mobility  07/09/18 1713     Done Acceptance E,D VU,NR Educated on weight bearing status, correct gait mechanics, and HEP.  07/08/18 1147     Done Acceptance E,D VU,NR Educated on correct gait mechanics and progression of POC.  07/04/18 1553     Active Acceptance E NR Educated pt on importance of mobility to return home and to PLOF. Informed pt of the benefits of maintaining mobility prior to possibility of surgery to ease recovery. JESSICA 07/02/18 0859     Active Acceptance E,D NR   06/30/18 1210    Significant Other Active Yahaira  E,D NR  DM 07/11/18 1724     Done Acceptance E,D VU,NR Educated on correct gait mechanics and progression of POC. LR 07/04/18 1553     Active Acceptance E,D NR  LS 06/30/18 1210                      User Key     Initials Effective Dates Name Provider Type Discipline    DM 06/19/15 -  Delfina Renteria, PT Physical Therapist PT    LR 06/19/15 -  Vilma Edmonds, PT Physical Therapist PT    LS 06/19/15 -  Loly Telles, PT Physical Therapist PT    MJ 04/03/18 -  Jaswinder Wadsworth, PT Physical Therapist PT    JESSICA 05/15/18 -  Dillon Lawrence, PT Student PT Student PT                    PT Recommendation and Plan  Anticipated Discharge Disposition (PT): skilled nursing facility  Therapy Frequency (PT Clinical Impression): daily  Outcome Summary/Treatment Plan (PT)  Daily Summary of Progress (PT): progress toward functional goals is good  Anticipated Equipment Needs at Discharge (PT): front wheeled walker  Anticipated Discharge Disposition (PT): skilled nursing facility  Plan of Care Reviewed With: patient, spouse  Progress: improving  Outcome Summary: able to perform ther ex all 4 extrem & amb total of 60 ft (3 trials) w/ R wx & Tracy, w/ L toe offload shoe, but req.2 sit rests d/t fat. & desat to 92%; noted low  HGB (8.4) ;Has had total of  2 units PRBC's; req. cont. cueing to WB status LLE          Outcome Measures     Row Name 07/11/18 1617 07/09/18 1645          How much help from another person do you currently need...    Turning from your back to your side while in flat bed without using bedrails? 4  -DM 4  -MJ     Moving from lying on back to sitting on the side of a flat bed without bedrails? 3  -DM 3  -MJ     Moving to and from a bed to a chair (including a wheelchair)? 3  -DM 3  -MJ     Standing up from a chair using your arms (e.g., wheelchair, bedside chair)? 3  -DM 3  -MJ     Climbing 3-5 steps with a railing? 2  -DM 2  -MJ     To walk in hospital room? 3  -DM 3  -MJ     AM-PAC 6 Clicks Score 18  -DM 18  -MJ         Modified Downey Scale    Modified Downey Scale 4 - Moderately severe disability.  Unable to walk without assistance, and unable to attend to own bodily needs without assistance.  -DM  --        Functional Assessment    Outcome Measure Options AM-PAC 6 Clicks Basic Mobility (PT)  -DM AM-PAC 6 Clicks Basic Mobility (PT)  -MJ       User Key  (r) = Recorded By, (t) = Taken By, (c) = Cosigned By    Initials Name Provider Type    DM Delfina Renteria, PT Physical Therapist    JOSR Wadsworth, PT Physical Therapist           Time Calculation:         PT Charges     Row Name 07/11/18 1728 07/11/18 1617          Time Calculation    Start Time 1617  -DM  --     PT Received On 07/11/18  -DM  --     PT Goal Re-Cert Due Date 07/18/18  -DM  --        Time Calculation- PT    Total Timed Code Minutes- PT 39 minute(s)  -DM  --        Timed Charges    64302 - Gait Training Minutes   -- 16  -DM     59994 - PT Therapeutic Activity Minutes  -- 23  -DM       User Key  (r) = Recorded By, (t) = Taken By, (c) = Cosigned By    Initials Name Provider Type    DM Delfina Renteria, PT Physical Therapist        Therapy Suggested Charges     Code   Minutes Charges    69565 (CPT®) Hc Pt Neuromusc Re Education Ea 15 Min      96233 (CPT®) Hc Pt Ther Proc Ea 15 Min      41731 (CPT®) Hc Gait Training Ea 15 Min 16 1    33731 (CPT®) Hc Pt Therapeutic Act Ea 15 Min 23 2    56381 (CPT®) Hc Pt Manual Therapy Ea 15 Min      36241 (CPT®) Hc Pt Iontophoresis Ea 15 Min      93436 (CPT®) Hc Pt Elec Stim Ea-Per 15 Min      38896 (CPT®) Hc Pt Ultrasound Ea 15 Min      35730 (CPT®) Hc Pt Self Care/Mgmt/Train Ea 15 Min      Total  39 3        Therapy Charges for Today     Code Description Service Date Service Provider Modifiers Qty    93580975620 HC GAIT TRAINING EA 15 MIN 7/11/2018 Delfina Renteria, PT GP 1    62213485905 HC PT THERAPEUTIC ACT EA 15 MIN 7/11/2018 Delfina Renteria, PT GP 2          PT G-Codes  Outcome Measure Options: AM-PAC 6 Clicks Basic Mobility  (PT)    Delfina Renteria, PT  7/11/2018

## 2018-07-11 NOTE — PROGRESS NOTES
Cardiothoracic Surgery Progress Note      POD #: 6-left great toe amputation     LOS: 14 days      Subjective: Wake and alert    Chief Complaint: Minimal surgical incisional pain    Objective:  Vital Signs  Temp:  [97.3 °F (36.3 °C)-99.3 °F (37.4 °C)] 98.7 °F (37.1 °C)  Heart Rate:  [66-75] 66  Resp:  [16] 16  BP: (115-141)/(53-85) 127/53    Physical Exam:   General Appearance:    Lungs:   Heart:   Skin:   Incision: Amputation site dressing change.  Sutures intact.  Dorsal and plantar skin flaps are viable     Results:    Results from last 7 days  Lab Units 07/11/18  0450   WBC 10*3/mm3 9.63   HEMOGLOBIN g/dL 8.4*   HEMATOCRIT % 26.7*   PLATELETS 10*3/mm3 37*       Results from last 7 days  Lab Units 07/08/18  0423   SODIUM mmol/L 139   POTASSIUM mmol/L 3.7   CHLORIDE mmol/L 110*   CO2 mmol/L 24.0   BUN mg/dL 22   CREATININE mg/dL 1.05   GLUCOSE mg/dL 74   CALCIUM mg/dL 7.8*         Assessment:1.  Postop day 6 left great toe amputation  #2.  Recent stroke almost complete recovery  #3.  Diabetes mellitus severe peripheral neuropathy  #4.  Peripheral vascular disease with prior Endo graft stenting and angioplasty left anterior tibial artery  Plan: Daily dressing changes.  Transfer to rehabilitation facility or home health okay with me.  Antibiotic therapy per infectious disease      Prakash Carrington MD - 07/11/18 - 12:08 PM

## 2018-07-11 NOTE — PLAN OF CARE
Problem: Patient Care Overview  Goal: Plan of Care Review  Outcome: Ongoing (interventions implemented as appropriate)   07/11/18 2325   Coping/Psychosocial   Plan of Care Reviewed With patient   Plan of Care Review   Progress improving   OTHER   Outcome Summary Pt. condition improved today. Pain controlled without any pain meds. Neuro checks unchanged from last assessment. Cardiac monitoring continued (NSR); Dsg changed via wound care to bilat arms. Dsg to left foot changed via MD. All dsgs CDI. Will continue to monitor. Rachna Hoff RN

## 2018-07-11 NOTE — PROGRESS NOTES
[facility] Medicine Services  PROGRESS NOTE    Patient Name: Lynne Sanchez  : 1934  MRN: [id number]    Date of Admission: 2018  Length of Stay: 14  Primary Care Physician: ANN Gonsalez    Subjective   Subjective     CC: f/u osteomyelitis    HPI: Sitting up in bed. Doing well. Denies pain. Has no concerns.    Review of Systems  Gen- No fevers, chills  CV- No chest pain, palpitations  Resp- No cough, dyspnea  GI- No N/V/D, abd pain    Otherwise ROS is negative except as mentioned in the HPI.    Objective   Objective     Vital Signs:   Temp:  [97.3 °F (36.3 °C)-99.3 °F (37.4 °C)] 98.7 °F (37.1 °C)  Heart Rate:  [66-75] 66  Resp:  [16] 16  BP: (115-141)/(53-85) 127/53  Total (NIH Stroke Scale): 0     Physical Exam:  Constitutional: No acute distress, awake, alert, chronically ill appearing  HENT: NCAT, mucous membranes moist  Respiratory: Clear to auscultation bilaterally, respiratory effort normal   Cardiovascular: RRR, no murmurs, rubs, or gallops, palpable pedal pulses bilaterally  Gastrointestinal: Positive bowel sounds, soft, nontender, nondistended  Musculoskeletal: Left toe dressed. Wound not examined.  Psychiatric: Appropriate affect, cooperative  Neurologic: Oriented x 3, strength symmetric in all extremities, Cranial Nerves grossly intact to confrontation, speech clear  Skin: No rashes    Results Reviewed:  I have personally reviewed current lab, radiology, and data and agree.      Results from last 7 days  Lab Units 18  0450 07/10/18  0523 18  0819   WBC 10*3/mm3 9.63 8.08 6.51   HEMOGLOBIN g/dL 8.4* 9.0* 6.7*   HEMATOCRIT % 26.7* 27.7* 21.4*   PLATELETS 10*3/mm3 37* 38* 51*       Results from last 7 days  Lab Units 18  0423 18  0430   SODIUM mmol/L 139 137   POTASSIUM mmol/L 3.7 3.8   CHLORIDE mmol/L 110* 108   CO2 mmol/L 24.0 22.0   BUN mg/dL 22 19   CREATININE mg/dL 1.05 1.13   GLUCOSE mg/dL 74 85   CALCIUM mg/dL 7.8* 8.2*      Estimated Creatinine Clearance: 42.7 mL/min (by C-G formula based on SCr of 1.05 mg/dL).  No results found for: BNP    Microbiology Results Abnormal     Procedure Component Value - Date/Time    Fungus Culture - Tissue, Toe, Left [[id number]]  (Abnormal) Collected:  07/05/18 1023    Lab Status:  Preliminary result Specimen:  Tissue from Toe, Left Updated:  07/11/18 1320     Fungus Culture Scant growth (1+) Candida parapsilosis (A)    AFB Culture - Tissue, Toe, Left [[id number]]  (Normal) Collected:  07/05/18 1023    Lab Status:  Preliminary result Specimen:  Tissue from Toe, Left Updated:  07/10/18 1130     AFB Culture No AFB isolated at less than 1 week     AFB Stain No acid fast bacilli seen on concentrated smear    Tissue / Bone Culture - Tissue, Toe, Left [[id number]]  (Abnormal)  (Susceptibility) Collected:  07/05/18 1023    Lab Status:  Final result Specimen:  Tissue from Toe, Left Updated:  07/09/18 1354     Tissue Culture Scant growth (1+) Enterobacter cloacae (A)      Light growth (2+) Staphylococcus haemolyticus (A)     Gram Stain Result Occasional WBCs seen      No organisms seen    Susceptibility      Enterobacter cloacae    Staphylococcus haemolyticus       PRIYA    PRIYA       Aztreonam <=8 ug/ml Susceptible             Cefepime <=8 ug/ml Susceptible             Cefotaxime <=2 ug/ml Susceptible             Ceftriaxone <=8 ug/ml Susceptible             Clindamycin       <=0.5 ug/ml Susceptible       Daptomycin       <=0.5 ug/ml Susceptible       Ertapenem <=1 ug/ml Susceptible             Erythromycin       <=0.5 ug/ml Susceptible       Gentamicin <=4 ug/ml Susceptible    <=4 ug/ml Susceptible       Levofloxacin <=2 ug/ml Susceptible    <=1 ug/ml Susceptible  [1]        Linezolid       <=1 ug/ml Susceptible       Meropenem <=1 ug/ml Susceptible             Oxacillin       1 ug/ml Resistant       Penicillin G       >8 ug/ml Resistant       Piperacillin + Tazobactam <=16 ug/ml Susceptible              Quinupristin + Dalfopristin       <=0.5 ug/ml Susceptible       Rifampin       <=1 ug/ml Susceptible       Tetracycline <=4 ug/ml Susceptible    <=4 ug/ml Susceptible       Tobramycin <=4 ug/ml Susceptible             Trimethoprim + Sulfamethoxazole <=2/38 ug/ml Susceptible    <=0.5/9.5 ug/ml Susceptible       Vancomycin       1 ug/ml Susceptible              [1]   Staphylococcus species may develop resistance during prolonged therapy with quinolones.  Isolates that are initially susceptible may become resistant within three to four days after initiation of therapy. Testing of repeat isolates may be warranted.                   Anaerobic Culture - Tissue, Toe, Left [[id number]]  (Normal) Collected:  07/05/18 1023    Lab Status:  Preliminary result Specimen:  Tissue from Toe, Left Updated:  07/06/18 1127     Culture No anaerobes isolated    Blood Culture - Blood, [[id number]]  (Normal) Collected:  06/28/18 0320    Lab Status:  Final result Specimen:  Blood from Arm, Right Updated:  07/03/18 0400     Blood Culture No growth at 5 days    Blood Culture - Blood, [[id number]]  (Normal) Collected:  06/28/18 0330    Lab Status:  Final result Specimen:  Blood from Arm, Left Updated:  07/03/18 0400     Blood Culture No growth at 5 days    Urine Culture - Urine, [[id number]]  (Abnormal)  (Susceptibility) Collected:  06/28/18 2300    Lab Status:  Final result Specimen:  Urine from Urine, Clean Catch Updated:  07/01/18 1120     Urine Culture 60,000-70,000 CFU/mL Klebsiella aerogenes (A)    Susceptibility      Klebsiella aerogenes     PRIYA     Aztreonam 16 ug/ml Intermediate     Cefepime <=8 ug/ml Susceptible     Cefotaxime 16 ug/ml Intermediate     Ceftriaxone 32 ug/ml Intermediate     Ertapenem <=1 ug/ml Susceptible     Gentamicin <=4 ug/ml Susceptible     Levofloxacin <=2 ug/ml Susceptible     Meropenem <=1 ug/ml Susceptible     Nitrofurantoin <=32 ug/ml Susceptible     Piperacillin + Tazobactam 64 ug/ml Intermediate      Tetracycline <=4 ug/ml Susceptible     Tobramycin <=4 ug/ml Susceptible     Trimethoprim + Sulfamethoxazole <=2/38 ug/ml Susceptible                        CT head personally reviewed without acute disease. Agree with interpretation.     Results for orders placed during the hospital encounter of 06/27/18   Adult Transthoracic Echo Complete W/ Cont if Necessary Per Protocol (With Agitated Saline)    Narrative · Left ventricular systolic function is normal. Estimated EF = 60%.  · Mild aortic valve regurgitation is present.  · Moderate mitral valve regurgitation is present  · Moderate to severe tricuspid valve regurgitation is present.  · Calculated right ventricular systolic pressure from tricuspid   regurgitation is 90 mmHg. Severe pulmonary hypertension is present  · Calculated right ventricular systolic pressure from tricuspid   regurgitation is 90 mmHg.          I have reviewed the medications.    Assessment/Plan   Assessment / Plan     Hospital Problem List     * (Principal)Osteomyelitis of left foot (CMS/HCC)    Chronic atrial fibrillation (CMS/HCC)    Overview Signed 12/22/2016  1:51 PM by Pan Saunders     1. Chronic atrial fibrillation, status post [employer] pacemaker implantation and AV node ablation:  a. Diagnosed June 2005.  b. Status post ECV restoring normal sinus rhythm, June 2005.  c. Rythmol initiated 05/01/2006.  d. Previously digoxin toxicity.  e. GXT Cardiolite, 09/08/2005:  No reversible ischemia.  LV function not performed secondary to atrial fibrillation.   f. Status post successful external cardioversion on 10/01/2008, Tessa Downey MD.  g. EKG on 10/21/2008:  Recurrence of atrial fibrillation with rate of 109.  h. Recurrent atrial fibrillation by EKG, 11/03/2008, asymptomatic.  i. Status post [employer] pacemaker implantation and AV node ablation.   j. Echocardiogram 02/15/2010:  Moderate MR, moderate TR.  RVSP 50.  EF greater than 55%, left atrial enlargement at 4.3 cm.           Type 2  diabetes mellitus treated without insulin (CMS/HCC) (Chronic)    Normocytic anemia    Peripheral arterial disease (CMS/HCC)    Cerebrovascular accident (CVA) due to thrombosis of left anterior cerebral artery (CMS/HCC)    Herpes zoster without complication    Thrombocytopenia (CMS/HCC)          Brief Hospital Course to date:  Lynne Sanchez is a 83 y.o. female with history of DMII, PVD, Afib/SSS/PPM, CKDIII, Anemia and osteomyelitis presented for treatment of foot wound but hospitalization complicated by acute stroke and shingles.     Assessment & Plan:     Osteomyelitis  - s/p left great toe amputation per Dr Carrington 7/5.  Daily dressing changes.   - ID following.  Plan was to stop abx at discharge per Dr. Belcher.     Shingles with ongoing pain  - s/p valtrex x 7 d  - trial gabapentin 300mg daily for now, titrate if patient tolerates.      Acute CVA after admission  - s/p tPA.  No obvious deficit.  C/o only generalized weakness.    - eliquis when O  - continue ASA, statin     Afib  - previously no ASA/plavix.  Consider eliquis when platelets recover and Hgb stable     Anemia  - chronic anemia dating back to at least 2015.  + occult blood but on oral iron.  Prior EGD 2017 showed only Barretts esophagus with gastritis.  Will place back on PPI.  - H/H contiunues trend down.  Gave 2 units PRBC on 7/9 with appropriate response     Thombocytopenia  - history of borderline low in past, continues to fall.  Down to 38 today.  She tells me she had BM bx 2017 with Dr. Rodriguez.    - hematology following.     DMII (A1c 8.5)  - continue current insulin, acceptable glc     SSS/PPM     PAD     CKDIII  --creatinine stable at 1.13  --monitor labs      DVT Prophylaxis:  Mechanical    CODE STATUS:   Code Status and Medical Interventions:   Ordered at: 06/28/18 1008     Level Of Support Discussed With:    Patient     Code Status:    CPR     Medical Interventions (Level of Support Prior to Arrest):    Full       Disposition: I expect the  patient to be discharged to rehab once bed obtained.      Electronically signed by Alisson Stapleton II, DO, 07/11/18, 3:42 PM.

## 2018-07-11 NOTE — PROGRESS NOTES
Clinical Nutrition   Reason For Visit: Follow-up protocol    Patient Name: Lynne Sanchez  YOB: 1934  MRN: [id number]  Date of Encounter: 07/11/18 11:47 AM  Admission date: 6/27/2018    Recommend adjusting insulin regimen to achieve FSBS goal of 140-180      Results from last 7 days  Lab Units 07/11/18  0738 07/10/18  2033 07/10/18  1653 07/10/18  1135 07/10/18  0815 07/09/18  2043   GLUCOSE mg/dL 208* 240* 264* 213* 122 203*       Nutrition Assessment     Hospital Problem List  Principal Problem:    Osteomyelitis of left foot (CMS/HCC)  Active Problems:    Chronic atrial fibrillation (CMS/HCC)    Type 2 diabetes mellitus treated without insulin (CMS/HCC)    Normocytic anemia    Peripheral arterial disease (CMS/HCC)    Cerebrovascular accident (CVA) due to thrombosis of left anterior cerebral artery (CMS/HCC)    Herpes zoster without complication    Thrombocytopenia (CMS/HCC)          PMH: She  has a past medical history of Anemia; CHF (congestive heart failure) (CMS/HCC); Chronic atrial fibrillation (CMS/HCC); Diabetes mellitus, type 2 (CMS/HCC); Glaucoma; History of transfusion; Hyperlipidemia; Hypertension; and Kidney disease.   PSxH: She  has a past surgical history that includes Appendectomy; Esophagogastroduodenoscopy (N/A, 10/9/2017); Cardiac catheterization (N/A, 10/10/2017); Interventional radiology procedure (N/A, 10/10/2017); Cardiac catheterization (N/A, 10/10/2017); Cardiac catheterization (N/A, 10/10/2017); Colonoscopy; Cardioversion; Cardiac Ablation; Pacemaker Implantation; Eye surgery (Bilateral); Bone marrow aspiration; Cardiac electrophysiology procedure (N/A, 5/3/2018); and Finger amputation (Left, 7/5/2018).       Other Applicable Diagnosis:  S/p left great toe amputation (7/5)      Reported/Observed/Food/Nutrition Related History   Patient sitting up in chair. Reports her appetite has been quite poor, but has recently began to improve. Typically drinks 1-2 whole Boost Glucose  Control supplements daily throughout the day. However, at times has difficulty opening and getting straw out of plastic wrap. Would like to receive supplement in cup to help with this. Ate 100% of cheerios and a muffin this morning at breakfast. RD opened Boost supplement for patient during visit so patient could consume. No additional preferences/requests.        Anthropometrics   Height: 65 in  Weight: 147 lbs (bedscale 6/27 per ns documentation)  BMI: 24.5  BMI classification: Normal: 18.5-24.9kg/m2       Labs reviewed   Labs reviewed: Yes    Results from last 7 days  Lab Units 07/11/18  0738 07/10/18  2033 07/10/18  1653 07/10/18  1135 07/10/18  0815 07/09/18  2043   GLUCOSE mg/dL 208* 240* 264* 213* 122 203*       Medications reviewed   Medications reviewed: Yes      Intake/Ouptut 24 hrs (7:00AM - 6:59 AM)     Intake & Output (last day)       07/10 0701 - 07/11 0700 07/11 0701 - 07/12 0700    Blood      IV Piggyback      Total Intake(mL/kg)      Urine (mL/kg/hr) 1050 (0.7)     Stool 0 (0)     Total Output 1050      Net -1050            Unmeasured Urine Occurrence 1 x     Unmeasured Stool Occurrence 1 x             Current Nutrition Prescription   PO: Diet Regular; Consistent Carbohydrate  Oral Nutrition Supplement: Boost Glucose Control 2x daily    Evaluation of Received Nutrient/Fluid Intake: 29% / 6 meals      Nutrition Diagnosis     Problem Inadequate oral intake   Etiology Poor appetite   Signs/Symptoms PO intake: 29% / 6 meals with consumption of 1-2 Boost supplements daily         Intervention   Intervention: Follow treatment progress, Care plan reviewed, Interview for preferences, Encourage intake    Recommend adjusting insulin regimen to achieve FSBS goal of 140-180    Goal:   General: Nutrition support treatment  PO: Increase intake      Monitoring/Evaluation:       Monitoring/Evaluation: Per protocol, PO intake, Supplement intake  Will Continue to follow per protocol  Lynette Esquivel RD  Time  Spent: 20 min

## 2018-07-11 NOTE — PLAN OF CARE
Problem: Patient Care Overview  Goal: Plan of Care Review  Outcome: Ongoing (interventions implemented as appropriate)   07/11/18 9664   Coping/Psychosocial   Plan of Care Reviewed With patient;spouse   Plan of Care Review   Progress improving   OTHER   Outcome Summary able to perform ther ex all 4 extrem & amb total of 60 ft (3 trials) w/ R wx & Tracy, w/ L toe offload shoe, but req.2 sit rests d/t fat. & desat to 92%; noted low HGB (8.4) ;Has had total of 2 units PRBC's; req. cont. cueing to WB status LLE

## 2018-07-11 NOTE — PROGRESS NOTES
"      PROBLEM LIST:  1.  Thrombocytopenia.  2.  Chronic anemia, details incomplete  3.  Osteomyelitis of the left foot  4.  Peripheral arterial disease  5.  Recent CVA  6.  Chronic atrial fibrillation    Subjective     HISTORY OF PRESENT ILLNESS:   Chief complaint: Low blood counts  Ms. Sanchez doesn't have any new symptoms.  She had a quiet night.  She doesn't report any nose bleeding, gum bleeding or other unusual bruising or bleeding.  She doesn't have any new neurologic symptoms.    Past Medical History, Past Surgical History, Social History, Family History have been reviewed and are without significant changes except as mentioned.    Review of Systems   A comprehensive 14 point review of systems was performed and was negative except as mentioned.    Medications:  The current medication list was reviewed in the EMR    ALLERGIES:    Allergies   Allergen Reactions   • Phenergan [Promethazine Hcl] Confusion       Objective      /69 (BP Location: Left arm, Patient Position: Lying)   Pulse 69   Temp 97.3 °F (36.3 °C) (Temporal Artery )   Resp 16   Ht 165.1 cm (65\")   Wt 66.7 kg (147 lb)   SpO2 99%   BMI 24.46 kg/m²        General: well appearing, in no acute distress  HEENT: sclera anicteric, oropharynx clear  Lymphatics: no cervical, supraclavicular, or axillary adenopathy  Cardiovascular: regular rate and rhythm, no murmurs  Lungs: clear to auscultation bilaterally  Abdomen: soft, nontender, nondistended.  No palpable organomegaly  Extremeties: no lower extremity edema, Bandages on her left foot were not removed  Skin: no rashes, lesions, bruising, or petechiae    RECENT LABS:   WBC   Date Value Ref Range Status   07/11/2018 9.63 3.50 - 10.80 10*3/mm3 Final     Hemoglobin   Date Value Ref Range Status   07/11/2018 8.4 (L) 11.5 - 15.5 g/dL Final     Hematocrit   Date Value Ref Range Status   07/11/2018 26.7 (L) 34.5 - 44.0 % Final     MCV   Date Value Ref Range Status   07/11/2018 84.2 80.0 - 99.0 fL " Final     RDW   Date Value Ref Range Status   07/11/2018 17.3 (H) 11.3 - 14.5 % Final     MPV   Date Value Ref Range Status   07/09/2018 9.7 6.0 - 12.0 fL Final     Platelets   Date Value Ref Range Status   07/11/2018 37 (L) 150 - 450 10*3/mm3 Final     Immature Grans %   Date Value Ref Range Status   07/11/2018 1.2 (H) 0.0 - 0.6 % Final     Neutrophils, Absolute   Date Value Ref Range Status   07/11/2018 4.57 1.50 - 8.30 10*3/mm3 Final     Lymphocytes, Absolute   Date Value Ref Range Status   07/11/2018 2.83 0.60 - 4.80 10*3/mm3 Final     Monocytes, Absolute   Date Value Ref Range Status   07/11/2018 1.77 (H) 0.00 - 1.00 10*3/mm3 Final     Eosinophils, Absolute   Date Value Ref Range Status   07/11/2018 0.24 0.00 - 0.30 10*3/mm3 Final     Basophils, Absolute   Date Value Ref Range Status   07/11/2018 0.10 0.00 - 0.20 10*3/mm3 Final     Immature Grans, Absolute   Date Value Ref Range Status   07/11/2018 0.12 (H) 0.00 - 0.03 10*3/mm3 Final       No results found for: GLUCOSE, NA, K, CO2, CL, ANIONGAP, CREATININE, BUN, BCR, CALCIUM, EGFRIFNONA, ALKPHOS, PROTEINTOT, ALT, AST, BILITOT, ALBUMIN, GLOB, LABIL2    LDH   Date Value Ref Range Status   07/11/2018 225 120 - 246 U/L Final       No results found for: MSPIKE, KAPPALAMB, IGLFLC, FREEKAPPAL            Assessment/Plan   Impression: 1.  Thrombocytopenia and anemia.  Her platelets were stable overnight and her hemoglobin dropped only slightly after her transfusion which could be dilutional.  Note that her reticulocyte count is very low for this degree of anemia suggesting an impaired marrow.  I suspect this is the mechanism of her thrombocytopenia also.  She probably has an underlying marrow disorder such as a very early myelodysplastic syndrome that is been managed expectantly with the problems exacerbated by her acute illness including infection.  It's also possible that she did have some marrow suppression from one of her meds, most likely from the  antibiotics.    Plan: 1.  We will continue to manage her conservatively.  As it need a platelet transfusion.  I would plan transfusion of red cells as needed based on her symptoms and hemodynamics.  2.  I'll review the manual differential when reported and the blood smear if necessary.  I'll follow with you.            Ankur Omalley MD   [facility] Hematology and Oncology    7/11/2018          CC:

## 2018-07-11 NOTE — PROGRESS NOTES
Continued Stay Note  [facility]     Patient Name: Lynne Sanchez  MRN: [id number]  Today's Date: 7/11/2018    Admit Date: 6/27/2018          Discharge Plan     Row Name 07/11/18 1632       Plan    Plan Comments CM received call from Angela at [facility] as referral had also been made to them on 7/10/18 and they are following and waiting on insurance auth to offer bed. CM will continue to follow.               Discharge Codes    No documentation.       Expected Discharge Date and Time     Expected Discharge Date Expected Discharge Time    Jul 9, 2018             Rachel Teixeira

## 2018-07-12 LAB
ACANTHOCYTES BLD QL SMEAR: ABNORMAL
BACTERIA SPEC ANAEROBE CULT: NORMAL
BASOPHILS # BLD MANUAL: 0.1 10*3/MM3 (ref 0–0.2)
BASOPHILS NFR BLD AUTO: 1 % (ref 0–1)
DEPRECATED RDW RBC AUTO: 53.2 FL (ref 37–54)
ELLIPTOCYTES BLD QL SMEAR: ABNORMAL
EOSINOPHIL # BLD MANUAL: 0.3 10*3/MM3 (ref 0.1–0.3)
EOSINOPHIL NFR BLD MANUAL: 3 % (ref 0–3)
ERYTHROCYTE [DISTWIDTH] IN BLOOD BY AUTOMATED COUNT: 17.5 % (ref 11.3–14.5)
GLUCOSE BLDC GLUCOMTR-MCNC: 265 MG/DL (ref 70–130)
GLUCOSE BLDC GLUCOMTR-MCNC: 277 MG/DL (ref 70–130)
GLUCOSE BLDC GLUCOMTR-MCNC: 336 MG/DL (ref 70–130)
GLUCOSE BLDC GLUCOMTR-MCNC: 365 MG/DL (ref 70–130)
HCT VFR BLD AUTO: 26.1 % (ref 34.5–44)
HGB BLD-MCNC: 8.4 G/DL (ref 11.5–15.5)
HYPOCHROMIA BLD QL: ABNORMAL
LYMPHOCYTES # BLD MANUAL: 2.79 10*3/MM3 (ref 0.6–4.8)
LYMPHOCYTES NFR BLD MANUAL: 28 % (ref 24–44)
LYMPHOCYTES NFR BLD MANUAL: 7 % (ref 0–12)
MCH RBC QN AUTO: 27 PG (ref 27–31)
MCHC RBC AUTO-ENTMCNC: 32.2 G/DL (ref 32–36)
MCV RBC AUTO: 83.9 FL (ref 80–99)
METAMYELOCYTES NFR BLD MANUAL: 2 % (ref 0–0)
MONOCYTES # BLD AUTO: 0.7 10*3/MM3 (ref 0–1)
NEUTROPHILS # BLD AUTO: 5.78 10*3/MM3 (ref 1.5–8.3)
NEUTROPHILS NFR BLD MANUAL: 58 % (ref 41–71)
PLAT MORPH BLD: NORMAL
PLATELET # BLD AUTO: 54 10*3/MM3 (ref 150–450)
RBC # BLD AUTO: 3.11 10*6/MM3 (ref 3.89–5.14)
SCHISTOCYTES BLD QL SMEAR: ABNORMAL
SMUDGE CELLS BLD QL SMEAR: ABNORMAL
VARIANT LYMPHS NFR BLD MANUAL: 1 % (ref 0–5)
WBC NRBC COR # BLD: 9.97 10*3/MM3 (ref 3.5–10.8)

## 2018-07-12 PROCEDURE — 63710000001 INSULIN DETEMIR PER 5 UNITS: Performed by: INTERNAL MEDICINE

## 2018-07-12 PROCEDURE — 99231 SBSQ HOSP IP/OBS SF/LOW 25: CPT | Performed by: INTERNAL MEDICINE

## 2018-07-12 PROCEDURE — 99232 SBSQ HOSP IP/OBS MODERATE 35: CPT | Performed by: INTERNAL MEDICINE

## 2018-07-12 PROCEDURE — 97110 THERAPEUTIC EXERCISES: CPT

## 2018-07-12 PROCEDURE — 85025 COMPLETE CBC W/AUTO DIFF WBC: CPT | Performed by: INTERNAL MEDICINE

## 2018-07-12 PROCEDURE — 97116 GAIT TRAINING THERAPY: CPT

## 2018-07-12 PROCEDURE — 82962 GLUCOSE BLOOD TEST: CPT

## 2018-07-12 PROCEDURE — 85007 BL SMEAR W/DIFF WBC COUNT: CPT | Performed by: INTERNAL MEDICINE

## 2018-07-12 RX ADMIN — LATANOPROST 1 DROP: 50 SOLUTION OPHTHALMIC at 20:09

## 2018-07-12 RX ADMIN — ASPIRIN 81 MG: 81 TABLET, COATED ORAL at 08:09

## 2018-07-12 RX ADMIN — FUROSEMIDE 20 MG: 20 TABLET ORAL at 08:10

## 2018-07-12 RX ADMIN — POVIDONE IODINE 10%: 100 LIQUID TOPICAL at 11:07

## 2018-07-12 RX ADMIN — INSULIN DETEMIR 10 UNITS: 100 INJECTION, SOLUTION SUBCUTANEOUS at 20:15

## 2018-07-12 RX ADMIN — METOPROLOL TARTRATE 100 MG: 100 TABLET ORAL at 20:08

## 2018-07-12 RX ADMIN — Medication 325 MG: at 08:10

## 2018-07-12 RX ADMIN — INSULIN LISPRO 6 UNITS: 100 INJECTION, SOLUTION INTRAVENOUS; SUBCUTANEOUS at 20:15

## 2018-07-12 RX ADMIN — NYSTATIN: 100000 POWDER TOPICAL at 11:02

## 2018-07-12 RX ADMIN — GABAPENTIN 300 MG: 300 CAPSULE ORAL at 08:09

## 2018-07-12 RX ADMIN — PANTOPRAZOLE SODIUM 40 MG: 40 TABLET, DELAYED RELEASE ORAL at 05:56

## 2018-07-12 RX ADMIN — Medication 325 MG: at 17:46

## 2018-07-12 RX ADMIN — INSULIN LISPRO 4 UNITS: 100 INJECTION, SOLUTION INTRAVENOUS; SUBCUTANEOUS at 12:30

## 2018-07-12 RX ADMIN — Medication 5 MG: at 20:15

## 2018-07-12 RX ADMIN — METOPROLOL TARTRATE 100 MG: 100 TABLET ORAL at 08:10

## 2018-07-12 RX ADMIN — INSULIN LISPRO 5 UNITS: 100 INJECTION, SOLUTION INTRAVENOUS; SUBCUTANEOUS at 17:45

## 2018-07-12 RX ADMIN — ATORVASTATIN CALCIUM 80 MG: 40 TABLET, FILM COATED ORAL at 20:09

## 2018-07-12 RX ADMIN — Medication 1 CAPSULE: at 08:09

## 2018-07-12 RX ADMIN — INSULIN LISPRO 4 UNITS: 100 INJECTION, SOLUTION INTRAVENOUS; SUBCUTANEOUS at 08:09

## 2018-07-12 RX ADMIN — NYSTATIN: 100000 POWDER TOPICAL at 20:09

## 2018-07-12 NOTE — THERAPY TREATMENT NOTE
Acute Care - Physical Therapy Treatment Note  [facility]     Patient Name: Lynne Sanchez  : 1934  MRN: [id number]  Today's Date: 2018  Onset of Illness/Injury or Date of Surgery: 18  Date of Referral to PT: 18  Referring Physician: MD Zeb    Admit Date: 2018    Visit Dx:    ICD-10-CM ICD-9-CM   1. Other chronic osteomyelitis of left foot (CMS/[facility]) M86.672 730.17   2. Cerebrovascular accident (CVA), unspecified mechanism (CMS/[facility]) I63.9 434.91   3. Impaired mobility and ADLs Z74.09 799.89   4. Impaired functional mobility, balance, gait, and endurance Z74.09 V49.89   5. Aphasia R47.01 784.3   6. 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 (CMS/[facility])   • Melena   • Chronic gastritis   • Peripheral arterial disease (CMS/[facility])   • Cerebrovascular accident (CVA) due to thrombosis of left anterior cerebral artery (CMS/[facility])   • Herpes zoster without complication   • Thrombocytopenia (CMS/[facility])       Therapy Treatment          Rehabilitation Treatment Summary     Row Name 18 0957             Treatment Time/Intention    Discipline physical therapist  -BD      Document Type therapy note (daily note)  -BD      Subjective Information no complaints  -BD      Mode of Treatment physical therapy  -BD      Patient/Family Observations Pt supine in bed, telem, L foot bandaged.  -BD      Care Plan Review care plan/treatment goals reviewed  -BD      Therapy Frequency (PT Clinical Impression) daily  -BD      Recorded by [BD] Porsche Agrawal, PT 18 1047      Row Name 18 0957             Vital Signs    Pre Systolic BP Rehab 138  -BD       Pre Treatment Diastolic BP 55  -BD      Pretreatment Heart Rate (beats/min) 79  -BD      Posttreatment Heart Rate (beats/min) 80  -BD      Pre SpO2 (%) --   not being monitored  -BD      O2 Delivery Pre Treatment room air  -BD      O2 Delivery Intra Treatment room air  -BD      O2 Delivery Post Treatment room air  -BD      Pre Patient Position Supine  -BD      Intra Patient Position Standing  -BD      Post Patient Position Sitting  -BD      Recorded by [BD] Porsche Agrawal, PT 07/12/18 1047      Row Name 07/12/18 0957             Cognitive Assessment/Intervention- PT/OT    Affect/Mental Status (Cognitive) WFL  -BD      Orientation Status (Cognition) oriented x 4  -BD      Follows Commands (Cognition) follows one step commands;over 90% accuracy  -BD      Cognitive Function (Cognitive) WFL  -BD      Personal Safety Interventions fall prevention program maintained;gait belt;nonskid shoes/slippers when out of bed  -BD      Recorded by [BD] Porsche Agrawal, PT 07/12/18 1047      Row Name 07/12/18 0957             Safety Issues, Functional Mobility    Safety Issues Affecting Function (Mobility) safety precaution awareness  -BD      Impairments Affecting Function (Mobility) balance;endurance/activity tolerance;strength  -BD      Recorded by [BD] Porsche Agrawal, PT 07/12/18 1058      Row Name 07/12/18 0957             Mobility Assessment/Intervention    Extremity Weight-bearing Status left lower extremity  -BD      Left Lower Extremity (Weight-bearing Status) touch down weight-bearing (TDWB);partial weight-bearing (PWB)  -BD      Recorded by [BD] Porsche Agrawal, PT 07/12/18 1058      Row Name 07/12/18 0957             Bed Mobility Assessment/Treatment    Bed Mobility Assessment/Treatment supine-sit  -BD      Scooting/Bridging [place] (Bed Mobility) supervision  -BD      Bed Mobility, Safety Issues decreased use of arms for pushing/pulling;decreased use of legs for bridging/pushing  -BD      Assistive  "Device (Bed Mobility) bed rails  -BD      Recorded by [BD] Porsche Agrawal, PT 07/12/18 1058      Row Name 07/12/18 0957             Transfer Assessment/Treatment    Transfer Assessment/Treatment sit-stand transfer;stand-sit transfer  -BD      Maintains Weight-bearing Status (Transfers) able to maintain  -BD      Recorded by [BD] Porsche Agrawal, PT 07/12/18 1058      Row Name 07/12/18 0957             Bed-Chair Transfer    Bed-Chair [place] (Transfers) contact guard;verbal cues  -BD      Assistive Device (Bed-Chair Transfers) walker, front-wheeled  -BD      Recorded by [BD] Porsche Agrawal, PT 07/12/18 1058      Row Name 07/12/18 0957             Sit-Stand Transfer    Sit-Stand [place] (Transfers) contact guard  -BD      Assistive Device (Sit-Stand Transfers) walker, front-wheeled  -BD      Recorded by [BD] Porsche Agrawal, PT 07/12/18 1058      Row Name 07/12/18 0957             Stand-Sit Transfer    Stand-Sit [place] (Transfers) contact guard  -BD      Assistive Device (Stand-Sit Transfers) walker, front-wheeled  -BD      Recorded by [BD] Porsche Agrawal, PT 07/12/18 1058      Row Name 07/12/18 0957             Gait/Stairs Assessment/Training    41203 - Gait Training Minutes  15  -BD      Gait/Stairs Assessment/Training gait/ambulation independence;distance ambulated;gait pattern;gait deviations;maintains weight-bearing status  -BD      [place] Level (Gait) contact guard;verbal cues  -BD      Assistive Device (Gait) walker, front-wheeled  -BD      Distance in Feet (Gait) 100  -BD      Pattern (Gait) step-through  -BD      Deviations/Abnormal Patterns (Gait) left sided deviations;antalgic;base of support, narrow;stride length decreased  -BD      Bilateral Gait Deviations forward flexed posture  -BD      Left Sided Gait Deviations weight shift ability decreased  -BD      Comment (Gait/Stairs) a few minor baolance loss \"checks\"  -BD      Recorded by [BD] Porsche Agrawal, PT " 07/12/18 1058      Row Name 07/12/18 0957             Motor Skills Assessment/Interventions    Additional Documentation Therapeutic Exercise (Group);Therapeutic Exercise Interventions (Group)  -BD      Recorded by [BD] Porsche Agrawal, PT 07/12/18 1047      Row Name 07/12/18 0957             Therapeutic Exercise    Therapeutic Exercise sidelying, lower extremities  -BD      79844 - PT Therapeutic Exercise Minutes 10  -BD      Recorded by [BD] Porsche Agrawal, PT 07/12/18 1047      Row Name 07/12/18 0957             Lower Extremity Seated Therapeutic Exercise    Exercise Type, Seated Lower Extremity (Therapeutic Exercise) AROM (active range of motion)  -BD      Recorded by [BD] Porsche Agrawal, PT 07/12/18 1047      Row Name 07/12/18 0957             Therapeutic Exercise    Lower Extremity (Therapeutic Exercise) SLR (straight leg raise), bilateral  -BD      Lower Extremity Range of Motion (Therapeutic Exercise) hip internal/external rotation, bilateral;ankle dorsiflexion/plantar flexion, bilateral  -BD      Recorded by [BD] Porsche Agrawal, PT 07/12/18 1047      Row Name 07/12/18 0957             Balance    Balance dynamic sitting balance  -BD      Recorded by [BD] Porsche Agrawal, PT 07/12/18 1047      Row Name 07/12/18 0957             Static Sitting Balance    Level of [place] (Unsupported Sitting, Static Balance) independent  -BD      Recorded by [BD] Porsche Agrawal, PT 07/12/18 1047      Row Name 07/12/18 0957             Static Standing Balance    Level of [place] (Supported Standing, Static Balance) contact guard assist  -BD      Time Able to Maintain Position (Supported Standing, Static Balance) 1 to 2 minutes  -BD      Assistive Device Utilized (Supported Standing, Static Balance) rolling walker  -BD      Comment (Supported Standing, Static Balance) Trunk Postur, extension and head up  -BD      Recorded by [BD] Porsche Agrawal, PT 07/12/18 1047      Row Name 07/12/18 0957              Dynamic Standing Balance    Level of [place], Reaches Outside Midline (Standing, Dynamic Balance) contact guard assist  -BD      Time Able to Maintain Position, Reaches Outside Midline (Standing, Dynamic Balance) 30 to 45 seconds  -BD      Level of [place], Resists Mild Perturbations (Standing, Dynamic Balance) contact guard assist  -BD      Recorded by [BD] Porsche Agrawal, PT 07/12/18 1047      Row Name 07/12/18 0957             Positioning and Restraints    Pre-Treatment Position in bed  -BD      Post Treatment Position chair  -BD      In Chair notified nsg;reclined;call light within reach;encouraged to call for assist;exit alarm on;legs elevated;LLE elevated;heels elevated  -BD      Recorded by [BD] Porsche Agrawal, PT 07/12/18 1047      Row Name 07/12/18 0957             Pain Assessment    Additional Documentation Pain Scale: Numbers Pre/Post-Treatment (Group)  -BD      Recorded by [BD] Porsche Agrawal, PT 07/12/18 1047      Row Name 07/12/18 0957             Pain Scale: Numbers Pre/Post-Treatment    Pain Scale: Numbers, Pretreatment 0/10 - no pain  -BD      Pain Scale: Numbers, Post-Treatment 0/10 - no pain  -BD      Recorded by [BD] Porsche Agrawal, PT 07/12/18 1047      Row Name                Wound 06/27/18 1659 Left toe pressure injury;diabetic/neuropathic ulceration    Wound - Properties Group Date first assessed: 06/27/18 [KA] Time first assessed: 1659 [KA] Present On Admission : yes [KA] Side: Left [KA] Location: toe [KA] Type: pressure injury;diabetic/neuropathic ulceration [KA] Additional Comments: possible arterial ulcer; pt pt skin tear from tape removal [CP] Recorded by:  [CP] BILL Magallanes 06/28/18 1335 [KA] Anel Woodruff RN 06/27/18 1700    Row Name                Wound 06/28/18 1030 Right distal toe other (see comments)    Wound - Properties Group Date first assessed: 06/28/18 [CP] Time first assessed: 1030 [CP] Present On Admission : yes;picture taken  [CP] Side: Right [CP] Orientation: distal [CP] Location: toe [CP] Type: other (see comments) [CP] Additional Comments: unknown origin; possible tape inury; right great toe [CP] Recorded by:  [CP] BILL Magallanes 06/28/18 1340    Row Name                Wound 06/28/18 1030 Right elbow abrasion    Wound - Properties Group Date first assessed: 06/28/18 [CP] Time first assessed: 1030 [CP] Present On Admission : yes;picture taken [CP] Side: Right [CP] Location: elbow [CP] Type: abrasion [CP] Recorded by:  [CP] BILL Magallanes 06/28/18 1344    Row Name                Wound 06/28/18 1030 Left lower arm skin tear    Wound - Properties Group Date first assessed: 06/28/18 [CP] Time first assessed: 1030 [CP] Present On Admission : yes [CP] Side: Left [CP] Orientation: lower [CP] Location: arm [CP] Type: skin tear [CP] Additional Comments: forearm and elbow [CP] Recorded by:  [CP] BILL Magallanes 06/28/18 1346    Row Name                Wound 07/05/18 1044 Left leg incision    Wound - Properties Group Date first assessed: 07/05/18 [BM] Time first assessed: 1044 [BM] Side: Left [BM] Location: leg [BM] Type: incision [BM] Recorded by:  [BM] Leonie Ozuna RN 07/05/18 1044    Row Name                Wound 07/05/18 1130 Left toe other (see comments)    Wound - Properties Group Date first assessed: 07/05/18 [KL] Time first assessed: 1130 [KL] Present On Admission : no [KL] Side: Left [KL] Location: toe [KL] Type: other (see comments) [KL], amputation  Wound Outcome: Amputation [KL] Recorded by:  [KL] Leslye Gambino RN 07/05/18 1556    Row Name 07/12/18 0957             Outcome Summary/Treatment Plan (PT)    Daily Summary of Progress (PT) progress toward functional goals is good  -BD      Anticipated Equipment Needs at Discharge (PT) front wheeled walker  -BD      Anticipated Discharge Disposition (PT) inpatient rehabilitation facility  -BD      Recorded by [BD] Porsche Agrawal, PT 07/12/18 1040         User Key  (r) = Recorded By, (t) = Taken By, (c) = Cosigned By    Initials Name Effective Dates Discipline    CP Lorraine Morillo, APRN 06/08/18 -  Nurse    JAZZ Gambino, SHAD 06/16/16 -  Nurse    JACKSON Agrawal, PT 06/08/18 -  PT    SYLVIA Woodruff, SHAD 06/23/17 -  Nurse    BM Leonie Ozuna, SHAD 08/17/17 -  Nurse          Rash 06/27/18 2044 Left lower breast other (see comments) (Active)   Distribution localized 7/12/2018  8:09 AM   Configuration/Shape asymmetric 7/12/2018  8:09 AM   Borders irregular 7/12/2018  8:09 AM   Characteristics scaly 7/12/2018  8:09 AM   Color pink 7/12/2018  8:09 AM       Rash 06/28/18 1030 Right posterior leg vesicle (Active)   Distribution regional 7/12/2018  8:09 AM   Configuration/Shape asymmetric 7/12/2018  8:09 AM   Borders irregular 7/11/2018  8:00 PM   Characteristics dry;crusted;scaly 7/12/2018  8:09 AM   Color red 7/11/2018  8:00 PM       Wound 06/27/18 1659 Left toe pressure injury;diabetic/neuropathic ulceration (Active)   Dressing Appearance dry;intact;no drainage 7/12/2018  8:09 AM   Closure JIMMY 7/12/2018  8:09 AM   Care, Wound cleansed with 7/12/2018  8:09 AM   Dressing Care, Wound open to air 7/12/2018  8:09 AM       Wound 06/28/18 1030 Right distal toe other (see comments) (Active)   Dressing Appearance open to air 7/12/2018  8:09 AM   Base dry;scab 7/12/2018  8:09 AM   Periwound intact;dry;pink;blanchable 7/11/2018 11:15 AM   Periwound Temperature warm 7/12/2018  8:09 AM   Periwound Skin Turgor firm 7/12/2018  8:09 AM   Edges irregular;callused 7/12/2018  8:09 AM   Drainage Amount none 7/12/2018  8:09 AM   Care, Wound cleansed with 7/12/2018  8:09 AM   Dressing Care, Wound open to air 7/11/2018 11:15 AM       Wound 06/28/18 1030 Right elbow abrasion (Active)   Dressing Appearance dry;intact;no drainage 7/12/2018  8:09 AM   Base clean;dry;scab 7/11/2018  8:00 PM   Periwound intact;dry;pink;blanchable 7/11/2018  8:00 PM   Periwound Temperature warm  7/11/2018  8:00 PM   Periwound Skin Turgor firm 7/11/2018  8:00 PM   Edges irregular 7/11/2018  8:00 PM   Drainage Amount none 7/11/2018 11:15 AM   Care, Wound cleansed with;sterile normal saline 7/11/2018 11:15 AM   Dressing Care, Wound foam 7/12/2018  8:09 AM       Wound 06/28/18 1030 Left lower arm skin tear (Active)   Dressing Appearance dry;intact;no drainage 7/12/2018  8:09 AM   Base scab;dry 7/12/2018  8:09 AM   Periwound intact;dry;pink 7/11/2018 11:15 AM   Periwound Temperature warm 7/11/2018  8:00 PM   Periwound Skin Turgor soft 7/11/2018  8:00 PM   Edges irregular 7/11/2018  8:00 PM   Wound Length (cm) 0.8 cm 7/11/2018 11:15 AM   Wound Width (cm) 0.3 cm 7/11/2018 11:15 AM   Wound Depth (cm) 0.1 cm 7/11/2018 11:15 AM   Drainage Amount none 7/12/2018  8:09 AM   Care, Wound cleansed with;sterile normal saline 7/11/2018 11:15 AM   Dressing Care, Wound foam 7/12/2018  8:09 AM       Wound 07/05/18 1044 Left leg incision (Active)   Dressing Appearance dry;intact;no drainage 7/12/2018  8:09 AM   Closure JIMMY 7/12/2018  8:09 AM   Dressing Care, Wound gauze, dry 7/12/2018  8:09 AM       Wound 07/05/18 1130 Left toe other (see comments) (Active)   Dressing Appearance dry;intact;no drainage 7/12/2018  8:09 AM   Care, Wound other (see comments) 7/12/2018  8:09 AM   Dressing Care, Wound gauze, dry 7/12/2018  8:09 AM             Physical Therapy Education     Title: PT OT SLP Therapies (Active)     Topic: Physical Therapy (Active)     Point: Mobility training (Active)    Learning Progress Summary     Learner Status Readiness Method Response Comment Documented by    Patient Active Acceptance E,D NR  BD 07/12/18 1048     Active Eager E,D NR  DM 07/11/18 1724     Done Acceptance E,D VU,NR Reviewed WB status, benefits of mobility MJ 07/09/18 1713     Done Acceptance E,D CAROLINE,NR Educated on weight bearing status, correct gait mechanics, and HEP. LR 07/08/18 1147     Done Acceptance E,D CAROLINE,NR Educated on correct gait mechanics  and progression of POC. LR 07/04/18 1553     Active Acceptance E NR Educated pt on importance of mobility to return home and to PLOF. Informed pt of the benefits of maintaining mobility prior to possibility of surgery to ease recovery. JESSICA 07/02/18 0859     Active Acceptance E,D NR   06/30/18 1210    Significant Other Active Eager E,D NR   07/11/18 1724     Done Acceptance E,D VU,NR Educated on correct gait mechanics and progression of POC. LR 07/04/18 1553     Active Acceptance E,D NR   06/30/18 1210          Point: Home exercise program (Active)    Learning Progress Summary     Learner Status Readiness Method Response Comment Documented by    Patient Active Acceptance E,D NR   07/12/18 1048     Active Eager E,D NR   07/11/18 1724     Done Acceptance E,D VU,NR Reviewed WB status, benefits of mobility  07/09/18 1713     Done Acceptance E,D VU,NR Educated on weight bearing status, correct gait mechanics, and HEP.  07/08/18 1147     Done Acceptance E,D VU,NR Educated on correct gait mechanics and progression of POC.  07/04/18 1553     Active Acceptance E NR Educated pt on importance of mobility to return home and to PLOF. Informed pt of the benefits of maintaining mobility prior to possibility of surgery to ease recovery. JESSICA 07/02/18 0859    Significant Other Active Eager E,D NR   07/11/18 1724     Done Acceptance E,D VU,NR Educated on correct gait mechanics and progression of POC. LR 07/04/18 1553          Point: Body mechanics (Active)    Learning Progress Summary     Learner Status Readiness Method Response Comment Documented by    Patient Active Acceptance E,D NR   07/12/18 1048     Active Eager E,D NR   07/11/18 1724     Done Acceptance E,D VU,NR Reviewed WB status, benefits of mobility  07/09/18 1713     Done Acceptance E,D VU,NR Educated on weight bearing status, correct gait mechanics, and HEP.  07/08/18 1147     Done Acceptance E,D VU,NR Educated on correct gait mechanics and  progression of POC.  07/04/18 1553     Active Acceptance E NR Educated pt on importance of mobility to return home and to PLOF. Informed pt of the benefits of maintaining mobility prior to possibility of surgery to ease recovery. JESSICA 07/02/18 0859     Active Acceptance E,D NR   06/30/18 1210    Significant Other Active Eager E,D NR  DM 07/11/18 1724     Done Acceptance E,D VU,NR Educated on correct gait mechanics and progression of POC. LR 07/04/18 1553     Active Acceptance E,D NR   06/30/18 1210          Point: Precautions (Active)    Learning Progress Summary     Learner Status Readiness Method Response Comment Documented by    Patient Active Acceptance E,D NR   07/12/18 1048     Active Eager E,D NR   07/11/18 1724     Done Acceptance E,D VU,NR Reviewed WB status, benefits of mobility  07/09/18 1713     Done Acceptance E,D VU,NR Educated on weight bearing status, correct gait mechanics, and HEP.  07/08/18 1147     Done Acceptance E,D VU,NR Educated on correct gait mechanics and progression of POC.  07/04/18 1553     Active Acceptance E NR Educated pt on importance of mobility to return home and to PLOF. Informed pt of the benefits of maintaining mobility prior to possibility of surgery to ease recovery. JESSICA 07/02/18 0859     Active Acceptance E,D NR   06/30/18 1210    Significant Other Active Eager E,D NR   07/11/18 1724     Done Acceptance E,D VU,NR Educated on correct gait mechanics and progression of POC. LR 07/04/18 1553     Active Acceptance E,D NR   06/30/18 1210                      User Key     Initials Effective Dates Name Provider Type Discipline    DM 06/19/15 -  Delfina Renteria, PT Physical Therapist PT    LR 06/19/15 -  Vilma Edmonds, PT Physical Therapist PT    LS 06/19/15 -  Loly Telles, PT Physical Therapist PT     04/03/18 -  Jaswnider Wadsworth, PT Physical Therapist PT     06/08/18 -  Porsche Agrawal, PT Physical Therapist PT    JESSICA 05/15/18 -  Dillon Lawrence, PT  Student PT Student PT                    PT Recommendation and Plan  Anticipated Discharge Disposition (PT): inpatient rehabilitation facility  Therapy Frequency (PT Clinical Impression): daily  Outcome Summary/Treatment Plan (PT)  Daily Summary of Progress (PT): progress toward functional goals is good  Anticipated Equipment Needs at Discharge (PT): front wheeled walker  Anticipated Discharge Disposition (PT): inpatient rehabilitation facility  Plan of Care Reviewed With: patient  Progress: improving  Outcome Summary: Pt is impoving with bed mobiity, transfers and gait. She needs safety assist when ambulating with AD due to baolance losses at times. Continue with PT goals.          Outcome Measures     Row Name 07/12/18 0957 07/11/18 1617 07/09/18 1645       How much help from another person do you currently need...    Turning from your back to your side while in flat bed without using bedrails? 4  -BD 4  -DM 4  -MJ    Moving from lying on back to sitting on the side of a flat bed without bedrails? 4  -BD 3  -DM 3  -MJ    Moving to and from a bed to a chair (including a wheelchair)? 3  -BD 3  -DM 3  -MJ    Standing up from a chair using your arms (e.g., wheelchair, bedside chair)? 3  -BD 3  -DM 3  -MJ    Climbing 3-5 steps with a railing? 2  -BD 2  -DM 2  -MJ    To walk in hospital room? 3  -BD 3  -DM 3  -MJ    AM-PAC 6 Clicks Score 19  -BD 18  -DM 18  -MJ       Modified [place] Scale    Modified [place] Scale  -- 4 - Moderately severe disability.  Unable to walk without assistance, and unable to attend to own bodily needs without assistance.  -DM  --       Functional Assessment    Outcome Measure Options AM-PAC 6 Clicks Basic Mobility (PT)  -BD AM-PAC 6 Clicks Basic Mobility (PT)  -DM AM-PAC 6 Clicks Basic Mobility (PT)  -MJ      User Key  (r) = Recorded By, (t) = Taken By, (c) = Cosigned By    Initials Name Provider Type    DM Delfina Renteria, PT Physical Therapist    JOSR Wadsworth, PT Physical Therapist    JACKSON  Porsche Agrawal, PT Physical Therapist           Time Calculation:         PT Charges     Row Name 07/12/18 1059 07/12/18 0957          Time Calculation    Start Time 0957  -BD  --     PT Received On 07/12/18  -BD  --        Time Calculation- PT    Total Timed Code Minutes- PT 32 minute(s)  -BD  --        Timed Charges    81096 - PT Therapeutic Exercise Minutes  -- 10  -BD     02120 - Gait Training Minutes   -- 15  -BD       User Key  (r) = Recorded By, (t) = Taken By, (c) = Cosigned By    Initials Name Provider Type    BD Porsche Agrawal, PT Physical Therapist        Therapy Suggested Charges     Code   Minutes Charges    20908 (CPT®) Hc Pt Neuromusc Re Education Ea 15 Min      53026 (CPT®) Hc Pt Ther Proc Ea 15 Min 10 1    99463 (CPT®) Hc Gait Training Ea 15 Min 15 1    12432 (CPT®) Hc Pt Therapeutic Act Ea 15 Min      97848 (CPT®) Hc Pt Manual Therapy Ea 15 Min      84086 (CPT®) Hc Pt Iontophoresis Ea 15 Min      94650 (CPT®) Hc Pt Elec Stim Ea-Per 15 Min      06954 (CPT®) Hc Pt Ultrasound Ea 15 Min      27239 (CPT®) Hc Pt Self Care/Mgmt/Train Ea 15 Min      Total  25 2        Therapy Charges for Today     Code Description Service Date Service Provider Modifiers Qty    24472259390 HC PT THER PROC EA 15 MIN 7/12/2018 Porsche Agrawal, PT GP 1    01854488437 HC GAIT TRAINING EA 15 MIN 7/12/2018 Porsche Agrawal, PT GP 1    55788930130 HC PT THER SUPP EA 15 MIN 7/12/2018 Porsche Agrawal, PT GP 2          PT G-Codes  Outcome Measure Options: AM-PAC 6 Clicks Basic Mobility (PT)    Porsche Agrawal, PT  7/12/2018

## 2018-07-12 NOTE — PROGRESS NOTES
"      PROBLEM LIST:  1.  Thrombocytopenia and chronic anemia.  2.  Osteomyelitis in the left foot  3.  Recent CVA  4.  Chronic atrial fibrillation  5.  Peripheral arterial disease  6.  Dermatomal zoster    Subjective     HISTORY OF PRESENT ILLNESS:   Ms. Sanchez doesn't have any new symptoms.  She is slowly gaining strength.  She hasn't noticed any nose bleeding or other bleeding.  She does still bruise easily including at venipuncture sites.    Past Medical History, Past Surgical History, Social History, Family History have been reviewed and are without significant changes except as mentioned.    Review of Systems   A comprehensive 14 point review of systems was performed and was negative except as mentioned.    Medications:  The current medication list was reviewed in the EMR    ALLERGIES:    Allergies   Allergen Reactions   • Phenergan [Promethazine Hcl] Confusion       Objective      /55 (BP Location: Left arm, Patient Position: Lying)   Pulse 69   Temp 97.5 °F (36.4 °C) (Oral)   Resp 18   Ht 165.1 cm (65\")   Wt 66.7 kg (147 lb)   SpO2 94%   BMI 24.46 kg/m²       General: Comfortable- appearing, in no acute distress  HEENT: sclera anicteric, oropharynx clear  Lymphatics: no cervical, supraclavicular, or axillary adenopathy  Cardiovascular: regular rate and rhythm, no murmurs  Lungs: clear to auscultation bilaterally  Abdomen: soft, nontender, nondistended.  No palpable organomegaly  Extremeties: no lower extremity edema, left foot bandage not removed  Skin: no rashes, lesions, bruising, or petechiae    RECENT LABS:   WBC   Date Value Ref Range Status   07/12/2018 9.97 3.50 - 10.80 10*3/mm3 Final     Hemoglobin   Date Value Ref Range Status   07/12/2018 8.4 (L) 11.5 - 15.5 g/dL Final     Hematocrit   Date Value Ref Range Status   07/12/2018 26.1 (L) 34.5 - 44.0 % Final     MCV   Date Value Ref Range Status   07/12/2018 83.9 80.0 - 99.0 fL Final     RDW   Date Value Ref Range Status   07/12/2018 17.5 " (H) 11.3 - 14.5 % Final     Platelets   Date Value Ref Range Status   07/12/2018 54 (L) 150 - 450 10*3/mm3 Final     Immature Grans %   Date Value Ref Range Status   07/11/2018 1.2 (H) 0.0 - 0.6 % Final     Neutrophils, Absolute   Date Value Ref Range Status   07/11/2018 4.57 1.50 - 8.30 10*3/mm3 Final     Neutrophils Absolute   Date Value Ref Range Status   07/12/2018 5.78 1.50 - 8.30 10*3/mm3 Final     Lymphocytes, Absolute   Date Value Ref Range Status   07/11/2018 2.83 0.60 - 4.80 10*3/mm3 Final     Monocytes, Absolute   Date Value Ref Range Status   07/11/2018 1.77 (H) 0.00 - 1.00 10*3/mm3 Final     Eosinophils, Absolute   Date Value Ref Range Status   07/11/2018 0.24 0.00 - 0.30 10*3/mm3 Final     Eosinophils Absolute   Date Value Ref Range Status   07/12/2018 0.30 0.10 - 0.30 10*3/mm3 Final     Basophils, Absolute   Date Value Ref Range Status   07/11/2018 0.10 0.00 - 0.20 10*3/mm3 Final     Basophils Absolute   Date Value Ref Range Status   07/12/2018 0.10 0.00 - 0.20 10*3/mm3 Final     Immature Grans, Absolute   Date Value Ref Range Status   07/11/2018 0.12 (H) 0.00 - 0.03 10*3/mm3 Final       No results found for: GLUCOSE, NA, K, CO2, CL, ANIONGAP, CREATININE, BUN, BCR, CALCIUM, EGFRIFNONA, ALKPHOS, PROTEINTOT, ALT, AST, BILITOT, ALBUMIN, GLOB, LABIL2    LDH   Date Value Ref Range Status   07/11/2018 225 120 - 246 U/L Final       No results found for: MSPIKE, KAPPALAMB, IGLFLC, FREEKAPPAL            Assessment/Plan   Impression: 1.  Thrombocytopenia.  This appears to be from marrow suppression either his result of infection or meds.  She's had both the osteomyelitis as well as dermatomal zoster, and I have seen zoster caused thrombocytopenia in patients with underlying hematologic disorders.  2.  Chronic anemia.  This is been exacerbated by her acute illness.    Plan: 1.  We will monitor her platelets daily without any other changes.  I suspect they will improved to an adequate level around her baseline,  although this may be slightly below normal.  2.  We will continue to monitor her anemia and transfuse packed red blood cells as needed based on her hemodynamics and symptoms.  3.  She'll stay off any additional meds as much as possible.              Ankru Omalley MD   [facility] Hematology and Oncology    7/12/2018          CC:

## 2018-07-12 NOTE — PLAN OF CARE
Problem: Patient Care Overview  Goal: Plan of Care Review  Outcome: Ongoing (interventions implemented as appropriate)   07/12/18 1048   Coping/Psychosocial   Plan of Care Reviewed With patient   Plan of Care Review   Progress improving   OTHER   Outcome Summary Pt is impoving with bed mobiity, transfers and gait. She needs safety assist when ambulating with AD due to baolance losses at times. Continue with PT goals.

## 2018-07-12 NOTE — PLAN OF CARE
Problem: Patient Care Overview  Goal: Plan of Care Review  Outcome: Ongoing (interventions implemented as appropriate)   07/12/18 0651   Coping/Psychosocial   Plan of Care Reviewed With patient   Plan of Care Review   Progress no change   OTHER   Outcome Summary Pain controlled without pain interventions, neuro checks unchanged, VSS, will continue to monitor

## 2018-07-12 NOTE — PROGRESS NOTES
[facility] Medicine Services  PROGRESS NOTE    Patient Name: Lynne Sanchez  : 1934  MRN: [id number]    Date of Admission: 2018  Length of Stay: 15  Primary Care Physician: ANN Gonsalez    Subjective   Subjective     CC: f/u osteomyelitis    HPI: Up in bed. Nursing at bedside. No complaints. Feels well.    Review of Systems  Gen- No fevers, chills  CV- No chest pain, palpitations  Resp- No cough, dyspnea  GI- No N/V/D, abd pain    Otherwise ROS is negative except as mentioned in the HPI.    Objective   Objective     Vital Signs:   Temp:  [96.8 °F (36 °C)-98.3 °F (36.8 °C)] 97.5 °F (36.4 °C)  Heart Rate:  [67-73] 69  Resp:  [16-18] 18  BP: (122-152)/(45-63) 138/55  Total (NIH Stroke Scale): 0     Physical Exam:  Constitutional: No acute distress, awake, alert  HENT: NCAT, mucous membranes moist  Respiratory: Clear to auscultation bilaterally, respiratory effort normal   Cardiovascular: RRR, no murmurs, rubs, or gallops, palpable pedal pulses bilaterally  Gastrointestinal: Positive bowel sounds, soft, nontender, nondistended  Musculoskeletal: Left foot dressed without drainage.   Psychiatric: Appropriate affect, cooperative  Neurologic: Oriented x 3, strength symmetric in all extremities, Cranial Nerves grossly intact to confrontation, speech clear  Skin: No rashes    Results Reviewed:  I have personally reviewed current lab, radiology, and data and agree.      Results from last 7 days  Lab Units 18  0615 18  0450 07/10/18  0523   WBC 10*3/mm3 9.97 9.63 8.08   HEMOGLOBIN g/dL 8.4* 8.4* 9.0*   HEMATOCRIT % 26.1* 26.7* 27.7*   PLATELETS 10*3/mm3 54* 37* 38*       Results from last 7 days  Lab Units 18  0423 18  0430   SODIUM mmol/L 139 137   POTASSIUM mmol/L 3.7 3.8   CHLORIDE mmol/L 110* 108   CO2 mmol/L 24.0 22.0   BUN mg/dL 22 19   CREATININE mg/dL 1.05 1.13   GLUCOSE mg/dL 74 85   CALCIUM mg/dL 7.8* 8.2*     Estimated Creatinine  Clearance: 42.7 mL/min (by C-G formula based on SCr of 1.05 mg/dL).  No results found for: BNP    Microbiology Results Abnormal     Procedure Component Value - Date/Time    AFB Culture - Tissue, Toe, Left [[id number]]  (Normal) Collected:  07/05/18 1023    Lab Status:  Preliminary result Specimen:  Tissue from Toe, Left Updated:  07/12/18 1130     AFB Culture No AFB isolated at 1 week     AFB Stain No acid fast bacilli seen on concentrated smear    Fungus Culture - Tissue, Toe, Left [[id number]]  (Abnormal) Collected:  07/05/18 1023    Lab Status:  Preliminary result Specimen:  Tissue from Toe, Left Updated:  07/11/18 1320     Fungus Culture Scant growth (1+) Candida parapsilosis (A)    Tissue / Bone Culture - Tissue, Toe, Left [[id number]]  (Abnormal)  (Susceptibility) Collected:  07/05/18 1023    Lab Status:  Final result Specimen:  Tissue from Toe, Left Updated:  07/09/18 1354     Tissue Culture Scant growth (1+) Enterobacter cloacae (A)      Light growth (2+) Staphylococcus haemolyticus (A)     Gram Stain Result Occasional WBCs seen      No organisms seen    Susceptibility      Enterobacter cloacae    Staphylococcus haemolyticus       PRIYA    PRIYA       Aztreonam <=8 ug/ml Susceptible             Cefepime <=8 ug/ml Susceptible             Cefotaxime <=2 ug/ml Susceptible             Ceftriaxone <=8 ug/ml Susceptible             Clindamycin       <=0.5 ug/ml Susceptible       Daptomycin       <=0.5 ug/ml Susceptible       Ertapenem <=1 ug/ml Susceptible             Erythromycin       <=0.5 ug/ml Susceptible       Gentamicin <=4 ug/ml Susceptible    <=4 ug/ml Susceptible       Levofloxacin <=2 ug/ml Susceptible    <=1 ug/ml Susceptible  [1]        Linezolid       <=1 ug/ml Susceptible       Meropenem <=1 ug/ml Susceptible             Oxacillin       1 ug/ml Resistant       Penicillin G       >8 ug/ml Resistant       Piperacillin + Tazobactam <=16 ug/ml Susceptible             Quinupristin + Dalfopristin       <=0.5  ug/ml Susceptible       Rifampin       <=1 ug/ml Susceptible       Tetracycline <=4 ug/ml Susceptible    <=4 ug/ml Susceptible       Tobramycin <=4 ug/ml Susceptible             Trimethoprim + Sulfamethoxazole <=2/38 ug/ml Susceptible    <=0.5/9.5 ug/ml Susceptible       Vancomycin       1 ug/ml Susceptible              [1]   Staphylococcus species may develop resistance during prolonged therapy with quinolones.  Isolates that are initially susceptible may become resistant within three to four days after initiation of therapy. Testing of repeat isolates may be warranted.                   Anaerobic Culture - Tissue, Toe, Left [[id number]]  (Normal) Collected:  07/05/18 1023    Lab Status:  Preliminary result Specimen:  Tissue from Toe, Left Updated:  07/06/18 1127     Culture No anaerobes isolated    Blood Culture - Blood, [[id number]]  (Normal) Collected:  06/28/18 0320    Lab Status:  Final result Specimen:  Blood from Arm, Right Updated:  07/03/18 0400     Blood Culture No growth at 5 days    Blood Culture - Blood, [[id number]]  (Normal) Collected:  06/28/18 0330    Lab Status:  Final result Specimen:  Blood from Arm, Left Updated:  07/03/18 0400     Blood Culture No growth at 5 days    Urine Culture - Urine, [[id number]]  (Abnormal)  (Susceptibility) Collected:  06/28/18 2300    Lab Status:  Final result Specimen:  Urine from Urine, Clean Catch Updated:  07/01/18 1120     Urine Culture 60,000-70,000 CFU/mL Klebsiella aerogenes (A)    Susceptibility      Klebsiella aerogenes     PRIYA     Aztreonam 16 ug/ml Intermediate     Cefepime <=8 ug/ml Susceptible     Cefotaxime 16 ug/ml Intermediate     Ceftriaxone 32 ug/ml Intermediate     Ertapenem <=1 ug/ml Susceptible     Gentamicin <=4 ug/ml Susceptible     Levofloxacin <=2 ug/ml Susceptible     Meropenem <=1 ug/ml Susceptible     Nitrofurantoin <=32 ug/ml Susceptible     Piperacillin + Tazobactam 64 ug/ml Intermediate     Tetracycline <=4 ug/ml Susceptible     Tobramycin  <=4 ug/ml Susceptible     Trimethoprim + Sulfamethoxazole <=2/38 ug/ml Susceptible                          Imaging Results (last 24 hours)     ** No results found for the last 24 hours. **        Results for orders placed during the hospital encounter of 06/27/18   Adult Transthoracic Echo Complete W/ Cont if Necessary Per Protocol (With Agitated Saline)    Narrative · Left ventricular systolic function is normal. Estimated EF = 60%.  · Mild aortic valve regurgitation is present.  · Moderate mitral valve regurgitation is present  · Moderate to severe tricuspid valve regurgitation is present.  · Calculated right ventricular systolic pressure from tricuspid   regurgitation is 90 mmHg. Severe pulmonary hypertension is present  · Calculated right ventricular systolic pressure from tricuspid   regurgitation is 90 mmHg.          I have reviewed the medications.    Assessment/Plan   Assessment / Plan     Hospital Problem List     * (Principal)Osteomyelitis of left foot (CMS/HCC)    Chronic atrial fibrillation (CMS/HCC)    Overview Signed 12/22/2016  1:51 PM by Pan Saunders     1. Chronic atrial fibrillation, status post [employer] pacemaker implantation and AV node ablation:  a. Diagnosed June 2005.  b. Status post ECV restoring normal sinus rhythm, June 2005.  c. Rythmol initiated 05/01/2006.  d. Previously digoxin toxicity.  e. GXT Cardiolite, 09/08/2005:  No reversible ischemia.  LV function not performed secondary to atrial fibrillation.   f. Status post successful external cardioversion on 10/01/2008, Tessa Downey MD.  g. EKG on 10/21/2008:  Recurrence of atrial fibrillation with rate of 109.  h. Recurrent atrial fibrillation by EKG, 11/03/2008, asymptomatic.  i. Status post [employer] pacemaker implantation and AV node ablation.   j. Echocardiogram 02/15/2010:  Moderate MR, moderate TR.  RVSP 50.  EF greater than 55%, left atrial enlargement at 4.3 cm.           Type 2 diabetes mellitus treated without insulin  (CMS/HCC) (Chronic)    Normocytic anemia    Peripheral arterial disease (CMS/HCC)    Cerebrovascular accident (CVA) due to thrombosis of left anterior cerebral artery (CMS/HCC)    Herpes zoster without complication    Thrombocytopenia (CMS/HCC)             Brief Hospital Course to date:  Lynne Sanchez is a 83 y.o. female with history of DMII, PVD, Afib/SSS/PPM, CKDIII, Anemia and osteomyelitis presented for treatment of foot wound but hospitalization complicated by acute stroke and shingles.     Assessment & Plan:     Osteomyelitis  - s/p left great toe amputation per Dr Carrington 7/5.  Daily dressing changes.   - ID following.  Plan was to stop abx at discharge per Dr. Belcher.     Shingles with ongoing pain  - s/p valtrex x 7 d  - trial gabapentin 300mg daily for now, titrate if patient tolerates.      Acute CVA after admission  - s/p tPA.  No obvious deficit.  C/o only generalized weakness.    - eliquis when O  - continue ASA, statin     Afib  - previously no ASA/plavix.  Consider eliquis when platelets recover and Hgb stable     Anemia  - chronic anemia dating back to at least 2015.  + occult blood but on oral iron.  Prior EGD 2017 showed only Barretts esophagus with gastritis.  Will place back on PPI.  - H/H contiunues trend down.  Gave 2 units PRBC on 7/9 with appropriate response     Thombocytopenia  - history of borderline low in past. plts stable. She tells me she had BM bx 2017 with Dr. Rodriguez.  - hematology following.     DMII (A1c 8.5)  - continue current insulin, acceptable glc     SSS/PPM     PAD      CKDIII  --creatinine stable at 1.13   --monitor labs      DVT Prophylaxis:  Mechanical    CODE STATUS:   Code Status and Medical Interventions:   Ordered at: 06/28/18 1008     Level Of Support Discussed With:    Patient     Code Status:    CPR     Medical Interventions (Level of Support Prior to Arrest):    Full       Disposition: I expect the patient to be discharged to rehab tomorrow.    Electronically  signed by Alisson Stapleton, II, DO, 07/12/18, 2:01 PM.

## 2018-07-12 NOTE — PROGRESS NOTES
Continued Stay Note  [facility]     Patient Name: Lynne Sanchez  MRN: [id number]  Today's Date: 7/12/2018    Admit Date: 6/27/2018          Discharge Plan     Row Name 07/12/18 1354       Plan    Plan Comments Pt has been accepted to [facility] for 7/13/18. Family to transport.              Discharge Codes    No documentation.       Expected Discharge Date and Time     Expected Discharge Date Expected Discharge Time    Jul 9, 2018             Kat Rosenbaum RN

## 2018-07-13 VITALS
BODY MASS INDEX: 24.49 KG/M2 | OXYGEN SATURATION: 96 % | DIASTOLIC BLOOD PRESSURE: 59 MMHG | SYSTOLIC BLOOD PRESSURE: 148 MMHG | HEART RATE: 69 BPM | HEIGHT: 65 IN | RESPIRATION RATE: 20 BRPM | TEMPERATURE: 96.9 F | WEIGHT: 147 LBS

## 2018-07-13 LAB
BASOPHILS # BLD AUTO: 0.05 10*3/MM3 (ref 0–0.2)
BASOPHILS NFR BLD AUTO: 0.4 % (ref 0–1)
DEPRECATED RDW RBC AUTO: 53.3 FL (ref 37–54)
EOSINOPHIL # BLD AUTO: 0.27 10*3/MM3 (ref 0–0.3)
EOSINOPHIL NFR BLD AUTO: 2.3 % (ref 0–3)
ERYTHROCYTE [DISTWIDTH] IN BLOOD BY AUTOMATED COUNT: 17.6 % (ref 11.3–14.5)
GLUCOSE BLDC GLUCOMTR-MCNC: 220 MG/DL (ref 70–130)
GLUCOSE BLDC GLUCOMTR-MCNC: 271 MG/DL (ref 70–130)
HCT VFR BLD AUTO: 25.7 % (ref 34.5–44)
HGB BLD-MCNC: 8 G/DL (ref 11.5–15.5)
IMM GRANULOCYTES # BLD: 0.32 10*3/MM3 (ref 0–0.03)
IMM GRANULOCYTES NFR BLD: 2.7 % (ref 0–0.6)
LYMPHOCYTES # BLD AUTO: 3.87 10*3/MM3 (ref 0.6–4.8)
LYMPHOCYTES NFR BLD AUTO: 32.8 % (ref 24–44)
MCH RBC QN AUTO: 26.2 PG (ref 27–31)
MCHC RBC AUTO-ENTMCNC: 31.1 G/DL (ref 32–36)
MCV RBC AUTO: 84.3 FL (ref 80–99)
MONOCYTES # BLD AUTO: 1.4 10*3/MM3 (ref 0–1)
MONOCYTES NFR BLD AUTO: 11.9 % (ref 0–12)
NEUTROPHILS # BLD AUTO: 6.22 10*3/MM3 (ref 1.5–8.3)
NEUTROPHILS NFR BLD AUTO: 52.6 % (ref 41–71)
PLAT MORPH BLD: NORMAL
PLATELET # BLD AUTO: 75 10*3/MM3 (ref 150–450)
PMV BLD AUTO: ABNORMAL FL (ref 6–12)
RBC # BLD AUTO: 3.05 10*6/MM3 (ref 3.89–5.14)
RBC MORPH BLD: NORMAL
WBC MORPH BLD: NORMAL
WBC NRBC COR # BLD: 11.81 10*3/MM3 (ref 3.5–10.8)

## 2018-07-13 PROCEDURE — 97530 THERAPEUTIC ACTIVITIES: CPT

## 2018-07-13 PROCEDURE — 82962 GLUCOSE BLOOD TEST: CPT

## 2018-07-13 PROCEDURE — 85025 COMPLETE CBC W/AUTO DIFF WBC: CPT | Performed by: INTERNAL MEDICINE

## 2018-07-13 PROCEDURE — 99024 POSTOP FOLLOW-UP VISIT: CPT | Performed by: THORACIC SURGERY (CARDIOTHORACIC VASCULAR SURGERY)

## 2018-07-13 PROCEDURE — 99239 HOSP IP/OBS DSCHRG MGMT >30: CPT | Performed by: PHYSICIAN ASSISTANT

## 2018-07-13 PROCEDURE — 97116 GAIT TRAINING THERAPY: CPT

## 2018-07-13 PROCEDURE — 97110 THERAPEUTIC EXERCISES: CPT

## 2018-07-13 PROCEDURE — G0108 DIAB MANAGE TRN  PER INDIV: HCPCS | Performed by: REGISTERED NURSE

## 2018-07-13 PROCEDURE — 85007 BL SMEAR W/DIFF WBC COUNT: CPT | Performed by: INTERNAL MEDICINE

## 2018-07-13 RX ORDER — ATORVASTATIN CALCIUM 80 MG/1
80 TABLET, FILM COATED ORAL NIGHTLY
Start: 2018-07-13 | End: 2020-02-04

## 2018-07-13 RX ORDER — ACETAMINOPHEN 650 MG
TABLET, EXTENDED RELEASE ORAL DAILY
Start: 2018-07-14 | End: 2018-08-28 | Stop reason: HOSPADM

## 2018-07-13 RX ORDER — FERROUS SULFATE 325(65) MG
325 TABLET ORAL 2 TIMES DAILY WITH MEALS
Start: 2018-07-13 | End: 2022-03-04

## 2018-07-13 RX ORDER — PANTOPRAZOLE SODIUM 40 MG/1
40 TABLET, DELAYED RELEASE ORAL DAILY
Start: 2018-07-13 | End: 2018-08-15 | Stop reason: ALTCHOICE

## 2018-07-13 RX ORDER — GABAPENTIN 100 MG/1
200 CAPSULE ORAL DAILY
Start: 2018-07-13 | End: 2018-08-15 | Stop reason: ALTCHOICE

## 2018-07-13 RX ORDER — FUROSEMIDE 20 MG/1
20 TABLET ORAL DAILY
Start: 2018-07-14 | End: 2020-03-17 | Stop reason: SDUPTHER

## 2018-07-13 RX ORDER — NYSTATIN 100000 [USP'U]/G
POWDER TOPICAL EVERY 12 HOURS SCHEDULED
Start: 2018-07-13 | End: 2020-02-04

## 2018-07-13 RX ORDER — ACETAMINOPHEN 325 MG/1
650 TABLET ORAL EVERY 6 HOURS PRN
Start: 2018-07-13 | End: 2018-08-28 | Stop reason: HOSPADM

## 2018-07-13 RX ADMIN — PANTOPRAZOLE SODIUM 40 MG: 40 TABLET, DELAYED RELEASE ORAL at 06:33

## 2018-07-13 RX ADMIN — GABAPENTIN 300 MG: 300 CAPSULE ORAL at 09:41

## 2018-07-13 RX ADMIN — FUROSEMIDE 20 MG: 20 TABLET ORAL at 09:41

## 2018-07-13 RX ADMIN — Medication 1 CAPSULE: at 09:41

## 2018-07-13 RX ADMIN — Medication 325 MG: at 09:41

## 2018-07-13 RX ADMIN — ASPIRIN 81 MG: 81 TABLET, COATED ORAL at 09:41

## 2018-07-13 RX ADMIN — METOPROLOL TARTRATE 100 MG: 100 TABLET ORAL at 09:43

## 2018-07-13 RX ADMIN — ACETAMINOPHEN 650 MG: 325 TABLET, FILM COATED ORAL at 01:29

## 2018-07-13 RX ADMIN — INSULIN LISPRO 4 UNITS: 100 INJECTION, SOLUTION INTRAVENOUS; SUBCUTANEOUS at 12:25

## 2018-07-13 RX ADMIN — INSULIN LISPRO 3 UNITS: 100 INJECTION, SOLUTION INTRAVENOUS; SUBCUTANEOUS at 09:41

## 2018-07-13 NOTE — PROGRESS NOTES
Cardiothoracic Surgery Progress Note      POD #: 7-left great toe amputation     LOS: 16 days      Subjective: Patient's awake alert awaiting transfer to [facility] for further rehabilitation    Chief Complaint: Minimal postsurgical pain left foot    Objective:  Vital Signs  Temp:  [97.4 °F (36.3 °C)-98.9 °F (37.2 °C)] 98.9 °F (37.2 °C)  Heart Rate:  [69-71] 69  Resp:  [16-18] 16  BP: (124-139)/(50-55) 134/54    Physical Exam:   General Appearance: Oriented ×3   Lungs:   Heart:   Skin:   Incision: Left great toe amputation site dressing changed.  Skin sutures intact.  Dorsal and plantar skin flaps viable.     Results: Laboratory results below are noted    Results from last 7 days  Lab Units 07/12/18  0615   WBC 10*3/mm3 9.97   HEMOGLOBIN g/dL 8.4*   HEMATOCRIT % 26.1*   PLATELETS 10*3/mm3 54*       Results from last 7 days  Lab Units 07/08/18  0423   SODIUM mmol/L 139   POTASSIUM mmol/L 3.7   CHLORIDE mmol/L 110*   CO2 mmol/L 24.0   BUN mg/dL 22   CREATININE mg/dL 1.05   GLUCOSE mg/dL 74   CALCIUM mg/dL 7.8*         Assessment:#1.  Postop day 7 left great toe amputation primary closure healing well  #2.  Recent stroke almost complete recovery  #3.  Diabetes mellitus severe neuropathy.  #4 peripheral vascular disease with prior endograft stenting and angioplasty left anterior tibial artery number Dr. Pelaez at [facility] approxi-6 months ago      Plan: Continue daily dressing changes left foot amputation site.  Transfer to rehabilitation facility hopefully today.  I will plan to see the patient in follow-up in the office in 2 weeks' time and I will arrange for that    Prakash Carrington MD - 07/13/18 - 7:39 AM

## 2018-07-13 NOTE — PROGRESS NOTES
Clinical Nutrition   Reason For Visit: Follow-up protocol    Patient Name: Lynne Sanchez  YOB: 1934  MRN: [id number]  Date of Encounter: 07/13/18 10:52 AM  Admission date: 6/27/2018        Nutrition Assessment     Hospital Problem List  Principal Problem:    Osteomyelitis of left foot (CMS/HCC)  Active Problems:    Chronic atrial fibrillation (CMS/HCC)    Type 2 diabetes mellitus treated without insulin (CMS/HCC)    Normocytic anemia    Peripheral arterial disease (CMS/HCC)    Cerebrovascular accident (CVA) due to thrombosis of left anterior cerebral artery (CMS/HCC)    Herpes zoster without complication    Thrombocytopenia (CMS/HCC)        PMH: She  has a past medical history of Anemia; CHF (congestive heart failure) (CMS/HCC); Chronic atrial fibrillation (CMS/HCC); Diabetes mellitus, type 2 (CMS/HCC); Glaucoma; History of transfusion; Hyperlipidemia; Hypertension; and Kidney disease.   PSxH: She  has a past surgical history that includes Appendectomy; Esophagogastroduodenoscopy (N/A, 10/9/2017); Cardiac catheterization (N/A, 10/10/2017); Interventional radiology procedure (N/A, 10/10/2017); Cardiac catheterization (N/A, 10/10/2017); Cardiac catheterization (N/A, 10/10/2017); Colonoscopy; Cardioversion; Cardiac Ablation; Pacemaker Implantation; Eye surgery (Bilateral); Bone marrow aspiration; Cardiac electrophysiology procedure (N/A, 5/3/2018); and Finger amputation (Left, 7/5/2018).       Other Applicable Diagnosis:  S/p left great toe amputation (7/5)        Anthropometrics   Height: 65 in  [facility]: 147 lbs ([facility] [place] documentation)  BMI: 24.5  BMI classification: Normal: 18.5-24.9kg/m2      Labs reviewed   Labs reviewed: Yes    Medications reviewed   Medications reviewed: Yes  Pertinent: iron, lasix, insulin, probiotic    Current Nutrition Prescription   PO: Diet Regular; Consistent Carbohydrate  Oral Nutrition Supplement: Boost Glucose Control 2x daily    Evaluation of Received  Nutrient/Fluid Intake: 67% / 3 meals      Nutrition Diagnosis     7/11  Problem Inadequate oral intake   Etiology Poor appetite   Signs/Symptoms PO intake: 29% / 6 meals with consumption of 1-2 Boost supplements daily   Status: resolved 7/13      Intervention   Intervention: Follow treatment progress, Care plan reviewed - note discharge to rehab facility today      Goal:   General: Nutrition support treatment  PO: Maintain intake, Continue positive trend      Monitoring/Evaluation:       Monitoring/Evaluation: Per protocol, PO intake, Supplement intake  Will Continue to follow per protocol  Lynette Esquivel RD  Time Spent: 15 min

## 2018-07-13 NOTE — NURSING NOTE
IV removed pt belongings taken to personal vehicle. Pt will be transported by spouse to . Paperwork sealed in packet given to spouse to give to [facility].

## 2018-07-13 NOTE — PLAN OF CARE
Problem: Skin Injury Risk (Adult)  Goal: Skin Health and Integrity  Outcome: Ongoing (interventions implemented as appropriate)   07/13/18 3251   Skin Injury Risk (Adult)   Skin Health and Integrity making progress toward outcome

## 2018-07-13 NOTE — DISCHARGE SUMMARY
[facility]  DISCHARGE SUMMARY    Patient Name: Lynne Sanchez  : 1934  MRN: [id number]    Date of Admission: 2018  Date of Discharge:  2018  Primary Care Physician: ANN Gonsalez    Consults     Date and Time Order Name Status Description    7/10/2018 0810 Hematology & Oncology Inpatient Consult Completed     2018 2147 Inpatient Neurology Consult Completed     2018 1009 Inpatient Vascular Surgery Consult Completed     2018 0030 Inpatient Infectious Diseases Consult Completed         Hospital Course     Presenting Problem:   Foot infection [L08.9]  Other chronic osteomyelitis of left foot (CMS/HCC) [M86.672]  Other chronic osteomyelitis of left foot (CMS/HCC) [M86.672]    Active Hospital Problems    Diagnosis Date Noted   • **Osteomyelitis of left foot (CMS/[facility]) [M86.9] 10/03/2017   • Herpes zoster without complication [B02.9] 2018   • Thrombocytopenia (CMS/[facility]) [D69.6] 2018   • 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      Hospital Course:  Ms. Lynne Sanchez is an 84yo female with PMH significant for peripheral vascular disease (with prior balloon angioplasty of the L anterior tibial artery and dosalis pedis by Dr. Pelaez on 10/10/17), CKD III, chronic atrial fibrillation, SSS s/p PPM, chronic gastritis and Fermin's esophagus, chronic anemia and thrombocytopenia (followed by Dr. Rodriguez) and poorly controlled DMII.  She was treated for osteomyelitis of  the left foot in October 2017 at [facility].  Wound debridement with possible amputation was recommended however, the patient declined and wanted to try antibiotic therapy to improve her infection.  Cultures were positive for Enterobacter/Pseudomonas aeruginosa.  She was treated with IV Fortaz for 6 weeks.    She was directly admitted to [facility] on 6/27/18 from her PCPs office due to concerns of worsening left toe wound.  She was started on Zosyn and Zyvox for chronic osteomyelitis.  Infectious disease and vascular surgery teams were consulted.  Dr. Carrington recommended a bone scan which was performed on 6/29/18.    Mrs. Sanchez developed shingles on the right sacrum and was treated with Valtrex.  She is being treated with gabapentin for postherpetic neuralgia.  On 6/29/18, the patient had sudden onset of slurred speech, aphasia and facial droop.  A CT perfusion scan was obtained and showed moderate focus of left frontal perfusion delay within the left MCA territory.  Neurology recommended initiation of TPA.  She was transferred to the ICU. The patient has had no obvious defects following her   CVA, her primary complaint is fatigue and generalized weakness.    Eliquis was recommended for anticoagulation.  The patient was agreeable to initiating Eliquis for CVA prophylaxis.  Due to worsening thrombocytopenia, hematology was consulted and Dr. Omalley evaluated the patient.  He recommended conservative management and suspected a probable underlying marrow disorder such as an early myelodysplastic syndrome.  Platelets are 75 on day of discharge and Dr. Omalley feels starting Eliquis is safe.  He recommends weekly CBC and follow-up with her primary hematologist, Dr. Rodriguez, in 2-3 weeks.    Due to acute on chronic anemia, the patient was transfused 2 units of packed red blood cells on 7/9.  Her PPI for chronic gastritis was resumed.    Patient ultimately was agreeable to the left great toe  amputation and this was performed on 7/5/2018.  She tolerated this procedure well and has had no postoperative complications.  Pain is well managed.      Discharge Follow Up Recommendations for labs/diagnostics:  Heel-touch weight bearing with surgical shoe  Follow up with Dr. Carrington in 2-3 weeks  Follow up with Dr. Rodriguez in 2-3 weeks  Weekly CBC while on Eliquis - call Dr. Rodriguez's office for platelets < 70   Follow up with PCP one week after d/c from [facility]         Day of Discharge     HPI:   Up in chair, feels good and anxious to leave the hospital. Primary complaint is weakness and fatigue. No foot pain. Denies chest pain, dyspnea, N/V/D or constipation.     Review of Systems  Gen- No fevers, chills  CV- No chest pain, palpitations  Resp- No cough, dyspnea  GI- No N/V/D, abd pain    Otherwise ROS is negative except as mentioned in the HPI.    Vital Signs:   Temp:  [96.9 °F (36.1 °C)-98.9 °F (37.2 °C)] 96.9 °F (36.1 °C)  Heart Rate:  [69-71] 69  Resp:  [16-20] 20  BP: (124-148)/(50-59) 148/59     Physical Exam:  Constitutional: No acute distress, awake, alert  HENT: NCAT, mucous membranes moist  Respiratory: Clear to auscultation bilaterally, respiratory effort normal   Cardiovascular: RRR, no murmurs, rubs, or gallops, palpable pedal pulses bilaterally  Gastrointestinal: Positive bowel sounds, soft, nontender, nondistended  Musculoskeletal: No bilateral ankle edema. L foot is dressed, not removed for exam.   Psychiatric: Appropriate affect, cooperative  Neurologic: Oriented x 3, strength symmetric in all extremities, Cranial Nerves grossly intact to confrontation, speech clear  Skin: No rashes    Pertinent  and/or Most Recent Results       Results from last 7 days  Lab Units 07/13/18  0731 07/12/18  0615 07/11/18  0450 07/10/18  0523 07/09/18  0819 07/08/18  0423 07/07/18  0430 07/06/18  1501   WBC 10*3/mm3 11.81* 9.97 9.63 8.08 6.51 7.06  --  6.69   HEMOGLOBIN g/dL 8.0* 8.4* 8.4* 9.0* 6.7* 7.1*  --  7.7*   HEMATOCRIT  % 25.7* 26.1* 26.7* 27.7* 21.4* 22.5*  --  24.3*   PLATELETS 10*3/mm3 75* 54* 37* 38* 51* 75*  --  135*   SODIUM mmol/L  --   --   --   --   --  139 137  --    POTASSIUM mmol/L  --   --   --   --   --  3.7 3.8  --    CHLORIDE mmol/L  --   --   --   --   --  110* 108  --    CO2 mmol/L  --   --   --   --   --  24.0 22.0  --    BUN mg/dL  --   --   --   --   --  22 19  --    CREATININE mg/dL  --   --   --   --   --  1.05 1.13  --    GLUCOSE mg/dL  --   --   --   --   --  74 85  --    CALCIUM mg/dL  --   --   --   --   --  7.8* 8.2*  --          Brief Urine Lab Results  (Last result in the past 365 days)      Color   Clarity   Blood   Leuk Est   Nitrite   Protein   CREAT   Urine HCG        06/28/18 2300 Yellow Cloudy(A) Small (1+)(A) Large (3+)(A) Positive(A) 30 mg/dL (1+)(A)             Microbiology Results Abnormal     Procedure Component Value - Date/Time    Anaerobic Culture - Tissue, Toe, Left [[id number]]  (Normal) Collected:  07/05/18 1023    Lab Status:  Final result Specimen:  Tissue from Toe, Left Updated:  07/12/18 1418     Culture No anaerobes isolated    AFB Culture - Tissue, Toe, Left [[id number]]  (Normal) Collected:  07/05/18 1023    Lab Status:  Preliminary result Specimen:  Tissue from Toe, Left Updated:  07/12/18 1130     AFB Culture No AFB isolated at 1 week     AFB Stain No acid fast bacilli seen on concentrated smear    Fungus Culture - Tissue, Toe, Left [[id number]]  (Abnormal) Collected:  07/05/18 1023    Lab Status:  Preliminary result Specimen:  Tissue from Toe, Left Updated:  07/11/18 1320     Fungus Culture Scant growth (1+) Candida parapsilosis (A)    Tissue / Bone Culture - Tissue, Toe, Left [[id number]]  (Abnormal)  (Susceptibility) Collected:  07/05/18 1023    Lab Status:  Final result Specimen:  Tissue from Toe, Left Updated:  07/09/18 1354     Tissue Culture Scant growth (1+) Enterobacter cloacae (A)      Light growth (2+) Staphylococcus haemolyticus (A)     Gram Stain Result Occasional  WBCs seen      No organisms seen    Susceptibility      Enterobacter cloacae    Staphylococcus haemolyticus       PRIYA    PRIYA       Aztreonam <=8 ug/ml Susceptible             Cefepime <=8 ug/ml Susceptible             Cefotaxime <=2 ug/ml Susceptible             Ceftriaxone <=8 ug/ml Susceptible             Clindamycin       <=0.5 ug/ml Susceptible       Daptomycin       <=0.5 ug/ml Susceptible       Ertapenem <=1 ug/ml Susceptible             Erythromycin       <=0.5 ug/ml Susceptible       Gentamicin <=4 ug/ml Susceptible    <=4 ug/ml Susceptible       Levofloxacin <=2 ug/ml Susceptible    <=1 ug/ml Susceptible  [1]        Linezolid       <=1 ug/ml Susceptible       Meropenem <=1 ug/ml Susceptible             Oxacillin       1 ug/ml Resistant       Penicillin G       >8 ug/ml Resistant       Piperacillin + Tazobactam <=16 ug/ml Susceptible             Quinupristin + Dalfopristin       <=0.5 ug/ml Susceptible       Rifampin       <=1 ug/ml Susceptible       Tetracycline <=4 ug/ml Susceptible    <=4 ug/ml Susceptible       Tobramycin <=4 ug/ml Susceptible             Trimethoprim + Sulfamethoxazole <=2/38 ug/ml Susceptible    <=0.5/9.5 ug/ml Susceptible       Vancomycin       1 ug/ml Susceptible              [1]   Staphylococcus species may develop resistance during prolonged therapy with quinolones.  Isolates that are initially susceptible may become resistant within three to four days after initiation of therapy. Testing of repeat isolates may be warranted.                   Blood Culture - Blood, [[id number]]  (Normal) Collected:  06/28/18 0320    Lab Status:  Final result Specimen:  Blood from Arm, Right Updated:  07/03/18 0400     Blood Culture No growth at 5 days    Blood Culture - Blood, [[id number]]  (Normal) Collected:  06/28/18 0330    Lab Status:  Final result Specimen:  Blood from Arm, Left Updated:  07/03/18 0400     Blood Culture No growth at 5 days    Urine Culture - Urine, [[id number]]  (Abnormal)   (Susceptibility) Collected:  06/28/18 2300    Lab Status:  Final result Specimen:  Urine from Urine, Clean Catch Updated:  07/01/18 1120     Urine Culture 60,000-70,000 CFU/mL Klebsiella aerogenes (A)    Susceptibility      Klebsiella aerogenes     PRIYA     Aztreonam 16 ug/ml Intermediate     Cefepime <=8 ug/ml Susceptible     Cefotaxime 16 ug/ml Intermediate     Ceftriaxone 32 ug/ml Intermediate     Ertapenem <=1 ug/ml Susceptible     Gentamicin <=4 ug/ml Susceptible     Levofloxacin <=2 ug/ml Susceptible     Meropenem <=1 ug/ml Susceptible     Nitrofurantoin <=32 ug/ml Susceptible     Piperacillin + Tazobactam 64 ug/ml Intermediate     Tetracycline <=4 ug/ml Susceptible     Tobramycin <=4 ug/ml Susceptible     Trimethoprim + Sulfamethoxazole <=2/38 ug/ml Susceptible                          Imaging Results (all)     Procedure Component Value Units Date/Time    CT Head Without Contrast [[id number]] Collected:  06/30/18 2300     Updated:  07/01/18 0202    Narrative:       EXAM:    CT Head Without Intravenous Contrast    CLINICAL HISTORY:    83 years old, female; Cerebral infarction, unspecified; Other chronic   osteomyelitis, left ankle and foot; Aphasia; Other reduced mobility; Other   reduced mobility; Condition or disease; Other: 24hr post tpa; Additional info:   Stroke    TECHNIQUE:    Axial computed tomography images of the head/brain without intravenous   contrast.  All CT scans at this facility use at least one of these dose   optimization techniques: automated exposure control; mA and/or kV adjustment   per patient size (includes targeted exams where dose is matched to clinical   indication); or iterative reconstruction.    COMPARISON:    CT HEAD WO CONTRAST 2018-06-29 20:42    FINDINGS:    Bilateral periventricular lucencies without intraparenchymal hemorrhage and   no obvious acute ischemic stroke. No intra-or extra-axial fluid collection, no   supra-or infratentorial mass, no mass effect or midline shift.        Mildly dilated ventricles, sulci and basal cisterns without evidence of   hydrocephalus. Skull bones are normal.  No significant mucoperiosteal   thickening in the visualized paranasal sinuses. No mastoid effusion.       Impression:         Mild chronic microvascular disease and generalized atrophy without acute   intracranial abnormality.       No evidence of acute hemorrhage, mass lesion or obvious acute ischemic   infarction.      THIS DOCUMENT HAS BEEN ELECTRONICALLY SIGNED BY DIPAK SOW MD    NM Bone Scan 3 Phase [[id number]] Collected:  06/29/18 1739     Updated:  06/30/18 0846    Addenda:              ADDENDUM: After further clinical information provided and review of the  images as noted in the findings section on the original report there is  minimal uptake at the distal margin left great toe seen on all phases of  imaging but greatest on delayed phase concerning for acute information  osteomyelitis not excluded left great toe distal margin with probable  involvement of the distal phalanx.     This report was finalized on 6/30/2018 8:44 AM by Dr. Leeroy Lunsford.     Signed:  06/30/18 0844 by Leeroy Lunsford DO    Narrative:       EXAMINATION: NM BONE SCAN 3 PHASE-     INDICATION: Osteomyelitis suspected, foot swelling, diabetic      TECHNIQUE: Radiopharmaceutical 21.6 mCi of technetium 99 MDP with triple  phase bone scan including blood pool, bone flow and delayed imaging  performed.     COMPARISON: NONE     FINDINGS: Increased tracer uptake on blood pool and bone flow within the  region of the right fifth metatarsal distally with only minimal blood  pull increase at the margin of the right great toe. Delayed phase  sequencing demonstrates minimal uptake at the distal margin left great  toe and continued uptake within the right fifth metatarsal distally.          Impression:       1. No significant delayed uptake or blood pool tracer uptake at the site  of right great toe distally. This likely  represents soft tissue  inflammation without discrete osteomyelitis.  2. Increased uptake on triple phase imaging within the distal right  fifth metatarsal raising suspicion for degenerative osteoarthritic  changes however acute inflammation or osteomyelitis not excluded in the  setting of adjacent ulceration or tissue inflammation.         This report was finalized on 6/29/2018 6:31 PM by Dr. Leeroy Lunsford.       CT Cerebral Perfusion With & Without Contrast [[id number]] Collected:  06/29/18 2046     Updated:  06/29/18 2146    Addenda:        THIS REPORT CONTAINS FINDINGS THAT MAY BE CRITICAL TO PATIENT CARE. The   findings were verbally communicated via telephone conference with SHAD Weber at 9:46 PM EDT on 6/29/2018. The findings were acknowledged and   understood.    THIS DOCUMENT HAS BEEN ELECTRONICALLY SIGNED BY VLADIMIR WILSON MD  Signed:  06/29/18 2146 by Vladimir Wilson MD    Narrative:       EXAM:    CT Brain Perfusion With Intravenous Contrast    EXAM DATE/TIME:    6/29/2018 8:46 PM    CLINICAL HISTORY:    83 years old, female; Other chronic osteomyelitis, left ankle and foot; Signs   and symptoms; Speech disturbance and weakness, facial; Slurred speech;   Additional info: Code stroke, left sided facial droop, speech changes.    TECHNIQUE:    Axial computed tomography images of the brain with intravenous contrast using   cerebral perfusion protocol.  Post-processing parametric maps were created and   reviewed.  These include cerebral blood flow, cerebral blood volume and mean   transit time.  All CT scans at this facility use at least one of these dose   optimization techniques: automated exposure control; mA and/or kV adjustment   per patient size (includes targeted exams where dose is matched to clinical   indication); or iterative reconstruction.    MIP reconstructed images were created and reviewed.    COMPARISON:    CT HEAD WO CONTRAST 2018-06-29 20:42    FINDINGS:    Symmetric cerebral blood flow  and volume.  Moderate focus of left frontal   perfusion delay within the left MCA territory.      Impression:         Moderate sized focus of left frontal ischemia within the left MCA territory.    THIS DOCUMENT HAS BEEN ELECTRONICALLY SIGNED BY VLADIMIR WILSON MD    CT Head Without Contrast [[id number]] Collected:  06/29/18 2039     Updated:  06/29/18 2113    Narrative:       EXAM:    CT Head Without Intravenous Contrast    EXAM DATE/TIME:    6/29/2018 8:39 PM    CLINICAL HISTORY:    83 years old, female; Other chronic osteomyelitis, left ankle and foot; Signs   and symptoms; Weakness, facial; Additional info: Code stroke, left sided facial   droop, please document scan time in report: 2051    TECHNIQUE:    Axial computed tomography images of the head/brain without intravenous   contrast.  All CT scans at this facility use at least one of these dose   optimization techniques: automated exposure control; mA and/or kV adjustment   per patient size (includes targeted exams where dose is matched to clinical   indication); or iterative reconstruction.    COMPARISON:    No relevant prior studies available.    FINDINGS:    Brain:  Decreased attenuation of the supratentorial white matter is likely   secondary to chronic microvascular ischemia.  Chronic lacunar infarct involving   the right thalamus.  No hemorrhage.    Ventricles:  Ventricular and subarachnoid spaces are age appropriate.    Bones/joints:  Unremarkable.  No acute fracture.    Soft tissues:  Unremarkable.    Vasculature:  Intracranial vascular calcification.    Sinuses:  Unremarkable as visualized.  No acute sinusitis.    Mastoid air cells:  Unremarkable as visualized.  No mastoid effusion.      Impression:         No acute intracranial abnormality.    THIS DOCUMENT HAS BEEN ELECTRONICALLY SIGNED BY VLADIMIR WILSON MD    XR Hip With or Without Pelvis 2 - 3 View Right [[id number]] Collected:  06/29/18 1619     Updated:  06/29/18 1739    Narrative:          EXAMINATION: XR  RIGHT HIP, W OR WO PELVIS, 2-3 VIEWS - 6/29/2018     INDICATION: Post fall.  M86.672-Other chronic osteomyelitis, left ankle  and foot.       COMPARISON: None.     FINDINGS: SI joints and pubic symphysis without widening. Degenerative  changes of the bilateral hips right greater than left with near-total  joint space height loss. No evidence for acute fracture or cortical  disruption of the right femur. Calcified phleboliths within the pelvis.  Vascular calcifications within the soft tissues.           Impression:       Severe right hip DJD without acute osseous abnormality.  Specifically, no displaced femoral fracture. Femoral head is well  located within the acetabular cup.     DICTATED:   6/29/2018  EDITED/ls :   6/29/2018      This report was finalized on 6/29/2018 5:37 PM by Dr. Leeroy Lunsford.       XR Toe 2+ View Left [[id number]] Collected:  06/27/18 1803     Updated:  06/27/18 2121    Narrative:       EXAM:  XR Left Toe(s), 2 or More Views    EXAM DATE/TIME:  6/27/2018 6:03 PM    CLINICAL HISTORY:  83 years old, female; Signs and symptoms; Other: Left 1st   digit ulcer; Additional info: Left diabetic toe wound    TECHNIQUE:  Frontal, lateral and oblique views of toe(s) of the left foot.    COMPARISON:  No relevant prior studies available.    FINDINGS:    Bones/joints:  No acute osseous abnormality.  No evidence of bony destructive   process.  Bony demineralization and degenerative bony changes.  No dislocation.    Soft tissues:  Soft tissue ulcer along the medial distal left great toe.    Vasculature:  Small vessel arterial calcification.      Impression:       1.  Soft tissue ulcer along the medial distal left great toe.  2.  No radiographic evidence of osteomyelitis at this time.    THIS DOCUMENT HAS BEEN ELECTRONICALLY SIGNED BY RONI SANTIAGO JR. MD        Results for orders placed during the hospital encounter of 06/27/18   Bilateral Carotid Duplex    Narrative · Right internal carotid artery stenosis of  0-49%.  · Left internal carotid artery stenosis of 0-49%.  · The elevated velocities in the distal right and left internal carotid   arteries appear to be related to tortuosity and not atherosclerosis.        Results for orders placed during the hospital encounter of 06/27/18   Adult Transthoracic Echo Complete W/ Cont if Necessary Per Protocol (With Agitated Saline)    Narrative · Left ventricular systolic function is normal. Estimated EF = 60%.  · Mild aortic valve regurgitation is present.  · Moderate mitral valve regurgitation is present  · Moderate to severe tricuspid valve regurgitation is present.  · Calculated right ventricular systolic pressure from tricuspid   regurgitation is 90 mmHg. Severe pulmonary hypertension is present  · Calculated right ventricular systolic pressure from tricuspid   regurgitation is 90 mmHg.         Order Current Status    AFB Culture - Tissue, Toe, Left Preliminary result    Fungus Culture - Tissue, Toe, Left Preliminary result        Discharge Details        Discharge Medications      New Medications      Instructions Start Date   acetaminophen 325 MG tablet  Commonly known as:  TYLENOL   650 mg, Oral, Every 6 Hours PRN      gabapentin 100 MG capsule  Commonly known as:  NEURONTIN   200 mg, Oral, Daily      melatonin 5 MG sublingual tablet sublingual tablet   5 mg, Sublingual, Nightly PRN      nystatin 250929 UNIT/GM powder  Commonly known as:  MYCOSTATIN   Topical, Every 12 Hours Scheduled, Under L breast      pantoprazole 40 MG EC tablet  Commonly known as:  PROTONIX   40 mg, Oral, Daily      povidone-iodine 10 % external solution  Commonly known as:  BETADINE   Topical, Daily, Clean distal right great toe wound with NS; apply Betadine; leave AIDEN   Start Date:  7/14/2018        Changes to Medications      Instructions Start Date   atorvastatin 80 MG tablet  Commonly known as:  LIPITOR  What changed:  · medication strength  · how much to take   80 mg, Oral, Nightly      ferrous  sulfate 325 (65 FE) MG tablet  What changed:  when to take this   325 mg, Oral, 2 Times Daily With Meals      furosemide 20 MG tablet  Commonly known as:  LASIX  What changed:  when to take this   20 mg, Oral, Daily   Start Date:  7/14/2018        Continue These Medications      Instructions Start Date   ASPIR-LOW 81 MG EC tablet  Generic drug:  aspirin   81 mg, Oral, Daily      Biotin 5000 MCG tablet   1 tablet, Oral, Daily      cholecalciferol 1000 units tablet  Commonly known as:  VITAMIN D3   1,000 Units, Oral, Daily      fish oil 1200 MG capsule capsule   1,200 mg, Oral, Daily      glimepiride 4 MG tablet  Commonly known as:  AMARYL   4 mg, Oral, 2 Times Daily      latanoprost 0.005 % ophthalmic solution  Commonly known as:  XALATAN   1 drop, Both Eyes, Daily      metoprolol tartrate 100 MG tablet  Commonly known as:  LOPRESSOR   100 mg, Oral, 2 Times Daily      MULTIVITAMIN GUMMIES ADULT chewable tablet   1 tablet, Oral, Daily      potassium gluconate 595 (99 K) MG tablet tablet   99 mg, Oral, Daily      PROBIOTIC ACIDOPHILUS PO   Oral      SITagliptin 25 MG tablet  Commonly known as:  JANUVIA   25 mg, Oral, Daily         Stop These Medications    clopidogrel 75 MG tablet  Commonly known as:  PLAVIX          Discharge Disposition:  Skilled Nursing Facility (DC - External)    Discharge Diet:  Diet Instructions     Diet: Regular, Consistent Carbohydrate, Cardiac; Thin       Discharge Diet:   Regular  Consistent Carbohydrate  Cardiac       Fluid Consistency:  Thin        Discharge Activity:   Activity Instructions     Discharge Activity Restrictions       Heel touch weight bearing        Code Status/Level of Support:  Code Status and Medical Interventions:   Ordered at: 06/28/18 1008     Level Of Support Discussed With:    Patient     Code Status:    CPR     Medical Interventions (Level of Support Prior to Arrest):    Full     Future Appointments  Date Time Provider Department Center   8/6/2018 10:00 AM [facility]  [facility]   8/6/2018 10:45 AM Demond Leon MD [facility] None     Additional Instructions for the Follow-ups that You Need to Schedule     Discharge Follow-up with Specified Provider: Dr. Rodriguez ([facility] heme/onc) in 2-3 weeks - re: thrombocytopenia, anemia, started on Eliquis after stroke this admission    As directed      To:  Dr. Rodriguez ([facility] heme/onc) in 2-3 weeks - re: thrombocytopenia, anemia, started on Eliquis after stroke this admission         Discharge Follow-up with Specified Provider: Zeb Hoskins 2-3 weeks    As directed      To:  Zeb Hoskins 2-3 weeks             Time Spent on Discharge:  45 minutes    Electronically signed by Ignacia Ramírez PA-C, 07/13/18, 12:14 PM.

## 2018-07-13 NOTE — PROGRESS NOTES
Continued Stay Note  [facility]     Patient Name: Lynne Sanchez  MRN: [id number]  Today's Date: 7/13/2018    Admit Date: 6/27/2018          Discharge Plan     Row Name 07/13/18 1010       Plan    Plan [facility]    Patient/Family in Agreement with Plan yes    Plan Comments Per Stephany patient will go to the stroke unit today. Nurse will call report to [phone]. Family will transport.              Discharge Codes    No documentation.       Expected Discharge Date and Time     Expected Discharge Date Expected Discharge Time    Jul 9, 2018             Nikole Wilson RN

## 2018-07-13 NOTE — PLAN OF CARE
Problem: Patient Care Overview  Goal: Plan of Care Review  Outcome: Ongoing (interventions implemented as appropriate)   07/13/18 1145   Coping/Psychosocial   Plan of Care Reviewed With patient   Plan of Care Review   Progress improving   OTHER   Outcome Summary Pt able to walk further and with less assistance. Also able to stand w/o RW with only SBA. Improving balance and mobility.

## 2018-07-13 NOTE — THERAPY TREATMENT NOTE
Acute Care - Physical Therapy Treatment Note  [facility]     Patient Name: Lynne Sanchez  : 1934  MRN: [id number]  Today's Date: 2018  Onset of Illness/Injury or Date of Surgery: 18  Date of Referral to PT: 18  Referring Physician: MD Zeb    Admit Date: 2018    Visit Dx:    ICD-10-CM ICD-9-CM   1. Other chronic osteomyelitis of left foot (CMS/[facility]) M86.672 730.17   2. Cerebrovascular accident (CVA), unspecified mechanism (CMS/[facility]) I63.9 434.91   3. Impaired mobility and ADLs Z74.09 799.89   4. Impaired functional mobility, balance, gait, and endurance Z74.09 V49.89   5. Aphasia R47.01 784.3   6. S/P amputation of lesser toe, left (CMS/[facility]) Z89.422 V49.72     Patient Active Problem List   Diagnosis   • Chronic atrial fibrillation (CMS/[facility])   • Valvular heart disease   • Type 2 diabetes mellitus treated without insulin (CMS/[facility])   • History of congestive heart failure   • Osteomyelitis of left foot (CMS/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)   • Melena   • Chronic gastritis   • Peripheral arterial disease (CMS/[facility])   • Cerebrovascular accident (CVA) due to thrombosis of left anterior cerebral artery (CMS/[facility])   • Herpes zoster without complication   • Thrombocytopenia (CMS/[facility])       Therapy Treatment          Rehabilitation Treatment Summary     Row Name 18 1045             Treatment Time/Intention    Discipline physical therapist  -LS      Document Type therapy note (daily note)  -LS      Subjective Information no complaints  -LS      Recorded by [LS] Valorie Hernandez, PT 18 1144      Row Name 18 1045             Bed Mobility Assessment/Treatment    Comment (Bed Mobility) deferred; up in chair  -LS      Recorded by [LS] Valorie Hernandez, PT 18 1144      Row Name 18 1045             Sit-Stand Transfer    Sit-Stand [place] (Transfers) independent  -LS      Assistive Device (Sit-Stand  Transfers) walker, front-wheeled  -LS      Recorded by [LS] Valorie Hernandez, PT 07/13/18 1144      Row Name 07/13/18 1045             Stand-Sit Transfer    Stand-Sit [place] (Transfers) independent  -LS      Assistive Device (Stand-Sit Transfers) walker, front-wheeled  -LS      Recorded by [LS] Valorie Hernandez, PT 07/13/18 1144      Row Name 07/13/18 1045             Toilet Transfer    [place] (Toilet Transfer) stand by assist  -LS      Assistive Device (Toilet Transfer) grab bars/safety frame;walker, front-wheeled   needed vcs to keep walker with her  -LS      Recorded by [LS] Valorie Hernandez, PT 07/13/18 1144      Row Name 07/13/18 1045             Gait/Stairs Assessment/Training    36680 - Gait Training Minutes  15  -LS      Gait/Stairs Assessment/Training gait/ambulation independence;gait/ambulation assistive device   amb w/LLE offloading shoe  -LS      [place] (Gait) verbal cues;stand by assist  -LS      Assistive Device (Gait) walker, front-wheeled  -LS      Distance in Feet (Gait) 200  -LS      Pattern (Gait) step-through  -LS      Deviations/Abnormal Patterns (Gait) roby decreased;gait speed decreased;stride length decreased  -LS      Bilateral Gait Deviations forward flexed posture  -LS      Left Sided Gait Deviations weight shift ability decreased  -LS      Comment (Gait/Stairs) vcs to avoid pushing walker too far ahead  -LS      Recorded by [LS] Valorie Hernandez, PT 07/13/18 1144      Row Name 07/13/18 1045             Therapeutic Exercise    71685 - PT Therapeutic Exercise Minutes 10  -LS      07298 - PT Therapeutic Activity Minutes 15  -LS      Recorded by [LS] Valorie Hernandez, PT 07/13/18 1144      Row Name 07/13/18 1045             Upper Extremity Seated Therapeutic Exercise    Performed, Seated Upper Extremity (Therapeutic Exercise) shoulder flexion/extension;shoulder horizontal abduction/adduction;elbow flexion/extension  -LS      Exercise Type,  Seated Upper Extremity (Therapeutic Exercise) AROM (active range of motion)  -LS      Sets/Reps Detail, Seated Upper Extremity (Therapeutic Exercise) 1/20  -LS      Recorded by [LS] Valorie Hernandez, PT 07/13/18 1144      Row Name 07/13/18 1045             Lower Extremity Seated Therapeutic Exercise    Performed, Seated Lower Extremity (Therapeutic Exercise) ankle dorsiflexion/plantarflexion;LAQ (long arc quad), knee extension   GS  -LS      Exercise Type, Seated Lower Extremity (Therapeutic Exercise) AROM (active range of motion)  -LS2      Sets/Reps Detail, Seated Lower Extremity (Therapeutic Exercise) 1/20  -LS2      Recorded by [LS] Valorie Hernandez, PT 07/13/18 1147  [LS2] Valorie Hernandez, PT 07/13/18 1144      Row Name 07/13/18 1045             Therapeutic Exercise    Lower Extremity (Therapeutic Exercise) marching while standing;SLR (straight leg raise), left;SLR (straight leg raise), right  -LS      Exercise Type (Therapeutic Exercise) AROM (active range of motion)  -LS      Position (Therapeutic Exercise) seated  -LS      Sets/Reps (Therapeutic Exercise) 1/20  -LS      Recorded by [LS] Valorie Hernandez, PT 07/13/18 1147      Row Name 07/13/18 1045             Static Sitting Balance    Level of [place] (Unsupported Sitting, Static Balance) independent  -LS      Sitting Position (Unsupported Sitting, Static Balance) sitting in chair;other (see comments)   on toilet  -LS      Recorded by [LS] Valorie Hernandez, PT 07/13/18 1144      Row Name 07/13/18 1045             Static Standing Balance    Level of [place] (Supported Standing, Static Balance) standby assist   SBA with or w/o RW  -LS      Assistive Device Utilized (Supported Standing, Static Balance) rolling walker   with or without RW;stood at commode w/o a.d. to manage pants  -LS      Recorded by [LS] Valorie Hernandez, PT 07/13/18 1144      Row Name 07/13/18 1045             Positioning and Restraints    Pre-Treatment Position  sitting in chair/recliner  -LS      Post Treatment Position chair  -LS      In Chair notified nsg;sitting;call light within reach;encouraged to call for assist;exit alarm on;with family/caregiver;legs elevated   offloading shoe LLE  -LS      Recorded by [LS] Valorie Hernandez, PT 07/13/18 1144      Row Name                Wound 06/27/18 1659 Left toe pressure injury;diabetic/neuropathic ulceration    Wound - Properties Group Date first assessed: 06/27/18 [KA] Time first assessed: 1659 [KA] Present On Admission : yes [KA] Side: Left [KA] Location: toe [KA] Type: pressure injury;diabetic/neuropathic ulceration [KA] Additional Comments: possible arterial ulcer; pt pt skin tear from tape removal [CP] Recorded by:  [CP] BILL Magallanes 06/28/18 1335 [KA] Anel Woodruff RN 06/27/18 1700    Row Name                Wound 06/28/18 1030 Right distal toe other (see comments)    Wound - Properties Group Date first assessed: 06/28/18 [CP] Time first assessed: 1030 [CP] Present On Admission : yes;picture taken [CP] Side: Right [CP] Orientation: distal [CP] Location: toe [CP] Type: other (see comments) [CP] Additional Comments: unknown origin; possible tape inury; right great toe [CP] Recorded by:  [CP] BILL Magallanes 06/28/18 1340    Row Name                Wound 06/28/18 1030 Right elbow abrasion    Wound - Properties Group Date first assessed: 06/28/18 [CP] Time first assessed: 1030 [CP] Present On Admission : yes;picture taken [CP] Side: Right [CP] Location: elbow [CP] Type: abrasion [CP] Recorded by:  [CP] BILL Magallanes 06/28/18 1344    Row Name                Wound 06/28/18 1030 Left lower arm skin tear    Wound - Properties Group Date first assessed: 06/28/18 [CP] Time first assessed: 1030 [CP] Present On Admission : yes [CP] Side: Left [CP] Orientation: lower [CP] Location: arm [CP] Type: skin tear [CP] Additional Comments: forearm and elbow [CP] Recorded by:  [CP] Lorraine Morillo,  APRN 06/28/18 1346    Row Name                Wound 07/05/18 1044 Left leg incision    Wound - Properties Group Date first assessed: 07/05/18 [BM] Time first assessed: 1044 [BM] Side: Left [BM] Location: leg [BM] Type: incision [BM] Recorded by:  [BM] Leonie Ozuna RN 07/05/18 1044    Row Name                Wound 07/05/18 1130 Left toe other (see comments)    Wound - Properties Group Date first assessed: 07/05/18 [KL] Time first assessed: 1130 [KL] Present On Admission : no [KL] Side: Left [KL] Location: toe [KL] Type: other (see comments) [KL], amputation  Wound Outcome: Amputation [KL] Recorded by:  [KL] Leslye Gambino RN 07/05/18 1556      User Key  (r) = Recorded By, (t) = Taken By, (c) = Cosigned By    Initials Name Effective Dates Discipline    CP Lorraine Morillo, BILL 06/08/18 -  Nurse    PAN Hernandez, PT 06/19/15 -  PT    JAZZ Gambino RN 06/16/16 -  Nurse    SYLVIA Woodruff RN 06/23/17 -  Nurse    ROBERT Ozuna RN 08/17/17 -  Nurse          Rash 06/27/18 2044 Left lower breast other (see comments) (Active)   Distribution localized 7/13/2018  6:00 AM   Configuration/Shape asymmetric 7/13/2018  6:00 AM   Borders irregular 7/13/2018  6:00 AM   Characteristics scaly 7/13/2018  6:00 AM   Color pink 7/13/2018  6:00 AM       Rash 06/28/18 1030 Right posterior leg vesicle (Active)   Distribution regional 7/13/2018  6:00 AM   Configuration/Shape asymmetric 7/13/2018  6:00 AM   Characteristics dry 7/13/2018  8:00 AM   Color red 7/13/2018  6:00 AM       Wound 06/27/18 1659 Left toe pressure injury;diabetic/neuropathic ulceration (Active)   Dressing Appearance dry;intact 7/13/2018  8:00 AM   Closure JIMMY 7/13/2018  8:00 AM   Base dressing in place, unable to visualize 7/13/2018  8:00 AM   Drainage Amount none 7/13/2018  6:00 AM   Dressing Care, Wound gauze 7/12/2018  8:00 PM       Wound 06/28/18 1030 Right distal toe other (see comments) (Active)   Dressing Appearance open to air  7/13/2018  6:00 AM   Base dry;scab 7/13/2018  6:00 AM   Periwound Temperature warm 7/13/2018  6:00 AM   Periwound Skin Turgor firm 7/13/2018  6:00 AM   Edges irregular;callused 7/13/2018  6:00 AM   Drainage Amount none 7/13/2018  6:00 AM   Dressing Care, Wound open to air 7/12/2018  8:00 PM       Wound 06/28/18 1030 Right elbow abrasion (Active)   Dressing Appearance dry;intact 7/13/2018  8:00 AM   Closure None 7/13/2018  6:00 AM   Base scab;clean;dry;red/granulating 7/13/2018  6:00 AM   Drainage Amount none 7/13/2018  6:00 AM   Dressing Care, Wound low-adherent 7/12/2018  8:00 PM       Wound 06/28/18 1030 Left lower arm skin tear (Active)   Dressing Appearance dry;intact 7/13/2018  8:00 AM   Closure None 7/13/2018  6:00 AM   Base scab;dry 7/13/2018  6:00 AM   Periwound dry;pink 7/13/2018  6:00 AM   Drainage Amount none 7/13/2018  6:00 AM   Dressing Care, Wound low-adherent 7/12/2018  8:00 PM       Wound 07/05/18 1044 Left leg incision (Active)   Dressing Appearance dry;intact 7/13/2018  8:00 AM   Closure JIMMY 7/13/2018  6:00 AM   Dressing Care, Wound gauze 7/12/2018  8:00 PM       Wound 07/05/18 1130 Left toe other (see comments) (Active)   Dressing Appearance dry;intact 7/13/2018  8:00 AM   Closure JIMMY 7/13/2018  8:00 AM   Base dressing in place, unable to visualize 7/13/2018  6:00 AM   Dressing Care, Wound gauze 7/12/2018  8:00 PM             Physical Therapy Education     Title: PT OT SLP Therapies (Active)     Topic: Physical Therapy (Active)     Point: Mobility training (Active)    Learning Progress Summary     Learner Status Readiness Method Response Comment Documented by    Patient Done Acceptance E YESENIA VELAZQUEZ  LS 07/13/18 1145     Active Acceptance E,D NR  BD 07/12/18 1048     Active Eager E,D YESENIA  DM 07/11/18 1724     Done Acceptance E,D YESENIA VELAZQUEZ Reviewed WB status, benefits of mobility MJ 07/09/18 1713     Done Acceptance E,D YESENIA VELAZQUEZ Educated on weight bearing status, correct gait mechanics, and HEP. LR 07/08/18 1149      Done Acceptance E,D VU,NR Educated on correct gait mechanics and progression of POC. LR 07/04/18 1553     Active Acceptance E NR Educated pt on importance of mobility to return home and to PLOF. Informed pt of the benefits of maintaining mobility prior to possibility of surgery to ease recovery. JESSICA 07/02/18 0859     Active Acceptance E,D NR  LS1 06/30/18 1210    Significant Other Active Eager E,D NR   07/11/18 1724     Done Acceptance E,D VU,NR Educated on correct gait mechanics and progression of POC. LR 07/04/18 1553     Active Acceptance E,D NR  1 06/30/18 1210          Point: Home exercise program (Active)    Learning Progress Summary     Learner Status Readiness Method Response Comment Documented by    Patient Active Acceptance E,D NR   07/12/18 1048     Active Eager E,D NR   07/11/18 1724     Done Acceptance E,D VU,NR Reviewed WB status, benefits of mobility  07/09/18 1713     Done Acceptance E,D VU,NR Educated on weight bearing status, correct gait mechanics, and HEP.  07/08/18 1147     Done Acceptance E,D VU,NR Educated on correct gait mechanics and progression of POC.  07/04/18 1553     Active Acceptance E NR Educated pt on importance of mobility to return home and to PLOF. Informed pt of the benefits of maintaining mobility prior to possibility of surgery to ease recovery. JESSICA 07/02/18 0859    Significant Other Active Eager E,D NR   07/11/18 1724     Done Acceptance E,D VU,NR Educated on correct gait mechanics and progression of POC.  07/04/18 1553          Point: Body mechanics (Active)    Learning Progress Summary     Learner Status Readiness Method Response Comment Documented by    Patient Active Acceptance E,D NR   07/12/18 1048     Active Eager E,D NR   07/11/18 1724     Done Acceptance E,D VU,NR Reviewed WB status, benefits of mobility  07/09/18 1713     Done Acceptance E,D VU,NR Educated on weight bearing status, correct gait mechanics, and HEP.  07/08/18 1147     Done  Acceptance E,D VU,NR Educated on correct gait mechanics and progression of POC. LR 07/04/18 1553     Active Acceptance E NR Educated pt on importance of mobility to return home and to PLOF. Informed pt of the benefits of maintaining mobility prior to possibility of surgery to ease recovery. JESSICA 07/02/18 0859     Active Acceptance E,D NR  1 06/30/18 1210    Significant Other Active Eager E,D NR   07/11/18 1724     Done Acceptance E,D VU,NR Educated on correct gait mechanics and progression of POC. LR 07/04/18 1553     Active Acceptance E,D NR  1 06/30/18 1210          Point: Precautions (Active)    Learning Progress Summary     Learner Status Readiness Method Response Comment Documented by    Patient Active Acceptance E,D NR   07/12/18 1048     Active Eager E,D NR   07/11/18 1724     Done Acceptance E,D VU,NR Reviewed WB status, benefits of mobility  07/09/18 1713     Done Acceptance E,D VU,NR Educated on weight bearing status, correct gait mechanics, and HEP. LR 07/08/18 1147     Done Acceptance E,D VU,NR Educated on correct gait mechanics and progression of POC. LR 07/04/18 1553     Active Acceptance E NR Educated pt on importance of mobility to return home and to PLOF. Informed pt of the benefits of maintaining mobility prior to possibility of surgery to ease recovery. JESSICA 07/02/18 0859     Active Acceptance E,D NR  [facility] 06/30/18 1210    Significant Other Active Eager E,D NR   07/11/18 1724     Done Acceptance E,D VU,NR Educated on correct gait mechanics and progression of POC. LR 07/04/18 1553     Active Acceptance E,D NR  [facility] 06/30/18 1210                      User Key     Initials Effective Dates Name Provider Type Discipline    DM 06/19/15 -  Delfina Renteria, PT Physical Therapist PT    LR 06/19/15 -  Vilma Edmonds, PT Physical Therapist PT     06/19/15 -  Valorie Hernandez, PT Physical Therapist PT    [facility] 06/19/15 -  Loly Telles, PT Physical Therapist PT     04/03/18 -  Jaswinder Wadsworth,  PT Physical Therapist PT    BD 06/08/18 -  Porsche Agrawal, PT Physical Therapist PT    JESSICA 05/15/18 -  Dillon Lawrence, PT Student PT Student PT                    PT Recommendation and Plan     Plan of Care Reviewed With: patient  Progress: improving  Outcome Summary: Pt able to walk further and with less assistance. Also able to stand w/o RW with only SBA. Improving balance and mobility.          Outcome Measures     Row Name 07/13/18 1045 07/12/18 0957 07/11/18 1617       How much help from another person do you currently need...    Turning from your back to your side while in flat bed without using bedrails? 4  -LS 4  -BD 4  -DM    Moving from lying on back to sitting on the side of a flat bed without bedrails? 4  -LS 4  -BD 3  -DM    Moving to and from a bed to a chair (including a wheelchair)? 3  -LS 3  -BD 3  -DM    Standing up from a chair using your arms (e.g., wheelchair, bedside chair)? 4  -LS 3  -BD 3  -DM    Climbing 3-5 steps with a railing? 3  -LS 2  -BD 2  -DM    To walk in hospital room? 3  -LS 3  -BD 3  -DM    AM-PAC 6 Clicks Score 21  -LS 19  -BD 18  -DM       Modified Mattie Scale    Modified Hayes Scale 3 - Moderate disability.  Requiring some help, but able to walk without assistance.  -LS  -- 4 - Moderately severe disability.  Unable to walk without assistance, and unable to attend to own bodily needs without assistance.  -DM       Functional Assessment    Outcome Measure Options AM-PAC 6 Clicks Basic Mobility (PT)  -LS AM-PAC 6 Clicks Basic Mobility (PT)  -BD AM-PAC 6 Clicks Basic Mobility (PT)  -DM      User Key  (r) = Recorded By, (t) = Taken By, (c) = Cosigned By    Initials Name Provider Type    DM Delfina Renteria, PT Physical Therapist    LS Valorie Hernandez, PT Physical Therapist    BD Porsche Agrawal, PT Physical Therapist           Time Calculation:         PT Charges     Row Name 07/13/18 1147 07/13/18 1045          Time Calculation    Start Time 1055  -LS  --     PT Received  On 07/13/18  -  --     PT Goal Re-Cert Due Date 07/23/18  -  --        Timed Charges    09476 - PT Therapeutic Exercise Minutes  -- 10  -LS     38604 - Gait Training Minutes   -- 15  -LS     79133 - PT Therapeutic Activity Minutes  -- 15  -LS       User Key  (r) = Recorded By, (t) = Taken By, (c) = Cosigned By    Initials Name Provider Type     Valorie Hernandez, PT Physical Therapist        Therapy Suggested Charges     Code   Minutes Charges    64622 (CPT®) Hc Pt Neuromusc Re Education Ea 15 Min      89262 (CPT®) Hc Pt Ther Proc Ea 15 Min 10 1    76319 (CPT®) Hc Gait Training Ea 15 Min 15 1    15299 (CPT®) Hc Pt Therapeutic Act Ea 15 Min 15 1    39470 (CPT®) Hc Pt Manual Therapy Ea 15 Min      42593 (CPT®) Hc Pt Iontophoresis Ea 15 Min      64849 (CPT®) Hc Pt Elec Stim Ea-Per 15 Min      88904 (CPT®) Hc Pt Ultrasound Ea 15 Min      92663 (CPT®) Hc Pt Self Care/Mgmt/Train Ea 15 Min      Total  40 3        Therapy Charges for Today     Code Description Service Date Service Provider Modifiers Qty    40801590310 HC PT THER PROC EA 15 MIN 7/13/2018 Valorie Hernandez, PT  1    25128976946 HC GAIT TRAINING EA 15 MIN 7/13/2018 Valorie Hernandez, PT  1    75230493458 HC PT THERAPEUTIC ACT EA 15 MIN 7/13/2018 Valorie Hernandez, PT GP 1          PT G-Codes  Outcome Measure Options: AM-PAC 6 Clicks Basic Mobility (PT)    Valorie Hernandez, PT  7/13/2018

## 2018-07-13 NOTE — PLAN OF CARE
Problem: Patient Care Overview  Goal: Plan of Care Review  Outcome: Ongoing (interventions implemented as appropriate)   07/13/18 4852   Coping/Psychosocial   Plan of Care Reviewed With patient   Plan of Care Review   Progress no change   OTHER   Outcome Summary vss, ra, 2l nc at hs, ppm, chh today via family, dressing cdi, no pain

## 2018-07-13 NOTE — CONSULTS
Note was seen on 7/3/18 during this stay and diabetes education assess and education was done. Was not aware when received this latest consult and repeated assess and education. Patient didn't recall the earlier education/visit per ROMIE Arrington RN.   at bedside today as well and they say [facility].  Previous assess confirmed information and no changes. Answered questions regarding Mrs. Sanchez diabetes medication for Mr. Sanchez.  She stated she has been diagnosed for over 30 years and recently had the Amaryl added back to the Januvia so they figure her glucoses should get better again. She denied any recent hypoglycemia but reviewed s/sx/tx with both. Urged thickened sugars such as honey for her 15 gm of sugar when <70 mg/dl. Both were very [place]. They answered questions very well and knowledge about diabetes and management was good. Pt gave permission for diabetes education visit. 30 minutes.

## 2018-07-15 NOTE — THERAPY DISCHARGE NOTE
Acute Care - Physical Therapy Discharge Summary  [facility]       Patient Name: Lynne Sanchez  : 1934  MRN: [id number]    Today's Date: 7/15/2018  Onset of Illness/Injury or Date of Surgery: 18    Date of Referral to PT: 18  Referring Physician: MD Zeb      Admit Date: 2018      PT Recommendation and Plan    Visit Dx:    ICD-10-CM ICD-9-CM   1. Other chronic osteomyelitis of left foot (CMS/[facility]) M86.672 730.17   2. Cerebrovascular accident (CVA), unspecified mechanism (CMS/[facility]) I63.9 434.91   3. Impaired mobility and ADLs Z74.09 799.89   4. Impaired functional mobility, balance, gait, and endurance Z74.09 V49.89   5. Aphasia R47.01 784.3   6. S/P amputation of lesser toe, left (CMS/HCC) Z89.422 V49.72             Outcome Measures     Row Name 07/15/18 1035 18 1045          How much help from another person do you currently need...    Turning from your back to your side while in flat bed without using bedrails?  -- 4  -LS     Moving from lying on back to sitting on the side of a flat bed without bedrails?  -- 4  -LS     Moving to and from a bed to a chair (including a wheelchair)?  -- 3  -LS     Standing up from a chair using your arms (e.g., wheelchair, bedside chair)?  -- 4  -LS     Climbing 3-5 steps with a railing?  -- 3  -LS     To walk in hospital room?  -- 3  -LS     AM-PAC 6 Clicks Score  -- 21  -LS        Modified [place] Scale    Modified [place] Scale 3 - Moderate disability.  Requiring some help, but able to walk without assistance.  - 3 - Moderate disability.  Requiring some help, but able to walk without assistance.  -LS        Functional Assessment    Outcome Measure Options  -- AM-PAC 6 Clicks Basic Mobility (PT)  -LS       User Key  (r) = Recorded By, (t) = Taken By, (c) = Cosigned By    Initials Name Provider Type    LS Valorie Hernandez, PT Physical Therapist     Bindu Díaz, PT Physical Therapist            Therapy Suggested Charges     Code    Minutes Charges    65155 (CPT®) Hc Pt Neuromusc Re Education Ea 15 Min      88398 (CPT®) Hc Pt Ther Proc Ea 15 Min 10 1    21621 (CPT®) Hc Gait Training Ea 15 Min 15 1    56575 (CPT®) Hc Pt Therapeutic Act Ea 15 Min 15 1    91828 (CPT®) Hc Pt Manual Therapy Ea 15 Min      06441 (CPT®) Hc Pt Iontophoresis Ea 15 Min      98312 (CPT®) Hc Pt Elec Stim Ea-Per 15 Min      15402 (CPT®) Hc Pt Ultrasound Ea 15 Min      71043 (CPT®) Hc Pt Self Care/Mgmt/Train Ea 15 Min      Total  40 3                PT Rehab Goals     Row Name 07/15/18 1035             Bed Mobility Goal 1 (PT)    Activity/Assistive Device (Bed Mobility Goal 1, PT) sit to supine/supine to sit  -MC      [place] Level/Cues Needed (Bed Mobility Goal 1, PT) independent  -MC      Time Frame (Bed Mobility Goal 1, PT) 2 weeks  -MC      Progress/Outcomes (Bed Mobility Goal 1, PT) goal ongoing  -         Transfer Goal 1 (PT)    Activity/Assistive Device (Transfer Goal 1, PT) sit-to-stand/stand-to-sit;cane, straight  -MC      [place] Level/Cues Needed (Transfer Goal 1, PT) conditional independence  -MC      Time Frame (Transfer Goal 1, PT) 2 weeks  -MC      Progress/Outcome (Transfer Goal 1, PT) goal ongoing  -         Gait Training Goal 1 (PT)    Activity/Assistive Device (Gait Training Goal 1, PT) gait (walking locomotion);cane, straight  -MC      [place] Level (Gait Training Goal 1, PT) supervision required  -      Distance (Gait Goal 1, PT) 50   to get from room to room in her home  -MC      Time Frame (Gait Training Goal 1, PT) 2 weeks  -MC      Progress/Outcome (Gait Training Goal 1, PT) goal ongoing  -         Stairs Goal 1 (PT)    Activity/Assistive Device (Stairs Goal 1, PT) ascending stairs;descending stairs  -      [place] Level/Cues Needed (Stairs Goal 1, PT) contact guard assist  -MC      Number of Stairs (Stairs Goal 1, PT) 12  -MC      Time Frame (Stairs Goal 1, PT) 2 weeks  -MC      Progress/Outcome (Stairs Goal 1, PT) goal  no longer appropriate;other (see comments)   Will have to stay on first floor of the home if d/c home  -        User Key  (r) = Recorded By, (t) = Taken By, (c) = Cosigned By    Initials Name Provider Type Discipline    HARISH Díaz, PT Physical Therapist PT              PT Discharge Summary  Anticipated Discharge Disposition (PT): inpatient rehabilitation facility  Reason for Discharge: Discharge from facility  Outcomes Achieved: Refer to plan of care for updates on goals achieved  Discharge Destination: SNF      Bindu Díaz, PT   7/15/2018

## 2018-07-27 ENCOUNTER — OFFICE VISIT (OUTPATIENT)
Dept: CARDIAC SURGERY | Facility: CLINIC | Age: 83
End: 2018-07-27

## 2018-07-27 VITALS
SYSTOLIC BLOOD PRESSURE: 105 MMHG | DIASTOLIC BLOOD PRESSURE: 74 MMHG | TEMPERATURE: 98.7 F | HEART RATE: 78 BPM | OXYGEN SATURATION: 95 % | BODY MASS INDEX: 24.62 KG/M2 | HEIGHT: 66 IN | WEIGHT: 153.2 LBS

## 2018-07-27 DIAGNOSIS — I73.9 PERIPHERAL ARTERIAL DISEASE (HCC): Primary | ICD-10-CM

## 2018-07-27 DIAGNOSIS — M86.472 CHRONIC OSTEOMYELITIS OF LEFT FOOT WITH DRAINING SINUS (HCC): ICD-10-CM

## 2018-07-27 PROCEDURE — 99024 POSTOP FOLLOW-UP VISIT: CPT | Performed by: THORACIC SURGERY (CARDIOTHORACIC VASCULAR SURGERY)

## 2018-07-27 NOTE — PROGRESS NOTES
"Patient Information  Lynne Sanchez                                                                                          [address] [place] [address]      1934  [phone]      Chief Complaint   Patient presents with   • Osteomyelitis      Hospital follow-up s/p left great toe amputation 7/5/18       History of Present Illness: Patient seen today in the office for first postop visit following a left great toe amputation with primary closure for osteomyelitis of the left great toe the operative procedure was done on 07/05/2018.  The patient went to [facility] rehabilitation postoperatively and is now been discharged home.    Vitals:    07/27/18 1145   BP: 105/74   BP Location: Right arm   Patient Position: Sitting   Pulse: 78   Temp: 98.7 °F (37.1 °C)   SpO2: 95%   Weight: 69.5 kg (153 lb 3.2 oz)   Height: 167.6 cm (66\")        Physical Exam the left toe amputation site appears to be healing well with the dorsal and plantar skin flaps intact and viable.  The suture line is intact with viable skin margins and the skin sutures are intact.  I did apply a large Band-Aid over the amputation site today and the patient is wearing her toe unloading shoe and I reapplied that today.  Also the patient showed me her left great toe which has been traumatized recently and there is some drainage superficially around the nailbed and some bruising noted.  I did apply some triple antibiotic over the area and applied a large Band-Aid to the left great toe.    Lab/other results:    Assessment: #1.  Postop left great toe amputation and primary closure performed on 07/05/2018 for osteomyelitis of the distal phalanx of the left great toe.  The amputation site appears to be viable and skin sutures are intact.  New dressing was applied along with a toe unloading shoe.  #2.  Recent trauma to the right great toe with some bruising of the distal pulp of the left great toe and some drainage around the nailbed.    Plan: I " will plan to see Mrs. Sanchez in 2 weeks for follow-up visit and most likely will remove the skin sutures from the  left great toe amputation at that time and instructed her to continue to wear the toe unloading shoe to the left foot and also before the right foot concern to wash the foot daily and apply some triple antibiotic body, and a large Band-Aid over the right great toe    Prakash Carrington M.D.

## 2018-08-09 ENCOUNTER — OFFICE VISIT (OUTPATIENT)
Dept: CARDIAC SURGERY | Facility: CLINIC | Age: 83
End: 2018-08-09

## 2018-08-09 VITALS
BODY MASS INDEX: 23.46 KG/M2 | DIASTOLIC BLOOD PRESSURE: 71 MMHG | HEART RATE: 64 BPM | WEIGHT: 146 LBS | SYSTOLIC BLOOD PRESSURE: 131 MMHG | HEIGHT: 66 IN | OXYGEN SATURATION: 95 % | TEMPERATURE: 98.7 F

## 2018-08-09 DIAGNOSIS — I73.9 PERIPHERAL ARTERIAL DISEASE (HCC): Primary | ICD-10-CM

## 2018-08-09 DIAGNOSIS — E11.9 TYPE 2 DIABETES MELLITUS TREATED WITHOUT INSULIN (HCC): ICD-10-CM

## 2018-08-09 DIAGNOSIS — M86.472 CHRONIC OSTEOMYELITIS OF LEFT FOOT WITH DRAINING SINUS (HCC): ICD-10-CM

## 2018-08-09 PROCEDURE — 99024 POSTOP FOLLOW-UP VISIT: CPT | Performed by: THORACIC SURGERY (CARDIOTHORACIC VASCULAR SURGERY)

## 2018-08-09 RX ORDER — POTASSIUM CHLORIDE 750 MG/1
CAPSULE, EXTENDED RELEASE ORAL
Status: ON HOLD | COMMUNITY
End: 2022-03-22

## 2018-08-10 NOTE — PROGRESS NOTES
"Patient Information  Lynne Sanchez                                                                                          [address] [place] [address]      1934  [phone]      Chief Complaint   Patient presents with   • Post-op Follow-up     2 wk fu / s/p lfet great toe amp        History of Present Illness: Patient seen today for second postop visit following left great toe amputation and primary closure for osteomyelitis of left great toe that was performed on 07/05/2018 and she is completing her rehabilitation at [facility] will and is now gone home.    Vitals:    08/09/18 1107   BP: 131/71   BP Location: Right arm   Patient Position: Sitting   Pulse: 64   Temp: 98.7 °F (37.1 °C)   TempSrc: Temporal Artery    SpO2: 95%   Weight: 66.2 kg (146 lb)   Height: 167.6 cm (66\")        Physical Exam the left great toe amputation site appears to be completely healed and the skin sutures were removed today.  Her second left toe which has a hammertoe deformities now has a callus/ulcer formation at the distal tuft  The callus/ulcer not appear to communicate yet to the distal phalanx.  In addition the right great toe which on her previous visit she rated that she had some trauma to does appear to be stable and intact at this time without any skin ulceration or evidence of any soft tissue .  There is however on the second toe of the right foot also a callus formation at the distal tuft area similar to the formation of the left second toe.  This callus does not appear to go into the distal phalanx but it is close.  This shows a left for second toes have a some hammertoe deformity and they are pushing on the end of her shoes that she is wearing    Lab/other results:    Assessment: #1.  Postop left great toe amputation and primary closure on 07/05/2018 for ostomy myelitis of distal phalanx left great toe amputation site is completely healed and the skin sutures were removed.  #2.  Callus formation on both " right and left second toes with some hammertoe deformity.  This almost assuredly is due to improper shoe fitting with the ends of her toes pressing on the inside of her shoes.  She is not gotten any diabetic shoes    Plan: I have informed the patient and her  to try to obtain some shoes that are more properly fitting for her problem with her hammertoe's of the second toes of both feet causing the calluses at the end of both of these toes.  They both state that they are going to see the podiatrist about obtaining some shoes that are more properly fitting for her toe anatomy.  I will plan to formally see the patient in one month for follow-up visit.    Prakash Carrington M.D.

## 2018-08-14 LAB — FUNGUS WND CULT: ABNORMAL

## 2018-08-15 ENCOUNTER — OFFICE VISIT (OUTPATIENT)
Dept: CARDIOLOGY | Facility: CLINIC | Age: 83
End: 2018-08-15

## 2018-08-15 ENCOUNTER — HOSPITAL ENCOUNTER (OUTPATIENT)
Dept: CARDIOLOGY | Facility: HOSPITAL | Age: 83
Discharge: HOME | End: 2018-08-15

## 2018-08-15 VITALS
DIASTOLIC BLOOD PRESSURE: 58 MMHG | BODY MASS INDEX: 23.33 KG/M2 | HEART RATE: 70 BPM | OXYGEN SATURATION: 98 % | HEIGHT: 66 IN | SYSTOLIC BLOOD PRESSURE: 124 MMHG | WEIGHT: 145.2 LBS

## 2018-08-15 DIAGNOSIS — I38 VALVULAR HEART DISEASE: ICD-10-CM

## 2018-08-15 DIAGNOSIS — I10 ESSENTIAL HYPERTENSION: ICD-10-CM

## 2018-08-15 DIAGNOSIS — I48.20 CHRONIC ATRIAL FIBRILLATION (HCC): Primary | ICD-10-CM

## 2018-08-15 DIAGNOSIS — I34.0 NON-RHEUMATIC MITRAL REGURGITATION: ICD-10-CM

## 2018-08-15 PROCEDURE — 93279 PRGRMG DEV EVAL PM/LDLS PM: CPT | Performed by: INTERNAL MEDICINE

## 2018-08-15 PROCEDURE — 99213 OFFICE O/P EST LOW 20 MIN: CPT | Performed by: INTERNAL MEDICINE

## 2018-08-15 NOTE — PROGRESS NOTES
[facility] - Office Note  Lynne Sanchez         [address] [place] [address]  1934   [phone] (home)      LOCATION:  [facility] office.  Visit Type: Follow Up.    VISIT DATE: 8/15/2018     PCP:  Vilma Rollins PA    08/15/18   Lynne Sanchez is a 83 y.o.  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  PROBLEM LIST:  1. Chronic atrial fibrillation, status post [employer]ureano pacemaker implantation and AV node ablation:  a. Diagnosed June 2005.  b. Status post ECV restoring normal sinus rhythm, June 2005.  c. Rythmol initiated 05/01/2006.  d. Previously digoxin toxicity.  e. GXT Cardiolite, 09/08/2005: No reversible ischemia. LV function not performed secondary to atrial fibrillation.   f. Status post successful external cardioversion on 10/01/2008, Tessa Downey MD.  g. EKG on 10/21/2008: Recurrence of atrial fibrillation with rate of 109.  h. Recurrent atrial fibrillation by EKG, 11/03/2008, asymptomatic.  i. Status post [employer] Laureano pacemaker implantation and AV node ablation.  2018 Gen change AAslam  j. Echocardiogram 02/15/2010: Moderate MR, moderate TR. RVSP 50. EF greater than 55%, left atrial enlargement at 4.3 cm.  k. Echo 1/18/17: EF 60%, Mod-severe MR, Mod-severe TR.  l. 10/6/17 echo: EF 50-55%, mild LVH, thickened mitral leaflets with mild MR, aortic sclerosis, trace AI, moderate TR with PA SP 60 mmHg, moderate to severe RV dilation, dilated IVC  2. History of congestive heart failure.  3. Diabetes mellitus, type 2.  4. PVD  a. - 10/17 L PTCA ant tib(Benigno)  5. Surgical history - appendectomy.   6. Anemia-NOS neg scopes 2017, h/o following  a. Per Dr. Michael Poe [facility]       Allergies   Allergen Reactions   • Phenergan [Promethazine Hcl] Confusion         Current Outpatient Prescriptions:   •  acetaminophen (TYLENOL) 325 MG tablet, Take 2 tablets by mouth Every 6 (Six) Hours As Needed  for Mild Pain  or Fever., Disp: , Rfl:   •  aspirin 81 MG EC tablet, Take 1 tablet by mouth Daily., Disp: 30 tablet, Rfl: 0  •  atorvastatin (LIPITOR) 80 MG tablet, Take 1 tablet by mouth Every Night., Disp: , Rfl:   •  Biotin 5000 MCG tablet, Take 1 tablet by mouth Daily., Disp: , Rfl:   •  cholecalciferol (VITAMIN D3) 1000 units tablet, Take 1,000 Units by mouth Daily., Disp: , Rfl:   •  ferrous sulfate 325 (65 FE) MG tablet, Take 1 tablet by mouth 2 (Two) Times a Day With Meals., Disp: , Rfl:   •  furosemide (LASIX) 20 MG tablet, Take 1 tablet by mouth Daily., Disp: , Rfl:   •  glimepiride (AMARYL) 4 MG tablet, Take 4 mg by mouth 2 (Two) Times a Day., Disp: , Rfl:   •  Lactobacillus (PROBIOTIC ACIDOPHILUS PO), Take  by mouth., Disp: , Rfl:   •  latanoprost (XALATAN) 0.005 % ophthalmic solution, Administer 1 drop to both eyes Daily., Disp: , Rfl:   •  melatonin 5 MG sublingual tablet sublingual tablet, Place 1 tablet under the tongue At Night As Needed (sleep)., Disp: , Rfl:   •  metoprolol tartrate (LOPRESSOR) 100 MG tablet, Take 1 tablet by mouth 2 (Two) Times a Day., Disp: 180 tablet, Rfl: 3  •  Multiple Vitamins-Minerals (MULTIVITAMIN GUMMIES ADULT) chewable tablet, Chew 1 tablet Daily., Disp: , Rfl:   •  nystatin (MYCOSTATIN) 723749 UNIT/GM powder, Apply  topically Every 12 (Twelve) Hours. Under L breast, Disp: , Rfl:   •  Omega-3 Fatty Acids (FISH OIL) 1200 MG capsule capsule, Take 1,200 mg by mouth Daily., Disp: , Rfl:   •  potassium chloride (MICRO-K) 10 MEQ CR capsule, potassium chloride ER 10 mEq capsule,extended release  Take 1 capsule every day by oral route., Disp: , Rfl:   •  povidone-iodine (BETADINE) 10 % external solution, Apply  topically Daily. Clean distal right great toe wound with NS; apply Betadine; leave AIDEN, Disp: , Rfl:   •  SITagliptin (JANUVIA) 25 MG tablet, Take 25 mg by mouth Daily., Disp: , Rfl:     Shortness of Breath   Pertinent negatives include no chest pain or syncope.   Atrial  "Fibrillation   Symptoms include shortness of breath. Symptoms are negative for chest pain and syncope. Past medical history includes atrial fibrillation.       Pt denies CP, dyspnea on exertion, orthopnea, PND, palpitations, lower extremity edema, syncope.  Recent swelling prompting ER eval c//w vol overload and marked anemia Hgb 7.8 .  Had R great toe removed per Dr Carrington since last visit.   [facility] now seeing pt.    The following portions of the patient's history were reviewed in the chart and updated as appropriate: allergies, current medications, past family history, past medical history, past social history, past surgical history and problem list.    Review of Systems   Constitution: Positive for malaise/fatigue.   Cardiovascular: Negative for chest pain, dyspnea on exertion, near-syncope and syncope.   Respiratory: Positive for shortness of breath. Negative for cough.          height is 167.6 cm (66\") and weight is 65.9 kg (145 lb 3.2 oz). Her blood pressure is 124/58 and her pulse is 70. Her oxygen saturation is 98%.      Physical Exam   Constitutional: She appears well-developed and well-nourished.   Neck: Normal range of motion. Neck supple. No hepatojugular reflux and no JVD present. Carotid bruit is not present. No tracheal deviation present. No thyromegaly present.   Cardiovascular: Normal rate, regular rhythm, S1 normal, S2 normal, intact distal pulses and normal pulses.  PMI is not displaced.  Exam reveals no gallop, no distant heart sounds, no friction rub, no midsystolic click and no opening snap.    Murmur heard.  High-pitched blowing holosystolic murmur is present with a grade of 3/6  at the apex  Pulses:       Radial pulses are 2+ on the right side, and 2+ on the left side.        Dorsalis pedis pulses are 2+ on the right side, and 2+ on the left side.        Posterior tibial pulses are 2+ on the right side, and 2+ on the left side.   Pulmonary/Chest: Effort normal and breath sounds " normal. She has no wheezes. She has no rales.   Abdominal: Soft. Bowel sounds are normal. She exhibits no mass. There is no tenderness. There is no guarding.   Musculoskeletal: She exhibits edema.     Procedures   [employer] Laureano pacemaker checked today  VVI   RV pacing greater than 99%, threshold 0.75, impedance 366 ohms.  Battery voltage at MADDI    No events noted.    Assessment/ Plan     Chronic atrial fibrillation:  Device at JOSE.  Will schedule generator change    Valvular heart disease:  Mod + MR .  At this time patient is well compensated and relatively asymptomatic with near prohibitive surgical risk.  Reevaluate at time of follow-up and attempt to keep Hgb >9    Anemia- per heme onc Dr. Rodriguez/Samy et al .  Requested she fu w them asap as needed 2U PRBC in hospital.      8/15/2018  2:26 PM

## 2018-08-16 LAB
MYCOBACTERIUM SPEC CULT: NORMAL
NIGHT BLUE STAIN TISS: NORMAL

## 2018-08-23 ENCOUNTER — HOSPITAL ENCOUNTER (OUTPATIENT)
Dept: GENERAL RADIOLOGY | Facility: HOSPITAL | Age: 83
Discharge: HOME | End: 2018-08-23
Admitting: PHYSICIAN ASSISTANT

## 2018-08-23 ENCOUNTER — PREP FOR SURGERY (OUTPATIENT)
Dept: OTHER | Facility: HOSPITAL | Age: 83
End: 2018-08-23

## 2018-08-23 ENCOUNTER — APPOINTMENT (OUTPATIENT)
Dept: PREADMISSION TESTING | Facility: HOSPITAL | Age: 83
End: 2018-08-23

## 2018-08-23 ENCOUNTER — OFFICE VISIT (OUTPATIENT)
Dept: CARDIAC SURGERY | Facility: CLINIC | Age: 83
End: 2018-08-23

## 2018-08-23 VITALS
SYSTOLIC BLOOD PRESSURE: 134 MMHG | OXYGEN SATURATION: 94 % | HEART RATE: 84 BPM | WEIGHT: 140.2 LBS | TEMPERATURE: 98.1 F | BODY MASS INDEX: 22.53 KG/M2 | HEIGHT: 66 IN | DIASTOLIC BLOOD PRESSURE: 78 MMHG

## 2018-08-23 VITALS — HEIGHT: 66 IN | WEIGHT: 140.21 LBS | BODY MASS INDEX: 22.53 KG/M2

## 2018-08-23 DIAGNOSIS — M86.9 OSTEOMYELITIS OF LEFT FOOT, UNSPECIFIED TYPE (HCC): ICD-10-CM

## 2018-08-23 DIAGNOSIS — M86.9 OSTEOMYELITIS OF LEFT FOOT, UNSPECIFIED TYPE (HCC): Primary | ICD-10-CM

## 2018-08-23 DIAGNOSIS — E11.9 TYPE 2 DIABETES MELLITUS TREATED WITHOUT INSULIN (HCC): ICD-10-CM

## 2018-08-23 DIAGNOSIS — M86.472 CHRONIC OSTEOMYELITIS OF LEFT FOOT WITH DRAINING SINUS (HCC): ICD-10-CM

## 2018-08-23 DIAGNOSIS — I73.9 PERIPHERAL ARTERIAL DISEASE (HCC): Primary | ICD-10-CM

## 2018-08-23 LAB
ANION GAP SERPL CALCULATED.3IONS-SCNC: 7 MMOL/L (ref 3–11)
BUN BLD-MCNC: 24 MG/DL (ref 9–23)
BUN/CREAT SERPL: 17.6 (ref 7–25)
CALCIUM SPEC-SCNC: 8.9 MG/DL (ref 8.7–10.4)
CHLORIDE SERPL-SCNC: 108 MMOL/L (ref 99–109)
CO2 SERPL-SCNC: 27 MMOL/L (ref 20–31)
CREAT BLD-MCNC: 1.36 MG/DL (ref 0.6–1.3)
DEPRECATED RDW RBC AUTO: 56.1 FL (ref 37–54)
ERYTHROCYTE [DISTWIDTH] IN BLOOD BY AUTOMATED COUNT: 17.6 % (ref 11.3–14.5)
GFR SERPL CREATININE-BSD FRML MDRD: 37 ML/MIN/1.73
GLUCOSE BLD-MCNC: 199 MG/DL (ref 70–100)
HBA1C MFR BLD: 6.5 % (ref 4.8–5.6)
HCT VFR BLD AUTO: 29.6 % (ref 34.5–44)
HGB BLD-MCNC: 9.1 G/DL (ref 11.5–15.5)
INR PPP: 1.09 (ref 0.91–1.09)
MCH RBC QN AUTO: 26.8 PG (ref 27–31)
MCHC RBC AUTO-ENTMCNC: 30.7 G/DL (ref 32–36)
MCV RBC AUTO: 87.1 FL (ref 80–99)
PLATELET # BLD AUTO: 167 10*3/MM3 (ref 150–450)
PMV BLD AUTO: 9.7 FL (ref 6–12)
POTASSIUM BLD-SCNC: 4.4 MMOL/L (ref 3.5–5.5)
PROTHROMBIN TIME: 11.4 SECONDS (ref 9.6–11.5)
RBC # BLD AUTO: 3.4 10*6/MM3 (ref 3.89–5.14)
SODIUM BLD-SCNC: 142 MMOL/L (ref 132–146)
WBC NRBC COR # BLD: 7.62 10*3/MM3 (ref 3.5–10.8)

## 2018-08-23 PROCEDURE — 83036 HEMOGLOBIN GLYCOSYLATED A1C: CPT | Performed by: PHYSICIAN ASSISTANT

## 2018-08-23 PROCEDURE — 36415 COLL VENOUS BLD VENIPUNCTURE: CPT

## 2018-08-23 PROCEDURE — 71046 X-RAY EXAM CHEST 2 VIEWS: CPT

## 2018-08-23 PROCEDURE — 99024 POSTOP FOLLOW-UP VISIT: CPT | Performed by: THORACIC SURGERY (CARDIOTHORACIC VASCULAR SURGERY)

## 2018-08-23 PROCEDURE — 85610 PROTHROMBIN TIME: CPT | Performed by: PHYSICIAN ASSISTANT

## 2018-08-23 PROCEDURE — 85027 COMPLETE CBC AUTOMATED: CPT | Performed by: PHYSICIAN ASSISTANT

## 2018-08-23 PROCEDURE — 80048 BASIC METABOLIC PNL TOTAL CA: CPT | Performed by: PHYSICIAN ASSISTANT

## 2018-08-23 RX ORDER — CHLORHEXIDINE GLUCONATE 500 MG/1
1 CLOTH TOPICAL EVERY 12 HOURS PRN
Status: CANCELLED | OUTPATIENT
Start: 2018-08-23

## 2018-08-23 RX ORDER — ACETAMINOPHEN 325 MG/1
650 TABLET ORAL EVERY 4 HOURS PRN
Status: CANCELLED | OUTPATIENT
Start: 2018-08-27

## 2018-08-23 RX ORDER — NITROGLYCERIN 0.4 MG/1
0.4 TABLET SUBLINGUAL
Status: CANCELLED | OUTPATIENT
Start: 2018-08-27

## 2018-08-23 NOTE — PAT
Patient instructed to call Dr. Carrington's office regarding plavix.    Patient to apply Chlorhexadine wipes  to surgical area (as instructed) the night before procedure and the AM of procedure. Wipes provided.

## 2018-08-23 NOTE — DISCHARGE INSTRUCTIONS

## 2018-08-24 ENCOUNTER — TELEPHONE (OUTPATIENT)
Dept: CARDIAC SURGERY | Facility: CLINIC | Age: 83
End: 2018-08-24

## 2018-08-24 NOTE — TELEPHONE ENCOUNTER
[facility] Lynne Rose[facility] to attempt to get a prior authorization for inpatient surgery on 8/27/18. The cpt code 34301 is an outpatient code. Therefore no prior authorization is needed. If the patient stays past 23 hours, the hospital UM dept is to call to upgrade admit.     Dillon

## 2018-08-24 NOTE — PROGRESS NOTES
"Patient Information  Lynne Sanchez                                                                                          [address] [place] [address]      1934  [phone]      Chief Complaint   Patient presents with   • Follow-up     Follow up for left second toe swelling and ulcer       History of Present Illness: Patient seen today at her request for rather acute swelling of her left second toe at the distal pulp area.  She was last seen by me on 08/09/2018 for her left great toe amputation that a been performed on 07/05/2018 for osteomyelitis.  That amputation site was healing well but I had some concern at that time about some callus formation and ulceration of the left second toe and I told her to try to treat at daily with some antibiotic ointment and cover with Band-Aid and to still wear her toe unloading shoe.  She is not experiencing any fever or chills at this time and no drainage    Vitals:    08/23/18 1531   BP: 134/78   BP Location: Right arm   Patient Position: Sitting   Pulse: 84   Temp: 98.1 °F (36.7 °C)   SpO2: 94%   Weight: 63.6 kg (140 lb 3.2 oz)   Height: 167.6 cm (66\")        Physical Exam examination reveals the left great toe amputation site to be completely healed.  The left second toe however at the distal  pulp has an ulceration that on probing definitely goes to the distal phalangeal bone and in addition there is some cellulitis surrounding this ulceration that was not present on her visit on 08/09/2018.      Lab/other results:    Assessment: #1.  On clinical grounds the patient has now osteomyelitis of the left second toe distal phalanx.  There is also some surrounding cellulitis/erythema around the ulcer bed of the second toe    #2.  Postop left great toe amputation performed on 07/05/2018  is completely healed  Plan: the left second toe definitely needs to be amputated.  I discussed the situation with  of infectious disease and after he reviewed the " cultures from the left great toe amputation he felt like we should start the patient on doxycycline 100 mg twice a day until the time of surgery.  Therefore we have written a prescription.  Toe amputation is being scheduled for Monday August 27 2018.      Prakash Carrington M.D.

## 2018-08-26 ENCOUNTER — ANESTHESIA EVENT (OUTPATIENT)
Dept: PERIOP | Facility: HOSPITAL | Age: 83
End: 2018-08-26

## 2018-08-26 RX ORDER — SODIUM CHLORIDE, SODIUM LACTATE, POTASSIUM CHLORIDE, CALCIUM CHLORIDE 600; 310; 30; 20 MG/100ML; MG/100ML; MG/100ML; MG/100ML
9 INJECTION, SOLUTION INTRAVENOUS CONTINUOUS
Status: CANCELLED | OUTPATIENT
Start: 2018-08-26

## 2018-08-26 RX ORDER — FAMOTIDINE 10 MG/ML
20 INJECTION, SOLUTION INTRAVENOUS ONCE
Status: CANCELLED | OUTPATIENT
Start: 2018-08-26 | End: 2018-08-26

## 2018-08-26 RX ORDER — FAMOTIDINE 20 MG/1
20 TABLET, FILM COATED ORAL ONCE
Status: CANCELLED | OUTPATIENT
Start: 2018-08-26 | End: 2018-08-26

## 2018-08-26 RX ORDER — SODIUM CHLORIDE 0.9 % (FLUSH) 0.9 %
1-10 SYRINGE (ML) INJECTION AS NEEDED
Status: CANCELLED | OUTPATIENT
Start: 2018-08-26

## 2018-08-26 RX ORDER — LIDOCAINE HYDROCHLORIDE 10 MG/ML
0.5 INJECTION, SOLUTION EPIDURAL; INFILTRATION; INTRACAUDAL; PERINEURAL ONCE AS NEEDED
Status: CANCELLED | OUTPATIENT
Start: 2018-08-26

## 2018-08-27 ENCOUNTER — ANESTHESIA (OUTPATIENT)
Dept: PERIOP | Facility: HOSPITAL | Age: 83
End: 2018-08-27

## 2018-08-27 ENCOUNTER — HOSPITAL ENCOUNTER (OUTPATIENT)
Facility: HOSPITAL | Age: 83
Discharge: HOME | End: 2018-08-28
Attending: THORACIC SURGERY (CARDIOTHORACIC VASCULAR SURGERY) | Admitting: THORACIC SURGERY (CARDIOTHORACIC VASCULAR SURGERY)

## 2018-08-27 DIAGNOSIS — M86.9 OSTEOMYELITIS OF LEFT FOOT, UNSPECIFIED TYPE (HCC): ICD-10-CM

## 2018-08-27 DIAGNOSIS — Z74.09 IMPAIRED FUNCTIONAL MOBILITY, BALANCE, GAIT, AND ENDURANCE: Primary | ICD-10-CM

## 2018-08-27 LAB
GLUCOSE BLDC GLUCOMTR-MCNC: 101 MG/DL (ref 70–130)
GLUCOSE BLDC GLUCOMTR-MCNC: 120 MG/DL (ref 70–130)
GLUCOSE BLDC GLUCOMTR-MCNC: 134 MG/DL (ref 70–130)
GLUCOSE BLDC GLUCOMTR-MCNC: 146 MG/DL (ref 70–130)
POTASSIUM BLDA-SCNC: 4.06 MMOL/L (ref 3.5–5.3)

## 2018-08-27 PROCEDURE — 63710000001 FERROUS SULFATE 325 (65 FE) MG TABLET: Performed by: PHYSICIAN ASSISTANT

## 2018-08-27 PROCEDURE — 87116 MYCOBACTERIA CULTURE: CPT | Performed by: THORACIC SURGERY (CARDIOTHORACIC VASCULAR SURGERY)

## 2018-08-27 PROCEDURE — 87070 CULTURE OTHR SPECIMN AEROBIC: CPT | Performed by: THORACIC SURGERY (CARDIOTHORACIC VASCULAR SURGERY)

## 2018-08-27 PROCEDURE — 87206 SMEAR FLUORESCENT/ACID STAI: CPT | Performed by: THORACIC SURGERY (CARDIOTHORACIC VASCULAR SURGERY)

## 2018-08-27 PROCEDURE — 25010000002 CEFUROXIME: Performed by: PHYSICIAN ASSISTANT

## 2018-08-27 PROCEDURE — 25010000002 CEFEPIME 2 G/NS 100 ML SOLUTION: Performed by: INTERNAL MEDICINE

## 2018-08-27 PROCEDURE — A9270 NON-COVERED ITEM OR SERVICE: HCPCS | Performed by: PHYSICIAN ASSISTANT

## 2018-08-27 PROCEDURE — 25010000002 PROPOFOL 10 MG/ML EMULSION: Performed by: NURSE ANESTHETIST, CERTIFIED REGISTERED

## 2018-08-27 PROCEDURE — 87102 FUNGUS ISOLATION CULTURE: CPT | Performed by: THORACIC SURGERY (CARDIOTHORACIC VASCULAR SURGERY)

## 2018-08-27 PROCEDURE — 87106 FUNGI IDENTIFICATION YEAST: CPT | Performed by: THORACIC SURGERY (CARDIOTHORACIC VASCULAR SURGERY)

## 2018-08-27 PROCEDURE — 82962 GLUCOSE BLOOD TEST: CPT

## 2018-08-27 PROCEDURE — 88305 TISSUE EXAM BY PATHOLOGIST: CPT | Performed by: THORACIC SURGERY (CARDIOTHORACIC VASCULAR SURGERY)

## 2018-08-27 PROCEDURE — 28820 AMPUTATION OF TOE: CPT | Performed by: THORACIC SURGERY (CARDIOTHORACIC VASCULAR SURGERY)

## 2018-08-27 PROCEDURE — 63710000001 ATORVASTATIN 40 MG TABLET: Performed by: PHYSICIAN ASSISTANT

## 2018-08-27 PROCEDURE — 87075 CULTR BACTERIA EXCEPT BLOOD: CPT | Performed by: THORACIC SURGERY (CARDIOTHORACIC VASCULAR SURGERY)

## 2018-08-27 PROCEDURE — 25010000002 PHENYLEPHRINE PER 1 ML: Performed by: NURSE ANESTHETIST, CERTIFIED REGISTERED

## 2018-08-27 PROCEDURE — 88311 DECALCIFY TISSUE: CPT | Performed by: THORACIC SURGERY (CARDIOTHORACIC VASCULAR SURGERY)

## 2018-08-27 PROCEDURE — 87077 CULTURE AEROBIC IDENTIFY: CPT | Performed by: THORACIC SURGERY (CARDIOTHORACIC VASCULAR SURGERY)

## 2018-08-27 PROCEDURE — 87205 SMEAR GRAM STAIN: CPT | Performed by: THORACIC SURGERY (CARDIOTHORACIC VASCULAR SURGERY)

## 2018-08-27 PROCEDURE — 87176 TISSUE HOMOGENIZATION CULTR: CPT | Performed by: THORACIC SURGERY (CARDIOTHORACIC VASCULAR SURGERY)

## 2018-08-27 PROCEDURE — 84132 ASSAY OF SERUM POTASSIUM: CPT | Performed by: ANESTHESIOLOGY

## 2018-08-27 PROCEDURE — 63710000001 NYSTATIN 100000 UNIT/GM POWDER 15 G BOTTLE: Performed by: PHYSICIAN ASSISTANT

## 2018-08-27 PROCEDURE — 25010000002 LINEZOLID 600 MG/300ML SOLUTION: Performed by: INTERNAL MEDICINE

## 2018-08-27 PROCEDURE — 87186 SC STD MICRODIL/AGAR DIL: CPT | Performed by: THORACIC SURGERY (CARDIOTHORACIC VASCULAR SURGERY)

## 2018-08-27 PROCEDURE — 25010000002 FENTANYL CITRATE (PF) 100 MCG/2ML SOLUTION: Performed by: NURSE ANESTHETIST, CERTIFIED REGISTERED

## 2018-08-27 RX ORDER — ASPIRIN 81 MG/1
81 TABLET ORAL DAILY
Status: DISCONTINUED | OUTPATIENT
Start: 2018-08-28 | End: 2018-08-28 | Stop reason: HOSPADM

## 2018-08-27 RX ORDER — HYDROCODONE BITARTRATE AND ACETAMINOPHEN 7.5; 325 MG/1; MG/1
1 TABLET ORAL EVERY 4 HOURS PRN
Status: DISCONTINUED | OUTPATIENT
Start: 2018-08-27 | End: 2018-08-28 | Stop reason: HOSPADM

## 2018-08-27 RX ORDER — MORPHINE SULFATE 4 MG/ML
2 INJECTION, SOLUTION INTRAMUSCULAR; INTRAVENOUS
Status: DISCONTINUED | OUTPATIENT
Start: 2018-08-27 | End: 2018-08-28 | Stop reason: HOSPADM

## 2018-08-27 RX ORDER — SODIUM CHLORIDE 0.9 % (FLUSH) 0.9 %
1-10 SYRINGE (ML) INJECTION AS NEEDED
Status: DISCONTINUED | OUTPATIENT
Start: 2018-08-27 | End: 2018-08-27 | Stop reason: HOSPADM

## 2018-08-27 RX ORDER — SODIUM CHLORIDE, SODIUM LACTATE, POTASSIUM CHLORIDE, CALCIUM CHLORIDE 600; 310; 30; 20 MG/100ML; MG/100ML; MG/100ML; MG/100ML
50 INJECTION, SOLUTION INTRAVENOUS CONTINUOUS
Status: DISCONTINUED | OUTPATIENT
Start: 2018-08-27 | End: 2018-08-28 | Stop reason: HOSPADM

## 2018-08-27 RX ORDER — CLOPIDOGREL BISULFATE 75 MG/1
75 TABLET ORAL DAILY
Status: DISCONTINUED | OUTPATIENT
Start: 2018-08-28 | End: 2018-08-28 | Stop reason: HOSPADM

## 2018-08-27 RX ORDER — DEXTROSE MONOHYDRATE 25 G/50ML
25 INJECTION, SOLUTION INTRAVENOUS
Status: DISCONTINUED | OUTPATIENT
Start: 2018-08-27 | End: 2018-08-28 | Stop reason: HOSPADM

## 2018-08-27 RX ORDER — FENTANYL CITRATE 50 UG/ML
INJECTION, SOLUTION INTRAMUSCULAR; INTRAVENOUS AS NEEDED
Status: DISCONTINUED | OUTPATIENT
Start: 2018-08-27 | End: 2018-08-27 | Stop reason: SURG

## 2018-08-27 RX ORDER — SODIUM CHLORIDE 9 MG/ML
9 INJECTION, SOLUTION INTRAVENOUS ONCE
Status: COMPLETED | OUTPATIENT
Start: 2018-08-27 | End: 2018-08-27

## 2018-08-27 RX ORDER — NYSTATIN 100000 [USP'U]/G
POWDER TOPICAL EVERY 12 HOURS SCHEDULED
Status: DISCONTINUED | OUTPATIENT
Start: 2018-08-27 | End: 2018-08-28 | Stop reason: HOSPADM

## 2018-08-27 RX ORDER — SODIUM CHLORIDE, SODIUM LACTATE, POTASSIUM CHLORIDE, CALCIUM CHLORIDE 600; 310; 30; 20 MG/100ML; MG/100ML; MG/100ML; MG/100ML
9 INJECTION, SOLUTION INTRAVENOUS CONTINUOUS
Status: DISCONTINUED | OUTPATIENT
Start: 2018-08-27 | End: 2018-08-27

## 2018-08-27 RX ORDER — SODIUM CHLORIDE 9 MG/ML
INJECTION, SOLUTION INTRAVENOUS CONTINUOUS PRN
Status: DISCONTINUED | OUTPATIENT
Start: 2018-08-27 | End: 2018-08-27 | Stop reason: SURG

## 2018-08-27 RX ORDER — LATANOPROST 50 UG/ML
1 SOLUTION/ DROPS OPHTHALMIC NIGHTLY
Status: DISCONTINUED | OUTPATIENT
Start: 2018-08-27 | End: 2018-08-28 | Stop reason: HOSPADM

## 2018-08-27 RX ORDER — MELATONIN
1000 DAILY
Status: DISCONTINUED | OUTPATIENT
Start: 2018-08-28 | End: 2018-08-28 | Stop reason: HOSPADM

## 2018-08-27 RX ORDER — HYDROMORPHONE HYDROCHLORIDE 1 MG/ML
0.5 INJECTION, SOLUTION INTRAMUSCULAR; INTRAVENOUS; SUBCUTANEOUS
Status: DISCONTINUED | OUTPATIENT
Start: 2018-08-27 | End: 2018-08-27 | Stop reason: HOSPADM

## 2018-08-27 RX ORDER — ACETAMINOPHEN 325 MG/1
650 TABLET ORAL EVERY 4 HOURS PRN
Status: DISCONTINUED | OUTPATIENT
Start: 2018-08-27 | End: 2018-08-27 | Stop reason: HOSPADM

## 2018-08-27 RX ORDER — LINEZOLID 2 MG/ML
600 INJECTION, SOLUTION INTRAVENOUS EVERY 12 HOURS
Status: DISCONTINUED | OUTPATIENT
Start: 2018-08-27 | End: 2018-08-28 | Stop reason: HOSPADM

## 2018-08-27 RX ORDER — METOPROLOL TARTRATE 100 MG/1
100 TABLET ORAL EVERY 12 HOURS SCHEDULED
Status: DISCONTINUED | OUTPATIENT
Start: 2018-08-27 | End: 2018-08-28 | Stop reason: HOSPADM

## 2018-08-27 RX ORDER — NITROGLYCERIN 0.4 MG/1
0.4 TABLET SUBLINGUAL
Status: DISCONTINUED | OUTPATIENT
Start: 2018-08-27 | End: 2018-08-27 | Stop reason: HOSPADM

## 2018-08-27 RX ORDER — LIDOCAINE HYDROCHLORIDE 10 MG/ML
0.5 INJECTION, SOLUTION EPIDURAL; INFILTRATION; INTRACAUDAL; PERINEURAL ONCE AS NEEDED
Status: COMPLETED | OUTPATIENT
Start: 2018-08-27 | End: 2018-08-27

## 2018-08-27 RX ORDER — POTASSIUM CHLORIDE 750 MG/1
10 CAPSULE, EXTENDED RELEASE ORAL DAILY
Status: DISCONTINUED | OUTPATIENT
Start: 2018-08-28 | End: 2018-08-28 | Stop reason: HOSPADM

## 2018-08-27 RX ORDER — NICOTINE POLACRILEX 4 MG
15 LOZENGE BUCCAL
Status: DISCONTINUED | OUTPATIENT
Start: 2018-08-27 | End: 2018-08-28 | Stop reason: HOSPADM

## 2018-08-27 RX ORDER — FERROUS SULFATE 325(65) MG
325 TABLET ORAL 2 TIMES DAILY WITH MEALS
Status: DISCONTINUED | OUTPATIENT
Start: 2018-08-27 | End: 2018-08-28 | Stop reason: HOSPADM

## 2018-08-27 RX ORDER — FAMOTIDINE 20 MG/1
20 TABLET, FILM COATED ORAL ONCE
Status: COMPLETED | OUTPATIENT
Start: 2018-08-27 | End: 2018-08-27

## 2018-08-27 RX ORDER — ATORVASTATIN CALCIUM 40 MG/1
80 TABLET, FILM COATED ORAL NIGHTLY
Status: DISCONTINUED | OUTPATIENT
Start: 2018-08-27 | End: 2018-08-28 | Stop reason: HOSPADM

## 2018-08-27 RX ORDER — BISACODYL 10 MG
10 SUPPOSITORY, RECTAL RECTAL DAILY PRN
Status: DISCONTINUED | OUTPATIENT
Start: 2018-08-27 | End: 2018-08-28 | Stop reason: HOSPADM

## 2018-08-27 RX ORDER — FENTANYL CITRATE 50 UG/ML
50 INJECTION, SOLUTION INTRAMUSCULAR; INTRAVENOUS
Status: DISCONTINUED | OUTPATIENT
Start: 2018-08-27 | End: 2018-08-27 | Stop reason: HOSPADM

## 2018-08-27 RX ORDER — LIDOCAINE HYDROCHLORIDE 10 MG/ML
INJECTION, SOLUTION INFILTRATION; PERINEURAL AS NEEDED
Status: DISCONTINUED | OUTPATIENT
Start: 2018-08-27 | End: 2018-08-27 | Stop reason: HOSPADM

## 2018-08-27 RX ORDER — CHLORHEXIDINE GLUCONATE 500 MG/1
1 CLOTH TOPICAL EVERY 12 HOURS PRN
Status: DISCONTINUED | OUTPATIENT
Start: 2018-08-27 | End: 2018-08-27 | Stop reason: HOSPADM

## 2018-08-27 RX ORDER — SENNA AND DOCUSATE SODIUM 50; 8.6 MG/1; MG/1
2 TABLET, FILM COATED ORAL 2 TIMES DAILY PRN
Status: DISCONTINUED | OUTPATIENT
Start: 2018-08-27 | End: 2018-08-28 | Stop reason: HOSPADM

## 2018-08-27 RX ORDER — CLOPIDOGREL BISULFATE 75 MG/1
75 TABLET ORAL DAILY
COMMUNITY
End: 2020-02-17 | Stop reason: SDUPTHER

## 2018-08-27 RX ORDER — HEPARIN SODIUM 5000 [USP'U]/ML
5000 INJECTION, SOLUTION INTRAVENOUS; SUBCUTANEOUS EVERY 8 HOURS SCHEDULED
Status: DISCONTINUED | OUTPATIENT
Start: 2018-08-28 | End: 2018-08-28 | Stop reason: HOSPADM

## 2018-08-27 RX ORDER — FUROSEMIDE 20 MG/1
20 TABLET ORAL DAILY
Status: DISCONTINUED | OUTPATIENT
Start: 2018-08-28 | End: 2018-08-28 | Stop reason: HOSPADM

## 2018-08-27 RX ORDER — PROPOFOL 10 MG/ML
VIAL (ML) INTRAVENOUS CONTINUOUS PRN
Status: DISCONTINUED | OUTPATIENT
Start: 2018-08-27 | End: 2018-08-27 | Stop reason: SURG

## 2018-08-27 RX ADMIN — FENTANYL CITRATE 50 MCG: 50 INJECTION, SOLUTION INTRAMUSCULAR; INTRAVENOUS at 13:05

## 2018-08-27 RX ADMIN — FAMOTIDINE 20 MG: 20 TABLET ORAL at 10:26

## 2018-08-27 RX ADMIN — ACETAMINOPHEN 650 MG: 325 TABLET ORAL at 10:26

## 2018-08-27 RX ADMIN — PHENYLEPHRINE HYDROCHLORIDE 100 MCG: 10 INJECTION INTRAVENOUS at 12:57

## 2018-08-27 RX ADMIN — LINEZOLID 600 MG: 600 INJECTION, SOLUTION INTRAVENOUS at 18:52

## 2018-08-27 RX ADMIN — NYSTATIN: 100000 POWDER TOPICAL at 22:49

## 2018-08-27 RX ADMIN — FENTANYL CITRATE 50 MCG: 50 INJECTION, SOLUTION INTRAMUSCULAR; INTRAVENOUS at 12:30

## 2018-08-27 RX ADMIN — CEFEPIME 2 G: 2 INJECTION, POWDER, FOR SOLUTION INTRAVENOUS at 17:44

## 2018-08-27 RX ADMIN — SODIUM CHLORIDE 9 ML/HR: 9 INJECTION, SOLUTION INTRAVENOUS at 10:21

## 2018-08-27 RX ADMIN — LIDOCAINE HYDROCHLORIDE 0.2 ML: 10 INJECTION, SOLUTION EPIDURAL; INFILTRATION; INTRACAUDAL; PERINEURAL at 10:21

## 2018-08-27 RX ADMIN — PROPOFOL 60 MCG/KG/MIN: 10 INJECTION, EMULSION INTRAVENOUS at 12:38

## 2018-08-27 RX ADMIN — CEFUROXIME 1.5 G: 1.5 INJECTION, POWDER, FOR SOLUTION INTRAVENOUS at 12:30

## 2018-08-27 RX ADMIN — SODIUM CHLORIDE, SODIUM LACTATE, POTASSIUM CHLORIDE, CALCIUM CHLORIDE 50 ML/HR: 600; 310; 30; 20 INJECTION, SOLUTION INTRAVENOUS at 14:11

## 2018-08-27 RX ADMIN — Medication 325 MG: at 17:48

## 2018-08-27 RX ADMIN — SODIUM CHLORIDE: 9 INJECTION, SOLUTION INTRAVENOUS at 12:30

## 2018-08-27 NOTE — ANESTHESIA PREPROCEDURE EVALUATION
Anesthesia Evaluation                  Airway   Mallampati: I  TM distance: >3 FB  Neck ROM: full  No difficulty expected  Dental - normal exam     Pulmonary - normal exam   Cardiovascular - normal exam    (+) hypertension, dysrhythmias Atrial Fib, CHF, PVD, hyperlipidemia,       Neuro/Psych  (+) CVA,     GI/Hepatic/Renal/Endo    (+)   diabetes mellitus,     Musculoskeletal     Abdominal  - normal exam    Bowel sounds: normal.   Substance History      OB/GYN          Other                        Anesthesia Plan    ASA 3     general     intravenous induction   Anesthetic plan and risks discussed with patient.    Plan discussed with CRNA.

## 2018-08-27 NOTE — ANESTHESIA POSTPROCEDURE EVALUATION
Patient: Lynne Sanchez    Procedure Summary     Date:  08/27/18 Room / Location:   [facility]    Anesthesia Start:  1230 Anesthesia Stop:      Procedure:  LEFT SECOND TOE AMPUTATION DIGIT (Left Fingers) Diagnosis:       Osteomyelitis of left foot, unspecified type (CMS/HCC)      (Osteomyelitis of left foot, unspecified type (CMS/HCC) [M86.9])    Surgeon:  Prakash Carrington MD Provider:  Jez Alan MD    Anesthesia Type:  general ASA Status:  3          Anesthesia Type: general  Last vitals  BP   89/45   Temp   98   Pulse   72   Resp   12   SpO2   100%     Post Anesthesia Care and Evaluation    Patient location during evaluation: PACU  Patient participation: complete - patient participated  Pain score: 0  Pain management: adequate  Airway patency: patent  Anesthetic complications: No anesthetic complications  PONV Status: none  Cardiovascular status: acceptable  Respiratory status: acceptable  Hydration status: acceptable

## 2018-08-28 VITALS
RESPIRATION RATE: 18 BRPM | TEMPERATURE: 98.2 F | DIASTOLIC BLOOD PRESSURE: 57 MMHG | SYSTOLIC BLOOD PRESSURE: 119 MMHG | HEART RATE: 74 BPM | HEIGHT: 66 IN | OXYGEN SATURATION: 98 % | WEIGHT: 140 LBS | BODY MASS INDEX: 22.5 KG/M2

## 2018-08-28 LAB
ALBUMIN SERPL-MCNC: 3.21 G/DL (ref 3.2–4.8)
ALBUMIN/GLOB SERPL: 1.2 G/DL (ref 1.5–2.5)
ALP SERPL-CCNC: 64 U/L (ref 25–100)
ALT SERPL W P-5'-P-CCNC: 6 U/L (ref 7–40)
ANION GAP SERPL CALCULATED.3IONS-SCNC: 6 MMOL/L (ref 3–11)
AST SERPL-CCNC: 14 U/L (ref 0–33)
BILIRUB SERPL-MCNC: 0.6 MG/DL (ref 0.3–1.2)
BUN BLD-MCNC: 25 MG/DL (ref 9–23)
BUN/CREAT SERPL: 20.5 (ref 7–25)
CALCIUM SPEC-SCNC: 8 MG/DL (ref 8.7–10.4)
CHLORIDE SERPL-SCNC: 110 MMOL/L (ref 99–109)
CO2 SERPL-SCNC: 24 MMOL/L (ref 20–31)
CREAT BLD-MCNC: 1.22 MG/DL (ref 0.6–1.3)
DEPRECATED RDW RBC AUTO: 54.7 FL (ref 37–54)
ERYTHROCYTE [DISTWIDTH] IN BLOOD BY AUTOMATED COUNT: 17.2 % (ref 11.3–14.5)
GFR SERPL CREATININE-BSD FRML MDRD: 42 ML/MIN/1.73
GLOBULIN UR ELPH-MCNC: 2.6 GM/DL
GLUCOSE BLD-MCNC: 193 MG/DL (ref 70–100)
GLUCOSE BLDC GLUCOMTR-MCNC: 149 MG/DL (ref 70–130)
GLUCOSE BLDC GLUCOMTR-MCNC: 80 MG/DL (ref 70–130)
HCT VFR BLD AUTO: 26.7 % (ref 34.5–44)
HGB BLD-MCNC: 8.2 G/DL (ref 11.5–15.5)
MCH RBC QN AUTO: 26.8 PG (ref 27–31)
MCHC RBC AUTO-ENTMCNC: 30.7 G/DL (ref 32–36)
MCV RBC AUTO: 87.3 FL (ref 80–99)
PLATELET # BLD AUTO: 117 10*3/MM3 (ref 150–450)
PMV BLD AUTO: 9.9 FL (ref 6–12)
POTASSIUM BLD-SCNC: 4.2 MMOL/L (ref 3.5–5.5)
PROT SERPL-MCNC: 5.8 G/DL (ref 5.7–8.2)
RBC # BLD AUTO: 3.06 10*6/MM3 (ref 3.89–5.14)
SODIUM BLD-SCNC: 140 MMOL/L (ref 132–146)
WBC NRBC COR # BLD: 6.61 10*3/MM3 (ref 3.5–10.8)

## 2018-08-28 PROCEDURE — 63710000001 METOPROLOL TARTRATE 100 MG TABLET: Performed by: PHYSICIAN ASSISTANT

## 2018-08-28 PROCEDURE — A9270 NON-COVERED ITEM OR SERVICE: HCPCS | Performed by: PHYSICIAN ASSISTANT

## 2018-08-28 PROCEDURE — 80053 COMPREHEN METABOLIC PANEL: CPT | Performed by: INTERNAL MEDICINE

## 2018-08-28 PROCEDURE — 63710000001 ASPIRIN 81 MG TABLET DELAYED-RELEASE: Performed by: PHYSICIAN ASSISTANT

## 2018-08-28 PROCEDURE — 85027 COMPLETE CBC AUTOMATED: CPT | Performed by: INTERNAL MEDICINE

## 2018-08-28 PROCEDURE — 63710000001 DOXYCYCLINE 100 MG CAPSULE: Performed by: PHYSICIAN ASSISTANT

## 2018-08-28 PROCEDURE — 25010000002 CEFEPIME 2 G/NS 100 ML SOLUTION: Performed by: INTERNAL MEDICINE

## 2018-08-28 PROCEDURE — G8978 MOBILITY CURRENT STATUS: HCPCS

## 2018-08-28 PROCEDURE — G8979 MOBILITY GOAL STATUS: HCPCS

## 2018-08-28 PROCEDURE — 82962 GLUCOSE BLOOD TEST: CPT

## 2018-08-28 PROCEDURE — 63710000001 CLOPIDOGREL 75 MG TABLET: Performed by: PHYSICIAN ASSISTANT

## 2018-08-28 PROCEDURE — 25010000002 HEPARIN (PORCINE) PER 1000 UNITS: Performed by: PHYSICIAN ASSISTANT

## 2018-08-28 PROCEDURE — 99024 POSTOP FOLLOW-UP VISIT: CPT | Performed by: THORACIC SURGERY (CARDIOTHORACIC VASCULAR SURGERY)

## 2018-08-28 PROCEDURE — 63710000001 POTASSIUM CHLORIDE 10 MEQ CAPSULE CONTROLLED-RELEASE: Performed by: PHYSICIAN ASSISTANT

## 2018-08-28 PROCEDURE — 63710000001 FUROSEMIDE 20 MG TABLET: Performed by: PHYSICIAN ASSISTANT

## 2018-08-28 PROCEDURE — 63710000001 CHOLECALCIFEROL 1000 UNITS TABLET: Performed by: PHYSICIAN ASSISTANT

## 2018-08-28 PROCEDURE — 97161 PT EVAL LOW COMPLEX 20 MIN: CPT

## 2018-08-28 PROCEDURE — 63710000001 FERROUS SULFATE 325 (65 FE) MG TABLET: Performed by: PHYSICIAN ASSISTANT

## 2018-08-28 PROCEDURE — 25010000002 LINEZOLID 600 MG/300ML SOLUTION: Performed by: INTERNAL MEDICINE

## 2018-08-28 RX ORDER — DOXYCYCLINE 100 MG/1
100 CAPSULE ORAL EVERY 12 HOURS SCHEDULED
Qty: 14 CAPSULE | Refills: 0 | Status: SHIPPED | OUTPATIENT
Start: 2018-08-28 | End: 2018-08-28

## 2018-08-28 RX ORDER — DOXYCYCLINE 100 MG/1
100 CAPSULE ORAL EVERY 12 HOURS SCHEDULED
Status: DISCONTINUED | OUTPATIENT
Start: 2018-08-28 | End: 2018-08-28 | Stop reason: HOSPADM

## 2018-08-28 RX ORDER — DOXYCYCLINE 100 MG/1
100 CAPSULE ORAL EVERY 12 HOURS SCHEDULED
Qty: 14 CAPSULE | Refills: 0 | Status: SHIPPED | OUTPATIENT
Start: 2018-08-28 | End: 2018-09-04

## 2018-08-28 RX ADMIN — DOXYCYCLINE 100 MG: 100 CAPSULE ORAL at 15:21

## 2018-08-28 RX ADMIN — Medication 325 MG: at 08:27

## 2018-08-28 RX ADMIN — NYSTATIN: 100000 POWDER TOPICAL at 08:32

## 2018-08-28 RX ADMIN — CEFEPIME 2 G: 2 INJECTION, POWDER, FOR SOLUTION INTRAVENOUS at 06:49

## 2018-08-28 RX ADMIN — POTASSIUM CHLORIDE 10 MEQ: 750 CAPSULE, EXTENDED RELEASE ORAL at 08:29

## 2018-08-28 RX ADMIN — ASPIRIN 81 MG: 81 TABLET, COATED ORAL at 08:27

## 2018-08-28 RX ADMIN — HEPARIN SODIUM 5000 UNITS: 5000 INJECTION, SOLUTION INTRAVENOUS; SUBCUTANEOUS at 14:23

## 2018-08-28 RX ADMIN — VITAMIN D, TAB 1000IU (100/BT) 1000 UNITS: 25 TAB at 08:27

## 2018-08-28 RX ADMIN — METOPROLOL TARTRATE 100 MG: 100 TABLET ORAL at 08:29

## 2018-08-28 RX ADMIN — FUROSEMIDE 20 MG: 20 TABLET ORAL at 08:28

## 2018-08-28 RX ADMIN — CLOPIDOGREL BISULFATE 75 MG: 75 TABLET ORAL at 08:27

## 2018-08-28 RX ADMIN — LINEZOLID 600 MG: 600 INJECTION, SOLUTION INTRAVENOUS at 06:50

## 2018-08-28 RX ADMIN — HEPARIN SODIUM 5000 UNITS: 5000 INJECTION, SOLUTION INTRAVENOUS; SUBCUTANEOUS at 06:50

## 2018-08-30 ENCOUNTER — LAB REQUISITION (OUTPATIENT)
Dept: LAB | Facility: HOSPITAL | Age: 83
End: 2018-08-30

## 2018-08-30 DIAGNOSIS — Z00.00 ROUTINE GENERAL MEDICAL EXAMINATION AT A HEALTH CARE FACILITY: ICD-10-CM

## 2018-08-30 LAB
ALBUMIN SERPL-MCNC: 4.28 G/DL (ref 3.2–4.8)
ALBUMIN/GLOB SERPL: 1.8 G/DL (ref 1.5–2.5)
ALP SERPL-CCNC: 84 U/L (ref 25–100)
ALT SERPL W P-5'-P-CCNC: 10 U/L (ref 7–40)
ANION GAP SERPL CALCULATED.3IONS-SCNC: 8 MMOL/L (ref 3–11)
AST SERPL-CCNC: 22 U/L (ref 0–33)
BACTERIA SPEC AEROBE CULT: ABNORMAL
BASOPHILS # BLD AUTO: 0.02 10*3/MM3 (ref 0–0.2)
BASOPHILS NFR BLD AUTO: 0.3 % (ref 0–1)
BILIRUB SERPL-MCNC: 0.8 MG/DL (ref 0.3–1.2)
BUN BLD-MCNC: 26 MG/DL (ref 9–23)
BUN/CREAT SERPL: 20.3 (ref 7–25)
CALCIUM SPEC-SCNC: 9.3 MG/DL (ref 8.7–10.4)
CHLORIDE SERPL-SCNC: 107 MMOL/L (ref 99–109)
CO2 SERPL-SCNC: 27 MMOL/L (ref 20–31)
CREAT BLD-MCNC: 1.28 MG/DL (ref 0.6–1.3)
CYTO UR: NORMAL
DEPRECATED RDW RBC AUTO: 55.3 FL (ref 37–54)
EOSINOPHIL # BLD AUTO: 0.3 10*3/MM3 (ref 0–0.3)
EOSINOPHIL NFR BLD AUTO: 3.8 % (ref 0–3)
ERYTHROCYTE [DISTWIDTH] IN BLOOD BY AUTOMATED COUNT: 17.3 % (ref 11.3–14.5)
GFR SERPL CREATININE-BSD FRML MDRD: 40 ML/MIN/1.73
GLOBULIN UR ELPH-MCNC: 2.4 GM/DL
GLUCOSE BLD-MCNC: 107 MG/DL (ref 70–100)
GRAM STN SPEC: ABNORMAL
HCT VFR BLD AUTO: 31.4 % (ref 34.5–44)
HGB BLD-MCNC: 9.5 G/DL (ref 11.5–15.5)
IMM GRANULOCYTES # BLD: 0.02 10*3/MM3 (ref 0–0.03)
IMM GRANULOCYTES NFR BLD: 0.3 % (ref 0–0.6)
LAB AP CASE REPORT: NORMAL
LAB AP CLINICAL INFORMATION: NORMAL
LYMPHOCYTES # BLD AUTO: 2 10*3/MM3 (ref 0.6–4.8)
LYMPHOCYTES NFR BLD AUTO: 25.4 % (ref 24–44)
MCH RBC QN AUTO: 26.5 PG (ref 27–31)
MCHC RBC AUTO-ENTMCNC: 30.3 G/DL (ref 32–36)
MCV RBC AUTO: 87.7 FL (ref 80–99)
MONOCYTES # BLD AUTO: 0.7 10*3/MM3 (ref 0–1)
MONOCYTES NFR BLD AUTO: 8.9 % (ref 0–12)
NEUTROPHILS # BLD AUTO: 4.84 10*3/MM3 (ref 1.5–8.3)
NEUTROPHILS NFR BLD AUTO: 61.6 % (ref 41–71)
PATH REPORT.FINAL DX SPEC: NORMAL
PATH REPORT.GROSS SPEC: NORMAL
PLATELET # BLD AUTO: 176 10*3/MM3 (ref 150–450)
PMV BLD AUTO: 10 FL (ref 6–12)
POTASSIUM BLD-SCNC: 4.4 MMOL/L (ref 3.5–5.5)
PROT SERPL-MCNC: 6.7 G/DL (ref 5.7–8.2)
RBC # BLD AUTO: 3.58 10*6/MM3 (ref 3.89–5.14)
SODIUM BLD-SCNC: 142 MMOL/L (ref 132–146)
WBC NRBC COR # BLD: 7.86 10*3/MM3 (ref 3.5–10.8)

## 2018-08-30 PROCEDURE — 36415 COLL VENOUS BLD VENIPUNCTURE: CPT | Performed by: INTERNAL MEDICINE

## 2018-08-30 PROCEDURE — 80053 COMPREHEN METABOLIC PANEL: CPT | Performed by: INTERNAL MEDICINE

## 2018-08-30 PROCEDURE — 85025 COMPLETE CBC W/AUTO DIFF WBC: CPT | Performed by: INTERNAL MEDICINE

## 2018-09-05 LAB
BACTERIA SPEC ANAEROBE CULT: ABNORMAL

## 2018-09-13 ENCOUNTER — OFFICE VISIT (OUTPATIENT)
Dept: CARDIAC SURGERY | Facility: CLINIC | Age: 83
End: 2018-09-13

## 2018-09-13 VITALS
BODY MASS INDEX: 23.08 KG/M2 | OXYGEN SATURATION: 99 % | HEART RATE: 89 BPM | DIASTOLIC BLOOD PRESSURE: 62 MMHG | HEIGHT: 66 IN | TEMPERATURE: 98.2 F | SYSTOLIC BLOOD PRESSURE: 127 MMHG | WEIGHT: 143.6 LBS

## 2018-09-13 DIAGNOSIS — I73.9 PERIPHERAL ARTERIAL DISEASE (HCC): ICD-10-CM

## 2018-09-13 DIAGNOSIS — M86.9 OSTEOMYELITIS OF LEFT FOOT, UNSPECIFIED TYPE (HCC): Primary | ICD-10-CM

## 2018-09-13 DIAGNOSIS — E11.9 TYPE 2 DIABETES MELLITUS TREATED WITHOUT INSULIN (HCC): ICD-10-CM

## 2018-09-13 DIAGNOSIS — M86.472 CHRONIC OSTEOMYELITIS OF LEFT FOOT WITH DRAINING SINUS (HCC): ICD-10-CM

## 2018-09-13 PROCEDURE — 99024 POSTOP FOLLOW-UP VISIT: CPT | Performed by: THORACIC SURGERY (CARDIOTHORACIC VASCULAR SURGERY)

## 2018-09-14 NOTE — PROGRESS NOTES
"Patient Information  Lynne Sanchez                                                                                          [address] [place] [address]      1934  [phone]      Chief Complaint   Patient presents with   • Follow-up     Hospital follow up s/p left second toe amp        History of Present Illness: Patient seen today for follow-up of her left great toe amputation for osteomyelitis.  The procedure was done on 07/05/2018.  Subsequent to this she developed osteomyelitis of the left second toe at the distal which required amputation on 08/27/2018.  She comes back today for removal of those skin sutures.    Vitals:    09/13/18 1341   BP: 127/62   BP Location: Right arm   Patient Position: Sitting   Pulse: 89   Temp: 98.2 °F (36.8 °C)   SpO2: 99%   Weight: 65.1 kg (143 lb 9.6 oz)   Height: 167.6 cm (66\")        Physical Exam the skin sutures were removed from these second toe amputation site.  The toe is healed well.  Her left great toe amputation site is also well healed.  Concern however is the right great toe and the right second toe with slight eschar formation at the distal pulp.  At this time is no evidence of any deep penetration of this ulceration into the distal phalangeal bone.    Lab/other results:    Assessment: #1.  Postop left great toe amputation on 07/05/2018 completely healed  #2.  Postop second left toe amputation 08/27/2018 completely healed  #3.  Beginning ulcerations of the distal pulp of the right great toe and right second toe    Plan: At this time I will switch the toe unloading shoe from the left foot now to the right foot.  She is also to wear the toe unloading shoe on the left foot.  I am going to see the patient back in 2 weeks for follow-up visit    Prakash Carrington M.D.   "

## 2018-10-04 ENCOUNTER — OFFICE VISIT (OUTPATIENT)
Dept: CARDIAC SURGERY | Facility: CLINIC | Age: 83
End: 2018-10-04

## 2018-10-04 VITALS
DIASTOLIC BLOOD PRESSURE: 63 MMHG | TEMPERATURE: 100.4 F | HEIGHT: 66 IN | BODY MASS INDEX: 22.76 KG/M2 | WEIGHT: 141.6 LBS | OXYGEN SATURATION: 100 % | HEART RATE: 82 BPM | SYSTOLIC BLOOD PRESSURE: 122 MMHG

## 2018-10-04 DIAGNOSIS — M86.472 CHRONIC OSTEOMYELITIS OF LEFT FOOT WITH DRAINING SINUS (HCC): ICD-10-CM

## 2018-10-04 DIAGNOSIS — M86.9 OSTEOMYELITIS OF LEFT FOOT, UNSPECIFIED TYPE (HCC): Primary | ICD-10-CM

## 2018-10-04 DIAGNOSIS — E11.9 TYPE 2 DIABETES MELLITUS TREATED WITHOUT INSULIN (HCC): ICD-10-CM

## 2018-10-04 PROCEDURE — 99024 POSTOP FOLLOW-UP VISIT: CPT | Performed by: THORACIC SURGERY (CARDIOTHORACIC VASCULAR SURGERY)

## 2018-10-07 NOTE — PROGRESS NOTES
"Patient Information  Lynne Sanchez                                                                                          [address] [place] [id number]      1934  [phone]      Chief Complaint   Patient presents with   • Wound Check     3 week follow-up ro evaluate ulcerations on right toe. s/p left great toe amputation 7/5/18 for osteomyelitis       History of Present Illness: Patient seen in the office today for follow-up of her left second toe amputation done on 08/27/2018 she had a prior great toe amputation left side done on 07/05/2018 and    Vitals:    10/04/18 1506   BP: 122/63   BP Location: Right arm   Patient Position: Sitting   Pulse: 82   Temp: 100.4 °F (38 °C)   SpO2: 100%   Weight: 64.2 kg (141 lb 9.6 oz)   Height: 167.6 cm (66\")        Physical Exam examination left total second toe amputation site reveals be totally healed.  Left great toe amputation site is also totally healed.  She does have some callus formation of the right second toe at the pulp    Lab/other results:    Assessment: #1 postop left great toe amputation on 07/05/2018 completely healed  #2 postop second left toe amputation 08/27/2018 completely healed  3.  Slight callus formation of the distal pulp of the right second toe  Plan: Patient continue follow toe unloading shoe the left foot in the right foot she does return to see me should she develop any further ulcerations of the toes of her foot    Prakash Carrington M.D.   "

## 2018-10-08 LAB
FUNGUS WND CULT: ABNORMAL
MYCOBACTERIUM SPEC CULT: NORMAL
NIGHT BLUE STAIN TISS: NORMAL

## 2018-10-31 ENCOUNTER — TELEPHONE (OUTPATIENT)
Dept: CARDIOLOGY | Facility: CLINIC | Age: 83
End: 2018-10-31

## 2018-10-31 NOTE — TELEPHONE ENCOUNTER
Called pt due to didn't receive a scheduled reading of her pacemaker.  Left my name and number for pt to return my call.      11/12/18-  returned call and they are in [place].  They are coming home soon.

## 2018-11-22 PROCEDURE — 93296 REM INTERROG EVL PM/IDS: CPT | Performed by: INTERNAL MEDICINE

## 2018-11-22 PROCEDURE — 93294 REM INTERROG EVL PM/LDLS PM: CPT | Performed by: INTERNAL MEDICINE

## 2018-11-26 ENCOUNTER — CLINICAL SUPPORT NO REQUIREMENTS (OUTPATIENT)
Dept: CARDIOLOGY | Facility: CLINIC | Age: 83
End: 2018-11-26

## 2018-11-26 DIAGNOSIS — I48.20 CHRONIC ATRIAL FIBRILLATION (HCC): ICD-10-CM

## 2019-04-16 ENCOUNTER — CLINICAL SUPPORT NO REQUIREMENTS (OUTPATIENT)
Dept: CARDIOLOGY | Facility: CLINIC | Age: 84
End: 2019-04-16

## 2019-04-16 DIAGNOSIS — I48.91 ATRIAL FIBRILLATION, UNSPECIFIED TYPE (HCC): ICD-10-CM

## 2019-04-16 PROCEDURE — 93296 REM INTERROG EVL PM/IDS: CPT | Performed by: INTERNAL MEDICINE

## 2019-04-16 PROCEDURE — 93294 REM INTERROG EVL PM/LDLS PM: CPT | Performed by: INTERNAL MEDICINE

## 2019-06-04 NOTE — PROGRESS NOTES
[facility] - Office Note  Lynne Sanchez         [address] [place] [id number]  1934   [phone] (home)      LOCATION:  [facility].  Visit Type: Follow Up.    VISIT DATE: 6/5/2019     PCP:  Vilma Rollins PA    06/05/19   Lynne Sanchez is a 84 y.o.  female  retired.  Former mgr pt    Chief Complaint:   Chief Complaint   Patient presents with   • Atrial Fibrillation         PROBLEM LIST:  1. Chronic atrial fibrillation, status post [employer] Laureano pacemaker implantation and AV node ablation:  a. Diagnosed June 2005.  b. Status post ECV restoring normal sinus rhythm, June 2005.  c. Rythmol initiated 05/01/2006.  d. Previously digoxin toxicity.  e. GXT Cardiolite, 09/08/2005: No reversible ischemia. LV function not performed secondary to atrial fibrillation.   f. Status post successful external cardioversion on 10/01/2008, Tessa Downey MD.  g. EKG on 10/21/2008: Recurrence of atrial fibrillation with rate of 109.  h. Recurrent atrial fibrillation by EKG, 11/03/2008, asymptomatic.  i. Status post [employer] Laureano pacemaker implantation and AV node ablation.    j. Echocardiogram 02/15/2010: Moderate MR, moderate TR. RVSP 50. EF greater than 55%, left atrial enlargement at 4.3 cm.  k. Echo 1/18/17: EF 60%, Mod-severe MR, Mod-severe TR.  l. 10/6/17 echo: EF 50-55%, mild LVH, thickened mitral leaflets with mild MR, aortic sclerosis, trace AI, moderate TR with PA SP 60 mmHg, moderate to severe RV dilation, dilated IVC  m. 5/2018 Gen change Aslam  n. 6/30/2018 EF 60%, mild AR, moderate MR, moderate to severe TR, severe pulmonary HTN RVSP 90  2. History of congestive heart failure.  3. Diabetes mellitus, type 2.  4. PVD  a. - 10/17 L PTCA ant tib(Benigno)  5. Surgical history - appendectomy.   6. Anemia-NOS neg scopes 2017, h/o following  a. Per Dr. Michael guillen [facility]  7. 7/2018 osteomyelitis with subsequent L great toe amputation been left second toe  amputations per Dr. Wade       Allergies   Allergen Reactions   • Phenergan [Promethazine Hcl] Confusion         Current Outpatient Medications:   •  aspirin 81 MG EC tablet, Take 1 tablet by mouth Daily., Disp: 30 tablet, Rfl: 0  •  atorvastatin (LIPITOR) 80 MG tablet, Take 1 tablet by mouth Every Night., Disp: , Rfl:   •  Biotin 5000 MCG tablet, Take 1 tablet by mouth Daily., Disp: , Rfl:   •  cholecalciferol (VITAMIN D3) 1000 units tablet, Take 1,000 Units by mouth Daily., Disp: , Rfl:   •  clopidogrel (PLAVIX) 75 MG tablet, Take 75 mg by mouth Daily., Disp: , Rfl:   •  ferrous sulfate 325 (65 FE) MG tablet, Take 1 tablet by mouth 2 (Two) Times a Day With Meals., Disp: , Rfl:   •  furosemide (LASIX) 20 MG tablet, Take 1 tablet by mouth Daily., Disp: , Rfl:   •  glimepiride (AMARYL) 4 MG tablet, Take 4 mg by mouth 2 (Two) Times a Day., Disp: , Rfl:   •  latanoprost (XALATAN) 0.005 % ophthalmic solution, Administer 1 drop to both eyes Daily., Disp: , Rfl:   •  levothyroxine (SYNTHROID, LEVOTHROID) 50 MCG tablet, Take 25 mcg by mouth Daily., Disp: , Rfl:   •  metoprolol tartrate (LOPRESSOR) 100 MG tablet, Take 1 tablet by mouth 2 (Two) Times a Day., Disp: 180 tablet, Rfl: 3  •  Omega-3 Fatty Acids (FISH OIL) 1200 MG capsule capsule, Take 1,200 mg by mouth Daily., Disp: , Rfl:   •  potassium chloride (MICRO-K) 10 MEQ CR capsule, potassium chloride ER 10 mEq capsule,extended release  Take 1 capsule every day by oral route., Disp: , Rfl:   •  SITagliptin (JANUVIA) 25 MG tablet, Take 50 mg by mouth Daily., Disp: , Rfl:   •  Lactobacillus (PROBIOTIC ACIDOPHILUS PO), Take  by mouth Daily., Disp: , Rfl:   •  melatonin 5 MG sublingual tablet sublingual tablet, Place 1 tablet under the tongue At Night As Needed (sleep)., Disp: , Rfl:   •  Multiple Vitamins-Minerals (MULTIVITAMIN GUMMIES ADULT) chewable tablet, Chew 1 tablet Daily., Disp: , Rfl:   •  nystatin (MYCOSTATIN) 568756 UNIT/GM powder, Apply  topically Every 12  "(Twelve) Hours. Under L breast, Disp: , Rfl:     Atrial Fibrillation   Symptoms include shortness of breath. Symptoms are negative for chest pain and syncope. Past medical history includes atrial fibrillation.   Shortness of Breath   Pertinent negatives include no chest pain or syncope.       Pt denies CP, dyspnea on exertion, orthopnea, PND, palpitations, lower extremity edema, syncope.  Had L second toe removed per Dr Carrington since last visit. He I now oobsrving a callous on the R second toe. BGs are reportedly improving now that she is back on Januvia, states BGs are 150-200.  Is not checking BP at home.            The following portions of the patient's history were reviewed in the chart and updated as appropriate: allergies, current medications, past family history, past medical history, past social history, past surgical history and problem list.      ROS   All other systems are reviewed and negative except what is detailed in the HPI       height is 167.6 cm (66\") and weight is 65.8 kg (145 lb). Her blood pressure is 126/60 and her pulse is 69. Her oxygen saturation is 99%.      Physical Exam   Constitutional: She appears well-developed and well-nourished.   Neck: Normal range of motion. Neck supple. No hepatojugular reflux and no JVD present. Carotid bruit is not present. No tracheal deviation present. No thyromegaly present.   Cardiovascular: Normal rate, regular rhythm, S1 normal, S2 normal, intact distal pulses and normal pulses. PMI is not displaced. Exam reveals no gallop, no distant heart sounds, no friction rub, no midsystolic click and no opening snap.   Murmur heard.  High-pitched blowing holosystolic murmur is present with a grade of 3/6 at the apex.  Pulses:       Radial pulses are 2+ on the right side, and 2+ on the left side.        Dorsalis pedis pulses are 2+ on the right side, and 2+ on the left side.        Posterior tibial pulses are 2+ on the right side, and 2+ on the left side. "   Pulmonary/Chest: Effort normal and breath sounds normal. She has no wheezes. She has no rales.   Abdominal: Soft. Bowel sounds are normal. She exhibits no mass. There is no tenderness. There is no guarding.   Musculoskeletal: She exhibits edema (w varicosities ) and tenderness.     Procedures   [employer] Laureano pacemaker checked today  Greater than 99% pacing  Acceptable threshold and impedance  No episodes, longevity 9.5 years    Assessment/ Plan      Diagnosis Plan   1. Chronic atrial fibrillation (CMS/HCC)     2. Valvular heart disease     3. Peripheral arterial disease (CMS/HCC)         atrial fibrillation:  Device check acceptable  9.5 years on battery    Valvular heart disease:  Mod + MR .  At this time patient is well compensated and relatively asymptomatic with near prohibitive surgical risk.  Reevaluate at time of follow-up and attempt to keep Hgb >9    PVD w 2 L toes amputations per Dr. Carrington who is now following her R foot.    Anemia eval per heme onc, awaiting consultation w nephrology     Scribed for Demond Leon MD by Sheree Saenz PA-C. 6/5/2019  11:33 AM   I, Demond Leon MD, personally performed the services described in this documentation as scribed by the above named individual in my presence, and it is both accurate and complete.  6/5/2019  11:33 AM

## 2019-06-05 ENCOUNTER — OFFICE VISIT (OUTPATIENT)
Dept: CARDIOLOGY | Facility: CLINIC | Age: 84
End: 2019-06-05

## 2019-06-05 VITALS
BODY MASS INDEX: 23.3 KG/M2 | SYSTOLIC BLOOD PRESSURE: 126 MMHG | WEIGHT: 145 LBS | OXYGEN SATURATION: 99 % | HEIGHT: 66 IN | DIASTOLIC BLOOD PRESSURE: 60 MMHG | HEART RATE: 69 BPM

## 2019-06-05 DIAGNOSIS — I38 VALVULAR HEART DISEASE: ICD-10-CM

## 2019-06-05 DIAGNOSIS — I48.20 CHRONIC ATRIAL FIBRILLATION (HCC): Primary | ICD-10-CM

## 2019-06-05 DIAGNOSIS — I73.9 PERIPHERAL ARTERIAL DISEASE (HCC): ICD-10-CM

## 2019-06-05 PROCEDURE — 93279 PRGRMG DEV EVAL PM/LDLS PM: CPT | Performed by: PHYSICIAN ASSISTANT

## 2019-06-05 PROCEDURE — 99213 OFFICE O/P EST LOW 20 MIN: CPT | Performed by: PHYSICIAN ASSISTANT

## 2019-06-05 RX ORDER — LEVOTHYROXINE SODIUM 0.05 MG/1
25 TABLET ORAL DAILY
COMMUNITY
End: 2020-02-04 | Stop reason: SDUPTHER

## 2019-07-15 ENCOUNTER — HOSPITAL ENCOUNTER (OUTPATIENT)
Dept: INFUSION THERAPY | Facility: HOSPITAL | Age: 84
Setting detail: INFUSION SERIES
Discharge: HOME | End: 2019-07-15

## 2019-07-15 VITALS
OXYGEN SATURATION: 100 % | SYSTOLIC BLOOD PRESSURE: 151 MMHG | DIASTOLIC BLOOD PRESSURE: 78 MMHG | RESPIRATION RATE: 18 BRPM | TEMPERATURE: 97.6 F | HEART RATE: 69 BPM

## 2019-07-15 DIAGNOSIS — D64.9 ANEMIA, UNSPECIFIED TYPE: Primary | ICD-10-CM

## 2019-07-15 LAB
ABO GROUP BLD: NORMAL
BLD GP AB SCN SERPL QL: NEGATIVE
RH BLD: POSITIVE
T&S EXPIRATION DATE: NORMAL

## 2019-07-15 PROCEDURE — 86901 BLOOD TYPING SEROLOGIC RH(D): CPT | Performed by: INTERNAL MEDICINE

## 2019-07-15 PROCEDURE — 86900 BLOOD TYPING SEROLOGIC ABO: CPT

## 2019-07-15 PROCEDURE — 86850 RBC ANTIBODY SCREEN: CPT | Performed by: INTERNAL MEDICINE

## 2019-07-15 PROCEDURE — 86900 BLOOD TYPING SEROLOGIC ABO: CPT | Performed by: INTERNAL MEDICINE

## 2019-07-15 PROCEDURE — 63710000001 DIPHENHYDRAMINE PER 50 MG: Performed by: INTERNAL MEDICINE

## 2019-07-15 PROCEDURE — 86920 COMPATIBILITY TEST SPIN: CPT

## 2019-07-15 PROCEDURE — P9016 RBC LEUKOCYTES REDUCED: HCPCS

## 2019-07-15 PROCEDURE — 36430 TRANSFUSION BLD/BLD COMPNT: CPT

## 2019-07-15 RX ORDER — ACETAMINOPHEN 325 MG/1
650 TABLET ORAL ONCE
Status: COMPLETED | OUTPATIENT
Start: 2019-07-15 | End: 2019-07-15

## 2019-07-15 RX ORDER — SODIUM CHLORIDE 9 MG/ML
250 INJECTION, SOLUTION INTRAVENOUS AS NEEDED
Status: DISCONTINUED | OUTPATIENT
Start: 2019-07-15 | End: 2019-07-17 | Stop reason: HOSPADM

## 2019-07-15 RX ORDER — DIPHENHYDRAMINE HCL 25 MG
25 CAPSULE ORAL ONCE
Status: COMPLETED | OUTPATIENT
Start: 2019-07-15 | End: 2019-07-15

## 2019-07-15 RX ADMIN — ACETAMINOPHEN 650 MG: 325 TABLET, FILM COATED ORAL at 10:49

## 2019-07-15 RX ADMIN — DIPHENHYDRAMINE HYDROCHLORIDE 25 MG: 25 CAPSULE ORAL at 10:49

## 2019-07-16 LAB
ABO + RH BLD: NORMAL
BH BB BLOOD EXPIRATION DATE: NORMAL
BH BB BLOOD TYPE BARCODE: 5100
BH BB DISPENSE STATUS: NORMAL
BH BB PRODUCT CODE: NORMAL
BH BB UNIT NUMBER: NORMAL
CROSSMATCH INTERPRETATION: NORMAL
UNIT  ABO: NORMAL
UNIT  RH: NORMAL

## 2019-08-06 ENCOUNTER — CLINICAL SUPPORT NO REQUIREMENTS (OUTPATIENT)
Dept: CARDIOLOGY | Facility: CLINIC | Age: 84
End: 2019-08-06

## 2019-08-06 DIAGNOSIS — I63.322 CEREBROVASCULAR ACCIDENT (CVA) DUE TO THROMBOSIS OF LEFT ANTERIOR CEREBRAL ARTERY (HCC): ICD-10-CM

## 2019-08-06 DIAGNOSIS — I50.22 CHRONIC SYSTOLIC CONGESTIVE HEART FAILURE (HCC): ICD-10-CM

## 2019-08-06 DIAGNOSIS — I48.20 CHRONIC ATRIAL FIBRILLATION (HCC): Primary | ICD-10-CM

## 2019-08-06 PROCEDURE — 93294 REM INTERROG EVL PM/LDLS PM: CPT | Performed by: INTERNAL MEDICINE

## 2019-08-06 PROCEDURE — 93296 REM INTERROG EVL PM/IDS: CPT | Performed by: INTERNAL MEDICINE

## 2019-11-06 ENCOUNTER — TELEPHONE (OUTPATIENT)
Dept: CARDIOLOGY | Facility: CLINIC | Age: 84
End: 2019-11-06

## 2019-11-24 ENCOUNTER — CLINICAL SUPPORT NO REQUIREMENTS (OUTPATIENT)
Dept: CARDIOLOGY | Facility: CLINIC | Age: 84
End: 2019-11-24

## 2019-11-24 DIAGNOSIS — I48.91 ATRIAL FIBRILLATION WITH RVR (HCC): ICD-10-CM

## 2019-11-24 PROCEDURE — 93294 REM INTERROG EVL PM/LDLS PM: CPT | Performed by: INTERNAL MEDICINE

## 2019-11-24 PROCEDURE — 93296 REM INTERROG EVL PM/IDS: CPT | Performed by: INTERNAL MEDICINE

## 2019-12-16 ENCOUNTER — OFFICE VISIT (OUTPATIENT)
Dept: CARDIOLOGY | Facility: CLINIC | Age: 84
End: 2019-12-16

## 2019-12-16 VITALS
OXYGEN SATURATION: 95 % | HEART RATE: 70 BPM | BODY MASS INDEX: 24.27 KG/M2 | WEIGHT: 151 LBS | DIASTOLIC BLOOD PRESSURE: 50 MMHG | SYSTOLIC BLOOD PRESSURE: 138 MMHG | HEIGHT: 66 IN

## 2019-12-16 DIAGNOSIS — I34.0 NONRHEUMATIC MITRAL VALVE REGURGITATION: ICD-10-CM

## 2019-12-16 DIAGNOSIS — I73.9 PVD (PERIPHERAL VASCULAR DISEASE) WITH CLAUDICATION (HCC): ICD-10-CM

## 2019-12-16 DIAGNOSIS — I48.0 PAROXYSMAL ATRIAL FIBRILLATION (HCC): Primary | ICD-10-CM

## 2019-12-16 PROCEDURE — 99213 OFFICE O/P EST LOW 20 MIN: CPT | Performed by: INTERNAL MEDICINE

## 2019-12-16 PROCEDURE — 93279 PRGRMG DEV EVAL PM/LDLS PM: CPT | Performed by: INTERNAL MEDICINE

## 2020-02-04 ENCOUNTER — OFFICE VISIT (OUTPATIENT)
Dept: INTERNAL MEDICINE | Facility: CLINIC | Age: 85
End: 2020-02-04

## 2020-02-04 VITALS
SYSTOLIC BLOOD PRESSURE: 110 MMHG | HEIGHT: 66 IN | TEMPERATURE: 98.2 F | WEIGHT: 154 LBS | OXYGEN SATURATION: 98 % | DIASTOLIC BLOOD PRESSURE: 54 MMHG | BODY MASS INDEX: 24.75 KG/M2 | HEART RATE: 86 BPM

## 2020-02-04 DIAGNOSIS — N18.9 CHRONIC RENAL IMPAIRMENT, UNSPECIFIED CKD STAGE: ICD-10-CM

## 2020-02-04 DIAGNOSIS — E11.29 CONTROLLED TYPE 2 DIABETES MELLITUS WITH OTHER DIABETIC KIDNEY COMPLICATION, WITHOUT LONG-TERM CURRENT USE OF INSULIN (HCC): ICD-10-CM

## 2020-02-04 DIAGNOSIS — E03.9 HYPOTHYROIDISM, UNSPECIFIED TYPE: ICD-10-CM

## 2020-02-04 DIAGNOSIS — Z76.89 ENCOUNTER TO ESTABLISH CARE: Primary | ICD-10-CM

## 2020-02-04 PROCEDURE — 99203 OFFICE O/P NEW LOW 30 MIN: CPT | Performed by: PHYSICIAN ASSISTANT

## 2020-02-04 RX ORDER — ATORVASTATIN CALCIUM 40 MG/1
40 TABLET, FILM COATED ORAL DAILY
COMMUNITY
Start: 2020-01-17 | End: 2020-05-05 | Stop reason: SDUPTHER

## 2020-02-04 RX ORDER — LEVOTHYROXINE SODIUM 0.05 MG/1
50 TABLET ORAL DAILY
Qty: 90 TABLET | Refills: 1 | Status: SHIPPED | OUTPATIENT
Start: 2020-02-04 | End: 2020-03-17 | Stop reason: SDUPTHER

## 2020-02-04 RX ORDER — CHLORAL HYDRATE 500 MG
CAPSULE ORAL
COMMUNITY
End: 2022-03-04

## 2020-02-04 RX ORDER — GLIMEPIRIDE 4 MG/1
4 TABLET ORAL 2 TIMES DAILY
Qty: 180 TABLET | Refills: 1 | Status: SHIPPED | OUTPATIENT
Start: 2020-02-04 | End: 2020-06-04 | Stop reason: SDUPTHER

## 2020-02-04 NOTE — PROGRESS NOTES
Subjective   Lynne Sanchez is a 85 y.o. female who presents to [facility]. Last PCP Dr. Ludwig at [facility].    Chief Complaint   Patient presents with   • Establish Care     Type 2 diabetes mellitus        HPI: Last saw her former PCP around 6 weeks ago.          She has been followed by  nephrology since November 2019 and is currently treated with Retacrit for anemia.  She reports that she was found to have one kidney smaller than the other.  Prior to initiating red a crit she had received around 6 blood transfusions over the last couple of years.  She endorses long-term fatigue and malaise which has improved some since starting Retacrit.     Diabetes has been well controlled with glimepiride 4 mg twice daily and Januvia 50 mg daily.  Patient denies foot ulcerations, hyperglycemia, hypoglycemia, nausea, paresthesia of the feet, polydipsia, polyuria, polyphagia, visual disturbances and weight loss.     She is taking aspirin and Plavix daily due to history of CVA and peripheral vascular disease.      HTN: She is taking metoprolol as directed.  She does have a pacemaker. She reports swelling in both ankles for years which is stable.  She does take Lasix daily.  Patient denies chest pain, dyspnea, orthopnea, palpitations, headaches, visual disturbances or confusion.  She endorses mild generalized weakness which is chronic but denies hemiparesis.    She has no new concerns or complaints today.      The following portions of the patient's history were reviewed and updated as appropriate: She  has a past medical history of Anemia, Asthma, CHF (congestive heart failure) (CMS/HCC), Chronic atrial fibrillation, Diabetes mellitus, type 2 (CMS/HCC), Diarrhea, GERD (gastroesophageal reflux disease), Glaucoma, H/O transfusion of whole blood, Heart attack (CMS/HCC), Heart murmur, History of transfusion, Hyperlipidemia, Hypertension, Kidney disease, Osteomyelitis (CMS/HCC), Peripheral vascular disease (CMS/HCC),  Stroke (CMS/HCC), Urinary tract infection, and Wears glasses.  She does not have any pertinent problems on file.  She  has a past surgical history that includes Appendectomy; Esophagogastroduodenoscopy (N/A, 10/9/2017); Cardiac catheterization (N/A, 10/10/2017); Interventional radiology procedure (N/A, 10/10/2017); Cardiac catheterization (N/A, 10/10/2017); Cardiac catheterization (N/A, 10/10/2017); Colonoscopy; Cardioversion; Cardiac Ablation; Pacemaker Implantation; Bone marrow aspiration; Cardiac electrophysiology procedure (N/A, 5/3/2018); Finger amputation (Left, 7/5/2018); Eye surgery (Bilateral); and Finger amputation (Left, 8/27/2018).  Her family history includes Arthritis in an other family member; No Known Problems in her father and mother.  She  reports that she has never smoked. She has never used smokeless tobacco. She reports that she does not drink alcohol or use drugs.    Current Outpatient Medications   Medication Sig Dispense Refill   • aspirin 81 MG EC tablet Take 1 tablet by mouth Daily. 30 tablet 0   • atorvastatin (LIPITOR) 40 MG tablet Take 40 mg by mouth Daily.     • Biotin 5000 MCG tablet Take 1 tablet by mouth Daily.     • cholecalciferol (VITAMIN D3) 1000 units tablet Take 1,000 Units by mouth Daily.     • clopidogrel (PLAVIX) 75 MG tablet Take 75 mg by mouth Daily.     • epoetin dorcas-epbx (RETACRIT) 87184 UNIT/ML injection Retacrit 10,000 unit/mL injection solution   administer 1 ml subqu every 3 weeks     • ferrous sulfate 325 (65 FE) MG tablet Take 1 tablet by mouth 2 (Two) Times a Day With Meals.     • furosemide (LASIX) 20 MG tablet Take 1 tablet by mouth Daily.     • glimepiride (AMARYL) 4 MG tablet Take 1 tablet by mouth 2 (Two) Times a Day. 180 tablet 1   • glucose blood test strip True Metrix Glucose Test Strip   TEST BLOOD SUGAR EVERY DAY     • latanoprost (XALATAN) 0.005 % ophthalmic solution Administer 1 drop to both eyes Daily.     • levothyroxine (SYNTHROID, LEVOTHROID) 50  MCG tablet Take 1 tablet by mouth Daily. 90 tablet 1   • metoprolol tartrate (LOPRESSOR) 100 MG tablet Take 1 tablet by mouth 2 (Two) Times a Day. 180 tablet 3   • Multiple Vitamins-Minerals (MULTIVITAMIN GUMMIES ADULT) chewable tablet Chew 1 tablet Daily.     • Omega-3 Fatty Acids (FISH OIL) 1000 MG capsule capsule Fish Oil     • potassium chloride (MICRO-K) 10 MEQ CR capsule potassium chloride ER 10 mEq capsule,extended release   Take 1 capsule every day by oral route.     • SITagliptin (JANUVIA) 50 MG tablet Take 1 tablet by mouth Daily. 90 tablet 1     No current facility-administered medications for this visit.        Review of Systems  Review of Systems   Constitutional: Positive for fatigue (chronic). Negative for appetite change, chills, fever and unexpected weight change.   HENT: Negative for ear pain, hearing loss, nosebleeds, rhinorrhea, sore throat, trouble swallowing and voice change.    Eyes: Negative for pain, discharge, redness, itching and visual disturbance.        No dry eyes   Respiratory: Negative for cough, shortness of breath and wheezing.         No SOB with exertion  No SOB lying down   Cardiovascular: Positive for leg swelling. Negative for chest pain and palpitations.        No leg cramps     Gastrointestinal: Negative for abdominal pain, blood in stool, constipation, diarrhea, nausea and vomiting.        No change in bowel habits  No heartburn   Endocrine: Negative for cold intolerance, heat intolerance, polydipsia, polyphagia and polyuria.        No hot flashes  No muscle weakness  No feeling of weakness   Genitourinary: Negative for difficulty urinating, dyspareunia, dysuria, frequency, hematuria, menstrual problem, pelvic pain, urgency, vaginal discharge and vaginal pain.        No urinary incontinence  No change in periods   Musculoskeletal: Positive for gait problem (uses a cane). Negative for arthralgias, joint swelling and myalgias.        No limb pain  No limb swelling   Skin:  "Negative for rash and wound.        No rash  No lesions  No change in mole  No itching   Allergic/Immunologic: Negative for immunocompromised state.   Neurological: Positive for weakness (generalized, chronic). Negative for dizziness, seizures, syncope, numbness and headaches.        No confusion  No tingling  No trouble walking   Hematological: Negative for adenopathy. Does not bruise/bleed easily.   Psychiatric/Behavioral: Negative for agitation, confusion, dysphoric mood, sleep disturbance and suicidal ideas. The patient is not nervous/anxious.         No change in personality         Objective    /54   Pulse 86   Temp 98.2 °F (36.8 °C)   Ht 167.6 cm (66\")   Wt 69.9 kg (154 lb)   SpO2 98%   BMI 24.86 kg/m²   Physical Exam      Assessment/Plan      Diagnosis Plan   1. Encounter to establish care     2. Hypothyroidism, unspecified type  levothyroxine (SYNTHROID, LEVOTHROID) 50 MCG tablet   3. Controlled type 2 diabetes mellitus with other diabetic kidney complication, without long-term current use of insulin (CMS/[facility])  glimepiride (AMARYL) 4 MG tablet    SITagliptin (JANUVIA) 50 MG tablet   4. Chronic renal impairment, unspecified CKD stage       Refills of Synthroid, Januvia and glimepiride provided today.    Requested records from previous PCP including labs, office notes, radiology reports, consultation notes and immunizations.      Return in about 3 months (around 5/4/2020) for medicare wellness.             ANN Ndiaye  02/04/2020  9:26 AM    "

## 2020-02-06 PROBLEM — Z95.0 CARDIAC PACEMAKER IN SITU: Status: ACTIVE | Noted: 2017-12-27

## 2020-02-06 PROBLEM — B02.9 HERPES ZOSTER WITHOUT COMPLICATION: Status: RESOLVED | Noted: 2018-07-08 | Resolved: 2020-02-06

## 2020-02-06 PROBLEM — E03.9 HYPOTHYROIDISM: Status: ACTIVE | Noted: 2019-05-16

## 2020-02-06 PROBLEM — K92.1 MELENA: Status: RESOLVED | Noted: 2017-10-03 | Resolved: 2020-02-06

## 2020-02-06 PROBLEM — H40.9 GLAUCOMA: Status: ACTIVE | Noted: 2017-12-27

## 2020-02-06 PROBLEM — I50.9 CHRONIC CONGESTIVE HEART FAILURE (HCC): Status: ACTIVE | Noted: 2017-12-27

## 2020-02-06 PROBLEM — N18.9 CHRONIC RENAL INSUFFICIENCY: Status: ACTIVE | Noted: 2018-01-04

## 2020-02-06 PROBLEM — G62.9 NEUROPATHY: Status: ACTIVE | Noted: 2019-05-16

## 2020-02-06 PROBLEM — E78.5 HYPERLIPIDEMIA: Status: ACTIVE | Noted: 2017-11-13

## 2020-02-06 PROBLEM — K11.8 MASS OF PAROTID GLAND: Status: ACTIVE | Noted: 2017-12-27

## 2020-02-07 RX ORDER — METOPROLOL TARTRATE 100 MG/1
100 TABLET ORAL 2 TIMES DAILY
Qty: 180 TABLET | Refills: 3 | Status: SHIPPED | OUTPATIENT
Start: 2020-02-07 | End: 2020-05-19 | Stop reason: SDUPTHER

## 2020-02-17 NOTE — TELEPHONE ENCOUNTER
Pt was in and needs to have this filled in the next few hours. They are leaving in the morning for [place].

## 2020-02-18 RX ORDER — CLOPIDOGREL BISULFATE 75 MG/1
75 TABLET ORAL DAILY
Qty: 30 TABLET | Refills: 3 | Status: SHIPPED | OUTPATIENT
Start: 2020-02-18 | End: 2020-05-05 | Stop reason: SDUPTHER

## 2020-02-21 PROBLEM — N18.30 CHRONIC RENAL IMPAIRMENT, STAGE 3 (MODERATE): Status: ACTIVE | Noted: 2020-02-21

## 2020-03-17 DIAGNOSIS — E03.9 HYPOTHYROIDISM, UNSPECIFIED TYPE: ICD-10-CM

## 2020-03-17 RX ORDER — FUROSEMIDE 20 MG/1
20 TABLET ORAL DAILY
Qty: 90 TABLET | Refills: 1 | Status: SHIPPED | OUTPATIENT
Start: 2020-03-17 | End: 2020-06-04 | Stop reason: SDUPTHER

## 2020-03-17 RX ORDER — LEVOTHYROXINE SODIUM 0.05 MG/1
50 TABLET ORAL DAILY
Qty: 90 TABLET | Refills: 1 | Status: SHIPPED | OUTPATIENT
Start: 2020-03-17 | End: 2020-06-04 | Stop reason: SDUPTHER

## 2020-04-24 ENCOUNTER — DOCUMENTATION (OUTPATIENT)
Dept: INTERNAL MEDICINE | Facility: CLINIC | Age: 85
End: 2020-04-24

## 2020-04-24 DIAGNOSIS — H40.9 GLAUCOMA OF BOTH EYES, UNSPECIFIED GLAUCOMA TYPE: ICD-10-CM

## 2020-04-24 DIAGNOSIS — H40.9 GLAUCOMA OF BOTH EYES, UNSPECIFIED GLAUCOMA TYPE: Primary | ICD-10-CM

## 2020-04-24 RX ORDER — LATANOPROST 50 UG/ML
1 SOLUTION/ DROPS OPHTHALMIC DAILY
Qty: 7.5 ML | Refills: 3 | Status: SHIPPED | OUTPATIENT
Start: 2020-04-24

## 2020-04-24 RX ORDER — LATANOPROST 50 UG/ML
1 SOLUTION/ DROPS OPHTHALMIC DAILY
Qty: 7.5 ML | Refills: 3 | Status: SHIPPED | OUTPATIENT
Start: 2020-04-24 | End: 2020-04-24 | Stop reason: SDUPTHER

## 2020-04-24 NOTE — PROGRESS NOTES
Patients  came in and is asking for latanoprost 0.005 % sol sand, instill 1 gtt in each eye in evening. Please send to [facility].

## 2020-05-05 RX ORDER — ATORVASTATIN CALCIUM 40 MG/1
40 TABLET, FILM COATED ORAL DAILY
Qty: 90 TABLET | Refills: 3 | Status: SHIPPED | OUTPATIENT
Start: 2020-05-05 | End: 2021-02-22 | Stop reason: SDUPTHER

## 2020-05-05 RX ORDER — CLOPIDOGREL BISULFATE 75 MG/1
75 TABLET ORAL DAILY
Qty: 90 TABLET | Refills: 3 | Status: SHIPPED | OUTPATIENT
Start: 2020-05-05 | End: 2021-02-22 | Stop reason: SDUPTHER

## 2020-05-05 NOTE — TELEPHONE ENCOUNTER
Pt's  walked in today asking for refills of plavix and atorvastatin to go to [facility]. He has also brought in a copy of her new rx card for her file.

## 2020-05-19 RX ORDER — METOPROLOL TARTRATE 100 MG/1
100 TABLET ORAL 2 TIMES DAILY
Qty: 180 TABLET | Refills: 3 | Status: SHIPPED | OUTPATIENT
Start: 2020-05-19 | End: 2021-03-01 | Stop reason: SDUPTHER

## 2020-06-04 DIAGNOSIS — E11.29 CONTROLLED TYPE 2 DIABETES MELLITUS WITH OTHER DIABETIC KIDNEY COMPLICATION, WITHOUT LONG-TERM CURRENT USE OF INSULIN (HCC): ICD-10-CM

## 2020-06-04 DIAGNOSIS — E03.9 HYPOTHYROIDISM, UNSPECIFIED TYPE: ICD-10-CM

## 2020-06-04 RX ORDER — FUROSEMIDE 20 MG/1
20 TABLET ORAL DAILY
Qty: 90 TABLET | Refills: 1 | Status: SHIPPED | OUTPATIENT
Start: 2020-06-04 | End: 2020-09-24

## 2020-06-04 RX ORDER — LEVOTHYROXINE SODIUM 0.05 MG/1
50 TABLET ORAL DAILY
Qty: 90 TABLET | Refills: 1 | Status: SHIPPED | OUTPATIENT
Start: 2020-06-04 | End: 2020-09-24

## 2020-06-04 RX ORDER — GLIMEPIRIDE 4 MG/1
4 TABLET ORAL 2 TIMES DAILY
Qty: 180 TABLET | Refills: 1 | Status: SHIPPED | OUTPATIENT
Start: 2020-06-04 | End: 2020-11-13

## 2020-06-19 ENCOUNTER — OFFICE VISIT (OUTPATIENT)
Dept: INTERNAL MEDICINE | Facility: CLINIC | Age: 85
End: 2020-06-19

## 2020-06-19 VITALS
HEART RATE: 85 BPM | TEMPERATURE: 97.7 F | HEIGHT: 66 IN | DIASTOLIC BLOOD PRESSURE: 54 MMHG | SYSTOLIC BLOOD PRESSURE: 112 MMHG | BODY MASS INDEX: 24.11 KG/M2 | WEIGHT: 150 LBS | OXYGEN SATURATION: 96 %

## 2020-06-19 DIAGNOSIS — E11.22 CONTROLLED TYPE 2 DIABETES MELLITUS WITH STAGE 4 CHRONIC KIDNEY DISEASE, WITHOUT LONG-TERM CURRENT USE OF INSULIN (HCC): Chronic | ICD-10-CM

## 2020-06-19 DIAGNOSIS — N18.4 CHRONIC RENAL IMPAIRMENT, STAGE 4 (SEVERE) (HCC): ICD-10-CM

## 2020-06-19 DIAGNOSIS — E03.9 HYPOTHYROIDISM, UNSPECIFIED TYPE: ICD-10-CM

## 2020-06-19 DIAGNOSIS — I73.9 PVD (PERIPHERAL VASCULAR DISEASE) WITH CLAUDICATION (HCC): ICD-10-CM

## 2020-06-19 DIAGNOSIS — E78.5 HYPERLIPIDEMIA, UNSPECIFIED HYPERLIPIDEMIA TYPE: Primary | ICD-10-CM

## 2020-06-19 DIAGNOSIS — N18.4 CONTROLLED TYPE 2 DIABETES MELLITUS WITH STAGE 4 CHRONIC KIDNEY DISEASE, WITHOUT LONG-TERM CURRENT USE OF INSULIN (HCC): Chronic | ICD-10-CM

## 2020-06-19 DIAGNOSIS — I50.22 CHRONIC SYSTOLIC CONGESTIVE HEART FAILURE (HCC): ICD-10-CM

## 2020-06-19 DIAGNOSIS — D69.6 THROMBOCYTOPENIA (HCC): ICD-10-CM

## 2020-06-19 DIAGNOSIS — I48.0 PAROXYSMAL ATRIAL FIBRILLATION (HCC): ICD-10-CM

## 2020-06-19 PROBLEM — M86.9 OSTEOMYELITIS OF LEFT FOOT: Status: RESOLVED | Noted: 2017-10-03 | Resolved: 2020-06-19

## 2020-06-19 PROBLEM — M62.81 MUSCLE WEAKNESS OF LOWER EXTREMITY: Status: ACTIVE | Noted: 2020-06-19

## 2020-06-19 PROBLEM — R26.89 IMPAIRMENT OF BALANCE: Status: ACTIVE | Noted: 2020-06-19

## 2020-06-19 PROBLEM — Z74.09 IMPAIRED MOBILITY: Status: ACTIVE | Noted: 2020-06-19

## 2020-06-19 PROCEDURE — 99214 OFFICE O/P EST MOD 30 MIN: CPT | Performed by: PHYSICIAN ASSISTANT

## 2020-06-19 NOTE — PROGRESS NOTES
Lynne Sanchez is a 85 y.o. female.     Subjective   History of Present Illness   Here today for follow-up of hypothyroidism and diabetes type 2. She is taking levothyroxine daily as directed. She denies abnormal fatigue, weight change, temperature intolerance, skin changes, bowel habit changes or heart rate changes.     She is taking Amaryl and Januvia as directed for diabetes.  She does not check fasting glucose but random glucose is always under 200.  She is s/p amputation of the left 1st-2nd toes. She does have neuropathy.  She denies any new foot ulcerations, hypoglycemia, nausea, polydipsia, polyuria, polyphagia, visual disturbances or weight loss.     CKD with anemia is managed by  nephrology. She continues to receive retacrit injections increased to every 3 weeks from every 4 weeks.     Bilateral lower extremity edema controlled with lasix 20 mg daily.  Edema is more prominent if she has been on her feet all day.  She denies chest pain, dyspnea, orthopnea, palpitations, headaches, generalized weakness, visual disturbances or confusion.        The following portions of the patient's history were reviewed and updated as appropriate: allergies, current medications, past family history, past medical history, past social history, past surgical history and problem list.    Review of Systems   Constitutional: Negative for activity change, chills, fatigue, fever, unexpected weight gain and unexpected weight loss.   HENT: Negative.    Eyes: Negative.  Negative for visual disturbance.   Respiratory: Negative for cough, chest tightness, shortness of breath and wheezing.    Cardiovascular: Positive for leg swelling. Negative for chest pain and palpitations.   Gastrointestinal: Negative for abdominal pain, constipation, diarrhea, nausea and vomiting.   Endocrine: Negative for cold intolerance, heat intolerance, polydipsia, polyphagia and polyuria.   Genitourinary: Negative.    Musculoskeletal: Positive for  arthralgias (occasional aches and pains) and gait problem. Negative for neck pain and neck stiffness.   Skin: Negative.  Negative for color change, rash and bruise.   Allergic/Immunologic: Negative for immunocompromised state.   Neurological: Positive for weakness (residual from CVA). Negative for dizziness, speech difficulty, headache and confusion.        Balance impairment secondary to CVA.     Hematological: Negative for adenopathy. Does not bruise/bleed easily.   Psychiatric/Behavioral: Negative for agitation, sleep disturbance, suicidal ideas, negative for hyperactivity and depressed mood. The patient is not nervous/anxious.          Objective    Physical Exam   Constitutional: She is oriented to person, place, and time. She appears well-developed and well-nourished. No distress.   HENT:   Head: Normocephalic and atraumatic.   Eyes: Pupils are equal, round, and reactive to light. Conjunctivae and EOM are normal.   Neck: Normal range of motion. Neck supple.   Cardiovascular: Normal rate and regular rhythm. Exam reveals no gallop and no friction rub.   Murmur heard.  Pulmonary/Chest: Effort normal and breath sounds normal. No respiratory distress. She has no wheezes. She has no rales.   Abdominal: Soft. Bowel sounds are normal. She exhibits no mass. There is no tenderness. No hernia.   Musculoskeletal: Normal range of motion. She exhibits edema (trace lower extremity edema bilaterally). She exhibits no tenderness or deformity.   Lymphadenopathy:     She has no cervical adenopathy.   Neurological: She is alert and oriented to person, place, and time. She displays normal reflexes. No cranial nerve deficit or sensory deficit. She exhibits normal muscle tone. Coordination (ambulates with a cane ) abnormal.   Skin: Skin is warm and dry. Capillary refill takes less than 2 seconds. No rash noted. She is not diaphoretic.   Psychiatric: She has a normal mood and affect. Her behavior is normal. Judgment and thought  "content normal.   Nursing note and vitals reviewed.        /54   Pulse 85   Temp 97.7 °F (36.5 °C)   Ht 167.6 cm (65.98\")   Wt 68 kg (150 lb)   SpO2 96%   BMI 24.22 kg/m²     Nursing note and vitals reviewed.          Assessment/Plan   Lynne was seen today for follow-up.    Diagnoses and all orders for this visit:    Hyperlipidemia, unspecified hyperlipidemia type  -     Lipid Panel  Continue atorvastatin 40 mg nightly.    Hypothyroidism, unspecified type  -     TSH  -     T4, Free  Clinically euthyroid.  Continue levothyroxine 50 mcg daily as directed.    Controlled type 2 diabetes mellitus with stage 4 chronic kidney disease, without long-term current use of insulin (CMS/HCC)  -     Hemoglobin A1c  Consider changing Januvia due to its potential for renal impairment after A1C review.     Follow diabetic diet. Take all medications as prescribed.  Exercise as tolerated up to 30 minutes 5 days a week.  Monitor blood sugars as discussed. See eye doctor annually.  Wear protective foot wear/no bare feet. Check feet regularly for calluses or ulcers. Discussed risk of poorly controlled diabetes and long-term complications.    Chronic renal impairment, stage 4 (severe) (CMS/HCC)  Thrombocytopenia (CMS/HCC)   Stable.  Managed by  nephrology.    Paroxysmal atrial fibrillation (CMS/HCC)  Rate controlled with metoprolol, continue.  Continue follow-up with cardiology.    PVD (peripheral vascular disease) with claudication (CMS/HCC)  Continue aspirin and plavix.  Continue follow-up with vascular surgery.    Chronic systolic congestive heart failure (CMS/HCC)  Asymptomatic.  Continue Lasix 20 mg daily as directed continue follow-up with cardiology.          Obtained and reviewed most recent records including office notes and labs from  nephrology.      Return in around 3 months for Medicare wellness visit or sooner if needed.             ANN Ndiaye  06/19/2020  8:25 AM  "

## 2020-06-29 ENCOUNTER — OFFICE VISIT (OUTPATIENT)
Dept: CARDIOLOGY | Facility: CLINIC | Age: 85
End: 2020-06-29

## 2020-06-29 VITALS
SYSTOLIC BLOOD PRESSURE: 116 MMHG | DIASTOLIC BLOOD PRESSURE: 54 MMHG | WEIGHT: 146.4 LBS | TEMPERATURE: 97.7 F | BODY MASS INDEX: 23.53 KG/M2 | OXYGEN SATURATION: 99 % | HEIGHT: 66 IN | HEART RATE: 73 BPM

## 2020-06-29 DIAGNOSIS — I73.9 PVD (PERIPHERAL VASCULAR DISEASE) WITH CLAUDICATION (HCC): ICD-10-CM

## 2020-06-29 DIAGNOSIS — E78.2 MIXED HYPERLIPIDEMIA: ICD-10-CM

## 2020-06-29 DIAGNOSIS — I48.20 CHRONIC ATRIAL FIBRILLATION (HCC): Primary | ICD-10-CM

## 2020-06-29 DIAGNOSIS — E11.65 TYPE 2 DIABETES MELLITUS WITH HYPERGLYCEMIA, WITHOUT LONG-TERM CURRENT USE OF INSULIN (HCC): ICD-10-CM

## 2020-06-29 LAB
CHOLEST SERPL-MCNC: 92 MG/DL (ref 0–200)
HBA1C MFR BLD: 6.8 % (ref 4.8–5.6)
HDLC SERPL-MCNC: 47 MG/DL (ref 40–60)
LDLC SERPL CALC-MCNC: 28 MG/DL (ref 0–100)
T4 FREE SERPL-MCNC: 1.67 NG/DL (ref 0.93–1.7)
TRIGL SERPL-MCNC: 86 MG/DL (ref 0–150)
TSH SERPL DL<=0.005 MIU/L-ACNC: 1.96 UIU/ML (ref 0.27–4.2)
VLDLC SERPL CALC-MCNC: 17.2 MG/DL

## 2020-06-29 PROCEDURE — 93279 PRGRMG DEV EVAL PM/LDLS PM: CPT | Performed by: INTERNAL MEDICINE

## 2020-06-29 PROCEDURE — 99214 OFFICE O/P EST MOD 30 MIN: CPT | Performed by: INTERNAL MEDICINE

## 2020-06-29 NOTE — PROGRESS NOTES
[facility] - Office Note  Lynne Sanchez         [address] [place] [id number]  1934   [phone] (home)      LOCATION:  [facility].  Visit Type: Follow Up.    VISIT DATE:12/16/19    PCP:  Dia Main PA      Lynne Sanchez is a 85 y.o.  female  retired.  Former mgr pt    Chief Complaint:   Chief Complaint   Patient presents with   • Atrial Fibrillation         PROBLEM LIST:  1. Chronic atrial fibrillation, status post [employer] Laureano pacemaker implantation and AV node ablation:  a. Diagnosed June 2005.  b. Status post ECV restoring normal sinus rhythm, June 2005.  c. Rythmol initiated 05/01/2006.  d. Previously digoxin toxicity.  e. GXT Cardiolite, 09/08/2005: No reversible ischemia. LV function not performed secondary to atrial fibrillation.   f. Status post successful external cardioversion on 10/01/2008, Tessa Downey MD.  g. EKG on 10/21/2008: Recurrence of atrial fibrillation with rate of 109.  h. Recurrent atrial fibrillation by EKG, 11/03/2008, asymptomatic.  i. Status post [employer]. Laureano pacemaker implantation and AV node ablation.    j. Echocardiogram 02/15/2010: Moderate MR, moderate TR. RVSP 50. EF greater than 55%, left atrial enlargement at 4.3 cm.  k. Echo 1/18/17: EF 60%, Mod-severe MR, Mod-severe TR.  l. 10/6/17 echo: EF 50-55%, mild LVH, thickened mitral leaflets with mild MR, aortic sclerosis, trace AI, moderate TR with PA SP 60 mmHg, moderate to severe RV dilation, dilated IVC  m. 5/2018 Gen change Aslam  n. 6/30/2018 EF 60%, mild AR, moderate MR, moderate to severe TR, severe pulmonary HTN RVSP 90  2. History of congestive heart failure.  3. Diabetes mellitus, type 2.  4. PVD  a. - 10/17 L PTCA ant tib(Benigno)  5. Surgical history - appendectomy.   6. Anemia-NOS neg scopes 2017, h/o following  a. Per Dr. Michael guillen [facility]  7. 7/2018 osteomyelitis with subsequent L great toe amputation been left second toe amputations per   Mauro       Allergies   Allergen Reactions   • Phenergan [Promethazine Hcl] Confusion         Current Outpatient Medications:   •  aspirin 81 MG EC tablet, Take 1 tablet by mouth Daily., Disp: 30 tablet, Rfl: 0  •  atorvastatin (LIPITOR) 40 MG tablet, Take 1 tablet by mouth Daily., Disp: 90 tablet, Rfl: 3  •  Biotin 5000 MCG tablet, Take 1 tablet by mouth Daily., Disp: , Rfl:   •  cholecalciferol (VITAMIN D3) 1000 units tablet, Take 1,000 Units by mouth Daily., Disp: , Rfl:   •  clopidogrel (PLAVIX) 75 MG tablet, Take 1 tablet by mouth Daily., Disp: 90 tablet, Rfl: 3  •  epoetin dorcas-epbx (RETACRIT) 59190 UNIT/ML injection, Retacrit 10,000 unit/mL injection solution  administer 1 ml subqu every 3 weeks, Disp: , Rfl:   •  ferrous sulfate 325 (65 FE) MG tablet, Take 1 tablet by mouth 2 (Two) Times a Day With Meals., Disp: , Rfl:   •  furosemide (LASIX) 20 MG tablet, Take 1 tablet by mouth Daily., Disp: 90 tablet, Rfl: 1  •  glimepiride (AMARYL) 4 MG tablet, Take 1 tablet by mouth 2 (Two) Times a Day., Disp: 180 tablet, Rfl: 1  •  glucose blood test strip, True Metrix Glucose Test Strip  TEST BLOOD SUGAR EVERY DAY, Disp: , Rfl:   •  latanoprost (XALATAN) 0.005 % ophthalmic solution, Administer 1 drop to both eyes Daily., Disp: 7.5 mL, Rfl: 3  •  levothyroxine (SYNTHROID, LEVOTHROID) 50 MCG tablet, Take 1 tablet by mouth Daily., Disp: 90 tablet, Rfl: 1  •  metoprolol tartrate (LOPRESSOR) 100 MG tablet, Take 1 tablet by mouth 2 (Two) Times a Day., Disp: 180 tablet, Rfl: 3  •  Multiple Vitamins-Minerals (MULTIVITAMIN GUMMIES ADULT) chewable tablet, Chew 1 tablet Daily., Disp: , Rfl:   •  Omega-3 Fatty Acids (FISH OIL) 1000 MG capsule capsule, Fish Oil, Disp: , Rfl:   •  potassium chloride (MICRO-K) 10 MEQ CR capsule, potassium chloride ER 10 mEq capsule,extended release  Take 1 capsule every day by oral route., Disp: , Rfl:   •  SITagliptin (JANUVIA) 50 MG tablet, Take 1 tablet by mouth Daily., Disp: 90 tablet, Rfl:  "1    Atrial Fibrillation   Symptoms include shortness of breath. Symptoms are negative for chest pain and syncope. Past medical history includes atrial fibrillation.   Shortness of Breath   Pertinent negatives include no chest pain or syncope.     HPI      Pt denies CP, dyspnea on exertion, orthopnea, PND, palpitations, lower extremity edema, syncope.    He is having concerned with an irritated toe and does not wish to have this evaluated as she fears having yet another amputation                    height is 167.6 cm (66\") and weight is 66.4 kg (146 lb 6.4 oz). Her temperature is 97.7 °F (36.5 °C). Her blood pressure is 116/54 and her pulse is 73. Her oxygen saturation is 99%.      Physical Exam   Constitutional: She appears well-developed and well-nourished.   Neck: Normal range of motion. Neck supple. No hepatojugular reflux and no JVD present. Carotid bruit is not present. No tracheal deviation present. No thyromegaly present.   Cardiovascular: Normal rate, regular rhythm, S1 normal, S2 normal, intact distal pulses and normal pulses. PMI is not displaced. Exam reveals no gallop, no distant heart sounds, no friction rub, no midsystolic click and no opening snap.   Murmur heard.  High-pitched blowing holosystolic murmur is present with a grade of 3/6 at the apex.  Pulses:       Radial pulses are 2+ on the right side, and 2+ on the left side.        Dorsalis pedis pulses are 2+ on the right side, and 2+ on the left side.        Posterior tibial pulses are 2+ on the right side, and 2+ on the left side.   Pulmonary/Chest: Effort normal and breath sounds normal. She has no wheezes. She has no rales.   Abdominal: Soft. Bowel sounds are normal. She exhibits no mass. There is no tenderness. There is no guarding.   Musculoskeletal: She exhibits edema (w varicosities ) and tenderness.     Procedures   St. Laureano pacemaker checked today  Greater than 99% pacing  Acceptable threshold and impedance  No episodes, longevity 10 " years    Assessment/ Plan      Diagnosis Plan   1. Chronic atrial fibrillation (CMS/HCC)     2. PVD (peripheral vascular disease) with claudication (CMS/HCC)     3. Type 2 diabetes mellitus with hyperglycemia, without long-term current use of insulin (CMS/[facility])     4. Mixed hyperlipidemia         atrial fibrillation:  Device check acceptable  9.5 years on battery    Valvular heart disease:  Mod + MR .  At this time patient is well compensated and relatively asymptomatic with near prohibitive surgical risk.  Reevaluate at time of follow-up and attempt to keep Hgb >9    PVD w 2 L toes amputations per Dr. Carrington who is now following her R foot she wishes to defer evaluation at this time           6/29/2020  14:08

## 2020-08-07 ENCOUNTER — TELEPHONE (OUTPATIENT)
Dept: INTERNAL MEDICINE | Facility: CLINIC | Age: 85
End: 2020-08-07

## 2020-08-07 DIAGNOSIS — N18.4 CHRONIC RENAL IMPAIRMENT, STAGE 4 (SEVERE) (HCC): Primary | ICD-10-CM

## 2020-08-07 NOTE — TELEPHONE ENCOUNTER
PT'S  CALLED ASKING IF THERE IS A   OF UROLOGY IN [place].    [facility] CONTACT [phone]     PLEASE ADVISE

## 2020-08-10 NOTE — TELEPHONE ENCOUNTER
Yes, we have Dr. Fernandez and Dr. Pollack. Please find out what she is needing a referral for and I will order it.

## 2020-08-10 NOTE — TELEPHONE ENCOUNTER
She needs a nephrology referral, not urology. I referred her to Dr. Recinos who is based in [facility] but comes to [place] also. They should hear from Dr. Recinos's office within 2 weeks.

## 2020-08-10 NOTE — TELEPHONE ENCOUNTER
Does not want to drive to [employer] anymore, she has chronic renal impairment, she is on retacrit

## 2020-09-24 ENCOUNTER — RESULTS ENCOUNTER (OUTPATIENT)
Dept: INTERNAL MEDICINE | Facility: CLINIC | Age: 85
End: 2020-09-24

## 2020-09-24 ENCOUNTER — OFFICE VISIT (OUTPATIENT)
Dept: INTERNAL MEDICINE | Facility: CLINIC | Age: 85
End: 2020-09-24

## 2020-09-24 VITALS
HEIGHT: 66 IN | HEART RATE: 82 BPM | WEIGHT: 145 LBS | TEMPERATURE: 97.1 F | OXYGEN SATURATION: 98 % | SYSTOLIC BLOOD PRESSURE: 116 MMHG | DIASTOLIC BLOOD PRESSURE: 48 MMHG | BODY MASS INDEX: 23.3 KG/M2

## 2020-09-24 DIAGNOSIS — Z00.00 MEDICARE ANNUAL WELLNESS VISIT, SUBSEQUENT: Primary | ICD-10-CM

## 2020-09-24 DIAGNOSIS — E11.29 CONTROLLED TYPE 2 DIABETES MELLITUS WITH OTHER DIABETIC KIDNEY COMPLICATION, WITHOUT LONG-TERM CURRENT USE OF INSULIN (HCC): ICD-10-CM

## 2020-09-24 DIAGNOSIS — N18.4 CHRONIC RENAL IMPAIRMENT, STAGE 4 (SEVERE) (HCC): ICD-10-CM

## 2020-09-24 DIAGNOSIS — R82.90 ABNORMAL URINALYSIS: ICD-10-CM

## 2020-09-24 DIAGNOSIS — R30.0 BURNING WITH URINATION: ICD-10-CM

## 2020-09-24 DIAGNOSIS — E03.9 HYPOTHYROIDISM, UNSPECIFIED TYPE: ICD-10-CM

## 2020-09-24 DIAGNOSIS — D69.6 THROMBOCYTOPENIA (HCC): ICD-10-CM

## 2020-09-24 LAB
BILIRUB BLD-MCNC: NEGATIVE MG/DL
CLARITY, POC: ABNORMAL
COLOR UR: YELLOW
GLUCOSE UR STRIP-MCNC: NEGATIVE MG/DL
HBA1C MFR BLD: 7.4 %
KETONES UR QL: NEGATIVE
LEUKOCYTE EST, POC: NEGATIVE
NITRITE UR-MCNC: POSITIVE MG/ML
PH UR: 6 [PH] (ref 5–8)
PROT UR STRIP-MCNC: NEGATIVE MG/DL
RBC # UR STRIP: ABNORMAL /UL
SP GR UR: 1.01 (ref 1–1.03)
UROBILINOGEN UR QL: NORMAL

## 2020-09-24 PROCEDURE — 99397 PER PM REEVAL EST PAT 65+ YR: CPT | Performed by: PHYSICIAN ASSISTANT

## 2020-09-24 PROCEDURE — 81003 URINALYSIS AUTO W/O SCOPE: CPT | Performed by: PHYSICIAN ASSISTANT

## 2020-09-24 PROCEDURE — 83036 HEMOGLOBIN GLYCOSYLATED A1C: CPT | Performed by: PHYSICIAN ASSISTANT

## 2020-09-24 PROCEDURE — G0439 PPPS, SUBSEQ VISIT: HCPCS | Performed by: PHYSICIAN ASSISTANT

## 2020-09-24 RX ORDER — LEVOTHYROXINE SODIUM 0.05 MG/1
50 TABLET ORAL DAILY
Qty: 90 TABLET | Refills: 3 | Status: SHIPPED | OUTPATIENT
Start: 2020-09-24 | End: 2021-10-28 | Stop reason: SDUPTHER

## 2020-09-24 RX ORDER — FUROSEMIDE 20 MG/1
20 TABLET ORAL DAILY
Qty: 90 TABLET | Refills: 1 | Status: SHIPPED | OUTPATIENT
Start: 2020-09-24 | End: 2021-01-04 | Stop reason: SDUPTHER

## 2020-09-24 RX ORDER — CEFDINIR 300 MG/1
300 CAPSULE ORAL 2 TIMES DAILY
Qty: 10 CAPSULE | Refills: 0 | Status: SHIPPED | OUTPATIENT
Start: 2020-09-24 | End: 2020-09-29

## 2020-09-24 NOTE — PATIENT INSTRUCTIONS
Medicare Wellness  Personal Prevention Plan of Service     Date of Office Visit:  2020  Encounter Provider:  ANN Ndiaye  Place of Service:  [facility]  Patient Name: Lynne Sanchez  :  1934    As part of the Medicare Wellness portion of your visit today, we are providing you with this personalized preventive plan of services (PPPS). This plan is based upon recommendations of the [place] Preventive Services Task Force (USPSTF) and the Advisory Committee on Immunization Practices (ACIP).    This lists the preventive care services that should be considered, and provides dates of when you are due. Items listed as completed are up-to-date and do not require any further intervention.    Health Maintenance   Topic Date Due   • MEDICARE ANNUAL WELLNESS  2017   • DIABETIC EYE EXAM  2017   • INFLUENZA VACCINE  10/24/2020 (Originally 2020)   • ZOSTER VACCINE (1 of 2) 10/03/2021 (Originally 1984)   • TDAP/TD VACCINES (1 - Tdap) 2034 (Originally 1953)   • HEMOGLOBIN A1C  2020   • URINE MICROALBUMIN  2021   • LIPID PANEL  2021       Orders Placed This Encounter   Procedures   • Urine Culture - Urine, Urine, Clean Catch     Standing Status:   Future     Standing Expiration Date:   2021   • POC Urinalysis Dipstick, Automated   • POC Glycosylated Hemoglobin (Hb A1C)       Return in about 3 months (around 2020) for Next scheduled follow up.

## 2020-09-24 NOTE — PROGRESS NOTES
The ABCs of the Annual Wellness Visit  Subsequent Medicare Wellness Visit    Chief Complaint   Patient presents with   • Medicare Wellness-subsequent       Subjective   History of Present Illness:  She had to cancel an appointment at  nephrology as her  cannot drive her to  any longer. She established with Dr. Recinos last week who suggested she began a liquid iron supplement and possibly increase Retacrit to achieve better control of anemia.  She was supposed to hear back from him early this week regarding the new treatment plan but has not yet heard from his office.     Glucose has been running 250-300 since discontinuing Januvia.  She is taking glimeperide 4 mg twice daily as directed and is also following a diabetic diet.    Today she reports burning with urination for 4 days.  Symptoms are now worsening.  She denies any new low back pain, flank pain, lower abdominal pain, hematuria, increased frequency or urgency.         Lynne Sanchez is a 85 y.o. female who presents for a Subsequent Medicare Wellness Visit.    HEALTH RISK ASSESSMENT    Recent Hospitalizations:  No hospitalization(s) within the last year.    Current Medical Providers:  Patient Care Team:  Dia Main PA as PCP - General (Physician Assistant)    Smoking Status:  Social History     Tobacco Use   Smoking Status Never Smoker   Smokeless Tobacco Never Used       Alcohol Consumption:  Social History     Substance and Sexual Activity   Alcohol Use No       Depression Screen:   PHQ-2/PHQ-9 Depression Screening 9/24/2020   Little interest or pleasure in doing things 0   Feeling down, depressed, or hopeless 0   Total Score 0       Fall Risk Screen:  STEADI Fall Risk Assessment was completed, and patient is at MODERATE risk for falls. Assessment completed on:9/24/2020    Health Habits and Functional and Cognitive Screening:  Functional & Cognitive Status 9/24/2020   Do you have difficulty preparing food and eating? No   Do you  have difficulty bathing yourself, getting dressed or grooming yourself? No   Do you have difficulty using the toilet? Yes   Do you have difficulty moving around from place to place? Yes   Do you have trouble with steps or getting out of a bed or a chair? Yes   Current Diet Well Balanced Diet   Dental Exam Not up to date   Eye Exam Up to date   Exercise (times per week) 7 times per week   Current Exercise Activities Include (No Data)   Do you need help using the phone?  No   Are you deaf or do you have serious difficulty hearing?  No   Do you need help with transportation? Yes   Do you need help shopping? Yes   Do you need help preparing meals?  No   Do you need help with housework?  No   Do you need help with laundry? No   Do you need help taking your medications? No   Do you need help managing money? No   Do you ever drive or ride in a car without wearing a seat belt? No   Have you felt unusual stress, anger or loneliness in the last month? No   Who do you live with? Spouse   If you need help, do you have trouble finding someone available to you? No   Have you been bothered in the last four weeks by sexual problems? No   Do you have difficulty concentrating, remembering or making decisions? No         Does the patient have evidence of cognitive impairment? No    Asprin use counseling:Taking ASA appropriately as indicated    Age-appropriate Screening Schedule:  Refer to the list below for future screening recommendations based on patient's age, sex and/or medical conditions. Orders for these recommended tests are listed in the plan section. The patient has been provided with a written plan.    Health Maintenance   Topic Date Due   • DIABETIC EYE EXAM  05/04/2017   • INFLUENZA VACCINE  10/24/2020 (Originally 8/1/2020)   • ZOSTER VACCINE (1 of 2) 10/03/2021 (Originally 12/11/1984)   • TDAP/TD VACCINES (1 - Tdap) 09/30/2034 (Originally 12/11/1953)   • URINE MICROALBUMIN  01/06/2021   • HEMOGLOBIN A1C  03/24/2021   •  LIPID PANEL  06/29/2021          The following portions of the patient's history were reviewed and updated as appropriate: allergies, current medications, past family history, past medical history, past social history, past surgical history and problem list.    Outpatient Medications Prior to Visit   Medication Sig Dispense Refill   • aspirin 81 MG EC tablet Take 1 tablet by mouth Daily. 30 tablet 0   • atorvastatin (LIPITOR) 40 MG tablet Take 1 tablet by mouth Daily. 90 tablet 3   • Biotin 5000 MCG tablet Take 1 tablet by mouth Daily.     • cholecalciferol (VITAMIN D3) 1000 units tablet Take 1,000 Units by mouth Daily.     • clopidogrel (PLAVIX) 75 MG tablet Take 1 tablet by mouth Daily. 90 tablet 3   • epoetin dorcas-epbx (RETACRIT) 87061 UNIT/ML injection Retacrit 10,000 unit/mL injection solution   administer 1 ml subqu every 3 weeks     • ferrous sulfate 325 (65 FE) MG tablet Take 1 tablet by mouth 2 (Two) Times a Day With Meals.     • glimepiride (AMARYL) 4 MG tablet Take 1 tablet by mouth 2 (Two) Times a Day. 180 tablet 1   • glucose blood test strip True Metrix Glucose Test Strip   TEST BLOOD SUGAR EVERY DAY     • latanoprost (XALATAN) 0.005 % ophthalmic solution Administer 1 drop to both eyes Daily. 7.5 mL 3   • metoprolol tartrate (LOPRESSOR) 100 MG tablet Take 1 tablet by mouth 2 (Two) Times a Day. 180 tablet 3   • Multiple Vitamins-Minerals (MULTIVITAMIN GUMMIES ADULT) chewable tablet Chew 1 tablet Daily.     • Omega-3 Fatty Acids (FISH OIL) 1000 MG capsule capsule Fish Oil     • potassium chloride (MICRO-K) 10 MEQ CR capsule potassium chloride ER 10 mEq capsule,extended release   Take 1 capsule every day by oral route.     • furosemide (LASIX) 20 MG tablet Take 1 tablet by mouth Daily. 90 tablet 1   • levothyroxine (SYNTHROID, LEVOTHROID) 50 MCG tablet Take 1 tablet by mouth Daily. 90 tablet 1     No facility-administered medications prior to visit.        Patient Active Problem List   Diagnosis   •  Chronic atrial fibrillation   • Valvular heart disease   • Diabetes mellitus type II, controlled (CMS/[facility])   • Normocytic anemia   • Chronic gastritis   • Peripheral arterial disease (CMS/[facility])   • Cerebrovascular accident (CVA) due to thrombosis of left anterior cerebral artery (CMS/[facility])   • Thrombocytopenia (CMS/[facility])   • Cardiac pacemaker in situ   • Chronic congestive heart failure (CMS/[facility])   • Functional heart murmur   • Glaucoma   • Hyperlipidemia   • Hypertensive disorder   • Hypothyroidism   • Mass of parotid gland   • Neuropathy   • Chronic renal impairment, stage 4 (severe) (CMS/[facility])   • Impairment of balance   • Impaired mobility   • Muscle weakness of lower extremity       Advanced Care Planning:  ACP discussion was held with the patient during this visit. Patient has an advance directive in EMR which is still valid.     Review of Systems   Constitutional: Negative for appetite change, chills, fatigue, fever and unexpected weight change.   HENT: Negative for congestion, drooling, ear pain, hearing loss, nosebleeds, postnasal drip, rhinorrhea, sore throat, tinnitus and trouble swallowing.    Eyes: Positive for visual disturbance (chronic). Negative for photophobia and discharge.   Respiratory: Negative for cough, chest tightness, shortness of breath and wheezing.    Cardiovascular: Negative for chest pain, palpitations and leg swelling.   Gastrointestinal: Negative for abdominal distention, abdominal pain, blood in stool, constipation, diarrhea, nausea and vomiting.   Endocrine: Negative for cold intolerance, heat intolerance, polydipsia, polyphagia and polyuria.   Genitourinary: Positive for dysuria.   Musculoskeletal: Positive for arthralgias, gait problem (impaired mobility) and myalgias. Negative for back pain, joint swelling, neck pain and neck stiffness.   Skin: Negative for color change, pallor, rash and wound.   Allergic/Immunologic: Negative for environmental allergies, food allergies and  "immunocompromised state.   Neurological: Positive for weakness (generalized; lower extremity) and numbness. Negative for dizziness, tremors, seizures and headaches.   Hematological: Negative for adenopathy. Does not bruise/bleed easily.   Psychiatric/Behavioral: Negative for agitation, behavioral problems, confusion, hallucinations, self-injury and suicidal ideas. The patient is not nervous/anxious.        Compared to one year ago, the patient feels her physical health is worse.  Compared to one year ago, the patient feels her mental health is the same.    Reviewed chart for potential of high risk medication in the elderly: yes  Reviewed chart for potential of harmful drug interactions in the elderly:yes    Objective         Vitals:    09/24/20 1337   BP: 116/48   Pulse: 82   Temp: 97.1 °F (36.2 °C)   SpO2: 98%   Weight: 65.8 kg (145 lb)   Height: 167.6 cm (65.98\")   PainSc:   6       Body mass index is 23.41 kg/m².  Discussed the patient's BMI with her. The BMI is in the acceptable range.    Physical Exam  Vitals signs and nursing note reviewed.   Constitutional:       General: She is not in acute distress.     Appearance: She is well-developed and normal weight. She is not toxic-appearing or diaphoretic.   HENT:      Head: Normocephalic and atraumatic.      Right Ear: External ear normal.      Left Ear: External ear normal.      Nose: Nose normal.      Mouth/Throat:      Pharynx: No oropharyngeal exudate.   Eyes:      General: No scleral icterus.     Conjunctiva/sclera: Conjunctivae normal.      Pupils: Pupils are equal, round, and reactive to light.   Neck:      Musculoskeletal: Normal range of motion and neck supple. No neck rigidity or muscular tenderness.      Thyroid: No thyromegaly.   Cardiovascular:      Rate and Rhythm: Normal rate and regular rhythm.      Heart sounds: Normal heart sounds. No murmur. No friction rub. No gallop.    Pulmonary:      Effort: Pulmonary effort is normal. No respiratory " distress.      Breath sounds: Normal breath sounds. No wheezing, rhonchi or rales.   Chest:      Chest wall: No tenderness.   Abdominal:      General: Bowel sounds are normal. There is no distension.      Palpations: Abdomen is soft. There is no mass.      Tenderness: There is no abdominal tenderness. There is no right CVA tenderness, left CVA tenderness, guarding or rebound.      Hernia: No hernia is present.   Musculoskeletal:         General: Deformity (ankles) present. No tenderness.      Right lower leg: Edema (trace) present.      Left lower leg: Edema (trace) present.   Lymphadenopathy:      Cervical: No cervical adenopathy.   Skin:     General: Skin is warm and dry.      Capillary Refill: Capillary refill takes less than 2 seconds.      Coloration: Skin is not pale.      Findings: No bruising, lesion or rash.   Neurological:      Mental Status: She is alert and oriented to person, place, and time. Mental status is at baseline.      Cranial Nerves: No cranial nerve deficit.      Sensory: No sensory deficit.      Gait: Gait abnormal (ambulates with cane; slow to stand from seated position).      Deep Tendon Reflexes: Reflexes normal.   Psychiatric:         Mood and Affect: Mood normal.         Behavior: Behavior normal.         Thought Content: Thought content normal.         Judgment: Judgment normal.         Lab Results   Component Value Date    CHLPL 92 06/29/2020    TRIG 86 06/29/2020    HDL 47 06/29/2020    LDL 28 06/29/2020    VLDL 17.2 06/29/2020    HGBA1C 7.4 09/24/2020        Assessment/Plan   Medicare Risks and Personalized Health Plan  CMS Preventative Services Quick Reference  Cardiovascular risk  Chronic Pain   Diabetic Lab Screening   Fall Risk  Inactivity/Sedentary  Polypharmacy    The above risks/problems have been discussed with the patient.  Pertinent information has been shared with the patient in the After Visit Summary.  Follow up plans and orders are seen below in the Assessment/Plan  Section.    Diagnoses and all orders for this visit:    1. Medicare annual wellness visit, subsequent (Primary)    2. BMI 23.0-23.9, adult  Patient's Body mass index is 23.41 kg/m². BMI is within normal parameters. No follow-up required..    3. Controlled type 2 diabetes mellitus with other diabetic kidney complication, without long-term current use of insulin (CMS/[facility])  -     Begin: Linagliptin (Tradjenta) 5 MG tablet tablet; Take 1 tablet by mouth Daily.  Dispense: 90 tablet; Refill: 1  -     POC Glycosylated Hemoglobin (Hb A1C) - 7.4 today  Continue glimepiride 4 mg twice daily.    Follow diabetic diet. Take all medications as prescribed.  Exercise as tolerated up to 30 minutes 5 days a week.  Monitor blood sugars as discussed. See eye doctor annually.  Wear protective foot wear/no bare feet. Check feet regularly for calluses or ulcers. Discussed risk of poorly controlled diabetes and long-term complications.    4. Abnormal urinalysis  5. Burning with urination  -     POC Urinalysis Dipstick, Automated  Brief Urine Lab Results  (Last result in the past 365 days)      Color   Clarity   Blood   Leuk Est   Nitrite   Protein   CREAT   Urine HCG        09/24/20 1446 Yellow Cloudy Trace[C] Negative Positive[C] Negative             -     Urine Culture - Urine, Urine, Clean Catch; Future  -     Begin: Cefdinir (OMNICEF) 300 MG capsule; Take 1 capsule by mouth 2 (Two) Times a Day for 5 days.  Dispense: 10 capsule; Refill: 0    6. Chronic renal impairment, stage 4 (severe) (CMS/[facility])  7. Thrombocytopenia (CMS/[facility])  Follow-up with Dr. Recinos, nephrologist, as directed.          Follow Up:  Return in about 3 months (around 12/24/2020) for Next scheduled follow up.     An After Visit Summary and PPPS were given to the patient.

## 2020-11-13 DIAGNOSIS — E11.29 CONTROLLED TYPE 2 DIABETES MELLITUS WITH OTHER DIABETIC KIDNEY COMPLICATION, WITHOUT LONG-TERM CURRENT USE OF INSULIN (HCC): ICD-10-CM

## 2020-11-13 RX ORDER — GLIMEPIRIDE 4 MG/1
TABLET ORAL
Qty: 180 TABLET | Refills: 3 | Status: SHIPPED | OUTPATIENT
Start: 2020-11-13 | End: 2021-01-04 | Stop reason: SDUPTHER

## 2021-01-04 ENCOUNTER — OFFICE VISIT (OUTPATIENT)
Dept: INTERNAL MEDICINE | Facility: CLINIC | Age: 86
End: 2021-01-04

## 2021-01-04 VITALS
TEMPERATURE: 97.3 F | OXYGEN SATURATION: 99 % | WEIGHT: 147 LBS | SYSTOLIC BLOOD PRESSURE: 126 MMHG | DIASTOLIC BLOOD PRESSURE: 58 MMHG | BODY MASS INDEX: 23.63 KG/M2 | HEART RATE: 64 BPM | HEIGHT: 66 IN

## 2021-01-04 DIAGNOSIS — N18.4 CHRONIC RENAL IMPAIRMENT, STAGE 4 (SEVERE) (HCC): Primary | Chronic | ICD-10-CM

## 2021-01-04 DIAGNOSIS — M19.90 ARTHRITIS: ICD-10-CM

## 2021-01-04 DIAGNOSIS — E11.29 CONTROLLED TYPE 2 DIABETES MELLITUS WITH OTHER DIABETIC KIDNEY COMPLICATION, WITHOUT LONG-TERM CURRENT USE OF INSULIN (HCC): ICD-10-CM

## 2021-01-04 PROBLEM — H40.9 GLAUCOMA: Chronic | Status: ACTIVE | Noted: 2017-12-27

## 2021-01-04 PROBLEM — I50.9 CHRONIC CONGESTIVE HEART FAILURE: Chronic | Status: ACTIVE | Noted: 2017-12-27

## 2021-01-04 PROBLEM — I73.9 PERIPHERAL ARTERIAL DISEASE (HCC): Chronic | Status: ACTIVE | Noted: 2017-10-09

## 2021-01-04 PROBLEM — D64.9 NORMOCYTIC ANEMIA: Chronic | Status: ACTIVE | Noted: 2017-10-03

## 2021-01-04 PROBLEM — E03.9 HYPOTHYROIDISM: Chronic | Status: ACTIVE | Noted: 2019-05-16

## 2021-01-04 PROBLEM — D69.6 THROMBOCYTOPENIA: Chronic | Status: ACTIVE | Noted: 2018-07-08

## 2021-01-04 PROBLEM — Z95.0 CARDIAC PACEMAKER IN SITU: Chronic | Status: ACTIVE | Noted: 2017-12-27

## 2021-01-04 PROBLEM — E78.5 HYPERLIPIDEMIA: Chronic | Status: ACTIVE | Noted: 2017-11-13

## 2021-01-04 LAB — HBA1C MFR BLD: 6.4 %

## 2021-01-04 PROCEDURE — 99214 OFFICE O/P EST MOD 30 MIN: CPT | Performed by: PHYSICIAN ASSISTANT

## 2021-01-04 PROCEDURE — 83036 HEMOGLOBIN GLYCOSYLATED A1C: CPT | Performed by: PHYSICIAN ASSISTANT

## 2021-01-04 RX ORDER — FUROSEMIDE 20 MG/1
20 TABLET ORAL DAILY
Qty: 90 TABLET | Refills: 3 | Status: SHIPPED | OUTPATIENT
Start: 2021-01-04 | End: 2021-05-18 | Stop reason: SDUPTHER

## 2021-01-04 RX ORDER — GLIMEPIRIDE 4 MG/1
4 TABLET ORAL 2 TIMES DAILY
Qty: 180 TABLET | Refills: 3 | Status: SHIPPED | OUTPATIENT
Start: 2021-01-04 | End: 2021-04-20 | Stop reason: SDUPTHER

## 2021-01-04 NOTE — PROGRESS NOTES
"Chief Complaint  Follow-up (diabetes)    Subjective     {CC  Problem List  Visit Diagnosis   Encounters  Notes  Medications  Labs  Result Review Imaging  Media :23}     Lynne Sanchez presents to [facility] for diabetes.  History of Present Illness  Here today for follow-up of type 2 diabetes.  She is currently taking Amaryl and Tradjenta as directed. Glucose was 151 this morning and mostly runs below 200 when checked throughout the day. She has chronic numbness and tingling in both feet which is mostly bothersome at night and may be worsening. She denies foot ulcerations, hyperglycemia, nausea, polydipsia, polyuria, polyphagia, visual disturbances and weight loss.     Both knees and fingers on both hands have been hurting nearly every day for a couple of months. Tylenol helps some when taken 2-3 times per week. Blue Emu topically also helps some. She has had similar pains intermittently in the past which she feels are due to arthritis.  No known injuries.    She is overdue for follow up with nephrology which she will call and schedule today.      Objective   Vital Signs:   /58   Pulse 64   Temp 97.3 °F (36.3 °C)   Ht 167.6 cm (65.98\")   Wt 66.7 kg (147 lb)   SpO2 99%   BMI 23.74 kg/m²     Physical Exam  Vitals signs and nursing note reviewed.   Constitutional:       General: She is not in acute distress.     Appearance: Normal appearance. She is well-developed. She is not ill-appearing, toxic-appearing or diaphoretic.   HENT:      Head: Normocephalic and atraumatic.      Right Ear: External ear normal.      Left Ear: External ear normal.   Eyes:      General: No scleral icterus.     Extraocular Movements: Extraocular movements intact.      Conjunctiva/sclera: Conjunctivae normal.      Pupils: Pupils are equal, round, and reactive to light.   Neck:      Musculoskeletal: Normal range of motion and neck supple.   Cardiovascular:      Rate and Rhythm: Normal rate and " regular rhythm.      Heart sounds: Normal heart sounds. No murmur. No friction rub. No gallop.    Pulmonary:      Effort: Pulmonary effort is normal. No respiratory distress.      Breath sounds: Normal breath sounds. No wheezing, rhonchi or rales.   Chest:      Chest wall: No tenderness.   Abdominal:      General: Bowel sounds are normal.      Palpations: Abdomen is soft.      Tenderness: There is no abdominal tenderness.   Musculoskeletal: Normal range of motion.         General: No tenderness or deformity.      Right lower leg: Edema (trace) present.      Left lower leg: Edema (trace) present.      Comments: Negative homans sign bilaterally.    Skin:     General: Skin is warm and dry.      Capillary Refill: Capillary refill takes less than 2 seconds.      Findings: No erythema, lesion or rash.      Comments: Varicosities bilateral lower legs, left > right.    Neurological:      Mental Status: She is alert and oriented to person, place, and time.      Cranial Nerves: No cranial nerve deficit.      Sensory: No sensory deficit.      Motor: No abnormal muscle tone.      Coordination: Coordination normal.      Gait: Gait abnormal (ambulates with walker).      Deep Tendon Reflexes: Reflexes normal.   Psychiatric:         Mood and Affect: Mood normal.         Behavior: Behavior normal.         Thought Content: Thought content normal.         Judgment: Judgment normal.        Result Review :   The following data was reviewed by: ANN Ndiaye on 01/04/2021:  Most Recent A1C    HGBA1C Most Recent 1/4/21   Hemoglobin A1C 6.4                  Assessment and Plan    Problem List Items Addressed This Visit        Endocrine and Metabolic    Diabetes mellitus type II, with other diabetic kidney condition (CMS/HCC) (Chronic)    Relevant Medications    glimepiride (AMARYL) 4 MG tablet    linagliptin (Tradjenta) 5 MG tablet tablet    glucose blood test strip    Other Relevant Orders    POC Glycosylated Hemoglobin (Hb  A1C) (Completed) - 6.4    Currently well controlled, continue Amaryl and Tradjenta as directed.  She declined a prescription for gabapentin for treatment of diabetic neuropathy today but will consider it in the future if symptoms worsen.    Follow diabetic diet. Take all medications as prescribed.  Exercise as tolerated up to 30 minutes 5 days a week.  Monitor blood sugars as discussed. See eye doctor annually.  Wear protective foot wear/no bare feet. Check feet regularly for calluses or ulcers.  Discussed risk of poorly controlled diabetes and long-term complications.    Chronic kidney disease stage IV (CMS)  Follow-up with nephrology regarding CKD stage IV with associated anemia.      Arthritis  Continue Tylenol and blue emu oil topically for management of pain in the knees and hands.              I spent 30 minutes caring for Lynne on this date of service. This time includes time spent by me in the following activities:preparing for the visit, reviewing tests, performing a medically appropriate examination and/or evaluation , counseling and educating the patient/family/caregiver, ordering medications, tests, or procedures, documenting information in the medical record and independently interpreting results and communicating that information with the patient/family/caregiver  Follow Up   Return in about 3 months (around 4/4/2021) for Next scheduled follow up.  Patient was given instructions and counseling regarding her condition or for health maintenance advice. Please see specific information pulled into the AVS if appropriate.

## 2021-01-07 ENCOUNTER — TELEPHONE (OUTPATIENT)
Dept: INTERNAL MEDICINE | Facility: CLINIC | Age: 86
End: 2021-01-07

## 2021-01-07 NOTE — TELEPHONE ENCOUNTER
PATIENT'S  CALLED STATING THAT THERE WAS A MIX UP WITH THE PATIENTS PRESCRIPTION GOING TO THE WRONG PHARMACY.    [facility] CONTACT [phone]     PLEASE ADVISE

## 2021-01-15 DIAGNOSIS — E11.29 CONTROLLED TYPE 2 DIABETES MELLITUS WITH OTHER DIABETIC KIDNEY COMPLICATION, WITHOUT LONG-TERM CURRENT USE OF INSULIN (HCC): ICD-10-CM

## 2021-01-15 NOTE — TELEPHONE ENCOUNTER
BAO FROM [facility] CALLED TO SEE IF PATIENT COULD GET A 30 DAY SUPPLY OF TRUJENTA SENT TO [facility] SINCE MAIL ORDER WILL NOT BE ABLE TO GET IT TO HER IN TIME. INSURANCE WILL COVER A 30 DAY SUPPLY TO [facility].    ANY QUESTIONS CAN CALL BAO [phone]

## 2021-01-15 NOTE — TELEPHONE ENCOUNTER
Caller: hao    Relationship: pharmacy    Best call back number: [phone]    Medication needed:   Requested Prescriptions     Pending Prescriptions Disp Refills   • linagliptin (Tradjenta) 5 MG tablet tablet 90 tablet 3     Sig: Take 1 tablet by mouth Daily.       When do you need the refill by: 01/15/21    What details did the patient provide when requesting the medication: patient is completely out    Does the patient have less than a 3 day supply:  [x] Yes  [] No    What is the patient's preferred pharmacy: [id number] - [place], [place] - [address]  - [phone] [facility] [phone]

## 2021-02-19 PROCEDURE — 93296 REM INTERROG EVL PM/IDS: CPT | Performed by: INTERNAL MEDICINE

## 2021-02-19 PROCEDURE — 93294 REM INTERROG EVL PM/LDLS PM: CPT | Performed by: INTERNAL MEDICINE

## 2021-02-22 RX ORDER — CLOPIDOGREL BISULFATE 75 MG/1
75 TABLET ORAL DAILY
Qty: 90 TABLET | Refills: 3 | Status: SHIPPED | OUTPATIENT
Start: 2021-02-22 | End: 2021-02-25 | Stop reason: SDUPTHER

## 2021-02-22 RX ORDER — ATORVASTATIN CALCIUM 40 MG/1
40 TABLET, FILM COATED ORAL DAILY
Qty: 90 TABLET | Refills: 3 | Status: SHIPPED | OUTPATIENT
Start: 2021-02-22 | End: 2021-02-25 | Stop reason: SDUPTHER

## 2021-02-22 NOTE — TELEPHONE ENCOUNTER
Caller: Lynne Sanchez    Relationship: Self    Best call back number: [phone]    Medication needed:   Requested Prescriptions     Pending Prescriptions Disp Refills   • atorvastatin (LIPITOR) 40 MG tablet 90 tablet 3     Sig: Take 1 tablet by mouth Daily.   • clopidogrel (PLAVIX) 75 MG tablet 90 tablet 3     Sig: Take 1 tablet by mouth Daily.       When do you need the refill by: 02/23/2021    What details did the patient provide when requesting the medication: Patient will be out of medication tomorrow (02/23/2021.) The patient is needing a 30 day supply of these medications.     Does the patient have less than a 3 day supply:  [x] Yes  [] No    What is the patient's preferred pharmacy: [id number] - [place], [place] - [address] - [phone]  - [phone]

## 2021-02-25 RX ORDER — CLOPIDOGREL BISULFATE 75 MG/1
75 TABLET ORAL DAILY
Qty: 90 TABLET | Refills: 3 | Status: SHIPPED | OUTPATIENT
Start: 2021-02-25 | End: 2022-03-04

## 2021-02-25 RX ORDER — ATORVASTATIN CALCIUM 40 MG/1
40 TABLET, FILM COATED ORAL DAILY
Qty: 90 TABLET | Refills: 3 | Status: SHIPPED | OUTPATIENT
Start: 2021-02-25

## 2021-03-01 RX ORDER — METOPROLOL TARTRATE 100 MG/1
100 TABLET ORAL 2 TIMES DAILY
Qty: 180 TABLET | Refills: 3 | Status: SHIPPED | OUTPATIENT
Start: 2021-03-01 | End: 2022-01-05 | Stop reason: SDUPTHER

## 2021-03-19 DIAGNOSIS — E11.29 CONTROLLED TYPE 2 DIABETES MELLITUS WITH OTHER DIABETIC KIDNEY COMPLICATION, WITHOUT LONG-TERM CURRENT USE OF INSULIN (HCC): ICD-10-CM

## 2021-04-19 ENCOUNTER — OFFICE VISIT (OUTPATIENT)
Dept: INTERNAL MEDICINE | Facility: CLINIC | Age: 86
End: 2021-04-19

## 2021-04-19 VITALS
SYSTOLIC BLOOD PRESSURE: 108 MMHG | DIASTOLIC BLOOD PRESSURE: 58 MMHG | HEIGHT: 66 IN | TEMPERATURE: 97.1 F | BODY MASS INDEX: 24.11 KG/M2 | WEIGHT: 150 LBS | OXYGEN SATURATION: 97 % | HEART RATE: 80 BPM

## 2021-04-19 DIAGNOSIS — M25.562 CHRONIC PAIN OF BOTH KNEES: ICD-10-CM

## 2021-04-19 DIAGNOSIS — E11.29 CONTROLLED TYPE 2 DIABETES MELLITUS WITH OTHER DIABETIC KIDNEY COMPLICATION, WITHOUT LONG-TERM CURRENT USE OF INSULIN (HCC): ICD-10-CM

## 2021-04-19 DIAGNOSIS — N18.4 CHRONIC RENAL IMPAIRMENT, STAGE 4 (SEVERE) (HCC): ICD-10-CM

## 2021-04-19 DIAGNOSIS — E11.22 CONTROLLED TYPE 2 DIABETES MELLITUS WITH STAGE 4 CHRONIC KIDNEY DISEASE, WITHOUT LONG-TERM CURRENT USE OF INSULIN (HCC): Primary | ICD-10-CM

## 2021-04-19 DIAGNOSIS — M25.561 CHRONIC PAIN OF BOTH KNEES: ICD-10-CM

## 2021-04-19 DIAGNOSIS — M19.90 ARTHRITIS: ICD-10-CM

## 2021-04-19 DIAGNOSIS — G89.29 CHRONIC PAIN OF BOTH KNEES: ICD-10-CM

## 2021-04-19 DIAGNOSIS — N18.4 CONTROLLED TYPE 2 DIABETES MELLITUS WITH STAGE 4 CHRONIC KIDNEY DISEASE, WITHOUT LONG-TERM CURRENT USE OF INSULIN (HCC): Primary | ICD-10-CM

## 2021-04-19 PROCEDURE — 99214 OFFICE O/P EST MOD 30 MIN: CPT | Performed by: PHYSICIAN ASSISTANT

## 2021-04-19 RX ORDER — TIMOLOL MALEATE 5 MG/ML
1 SOLUTION/ DROPS OPHTHALMIC DAILY
COMMUNITY
Start: 2021-04-16

## 2021-04-19 NOTE — PROGRESS NOTES
"Chief Complaint  Follow-up (Diabetes mellitus type II)    Subjective          Lynne Sanchez presents to [facility]  History of Present Illness  Here today for follow-up of diabetes.  Last A1C was 6.4 on 1/4/21 at which time Tradjenta and Amaryl were continued.  Neuropathy remains bothersome but she continues to decline treatment. Glucose was 108 this morning which has been happening more often recently.     Her hips and knees have been bothersome long-term but have been worsening in recent months. Blue Emu oil has helped some and Tylenol also helps some but she does not take it often.  No new injury.  She is unable to take NSAIDs due to chronic kidney disease with anemia which is managed by nephrology.  She reports that her last hemoglobin was around 10 at which time her nephrologist did not renew her prescription for Retacrit injections which she felt was odd. Her last labs were done in December and she will not follow up until July.         Objective   Vital Signs:   /58   Pulse 80   Temp 97.1 °F (36.2 °C)   Ht 167.6 cm (65.98\")   Wt 68 kg (150 lb)   SpO2 97%   BMI 24.22 kg/m²     Physical Exam  Vitals and nursing note reviewed.   Constitutional:       General: She is not in acute distress.     Appearance: She is well-developed. She is not ill-appearing, toxic-appearing or diaphoretic.   HENT:      Head: Normocephalic and atraumatic.      Right Ear: External ear normal.      Left Ear: External ear normal.   Eyes:      General: No scleral icterus.     Extraocular Movements: Extraocular movements intact.      Conjunctiva/sclera: Conjunctivae normal.      Pupils: Pupils are equal, round, and reactive to light.   Cardiovascular:      Rate and Rhythm: Normal rate and regular rhythm.      Heart sounds: Normal heart sounds. No murmur heard.   No friction rub. No gallop.    Pulmonary:      Effort: Pulmonary effort is normal. No respiratory distress.      Breath sounds: Normal " breath sounds. No wheezing, rhonchi or rales.   Chest:      Chest wall: No tenderness.   Abdominal:      General: Bowel sounds are normal.      Palpations: Abdomen is soft.      Tenderness: There is no abdominal tenderness. There is no right CVA tenderness or left CVA tenderness.   Musculoskeletal:         General: Deformity ( Arthritic deformities of bilateral hands) present. No tenderness.      Cervical back: Normal range of motion and neck supple. No rigidity or tenderness.      Right knee: No swelling, deformity, erythema or bony tenderness. Decreased range of motion. Normal meniscus. Normal pulse.      Left knee: Deformity ( Palpable Baker's cyst) present. No swelling, erythema or bony tenderness. Decreased range of motion. Normal meniscus. Normal pulse.      Right lower le+ Edema present.      Left lower le+ Edema present.      Right ankle: Deformity present.      Left ankle: Deformity present.      Comments: Negative Homans' sign bilaterally.   Lymphadenopathy:      Cervical: No cervical adenopathy.   Skin:     General: Skin is warm and dry.      Capillary Refill: Capillary refill takes less than 2 seconds.      Findings: No rash.   Neurological:      Mental Status: She is alert and oriented to person, place, and time.      Cranial Nerves: No cranial nerve deficit.      Sensory: No sensory deficit.      Motor: No abnormal muscle tone.      Coordination: Coordination normal.      Gait: Gait abnormal ( Ambulates with a cane).      Deep Tendon Reflexes: Reflexes normal.   Psychiatric:         Mood and Affect: Mood normal.         Behavior: Behavior normal.         Thought Content: Thought content normal.         Judgment: Judgment normal.        Result Review :   The following data was reviewed by: ANN Ndiaye on 2021:  Common labs    Common Labsle 20    1049 1049     Total Cholesterol 92      Triglycerides 86      HDL Cholesterol 47      LDL Cholesterol  28       Hemoglobin A1C  6.80 (A) 7.4 6.4   (A) Abnormal value       Comments are available for some flowsheets but are not being displayed.                    Assessment and Plan    Diagnoses and all orders for this visit:    1. Controlled type 2 diabetes mellitus with stage 4 chronic kidney disease, without long-term current use of insulin (CMS/[facility]) (Primary)  -     CBC & Differential  -     Comprehensive Metabolic Panel  -     Hemoglobin A1c  -     Microalbumin / Creatinine Urine Ratio - Urine, Clean Catch  We will consider diabetes treatment modification after review of labs.    2. Chronic renal impairment, stage 4 (severe) (CMS/[facility])  -     CBC & Differential  -     Comprehensive Metabolic Panel  She believes last hemoglobin was around 10.  No Retacrit use since December 2020.    3. Chronic pain of both knees  4. Arthritis  She declined knee x-rays and ortho referral for injections at this time but may reconsider if pain fails to improve with more regular use of Tylenol.           Follow Up   Return in about 6 months (around 10/29/2021) for medicare wellness.  Patient was given instructions and counseling regarding her condition or for health maintenance advice. Please see specific information pulled into the AVS if appropriate.

## 2021-04-20 PROBLEM — G89.29 CHRONIC PAIN OF BOTH KNEES: Status: ACTIVE | Noted: 2021-04-20

## 2021-04-20 PROBLEM — M25.562 CHRONIC PAIN OF BOTH KNEES: Status: ACTIVE | Noted: 2021-04-20

## 2021-04-20 PROBLEM — M25.561 CHRONIC PAIN OF BOTH KNEES: Status: ACTIVE | Noted: 2021-04-20

## 2021-04-20 LAB
ALBUMIN SERPL-MCNC: 4.2 G/DL (ref 3.5–5.2)
ALBUMIN/CREAT UR: 40 MG/G CREAT (ref 0–29)
ALBUMIN/GLOB SERPL: 1.8 G/DL
ALP SERPL-CCNC: 74 U/L (ref 39–117)
ALT SERPL-CCNC: 14 U/L (ref 1–33)
AST SERPL-CCNC: 24 U/L (ref 1–32)
BASOPHILS # BLD AUTO: 0.05 10*3/MM3 (ref 0–0.2)
BASOPHILS NFR BLD AUTO: 0.7 % (ref 0–1.5)
BILIRUB SERPL-MCNC: 0.6 MG/DL (ref 0–1.2)
BUN SERPL-MCNC: 28 MG/DL (ref 8–23)
BUN/CREAT SERPL: 22 (ref 7–25)
CALCIUM SERPL-MCNC: 9.2 MG/DL (ref 8.6–10.5)
CHLORIDE SERPL-SCNC: 108 MMOL/L (ref 98–107)
CO2 SERPL-SCNC: 25.7 MMOL/L (ref 22–29)
CREAT SERPL-MCNC: 1.27 MG/DL (ref 0.57–1)
CREAT UR-MCNC: 76.7 MG/DL
EOSINOPHIL # BLD AUTO: 0.12 10*3/MM3 (ref 0–0.4)
EOSINOPHIL NFR BLD AUTO: 1.6 % (ref 0.3–6.2)
ERYTHROCYTE [DISTWIDTH] IN BLOOD BY AUTOMATED COUNT: 15.5 % (ref 12.3–15.4)
GLOBULIN SER CALC-MCNC: 2.4 GM/DL
GLUCOSE SERPL-MCNC: 117 MG/DL (ref 65–99)
HBA1C MFR BLD: 6.2 % (ref 4.8–5.6)
HCT VFR BLD AUTO: 23.2 % (ref 34–46.6)
HGB BLD-MCNC: 7.2 G/DL (ref 12–15.9)
IMM GRANULOCYTES # BLD AUTO: 0.04 10*3/MM3 (ref 0–0.05)
IMM GRANULOCYTES NFR BLD AUTO: 0.5 % (ref 0–0.5)
LYMPHOCYTES # BLD AUTO: 1.98 10*3/MM3 (ref 0.7–3.1)
LYMPHOCYTES NFR BLD AUTO: 26.5 % (ref 19.6–45.3)
MCH RBC QN AUTO: 28.1 PG (ref 26.6–33)
MCHC RBC AUTO-ENTMCNC: 31 G/DL (ref 31.5–35.7)
MCV RBC AUTO: 90.6 FL (ref 79–97)
MICROALBUMIN UR-MCNC: 30.4 UG/ML
MONOCYTES # BLD AUTO: 0.62 10*3/MM3 (ref 0.1–0.9)
MONOCYTES NFR BLD AUTO: 8.3 % (ref 5–12)
NEUTROPHILS # BLD AUTO: 4.67 10*3/MM3 (ref 1.7–7)
NEUTROPHILS NFR BLD AUTO: 62.4 % (ref 42.7–76)
NRBC BLD AUTO-RTO: 0.1 /100 WBC (ref 0–0.2)
PLATELET # BLD AUTO: 148 10*3/MM3 (ref 140–450)
POTASSIUM SERPL-SCNC: 4.2 MMOL/L (ref 3.5–5.2)
PROT SERPL-MCNC: 6.6 G/DL (ref 6–8.5)
RBC # BLD AUTO: 2.56 10*6/MM3 (ref 3.77–5.28)
SODIUM SERPL-SCNC: 145 MMOL/L (ref 136–145)
WBC # BLD AUTO: 7.48 10*3/MM3 (ref 3.4–10.8)

## 2021-04-20 RX ORDER — GLIMEPIRIDE 4 MG/1
4 TABLET ORAL
Qty: 90 TABLET | Refills: 3 | Status: SHIPPED | OUTPATIENT
Start: 2021-04-20 | End: 2022-01-05 | Stop reason: SDUPTHER

## 2021-05-13 ENCOUNTER — TELEPHONE (OUTPATIENT)
Dept: INTERNAL MEDICINE | Facility: CLINIC | Age: 86
End: 2021-05-13

## 2021-05-13 NOTE — TELEPHONE ENCOUNTER
Caller: Brennan Mendenhall    Relationship: Emergency Contact    Best call back number: [phone]    What is the best time to reach you: ASAP    Who are you requesting to speak with (clinical staff, provider,  specific staff member): CLINICAL    What was the call regarding: HE STATED A HORMONE CALLED RETACRIT WAS CALLED IN TO REGULATE HEMOGLOBIN  PT STATED NEURPHROLOGY SPECIALIST IS NOT CALLING IN MEDICATION AND IS REQUESTING IT BE CALLED IN FOR HER  HE STATED HEMOGLOBIN IS DOWN TO 7 AND IS REQUESTING A CALL BACK      Do you require a callback: YES

## 2021-05-13 NOTE — TELEPHONE ENCOUNTER
Pt  has already spoke to [facility] and they told them to go to [facility] for iron labs, but really need to take her to ER for transfusion

## 2021-05-14 ENCOUNTER — HOSPITAL ENCOUNTER (EMERGENCY)
Facility: HOSPITAL | Age: 86
Discharge: HOME | End: 2021-05-14
Attending: EMERGENCY MEDICINE | Admitting: EMERGENCY MEDICINE

## 2021-05-14 VITALS
WEIGHT: 145 LBS | DIASTOLIC BLOOD PRESSURE: 62 MMHG | BODY MASS INDEX: 23.3 KG/M2 | RESPIRATION RATE: 16 BRPM | HEART RATE: 68 BPM | TEMPERATURE: 98.1 F | HEIGHT: 66 IN | SYSTOLIC BLOOD PRESSURE: 137 MMHG | OXYGEN SATURATION: 99 %

## 2021-05-14 DIAGNOSIS — D64.9 CHRONIC ANEMIA: Primary | ICD-10-CM

## 2021-05-14 LAB
ABO GROUP BLD: NORMAL
ALBUMIN SERPL-MCNC: 4 G/DL (ref 3.5–5.2)
ALBUMIN/GLOB SERPL: 1.5 G/DL
ALP SERPL-CCNC: 90 U/L (ref 39–117)
ALT SERPL W P-5'-P-CCNC: 15 U/L (ref 1–33)
ANION GAP SERPL CALCULATED.3IONS-SCNC: 8.7 MMOL/L (ref 5–15)
AST SERPL-CCNC: 24 U/L (ref 1–32)
BASOPHILS # BLD AUTO: 0.04 10*3/MM3 (ref 0–0.2)
BASOPHILS NFR BLD AUTO: 0.6 % (ref 0–1.5)
BILIRUB SERPL-MCNC: 0.6 MG/DL (ref 0–1.2)
BLD GP AB SCN SERPL QL: NEGATIVE
BUN SERPL-MCNC: 28 MG/DL (ref 8–23)
BUN/CREAT SERPL: 19.9 (ref 7–25)
CALCIUM SPEC-SCNC: 9.6 MG/DL (ref 8.6–10.5)
CHLORIDE SERPL-SCNC: 103 MMOL/L (ref 98–107)
CO2 SERPL-SCNC: 28.3 MMOL/L (ref 22–29)
CREAT SERPL-MCNC: 1.41 MG/DL (ref 0.57–1)
DEPRECATED RDW RBC AUTO: 58.3 FL (ref 37–54)
EOSINOPHIL # BLD AUTO: 0.12 10*3/MM3 (ref 0–0.4)
EOSINOPHIL NFR BLD AUTO: 1.8 % (ref 0.3–6.2)
ERYTHROCYTE [DISTWIDTH] IN BLOOD BY AUTOMATED COUNT: 17.2 % (ref 12.3–15.4)
GFR SERPL CREATININE-BSD FRML MDRD: 35 ML/MIN/1.73
GLOBULIN UR ELPH-MCNC: 2.6 GM/DL
GLUCOSE SERPL-MCNC: 176 MG/DL (ref 65–99)
HCT VFR BLD AUTO: 25.7 % (ref 34–46.6)
HGB BLD-MCNC: 7.9 G/DL (ref 12–15.9)
HOLD SPECIMEN: NORMAL
IMM GRANULOCYTES # BLD AUTO: 0.02 10*3/MM3 (ref 0–0.05)
IMM GRANULOCYTES NFR BLD AUTO: 0.3 % (ref 0–0.5)
INR PPP: 1.14 (ref 0.9–1.1)
LYMPHOCYTES # BLD AUTO: 1.49 10*3/MM3 (ref 0.7–3.1)
LYMPHOCYTES NFR BLD AUTO: 22.5 % (ref 19.6–45.3)
MCH RBC QN AUTO: 28.5 PG (ref 26.6–33)
MCHC RBC AUTO-ENTMCNC: 30.7 G/DL (ref 31.5–35.7)
MCV RBC AUTO: 92.8 FL (ref 79–97)
MONOCYTES # BLD AUTO: 0.67 10*3/MM3 (ref 0.1–0.9)
MONOCYTES NFR BLD AUTO: 10.1 % (ref 5–12)
NEUTROPHILS NFR BLD AUTO: 4.27 10*3/MM3 (ref 1.7–7)
NEUTROPHILS NFR BLD AUTO: 64.7 % (ref 42.7–76)
NRBC BLD AUTO-RTO: 0 /100 WBC (ref 0–0.2)
PLATELET # BLD AUTO: 130 10*3/MM3 (ref 140–450)
PMV BLD AUTO: 9.7 FL (ref 6–12)
POTASSIUM SERPL-SCNC: 4.4 MMOL/L (ref 3.5–5.2)
PROT SERPL-MCNC: 6.6 G/DL (ref 6–8.5)
PROTHROMBIN TIME: 15.1 SECONDS (ref 12–15.1)
RBC # BLD AUTO: 2.77 10*6/MM3 (ref 3.77–5.28)
RH BLD: POSITIVE
SODIUM SERPL-SCNC: 140 MMOL/L (ref 136–145)
T&S EXPIRATION DATE: NORMAL
WBC # BLD AUTO: 6.61 10*3/MM3 (ref 3.4–10.8)
WHOLE BLOOD HOLD SPECIMEN: NORMAL
WHOLE BLOOD HOLD SPECIMEN: NORMAL

## 2021-05-14 PROCEDURE — 86901 BLOOD TYPING SEROLOGIC RH(D): CPT | Performed by: EMERGENCY MEDICINE

## 2021-05-14 PROCEDURE — 80053 COMPREHEN METABOLIC PANEL: CPT | Performed by: EMERGENCY MEDICINE

## 2021-05-14 PROCEDURE — 85025 COMPLETE CBC W/AUTO DIFF WBC: CPT | Performed by: EMERGENCY MEDICINE

## 2021-05-14 PROCEDURE — 86900 BLOOD TYPING SEROLOGIC ABO: CPT | Performed by: EMERGENCY MEDICINE

## 2021-05-14 PROCEDURE — 85610 PROTHROMBIN TIME: CPT | Performed by: EMERGENCY MEDICINE

## 2021-05-14 PROCEDURE — 86850 RBC ANTIBODY SCREEN: CPT | Performed by: EMERGENCY MEDICINE

## 2021-05-14 PROCEDURE — 99283 EMERGENCY DEPT VISIT LOW MDM: CPT

## 2021-05-14 RX ORDER — SODIUM CHLORIDE 0.9 % (FLUSH) 0.9 %
10 SYRINGE (ML) INJECTION AS NEEDED
Status: DISCONTINUED | OUTPATIENT
Start: 2021-05-14 | End: 2021-05-14 | Stop reason: HOSPADM

## 2021-05-18 ENCOUNTER — TELEPHONE (OUTPATIENT)
Dept: INTERNAL MEDICINE | Facility: CLINIC | Age: 86
End: 2021-05-18

## 2021-05-18 RX ORDER — FUROSEMIDE 20 MG/1
20 TABLET ORAL DAILY
Qty: 30 TABLET | Refills: 0 | Status: SHIPPED | OUTPATIENT
Start: 2021-05-18 | End: 2022-01-05 | Stop reason: SDUPTHER

## 2021-05-18 NOTE — TELEPHONE ENCOUNTER
I sent a 30-day supply of Lasix 20 mg to [facility] pharmacy.  She should have plenty of refills available through her mail order pharmacy but please verify that she is still using that pharmacy for long-term meds.

## 2021-05-18 NOTE — TELEPHONE ENCOUNTER
Caller: Lynne Sanchez    Relationship: Self    Best call back number: [phone] (H)    Medication needed:   furosemide (LASIX) 20 MG tablet  20 mg, Daily 3 ordered         Summary: Take 1 tablet by mouth Daily., Starting Mon 1/4/2021, Normal   Dose, Route, Frequency: 20 mg, Oral, Daily          When do you need the refill by: TODAY    What additional details did the patient provide when requesting the medication: PATIENT IS OUT     Does the patient have less than a 3 day supply:  [x] Yes  [] No    What is the patient's preferred pharmacy:    [id number] - [place], [place] - [address]  - [phone]  - [phone]        PATIENT HAS BEEN ADVISED THAT THIS REQUEST HAS BEEN MARKED AS A HIGH PRIORITY TO ALLOW 48 HOURS FOR THE CLINICAL TEAM TO FOLLOW UP ON THIS REQUEST,  IF SYMPTOMS WORSENS TO SEEK OUT EMERGENT CARE. PATIENT  FULLY UNDERSTANDS.

## 2021-05-21 PROCEDURE — 93294 REM INTERROG EVL PM/LDLS PM: CPT | Performed by: INTERNAL MEDICINE

## 2021-05-21 PROCEDURE — 93296 REM INTERROG EVL PM/IDS: CPT | Performed by: INTERNAL MEDICINE

## 2021-05-24 ENCOUNTER — TELEPHONE (OUTPATIENT)
Dept: INTERNAL MEDICINE | Facility: CLINIC | Age: 86
End: 2021-05-24

## 2021-05-24 NOTE — TELEPHONE ENCOUNTER
Caller: Brennan Mendenhall    Relationship: Emergency Contact    Best call back number: [phone]     WHA is the call regarding: CHAIR LIFT    Additional notes: PATIENT'S  STATES THAT SHE IS UNABLE TO CLIMB HER STAIRS AND WOULD  LIKE TO SEE ABOUT GETTING A CHAIR LIFT.

## 2021-05-24 NOTE — TELEPHONE ENCOUNTER
Pt  informed we would need to know where to send a letter for this, he will call the insurance co and let me know

## 2021-05-27 ENCOUNTER — HOSPITAL ENCOUNTER (OUTPATIENT)
Dept: INFUSION THERAPY | Facility: HOSPITAL | Age: 86
Setting detail: INFUSION SERIES
Discharge: HOME | End: 2021-05-27

## 2021-05-27 VITALS
WEIGHT: 145 LBS | TEMPERATURE: 97.8 F | RESPIRATION RATE: 18 BRPM | SYSTOLIC BLOOD PRESSURE: 148 MMHG | DIASTOLIC BLOOD PRESSURE: 77 MMHG | BODY MASS INDEX: 23.3 KG/M2 | OXYGEN SATURATION: 98 % | HEART RATE: 70 BPM | HEIGHT: 66 IN

## 2021-05-27 DIAGNOSIS — D64.9 NORMOCYTIC ANEMIA: Primary | ICD-10-CM

## 2021-05-27 PROCEDURE — 96365 THER/PROPH/DIAG IV INF INIT: CPT

## 2021-05-27 PROCEDURE — 25010000002 IRON SUCROSE PER 1 MG: Performed by: PHYSICIAN ASSISTANT

## 2021-05-27 RX ORDER — DIPHENHYDRAMINE HYDROCHLORIDE 50 MG/ML
50 INJECTION INTRAMUSCULAR; INTRAVENOUS ONCE AS NEEDED
Status: CANCELLED
Start: 2021-05-28

## 2021-05-27 RX ORDER — EPINEPHRINE 1 MG/ML
0.3 INJECTION, SOLUTION INTRAMUSCULAR; SUBCUTANEOUS ONCE AS NEEDED
Status: CANCELLED
Start: 2021-05-28

## 2021-05-27 RX ORDER — ACETAMINOPHEN 325 MG/1
650 TABLET ORAL ONCE
Status: DISCONTINUED | OUTPATIENT
Start: 2021-05-27 | End: 2021-05-29 | Stop reason: HOSPADM

## 2021-05-27 RX ORDER — EPINEPHRINE 0.1 MG/ML
0.1 SYRINGE (ML) INJECTION ONCE AS NEEDED
Status: CANCELLED
Start: 2021-05-28

## 2021-05-27 RX ORDER — ACETAMINOPHEN 325 MG/1
650 TABLET ORAL ONCE
Status: CANCELLED
Start: 2021-05-28 | End: 2021-05-28

## 2021-05-27 RX ORDER — SODIUM CHLORIDE 9 MG/ML
250 INJECTION, SOLUTION INTRAVENOUS ONCE
Status: CANCELLED | OUTPATIENT
Start: 2021-05-28

## 2021-05-27 RX ORDER — DIPHENHYDRAMINE HCL 25 MG
25 CAPSULE ORAL ONCE
Status: DISCONTINUED | OUTPATIENT
Start: 2021-05-27 | End: 2021-05-29 | Stop reason: HOSPADM

## 2021-05-27 RX ORDER — ALBUTEROL SULFATE 90 UG/1
2 AEROSOL, METERED RESPIRATORY (INHALATION) ONCE AS NEEDED
Status: CANCELLED
Start: 2021-05-28

## 2021-05-27 RX ORDER — DIPHENHYDRAMINE HYDROCHLORIDE 50 MG/ML
50 INJECTION INTRAMUSCULAR; INTRAVENOUS ONCE AS NEEDED
Status: DISCONTINUED | OUTPATIENT
Start: 2021-05-27 | End: 2021-05-29 | Stop reason: HOSPADM

## 2021-05-27 RX ORDER — SODIUM CHLORIDE 9 MG/ML
250 INJECTION, SOLUTION INTRAVENOUS ONCE
Status: DISCONTINUED | OUTPATIENT
Start: 2021-05-27 | End: 2021-05-29 | Stop reason: HOSPADM

## 2021-05-27 RX ORDER — DIPHENHYDRAMINE HCL 25 MG
25 CAPSULE ORAL ONCE
Status: CANCELLED
Start: 2021-05-28 | End: 2021-05-28

## 2021-05-27 RX ADMIN — IRON SUCROSE 200 MG: 20 INJECTION, SOLUTION INTRAVENOUS at 11:06

## 2021-06-04 ENCOUNTER — APPOINTMENT (OUTPATIENT)
Dept: INFUSION THERAPY | Facility: HOSPITAL | Age: 86
End: 2021-06-04

## 2021-06-04 DIAGNOSIS — D63.1 ANEMIA OF CHRONIC RENAL FAILURE, STAGE 3 (MODERATE), UNSPECIFIED WHETHER STAGE 3A OR 3B CKD (HCC): Primary | ICD-10-CM

## 2021-06-04 DIAGNOSIS — N18.30 ANEMIA OF CHRONIC RENAL FAILURE, STAGE 3 (MODERATE), UNSPECIFIED WHETHER STAGE 3A OR 3B CKD (HCC): Primary | ICD-10-CM

## 2021-06-07 ENCOUNTER — HOSPITAL ENCOUNTER (OUTPATIENT)
Dept: INFUSION THERAPY | Facility: HOSPITAL | Age: 86
Discharge: HOME | End: 2021-06-07
Admitting: PHYSICIAN ASSISTANT

## 2021-06-07 VITALS
TEMPERATURE: 98.2 F | HEART RATE: 69 BPM | OXYGEN SATURATION: 99 % | WEIGHT: 145 LBS | RESPIRATION RATE: 16 BRPM | DIASTOLIC BLOOD PRESSURE: 69 MMHG | BODY MASS INDEX: 23.4 KG/M2 | SYSTOLIC BLOOD PRESSURE: 140 MMHG

## 2021-06-07 DIAGNOSIS — D64.9 NORMOCYTIC ANEMIA: ICD-10-CM

## 2021-06-07 DIAGNOSIS — N18.4 CHRONIC RENAL IMPAIRMENT, STAGE 4 (SEVERE) (HCC): ICD-10-CM

## 2021-06-07 DIAGNOSIS — N18.30 ANEMIA OF CHRONIC RENAL FAILURE, STAGE 3 (MODERATE), UNSPECIFIED WHETHER STAGE 3A OR 3B CKD (HCC): Primary | ICD-10-CM

## 2021-06-07 DIAGNOSIS — D63.1 ANEMIA OF CHRONIC RENAL FAILURE, STAGE 3 (MODERATE), UNSPECIFIED WHETHER STAGE 3A OR 3B CKD (HCC): Primary | ICD-10-CM

## 2021-06-07 LAB
CREAT SERPL-MCNC: 1.33 MG/DL (ref 0.57–1)
GFR SERPL CREATININE-BSD FRML MDRD: 38 ML/MIN/1.73
HCT VFR BLD AUTO: 23.7 % (ref 34–46.6)
HGB BLD-MCNC: 7.2 G/DL (ref 12–15.9)
POTASSIUM SERPL-SCNC: 3.9 MMOL/L (ref 3.5–5.2)

## 2021-06-07 PROCEDURE — 84132 ASSAY OF SERUM POTASSIUM: CPT | Performed by: INTERNAL MEDICINE

## 2021-06-07 PROCEDURE — 63710000001 ACETAMINOPHEN 325 MG TABLET: Performed by: PHYSICIAN ASSISTANT

## 2021-06-07 PROCEDURE — A9270 NON-COVERED ITEM OR SERVICE: HCPCS | Performed by: PHYSICIAN ASSISTANT

## 2021-06-07 PROCEDURE — 85018 HEMOGLOBIN: CPT | Performed by: INTERNAL MEDICINE

## 2021-06-07 PROCEDURE — 96365 THER/PROPH/DIAG IV INF INIT: CPT

## 2021-06-07 PROCEDURE — 85014 HEMATOCRIT: CPT | Performed by: INTERNAL MEDICINE

## 2021-06-07 PROCEDURE — 96372 THER/PROPH/DIAG INJ SC/IM: CPT

## 2021-06-07 PROCEDURE — 25010000002 EPOETIN ALFA-EPBX 10000 UNIT/ML SOLUTION: Performed by: INTERNAL MEDICINE

## 2021-06-07 PROCEDURE — 63710000001 DIPHENHYDRAMINE PER 50 MG: Performed by: PHYSICIAN ASSISTANT

## 2021-06-07 PROCEDURE — 82565 ASSAY OF CREATININE: CPT | Performed by: INTERNAL MEDICINE

## 2021-06-07 PROCEDURE — 25010000002 IRON SUCROSE PER 1 MG: Performed by: PHYSICIAN ASSISTANT

## 2021-06-07 RX ORDER — ACETAMINOPHEN 325 MG/1
650 TABLET ORAL ONCE
Status: CANCELLED
Start: 2021-06-08 | End: 2021-06-08

## 2021-06-07 RX ORDER — DIPHENHYDRAMINE HYDROCHLORIDE 50 MG/ML
50 INJECTION INTRAMUSCULAR; INTRAVENOUS ONCE AS NEEDED
Status: CANCELLED
Start: 2021-06-08

## 2021-06-07 RX ORDER — ACETAMINOPHEN 325 MG/1
650 TABLET ORAL ONCE
Status: COMPLETED | OUTPATIENT
Start: 2021-06-07 | End: 2021-06-07

## 2021-06-07 RX ORDER — SODIUM CHLORIDE 9 MG/ML
250 INJECTION, SOLUTION INTRAVENOUS ONCE
Status: CANCELLED | OUTPATIENT
Start: 2021-06-08

## 2021-06-07 RX ORDER — ALBUTEROL SULFATE 90 UG/1
2 AEROSOL, METERED RESPIRATORY (INHALATION) ONCE AS NEEDED
Status: CANCELLED
Start: 2021-06-08

## 2021-06-07 RX ORDER — EPINEPHRINE 1 MG/ML
0.3 INJECTION, SOLUTION INTRAMUSCULAR; SUBCUTANEOUS ONCE AS NEEDED
Status: CANCELLED
Start: 2021-06-08

## 2021-06-07 RX ORDER — DIPHENHYDRAMINE HCL 25 MG
25 CAPSULE ORAL ONCE
Status: CANCELLED
Start: 2021-06-08 | End: 2021-06-08

## 2021-06-07 RX ORDER — DIPHENHYDRAMINE HCL 25 MG
25 CAPSULE ORAL ONCE
Status: COMPLETED | OUTPATIENT
Start: 2021-06-07 | End: 2021-06-07

## 2021-06-07 RX ORDER — SODIUM CHLORIDE 9 MG/ML
250 INJECTION, SOLUTION INTRAVENOUS ONCE
Status: DISCONTINUED | OUTPATIENT
Start: 2021-06-07 | End: 2021-06-09 | Stop reason: HOSPADM

## 2021-06-07 RX ORDER — EPINEPHRINE 0.1 MG/ML
0.1 SYRINGE (ML) INJECTION ONCE AS NEEDED
Status: CANCELLED
Start: 2021-06-08

## 2021-06-07 RX ADMIN — EPOETIN ALFA-EPBX 10000 UNITS: 10000 INJECTION, SOLUTION INTRAVENOUS; SUBCUTANEOUS at 11:01

## 2021-06-07 RX ADMIN — IRON SUCROSE 200 MG: 20 INJECTION, SOLUTION INTRAVENOUS at 11:04

## 2021-06-07 RX ADMIN — DIPHENHYDRAMINE HYDROCHLORIDE 25 MG: 25 CAPSULE ORAL at 10:54

## 2021-06-07 RX ADMIN — ACETAMINOPHEN 650 MG: 325 TABLET ORAL at 10:53

## 2021-06-21 ENCOUNTER — HOSPITAL ENCOUNTER (OUTPATIENT)
Dept: INFUSION THERAPY | Facility: HOSPITAL | Age: 86
Discharge: HOME | End: 2021-06-21
Admitting: INTERNAL MEDICINE

## 2021-06-21 VITALS
SYSTOLIC BLOOD PRESSURE: 112 MMHG | TEMPERATURE: 97.5 F | OXYGEN SATURATION: 98 % | HEART RATE: 60 BPM | WEIGHT: 145 LBS | RESPIRATION RATE: 16 BRPM | DIASTOLIC BLOOD PRESSURE: 65 MMHG | BODY MASS INDEX: 23.4 KG/M2

## 2021-06-21 DIAGNOSIS — N18.4 CHRONIC RENAL IMPAIRMENT, STAGE 4 (SEVERE) (HCC): ICD-10-CM

## 2021-06-21 DIAGNOSIS — N18.30 ANEMIA OF CHRONIC RENAL FAILURE, STAGE 3 (MODERATE), UNSPECIFIED WHETHER STAGE 3A OR 3B CKD (HCC): Primary | ICD-10-CM

## 2021-06-21 DIAGNOSIS — D64.9 NORMOCYTIC ANEMIA: ICD-10-CM

## 2021-06-21 DIAGNOSIS — D63.1 ANEMIA OF CHRONIC RENAL FAILURE, STAGE 3 (MODERATE), UNSPECIFIED WHETHER STAGE 3A OR 3B CKD (HCC): Primary | ICD-10-CM

## 2021-06-21 LAB
CREAT SERPL-MCNC: 1.43 MG/DL (ref 0.57–1)
FERRITIN SERPL-MCNC: 125.3 NG/ML (ref 13–150)
GFR SERPL CREATININE-BSD FRML MDRD: 35 ML/MIN/1.73
HCT VFR BLD AUTO: 27.3 % (ref 34–46.6)
HGB BLD-MCNC: 8.3 G/DL (ref 12–15.9)
IRON 24H UR-MRATE: 58 MCG/DL (ref 37–145)
IRON SATN MFR SERPL: 16 % (ref 20–50)
POTASSIUM SERPL-SCNC: 4.4 MMOL/L (ref 3.5–5.2)
TIBC SERPL-MCNC: 361 MCG/DL (ref 298–536)
TRANSFERRIN SERPL-MCNC: 242 MG/DL (ref 200–360)

## 2021-06-21 PROCEDURE — 84466 ASSAY OF TRANSFERRIN: CPT | Performed by: INTERNAL MEDICINE

## 2021-06-21 PROCEDURE — 85014 HEMATOCRIT: CPT | Performed by: INTERNAL MEDICINE

## 2021-06-21 PROCEDURE — 82565 ASSAY OF CREATININE: CPT | Performed by: INTERNAL MEDICINE

## 2021-06-21 PROCEDURE — 25010000002 EPOETIN ALFA-EPBX 10000 UNIT/ML SOLUTION: Performed by: INTERNAL MEDICINE

## 2021-06-21 PROCEDURE — 84132 ASSAY OF SERUM POTASSIUM: CPT | Performed by: INTERNAL MEDICINE

## 2021-06-21 PROCEDURE — 83540 ASSAY OF IRON: CPT | Performed by: INTERNAL MEDICINE

## 2021-06-21 PROCEDURE — 96372 THER/PROPH/DIAG INJ SC/IM: CPT

## 2021-06-21 PROCEDURE — 82728 ASSAY OF FERRITIN: CPT | Performed by: INTERNAL MEDICINE

## 2021-06-21 PROCEDURE — 36415 COLL VENOUS BLD VENIPUNCTURE: CPT

## 2021-06-21 PROCEDURE — 85018 HEMOGLOBIN: CPT | Performed by: INTERNAL MEDICINE

## 2021-06-21 RX ADMIN — EPOETIN ALFA-EPBX 10000 UNITS: 10000 INJECTION, SOLUTION INTRAVENOUS; SUBCUTANEOUS at 10:54

## 2021-07-06 ENCOUNTER — HOSPITAL ENCOUNTER (EMERGENCY)
Facility: HOSPITAL | Age: 86
Discharge: HOME | End: 2021-07-06
Attending: EMERGENCY MEDICINE | Admitting: EMERGENCY MEDICINE

## 2021-07-06 ENCOUNTER — HOSPITAL ENCOUNTER (OUTPATIENT)
Dept: INFUSION THERAPY | Facility: HOSPITAL | Age: 86
Discharge: HOME | End: 2021-07-06
Admitting: INTERNAL MEDICINE

## 2021-07-06 VITALS
SYSTOLIC BLOOD PRESSURE: 106 MMHG | TEMPERATURE: 98.4 F | OXYGEN SATURATION: 100 % | HEART RATE: 69 BPM | DIASTOLIC BLOOD PRESSURE: 46 MMHG | RESPIRATION RATE: 18 BRPM

## 2021-07-06 VITALS
BODY MASS INDEX: 23.4 KG/M2 | WEIGHT: 145.6 LBS | SYSTOLIC BLOOD PRESSURE: 152 MMHG | OXYGEN SATURATION: 97 % | DIASTOLIC BLOOD PRESSURE: 75 MMHG | RESPIRATION RATE: 18 BRPM | HEIGHT: 66 IN | HEART RATE: 68 BPM | TEMPERATURE: 98.3 F

## 2021-07-06 DIAGNOSIS — N18.30 ANEMIA OF CHRONIC RENAL FAILURE, STAGE 3 (MODERATE), UNSPECIFIED WHETHER STAGE 3A OR 3B CKD (HCC): Primary | ICD-10-CM

## 2021-07-06 DIAGNOSIS — D63.1 ANEMIA OF CHRONIC RENAL FAILURE, STAGE 3 (MODERATE), UNSPECIFIED WHETHER STAGE 3A OR 3B CKD (HCC): Primary | ICD-10-CM

## 2021-07-06 DIAGNOSIS — D64.9 NORMOCYTIC ANEMIA: ICD-10-CM

## 2021-07-06 DIAGNOSIS — N18.4 CHRONIC RENAL IMPAIRMENT, STAGE 4 (SEVERE) (HCC): ICD-10-CM

## 2021-07-06 DIAGNOSIS — D64.9 ANEMIA, UNSPECIFIED TYPE: Primary | ICD-10-CM

## 2021-07-06 LAB
ABO GROUP BLD: NORMAL
ALBUMIN SERPL-MCNC: 3.9 G/DL (ref 3.5–5.2)
ALBUMIN/GLOB SERPL: 1.6 G/DL
ALP SERPL-CCNC: 64 U/L (ref 39–117)
ALT SERPL W P-5'-P-CCNC: 16 U/L (ref 1–33)
ANION GAP SERPL CALCULATED.3IONS-SCNC: 14.6 MMOL/L (ref 5–15)
ANISOCYTOSIS BLD QL: NORMAL
AST SERPL-CCNC: 28 U/L (ref 1–32)
BASOPHILS # BLD AUTO: 0.03 10*3/MM3 (ref 0–0.2)
BASOPHILS NFR BLD AUTO: 0.5 % (ref 0–1.5)
BILIRUB SERPL-MCNC: 0.4 MG/DL (ref 0–1.2)
BLD GP AB SCN SERPL QL: NEGATIVE
BUN SERPL-MCNC: 37 MG/DL (ref 8–23)
BUN/CREAT SERPL: 24.3 (ref 7–25)
CALCIUM SPEC-SCNC: 8.7 MG/DL (ref 8.6–10.5)
CHLORIDE SERPL-SCNC: 107 MMOL/L (ref 98–107)
CO2 SERPL-SCNC: 20.4 MMOL/L (ref 22–29)
CREAT SERPL-MCNC: 1.45 MG/DL (ref 0.57–1)
CREAT SERPL-MCNC: 1.52 MG/DL (ref 0.57–1)
DEPRECATED RDW RBC AUTO: 68.4 FL (ref 37–54)
EOSINOPHIL # BLD AUTO: 0.11 10*3/MM3 (ref 0–0.4)
EOSINOPHIL NFR BLD AUTO: 1.9 % (ref 0.3–6.2)
ERYTHROCYTE [DISTWIDTH] IN BLOOD BY AUTOMATED COUNT: 20.3 % (ref 12.3–15.4)
GFR SERPL CREATININE-BSD FRML MDRD: 32 ML/MIN/1.73
GFR SERPL CREATININE-BSD FRML MDRD: 34 ML/MIN/1.73
GLOBULIN UR ELPH-MCNC: 2.5 GM/DL
GLUCOSE SERPL-MCNC: 110 MG/DL (ref 65–99)
HCT VFR BLD AUTO: 19.4 % (ref 34–46.6)
HCT VFR BLD AUTO: 20.1 % (ref 34–46.6)
HGB BLD-MCNC: 5.7 G/DL (ref 12–15.9)
HGB BLD-MCNC: 5.9 G/DL (ref 12–15.9)
HYPOCHROMIA BLD QL: NORMAL
IMM GRANULOCYTES # BLD AUTO: 0.04 10*3/MM3 (ref 0–0.05)
IMM GRANULOCYTES NFR BLD AUTO: 0.7 % (ref 0–0.5)
LYMPHOCYTES # BLD AUTO: 1.49 10*3/MM3 (ref 0.7–3.1)
LYMPHOCYTES NFR BLD AUTO: 25.9 % (ref 19.6–45.3)
MCH RBC QN AUTO: 27.5 PG (ref 26.6–33)
MCHC RBC AUTO-ENTMCNC: 29.4 G/DL (ref 31.5–35.7)
MCV RBC AUTO: 93.7 FL (ref 79–97)
MONOCYTES # BLD AUTO: 0.5 10*3/MM3 (ref 0.1–0.9)
MONOCYTES NFR BLD AUTO: 8.7 % (ref 5–12)
NEUTROPHILS NFR BLD AUTO: 3.58 10*3/MM3 (ref 1.7–7)
NEUTROPHILS NFR BLD AUTO: 62.3 % (ref 42.7–76)
NRBC BLD AUTO-RTO: 0 /100 WBC (ref 0–0.2)
PLATELET # BLD AUTO: 146 10*3/MM3 (ref 140–450)
PMV BLD AUTO: 10.6 FL (ref 6–12)
POTASSIUM SERPL-SCNC: 4.5 MMOL/L (ref 3.5–5.2)
POTASSIUM SERPL-SCNC: 4.5 MMOL/L (ref 3.5–5.2)
PROT SERPL-MCNC: 6.4 G/DL (ref 6–8.5)
RBC # BLD AUTO: 2.07 10*6/MM3 (ref 3.77–5.28)
RH BLD: POSITIVE
SMALL PLATELETS BLD QL SMEAR: ADEQUATE
SODIUM SERPL-SCNC: 142 MMOL/L (ref 136–145)
T&S EXPIRATION DATE: NORMAL
TROPONIN T SERPL-MCNC: 0.05 NG/ML (ref 0–0.03)
WBC # BLD AUTO: 5.75 10*3/MM3 (ref 3.4–10.8)
WBC MORPH BLD: NORMAL

## 2021-07-06 PROCEDURE — 96372 THER/PROPH/DIAG INJ SC/IM: CPT

## 2021-07-06 PROCEDURE — 36415 COLL VENOUS BLD VENIPUNCTURE: CPT

## 2021-07-06 PROCEDURE — 86920 COMPATIBILITY TEST SPIN: CPT

## 2021-07-06 PROCEDURE — 86900 BLOOD TYPING SEROLOGIC ABO: CPT

## 2021-07-06 PROCEDURE — 25010000002 EPOETIN ALFA-EPBX 10000 UNIT/ML SOLUTION: Performed by: INTERNAL MEDICINE

## 2021-07-06 PROCEDURE — 93005 ELECTROCARDIOGRAM TRACING: CPT | Performed by: EMERGENCY MEDICINE

## 2021-07-06 PROCEDURE — 85018 HEMOGLOBIN: CPT | Performed by: INTERNAL MEDICINE

## 2021-07-06 PROCEDURE — P9016 RBC LEUKOCYTES REDUCED: HCPCS

## 2021-07-06 PROCEDURE — 85025 COMPLETE CBC W/AUTO DIFF WBC: CPT | Performed by: EMERGENCY MEDICINE

## 2021-07-06 PROCEDURE — 85007 BL SMEAR W/DIFF WBC COUNT: CPT | Performed by: EMERGENCY MEDICINE

## 2021-07-06 PROCEDURE — 36430 TRANSFUSION BLD/BLD COMPNT: CPT

## 2021-07-06 PROCEDURE — 85014 HEMATOCRIT: CPT | Performed by: INTERNAL MEDICINE

## 2021-07-06 PROCEDURE — 86901 BLOOD TYPING SEROLOGIC RH(D): CPT | Performed by: EMERGENCY MEDICINE

## 2021-07-06 PROCEDURE — 80053 COMPREHEN METABOLIC PANEL: CPT | Performed by: EMERGENCY MEDICINE

## 2021-07-06 PROCEDURE — 84132 ASSAY OF SERUM POTASSIUM: CPT | Performed by: INTERNAL MEDICINE

## 2021-07-06 PROCEDURE — 82565 ASSAY OF CREATININE: CPT | Performed by: INTERNAL MEDICINE

## 2021-07-06 PROCEDURE — 86900 BLOOD TYPING SEROLOGIC ABO: CPT | Performed by: EMERGENCY MEDICINE

## 2021-07-06 PROCEDURE — 84484 ASSAY OF TROPONIN QUANT: CPT | Performed by: EMERGENCY MEDICINE

## 2021-07-06 PROCEDURE — 86850 RBC ANTIBODY SCREEN: CPT | Performed by: EMERGENCY MEDICINE

## 2021-07-06 PROCEDURE — 99283 EMERGENCY DEPT VISIT LOW MDM: CPT

## 2021-07-06 RX ADMIN — EPOETIN ALFA-EPBX 10000 UNITS: 10000 INJECTION, SOLUTION INTRAVENOUS; SUBCUTANEOUS at 12:00

## 2021-07-06 NOTE — ED PROVIDER NOTES
Subjective   86-year-old female presenting with abnormal labs.  Patient states that she has a long history of anemia, gets outpatient infusions and has had multiple transfusions.  She states that for the last week or so she has had increased shortness of breath worse with exertion.  She denies any nausea, vomiting, chest pain, fevers, chills.  She came to get infusion today and was told that her blood counts were very low and she needed a transfusion.          Review of Systems   Constitutional: Positive for fatigue.   Eyes: Negative.    Respiratory: Positive for shortness of breath.    Cardiovascular: Negative.    Gastrointestinal: Negative.    Genitourinary: Negative.    Musculoskeletal: Negative.    Skin: Negative.    Neurological: Negative.    Psychiatric/Behavioral: Negative.        Past Medical History:   Diagnosis Date   • Anemia    • Asthma    • CHF (congestive heart failure) (CMS/HCC)    • Chronic atrial fibrillation (CMS/HCC)    • Diabetes mellitus, type 2 (CMS/HCC)    • Diarrhea    • GERD (gastroesophageal reflux disease)    • Glaucoma    • H/O transfusion of whole blood    • Heart attack (CMS/[facility])    • Heart murmur    • History of transfusion    • Hyperlipidemia    • Hypertension    • Kidney disease     patient not aware of what exact diagnosis is    • Osteomyelitis (CMS/HCC)    • Osteomyelitis of left foot (CMS/HCC) 10/3/2017   • Peripheral vascular disease (CMS/HCC)    • Stroke (CMS/HCC)    • Urinary tract infection    • Wears glasses        Allergies   Allergen Reactions   • Phenergan [Promethazine Hcl] Confusion       Past Surgical History:   Procedure Laterality Date   • AMPUTATION DIGIT Left 7/5/2018    Procedure: Left Great toe amputation;  Surgeon: Prakash Carrington MD;  Location:  [facility];  Service: Vascular   • AMPUTATION DIGIT Left 8/27/2018    Procedure: SECOND TOE AMPUTATION DIGIT LEFT;  Surgeon: Prakash Carrington MD;  Location:  [facility];  Service: General   • APPENDECTOMY     • BONE MARROW  ASPIRATION     • CARDIAC ABLATION     • CARDIAC CATHETERIZATION N/A 10/10/2017    Procedure: Peripheral angiography;  Surgeon: Kan Pelaez MD;  Location: [facility];  Service:    • CARDIAC CATHETERIZATION N/A 10/10/2017    Procedure: Angioplasty-peripheral;  Surgeon: Kan Pelaez MD;  Location: [facility] INVASIVE LOCATION;  Service:    • CARDIAC CATHETERIZATION N/A 10/10/2017    Procedure: Atherectomy-peripheral;  Surgeon: Kan Pelaez MD;  Location: [facility] INVASIVE LOCATION;  Service:    • CARDIAC ELECTROPHYSIOLOGY PROCEDURE N/A 5/3/2018    Procedure: generator change;  Surgeon: Zan Poole MD;  Location:  [facility];  Service: Cardiovascular   • CARDIOVERSION     • COLONOSCOPY     • ENDOSCOPY N/A 10/9/2017    Procedure: ESOPHAGOGASTRODUODENOSCOPY WITH COLD FORCEP BIOPSY;  Surgeon: Clifton Hyatt MD;  Location: [facility];  Service:    • EYE SURGERY Bilateral     CATARACTS   • INTERVENTIONAL RADIOLOGY PROCEDURE N/A 10/10/2017    Procedure: Abdominal Aortagram with Runoff;  Surgeon: Kan Pelaez MD;  Location: [facility] INVASIVE LOCATION;  Service:    • PACEMAKER IMPLANTATION      around 2008 then replaced 2018       Family History   Problem Relation Age of Onset   • No Known Problems Mother    • No Known Problems Father    • Arthritis Other        Social History     Socioeconomic History   • Marital status:      Spouse name: Not on file   • Number of children: 3   • Years of education: Not on file   • Highest education level: Not on file   Tobacco Use   • Smoking status: Never Smoker   • Smokeless tobacco: Never Used   Vaping Use   • Vaping Use: Never used   Substance and Sexual Activity   • Alcohol use: No   • Drug use: No   • Sexual activity: Defer           Objective   Physical Exam  Constitutional:       General: She is not in acute distress.     Appearance: Normal appearance. She is not ill-appearing,  toxic-appearing or diaphoretic.   HENT:      Head: Normocephalic and atraumatic.      Right Ear: External ear normal.      Left Ear: External ear normal.      Nose: Nose normal.      Mouth/Throat:      Mouth: Mucous membranes are moist.      Pharynx: Oropharynx is clear.   Eyes:      Extraocular Movements: Extraocular movements intact.      Pupils: Pupils are equal, round, and reactive to light.      Comments: Pale conjunctiva   Cardiovascular:      Rate and Rhythm: Normal rate and regular rhythm.      Pulses: Normal pulses.      Heart sounds: Normal heart sounds.   Pulmonary:      Effort: Pulmonary effort is normal. No respiratory distress.      Breath sounds: Normal breath sounds.   Abdominal:      General: Bowel sounds are normal. There is no distension.      Tenderness: There is no abdominal tenderness.   Musculoskeletal:         General: No swelling, tenderness or deformity. Normal range of motion.      Cervical back: Normal range of motion.   Skin:     General: Skin is warm and dry.      Capillary Refill: Capillary refill takes less than 2 seconds.      Findings: No rash.   Neurological:      General: No focal deficit present.      Mental Status: She is alert and oriented to person, place, and time.   Psychiatric:         Mood and Affect: Mood normal.         Behavior: Behavior normal.         Procedures           ED Course                                            MDM  Number of Diagnoses or Management Options  Anemia, unspecified type  Diagnosis management comments: 86-year-old female with shortness of breath, abnormal labs.  Well-developed and well-nourished elderly lady in no distress exam as above.  Will obtain basic labs and EKG.  Will prepare for transfusion.  Disposition pending.    DDx: Chronic anemia    EKG interpreted by me: ventricularly paced rhythm, normal rate, no obvious ST/T changes, this is an abnormal EKG, this is similar to previous     Blood work notable for anemia, mildly elevated  troponin likely consistent with her chronic kidney disease and anemia.  She is feeling better after transfusion.  Once this is complete we will plan for discharge home.       Amount and/or Complexity of Data Reviewed  Decide to obtain previous medical records or to obtain history from someone other than the patient: yes        Final diagnoses:   Anemia, unspecified type          Pan Mirza MD  07/06/21 0757

## 2021-07-06 NOTE — CODE DOCUMENTATION
Attempted to  office to advise of critical H&H- no anwer. Left message with practice manager. Will continue to try to get a hold of staff.

## 2021-07-07 ENCOUNTER — TELEPHONE (OUTPATIENT)
Dept: INTERNAL MEDICINE | Facility: CLINIC | Age: 86
End: 2021-07-07

## 2021-07-07 NOTE — TELEPHONE ENCOUNTER
Please advise. Patient had critical lab results at ER visit and has received blood transfusion.   Do you want to repeat labs and then have patient follow up or should patient contact office for an appointment first

## 2021-07-07 NOTE — TELEPHONE ENCOUNTER
Caller: BLAIR    Relationship: Provider    Best call back number: [phone]    What orders are you requesting (i.e. lab or imaging): LABS    In what timeframe would the patient need to come in: ASAP    Where will you receive your lab/imaging services: IN OFFICE OR [facility]    Additional notes: PATIENT WAS IN THE ER 07/06/21 AND WAS GIVEN BLOOD. THE ER DRDR JOY IS RECOMMENDING THAT PATIENT GET LABS DONE ASAP    PLEASE ADVISE AND CALL PATIENT ONCE SHE HAS AN ACTIVE LAB ORDER [phone]

## 2021-07-08 LAB
BH BB BLOOD EXPIRATION DATE: NORMAL
BH BB BLOOD EXPIRATION DATE: NORMAL
BH BB BLOOD TYPE BARCODE: 5100
BH BB BLOOD TYPE BARCODE: 5100
BH BB DISPENSE STATUS: NORMAL
BH BB DISPENSE STATUS: NORMAL
BH BB PRODUCT CODE: NORMAL
BH BB PRODUCT CODE: NORMAL
BH BB UNIT NUMBER: NORMAL
BH BB UNIT NUMBER: NORMAL
CROSSMATCH INTERPRETATION: NORMAL
CROSSMATCH INTERPRETATION: NORMAL
UNIT  ABO: NORMAL
UNIT  ABO: NORMAL
UNIT  RH: NORMAL
UNIT  RH: NORMAL

## 2021-07-08 NOTE — TELEPHONE ENCOUNTER
Anemia is managed by nephrology so it would be best if they ordered the follow up labs. If that is not feasible let me know.

## 2021-07-20 ENCOUNTER — HOSPITAL ENCOUNTER (OUTPATIENT)
Dept: INFUSION THERAPY | Facility: HOSPITAL | Age: 86
Discharge: HOME | End: 2021-07-20
Admitting: INTERNAL MEDICINE

## 2021-07-20 VITALS
HEART RATE: 72 BPM | BODY MASS INDEX: 23.4 KG/M2 | SYSTOLIC BLOOD PRESSURE: 135 MMHG | DIASTOLIC BLOOD PRESSURE: 54 MMHG | TEMPERATURE: 98.3 F | OXYGEN SATURATION: 98 % | RESPIRATION RATE: 14 BRPM | WEIGHT: 145 LBS

## 2021-07-20 DIAGNOSIS — D63.1 ANEMIA OF CHRONIC RENAL FAILURE, STAGE 3 (MODERATE), UNSPECIFIED WHETHER STAGE 3A OR 3B CKD (HCC): Primary | ICD-10-CM

## 2021-07-20 DIAGNOSIS — N18.4 CHRONIC RENAL IMPAIRMENT, STAGE 4 (SEVERE) (HCC): ICD-10-CM

## 2021-07-20 DIAGNOSIS — D64.9 NORMOCYTIC ANEMIA: ICD-10-CM

## 2021-07-20 DIAGNOSIS — N18.30 ANEMIA OF CHRONIC RENAL FAILURE, STAGE 3 (MODERATE), UNSPECIFIED WHETHER STAGE 3A OR 3B CKD (HCC): Primary | ICD-10-CM

## 2021-07-20 LAB
CREAT SERPL-MCNC: 1.17 MG/DL (ref 0.57–1)
FERRITIN SERPL-MCNC: 66.84 NG/ML (ref 13–150)
GFR SERPL CREATININE-BSD FRML MDRD: 44 ML/MIN/1.73
HCT VFR BLD AUTO: 28.7 % (ref 34–46.6)
HGB BLD-MCNC: 8.8 G/DL (ref 12–15.9)
IRON 24H UR-MRATE: 76 MCG/DL (ref 37–145)
IRON SATN MFR SERPL: 20 % (ref 20–50)
POTASSIUM SERPL-SCNC: 4.2 MMOL/L (ref 3.5–5.2)
TIBC SERPL-MCNC: 374 MCG/DL (ref 298–536)
TRANSFERRIN SERPL-MCNC: 251 MG/DL (ref 200–360)

## 2021-07-20 PROCEDURE — 85018 HEMOGLOBIN: CPT | Performed by: INTERNAL MEDICINE

## 2021-07-20 PROCEDURE — 82728 ASSAY OF FERRITIN: CPT | Performed by: INTERNAL MEDICINE

## 2021-07-20 PROCEDURE — 85014 HEMATOCRIT: CPT | Performed by: INTERNAL MEDICINE

## 2021-07-20 PROCEDURE — 84466 ASSAY OF TRANSFERRIN: CPT | Performed by: INTERNAL MEDICINE

## 2021-07-20 PROCEDURE — 84132 ASSAY OF SERUM POTASSIUM: CPT | Performed by: INTERNAL MEDICINE

## 2021-07-20 PROCEDURE — 82565 ASSAY OF CREATININE: CPT | Performed by: INTERNAL MEDICINE

## 2021-07-20 PROCEDURE — 96372 THER/PROPH/DIAG INJ SC/IM: CPT

## 2021-07-20 PROCEDURE — 25010000002 EPOETIN ALFA-EPBX 10000 UNIT/ML SOLUTION: Performed by: INTERNAL MEDICINE

## 2021-07-20 PROCEDURE — 83540 ASSAY OF IRON: CPT | Performed by: INTERNAL MEDICINE

## 2021-07-20 PROCEDURE — 36415 COLL VENOUS BLD VENIPUNCTURE: CPT

## 2021-07-20 RX ADMIN — EPOETIN ALFA-EPBX 10000 UNITS: 10000 INJECTION, SOLUTION INTRAVENOUS; SUBCUTANEOUS at 11:24

## 2021-07-21 ENCOUNTER — OFFICE VISIT (OUTPATIENT)
Dept: CARDIOLOGY | Facility: CLINIC | Age: 86
End: 2021-07-21

## 2021-07-21 VITALS
BODY MASS INDEX: 23.56 KG/M2 | WEIGHT: 146.6 LBS | HEART RATE: 86 BPM | OXYGEN SATURATION: 97 % | HEIGHT: 66 IN | DIASTOLIC BLOOD PRESSURE: 64 MMHG | SYSTOLIC BLOOD PRESSURE: 108 MMHG

## 2021-07-21 DIAGNOSIS — I48.20 CHRONIC ATRIAL FIBRILLATION (HCC): Primary | ICD-10-CM

## 2021-07-21 DIAGNOSIS — I73.9 PVD (PERIPHERAL VASCULAR DISEASE) WITH CLAUDICATION (HCC): ICD-10-CM

## 2021-07-21 DIAGNOSIS — I50.30 DIASTOLIC CONGESTIVE HEART FAILURE, UNSPECIFIED HF CHRONICITY (HCC): ICD-10-CM

## 2021-07-21 DIAGNOSIS — I49.5 TACHY-BRADY SYNDROME (HCC): ICD-10-CM

## 2021-07-21 PROCEDURE — 99214 OFFICE O/P EST MOD 30 MIN: CPT | Performed by: INTERNAL MEDICINE

## 2021-07-21 PROCEDURE — 93280 PM DEVICE PROGR EVAL DUAL: CPT | Performed by: INTERNAL MEDICINE

## 2021-07-21 NOTE — PROGRESS NOTES
[facility] Cardiology  Office Progress Note  Lynne Sanchez  1934  [address] [place] [address]       Visit Date: 07/21/21    PCP: Dia Main PA  [address]  [facility] [address]    IDENTIFICATION: A 86 y.o. female  female  retired.    PROBLEM LIST:   1. Chronic atrial fibrillation, status post [employer] pacemaker implantation and AV node ablation:  a. Diagnosed June 2005.  b. Status post ECV restoring normal sinus rhythm, June 2005.  c. Rythmol initiated 05/01/2006.  d. Previously digoxin toxicity.  e. GXT Cardiolite, 09/08/2005: No reversible ischemia. LV function not performed secondary to atrial fibrillation.   f. Status post successful external cardioversion on 10/01/2008, Tessa Downey MD.  g. EKG on 10/21/2008: Recurrence of atrial fibrillation with rate of 109.  h. Recurrent atrial fibrillation by EKG, 11/03/2008, asymptomatic.  i. Status post [employer] pacemaker implantation and AV node ablation.    j. Echocardiogram 02/15/2010: Moderate MR, moderate TR. RVSP 50. EF greater than 55%, left atrial enlargement at 4.3 cm.  k. Echo 1/18/17: EF 60%, Mod-severe MR, Mod-severe TR.  l. 10/6/17 echo: EF 50-55%, mild LVH, thickened mitral leaflets with mild MR, aortic sclerosis, trace AI, moderate TR with PA SP 60 mmHg, moderate to severe RV dilation, dilated IVC  m. 5/2018 Gen change Aslam  n. 6/30/2018 EF 60%, mild AR, moderate MR, moderate to severe TR, severe pulmonary HTN RVSP 90  2. History of congestive heart failure.  3. Diabetes mellitus, type 2.  1. 1/21 A1C: 6.4  2. 4/21 A1C: 6.2  4. PVD  a. - 10/17 L PTCA ant tib(Benigno)  5. Renal impairment stage 4  1. 7/6/21 Cr: 1.52  2. 7/20/21 Cr: 1.17  6. Surgical history - appendectomy.   7. Chronic Anemia-NOS neg scopes 2017, h/o following  a. Per Dr. Michael guillen [facility]  8. 7/2018 osteomyelitis with subsequent L great toe amputation been left second toe amputations per Dr. Wade      CC:   Chief  Complaint   Patient presents with   • Chronic atrial fibrillation   • Paroxysmal atrial fibrillation       Allergies  Allergies   Allergen Reactions   • Phenergan [Promethazine Hcl] Confusion       Current Medications    Current Outpatient Medications:   •  aspirin 81 MG EC tablet, Take 1 tablet by mouth Daily., Disp: 30 tablet, Rfl: 0  •  atorvastatin (LIPITOR) 40 MG tablet, Take 1 tablet by mouth Daily., Disp: 90 tablet, Rfl: 3  •  Biotin 5000 MCG tablet, Take 1 tablet by mouth Daily., Disp: , Rfl:   •  cholecalciferol (VITAMIN D3) 1000 units tablet, Take 1,000 Units by mouth Daily., Disp: , Rfl:   •  clopidogrel (PLAVIX) 75 MG tablet, Take 1 tablet by mouth Daily., Disp: 90 tablet, Rfl: 3  •  epoetin dorcas-epbx (RETACRIT) 62881 UNIT/ML injection, Retacrit 10,000 unit/mL injection solution  administer 1 ml subqu every 3 weeks, Disp: , Rfl:   •  ferrous sulfate 325 (65 FE) MG tablet, Take 1 tablet by mouth 2 (Two) Times a Day With Meals., Disp: , Rfl:   •  furosemide (LASIX) 20 MG tablet, Take 1 tablet by mouth Daily., Disp: 30 tablet, Rfl: 0  •  glimepiride (AMARYL) 4 MG tablet, Take 1 tablet by mouth Every Morning Before Breakfast., Disp: 90 tablet, Rfl: 3  •  glucose blood test strip, 1 each by Other route Daily. Use as instructed once a day as directed, for Truemetrix reader, Disp: 100 each, Rfl: 3  •  latanoprost (XALATAN) 0.005 % ophthalmic solution, Administer 1 drop to both eyes Daily., Disp: 7.5 mL, Rfl: 3  •  levothyroxine (SYNTHROID, LEVOTHROID) 50 MCG tablet, TAKE 1 TABLET BY MOUTH  DAILY, Disp: 90 tablet, Rfl: 3  •  linagliptin (Tradjenta) 5 MG tablet tablet, Take 1 tablet by mouth Daily., Disp: 90 tablet, Rfl: 1  •  metoprolol tartrate (LOPRESSOR) 100 MG tablet, Take 1 tablet by mouth 2 (Two) Times a Day., Disp: 180 tablet, Rfl: 3  •  Multiple Vitamins-Minerals (MULTIVITAMIN GUMMIES ADULT) chewable tablet, Chew 1 tablet Daily., Disp: , Rfl:   •  Omega-3 Fatty Acids (FISH OIL) 1000 MG capsule capsule,  "Fish Oil, Disp: , Rfl:   •  potassium chloride (MICRO-K) 10 MEQ CR capsule, potassium chloride ER 10 mEq capsule,extended release  Take 1 capsule every day by oral route., Disp: , Rfl:   •  timolol (TIMOPTIC) 0.5 % ophthalmic solution, , Disp: , Rfl:       History of Present Illness   Lynne Sanchez is a 86 y.o. year old female here for follow up.    Pt denies any chest pain, dyspnea, dyspnea on exertion, orthopnea, PND, palpitations, lower extremity edema, or claudication.  Functional capacity limited  Patient defers Covid vaccination      OBJECTIVE:  Vitals:    07/21/21 1315   BP: 108/64   BP Location: Right arm   Patient Position: Sitting   Pulse: 86   SpO2: 97%   Weight: 66.5 kg (146 lb 9.6 oz)   Height: 167.6 cm (66\")     Body mass index is 23.66 kg/m².    Constitutional:       Appearance: Healthy appearance. Not in distress.   Neck:      Vascular: No JVR. JVD normal.   Pulmonary:      Effort: Pulmonary effort is normal.      Breath sounds: Normal breath sounds. No wheezing. No rhonchi. No rales.   Chest:      Chest wall: Not tender to palpatation.   Cardiovascular:      PMI at left midclavicular line. PM cdi Normal rate. Regular rhythm. Normal S1. Normal S2.      Murmurs: There is a systolic murmur.      No gallop. No click. No rub.   Pulses:     Intact distal pulses.   Edema:     Peripheral edema absent.   Abdominal:      General: Bowel sounds are normal.      Palpations: Abdomen is soft.      Tenderness: There is no abdominal tenderness.   Musculoskeletal: Normal range of motion.         General: No tenderness. Skin:     General: Skin is warm and dry.   Neurological:      General: No focal deficit present.      Mental Status: Alert and oriented to person, place and time.         Diagnostic Data:  Procedures  Device check  Acceptable threshold impedances  VVIR  Generator 10-year    ASSESSMENT:   Diagnosis Plan   1. Chronic atrial fibrillation (CMS/HCC)     2. Diastolic congestive heart failure, unspecified " HF chronicity (CMS/HCC)     3. PVD (peripheral vascular disease) with claudication (CMS/HCC)     4. Tachy-luly syndrome (CMS/HCC)         PLAN:  Chronic A. fib rate controlled anticoagulated    Diastolic CHF euvolemic at current    PVD no limb threatening issue    Tachybradycardia syndrome acceptable pacemaker function          Demond Leon MD, FACC

## 2021-07-22 ENCOUNTER — LAB (OUTPATIENT)
Dept: LAB | Facility: HOSPITAL | Age: 86
End: 2021-07-22

## 2021-07-22 DIAGNOSIS — N18.30 ANEMIA OF CHRONIC RENAL FAILURE, STAGE 3 (MODERATE), UNSPECIFIED WHETHER STAGE 3A OR 3B CKD (HCC): ICD-10-CM

## 2021-07-22 DIAGNOSIS — D63.1 ANEMIA OF CHRONIC RENAL FAILURE, STAGE 3 (MODERATE), UNSPECIFIED WHETHER STAGE 3A OR 3B CKD (HCC): ICD-10-CM

## 2021-07-22 LAB
HCT VFR BLD AUTO: 28.5 % (ref 34–46.6)
HGB BLD-MCNC: 8.7 G/DL (ref 12–15.9)

## 2021-07-22 PROCEDURE — 85014 HEMATOCRIT: CPT

## 2021-07-22 PROCEDURE — 85018 HEMOGLOBIN: CPT

## 2021-08-03 ENCOUNTER — LAB (OUTPATIENT)
Dept: LAB | Facility: HOSPITAL | Age: 86
End: 2021-08-03

## 2021-08-03 ENCOUNTER — LAB (OUTPATIENT)
Dept: ONCOLOGY | Facility: HOSPITAL | Age: 86
End: 2021-08-03

## 2021-08-03 VITALS — TEMPERATURE: 98 F | SYSTOLIC BLOOD PRESSURE: 141 MMHG | DIASTOLIC BLOOD PRESSURE: 61 MMHG | HEART RATE: 72 BPM

## 2021-08-03 DIAGNOSIS — N18.30 ANEMIA OF CHRONIC RENAL FAILURE, STAGE 3 (MODERATE), UNSPECIFIED WHETHER STAGE 3A OR 3B CKD (HCC): ICD-10-CM

## 2021-08-03 DIAGNOSIS — D64.9 NORMOCYTIC ANEMIA: ICD-10-CM

## 2021-08-03 DIAGNOSIS — N18.4 CHRONIC RENAL IMPAIRMENT, STAGE 4 (SEVERE) (HCC): Primary | ICD-10-CM

## 2021-08-03 DIAGNOSIS — D63.1 ANEMIA OF CHRONIC RENAL FAILURE, STAGE 3 (MODERATE), UNSPECIFIED WHETHER STAGE 3A OR 3B CKD (HCC): ICD-10-CM

## 2021-08-03 LAB
CREAT SERPL-MCNC: 1.25 MG/DL (ref 0.57–1)
FERRITIN SERPL-MCNC: 69.29 NG/ML (ref 13–150)
GFR SERPL CREATININE-BSD FRML MDRD: 41 ML/MIN/1.73
HCT VFR BLD AUTO: 28.1 % (ref 34–46.6)
HGB BLD-MCNC: 8.8 G/DL (ref 12–15.9)
IRON 24H UR-MRATE: 91 MCG/DL (ref 37–145)
IRON SATN MFR SERPL: 25 % (ref 20–50)
POTASSIUM SERPL-SCNC: 4.2 MMOL/L (ref 3.5–5.2)
TIBC SERPL-MCNC: 367 MCG/DL (ref 298–536)
TRANSFERRIN SERPL-MCNC: 246 MG/DL (ref 200–360)

## 2021-08-03 PROCEDURE — 85018 HEMOGLOBIN: CPT

## 2021-08-03 PROCEDURE — 84132 ASSAY OF SERUM POTASSIUM: CPT

## 2021-08-03 PROCEDURE — 83540 ASSAY OF IRON: CPT

## 2021-08-03 PROCEDURE — 85014 HEMATOCRIT: CPT

## 2021-08-03 PROCEDURE — 82728 ASSAY OF FERRITIN: CPT

## 2021-08-03 PROCEDURE — 25010000002 EPOETIN ALFA-EPBX 10000 UNIT/ML SOLUTION: Performed by: INTERNAL MEDICINE

## 2021-08-03 PROCEDURE — 82565 ASSAY OF CREATININE: CPT

## 2021-08-03 PROCEDURE — 96372 THER/PROPH/DIAG INJ SC/IM: CPT

## 2021-08-03 PROCEDURE — 84466 ASSAY OF TRANSFERRIN: CPT

## 2021-08-03 RX ADMIN — EPOETIN ALFA-EPBX 10000 UNITS: 10000 INJECTION, SOLUTION INTRAVENOUS; SUBCUTANEOUS at 14:17

## 2021-08-17 ENCOUNTER — HOSPITAL ENCOUNTER (OUTPATIENT)
Dept: INFUSION THERAPY | Facility: HOSPITAL | Age: 86
Discharge: HOME | End: 2021-08-17
Admitting: INTERNAL MEDICINE

## 2021-08-17 VITALS
RESPIRATION RATE: 18 BRPM | HEART RATE: 93 BPM | SYSTOLIC BLOOD PRESSURE: 126 MMHG | OXYGEN SATURATION: 99 % | TEMPERATURE: 97.5 F | DIASTOLIC BLOOD PRESSURE: 58 MMHG

## 2021-08-17 DIAGNOSIS — N18.4 CHRONIC RENAL IMPAIRMENT, STAGE 4 (SEVERE) (HCC): ICD-10-CM

## 2021-08-17 DIAGNOSIS — N18.30 ANEMIA OF CHRONIC RENAL FAILURE, STAGE 3 (MODERATE), UNSPECIFIED WHETHER STAGE 3A OR 3B CKD (HCC): Primary | ICD-10-CM

## 2021-08-17 DIAGNOSIS — D63.1 ANEMIA OF CHRONIC RENAL FAILURE, STAGE 3 (MODERATE), UNSPECIFIED WHETHER STAGE 3A OR 3B CKD (HCC): Primary | ICD-10-CM

## 2021-08-17 DIAGNOSIS — D64.9 NORMOCYTIC ANEMIA: ICD-10-CM

## 2021-08-17 LAB
CREAT SERPL-MCNC: 1.39 MG/DL (ref 0.57–1)
GFR SERPL CREATININE-BSD FRML MDRD: 36 ML/MIN/1.73
HCT VFR BLD AUTO: 28.2 % (ref 34–46.6)
HGB BLD-MCNC: 8.6 G/DL (ref 12–15.9)
POTASSIUM SERPL-SCNC: 4.7 MMOL/L (ref 3.5–5.2)

## 2021-08-17 PROCEDURE — 96372 THER/PROPH/DIAG INJ SC/IM: CPT

## 2021-08-17 PROCEDURE — 25010000002 EPOETIN ALFA-EPBX 10000 UNIT/ML SOLUTION: Performed by: INTERNAL MEDICINE

## 2021-08-17 PROCEDURE — 36415 COLL VENOUS BLD VENIPUNCTURE: CPT

## 2021-08-17 PROCEDURE — 82565 ASSAY OF CREATININE: CPT | Performed by: INTERNAL MEDICINE

## 2021-08-17 PROCEDURE — 85014 HEMATOCRIT: CPT | Performed by: INTERNAL MEDICINE

## 2021-08-17 PROCEDURE — 84132 ASSAY OF SERUM POTASSIUM: CPT | Performed by: INTERNAL MEDICINE

## 2021-08-17 PROCEDURE — 85018 HEMOGLOBIN: CPT | Performed by: INTERNAL MEDICINE

## 2021-08-17 RX ADMIN — EPOETIN ALFA-EPBX 10000 UNITS: 10000 INJECTION, SOLUTION INTRAVENOUS; SUBCUTANEOUS at 14:56

## 2021-08-20 PROCEDURE — 93296 REM INTERROG EVL PM/IDS: CPT | Performed by: INTERNAL MEDICINE

## 2021-08-20 PROCEDURE — 93294 REM INTERROG EVL PM/LDLS PM: CPT | Performed by: INTERNAL MEDICINE

## 2021-08-31 ENCOUNTER — HOSPITAL ENCOUNTER (OUTPATIENT)
Dept: INFUSION THERAPY | Facility: HOSPITAL | Age: 86
Discharge: HOME | End: 2021-08-31
Admitting: INTERNAL MEDICINE

## 2021-08-31 VITALS
OXYGEN SATURATION: 100 % | WEIGHT: 145 LBS | DIASTOLIC BLOOD PRESSURE: 60 MMHG | SYSTOLIC BLOOD PRESSURE: 126 MMHG | TEMPERATURE: 97.1 F | HEART RATE: 85 BPM | RESPIRATION RATE: 16 BRPM | BODY MASS INDEX: 23.4 KG/M2

## 2021-08-31 DIAGNOSIS — D63.1 ANEMIA OF CHRONIC RENAL FAILURE, STAGE 3 (MODERATE), UNSPECIFIED WHETHER STAGE 3A OR 3B CKD (HCC): ICD-10-CM

## 2021-08-31 DIAGNOSIS — N18.4 CHRONIC RENAL IMPAIRMENT, STAGE 4 (SEVERE) (HCC): Primary | ICD-10-CM

## 2021-08-31 DIAGNOSIS — D64.9 NORMOCYTIC ANEMIA: ICD-10-CM

## 2021-08-31 DIAGNOSIS — N18.30 ANEMIA OF CHRONIC RENAL FAILURE, STAGE 3 (MODERATE), UNSPECIFIED WHETHER STAGE 3A OR 3B CKD (HCC): ICD-10-CM

## 2021-08-31 LAB
CREAT SERPL-MCNC: 1.29 MG/DL (ref 0.57–1)
FERRITIN SERPL-MCNC: 58.39 NG/ML (ref 13–150)
GFR SERPL CREATININE-BSD FRML MDRD: 39 ML/MIN/1.73
HCT VFR BLD AUTO: 31.1 % (ref 34–46.6)
HGB BLD-MCNC: 9.4 G/DL (ref 12–15.9)
IRON 24H UR-MRATE: 86 MCG/DL (ref 37–145)
IRON SATN MFR SERPL: 22 % (ref 20–50)
POTASSIUM SERPL-SCNC: 4.3 MMOL/L (ref 3.5–5.2)
TIBC SERPL-MCNC: 396 MCG/DL (ref 298–536)
TRANSFERRIN SERPL-MCNC: 266 MG/DL (ref 200–360)

## 2021-08-31 PROCEDURE — 83540 ASSAY OF IRON: CPT | Performed by: INTERNAL MEDICINE

## 2021-08-31 PROCEDURE — 85018 HEMOGLOBIN: CPT | Performed by: INTERNAL MEDICINE

## 2021-08-31 PROCEDURE — 84466 ASSAY OF TRANSFERRIN: CPT | Performed by: INTERNAL MEDICINE

## 2021-08-31 PROCEDURE — 25010000002 EPOETIN ALFA-EPBX 10000 UNIT/ML SOLUTION: Performed by: INTERNAL MEDICINE

## 2021-08-31 PROCEDURE — 96372 THER/PROPH/DIAG INJ SC/IM: CPT

## 2021-08-31 PROCEDURE — 36415 COLL VENOUS BLD VENIPUNCTURE: CPT

## 2021-08-31 PROCEDURE — 85014 HEMATOCRIT: CPT | Performed by: INTERNAL MEDICINE

## 2021-08-31 PROCEDURE — 82565 ASSAY OF CREATININE: CPT | Performed by: INTERNAL MEDICINE

## 2021-08-31 PROCEDURE — 84132 ASSAY OF SERUM POTASSIUM: CPT | Performed by: INTERNAL MEDICINE

## 2021-08-31 PROCEDURE — 82728 ASSAY OF FERRITIN: CPT | Performed by: INTERNAL MEDICINE

## 2021-08-31 RX ADMIN — EPOETIN ALFA-EPBX 10000 UNITS: 10000 INJECTION, SOLUTION INTRAVENOUS; SUBCUTANEOUS at 14:20

## 2021-09-02 ENCOUNTER — TELEPHONE (OUTPATIENT)
Dept: INTERNAL MEDICINE | Facility: CLINIC | Age: 86
End: 2021-09-02

## 2021-09-02 NOTE — TELEPHONE ENCOUNTER
Caller: Lynne Sanchez    Relationship: Self    Best call back number:     What is the medical concern/diagnosis: PATIENT IS GETTING INFUSIONS AND SAID THEY HAVE BEEN HAVING TROUBLE GETTING IN TOUCH WITH THE OTHER ONE    What specialty or service is being requested: NEPHROLOGY     What is the provider, practice or medical service name: [facility]    What is the office location: [facility]     PLEASE CALL PATIENT WHEN THIS IS PLACED

## 2021-09-02 NOTE — TELEPHONE ENCOUNTER
Called nephrology Dr. Faria to see about order, left them a message to contact patient, told her if she does not hear from them by next week let me know

## 2021-09-20 ENCOUNTER — HOSPITAL ENCOUNTER (OUTPATIENT)
Dept: INFUSION THERAPY | Facility: HOSPITAL | Age: 86
Discharge: HOME | End: 2021-09-20
Admitting: INTERNAL MEDICINE

## 2021-09-20 VITALS
TEMPERATURE: 97.7 F | DIASTOLIC BLOOD PRESSURE: 69 MMHG | HEART RATE: 92 BPM | RESPIRATION RATE: 18 BRPM | BODY MASS INDEX: 23.4 KG/M2 | SYSTOLIC BLOOD PRESSURE: 133 MMHG | WEIGHT: 145 LBS

## 2021-09-20 DIAGNOSIS — D63.1 ANEMIA OF CHRONIC RENAL FAILURE, STAGE 3 (MODERATE), UNSPECIFIED WHETHER STAGE 3A OR 3B CKD (HCC): ICD-10-CM

## 2021-09-20 DIAGNOSIS — D64.9 NORMOCYTIC ANEMIA: ICD-10-CM

## 2021-09-20 DIAGNOSIS — N18.30 ANEMIA OF CHRONIC RENAL FAILURE, STAGE 3 (MODERATE), UNSPECIFIED WHETHER STAGE 3A OR 3B CKD (HCC): ICD-10-CM

## 2021-09-20 DIAGNOSIS — N18.4 CHRONIC RENAL IMPAIRMENT, STAGE 4 (SEVERE) (HCC): Primary | ICD-10-CM

## 2021-09-20 LAB
CREAT SERPL-MCNC: 1.29 MG/DL (ref 0.57–1)
GFR SERPL CREATININE-BSD FRML MDRD: 39 ML/MIN/1.73
HCT VFR BLD AUTO: 29.5 % (ref 34–46.6)
HGB BLD-MCNC: 9 G/DL (ref 12–15.9)
POTASSIUM SERPL-SCNC: 4.4 MMOL/L (ref 3.5–5.2)

## 2021-09-20 PROCEDURE — 25010000002 EPOETIN ALFA-EPBX 20000 UNIT/ML SOLUTION: Performed by: INTERNAL MEDICINE

## 2021-09-20 PROCEDURE — 85014 HEMATOCRIT: CPT | Performed by: INTERNAL MEDICINE

## 2021-09-20 PROCEDURE — 82565 ASSAY OF CREATININE: CPT | Performed by: INTERNAL MEDICINE

## 2021-09-20 PROCEDURE — 36415 COLL VENOUS BLD VENIPUNCTURE: CPT

## 2021-09-20 PROCEDURE — 85018 HEMOGLOBIN: CPT | Performed by: INTERNAL MEDICINE

## 2021-09-20 PROCEDURE — 84132 ASSAY OF SERUM POTASSIUM: CPT | Performed by: INTERNAL MEDICINE

## 2021-09-20 PROCEDURE — 96372 THER/PROPH/DIAG INJ SC/IM: CPT

## 2021-09-20 RX ADMIN — EPOETIN ALFA-EPBX 20000 UNITS: 20000 INJECTION, SOLUTION INTRAVENOUS; SUBCUTANEOUS at 14:48

## 2021-10-04 ENCOUNTER — HOSPITAL ENCOUNTER (OUTPATIENT)
Dept: INFUSION THERAPY | Facility: HOSPITAL | Age: 86
Discharge: HOME | End: 2021-10-04
Admitting: INTERNAL MEDICINE

## 2021-10-04 VITALS
TEMPERATURE: 98 F | OXYGEN SATURATION: 94 % | SYSTOLIC BLOOD PRESSURE: 122 MMHG | DIASTOLIC BLOOD PRESSURE: 68 MMHG | RESPIRATION RATE: 18 BRPM | HEART RATE: 83 BPM

## 2021-10-04 DIAGNOSIS — D64.9 NORMOCYTIC ANEMIA: ICD-10-CM

## 2021-10-04 DIAGNOSIS — D63.1 ANEMIA OF CHRONIC RENAL FAILURE, STAGE 3 (MODERATE), UNSPECIFIED WHETHER STAGE 3A OR 3B CKD (HCC): Primary | ICD-10-CM

## 2021-10-04 DIAGNOSIS — N18.4 CHRONIC RENAL IMPAIRMENT, STAGE 4 (SEVERE) (HCC): ICD-10-CM

## 2021-10-04 DIAGNOSIS — N18.30 ANEMIA OF CHRONIC RENAL FAILURE, STAGE 3 (MODERATE), UNSPECIFIED WHETHER STAGE 3A OR 3B CKD (HCC): Primary | ICD-10-CM

## 2021-10-04 LAB
CREAT SERPL-MCNC: 1.4 MG/DL (ref 0.57–1)
FERRITIN SERPL-MCNC: 49.4 NG/ML (ref 13–150)
GFR SERPL CREATININE-BSD FRML MDRD: 36 ML/MIN/1.73
HCT VFR BLD AUTO: 28 % (ref 34–46.6)
HGB BLD-MCNC: 9 G/DL (ref 12–15.9)
IRON 24H UR-MRATE: 67 MCG/DL (ref 37–145)
IRON SATN MFR SERPL: 18 % (ref 20–50)
POTASSIUM SERPL-SCNC: 4.1 MMOL/L (ref 3.5–5.2)
TIBC SERPL-MCNC: 374 MCG/DL (ref 298–536)
TRANSFERRIN SERPL-MCNC: 251 MG/DL (ref 200–360)

## 2021-10-04 PROCEDURE — 36415 COLL VENOUS BLD VENIPUNCTURE: CPT

## 2021-10-04 PROCEDURE — 85014 HEMATOCRIT: CPT | Performed by: INTERNAL MEDICINE

## 2021-10-04 PROCEDURE — 85018 HEMOGLOBIN: CPT | Performed by: INTERNAL MEDICINE

## 2021-10-04 PROCEDURE — 82728 ASSAY OF FERRITIN: CPT | Performed by: INTERNAL MEDICINE

## 2021-10-04 PROCEDURE — 25010000002 EPOETIN ALFA-EPBX 20000 UNIT/ML SOLUTION: Performed by: INTERNAL MEDICINE

## 2021-10-04 PROCEDURE — 96372 THER/PROPH/DIAG INJ SC/IM: CPT

## 2021-10-04 PROCEDURE — 82565 ASSAY OF CREATININE: CPT | Performed by: INTERNAL MEDICINE

## 2021-10-04 PROCEDURE — 84466 ASSAY OF TRANSFERRIN: CPT | Performed by: INTERNAL MEDICINE

## 2021-10-04 PROCEDURE — 83540 ASSAY OF IRON: CPT | Performed by: INTERNAL MEDICINE

## 2021-10-04 PROCEDURE — 84132 ASSAY OF SERUM POTASSIUM: CPT | Performed by: INTERNAL MEDICINE

## 2021-10-04 RX ADMIN — EPOETIN ALFA-EPBX 20000 UNITS: 20000 INJECTION, SOLUTION INTRAVENOUS; SUBCUTANEOUS at 13:49

## 2021-10-18 ENCOUNTER — APPOINTMENT (OUTPATIENT)
Dept: INFUSION THERAPY | Facility: HOSPITAL | Age: 86
End: 2021-10-18

## 2021-10-21 ENCOUNTER — APPOINTMENT (OUTPATIENT)
Dept: INFUSION THERAPY | Facility: HOSPITAL | Age: 86
End: 2021-10-21

## 2021-10-28 ENCOUNTER — OFFICE VISIT (OUTPATIENT)
Dept: INTERNAL MEDICINE | Facility: CLINIC | Age: 86
End: 2021-10-28

## 2021-10-28 VITALS
BODY MASS INDEX: 22.98 KG/M2 | TEMPERATURE: 97.1 F | HEIGHT: 66 IN | DIASTOLIC BLOOD PRESSURE: 56 MMHG | OXYGEN SATURATION: 99 % | SYSTOLIC BLOOD PRESSURE: 124 MMHG | WEIGHT: 143 LBS | HEART RATE: 87 BPM

## 2021-10-28 DIAGNOSIS — I10 PRIMARY HYPERTENSION: ICD-10-CM

## 2021-10-28 DIAGNOSIS — E11.22 CONTROLLED TYPE 2 DIABETES MELLITUS WITH STAGE 4 CHRONIC KIDNEY DISEASE, WITHOUT LONG-TERM CURRENT USE OF INSULIN (HCC): ICD-10-CM

## 2021-10-28 DIAGNOSIS — E78.5 HYPERLIPIDEMIA, UNSPECIFIED HYPERLIPIDEMIA TYPE: ICD-10-CM

## 2021-10-28 DIAGNOSIS — Z00.00 ENCOUNTER FOR SUBSEQUENT ANNUAL WELLNESS VISIT (AWV) IN MEDICARE PATIENT: Primary | ICD-10-CM

## 2021-10-28 DIAGNOSIS — N18.4 CONTROLLED TYPE 2 DIABETES MELLITUS WITH STAGE 4 CHRONIC KIDNEY DISEASE, WITHOUT LONG-TERM CURRENT USE OF INSULIN (HCC): ICD-10-CM

## 2021-10-28 DIAGNOSIS — E03.9 HYPOTHYROIDISM, UNSPECIFIED TYPE: ICD-10-CM

## 2021-10-28 DIAGNOSIS — N18.4 CHRONIC RENAL IMPAIRMENT, STAGE 4 (SEVERE) (HCC): ICD-10-CM

## 2021-10-28 PROBLEM — H26.40 SECONDARY CATARACT OF BOTH EYES: Status: ACTIVE | Noted: 2021-06-01

## 2021-10-28 PROBLEM — R68.89 SUSPECTED GLAUCOMA OF BOTH EYES: Status: ACTIVE | Noted: 2021-06-01

## 2021-10-28 PROBLEM — E11.3553 STABLE PROLIFERATIVE DIABETIC RETINOPATHY OF BOTH EYES ASSOCIATED WITH TYPE 2 DIABETES MELLITUS (HCC): Status: ACTIVE | Noted: 2021-06-01

## 2021-10-28 PROCEDURE — 99397 PER PM REEVAL EST PAT 65+ YR: CPT | Performed by: PHYSICIAN ASSISTANT

## 2021-10-28 PROCEDURE — 1170F FXNL STATUS ASSESSED: CPT | Performed by: PHYSICIAN ASSISTANT

## 2021-10-28 PROCEDURE — 1159F MED LIST DOCD IN RCRD: CPT | Performed by: PHYSICIAN ASSISTANT

## 2021-10-28 PROCEDURE — 1125F AMNT PAIN NOTED PAIN PRSNT: CPT | Performed by: PHYSICIAN ASSISTANT

## 2021-10-28 PROCEDURE — G0439 PPPS, SUBSEQ VISIT: HCPCS | Performed by: PHYSICIAN ASSISTANT

## 2021-10-28 RX ORDER — LEVOTHYROXINE SODIUM 0.05 MG/1
50 TABLET ORAL DAILY
Qty: 90 TABLET | Refills: 3 | Status: SHIPPED | OUTPATIENT
Start: 2021-10-28 | End: 2022-05-26 | Stop reason: SDUPTHER

## 2021-10-28 NOTE — PROGRESS NOTES
The ABCs of the Annual Wellness Visit  Subsequent Medicare Wellness Visit    Chief Complaint   Patient presents with   • Medicare Wellness-subsequent      Subjective    History of Present Illness:  Lynne Sanchez is a 86 y.o. female who presents for a Subsequent Medicare Wellness Visit.    Bilateral knee pain continues to be bothersome.       The following portions of the patient's history were reviewed and updated as appropriate: allergies, current medications, past family history, past medical history, past social history, past surgical history and problem list.    Compared to one year ago, the patient feels her physical health is worse.    Compared to one year ago, the patient feels her mental health is the same.    Recent Hospitalizations:  She was not admitted to the hospital during the last year.       Current Medical Providers:  Patient Care Team:  Dia Main PA as PCP - General (Family Medicine)  Davian Faria MD as Consulting Physician (Nephrology)    Outpatient Medications Prior to Visit   Medication Sig Dispense Refill   • aspirin 81 MG EC tablet Take 1 tablet by mouth Daily. 30 tablet 0   • atorvastatin (LIPITOR) 40 MG tablet Take 1 tablet by mouth Daily. 90 tablet 3   • Biotin 5000 MCG tablet Take 1 tablet by mouth Daily.     • cholecalciferol (VITAMIN D3) 1000 units tablet Take 1,000 Units by mouth Daily.     • clopidogrel (PLAVIX) 75 MG tablet Take 1 tablet by mouth Daily. 90 tablet 3   • epoetin dorcas-epbx (RETACRIT) 29165 UNIT/ML injection 20,000 Units.     • ferrous sulfate 325 (65 FE) MG tablet Take 1 tablet by mouth 2 (Two) Times a Day With Meals.     • furosemide (LASIX) 20 MG tablet Take 1 tablet by mouth Daily. 30 tablet 0   • glimepiride (AMARYL) 4 MG tablet Take 1 tablet by mouth Every Morning Before Breakfast. 90 tablet 3   • glucose blood test strip 1 each by Other route Daily. Use as instructed once a day as directed, for Truemetrix reader 100 each 3   • latanoprost  (XALATAN) 0.005 % ophthalmic solution Administer 1 drop to both eyes Daily. 7.5 mL 3   • metoprolol tartrate (LOPRESSOR) 100 MG tablet Take 1 tablet by mouth 2 (Two) Times a Day. 180 tablet 3   • Multiple Vitamins-Minerals (MULTIVITAMIN GUMMIES ADULT) chewable tablet Chew 1 tablet Daily.     • Omega-3 Fatty Acids (FISH OIL) 1000 MG capsule capsule Fish Oil     • potassium chloride (MICRO-K) 10 MEQ CR capsule potassium chloride ER 10 mEq capsule,extended release   Take 1 capsule every day by oral route.     • timolol (TIMOPTIC) 0.5 % ophthalmic solution      • levothyroxine (SYNTHROID, LEVOTHROID) 50 MCG tablet TAKE 1 TABLET BY MOUTH  DAILY 90 tablet 3   • linagliptin (Tradjenta) 5 MG tablet tablet Take 1 tablet by mouth Daily. 90 tablet 1     No facility-administered medications prior to visit.       No opioid medication identified on active medication list. I have reviewed chart for other potential  high risk medication/s and harmful drug interactions in the elderly.          Aspirin is on active medication list. Aspirin use is indicated based on review of current medical condition/s. Pros and cons of this therapy have been discussed today. Benefits of this medication outweigh potential harm.  Patient has been encouraged to continue taking this medication.  .      Patient Active Problem List   Diagnosis   • Chronic atrial fibrillation   • Valvular heart disease   • Diabetes mellitus type II, controlled ([facility])   • Normocytic anemia   • Chronic gastritis   • Peripheral arterial disease (HCC)   • Cerebrovascular accident (CVA) due to thrombosis of left anterior cerebral artery ([facility])   • Thrombocytopenia ([facility])   • Cardiac pacemaker in situ   • Chronic congestive heart failure ([facility])   • Functional heart murmur   • Glaucoma   • Hyperlipidemia   • Hypertensive disorder   • Hypothyroidism   • Mass of parotid gland   • Neuropathy   • Chronic renal impairment, stage 4 (severe) ([facility])   • Impairment of balance   • Impaired  "mobility   • Muscle weakness of lower extremity   • Chronic pain of both knees   • Secondary cataract of both eyes   • 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  ACP discussion was held with the patient during this visit. Patient has an advance directive in EMR which is still valid.           Objective    Vitals:    10/28/21 1300   BP: 124/56   Pulse: 87   Temp: 97.1 °F (36.2 °C)   SpO2: 99%   Weight: 64.9 kg (143 lb)   Height: 167.6 cm (65.98\")   PainSc:   5     BMI Readings from Last 1 Encounters:   10/28/21 23.09 kg/m²   BMI is within normal parameters. No follow-up required.    Does the patient have evidence of cognitive impairment? No    Physical Exam  Vitals and nursing note reviewed.   Constitutional:       General: She is not in acute distress.     Appearance: She is well-developed and normal weight. She is ill-appearing ( Chronically ill-appearing). She is not toxic-appearing or diaphoretic.   HENT:      Head: Normocephalic and atraumatic.      Right Ear: External ear normal.      Left Ear: External ear normal.   Eyes:      General: No scleral icterus.        Right eye: No discharge.         Left eye: No discharge.      Extraocular Movements: Extraocular movements intact.      Conjunctiva/sclera: Conjunctivae normal.      Pupils: Pupils are equal, round, and reactive to light.   Cardiovascular:      Rate and Rhythm: Normal rate and regular rhythm.      Heart sounds: Murmur heard.   No friction rub. No gallop.    Pulmonary:      Effort: Pulmonary effort is normal. No respiratory distress.      Breath sounds: Normal breath sounds. No stridor. No wheezing, rhonchi or rales.   Chest:      Chest wall: No tenderness.   Abdominal:      General: Bowel sounds are normal. There is no distension.      Palpations: Abdomen is soft.      Tenderness: There is no abdominal " tenderness. There is no right CVA tenderness, left CVA tenderness or guarding.   Musculoskeletal:         General: No tenderness or deformity.      Cervical back: Normal range of motion and neck supple. No rigidity or tenderness.      Right knee: Decreased range of motion.      Left knee: Decreased range of motion.      Right lower leg: No edema.      Left lower leg: No edema.   Lymphadenopathy:      Cervical: No cervical adenopathy.   Skin:     General: Skin is warm and dry.      Capillary Refill: Capillary refill takes less than 2 seconds.      Coloration: Skin is not jaundiced.      Findings: No erythema or rash.   Neurological:      Mental Status: She is alert and oriented to person, place, and time. Mental status is at baseline.      Cranial Nerves: No cranial nerve deficit.      Sensory: No sensory deficit.      Motor: No abnormal muscle tone.      Coordination: Coordination normal.      Gait: Gait abnormal ( Ambulates with cane).      Deep Tendon Reflexes: Reflexes normal.   Psychiatric:         Mood and Affect: Mood normal.         Behavior: Behavior normal.         Thought Content: Thought content normal.         Judgment: Judgment normal.                 HEALTH RISK ASSESSMENT    Smoking Status:  Social History     Tobacco Use   Smoking Status Never Smoker   Smokeless Tobacco Never Used     Alcohol Consumption:  Social History     Substance and Sexual Activity   Alcohol Use No     Fall Risk Screen:    [facility], and patient is at MODERATE risk for falls. Assessment completed on:10/28/2021    Depression Screening:  PHQ-2/PHQ-9 Depression Screening 10/28/2021   Little interest or pleasure in doing things 0   Feeling down, depressed, or hopeless 0   Total Score 0       Health Habits and Functional and Cognitive Screening:  Functional & Cognitive Status 10/28/2021   Do you have difficulty preparing food and eating? No   Do you have difficulty bathing yourself, getting dressed or  grooming yourself? No   Do you have difficulty using the toilet? No   Do you have difficulty moving around from place to place? No   Do you have trouble with steps or getting out of a bed or a chair? Yes   Current Diet -   Dental Exam Not up to date   Eye Exam Up to date   Exercise (times per week) 7 times per week   Current Exercises Include Walking   Current Exercise Activities Include -   Do you need help using the phone?  No   Are you deaf or do you have serious difficulty hearing?  No   Do you need help with transportation? Yes   Do you need help shopping? Yes   Do you need help preparing meals?  No   Do you need help with housework?  No   Do you need help with laundry? No   Do you need help taking your medications? No   Do you need help managing money? No   Do you ever drive or ride in a car without wearing a seat belt? No   Have you felt unusual stress, anger or loneliness in the last month? Yes   Who do you live with? Spouse   If you need help, do you have trouble finding someone available to you? No   Have you been bothered in the last four weeks by sexual problems? No   Do you have difficulty concentrating, remembering or making decisions? No       Age-appropriate Screening Schedule:  Refer to the list below for future screening recommendations based on patient's age, sex and/or medical conditions. Orders for these recommended tests are listed in the plan section. The patient has been provided with a written plan.    Health Maintenance   Topic Date Due   • ZOSTER VACCINE (1 of 2) Never done   • LIPID PANEL  06/29/2021   • HEMOGLOBIN A1C  10/19/2021   • DXA SCAN  10/28/2021 (Originally 1/9/2019)   • INFLUENZA VACCINE  10/28/2022 (Originally 8/1/2021)   • TDAP/TD VACCINES (1 - Tdap) 09/30/2034 (Originally 12/11/1953)   • DIABETIC EYE EXAM  01/04/2022   • URINE MICROALBUMIN  04/19/2022                Assessment/Plan   CMS Preventative Services Quick Reference  Risk Factors Identified During  Encounter  Cardiovascular Disease  Chronic Pain   Fall Risk-High or Moderate  Inactivity/Sedentary  The above risks/problems have been discussed with the patient.  Follow up actions/plans if indicated are seen below in the Assessment/Plan Section.  Pertinent information has been shared with the patient in the After Visit Summary.    Diagnoses and all orders for this visit:    1. Encounter for subsequent annual wellness visit (AWV) in Medicare patient (Primary)    2. Body mass index (BMI) of 23.0 to 23.9 in adult  Patient's Body mass index is 23.09 kg/m². indicating that she is within normal range (BMI 18.5-24.9). No BMI management plan needed..    3. Hyperlipidemia, unspecified hyperlipidemia type  -     Lipid Panel    4. Controlled type 2 diabetes mellitus with stage 4 chronic kidney disease, without long-term current use of insulin (HCC)  -     Comprehensive Metabolic Panel  -     Hemoglobin A1c  Follow diabetic diet. Take all medications as prescribed.  Exercise as tolerated up to 30 minutes 5 days a week.  Monitor blood sugars as discussed. See eye doctor annually.  Wear protective foot wear/no bare feet. Check feet regularly for calluses or ulcers.  Discussed risk of poorly controlled diabetes and long-term complications.    5. Primary hypertension  -     Comprehensive Metabolic Panel  Stable: Follow heart healthy/low salt diet.  Avoid processed foods.  Monitor blood pressure as discussed.  Exercise as tolerated up to 30 minutes 5 days per week.  Take medications as prescribed.    6. Chronic renal impairment, stage 4 (severe) (HCC)  -     CBC (No Diff)  -     Comprehensive Metabolic Panel  Managed by nephrology.    7. Hypothyroidism, unspecified type  -     TSH Rfx On Abnormal To Free T4  -     levothyroxine (SYNTHROID, LEVOTHROID) 50 MCG tablet; Take 1 tablet by mouth Daily.  Dispense: 90 tablet; Refill: 3      Patient Counseling:  --Nutrition: Stressed importance of moderation in sodium/caffeine intake,  saturated fat and cholesterol, caloric balance, sufficient intake of fresh fruits, vegetables, fiber, calcium, iron, and 1 g folate supplementation if of childbearing age.   --Discussed the issue of calcium supplement, and the daily use of baby aspirin if applicable.             --Mammogram recommended every 2 years from age 40-49 and yearly beginning at age 50.  --Exercise: Stressed the importance of regular exercise.   --Substance Abuse: Discussed cessation/primary prevention of tobacco (if applicable), alcohol, or other drug use (if applicable); driving or other dangerous activities under the influence; availability of treatment for abuse.    --Sexuality: Discussed sexually transmitted diseases, partner selection, use of condoms, avoidance of unintended pregnancy  and contraceptive alternatives.   --Injury prevention: Discussed safety belts, safety helmets, smoke detector, smoking near bedding or upholstery.   --Dental health: Discussed importance of regular tooth brushing, flossing, and dental visits every 6 months.  --Immunizations reviewed.  --Discussed benefits of screening colonoscopy (if applicable).  --After hours service discussed with patient.      Follow Up:   Return in about 6 months (around 4/28/2022) for Next scheduled follow up DM.     An After Visit Summary and PPPS were made available to the patient.

## 2021-10-29 LAB
ALBUMIN SERPL-MCNC: 4.3 G/DL (ref 3.5–5.2)
ALBUMIN/GLOB SERPL: 2 G/DL
ALP SERPL-CCNC: 69 U/L (ref 39–117)
ALT SERPL-CCNC: 12 U/L (ref 1–33)
AST SERPL-CCNC: 24 U/L (ref 1–32)
BILIRUB SERPL-MCNC: 0.4 MG/DL (ref 0–1.2)
BUN SERPL-MCNC: 26 MG/DL (ref 8–23)
BUN/CREAT SERPL: 22 (ref 7–25)
CALCIUM SERPL-MCNC: 9.6 MG/DL (ref 8.6–10.5)
CHLORIDE SERPL-SCNC: 103 MMOL/L (ref 98–107)
CHOLEST SERPL-MCNC: 93 MG/DL (ref 0–200)
CO2 SERPL-SCNC: 27 MMOL/L (ref 22–29)
CREAT SERPL-MCNC: 1.18 MG/DL (ref 0.57–1)
ERYTHROCYTE [DISTWIDTH] IN BLOOD BY AUTOMATED COUNT: 15.8 % (ref 12.3–15.4)
GLOBULIN SER CALC-MCNC: 2.1 GM/DL
GLUCOSE SERPL-MCNC: 154 MG/DL (ref 65–99)
HBA1C MFR BLD: 6.7 % (ref 4.8–5.6)
HCT VFR BLD AUTO: 27.6 % (ref 34–46.6)
HDLC SERPL-MCNC: 52 MG/DL (ref 40–60)
HGB BLD-MCNC: 8.6 G/DL (ref 12–15.9)
LDLC SERPL CALC-MCNC: 29 MG/DL (ref 0–100)
MCH RBC QN AUTO: 28.3 PG (ref 26.6–33)
MCHC RBC AUTO-ENTMCNC: 31.2 G/DL (ref 31.5–35.7)
MCV RBC AUTO: 90.8 FL (ref 79–97)
PLATELET # BLD AUTO: 114 10*3/MM3 (ref 140–450)
POTASSIUM SERPL-SCNC: 4.1 MMOL/L (ref 3.5–5.2)
PROT SERPL-MCNC: 6.4 G/DL (ref 6–8.5)
RBC # BLD AUTO: 3.04 10*6/MM3 (ref 3.77–5.28)
SODIUM SERPL-SCNC: 139 MMOL/L (ref 136–145)
TRIGL SERPL-MCNC: 46 MG/DL (ref 0–150)
TSH SERPL DL<=0.005 MIU/L-ACNC: 3.81 UIU/ML (ref 0.27–4.2)
VLDLC SERPL CALC-MCNC: 12 MG/DL (ref 5–40)
WBC # BLD AUTO: 6.88 10*3/MM3 (ref 3.4–10.8)

## 2021-11-01 ENCOUNTER — APPOINTMENT (OUTPATIENT)
Dept: INFUSION THERAPY | Facility: HOSPITAL | Age: 86
End: 2021-11-01

## 2021-11-19 PROCEDURE — 93294 REM INTERROG EVL PM/LDLS PM: CPT | Performed by: INTERNAL MEDICINE

## 2021-11-19 PROCEDURE — 93296 REM INTERROG EVL PM/IDS: CPT | Performed by: INTERNAL MEDICINE

## 2021-11-23 ENCOUNTER — TELEPHONE (OUTPATIENT)
Dept: INTERNAL MEDICINE | Facility: CLINIC | Age: 86
End: 2021-11-23

## 2021-11-23 NOTE — TELEPHONE ENCOUNTER
LMTCB trying to get more detailed information in regards to the fax that the patient had sent, a denial for the Retacrit medication.

## 2021-11-23 NOTE — TELEPHONE ENCOUNTER
Caller: Brennan Mendenhall    Relationship: Emergency Contact    Best call back number: [phone]     What is the best time to reach you: ANYTIME     Who are you requesting to speak with (clinical staff, provider,  specific staff member): CLINICAL STAFF     What was the call regarding: PATIENT'S  IS CALLING TO ASK FOR A COPY OF THE PATIENT'S BLOOD WORK ON 10/28/2021. HE WOULD LIKE A CALL BACK TO KNOW WHEN THIS IS AVAILABLE TO BE PICKED UP.       Do you require a callback: YES

## 2021-12-14 ENCOUNTER — TELEPHONE (OUTPATIENT)
Dept: INTERNAL MEDICINE | Facility: CLINIC | Age: 86
End: 2021-12-14

## 2021-12-14 NOTE — TELEPHONE ENCOUNTER
Caller: Brennan Mendenhall    Relationship: Emergency Contact    Best call back number: [phone]    What orders are you requesting (i.e. lab or imaging): HEMOGLOBIN LAB ORDER    In what timeframe would the patient need to come in: NA    Where will you receive your lab/imaging services: NA     Additional notes:  PATIENTS  REQUESTING CALL BACK WHEN LAB ORDER HAS BEEN PLACED.

## 2021-12-15 NOTE — TELEPHONE ENCOUNTER
LMTCB per Vaughn message below     I have never actually seen this patient and need more information. A hemoglobin? Or a hemoglobin A1C? It is not yet time to recheck the A1C, that will be done at her next visit. If it is hemoglobin, if she is having symptoms of anemia she needs to be seen. I see that she has chronic kidney disease and is followed by nephrology, is this something they are wanting?       OK FOR [facility] TO DELIVER

## 2022-01-05 ENCOUNTER — OFFICE VISIT (OUTPATIENT)
Dept: INTERNAL MEDICINE | Facility: CLINIC | Age: 87
End: 2022-01-05

## 2022-01-05 VITALS
TEMPERATURE: 97.3 F | HEART RATE: 79 BPM | SYSTOLIC BLOOD PRESSURE: 128 MMHG | DIASTOLIC BLOOD PRESSURE: 78 MMHG | HEIGHT: 66 IN | BODY MASS INDEX: 20.31 KG/M2 | WEIGHT: 126.4 LBS | OXYGEN SATURATION: 99 %

## 2022-01-05 DIAGNOSIS — N18.4 CHRONIC RENAL IMPAIRMENT, STAGE 4 (SEVERE): Primary | ICD-10-CM

## 2022-01-05 DIAGNOSIS — E11.29 CONTROLLED TYPE 2 DIABETES MELLITUS WITH OTHER DIABETIC KIDNEY COMPLICATION, WITHOUT LONG-TERM CURRENT USE OF INSULIN: ICD-10-CM

## 2022-01-05 DIAGNOSIS — I50.9 CHRONIC CONGESTIVE HEART FAILURE, UNSPECIFIED HEART FAILURE TYPE: Chronic | ICD-10-CM

## 2022-01-05 DIAGNOSIS — D63.8 ANEMIA OF CHRONIC DISEASE: ICD-10-CM

## 2022-01-05 PROCEDURE — 99214 OFFICE O/P EST MOD 30 MIN: CPT | Performed by: NURSE PRACTITIONER

## 2022-01-05 RX ORDER — MULTIVITAMIN WITH IRON
1 TABLET ORAL DAILY
COMMUNITY
End: 2022-03-04

## 2022-01-05 RX ORDER — GLIMEPIRIDE 4 MG/1
4 TABLET ORAL
Qty: 90 TABLET | Refills: 3 | Status: ON HOLD | OUTPATIENT
Start: 2022-01-05 | End: 2022-03-23 | Stop reason: SDUPTHER

## 2022-01-05 RX ORDER — FUROSEMIDE 20 MG/1
20 TABLET ORAL DAILY
Qty: 90 TABLET | Refills: 1 | Status: SHIPPED | OUTPATIENT
Start: 2022-01-05 | End: 2022-07-14 | Stop reason: SDUPTHER

## 2022-01-05 RX ORDER — MELOXICAM 7.5 MG/1
7.5 TABLET ORAL DAILY
COMMUNITY
Start: 2021-12-13 | End: 2022-03-04

## 2022-01-05 RX ORDER — METOPROLOL TARTRATE 100 MG/1
100 TABLET ORAL 2 TIMES DAILY
Qty: 180 TABLET | Refills: 3 | Status: SHIPPED | OUTPATIENT
Start: 2022-01-05 | End: 2023-01-04 | Stop reason: SDUPTHER

## 2022-01-05 NOTE — PROGRESS NOTES
Office Visit      Patient Name: Lynne Sanchez  : 1934   MRN: [id number]   Care Team: Patient Care Team:  Elizabeth Easton APRN as PCP - General (Family Medicine)  Davian Faria MD as Consulting Physician (Nephrology)    Chief Complaint  Discuss hemoglobin (review labs from 10/4/2021)    Subjective     Subjective      Lynne Sanchez presents to [facility] for hemoglobin concerns. History of chronic kidney disease and on retacrit injections managed by nephrololgy. Has been seeing Dr. Faria with [facility] and was informed by insurance company they would no longer cover this medication, she has been unable to get in touch with Dr. Faria's office since November per her and husbands report. Last injection was in October and hemoglobin was stable at that time.   Lab Results   Component Value Date    WBC 6.88 10/28/2021    HGB 8.6 (L) 10/28/2021    HCT 27.6 (L) 10/28/2021    MCV 90.8 10/28/2021     (L) 10/28/2021    She is requesting referral to new nephrologist today as she is very frustrated with lack of communication. She has never seen Dr. Alas in the past. She is a previous patient of  nephrology but the drive is beginning to be too much as he  is 89 years of age and [place] interstate.   She doesn't necessarily feel as though her blood counts are low but would like them checked for reassurance today.   Denies fatigue, dizziness, syncope, gait disturbance, and chest pain. She does get short of breath on exertion at times, not any worse than her normal.     Also requesting refills on several medications today- glimeperide, furosemide, and metoprolol.   Type II diabetes has been controlled wit current medciation regimen, she has a scheduled follow-up in April and is compliant with her medications and follow-ups. She denies any adverse effects from her medications.   Lab Results   Component Value Date  "   HGBA1C 6.70 (H) 10/28/2021     CHF- taking lasix with potassium daily, this is a chronic medication for her. She does follow with cardiology as well. As stated above she does have shortness of breath on exertion at times but overall it is stable. Denies lower extremity or abdominal swelling. She is established with cardiology and will see Dr. Leon in May. She is also compliant with beta blockade therapy. Denies palpitations.     Review of Systems   Constitutional: Negative for appetite change, fatigue and fever.   Respiratory: Positive for shortness of breath (baseline). Negative for cough.    Cardiovascular: Negative for chest pain.   Gastrointestinal: Negative for abdominal distention, abdominal pain, anal bleeding, blood in stool, constipation, diarrhea, nausea, vomiting and GERD.   Genitourinary: Negative for decreased libido, dysuria, frequency and urgency.   Musculoskeletal: Positive for arthralgias.   Skin: Negative for rash.       Objective     Objective   Vital Signs:   /78   Pulse 79   Temp 97.3 °F (36.3 °C) (Temporal)   Ht 167.6 cm (65.98\")   Wt 57.3 kg (126 lb 6.4 oz)   SpO2 99%   BMI 20.41 kg/m²     Physical Exam  Vitals and nursing note reviewed.   Constitutional:       General: She is not in acute distress.     Appearance: Normal appearance. She is ill-appearing (chronically ill appearing). She is not toxic-appearing.   Eyes:      Pupils: Pupils are equal, round, and reactive to light.   Cardiovascular:      Rate and Rhythm: Normal rate. Rhythm irregular.      Heart sounds: Murmur heard.       Pulmonary:      Effort: Pulmonary effort is normal. No respiratory distress.      Breath sounds: Normal breath sounds. No wheezing.   Abdominal:      General: Bowel sounds are normal. There is no distension.      Palpations: Abdomen is soft.      Tenderness: There is no abdominal tenderness.   Musculoskeletal:      Cervical back: Neck supple. No tenderness.   Skin:     General: Skin is warm and " dry.      Coloration: Skin is pale.      Findings: No rash.   Neurological:      General: No focal deficit present.      Mental Status: She is alert.   Psychiatric:         Mood and Affect: Mood normal.         Behavior: Behavior normal.          Assessment / Plan      Assessment/Plan   Problem List Items Addressed This Visit        Cardiac and Vasculature    Chronic congestive heart failure (HCC) (Chronic)    Relevant Medications    metoprolol tartrate (LOPRESSOR) 100 MG tablet    Refills provided for metoprolol and lasix today. Continue as prescribed. Keep follow-up with cardiology. Labs to be drawn today.        Endocrine and Metabolic    Diabetes mellitus type II, controlled (HCC) (Chronic)    Relevant Medications    glimepiride (AMARYL) 4 MG tablet    Controlled. Continue tradjenta and glimepiride at current dose. Keep follow-up in April. Refills provided. Diabetic diet, medication compliance,        Genitourinary and Reproductive     Chronic renal impairment, stage 4 (severe) (HCC) - Primary (Chronic)    Overview     Managed by  nephrology.          Relevant Medications    furosemide (LASIX) 20 MG tablet    Other Relevant Orders    CBC w AUTO Differential    Comprehensive metabolic panel    Iron and TIBC    Ferritin    Ambulatory Referral to Nephrology      Other Visit Diagnoses     Anemia of chronic disease        Relevant Orders    CBC w AUTO Differential    Comprehensive metabolic panel    Iron and TIBC    Ferritin    Ambulatory Referral to Nephrology    Labs as above today. No obvious signs of major anemia. Referral for Dr. Alas placed to establish care with provider here in [place]. Escalate treatment and workup as needed based upon labs. Return precautions advised.         I spent 33 minutes caring for Lynne on this date of service. This time includes time spent by me in the following activities:preparing for the visit, reviewing tests, obtaining and/or reviewing a separately obtained history,  performing a medically appropriate examination and/or evaluation , counseling and educating the patient/family/caregiver, ordering medications, tests, or procedures and documenting information in the medical record         Follow Up   Return for Follow-up with renal, keep follow-up in April.  Patient was given instructions and counseling regarding her condition or for health maintenance advice. Please see specific information pulled into the AVS if appropriate.     BILL Purcell  [facility]

## 2022-01-06 ENCOUNTER — TELEPHONE (OUTPATIENT)
Dept: INTERNAL MEDICINE | Facility: CLINIC | Age: 87
End: 2022-01-06

## 2022-01-06 LAB
ALBUMIN SERPL-MCNC: 4.6 G/DL (ref 3.5–5.2)
ALBUMIN/GLOB SERPL: 2.7 G/DL
ALP SERPL-CCNC: 77 U/L (ref 39–117)
ALT SERPL-CCNC: 13 U/L (ref 1–33)
AST SERPL-CCNC: 20 U/L (ref 1–32)
BASOPHILS # BLD AUTO: 0.04 10*3/MM3 (ref 0–0.2)
BASOPHILS NFR BLD AUTO: 0.6 % (ref 0–1.5)
BILIRUB SERPL-MCNC: 0.5 MG/DL (ref 0–1.2)
BUN SERPL-MCNC: 31 MG/DL (ref 8–23)
BUN/CREAT SERPL: 20.9 (ref 7–25)
CALCIUM SERPL-MCNC: 9.5 MG/DL (ref 8.6–10.5)
CHLORIDE SERPL-SCNC: 109 MMOL/L (ref 98–107)
CO2 SERPL-SCNC: 26.1 MMOL/L (ref 22–29)
CREAT SERPL-MCNC: 1.48 MG/DL (ref 0.57–1)
EOSINOPHIL # BLD AUTO: 0.11 10*3/MM3 (ref 0–0.4)
EOSINOPHIL NFR BLD AUTO: 1.5 % (ref 0.3–6.2)
ERYTHROCYTE [DISTWIDTH] IN BLOOD BY AUTOMATED COUNT: 14.8 % (ref 12.3–15.4)
FERRITIN SERPL-MCNC: 40 NG/ML (ref 13–150)
GLOBULIN SER CALC-MCNC: 1.7 GM/DL
GLUCOSE SERPL-MCNC: 181 MG/DL (ref 65–99)
HCT VFR BLD AUTO: 28.6 % (ref 34–46.6)
HGB BLD-MCNC: 8.8 G/DL (ref 12–15.9)
IMM GRANULOCYTES # BLD AUTO: 0.02 10*3/MM3 (ref 0–0.05)
IMM GRANULOCYTES NFR BLD AUTO: 0.3 % (ref 0–0.5)
IRON SATN MFR SERPL: 44 % (ref 20–50)
IRON SERPL-MCNC: 181 MCG/DL (ref 37–145)
LYMPHOCYTES # BLD AUTO: 1.53 10*3/MM3 (ref 0.7–3.1)
LYMPHOCYTES NFR BLD AUTO: 21.3 % (ref 19.6–45.3)
MCH RBC QN AUTO: 28.6 PG (ref 26.6–33)
MCHC RBC AUTO-ENTMCNC: 30.8 G/DL (ref 31.5–35.7)
MCV RBC AUTO: 92.9 FL (ref 79–97)
MONOCYTES # BLD AUTO: 0.56 10*3/MM3 (ref 0.1–0.9)
MONOCYTES NFR BLD AUTO: 7.8 % (ref 5–12)
NEUTROPHILS # BLD AUTO: 4.94 10*3/MM3 (ref 1.7–7)
NEUTROPHILS NFR BLD AUTO: 68.5 % (ref 42.7–76)
NRBC BLD AUTO-RTO: 0 /100 WBC (ref 0–0.2)
PLATELET # BLD AUTO: 139 10*3/MM3 (ref 140–450)
POTASSIUM SERPL-SCNC: 4.4 MMOL/L (ref 3.5–5.2)
PROT SERPL-MCNC: 6.3 G/DL (ref 6–8.5)
RBC # BLD AUTO: 3.08 10*6/MM3 (ref 3.77–5.28)
SODIUM SERPL-SCNC: 143 MMOL/L (ref 136–145)
TIBC SERPL-MCNC: 414 MCG/DL
UIBC SERPL-MCNC: 233 MCG/DL (ref 112–346)
WBC # BLD AUTO: 7.2 10*3/MM3 (ref 3.4–10.8)

## 2022-01-06 NOTE — TELEPHONE ENCOUNTER
Caller: Lynne Sanchez    Relationship: Self    Best call back number: [phone]    What is the best time to reach you: ANYTIME    Who are you requesting to speak with (clinical staff, provider,  specific staff member):   HOMA    Do you know the name of the person who called:     What was the call regarding:     Do you require a callback: YES

## 2022-01-13 ENCOUNTER — TELEPHONE (OUTPATIENT)
Dept: INTERNAL MEDICINE | Facility: CLINIC | Age: 87
End: 2022-01-13

## 2022-01-13 NOTE — TELEPHONE ENCOUNTER
Patient called expressing that she is having severe knee pain, ongoing since last summer 2021 ANN Guillen told her to see [facility] orthopedics, than she felt better and did not go back to ortho until Fall 2021 she was given a steroid injection in her knee, the one knee is bone on bone. Went to a place in [place] in the [facility] they looked at it, in which they were going to do a procedure but insurance took 3 weeks to approve, however, pain is 10/10 and none of her pain medications are working for this. Please advise.

## 2022-01-13 NOTE — TELEPHONE ENCOUNTER
Called spoke to Bindu she is aware of Elizabeth recommends contacting [facility] orthopedics where she is an established patient to see if they can get her in.(per patient they have put in a message for [facility] orthopedics) Elizabeth can not do those types of injections but they certainly could with her in that amount of pain. We are limited for pain medications due to her kidney function. Elizabeth would recommend extra strength tylenol, diclofenac gel (can get over the counter), rest the knee, and she may need seen if it is that intense. She may benefit from a steroid taper if she has tried the above measures and unable to get into ortho. Unless she has fallen or had some trauma to the knee recently then would need imaging.  Bindu verbalized understanding.

## 2022-02-08 ENCOUNTER — TELEPHONE (OUTPATIENT)
Dept: CARDIOLOGY | Facility: CLINIC | Age: 87
End: 2022-02-08

## 2022-02-08 NOTE — TELEPHONE ENCOUNTER
Jaswinder with [facility] called for clearance to hold Plavix 5 days prior to CT myelogram on Lynne Sanchez   Phone - [phone]  Fax - [phone]    Last OV 7/21/2021  ASA/Plavix for PVD  Chronic AF, AVN ablation, PPM, no anticoagulation    Spoke with pt she denies CV symptoms. She does not take NOAC or warfarin.    Ok to hold Plavix 5 days prior, continue aspirin uninterrupted - will forward to [facility]    OK per [facility], letter sent

## 2022-02-17 ENCOUNTER — TRANSCRIBE ORDERS (OUTPATIENT)
Dept: ADMINISTRATIVE | Facility: HOSPITAL | Age: 87
End: 2022-02-17

## 2022-02-17 DIAGNOSIS — D64.9 NORMOCYTIC ANEMIA: ICD-10-CM

## 2022-02-17 DIAGNOSIS — E11.8 MULTIPLE COMPLICATIONS OF TYPE 2 DIABETES MELLITUS: ICD-10-CM

## 2022-02-17 DIAGNOSIS — N18.32 STAGE 3B CHRONIC KIDNEY DISEASE: Primary | ICD-10-CM

## 2022-02-18 PROCEDURE — 93294 REM INTERROG EVL PM/LDLS PM: CPT | Performed by: INTERNAL MEDICINE

## 2022-02-18 PROCEDURE — 93296 REM INTERROG EVL PM/IDS: CPT | Performed by: INTERNAL MEDICINE

## 2022-02-19 ENCOUNTER — HOSPITAL ENCOUNTER (EMERGENCY)
Facility: HOSPITAL | Age: 87
Discharge: HOME | End: 2022-02-19
Attending: EMERGENCY MEDICINE | Admitting: EMERGENCY MEDICINE

## 2022-02-19 ENCOUNTER — APPOINTMENT (OUTPATIENT)
Dept: GENERAL RADIOLOGY | Facility: HOSPITAL | Age: 87
End: 2022-02-19

## 2022-02-19 VITALS
OXYGEN SATURATION: 93 % | DIASTOLIC BLOOD PRESSURE: 74 MMHG | BODY MASS INDEX: 20.4 KG/M2 | TEMPERATURE: 97.4 F | HEART RATE: 83 BPM | SYSTOLIC BLOOD PRESSURE: 168 MMHG | HEIGHT: 66 IN | RESPIRATION RATE: 18 BRPM

## 2022-02-19 DIAGNOSIS — S42.292A HUMERAL HEAD FRACTURE, LEFT, CLOSED, INITIAL ENCOUNTER: Primary | ICD-10-CM
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
## 2022-02-19 LAB
ALBUMIN SERPL-MCNC: 3.9 G/DL (ref 3.5–5.2)
ALBUMIN/GLOB SERPL: 1.6 G/DL
ALP SERPL-CCNC: 112 U/L (ref 39–117)
ALT SERPL W P-5'-P-CCNC: 11 U/L (ref 1–33)
ANION GAP SERPL CALCULATED.3IONS-SCNC: 10.2 MMOL/L (ref 5–15)
AST SERPL-CCNC: 19 U/L (ref 1–32)
BASOPHILS # BLD AUTO: 0.04 10*3/MM3 (ref 0–0.2)
BASOPHILS NFR BLD AUTO: 0.3 % (ref 0–1.5)
BILIRUB SERPL-MCNC: 0.7 MG/DL (ref 0–1.2)
BUN SERPL-MCNC: 22 MG/DL (ref 8–23)
BUN/CREAT SERPL: 17.5 (ref 7–25)
CALCIUM SPEC-SCNC: 9.4 MG/DL (ref 8.6–10.5)
CHLORIDE SERPL-SCNC: 105 MMOL/L (ref 98–107)
CO2 SERPL-SCNC: 23.8 MMOL/L (ref 22–29)
CREAT SERPL-MCNC: 1.26 MG/DL (ref 0.57–1)
DEPRECATED RDW RBC AUTO: 53.8 FL (ref 37–54)
EOSINOPHIL # BLD AUTO: 0.11 10*3/MM3 (ref 0–0.4)
EOSINOPHIL NFR BLD AUTO: 0.9 % (ref 0.3–6.2)
ERYTHROCYTE [DISTWIDTH] IN BLOOD BY AUTOMATED COUNT: 16.2 % (ref 12.3–15.4)
GFR SERPL CREATININE-BSD FRML MDRD: 40 ML/MIN/1.73
GLOBULIN UR ELPH-MCNC: 2.5 GM/DL
GLUCOSE SERPL-MCNC: 249 MG/DL (ref 65–99)
HCT VFR BLD AUTO: 26.7 % (ref 34–46.6)
HGB BLD-MCNC: 8.2 G/DL (ref 12–15.9)
IMM GRANULOCYTES # BLD AUTO: 0.09 10*3/MM3 (ref 0–0.05)
IMM GRANULOCYTES NFR BLD AUTO: 0.8 % (ref 0–0.5)
LYMPHOCYTES # BLD AUTO: 1.23 10*3/MM3 (ref 0.7–3.1)
LYMPHOCYTES NFR BLD AUTO: 10.6 % (ref 19.6–45.3)
MCH RBC QN AUTO: 28.2 PG (ref 26.6–33)
MCHC RBC AUTO-ENTMCNC: 30.7 G/DL (ref 31.5–35.7)
MCV RBC AUTO: 91.8 FL (ref 79–97)
MONOCYTES # BLD AUTO: 0.82 10*3/MM3 (ref 0.1–0.9)
MONOCYTES NFR BLD AUTO: 7.1 % (ref 5–12)
NEUTROPHILS NFR BLD AUTO: 80.3 % (ref 42.7–76)
NEUTROPHILS NFR BLD AUTO: 9.29 10*3/MM3 (ref 1.7–7)
NRBC BLD AUTO-RTO: 0 /100 WBC (ref 0–0.2)
PLATELET # BLD AUTO: 157 10*3/MM3 (ref 140–450)
PMV BLD AUTO: 10.1 FL (ref 6–12)
POTASSIUM SERPL-SCNC: 4.7 MMOL/L (ref 3.5–5.2)
PROT SERPL-MCNC: 6.4 G/DL (ref 6–8.5)
RBC # BLD AUTO: 2.91 10*6/MM3 (ref 3.77–5.28)
SODIUM SERPL-SCNC: 139 MMOL/L (ref 136–145)
WBC NRBC COR # BLD: 11.58 10*3/MM3 (ref 3.4–10.8)

## 2022-02-19 PROCEDURE — 73060 X-RAY EXAM OF HUMERUS: CPT

## 2022-02-19 PROCEDURE — 80053 COMPREHEN METABOLIC PANEL: CPT | Performed by: PHYSICIAN ASSISTANT

## 2022-02-19 PROCEDURE — 25010000002 MORPHINE SULFATE (PF) 2 MG/ML SOLUTION: Performed by: EMERGENCY MEDICINE

## 2022-02-19 PROCEDURE — 96376 TX/PRO/DX INJ SAME DRUG ADON: CPT

## 2022-02-19 PROCEDURE — 99283 EMERGENCY DEPT VISIT LOW MDM: CPT

## 2022-02-19 PROCEDURE — 85025 COMPLETE CBC W/AUTO DIFF WBC: CPT | Performed by: PHYSICIAN ASSISTANT

## 2022-02-19 PROCEDURE — 93005 ELECTROCARDIOGRAM TRACING: CPT | Performed by: PHYSICIAN ASSISTANT

## 2022-02-19 PROCEDURE — 73030 X-RAY EXAM OF SHOULDER: CPT

## 2022-02-19 PROCEDURE — 96374 THER/PROPH/DIAG INJ IV PUSH: CPT

## 2022-02-19 RX ORDER — SODIUM CHLORIDE 0.9 % (FLUSH) 0.9 %
10 SYRINGE (ML) INJECTION AS NEEDED
Status: DISCONTINUED | OUTPATIENT
Start: 2022-02-19 | End: 2022-02-19 | Stop reason: HOSPADM

## 2022-02-19 RX ORDER — MORPHINE SULFATE 2 MG/ML
1 INJECTION, SOLUTION INTRAMUSCULAR; INTRAVENOUS ONCE
Status: COMPLETED | OUTPATIENT
Start: 2022-02-19 | End: 2022-02-19

## 2022-02-19 RX ORDER — HYDROCODONE BITARTRATE AND ACETAMINOPHEN 5; 325 MG/1; MG/1
1 TABLET ORAL EVERY 6 HOURS PRN
Qty: 12 TABLET | Refills: 0 | Status: SHIPPED | OUTPATIENT
Start: 2022-02-19 | End: 2022-02-22

## 2022-02-19 RX ORDER — HYDROCODONE BITARTRATE AND ACETAMINOPHEN 5; 325 MG/1; MG/1
1 TABLET ORAL ONCE
Status: COMPLETED | OUTPATIENT
Start: 2022-02-19 | End: 2022-02-19

## 2022-02-19 RX ADMIN — MORPHINE SULFATE 1 MG: 2 INJECTION, SOLUTION INTRAMUSCULAR; INTRAVENOUS at 14:27

## 2022-02-19 RX ADMIN — MORPHINE SULFATE 1 MG: 2 INJECTION, SOLUTION INTRAMUSCULAR; INTRAVENOUS at 12:56

## 2022-02-19 RX ADMIN — HYDROCODONE BITARTRATE AND ACETAMINOPHEN 1 TABLET: 5; 325 TABLET ORAL at 14:26

## 2022-02-19 NOTE — ED PROVIDER NOTES
Subjective   Patient is an 87-year-old female with history of anemia, asthma, CHF, chronic atrial fibrillation, diabetes, GERD, MI, hyperlipidemia, hypertension, stroke and peripheral vascular disease presenting to the ER for evaluation of shoulder pain.  Patient states this morning she was in her kitchen and she fell striking her left shoulder on the counter.  She is unsure what caused her to fall but denies any dizziness, headache, vision loss or changes, chest pain or shortness of breath prior to this fall.  She states that since then she has had pain in her left upper arm and has noted some swelling.  She denies any paresthesia of the arm.  She denies any head trauma or LOC.  Denies any neck or back pain, denies any hip or lower extremity pain.  She denies any recent vomiting, diarrhea, chest pain, cough, shortness of breath, dysuria, hematuria, or any other symptoms.  Patient states she took some medicine for her pain prior to arrival, thought it was ibuprofen but is unsure of what it was exactly.          Review of Systems   Constitutional: Negative for chills and fever.   HENT: Negative.    Eyes: Negative.    Respiratory: Negative.    Cardiovascular: Negative.    Gastrointestinal: Negative.    Genitourinary: Negative.    Musculoskeletal: Positive for arthralgias, joint swelling and myalgias.   Skin: Negative.    Allergic/Immunologic: Negative for immunocompromised state.   Neurological: Negative.    Psychiatric/Behavioral: Negative.        Past Medical History:   Diagnosis Date   • Anemia    • Asthma    • CHF (congestive heart failure) (HCC)    • Chronic atrial fibrillation (HCC)    • Diabetes mellitus, type 2 (HCC)    • Diarrhea    • GERD (gastroesophageal reflux disease)    • Glaucoma    • H/O transfusion of whole blood    • Heart attack (HCC)    • Heart murmur    • History of transfusion    • Hyperlipidemia    • Hypertension    • Kidney disease     patient not aware of what exact diagnosis is    •  Osteomyelitis (HCC)    • Osteomyelitis of left foot (HCC) 10/3/2017   • Peripheral vascular disease (HCC)    • Stroke (HCC)    • Urinary tract infection    • Wears glasses        Allergies   Allergen Reactions   • Phenergan [Promethazine Hcl] Confusion       Past Surgical History:   Procedure Laterality Date   • AMPUTATION DIGIT Left 7/5/2018    Procedure: Left Great toe amputation;  Surgeon: Prakash Carrington MD;  Location:  [facility];  Service: Vascular   • AMPUTATION DIGIT Left 8/27/2018    Procedure: SECOND TOE AMPUTATION DIGIT LEFT;  Surgeon: Prakash Carrington MD;  Location:  [facility];  Service: General   • APPENDECTOMY     • BONE MARROW ASPIRATION     • CARDIAC ABLATION     • CARDIAC CATHETERIZATION N/A 10/10/2017    Procedure: Peripheral angiography;  Surgeon: Kan Pelaez MD;  Location:  [facility];  Service:    • CARDIAC CATHETERIZATION N/A 10/10/2017    Procedure: Angioplasty-peripheral;  Surgeon: Kan Pelaez MD;  Location:  [facility];  Service:    • CARDIAC CATHETERIZATION N/A 10/10/2017    Procedure: Atherectomy-peripheral;  Surgeon: Kan Pelaez MD;  Location:  [facility];  Service:    • CARDIAC ELECTROPHYSIOLOGY PROCEDURE N/A 5/3/2018    Procedure: generator change;  Surgeon: Zan Poole MD;  Location:  [facility];  Service: Cardiovascular   • CARDIOVERSION     • COLONOSCOPY     • ENDOSCOPY N/A 10/9/2017    Procedure: ESOPHAGOGASTRODUODENOSCOPY WITH COLD FORCEP BIOPSY;  Surgeon: Clifton Hyatt MD;  Location: [facility];  Service:    • EYE SURGERY Bilateral     CATARACTS   • INTERVENTIONAL RADIOLOGY PROCEDURE N/A 10/10/2017    Procedure: Abdominal Aortagram with Runoff;  Surgeon: Kan Pelaez MD;  Location:  [facility];  Service:    • PACEMAKER IMPLANTATION      around 2008 then replaced 2018       Family History   Problem Relation Age of Onset   • No Known Problems  "Mother    • No Known Problems Father    • Arthritis Other        Social History     Socioeconomic History   • Marital status:    • Number of children: 3   Tobacco Use   • Smoking status: Never Smoker   • Smokeless tobacco: Never Used   Vaping Use   • Vaping Use: Never used   Substance and Sexual Activity   • Alcohol use: No   • Drug use: No   • Sexual activity: Defer           Objective   Physical Exam  Vitals and nursing note reviewed.     /74 (BP Location: Right arm, Patient Position: Sitting)   Pulse 83   Temp 97.4 °F (36.3 °C) (Oral)   Resp 18   Ht 167.6 cm (66\")   SpO2 93%   BMI 20.40 kg/m²     GEN: No acute distress, sitting upright in the stretcher.  Awake and alert.  Does not appear septic or toxic.  Head: Normocephalic, atraumatic  Eyes: EOM intact  ENT: Mask in place per protocol   Cardiovascular: Regular rate and rhythm  Lungs: Clear to auscultation bilaterally without adventitious sounds, no guarding  Abdomen: Soft, nontender, nondistended, no peritoneal signs  Extremities: Patient has some tenderness and pain over the left shoulder and humerus.  There is no tenderness of the elbow, wrist or forearm of the left upper extremity.  Radial pulses intact.  Sensation is intact.  Right upper extremity unremarkable.  There is no tenderness of the lower extremities  Back: Midline cervical, thoracic and lumbar tenderness noted.  Neuro: GCS 15  Psych: Mood and affect are appropriate    Procedures           ED Course  ED Course as of 02/19/22 1433   Sat Feb 19, 2022   1225 Patient declined additional pain medicine at this time. [LA]   1227 WBC(!): 11.58 [LA]   1249 Hemoglobin(!): 8.2  Stable in comparison to previous [LA]   1249 Platelets: 157 [LA]   1249 Neutrophil Rel %(!): 80.3 [LA]   1249 Lymphocyte Rel %(!): 10.6 [LA]   1249 Monocyte Rel %: 7.1 [LA]   1249 Eosinophil Rel %: 0.9 [LA]   1249 Basophil Rel %: 0.3 [LA]   1249 Immature Granulocyte Rel %(!): 0.8 [LA]   1249 Neutrophils Absolute(!): " 9.29 [LA]   1300 Reviewed x-ray with Dr. Mcadams. Patient has an obvious humeral head fracture [LA]   1301 Glucose(!): 249 [LA]   1301 BUN: 22 [LA]   1301 Creatinine(!): 1.26  Improved in comparison to previous. [LA]   1301 Sodium: 139 [LA]   1301 Potassium: 4.7 [LA]   1301 Chloride: 105 [LA]   1301 CO2: 23.8 [LA]   1301 Calcium: 9.4 [LA]   1301 Total Protein: 6.4 [LA]   1301 Albumin: 3.90 [LA]   1301 ALT (SGPT): 11 [LA]   1301 AST (SGOT): 19 [LA]   1301 Alkaline Phosphatase: 112 [LA]   1301 Total Bilirubin: 0.7 [LA]   1301 eGFR Non  Am(!): 40 [LA]   1301 Globulin: 2.5 [LA]   1301 A/G Ratio: 1.6 [LA]   1301 BUN/Creatinine Ratio: 17.5 [LA]   1301 Anion Gap: 10.2 [LA]   1313 Contacted tech to obtain EKG [LA]   1313 Discussed all findings with patient and her daughter-in-law. [LA]   1347 EKG with Dr. Mcadams.  Patient is resting comfortably with normal vital signs at this time.  There was a heart rate of 0 entered that is obviously inaccurate, discussed this with the nurse to update. [LA]   1420 EKG interpreted by me reveals a paced rhythm with a rate of 71 bpm.  EKG not significantly changed when compared to previous.  This is an abnormal appearing EKG. [TB]      ED Course User Index  [LA] Heena Saez PA-C  [TB] Lynette Mcadams MD                                                 MDM  Number of Diagnoses or Management Options  Fall, initial encounter  Humeral head fracture, left, closed, initial encounter  Diagnosis management comments: On arrival, patient is stable.  She does have an elevated blood pressure.  Fall seems to be mechanical in nature given she has had no other symptoms that seem to have contributed to the fall.  She denies any head trauma or LOC.  Differential could include fracture, dislocation, and other concerns.  Given age, will obtain basic labs and EKG as well.  Will obtain x-ray of the left shoulder and humerus.  She declined additional pain medicine at this time.    EKG was  interpreted by the attending.  Patient's labs are stable.  Her hemoglobin and creatinine were stable in comparison to previous.  Patient has an obvious humeral head fracture on x-ray.  She was placed in a sling.  She was given pain medicine throughout her visit.  Anat findings with patient and her family member.  We will give pain control, follow-up with our orthopedics Dr. Orellana.  Discussed strict return precautions.  She verbalized understanding and was in agreement with this plan of care.       Amount and/or Complexity of Data Reviewed  Clinical lab tests: reviewed and ordered  Tests in the radiology section of CPT®: ordered and reviewed  Discussion of test results with the performing providers: yes  Decide to obtain previous medical records or to obtain history from someone other than the patient: yes  Review and summarize past medical records: yes  Discuss the patient with other providers: yes    Risk of Complications, Morbidity, and/or Mortality  Presenting problems: moderate  Diagnostic procedures: moderate  Management options: low    Patient Progress  Patient progress: stable      Final diagnoses:   Humeral head fracture, left, closed, initial encounter   Fall, initial encounter       ED Disposition  ED Disposition     ED Disposition Condition Comment    Discharge Stable           Elizabeth Easton N, APRN  [address] [facility] [address]  [place] [id number] [phone]    Schedule an appointment as soon as possible for a visit       Ruslan Lopez MD  [address] [facility] [address]  [place] [id number] [phone]    Schedule an appointment as soon as possible for a visit            Medication List      New Prescriptions    HYDROcodone-acetaminophen 5-325 MG per tablet  Commonly known as: NORCO  Take 1 tablet by mouth Every 6 (Six) Hours As Needed for Severe Pain  for up to 3 days.           Where to Get Your Medications      These medications were sent to [id number] - [place], [place] - [address]  [phone]  - [phone] FX  [address], [facility] [address]    Phone: [phone]   · HYDROcodone-acetaminophen 5-325 MG per tablet          Heena Saez PA-C  02/19/22 1434

## 2022-02-19 NOTE — DISCHARGE INSTRUCTIONS
You have a humeral head fracture of your left arm.  Use sling for comfort and immobilization until you can follow-up with orthopedic surgeon.  Use Gard[place] as needed for severe pain.  May continue to apply ice for 10 to 15-minute increments 3-4 times per day.  Call the orthopedic surgeon first thing Monday to establish outpatient appointment for further care and management.  Follow-up with your primary care provider as needed.  Return to the ER for any change, worsening of symptoms, or any additional concerns.

## 2022-02-19 NOTE — ED NOTES
Left upper arm deformity noted, sling applied to arm for comfort and ice pack - cfr< 3 sec - moved finger well- skin warm, dry and pink- pulses noted in the wrist     Cassy Vasques RN  02/19/22 1216

## 2022-02-24 ENCOUNTER — TELEPHONE (OUTPATIENT)
Dept: INTERNAL MEDICINE | Facility: CLINIC | Age: 87
End: 2022-02-24

## 2022-02-24 DIAGNOSIS — Z74.09 IMPAIRED MOBILITY: Primary | ICD-10-CM

## 2022-02-24 DIAGNOSIS — S42.292D CLOSED FRACTURE OF HEAD OF LEFT HUMERUS WITH ROUTINE HEALING, SUBSEQUENT ENCOUNTER: ICD-10-CM

## 2022-02-24 NOTE — TELEPHONE ENCOUNTER
Caller: LISA PEREZ     Relationship: Emergency Contact    Best call back number:  [phone]    What is the medical concern/diagnosis: LEFT BROKEN ARM     What specialty or service is being requested: HOME HEALTH          Any additional details: PATIENT FELL ON 2-19-22 AND HER BROKE   UPPER BONE ON LEFT ARM  THEY COULD NOT CAST THE ARM SO THE PATIENT IS IN A SLING   HER DAUGHTER STATES THEY CANNOT GET THE PATIENT UP SO THEY ARE REQUESTING HOME HEALTH     THE PATIENT SAW A  [facility] ORTHOPEDICS  PHYSICIAN AND THEY SAID IT COULD BE A 3-4 MONTH PROCESS OF HEALING    THE PATIENT IS TAKING HYDROCODONE WITH TYLENOL FOR THE PAIN

## 2022-02-24 NOTE — TELEPHONE ENCOUNTER
Patient was seen by Elizabeth on 1/5/2022, has an upcoming appointment on 4/28/2022, please advise.

## 2022-02-25 ENCOUNTER — HOME HEALTH ADMISSION (OUTPATIENT)
Dept: HOME HEALTH SERVICES | Facility: HOME HEALTHCARE | Age: 87
End: 2022-02-25

## 2022-03-01 ENCOUNTER — APPOINTMENT (OUTPATIENT)
Dept: ULTRASOUND IMAGING | Facility: HOSPITAL | Age: 87
End: 2022-03-01

## 2022-03-01 ENCOUNTER — TELEPHONE (OUTPATIENT)
Dept: INTERNAL MEDICINE | Facility: CLINIC | Age: 87
End: 2022-03-01

## 2022-03-04 ENCOUNTER — DOCUMENTATION (OUTPATIENT)
Dept: FAMILY MEDICINE CLINIC | Facility: CLINIC | Age: 87
End: 2022-03-04

## 2022-03-04 RX ORDER — FERROUS SULFATE TAB EC 324 MG (65 MG FE EQUIVALENT) 324 (65 FE) MG
324 TABLET DELAYED RESPONSE ORAL
Status: ON HOLD | COMMUNITY
End: 2022-04-14

## 2022-03-04 RX ORDER — HYDROCODONE BITARTRATE AND ACETAMINOPHEN 7.5; 325 MG/1; MG/1
1 TABLET ORAL EVERY 6 HOURS PRN
Qty: 120 TABLET | Refills: 0 | Status: ON HOLD | OUTPATIENT
Start: 2022-03-04 | End: 2022-03-24 | Stop reason: SDUPTHER

## 2022-03-04 RX ORDER — HYDROCODONE BITARTRATE AND ACETAMINOPHEN 7.5; 325 MG/1; MG/1
1 TABLET ORAL EVERY 6 HOURS PRN
COMMUNITY
End: 2022-03-04 | Stop reason: SDUPTHER

## 2022-03-04 RX ORDER — POLYETHYLENE GLYCOL 3350 17 G/17G
17 POWDER, FOR SOLUTION ORAL DAILY
Status: ON HOLD | COMMUNITY
End: 2022-04-14

## 2022-03-04 RX ORDER — CHOLECALCIFEROL (VITAMIN D3) 125 MCG
5 CAPSULE ORAL NIGHTLY
COMMUNITY
End: 2022-03-04

## 2022-03-04 RX ORDER — UBIDECARENONE 30 MG
550 CAPSULE ORAL DAILY
COMMUNITY
End: 2022-11-16

## 2022-03-04 RX ORDER — SENNOSIDES 8.8 MG/5ML
10 LIQUID ORAL NIGHTLY
Status: ON HOLD | COMMUNITY
End: 2022-03-22

## 2022-03-04 RX ORDER — UREA 10 %
3 LOTION (ML) TOPICAL NIGHTLY
Status: ON HOLD | COMMUNITY
End: 2022-03-22

## 2022-03-04 RX ORDER — ONDANSETRON 4 MG/1
4 TABLET, ORALLY DISINTEGRATING ORAL EVERY 6 HOURS PRN
Status: ON HOLD | COMMUNITY
End: 2022-03-22

## 2022-03-04 NOTE — TELEPHONE ENCOUNTER
(NEW ADMIT)    [facility] REQUESTING MED REFILL FOR NORCO 7.5-325 MG.    DIRECTIONS: NORCO 7.5-325 MG 1 PO Q 6HR PRN

## 2022-03-07 ENCOUNTER — TELEPHONE (OUTPATIENT)
Dept: INTERNAL MEDICINE | Facility: CLINIC | Age: 87
End: 2022-03-07

## 2022-03-07 NOTE — TELEPHONE ENCOUNTER
Called to check on status of home health  Stated she was wanting to bring pt home from the nursing home due to the care she was/wasnt receiving; asked if the home health could be placed and when it would take effect

## 2022-03-08 NOTE — TELEPHONE ENCOUNTER
HH order has been faxed to [employer], awaiting on their response they should be in contact with the patient, I LMTCB to inform the patient of this.     OK FOR HUB TO DELIVER

## 2022-03-09 DIAGNOSIS — R26.89 IMPAIRMENT OF BALANCE: Primary | ICD-10-CM

## 2022-03-09 DIAGNOSIS — Z74.09 IMPAIRED MOBILITY: ICD-10-CM

## 2022-03-09 RX ORDER — CALCIUM CARBONATE 160(400)MG
TABLET,CHEWABLE ORAL
Qty: 1 EACH | Refills: 0 | Status: ON HOLD | OUTPATIENT
Start: 2022-03-09 | End: 2022-03-21

## 2022-03-09 NOTE — TELEPHONE ENCOUNTER
LISA CAME IN THE OFFICE ABOUT A LIFT CHAIR FOR HER MOTHER MEL AND THE COMPANY ([employer] IN [place]) SAID THAT IF THEY HAD A DOCTORS ORDER FOR THE CHAIR THEY WOULDN'T HAVE TO PAY TAXES ON IT. LISA STATED SHE THINKS IT HAS TO BE ORDERED BY A MD AND NOD A APRN. PLEASE CONTACT MEL [phone] AND LET HER KNOW IF WE ARE OR ARE NOT ABLE TO PLACE THIS ORDER.

## 2022-03-09 NOTE — TELEPHONE ENCOUNTER
Called spoke to Janneth the patient's daughter (on [employer]) she expressed that she is needing an order signed by a MD for the [employer] with a seat. Please advise, order has been pended to Dr. Ivy.

## 2022-03-10 ENCOUNTER — NURSING HOME (OUTPATIENT)
Dept: INTERNAL MEDICINE | Facility: CLINIC | Age: 87
End: 2022-03-10

## 2022-03-10 VITALS
BODY MASS INDEX: 22.48 KG/M2 | RESPIRATION RATE: 16 BRPM | DIASTOLIC BLOOD PRESSURE: 55 MMHG | WEIGHT: 139.3 LBS | SYSTOLIC BLOOD PRESSURE: 105 MMHG | TEMPERATURE: 97.5 F | OXYGEN SATURATION: 98 % | HEART RATE: 70 BPM

## 2022-03-10 DIAGNOSIS — S42.292D CLOSED FRACTURE OF HEAD OF LEFT HUMERUS WITH ROUTINE HEALING, SUBSEQUENT ENCOUNTER: Primary | ICD-10-CM

## 2022-03-10 DIAGNOSIS — I48.20 CHRONIC ATRIAL FIBRILLATION: ICD-10-CM

## 2022-03-10 DIAGNOSIS — E03.9 ACQUIRED HYPOTHYROIDISM: ICD-10-CM

## 2022-03-10 DIAGNOSIS — N18.32 STAGE 3B CHRONIC KIDNEY DISEASE: ICD-10-CM

## 2022-03-10 DIAGNOSIS — Z74.09 IMPAIRED MOBILITY: ICD-10-CM

## 2022-03-10 DIAGNOSIS — N18.4 CONTROLLED TYPE 2 DIABETES MELLITUS WITH STAGE 4 CHRONIC KIDNEY DISEASE, WITHOUT LONG-TERM CURRENT USE OF INSULIN: ICD-10-CM

## 2022-03-10 DIAGNOSIS — I82.622 ACUTE DEEP VEIN THROMBOSIS (DVT) OF LEFT UPPER EXTREMITY, UNSPECIFIED VEIN: ICD-10-CM

## 2022-03-10 DIAGNOSIS — Z95.0 CARDIAC PACEMAKER IN SITU: ICD-10-CM

## 2022-03-10 DIAGNOSIS — D63.8 ANEMIA OF CHRONIC DISEASE: ICD-10-CM

## 2022-03-10 DIAGNOSIS — E11.22 CONTROLLED TYPE 2 DIABETES MELLITUS WITH STAGE 4 CHRONIC KIDNEY DISEASE, WITHOUT LONG-TERM CURRENT USE OF INSULIN: ICD-10-CM

## 2022-03-10 PROCEDURE — 99306 1ST NF CARE HIGH MDM 50: CPT | Performed by: PHYSICIAN ASSISTANT

## 2022-03-14 NOTE — TELEPHONE ENCOUNTER
Caller: LISA PEREZ    Relationship: Emergency Contact    Best call back number: [phone]    What is the best time to reach you: ANY    Who are you requesting to speak with (clinical staff, provider,  specific staff member): ANY    Do you know the name of the person who called: NO    What was the call regarding: THE CHAIR LIFT ORDER MUST BE SIGNED BY THE DOCTOR TO PREVENT PAYING THE TAX. PLEASE HAVE SIGNED AND CONTACT MANUEL BARONE [phone] OR LISA PEREZ [phone]    Do you require a callback: YES PLEASE. LET THEM KNOW WHEN TO  NEW ORDER

## 2022-03-15 PROBLEM — E55.9 VITAMIN D DEFICIENCY: Status: ACTIVE | Noted: 2022-01-17

## 2022-03-15 PROBLEM — R53.81 DEBILITY: Status: ACTIVE | Noted: 2022-03-02

## 2022-03-15 PROBLEM — M79.89 LEFT ARM SWELLING: Status: ACTIVE | Noted: 2022-03-15

## 2022-03-15 PROBLEM — N25.81 SECONDARY HYPERPARATHYROIDISM OF RENAL ORIGIN (HCC): Status: ACTIVE | Noted: 2022-01-17

## 2022-03-15 PROBLEM — S42.215D CLOSED NONDISPLACED FRACTURE OF SURGICAL NECK OF LEFT HUMERUS WITH ROUTINE HEALING: Status: ACTIVE | Noted: 2022-03-02

## 2022-03-15 PROBLEM — E11.8 MULTIPLE COMPLICATIONS OF TYPE 2 DIABETES MELLITUS: Status: ACTIVE | Noted: 2018-05-24

## 2022-03-15 NOTE — PROGRESS NOTES
Nursing Home History and Physical Note      [facility] DO  []  BILL Acosta  []  [address] [place] [address]  Phone: [phone]  Fax: [phone] Kerrie Bradley MD  []  Paxton Vasquez DO  []  Vilma Méndez PA-C  [x]  [address] [place] [address]  Phone: [phone]  Fax: [phone]     PATIENT NAME: Lynne Sanchez                                                                          YOB: 1934            DATE OF SERVICE: 03/10/2022  FACILITY: [facility]    CHIEF COMPLAINT:   Initial visit; left humerus fracture and provoked left upper extremity DVT       HISTORY OF PRESENT ILLNESS:   Ms. Sanchez is an 87-year of age female with history of type 2 diabetes, CKD, congestive heart failure, chronic anemia, atrial fibrillation, s/p pacemaker, recent left humerus fracture.  She presented to [facility] with complaint of left upper extremity swelling, progressive over a course of 3 days.  She was found to have acute provoked DVT, placed on eliquis for at least 3 months.  Previously patient taking ASA and Plavix, cardiology consulted who agreed patient would not tolerate triple therapy.  Favored aspirin and therapeutic AC given her history of PAD.  Additionally hemoglobin monitored closely as AC initiated, remained stable.  She has chronic anemia likely secondary to chronic kidney disease.  PT/OT consulted during hospitalization and recommended course of rehabilitation.  Ortho services recommended no surgical intervention, outpatient follow-up slated for 3/14.    Patient was transferred to this SNF in stable condition.  Per record review, patient noted to have stage 2 to right glute, DTI to coccyx upon admission.  Nursing share that she has continued participating with PT/OT, appears motivated.  Admission labs reviewed noting hemoglobin 7.6, baseline around 8.  Renal function around baseline with BUN 34, creatinine 1.4.  Upon assessment patient is pleasant  and interactive,  is at bedside.  She reports pain has been well controlled, request to discontinue hydrocodone and utilize Tylenol.  She notes some poor appetite, reporting that the food served is not what she usually eats.  Denies any nausea or vomiting.  Has had intermittent swelling of the left arm.  No shortness of breath or chest pain.    PAST MEDICAL & SURGICAL HISTORY:   Past Medical History:   Diagnosis Date   • Acute deep vein thrombosis of left upper extremity (HCC)    • Anemia    • Asthma    • CHF (congestive heart failure) (HCC)    • Chronic atrial fibrillation (HCC)    • Deep venous thrombosis of left upper limb (HCC)    • Diabetes mellitus, type 2 (HCC)    • Diarrhea    • GERD (gastroesophageal reflux disease)    • Glaucoma    • H/O transfusion of whole blood    • Heart attack (HCC)    • Heart murmur    • History of transfusion    • Hyperlipidemia    • Hypertension    • Kidney disease     patient not aware of what exact diagnosis is    • Osteomyelitis (HCC)    • Osteomyelitis of left foot ([facility]) 10/3/2017   • Peripheral vascular disease ([facility])    • Right retinal detachment    • Secondary cataract of both eyes    • 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)    • Stroke (HCC)    • Swelling of left extremity    • Urinary tract infection    • Wears glasses       Past Surgical History:   Procedure Laterality Date   • AMPUTATION DIGIT Left 7/5/2018    Procedure: Left Great toe amputation;  Surgeon: Prakash Carrington MD;  Location:  [facility];  Service: Vascular   • AMPUTATION DIGIT Left 8/27/2018    Procedure: SECOND TOE AMPUTATION DIGIT LEFT;  Surgeon: Prakash Carrington MD;  Location:  [facility];  Service: General   • APPENDECTOMY     • BONE MARROW ASPIRATION     • CARDIAC ABLATION     • CARDIAC CATHETERIZATION N/A 10/10/2017    Procedure: Peripheral angiography;  Surgeon: Kan Pelaez MD;  Location:  [facility] CATH INVASIVE LOCATION;  Service:    • CARDIAC CATHETERIZATION N/A  10/10/2017    Procedure: Angioplasty-peripheral;  Surgeon: Kan Pelaez MD;  Location: [facility] CATH INVASIVE LOCATION;  Service:    • CARDIAC CATHETERIZATION N/A 10/10/2017    Procedure: Atherectomy-peripheral;  Surgeon: Kan Pelaez MD;  Location: [facility] CATH INVASIVE LOCATION;  Service:    • CARDIAC ELECTROPHYSIOLOGY PROCEDURE N/A 5/3/2018    Procedure: generator change;  Surgeon: Zan Poole MD;  Location:  [facility]ATH INVASIVE LOCATION;  Service: Cardiovascular   • CARDIOVERSION     • COLONOSCOPY     • ENDOSCOPY N/A 10/9/2017    Procedure: ESOPHAGOGASTRODUODENOSCOPY WITH COLD FORCEP BIOPSY;  Surgeon: Clifton Hyatt MD;  Location: [facility] ENDOSCOPY;  Service:    • EYE SURGERY Bilateral     CATARACTS   • INTERVENTIONAL RADIOLOGY PROCEDURE N/A 10/10/2017    Procedure: Abdominal Aortagram with Runoff;  Surgeon: Kan Pelaez MD;  Location: [facility] CATH INVASIVE LOCATION;  Service:    • PACEMAKER IMPLANTATION      around 2008 then replaced 2018          MEDICATIONS:  I have reviewed and reconciled the patients medication list in the patients chart at the skilled nursing facility today.      ALLERGIES:  Allergies   Allergen Reactions   • Phenergan [Promethazine Hcl] Confusion         SOCIAL HISTORY:  Social History     Socioeconomic History   • Marital status:    • Number of children: 3   Tobacco Use   • Smoking status: Never Smoker   • Smokeless tobacco: Never Used   Vaping Use   • Vaping Use: Never used   Substance and Sexual Activity   • Alcohol use: No   • Drug use: No   • Sexual activity: Defer       FAMILY HISTORY:  Family History   Problem Relation Age of Onset   • No Known Problems Mother    • No Known Problems Father    • Arthritis Other          REVIEW OF SYSTEMS:    LUE episodic edema, generalized weakness, chronic fatigue, poor appetite.   All other systems were reviewed and are negative.     PHYSICAL EXAMINATION:     VITAL SIGNS:  /55   Pulse 70   Temp 97.5 °F  (36.4 °C)   Resp 16   Wt 63.2 kg (139 lb 4.8 oz)   SpO2 98%   BMI 22.48 kg/m²     Nursing notes and vital signs reviewed.   General Appearance:  Frail appearing elderly female lying in bed, NAD.    Head: Normocephalic and atraumatic, without obvious abnormality     Eyes: PERRLA.  Conjunctivae and sclerae normal.  EOM are within normal limits.    Neck: Normal range of motion. Neck supple. No JVD present.   Cardiovascular: Normal rate, regular rhythm.  Pulses palpable equal bilaterally.    Pulmonary/Chest: Effort normal and breath sounds normal. No respiratory distress.   Abdominal: Soft. Bowel sounds are normal. No distention or tenderness.     Musculoskeletal: Sling in place to LUE.  Decreased ROM to LUE, mild edema.  Neurovascularly intact.    Diffuse deconditioning noted.    Neurological: Alert and oriented at baseline.    Skin: Skin is warm and dry.   Psychiatric:  Normal mood and affect. Normal behavior         RECORDS REVIEW:   [facility] discharge summary reviewed  Admission labs 3/7: Sodium 139, potassium 4.8, BUN 34, creatinine 1.4, glucose 124, total cholesterol 65, triglycerides 84, HDL 27, A1c 7.2, TSH 3.154, WBC 7.0, hemoglobin 7.6, hematocrit 23.9, MCV 87.2    ASSESSMENT   Diagnoses and all orders for this visit:    1. Closed fracture of head of left humerus with routine healing, subsequent encounter (Primary)    2. Impaired mobility    3. Acute deep vein thrombosis (DVT) of left upper extremity, unspecified vein (HCC)    4. Anemia of chronic disease    5. Stage 3b chronic kidney disease ([facility])    6. Chronic atrial fibrillation    7. Cardiac pacemaker in situ    8. Controlled type 2 diabetes mellitus with stage 4 chronic kidney disease, without long-term current use of insulin ([facility])    9. Acquired hypothyroidism        PLAN  Left humerus fracture/impaired mobility/acute DVT: Provoked DVT, initiated on Lovenox x5 days.  Per record review DVT found axillary and paired brachial left, superficial basilic vein  as well.  She was discharged on Eliquis (3/4), recommending therapy at least 3 months (6/4).  Continue with orthopedic follow-up as outpatient slated for 3/14.  Patient reports pain has been well controlled and request to discontinue hydrocodone.  We will utilize Tylenol 650 mg every 6 hours as needed.  Continue with sling placement, elevation as tolerated.  PT/OT for strengthening.    Anemia of chronic disease: Patient and  reports she was previously taking Retacrit, followed by Dr. Alas.  Hemoglobin today 7.6, baseline of around 8.  No report of acute blood loss.  Will consult with nephrology services and confirm Retacrit dosing.  Can be given at SNF will need scheduled CBC prior to injections.  We will repeat CBC on Monday for close follow-up.    CKD stage III: Renal function reviewed, slight bump in creatinine.  GFR at baseline around 40, today 36.  Encourage p.o. hydration and follow-up next Monday with a BMP.  Nephrology outpatient follow-up as previously scheduled.    Chronic atrial fibrillation/status post pacemaker: Followed by cardiology as outpatient.  They were consulted for recommendations during hospitalization, will continue aspirin and Eliquis.  R/R, continue metoprolol    Diabetes: Glimepiride decreased during hospitalization to 2 mg.  She also takes Tradjenta.  A1c obtained and reviewed noting well controlled at 7.2.  Continue current medications for now, she has had some poor appetite.  Follow closely, considering de-escalating DM medications as clinically indicated.  Follow-up with A1c every 3 months.    Acquired hypothyroidism: TSH reviewed, within normal limits.  Continue Synthroid at 50 mcg daily.    []  Discussed Patient in detail with nursing/staff, addressed all needs today.     []  Plan of Care Reviewed   []  PT/OT Reviewed   []  Order Changes  []  Discharge Plans Reviewed  [x]  Advance Directive on file with Nursing Home.   [x]  POA on file with Nursing Home.   [x]  Code Status:  DNR    Advance Care Planning   ACP discussion was held with the patient during this visit. Patient has an advance directive in EMR which is still valid.        Vilma Méndez PA-C

## 2022-03-17 ENCOUNTER — NURSING HOME (OUTPATIENT)
Dept: INTERNAL MEDICINE | Facility: CLINIC | Age: 87
End: 2022-03-17

## 2022-03-17 VITALS — BODY MASS INDEX: 20.98 KG/M2 | WEIGHT: 130 LBS

## 2022-03-17 DIAGNOSIS — E03.9 ACQUIRED HYPOTHYROIDISM: ICD-10-CM

## 2022-03-17 DIAGNOSIS — R26.89 IMPAIRMENT OF BALANCE: ICD-10-CM

## 2022-03-17 DIAGNOSIS — I38 VALVULAR HEART DISEASE: ICD-10-CM

## 2022-03-17 DIAGNOSIS — I73.9 PERIPHERAL ARTERIAL DISEASE: ICD-10-CM

## 2022-03-17 DIAGNOSIS — Z74.09 IMPAIRED MOBILITY: ICD-10-CM

## 2022-03-17 DIAGNOSIS — Z95.0 CARDIAC PACEMAKER IN SITU: ICD-10-CM

## 2022-03-17 DIAGNOSIS — D69.6 THROMBOCYTOPENIA: ICD-10-CM

## 2022-03-17 DIAGNOSIS — I48.20 CHRONIC ATRIAL FIBRILLATION: Primary | ICD-10-CM

## 2022-03-17 DIAGNOSIS — E11.22 CONTROLLED TYPE 2 DIABETES MELLITUS WITH STAGE 4 CHRONIC KIDNEY DISEASE, WITHOUT LONG-TERM CURRENT USE OF INSULIN: ICD-10-CM

## 2022-03-17 DIAGNOSIS — I63.322 CEREBROVASCULAR ACCIDENT (CVA) DUE TO THROMBOSIS OF LEFT ANTERIOR CEREBRAL ARTERY: ICD-10-CM

## 2022-03-17 DIAGNOSIS — I50.9 CHRONIC CONGESTIVE HEART FAILURE, UNSPECIFIED HEART FAILURE TYPE: ICD-10-CM

## 2022-03-17 DIAGNOSIS — N18.4 CONTROLLED TYPE 2 DIABETES MELLITUS WITH STAGE 4 CHRONIC KIDNEY DISEASE, WITHOUT LONG-TERM CURRENT USE OF INSULIN: ICD-10-CM

## 2022-03-17 DIAGNOSIS — D64.9 NORMOCYTIC ANEMIA: ICD-10-CM

## 2022-03-17 DIAGNOSIS — K29.50 CHRONIC GASTRITIS WITHOUT BLEEDING, UNSPECIFIED GASTRITIS TYPE: ICD-10-CM

## 2022-03-17 DIAGNOSIS — N18.32 STAGE 3B CHRONIC KIDNEY DISEASE: ICD-10-CM

## 2022-03-17 PROCEDURE — 99308 SBSQ NF CARE LOW MDM 20: CPT | Performed by: INTERNAL MEDICINE

## 2022-03-18 ENCOUNTER — TELEPHONE (OUTPATIENT)
Dept: INTERNAL MEDICINE | Facility: CLINIC | Age: 87
End: 2022-03-18

## 2022-03-21 ENCOUNTER — HOSPITAL ENCOUNTER (OUTPATIENT)
Facility: HOSPITAL | Age: 87
Discharge: NURSING, REHAB OR LONG-TERM CARE | End: 2022-03-24
Attending: EMERGENCY MEDICINE | Admitting: INTERNAL MEDICINE

## 2022-03-21 DIAGNOSIS — K92.1 MELENA: Primary | ICD-10-CM

## 2022-03-21 DIAGNOSIS — D64.9 ANEMIA REQUIRING TRANSFUSIONS: ICD-10-CM

## 2022-03-21 DIAGNOSIS — E11.29 CONTROLLED TYPE 2 DIABETES MELLITUS WITH OTHER DIABETIC KIDNEY COMPLICATION, WITHOUT LONG-TERM CURRENT USE OF INSULIN: ICD-10-CM

## 2022-03-21 DIAGNOSIS — D50.0 BLOOD LOSS ANEMIA: ICD-10-CM

## 2022-03-21 LAB
ABO GROUP BLD: NORMAL
ALBUMIN SERPL-MCNC: 3.6 G/DL (ref 3.5–5.2)
ALBUMIN/GLOB SERPL: 1.5 G/DL
ALP SERPL-CCNC: 110 U/L (ref 39–117)
ALT SERPL W P-5'-P-CCNC: 16 U/L (ref 1–33)
ANION GAP SERPL CALCULATED.3IONS-SCNC: 12.5 MMOL/L (ref 5–15)
APTT PPP: 43.7 SECONDS (ref 70–100)
AST SERPL-CCNC: 24 U/L (ref 1–32)
BASOPHILS # BLD AUTO: 0.04 10*3/MM3 (ref 0–0.2)
BASOPHILS NFR BLD AUTO: 0.5 % (ref 0–1.5)
BILIRUB SERPL-MCNC: 0.3 MG/DL (ref 0–1.2)
BLD GP AB SCN SERPL QL: NEGATIVE
BUN SERPL-MCNC: 37 MG/DL (ref 8–23)
BUN/CREAT SERPL: 28.2 (ref 7–25)
CALCIUM SPEC-SCNC: 9.1 MG/DL (ref 8.6–10.5)
CHLORIDE SERPL-SCNC: 97 MMOL/L (ref 98–107)
CO2 SERPL-SCNC: 24.5 MMOL/L (ref 22–29)
CREAT SERPL-MCNC: 1.31 MG/DL (ref 0.57–1)
DEPRECATED RDW RBC AUTO: 59.4 FL (ref 37–54)
EGFRCR SERPLBLD CKD-EPI 2021: 39.5 ML/MIN/1.73
EOSINOPHIL # BLD AUTO: 0.11 10*3/MM3 (ref 0–0.4)
EOSINOPHIL NFR BLD AUTO: 1.2 % (ref 0.3–6.2)
ERYTHROCYTE [DISTWIDTH] IN BLOOD BY AUTOMATED COUNT: 18.3 % (ref 12.3–15.4)
GLOBULIN UR ELPH-MCNC: 2.4 GM/DL
GLUCOSE SERPL-MCNC: 208 MG/DL (ref 65–99)
HCT VFR BLD AUTO: 19.8 % (ref 34–46.6)
HGB BLD-MCNC: 6.1 G/DL (ref 12–15.9)
IMM GRANULOCYTES # BLD AUTO: 0.07 10*3/MM3 (ref 0–0.05)
IMM GRANULOCYTES NFR BLD AUTO: 0.8 % (ref 0–0.5)
INR PPP: 1.69 (ref 0.9–1.1)
LYMPHOCYTES # BLD AUTO: 2.36 10*3/MM3 (ref 0.7–3.1)
LYMPHOCYTES NFR BLD AUTO: 26.8 % (ref 19.6–45.3)
MCH RBC QN AUTO: 28.4 PG (ref 26.6–33)
MCHC RBC AUTO-ENTMCNC: 30.8 G/DL (ref 31.5–35.7)
MCV RBC AUTO: 92.1 FL (ref 79–97)
MONOCYTES # BLD AUTO: 0.7 10*3/MM3 (ref 0.1–0.9)
MONOCYTES NFR BLD AUTO: 7.9 % (ref 5–12)
NEUTROPHILS NFR BLD AUTO: 5.53 10*3/MM3 (ref 1.7–7)
NEUTROPHILS NFR BLD AUTO: 62.8 % (ref 42.7–76)
NRBC BLD AUTO-RTO: 0 /100 WBC (ref 0–0.2)
PLATELET # BLD AUTO: 237 10*3/MM3 (ref 140–450)
PMV BLD AUTO: 9.3 FL (ref 6–12)
POTASSIUM SERPL-SCNC: 4.4 MMOL/L (ref 3.5–5.2)
PROT SERPL-MCNC: 6 G/DL (ref 6–8.5)
PROTHROMBIN TIME: 20.4 SECONDS (ref 12–15.1)
RBC # BLD AUTO: 2.15 10*6/MM3 (ref 3.77–5.28)
RH BLD: POSITIVE
SARS-COV-2 RNA PNL SPEC NAA+PROBE: NOT DETECTED
SODIUM SERPL-SCNC: 134 MMOL/L (ref 136–145)
T&S EXPIRATION DATE: NORMAL
WBC NRBC COR # BLD: 8.81 10*3/MM3 (ref 3.4–10.8)

## 2022-03-21 PROCEDURE — G0378 HOSPITAL OBSERVATION PER HR: HCPCS

## 2022-03-21 PROCEDURE — 86920 COMPATIBILITY TEST SPIN: CPT

## 2022-03-21 PROCEDURE — 36415 COLL VENOUS BLD VENIPUNCTURE: CPT

## 2022-03-21 PROCEDURE — 87635 SARS-COV-2 COVID-19 AMP PRB: CPT | Performed by: INTERNAL MEDICINE

## 2022-03-21 PROCEDURE — 85610 PROTHROMBIN TIME: CPT | Performed by: PHYSICIAN ASSISTANT

## 2022-03-21 PROCEDURE — 99284 EMERGENCY DEPT VISIT MOD MDM: CPT

## 2022-03-21 PROCEDURE — 86900 BLOOD TYPING SEROLOGIC ABO: CPT

## 2022-03-21 PROCEDURE — 87081 CULTURE SCREEN ONLY: CPT | Performed by: INTERNAL MEDICINE

## 2022-03-21 PROCEDURE — 85025 COMPLETE CBC W/AUTO DIFF WBC: CPT | Performed by: PHYSICIAN ASSISTANT

## 2022-03-21 PROCEDURE — P9016 RBC LEUKOCYTES REDUCED: HCPCS

## 2022-03-21 PROCEDURE — 87102 FUNGUS ISOLATION CULTURE: CPT | Performed by: INTERNAL MEDICINE

## 2022-03-21 PROCEDURE — 96361 HYDRATE IV INFUSION ADD-ON: CPT

## 2022-03-21 PROCEDURE — 36430 TRANSFUSION BLD/BLD COMPNT: CPT

## 2022-03-21 PROCEDURE — 86901 BLOOD TYPING SEROLOGIC RH(D): CPT | Performed by: PHYSICIAN ASSISTANT

## 2022-03-21 PROCEDURE — 96374 THER/PROPH/DIAG INJ IV PUSH: CPT

## 2022-03-21 PROCEDURE — 87641 MR-STAPH DNA AMP PROBE: CPT | Performed by: INTERNAL MEDICINE

## 2022-03-21 PROCEDURE — 86850 RBC ANTIBODY SCREEN: CPT | Performed by: PHYSICIAN ASSISTANT

## 2022-03-21 PROCEDURE — 80053 COMPREHEN METABOLIC PANEL: CPT | Performed by: PHYSICIAN ASSISTANT

## 2022-03-21 PROCEDURE — 99220 PR INITIAL OBSERVATION CARE/DAY 70 MINUTES: CPT | Performed by: INTERNAL MEDICINE

## 2022-03-21 PROCEDURE — 85730 THROMBOPLASTIN TIME PARTIAL: CPT | Performed by: PHYSICIAN ASSISTANT

## 2022-03-21 PROCEDURE — C9803 HOPD COVID-19 SPEC COLLECT: HCPCS

## 2022-03-21 PROCEDURE — 86900 BLOOD TYPING SEROLOGIC ABO: CPT | Performed by: PHYSICIAN ASSISTANT

## 2022-03-21 RX ORDER — ONDANSETRON 2 MG/ML
4 INJECTION INTRAMUSCULAR; INTRAVENOUS EVERY 6 HOURS PRN
Status: DISCONTINUED | OUTPATIENT
Start: 2022-03-21 | End: 2022-03-24 | Stop reason: HOSPADM

## 2022-03-21 RX ORDER — PANTOPRAZOLE SODIUM 40 MG/10ML
40 INJECTION, POWDER, LYOPHILIZED, FOR SOLUTION INTRAVENOUS EVERY 12 HOURS SCHEDULED
Status: DISCONTINUED | OUTPATIENT
Start: 2022-03-22 | End: 2022-03-23

## 2022-03-21 RX ORDER — CHOLECALCIFEROL (VITAMIN D3) 125 MCG
5 CAPSULE ORAL NIGHTLY PRN
Status: DISCONTINUED | OUTPATIENT
Start: 2022-03-21 | End: 2022-03-24 | Stop reason: HOSPADM

## 2022-03-21 RX ORDER — ACETAMINOPHEN 325 MG/1
650 TABLET ORAL EVERY 4 HOURS PRN
Status: DISCONTINUED | OUTPATIENT
Start: 2022-03-21 | End: 2022-03-24 | Stop reason: HOSPADM

## 2022-03-21 RX ORDER — SODIUM CHLORIDE 0.9 % (FLUSH) 0.9 %
10 SYRINGE (ML) INJECTION AS NEEDED
Status: DISCONTINUED | OUTPATIENT
Start: 2022-03-21 | End: 2022-03-24 | Stop reason: HOSPADM

## 2022-03-21 RX ORDER — SODIUM CHLORIDE 9 MG/ML
75 INJECTION, SOLUTION INTRAVENOUS CONTINUOUS
Status: DISCONTINUED | OUTPATIENT
Start: 2022-03-21 | End: 2022-03-23

## 2022-03-21 RX ORDER — DEXTROSE MONOHYDRATE 25 G/50ML
25 INJECTION, SOLUTION INTRAVENOUS
Status: DISCONTINUED | OUTPATIENT
Start: 2022-03-21 | End: 2022-03-24 | Stop reason: HOSPADM

## 2022-03-21 RX ORDER — SODIUM CHLORIDE 9 MG/ML
70 INJECTION, SOLUTION INTRAVENOUS CONTINUOUS PRN
Status: DISCONTINUED | OUTPATIENT
Start: 2022-03-21 | End: 2022-03-23

## 2022-03-21 RX ORDER — SODIUM CHLORIDE 0.9 % (FLUSH) 0.9 %
10 SYRINGE (ML) INJECTION AS NEEDED
Status: DISCONTINUED | OUTPATIENT
Start: 2022-03-21 | End: 2022-03-24

## 2022-03-21 RX ORDER — SODIUM CHLORIDE 0.9 % (FLUSH) 0.9 %
10 SYRINGE (ML) INJECTION EVERY 12 HOURS SCHEDULED
Status: DISCONTINUED | OUTPATIENT
Start: 2022-03-21 | End: 2022-03-24

## 2022-03-21 RX ORDER — PANTOPRAZOLE SODIUM 40 MG/10ML
40 INJECTION, POWDER, LYOPHILIZED, FOR SOLUTION INTRAVENOUS ONCE
Status: COMPLETED | OUTPATIENT
Start: 2022-03-21 | End: 2022-03-21

## 2022-03-21 RX ORDER — NICOTINE POLACRILEX 4 MG
1 LOZENGE BUCCAL
Status: DISCONTINUED | OUTPATIENT
Start: 2022-03-21 | End: 2022-03-24 | Stop reason: HOSPADM

## 2022-03-21 RX ADMIN — SODIUM CHLORIDE 75 ML/HR: 9 INJECTION, SOLUTION INTRAVENOUS at 22:15

## 2022-03-21 RX ADMIN — PANTOPRAZOLE SODIUM 40 MG: 40 INJECTION, POWDER, FOR SOLUTION INTRAVENOUS at 19:31

## 2022-03-21 RX ADMIN — Medication 10 ML: at 22:16

## 2022-03-21 NOTE — H&P
[facility]   HISTORY AND PHYSICAL      Name:  Lynne Sanchez   Age:  87 y.o.  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 Patient hasn't had any new symptoms. No chest pain, shortness of breath, lightheadedness or blurry vision. She has dark stools but this is unchanged and she attributes this to her being on iron supplements. She does not take NSAIDs. She doesn't take a PPI. No hematochezia. No hematemesis. No dysuria. No fevers.     The patient fractured her L proximal humerus on 22; she was deemed a poor surgical candidate so did not have pinning. She's been at [facility] since then for physical therapy. On 22 she was diagnosed with provoked LUE DVT; her plavix was discontinued and she was to continue aspirin 81mg daily; she was also started on eliquis.    Initial Vitals from the ED:  22 1744 98.1 (36.7) 72 18 129/99 Sitting 100     Studies from the ED:  Glu 208, na 134, k 4.4, co2 24, cl 97, cr 1.31 (b/l 1.2-1.4), ca 9.1  inr 1.7, pt 20, ptt 43  Wbc 8.8, hgb 6.1 (b/l 8-9), hct 19.8, plt 237  covid negative    In the ED the patient received: iv protonix 40mg and 2 u PRBCs transfusion was ordered.    Review Of Systems:    All systems were reviewed and negative except as mentioned in history of  presenting illness, assessment and plan.    Past Medical History: Patient  has a past medical history of Acute deep vein thrombosis of left upper extremity (HCC), Anemia, Asthma, CHF (congestive heart failure) (HCC), Chronic atrial fibrillation (HCC), Deep venous thrombosis of left upper limb (HCC), Diabetes mellitus, type 2 (HCC), Diarrhea, GERD (gastroesophageal reflux disease), Glaucoma, H/O transfusion of whole blood, Heart attack (HCC), Heart murmur, History of transfusion, Hyperlipidemia, Hypertension, Kidney disease, Osteomyelitis (HCC), Osteomyelitis of left foot (HCC) (10/3/2017), Peripheral vascular disease (HCC), Right retinal detachment, Secondary cataract of both eyes, 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    Past Surgical History: Patient  has a past surgical history that includes Appendectomy; Esophagogastroduodenoscopy (N/A, 10/9/2017); Cardiac catheterization (N/A, 10/10/2017); Interventional radiology procedure (N/A, 10/10/2017); Cardiac catheterization (N/A, 10/10/2017); Cardiac catheterization (N/A, 10/10/2017); Colonoscopy; Cardioversion; Cardiac Ablation; Pacemaker Implantation; Bone marrow aspiration; Cardiac electrophysiology procedure (N/A, 5/3/2018); Finger amputation (Left, 7/5/2018); Eye surgery (Bilateral); and Finger amputation (Left, 8/27/2018).    Social History: Patient  reports that she has never smoked. She has never used smokeless tobacco. She reports that she does not drink alcohol and does not use drugs.    Family History: Patient's family history includes Arthritis in an other family member; No Known Problems in her father and mother.    Allergies:      Phenergan [promethazine hcl]    Home Medications:    Prior to Admission Medications     Prescriptions Last Dose Informant Patient Reported? Taking?    apixaban (Eliquis) 5 MG tablet tablet   Yes No    Take 5 mg by mouth  2 (Two) Times a Day.    aspirin 81 MG EC tablet  Self No No    Take 1 tablet by mouth Daily.    atorvastatin (LIPITOR) 40 MG tablet   No No    Take 1 tablet by mouth Daily.    ferrous sulfate 324 (65 Fe) MG tablet delayed-release EC tablet   Yes No    Take 324 mg by mouth Daily With Breakfast.    furosemide (LASIX) 20 MG tablet   No No    Take 1 tablet by mouth Daily.    glimepiride (AMARYL) 4 MG tablet   No No    Take 1 tablet by mouth Every Morning Before Breakfast.    Patient taking differently:  Take 2 mg by mouth Every Morning Before Breakfast.    glucose blood test strip   No No    1 each by Other route Daily. Use as instructed once a day as directed, for Truemetrix reader    HYDROcodone-acetaminophen (NORCO) 7.5-325 MG per tablet   No No    Take 1 tablet by mouth Every 6 (Six) Hours As Needed for Moderate Pain .    latanoprost (XALATAN) 0.005 % ophthalmic solution   No No    Administer 1 drop to both eyes Daily.    levothyroxine (SYNTHROID, LEVOTHROID) 50 MCG tablet   No No    Take 1 tablet by mouth Daily.    linagliptin (Tradjenta) 5 MG tablet tablet   No No    Take 1 tablet by mouth Daily.    melatonin 1 MG tablet   Yes No    Take 3 mg by mouth Every Night. Take one tablet (3mg total)    metoprolol tartrate (LOPRESSOR) 100 MG tablet   No No    Take 1 tablet by mouth 2 (Two) Times a Day.    Misc. Devices (Rollator Ultra-Light) misc   No No    Use walker to help with stability when walking    ondansetron ODT (ZOFRAN-ODT) 4 MG disintegrating tablet   Yes No    Place 4 mg on the tongue Every 6 (Six) Hours As Needed for Nausea or Vomiting.    polyethylene glycol (MiraLax) 17 GM/SCOOP powder   Yes No    Take 17 g by mouth Daily.    potassium chloride (MICRO-K) 10 MEQ CR capsule  Self Yes No    potassium chloride ER 10 mEq capsule,extended release   Take 1 capsule every day by oral route.    Potassium Gluconate 550 MG tablet   Yes No    Take 550 mg by mouth Daily.    Sennosides (Senna) 8.8 MG/5ML syrup   Yes No     "Take 10 mL by mouth Every Night. Take 10 mls nightly for 10 days    timolol (TIMOPTIC) 0.5 % ophthalmic solution   Yes No        ED Medications:    Medications   sodium chloride 0.9 % flush 10 mL (has no administration in time range)   sodium chloride 0.9 % infusion (has no administration in time range)   sodium chloride 0.9 % flush 10 mL (has no administration in time range)   sodium chloride 0.9 % flush 10 mL (has no administration in time range)   sodium chloride 0.9 % infusion (has no administration in time range)   acetaminophen (TYLENOL) tablet 650 mg (has no administration in time range)   ondansetron (ZOFRAN) injection 4 mg (has no administration in time range)   melatonin tablet 5 mg (has no administration in time range)   pantoprazole (PROTONIX) injection 40 mg (40 mg Intravenous Given 3/21/22 1931)     Vital Signs:  Temp:  [98.1 °F (36.7 °C)] 98.1 °F (36.7 °C)  Heart Rate:  [70-77] 70  Resp:  [18] 18  BP: (118-129)/(51-99) 118/53        03/21/22  1744   Weight: 55.8 kg (123 lb)     Body mass index is 19.85 kg/m².    Physical Exam:     Most recent vital Signs: /53   Pulse 70   Temp 98.1 °F (36.7 °C) (Oral)   Resp 18   Ht 167.6 cm (66\")   Wt 55.8 kg (123 lb)   SpO2 100%   BMI 19.85 kg/m²     General Appearance:  Alert and cooperative. NAD. Pleasant.   Head:  Atraumatic and normocephalic.   Eyes: Conjunctivae and sclerae normal, no icterus. No pallor.   Throat: No oral lesions, no thrush, oral mucosa moist.   Neck: Supple, trachea midline, no thyromegaly.   Lungs:   Breath sounds heard bilaterally equally.  No wheezing or crackles. No Pleural rub or bronchial breathing.   Heart:  Normal S1 and S2, no murmur, no gallop, no rub.    Abdomen:   No masses, no organomegaly. Soft, nontender, nondistended, no rebound tenderness.   Extremities: Supple, no edema, no cyanosis, no clubbing.   Skin: No bleeding or rash.   Neurologic: No facial asymmetry. Moves all four limbs. No tremors.        Laboratory " data:    I have reviewed the labs done in the emergency room.    Results from last 7 days   Lab Units 03/21/22  1822   SODIUM mmol/L 134*   POTASSIUM mmol/L 4.4   CHLORIDE mmol/L 97*   CO2 mmol/L 24.5   BUN mg/dL 37*   CREATININE mg/dL 1.31*   CALCIUM mg/dL 9.1   BILIRUBIN mg/dL 0.3   ALK PHOS U/L 110   ALT (SGPT) U/L 16   AST (SGOT) U/L 24   GLUCOSE mg/dL 208*     Results from last 7 days   Lab Units 03/21/22  1822   WBC 10*3/mm3 8.81   HEMOGLOBIN g/dL 6.1*   HEMATOCRIT % 19.8*   PLATELETS 10*3/mm3 237     Results from last 7 days   Lab Units 03/21/22  1822   INR  1.69*                               Invalid input(s): USDES,  BLOODU, NITRITITE, BACT, EP    Pain Management Panel     Pain Management Panel Latest Ref Rng & Units 4/19/2021    CREATININE UR Not Estab. mg/dL 76.7            Radiology:    No radiology results for the last 3 days    Assessment/Plan:    Acute on chronic blood loss anemia likely 2/2 gi bleeding  -appreciate gi; per ED provider, plan for EGD in AM  -hold eliquis and asa  -npo  -protonix iv 40mg bid  -occult blood stool   -ns@75ml/hr  -transfuse 2u PRBC  -cbc, b12, folate in am    H/o slater's esophagitis    Recent LUE fracture// ability to perform adls below baseline  -pt/ot    Risk Assessment: moderate  DVT Prophylaxis: SCDs  Code Status: DNR/ okay to intubate  Diet: NPO      Aaron Cheek MD  03/21/22  19:49 EDT

## 2022-03-21 NOTE — ED PROVIDER NOTES
Subjective   Chief Complaint: Low hemoglobin  History of Present Illness: 87-year-old female comes in for evaluation above complaint.  She recently had left upper extremity fracture with fixation and unfortunately developed a DVT afterwards.  She has been on Eliquis since end of February.  She states her stool is always dark due to being on iron.  She had had transfusions in the past.  She denies any chest pain shortness of breath or any symptoms today.  She was just having routine labs drawn at the rehab facility she is at and was found to have a hemoglobin of 5.8.  Onset: Today  Exacerbating / Alleviating factors: None  Associated symptoms: None      Nurses Notes reviewed and agree, including vitals, allergies, social history and prior medical history.          Review of Systems   Constitutional:        Anemia     HENT: Negative.    Eyes: Negative.    Respiratory: Negative.    Cardiovascular: Negative.    Gastrointestinal: Negative.    Genitourinary: Negative.    Musculoskeletal: Negative.    Skin: Negative.    Neurological: Negative.    Psychiatric/Behavioral: Negative.        Past Medical History:   Diagnosis Date   • Acute deep vein thrombosis of left upper extremity (HCC)    • Anemia    • Asthma    • CHF (congestive heart failure) ([facility])    • Chronic atrial fibrillation (HCC)    • Deep venous thrombosis of left upper limb (HCC)    • Diabetes mellitus, type 2 (HCC)    • Diarrhea    • GERD (gastroesophageal reflux disease)    • Glaucoma    • H/O transfusion of whole blood    • Heart attack ([facility])    • Heart murmur    • History of transfusion    • Hyperlipidemia    • Hypertension    • Kidney disease     patient not aware of what exact diagnosis is    • Osteomyelitis ([facility])    • Osteomyelitis of left foot ([facility]) 10/3/2017   • Peripheral vascular disease ([facility])    • Right retinal detachment    • Secondary cataract of both eyes    • Stable proliferative diabetic retinopathy of both eyes ([facility])    • Stroke ([facility])    •  Swelling of left extremity    • Urinary tract infection    • Wears glasses        Allergies   Allergen Reactions   • Phenergan [Promethazine Hcl] Confusion       Past Surgical History:   Procedure Laterality Date   • AMPUTATION DIGIT Left 7/5/2018    Procedure: Left Great toe amputation;  Surgeon: Prakash Carrington MD;  Location:  [facility];  Service: Vascular   • AMPUTATION DIGIT Left 8/27/2018    Procedure: SECOND TOE AMPUTATION DIGIT LEFT;  Surgeon: Prakash Carrington MD;  Location:  [facility];  Service: General   • APPENDECTOMY     • BONE MARROW ASPIRATION     • CARDIAC ABLATION     • CARDIAC CATHETERIZATION N/A 10/10/2017    Procedure: Peripheral angiography;  Surgeon: Kan Pelaez MD;  Location: [facility] CATH INVASIVE LOCATION;  Service:    • CARDIAC CATHETERIZATION N/A 10/10/2017    Procedure: Angioplasty-peripheral;  Surgeon: Kan Pelaez MD;  Location: [facility] CATH INVASIVE LOCATION;  Service:    • CARDIAC CATHETERIZATION N/A 10/10/2017    Procedure: Atherectomy-peripheral;  Surgeon: Kan Pelaez MD;  Location: [facility] CATH INVASIVE LOCATION;  Service:    • CARDIAC ELECTROPHYSIOLOGY PROCEDURE N/A 5/3/2018    Procedure: generator change;  Surgeon: Zan Poole MD;  Location:  [facility] CATH INVASIVE LOCATION;  Service: Cardiovascular   • CARDIOVERSION     • COLONOSCOPY     • ENDOSCOPY N/A 10/9/2017    Procedure: ESOPHAGOGASTRODUODENOSCOPY WITH COLD FORCEP BIOPSY;  Surgeon: Clifton Hyatt MD;  Location: [facility] ENDOSCOPY;  Service:    • ENDOSCOPY N/A 3/22/2022    Procedure: ESOPHAGOGASTRODUODENOSCOPY with AVM cautery;  Surgeon: Clarisse Velez MD;  Location: [facility] ENDOSCOPY;  Service: Gastroenterology;  Laterality: N/A;   • EYE SURGERY Bilateral     CATARACTS   • INTERVENTIONAL RADIOLOGY PROCEDURE N/A 10/10/2017    Procedure: Abdominal Aortagram with Runoff;  Surgeon: Kan Pelaez MD;  Location: [facility] CATH INVASIVE LOCATION;  Service:    • PACEMAKER IMPLANTATION      around  2008 then replaced 2018       Family History   Problem Relation Age of Onset   • No Known Problems Mother    • No Known Problems Father    • Arthritis Other        Social History     Socioeconomic History   • Marital status:    • Number of children: 3   Tobacco Use   • Smoking status: Never Smoker   • Smokeless tobacco: Never Used   Vaping Use   • Vaping Use: Never used   Substance and Sexual Activity   • Alcohol use: No   • Drug use: No   • Sexual activity: Defer           Objective   Physical Exam  Vitals and nursing note reviewed.   Constitutional:       Appearance: Normal appearance.   HENT:      Head: Normocephalic and atraumatic.      Nose: Nose normal.   Eyes:      Extraocular Movements: Extraocular movements intact.   Cardiovascular:      Rate and Rhythm: Normal rate and regular rhythm.   Pulmonary:      Effort: Pulmonary effort is normal.   Abdominal:      General: Abdomen is flat.   Musculoskeletal:      Cervical back: Normal range of motion.      Comments: Left upper extremity in a sling, 2+ radial pulse.  No significant swelling.   Skin:     General: Skin is warm and dry.   Neurological:      General: No focal deficit present.      Mental Status: She is alert. Mental status is at baseline.   Psychiatric:         Mood and Affect: Mood normal.         Behavior: Behavior normal.         Procedures           ED Course      Dr. Velez recommends Protonix twice daily n.p.o. after midnight and endoscopy in the morning.  Dr. Cheek graciously accepts the patient for admission.  Patient is in agreement with the plan.                                           MDM    Final diagnoses:   Anemia requiring transfusions       ED Disposition  ED Disposition     ED Disposition   Decision to Admit    Condition   --    Comment   Level of Care: Med/Surg [1]   Diagnosis: Anemia requiring transfusions [[id number]]   Admitting Physician: NETTE CHEEK [[id number]]   Attending Physician: NETTE CHEEK [[id number]]               Elizabeth Easton  N, APRN  [address]  [facility] [address]  [phone]          PedroNavid alicearegina Maieray,   [address]  [facility] [id number] [phone]          Vilma Méndez PA-C  [address]  [facility] [address]  [facility] [id number] [phone]          [facility]  [address] [place] [address] [phone]             Medication List      New Prescriptions    pantoprazole 40 MG EC tablet  Commonly known as: Protonix  Take 1 tablet by mouth Daily.        Changed    glimepiride 2 MG tablet  Commonly known as: AMARYL  Take 1 tablet by mouth Every Morning Before Breakfast.  What changed:   · medication strength  · how much to take     linagliptin 5 MG tablet tablet  Commonly known as: Tradjenta  Take 1 tablet by mouth Daily.  What changed: when to take this        Stop    Biotin 5 MG tablet     HYDROcodone-acetaminophen 7.5-325 MG per tablet  Commonly known as: NORCO           Where to Get Your Medications      These medications were sent to [id number] - [place], [place] - [address] - [phone]  - [phone] FX  [address] [address]    Phone: [phone]   · glimepiride 2 MG tablet  · pantoprazole 40 MG EC tablet          Michael Yadav PA-C  03/21/22 1923       Michael Yadav PA-C  04/03/22 1834

## 2022-03-22 ENCOUNTER — ANESTHESIA EVENT (OUTPATIENT)
Dept: GASTROENTEROLOGY | Facility: HOSPITAL | Age: 87
End: 2022-03-22

## 2022-03-22 ENCOUNTER — ANESTHESIA (OUTPATIENT)
Dept: GASTROENTEROLOGY | Facility: HOSPITAL | Age: 87
End: 2022-03-22

## 2022-03-22 PROBLEM — K92.1 MELENA: Status: ACTIVE | Noted: 2022-03-21

## 2022-03-22 LAB
ANION GAP SERPL CALCULATED.3IONS-SCNC: 9.6 MMOL/L (ref 5–15)
BASOPHILS # BLD AUTO: 0.04 10*3/MM3 (ref 0–0.2)
BASOPHILS NFR BLD AUTO: 0.5 % (ref 0–1.5)
BUN SERPL-MCNC: 29 MG/DL (ref 8–23)
BUN/CREAT SERPL: 25.7 (ref 7–25)
CALCIUM SPEC-SCNC: 8.5 MG/DL (ref 8.6–10.5)
CHLORIDE SERPL-SCNC: 106 MMOL/L (ref 98–107)
CO2 SERPL-SCNC: 23.4 MMOL/L (ref 22–29)
CREAT SERPL-MCNC: 1.13 MG/DL (ref 0.57–1)
DEPRECATED RDW RBC AUTO: 55.1 FL (ref 37–54)
EGFRCR SERPLBLD CKD-EPI 2021: 47.2 ML/MIN/1.73
EOSINOPHIL # BLD AUTO: 0.11 10*3/MM3 (ref 0–0.4)
EOSINOPHIL NFR BLD AUTO: 1.5 % (ref 0.3–6.2)
ERYTHROCYTE [DISTWIDTH] IN BLOOD BY AUTOMATED COUNT: 17.2 % (ref 12.3–15.4)
GLUCOSE BLDC GLUCOMTR-MCNC: 114 MG/DL (ref 70–130)
GLUCOSE BLDC GLUCOMTR-MCNC: 129 MG/DL (ref 70–130)
GLUCOSE BLDC GLUCOMTR-MCNC: 168 MG/DL (ref 70–130)
GLUCOSE BLDC GLUCOMTR-MCNC: 181 MG/DL (ref 70–130)
GLUCOSE SERPL-MCNC: 112 MG/DL (ref 65–99)
HCT VFR BLD AUTO: 24.5 % (ref 34–46.6)
HGB BLD-MCNC: 7.8 G/DL (ref 12–15.9)
IMM GRANULOCYTES # BLD AUTO: 0.08 10*3/MM3 (ref 0–0.05)
IMM GRANULOCYTES NFR BLD AUTO: 1.1 % (ref 0–0.5)
IRON 24H UR-MRATE: 55 MCG/DL (ref 37–145)
IRON SATN MFR SERPL: 17 % (ref 20–50)
LYMPHOCYTES # BLD AUTO: 1.72 10*3/MM3 (ref 0.7–3.1)
LYMPHOCYTES NFR BLD AUTO: 23.3 % (ref 19.6–45.3)
MCH RBC QN AUTO: 28.7 PG (ref 26.6–33)
MCHC RBC AUTO-ENTMCNC: 31.8 G/DL (ref 31.5–35.7)
MCV RBC AUTO: 90.1 FL (ref 79–97)
MONOCYTES # BLD AUTO: 0.6 10*3/MM3 (ref 0.1–0.9)
MONOCYTES NFR BLD AUTO: 8.1 % (ref 5–12)
MRSA DNA SPEC QL NAA+PROBE: NORMAL
NEUTROPHILS NFR BLD AUTO: 4.84 10*3/MM3 (ref 1.7–7)
NEUTROPHILS NFR BLD AUTO: 65.5 % (ref 42.7–76)
NRBC BLD AUTO-RTO: 0 /100 WBC (ref 0–0.2)
PLATELET # BLD AUTO: 182 10*3/MM3 (ref 140–450)
PMV BLD AUTO: 9.5 FL (ref 6–12)
POTASSIUM SERPL-SCNC: 4.3 MMOL/L (ref 3.5–5.2)
RBC # BLD AUTO: 2.72 10*6/MM3 (ref 3.77–5.28)
SODIUM SERPL-SCNC: 139 MMOL/L (ref 136–145)
TIBC SERPL-MCNC: 331 MCG/DL (ref 298–536)
TRANSFERRIN SERPL-MCNC: 222 MG/DL (ref 200–360)
TSH SERPL DL<=0.05 MIU/L-ACNC: 6.57 UIU/ML (ref 0.27–4.2)
VIT B12 BLD-MCNC: 680 PG/ML (ref 211–946)
VRE SPEC QL CULT: NORMAL
WBC NRBC COR # BLD: 7.39 10*3/MM3 (ref 3.4–10.8)

## 2022-03-22 PROCEDURE — 85025 COMPLETE CBC W/AUTO DIFF WBC: CPT | Performed by: INTERNAL MEDICINE

## 2022-03-22 PROCEDURE — 80048 BASIC METABOLIC PNL TOTAL CA: CPT | Performed by: INTERNAL MEDICINE

## 2022-03-22 PROCEDURE — 43255 EGD CONTROL BLEEDING ANY: CPT | Performed by: INTERNAL MEDICINE

## 2022-03-22 PROCEDURE — 36430 TRANSFUSION BLD/BLD COMPNT: CPT

## 2022-03-22 PROCEDURE — P9016 RBC LEUKOCYTES REDUCED: HCPCS

## 2022-03-22 PROCEDURE — 82607 VITAMIN B-12: CPT | Performed by: INTERNAL MEDICINE

## 2022-03-22 PROCEDURE — 83540 ASSAY OF IRON: CPT | Performed by: INTERNAL MEDICINE

## 2022-03-22 PROCEDURE — 63710000001 SIMETHICONE 40 MG/0.6ML SUSPENSION: Performed by: INTERNAL MEDICINE

## 2022-03-22 PROCEDURE — 99225 PR SBSQ OBSERVATION CARE/DAY 25 MINUTES: CPT | Performed by: INTERNAL MEDICINE

## 2022-03-22 PROCEDURE — 84466 ASSAY OF TRANSFERRIN: CPT | Performed by: INTERNAL MEDICINE

## 2022-03-22 PROCEDURE — 63710000001 MELATONIN 3 MG TABLET: Performed by: INTERNAL MEDICINE

## 2022-03-22 PROCEDURE — A9270 NON-COVERED ITEM OR SERVICE: HCPCS | Performed by: INTERNAL MEDICINE

## 2022-03-22 PROCEDURE — 97162 PT EVAL MOD COMPLEX 30 MIN: CPT

## 2022-03-22 PROCEDURE — G0378 HOSPITAL OBSERVATION PER HR: HCPCS

## 2022-03-22 PROCEDURE — 96376 TX/PRO/DX INJ SAME DRUG ADON: CPT

## 2022-03-22 PROCEDURE — 86900 BLOOD TYPING SEROLOGIC ABO: CPT

## 2022-03-22 PROCEDURE — 96361 HYDRATE IV INFUSION ADD-ON: CPT

## 2022-03-22 PROCEDURE — 99204 OFFICE O/P NEW MOD 45 MIN: CPT | Performed by: INTERNAL MEDICINE

## 2022-03-22 PROCEDURE — 82962 GLUCOSE BLOOD TEST: CPT

## 2022-03-22 PROCEDURE — 97166 OT EVAL MOD COMPLEX 45 MIN: CPT

## 2022-03-22 PROCEDURE — 63710000001 METOPROLOL TARTRATE 50 MG TABLET: Performed by: INTERNAL MEDICINE

## 2022-03-22 PROCEDURE — 25010000002 PROPOFOL 10 MG/ML EMULSION: Performed by: NURSE ANESTHETIST, CERTIFIED REGISTERED

## 2022-03-22 PROCEDURE — 84443 ASSAY THYROID STIM HORMONE: CPT | Performed by: INTERNAL MEDICINE

## 2022-03-22 RX ORDER — SENNOSIDES 8.6 MG
650 CAPSULE ORAL EVERY 6 HOURS PRN
COMMUNITY

## 2022-03-22 RX ORDER — BIOTIN 5 MG
1 TABLET ORAL DAILY
COMMUNITY
End: 2022-03-24 | Stop reason: HOSPADM

## 2022-03-22 RX ORDER — SIMETHICONE 20 MG/.3ML
EMULSION ORAL AS NEEDED
Status: DISCONTINUED | OUTPATIENT
Start: 2022-03-22 | End: 2022-03-23 | Stop reason: HOSPADM

## 2022-03-22 RX ORDER — METOPROLOL TARTRATE 50 MG/1
100 TABLET, FILM COATED ORAL 2 TIMES DAILY
Status: DISCONTINUED | OUTPATIENT
Start: 2022-03-22 | End: 2022-03-24 | Stop reason: HOSPADM

## 2022-03-22 RX ORDER — LATANOPROST 50 UG/ML
1 SOLUTION/ DROPS OPHTHALMIC DAILY
Status: DISCONTINUED | OUTPATIENT
Start: 2022-03-22 | End: 2022-03-24 | Stop reason: HOSPADM

## 2022-03-22 RX ORDER — PROPOFOL 10 MG/ML
VIAL (ML) INTRAVENOUS AS NEEDED
Status: DISCONTINUED | OUTPATIENT
Start: 2022-03-22 | End: 2022-03-22 | Stop reason: SURG

## 2022-03-22 RX ORDER — AMINO ACIDS/PROTEIN HYDROLYS 15G-100/30
30 LIQUID (ML) ORAL 2 TIMES DAILY
COMMUNITY

## 2022-03-22 RX ORDER — LANOLIN ALCOHOL/MO/W.PET/CERES
3 CREAM (GRAM) TOPICAL NIGHTLY
Status: DISCONTINUED | OUTPATIENT
Start: 2022-03-22 | End: 2022-03-24 | Stop reason: HOSPADM

## 2022-03-22 RX ORDER — LEVOTHYROXINE SODIUM 0.05 MG/1
50 TABLET ORAL DAILY
Status: DISCONTINUED | OUTPATIENT
Start: 2022-03-22 | End: 2022-03-24 | Stop reason: HOSPADM

## 2022-03-22 RX ORDER — POLYETHYLENE GLYCOL 3350 17 G/17G
17 POWDER, FOR SOLUTION ORAL DAILY
Status: DISCONTINUED | OUTPATIENT
Start: 2022-03-22 | End: 2022-03-22

## 2022-03-22 RX ORDER — ZINC OXIDE 16 %
OINTMENT (GRAM) TOPICAL 2 TIMES DAILY
COMMUNITY
End: 2022-11-16

## 2022-03-22 RX ORDER — LIDOCAINE HYDROCHLORIDE 20 MG/ML
INJECTION, SOLUTION INTRAVENOUS AS NEEDED
Status: DISCONTINUED | OUTPATIENT
Start: 2022-03-22 | End: 2022-03-22 | Stop reason: SURG

## 2022-03-22 RX ORDER — MULTIPLE VITAMINS W/ MINERALS TAB 9MG-400MCG
1 TAB ORAL DAILY
COMMUNITY

## 2022-03-22 RX ORDER — HYDROCODONE BITARTRATE AND ACETAMINOPHEN 7.5; 325 MG/1; MG/1
1 TABLET ORAL EVERY 6 HOURS PRN
Status: DISCONTINUED | OUTPATIENT
Start: 2022-03-22 | End: 2022-03-24 | Stop reason: HOSPADM

## 2022-03-22 RX ORDER — ATORVASTATIN CALCIUM 40 MG/1
40 TABLET, FILM COATED ORAL DAILY
Status: DISCONTINUED | OUTPATIENT
Start: 2022-03-22 | End: 2022-03-24 | Stop reason: HOSPADM

## 2022-03-22 RX ORDER — FERROUS SULFATE TAB EC 324 MG (65 MG FE EQUIVALENT) 324 (65 FE) MG
324 TABLET DELAYED RESPONSE ORAL
Status: DISCONTINUED | OUTPATIENT
Start: 2022-03-22 | End: 2022-03-22

## 2022-03-22 RX ORDER — TIMOLOL MALEATE 5 MG/ML
1 SOLUTION/ DROPS OPHTHALMIC DAILY
Status: DISCONTINUED | OUTPATIENT
Start: 2022-03-22 | End: 2022-03-24 | Stop reason: HOSPADM

## 2022-03-22 RX ADMIN — PROPOFOL 50 MG: 10 INJECTION, EMULSION INTRAVENOUS at 14:42

## 2022-03-22 RX ADMIN — PANTOPRAZOLE SODIUM 40 MG: 40 INJECTION, POWDER, FOR SOLUTION INTRAVENOUS at 22:19

## 2022-03-22 RX ADMIN — LIDOCAINE HYDROCHLORIDE 40 MG: 20 INJECTION, SOLUTION INTRAVENOUS at 14:42

## 2022-03-22 RX ADMIN — SODIUM CHLORIDE 75 ML/HR: 9 INJECTION, SOLUTION INTRAVENOUS at 13:34

## 2022-03-22 RX ADMIN — Medication 10 ML: at 09:19

## 2022-03-22 RX ADMIN — SODIUM CHLORIDE 75 ML/HR: 9 INJECTION, SOLUTION INTRAVENOUS at 12:01

## 2022-03-22 RX ADMIN — TIMOLOL MALEATE 1 DROP: 5 SOLUTION/ DROPS OPHTHALMIC at 09:15

## 2022-03-22 RX ADMIN — MELATONIN TAB 3 MG 3 MG: 3 TAB at 22:19

## 2022-03-22 RX ADMIN — METOPROLOL TARTRATE 100 MG: 50 TABLET ORAL at 22:19

## 2022-03-22 RX ADMIN — PROPOFOL 30 MG: 10 INJECTION, EMULSION INTRAVENOUS at 14:46

## 2022-03-22 RX ADMIN — Medication 10 ML: at 22:20

## 2022-03-22 RX ADMIN — PROPOFOL 30 MG: 10 INJECTION, EMULSION INTRAVENOUS at 14:55

## 2022-03-22 RX ADMIN — PANTOPRAZOLE SODIUM 40 MG: 40 INJECTION, POWDER, FOR SOLUTION INTRAVENOUS at 09:07

## 2022-03-22 RX ADMIN — LATANOPROST 1 DROP: 50 SOLUTION OPHTHALMIC at 09:17

## 2022-03-22 NOTE — CASE MANAGEMENT/SOCIAL WORK
Continued Stay Note   [place]     Patient Name: Lynne Sanchez  MRN: [id number]  Today's Date: 3/22/2022    Admit Date: 3/21/2022     Discharge Plan     Row Name 03/22/22 1527       Plan    Plan spoke to ElleShanon stated would need to precert again; will begin today    Row Name 03/22/22 1430       Plan    Plan pt in OR; called and spoke with daughter Janneth Main; stated pt in rehab at [facility] at present due to injury a month ago; would like her to go back to complete rehab and then home with assistance; they are working on inhome assistance when discharged from [facility]; stated before injury she was able to totally care for herself;               Discharge Codes    No documentation.               Expected Discharge Date and Time     Expected Discharge Date Expected Discharge Time    Mar 23, 2022             [employer] RN

## 2022-03-22 NOTE — PROGRESS NOTES
[facility]    Name:  Lynne Sanchez   Age:  87 y.o.  Sex:  female  :  1934  MRN:  [id number]   Visit Number:  [id number]  Admission Date:  3/21/2022  Date Of Service:  22  Primary Care Physician:  Elizabeth Easton APRN     LOS: 0 days :  Patient Care Team:  Elizabeth Easton APRN as PCP - General (Family Medicine)  Paxton Vasquez DO as PCP - Internal Medicine (long-term Care [facility])  Vilma Méndez PA-C as PCP - Family Medicine (Long Term Care [facility])  Davian Faria MD as Consulting Physician (Nephrology)  Radha Boone RN as Registered Nurse:    Chief Complaint:    Blood Loss Anemia    Subjective / Interval History:   Patient was resting comfortably this morning in her bed.  She was awake and alert and content as she was awaiting EGD studies.  She denied any significant abdominal pain, nausea, or johnny GI bleeding.  2 units PRBC given overnight and patient has tolerated this fairly well without signs of shortness of breath or volume overload.    Review of Systems:   Review of Systems   Constitutional: Negative for chills, fatigue and fever.   HENT: Negative for congestion, ear pain, rhinorrhea, sinus pressure and sore throat.    Eyes: Negative for visual disturbance.   Respiratory: Negative for cough, chest tightness, shortness of breath and wheezing.    Cardiovascular: Negative for chest pain, palpitations and leg swelling.   Gastrointestinal: Negative for abdominal pain, blood in stool, constipation, diarrhea, nausea and vomiting.   Endocrine: Negative for polydipsia and polyuria.   Genitourinary: Negative for dysuria and hematuria.   Musculoskeletal: Negative for arthralgias and back pain.   Skin: Negative for rash.   Neurological: Negative for dizziness, light-headedness, numbness and headaches.   Psychiatric/Behavioral: Negative for dysphoric mood and sleep disturbance. The patient is not nervous/anxious.          Vital  Signs:    Temp:  [97.6 °F (36.4 °C)-98.9 °F (37.2 °C)] 98.5 °F (36.9 °C)  Heart Rate:  [70-77] 71  Resp:  [18] 18  BP: (111-160)/(47-99) 157/64    Intake and output:    I/O last 3 completed shifts:  In: 632 [Blood:632]  Out: 900 [Urine:900]  No intake/output data recorded.    Physical Examination:  Physical Exam  Vitals and nursing note reviewed.   Constitutional:       Appearance: Normal appearance. She is well-developed.      Comments: Frail elderly female in no acute distress   HENT:      Head: Normocephalic and atraumatic.      Nose: Nose normal.      Mouth/Throat:      Mouth: Mucous membranes are moist.      Pharynx: No oropharyngeal exudate.   Eyes:      General: No scleral icterus.     Conjunctiva/sclera: Conjunctivae normal.      Pupils: Pupils are equal, round, and reactive to light.   Neck:      Thyroid: No thyromegaly.   Cardiovascular:      Rate and Rhythm: Normal rate and regular rhythm.      Heart sounds: Normal heart sounds. No murmur heard.    No friction rub. No gallop.   Pulmonary:      Effort: Pulmonary effort is normal. No respiratory distress.      Breath sounds: Normal breath sounds. No wheezing.   Abdominal:      General: Bowel sounds are normal. There is no distension.      Palpations: Abdomen is soft.      Tenderness: There is no abdominal tenderness.   Musculoskeletal:         General: No deformity or signs of injury.      Cervical back: Normal range of motion and neck supple.   Lymphadenopathy:      Cervical: No cervical adenopathy.   Skin:     General: Skin is warm and dry.      Findings: No rash.   Neurological:      Mental Status: She is alert and oriented to person, place, and time. Mental status is at baseline.   Psychiatric:         Mood and Affect: Mood normal.         Behavior: Behavior normal.             Laboratory results:  I have reviewed the patient's laboratory results.    Results from last 7 days   Lab Units 03/22/22  0534 03/21/22  1822   SODIUM mmol/L 139 134*   POTASSIUM  mmol/L 4.3 4.4   CHLORIDE mmol/L 106 97*   CO2 mmol/L 23.4 24.5   BUN mg/dL 29* 37*   CREATININE mg/dL 1.13* 1.31*   CALCIUM mg/dL 8.5* 9.1   BILIRUBIN mg/dL  --  0.3   ALK PHOS U/L  --  110   ALT (SGPT) U/L  --  16   AST (SGOT) U/L  --  24   GLUCOSE mg/dL 112* 208*     Results from last 7 days   Lab Units 03/22/22  0534 03/21/22  1822   WBC 10*3/mm3 7.39 8.81   HEMOGLOBIN g/dL 7.8* 6.1*   HEMATOCRIT % 24.5* 19.8*   PLATELETS 10*3/mm3 182 237     Results from last 7 days   Lab Units 03/21/22  1822   INR  1.69*               Radiology results:  I have reviewed the patient's radiology reports.  Imaging Results (Last 24 Hours)     ** No results found for the last 24 hours. **              Medication Review:   I have reviewed the patient's active and prn medications.     Assessment:    Anemia requiring transfusions    Melena        Plan:    Acute on chronic blood loss anemia secondary to GI Bleeding  -Plan for EGD this morning with Dr. Velez   -Hold Eliquis and aspirin  -Status post transfusion of 2 units PRBC, tolerated well.   -  Follow-up CBC today presented hemoglobin improvement from 6.1 to now 7.8.  - cont pantoprazole IV while NPO.      CKD III  - on Retacrit per nephrology as outpatient  - Cr is improved with blood transfusion.     Hypothyroidism  -TSH mildly elevated possibly due to acute disease.  Consider increasing Synthroid after repeat TSH in 1 week at nursing facility.    Left Humerus fracture  - non surgical management, pt/ot    DVT  - eliquis on hold    Paxton Vasquez DO  03/22/22  09:03 EDT

## 2022-03-22 NOTE — PLAN OF CARE
Goal Outcome Evaluation:      Spoke with pt and her son at her bedside.  Pt was alert and conversational..  I listened and offered support as pt talked about her condition and procedure earlier today.  Pt also talked about her need of a stair lift to enable her to use her walk-in shower in her upstairs bathroom.  Pt stated that she was  in a rehab facility when she feel, and would like to go home, but needs to have the use of her upstairs bathroom and bedroom.  I provided a scripture tent and offered prayer at her invitation.  I will continue to follow.

## 2022-03-22 NOTE — PLAN OF CARE
Goal Outcome Evaluation:  Plan of Care Reviewed With: patient        Progress: no change  Outcome Evaluation: OT eval completed. Patient presents deficits in strength, endurance, balance, mobility and ADL performance. Patient completed bed mobility with min A, transfer and functional mobility x 12' with CGA using nati walker. Patient requires min-mod A for ADLs, LUE in sling. Patient was able to perform distal ROM exercises sitting EOB. Patient to return to [facility] at discharge.

## 2022-03-22 NOTE — THERAPY EVALUATION
Patient Name: Lynne Sanchez  : 1934    MRN: [id number]                              Today's Date: 3/22/2022       Admit Date: 3/21/2022    Visit Dx:     ICD-10-CM ICD-9-CM   1. Melena  K92.1 578.1   2. Blood loss anemia  D50.0 280.0   3. Anemia requiring transfusions  D64.9 285.9     Patient Active Problem List   Diagnosis   • Chronic atrial fibrillation   • Valvular heart disease   • Diabetes mellitus type II, controlled ([facility])   • Normocytic anemia   • Chronic gastritis   • Peripheral arterial disease ([facility])   • Cerebrovascular accident (CVA) due to thrombosis of left anterior cerebral artery ([facility])   • Thrombocytopenia ([facility])   • Cardiac pacemaker in situ   • Chronic congestive heart failure ([facility])   • Functional heart murmur   • Glaucoma   • Hyperlipidemia   • Hypertensive disorder   • Hypothyroidism   • Mass of parotid gland   • Neuropathy   • Chronic renal impairment, stage 4 (severe) ([facility])   • Impairment of balance   • Impaired mobility   • Muscle weakness of lower extremity   • Chronic pain of both knees   • Secondary cataract of both eyes   • 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 ([facility])   • Suspected glaucoma of both eyes   • Acute deep vein thrombosis of left upper extremity ([facility])   • Deep venous thrombosis of left upper limb ([facility])   • Secondary cataract of both eyes   • Right retinal detachment   • Stable proliferative diabetic retinopathy of both eyes ([facility])   • Swelling of left extremity   • Closed nondisplaced fracture of surgical neck of left humerus with routine healing   • Debility   • Left arm swelling   • 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 ([facility])   • Secondary hyperparathyroidism of renal origin ([facility])   • Vitamin D deficiency   • Anemia requiring transfusions   • Melena     Past Medical History:   Diagnosis Date   • Acute deep vein thrombosis of left upper extremity ([facility])    • Anemia    • Asthma    • CHF (congestive heart  failure) (HCC)    • Chronic atrial fibrillation (HCC)    • Deep venous thrombosis of left upper limb (HCC)    • Diabetes mellitus, type 2 (HCC)    • Diarrhea    • GERD (gastroesophageal reflux disease)    • Glaucoma    • H/O transfusion of whole blood    • Heart attack (HCC)    • Heart murmur    • History of transfusion    • Hyperlipidemia    • Hypertension    • Kidney disease     patient not aware of what exact diagnosis is    • Osteomyelitis (HCC)    • Osteomyelitis of left foot (HCC) 10/3/2017   • Peripheral vascular disease (HCC)    • Right retinal detachment    • Secondary cataract of both eyes    • 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)    • Stroke (HCC)    • Swelling of left extremity    • Urinary tract infection    • Wears glasses      Past Surgical History:   Procedure Laterality Date   • AMPUTATION DIGIT Left 7/5/2018    Procedure: Left Great toe amputation;  Surgeon: Prakash Carrington MD;  Location:  [facility];  Service: Vascular   • AMPUTATION DIGIT Left 8/27/2018    Procedure: SECOND TOE AMPUTATION DIGIT LEFT;  Surgeon: Prakash Carrington MD;  Location:  [facility];  Service: General   • APPENDECTOMY     • BONE MARROW ASPIRATION     • CARDIAC ABLATION     • CARDIAC CATHETERIZATION N/A 10/10/2017    Procedure: Peripheral angiography;  Surgeon: Kan Pelaez MD;  Location:  [facility];  Service:    • CARDIAC CATHETERIZATION N/A 10/10/2017    Procedure: Angioplasty-peripheral;  Surgeon: Kan Pelaez MD;  Location:  [facility];  Service:    • CARDIAC CATHETERIZATION N/A 10/10/2017    Procedure: Atherectomy-peripheral;  Surgeon: Kan Pelaez MD;  Location:  [facility];  Service:    • CARDIAC ELECTROPHYSIOLOGY PROCEDURE N/A 5/3/2018    Procedure: generator change;  Surgeon: Zan Poole MD;  Location:  [facility];  Service: Cardiovascular   • CARDIOVERSION     • COLONOSCOPY     • ENDOSCOPY N/A  10/9/2017    Procedure: ESOPHAGOGASTRODUODENOSCOPY WITH COLD FORCEP BIOPSY;  Surgeon: Clifton Hyatt MD;  Location: [facility] ENDOSCOPY;  Service:    • EYE SURGERY Bilateral     CATARACTS   • INTERVENTIONAL RADIOLOGY PROCEDURE N/A 10/10/2017    Procedure: Abdominal Aortagram with Runoff;  Surgeon: Kan Pelaez MD;  Location: [facility] CATH INVASIVE LOCATION;  Service:    • PACEMAKER IMPLANTATION      around 2008 then replaced 2018      General Information     Row Name 03/22/22 1446          Physical Therapy Time and Intention    Document Type evaluation  -MS     Mode of Treatment physical therapy  -MS     Row Name 03/22/22 1446          General Information    Patient Profile Reviewed yes  -MS     Prior Level of Function independent:;all household mobility  -MS     Existing Precautions/Restrictions fall;shoulder;left;other (see comments)  L UE in sling  -MS     Barriers to Rehab medically complex;previous functional deficit  -MS     Row Name 03/22/22 1446          Living Environment    People in Home spouse  -MS     Row Name 03/22/22 1446          Stairs Within Home, Primary    Stairs, Within Home, Primary Pt states they have stairs in home and are working to get a stair lift  -MS     Row Name 03/22/22 1446          Cognition    Orientation Status (Cognition) oriented x 4  -MS     Row Name 03/22/22 1446          Safety Issues, Functional Mobility    Safety Issues Affecting Function (Mobility) insight into deficits/self-awareness;safety precautions follow-through/compliance;safety precaution awareness  -MS     Impairments Affecting Function (Mobility) balance;endurance/activity tolerance;pain;strength  -MS           User Key  (r) = Recorded By, (t) = Taken By, (c) = Cosigned By    Initials Name Provider Type    MS Yung Sanchez, PT Physical Therapist               Mobility     Row Name 03/22/22 1447          Bed Mobility    Bed Mobility supine-sit;sit-supine  -MS     Supine-Sit [place] (Bed Mobility)  minimum assist (75% patient effort)  -MS     Sit-Supine [place] (Bed Mobility) minimum assist (75% patient effort)  -MS     Assistive Device (Bed Mobility) bed rails;head of bed elevated  -MS     Row Name 03/22/22 1447          Sit-Stand Transfer    Sit-Stand [place] (Transfers) contact guard  -MS     Assistive Device (Sit-Stand Transfers) walker, nati  -MS     Row Name 03/22/22 1447          Gait/Stairs (Locomotion)    [place] Level (Gait) contact guard  -MS     Assistive Device (Gait) walker, nati  -MS     Distance in Feet (Gait) 12  -MS     Deviations/Abnormal Patterns (Gait) roby decreased;festinating/shuffling  -MS           User Key  (r) = Recorded By, (t) = Taken By, (c) = Cosigned By    Initials Name Provider Type    Yung Monteiro, PT Physical Therapist               Obj/Interventions     Row Name 03/22/22 1525          Range of Motion Comprehensive    General Range of Motion bilateral lower extremity ROM WFL  -MS     Row Name 03/22/22 1525          Strength Comprehensive (MMT)    General Manual Muscle Testing (MMT) Assessment lower extremity strength deficits identified  -MS     Comment, General Manual Muscle Testing (MMT) Assessment B LE 3+/5  -MS     Row Name 03/22/22 1525          Balance    Balance Assessment standing dynamic balance  -MS     Dynamic Standing Balance minimal assist  -MS           User Key  (r) = Recorded By, (t) = Taken By, (c) = Cosigned By    Initials Name Provider Type    Yung Monteiro, PT Physical Therapist               Goals/Plan     Row Name 03/22/22 1536          Bed Mobility Goal 1 (PT)    Activity/Assistive Device (Bed Mobility Goal 1, PT) bed mobility activities, all  -MS     [place] Level/Cues Needed (Bed Mobility Goal 1, PT) standby assist  -MS     Time Frame (Bed Mobility Goal 1, PT) long term goal (LTG);10 days  -MS     Row Name 03/22/22 1536          Transfer Goal 1 (PT)    Activity/Assistive Device (Transfer Goal 1, PT)  sit-to-stand/stand-to-sit;transfers, all  -MS     [place] Level/Cues Needed (Transfer Goal 1, PT) standby assist  -MS     Time Frame (Transfer Goal 1, PT) long term goal (LTG);10 days  -MS     Row Name 03/22/22 1536          Gait Training Goal 1 (PT)    Activity/Assistive Device (Gait Training Goal 1, PT) gait (walking locomotion);assistive device use  -MS     [place] Level (Gait Training Goal 1, PT) contact guard required  -MS     Distance (Gait Training Goal 1, PT) 100ft  -MS     Time Frame (Gait Training Goal 1, PT) long term goal (LTG);10 days  -MS           User Key  (r) = Recorded By, (t) = Taken By, (c) = Cosigned By    Initials Name Provider Type    MS Yung Sanchez, PT Physical Therapist               Clinical Impression     Row Name 03/22/22 1526          Pain    Pretreatment Pain Rating 0/10 - no pain  -MS     Posttreatment Pain Rating 0/10 - no pain  -MS     Row Name 03/22/22 1526          Plan of Care Review    Plan of Care Reviewed With patient  -MS     Progress no change  -MS     Outcome Evaluation Pt participated in PT eval this AM. Pt required Tracy for sup-sit transfer. Pt stood with Tracy and use of nati walker. Pt ambulated 12 ft with nati walker and CGA. Pt is expected to benefit from skilled PTx during this inpatient stay.  -MS     Row Name 03/22/22 1526          Therapy Assessment/Plan (PT)    Patient/Family Therapy Goals Statement (PT) to go home  -MS     Rehab Potential (PT) good, to achieve stated therapy goals  -MS     Criteria for Skilled Interventions Met (PT) yes;meets criteria  -MS     Row Name 03/22/22 1526          Vital Signs    Pre SpO2 (%) 98  -MS     O2 Delivery Pre Treatment supplemental O2  -MS     Intra SpO2 (%) 95  -MS     O2 Delivery Intra Treatment room air  -MS     Post SpO2 (%) 98  -MS     O2 Delivery Post Treatment supplemental O2  -MS     Pre Patient Position Supine  -MS     Intra Patient Position Standing  -MS     Post Patient Position Supine  -MS     [place]  Name 03/22/22 1526          Positioning and Restraints    Pre-Treatment Position in bed  -MS     Post Treatment Position bed  -MS     In Bed call light within reach;encouraged to call for assist;fowlers;exit alarm on  -MS           User Key  (r) = Recorded By, (t) = Taken By, (c) = Cosigned By    Initials Name Provider Type    Yung Monteiro, OSMAR Physical Therapist               Outcome Measures     Row Name 03/22/22 1537          How much help from another person do you currently need...    Turning from your back to your side while in flat bed without using bedrails? 3  -MS     Moving from lying on back to sitting on the side of a flat bed without bedrails? 3  -MS     Moving to and from a bed to a chair (including a wheelchair)? 3  -MS     Standing up from a chair using your arms (e.g., wheelchair, bedside chair)? 3  -MS     Climbing 3-5 steps with a railing? 3  -MS     To walk in hospital room? 3  -MS     AM-PAC 6 Clicks Score (PT) 18  -MS     Row Name 03/22/22 1537 03/22/22 1258       Functional Assessment    Outcome Measure Options AM-PAC 6 Clicks Basic Mobility (PT)  -MS AM-PAC 6 Clicks Daily Activity (OT)  -SD          User Key  (r) = Recorded By, (t) = Taken By, (c) = Cosigned By    Initials Name Provider Type    Kat Medellin OT Occupational Therapist    Yung Monteiro, PT Physical Therapist                             Physical Therapy Education                 Title: PT OT SLP Therapies (In Progress)     Topic: Physical Therapy (In Progress)     Point: Mobility training (Done)     Learning Progress Summary           Patient Acceptance, E, VU by MS at 3/22/2022 1537    Comment: importance of mobility                   Point: Home exercise program (Done)     Learning Progress Summary           Patient Acceptance, E, VU by MS at 3/22/2022 1537    Comment: importance of mobility                   Point: Body mechanics (Not Started)     Learner Progress:  Not documented in this visit.           Point: Precautions (Not Started)     Learner Progress:  Not documented in this visit.                      User Key     Initials Effective Dates Name Provider Type Discipline    MS 06/16/21 -  Yung Sanchez PT Physical Therapist PT              PT Recommendation and Plan  Planned Therapy Interventions (PT): balance training, bed mobility training, gait training, patient/family education, transfer training, ROM (range of motion), stair training, strengthening, home exercise program  Plan of Care Reviewed With: patient  Progress: no change  Outcome Evaluation: Pt participated in PT eval this AM. Pt required Tracy for sup-sit transfer. Pt stood with Tracy and use of nati walker. Pt ambulated 12 ft with nati walker and CGA. Pt is expected to benefit from skilled PTx during this inpatient stay.     Time Calculation:    PT Charges     Row Name 03/22/22 1543             Time Calculation    Start Time 1007  -MS      PT Received On 03/22/22  -MS      PT Goal Re-Cert Due Date 04/01/22  -MS              Untimed Charges    PT Eval/Re-eval Minutes 55  -MS              Total Minutes    Untimed Charges Total Minutes 55  -MS       Total Minutes 55  -MS            User Key  (r) = Recorded By, (t) = Taken By, (c) = Cosigned By    Initials Name Provider Type    MS Yung Sanchez PT Physical Therapist              Therapy Charges for Today     Code Description Service Date Service Provider Modifiers Qty    84252612744 HC PT EVAL MOD COMPLEXITY 4 3/22/2022 Yung Sanchez PT GP 1          PT G-Codes  Outcome Measure Options: AM-PAC 6 Clicks Basic Mobility (PT)  AM-PAC 6 Clicks Score (PT): 18  AM-PAC 6 Clicks Score (OT): 15    Yung Sanchez PT  3/22/2022

## 2022-03-22 NOTE — ANESTHESIA PREPROCEDURE EVALUATION
Anesthesia Evaluation     Patient summary reviewed and Nursing notes reviewed   NPO Solid Status: > 8 hours  NPO Liquid Status: > 8 hours           Airway   Mallampati: I  TM distance: >3 FB  Neck ROM: full  Possible difficult intubation  Dental - normal exam     Pulmonary - normal exam   (+) asthma,  Cardiovascular - normal exam    (+) hypertension, valvular problems/murmurs, past MI , dysrhythmias Atrial Fib, CHF , PVD, hyperlipidemia,       Neuro/Psych  (+) CVA,    GI/Hepatic/Renal/Endo    (+)  GERD well controlled,  renal disease stones, diabetes mellitus type 2 well controlled, thyroid problem hypothyroidism    Musculoskeletal     Abdominal  - normal exam    Bowel sounds: normal.   Substance History      OB/GYN          Other                          Anesthesia Plan    ASA 3     MAC     intravenous induction     Anesthetic plan, all risks, benefits, and alternatives have been provided, discussed and informed consent has been obtained with: patient.    Plan discussed with CRNA.

## 2022-03-22 NOTE — ANESTHESIA POSTPROCEDURE EVALUATION
Patient: Lynne Sanchez    Procedure Summary     Date: 03/22/22 Room / Location: [facility] ENDOSCOPY 2 / [facility] ENDOSCOPY    Anesthesia Start: 1434 Anesthesia Stop:     Procedure: ESOPHAGOGASTRODUODENOSCOPY with AVM cautery (N/A Esophagus) Diagnosis:       Melena      Blood loss anemia      (Melena [K92.1])      (Blood loss anemia [D50.0])    Surgeons: Clarisse Velez MD Provider: Hussein Reina CRNA    Anesthesia Type: MAC ASA Status: 3          Anesthesia Type: MAC    Vitals  HR 74  Sat 100  Resp 15  /45  Temp 97.6      Post Anesthesia Care and Evaluation    Patient location during evaluation: bedside  Patient participation: complete - patient participated  Level of consciousness: awake and alert  Pain score: 0  Pain management: adequate  Airway patency: patent  Anesthetic complications: No anesthetic complications  PONV Status: none  Cardiovascular status: acceptable  Respiratory status: acceptable and nasal cannula  Hydration status: acceptable

## 2022-03-22 NOTE — PLAN OF CARE
Goal Outcome Evaluation:  Plan of Care Reviewed With: patient        Progress: no change  Outcome Evaluation: Pt participated in PT eval this AM. Pt required Tracy for sup-sit transfer. Pt stood with Tracy and use of nati walker. Pt ambulated 12 ft with nati walker and CGA. Pt is expected to benefit from skilled PTx during this inpatient stay.

## 2022-03-22 NOTE — NURSING NOTE
Seen for wound consult. Noted to be a pleasant lady. Sling in place left arm for prior to admission humerus fracture. Medigrip on bilateral arms. Reports nursing home put them on for skin protection. Left arm is edematous with fluid noted under skin. Also with scabs and bruising from reported falls. Medigrip smoothed on arm and stretched above elbow to assist with edema control and skin protection. Patient preference to wear brief. States is occasionally incontinent. She is able to turn and walk with standby assistance. Staff to remind patient to change positions to reduce pressure.   Stage 2 pressure injury to sacral area. No s/s of infection. Scattered bruising in various stages of healing on body.   Recommendations for care include a turning schedule, barrier cream twice daily and prn after cleansing, float heels off bed with pillows or heel boots as tolerated, encourage increase mobility as appropriate and tolerated to reduce pressure, for dry skin apply lotion/cream and a nutrition consult for dietary needs. Staff to contact provider and re-consult wound nurse for new skin issues or lack of improvement with current recommendations. Thank you for the consult. If you have questions or concerns do not hesitate to contact me.

## 2022-03-22 NOTE — CONSULTS
In Patient Consult      Date of Consultation: 2022  Patient Name: Lynne Sanchez  MRN: [id number]  : 1934     Referring provider: Paxton Vasquez DO    Primary care provider:  Elizabeth Easton APRN    Reason for consultation: Symptomatic anemia, suspected GI bleed      History of Present Illness: This is a 87-year-old elderly female patient with a prior history of congestive heart failure, chronic kidney disease, valvular heart disease, chronic atrial fibrillation, tachybradycardia syndrome status post PPM, referral vascular disease, diabetes mellitus, chronic anemia, hypothyroidism, recent history of left upper extremity DVT on Eliquis, was brought to the emergency room on 3/21/2022 after she was noted to have a low hemoglobin at rehab with the routine lab work.    Patient had a recent left humeral fracture following which she had a DVT left upper extremity and was put on Eliquis.  She had a routine lab work done which revealed low hemoglobin for which she was sent to emergency room.  She herself denies any abdominal pain no nausea vomiting.  Denies any chest pain shortness of breathing.  She has a chronic anemia for many years and has been taking iron pills.  Not see any difference other than the dark stool she normally gets with the iron pills.  Denies any prior history peptic ulcer disease no NSAID use.  She was on aspirin and the Plavix before and her Plavix was discontinued when she was put on Eliquis.  She is also taking baby aspirin daily.    Her last EGD was in 2017 by Dr. Mosquera and was reported as having the long segment Fermin's.  No recent colonoscopy.    Her work-up in the emergency room revealed hemoglobin of 6.1 g/dL compared to her baseline around 7 to 8 g/dL and GI was consulted.  He was started on a Protonix twice daily and had a 2 years of PRBC transfusion.    Subjective     Past Medical History:   Diagnosis Date   • Acute deep vein thrombosis of left upper extremity  (HCC)    • Anemia    • Asthma    • CHF (congestive heart failure) (HCC)    • Chronic atrial fibrillation (HCC)    • Deep venous thrombosis of left upper limb (HCC)    • Diabetes mellitus, type 2 (HCC)    • Diarrhea    • GERD (gastroesophageal reflux disease)    • Glaucoma    • H/O transfusion of whole blood    • Heart attack (HCC)    • Heart murmur    • History of transfusion    • Hyperlipidemia    • Hypertension    • Kidney disease     patient not aware of what exact diagnosis is    • Osteomyelitis (HCC)    • Osteomyelitis of left foot (HCC) 10/3/2017   • Peripheral vascular disease (HCC)    • Right retinal detachment    • Secondary cataract of both eyes    • 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)    • Stroke (HCC)    • Swelling of left extremity    • Urinary tract infection    • Wears glasses        Past Surgical History:   Procedure Laterality Date   • AMPUTATION DIGIT Left 7/5/2018    Procedure: Left Great toe amputation;  Surgeon: Prakash Carrington MD;  Location:  [facility];  Service: Vascular   • AMPUTATION DIGIT Left 8/27/2018    Procedure: SECOND TOE AMPUTATION DIGIT LEFT;  Surgeon: Prakash Carrington MD;  Location:  [facility];  Service: General   • APPENDECTOMY     • BONE MARROW ASPIRATION     • CARDIAC ABLATION     • CARDIAC CATHETERIZATION N/A 10/10/2017    Procedure: Peripheral angiography;  Surgeon: Kan Pelaez MD;  Location:  [facility];  Service:    • CARDIAC CATHETERIZATION N/A 10/10/2017    Procedure: Angioplasty-peripheral;  Surgeon: Kan Pelaez MD;  Location:  [facility];  Service:    • CARDIAC CATHETERIZATION N/A 10/10/2017    Procedure: Atherectomy-peripheral;  Surgeon: Kan Pelaez MD;  Location:  [facility];  Service:    • CARDIAC ELECTROPHYSIOLOGY PROCEDURE N/A 5/3/2018    Procedure: generator change;  Surgeon: Zan Poole MD;  Location:  [facility];  Service: Cardiovascular    • CARDIOVERSION     • COLONOSCOPY     • ENDOSCOPY N/A 10/9/2017    Procedure: ESOPHAGOGASTRODUODENOSCOPY WITH COLD FORCEP BIOPSY;  Surgeon: Clifton Hyatt MD;  Location: [facility] ENDOSCOPY;  Service:    • EYE SURGERY Bilateral     CATARACTS   • INTERVENTIONAL RADIOLOGY PROCEDURE N/A 10/10/2017    Procedure: Abdominal Aortagram with Runoff;  Surgeon: Kan Pelaez MD;  Location: [facility] CATH INVASIVE LOCATION;  Service:    • PACEMAKER IMPLANTATION      around 2008 then replaced 2018       Family History   Problem Relation Age of Onset   • No Known Problems Mother    • No Known Problems Father    • Arthritis Other        Social History     Socioeconomic History   • Marital status:    • Number of children: 3   Tobacco Use   • Smoking status: Never Smoker   • Smokeless tobacco: Never Used   Vaping Use   • Vaping Use: Never used   Substance and Sexual Activity   • Alcohol use: No   • Drug use: No   • Sexual activity: Defer         Current Facility-Administered Medications:   •  [MAR Hold] acetaminophen (TYLENOL) tablet 650 mg, 650 mg, Oral, Q4H PRN, Aaron Cheek MD  •  [MAR Hold] atorvastatin (LIPITOR) tablet 40 mg, 40 mg, Oral, Daily, Aaron Cheek MD  •  [MAR Hold] dextrose (D50W) (25 g/50 mL) IV injection 25 g, 25 g, Intravenous, Q15 Min PRN, Aaron Cheek MD  •  [MAR Hold] dextrose (GLUTOSE) oral gel 1 tube, 1 tube, Oral, Q15 Min PRN, Aaron Cheek MD  •  [MAR Hold] glucagon (human recombinant) (GLUCAGEN DIAGNOSTIC) injection 1 mg, 1 mg, Subcutaneous, PRN, Aaron Cheek MD  •  [MAR Hold] HYDROcodone-acetaminophen (NORCO) 7.5-325 MG per tablet 1 tablet, 1 tablet, Oral, Q6H PRN, Aaron Cheek MD  •  [MAR Hold] insulin aspart (novoLOG) injection 0-7 Units, 0-7 Units, Subcutaneous, TID AC, Aaron Cheek MD  •  [MAR Hold] latanoprost (XALATAN) 0.005 % ophthalmic solution 1 drop, 1 drop, Both Eyes, Daily, Aaron Cheek MD, 1 drop at 03/22/22 0917  •  [MAR Hold] levothyroxine (SYNTHROID, LEVOTHROID) tablet 50 mcg, 50 mcg, Oral,  Daily, Aaron Cheek MD  •  [MAR Hold] melatonin tablet 3 mg, 3 mg, Oral, Nightly, Aaron Cheek MD  •  [MAR Hold] melatonin tablet 5 mg, 5 mg, Oral, Nightly PRN, Aaron Cheek MD  •  metoprolol tartrate (LOPRESSOR) tablet 100 mg, 100 mg, Oral, BID, Aaron Cheek MD  •  [MAR Hold] ondansetron (ZOFRAN) injection 4 mg, 4 mg, Intravenous, Q6H PRN, Aaron Cheek MD  •  [MAR Hold] pantoprazole (PROTONIX) injection 40 mg, 40 mg, Intravenous, Q12H, Aaron Cheek MD, 40 mg at 03/22/22 0907  •  [COMPLETED] Insert peripheral IV, , , Once **AND** [MAR Hold] sodium chloride 0.9 % flush 10 mL, 10 mL, Intravenous, PRN, Michael Yadav PA-C  •  [MAR Hold] sodium chloride 0.9 % flush 10 mL, 10 mL, Intravenous, Q12H, Aaron Cheek MD, 10 mL at 03/22/22 0919  •  [MAR Hold] sodium chloride 0.9 % flush 10 mL, 10 mL, Intravenous, PRN, Aaron Cheek MD  •  sodium chloride 0.9 % infusion, 70 mL/hr, Intravenous, Continuous PRN, Clarisse Velez MD  •  sodium chloride 0.9 % infusion, 75 mL/hr, Intravenous, Continuous, Aaron Cheek MD, Last Rate: 75 mL/hr at 03/22/22 1334, 75 mL/hr at 03/22/22 1334  •  [MAR Hold] timolol (TIMOPTIC) 0.5 % ophthalmic solution 1 drop, 1 drop, Both Eyes, Daily, Aaron Cheek MD, 1 drop at 03/22/22 0915    Allergies   Allergen Reactions   • Phenergan [Promethazine Hcl] Confusion       Review of Systems   Constitutional: Positive for fatigue. Negative for fever.   HENT: Negative for sore throat and trouble swallowing.    Eyes: Negative for visual disturbance.   Respiratory: Negative for cough, chest tightness and shortness of breath.    Cardiovascular: Negative for chest pain, palpitations and leg swelling.   Gastrointestinal: Positive for blood in stool. Negative for abdominal pain, constipation, diarrhea, nausea, vomiting and indigestion.   Endocrine: Negative for polyphagia.   Genitourinary: Negative for dysuria and hematuria.   Musculoskeletal: Negative for back pain, joint swelling and neck pain.   Skin: Negative for  "rash, skin lesions and wound.   Neurological: Negative for dizziness, seizures, speech difficulty, weakness, numbness and confusion.   Hematological: Negative for adenopathy. Does not bruise/bleed easily.   Psychiatric/Behavioral: Negative for hallucinations and depressed mood.        The following portions of the patient's history were reviewed and updated as appropriate: allergies, current medications, past family history, past medical history, past social history, past surgical history and problem list.    Objective     Vitals:    03/22/22 0335 03/22/22 0700 03/22/22 1005 03/22/22 1100   BP: 160/64 157/64  145/58   BP Location: Right arm Left arm  Right arm   Patient Position: Lying Lying  Sitting   Pulse: 75 71  72   Resp: 18 18  17   Temp: 98.3 °F (36.8 °C) 98.5 °F (36.9 °C)  98.5 °F (36.9 °C)   TempSrc: Axillary Oral  Oral   SpO2: 100% 98%     Weight:   60.3 kg (132 lb 15 oz)  Comment: abw    Height:   167.6 cm (66\")        Physical Exam  Vitals and nursing note reviewed.   Constitutional:       Appearance: She is well-developed.      Comments: Elderly frail lady   HENT:      Head: Normocephalic and atraumatic.      Right Ear: External ear normal.      Left Ear: External ear normal.   Eyes:      Conjunctiva/sclera: Conjunctivae normal.   Neck:      Thyroid: No thyromegaly.      Trachea: No tracheal deviation.   Cardiovascular:      Rate and Rhythm: Normal rate and regular rhythm.      Heart sounds: No murmur heard.  Pulmonary:      Effort: Pulmonary effort is normal. No respiratory distress.      Breath sounds: Normal breath sounds.   Abdominal:      General: Bowel sounds are normal. There is no distension.      Palpations: Abdomen is soft. There is no mass.      Tenderness: There is no abdominal tenderness. There is no guarding or rebound.      Hernia: No hernia is present.   Musculoskeletal:      Cervical back: Normal range of motion.      Comments: Left shoulder cast and sling note   Skin:     General: Skin " is warm and dry.   Neurological:      Mental Status: She is alert and oriented to person, place, and time.      Cranial Nerves: No cranial nerve deficit.      Sensory: No sensory deficit.   Psychiatric:         Mood and Affect: Mood normal.         Behavior: Behavior normal.         Thought Content: Thought content normal.         Judgment: Judgment normal.         Results from last 7 days   Lab Units 03/22/22  0534 03/21/22  1822   SODIUM mmol/L 139 134*   POTASSIUM mmol/L 4.3 4.4   CHLORIDE mmol/L 106 97*   CO2 mmol/L 23.4 24.5   BUN mg/dL 29* 37*   CREATININE mg/dL 1.13* 1.31*   CALCIUM mg/dL 8.5* 9.1   ALBUMIN g/dL  --  3.60   BILIRUBIN mg/dL  --  0.3   ALK PHOS U/L  --  110   ALT (SGPT) U/L  --  16   AST (SGOT) U/L  --  24   GLUCOSE mg/dL 112* 208*   WBC 10*3/mm3 7.39 8.81   HEMOGLOBIN g/dL 7.8* 6.1*   PLATELETS 10*3/mm3 182 237   INR   --  1.69*       Imaging Results (Last 24 Hours)     ** No results found for the last 24 hours. **          Assessment / Plan      Assessment/Recommendations:   1.  Acute on chronic anemia  2.  Suspected melena   3.  Chronic normocytic anemia  4.  Chronic kidney disease  5.  Long-term anticoagulation  6.  History of Fermin's esophagus.  Patient has a chronic anemia over 8 to 10 years now she had a hemoglobin as low as 5 g/dL intermittently since about 2015.   For anemia appears to be multifactorial in etiology including, chronic kidney disease, possible intermittent GI bleed, and anemia of chronic disease.  Patient is high risk for small bowel AVMs due to her CKD.      Patient currently on iron pills and has been noticing dark stool.  Given her recent anticoagulation use GI bleed suspected.  She had a 2 inch PRBC transfused her hemoglobin improved to 7.8 g/dL.  She needs an EGD to begin with for evaluation of her anemia.  I have discussed the risk and benefits involved and patient is agreeable to proceed with the procedure.  She had her Eliquis yesterday on 3/22/2022.  We may  not be able to biopsy this time anything due to anticoagulation use.  Keep n.p.o.  I will fluid hydration  Protonix 40 m IV twice daily  Hold anticoagulation and antiplatelet medications for now  We will recommend further after an EGD      7.  Recent left upper extremity humeral fracture  8.  History of left upper extremity DVT following humeral fracture  Patient was on aspirin Plavix for many years.  Recently she was started on Eliquis for DVT and the Plavix was discontinued and also taking aspirin.    6.  Chronic atrial fibrillation  7.  Peripheral artery disease  Currently on aspirin and Eliquis      Thank you very much for letting me participate in the care of this patient.  Please do not hesitate to call me if you have any questions.    Clarisse Velez MD  [facility]  3/22/2022  14:27 EDT    Please note that portions of this note may have been completed with a voice recognition program.

## 2022-03-22 NOTE — CONSULTS
"Adult Nutrition  Assessment/PES    Patient Name:  Lynne Sanchez  YOB: 1934  MRN: [id number]  Admit Date:  3/21/2022    Assessment Date:  3/22/2022    Comments:      Recommend:    1. Continue NPO as medically appropriate and tolerated. If diet doesn't advance in > 3-5 days consider nutrition support as medically appropriate and tolerated.   2. Consider a renal MVI with minerals daily.  3. Consider an iron supplement daily as appropriate.   4. Continue to monitor and replace electrolytes PRN.    RD to follow pt and is available PRN.       Reason for Assessment     Row Name 03/22/22 1003          Reason for Assessment    Reason For Assessment per organizational policy;diagnosis/disease state;identified at risk by screening criteria (P)      Diagnosis hematological/related complications;cardiac disease;diabetes diagnosis/complications;renal disease;endocrine conditions (P)      Identified At Risk by Screening Criteria MST SCORE 2+;unintentional loss of 10 lbs or more in the past 2 mos (P)                   Anthropometrics     Row Name 03/22/22 1005          Anthropometrics    Height 167.6 cm (66\") (P)      Weight 60.3 kg (132 lb 15 oz) (P)   abw     Height for Calculation 1.676 m (5' 6\") (P)      Weight for Calculation 60.3 kg (132 lb 15 oz) (P)   abw            Ideal Body Weight (IBW)    Retired Ideal Body Weight (IBW) (kg) 59.58 (P)      Retired % Ideal Body Weight 101.22 (P)             Retired Body Mass Index (BMI)    Retired BMI (kg/m2) 21.5 (P)                 Labs/Tests/Procedures/Meds     Row Name 03/22/22 1003          Labs/Procedures/Meds    Lab Results Reviewed reviewed, pertinent (P)      Lab Results Comments High BUN, creat (P)             Medications    Pertinent Medications Reviewed reviewed, pertinent (P)      Pertinent Medications Comments lipitor, novolog, synthroid, lopressor, pantoprazole, NaCl (P)                 Physical Findings     Row Name 03/22/22 1004          Physical " "Findings    Overall Physical Appearance missing teeth, right gluteal wound (P)                 Estimated/Assessed Needs - Anthropometrics     Row Name 03/22/22 1005          Anthropometrics    Height 167.6 cm (66\") (P)      Weight 60.3 kg (132 lb 15 oz) (P)   abw     Height for Calculation 1.676 m (5' 6\") (P)      Weight for Calculation 60.3 kg (132 lb 15 oz) (P)   abw            Estimated/Assessed Needs    Additional Documentation KCAL/KG (Group);Protein Requirements (Group);[place]-St. Jeor Equation (Group);Estimated Calorie Needs (Group);Fluid Requirements (Group) (P)             Estimated Calorie Needs    Estimated Calorie Requirement (kcal/day) 1500 (P)      Estimated Calorie Need Method kcal/kg;[place]-St Jeor (P)             KCAL/KG    KCAL/KG 20 Kcal/Kg (kcal);30 Kcal/Kg (kcal);25 Kcal/Kg (kcal) (P)      20 Kcal/Kg (kcal) 1206 (P)      25 Kcal/Kg (kcal) 1507.5 (P)      30 Kcal/Kg (kcal) 1809 (P)             [place]-St. Jeor Equation    RMR ([place]-St. Jeor Equation) 1054.75 (P)      [place]-St. Jeor Activity Factors 1.4 - 1.5 (P)      Activity Factors ([place]-St. Jeor) 1476.65 - 1582.125 (P)             Protein Requirements    Weight Used For Protein Calculations 60.3 kg (132 lb 15 oz) (P)      Est Protein Requirement Amount (gms/kg) 1.0 gm protein (P)      Estimated Protein Requirements (gms/day) 60.3 (P)             Fluid Requirements    Fluid Requirements (mL/day) 1500 (P)      Estimated Fluid Requirement Method other (see comments) (P)   1 mL/kcal     RDA Method (mL) 1500 (P)                 Nutrition Prescription Ordered     Row Name 03/22/22 1006          Nutrition Prescription PO    Current PO Diet NPO (P)                        Problem/Interventions:   Problem 1     Row Name 03/22/22 1006          Nutrition Diagnoses Problem 1    Problem 1 Predicted Suboptimal Intake (P)      Etiology (related to) Medical Diagnosis (P)      Cardiac CHF;HTN (P)      Hematological Chronic anemia (P)      " Signs/Symptoms (evidenced by) NPO;Unintended Weight Change (P)      Unintended Weight Change Loss (P)                 Problem 2     Row Name 03/22/22 1007          Nutrition Diagnoses Problem 2    Problem 2 Increased Nutrient Needs (P)      Macronutrient Kcal;Protein (P)      Etiology (related to) Medical Diagnosis (P)      Hematological Chronic anemia (P)      Skin Pressure injury (P)      Signs/Symptoms (evidenced by) Report/Observation (P)      Reported/Observed By RD/Tech (P)                     Intervention Goal     Row Name 03/22/22 1007          Intervention Goal    General Disease management/therapy;Maintain nutrition;Provide information regarding MNT for treatment/condition;Reduce/improve symptoms;Improved nutrition related lab(s);Meet nutritional needs for age/condition (P)      PO Initiate feeding;Establish PO;Tolerate PO;Meet estimated needs (P)      Weight Maintain weight (P)                 Nutrition Intervention     Row Name 03/22/22 1007          Nutrition Intervention    RD/Tech Action Follow Tx progress;Care plan reviewd;Await begin PO (P)                 Nutrition Prescription     Row Name 03/22/22 1007          Nutrition Prescription PO    PO Prescription Other (comment) (P)   continue NPO as medically appropriate and tolerated            Other Orders    Obtain Weight Daily (P)      Obtain Weight Ordered? No, recommended (P)      Supplement Vitamin mineral supplement (P)   renal MVI     Supplement Ordered? No, recommended (P)      Other continue to monitor and replace electrolytes PRN (P)                 Education/Evaluation     Row Name 03/22/22 1008          Education    Education Will Instruct as appropriate (P)             Monitor/Evaluation    Monitor Per protocol;I&O;Pertinent labs;Weight;Skin status;Symptoms (P)                  Electronically signed by:  Vilma Michael  03/22/22 10:10 EDT

## 2022-03-22 NOTE — THERAPY EVALUATION
Patient Name: Lynne Sanchez  : 1934    MRN: [id number]                              Today's Date: 3/22/2022       Admit Date: 3/21/2022    Visit Dx:     ICD-10-CM ICD-9-CM   1. Melena  K92.1 578.1   2. Blood loss anemia  D50.0 280.0   3. Anemia requiring transfusions  D64.9 285.9     Patient Active Problem List   Diagnosis   • Chronic atrial fibrillation   • Valvular heart disease   • Diabetes mellitus type II, controlled ([facility])   • Normocytic anemia   • Chronic gastritis   • Peripheral arterial disease ([facility])   • Cerebrovascular accident (CVA) due to thrombosis of left anterior cerebral artery ([facility])   • Thrombocytopenia ([facility])   • Cardiac pacemaker in situ   • Chronic congestive heart failure ([facility])   • Functional heart murmur   • Glaucoma   • Hyperlipidemia   • Hypertensive disorder   • Hypothyroidism   • Mass of parotid gland   • Neuropathy   • Chronic renal impairment, stage 4 (severe) ([facility])   • Impairment of balance   • Impaired mobility   • Muscle weakness of lower extremity   • Chronic pain of both knees   • Secondary cataract of both eyes   • 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 ([facility])   • Suspected glaucoma of both eyes   • Acute deep vein thrombosis of left upper extremity ([facility])   • Deep venous thrombosis of left upper limb ([facility])   • Secondary cataract of both eyes   • Right retinal detachment   • Stable proliferative diabetic retinopathy of both eyes ([facility])   • Swelling of left extremity   • Closed nondisplaced fracture of surgical neck of left humerus with routine healing   • Debility   • Left arm swelling   • 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 ([facility])   • Secondary hyperparathyroidism of renal origin ([facility])   • Vitamin D deficiency   • Anemia requiring transfusions   • Melena     Past Medical History:   Diagnosis Date   • Acute deep vein thrombosis of left upper extremity ([facility])    • Anemia    • Asthma    • CHF (congestive heart  failure) ([facility])    • Chronic atrial fibrillation ([facility])    • Deep venous thrombosis of left upper limb ([facility])    • Diabetes mellitus, type 2 ([facility])    • Diarrhea    • GERD (gastroesophageal reflux disease)    • Glaucoma    • H/O transfusion of whole blood    • Heart attack ([facility])    • Heart murmur    • History of transfusion    • Hyperlipidemia    • Hypertension    • Kidney disease     patient not aware of what exact diagnosis is    • Osteomyelitis ([facility])    • Osteomyelitis of left foot ([facility]) 10/3/2017   • Peripheral vascular disease ([facility])    • Right retinal detachment    • Secondary cataract of both eyes    • 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)    • Stroke ([facility])    • Swelling of left extremity    • Urinary tract infection    • Wears glasses      Past Surgical History:   Procedure Laterality Date   • AMPUTATION DIGIT Left 7/5/2018    Procedure: Left Great toe amputation;  Surgeon: Prakash Carrington MD;  Location:  [facility];  Service: Vascular   • AMPUTATION DIGIT Left 8/27/2018    Procedure: SECOND TOE AMPUTATION DIGIT LEFT;  Surgeon: Prakash Carrington MD;  Location:  [facility];  Service: General   • APPENDECTOMY     • BONE MARROW ASPIRATION     • CARDIAC ABLATION     • CARDIAC CATHETERIZATION N/A 10/10/2017    Procedure: Peripheral angiography;  Surgeon: Kan Pelaez MD;  Location:  [facility];  Service:    • CARDIAC CATHETERIZATION N/A 10/10/2017    Procedure: Angioplasty-peripheral;  Surgeon: Kan Pelaez MD;  Location:  [facility];  Service:    • CARDIAC CATHETERIZATION N/A 10/10/2017    Procedure: Atherectomy-peripheral;  Surgeon: Kan Pelaez MD;  Location:  [facility];  Service:    • CARDIAC ELECTROPHYSIOLOGY PROCEDURE N/A 5/3/2018    Procedure: generator change;  Surgeon: Zan Poole MD;  Location:  [facility];  Service: Cardiovascular   • CARDIOVERSION     • COLONOSCOPY     • ENDOSCOPY N/A  10/9/2017    Procedure: ESOPHAGOGASTRODUODENOSCOPY WITH COLD FORCEP BIOPSY;  Surgeon: Clifton Hyatt MD;  Location: [facility] ENDOSCOPY;  Service:    • EYE SURGERY Bilateral     CATARACTS   • INTERVENTIONAL RADIOLOGY PROCEDURE N/A 10/10/2017    Procedure: Abdominal Aortagram with Runoff;  Surgeon: Kan Pelaez MD;  Location: [facility] CATH INVASIVE LOCATION;  Service:    • PACEMAKER IMPLANTATION      around 2008 then replaced 2018      General Information     Row Name 03/22/22 1245          OT Time and Intention    Document Type evaluation  -SD     Mode of Treatment occupational therapy  -SD     Row Name 03/22/22 1245          General Information    Patient Profile Reviewed yes  -SD     Prior Level of Function independent:;all household mobility  has been at [facility] x 3 weeks. Normally lives with her , has a SPC. SC, raised commode.  -SD     Existing Precautions/Restrictions fall;shoulder;left;other (see comments)  LUE in sling  -SD     Barriers to Rehab medically complex;previous functional deficit  -SD     Row Name 03/22/22 1245          Living Environment    People in Home spouse  -SD     Row Name 03/22/22 1245          Stairs Within Home, Primary    Stairs, Within Home, Primary patient reports her home has stairs inside, working on having a stair lift installed.  -SD     Row Name 03/22/22 1245          Cognition    Orientation Status (Cognition) oriented x 4  -SD     Row Name 03/22/22 1245          Safety Issues, Functional Mobility    Safety Issues Affecting Function (Mobility) safety precautions follow-through/compliance;safety precaution awareness;positioning of assistive device;awareness of need for assistance;friction/shear risk;insight into deficits/self-awareness  -SD     Impairments Affecting Function (Mobility) balance;endurance/activity tolerance;pain;strength  -SD           User Key  (r) = Recorded By, (t) = Taken By, (c) = Cosigned By    Initials Name Provider Type    SD [place],  BISI Stephens Occupational Therapist                 Mobility/ADL's     Row Name 03/22/22 1249          Bed Mobility    Bed Mobility supine-sit;sit-supine  -SD     Supine-Sit [place] (Bed Mobility) minimum assist (75% patient effort)  -SD     Sit-Supine [place] (Bed Mobility) minimum assist (75% patient effort)  -SD     Assistive Device (Bed Mobility) bed rails;head of bed elevated  -SD     Row Name 03/22/22 1249          Transfers    Transfers sit-stand transfer  -SD     Sit-Stand [place] (Transfers) contact guard  -SD     Row Name 03/22/22 1249          Sit-Stand Transfer    Assistive Device (Sit-Stand Transfers) walker, nati  -SD     [facility] Name 03/22/22 1249          Functional Mobility    Functional Mobility- Ind. Level contact guard assist  -SD     Functional Mobility- Device nati walker  -SD     Functional Mobility-Distance (Feet) 12  -SD     Functional Mobility- Safety Issues balance decreased during turns;sequencing ability decreased;step length decreased;weight-shifting ability decreased  -[facility] Name 03/22/22 1249          Activities of Daily Living    BADL Assessment/Intervention bathing;upper body dressing;lower body dressing;grooming;feeding;toileting  -[facility] Name 03/22/22 1249          Bathing Assessment/Intervention    [place] Level (Bathing) moderate assist (50% patient effort)  -[facility] Name 03/22/22 1249          Upper Body Dressing Assessment/Training    [place] Level (Upper Body Dressing) minimum assist (75% patient effort)  -[facility] Name 03/22/22 1249          Lower Body Dressing Assessment/Training    [place] Level (Lower Body Dressing) moderate assist (50% patient effort)  -[facility] Name 03/22/22 1249          Grooming Assessment/Training    [place] Level (Grooming) standby assist  -[facility] Name 03/22/22 1249          Self-Feeding Assessment/Training    [place] Level (Feeding) supervision  -[facility] Name 03/22/22 1249          Toileting  Assessment/Training    [place] Level (Toileting) moderate assist (50% patient effort)  -SD           User Key  (r) = Recorded By, (t) = Taken By, (c) = Cosigned By    Initials Name Provider Type    Kat Medellin OT Occupational Therapist               Obj/Interventions     Row Name 03/22/22 1250          Range of Motion Comprehensive    Comment, General Range of Motion LUE in sling, distal ROM WFL  -[facility] Name 03/22/22 1250          Strength Comprehensive (MMT)    Comment, General Manual Muscle Testing (MMT) Assessment RUE 3+/5; LUE NT.  -[facility] Name 03/22/22 1250          Motor Skills    Therapeutic Exercise elbow/forearm;wrist;hand  patient was able to perform distal ROM exercises sitting EOB that she's been doing at rehab  -SD           User Key  (r) = Recorded By, (t) = Taken By, (c) = Cosigned By    Initials Name Provider Type    Kat Medellin OT Occupational Therapist               Goals/Plan     Row Name 03/22/22 1256          Transfer Goal 1 (OT)    Activity/Assistive Device (Transfer Goal 1, OT) sit-to-stand/stand-to-sit;walker, nati  -SD     [place] Level/Cues Needed (Transfer Goal 1, OT) standby assist  -SD     Time Frame (Transfer Goal 1, OT) long term goal (LTG)  -SD     Progress/Outcome (Transfer Goal 1, OT) goal ongoing  -SD     Row Name 03/22/22 1256          Dressing Goal 1 (OT)    Activity/Device (Dressing Goal 1, OT) lower body dressing  -SD     Wallback/Cues Needed (Dressing Goal 1, OT) minimum assist (75% or more patient effort)  -SD     Time Frame (Dressing Goal 1, OT) 2 weeks  -SD     Progress/Outcome (Dressing Goal 1, OT) goal ongoing  -SD     Row Name 03/22/22 1256          Toileting Goal 1 (OT)    Activity/Device (Toileting Goal 1, OT) toileting skills, all;commode, bedside without drop arms;commode  -SD     [place] Level/Cues Needed (Toileting Goal 1, OT) minimum assist (75% or more patient effort)  -SD     Time Frame (Toileting Goal 1, OT) 2 weeks   -SD     Progress/Outcome (Toileting Goal 1, OT) goal ongoing  -SD     Row Name 03/22/22 1256          Therapy Assessment/Plan (OT)    Planned Therapy Interventions (OT) activity tolerance training;adaptive equipment training;BADL retraining;patient/caregiver education/training;ROM/therapeutic exercise;strengthening exercise;transfer/mobility retraining  -SD           User Key  (r) = Recorded By, (t) = Taken By, (c) = Cosigned By    Initials Name Provider Type    Kat Medellin OT Occupational Therapist               Clinical Impression     Row Name 03/22/22 1251          Pain Assessment    Pretreatment Pain Rating 0/10 - no pain  -SD     Posttreatment Pain Rating 0/10 - no pain  -SD     Row Name 03/22/22 1251          Plan of Care Review    Plan of Care Reviewed With patient  -SD     Progress no change  -SD     Outcome Evaluation OT eval completed. Patient presents deficits in strength, endurance, balance, mobility and ADL performance. Patient completed bed mobility with min A, transfer and functional mobility x 12' with CGA using nati walker. Patient requires min-mod A for ADLs, LUE in sling. Patient was able to perform distal ROM exercises sitting EOB. Patient to return to [facility] at discharge.  -SD     Row Name 03/22/22 1251          Therapy Assessment/Plan (OT)    Patient/Family Therapy Goal Statement (OT) Increase strength and mobility  -SD     Rehab Potential (OT) good, to achieve stated therapy goals  -SD     Criteria for Skilled Therapeutic Interventions Met (OT) skilled treatment is necessary  -SD     Therapy Frequency (OT) 3 times/wk  5 times if indicated  -SD     Row Name 03/22/22 1251          Therapy Plan Review/Discharge Plan (OT)    Anticipated Discharge Disposition (OT) inpatient rehabilitation facility  -SD     Row Name 03/22/22 1251          Vital Signs    Pre SpO2 (%) 98  -SD     O2 Delivery Pre Treatment supplemental O2  -SD     Intra SpO2 (%) 95  -SD     O2 Delivery Intra Treatment room air   -SD     Post SpO2 (%) 98  -SD     O2 Delivery Post Treatment supplemental O2  -SD     Row Name 03/22/22 1251          Positioning and Restraints    Pre-Treatment Position in bed  -SD     Post Treatment Position bed  -SD     In Bed fowlers;call light within reach;encouraged to call for assist;exit alarm on;LUE elevated  -SD           User Key  (r) = Recorded By, (t) = Taken By, (c) = Cosigned By    Initials Name Provider Type    Kat Medellin OT Occupational Therapist               Outcome Measures     Row Name 03/22/22 1258          How much help from another is currently needed...    Putting on and taking off regular lower body clothing? 2  -SD     Bathing (including washing, rinsing, and drying) 2  -SD     Toileting (which includes using toilet bed pan or urinal) 2  -SD     Putting on and taking off regular upper body clothing 3  -SD     Taking care of personal grooming (such as brushing teeth) 3  -SD     Eating meals 3  -SD     AM-PAC 6 Clicks Score (OT) 15  -SD     Row Name 03/22/22 1258          Functional Assessment    Outcome Measure Options AM-PAC 6 Clicks Daily Activity (OT)  -SD           User Key  (r) = Recorded By, (t) = Taken By, (c) = Cosigned By    Initials Name Provider Type    Kat Medellin OT Occupational Therapist                Occupational Therapy Education                 Title: PT OT SLP Therapies (In Progress)     Topic: Occupational Therapy (In Progress)     Point: ADL training (Done)     Description:   Instruct learner(s) on proper safety adaptation and remediation techniques during self care or transfers.   Instruct in proper use of assistive devices.              Learning Progress Summary           Patient Acceptance, E,TB, VU by SD at 3/22/2022 1303    Comment: Benefit of OT; OT POC                   Point: Home exercise program (Not Started)     Description:   Instruct learner(s) on appropriate technique for monitoring, assisting and/or progressing therapeutic  exercises/activities.              Learner Progress:  Not documented in this visit.          Point: Precautions (Not Started)     Description:   Instruct learner(s) on prescribed precautions during self-care and functional transfers.              Learner Progress:  Not documented in this visit.          Point: Body mechanics (Not Started)     Description:   Instruct learner(s) on proper positioning and spine alignment during self-care, functional mobility activities and/or exercises.              Learner Progress:  Not documented in this visit.                      User Key     Initials Effective Dates Name Provider Type Discipline    SD 06/16/21 -  Kat Quiros OT Occupational Therapist OT              OT Recommendation and Plan  Planned Therapy Interventions (OT): activity tolerance training, adaptive equipment training, BADL retraining, patient/caregiver education/training, ROM/therapeutic exercise, strengthening exercise, transfer/mobility retraining  Therapy Frequency (OT): 3 times/wk (5 times if indicated)  Plan of Care Review  Plan of Care Reviewed With: patient  Progress: no change  Outcome Evaluation: OT eval completed. Patient presents deficits in strength, endurance, balance, mobility and ADL performance. Patient completed bed mobility with min A, transfer and functional mobility x 12' with CGA using nati walker. Patient requires min-mod A for ADLs, LUE in sling. Patient was able to perform distal ROM exercises sitting EOB. Patient to return to [facility] at discharge.     Time Calculation:    Time Calculation- OT     Row Name 03/22/22 1304             Time Calculation- OT    OT Start Time 1008  -SD      OT Received On 03/22/22  -SD      OT Goal Re-Cert Due Date 04/01/22  -SD              Untimed Charges    OT Eval/Re-eval Minutes 45  -SD              Total Minutes    Untimed Charges Total Minutes 45  -SD       Total Minutes 45  -SD            User Key  (r) = Recorded By, (t) = Taken By, (c) = Cosigned By     Initials Name Provider Type    Kat Medellin OT Occupational Therapist              Therapy Charges for Today     Code Description Service Date Service Provider Modifiers Qty    [id number]  OT EVAL MOD COMPLEXITY 3 3/22/2022 Kat Quiros OT GO 1               Kat Quiros OT  3/22/2022

## 2022-03-22 NOTE — CASE MANAGEMENT/SOCIAL WORK
Discharge Planning Assessment  [facility]     Patient Name: Lynne Sanchez  MRN: [id number]  Today's Date: 3/22/2022    Admit Date: 3/21/2022     Discharge Needs Assessment     Row Name 03/22/22 1428       Living Environment    People in Home spouse    Duration at Residence at [facility] rehab    Primary Care Provided by self    Provides Primary Care For no one    Family Caregiver if Needed child(cathie), adult    Quality of Family Relationships supportive    Able to Return to Prior Arrangements yes       Resource/Environmental Concerns    Resource/Environmental Concerns none    Transportation Concerns none       Transition Planning    Patient/Family Anticipates Transition to inpatient rehabilitation facility    Patient/Family Anticipated Services at Transition rehabilitation services       Discharge Needs Assessment    Readmission Within the Last 30 Days no previous admission in last 30 days    Current Outpatient/Agency/Support Group inpatient rehabilitation facility    Concerns to be Addressed discharge planning    Anticipated Changes Related to Illness none    Equipment Needed After Discharge none    Outpatient/Agency/Support Group Needs inpatient rehabilitation facility    Discharge Facility/Level of Care Needs rehabilitation facility    Current Discharge Risk physical impairment               Discharge Plan     Row Name 03/22/22 0027       Plan    Plan pt in OR; called and spoke with daughter Janneth Main; stated pt in rehab at [facility] at present due to injury a month ago; would like her to go back to complete rehab and then home with assistance; they are working on inhome assistance when discharged from [facility]; stated before injury she was able to totally care for herself; lw noted in chart; completed moon with daughter; informed would leave copy in room, done; stated her parents are financially okay so no issues there; cm will continue to follow              Continued Care and Services - Admitted Since  3/21/2022    Coordination has not been started for this encounter.       Expected Discharge Date and Time     Expected Discharge Date Expected Discharge Time    Mar 23, 2022          Demographic Summary     Row Name 03/22/22 1425       General Information    Admission Type observation    Arrived From emergency department    Required Notices Provided Observation Status Notice  completed fernandes with daughter    Referral Source admission list    Reason for Consult discharge planning    Preferred Language English               Functional Status     Row Name 03/22/22 1427       Functional Status    Usual Activity Tolerance fair    Current Activity Tolerance fair    Functional Status Comments fair at moment due to injury a month ago; before that able to care for self totally; at [facility] for rehab       Functional Status, IADL    Medications completely dependent    Meal Preparation completely dependent    Housekeeping completely dependent    Laundry completely dependent    Shopping completely dependent       Mental Status Summary    Recent Changes in Mental Status/Cognitive Functioning no changes               Psychosocial    No documentation.                Abuse/Neglect    No documentation.                Legal    No documentation.                Substance Abuse    No documentation.                Patient Forms    No documentation.                   P[employer] Painting RN

## 2022-03-23 ENCOUNTER — TELEPHONE (OUTPATIENT)
Dept: INTERNAL MEDICINE | Facility: CLINIC | Age: 87
End: 2022-03-23

## 2022-03-23 LAB
ACINETOBACTER SCREEN CX: NORMAL
ALBUMIN SERPL-MCNC: 3 G/DL (ref 3.5–5.2)
ALBUMIN/GLOB SERPL: 1.5 G/DL
ALP SERPL-CCNC: 101 U/L (ref 39–117)
ALT SERPL W P-5'-P-CCNC: 10 U/L (ref 1–33)
ANION GAP SERPL CALCULATED.3IONS-SCNC: 9.9 MMOL/L (ref 5–15)
AST SERPL-CCNC: 19 U/L (ref 1–32)
BASOPHILS # BLD AUTO: 0.02 10*3/MM3 (ref 0–0.2)
BASOPHILS NFR BLD AUTO: 0.3 % (ref 0–1.5)
BH BB BLOOD EXPIRATION DATE: NORMAL
BH BB BLOOD EXPIRATION DATE: NORMAL
BH BB BLOOD TYPE BARCODE: 5100
BH BB BLOOD TYPE BARCODE: NORMAL
BH BB DISPENSE STATUS: NORMAL
BH BB DISPENSE STATUS: NORMAL
BH BB PRODUCT CODE: NORMAL
BH BB PRODUCT CODE: NORMAL
BH BB UNIT NUMBER: NORMAL
BH BB UNIT NUMBER: NORMAL
BILIRUB SERPL-MCNC: 0.8 MG/DL (ref 0–1.2)
BUN SERPL-MCNC: 21 MG/DL (ref 8–23)
BUN/CREAT SERPL: 20 (ref 7–25)
CALCIUM SPEC-SCNC: 8.3 MG/DL (ref 8.6–10.5)
CHLORIDE SERPL-SCNC: 105 MMOL/L (ref 98–107)
CO2 SERPL-SCNC: 21.1 MMOL/L (ref 22–29)
CREAT SERPL-MCNC: 1.05 MG/DL (ref 0.57–1)
CROSSMATCH INTERPRETATION: NORMAL
CROSSMATCH INTERPRETATION: NORMAL
DEPRECATED RDW RBC AUTO: 58.2 FL (ref 37–54)
EGFRCR SERPLBLD CKD-EPI 2021: 51.5 ML/MIN/1.73
EOSINOPHIL # BLD AUTO: 0.12 10*3/MM3 (ref 0–0.4)
EOSINOPHIL NFR BLD AUTO: 1.7 % (ref 0.3–6.2)
ERYTHROCYTE [DISTWIDTH] IN BLOOD BY AUTOMATED COUNT: 17.9 % (ref 12.3–15.4)
GLOBULIN UR ELPH-MCNC: 2 GM/DL
GLUCOSE BLDC GLUCOMTR-MCNC: 144 MG/DL (ref 70–130)
GLUCOSE BLDC GLUCOMTR-MCNC: 200 MG/DL (ref 70–130)
GLUCOSE BLDC GLUCOMTR-MCNC: 205 MG/DL (ref 70–130)
GLUCOSE BLDC GLUCOMTR-MCNC: 218 MG/DL (ref 70–130)
GLUCOSE SERPL-MCNC: 153 MG/DL (ref 65–99)
HCT VFR BLD AUTO: 23.4 % (ref 34–46.6)
HGB BLD-MCNC: 7.3 G/DL (ref 12–15.9)
IMM GRANULOCYTES # BLD AUTO: 0.06 10*3/MM3 (ref 0–0.05)
IMM GRANULOCYTES NFR BLD AUTO: 0.8 % (ref 0–0.5)
LYMPHOCYTES # BLD AUTO: 1.25 10*3/MM3 (ref 0.7–3.1)
LYMPHOCYTES NFR BLD AUTO: 17.3 % (ref 19.6–45.3)
MCH RBC QN AUTO: 28.6 PG (ref 26.6–33)
MCHC RBC AUTO-ENTMCNC: 31.2 G/DL (ref 31.5–35.7)
MCV RBC AUTO: 91.8 FL (ref 79–97)
MONOCYTES # BLD AUTO: 0.69 10*3/MM3 (ref 0.1–0.9)
MONOCYTES NFR BLD AUTO: 9.6 % (ref 5–12)
NEUTROPHILS NFR BLD AUTO: 5.07 10*3/MM3 (ref 1.7–7)
NEUTROPHILS NFR BLD AUTO: 70.3 % (ref 42.7–76)
NRBC BLD AUTO-RTO: 0 /100 WBC (ref 0–0.2)
PLATELET # BLD AUTO: 178 10*3/MM3 (ref 140–450)
PMV BLD AUTO: 9 FL (ref 6–12)
POTASSIUM SERPL-SCNC: 4.3 MMOL/L (ref 3.5–5.2)
PROT SERPL-MCNC: 5 G/DL (ref 6–8.5)
RBC # BLD AUTO: 2.55 10*6/MM3 (ref 3.77–5.28)
SODIUM SERPL-SCNC: 136 MMOL/L (ref 136–145)
UNIT  ABO: NORMAL
UNIT  ABO: NORMAL
UNIT  RH: NORMAL
UNIT  RH: NORMAL
WBC NRBC COR # BLD: 7.21 10*3/MM3 (ref 3.4–10.8)

## 2022-03-23 PROCEDURE — 63710000001 ATORVASTATIN 40 MG TABLET: Performed by: INTERNAL MEDICINE

## 2022-03-23 PROCEDURE — 99214 OFFICE O/P EST MOD 30 MIN: CPT | Performed by: PHYSICIAN ASSISTANT

## 2022-03-23 PROCEDURE — G0378 HOSPITAL OBSERVATION PER HR: HCPCS

## 2022-03-23 PROCEDURE — 63710000001 METOPROLOL TARTRATE 50 MG TABLET: Performed by: INTERNAL MEDICINE

## 2022-03-23 PROCEDURE — 97110 THERAPEUTIC EXERCISES: CPT

## 2022-03-23 PROCEDURE — 97530 THERAPEUTIC ACTIVITIES: CPT

## 2022-03-23 PROCEDURE — A9270 NON-COVERED ITEM OR SERVICE: HCPCS | Performed by: INTERNAL MEDICINE

## 2022-03-23 PROCEDURE — 63710000001 MELATONIN 3 MG TABLET: Performed by: INTERNAL MEDICINE

## 2022-03-23 PROCEDURE — 82962 GLUCOSE BLOOD TEST: CPT

## 2022-03-23 PROCEDURE — 80053 COMPREHEN METABOLIC PANEL: CPT | Performed by: INTERNAL MEDICINE

## 2022-03-23 PROCEDURE — 99225 PR SBSQ OBSERVATION CARE/DAY 25 MINUTES: CPT | Performed by: INTERNAL MEDICINE

## 2022-03-23 PROCEDURE — 63710000001 LEVOTHYROXINE 50 MCG TABLET: Performed by: INTERNAL MEDICINE

## 2022-03-23 PROCEDURE — 85025 COMPLETE CBC W/AUTO DIFF WBC: CPT | Performed by: INTERNAL MEDICINE

## 2022-03-23 PROCEDURE — 97116 GAIT TRAINING THERAPY: CPT

## 2022-03-23 RX ORDER — PANTOPRAZOLE SODIUM 40 MG/1
40 TABLET, DELAYED RELEASE ORAL
Status: DISCONTINUED | OUTPATIENT
Start: 2022-03-24 | End: 2022-03-24 | Stop reason: HOSPADM

## 2022-03-23 RX ORDER — GLIMEPIRIDE 2 MG/1
2 TABLET ORAL
Qty: 30 TABLET | Refills: 5 | Status: SHIPPED | OUTPATIENT
Start: 2022-03-23 | End: 2023-01-20 | Stop reason: SDUPTHER

## 2022-03-23 RX ORDER — PANTOPRAZOLE SODIUM 40 MG/1
40 TABLET, DELAYED RELEASE ORAL DAILY
Qty: 30 TABLET | Refills: 5 | Status: SHIPPED | OUTPATIENT
Start: 2022-03-23 | End: 2022-10-31

## 2022-03-23 RX ADMIN — LATANOPROST 1 DROP: 50 SOLUTION OPHTHALMIC at 10:20

## 2022-03-23 RX ADMIN — MELATONIN TAB 3 MG 3 MG: 3 TAB at 21:15

## 2022-03-23 RX ADMIN — Medication 10 ML: at 10:14

## 2022-03-23 RX ADMIN — PANTOPRAZOLE SODIUM 40 MG: 40 INJECTION, POWDER, FOR SOLUTION INTRAVENOUS at 10:16

## 2022-03-23 RX ADMIN — METOPROLOL TARTRATE 100 MG: 50 TABLET ORAL at 10:10

## 2022-03-23 RX ADMIN — ATORVASTATIN CALCIUM 40 MG: 40 TABLET, FILM COATED ORAL at 10:10

## 2022-03-23 RX ADMIN — METOPROLOL TARTRATE 100 MG: 50 TABLET ORAL at 21:15

## 2022-03-23 RX ADMIN — TIMOLOL MALEATE 1 DROP: 5 SOLUTION/ DROPS OPHTHALMIC at 10:22

## 2022-03-23 RX ADMIN — LEVOTHYROXINE SODIUM 50 MCG: 50 TABLET ORAL at 10:11

## 2022-03-23 NOTE — CASE MANAGEMENT/SOCIAL WORK
Discharge Planning Assessment   [facility]     Patient Name: Lynne Sanchez  MRN: [id number]  Today's Date: 3/23/2022    Admit Date: 3/21/2022     Discharge Needs Assessment    No documentation.                Discharge Plan     Row Name 03/23/22 1469       Plan    Plan Comments patient, son , and spouse is upset because not able to return to [facility] today  because  her insurance has not approved the stay  as of yet,   patient is setting side of bed,  patient spouse is wanting to take her home,   explained to patient and spouse earlier today  that [facility] was waiting on precert from the insruance, Nurse Melba and Dr Vasquez informed of  patient  wanting to take her home              Continued Care and Services - Admitted Since 3/21/2022     Destination     Service Provider Request Status Selected Services Address Phone Fax Patient Preferred    [facility]  Pending - No Request Sent N/A [address] [place] [address] [phone] --              Expected Discharge Date and Time     Expected Discharge Date Expected Discharge Time    Mar 23, 2022          Demographic Summary    No documentation.                Functional Status    No documentation.                Psychosocial    No documentation.                Abuse/Neglect    No documentation.                Legal    No documentation.                Substance Abuse    No documentation.                Patient Forms    No documentation.                   Deborah Ovalle RN

## 2022-03-23 NOTE — PROGRESS NOTES
[facility]    Name:  Lynne Sanchez   Age:  87 y.o.  Sex:  female  :  1934  MRN:  [id number]   Visit Number:  [id number]  Admission Date:  3/21/2022  Date Of Service:  22  Primary Care Physician:  Elizabeth Easton APRN     LOS: 0 days :  Patient Care Team:  Elizabeth Easton APRN as PCP - General (Family Medicine)  Paxton Vasquez DO as PCP - Internal Medicine ([facility])  Vilma Méndez PA-C as PCP - Family Medicine ([facility])  Davian Faria MD as Consulting Physician (Nephrology)  Radha Boone RN as Registered Nurse:    Chief Complaint:     Blood Loss Anemia    Subjective / Interval History:   Patient was resting comfortably this morning in her bed.  She tolerated her clear liquid diet well last night and this morning.  She denied any signs of GI bleeding or abdominal pain.  She felt well and was questioning about a stair lift to be used at home when she returns from the nursing facility.  Unfortunately, her discharge was delayed due to pre-CERT pending.      Review of Systems:   Review of Systems   Constitutional: Negative for chills, fatigue and fever.   HENT: Negative for congestion, ear pain, rhinorrhea, sinus pressure and sore throat.    Eyes: Negative for visual disturbance.   Respiratory: Negative for cough, chest tightness, shortness of breath and wheezing.    Cardiovascular: Negative for chest pain, palpitations and leg swelling.   Gastrointestinal: Negative for abdominal pain, blood in stool, constipation, diarrhea, nausea and vomiting.   Endocrine: Negative for polydipsia and polyuria.   Genitourinary: Negative for dysuria and hematuria.   Musculoskeletal: Negative for arthralgias and back pain.   Skin: Negative for rash.   Neurological: Negative for dizziness, light-headedness, numbness and headaches.   Psychiatric/Behavioral: Negative for dysphoric mood and sleep disturbance. The patient is  not nervous/anxious.          Vital Signs:    Temp:  [97.8 °F (36.6 °C)-98.7 °F (37.1 °C)] 98 °F (36.7 °C)  Heart Rate:  [62-73] 72  Resp:  [16-18] 18  BP: (114-149)/(48-66) 141/61    Intake and output:    I/O last 3 completed shifts:  In: 1922 [P.O.:240; I.V.:1050; Blood:632]  Out: 1600 [Urine:1600]  I/O this shift:  In: 360 [P.O.:360]  Out: -     Physical Examination:  Physical Exam  Vitals and nursing note reviewed.   Constitutional:       Appearance: Normal appearance. She is well-developed.      Comments: Frail elderly female in no acute distress   HENT:      Head: Normocephalic and atraumatic.      Nose: Nose normal.      Mouth/Throat:      Mouth: Mucous membranes are moist.      Pharynx: No oropharyngeal exudate.   Eyes:      General: No scleral icterus.     Conjunctiva/sclera: Conjunctivae normal.      Pupils: Pupils are equal, round, and reactive to light.   Neck:      Thyroid: No thyromegaly.   Cardiovascular:      Rate and Rhythm: Normal rate and regular rhythm.      Heart sounds: Normal heart sounds. No murmur heard.    No friction rub. No gallop.   Pulmonary:      Effort: Pulmonary effort is normal. No respiratory distress.      Breath sounds: Normal breath sounds. No wheezing.   Abdominal:      General: Bowel sounds are normal. There is no distension.      Palpations: Abdomen is soft.      Tenderness: There is no abdominal tenderness.   Musculoskeletal:         General: No deformity or signs of injury.      Cervical back: Normal range of motion and neck supple.   Lymphadenopathy:      Cervical: No cervical adenopathy.   Skin:     General: Skin is warm and dry.      Findings: No rash.   Neurological:      Mental Status: She is alert and oriented to person, place, and time. Mental status is at baseline.   Psychiatric:         Mood and Affect: Mood normal.         Behavior: Behavior normal.             Laboratory results:  I have reviewed the patient's laboratory results.    Results from last 7 days   Lab  Units 03/23/22  0835 03/22/22  0534 03/21/22  1822   SODIUM mmol/L 136 139 134*   POTASSIUM mmol/L 4.3 4.3 4.4   CHLORIDE mmol/L 105 106 97*   CO2 mmol/L 21.1* 23.4 24.5   BUN mg/dL 21 29* 37*   CREATININE mg/dL 1.05* 1.13* 1.31*   CALCIUM mg/dL 8.3* 8.5* 9.1   BILIRUBIN mg/dL 0.8  --  0.3   ALK PHOS U/L 101  --  110   ALT (SGPT) U/L 10  --  16   AST (SGOT) U/L 19  --  24   GLUCOSE mg/dL 153* 112* 208*     Results from last 7 days   Lab Units 03/23/22  0835 03/22/22  0534 03/21/22  1822   WBC 10*3/mm3 7.21 7.39 8.81   HEMOGLOBIN g/dL 7.3* 7.8* 6.1*   HEMATOCRIT % 23.4* 24.5* 19.8*   PLATELETS 10*3/mm3 178 182 237     Results from last 7 days   Lab Units 03/21/22  1822   INR  1.69*               Radiology results:  I have reviewed the patient's radiology reports.  Imaging Results (Last 24 Hours)     ** No results found for the last 24 hours. **              Medication Review:   I have reviewed the patient's active and prn medications.     Assessment:    Anemia requiring transfusions    Melena        Plan:    Acute on chronic blood loss anemia secondary to GI Bleeding  -Plan for EGD this morning with Dr. Velez   -Hold Eliquis and aspirin  -Status post transfusion of 2 units PRBC, tolerated well.   -  Follow-up CBC today presented slight drop to 7.3 from 7.8.  This might be hemodilution as the patient was on IV fluids.  -  IVs removed in anticipation of discharge which was delayed for possibly tomorrow.  Patient may start Oral pantoprazole in place of IV.       CKD III  - on Retacrit per nephrology as outpatient  - Cr is improved with blood transfusion.     Hypothyroidism  -TSH mildly elevated possibly due to acute disease and Biotin use.  Consider increasing Synthroid after repeat TSH in 1 week at nursing facility.    Left Humerus fracture  - non surgical management, pt/ot    DVT  - eliquis on hold, may restart upon discharge to facility.    Paxton Vasquez DO  03/23/22  16:11 EDT

## 2022-03-23 NOTE — CASE MANAGEMENT/SOCIAL WORK
Discharge Planning Assessment  [facility]     Patient Name: Lynne Sanchez  MRN: [id number]  Today's Date: 3/23/2022    Admit Date: 3/21/2022     Discharge Needs Assessment    No documentation.                Discharge Plan     Row Name 03/23/22 1310       Plan    Plan Comments discharge order is in  but   per Elle at [facility]  they have not recieved  precert as of yet   Dr Vasquez and nurse Melba informed    Row Name 03/23/22 1132       Plan    Plan Comments patient is willing to return to [facility] to complete  rehab    Row Name 03/23/22 1129       Plan    Plan Comments patient  and spouse requesting an order of r stair lift so they can have it  after they are discharged from    [facility] and she goes home.  Got one from the [facility]  but they put chair lift not stair lift.   Explained it is not something that the hosptial set up and order needs to come from Primary care.    Dr Vasquez notified of patient wants needs              Continued Care and Services - Admitted Since 3/21/2022     Destination     Service Provider Request Status Selected Services Address Phone Fax Patient Preferred    [facility]  Pending - No Request Sent N/A [address] [place] [phone] [phone] --              Expected Discharge Date and Time     Expected Discharge Date Expected Discharge Time    Mar 23, 2022          Demographic Summary    No documentation.                Functional Status    No documentation.                Psychosocial    No documentation.                Abuse/Neglect    No documentation.                Legal    No documentation.                Substance Abuse    No documentation.                Patient Forms    No documentation.                   Deborah Ovalle RN

## 2022-03-23 NOTE — PLAN OF CARE
Goal Outcome Evaluation:  Plan of Care Reviewed With: patient        Progress: no change  Outcome Evaluation: VSS att. Pt rested well overnight w/ no c/o pain.

## 2022-03-23 NOTE — PLAN OF CARE
Goal Outcome Evaluation:      Spoke with pt and her  at her bedside.  Pt expressed disappointment in not being discharged today, but seemed more accepting of that news as we visited.  Pt and her  were talkative and our conversation covered their Buddhism background and life in [place].  Pt continues to have a concern for a stair lift to enable her to stay in her home and use her upstairs bedroom and walk-in bathroom.  I will continue to follow to provide emotional and spiritual support.

## 2022-03-23 NOTE — DISCHARGE SUMMARY
[facility]   DISCHARGE SUMMARY      Name:  Lynne Sanchez   Age:  87 y.o.  Sex:  female  :  1934  MRN:  [id number]   Visit Number:  [id number]  Primary Care Physician:  Elizabeth Easton APRN  Date of Discharge:  3/24/2022  Admission Date:  3/21/2022      Discharge Diagnosis:   Patient Active Problem List   Diagnosis   • Chronic atrial fibrillation   • Valvular heart disease   • Diabetes mellitus type II, controlled ([facility])   • Normocytic anemia   • Chronic gastritis   • Peripheral arterial disease ([facility])   • Cerebrovascular accident (CVA) due to thrombosis of left anterior cerebral artery ([facility])   • Thrombocytopenia ([facility])   • Cardiac pacemaker in situ   • Chronic congestive heart failure ([facility])   • Functional heart murmur   • Glaucoma   • Hyperlipidemia   • Hypertensive disorder   • Hypothyroidism   • Mass of parotid gland   • Neuropathy   • Chronic renal impairment, stage 4 (severe) ([facility])   • Impairment of balance   • Impaired mobility   • Muscle weakness of lower extremity   • Chronic pain of both knees   • Secondary cataract of both eyes   • 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 (HCC)   • Suspected glaucoma of both eyes   • Acute deep vein thrombosis of left upper extremity (HCC)   • Deep venous thrombosis of left upper limb (HCC)   • Secondary cataract of both eyes   • Right retinal detachment   • Stable proliferative diabetic retinopathy of both eyes ([facility])   • Swelling of left extremity   • Closed nondisplaced fracture of surgical neck of left humerus with routine healing   • Debility   • Left arm swelling   • 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 (HCC)   • Secondary hyperparathyroidism of renal origin (HCC)   • Vitamin D deficiency   • Anemia requiring transfusions   • Melena       Presenting Problem:  Anemia requiring transfusions [D64.9]       Consults:   Consults     Date and Time Order Name Status  Description    3/21/2022  7:24 PM Gastroenterology (on-call MD unless specified) Completed           Procedures Performed:  Procedure(s):  ESOPHAGOGASTRODUODENOSCOPY with AVM cautery  03/22 1431 UPPER GI ENDOSCOPY      History of Presenting Illness:  Patient is an 87-year-old white female with a history of Fermin's esophagitis, chronic anemia secondary to iron deficiency, CKD, type 2 diabetes mellitus, HFpEF, atrial fibrillation with tacky/bradycardia syndrome status post pacemaker as well as PAD status post PCI in 2017 who presented from [facility] for concerns of low hemoglobin.  It is notable that the patient was recently admitted to [facility] for left proximal humerus fracture as well as a left upper extremity provoked DVT.  She was started on Eliquis prior to being admitted to [facility].  Patient was transferred as her hemoglobin was less than 7 on routine blood work.  On questioning, patient had mentioned having dark stools but was not sure of melena vs being a result of taking iron tablets.    Hospital Course:  Patient was admitted to the medical floor for acute upper GI bleed with blood loss anemia.  She was stabilized and transfused 2 units PRBC on the night of admission and arrangements were made for EGD the following morning.  EGD with Dr. Velez presented a small angiectasia with stigmata of recent bleed in the gastric fundus, 2 small angiectasia's in the duodenum and a few diminutive polyps in the gastric fundus.  3 areas of angiectasia's were cauterized without any major complications.  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  Hemoglobin corrected to 7.8 after transfusion of 2 units.  She was given IV fluids as well and by the time of discharge, hemoglobin was 7.3.      It is notable that the patient's TSH was elevated around 6.  Thyroid dose was not adjusted given acute illness can cause elevated TSH.  She has also been on biotin  which can interfere with lab results.  Biotin was discontinued upon discharge from the hospital and TSH can be followed up in about 1 week.      Patient was initially planned for discharge on 3/23/2022 however due to pre-CERT issues, her discharge to the nursing facility was delayed.  Follow-up hemoglobin was obtained on the day of discharge and presented hemoglobin of 6.9.  Patient was transfused an additional 1 unit and had correction of hemoglobin to 9.3.  Patient will continue to need close monitoring of hemoglobin at the nursing facility as her blood thinners will be restarted on discharge.    As she was stable, arrangements were made for her to return to [facility] for continuation of physical therapy and Occupational Therapy.  She will need follow-up CBC, CMP, TSH in 1 week.  I will continue following the care of the patient in the nursing facility.          Vital Signs:  Temp:  [98.2 °F (36.8 °C)-99.1 °F (37.3 °C)] 98.2 °F (36.8 °C)  Heart Rate:  [68-70] 70  Resp:  [17-18] 18  BP: (103-145)/(43-71) 119/71    Physical Exam:  Physical Exam  Vitals and nursing note reviewed.   Constitutional:       Appearance: Normal appearance. She is well-developed.      Comments: Frail elderly female in no acute distress   HENT:      Head: Normocephalic and atraumatic.   Eyes:      Extraocular Movements: Extraocular movements intact.      Conjunctiva/sclera: Conjunctivae normal.   Cardiovascular:      Rate and Rhythm: Normal rate and regular rhythm.   Pulmonary:      Effort: Pulmonary effort is normal.      Breath sounds: Normal breath sounds.   Abdominal:      General: Abdomen is flat. Bowel sounds are normal. There is no distension.      Palpations: Abdomen is soft.   Musculoskeletal:      Cervical back: Normal range of motion and neck supple.      Comments: Left upper extremity in sling   Skin:     General: Skin is warm and dry.      Findings: No rash.   Neurological:      General: No focal deficit present.       Mental Status: She is alert and oriented to person, place, and time.   Psychiatric:         Mood and Affect: Mood normal.         Behavior: Behavior normal.           Pertinent Lab Results:     Results from last 7 days   Lab Units 03/23/22  0835 03/22/22  0534 03/21/22  1822   SODIUM mmol/L 136 139 134*   POTASSIUM mmol/L 4.3 4.3 4.4   CHLORIDE mmol/L 105 106 97*   CO2 mmol/L 21.1* 23.4 24.5   BUN mg/dL 21 29* 37*   CREATININE mg/dL 1.05* 1.13* 1.31*   CALCIUM mg/dL 8.3* 8.5* 9.1   BILIRUBIN mg/dL 0.8  --  0.3   ALK PHOS U/L 101  --  110   ALT (SGPT) U/L 10  --  16   AST (SGOT) U/L 19  --  24   GLUCOSE mg/dL 153* 112* 208*     Results from last 7 days   Lab Units 03/24/22  1428 03/24/22  0603 03/23/22  0835 03/22/22  0534   WBC 10*3/mm3  --  6.05 7.21 7.39   HEMOGLOBIN g/dL 9.3* 6.9* 7.3* 7.8*   HEMATOCRIT % 29.3* 22.1* 23.4* 24.5*   PLATELETS 10*3/mm3  --  151 178 182     Results from last 7 days   Lab Units 03/21/22  1822   INR  1.69*     No results found for: BLOODCX, URINECX, WOUNDCX, MRSACX      Pertinent Radiology Results:  Imaging Results (All)     None          Condition on Discharge:    Fair    Code status during the hospital stay:  <no information> DO NOT RESUSCITATE    Discharge Disposition:  Skilled Nursing Facility (DC - External)    Discharge Medication:     Discharge Medications      New Medications      Instructions Start Date   pantoprazole 40 MG EC tablet  Commonly known as: Protonix   40 mg, Oral, Daily         Changes to Medications      Instructions Start Date   glimepiride 2 MG tablet  Commonly known as: AMARYL  What changed:   · medication strength  · how much to take   2 mg, Oral, Every Morning Before Breakfast      linagliptin 5 MG tablet tablet  Commonly known as: Tradjenta  What changed: when to take this   5 mg, Oral, Daily         Continue These Medications      Instructions Start Date   acetaminophen 650 MG 8 hr tablet  Commonly known as: TYLENOL   650 mg, Oral, Every 6 Hours PRN       apixaban 5 MG tablet tablet  Commonly known as: ELIQUIS   5 mg, Oral, 2 Times Daily      Aspir-Low 81 MG EC tablet  Generic drug: aspirin   81 mg, Oral, Daily      atorvastatin 40 MG tablet  Commonly known as: LIPITOR   40 mg, Oral, Daily      Boudreauxs Butt Paste 16 % ointment  Generic drug: Zinc Oxide   Apply externally, 2 Times Daily      ferrous sulfate 324 (65 Fe) MG tablet delayed-release EC tablet   324 mg, Oral, Daily With Breakfast      furosemide 20 MG tablet  Commonly known as: LASIX   20 mg, Oral, Daily      glucose blood test strip   1 each, Other, Daily, Use as instructed once a day as directed, for Truemetrix reader      HYDROcodone-acetaminophen 7.5-325 MG per tablet  Commonly known as: NORCO   1 tablet, Oral, Every 6 Hours PRN      latanoprost 0.005 % ophthalmic solution  Commonly known as: XALATAN   1 drop, Both Eyes, Daily      levothyroxine 50 MCG tablet  Commonly known as: SYNTHROID, LEVOTHROID   50 mcg, Oral, Daily      metoprolol tartrate 100 MG tablet  Commonly known as: LOPRESSOR   100 mg, Oral, 2 Times Daily      multivitamin with minerals tablet tablet   1 tablet, Oral, Daily      polyethylene glycol 17 GM/SCOOP powder  Commonly known as: MIRALAX   17 g, Oral, Daily, Hold for lose stool      Potassium Gluconate 550 MG tablet   550 mg, Oral, Daily      PRO-STAT liquid oral liquid   30 mL, Oral, 2 Times Daily      timolol 0.5 % ophthalmic solution  Commonly known as: TIMOPTIC   1 drop, Both Eyes, Daily      vitamin D3 125 MCG (5000 UT) capsule capsule   5,000 Units, Oral, Daily         Stop These Medications    Biotin 5 MG tablet            Discharge Diet:   Marathon diet    Activity at Discharge:   As tolerated    Follow-up Appointments:  Future Appointments   Date Time Provider Department Center   4/21/2022  5:00 AM Paxton Vasquez DO [facility]   4/28/2022  1:30 PM Elizabeth Easton APRN [facility]   5/2/2022  1:30 PM Demond Leon MD [facility]   6/16/2022  5:00  KHALIDA Vasquez, Paxton Jeronimo DO [facility]         Test Results Pending at Discharge:  Pending Labs     Order Current Status    COVID-19,[facility] IN-HOUSE,Nasal Swab No Transport Media 3-4 HR TAT - Swab, Nasal Cavity In process    JINNY AURIS SCREEN - Swab, Axilla Right, Axilla Left and Groin Preliminary result             Paxton Vasquez DO  03/24/22  15:00 EDT    Time: Discharge 37 min

## 2022-03-23 NOTE — THERAPY TREATMENT NOTE
Patient Name: Lynne Sanchez  : 1934    MRN: [id number]                              Today's Date: 3/23/2022       Admit Date: 3/21/2022    Visit Dx:     ICD-10-CM ICD-9-CM   1. Melena  K92.1 578.1   2. Blood loss anemia  D50.0 280.0   3. Anemia requiring transfusions  D64.9 285.9     Patient Active Problem List   Diagnosis   • Chronic atrial fibrillation   • Valvular heart disease   • Diabetes mellitus type II, controlled ([facility])   • Normocytic anemia   • Chronic gastritis   • Peripheral arterial disease ([facility])   • Cerebrovascular accident (CVA) due to thrombosis of left anterior cerebral artery ([facility])   • Thrombocytopenia ([facility])   • Cardiac pacemaker in situ   • Chronic congestive heart failure ([facility])   • Functional heart murmur   • Glaucoma   • Hyperlipidemia   • Hypertensive disorder   • Hypothyroidism   • Mass of parotid gland   • Neuropathy   • Chronic renal impairment, stage 4 (severe) ([facility])   • Impairment of balance   • Impaired mobility   • Muscle weakness of lower extremity   • Chronic pain of both knees   • Secondary cataract of both eyes   • 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 ([facility])   • Suspected glaucoma of both eyes   • Acute deep vein thrombosis of left upper extremity ([facility])   • Deep venous thrombosis of left upper limb ([facility])   • Secondary cataract of both eyes   • Right retinal detachment   • Stable proliferative diabetic retinopathy of both eyes ([facility])   • Swelling of left extremity   • Closed nondisplaced fracture of surgical neck of left humerus with routine healing   • Debility   • Left arm swelling   • 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 ([facility])   • Secondary hyperparathyroidism of renal origin ([facility])   • Vitamin D deficiency   • Anemia requiring transfusions   • Melena     Past Medical History:   Diagnosis Date   • Acute deep vein thrombosis of left upper extremity ([facility])    • Anemia    • Asthma    • CHF (congestive heart  failure) (HCC)    • Chronic atrial fibrillation ([facility])    • Deep venous thrombosis of left upper limb ([facility])    • Diabetes mellitus, type 2 (HCC)    • Diarrhea    • GERD (gastroesophageal reflux disease)    • Glaucoma    • H/O transfusion of whole blood    • Heart attack (HCC)    • Heart murmur    • History of transfusion    • Hyperlipidemia    • Hypertension    • Kidney disease     patient not aware of what exact diagnosis is    • Osteomyelitis (HCC)    • Osteomyelitis of left foot ([facility]CC) 10/3/2017   • Peripheral vascular disease ([facility]CC)    • Right retinal detachment    • Secondary cataract of both eyes    • 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)    • Stroke (HCC)    • Swelling of left extremity    • Urinary tract infection    • Wears glasses      Past Surgical History:   Procedure Laterality Date   • AMPUTATION DIGIT Left 7/5/2018    Procedure: Left Great toe amputation;  Surgeon: Prakash Carrington MD;  Location:  [facility];  Service: Vascular   • AMPUTATION DIGIT Left 8/27/2018    Procedure: SECOND TOE AMPUTATION DIGIT LEFT;  Surgeon: Prakash Carrington MD;  Location:  [facility];  Service: General   • APPENDECTOMY     • BONE MARROW ASPIRATION     • CARDIAC ABLATION     • CARDIAC CATHETERIZATION N/A 10/10/2017    Procedure: Peripheral angiography;  Surgeon: Kan Pelaez MD;  Location:  [facility];  Service:    • CARDIAC CATHETERIZATION N/A 10/10/2017    Procedure: Angioplasty-peripheral;  Surgeon: Kan Pelaez MD;  Location:  [facility];  Service:    • CARDIAC CATHETERIZATION N/A 10/10/2017    Procedure: Atherectomy-peripheral;  Surgeon: Kan Pelaez MD;  Location:  [facility];  Service:    • CARDIAC ELECTROPHYSIOLOGY PROCEDURE N/A 5/3/2018    Procedure: generator change;  Surgeon: Zan Poole MD;  Location:  [facility];  Service: Cardiovascular   • CARDIOVERSION     • COLONOSCOPY     • ENDOSCOPY N/A  10/9/2017    Procedure: ESOPHAGOGASTRODUODENOSCOPY WITH COLD FORCEP BIOPSY;  Surgeon: Clifton Hyatt MD;  Location: [facility] ENDOSCOPY;  Service:    • ENDOSCOPY N/A 3/22/2022    Procedure: ESOPHAGOGASTRODUODENOSCOPY with AVM cautery;  Surgeon: Clarisse Velez MD;  Location: [facility] ENDOSCOPY;  Service: Gastroenterology;  Laterality: N/A;   • EYE SURGERY Bilateral     CATARACTS   • INTERVENTIONAL RADIOLOGY PROCEDURE N/A 10/10/2017    Procedure: Abdominal Aortagram with Runoff;  Surgeon: Kan Pelaez MD;  Location: [facility] CATH INVASIVE LOCATION;  Service:    • PACEMAKER IMPLANTATION      around 2008 then replaced 2018      General Information     Row Name 03/23/22 1224          Physical Therapy Time and Intention    Document Type therapy note (daily note)  -     Mode of Treatment physical therapy  -     Row Name 03/23/22 1224          General Information    Patient Profile Reviewed yes  -     Existing Precautions/Restrictions fall;shoulder;left;other (see comments)  L UE in sling  -     Row Name 03/23/22 1224          Cognition    Orientation Status (Cognition) oriented x 4  -CC     Row Name 03/23/22 1224          Safety Issues, Functional Mobility    Safety Issues Affecting Function (Mobility) insight into deficits/self-awareness;safety precautions follow-through/compliance  -     Impairments Affecting Function (Mobility) balance;endurance/activity tolerance;pain;strength  -           User Key  (r) = Recorded By, (t) = Taken By, (c) = Cosigned By    Initials Name Provider Type     Anjali Collins, PTA Physical Therapist Assistant               Mobility     Row Name 03/23/22 1230          Bed Mobility    Bed Mobility supine-sit  -CC     Supine-Sit H[place]bert (Bed Mobility) contact guard;verbal cues  -     Assistive Device (Bed Mobility) bed rails;head of bed elevated  -     Row Name 03/23/22 1230          Sit-Stand Transfer    Sit-Stand [place]bert (Transfers) contact  guard;verbal cues  -CC     Assistive Device (Sit-Stand Transfers) walker, nati  -CC     Row Name 03/23/22 1230          Gait/Stairs (Locomotion)    [place] Level (Gait) contact guard  -CC     Assistive Device (Gait) walker, nati  -CC     Distance in Feet (Gait) 125x2  -CC     Deviations/Abnormal Patterns (Gait) roby decreased;festinating/shuffling  -CC           User Key  (r) = Recorded By, (t) = Taken By, (c) = Cosigned By    Initials Name Provider Type     Anjali Collins PTA Physical Therapist Assistant               Obj/Interventions     Row Name 03/23/22 1233          Strength Comprehensive (MMT)    General Manual Muscle Testing (MMT) Assessment lower extremity strength deficits identified  -CC     Comment, General Manual Muscle Testing (MMT) Assessment Performed B LE ex in sitting AP, LAQ, hip abd, marching and reclined QS, glut sets, 1x20 reps each  -CC           User Key  (r) = Recorded By, (t) = Taken By, (c) = Cosigned By    Initials Name Provider Type     Anjali Collins PTA Physical Therapist Assistant               Goals/Plan    No documentation.                Clinical Impression     Row Name 03/23/22 1236          Pain    Pretreatment Pain Rating 0/10 - no pain  -CC     Posttreatment Pain Rating 0/10 - no pain  -CC     Row Name 03/23/22 1236          Plan of Care Review    Plan of Care Reviewed With patient  -CC     Progress improving  -     Outcome Evaluation Performed B LE ex in sitting AP, LAQ, hip abd, marching and reclined QS, glut sets, 1x20 reps each. Pt with decreased A required for bed mob, transfers and amb. Pt with increased distance amb 125 feet with 1 standing rest break. Con't with PT POC and progress as tolerated  -     Row Name 03/23/22 1236          Positioning and Restraints    Pre-Treatment Position in bed  -CC     Post Treatment Position chair  -CC     In Chair reclined;call light within reach;encouraged to call for assist;with family/caregiver  -CC            User Key  (r) = Recorded By, (t) = Taken By, (c) = Cosigned By    Initials Name Provider Type     Anjali Collins, FUENTES Physical Therapist Assistant               Outcome Measures     Row Name 03/23/22 1240          How much help from another person do you currently need...    Turning from your back to your side while in flat bed without using bedrails? 3  -CC     Moving from lying on back to sitting on the side of a flat bed without bedrails? 3  -CC     Moving to and from a bed to a chair (including a wheelchair)? 3  -CC     Standing up from a chair using your arms (e.g., wheelchair, bedside chair)? 3  -CC     Climbing 3-5 steps with a railing? 3  -CC     To walk in hospital room? 3  -CC     AM-PAC 6 Clicks Score (PT) 18  -CC     Row Name 03/23/22 1240          Functional Assessment    Outcome Measure Options AM-PAC 6 Clicks Basic Mobility (PT)  -CC           User Key  (r) = Recorded By, (t) = Taken By, (c) = Cosigned By    Initials Name Provider Type     Anjali Collins PTA Physical Therapist Assistant                             Physical Therapy Education                 Title: PT OT SLP Therapies (In Progress)     Topic: Physical Therapy (In Progress)     Point: Mobility training (Done)     Learning Progress Summary           Patient Acceptance, E, VU by MS at 3/22/2022 1537    Comment: importance of mobility                   Point: Home exercise program (Done)     Learning Progress Summary           Patient Acceptance, E,TB, VU,NR by  at 3/23/2022 1240    Comment: Perform ex daily    Acceptance, E, VU by MS at 3/22/2022 1537    Comment: importance of mobility                   Point: Body mechanics (Not Started)     Learner Progress:  Not documented in this visit.          Point: Precautions (Not Started)     Learner Progress:  Not documented in this visit.                      User Key     Initials Effective Dates Name Provider Type [facility] 06/16/21 -  Anjali Collins PTA Physical  Therapist Assistant PT    MS 06/16/21 - 03/22/22 Yung Sanchez, PT Physical Therapist PT              PT Recommendation and Plan     Plan of Care Reviewed With: patient  Progress: improving  Outcome Evaluation: Performed B LE ex in sitting AP, LAQ, hip abd, marching and reclined QS, glut sets, 1x20 reps each. Pt with decreased A required for bed mob, transfers and amb. Pt with increased distance amb 125 feet with 1 standing rest break. Con't with PT POC and progress as tolerated     Time Calculation:    PT Charges     Row Name 03/23/22 1241             Time Calculation    PT Received On 03/23/22  -CC      PT Goal Re-Cert Due Date 04/01/22  -CC              Time Calculation- PT    Total Timed Code Minutes- PT 45 minute(s)  -CC              Timed Charges    59477 - PT Therapeutic Exercise Minutes 15  -CC      51772 - Gait Training Minutes  20  -CC      54490 - PT Therapeutic Activity Minutes 10  -CC              Total Minutes    Timed Charges Total Minutes 45  -CC       Total Minutes 45  -CC            User Key  (r) = Recorded By, (t) = Taken By, (c) = Cosigned By    Initials Name Provider Type    CC Anjali Collins PTA Physical Therapist Assistant              Therapy Charges for Today     Code Description Service Date Service Provider Modifiers Qty    33230254778 HC PT THER PROC EA 15 MIN 3/23/2022 Anjali Collins, FUENTES GP 1    49436192620 HC GAIT TRAINING EA 15 MIN 3/23/2022 Anjali Collins, FUENTES GP 1    76974015372 HC PT THERAPEUTIC ACT EA 15 MIN 3/23/2022 Anjali Collins, FUENTES GP 1          PT G-Codes  Outcome Measure Options: AM-PAC 6 Clicks Basic Mobility (PT)  AM-PAC 6 Clicks Score (PT): 18  AM-PAC 6 Clicks Score (OT): 15    Anjali Collins PTA  3/23/2022

## 2022-03-23 NOTE — TELEPHONE ENCOUNTER
Caller: MANUEL BARONE     Relationship: DAUGHTER     Best call back number: [phone]    What form or medical record are you requesting: LETTER FOR STAIR LIFT TO BE INSTALLED IN PATIENT'S HOME    Who is requesting this form or medical record from you: INSURANCE AND TAX PURPOSES    How would you like to receive the form or medical records (pick-up, mail, fax): FAX  If fax, what is the fax number: WILL CALL BACK WITH FAX NUMBER   If mail, what is the address:   If pick-up, provide patient with address and location details    Timeframe paperwork needed: ASAP     Additional notes:

## 2022-03-23 NOTE — TELEPHONE ENCOUNTER
I will discuss the concerns when I see her in the morning.  A letter can be provided but will have to be done likely at the nursing facility as it does depend on her physical condition.

## 2022-03-23 NOTE — PLAN OF CARE
Goal Outcome Evaluation:  Plan of Care Reviewed With: patient, spouse        Progress: improving  Outcome Evaluation: vitals stable. potential for discharge today. will monitor

## 2022-03-23 NOTE — PROGRESS NOTES
In Patient Consult      Date of Consultation: 2022  Patient Name: Lynne Sanchez  MRN: [id number]  : 1934     Referring provider: Paxton Vasquez DO    Primary care provider:  Elizabeth Easton APRN    Reason for consultation: Symptomatic anemia, suspected GI bleed    Interval history:   3/23/2022  Patient feeling some better today. Able to eat breakfast without problem. Appetite is good. Had EGD yesterday and would like to discuss those results. ASA and Eliquis are still being held.     3/22/2022  This is a 87-year-old elderly female patient with a prior history of congestive heart failure, chronic kidney disease, valvular heart disease, chronic atrial fibrillation, tachybradycardia syndrome status post PPM, referral vascular disease, diabetes mellitus, chronic anemia, hypothyroidism, recent history of left upper extremity DVT on Eliquis, was brought to the emergency room on 3/21/2022 after she was noted to have a low hemoglobin at rehab with the routine lab work.     Patient had a recent left humeral fracture following which she had a DVT left upper extremity and was put on Eliquis.  She had a routine lab work done which revealed low hemoglobin for which she was sent to emergency room.  She herself denies any abdominal pain no nausea vomiting.  Denies any chest pain shortness of breathing.  She has a chronic anemia for many years and has been taking iron pills.  Not see any difference other than the dark stool she normally gets with the iron pills.  Denies any prior history peptic ulcer disease no NSAID use.  She was on aspirin and the Plavix before and her Plavix was discontinued when she was put on Eliquis.  She is also taking baby aspirin daily.     Her last EGD was in 2017 by Dr. Mosquera and was reported as having the long segment Fermin's.  No recent colonoscopy.     Her work-up in the emergency room revealed hemoglobin of 6.1 g/dL compared to her baseline around 7 to 8 g/dL and  GI was consulted.  He was started on a Protonix twice daily and had a 2 years of PRBC transfusion.    Subjective     Past Medical History:   Diagnosis Date   • Acute deep vein thrombosis of left upper extremity (HCC)    • Anemia    • Asthma    • CHF (congestive heart failure) (HCC)    • Chronic atrial fibrillation (HCC)    • Deep venous thrombosis of left upper limb (HCC)    • Diabetes mellitus, type 2 (HCC)    • Diarrhea    • GERD (gastroesophageal reflux disease)    • Glaucoma    • H/O transfusion of whole blood    • Heart attack (HCC)    • Heart murmur    • History of transfusion    • Hyperlipidemia    • Hypertension    • Kidney disease     patient not aware of what exact diagnosis is    • Osteomyelitis (HCC)    • Osteomyelitis of left foot (HCC) 10/3/2017   • Peripheral vascular disease (HCC)    • Right retinal detachment    • Secondary cataract of both eyes    • 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)    • Stroke (HCC)    • Swelling of left extremity    • Urinary tract infection    • Wears glasses        Past Surgical History:   Procedure Laterality Date   • AMPUTATION DIGIT Left 7/5/2018    Procedure: Left Great toe amputation;  Surgeon: Prakash Carrington MD;  Location: [facility] OR;  Service: Vascular   • AMPUTATION DIGIT Left 8/27/2018    Procedure: SECOND TOE AMPUTATION DIGIT LEFT;  Surgeon: Prakash Carrington MD;  Location:  [facility] OR;  Service: General   • APPENDECTOMY     • BONE MARROW ASPIRATION     • CARDIAC ABLATION     • CARDIAC CATHETERIZATION N/A 10/10/2017    Procedure: Peripheral angiography;  Surgeon: Kan Pelaez MD;  Location: [facility] CATH INVASIVE LOCATION;  Service:    • CARDIAC CATHETERIZATION N/A 10/10/2017    Procedure: Angioplasty-peripheral;  Surgeon: Kan Pelaez MD;  Location: [facility] CATH INVASIVE LOCATION;  Service:    • CARDIAC CATHETERIZATION N/A 10/10/2017    Procedure: Atherectomy-peripheral;  Surgeon: Kan Pelaez MD;  Location: [facility]  CATH INVASIVE LOCATION;  Service:    • CARDIAC ELECTROPHYSIOLOGY PROCEDURE N/A 5/3/2018    Procedure: generator change;  Surgeon: Zan Poole MD;  Location: [facility] CATH INVASIVE LOCATION;  Service: Cardiovascular   • CARDIOVERSION     • COLONOSCOPY     • ENDOSCOPY N/A 10/9/2017    Procedure: ESOPHAGOGASTRODUODENOSCOPY WITH COLD FORCEP BIOPSY;  Surgeon: Clifton Hyatt MD;  Location: [facility] ENDOSCOPY;  Service:    • ENDOSCOPY N/A 3/22/2022    Procedure: ESOPHAGOGASTRODUODENOSCOPY with AVM cautery;  Surgeon: Clarisse Velez MD;  Location: [facility] ENDOSCOPY;  Service: Gastroenterology;  Laterality: N/A;   • EYE SURGERY Bilateral     CATARACTS   • INTERVENTIONAL RADIOLOGY PROCEDURE N/A 10/10/2017    Procedure: Abdominal Aortagram with Runoff;  Surgeon: Kan Pelaez MD;  Location: [facility] CATH INVASIVE LOCATION;  Service:    • PACEMAKER IMPLANTATION      around 2008 then replaced 2018       Family History   Problem Relation Age of Onset   • No Known Problems Mother    • No Known Problems Father    • Arthritis Other        Social History     Socioeconomic History   • Marital status:    • Number of children: 3   Tobacco Use   • Smoking status: Never Smoker   • Smokeless tobacco: Never Used   Vaping Use   • Vaping Use: Never used   Substance and Sexual Activity   • Alcohol use: No   • Drug use: No   • Sexual activity: Defer         Current Facility-Administered Medications:   •  acetaminophen (TYLENOL) tablet 650 mg, 650 mg, Oral, Q4H PRN, Clarisse Velez MD  •  atorvastatin (LIPITOR) tablet 40 mg, 40 mg, Oral, Daily, Clarisse Velez MD  •  dextrose (D50W) (25 g/50 mL) IV injection 25 g, 25 g, Intravenous, Q15 Min PRN, Clarisse Velez MD  •  dextrose (GLUTOSE) oral gel 1 tube, 1 tube, Oral, Q15 Min PRN, Clarisse Velez MD  •  glucagon (human recombinant) (GLUCAGEN DIAGNOSTIC) injection 1 mg, 1 mg, Subcutaneous, PRN, Clarisse Velez MD  •  HYDROcodone-acetaminophen  (NORCO) 7.5-325 MG per tablet 1 tablet, 1 tablet, Oral, Q6H PRN, Clarisse Velez MD  •  insulin aspart (novoLOG) injection 0-7 Units, 0-7 Units, Subcutaneous, TID AC, Clarisse Velez MD  •  latanoprost (XALATAN) 0.005 % ophthalmic solution 1 drop, 1 drop, Both Eyes, Daily, Clarisse Velez MD, 1 drop at 03/22/22 0917  •  levothyroxine (SYNTHROID, LEVOTHROID) tablet 50 mcg, 50 mcg, Oral, Daily, Clarisse Velez MD  •  melatonin tablet 3 mg, 3 mg, Oral, Nightly, Clarisse Velez MD, 3 mg at 03/22/22 2219  •  melatonin tablet 5 mg, 5 mg, Oral, Nightly PRN, Clarisse Velez MD  •  metoprolol tartrate (LOPRESSOR) tablet 100 mg, 100 mg, Oral, BID, Clarisse Velez MD, 100 mg at 03/22/22 2219  •  ondansetron (ZOFRAN) injection 4 mg, 4 mg, Intravenous, Q6H PRN, Clarisse Velez MD  •  pantoprazole (PROTONIX) injection 40 mg, 40 mg, Intravenous, Q12H, Clarisse Velez MD, 40 mg at 03/22/22 2219  •  [COMPLETED] Insert peripheral IV, , , Once **AND** sodium chloride 0.9 % flush 10 mL, 10 mL, Intravenous, PRN, Clarisse Velez MD  •  sodium chloride 0.9 % flush 10 mL, 10 mL, Intravenous, Q12H, Clarisse Velez MD, 10 mL at 03/22/22 2220  •  sodium chloride 0.9 % flush 10 mL, 10 mL, Intravenous, PRN, Clarisse Velez MD  •  sodium chloride 0.9 % infusion, 70 mL/hr, Intravenous, Continuous PRN, Clarisse Velez MD  •  sodium chloride 0.9 % infusion, 75 mL/hr, Intravenous, Continuous, Clarisse Velez MD, Last Rate: 75 mL/hr at 03/22/22 1434, Currently Infusing at 03/22/22 1434  •  timolol (TIMOPTIC) 0.5 % ophthalmic solution 1 drop, 1 drop, Both Eyes, Daily, Clarisse Velez MD, 1 drop at 03/22/22 0915    Allergies   Allergen Reactions   • Phenergan [Promethazine Hcl] Confusion       Review of Systems   Constitutional: Positive for appetite change (improved).   Gastrointestinal: Negative for abdominal pain, diarrhea, nausea, vomiting and  GERD.   Musculoskeletal: Positive for arthralgias.       The following portions of the patient's history were reviewed and updated as appropriate: allergies, current medications, past family history, past medical history, past social history, past surgical history and problem list.    Objective     Vitals:    03/22/22 1952 03/22/22 2347 03/23/22 0344 03/23/22 0700   BP: 149/66 114/61 118/52 128/56   BP Location: Left arm Right arm Right arm Right arm   Patient Position: Lying Lying Lying Lying   Pulse: 70 73 62    Resp: 16 16 16 16   Temp: 97.8 °F (36.6 °C) 98.7 °F (37.1 °C) 98.2 °F (36.8 °C) 98.1 °F (36.7 °C)   TempSrc: Axillary Axillary Axillary Axillary   SpO2: 96% 98% 97% 97%   Weight:       Height:           Physical Exam  Constitutional:       General: She is not in acute distress.     Appearance: Normal appearance. She is not ill-appearing.      Comments: thin   HENT:      Head: Normocephalic.      Right Ear: External ear normal.      Left Ear: External ear normal.      Nose: Nose normal.      Mouth/Throat:      Mouth: Mucous membranes are moist.   Eyes:      General: No scleral icterus.        Right eye: No discharge.         Left eye: No discharge.      Conjunctiva/sclera: Conjunctivae normal.   Cardiovascular:      Rate and Rhythm: Normal rate and regular rhythm.      Heart sounds: Normal heart sounds. No murmur heard.  Pulmonary:      Effort: Pulmonary effort is normal. No respiratory distress.      Breath sounds: Normal breath sounds. No wheezing.   Abdominal:      General: Abdomen is flat. Bowel sounds are normal. There is no distension.      Palpations: There is no mass.      Tenderness: There is no abdominal tenderness. There is no guarding or rebound.      Hernia: No hernia is present.   Musculoskeletal:         General: No deformity.      Cervical back: Normal range of motion.      Comments: Left arm in sling   Skin:     General: Skin is warm and dry.      Coloration: Skin is pale. Skin is not  jaundiced.   Neurological:      Mental Status: She is alert and oriented to person, place, and time.   Psychiatric:         Mood and Affect: Mood normal.         Behavior: Behavior normal.         Thought Content: Thought content normal.         Judgment: Judgment normal.         Results from last 7 days   Lab Units 03/22/22  0534 03/21/22  1822   SODIUM mmol/L 139 134*   POTASSIUM mmol/L 4.3 4.4   CHLORIDE mmol/L 106 97*   CO2 mmol/L 23.4 24.5   BUN mg/dL 29* 37*   CREATININE mg/dL 1.13* 1.31*   CALCIUM mg/dL 8.5* 9.1   ALBUMIN g/dL  --  3.60   BILIRUBIN mg/dL  --  0.3   ALK PHOS U/L  --  110   ALT (SGPT) U/L  --  16   AST (SGOT) U/L  --  24   GLUCOSE mg/dL 112* 208*   WBC 10*3/mm3 7.39 8.81   HEMOGLOBIN g/dL 7.8* 6.1*   PLATELETS 10*3/mm3 182 237   INR   --  1.69*     No recent abdominal imaging to review.     EGD was completed on 3/22/2022 by Dr. Velez:  - The oropharynx was normal.  - The Z-line was irregular and was found 35 cm from the incisors.  - A 2 cm hiatal hernia was present.  - The esophagus and gastroesophageal junction were examined with white light and narrow band imaging (NBI). There were esophageal mucosal changes suspicious for long-segment Fermin's esophagus, consistent with long-segment Fermin's esophagus, classified as Fermin's stage C13-M15 per [place] criteria. These changes involved the mucosa extending to the Z-line (35 cm from the incisors). Circumferential salmon-colored mucosa was present from 20 to 35 cm and no visible abnormalities were present. The maximum longitudinal extent of these esophageal mucosal changes was 15 cm in length. Not biopsied as pt is on anticoagulation.  - Patchy mildly erythematous mucosa without bleeding was found on the posterior wall of the stomach and in the gastric antrum.  - A single small angioectasia with stigmata of recent bleeding was found in the gastric fundus. Vaporization for hemostasis of bleeding caused by the procedure using argon plasma at 2  liters/minute and 20 santa was successful.  - Red blood was found in the first portion of the duodenum.  - Two small angioectasias with bleeding were found in the first portion of the duodenum and in the second portion of the duodenum. Vaporization for hemostasis using argon plasma at 2 liters/minute and 20 santa was successful. Estimated blood loss was minimal.  - The second portion of the duodenum was normal.  - A few diminutive polyps were found in the gastric fundus and in the gastric body.  No specimens collected. Not biopsied as pt is on anticoagulation.    Assessment / Plan    Assessment/Recommendations:  1. Acute on chronic anemia  2. GERD without esophagitis  3. Fermin's esophagus, long segment awaiting pathology  4. Angioectasia in stomach  5. Angioectasia of duodenum, bleeding  Patient has a chronic anemia over 8 to 10 years now she had a hemoglobin as low as 5 g/dL intermittently since about 2015.   Her anemia appears to be multifactorial in etiology including, chronic kidney disease, possible intermittent GI bleed, and anemia of chronic disease.  Patient is high risk for small bowel AVMs due to her CKD which were found on EGD 3/22/2022.      Patient currently on iron pills and has been noticing dark stool.  Given her recent anticoagulation use, GI bleed was suspected.    EGD completed yesterday shows very long segment of Fermin's esophagus measuring 15 cm in length, patchy erythema in the stomach, single angiectasia with stigmata of recent bleeding in the gastric fundus treated with vaporization, red blood in the duodenum as well as 2 small angiectasia's with bleeding in the first portion of the duodenum treated with vaporization and benign-appearing gastric polyps.  Pathology pending on biopsies.    Continue PPI  May have distal duodenal /Jejunal AVMs  Needs smal bowel pilcam , will arrange as op next 1-2 weeks  Repeat EGD/enteroscopy after pillcam depending on those results  High rsk for GI bleeding  with anticoagulation, might consider resuming aspirin only  Return to GI office in 4 weeks for follow up after pillcam          Thank you very much for letting me participate in the care of this patient.  Please do not hesitate to call me if you have any questions.    Abiola Ritchie PA-C  [facility] [place]  3/23/2022  08:40 EDT    Please note that portions of this note may have been completed with a voice recognition program.

## 2022-03-23 NOTE — TELEPHONE ENCOUNTER
Called spoke to Nikole I expressed that all orders that are necessary for the patient to get around the house will be ordered through the nursing home when discharged. I stated that once she is discharged from the nursing home the appropriate orders will be made at that time, she is inquiring about the order for order/letter for the stair lift to have this installed prior to the patient coming home. I than again stated that all orders or necessary letters will be provided at discharge. She is requesting a call from Dr. Vasquez.

## 2022-03-23 NOTE — PLAN OF CARE
Goal Outcome Evaluation:  Plan of Care Reviewed With: patient        Progress: improving  Outcome Evaluation: Performed B LE ex in sitting AP, LAQ, hip abd, marching and reclined QS, glut sets, 1x20 reps each. Pt with decreased A required for bed mob, transfers and amb. Pt with increased distance amb 125 feet with 1 standing rest break. Con't with PT POC and progress as tolerated

## 2022-03-23 NOTE — THERAPY TREATMENT NOTE
Attempted OT tx this am and patient states she has some business she needs to deal with. Will follow up later this afternoon.

## 2022-03-23 NOTE — CASE MANAGEMENT/SOCIAL WORK
Discharge Planning Assessment   [place]     Patient Name: Lynne Sanchez  MRN: [id number]  Today's Date: 3/23/2022    Admit Date: 3/21/2022     Discharge Needs Assessment    No documentation.                Discharge Plan     Row Name 03/23/22 1129       Plan    Plan Comments patient  and spouse requesting an order of r stair lift so they can have it  after they are discharged from    [facility] and she goes home.  Got one from the [facility]  but they put chair lift not stair lift.   Explained it is not something that the hosptial set up and order needs to come from Primary care.    Dr Vasquez notified of patient wants needs              Continued Care and Services - Admitted Since 3/21/2022    Coordination has not been started for this encounter.       Expected Discharge Date and Time     Expected Discharge Date Expected Discharge Time    Mar 23, 2022          Demographic Summary    No documentation.                Functional Status    No documentation.                Psychosocial    No documentation.                Abuse/Neglect    No documentation.                Legal    No documentation.                Substance Abuse    No documentation.                Patient Forms    No documentation.                   Deborah Ovalle RN

## 2022-03-24 VITALS
HEIGHT: 66 IN | DIASTOLIC BLOOD PRESSURE: 72 MMHG | WEIGHT: 132.94 LBS | RESPIRATION RATE: 16 BRPM | SYSTOLIC BLOOD PRESSURE: 123 MMHG | BODY MASS INDEX: 21.36 KG/M2 | HEART RATE: 70 BPM | OXYGEN SATURATION: 97 % | TEMPERATURE: 97.6 F

## 2022-03-24 DIAGNOSIS — D64.9 ANEMIA REQUIRING TRANSFUSIONS: Primary | ICD-10-CM

## 2022-03-24 DIAGNOSIS — R19.8 ABNORMAL FINDINGS ON ESOPHAGOGASTRODUODENOSCOPY (EGD): ICD-10-CM

## 2022-03-24 DIAGNOSIS — K92.2: ICD-10-CM

## 2022-03-24 DIAGNOSIS — K31.811 GASTROINTESTINAL HEMORRHAGE ASSOCIATED WITH ANGIODYSPLASIA OF STOMACH AND DUODENUM: ICD-10-CM

## 2022-03-24 DIAGNOSIS — K31.811 AVM (ARTERIOVENOUS MALFORMATION) OF DUODENUM, ACQUIRED WITH HEMORRHAGE: ICD-10-CM

## 2022-03-24 LAB
DEPRECATED RDW RBC AUTO: 58.4 FL (ref 37–54)
ERYTHROCYTE [DISTWIDTH] IN BLOOD BY AUTOMATED COUNT: 17.8 % (ref 12.3–15.4)
GLUCOSE BLDC GLUCOMTR-MCNC: 169 MG/DL (ref 70–130)
GLUCOSE BLDC GLUCOMTR-MCNC: 174 MG/DL (ref 70–130)
GLUCOSE BLDC GLUCOMTR-MCNC: 313 MG/DL (ref 70–130)
GLUCOSE BLDC GLUCOMTR-MCNC: 326 MG/DL (ref 70–130)
GLUCOSE BLDC GLUCOMTR-MCNC: 66 MG/DL (ref 70–130)
HCT VFR BLD AUTO: 22.1 % (ref 34–46.6)
HCT VFR BLD AUTO: 29.3 % (ref 34–46.6)
HGB BLD-MCNC: 6.9 G/DL (ref 12–15.9)
HGB BLD-MCNC: 9.3 G/DL (ref 12–15.9)
MCH RBC QN AUTO: 28.4 PG (ref 26.6–33)
MCHC RBC AUTO-ENTMCNC: 31.2 G/DL (ref 31.5–35.7)
MCV RBC AUTO: 90.9 FL (ref 79–97)
PLATELET # BLD AUTO: 151 10*3/MM3 (ref 140–450)
PMV BLD AUTO: 9.2 FL (ref 6–12)
RBC # BLD AUTO: 2.43 10*6/MM3 (ref 3.77–5.28)
SARS-COV-2 RNA PNL SPEC NAA+PROBE: NOT DETECTED
WBC NRBC COR # BLD: 6.05 10*3/MM3 (ref 3.4–10.8)

## 2022-03-24 PROCEDURE — A9270 NON-COVERED ITEM OR SERVICE: HCPCS | Performed by: INTERNAL MEDICINE

## 2022-03-24 PROCEDURE — G0378 HOSPITAL OBSERVATION PER HR: HCPCS

## 2022-03-24 PROCEDURE — 36430 TRANSFUSION BLD/BLD COMPNT: CPT

## 2022-03-24 PROCEDURE — 63710000001 METOPROLOL TARTRATE 50 MG TABLET: Performed by: INTERNAL MEDICINE

## 2022-03-24 PROCEDURE — 85014 HEMATOCRIT: CPT | Performed by: INTERNAL MEDICINE

## 2022-03-24 PROCEDURE — 85018 HEMOGLOBIN: CPT | Performed by: INTERNAL MEDICINE

## 2022-03-24 PROCEDURE — 97530 THERAPEUTIC ACTIVITIES: CPT

## 2022-03-24 PROCEDURE — 85027 COMPLETE CBC AUTOMATED: CPT | Performed by: INTERNAL MEDICINE

## 2022-03-24 PROCEDURE — 86900 BLOOD TYPING SEROLOGIC ABO: CPT

## 2022-03-24 PROCEDURE — 87635 SARS-COV-2 COVID-19 AMP PRB: CPT | Performed by: INTERNAL MEDICINE

## 2022-03-24 PROCEDURE — P9016 RBC LEUKOCYTES REDUCED: HCPCS

## 2022-03-24 PROCEDURE — 82962 GLUCOSE BLOOD TEST: CPT

## 2022-03-24 PROCEDURE — 63710000001 ATORVASTATIN 40 MG TABLET: Performed by: INTERNAL MEDICINE

## 2022-03-24 PROCEDURE — 63710000001 PANTOPRAZOLE 40 MG TABLET DELAYED-RELEASE: Performed by: INTERNAL MEDICINE

## 2022-03-24 PROCEDURE — 63710000001 INSULIN ASPART PER 5 UNITS: Performed by: INTERNAL MEDICINE

## 2022-03-24 PROCEDURE — 99217 PR OBSERVATION CARE DISCHARGE MANAGEMENT: CPT | Performed by: INTERNAL MEDICINE

## 2022-03-24 PROCEDURE — 97535 SELF CARE MNGMENT TRAINING: CPT

## 2022-03-24 PROCEDURE — 63710000001 LEVOTHYROXINE 50 MCG TABLET: Performed by: INTERNAL MEDICINE

## 2022-03-24 RX ORDER — SODIUM CHLORIDE 0.9 % (FLUSH) 0.9 %
10 SYRINGE (ML) INJECTION EVERY 12 HOURS SCHEDULED
Status: DISCONTINUED | OUTPATIENT
Start: 2022-03-24 | End: 2022-03-24 | Stop reason: HOSPADM

## 2022-03-24 RX ORDER — HYDROCODONE BITARTRATE AND ACETAMINOPHEN 7.5; 325 MG/1; MG/1
1 TABLET ORAL EVERY 6 HOURS PRN
Qty: 120 TABLET | Refills: 0 | Status: SHIPPED | OUTPATIENT
Start: 2022-03-24 | End: 2022-03-25 | Stop reason: SDUPTHER

## 2022-03-24 RX ORDER — GLIPIZIDE 5 MG/1
10 TABLET ORAL
Status: DISCONTINUED | OUTPATIENT
Start: 2022-03-25 | End: 2022-03-24 | Stop reason: HOSPADM

## 2022-03-24 RX ORDER — SODIUM CHLORIDE 0.9 % (FLUSH) 0.9 %
10 SYRINGE (ML) INJECTION AS NEEDED
Status: DISCONTINUED | OUTPATIENT
Start: 2022-03-24 | End: 2022-03-24 | Stop reason: HOSPADM

## 2022-03-24 RX ADMIN — TIMOLOL MALEATE 1 DROP: 5 SOLUTION/ DROPS OPHTHALMIC at 08:36

## 2022-03-24 RX ADMIN — METOPROLOL TARTRATE 100 MG: 50 TABLET ORAL at 08:36

## 2022-03-24 RX ADMIN — PANTOPRAZOLE SODIUM 40 MG: 40 TABLET, DELAYED RELEASE ORAL at 06:28

## 2022-03-24 RX ADMIN — LATANOPROST 1 DROP: 50 SOLUTION OPHTHALMIC at 08:36

## 2022-03-24 RX ADMIN — INSULIN ASPART 5 UNITS: 100 INJECTION, SOLUTION INTRAVENOUS; SUBCUTANEOUS at 12:25

## 2022-03-24 RX ADMIN — LEVOTHYROXINE SODIUM 50 MCG: 50 TABLET ORAL at 08:36

## 2022-03-24 RX ADMIN — ATORVASTATIN CALCIUM 40 MG: 40 TABLET, FILM COATED ORAL at 08:36

## 2022-03-24 RX ADMIN — Medication 10 ML: at 10:31

## 2022-03-24 NOTE — CASE MANAGEMENT/SOCIAL WORK
Continued Stay Note   [place]     Patient Name: Lynne Sanchez  MRN: [id number]  Today's Date: 3/24/2022    Admit Date: 3/21/2022     Discharge Plan     Row Name 03/24/22 0900       Plan    Plan Pt has been accepted to [facility] will need a COVID test .Nursing to call report to [phone] [address] [place] fax dc summary to [phone]               Discharge Codes    No documentation.               Expected Discharge Date and Time     Expected Discharge Date Expected Discharge Time    Mar 23, 2022             Deborah Pitt RN

## 2022-03-24 NOTE — PLAN OF CARE
Goal Outcome Evaluation:  Plan of Care Reviewed With: patient        Progress: no change  Outcome Evaluation: VSS att. Pt had no c/o pain. Plan is to d/c to [facility] today.

## 2022-03-24 NOTE — PLAN OF CARE
Goal Outcome Evaluation:  Plan of Care Reviewed With: patient        Progress: improving  Outcome Evaluation: OT tx completed. Patient supine in bed. Patient completed bed mobility with SBA, completed tf and functional mobility to bathroom using nati walker with CGA. Patient completed toileting with mod A, completed UBB/D with min A, LBB/D with min-mod A, grooming tasks with S/U. Patient returned to chair and left sitting with daughter and  present to draw blood.

## 2022-03-24 NOTE — THERAPY TREATMENT NOTE
Per nsg getting ready to give pt pint of blood will f/u with pt later if available for therapy. PT to f/u at later date if unavailable today.

## 2022-03-24 NOTE — PLAN OF CARE
Goal Outcome Evaluation:  Plan of Care Reviewed With: patient        Progress: improving  Outcome Evaluation: plan for discharge today if hemoglobin is stable

## 2022-03-24 NOTE — CASE MANAGEMENT/SOCIAL WORK
Case Management Discharge Note                Selected Continued Care - Admitted Since 3/21/2022     Destination Coordination complete.    Service Provider Selected Services Address Phone Fax Patient Preferred    [facility]  [facility] [address] [facility] [place] [address] [phone] --          Durable Medical Equipment    No services have been selected for the patient.              Dialysis/Infusion    No services have been selected for the patient.              Home Medical Care    No services have been selected for the patient.              Therapy    No services have been selected for the patient.              Community Resources    No services have been selected for the patient.              Community & DME    No services have been selected for the patient.                  Transportation Services  Ambulance: [facility]    Final Discharge Disposition Code: 03 - skilled nursing facility (SNF)

## 2022-03-24 NOTE — THERAPY TREATMENT NOTE
Patient Name: Lynne Sanchez  : 1934    MRN: [id number]                              Today's Date: 3/24/2022       Admit Date: 3/21/2022    Visit Dx:     ICD-10-CM ICD-9-CM   1. Melena  K92.1 578.1   2. Blood loss anemia  D50.0 280.0   3. Anemia requiring transfusions  D64.9 285.9   4. Controlled type 2 diabetes mellitus with other diabetic kidney complication, without long-term current use of insulin ([facility])  E11.29 250.40     Patient Active Problem List   Diagnosis   • Chronic atrial fibrillation   • Valvular heart disease   • Diabetes mellitus type II, controlled ([facility])   • Normocytic anemia   • Chronic gastritis   • Peripheral arterial disease ([facility])   • Cerebrovascular accident (CVA) due to thrombosis of left anterior cerebral artery ([facility])   • Thrombocytopenia ([facility])   • Cardiac pacemaker in situ   • Chronic congestive heart failure ([facility])   • Functional heart murmur   • Glaucoma   • Hyperlipidemia   • Hypertensive disorder   • Hypothyroidism   • Mass of parotid gland   • Neuropathy   • Chronic renal impairment, stage 4 (severe) ([facility])   • Impairment of balance   • Impaired mobility   • Muscle weakness of lower extremity   • Chronic pain of both knees   • Secondary cataract of both eyes   • 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 ([facility])   • Suspected glaucoma of both eyes   • Acute deep vein thrombosis of left upper extremity ([facility])   • Deep venous thrombosis of left upper limb ([facility])   • Secondary cataract of both eyes   • Right retinal detachment   • Stable proliferative diabetic retinopathy of both eyes ([facility])   • Swelling of left extremity   • Closed nondisplaced fracture of surgical neck of left humerus with routine healing   • Debility   • Left arm swelling   • 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 ([facility])   • Secondary hyperparathyroidism of renal origin ([facility])   • Vitamin D deficiency   • Anemia requiring transfusions   • Melena     Past  Medical History:   Diagnosis Date   • Acute deep vein thrombosis of left upper extremity (HCC)    • Anemia    • Asthma    • CHF (congestive heart failure) (HCC)    • Chronic atrial fibrillation (HCC)    • Deep venous thrombosis of left upper limb (HCC)    • Diabetes mellitus, type 2 (HCC)    • Diarrhea    • GERD (gastroesophageal reflux disease)    • Glaucoma    • H/O transfusion of whole blood    • Heart attack (HCC)    • Heart murmur    • History of transfusion    • Hyperlipidemia    • Hypertension    • Kidney disease     patient not aware of what exact diagnosis is    • Osteomyelitis (HCC)    • Osteomyelitis of left foot (HCC) 10/3/2017   • Peripheral vascular disease (HCC)    • Right retinal detachment    • Secondary cataract of both eyes    • 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)    • Stroke (HCC)    • Swelling of left extremity    • Urinary tract infection    • Wears glasses      Past Surgical History:   Procedure Laterality Date   • AMPUTATION DIGIT Left 7/5/2018    Procedure: Left Great toe amputation;  Surgeon: Prakash Carrington MD;  Location:  [facility];  Service: Vascular   • AMPUTATION DIGIT Left 8/27/2018    Procedure: SECOND TOE AMPUTATION DIGIT LEFT;  Surgeon: Prakash Carrington MD;  Location:  [facility];  Service: General   • APPENDECTOMY     • BONE MARROW ASPIRATION     • CARDIAC ABLATION     • CARDIAC CATHETERIZATION N/A 10/10/2017    Procedure: Peripheral angiography;  Surgeon: Kan Pelaez MD;  Location: [facility] CATH INVASIVE LOCATION;  Service:    • CARDIAC CATHETERIZATION N/A 10/10/2017    Procedure: Angioplasty-peripheral;  Surgeon: Kan Pelaez MD;  Location: [facility] CATH INVASIVE LOCATION;  Service:    • CARDIAC CATHETERIZATION N/A 10/10/2017    Procedure: Atherectomy-peripheral;  Surgeon: Kan Pelaez MD;  Location: [facility] CATH INVASIVE LOCATION;  Service:    • CARDIAC ELECTROPHYSIOLOGY PROCEDURE N/A 5/3/2018    Procedure: generator change;   Surgeon: Zan Poole MD;  Location:  [facility] CATH INVASIVE LOCATION;  Service: Cardiovascular   • CARDIOVERSION     • COLONOSCOPY     • ENDOSCOPY N/A 10/9/2017    Procedure: ESOPHAGOGASTRODUODENOSCOPY WITH COLD FORCEP BIOPSY;  Surgeon: Clifton Hyatt MD;  Location: [facility] ENDOSCOPY;  Service:    • ENDOSCOPY N/A 3/22/2022    Procedure: ESOPHAGOGASTRODUODENOSCOPY with AVM cautery;  Surgeon: Clarisse Velez MD;  Location: [facility] ENDOSCOPY;  Service: Gastroenterology;  Laterality: N/A;   • EYE SURGERY Bilateral     CATARACTS   • INTERVENTIONAL RADIOLOGY PROCEDURE N/A 10/10/2017    Procedure: Abdominal Aortagram with Runoff;  Surgeon: Kan Pelaez MD;  Location: [facility] CATH INVASIVE LOCATION;  Service:    • PACEMAKER IMPLANTATION      around 2008 then replaced 2018      General Information     Row Name 03/24/22 1440          OT Time and Intention    Document Type therapy note (daily note)  -SD     Mode of Treatment occupational therapy  -SD     Row Name 03/24/22 1440          General Information    Patient Profile Reviewed yes  -SD           User Key  (r) = Recorded By, (t) = Taken By, (c) = Cosigned By    Initials Name Provider Type    SD Kat Quiros OT Occupational Therapist                 Mobility/ADL's     Row Name 03/24/22 1440          Bed Mobility    Bed Mobility supine-sit  -SD     Supine-Sit [place] (Bed Mobility) standby assist  -SD     Assistive Device (Bed Mobility) bed rails;head of bed elevated  -SD     Row Name 03/24/22 1440          Transfers    Transfers sit-stand transfer;toilet transfer  -SD     Sit-Stand [place] (Transfers) contact guard  -SD     [place] Level (Toilet Transfer) contact guard  -SD     Assistive Device (Toilet Transfer) commode;grab bars/safety frame  -SD     Row Name 03/24/22 1440          Sit-Stand Transfer    Assistive Device (Sit-Stand Transfers) walker, nati  -SD     Row Name 03/24/22 1440          Toilet Transfer    Type (Toilet Transfer)  stand pivot/stand step  -SD     Row Name 03/24/22 1440          Functional Mobility    Functional Mobility- Ind. Level contact guard assist;verbal cues required  -SD     Functional Mobility- Device nati walker  -SD     Functional Mobility-Distance (Feet) 16  23  -SD     Functional Mobility- Comment verbal cues to not place nati walker so far out in front of her  -SD     Row Name 03/24/22 1440          Bathing Assessment/Intervention    [place] Level (Bathing) upper body;minimum assist (75% patient effort);distal lower extremities/feet;perineal area;moderate assist (50% patient effort)  -SD     Position (Bathing) supported sitting  -SD     Row Name 03/24/22 1440          Upper Body Dressing Assessment/Training    [place] Level (Upper Body Dressing) don;pajama/robe;minimum assist (75% patient effort)  -SD     Row Name 03/24/22 1440          Lower Body Dressing Assessment/Training    [place] Level (Lower Body Dressing) don;pants/bottoms;socks;minimum assist (75% patient effort)  -SD     Row Name 03/24/22 1440          Grooming Assessment/Training    [place] Level (Grooming) hair care, combing/brushing;oral care regimen;wash face, hands;standby assist  -SD     Position (Grooming) sink side  -SD     Row Name 03/24/22 1440          Toileting Assessment/Training    [place] Level (Toileting) moderate assist (50% patient effort)  -SD     Assistive Devices (Toileting) commode;grab bar/safety frame  -SD           User Key  (r) = Recorded By, (t) = Taken By, (c) = Cosigned By    Initials Name Provider Type    Kat Medellin OT Occupational Therapist               Obj/Interventions    No documentation.                Goals/Plan    No documentation.                Clinical Impression     [facility] Name 03/24/22 1443          Pain Assessment    Pretreatment Pain Rating 0/10 - no pain  -SD     Posttreatment Pain Rating 0/10 - no pain  -SD     [facility] Name 03/24/22 1443          Plan of Care Review    Plan of  Care Reviewed With patient  -SD     Progress improving  -SD     Outcome Evaluation OT tx completed. Patient supine in bed. Patient completed bed mobility with SBA, completed tf and functional mobility to bathroom using nati walker with CGA. Patient completed toileting with mod A, completed UBB/D with min A, LBB/D with min-mod A, grooming tasks with S/U. Patient returned to chair and left sitting with daughter and  present to draw blood.  -SD     Row Name 03/24/22 1443          Positioning and Restraints    Pre-Treatment Position in bed  -SD     Post Treatment Position chair  -SD     In Chair sitting;call light within reach;encouraged to call for assist;with family/caregiver;with other staff  -SD           User Key  (r) = Recorded By, (t) = Taken By, (c) = Cosigned By    Initials Name Provider Type    Kat Medellin OT Occupational Therapist               Outcome Measures     Row Name 03/24/22 1450          How much help from another is currently needed...    Putting on and taking off regular lower body clothing? 3  -SD     Bathing (including washing, rinsing, and drying) 2  -SD     Toileting (which includes using toilet bed pan or urinal) 2  -SD     Putting on and taking off regular upper body clothing 3  -SD     Taking care of personal grooming (such as brushing teeth) 3  -SD     Eating meals 3  -SD     AM-PAC 6 Clicks Score (OT) 16  -SD     Row Name 03/24/22 1450          Functional Assessment    Outcome Measure Options AM-PAC 6 Clicks Daily Activity (OT)  -SD           User Key  (r) = Recorded By, (t) = Taken By, (c) = Cosigned By    Initials Name Provider Type    Kat Medellin OT Occupational Therapist                Occupational Therapy Education                 Title: PT OT SLP Therapies (In Progress)     Topic: Occupational Therapy (In Progress)     Point: ADL training (Done)     Description:   Instruct learner(s) on proper safety adaptation and remediation techniques during self care or  transfers.   Instruct in proper use of assistive devices.              Learning Progress Summary           Patient Acceptance, E,TB, VU by SD at 3/24/2022 1451    Comment: Safety and sequencing during functional tf and mobility    Acceptance, E,TB, VU by SD at 3/22/2022 1303    Comment: Benefit of OT; OT POC   Family Acceptance, E,TB, VU by SD at 3/24/2022 1451    Comment: Safety and sequencing during functional tf and mobility                   Point: Home exercise program (Not Started)     Description:   Instruct learner(s) on appropriate technique for monitoring, assisting and/or progressing therapeutic exercises/activities.              Learner Progress:  Not documented in this visit.          Point: Precautions (Not Started)     Description:   Instruct learner(s) on prescribed precautions during self-care and functional transfers.              Learner Progress:  Not documented in this visit.          Point: Body mechanics (Not Started)     Description:   Instruct learner(s) on proper positioning and spine alignment during self-care, functional mobility activities and/or exercises.              Learner Progress:  Not documented in this visit.                      User Key     Initials Effective Dates Name Provider Type Discipline    SD 06/16/21 -  Kat Quiros OT Occupational Therapist OT              OT Recommendation and Plan  Planned Therapy Interventions (OT): activity tolerance training, adaptive equipment training, BADL retraining, patient/caregiver education/training, ROM/therapeutic exercise, strengthening exercise, transfer/mobility retraining  Therapy Frequency (OT): 3 times/wk (5 times if indicated)  Plan of Care Review  Plan of Care Reviewed With: patient  Progress: improving  Outcome Evaluation: OT tx completed. Patient supine in bed. Patient completed bed mobility with SBA, completed tf and functional mobility to bathroom using nati walker with CGA. Patient completed toileting with mod A,  completed UBB/D with min A, LBB/D with min-mod A, grooming tasks with S/U. Patient returned to chair and left sitting with daughter and  present to draw blood.     Time Calculation:    Time Calculation- OT     Row Name 03/24/22 1452             Time Calculation- OT    OT Start Time 1345  -SD      OT Stop Time 1424  -SD      OT Time Calculation (min) 39 min  -SD      OT Received On 03/24/22  -SD      OT Goal Re-Cert Due Date 04/01/22  -SD              Timed Charges    02205 - OT Therapeutic Activity Minutes 10  -SD      59071 - OT Self Care/Mgmt Minutes 29  -SD              Total Minutes    Timed Charges Total Minutes 39  -SD       Total Minutes 39  -SD            User Key  (r) = Recorded By, (t) = Taken By, (c) = Cosigned By    Initials Name Provider Type    Kat Medellin OT Occupational Therapist              Therapy Charges for Today     Code Description Service Date Service Provider Modifiers Qty    87333290767  OT THERAPEUTIC ACT EA 15 MIN 3/24/2022 Kat Quiros OT GO 1    19738583446 HC OT SELF CARE/MGMT/TRAIN EA 15 MIN 3/24/2022 Kat Quiros OT GO 2               Kat Quiros OT  3/24/2022

## 2022-03-25 ENCOUNTER — NURSING HOME (OUTPATIENT)
Dept: INTERNAL MEDICINE | Facility: CLINIC | Age: 87
End: 2022-03-25

## 2022-03-25 ENCOUNTER — PATIENT OUTREACH (OUTPATIENT)
Dept: CASE MANAGEMENT | Facility: OTHER | Age: 87
End: 2022-03-25

## 2022-03-25 ENCOUNTER — DOCUMENTATION (OUTPATIENT)
Dept: FAMILY MEDICINE CLINIC | Facility: CLINIC | Age: 87
End: 2022-03-25

## 2022-03-25 DIAGNOSIS — I38 VALVULAR HEART DISEASE: ICD-10-CM

## 2022-03-25 DIAGNOSIS — I82.622 ACUTE DEEP VEIN THROMBOSIS (DVT) OF LEFT UPPER EXTREMITY, UNSPECIFIED VEIN: Primary | ICD-10-CM

## 2022-03-25 DIAGNOSIS — E11.29 CONTROLLED TYPE 2 DIABETES MELLITUS WITH OTHER DIABETIC KIDNEY COMPLICATION, WITHOUT LONG-TERM CURRENT USE OF INSULIN: ICD-10-CM

## 2022-03-25 DIAGNOSIS — I48.20 CHRONIC ATRIAL FIBRILLATION: ICD-10-CM

## 2022-03-25 DIAGNOSIS — E78.5 HYPERLIPIDEMIA, UNSPECIFIED HYPERLIPIDEMIA TYPE: ICD-10-CM

## 2022-03-25 DIAGNOSIS — S42.292D CLOSED FRACTURE OF HEAD OF LEFT HUMERUS WITH ROUTINE HEALING, SUBSEQUENT ENCOUNTER: ICD-10-CM

## 2022-03-25 DIAGNOSIS — Z95.0 CARDIAC PACEMAKER IN SITU: ICD-10-CM

## 2022-03-25 DIAGNOSIS — R53.1 GENERALIZED WEAKNESS: ICD-10-CM

## 2022-03-25 DIAGNOSIS — Z74.09 IMPAIRED MOBILITY: ICD-10-CM

## 2022-03-25 DIAGNOSIS — K92.2 ACUTE UPPER GI BLEED: ICD-10-CM

## 2022-03-25 DIAGNOSIS — D63.8 ANEMIA OF CHRONIC DISEASE: ICD-10-CM

## 2022-03-25 LAB
BH BB BLOOD EXPIRATION DATE: NORMAL
BH BB BLOOD TYPE BARCODE: 5100
BH BB DISPENSE STATUS: NORMAL
BH BB PRODUCT CODE: NORMAL
BH BB UNIT NUMBER: NORMAL
CROSSMATCH INTERPRETATION: NORMAL
UNIT  ABO: NORMAL
UNIT  RH: NORMAL

## 2022-03-25 PROCEDURE — 99316 NF DSCHRG MGMT 30 MIN+: CPT | Performed by: PHYSICIAN ASSISTANT

## 2022-03-25 RX ORDER — HYDROCODONE BITARTRATE AND ACETAMINOPHEN 7.5; 325 MG/1; MG/1
1 TABLET ORAL EVERY 8 HOURS PRN
Qty: 30 TABLET | Refills: 0 | Status: SHIPPED | OUTPATIENT
Start: 2022-03-25 | End: 2022-03-28 | Stop reason: ALTCHOICE

## 2022-03-25 RX ORDER — HYDROCODONE BITARTRATE AND ACETAMINOPHEN 7.5; 325 MG/1; MG/1
1 TABLET ORAL EVERY 6 HOURS PRN
Qty: 120 TABLET | Refills: 0 | OUTPATIENT
Start: 2022-03-25

## 2022-03-25 RX ORDER — EPOETIN ALFA-EPBX 40000 [IU]/ML
40000 INJECTION, SOLUTION INTRAVENOUS; SUBCUTANEOUS
COMMUNITY

## 2022-03-25 NOTE — TELEPHONE ENCOUNTER
[facility] REQUESTING MED REFILL FOR HYDROCODONE-ACET 7.5-325 MG.    DIRECTIONS: HYDROCODONE-ACETAMINOPHEN 7.5-325 1 PO Q 6 HRS PRN.

## 2022-03-25 NOTE — OUTREACH NOTE
AMBULATORY CASE MANAGEMENT NOTE    SNF Follow-up    Questions/Answers    Flowsheet Row Responses   Acute Facility Discharged From [facility]   Acute Discharge Date 03/24/22   Name of the Skilled Nursing Facility? [facility]   Purpose of SNF Admission PT, OT, SN   Estimated length of stay for the patient? Undetermined   Who is the insurance provider or payor of patient stay? Medicare   Progression of Patient? Patient's course of rehab was interrupted by an acute admission.  Patient noted as returning to the facility to complete therapy.          WILLIAM BRADFORD  Ambulatory Case Management    3/25/2022, 10:14 EDT

## 2022-03-25 NOTE — TELEPHONE ENCOUNTER
(Pt IS BEING DISCHARGE HOME TOMORROW) [facility] THAT [facility]    DIRECTIONS: HYDROCODONE-ACETAMINOPHEN 7.5-325 MG 1 PO Q 6 HRS PRN.

## 2022-03-25 NOTE — PROGRESS NOTES
RD mailed DM education materials including Heart Healthy and Consistent Carbohydrate Nutrition Therapy packet from [facility], Diabetes Basics from [facility], and RD contact information due to recent discharge.

## 2022-03-26 LAB — BACTERIA ISLT: NORMAL

## 2022-03-28 ENCOUNTER — TELEPHONE (OUTPATIENT)
Dept: INTERNAL MEDICINE | Facility: CLINIC | Age: 87
End: 2022-03-28

## 2022-03-28 VITALS
WEIGHT: 129.5 LBS | HEART RATE: 62 BPM | SYSTOLIC BLOOD PRESSURE: 110 MMHG | RESPIRATION RATE: 18 BRPM | DIASTOLIC BLOOD PRESSURE: 70 MMHG | TEMPERATURE: 97.2 F | OXYGEN SATURATION: 95 % | BODY MASS INDEX: 20.9 KG/M2

## 2022-03-28 LAB — HBA1C MFR BLD: 6.1 %

## 2022-03-28 NOTE — PROGRESS NOTES
Nursing Home Discharge Summary      Dell Hu DO  []  Joanne Zimmerman APRN  []  [address] [place] [address]  Phone: [phone]  Fax: [phone] Kerrie Bradley MD  []  Paxton Vasquez DO  []  Vilma Méndez PA-C  [x]  [address] [place] [address]  Phone: [phone]  Fax: [phone]     PATIENT NAME: Lynne Sanchez                                                                          YOB: 1934     Age:  87 y.o.  Sex:  female  DATE OF SERVICE: 3/25/2022  Primary Care Physician:  Elizabeth Easton APRN   Date of Discharge:  3/26/2022  Admission Date:  3/4/2022  FACILITY: [facility]      Important issues to note:  -Hgb 3/25 8.1; recommend weekly CBC x 3 weeks for close monitoring  -Continue follow up with nephrology scheduled 4/17; Retacrit monthly on the 17th  -Non occlusive DVT RUE; continue therapy x 3 months (projected d/c date 6/4)      Discharge Diagnosis:    Encounter Diagnoses   Name Primary?   • Acute deep vein thrombosis (DVT) of left upper extremity, unspecified vein (HCC) Yes   • Closed fracture of head of left humerus with routine healing, subsequent encounter    • Anemia of chronic disease    • Acute upper GI bleed    • Chronic atrial fibrillation    • Valvular heart disease    • Cardiac pacemaker in situ    • Controlled type 2 diabetes mellitus with other diabetic kidney complication, without long-term current use of insulin (HCC)    • Hyperlipidemia, unspecified hyperlipidemia type    • Impaired mobility    • Generalized weakness         Presenting Problem: Provoked left upper extremity DVT; generalized weakness.     History of Presenting Illness:  Ms. Sanchez is an 86 y/o female with history of type 2 diabetes, CKD, congestive heart failure, chronic anemia, atrial fibrillation, s/p pacemaker, recent left humerus fracture.  She presented to [facility] with complaint of progressive left upper extremity swelling.  She was found to have acute  provoked DVT, placed on eliquis for at least 3 months.  Previously patient taking ASA and Plavix, cardiology consulted who agreed patient would not tolerate triple therapy.  Favored aspirin and therapeutic AC given her history of PAD.  Additionally hemoglobin monitored closely as AC initiated, remained stable.  She has chronic anemia likely secondary to chronic kidney disease.  PT/OT consulted during hospitalization and recommended course of rehabilitation.  Ortho services recommended no surgical intervention, outpatient follow-up slated for 3/14.    Rehabilitation Course:  Patient was transferred to this SNF in stable condition where she continued working diligently with therapy services, motivated to improve her mobility.  Admission labs noted hemoglobin 7.6, family/patient requesting to restart Retacrit injection for her chronic anemia.  Confirmed with nephrology and initiated Retacrit therapy on 3/17.  Upon routine lab evaluation, hgb noted to critically low.  Subsequently, patient transferred to [facility] for urgent evaluation.  ER evaluation confirmed Hgb at 6.1 and hct at 19.8.  She was admitted to the medical service with plans for EGD with GI services in the AM.  Patient transfused 2 units PRBC upon admission.  Patient underwent EGD with Dr. Velez where she presented with small angiectasia with stigmata of recent bleed in the gastric fundus, 2 small angiectasia's in the duodenum confused diminutive polyps in the gastric fundus.  3 areas of angiectasia's were cauterized without any major complications.  Esophagus showed signs of Fermin's as well as 2 cm hiatal hernia.  Hemoglobin corrected to 7.8 status post transfusion.   Additional work-up notable for elevated TSH around 6, given acute illness can cause elevation no changes to Synthroid dosing made.  Hgb noted to be 6.9 on the day of discharge, she was subsequently transfused again 1 unit PRBC, Hgb 9.3 upon discharge.       Patient transferred back to this  SNF for continued nursing care and PT/OT services.  Readmission labs notable for Hgb 8.1/Hct 24.1.  TSH obtained routinely, noting improvement at 4.493.  She continues on Eliquis, to complete course of 3 months for RUE provoked DVT.  She is following orthopedic services, who continue to recommend NWB and use of sling.  She reports adequate pain control, requesting to discontinue opiate pain medications.  Continue to utilize PRN Tylenol for pain control.  She denies any melena, hematochezia, BRBPR.  Patient will need weekly CBC x 3 for close monitoring of H/H.  Continue with Retacrit injection monthly, last given 3/17.   [facility] reports that due to change in payor source, patient will discharge back to home Saturday, 3/26.  She will continue home health, PT/OT, and nursing care upon discharge to home.  Medications and plan discussed with patient and  at bedside who deny any acute concerns.  Follow up with PCP within 2 weeks, sooner if needed.     Recent Labs:  3/25: Na 138, K 4.0, BUN 19, Cr. 0.9, hgb 8.1, 24.1, TSH 4.493      DME Needs:  Stair lift; Forwarded request to Dr. Vasquez      REVIEW OF SYSTEMS:    Generalized weakness, chronic fatigue, arm pain/edema (episodic)  All other systems were reviewed and are negative.     PHYSICAL EXAMINATION:     VITAL SIGNS:  /70   Pulse 62   Temp 97.2 °F (36.2 °C)   Resp 18   Wt 58.7 kg (129 lb 8 oz)   SpO2 95%   BMI 20.90 kg/m²     Nursing notes and vital signs reviewed.   General Appearance:  Frail appearing elderly female sitting upright in wheelchair, NAD.    Head: Normocephalic and atraumatic, without obvious abnormality     Eyes: PERRLA.  Conjunctivae and sclerae normal.  EOM are within normal limits.    Neck: Normal range of motion. Neck supple. No JVD present.   Cardiovascular: Normal rate, regular rhythm.  Pulses palpable equal bilaterally.    Pulmonary/Chest: Effort normal and breath sounds normal. No respiratory distress.   Abdominal: Soft. Bowel  sounds are normal. No distention or tenderness.     Musculoskeletal: Sling in place to LUE.  Decreased ROM to LUE, no edema noted.  Neurovascularly intact.  Diffuse deconditioning noted.    Neurological: Alert and oriented at baseline.    Skin: Skin is warm and dry.   Psychiatric:  Normal mood and affect. Normal behavior         Condition on Discharge:    Stable to home    Discharge Medication:    Current Outpatient Medications:   •  acetaminophen (TYLENOL) 650 MG 8 hr tablet, Take 650 mg by mouth Every 6 (Six) Hours As Needed for Mild Pain ., Disp: , Rfl:   •  Amino Acids-Protein Hydrolys (PRO-STAT) liquid oral liquid, Take 30 mL by mouth 2 (Two) Times a Day., Disp: , Rfl:   •  apixaban (ELIQUIS) 5 MG tablet tablet, Take 5 mg by mouth 2 (Two) Times a Day., Disp: , Rfl:   •  aspirin 81 MG EC tablet, Take 1 tablet by mouth Daily., Disp: 30 tablet, Rfl: 0  •  atorvastatin (LIPITOR) 40 MG tablet, Take 1 tablet by mouth Daily., Disp: 90 tablet, Rfl: 3  •  epoetin dorcas-epbx (Retacrit) 81256 UNIT/ML injection, Inject 40,000 Units under the skin into the appropriate area as directed. 1 ml sq per month on day 17, Disp: , Rfl:   •  ferrous sulfate 324 (65 Fe) MG tablet delayed-release EC tablet, Take 324 mg by mouth Daily With Breakfast., Disp: , Rfl:   •  furosemide (LASIX) 20 MG tablet, Take 1 tablet by mouth Daily., Disp: 90 tablet, Rfl: 1  •  glimepiride (AMARYL) 2 MG tablet, Take 1 tablet by mouth Every Morning Before Breakfast., Disp: 30 tablet, Rfl: 5  •  glucose blood test strip, 1 each by Other route Daily. Use as instructed once a day as directed, for Truemetrix reader, Disp: 100 each, Rfl: 3  •  latanoprost (XALATAN) 0.005 % ophthalmic solution, Administer 1 drop to both eyes Daily., Disp: 7.5 mL, Rfl: 3  •  levothyroxine (SYNTHROID, LEVOTHROID) 50 MCG tablet, Take 1 tablet by mouth Daily., Disp: 90 tablet, Rfl: 3  •  linagliptin (Tradjenta) 5 MG tablet tablet, Take 1 tablet by mouth Daily. (Patient taking  differently: Take 5 mg by mouth Every Night.), Disp: 90 tablet, Rfl: 1  •  metoprolol tartrate (LOPRESSOR) 100 MG tablet, Take 1 tablet by mouth 2 (Two) Times a Day., Disp: 180 tablet, Rfl: 3  •  multivitamin with minerals tablet tablet, Take 1 tablet by mouth Daily., Disp: , Rfl:   •  pantoprazole (Protonix) 40 MG EC tablet, Take 1 tablet by mouth Daily., Disp: 30 tablet, Rfl: 5  •  polyethylene glycol (MIRALAX) 17 GM/SCOOP powder, Take 17 g by mouth Daily. Hold for lose stool, Disp: , Rfl:   •  Potassium Gluconate 550 MG tablet, Take 550 mg by mouth Daily., Disp: , Rfl:   •  timolol (TIMOPTIC) 0.5 % ophthalmic solution, Administer 1 drop to both eyes Daily., Disp: , Rfl:   •  vitamin D3 125 MCG (5000 UT) capsule capsule, Take 5,000 Units by mouth Daily., Disp: , Rfl:   •  Zinc Oxide (Boudreauxs Butt Paste) 16 % ointment, Apply  topically 2 (Two) Times a Day., Disp: , Rfl:      Time: Discharge 33 min    Vilma Méndez PA-C   03/25/2022

## 2022-03-28 NOTE — PROGRESS NOTES
Nursing Home Progress Note        Dell Hu DO []  BILL Acosta []  [address] [place] [address]  Phone: [phone]  Fax: [phone] Kerrie Bradley MD []  Paxton Vasquez DO [x]   [address] [place] [address]  Phone: [phone]  Fax: [phone]     PATIENT NAME: Lynne Sanchez                                                                          YOB: 1934           DATE OF SERVICE: 03/17/2022  FACILITY:  [x] [facility]   [] [facility]   [] [facility]   [] [facility]  []  [facility]  [] Other ______________________________________________________________________     CHIEF COMPLAINT:  Chronic Medical Management      HISTORY OF PRESENT ILLNESS:   Patient was sitting up in her bed on exam.  She shared she was feeling well and thought her arm pain was well controlled with Tylenol.  She has been tolerating her blood thinners without any significant complaints.  She does not notice any GI bleeding.  She was appreciative of her care.  Nurses do not report any acute events.  She has been tolerating physical therapy quite well.    PAST MEDICAL & SURGICAL HISTORY:   Past Medical History:   Diagnosis Date   • Acute deep vein thrombosis of left upper extremity (HCC)    • Anemia    • Asthma    • CHF (congestive heart failure) (HCC)    • Chronic atrial fibrillation (HCC)    • Deep venous thrombosis of left upper limb (HCC)    • Diabetes mellitus, type 2 (HCC)    • Diarrhea    • GERD (gastroesophageal reflux disease)    • Glaucoma    • H/O transfusion of whole blood    • Heart attack (HCC)    • Heart murmur    • History of transfusion    • Hyperlipidemia    • Hypertension    • Kidney disease     patient not aware of what exact diagnosis is    • Osteomyelitis (HCC)    • Osteomyelitis of left foot (HCC) 10/3/2017   • Peripheral vascular disease (HCC)    • Right retinal detachment    • Secondary cataract of both eyes    • 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)    • Stroke (HCC)    • Swelling of left extremity    • Urinary tract infection    • Wears glasses       Past Surgical History:   Procedure Laterality Date   • AMPUTATION DIGIT Left 7/5/2018    Procedure: Left Great toe amputation;  Surgeon: Prakash Carrington MD;  Location:  [facility];  Service: Vascular   • AMPUTATION DIGIT Left 8/27/2018    Procedure: SECOND TOE AMPUTATION DIGIT LEFT;  Surgeon: Prakash Carrington MD;  Location:  [facility];  Service: General   • APPENDECTOMY     • BONE MARROW ASPIRATION     • CARDIAC ABLATION     • CARDIAC CATHETERIZATION N/A 10/10/2017    Procedure: Peripheral angiography;  Surgeon: Kan Pelaez MD;  Location: [facility] CATH INVASIVE LOCATION;  Service:    • CARDIAC CATHETERIZATION N/A 10/10/2017    Procedure: Angioplasty-peripheral;  Surgeon: Kan Pelaez MD;  Location: [facility] CATH INVASIVE LOCATION;  Service:    • CARDIAC CATHETERIZATION N/A 10/10/2017    Procedure: Atherectomy-peripheral;  Surgeon: Kan Pelaez MD;  Location: [facility] CATH INVASIVE LOCATION;  Service:    • CARDIAC ELECTROPHYSIOLOGY PROCEDURE N/A 5/3/2018    Procedure: generator change;  Surgeon: Zan Poole MD;  Location:  [facility] CATH INVASIVE LOCATION;  Service: Cardiovascular   • CARDIOVERSION     • COLONOSCOPY     • ENDOSCOPY N/A 10/9/2017    Procedure: ESOPHAGOGASTRODUODENOSCOPY WITH COLD FORCEP BIOPSY;  Surgeon: Clifton Hyatt MD;  Location: [facility] ENDOSCOPY;  Service:    • ENDOSCOPY N/A 3/22/2022    Procedure: ESOPHAGOGASTRODUODENOSCOPY with AVM cautery;  Surgeon: Clarisse Velez MD;  Location: [facility] ENDOSCOPY;  Service: Gastroenterology;  Laterality: N/A;   • EYE SURGERY Bilateral     CATARACTS   • INTERVENTIONAL RADIOLOGY PROCEDURE N/A 10/10/2017    Procedure: Abdominal Aortagram with Runoff;  Surgeon: Kan Pelaez MD;  Location: [facility] CATH INVASIVE LOCATION;  Service:    • PACEMAKER IMPLANTATION      around 2008 then replaced 2018          MEDICATIONS:  I have reviewed and reconciled the patients medication list in the patients chart at the skilled nursing facility today, 03/17/2022.      ALLERGIES:  Allergies   Allergen Reactions   • Phenergan [Promethazine Hcl] Confusion         SOCIAL HISTORY:  Social History     Socioeconomic History   • Marital status:    • Number of children: 3   Tobacco Use   • Smoking status: Never Smoker   • Smokeless tobacco: Never Used   Vaping Use   • Vaping Use: Never used   Substance and Sexual Activity   • Alcohol use: No   • Drug use: No   • Sexual activity: Defer       FAMILY HISTORY:  Family History   Problem Relation Age of Onset   • No Known Problems Mother    • No Known Problems Father    • Arthritis Other        REVIEW OF SYSTEMS:  Review of Systems   Constitutional: Negative for chills, fatigue and fever.   HENT: Negative for congestion, ear pain, rhinorrhea, sinus pressure and sore throat.    Eyes: Negative for visual disturbance.   Respiratory: Negative for cough, chest tightness, shortness of breath and wheezing.    Cardiovascular: Negative for chest pain, palpitations and leg swelling.   Gastrointestinal: Negative for abdominal pain, blood in stool, constipation, diarrhea, nausea and vomiting.   Endocrine: Negative for polydipsia and polyuria.   Genitourinary: Negative for dysuria and hematuria.   Musculoskeletal: Negative for arthralgias and back pain.   Skin: Negative for rash.   Neurological: Positive for weakness ( Generalized). Negative for dizziness, light-headedness, numbness and headaches.   Psychiatric/Behavioral: Negative for dysphoric mood and sleep disturbance. The patient is not nervous/anxious.           PHYSICAL EXAMINATION:     VITAL SIGNS:  Wt 59 kg (130 lb)   BMI 20.98 kg/m²     Physical Exam  Vitals and nursing note reviewed.   Constitutional:       Appearance: Normal appearance. She is well-developed.      Comments: Frail elderly female in no acute distress   HENT:      Head:  Normocephalic and atraumatic.      Nose: Nose normal.      Mouth/Throat:      Mouth: Mucous membranes are moist.      Pharynx: No oropharyngeal exudate.   Eyes:      General: No scleral icterus.     Conjunctiva/sclera: Conjunctivae normal.      Pupils: Pupils are equal, round, and reactive to light.   Neck:      Thyroid: No thyromegaly.   Cardiovascular:      Rate and Rhythm: Normal rate and regular rhythm.      Heart sounds: Murmur heard.     No friction rub. No gallop.   Pulmonary:      Effort: Pulmonary effort is normal. No respiratory distress.      Breath sounds: Normal breath sounds. No wheezing.   Abdominal:      General: Bowel sounds are normal. There is no distension.      Palpations: Abdomen is soft.      Tenderness: There is no abdominal tenderness.   Musculoskeletal:         General: No deformity or signs of injury.      Cervical back: Normal range of motion and neck supple.      Comments: Left arm in sling   Lymphadenopathy:      Cervical: No cervical adenopathy.   Skin:     General: Skin is warm and dry.      Findings: No rash.   Neurological:      Mental Status: She is alert and oriented to person, place, and time.   Psychiatric:         Mood and Affect: Mood normal.         Behavior: Behavior normal.         RECORDS REVIEW:   Labs: 3/10/2022 CBC with hemoglobin of 7.6    ASSESSMENT     Diagnoses and all orders for this visit:    1. Chronic atrial fibrillation (Primary)    2. Cerebrovascular accident (CVA) due to thrombosis of left anterior cerebral artery (HCC)    3. Controlled type 2 diabetes mellitus with stage 4 chronic kidney disease, without long-term current use of insulin (HCC)    4. Valvular heart disease    5. Normocytic anemia    6. Chronic gastritis without bleeding, unspecified gastritis type    7. Cardiac pacemaker in situ    8. Chronic congestive heart failure, unspecified heart failure type (HCC)    9. Thrombocytopenia (HCC)    10. Impairment of balance    11. Impaired mobility    12.  Peripheral arterial disease (HCC)    13. Stage 3b chronic kidney disease (HCC)    14. Acquired hypothyroidism        PLAN    Left humerus fracture/impaired mobility  -Continue physical therapy in the nursing facility.  Patient is doing well overall since admission to the facility.  -Patient is being conservatively managed for her arm as she was not a surgical candidate.  -Pain is well controlled with Tylenol as needed.    Acute DVT  -Axillary and paired brachial left superficial basilic vein.  Started on Eliquis 3/4/2022 with recommendations to complete 3 months of therapy.    History of CVA  -Continue aspirin    CKD stage III  -Renal function remains stable at baseline.    Chronic atrial fibrillation/status post pacemaker  -Heart rate remains well controlled on metoprolol.  Continue aspirin and Eliquis regimen.    Type 2 diabetes mellitus  -Fair control with glimepiride and Tradjenta.  Last A1c was 7.2.    Acquired hypothyroidism  -TSH is planned for review later next week.  Will adjust dose of Synthroid if appropriate        [x]  Discussed Patient in detail with nursing/staff, addressed all needs today.     [x]  Plan of Care Reviewed   []  PT/OT Reviewed   []  Order Changes  []  Discharge Plans Reviewed  [x]  Advance Directive on file with Nursing Home.   [x]  POA on file with Nursing Home.    [x]  Code Status listed and reviewed.     I confirm accuracy of unchanged data/findings including physical exam and plan which have been carried forward from previous visit, as well as I have updated appropriately those that have changed        Paxton Vasquez DO.  3/28/2022

## 2022-03-28 NOTE — TELEPHONE ENCOUNTER
Patient's daughter in law, Nikole called to discuss patient's medication.  Nikole wasn't on the verbal release, but Lynne did give me verbal consent to speak with Nikole.  Nikole states that Lynne was just released from [place] and wanted to verify her med list.  Patient took 75 mg of Plavix, which has been discontinued,  this morning and 5 mg of Eliquis around 1500 this afternoon.  Nikole and the patient were both concerned because she took both medications.  She also wants to know if she should still be taking the 81 mg Aspirin.  Patient states that she is feelig fine and has no abnormal bruising or bleeding.  I recommended that she go to the ED if these symptoms were to develop.  Please advise.

## 2022-03-29 ENCOUNTER — TELEPHONE (OUTPATIENT)
Dept: GASTROENTEROLOGY | Facility: CLINIC | Age: 87
End: 2022-03-29

## 2022-03-29 NOTE — TELEPHONE ENCOUNTER
I have reviewed her discharge medication list from the nursing home. Pleases let them know she is supposed to be on both aspirin and the eliquis per cardiology's recommendation. Stop the plavix now. Nothing else to do regarding the dose from yesterday, watch for sings of bleeding.

## 2022-03-29 NOTE — TELEPHONE ENCOUNTER
Patient wanted to wait about 2 weeks for scheduling. She is scheduled for 04/14/22 and will arrive at office between 8-8:30. Instructions given to patient over phone.

## 2022-03-29 NOTE — TELEPHONE ENCOUNTER
----- Message from Catrachita Moreau MA sent at 3/24/2022  2:40 PM EDT -----  Regarding: FW: pillcam    ----- Message -----  From: Shana Clark  Sent: 3/24/2022   1:38 PM EDT  To: Catrachita Moreau MA  Subject: RE: pillcam                                      Approved - no  date . Ready to schedule.  Thanks    ----- Message -----  From: Catrachiat Moreau MA  Sent: 3/24/2022   8:27 AM EDT  To: Shana Clark  Subject: FW: pillcam                                      Could you please get an Auth for this. Thank you, I appreciate it.     Catrachita  ----- Message -----  From: Abiola Ritchie PA-C  Sent: 3/23/2022   6:23 PM EDT  To: Catrachita Moreau MA  Subject: pillcam                                          This pt is currently admitted, needs pillcam arranged in the next 1-2 weeks as an outpatient. Please arrange.

## 2022-03-29 NOTE — TELEPHONE ENCOUNTER
Spoke to patient regarding medications.  She verbalized understanding that she is to take the Eliquis and the Aspririn, not the Plavix.  She states that she has not seen any signs of abnormal bleeding.

## 2022-03-31 ENCOUNTER — TELEPHONE (OUTPATIENT)
Dept: INTERNAL MEDICINE | Facility: CLINIC | Age: 87
End: 2022-03-31

## 2022-04-07 ENCOUNTER — TELEPHONE (OUTPATIENT)
Dept: INTERNAL MEDICINE | Facility: CLINIC | Age: 87
End: 2022-04-07

## 2022-04-07 NOTE — TELEPHONE ENCOUNTER
[facility] CALLED AND IS ASKING FOR OFFICE NOTES FOR FAILED MEDICATIONS AND LABS RELATED TO DIAGNOSIS, IN ORDER TO PROCESS PRIOR AUTH. PLEASE ADVISE.    CALL BACK IS- [phone]  FAX- [phone]

## 2022-04-11 ENCOUNTER — TELEPHONE (OUTPATIENT)
Dept: INTERNAL MEDICINE | Facility: CLINIC | Age: 87
End: 2022-04-11

## 2022-04-11 ENCOUNTER — TELEPHONE (OUTPATIENT)
Dept: GASTROENTEROLOGY | Facility: CLINIC | Age: 87
End: 2022-04-11

## 2022-04-11 NOTE — TELEPHONE ENCOUNTER
Caller: ROWAN    Relationship: [facility]    Best call back number: [phone]    What is the best time to reach you: ANY    Who are you requesting to speak with (clinical staff, provider,  specific staff member):CLINICAL FOR VERBAL APPROVAL      Do you know the name of the person who called:N/A    What was the call regarding:REQUEST TO INCREASE LASIX    Do you require a callback: YES,     PATIENT IS EXPERIENCING BILATERAL LOWER EDEMA WITH WEEPING TO LEFT LOWER EXTREMITY.  HAVING SOA UPON EXERTION     ELEVATED GLUCOSE IN THE HIGH 200'S WITH CURRENT READING 277 MG/DL    PLEASE CALL AND ADISE ON APPROVAL TO INCREASE HER LASIX.  CURRENTLY 20 MG DAILY.

## 2022-04-11 NOTE — TELEPHONE ENCOUNTER
Patient is scheduled for Pill Cam Endoscopy on 04/14/22. Her  called today and states that she has a lot of other health issues that take precedent over this. Once they get those figured out he said they will call back to reschedule.

## 2022-04-12 ENCOUNTER — TELEPHONE (OUTPATIENT)
Dept: INTERNAL MEDICINE | Facility: CLINIC | Age: 87
End: 2022-04-12

## 2022-04-12 ENCOUNTER — PATIENT OUTREACH (OUTPATIENT)
Dept: CASE MANAGEMENT | Facility: OTHER | Age: 87
End: 2022-04-12

## 2022-04-12 NOTE — TELEPHONE ENCOUNTER
Caller: [id number] - [place], [place] - [address] - [phone]  - [phone]    Relationship: Pharmacy    Best call back number: [phone]    MEDICATION PRIOR AUTHORIZATION   epoetin dorcas-epbx (Retacrit) 57014 UNIT/ML injection  40,000 Units         When is it needed: ASAP

## 2022-04-12 NOTE — OUTREACH NOTE
AMBULATORY CASE MANAGEMENT NOTE    Name and Relationship of Patient/Support Person: Brennan Mendenhall - Emergency Contact    Patient Outreach    RN-ACM outreach to patient. Patient was recently discharged to home from a skilled nursing facility.  Conversation this date with spouse with patient participating in the background.      Per their report, patient is experiencing elevated blood glucose levels with readings ranging in the mid to upper 200's.  Patient is being served by [facility].  Per patient/spouse report, the  nurse did a prn visit yesterday and is scheduled to be back this afternoon.  Notes indicate hh staff are coordinating with PCP.     Spouse discussed concerns related to slow wound healing and the fluctuation in bg readings.  Spouse/patient declined RN-ACM assistance in scheduling a PCP appointment, stating preference to continue to work through the home health nurse.  Recent A1c of 6.1% reviewed.  Spouse/patient discussed decreased appetite.  CC diet and snacks encouraged.  Spouse expressed appreciation for the outreach and concluded the conversation.      Education Documentation  Sick-Day Management, taught by Courtney Laguerre RN at 4/12/2022 11:52 AM.  Learner: Family, Patient  Readiness: Acceptance  Method: Explanation  Response: Verbalizes Understanding, Needs Reinforcement    Blood Glucose Monitoring, taught by Courtney Laguerre RN at 4/12/2022 11:52 AM.  Learner: Family, Patient  Readiness: Acceptance  Method: Explanation  Response: Verbalizes Understanding, Needs Reinforcement    Blood Glucose Goal, taught by Courtney Laguerre RN at 4/12/2022 11:52 AM.  Learner: Family, Patient  Readiness: Acceptance  Method: Explanation  Response: Verbalizes Understanding, Needs Reinforcement    A1C Goal, taught by Courtney Laguerre RN at 4/12/2022 11:52 AM.  Learner: Family, Patient  Readiness: Acceptance  Method: Explanation  Response: Verbalizes Understanding, Needs  Cabrera BRADFORD  Ambulatory Case Management    4/12/2022, 11:42 EDT

## 2022-04-13 ENCOUNTER — APPOINTMENT (OUTPATIENT)
Dept: GENERAL RADIOLOGY | Facility: HOSPITAL | Age: 87
End: 2022-04-13

## 2022-04-13 ENCOUNTER — TELEPHONE (OUTPATIENT)
Dept: INTERNAL MEDICINE | Facility: CLINIC | Age: 87
End: 2022-04-13

## 2022-04-13 ENCOUNTER — HOSPITAL ENCOUNTER (INPATIENT)
Facility: HOSPITAL | Age: 87
LOS: 2 days | Discharge: HOME | End: 2022-04-16
Attending: EMERGENCY MEDICINE | Admitting: FAMILY MEDICINE

## 2022-04-13 DIAGNOSIS — Z86.718 HISTORY OF DVT (DEEP VEIN THROMBOSIS): ICD-10-CM

## 2022-04-13 DIAGNOSIS — N18.9 CHRONIC KIDNEY DISEASE, UNSPECIFIED CKD STAGE: ICD-10-CM

## 2022-04-13 DIAGNOSIS — L02.91 ABSCESS: ICD-10-CM

## 2022-04-13 DIAGNOSIS — I48.20 CHRONIC ATRIAL FIBRILLATION: Chronic | ICD-10-CM

## 2022-04-13 DIAGNOSIS — D64.9 ACUTE ON CHRONIC ANEMIA: Primary | ICD-10-CM

## 2022-04-13 DIAGNOSIS — R60.0 BILATERAL LOWER EXTREMITY EDEMA: ICD-10-CM

## 2022-04-13 LAB
ABO GROUP BLD: NORMAL
ALBUMIN SERPL-MCNC: 3.3 G/DL (ref 3.5–5.2)
ALBUMIN/GLOB SERPL: 1.4 G/DL
ALP SERPL-CCNC: 110 U/L (ref 39–117)
ALT SERPL W P-5'-P-CCNC: 16 U/L (ref 1–33)
ANION GAP SERPL CALCULATED.3IONS-SCNC: 19.5 MMOL/L (ref 5–15)
AST SERPL-CCNC: 21 U/L (ref 1–32)
BACTERIA UR QL AUTO: ABNORMAL /HPF
BASOPHILS # BLD AUTO: 0.02 10*3/MM3 (ref 0–0.2)
BASOPHILS NFR BLD AUTO: 0.2 % (ref 0–1.5)
BILIRUB SERPL-MCNC: 0.7 MG/DL (ref 0–1.2)
BILIRUB UR QL STRIP: NEGATIVE
BLD GP AB SCN SERPL QL: NEGATIVE
BUN SERPL-MCNC: 57 MG/DL (ref 8–23)
BUN/CREAT SERPL: 31.8 (ref 7–25)
CALCIUM SPEC-SCNC: 8.4 MG/DL (ref 8.6–10.5)
CHLORIDE SERPL-SCNC: 96 MMOL/L (ref 98–107)
CLARITY UR: CLEAR
CO2 SERPL-SCNC: 15.5 MMOL/L (ref 22–29)
COLOR UR: YELLOW
CREAT SERPL-MCNC: 1.79 MG/DL (ref 0.57–1)
DEPRECATED RDW RBC AUTO: 59.7 FL (ref 37–54)
EGFRCR SERPLBLD CKD-EPI 2021: 27.2 ML/MIN/1.73
EOSINOPHIL # BLD AUTO: 0.03 10*3/MM3 (ref 0–0.4)
EOSINOPHIL NFR BLD AUTO: 0.3 % (ref 0.3–6.2)
ERYTHROCYTE [DISTWIDTH] IN BLOOD BY AUTOMATED COUNT: 18.3 % (ref 12.3–15.4)
GLOBULIN UR ELPH-MCNC: 2.3 GM/DL
GLUCOSE SERPL-MCNC: 324 MG/DL (ref 65–99)
GLUCOSE UR STRIP-MCNC: NEGATIVE MG/DL
HCT VFR BLD AUTO: 14.6 % (ref 34–46.6)
HGB BLD-MCNC: 4.4 G/DL (ref 12–15.9)
HGB UR QL STRIP.AUTO: NEGATIVE
HOLD SPECIMEN: NORMAL
HOLD SPECIMEN: NORMAL
HYALINE CASTS UR QL AUTO: ABNORMAL /LPF
IMM GRANULOCYTES # BLD AUTO: 0.08 10*3/MM3 (ref 0–0.05)
IMM GRANULOCYTES NFR BLD AUTO: 0.8 % (ref 0–0.5)
KETONES UR QL STRIP: NEGATIVE
LEUKOCYTE ESTERASE UR QL STRIP.AUTO: ABNORMAL
LYMPHOCYTES # BLD AUTO: 1.45 10*3/MM3 (ref 0.7–3.1)
LYMPHOCYTES NFR BLD AUTO: 13.9 % (ref 19.6–45.3)
MCH RBC QN AUTO: 27 PG (ref 26.6–33)
MCHC RBC AUTO-ENTMCNC: 30.1 G/DL (ref 31.5–35.7)
MCV RBC AUTO: 89.6 FL (ref 79–97)
MONOCYTES # BLD AUTO: 1.07 10*3/MM3 (ref 0.1–0.9)
MONOCYTES NFR BLD AUTO: 10.2 % (ref 5–12)
NEUTROPHILS NFR BLD AUTO: 7.81 10*3/MM3 (ref 1.7–7)
NEUTROPHILS NFR BLD AUTO: 74.6 % (ref 42.7–76)
NITRITE UR QL STRIP: NEGATIVE
NRBC BLD AUTO-RTO: 0.5 /100 WBC (ref 0–0.2)
NT-PROBNP SERPL-MCNC: 7314 PG/ML (ref 0–1800)
PH UR STRIP.AUTO: <=5 [PH] (ref 5–8)
PLATELET # BLD AUTO: 291 10*3/MM3 (ref 140–450)
PMV BLD AUTO: 10.6 FL (ref 6–12)
POTASSIUM SERPL-SCNC: 4.4 MMOL/L (ref 3.5–5.2)
PROCALCITONIN SERPL-MCNC: 0.14 NG/ML (ref 0–0.25)
PROT SERPL-MCNC: 5.6 G/DL (ref 6–8.5)
PROT UR QL STRIP: NEGATIVE
RBC # BLD AUTO: 1.63 10*6/MM3 (ref 3.77–5.28)
RBC # UR STRIP: ABNORMAL /HPF
REF LAB TEST METHOD: ABNORMAL
RH BLD: POSITIVE
SARS-COV-2 RNA PNL SPEC NAA+PROBE: NOT DETECTED
SODIUM SERPL-SCNC: 131 MMOL/L (ref 136–145)
SP GR UR STRIP: 1.01 (ref 1–1.03)
SQUAMOUS #/AREA URNS HPF: ABNORMAL /HPF
T&S EXPIRATION DATE: NORMAL
TROPONIN T SERPL-MCNC: 0.06 NG/ML (ref 0–0.03)
UROBILINOGEN UR QL STRIP: ABNORMAL
WBC # UR STRIP: ABNORMAL /HPF
WBC NRBC COR # BLD: 10.46 10*3/MM3 (ref 3.4–10.8)
WHOLE BLOOD HOLD SPECIMEN: NORMAL
WHOLE BLOOD HOLD SPECIMEN: NORMAL

## 2022-04-13 PROCEDURE — 86920 COMPATIBILITY TEST SPIN: CPT

## 2022-04-13 PROCEDURE — 86900 BLOOD TYPING SEROLOGIC ABO: CPT

## 2022-04-13 PROCEDURE — G0378 HOSPITAL OBSERVATION PER HR: HCPCS

## 2022-04-13 PROCEDURE — 86900 BLOOD TYPING SEROLOGIC ABO: CPT | Performed by: EMERGENCY MEDICINE

## 2022-04-13 PROCEDURE — 81001 URINALYSIS AUTO W/SCOPE: CPT | Performed by: FAMILY MEDICINE

## 2022-04-13 PROCEDURE — 86850 RBC ANTIBODY SCREEN: CPT | Performed by: EMERGENCY MEDICINE

## 2022-04-13 PROCEDURE — 85025 COMPLETE CBC W/AUTO DIFF WBC: CPT | Performed by: EMERGENCY MEDICINE

## 2022-04-13 PROCEDURE — 84484 ASSAY OF TROPONIN QUANT: CPT | Performed by: EMERGENCY MEDICINE

## 2022-04-13 PROCEDURE — 71045 X-RAY EXAM CHEST 1 VIEW: CPT

## 2022-04-13 PROCEDURE — 93005 ELECTROCARDIOGRAM TRACING: CPT | Performed by: EMERGENCY MEDICINE

## 2022-04-13 PROCEDURE — P9016 RBC LEUKOCYTES REDUCED: HCPCS

## 2022-04-13 PROCEDURE — 87635 SARS-COV-2 COVID-19 AMP PRB: CPT | Performed by: FAMILY MEDICINE

## 2022-04-13 PROCEDURE — 84145 PROCALCITONIN (PCT): CPT | Performed by: EMERGENCY MEDICINE

## 2022-04-13 PROCEDURE — 99285 EMERGENCY DEPT VISIT HI MDM: CPT

## 2022-04-13 PROCEDURE — 83880 ASSAY OF NATRIURETIC PEPTIDE: CPT | Performed by: EMERGENCY MEDICINE

## 2022-04-13 PROCEDURE — 36430 TRANSFUSION BLD/BLD COMPNT: CPT

## 2022-04-13 PROCEDURE — 80053 COMPREHEN METABOLIC PANEL: CPT | Performed by: EMERGENCY MEDICINE

## 2022-04-13 PROCEDURE — 86901 BLOOD TYPING SEROLOGIC RH(D): CPT | Performed by: EMERGENCY MEDICINE

## 2022-04-13 PROCEDURE — 25010000002 FUROSEMIDE PER 20 MG: Performed by: EMERGENCY MEDICINE

## 2022-04-13 PROCEDURE — 99223 1ST HOSP IP/OBS HIGH 75: CPT | Performed by: FAMILY MEDICINE

## 2022-04-13 RX ORDER — SODIUM CHLORIDE 0.9 % (FLUSH) 0.9 %
3 SYRINGE (ML) INJECTION EVERY 12 HOURS SCHEDULED
Status: DISCONTINUED | OUTPATIENT
Start: 2022-04-14 | End: 2022-04-16 | Stop reason: HOSPADM

## 2022-04-13 RX ORDER — ACETAMINOPHEN 650 MG/1
650 SUPPOSITORY RECTAL EVERY 4 HOURS PRN
Status: DISCONTINUED | OUTPATIENT
Start: 2022-04-13 | End: 2022-04-16 | Stop reason: HOSPADM

## 2022-04-13 RX ORDER — SODIUM CHLORIDE 0.9 % (FLUSH) 0.9 %
3-10 SYRINGE (ML) INJECTION AS NEEDED
Status: DISCONTINUED | OUTPATIENT
Start: 2022-04-13 | End: 2022-04-16 | Stop reason: HOSPADM

## 2022-04-13 RX ORDER — LEVOTHYROXINE SODIUM 0.05 MG/1
50 TABLET ORAL DAILY
Status: DISCONTINUED | OUTPATIENT
Start: 2022-04-14 | End: 2022-04-16 | Stop reason: HOSPADM

## 2022-04-13 RX ORDER — NICOTINE POLACRILEX 4 MG
1 LOZENGE BUCCAL
Status: DISCONTINUED | OUTPATIENT
Start: 2022-04-13 | End: 2022-04-16 | Stop reason: HOSPADM

## 2022-04-13 RX ORDER — FUROSEMIDE 10 MG/ML
80 INJECTION INTRAMUSCULAR; INTRAVENOUS ONCE
Status: COMPLETED | OUTPATIENT
Start: 2022-04-13 | End: 2022-04-13

## 2022-04-13 RX ORDER — FUROSEMIDE 10 MG/ML
40 INJECTION INTRAMUSCULAR; INTRAVENOUS EVERY 12 HOURS
Status: DISCONTINUED | OUTPATIENT
Start: 2022-04-14 | End: 2022-04-15

## 2022-04-13 RX ORDER — DEXTROSE MONOHYDRATE 25 G/50ML
25 INJECTION, SOLUTION INTRAVENOUS
Status: DISCONTINUED | OUTPATIENT
Start: 2022-04-13 | End: 2022-04-16 | Stop reason: HOSPADM

## 2022-04-13 RX ORDER — SODIUM CHLORIDE 0.9 % (FLUSH) 0.9 %
10 SYRINGE (ML) INJECTION AS NEEDED
Status: DISCONTINUED | OUTPATIENT
Start: 2022-04-13 | End: 2022-04-16 | Stop reason: HOSPADM

## 2022-04-13 RX ORDER — ONDANSETRON 2 MG/ML
4 INJECTION INTRAMUSCULAR; INTRAVENOUS EVERY 6 HOURS PRN
Status: DISCONTINUED | OUTPATIENT
Start: 2022-04-13 | End: 2022-04-16 | Stop reason: HOSPADM

## 2022-04-13 RX ORDER — CEFTRIAXONE 1 G/50ML
1 INJECTION, SOLUTION INTRAVENOUS EVERY 24 HOURS
Status: DISCONTINUED | OUTPATIENT
Start: 2022-04-14 | End: 2022-04-16 | Stop reason: HOSPADM

## 2022-04-13 RX ORDER — ACETAMINOPHEN 325 MG/1
650 TABLET ORAL EVERY 4 HOURS PRN
Status: DISCONTINUED | OUTPATIENT
Start: 2022-04-13 | End: 2022-04-16 | Stop reason: HOSPADM

## 2022-04-13 RX ORDER — METOPROLOL TARTRATE 100 MG/1
100 TABLET ORAL 2 TIMES DAILY
Status: DISCONTINUED | OUTPATIENT
Start: 2022-04-14 | End: 2022-04-16 | Stop reason: HOSPADM

## 2022-04-13 RX ORDER — BUMETANIDE 0.25 MG/ML
0.5 INJECTION INTRAMUSCULAR; INTRAVENOUS ONCE
Status: COMPLETED | OUTPATIENT
Start: 2022-04-13 | End: 2022-04-13

## 2022-04-13 RX ORDER — PANTOPRAZOLE SODIUM 40 MG/10ML
40 INJECTION, POWDER, LYOPHILIZED, FOR SOLUTION INTRAVENOUS EVERY 12 HOURS SCHEDULED
Status: DISCONTINUED | OUTPATIENT
Start: 2022-04-14 | End: 2022-04-16 | Stop reason: HOSPADM

## 2022-04-13 RX ORDER — ACETAMINOPHEN 160 MG/5ML
650 SOLUTION ORAL EVERY 4 HOURS PRN
Status: DISCONTINUED | OUTPATIENT
Start: 2022-04-13 | End: 2022-04-16 | Stop reason: HOSPADM

## 2022-04-13 RX ADMIN — FUROSEMIDE 80 MG: 10 INJECTION, SOLUTION INTRAMUSCULAR; INTRAVENOUS at 19:04

## 2022-04-13 RX ADMIN — BUMETANIDE 0.5 MG: 0.25 INJECTION INTRAMUSCULAR; INTRAVENOUS at 23:34

## 2022-04-13 NOTE — ED PROVIDER NOTES
"Subjective   87-year-old female presents to the ED with a chief complaint of edema.  Patient is complaining of worsening edema in her bilateral lower extremities.  States that is getting worse over the last few days.  States that she is \"full of fluid everywhere\".  She states that she is generally fatigued and is having increased dyspnea on exertion.  No nausea vomiting diarrhea abdominal pain.  No fever.  No chest pain.  No lightheadedness or dizziness.  No prior treatments or limiting factors.  No prior evaluations.  No other complaints this time.          Review of Systems   Constitutional: Positive for fatigue. Negative for fever.   Respiratory: Positive for shortness of breath. Negative for wheezing.    Cardiovascular: Positive for leg swelling. Negative for chest pain and palpitations.   All other systems reviewed and are negative.      Past Medical History:   Diagnosis Date   • Acute deep vein thrombosis of left upper extremity (HCC)    • Anemia    • Asthma    • CHF (congestive heart failure) ([facility])    • Chronic atrial fibrillation ([facility])    • Deep venous thrombosis of left upper limb ([facility])    • Diabetes mellitus, type 2 ([facility])    • Diarrhea    • GERD (gastroesophageal reflux disease)    • Glaucoma    • H/O transfusion of whole blood    • Heart attack ([facility])    • Heart murmur    • History of transfusion    • Hyperlipidemia    • Hypertension    • Kidney disease     patient not aware of what exact diagnosis is    • Osteomyelitis ([facility])    • Osteomyelitis of left foot ([facility]) 10/3/2017   • Peripheral vascular disease ([facility])    • Right retinal detachment    • Secondary cataract of both eyes    • Stable proliferative diabetic retinopathy of both eyes ([facility])    • Stroke ([facility])    • Swelling of left extremity    • Urinary tract infection    • Wears glasses        Allergies   Allergen Reactions   • Phenergan [Promethazine Hcl] Confusion       Past Surgical History:   Procedure Laterality Date   • AMPUTATION DIGIT Left 7/5/2018 "    Procedure: Left Great toe amputation;  Surgeon: Prakash Carrington MD;  Location:  [facility];  Service: Vascular   • AMPUTATION DIGIT Left 8/27/2018    Procedure: SECOND TOE AMPUTATION DIGIT LEFT;  Surgeon: Prakash Carrington MD;  Location:  [facility];  Service: General   • APPENDECTOMY     • BONE MARROW ASPIRATION     • CARDIAC ABLATION     • CARDIAC CATHETERIZATION N/A 10/10/2017    Procedure: Peripheral angiography;  Surgeon: Kan Pelaez MD;  Location: [facility] CATH INVASIVE LOCATION;  Service:    • CARDIAC CATHETERIZATION N/A 10/10/2017    Procedure: Angioplasty-peripheral;  Surgeon: Kan Pelaez MD;  Location: [facility] CATH INVASIVE LOCATION;  Service:    • CARDIAC CATHETERIZATION N/A 10/10/2017    Procedure: Atherectomy-peripheral;  Surgeon: Kan Pelaez MD;  Location: [facility] CATH INVASIVE LOCATION;  Service:    • CARDIAC ELECTROPHYSIOLOGY PROCEDURE N/A 5/3/2018    Procedure: generator change;  Surgeon: Zan Poole MD;  Location: [facility] CATH INVASIVE LOCATION;  Service: Cardiovascular   • CARDIOVERSION     • COLONOSCOPY     • ENDOSCOPY N/A 10/9/2017    Procedure: ESOPHAGOGASTRODUODENOSCOPY WITH COLD FORCEP BIOPSY;  Surgeon: Clifton Hyatt MD;  Location: [facility] ENDOSCOPY;  Service:    • ENDOSCOPY N/A 3/22/2022    Procedure: ESOPHAGOGASTRODUODENOSCOPY with AVM cautery;  Surgeon: Clarisse Velez MD;  Location: [facility] ENDOSCOPY;  Service: Gastroenterology;  Laterality: N/A;   • EYE SURGERY Bilateral     CATARACTS   • INTERVENTIONAL RADIOLOGY PROCEDURE N/A 10/10/2017    Procedure: Abdominal Aortagram with Runoff;  Surgeon: Kan Pelaez MD;  Location: [facility] CATH INVASIVE LOCATION;  Service:    • PACEMAKER IMPLANTATION      around 2008 then replaced 2018       Family History   Problem Relation Age of Onset   • No Known Problems Mother    • No Known Problems Father    • Arthritis Other        Social History     Socioeconomic History   • Marital status:    • Number of  children: 3   Tobacco Use   • Smoking status: Never Smoker   • Smokeless tobacco: Never Used   Vaping Use   • Vaping Use: Never used   Substance and Sexual Activity   • Alcohol use: No   • Drug use: No   • Sexual activity: Defer           Objective   Physical Exam  Vitals and nursing note reviewed.   Constitutional:       General: She is not in acute distress.     Appearance: She is well-developed. She is not diaphoretic.      Comments: Elderly-appearing   HENT:      Head: Normocephalic and atraumatic.      Nose: Nose normal.   Eyes:      Conjunctiva/sclera: Conjunctivae normal.   Cardiovascular:      Rate and Rhythm: Normal rate and regular rhythm.   Pulmonary:      Effort: Pulmonary effort is normal. No respiratory distress.      Breath sounds: Normal breath sounds.   Abdominal:      General: There is no distension.      Palpations: Abdomen is soft.      Tenderness: There is no abdominal tenderness. There is no guarding.   Musculoskeletal:         General: No deformity.      Right lower leg: Edema present.      Left lower leg: Edema present.      Comments: 3+ pitting edema bilateral lower extremities   Neurological:      Mental Status: She is alert and oriented to person, place, and time.      Cranial Nerves: No cranial nerve deficit.         Procedures           ED Course  ED Course as of 04/13/22 2015 Wed Apr 13, 2022   1811 EKG interpreted by me.  Sinus rhythm.  Rate of 69.  Nonspecific intraventricular conduction delay.  ST segment depressions.  Abnormal EKG. [CG]   2011 Troponin T(!!): 0.060 [PF]   2011 proBNP(!): 7,314.0 [PF]   2011 RH type: Positive [PF]   2011 ABO Type: O [PF]   2011 Glucose(!): 324 [PF]   2011 BUN(!): 57 [PF]   2011 Creatinine(!): 1.79 [PF]   2011 Sodium(!): 131 [PF]   2011 Potassium: 4.4 [PF]   2011 Chloride(!): 96 [PF]   2011 CO2(!): 15.5 [PF]   2011 Calcium(!): 8.4 [PF]   2011 Total Protein(!): 5.6 [PF]   2011 Albumin(!): 3.30 [PF]   2011 ALT (SGPT): 16 [PF]   2011 AST (SGOT): 21  [PF]   2011 Alkaline Phosphatase: 110 [PF]   2011 Total Bilirubin: 0.7 [PF]   2011 Procalcitonin: 0.14 [PF]   2011 WBC: 10.46 [PF]   2011 Hemoglobin(!!): 4.4 [PF]   2011 Hematocrit(!!): 14.6 [PF]   2011 Platelets: 291 [PF]      ED Course User Index  [CG] Agustin Schaefer,   [PF] Robi King, DO                                                 [facility]  PULSE OXIMETRY INTERPRETATION  Patient had a pulse ox of 100% on room air. This is a normal pulse oximetry reading.      20:15 EDT  I received care from Dr. Schaefer in regards to follow-up of labs and imaging.  Patient is found to have a hemoglobin of 4.4, hematocrit 14.6.  Chest x-ray without acute findings per my interpretation.  Patient does have 2+ lower extremity edema up to the hips.  Does have a history of CKD as well.  Recent hospitalization for reported GI bleed and complicated by anticoagulation secondary to recent DVT, patient discussed with Dr. Lin hospitalist will admit.  2 units packed red cells ordered in emergency department  Final diagnoses:   Acute on chronic anemia   Chronic kidney disease, unspecified CKD stage   Bilateral lower extremity edema   History of DVT (deep vein thrombosis)       ED Disposition  ED Disposition     ED Disposition   Decision to Admit    Condition   --    Comment   Level of Care: Telemetry [5]   Diagnosis: Acute on chronic anemia [[id number]]   Admitting Physician: DECLAN IRBY [[id number]]               No follow-up provider specified.       Medication List      No changes were made to your prescriptions during this visit.          Robi King DO  04/13/22 2015

## 2022-04-13 NOTE — TELEPHONE ENCOUNTER
I called and spoke with patient and informed her that HH should be coming out and her  in the background started yelling home health can not start her on any new medications. I proceeded with Elizabeth can not change any medications without seeing Lynne in the office. I offered to move Denise appointment up and Lynne stated her legs have been swelling and she has multiple bed sores. I informed Lynne that she could have a infection of some sort going on that can be causing her to feel this fatigued and feeling bad. Denise  got on the phone and was yelling asking why no one from this office could come out to visit Lynne in the house hold. I informed  that we do not do house calls.  then said so if Lynne was laying here dying no one would come out to her? I informed  that if Lynne was dying or was in serious condition he needed to call 911 and have someone come out to evaluate Lynne.  then stated that Denise guevara has been staying in the 200s and I informed  that this could be due to a infection that Lynne needed to be evaluated either in office or at the ED.  started yelling again that she can not leave the house and this was ridiculous and hung the phone up.

## 2022-04-13 NOTE — TELEPHONE ENCOUNTER
Patients  states that her sugar has been running high over 200 lately and she has not felt good.  She does not want to go out.  He states he is needing someone to come out to the house.  Please advise.  Phone number verified.

## 2022-04-14 ENCOUNTER — APPOINTMENT (OUTPATIENT)
Dept: CARDIOLOGY | Facility: HOSPITAL | Age: 87
End: 2022-04-14

## 2022-04-14 ENCOUNTER — APPOINTMENT (OUTPATIENT)
Dept: ULTRASOUND IMAGING | Facility: HOSPITAL | Age: 87
End: 2022-04-14

## 2022-04-14 LAB
ANION GAP SERPL CALCULATED.3IONS-SCNC: 12.9 MMOL/L (ref 5–15)
BASOPHILS # BLD AUTO: 0.03 10*3/MM3 (ref 0–0.2)
BASOPHILS NFR BLD AUTO: 0.3 % (ref 0–1.5)
BUN SERPL-MCNC: 50 MG/DL (ref 8–23)
BUN/CREAT SERPL: 29.6 (ref 7–25)
CALCIUM SPEC-SCNC: 8.2 MG/DL (ref 8.6–10.5)
CHLORIDE SERPL-SCNC: 103 MMOL/L (ref 98–107)
CO2 SERPL-SCNC: 21.1 MMOL/L (ref 22–29)
CREAT SERPL-MCNC: 1.69 MG/DL (ref 0.57–1)
DEPRECATED RDW RBC AUTO: 51.3 FL (ref 37–54)
EGFRCR SERPLBLD CKD-EPI 2021: 29.1 ML/MIN/1.73
EOSINOPHIL # BLD AUTO: 0.06 10*3/MM3 (ref 0–0.4)
EOSINOPHIL NFR BLD AUTO: 0.7 % (ref 0.3–6.2)
ERYTHROCYTE [DISTWIDTH] IN BLOOD BY AUTOMATED COUNT: 16.8 % (ref 12.3–15.4)
GLUCOSE BLDC GLUCOMTR-MCNC: 105 MG/DL (ref 70–130)
GLUCOSE BLDC GLUCOMTR-MCNC: 111 MG/DL (ref 70–130)
GLUCOSE BLDC GLUCOMTR-MCNC: 176 MG/DL (ref 70–130)
GLUCOSE BLDC GLUCOMTR-MCNC: 250 MG/DL (ref 70–130)
GLUCOSE SERPL-MCNC: 222 MG/DL (ref 65–99)
HBA1C MFR BLD: 6.2 % (ref 4.8–5.6)
HCT VFR BLD AUTO: 19.6 % (ref 34–46.6)
HEMOCCULT STL QL: POSITIVE
HGB BLD-MCNC: 6.2 G/DL (ref 12–15.9)
IMM GRANULOCYTES # BLD AUTO: 0.09 10*3/MM3 (ref 0–0.05)
IMM GRANULOCYTES NFR BLD AUTO: 1 % (ref 0–0.5)
LYMPHOCYTES # BLD AUTO: 1.93 10*3/MM3 (ref 0.7–3.1)
LYMPHOCYTES NFR BLD AUTO: 22.4 % (ref 19.6–45.3)
MCH RBC QN AUTO: 27.1 PG (ref 26.6–33)
MCHC RBC AUTO-ENTMCNC: 31.6 G/DL (ref 31.5–35.7)
MCV RBC AUTO: 85.6 FL (ref 79–97)
MONOCYTES # BLD AUTO: 0.99 10*3/MM3 (ref 0.1–0.9)
MONOCYTES NFR BLD AUTO: 11.5 % (ref 5–12)
NEUTROPHILS NFR BLD AUTO: 5.5 10*3/MM3 (ref 1.7–7)
NEUTROPHILS NFR BLD AUTO: 64.1 % (ref 42.7–76)
NRBC BLD AUTO-RTO: 0.3 /100 WBC (ref 0–0.2)
PLATELET # BLD AUTO: 211 10*3/MM3 (ref 140–450)
PMV BLD AUTO: 10.3 FL (ref 6–12)
POTASSIUM SERPL-SCNC: 3.1 MMOL/L (ref 3.5–5.2)
POTASSIUM SERPL-SCNC: 4.4 MMOL/L (ref 3.5–5.2)
RBC # BLD AUTO: 2.29 10*6/MM3 (ref 3.77–5.28)
SODIUM SERPL-SCNC: 137 MMOL/L (ref 136–145)
TROPONIN T SERPL-MCNC: 0.05 NG/ML (ref 0–0.03)
WBC NRBC COR # BLD: 8.6 10*3/MM3 (ref 3.4–10.8)

## 2022-04-14 PROCEDURE — 63710000001 INSULIN ASPART PER 5 UNITS: Performed by: FAMILY MEDICINE

## 2022-04-14 PROCEDURE — 84484 ASSAY OF TROPONIN QUANT: CPT | Performed by: FAMILY MEDICINE

## 2022-04-14 PROCEDURE — 82272 OCCULT BLD FECES 1-3 TESTS: CPT | Performed by: FAMILY MEDICINE

## 2022-04-14 PROCEDURE — 99232 SBSQ HOSP IP/OBS MODERATE 35: CPT | Performed by: STUDENT IN AN ORGANIZED HEALTH CARE EDUCATION/TRAINING PROGRAM

## 2022-04-14 PROCEDURE — 87186 SC STD MICRODIL/AGAR DIL: CPT | Performed by: SURGERY

## 2022-04-14 PROCEDURE — 0Y900ZZ DRAINAGE OF RIGHT BUTTOCK, OPEN APPROACH: ICD-10-PCS | Performed by: SURGERY

## 2022-04-14 PROCEDURE — 0 LIDOCAINE 1 % SOLUTION: Performed by: SURGERY

## 2022-04-14 PROCEDURE — 86900 BLOOD TYPING SEROLOGIC ABO: CPT

## 2022-04-14 PROCEDURE — 80048 BASIC METABOLIC PNL TOTAL CA: CPT | Performed by: FAMILY MEDICINE

## 2022-04-14 PROCEDURE — 87147 CULTURE TYPE IMMUNOLOGIC: CPT | Performed by: SURGERY

## 2022-04-14 PROCEDURE — 87070 CULTURE OTHR SPECIMN AEROBIC: CPT | Performed by: SURGERY

## 2022-04-14 PROCEDURE — 84132 ASSAY OF SERUM POTASSIUM: CPT | Performed by: STUDENT IN AN ORGANIZED HEALTH CARE EDUCATION/TRAINING PROGRAM

## 2022-04-14 PROCEDURE — 25010000002 CEFTRIAXONE SODIUM-DEXTROSE 1-3.74 GM-%(50ML) RECONSTITUTED SOLUTION: Performed by: FAMILY MEDICINE

## 2022-04-14 PROCEDURE — 36430 TRANSFUSION BLD/BLD COMPNT: CPT

## 2022-04-14 PROCEDURE — 25010000002 FUROSEMIDE PER 20 MG: Performed by: FAMILY MEDICINE

## 2022-04-14 PROCEDURE — P9016 RBC LEUKOCYTES REDUCED: HCPCS

## 2022-04-14 PROCEDURE — 93306 TTE W/DOPPLER COMPLETE: CPT

## 2022-04-14 PROCEDURE — 93306 TTE W/DOPPLER COMPLETE: CPT | Performed by: INTERNAL MEDICINE

## 2022-04-14 PROCEDURE — 85025 COMPLETE CBC W/AUTO DIFF WBC: CPT | Performed by: FAMILY MEDICINE

## 2022-04-14 PROCEDURE — 83036 HEMOGLOBIN GLYCOSYLATED A1C: CPT | Performed by: FAMILY MEDICINE

## 2022-04-14 PROCEDURE — 82962 GLUCOSE BLOOD TEST: CPT

## 2022-04-14 PROCEDURE — 99231 SBSQ HOSP IP/OBS SF/LOW 25: CPT | Performed by: PHYSICIAN ASSISTANT

## 2022-04-14 PROCEDURE — 93971 EXTREMITY STUDY: CPT

## 2022-04-14 PROCEDURE — 87205 SMEAR GRAM STAIN: CPT | Performed by: SURGERY

## 2022-04-14 PROCEDURE — 10060 I&D ABSCESS SIMPLE/SINGLE: CPT | Performed by: SURGERY

## 2022-04-14 RX ORDER — LIDOCAINE HYDROCHLORIDE 10 MG/ML
10 INJECTION, SOLUTION INFILTRATION; PERINEURAL ONCE
Status: COMPLETED | OUTPATIENT
Start: 2022-04-14 | End: 2022-04-14

## 2022-04-14 RX ORDER — MAGNESIUM SULFATE HEPTAHYDRATE 40 MG/ML
2 INJECTION, SOLUTION INTRAVENOUS AS NEEDED
Status: DISCONTINUED | OUTPATIENT
Start: 2022-04-14 | End: 2022-04-16 | Stop reason: HOSPADM

## 2022-04-14 RX ORDER — POTASSIUM CHLORIDE 750 MG/1
40 CAPSULE, EXTENDED RELEASE ORAL EVERY 4 HOURS
Status: COMPLETED | OUTPATIENT
Start: 2022-04-14 | End: 2022-04-14

## 2022-04-14 RX ORDER — POTASSIUM CHLORIDE 750 MG/1
40 CAPSULE, EXTENDED RELEASE ORAL AS NEEDED
Status: DISCONTINUED | OUTPATIENT
Start: 2022-04-14 | End: 2022-04-14

## 2022-04-14 RX ORDER — BUMETANIDE 0.25 MG/ML
0.5 INJECTION INTRAMUSCULAR; INTRAVENOUS AS NEEDED
Status: ACTIVE | OUTPATIENT
Start: 2022-04-14 | End: 2022-04-15

## 2022-04-14 RX ORDER — POTASSIUM CHLORIDE 1.5 G/1.77G
40 POWDER, FOR SOLUTION ORAL AS NEEDED
Status: DISCONTINUED | OUTPATIENT
Start: 2022-04-14 | End: 2022-04-14

## 2022-04-14 RX ORDER — POTASSIUM CHLORIDE 750 MG/1
40 CAPSULE, EXTENDED RELEASE ORAL AS NEEDED
Status: DISCONTINUED | OUTPATIENT
Start: 2022-04-14 | End: 2022-04-16 | Stop reason: HOSPADM

## 2022-04-14 RX ORDER — POTASSIUM CHLORIDE 1.5 G/1.77G
40 POWDER, FOR SOLUTION ORAL AS NEEDED
Status: DISCONTINUED | OUTPATIENT
Start: 2022-04-14 | End: 2022-04-16 | Stop reason: HOSPADM

## 2022-04-14 RX ORDER — MAGNESIUM SULFATE HEPTAHYDRATE 40 MG/ML
4 INJECTION, SOLUTION INTRAVENOUS AS NEEDED
Status: DISCONTINUED | OUTPATIENT
Start: 2022-04-14 | End: 2022-04-16 | Stop reason: HOSPADM

## 2022-04-14 RX ORDER — POTASSIUM CHLORIDE 7.45 MG/ML
10 INJECTION INTRAVENOUS
Status: DISCONTINUED | OUTPATIENT
Start: 2022-04-14 | End: 2022-04-16 | Stop reason: HOSPADM

## 2022-04-14 RX ADMIN — Medication 3 ML: at 08:44

## 2022-04-14 RX ADMIN — CEFTRIAXONE 1 G: 1 INJECTION, SOLUTION INTRAVENOUS at 04:58

## 2022-04-14 RX ADMIN — FUROSEMIDE 40 MG: 10 INJECTION, SOLUTION INTRAMUSCULAR; INTRAVENOUS at 14:09

## 2022-04-14 RX ADMIN — POTASSIUM CHLORIDE 40 MEQ: 750 CAPSULE, EXTENDED RELEASE ORAL at 17:30

## 2022-04-14 RX ADMIN — METOPROLOL TARTRATE 100 MG: 100 TABLET, FILM COATED ORAL at 01:44

## 2022-04-14 RX ADMIN — PANTOPRAZOLE SODIUM 40 MG: 40 INJECTION, POWDER, FOR SOLUTION INTRAVENOUS at 08:44

## 2022-04-14 RX ADMIN — POTASSIUM CHLORIDE 40 MEQ: 750 CAPSULE, EXTENDED RELEASE ORAL at 09:39

## 2022-04-14 RX ADMIN — POTASSIUM CHLORIDE 40 MEQ: 750 CAPSULE, EXTENDED RELEASE ORAL at 14:08

## 2022-04-14 RX ADMIN — Medication 10 ML: at 14:10

## 2022-04-14 RX ADMIN — METOPROLOL TARTRATE 100 MG: 100 TABLET, FILM COATED ORAL at 21:06

## 2022-04-14 RX ADMIN — LEVOTHYROXINE SODIUM 50 MCG: 50 TABLET ORAL at 05:05

## 2022-04-14 RX ADMIN — INSULIN ASPART 4 UNITS: 100 INJECTION, SOLUTION INTRAVENOUS; SUBCUTANEOUS at 06:45

## 2022-04-14 RX ADMIN — PANTOPRAZOLE SODIUM 40 MG: 40 INJECTION, POWDER, FOR SOLUTION INTRAVENOUS at 03:10

## 2022-04-14 RX ADMIN — METOPROLOL TARTRATE 100 MG: 100 TABLET, FILM COATED ORAL at 08:44

## 2022-04-14 RX ADMIN — FUROSEMIDE 40 MG: 10 INJECTION, SOLUTION INTRAMUSCULAR; INTRAVENOUS at 04:56

## 2022-04-14 RX ADMIN — Medication 3 ML: at 21:06

## 2022-04-14 RX ADMIN — Medication 3 ML: at 04:57

## 2022-04-14 RX ADMIN — Medication 10 ML: at 21:06

## 2022-04-14 RX ADMIN — PANTOPRAZOLE SODIUM 40 MG: 40 INJECTION, POWDER, FOR SOLUTION INTRAVENOUS at 21:06

## 2022-04-14 RX ADMIN — LIDOCAINE HYDROCHLORIDE 10 ML: 10 INJECTION, SOLUTION INFILTRATION; PERINEURAL at 16:29

## 2022-04-14 NOTE — CONSULTS
[facility]      Nephrology Consultation      Referring Provider:   No ref. provider found    Reason for Consultation:  Acute Kidney Injury on chronic kidney disease 3     Subjective:  Chief complaint   Chief Complaint   Patient presents with   • Edema     History of present illness:    Patient is an 87 year old  female with significant history including CKD 3 with baseline creatinine of 1.4, chronic iron deficiency anemia, type 2 DM, HFpEF, atrial fibrillation on eliquis, tachybradycardia syndrome s/p PPM, PAD s/p PCI, DVT, hypothyroidism, and left proximal humerus fracture.  She was recently discharged from the nursing home around 2 weeks ago.  She presented to the ED with SOA and increased swelling in BLE.  Reports worsening dyspena on exertion with swelling in BLE over the past few days.  She has a history of blood transfusions and was evaluated in the past by Dr. Velez.  She also admits to chronic dark stools.  Currently patient has received 2 units of blood denies any shortness of breath, she denies any abdominal pain no nausea vomiting, she did mention that she has noticed dark-colored stool, what appears to be more bloody than dark-colored.  She said she does have known history of hemorrhoids as well and it does bleed.  She is scheduled to have a PillCam for further evaluation of recurrent GI bleeds.  In ED she was noted to have BUN/creatinine of 57/1.79 with sodium of 131, glucose 324, and Hgb of 4.4 with hematocrit of 14.6.  Patient received lasix and 2 units in the ED along with bumex after transfusion.  She was admitted and I was consulted for further management and evaluation of KASH on CKD 3.   I have reviewed labs/imaging/records from this hospitalization, including ER staff and admitting/attending physicians H/P's and progress notes to establish a comprehensive understanding of this patient's clinical hospital course, as well as to establish plan of care appropriately.    Past Medical History:   Diagnosis Date   • Acute deep vein thrombosis of left upper extremity (HCC)    • Anemia    • Asthma    • CHF (congestive heart failure) (HCC)    • Chronic atrial fibrillation (HCC)    • Deep venous thrombosis of left upper limb (HCC)    • Diabetes mellitus, type 2 (HCC)    • Diarrhea    • GERD (gastroesophageal reflux disease)    • Glaucoma    • H/O transfusion of whole blood    • Heart attack (HCC)    • Heart murmur    • History of transfusion    • Hyperlipidemia    • Hypertension    • Kidney disease     patient not aware of what exact diagnosis is    • Osteomyelitis (HCC)    • Osteomyelitis of left foot (HCC) 10/3/2017   • Peripheral vascular disease (HCC)    • Right retinal detachment    • Secondary cataract of both eyes    • 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)    • Stroke (HCC)    • Swelling of left extremity    • Urinary tract infection    • Wears glasses        Past Surgical History:   Procedure Laterality Date   • AMPUTATION DIGIT Left 7/5/2018    Procedure: Left Great toe amputation;  Surgeon: Prakash Carrington MD;  Location:  [facility];  Service: Vascular   • AMPUTATION DIGIT Left 8/27/2018    Procedure: SECOND TOE AMPUTATION DIGIT LEFT;  Surgeon: Prakash Carrington MD;  Location:  [facility];  Service: General   • APPENDECTOMY     • BONE MARROW ASPIRATION     • CARDIAC ABLATION     • CARDIAC CATHETERIZATION N/A 10/10/2017    Procedure: Peripheral angiography;  Surgeon: Kan Pelaez MD;  Location: [facility] CATH INVASIVE LOCATION;  Service:    • CARDIAC CATHETERIZATION N/A 10/10/2017    Procedure: Angioplasty-peripheral;  Surgeon: Kan Pelaez MD;  Location: [facility] CATH INVASIVE LOCATION;  Service:    • CARDIAC CATHETERIZATION N/A 10/10/2017    Procedure: Atherectomy-peripheral;  Surgeon: Kan Pelaez MD;  Location: [facility] CATH INVASIVE LOCATION;  Service:    • CARDIAC ELECTROPHYSIOLOGY PROCEDURE N/A 5/3/2018    Procedure: generator  change;  Surgeon: Zan Poole MD;  Location: [facility] CATH INVASIVE LOCATION;  Service: Cardiovascular   • CARDIOVERSION     • COLONOSCOPY     • ENDOSCOPY N/A 10/9/2017    Procedure: ESOPHAGOGASTRODUODENOSCOPY WITH COLD FORCEP BIOPSY;  Surgeon: Clifton Hyatt MD;  Location: [facility] ENDOSCOPY;  Service:    • ENDOSCOPY N/A 3/22/2022    Procedure: ESOPHAGOGASTRODUODENOSCOPY with AVM cautery;  Surgeon: Clarisse Velez MD;  Location: [facility] ENDOSCOPY;  Service: Gastroenterology;  Laterality: N/A;   • EYE SURGERY Bilateral     CATARACTS   • INTERVENTIONAL RADIOLOGY PROCEDURE N/A 10/10/2017    Procedure: Abdominal Aortagram with Runoff;  Surgeon: Kan Pelaez MD;  Location: [facility] CATH INVASIVE LOCATION;  Service:    • PACEMAKER IMPLANTATION      around 2008 then replaced 2018     Family History   Problem Relation Age of Onset   • No Known Problems Mother    • No Known Problems Father    • Arthritis Other      negative h/o ESRD     Social History     Tobacco Use   • Smoking status: Never Smoker   • Smokeless tobacco: Never Used   Vaping Use   • Vaping Use: Never used   Substance Use Topics   • Alcohol use: No   • Drug use: No     Home medications:   Prior to Admission Medications     Prescriptions Last Dose Informant Patient Reported? Taking?    acetaminophen (TYLENOL) 650 MG 8 hr tablet 4/12/2022  Yes Yes    Take 650 mg by mouth Every 6 (Six) Hours As Needed for Mild Pain .    Amino Acids-Protein Hydrolys (PRO-STAT) liquid oral liquid Past Week  Yes Yes    Take 30 mL by mouth 2 (Two) Times a Day.    apixaban (ELIQUIS) 5 MG tablet tablet 4/13/2022  Yes Yes    Take 5 mg by mouth 2 (Two) Times a Day.    aspirin 81 MG EC tablet 4/13/2022 Self No Yes    Take 1 tablet by mouth Daily.    atorvastatin (LIPITOR) 40 MG tablet 4/13/2022  No Yes    Take 1 tablet by mouth Daily.    epoetin dorcas-epbx (Retacrit) 35312 UNIT/ML injection Past Month  Yes Yes    Inject 40,000 Units under the skin into the appropriate area  as directed. 1 ml sq per month on day 17    furosemide (LASIX) 20 MG tablet 4/13/2022  No Yes    Take 1 tablet by mouth Daily.    glimepiride (AMARYL) 2 MG tablet 4/13/2022  No Yes    Take 1 tablet by mouth Every Morning Before Breakfast.    glucose blood test strip 4/13/2022  No Yes    1 each by Other route Daily. Use as instructed once a day as directed, for Truemetrix reader    latanoprost (XALATAN) 0.005 % ophthalmic solution 4/13/2022  No Yes    Administer 1 drop to both eyes Daily.    Patient taking differently:  Administer 1 drop to both eyes Every Night.    levothyroxine (SYNTHROID, LEVOTHROID) 50 MCG tablet 4/13/2022  No Yes    Take 1 tablet by mouth Daily.    linagliptin (Tradjenta) 5 MG tablet tablet 4/12/2022  No Yes    Take 1 tablet by mouth Daily.    Patient taking differently:  Take 5 mg by mouth Every Night.    metoprolol tartrate (LOPRESSOR) 100 MG tablet 4/13/2022  No Yes    Take 1 tablet by mouth 2 (Two) Times a Day.    pantoprazole (Protonix) 40 MG EC tablet 4/13/2022  No Yes    Take 1 tablet by mouth Daily.    Potassium Gluconate 550 MG tablet Past Month  Yes Yes    Take 550 mg by mouth Daily.    timolol (TIMOPTIC) 0.5 % ophthalmic solution 4/13/2022  Yes Yes    Administer 1 drop to both eyes Daily.    vitamin D3 125 MCG (5000 UT) capsule capsule 4/13/2022  Yes Yes    Take 5,000 Units by mouth Daily.    Zinc Oxide (Boudreauxs Butt Paste) 16 % ointment 4/13/2022  Yes Yes    Apply  topically 2 (Two) Times a Day.    ferrous sulfate 324 (65 Fe) MG tablet delayed-release EC tablet Unknown  Yes No    Take 324 mg by mouth Daily With Breakfast.    multivitamin with minerals tablet tablet   Yes No    Take 1 tablet by mouth Daily.    polyethylene glycol (MIRALAX) 17 GM/SCOOP powder   Yes No    Take 17 g by mouth Daily. Hold for lose stool        Emergency department medications:   Medications   sodium chloride 0.9 % flush 10 mL (has no administration in time range)   levothyroxine (SYNTHROID, LEVOTHROID)  "tablet 50 mcg (50 mcg Oral Given 4/14/22 0505)   metoprolol tartrate (LOPRESSOR) tablet 100 mg (100 mg Oral Given 4/14/22 0144)   sodium chloride 0.9 % flush 3 mL (3 mL Intravenous Given 4/14/22 0457)   sodium chloride 0.9 % flush 3-10 mL (has no administration in time range)   acetaminophen (TYLENOL) tablet 650 mg (has no administration in time range)     Or   acetaminophen (TYLENOL) 160 MG/5ML solution 650 mg (has no administration in time range)     Or   acetaminophen (TYLENOL) suppository 650 mg (has no administration in time range)   ondansetron (ZOFRAN) injection 4 mg (has no administration in time range)   pantoprazole (PROTONIX) injection 40 mg (40 mg Intravenous Given 4/14/22 0310)   furosemide (LASIX) injection 40 mg (40 mg Intravenous Given 4/14/22 0456)   dextrose (GLUTOSE) oral gel 1 tube (has no administration in time range)   dextrose (D50W) (25 g/50 mL) IV injection 25 g (has no administration in time range)   glucagon (human recombinant) (GLUCAGEN DIAGNOSTIC) injection 1 mg (has no administration in time range)   insulin aspart (novoLOG) injection 0-9 Units (4 Units Subcutaneous Given 4/14/22 0645)   cefTRIAXone (ROCEPHIN) IVPB 1 g/50ml dextrose (premix) (0 g Intravenous Stopped 4/14/22 0600)   furosemide (LASIX) injection 80 mg (80 mg Intravenous Given 4/13/22 1904)   bumetanide (BUMEX) injection 0.5 mg (0.5 mg Intravenous Given 4/13/22 2334)       Allergies:  Phenergan [promethazine hcl]    Review of Systems  All review of system were reviewed and are negative except as mentioned in the history of present illness and assessment and plan.          Objective:  Vital Signs  /48 (BP Location: Right arm, Patient Position: Lying)   Pulse 76   Temp 98.4 °F (36.9 °C) (Oral)   Resp 13   Ht 167.6 cm (66\")   Wt 65.2 kg (143 lb 11.8 oz)   SpO2 97%   BMI 23.20 kg/m²          No intake/output data recorded.    Intake/Output Summary (Last 24 hours) at 4/14/2022 0736  Last data filed at 4/14/2022 " 0458  Gross per 24 hour   Intake 722.92 ml   Output 500 ml   Net 222.92 ml       Physical Exam:  General Appearance:   Alert, cooperative, in no acute distress.     Head:   Normocephalic, without obvious abnormality, atraumatic.     Eyes:      Normal, conjunctivae and sclerae, no icterus, no pallor, corneas clear, PERRLA        Throat:   Oral mucosa dry      Neck:  No adenopathy, supple, trachea midline, no thyromegaly, no carotid bruit, no JVD        Lungs:    Clear to auscultation and fair air movement noted.      Heart::   Regular rhythm and normal rate, normal S1 and S2.       Abdomen:   Obese. Normal bowel sounds, no masses, no organomegaly, soft non-tender, non-distended, no guarding, no rebound tenderness      Genital urinary:   No urinary bladder palpable      Extremities:  Moves all extremities, 1+ edema, no cyanosis, no redness.     Pulses:  Pulses palpable and equal bilaterally but weak.     Skin:  No bleeding, bruising or rash        Neurologic:  Cranial nerves grossly intact, move all extremities         Results Review:   Results from last 7 days   Lab Units 04/14/22  0605 04/13/22  1800   SODIUM mmol/L 137 131*   POTASSIUM mmol/L 3.1* 4.4   CHLORIDE mmol/L 103 96*   CO2 mmol/L 21.1* 15.5*   BUN mg/dL 50* 57*   CREATININE mg/dL 1.69* 1.79*   CALCIUM mg/dL 8.2* 8.4*   ALBUMIN g/dL  --  3.30*   BILIRUBIN mg/dL  --  0.7   ALK PHOS U/L  --  110   ALT (SGPT) U/L  --  16   AST (SGOT) U/L  --  21   GLUCOSE mg/dL 222* 324*     Estimated Creatinine Clearance: 24.1 mL/min (A) (by C-G formula based on SCr of 1.69 mg/dL (H)).          Results from last 7 days   Lab Units 04/14/22  0605 04/13/22  1800   WBC 10*3/mm3 8.60 10.46   HEMOGLOBIN g/dL 6.2* 4.4*   PLATELETS 10*3/mm3 211 291         Brief Urine Lab Results  (Last result in the past 365 days)      Color   Clarity   Blood   Leuk Est   Nitrite   Protein   CREAT   Urine HCG        04/13/22 2251 Yellow   Clear   Negative   Trace   Negative   Negative                No results found for: UTPCR  Imaging Results (Last 24 Hours)     Procedure Component Value Units Date/Time    XR Chest 1 View [[id number]] Resulted: 04/13/22 1912     Updated: 04/13/22 1913        cefTRIAXone, 1 g, Intravenous, Q24H  furosemide, 40 mg, Intravenous, Q12H  insulin aspart, 0-9 Units, Subcutaneous, TID AC  levothyroxine, 50 mcg, Oral, Daily  metoprolol tartrate, 100 mg, Oral, BID  pantoprazole, 40 mg, Intravenous, Q12H  sodium chloride, 3 mL, Intravenous, Q12H           Assessment/Plan:    1. Acute kidney injury: It does appear that she does have worsening of renal function most likely secondary to hemodynamic abnormalities related to severe anemia on initial presentation and fluid overload.  Since she has received blood transfusion and diuretics renal function is starting to be more stable and improving.  2. CKD 3: baseline 1.48: Likely will get back to baseline.  She has diabetic hypertensive nephropathy.  3. Acute on chronic anemia: Does appear to have recurrent episodes of GI bleed, GI evaluation is ongoing.  4. Hypertension: We will continue to optimize medications she will benefit from fair amount of diuretics.  5. Type 2 DM: Check hemoglobin A1c.  6. HFpEF: LVEF 60% in 2018  7. Atrial fibrillation: on eliquis with recurrent bleeds.  Will need to get off the anticoagulation.  8. Tachybradycardia syndrome: s/p PPM  9. PAD: s/p PCI 2017  10. Recent fracture of left proximal humerus      Risk and complexity: High       Plan:  · Patient is already received 2 units of blood hemoglobin still below 7 likely will benefit from getting more blood transfusions.  · Continue with aggressive diuresis and see where she will settle down.  Electrolyte abnormalities noted and are being replaced per protocol.  Currently she is on Lasix 40 mg IV twice a day.  · Continue with rest of the current treatment plan and surveillance labs.  · Details were discussed with the patient no family in the room.    · Details were  also discussed with the hospitalist service.   · Further recommendations will depend on clinical course of the patient during the current hospitalization.    · I also discussed the details with the nursing staff.  · Rest as ordered.    In closing, I sincerely appreciate opportunity to participate in care of this patient. If I can be of any further assistance with the management of this patient, please don’t hesitate to contact me.    Raymundo Alas MD    04/14/22  07:36 EDT    Dictated using Dragon.

## 2022-04-14 NOTE — PROCEDURES
Patient with a right medial upper buttock abscess.  I recommended incision drainage of this.  I explained the procedure to her.  She understood the risk of bleeding and infection and she consented to proceed.    The area was prepped in usual fashion.  Lidocaine was used locally.  Incision was made with an 11 blade and the abscess and any contents drained.  The abscess cavity was probed for any undrained loculations.  Cultures were taken.  There was minimal blood loss.  Patient tolerated procedure well and there were no complications.      Recommend topical dressings only.  May shower.  Check cultures to guide antibiotics.

## 2022-04-14 NOTE — CONSULTS
"Adult Nutrition  Assessment/PES    Patient Name:  Lynne Sanchez  YOB: 1934  MRN: [id number]  Admit Date:  4/13/2022    Assessment Date:  4/14/2022    Comments:      Recommend:    1. Continue NPO as medically appropriate.   2. Advance diet once medically appropriate and as tolerated.   3. Consider renal MVI with minerals daily.   4. Consider iron supplement as appropriate d/t hx of iron-deficiency anemia.   5. Continue to monitor and replace electrolytes PRN.    RD to follow pt and available PRN.      Reason for Assessment     Row Name 04/14/22 0922          Reason for Assessment    Reason For Assessment diagnosis/disease state;nurse/nurse practitioner consult;per organizational policy     Diagnosis renal disease;gastrointestinal disease;cardiac disease;diabetes diagnosis/complications;hematological/related complications;other (see comments)  T2DM, CHF, HLD, KASH/CKD, GERD, iron-deficiency anemia     Identified At Risk by Screening Criteria need for education                  Anthropometrics     Row Name 04/14/22 0925 04/14/22 0600       Anthropometrics    Height 167.6 cm (66\") --    Weight 66.9 kg (147 lb 7.8 oz) 66.9 kg (147 lb 7.8 oz)    Age for Calculations 87 --    Height for Calculation 1.676 m (5' 6\") --    Weight for Calculation 59.9 kg (132 lb)  est dry bw --               Labs/Tests/Procedures/Meds     Row Name 04/14/22 0923          Labs/Procedures/Meds    Lab Results Reviewed reviewed, pertinent     Lab Results Comments high: glu, BUN, Cr, A1c Low: alb, K+            Medications    Pertinent Medications Reviewed reviewed, pertinent     Pertinent Medications Comments Lasixm novolog, protonix, NaCl                Physical Findings     Row Name 04/14/22 0924          Physical Findings    Overall Physical Appearance SOB, missing teeth, 3+ edema BLE, right gluteal PI                Estimated/Assessed Needs - Anthropometrics     Row Name 04/14/22 0925 04/14/22 0600       Anthropometrics    " "Height 167.6 cm (66\") --    Weight 66.9 kg (147 lb 7.8 oz) 66.9 kg (147 lb 7.8 oz)    Age for Calculations 87 --    Height for Calculation 1.676 m (5' 6\") --    Weight for Calculation 59.9 kg (132 lb)  est dry bw --       Estimated/Assessed Needs    Additional Documentation KCAL/KG (Group);Protein Requirements (Group);Estimated Calorie Needs (Group);Hopkins-St. Jeor Equation (Group);Fluid Requirements (Group) --       Estimated Calorie Needs    Estimated Calorie Requirement (kcal/day) 1471  Hopkins x 1.4 AF --    Estimated Calorie Need Method Hopkins-St Jeor;kcal/kg --       KCAL/KG    KCAL/KG 20 Kcal/Kg (kcal);25 Kcal/Kg (kcal);30 Kcal/Kg (kcal) --    20 Kcal/Kg (kcal) 1197.5 --    25 Kcal/Kg (kcal) 1496.875 --    30 Kcal/Kg (kcal) 1796.25 --       Hopkins-St. Jeor Equation    RMR (Hopkins-St. Jeor Equation) 1050.5 --    Hopkins-St. Jeor Activity Factors 1.4 - 1.5 --    Activity Factors (Hopkins-St. Jeor) 1470.7 - 1575.75 --       Protein Requirements    Weight Used For Protein Calculations 59.9 kg (132 lb)  est dry bw --    Est Protein Requirement Amount (gms/kg) 1.1 gm protein  60-66 --    Estimated Protein Requirements (gms/day) 65.86 --       Fluid Requirements    Fluid Requirements (mL/day) 1471 --    Estimated Fluid Requirement Method other (see comments)  1 mL/kcal --    RDA Method (mL) 1471 --               Nutrition Prescription Ordered     Row Name 04/14/22 0929          Nutrition Prescription PO    Current PO Diet NPO                Evaluation of Received Nutrient/Fluid Intake     Row Name 04/14/22 0976          Intake Assessment    Energy/Calorie Requirement Assessment not meeting needs     Protein Requirement Assessment not meeting needs            PO Evaluation    Number of Days PO Intake Evaluated Insufficient Data  Pt is NPO at this time                     Problem/Interventions:   Problem 1     Row Name 04/14/22 0530          Nutrition Diagnoses Problem 1    Problem 1 Increased Nutrient Needs     " Macronutrient Kcal;Fluid;Protein     Micronutrient Mineral;Vitamin     Etiology (related to) Medical Diagnosis     Skin Pressure injury     Signs/Symptoms (evidenced by) Other (comment)  right gluteal PI                Problem 2     Row Name 04/14/22 0930          Nutrition Diagnoses Problem 2    Problem 2 Altered Nutrition Related to Labs     Etiology (related to) Medical Diagnosis     Endocrine DM Type 2     Signs/Symptoms (evidenced by) Biochemical     Labs Reviewed Done     Specific Labs Noted HgbA1C;Glucose                    Intervention Goal     Row Name 04/14/22 0931          Intervention Goal    General Maintain nutrition;Disease management/therapy;Provide information regarding MNT for treatment/condition;Reduce/improve symptoms;Improved nutrition related lab(s);Meet nutritional needs for age/condition     PO Initiate feeding;Meet estimated needs;Advance diet;Establish PO;Other (comment)  as medically appropriate and tolerated     Weight Maintain weight                Nutrition Intervention     Row Name 04/14/22 0931          Nutrition Intervention    RD/Tech Action Follow Tx progress;Care plan reviewd;Await begin PO                Nutrition Prescription     Row Name 04/14/22 0932          Nutrition Prescription PO    PO Prescription Other (comment)  continue NPO as medically appropriate and tolerated     New PO Prescription Ordered? No, recommended            Other Orders    Obtain Weight Daily     Obtain Weight Ordered? No, recommended     Supplement Vitamin mineral supplement;Iron supplement  renal MVI     Supplement Ordered? No, recommended     Other Continue to monitor and replace electrolytes PRN                Education/Evaluation     Row Name 04/14/22 0932          Education    Education Will Instruct as appropriate            Monitor/Evaluation    Monitor Per protocol;I&O;PO intake;Pertinent labs;Weight;Skin status;Symptoms                 Electronically signed by:  Lashell Pollack RD  04/14/22  09:33 EDT

## 2022-04-14 NOTE — PROGRESS NOTES
[facility]    Name:  Lynne Sanchez   Age:  87 y.o.  Sex:  female  :  1934  MRN:  [id number]   Visit Number:  [id number]  Admission Date:  2022  Date Of Service:  22  Primary Care Physician:  Elizabeth Easton APRN     LOS: 0 days :    Chief Complaint:      Shortness of breath, Increased Swelling    Subjective:    Patient seen and examined at bedside this morning. Chart reviewed and events noted. Patient states that she is feeling better this morning compared to when she arrived yesterday evening, but states that she does still feel weak. She states that her shortness of breath has improved. She denies any chest pain.    Hospital Course:    87 years old female with a past medical history of chronic iron deficiency anemia, CKD, diabetes mellitus type 2, chronic kidney disease, HFpEF(echo 2018 LVEF 60%), A. fib on Eliquis, tachybradycardia syndrome status post pacemaker, PAD status post PCI 2017, history of DVT, hypothyroidism and recent fracture of left proximal humerus who presented to the ER with a chief complaint of shortness of breath and increased swelling in lower extremities. On arrival, she was found to have a hemoglobin of 4.4. She was given IV Lasix as well as transfused 2 units of PRBC with IV bumex administered after each transfusion.    Hemoglobin increased to 6.2 following blood transfusions. Additional 2 units of PRBCs ordered. GI consulted.     Review of Systems:     All systems were reviewed and negative except as mentioned in subjective, assessment and plan.    Vital Signs:    Temp:  [97.9 °F (36.6 °C)-99 °F (37.2 °C)] 98.3 °F (36.8 °C)  Heart Rate:  [68-76] 72  Resp:  [13-18] 14  BP: (101-133)/(37-68) 126/37    Intake and output:    I/O last 3 completed shifts:  In: 722.9 [Blood:672.9; IV Piggyback:50]  Out: 500 [Urine:500]  I/O this shift:  In: -   Out: 750 [Urine:750]    Physical Examination:    General Appearance:  Alert and  cooperative. Sitting up in bed, in NAD.   Head:  Atraumatic and normocephalic.   Eyes: PERRL, EOMI.           Lungs:   Breath sounds heard bilaterally equally.  No wheezing or crackles.    Heart:  Normal S1 and S2, no murmur, no gallop, no rub. No JVD.   Abdomen:   Normal bowel sounds, no masses, no organomegaly. Soft, nontender, nondistended, no rebound tenderness.   Extremities: Supple, no edema, no cyanosis, no clubbing.   Skin: No bleeding or rash.   Neurologic: Alert and oriented x 3. No facial asymmetry. Moves all four limbs. No tremors.      Laboratory results:    Results from last 7 days   Lab Units 04/14/22  0605 04/13/22  1800   SODIUM mmol/L 137 131*   POTASSIUM mmol/L 3.1* 4.4   CHLORIDE mmol/L 103 96*   CO2 mmol/L 21.1* 15.5*   BUN mg/dL 50* 57*   CREATININE mg/dL 1.69* 1.79*   CALCIUM mg/dL 8.2* 8.4*   BILIRUBIN mg/dL  --  0.7   ALK PHOS U/L  --  110   ALT (SGPT) U/L  --  16   AST (SGOT) U/L  --  21   GLUCOSE mg/dL 222* 324*     Results from last 7 days   Lab Units 04/14/22  0605 04/13/22  1800   WBC 10*3/mm3 8.60 10.46   HEMOGLOBIN g/dL 6.2* 4.4*   HEMATOCRIT % 19.6* 14.6*   PLATELETS 10*3/mm3 211 291         Results from last 7 days   Lab Units 04/14/22  0605 04/13/22  1800   TROPONIN T ng/mL 0.053* 0.060*             I have reviewed the patient's laboratory results.    Radiology results:    XR Chest 1 View    Result Date: 4/14/2022  PROCEDURE: XR CHEST 1 VW-  HISTORY: dyspnea  COMPARISON: April 2018.  FINDINGS: There is a left subclavian pacemaker. The heart is enlarged. The mediastinum is unremarkable. There is pulmonary venous hypertension. There is no acute infiltrate. There is no pneumothorax.  There are no acute osseous abnormalities.      Impression: Cardiomegaly with pulmonary venous hypertension.  Continued followup is recommended.      Images were reviewed, interpreted, and dictated by Dr. Katelynn Culver M.D. Transcribed by Anju West PA-C  This report was signed and finalized on  4/14/2022 12:18 PM by Katelynn Culver M.D..    I have reviewed the patient's radiology reports.    Medication Review:     I have reviewed the patient's active and prn medications.     Problem List:      Acute on chronic anemia      Assessment:    1. Acute on Chronic Anemia, POA  2. Severe, Symptomatic Normocytic Anemia, POA  3. Acute Exacerbation of Chronic Diastolic Heart Failure, POA  4. Elevated Troponin, likely Type 2  5. Urinary Tract Infection, POA  6. KASH on CKD  7. Type 2 Diabetes Mellitus  8. Atrial Fibrillation, on Eliquis  9. Tachybradycardia Syndrome s/p Pacemaker  10. Peripheral Arterial Disease  11. History of DVT  12. Hypothyroidism  13. GERD  14. 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 Impaired Mobility and ADLs    Plan:    Acute on Chronic Anemia  Severe, Symptomatic Anemia  Melena  -Patient with a hemoglobin of 4.4 on arrival and reported dark stools.  -Transfused 2 units PRBCs with improvement in hemoglobin up to 6.2  -Plan to transfuse additional 2 units PRBC  -Will administer IV Bumex following each transfusion  -GI consulted  -Continue PPI  -Patient was supposed to have had a pillcam following her last hospitalization, but patient did not want to do this while in rehab facility and has reportedly felt too weak to be seen at the GI clinic after being released from rehab.    Acute Exacerbation of Chronic Diastolic Heart Failure  -Continue with IV diuresis  -Strict I's/O's  -Echocardiogram ordered and pending  -1.5L/day fluid restriction  -1500mg/day sodium restriction    KASH  CKD  UTI  -Creatinine improving  -Nephrology consulted. Appreciate all assistance and recommendations  -Continue Rocephin for UTI.   -Urine culture pending    Type 2 Diabetes Mellitus  -Sliding Scale Insulin    Atrial Fibrillation  History of DVT  -Holding Eliquis in the setting of melena and symptomatic blood loss anemia    Hypothyroidism  -Continue Levothyroxine    Impaired Mobility and  ADLs  -PT/OT consulted    DVT Prophylaxis: SCDs  Code Status: Full  Diet: Cardiac, carb consistent, renal  Discharge Plan: To be determined    Lynette Garcia DO  04/14/22  12:54 EDT    Dictated utilizing Dragon dictation.

## 2022-04-14 NOTE — H&P
[facility]      Name:  Lynne Sanchez   Age:  87 y.o.  Sex:  female  :  1934  MRN:  [id number]   Visit Number:  [id number]  Admission Date:  2022  Date Of Service:  22  Primary Care Physician:  Elizabeth Easton APRN    Chief Complaint:     Shortness of breath, increased swelling.    History Of Presenting Illness:      Patient is an 87 years old female with a past medical history of chronic iron deficiency anemia, CKD, diabetes mellitus type 2, chronic kidney disease, HFpEF(echo 2018 LVEF 60%), A. fib on Eliquis, tachybradycardia syndrome status post pacemaker, PAD status post PCI 2017, history of DVT, hypothyroidism and recent fracture of left proximal humerus who presented to the ER with a chief complaint of shortness of breath and increased swelling in lower extremities.  Patient reports that she has been having significant worsening dyspnea on exertion and shortness of breath over the past few days.  Patient also reporting increased swelling in her bilateral legs over the same period. patient reporting melena but has been chronic problem for her. Patient was recently discharged from the nursing home around 2 weeks ago.  Patient has a history of needing transfusions due to her anemia and was evaluated by Dr. Dorsey with an EGD.  Patient denies any fever, chest pain, abdominal pain, nausea, vomiting.  Patient denies any hematemesis, hematuria or hemoptysis or any other source bleeding other than chronic dark stool.     Upon ER evaluation, hemodynamics were stable, labs were significant for troponin of 0.060, proBNP 7314, glucose 324, sodium 131, creatinine 1.79, BUN 57, hemoglobin 4.4, hematocrit 14.6 with otherwise normal WBC and platelets.  Chest x-ray with chronic changes and no acute cardiopulmonary process per my read.  Covid negative.  Patient received Lasix while in the ER, was ordered 2 units of PRBC along with Bumex after  transfusion.  Hospitalist was consulted for admission, further evaluation treatment.    Review Of Systems:    All systems were reviewed and negative except as mentioned in history of presenting illness, assessment and plan.    Past Medical History: Patient  has a past medical history of Acute deep vein thrombosis of left upper extremity (HCC), Anemia, Asthma, CHF (congestive heart failure) (HCC), Chronic atrial fibrillation (HCC), Deep venous thrombosis of left upper limb (HCC), Diabetes mellitus, type 2 (HCC), Diarrhea, GERD (gastroesophageal reflux disease), Glaucoma, H/O transfusion of whole blood, Heart attack (HCC), Heart murmur, History of transfusion, Hyperlipidemia, Hypertension, Kidney disease, Osteomyelitis (HCC), Osteomyelitis of left foot (HCC) (10/3/2017), Peripheral vascular disease (HCC), Right retinal detachment, Secondary cataract of both eyes, 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    Past Surgical History: Patient  has a past surgical history that includes Appendectomy; Esophagogastroduodenoscopy (N/A, 10/9/2017); Cardiac catheterization (N/A, 10/10/2017); Interventional radiology procedure (N/A, 10/10/2017); Cardiac catheterization (N/A, 10/10/2017); Cardiac catheterization (N/A, 10/10/2017); Colonoscopy; Cardioversion; Cardiac Ablation; Pacemaker Implantation; Bone marrow aspiration; Cardiac electrophysiology procedure (N/A, 5/3/2018); Finger amputation (Left, 7/5/2018); Eye surgery (Bilateral); Finger amputation (Left, 8/27/2018); and Esophagogastroduodenoscopy (N/A, 3/22/2022).    Social History: Patient  reports that she has never smoked. She has never used smokeless tobacco. She reports that she does not drink alcohol and does not use drugs.    Family History: Patient's family history includes Arthritis in an other family member; No Known Problems in her father and  mother.    Allergies:      Phenergan [promethazine hcl]    Home Medications:    Prior to Admission Medications     Prescriptions Last Dose Informant Patient Reported? Taking?    acetaminophen (TYLENOL) 650 MG 8 hr tablet   Yes No    Take 650 mg by mouth Every 6 (Six) Hours As Needed for Mild Pain .    Amino Acids-Protein Hydrolys (PRO-STAT) liquid oral liquid   Yes No    Take 30 mL by mouth 2 (Two) Times a Day.    apixaban (ELIQUIS) 5 MG tablet tablet   Yes No    Take 5 mg by mouth 2 (Two) Times a Day.    aspirin 81 MG EC tablet  Self No No    Take 1 tablet by mouth Daily.    atorvastatin (LIPITOR) 40 MG tablet   No No    Take 1 tablet by mouth Daily.    epoetin dorcas-epbx (Retacrit) 59118 UNIT/ML injection   Yes No    Inject 40,000 Units under the skin into the appropriate area as directed. 1 ml sq per month on day 17    ferrous sulfate 324 (65 Fe) MG tablet delayed-release EC tablet   Yes No    Take 324 mg by mouth Daily With Breakfast.    furosemide (LASIX) 20 MG tablet   No No    Take 1 tablet by mouth Daily.    glimepiride (AMARYL) 2 MG tablet   No No    Take 1 tablet by mouth Every Morning Before Breakfast.    glucose blood test strip   No No    1 each by Other route Daily. Use as instructed once a day as directed, for Truemetrix reader    latanoprost (XALATAN) 0.005 % ophthalmic solution   No No    Administer 1 drop to both eyes Daily.    levothyroxine (SYNTHROID, LEVOTHROID) 50 MCG tablet   No No    Take 1 tablet by mouth Daily.    linagliptin (Tradjenta) 5 MG tablet tablet   No No    Take 1 tablet by mouth Daily.    Patient taking differently:  Take 5 mg by mouth Every Night.    metoprolol tartrate (LOPRESSOR) 100 MG tablet   No No    Take 1 tablet by mouth 2 (Two) Times a Day.    multivitamin with minerals tablet tablet   Yes No    Take 1 tablet by mouth Daily.    pantoprazole (Protonix) 40 MG EC tablet   No No    Take 1 tablet by mouth Daily.    polyethylene glycol (MIRALAX) 17 GM/SCOOP powder   Yes No     "Take 17 g by mouth Daily. Hold for lose stool    Potassium Gluconate 550 MG tablet   Yes No    Take 550 mg by mouth Daily.    timolol (TIMOPTIC) 0.5 % ophthalmic solution   Yes No    Administer 1 drop to both eyes Daily.    vitamin D3 125 MCG (5000 UT) capsule capsule   Yes No    Take 5,000 Units by mouth Daily.    Zinc Oxide (Boudreauxs Butt Paste) 16 % ointment   Yes No    Apply  topically 2 (Two) Times a Day.        ED Medications:    Medications   sodium chloride 0.9 % flush 10 mL (has no administration in time range)   bumetanide (BUMEX) injection 0.5 mg (has no administration in time range)   furosemide (LASIX) injection 80 mg (80 mg Intravenous Given 4/13/22 1904)     Vital Signs:  Temp:  [98.3 °F (36.8 °C)] 98.3 °F (36.8 °C)  Heart Rate:  [70-75] 75  Resp:  [15] 15  BP: (105-126)/(46-59) 126/50        04/13/22  1749   Weight: 55.8 kg (123 lb)     Body mass index is 19.85 kg/m².    Physical Exam:     Most recent vital Signs: /50   Pulse 75   Temp 98.3 °F (36.8 °C) (Oral)   Resp 15   Ht 167.6 cm (66\")   Wt 55.8 kg (123 lb)   SpO2 100%   BMI 19.85 kg/m²     Physical Exam  Vitals and nursing note reviewed.   Constitutional:       General: She is not in acute distress.     Comments: Chronically ill-appearing.  Frail elderly.   HENT:      Head: Normocephalic and atraumatic.      Right Ear: External ear normal.      Left Ear: External ear normal.      Nose: Nose normal.      Mouth/Throat:      Mouth: Mucous membranes are moist.   Eyes:      Extraocular Movements: Extraocular movements intact.      Conjunctiva/sclera: Conjunctivae normal.      Pupils: Pupils are equal, round, and reactive to light.   Cardiovascular:      Rate and Rhythm: Normal rate and regular rhythm.      Pulses: Normal pulses.      Heart sounds: Normal heart sounds.   Pulmonary:      Effort: Pulmonary effort is normal. No respiratory distress.      Breath sounds: Normal breath sounds. No wheezing or rhonchi.      Comments: Decreased " air movement bilaterally.  Abdominal:      General: Bowel sounds are normal. There is no distension.      Palpations: Abdomen is soft.      Tenderness: There is no abdominal tenderness. There is no guarding or rebound.   Musculoskeletal:         General: Normal range of motion.      Cervical back: Normal range of motion and neck supple.      Right lower leg: No tenderness. 3+ Pitting Edema present.      Left lower leg: No tenderness. 3+ Pitting Edema present.   Skin:     General: Skin is warm and dry.      Coloration: Skin is pale.      Findings: No rash.   Neurological:      General: No focal deficit present.      Mental Status: She is alert and oriented to person, place, and time. Mental status is at baseline.      Motor: No weakness.   Psychiatric:         Mood and Affect: Mood normal.         Behavior: Behavior normal.         Thought Content: Thought content normal.         Laboratory data:    I have reviewed the labs done in the emergency room.    Results from last 7 days   Lab Units 04/13/22  1800   SODIUM mmol/L 131*   POTASSIUM mmol/L 4.4   CHLORIDE mmol/L 96*   CO2 mmol/L 15.5*   BUN mg/dL 57*   CREATININE mg/dL 1.79*   CALCIUM mg/dL 8.4*   BILIRUBIN mg/dL 0.7   ALK PHOS U/L 110   ALT (SGPT) U/L 16   AST (SGOT) U/L 21   GLUCOSE mg/dL 324*     Results from last 7 days   Lab Units 04/13/22  1800   WBC 10*3/mm3 10.46   HEMOGLOBIN g/dL 4.4*   HEMATOCRIT % 14.6*   PLATELETS 10*3/mm3 291         Results from last 7 days   Lab Units 04/13/22  1800   TROPONIN T ng/mL 0.060*     Results from last 7 days   Lab Units 04/13/22  1800   PROBNP pg/mL 7,314.0*                       Invalid input(s): USDES,  BLOODU, NITRITITE, BACT, EP    Pain Management Panel     Pain Management Panel Latest Ref Rng & Units 4/19/2021    CREATININE UR Not Estab. mg/dL 76.7          EKG:      Sinus rhythm, heart rate 69, intraventricular conduction delay, nonspecific ST/T wave changes.  No change in morphology from previous EKG on  file.    Radiology:    No radiology results for the last 3 days    Assessment:    Acute on chronic symptomatic blood loss anemia, POA  Melena  Fluid overload, POA  Left buttock sacral abscess with chronic decubitus wound, POA  KASH on CKD, POA  Elevated troponin, likely secondary to demand ischemia, POA  Urinary tract infection, POA  Diabetes mellitus type 2  HFpEF(echo 2018 LVEF 60%)  Chronic iron deficiency anemia  A. fib on Eliquis  Tachybradycardia syndrome status post pacemaker  PAD status post PCI 2017  History of DVT  Hypothyroidism  Debility    Plan:    Patient admitted to telemetry for further evaluation and treatment.    Acute on chronic symptomatic blood loss anemia  Melena  S/p 2 units PRBC transfusion.  Continue to monitor hemoglobin and transfuse as needed to keep hemoglobin above 7.  Gastroenterology consulted and appreciate recommendations.  PPI IV twice daily.    Fluid overload  HFpEF  (echo 2018 LVEF 60%)  Lasix 40 mg IV twice daily.    Daily weight and strict ins and outs.    Repeat 2D echo.    KASH on CKD  Nephrology is consulted and appreciate recommendations.  Avoid nephrotoxic drugs.  A.m. labs.    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  Wound care consulted.    Elevated troponin  likely secondary to demand ischemia.  Patient denies chest pain.  Low suspicion for acute coronary syndrome at this time.  Trending troponin in a.m.    Urinary tract infection  Urinalysis with leukocytes trace, WBC 3-5, bacteria 3+.    Patient is reporting occasional dysuria.    Will start patient on Rocephin and follow-up on cultures.    Diabetes mellitus type 2  Sliding scale insulin.  Check hemoglobin A1c.    Atrial fibrillation  History of DVT  We will hold Eliquis due to melena and symptomatic blood loss.    Hypothyroidism   On Synthroid    Debility  PT/OT consulted.    Risk Assessment: High  DVT Prophylaxis: SCDs,  avoid chemoprophylaxis due to melena and GI bleed.  Code Status: Full  Diet: Cardiac, carbohydrate consistent, renal.    Advance Care Planning   ACP discussion was held with the patient during this visit. Patient does not have an advance directive, information provided.           Michael Eason MD  04/13/22  20:10 EDT    Dictated utilizing Dragon dictation.

## 2022-04-14 NOTE — CONSULTS
In Patient Consult      Date of Consultation: 2022  Patient Name: Lynne Sanchez  MRN: [id number]  : 1934     Referring provider: Lynette Garcia DO    Primary care provider:  Elizabeth Easton APRN    Reason for consultation: melena    History of Present Illness: This is a 87-year-old elderly female patient with a prior history of congestive heart failure, chronic kidney disease, valvular heart disease, chronic atrial fibrillation, tachybradycardia syndrome status post PPM, referral vascular disease, diabetes mellitus, chronic anemia, hypothyroidism, recent history of left upper extremity DVT on Eliquis, was brought to the emergency room on 2022 due to increasing shortness of breath and swelling in her bilateral legs. She was found to have Hgb of 4.4 g/dL and was admitted to the hospital, given 2 U PRBCs then with 2 more units ordered today. GI was consulted for evaluation of melena.     She had EGD on previous admission (2022) by Dr. Velez which showed single angiectasia with stigmata of recent bleeding in the gastric fundus treated with vaporization, red blood in the duodenum as well as 2 small angiectasias with bleeding in the first portion of the duodenum treated with vaporization. It was suspected at that time that she had more bleeding in the small intestine. A pillcam was ordered and planned to be completed within 1-2 weeks of hospital discharge. Patient delayed having the procedure while she was in the nursing home for rehab and canceled the procedure later stating she was too weak to come to office have it completed.     She has had persistent dark stools, had stopped her daily iron supplement for the past approx 1 week before admission on her own. Reports still dark stools but improved since stopping the iron. She has remained on Eliquis until now.     Does note having very small amount bright red rectal bleeding on the tissue after a stool for the past approx 2  weeks. She did not see any blood in the toilet. Also has some rectal discomfort that she relates to a hemorrhoid.      3/22/2022 previous inpatient visit  This is a 87-year-old elderly female patient with a prior history of congestive heart failure, chronic kidney disease, valvular heart disease, chronic atrial fibrillation, tachybradycardia syndrome status post PPM, referral vascular disease, diabetes mellitus, chronic anemia, hypothyroidism, recent history of left upper extremity DVT on Eliquis, was brought to the emergency room on 3/21/2022 after she was noted to have a low hemoglobin at rehab with the routine lab work.     Patient had a recent left humeral fracture following which she had a DVT left upper extremity and was put on Eliquis.  She had a routine lab work done which revealed low hemoglobin for which she was sent to emergency room.  She herself denies any abdominal pain no nausea vomiting.  Denies any chest pain shortness of breathing.  She has a chronic anemia for many years and has been taking iron pills.  Not see any difference other than the dark stool she normally gets with the iron pills.  Denies any prior history peptic ulcer disease no NSAID use.  She was on aspirin and the Plavix before and her Plavix was discontinued when she was put on Eliquis.  She is also taking baby aspirin daily.     Her last EGD was in 2017 by Dr. Mosquera and was reported as having the long segment Fermin's.  No recent colonoscopy.     Her work-up in the emergency room revealed hemoglobin of 6.1 g/dL compared to her baseline around 7 to 8 g/dL and GI was consulted.  He was started on a Protonix twice daily and had a 2 years of PRBC transfusion.    Subjective     Past Medical History:   Diagnosis Date   • Acute deep vein thrombosis of left upper extremity (HCC)    • Anemia    • Asthma    • CHF (congestive heart failure) (HCC)    • Chronic atrial fibrillation (HCC)    • Deep venous thrombosis of left upper limb (HCC)    •  Diabetes mellitus, type 2 (HCC)    • Diarrhea    • GERD (gastroesophageal reflux disease)    • Glaucoma    • H/O transfusion of whole blood    • Heart attack (HCC)    • Heart murmur    • History of transfusion    • Hyperlipidemia    • Hypertension    • Kidney disease     patient not aware of what exact diagnosis is    • Osteomyelitis (HCC)    • Osteomyelitis of left foot (HCC) 10/3/2017   • Peripheral vascular disease (HCC)    • Right retinal detachment    • Secondary cataract of both eyes    • 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)    • Stroke ([facility])    • Swelling of left extremity    • Urinary tract infection    • Wears glasses        Past Surgical History:   Procedure Laterality Date   • AMPUTATION DIGIT Left 7/5/2018    Procedure: Left Great toe amputation;  Surgeon: Prakash Carrington MD;  Location:  [facility];  Service: Vascular   • AMPUTATION DIGIT Left 8/27/2018    Procedure: SECOND TOE AMPUTATION DIGIT LEFT;  Surgeon: Prakash Carrington MD;  Location:  [facility];  Service: General   • APPENDECTOMY     • BONE MARROW ASPIRATION     • CARDIAC ABLATION     • CARDIAC CATHETERIZATION N/A 10/10/2017    Procedure: Peripheral angiography;  Surgeon: Kan Pelaez MD;  Location:  [facility];  Service:    • CARDIAC CATHETERIZATION N/A 10/10/2017    Procedure: Angioplasty-peripheral;  Surgeon: Kan Pelaez MD;  Location:  [facility];  Service:    • CARDIAC CATHETERIZATION N/A 10/10/2017    Procedure: Atherectomy-peripheral;  Surgeon: Kan Pelaez MD;  Location:  [facility];  Service:    • CARDIAC ELECTROPHYSIOLOGY PROCEDURE N/A 5/3/2018    Procedure: generator change;  Surgeon: Zan Poole MD;  Location:  [facility];  Service: Cardiovascular   • CARDIOVERSION     • COLONOSCOPY     • ENDOSCOPY N/A 10/9/2017    Procedure: ESOPHAGOGASTRODUODENOSCOPY WITH COLD FORCEP BIOPSY;  Surgeon: Clifton Hyatt MD;   Location: [facility] ENDOSCOPY;  Service:    • ENDOSCOPY N/A 3/22/2022    Procedure: ESOPHAGOGASTRODUODENOSCOPY with AVM cautery;  Surgeon: Clarisse Velez MD;  Location: [facility] ENDOSCOPY;  Service: Gastroenterology;  Laterality: N/A;   • EYE SURGERY Bilateral     CATARACTS   • INTERVENTIONAL RADIOLOGY PROCEDURE N/A 10/10/2017    Procedure: Abdominal Aortagram with Runoff;  Surgeon: Kan Pelaez MD;  Location: [facility] CATH INVASIVE LOCATION;  Service:    • PACEMAKER IMPLANTATION      around 2008 then replaced 2018       Family History   Problem Relation Age of Onset   • No Known Problems Mother    • No Known Problems Father    • Arthritis Other        Social History     Socioeconomic History   • Marital status:    • Number of children: 3   Tobacco Use   • Smoking status: Never Smoker   • Smokeless tobacco: Never Used   Vaping Use   • Vaping Use: Never used   Substance and Sexual Activity   • Alcohol use: No   • Drug use: No   • Sexual activity: Defer         Current Facility-Administered Medications:   •  acetaminophen (TYLENOL) tablet 650 mg, 650 mg, Oral, Q4H PRN **OR** acetaminophen (TYLENOL) 160 MG/5ML solution 650 mg, 650 mg, Oral, Q4H PRN **OR** acetaminophen (TYLENOL) suppository 650 mg, 650 mg, Rectal, Q4H PRN, Michael Eason MD  •  bumetanide (BUMEX) injection 0.5 mg, 0.5 mg, Intravenous, PRN, Lynette Garcia, DO  •  cefTRIAXone (ROCEPHIN) IVPB 1 g/50ml dextrose (premix), 1 g, Intravenous, Q24H, Michael Eason MD, Stopped at 04/14/22 0600  •  dextrose (D50W) (25 g/50 mL) IV injection 25 g, 25 g, Intravenous, Q15 Min PRN, Michael Eason MD  •  dextrose (GLUTOSE) oral gel 1 tube, 1 tube, Oral, Q15 Min PRN, Michael Eason MD  •  furosemide (LASIX) injection 40 mg, 40 mg, Intravenous, Q12H, Michael Eason MD, 40 mg at 04/14/22 0456  •  glucagon (human recombinant) (GLUCAGEN DIAGNOSTIC) injection 1 mg, 1 mg, Subcutaneous, PRN, Slava Easonsam, MD  •  insulin aspart (novoLOG)  injection 0-9 Units, 0-9 Units, Subcutaneous, TID AC, Michael Eason MD, 4 Units at 04/14/22 0645  •  levothyroxine (SYNTHROID, LEVOTHROID) tablet 50 mcg, 50 mcg, Oral, Daily, Michael Eason MD, 50 mcg at 04/14/22 0505  •  Magnesium Sulfate 2 gram Bolus, followed by 8 gram infusion (total Mg dose 10 grams)- Mg less than or equal to 1mg/dL, 2 g, Intravenous, PRN **OR** Magnesium Sulfate 2 gram / 50mL Infusion (GIVE X 3 BAGS TO EQUAL 6GM TOTAL DOSE) - Mg 1.1 - 1.5 mg/dl, 2 g, Intravenous, PRN **OR** Magnesium Sulfate 4 gram infusion- Mg 1.6-1.9 mg/dL, 4 g, Intravenous, PRN, Lynette Garcia, DO  •  metoprolol tartrate (LOPRESSOR) tablet 100 mg, 100 mg, Oral, BID, Michael Eason MD, 100 mg at 04/14/22 0844  •  ondansetron (ZOFRAN) injection 4 mg, 4 mg, Intravenous, Q6H PRN, Micahel Eason MD  •  pantoprazole (PROTONIX) injection 40 mg, 40 mg, Intravenous, Q12H, Michael Eason MD, 40 mg at 04/14/22 0844  •  potassium chloride (MICRO-K) CR capsule 40 mEq, 40 mEq, Oral, PRN **OR** potassium chloride (KLOR-CON) packet 40 mEq, 40 mEq, Oral, PRN **OR** potassium chloride 10 mEq in 100 mL IVPB, 10 mEq, Intravenous, Q1H PRN, Lynette Garcia, DO  •  sodium chloride 0.9 % flush 10 mL, 10 mL, Intravenous, PRN, Agustin Schaefer, DO  •  sodium chloride 0.9 % flush 3 mL, 3 mL, Intravenous, Q12H, Michael Eason MD, 3 mL at 04/14/22 0844  •  sodium chloride 0.9 % flush 3-10 mL, 3-10 mL, Intravenous, PRN, Michael Eason MD    Allergies   Allergen Reactions   • Phenergan [Promethazine Hcl] Confusion       Review of Systems   Constitutional: Positive for fatigue.   HENT: Negative for trouble swallowing.    Eyes: Negative.    Respiratory: Positive for shortness of breath.    Cardiovascular: Positive for leg swelling.   Gastrointestinal: Positive for anal bleeding and constipation. Negative for abdominal pain, diarrhea and nausea.   Endocrine: Negative.    Genitourinary: Negative.    Musculoskeletal: Positive for  arthralgias.   Skin: Negative.    Allergic/Immunologic: Negative.    Neurological: Positive for weakness.   Hematological: Bruises/bleeds easily.   Psychiatric/Behavioral: Negative.         The following portions of the patient's history were reviewed and updated as appropriate: allergies, current medications, past family history, past medical history, past social history, past surgical history and problem list.    Objective     Vitals:    04/14/22 0230 04/14/22 0236 04/14/22 0600 04/14/22 0720   BP:    111/48   BP Location:    Right arm   Patient Position:    Lying   Pulse: 68   76   Resp: 16   13   Temp: 98.5 °F (36.9 °C)   98.4 °F (36.9 °C)   TempSrc: Oral   Oral   SpO2: 98% 98%  97%   Weight:   66.9 kg (147 lb 7.8 oz)    Height:           Physical Exam  Constitutional:       General: She is not in acute distress.     Appearance: Normal appearance. She is not ill-appearing.      Comments: thin   HENT:      Head: Normocephalic.      Right Ear: External ear normal.      Left Ear: External ear normal.      Nose: Nose normal.      Mouth/Throat:      Mouth: Mucous membranes are moist.   Eyes:      General: No scleral icterus.        Right eye: No discharge.         Left eye: No discharge.      Conjunctiva/sclera: Conjunctivae normal.   Cardiovascular:      Rate and Rhythm: Normal rate and regular rhythm.      Heart sounds: Normal heart sounds. No murmur heard.  Pulmonary:      Effort: Pulmonary effort is normal. No respiratory distress.      Breath sounds: No wheezing.      Comments: Decreased breath sounds heard bilaterally  Abdominal:      General: Abdomen is flat. Bowel sounds are normal. There is no distension.      Palpations: Abdomen is soft. There is no mass.      Tenderness: There is no abdominal tenderness. There is no guarding or rebound.      Hernia: No hernia is present.   Musculoskeletal:         General: No deformity.      Cervical back: Normal range of motion.      Right lower leg: Edema present.       Left lower leg: Edema present.   Skin:     General: Skin is warm and dry.      Coloration: Skin is pale. Skin is not jaundiced.   Neurological:      Mental Status: She is alert and oriented to person, place, and time.   Psychiatric:         Mood and Affect: Mood normal.         Behavior: Behavior normal.         Thought Content: Thought content normal.         Judgment: Judgment normal.         Results from last 7 days   Lab Units 04/14/22  0605 04/13/22  1800   SODIUM mmol/L 137 131*   POTASSIUM mmol/L 3.1* 4.4   CHLORIDE mmol/L 103 96*   CO2 mmol/L 21.1* 15.5*   BUN mg/dL 50* 57*   CREATININE mg/dL 1.69* 1.79*   CALCIUM mg/dL 8.2* 8.4*   ALBUMIN g/dL  --  3.30*   BILIRUBIN mg/dL  --  0.7   ALK PHOS U/L  --  110   ALT (SGPT) U/L  --  16   AST (SGOT) U/L  --  21   GLUCOSE mg/dL 222* 324*   WBC 10*3/mm3 8.60 10.46   HEMOGLOBIN g/dL 6.2* 4.4*   PLATELETS 10*3/mm3 211 291     No recent abdominal imaging to review.     EGD was completed on 3/22/2022 by Dr. Velez:  - The oropharynx was normal.  - The Z-line was irregular and was found 35 cm from the incisors.  - A 2 cm hiatal hernia was present.  - The esophagus and gastroesophageal junction were examined with white light and narrow band imaging (NBI). There were esophageal mucosal changes suspicious for long-segment Fermin's esophagus, consistent with long-segment Fermin's esophagus, classified as Fermin's stage C13-M15 per [place] criteria. These changes involved the mucosa extending to the Z-line (35 cm from the incisors). Circumferential salmon-colored mucosa was present from 20 to 35 cm and no visible abnormalities were present. The maximum longitudinal extent of these esophageal mucosal changes was 15 cm in length. Not biopsied as pt is on anticoagulation.  - Patchy mildly erythematous mucosa without bleeding was found on the posterior wall of the stomach and in the gastric antrum.  - A single small angioectasia with stigmata of recent bleeding was found in  the gastric fundus. Vaporization for hemostasis of bleeding caused by the procedure using argon plasma at 2 liters/minute and 20 santa was successful.  - Red blood was found in the first portion of the duodenum.  - Two small angioectasias with bleeding were found in the first portion of the duodenum and in the second portion of the duodenum. Vaporization for hemostasis using argon plasma at 2 liters/minute and 20 santa was successful. Estimated blood loss was minimal.  - The second portion of the duodenum was normal.  - A few diminutive polyps were found in the gastric fundus and in the gastric body.  No specimens collected. Not biopsied as pt is on anticoagulation.    Assessment / Plan      Assessment/Recommendations:   1. Acute on chronic anemia  2. GERD without esophagitis  3. 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 History of angioectasia in stomach  5. History of bleeding angioectasia of duodenum  Patient has a chronic anemia for over 8 to 10 years, she had a hemoglobin 4.4 g/dL this admission and below 7 intermittently since about 2015. Her anemia appears to be multifactorial in etiology including, chronic kidney disease, intermittent GI bleed, and anemia of chronic disease.  Patient is high risk for small bowel AVMs due to her CKD and AVMs were found on EGD 3/22/2022.      Patient was on iron pills and noticing dark stool, also on Eliquis daily.      EGD completed 3/22/2022 showed very long segment of Fermin's esophagus measuring 15 cm in length, patchy erythema in the stomach, single angiectasia with stigmata of recent bleeding in the gastric fundus treated with vaporization, red blood in the duodenum as well as 2 small angiectasias with bleeding in the first portion of the duodenum treated with vaporization and benign-appearing gastric polyps. No specimens collected due to anticoagulation.     She was directed last admission to have pillcam within 1-2 weeks of discharge but she did not want to  complete while at the rehab and was unable to come to clinic on scheduled date (today) due to worsening weakness.      Transfuse to keep Hgb >7 g/dL  Continue PPI  May have distal duodenal /Jejunal AVMs  Needs small bowel pillcam, she will have this tomorrow (4/15/2022) around 8:30AM while inpatient   Clear liquids now until 10pm tonight, then NPO  After Pillcam has been started, she can have clear liquids 2 hours after exam has begun  May have light snack 4 hours after PillCam started   May return to normal diet after PillCam completed (after 8 hours)    Repeat EGD/enteroscopy after pillcam depending on PillCam results  High rsk for GI bleeding with anticoagulation, not currently a candidate for Eliquis given her recurrent GI bleeding  Will get LUE doppler now to verify no clots present  Return to GI office in 4 weeks for follow up after pillcam          Thank you very much for letting me participate in the care of this patient.  Please do not hesitate to call me if you have any questions.    Abiola Ritchie PA-C  [facility]  4/14/2022  08:55 EDT    Please note that portions of this note may have been completed with a voice recognition program.

## 2022-04-14 NOTE — CASE MANAGEMENT/SOCIAL WORK
Discharge Planning Assessment   [facility]     Patient Name: Lynne Sanchez  MRN: [id number]  Today's Date: 4/14/2022    Admit Date: 4/13/2022     Discharge Needs Assessment     Row Name 04/14/22 1324       Living Environment    People in Home spouse    Name(s) of People in Home  Lorraine    Current Living Arrangements home    Duration at Residence 53 years    Potentially Unsafe Housing Conditions unable to assess    Primary Care Provided by self    Provides Primary Care For no one    Family Caregiver if Needed none    Quality of Family Relationships supportive    Able to Return to Prior Arrangements yes    Living Arrangement Comments Lives with her spouse in a one story house.  No steps       Resource/Environmental Concerns    Resource/Environmental Concerns none       Transition Planning    Transportation Anticipated family or friend will provide       Discharge Needs Assessment    Readmission Within the Last 30 Days no previous admission in last 30 days    Equipment Currently Used at Home cane, straight    Concerns to be Addressed no discharge needs identified    Anticipated Changes Related to Illness none    Equipment Needed After Discharge none    Provided Post Acute Provider List? N/A    N/A Provider List Comment No Needs    Provided Post Acute Provider Quality & Resource List? N/A    N/A Quality & Resource List Comment No Needs               Discharge Plan     Row Name 04/14/22 0768       Plan    Plan Spoke to pt and  in room.  Permission granted to speak in front of .  Lives together in a one story house.  Son Marco A is their POA, Living Will on file, no POA papers.  Address and phone no correct. Uses a walker and a cane, no other medical equipment.  Sees Elizabeth Easton as PCP, normally uses [facility], but will use [facility] at discharge. Plans to go home at discharge,  will transport.  Denies DC needs.                  Expected Discharge Date and Time     Expected Discharge  Date Expected Discharge Time    Apr 15, 2022          Demographic Summary     Row Name 04/14/22 1313       General Information    Admission Type observation    Arrived From emergency department    Required Notices Provided Observation Status Notice    Expected Length of Stay (LOS) 2 to 3 days    Referral Source admission list    Reason for Consult discharge planning    Preferred Language English               Functional Status     Row Name 04/14/22 1315       Functional Status    Usual Activity Tolerance good    Current Activity Tolerance fair    Functional Status Comments Independent       Functional Status, IADL    Medications independent    Meal Preparation independent    Housekeeping independent    Laundry independent    IADL Comments self       Mental Status    General Appearance WDL WDL       Mental Status Summary    Recent Changes in Mental Status/Cognitive Functioning no changes    Mental Status Comments Alert an oriented                      Lee Ann Calderón RN

## 2022-04-14 NOTE — PAYOR COMM NOTE
"TO:BC  FROM:CHERI DAVENPORT,RN PHONE [phone]  IN CLINICALS REF# [id number]    Mel Sanchez (87 y.o. Female)             Date of Birth   1934    Social Security Number       Address   [address] [place] [address]    Home Phone   [phone]    MRN   [id number]       [employer]    Marital Status                               Admission Date   22    Admission Type   Emergency    Admitting Provider   Michael Eason MD    Attending Provider   Lynette Garcia DO    Department, Room/Bed   [facility] MED SURG         Discharge Date       Discharge Disposition       Discharge Destination                               Attending Provider: Lynette Garcia DO    Allergies: Phenergan [Promethazine Hcl]    Isolation: None   Infection: None   Code Status: CPR   Advance Care Planning Activity    Ht: 167.6 cm (66\")   Wt: 66.9 kg (147 lb 7.8 oz)    Admission Cmt: None   Principal Problem: None                Active Insurance as of 2022     Primary Coverage     Payor Plan Insurance Group Employer/Plan Group    ANTHEM MEDICARE REPLACEMENT ANTHEM MEDICARE ADVANTAGE [id number]     Payor Plan Address Payor Plan Phone Number Payor Plan Fax Number Effective Dates    [id number] [phone]  2022 - None Entered    [facility] [address]       Subscriber Name Subscriber Birth Date Member ID       MEL SANCHEZ 1934 [id number]                 Emergency Contacts      (Rel.) Home Phone Work Phone Mobile Phone    LISA PEREZ (Daughter) -- -- [phone]    Brennan Mendenhall (Spouse) [phone] -- [phone]    LauraMarco A (Son) [phone] -- [phone]    MARILU Sanchez -- -- --               History & Physical      Michael Eason MD at 22 2010            [facility] HOSPITALIST   HISTORY AND PHYSICAL      Name:  Mel Sanchez   Age:  87 y.o.  Sex:  female  :  1934  MRN:  " [id number]   Visit Number:  [id number]  Admission Date:  4/13/2022  Date Of Service:  04/13/22  Primary Care Physician:  Elizabeth Easton APRN    Chief Complaint:     Shortness of breath, increased swelling.    History Of Presenting Illness:      Patient is an 87 years old female with a past medical history of chronic iron deficiency anemia, CKD, diabetes mellitus type 2, chronic kidney disease, HFpEF(echo 2018 LVEF 60%), A. fib on Eliquis, tachybradycardia syndrome status post pacemaker, PAD status post PCI 2017, history of DVT, hypothyroidism and recent fracture of left proximal humerus who presented to the ER with a chief complaint of shortness of breath and increased swelling in lower extremities.  Patient reports that she has been having significant worsening dyspnea on exertion and shortness of breath over the past few days.  Patient also reporting increased swelling in her bilateral legs over the same period. patient reporting melena but has been chronic problem for her. Patient was recently discharged from the nursing home around 2 weeks ago.  Patient has a history of needing transfusions due to her anemia and was evaluated by Dr. Dorsey with an EGD.  Patient denies any fever, chest pain, abdominal pain, nausea, vomiting.  Patient denies any hematemesis, hematuria or hemoptysis or any other source bleeding other than chronic dark stool.     Upon ER evaluation, hemodynamics were stable, labs were significant for troponin of 0.060, proBNP 7314, glucose 324, sodium 131, creatinine 1.79, BUN 57, hemoglobin 4.4, hematocrit 14.6 with otherwise normal WBC and platelets.  Chest x-ray with chronic changes and no acute cardiopulmonary process per my read.  Covid negative.  Patient received Lasix while in the ER, was ordered 2 units of PRBC along with Bumex after transfusion.  Hospitalist was consulted for admission, further evaluation treatment.    Review Of Systems:    All systems were reviewed and negative  except as mentioned in history of presenting illness, assessment and plan.    Past Medical History: Patient  has a past medical history of Acute deep vein thrombosis of left upper extremity (HCC), Anemia, Asthma, CHF (congestive heart failure) (HCC), Chronic atrial fibrillation (HCC), Deep venous thrombosis of left upper limb (HCC), Diabetes mellitus, type 2 (HCC), Diarrhea, GERD (gastroesophageal reflux disease), Glaucoma, H/O transfusion of whole blood, Heart attack (HCC), Heart murmur, History of transfusion, Hyperlipidemia, Hypertension, Kidney disease, Osteomyelitis (HCC), Osteomyelitis of left foot (HCC) (10/3/2017), Peripheral vascular disease (HCC), Right retinal detachment, Secondary cataract of both eyes, 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    Past Surgical History: Patient  has a past surgical history that includes Appendectomy; Esophagogastroduodenoscopy (N/A, 10/9/2017); Cardiac catheterization (N/A, 10/10/2017); Interventional radiology procedure (N/A, 10/10/2017); Cardiac catheterization (N/A, 10/10/2017); Cardiac catheterization (N/A, 10/10/2017); Colonoscopy; Cardioversion; Cardiac Ablation; Pacemaker Implantation; Bone marrow aspiration; Cardiac electrophysiology procedure (N/A, 5/3/2018); Finger amputation (Left, 7/5/2018); Eye surgery (Bilateral); Finger amputation (Left, 8/27/2018); and Esophagogastroduodenoscopy (N/A, 3/22/2022).    Social History: Patient  reports that she has never smoked. She has never used smokeless tobacco. She reports that she does not drink alcohol and does not use drugs.    Family History: Patient's family history includes Arthritis in an other family member; No Known Problems in her father and mother.    Allergies:      Phenergan [promethazine hcl]    Home Medications:    Prior to Admission Medications     Prescriptions Last Dose Informant Patient Reported?  Taking?    acetaminophen (TYLENOL) 650 MG 8 hr tablet   Yes No    Take 650 mg by mouth Every 6 (Six) Hours As Needed for Mild Pain .    Amino Acids-Protein Hydrolys (PRO-STAT) liquid oral liquid   Yes No    Take 30 mL by mouth 2 (Two) Times a Day.    apixaban (ELIQUIS) 5 MG tablet tablet   Yes No    Take 5 mg by mouth 2 (Two) Times a Day.    aspirin 81 MG EC tablet  Self No No    Take 1 tablet by mouth Daily.    atorvastatin (LIPITOR) 40 MG tablet   No No    Take 1 tablet by mouth Daily.    epoetin dorcas-epbx (Retacrit) 62089 UNIT/ML injection   Yes No    Inject 40,000 Units under the skin into the appropriate area as directed. 1 ml sq per month on day 17    ferrous sulfate 324 (65 Fe) MG tablet delayed-release EC tablet   Yes No    Take 324 mg by mouth Daily With Breakfast.    furosemide (LASIX) 20 MG tablet   No No    Take 1 tablet by mouth Daily.    glimepiride (AMARYL) 2 MG tablet   No No    Take 1 tablet by mouth Every Morning Before Breakfast.    glucose blood test strip   No No    1 each by Other route Daily. Use as instructed once a day as directed, for Truemetrix reader    latanoprost (XALATAN) 0.005 % ophthalmic solution   No No    Administer 1 drop to both eyes Daily.    levothyroxine (SYNTHROID, LEVOTHROID) 50 MCG tablet   No No    Take 1 tablet by mouth Daily.    linagliptin (Tradjenta) 5 MG tablet tablet   No No    Take 1 tablet by mouth Daily.    Patient taking differently:  Take 5 mg by mouth Every Night.    metoprolol tartrate (LOPRESSOR) 100 MG tablet   No No    Take 1 tablet by mouth 2 (Two) Times a Day.    multivitamin with minerals tablet tablet   Yes No    Take 1 tablet by mouth Daily.    pantoprazole (Protonix) 40 MG EC tablet   No No    Take 1 tablet by mouth Daily.    polyethylene glycol (MIRALAX) 17 GM/SCOOP powder   Yes No    Take 17 g by mouth Daily. Hold for lose stool    Potassium Gluconate 550 MG tablet   Yes No    Take 550 mg by mouth Daily.    timolol (TIMOPTIC) 0.5 % ophthalmic  "solution   Yes No    Administer 1 drop to both eyes Daily.    vitamin D3 125 MCG (5000 UT) capsule capsule   Yes No    Take 5,000 Units by mouth Daily.    Zinc Oxide (Boudreauxs Butt Paste) 16 % ointment   Yes No    Apply  topically 2 (Two) Times a Day.        ED Medications:    Medications   sodium chloride 0.9 % flush 10 mL (has no administration in time range)   bumetanide (BUMEX) injection 0.5 mg (has no administration in time range)   furosemide (LASIX) injection 80 mg (80 mg Intravenous Given 4/13/22 1904)     Vital Signs:  Temp:  [98.3 °F (36.8 °C)] 98.3 °F (36.8 °C)  Heart Rate:  [70-75] 75  Resp:  [15] 15  BP: (105-126)/(46-59) 126/50        04/13/22  1749   Weight: 55.8 kg (123 lb)     Body mass index is 19.85 kg/m².    Physical Exam:     Most recent vital Signs: /50   Pulse 75   Temp 98.3 °F (36.8 °C) (Oral)   Resp 15   Ht 167.6 cm (66\")   Wt 55.8 kg (123 lb)   SpO2 100%   BMI 19.85 kg/m²     Physical Exam  Vitals and nursing note reviewed.   Constitutional:       General: She is not in acute distress.     Comments: Chronically ill-appearing.  Frail elderly.   HENT:      Head: Normocephalic and atraumatic.      Right Ear: External ear normal.      Left Ear: External ear normal.      Nose: Nose normal.      Mouth/Throat:      Mouth: Mucous membranes are moist.   Eyes:      Extraocular Movements: Extraocular movements intact.      Conjunctiva/sclera: Conjunctivae normal.      Pupils: Pupils are equal, round, and reactive to light.   Cardiovascular:      Rate and Rhythm: Normal rate and regular rhythm.      Pulses: Normal pulses.      Heart sounds: Normal heart sounds.   Pulmonary:      Effort: Pulmonary effort is normal. No respiratory distress.      Breath sounds: Normal breath sounds. No wheezing or rhonchi.      Comments: Decreased air movement bilaterally.  Abdominal:      General: Bowel sounds are normal. There is no distension.      Palpations: Abdomen is soft.      Tenderness: There is " no abdominal tenderness. There is no guarding or rebound.   Musculoskeletal:         General: Normal range of motion.      Cervical back: Normal range of motion and neck supple.      Right lower leg: No tenderness. 3+ Pitting Edema present.      Left lower leg: No tenderness. 3+ Pitting Edema present.   Skin:     General: Skin is warm and dry.      Coloration: Skin is pale.      Findings: No rash.   Neurological:      General: No focal deficit present.      Mental Status: She is alert and oriented to person, place, and time. Mental status is at baseline.      Motor: No weakness.   Psychiatric:         Mood and Affect: Mood normal.         Behavior: Behavior normal.         Thought Content: Thought content normal.         Laboratory data:    I have reviewed the labs done in the emergency room.    Results from last 7 days   Lab Units 04/13/22  1800   SODIUM mmol/L 131*   POTASSIUM mmol/L 4.4   CHLORIDE mmol/L 96*   CO2 mmol/L 15.5*   BUN mg/dL 57*   CREATININE mg/dL 1.79*   CALCIUM mg/dL 8.4*   BILIRUBIN mg/dL 0.7   ALK PHOS U/L 110   ALT (SGPT) U/L 16   AST (SGOT) U/L 21   GLUCOSE mg/dL 324*     Results from last 7 days   Lab Units 04/13/22  1800   WBC 10*3/mm3 10.46   HEMOGLOBIN g/dL 4.4*   HEMATOCRIT % 14.6*   PLATELETS 10*3/mm3 291         Results from last 7 days   Lab Units 04/13/22  1800   TROPONIN T ng/mL 0.060*     Results from last 7 days   Lab Units 04/13/22  1800   PROBNP pg/mL 7,314.0*                       Invalid input(s): USDES,  BLOODU, NITRITITE, BACT, EP    Pain Management Panel     Pain Management Panel Latest Ref Rng & Units 4/19/2021    CREATININE UR Not Estab. mg/dL 76.7          EKG:      Sinus rhythm, heart rate 69, intraventricular conduction delay, nonspecific ST/T wave changes.  No change in morphology from previous EKG on file.    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 fib on Eliquis  Tachybradycardia syndrome status post pacemaker  PAD status post PCI 2017  History of DVT  Hypothyroidism  Debility    Plan:    Patient admitted to telemetry for further evaluation and treatment.    Acute on chronic symptomatic blood loss anemia  Melena  S/p 2 units PRBC transfusion.  Continue to monitor hemoglobin and transfuse as needed to keep hemoglobin above 7.  Gastroenterology consulted and appreciate recommendations.  PPI IV twice daily.    Fluid overload  HFpEF  (echo 2018 LVEF 60%)  Lasix 40 mg IV twice daily.    Daily weight and strict ins and outs.    Repeat 2D echo.    KASH on CKD  Nephrology is consulted and appreciate recommendations.  Avoid nephrotoxic drugs.  A.m. labs.    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  Wound care consulted.    Elevated troponin  likely secondary to demand ischemia.  Patient denies chest pain.  Low suspicion for acute coronary syndrome at this time.  Trending troponin in a.m.    Urinary tract infection  Urinalysis with leukocytes trace, WBC 3-5, bacteria 3+.    Patient is reporting occasional dysuria.    Will start patient on Rocephin and follow-up on cultures.    Diabetes mellitus type 2  Sliding scale insulin.  Check hemoglobin A1c.    Atrial fibrillation  History of DVT  We will hold Eliquis due to melena and symptomatic blood loss.    Hypothyroidism   On Synthroid    Debility  PT/OT consulted.    Risk Assessment: High  DVT Prophylaxis: SCDs, avoid chemoprophylaxis due to melena and GI bleed.  Code Status: Full  Diet: Cardiac, carbohydrate consistent, renal.    Advance Care Planning   ACP discussion was held  "with the patient during this visit. Patient does not have an advance directive, information provided.           Michael Eason MD  04/13/22  20:10 EDT    Dictated utilizing Dragon dictation.    Electronically signed by Michael Eason MD at 04/13/22 5613          Emergency Department Notes      Robi King DO at 04/13/22 7071          Subjective   87-year-old female presents to the ED with a chief complaint of edema.  Patient is complaining of worsening edema in her bilateral lower extremities.  States that is getting worse over the last few days.  States that she is \"full of fluid everywhere\".  She states that she is generally fatigued and is having increased dyspnea on exertion.  No nausea vomiting diarrhea abdominal pain.  No fever.  No chest pain.  No lightheadedness or dizziness.  No prior treatments or limiting factors.  No prior evaluations.  No other complaints this time.          Review of Systems   Constitutional: Positive for fatigue. Negative for fever.   Respiratory: Positive for shortness of breath. Negative for wheezing.    Cardiovascular: Positive for leg swelling. Negative for chest pain and palpitations.   All other systems reviewed and are negative.      Past Medical History:   Diagnosis Date   • Acute deep vein thrombosis of left upper extremity (HCC)    • Anemia    • Asthma    • CHF (congestive heart failure) (HCC)    • Chronic atrial fibrillation (HCC)    • Deep venous thrombosis of left upper limb (HCC)    • Diabetes mellitus, type 2 (HCC)    • Diarrhea    • GERD (gastroesophageal reflux disease)    • Glaucoma    • H/O transfusion of whole blood    • Heart attack (HCC)    • Heart murmur    • History of transfusion    • Hyperlipidemia    • Hypertension    • Kidney disease     patient not aware of what exact diagnosis is    • Osteomyelitis (HCC)    • Osteomyelitis of left foot (HCC) 10/3/2017   • Peripheral vascular disease (HCC)    • Right retinal detachment    • Secondary cataract of " both eyes    • 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)    • Stroke (HCC)    • Swelling of left extremity    • Urinary tract infection    • Wears glasses        Allergies   Allergen Reactions   • Phenergan [Promethazine Hcl] Confusion       Past Surgical History:   Procedure Laterality Date   • AMPUTATION DIGIT Left 7/5/2018    Procedure: Left Great toe amputation;  Surgeon: Prakash Carrington MD;  Location:  [facility];  Service: Vascular   • AMPUTATION DIGIT Left 8/27/2018    Procedure: SECOND TOE AMPUTATION DIGIT LEFT;  Surgeon: Prakash Carrington MD;  Location:  [facility];  Service: General   • APPENDECTOMY     • BONE MARROW ASPIRATION     • CARDIAC ABLATION     • CARDIAC CATHETERIZATION N/A 10/10/2017    Procedure: Peripheral angiography;  Surgeon: Kan Pelaez MD;  Location: [facility] CATH INVASIVE LOCATION;  Service:    • CARDIAC CATHETERIZATION N/A 10/10/2017    Procedure: Angioplasty-peripheral;  Surgeon: Kan Pelaez MD;  Location: [facility] CATH INVASIVE LOCATION;  Service:    • CARDIAC CATHETERIZATION N/A 10/10/2017    Procedure: Atherectomy-peripheral;  Surgeon: Kan Pelaez MD;  Location: [facility] CATH INVASIVE LOCATION;  Service:    • CARDIAC ELECTROPHYSIOLOGY PROCEDURE N/A 5/3/2018    Procedure: generator change;  Surgeon: Zan Poole MD;  Location:  [facility] CATH INVASIVE LOCATION;  Service: Cardiovascular   • CARDIOVERSION     • COLONOSCOPY     • ENDOSCOPY N/A 10/9/2017    Procedure: ESOPHAGOGASTRODUODENOSCOPY WITH COLD FORCEP BIOPSY;  Surgeon: Clifton Hyatt MD;  Location: [facility] ENDOSCOPY;  Service:    • ENDOSCOPY N/A 3/22/2022    Procedure: ESOPHAGOGASTRODUODENOSCOPY with AVM cautery;  Surgeon: Clarisse Velez MD;  Location: [facility] ENDOSCOPY;  Service: Gastroenterology;  Laterality: N/A;   • EYE SURGERY Bilateral     CATARACTS   • INTERVENTIONAL RADIOLOGY PROCEDURE N/A 10/10/2017    Procedure: Abdominal Aortagram with Runoff;  Surgeon: Kan Piper  MD Benigno;  Location: [facility] CATH INVASIVE LOCATION;  Service:    • PACEMAKER IMPLANTATION      around 2008 then replaced 2018       Family History   Problem Relation Age of Onset   • No Known Problems Mother    • No Known Problems Father    • Arthritis Other        Social History     Socioeconomic History   • Marital status:    • Number of children: 3   Tobacco Use   • Smoking status: Never Smoker   • Smokeless tobacco: Never Used   Vaping Use   • Vaping Use: Never used   Substance and Sexual Activity   • Alcohol use: No   • Drug use: No   • Sexual activity: Defer           Objective   Physical Exam  Vitals and nursing note reviewed.   Constitutional:       General: She is not in acute distress.     Appearance: She is well-developed. She is not diaphoretic.      Comments: Elderly-appearing   HENT:      Head: Normocephalic and atraumatic.      Nose: Nose normal.   Eyes:      Conjunctiva/sclera: Conjunctivae normal.   Cardiovascular:      Rate and Rhythm: Normal rate and regular rhythm.   Pulmonary:      Effort: Pulmonary effort is normal. No respiratory distress.      Breath sounds: Normal breath sounds.   Abdominal:      General: There is no distension.      Palpations: Abdomen is soft.      Tenderness: There is no abdominal tenderness. There is no guarding.   Musculoskeletal:         General: No deformity.      Right lower leg: Edema present.      Left lower leg: Edema present.      Comments: 3+ pitting edema bilateral lower extremities   Neurological:      Mental Status: She is alert and oriented to person, place, and time.      Cranial Nerves: No cranial nerve deficit.         Procedures          ED Course  ED Course as of 04/13/22 2015 Wed Apr 13, 2022   1811 EKG interpreted by me.  Sinus rhythm.  Rate of 69.  Nonspecific intraventricular conduction delay.  ST segment depressions.  Abnormal EKG. [CG]   2011 Troponin T(!!): 0.060 [PF]   2011 proBNP(!): 7,314.0 [PF]   2011 RH type: Positive [PF]   2011  ABO Type: O [PF]   2011 Glucose(!): 324 [PF]   2011 BUN(!): 57 [PF]   2011 Creatinine(!): 1.79 [PF]   2011 Sodium(!): 131 [PF]   2011 Potassium: 4.4 [PF]   2011 Chloride(!): 96 [PF]   2011 CO2(!): 15.5 [PF]   2011 Calcium(!): 8.4 [PF]   2011 Total Protein(!): 5.6 [PF]   2011 Albumin(!): 3.30 [PF]   2011 ALT (SGPT): 16 [PF]   2011 AST (SGOT): 21 [PF]   2011 Alkaline Phosphatase: 110 [PF]   2011 Total Bilirubin: 0.7 [PF]   2011 Procalcitonin: 0.14 [PF]   2011 WBC: 10.46 [PF]   2011 Hemoglobin(!!): 4.4 [PF]   2011 Hematocrit(!!): 14.6 [PF]   2011 Platelets: 291 [PF]      ED Course User Index  [CG] Agustin Schaefer DO  [PF] Robi King, DO                                                 [facility]  PULSE OXIMETRY INTERPRETATION  Patient had a pulse ox of 100% on room air. This is a normal pulse oximetry reading.      20:15 EDT  I received care from Dr. Schaefer in regards to follow-up of labs and imaging.  Patient is found to have a hemoglobin of 4.4, hematocrit 14.6.  Chest x-ray without acute findings per my interpretation.  Patient does have 2+ lower extremity edema up to the hips.  Does have a history of CKD as well.  Recent hospitalization for reported GI bleed and complicated by anticoagulation secondary to recent DVT, patient discussed with Dr. Lin hospitalist will admit.  2 units packed red cells ordered in emergency department  Final diagnoses:   Acute on chronic anemia   Chronic kidney disease, unspecified CKD stage   Bilateral lower extremity edema   History of DVT (deep vein thrombosis)       ED Disposition  ED Disposition     ED Disposition   Decision to Admit    Condition   --    Comment   Level of Care: Telemetry [5]   Diagnosis: Acute on chronic anemia [[id number]]   Admitting Physician: DECLAN IRBY [[id number]]               No follow-up provider specified.       Medication List      No changes were made to your prescriptions during this visit.          Robi King DO  04/13/22 2015      Electronically  signed by Robi King DO at 04/13/22 2015     Candida Acevedo, RN at 04/13/22 1954        Blood consent signed at this time.     Electronically signed by Candida Acevedo RN at 04/13/22 1954     Iraida Zavala at 04/13/22 2018        Patient will be going to  [facility]. Candida KULKARNI notified.     Electronically signed by Iraida Zavala at 04/13/22 2019     Candida Acevedo RN at 04/13/22 2032        Report called to SHAD Ascencio.     Electronically signed by Candida Acevedo RN at 04/13/22 2033       Vital Signs (last day)     Date/Time Temp Temp src Pulse Resp BP Patient Position SpO2    04/14/22 1204 98.3 (36.8) Oral 72 14 126/37 Lying 92    04/14/22 1036 98.9 (37.2) Oral 70 16 113/48 Lying 100    04/14/22 0958 98.6 (37) Oral 70 16 118/47 Lying 100    04/14/22 0909 98.8 (37.1) Oral 70 14 114/48 Lying 100    04/14/22 0720 98.4 (36.9) Oral 76 13 111/48 Lying 97    04/14/22 0236 -- -- -- -- -- -- 98    04/14/22 0230 98.5 (36.9) Oral 68 16 -- -- 98    04/14/22 0012 98.3 (36.8) Oral 69 16 133/39 -- 93    04/13/22 2331 97.9 (36.6) Oral 75 16 132/42 Lying 100    04/13/22 2108 98.6 (37) Oral 72 16 101/68 Lying 100    04/13/22 2041 99 (37.2) Oral 70 18 110/49 -- --    04/13/22 2030 -- -- 70 -- 118/47 -- 100    04/13/22 20:11:08 99 (37.2) Oral 70 16 121/49 -- 100    04/13/22 1930 -- -- 75 -- 126/50 -- 100    04/13/22 1904 -- -- 70 -- 115/46 -- --    04/13/22 1751 98.3 (36.8) Oral 70 15 105/59 Lying 100            Current Facility-Administered Medications   Medication Dose Route Frequency Provider Last Rate Last Admin   • acetaminophen (TYLENOL) tablet 650 mg  650 mg Oral Q4H PRN Michael Eason MD        Or   • acetaminophen (TYLENOL) 160 MG/5ML solution 650 mg  650 mg Oral Q4H PRN Michael Eason MD        Or   • acetaminophen (TYLENOL) suppository 650 mg  650 mg Rectal Q4H PRN Michael Eason MD       • bumetanide (BUMEX) injection 0.5 mg  0.5 mg Intravenous PRN Lynette Garcia, DO       • cefTRIAXone (ROCEPHIN) IVPB 1  g/50ml dextrose (premix)  1 g Intravenous Q24H Michael Eason MD   Stopped at 04/14/22 0600   • dextrose (D50W) (25 g/50 mL) IV injection 25 g  25 g Intravenous Q15 Min PRN Michael Eason MD       • dextrose (GLUTOSE) oral gel 1 tube  1 tube Oral Q15 Min PRN Michael Eason MD       • furosemide (LASIX) injection 40 mg  40 mg Intravenous Q12H Michael Eason MD   40 mg at 04/14/22 0456   • glucagon (human recombinant) (GLUCAGEN DIAGNOSTIC) injection 1 mg  1 mg Subcutaneous PRN Michael Eason MD       • insulin aspart (novoLOG) injection 0-9 Units  0-9 Units Subcutaneous TID AC Michael Eason MD   4 Units at 04/14/22 0645   • levothyroxine (SYNTHROID, LEVOTHROID) tablet 50 mcg  50 mcg Oral Daily Michael Eason MD   50 mcg at 04/14/22 0505   • lidocaine (XYLOCAINE) 1 % injection 10 mL  10 mL Subcutaneous Once Armando Feliciano MD       • Magnesium Sulfate 2 gram Bolus, followed by 8 gram infusion (total Mg dose 10 grams)- Mg less than or equal to 1mg/dL  2 g Intravenous PRN Lynette Garcia DO        Or   • Magnesium Sulfate 2 gram / 50mL Infusion (GIVE X 3 BAGS TO EQUAL 6GM TOTAL DOSE) - Mg 1.1 - 1.5 mg/dl  2 g Intravenous PRN Lynette Garcia DO        Or   • Magnesium Sulfate 4 gram infusion- Mg 1.6-1.9 mg/dL  4 g Intravenous PRN Lynette Garcia DO       • metoprolol tartrate (LOPRESSOR) tablet 100 mg  100 mg Oral BID Michael Eason MD   100 mg at 04/14/22 0844   • ondansetron (ZOFRAN) injection 4 mg  4 mg Intravenous Q6H PRN Michael Eason MD       • pantoprazole (PROTONIX) injection 40 mg  40 mg Intravenous Q12H Michael Eason MD   40 mg at 04/14/22 0844   • potassium chloride (KLOR-CON) packet 40 mEq  40 mEq Oral PRN Campos, Bill, RPH       • potassium chloride (MICRO-K) CR capsule 40 mEq  40 mEq Oral PRN Lynette Garcia, DO        Or   • potassium chloride 10 mEq in 100 mL IVPB  10 mEq Intravenous Q1H PRN Lynette Garcia, DO       • potassium chloride (MICRO-K) CR capsule 40 mEq   40 mEq Oral Q4H Lynette Garcia, DO   40 mEq at 04/14/22 0939   • sodium chloride 0.9 % flush 10 mL  10 mL Intravenous PRN Agustin Schaefer, DO       • sodium chloride 0.9 % flush 3 mL  3 mL Intravenous Q12H Michael Eason MD   3 mL at 04/14/22 0844   • sodium chloride 0.9 % flush 3-10 mL  3-10 mL Intravenous PRN Michael Eason MD           Lab Results (last 24 hours)     Procedure Component Value Units Date/Time    POC Glucose Once [[id number]]  (Normal) Collected: 04/14/22 1039    Specimen: Blood Updated: 04/14/22 1046     Glucose 105 mg/dL      Comment: Serial Number: [id number]:  [id number]       Hemoglobin A1c [[id number]]  (Abnormal) Collected: 04/14/22 0605    Specimen: Blood Updated: 04/14/22 0758     Hemoglobin A1C 6.20 %     Narrative:      Hemoglobin A1C Ranges:    Increased Risk for Diabetes  5.7% to 6.4%  Diabetes                     >= 6.5%  Diabetic Goal                < 7.0%    Troponin [[id number]]  (Abnormal) Collected: 04/14/22 0605    Specimen: Blood Updated: 04/14/22 0745     Troponin T 0.053 ng/mL     Narrative:      Troponin T Reference Range:  <= 0.03 ng/mL-   Negative for AMI  >0.03 ng/mL-     Abnormal for myocardial necrosis.  Clinicians would have to utilize clinical acumen, EKG, Troponin and serial changes to determine if it is an Acute Myocardial Infarction or myocardial injury due to an underlying chronic condition.       Results may be falsely decreased if patient taking Biotin.      Occult Blood X 1, Stool - Stool, Per Rectum [[id number]]  (Abnormal) Collected: 04/14/22 0708    Specimen: Stool from Per Rectum Updated: 04/14/22 0737     Fecal Occult Blood Positive    Basic Metabolic Panel [[id number]]  (Abnormal) Collected: 04/14/22 0605    Specimen: Blood Updated: 04/14/22 0728     Glucose 222 mg/dL      BUN 50 mg/dL      Creatinine 1.69 mg/dL      Sodium 137 mmol/L      Potassium 3.1 mmol/L      Chloride 103 mmol/L      CO2 21.1 mmol/L      Calcium 8.2 mg/dL      BUN/Creatinine  Ratio 29.6     Anion Gap 12.9 mmol/L      eGFR 29.1 mL/min/1.73      Comment: National Kidney Foundation and American Society of Nephrology (ASN) Task Force recommended calculation based on the Chronic Kidney Disease Epidemiology Collaboration (CKD-EPI) equation refit without adjustment for race.       Narrative:      GFR Normal >60  Chronic Kidney Disease <60  Kidney Failure <15      CBC Auto Differential [[id number]]  (Abnormal) Collected: 04/14/22 0605    Specimen: Blood Updated: 04/14/22 0708     WBC 8.60 10*3/mm3      RBC 2.29 10*6/mm3      Hemoglobin 6.2 g/dL      Hematocrit 19.6 %      MCV 85.6 fL      MCH 27.1 pg      MCHC 31.6 g/dL      RDW 16.8 %      RDW-SD 51.3 fl      MPV 10.3 fL      Platelets 211 10*3/mm3      Neutrophil % 64.1 %      Lymphocyte % 22.4 %      Monocyte % 11.5 %      Eosinophil % 0.7 %      Basophil % 0.3 %      Immature Grans % 1.0 %      Neutrophils, Absolute 5.50 10*3/mm3      Lymphocytes, Absolute 1.93 10*3/mm3      Monocytes, Absolute 0.99 10*3/mm3      Eosinophils, Absolute 0.06 10*3/mm3      Basophils, Absolute 0.03 10*3/mm3      Immature Grans, Absolute 0.09 10*3/mm3      nRBC 0.3 /100 WBC     POC Glucose Once [[id number]]  (Abnormal) Collected: 04/14/22 0606    Specimen: Blood Updated: 04/14/22 0612     Glucose 250 mg/dL      Comment: Serial Number: [id number]:  [id number]       COVID PRE-OP / PRE-PROCEDURE SCREENING ORDER (NO ISOLATION) - Swab, Nasal Cavity [[id number]]  (Normal) Collected: 04/13/22 2238    Specimen: Swab from Nasal Cavity Updated: 04/13/22 2333    Narrative:      The following orders were created for panel order COVID PRE-OP / PRE-PROCEDURE SCREENING ORDER (NO ISOLATION) - Swab, Nasal Cavity.  Procedure                               Abnormality         Status                     ---------                               -----------         ------                     COVID-19,[facility]...[[id number]]  Normal              Final result                 Please  view results for these tests on the individual orders.    COVID-19,[facility] IN-HOUSE,Nasal Swab No Transport Media 3-4 HR TAT - Swab, Nasal Cavity [[id number]]  (Normal) Collected: 04/13/22 2238    Specimen: Swab from Nasal Cavity Updated: 04/13/22 2333     COVID19 Not Detected    Narrative:      Fact sheet for providers: https://www.fda.gov/media/469257/download     Fact sheet for patients: https://www.fda.gov/media/575154/download    Test performed by PCR.    Consider negative results in combination with clinical observations, patient history, and epidemiological information.    Urinalysis, Microscopic Only - Urine, Clean Catch [[id number]]  (Abnormal) Collected: 04/13/22 2251    Specimen: Urine, Clean Catch Updated: 04/13/22 2304     RBC, UA None Seen /HPF      WBC, UA 3-5 /HPF      Bacteria, UA 3+ /HPF      Squamous Epithelial Cells, UA 0-2 /HPF      Hyaline Casts, UA None Seen /LPF      Methodology Manual Light Microscopy    Urinalysis With Culture If Indicated - Urine, Clean Catch [[id number]]  (Abnormal) Collected: 04/13/22 2251    Specimen: Urine, Clean Catch Updated: 04/13/22 2302     Color, UA Yellow     Appearance, UA Clear     pH, UA <=5.0     Specific Gravity, UA 1.008     Glucose, UA Negative     Ketones, UA Negative     Bilirubin, UA Negative     Blood, UA Negative     Protein, UA Negative     Leuk Esterase, UA Trace     Nitrite, UA Negative     Urobilinogen, UA 0.2 E.U./dL    Troponin [[id number]]  (Abnormal) Collected: 04/13/22 1800    Specimen: Blood Updated: 04/13/22 1922     Troponin T 0.060 ng/mL     Narrative:      Troponin T Reference Range:  <= 0.03 ng/mL-   Negative for AMI  >0.03 ng/mL-     Abnormal for myocardial necrosis.  Clinicians would have to utilize clinical acumen, EKG, Troponin and serial changes to determine if it is an Acute Myocardial Infarction or myocardial injury due to an underlying chronic condition.       Results may be falsely decreased if patient taking Biotin.       "Procalcitonin [[id number]]  (Normal) Collected: 04/13/22 1800    Specimen: Blood Updated: 04/13/22 1909     Procalcitonin 0.14 ng/mL     Narrative:      As a Marker for Sepsis (Non-Neonates):    1. <0.5 ng/mL represents a low risk of severe sepsis and/or septic shock.  2. >2 ng/mL represents a high risk of severe sepsis and/or septic shock.    As a Marker for Lower Respiratory Tract Infections that require antibiotic therapy:    PCT on Admission    Antibiotic Therapy       6-12 Hrs later    >0.5                Strongly Recommended  >0.25 - <0.5        Recommended   0.1 - 0.25          Discouraged              Remeasure/reassess PCT  <0.1                Strongly Discouraged     Remeasure/reassess PCT    As 28 day mortality risk marker: \"Change in Procalcitonin Result\" (>80% or <=80%) if Day 0 (or Day 1) and Day 4 values are available. Refer to http://www.[employer]onicspct-calculator.com    Change in PCT <=80%  A decrease of PCT levels below or equal to 80% defines a positive change in PCT test result representing a higher risk for 28-day all-cause mortality of patients diagnosed with severe sepsis for septic shock.    Change in PCT >80%  A decrease of PCT levels of more than 80% defines a negative change in PCT result representing a lower risk for 28-day all-cause mortality of patients diagnosed with severe sepsis or septic shock.       BNP [[id number]]  (Abnormal) Collected: 04/13/22 1800    Specimen: Blood Updated: 04/13/22 1907     proBNP 7,314.0 pg/mL     Narrative:      Among patients with dyspnea, NT-proBNP is highly sensitive for the detection of acute congestive heart failure. In addition NT-proBNP of <300 pg/ml effectively rules out acute congestive heart failure with 99% negative predictive value.    Results may be falsely decreased if patient taking Biotin.      [place] Draw [[id number]] Collected: 04/13/22 1800    Specimen: Blood Updated: 04/13/22 1904    Narrative:      The following orders were created for panel " order [place] Draw.  Procedure                               Abnormality         Status                     ---------                               -----------         ------                     Green Top (Gel)[[id number]]                                  Final result               Lavender Top[[id number]]                                     Final result               Gold Top - SST[[id number]]                                   Final result               Light Blue Top[[id number]]                                   Final result                 Please view results for these tests on the individual orders.    Light Blue Top [[id number]] Collected: 04/13/22 1800    Specimen: Blood Updated: 04/13/22 1904     Extra Tube hold for add-on     Comment: Auto resulted       Green Top (Gel) [[id number]] Collected: 04/13/22 1800    Specimen: Blood Updated: 04/13/22 1904     Extra Tube Hold for add-ons.     Comment: Auto resulted.       Lavender Top [[id number]] Collected: 04/13/22 1800    Specimen: Blood Updated: 04/13/22 1904     Extra Tube hold for add-on     Comment: Auto resulted       Gold Top - SST [[id number]] Collected: 04/13/22 1800    Specimen: Blood Updated: 04/13/22 1904     Extra Tube Hold for add-ons.     Comment: Auto resulted.       CBC & Differential [[id number]]  (Abnormal) Collected: 04/13/22 1800    Specimen: Blood Updated: 04/13/22 1902    Narrative:      The following orders were created for panel order CBC & Differential.  Procedure                               Abnormality         Status                     ---------                               -----------         ------                     CBC Auto Differential[[id number]]        Abnormal            Final result               Scan Slide[[id number]]                                                                    Please view results for these tests on the individual orders.    CBC Auto Differential [[id number]]  (Abnormal) Collected: 04/13/22 1800    Specimen:  Blood Updated: 04/13/22 1902     WBC 10.46 10*3/mm3      RBC 1.63 10*6/mm3      Hemoglobin 4.4 g/dL      Hematocrit 14.6 %      MCV 89.6 fL      MCH 27.0 pg      MCHC 30.1 g/dL      RDW 18.3 %      RDW-SD 59.7 fl      MPV 10.6 fL      Platelets 291 10*3/mm3      Neutrophil % 74.6 %      Lymphocyte % 13.9 %      Monocyte % 10.2 %      Eosinophil % 0.3 %      Basophil % 0.2 %      Immature Grans % 0.8 %      Neutrophils, Absolute 7.81 10*3/mm3      Lymphocytes, Absolute 1.45 10*3/mm3      Monocytes, Absolute 1.07 10*3/mm3      Eosinophils, Absolute 0.03 10*3/mm3      Basophils, Absolute 0.02 10*3/mm3      Immature Grans, Absolute 0.08 10*3/mm3      nRBC 0.5 /100 WBC     Comprehensive Metabolic Panel [[id number]]  (Abnormal) Collected: 04/13/22 1800    Specimen: Blood Updated: 04/13/22 1900     Glucose 324 mg/dL      Comment: Glucose >180, Hemoglobin A1C recommended.        BUN 57 mg/dL      Creatinine 1.79 mg/dL      Sodium 131 mmol/L      Potassium 4.4 mmol/L      Chloride 96 mmol/L      CO2 15.5 mmol/L      Calcium 8.4 mg/dL      Total Protein 5.6 g/dL      Albumin 3.30 g/dL      ALT (SGPT) 16 U/L      AST (SGOT) 21 U/L      Alkaline Phosphatase 110 U/L      Total Bilirubin 0.7 mg/dL      Globulin 2.3 gm/dL      A/G Ratio 1.4 g/dL      BUN/Creatinine Ratio 31.8     Anion Gap 19.5 mmol/L      eGFR 27.2 mL/min/1.73      Comment: National Kidney Foundation and American Society of Nephrology (ASN) Task Force recommended calculation based on the Chronic Kidney Disease Epidemiology Collaboration (CKD-EPI) equation refit without adjustment for race.       Narrative:      GFR Normal >60  Chronic Kidney Disease <60  Kidney Failure <15          Imaging Results (Last 24 Hours)     Procedure Component Value Units Date/Time    XR Chest 1 View [[id number]] Collected: 04/14/22 0912     Updated: 04/14/22 1220    Narrative:      PROCEDURE: XR CHEST 1 VW-     HISTORY: dyspnea     COMPARISON: April 2018.     FINDINGS: There is a left  subclavian pacemaker. The heart is enlarged.  The mediastinum is unremarkable. There is pulmonary venous hypertension.  There is no acute infiltrate. There is no pneumothorax.  There are no  acute osseous abnormalities.       Impression:      Cardiomegaly with pulmonary venous hypertension.     Continued followup is recommended.                 Images were reviewed, interpreted, and dictated by Dr. Katelynn Culver M.D.  Transcribed by Anju West PA-C     This report was signed and finalized on 2022 12:18 PM by Katelynn Culver M.D..        Physician Progress Notes (last 24 hours)  Notes from 22 1302 through 22 1302   No notes of this type exist for this encounter.            Consult Notes (last 24 hours)      Abiola Ritchie PA-C at 22 0855      Consult Orders    1. Inpatient Gastroenterology Consult [[id number]] ordered by Michael Eason MD at 22 2301                    In Patient Consult      Date of Consultation: 2022  Patient Name: Lynne Sanchez  MRN: [id number]  : 1934     Referring provider: Lynette Garcia DO    Primary care provider:  Elizabeth Easton APRN    Reason for consultation: melena    History of Present Illness: This is a 87-year-old elderly female patient with a prior history of congestive heart failure, chronic kidney disease, valvular heart disease, chronic atrial fibrillation, tachybradycardia syndrome status post PPM, referral vascular disease, diabetes mellitus, chronic anemia, hypothyroidism, recent history of left upper extremity DVT on Eliquis, was brought to the emergency room on 2022 due to increasing shortness of breath and swelling in her bilateral legs. She was found to have Hgb of 4.4 g/dL and was admitted to the hospital, given 2 U PRBCs then with 2 more units ordered today. GI was consulted for evaluation of melena.     She had EGD on previous admission (2022) by Dr. Velez which showed single angiectasia  with stigmata of recent bleeding in the gastric fundus treated with vaporization, red blood in the duodenum as well as 2 small angiectasias with bleeding in the first portion of the duodenum treated with vaporization. It was suspected at that time that she had more bleeding in the small intestine. A pillcam was ordered and planned to be completed within 1-2 weeks of hospital discharge. Patient delayed having the procedure while she was in the nursing home for rehab and canceled the procedure later stating she was too weak to come to office have it completed.     She has had persistent dark stools, had stopped her daily iron supplement for the past approx 1 week before admission on her own. Reports still dark stools but improved since stopping the iron. She has remained on Eliquis until now.     Does note having very small amount bright red rectal bleeding on the tissue after a stool for the past approx 2 weeks. She did not see any blood in the toilet. Also has some rectal discomfort that she relates to a hemorrhoid.      3/22/2022 previous inpatient visit  This is a 87-year-old elderly female patient with a prior history of congestive heart failure, chronic kidney disease, valvular heart disease, chronic atrial fibrillation, tachybradycardia syndrome status post PPM, referral vascular disease, diabetes mellitus, chronic anemia, hypothyroidism, recent history of left upper extremity DVT on Eliquis, was brought to the emergency room on 3/21/2022 after she was noted to have a low hemoglobin at rehab with the routine lab work.     Patient had a recent left humeral fracture following which she had a DVT left upper extremity and was put on Eliquis.  She had a routine lab work done which revealed low hemoglobin for which she was sent to emergency room.  She herself denies any abdominal pain no nausea vomiting.  Denies any chest pain shortness of breathing.  She has a chronic anemia for many years and has been taking iron  pills.  Not see any difference other than the dark stool she normally gets with the iron pills.  Denies any prior history peptic ulcer disease no NSAID use.  She was on aspirin and the Plavix before and her Plavix was discontinued when she was put on Eliquis.  She is also taking baby aspirin daily.     Her last EGD was in 2017 by Dr. Mosquera and was reported as having the long segment Fermin's.  No recent colonoscopy.     Her work-up in the emergency room revealed hemoglobin of 6.1 g/dL compared to her baseline around 7 to 8 g/dL and GI was consulted.  He was started on a Protonix twice daily and had a 2 years of PRBC transfusion.    Subjective     Past Medical History:   Diagnosis Date   • Acute deep vein thrombosis of left upper extremity (HCC)    • Anemia    • Asthma    • CHF (congestive heart failure) (HCC)    • Chronic atrial fibrillation (HCC)    • Deep venous thrombosis of left upper limb (HCC)    • Diabetes mellitus, type 2 (HCC)    • Diarrhea    • GERD (gastroesophageal reflux disease)    • Glaucoma    • H/O transfusion of whole blood    • Heart attack (HCC)    • Heart murmur    • History of transfusion    • Hyperlipidemia    • Hypertension    • Kidney disease     patient not aware of what exact diagnosis is    • Osteomyelitis ([facility])    • Osteomyelitis of left foot ([facility]) 10/3/2017   • Peripheral vascular disease ([facility])    • Right retinal detachment    • Secondary cataract of both eyes    • 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)    • Stroke ([facility])    • Swelling of left extremity    • Urinary tract infection    • Wears glasses        Past Surgical History:   Procedure Laterality Date   • AMPUTATION DIGIT Left 7/5/2018    Procedure: Left Great toe amputation;  Surgeon: Prakash Carrington MD;  Location: [facility] OR;  Service: Vascular   • AMPUTATION DIGIT Left 8/27/2018    Procedure: SECOND TOE AMPUTATION DIGIT LEFT;  Surgeon: Prakash Carrington MD;  Location: [facility] OR;  Service: General   •  APPENDECTOMY     • BONE MARROW ASPIRATION     • CARDIAC ABLATION     • CARDIAC CATHETERIZATION N/A 10/10/2017    Procedure: Peripheral angiography;  Surgeon: Kan Pelaez MD;  Location: [facility] CATH INVASIVE LOCATION;  Service:    • CARDIAC CATHETERIZATION N/A 10/10/2017    Procedure: Angioplasty-peripheral;  Surgeon: Kan Pelaez MD;  Location: [facility] CATH INVASIVE LOCATION;  Service:    • CARDIAC CATHETERIZATION N/A 10/10/2017    Procedure: Atherectomy-peripheral;  Surgeon: Kan Pelaez MD;  Location: [facility] CATH INVASIVE LOCATION;  Service:    • CARDIAC ELECTROPHYSIOLOGY PROCEDURE N/A 5/3/2018    Procedure: generator change;  Surgeon: Zan Poole MD;  Location: [facility] CATH INVASIVE LOCATION;  Service: Cardiovascular   • CARDIOVERSION     • COLONOSCOPY     • ENDOSCOPY N/A 10/9/2017    Procedure: ESOPHAGOGASTRODUODENOSCOPY WITH COLD FORCEP BIOPSY;  Surgeon: Clifton Hyatt MD;  Location: [facility] ENDOSCOPY;  Service:    • ENDOSCOPY N/A 3/22/2022    Procedure: ESOPHAGOGASTRODUODENOSCOPY with AVM cautery;  Surgeon: Clarisse Velez MD;  Location: [facility] ENDOSCOPY;  Service: Gastroenterology;  Laterality: N/A;   • EYE SURGERY Bilateral     CATARACTS   • INTERVENTIONAL RADIOLOGY PROCEDURE N/A 10/10/2017    Procedure: Abdominal Aortagram with Runoff;  Surgeon: Kan Pelaez MD;  Location: [facility] CATH INVASIVE LOCATION;  Service:    • PACEMAKER IMPLANTATION      around 2008 then replaced 2018       Family History   Problem Relation Age of Onset   • No Known Problems Mother    • No Known Problems Father    • Arthritis Other        Social History     Socioeconomic History   • Marital status:    • Number of children: 3   Tobacco Use   • Smoking status: Never Smoker   • Smokeless tobacco: Never Used   Vaping Use   • Vaping Use: Never used   Substance and Sexual Activity   • Alcohol use: No   • Drug use: No   • Sexual activity: Defer         Current Facility-Administered  Medications:   •  acetaminophen (TYLENOL) tablet 650 mg, 650 mg, Oral, Q4H PRN **OR** acetaminophen (TYLENOL) 160 MG/5ML solution 650 mg, 650 mg, Oral, Q4H PRN **OR** acetaminophen (TYLENOL) suppository 650 mg, 650 mg, Rectal, Q4H PRN, Michael Eason MD  •  bumetanide (BUMEX) injection 0.5 mg, 0.5 mg, Intravenous, PRN, Lynette Garcia,   •  cefTRIAXone (ROCEPHIN) IVPB 1 g/50ml dextrose (premix), 1 g, Intravenous, Q24H, Michael Eason MD, Stopped at 04/14/22 0600  •  dextrose (D50W) (25 g/50 mL) IV injection 25 g, 25 g, Intravenous, Q15 Min PRN, Michael Eason MD  •  dextrose (GLUTOSE) oral gel 1 tube, 1 tube, Oral, Q15 Min PRN, Michael Eason MD  •  furosemide (LASIX) injection 40 mg, 40 mg, Intravenous, Q12H, Michael Eason MD, 40 mg at 04/14/22 0456  •  glucagon (human recombinant) (GLUCAGEN DIAGNOSTIC) injection 1 mg, 1 mg, Subcutaneous, PRN, Michael Eason MD  •  insulin aspart (novoLOG) injection 0-9 Units, 0-9 Units, Subcutaneous, TID AC, Michael Eason MD, 4 Units at 04/14/22 0645  •  levothyroxine (SYNTHROID, LEVOTHROID) tablet 50 mcg, 50 mcg, Oral, Daily, Michael Eason MD, 50 mcg at 04/14/22 0505  •  Magnesium Sulfate 2 gram Bolus, followed by 8 gram infusion (total Mg dose 10 grams)- Mg less than or equal to 1mg/dL, 2 g, Intravenous, PRN **OR** Magnesium Sulfate 2 gram / 50mL Infusion (GIVE X 3 BAGS TO EQUAL 6GM TOTAL DOSE) - Mg 1.1 - 1.5 mg/dl, 2 g, Intravenous, PRN **OR** Magnesium Sulfate 4 gram infusion- Mg 1.6-1.9 mg/dL, 4 g, Intravenous, PRN, Lynette Garcia, DO  •  metoprolol tartrate (LOPRESSOR) tablet 100 mg, 100 mg, Oral, BID, Michael Eason MD, 100 mg at 04/14/22 0844  •  ondansetron (ZOFRAN) injection 4 mg, 4 mg, Intravenous, Q6H PRN, Michael Eason MD  •  pantoprazole (PROTONIX) injection 40 mg, 40 mg, Intravenous, Q12H, Michael Eason MD, 40 mg at 04/14/22 0844  •  potassium chloride (MICRO-K) CR capsule 40 mEq, 40 mEq, Oral, PRN **OR** potassium chloride  (KLOR-CON) packet 40 mEq, 40 mEq, Oral, PRN **OR** potassium chloride 10 mEq in 100 mL IVPB, 10 mEq, Intravenous, Q1H PRN, Lynette Garcia, DO  •  sodium chloride 0.9 % flush 10 mL, 10 mL, Intravenous, PRN, Agustin Schaefer B, DO  •  sodium chloride 0.9 % flush 3 mL, 3 mL, Intravenous, Q12H, Michael Eason MD, 3 mL at 04/14/22 0844  •  sodium chloride 0.9 % flush 3-10 mL, 3-10 mL, Intravenous, PRN, Michael Eason MD    Allergies   Allergen Reactions   • Phenergan [Promethazine Hcl] Confusion       Review of Systems   Constitutional: Positive for fatigue.   HENT: Negative for trouble swallowing.    Eyes: Negative.    Respiratory: Positive for shortness of breath.    Cardiovascular: Positive for leg swelling.   Gastrointestinal: Positive for anal bleeding and constipation. Negative for abdominal pain, diarrhea and nausea.   Endocrine: Negative.    Genitourinary: Negative.    Musculoskeletal: Positive for arthralgias.   Skin: Negative.    Allergic/Immunologic: Negative.    Neurological: Positive for weakness.   Hematological: Bruises/bleeds easily.   Psychiatric/Behavioral: Negative.         The following portions of the patient's history were reviewed and updated as appropriate: allergies, current medications, past family history, past medical history, past social history, past surgical history and problem list.    Objective     Vitals:    04/14/22 0230 04/14/22 0236 04/14/22 0600 04/14/22 0720   BP:    111/48   BP Location:    Right arm   Patient Position:    Lying   Pulse: 68   76   Resp: 16   13   Temp: 98.5 °F (36.9 °C)   98.4 °F (36.9 °C)   TempSrc: Oral   Oral   SpO2: 98% 98%  97%   Weight:   66.9 kg (147 lb 7.8 oz)    Height:           Physical Exam  Constitutional:       General: She is not in acute distress.     Appearance: Normal appearance. She is not ill-appearing.      Comments: thin   HENT:      Head: Normocephalic.      Right Ear: External ear normal.      Left Ear: External ear normal.      Nose:  Nose normal.      Mouth/Throat:      Mouth: Mucous membranes are moist.   Eyes:      General: No scleral icterus.        Right eye: No discharge.         Left eye: No discharge.      Conjunctiva/sclera: Conjunctivae normal.   Cardiovascular:      Rate and Rhythm: Normal rate and regular rhythm.      Heart sounds: Normal heart sounds. No murmur heard.  Pulmonary:      Effort: Pulmonary effort is normal. No respiratory distress.      Breath sounds: No wheezing.      Comments: Decreased breath sounds heard bilaterally  Abdominal:      General: Abdomen is flat. Bowel sounds are normal. There is no distension.      Palpations: Abdomen is soft. There is no mass.      Tenderness: There is no abdominal tenderness. There is no guarding or rebound.      Hernia: No hernia is present.   Musculoskeletal:         General: No deformity.      Cervical back: Normal range of motion.      Right lower leg: Edema present.      Left lower leg: Edema present.   Skin:     General: Skin is warm and dry.      Coloration: Skin is pale. Skin is not jaundiced.   Neurological:      Mental Status: She is alert and oriented to person, place, and time.   Psychiatric:         Mood and Affect: Mood normal.         Behavior: Behavior normal.         Thought Content: Thought content normal.         Judgment: Judgment normal.         Results from last 7 days   Lab Units 04/14/22  0605 04/13/22  1800   SODIUM mmol/L 137 131*   POTASSIUM mmol/L 3.1* 4.4   CHLORIDE mmol/L 103 96*   CO2 mmol/L 21.1* 15.5*   BUN mg/dL 50* 57*   CREATININE mg/dL 1.69* 1.79*   CALCIUM mg/dL 8.2* 8.4*   ALBUMIN g/dL  --  3.30*   BILIRUBIN mg/dL  --  0.7   ALK PHOS U/L  --  110   ALT (SGPT) U/L  --  16   AST (SGOT) U/L  --  21   GLUCOSE mg/dL 222* 324*   WBC 10*3/mm3 8.60 10.46   HEMOGLOBIN g/dL 6.2* 4.4*   PLATELETS 10*3/mm3 211 291     No recent abdominal imaging to review.     EGD was completed on 3/22/2022 by Dr. Velez:  - The oropharynx was normal.  - The Z-line was  irregular and was found 35 cm from the incisors.  - A 2 cm hiatal hernia was present.  - The esophagus and gastroesophageal junction were examined with white light and narrow band imaging (NBI). There were esophageal mucosal changes suspicious for long-segment Fermin's esophagus, consistent with long-segment Fermin's esophagus, classified as Fermin's stage C13-M15 per [place] criteria. These changes involved the mucosa extending to the Z-line (35 cm from the incisors). Circumferential salmon-colored mucosa was present from 20 to 35 cm and no visible abnormalities were present. The maximum longitudinal extent of these esophageal mucosal changes was 15 cm in length. Not biopsied as pt is on anticoagulation.  - Patchy mildly erythematous mucosa without bleeding was found on the posterior wall of the stomach and in the gastric antrum.  - A single small angioectasia with stigmata of recent bleeding was found in the gastric fundus. Vaporization for hemostasis of bleeding caused by the procedure using argon plasma at 2 liters/minute and 20 santa was successful.  - Red blood was found in the first portion of the duodenum.  - Two small angioectasias with bleeding were found in the first portion of the duodenum and in the second portion of the duodenum. Vaporization for hemostasis using argon plasma at 2 liters/minute and 20 santa was successful. Estimated blood loss was minimal.  - The second portion of the duodenum was normal.  - A few diminutive polyps were found in the gastric fundus and in the gastric body.  No specimens collected. Not biopsied as pt is on anticoagulation.    Assessment / Plan      Assessment/Recommendations:   1. Acute on chronic anemia  2. GERD without esophagitis  3. 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 History of angioectasia in stomach  5. History of bleeding angioectasia of duodenum  Patient has a chronic anemia for over 8 to 10 years, she had a hemoglobin 4.4 g/dL this admission and  below 7 intermittently since about 2015. Her anemia appears to be multifactorial in etiology including, chronic kidney disease, intermittent GI bleed, and anemia of chronic disease.  Patient is high risk for small bowel AVMs due to her CKD and AVMs were found on EGD 3/22/2022.      Patient was on iron pills and noticing dark stool, also on Eliquis daily.      EGD completed 3/22/2022 showed very long segment of Fermin's esophagus measuring 15 cm in length, patchy erythema in the stomach, single angiectasia with stigmata of recent bleeding in the gastric fundus treated with vaporization, red blood in the duodenum as well as 2 small angiectasias with bleeding in the first portion of the duodenum treated with vaporization and benign-appearing gastric polyps. No specimens collected due to anticoagulation.     She was directed last admission to have pillcam within 1-2 weeks of discharge but she did not want to complete while at the rehab and was unable to come to clinic on scheduled date (today) due to worsening weakness.      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  Please do not hesitate to call me if you have any questions.    Abiola Ritchie PA-C  [facility]  4/14/2022  08:55 EDT    Please note that portions of this note may have been completed with a voice recognition program.       Electronically signed by Abiola Ritchie PA-C at 04/14/22 [id number]

## 2022-04-14 NOTE — PLAN OF CARE
Goal Outcome Evaluation:  Plan of Care Reviewed With: patient        Progress: no change  Outcome Evaluation: Additional 2 unit PRBC given. Potassium replaced. Venous duplex done. Dr Feliciano to come and do bedside I&D of coccyx abcess. Denies pain.  at bedside most of the day. NPO after 2200 for pill cam in the a.m.

## 2022-04-14 NOTE — THERAPY EVALUATION
BISI arciniega held this date as patient is getting two units of blood transfused. Will follow up tomorrow as appropriate.

## 2022-04-15 ENCOUNTER — PROCEDURE VISIT (OUTPATIENT)
Dept: GASTROENTEROLOGY | Facility: CLINIC | Age: 87
End: 2022-04-15

## 2022-04-15 VITALS
HEIGHT: 66 IN | TEMPERATURE: 99.1 F | SYSTOLIC BLOOD PRESSURE: 125 MMHG | DIASTOLIC BLOOD PRESSURE: 43 MMHG | WEIGHT: 139 LBS | BODY MASS INDEX: 22.34 KG/M2

## 2022-04-15 DIAGNOSIS — D64.9 ACUTE ON CHRONIC ANEMIA: Primary | ICD-10-CM

## 2022-04-15 LAB
ANION GAP SERPL CALCULATED.3IONS-SCNC: 13 MMOL/L (ref 5–15)
BASOPHILS # BLD AUTO: 0.03 10*3/MM3 (ref 0–0.2)
BASOPHILS NFR BLD AUTO: 0.3 % (ref 0–1.5)
BH BB BLOOD EXPIRATION DATE: NORMAL
BH BB BLOOD TYPE BARCODE: 5100
BH BB DISPENSE STATUS: NORMAL
BH BB PRODUCT CODE: NORMAL
BH BB UNIT NUMBER: NORMAL
BH CV ECHO MEAS - AO MAX PG (FULL): 12.6 MMHG
BH CV ECHO MEAS - AO MAX PG: 14 MMHG
BH CV ECHO MEAS - AO MEAN PG (FULL): 6 MMHG
BH CV ECHO MEAS - AO MEAN PG: 7 MMHG
BH CV ECHO MEAS - AO ROOT AREA (BSA CORRECTED): 1.7
BH CV ECHO MEAS - AO ROOT AREA: 6.8 CM^2
BH CV ECHO MEAS - AO ROOT DIAM: 3 CM
BH CV ECHO MEAS - AO V2 MAX: 185 CM/SEC
BH CV ECHO MEAS - AO V2 MEAN: 115 CM/SEC
BH CV ECHO MEAS - AO V2 VTI: 41.9 CM
BH CV ECHO MEAS - ASC AORTA: 3.6 CM
BH CV ECHO MEAS - AVA(I,A): 1 CM^2
BH CV ECHO MEAS - AVA(I,D): 1 CM^2
BH CV ECHO MEAS - AVA(V,A): 1 CM^2
BH CV ECHO MEAS - AVA(V,D): 1 CM^2
BH CV ECHO MEAS - BSA(HAYCOCK): 1.8 M^2
BH CV ECHO MEAS - BSA: 1.8 M^2
BH CV ECHO MEAS - BZI_BMI: 23.7 KILOGRAMS/M^2
BH CV ECHO MEAS - BZI_METRIC_HEIGHT: 167.6 CM
BH CV ECHO MEAS - BZI_METRIC_WEIGHT: 66.7 KG
BH CV ECHO MEAS - EDV(CUBED): 59.8 ML
BH CV ECHO MEAS - EDV(MOD-SP2): 58.5 ML
BH CV ECHO MEAS - EDV(MOD-SP4): 47.4 ML
BH CV ECHO MEAS - EDV(TEICH): 66.3 ML
BH CV ECHO MEAS - EF(CUBED): 73.9 %
BH CV ECHO MEAS - EF(MOD-BP): 51 %
BH CV ECHO MEAS - EF(MOD-SP2): 50.8 %
BH CV ECHO MEAS - EF(MOD-SP4): 52.7 %
BH CV ECHO MEAS - EF(TEICH): 66.3 %
BH CV ECHO MEAS - ESV(CUBED): 15.6 ML
BH CV ECHO MEAS - ESV(MOD-SP2): 28.8 ML
BH CV ECHO MEAS - ESV(MOD-SP4): 22.4 ML
BH CV ECHO MEAS - ESV(TEICH): 22.3 ML
BH CV ECHO MEAS - FS: 36.1 %
BH CV ECHO MEAS - IVS/LVPW: 1.1
BH CV ECHO MEAS - IVSD: 1.5 CM
BH CV ECHO MEAS - LA DIMENSION: 4.8 CM
BH CV ECHO MEAS - LA/AO: 1.6
BH CV ECHO MEAS - LAD MAJOR: 5.4 CM
BH CV ECHO MEAS - LAT PEAK E' VEL: 10.6 CM/SEC
BH CV ECHO MEAS - LATERAL E/E' RATIO: 9.9
BH CV ECHO MEAS - LV DIASTOLIC VOL/BSA (35-75): 27 ML/M^2
BH CV ECHO MEAS - LV MASS(C)D: 213.6 GRAMS
BH CV ECHO MEAS - LV MASS(C)DI: 121.7 GRAMS/M^2
BH CV ECHO MEAS - LV MAX PG: 1.4 MMHG
BH CV ECHO MEAS - LV MEAN PG: 1 MMHG
BH CV ECHO MEAS - LV SYSTOLIC VOL/BSA (12-30): 12.8 ML/M^2
BH CV ECHO MEAS - LV V1 MAX: 58.7 CM/SEC
BH CV ECHO MEAS - LV V1 MEAN: 39.5 CM/SEC
BH CV ECHO MEAS - LV V1 VTI: 13.2 CM
BH CV ECHO MEAS - LVIDD: 3.9 CM
BH CV ECHO MEAS - LVIDS: 2.5 CM
BH CV ECHO MEAS - LVLD AP2: 6.8 CM
BH CV ECHO MEAS - LVLD AP4: 6.5 CM
BH CV ECHO MEAS - LVLS AP2: 5.9 CM
BH CV ECHO MEAS - LVLS AP4: 5.6 CM
BH CV ECHO MEAS - LVOT AREA (M): 3.2 CM^2
BH CV ECHO MEAS - LVOT AREA: 3.2 CM^2
BH CV ECHO MEAS - LVOT DIAM: 2 CM
BH CV ECHO MEAS - LVPWD: 1.4 CM
BH CV ECHO MEAS - MED PEAK E' VEL: 4.9 CM/SEC
BH CV ECHO MEAS - MEDIAL E/E' RATIO: 21.5
BH CV ECHO MEAS - MR MAX PG: 100 MMHG
BH CV ECHO MEAS - MR MAX VEL: 498.5 CM/SEC
BH CV ECHO MEAS - MR MEAN PG: 61 MMHG
BH CV ECHO MEAS - MR MEAN VEL: 362 CM/SEC
BH CV ECHO MEAS - MR VTI: 150 CM
BH CV ECHO MEAS - MV DEC TIME: 0.15 SEC
BH CV ECHO MEAS - MV E MAX VEL: 104.5 CM/SEC
BH CV ECHO MEAS - MV MAX PG: 6.7 MMHG
BH CV ECHO MEAS - MV MEAN PG: 2 MMHG
BH CV ECHO MEAS - MV V2 MAX: 129 CM/SEC
BH CV ECHO MEAS - MV V2 MEAN: 69.7 CM/SEC
BH CV ECHO MEAS - MV V2 VTI: 32.6 CM
BH CV ECHO MEAS - MVA(VTI): 1.3 CM^2
BH CV ECHO MEAS - PA ACC TIME: 0.08 SEC
BH CV ECHO MEAS - PA MAX PG (FULL): 1.3 MMHG
BH CV ECHO MEAS - PA MAX PG: 2.8 MMHG
BH CV ECHO MEAS - PA MEAN PG (FULL): 0 MMHG
BH CV ECHO MEAS - PA MEAN PG: 1 MMHG
BH CV ECHO MEAS - PA PR(ACCEL): 44.4 MMHG
BH CV ECHO MEAS - PA V2 MAX: 83.2 CM/SEC
BH CV ECHO MEAS - PA V2 MEAN: 51.6 CM/SEC
BH CV ECHO MEAS - PA V2 VTI: 16.1 CM
BH CV ECHO MEAS - PULM DIAS VEL: 20.9 CM/SEC
BH CV ECHO MEAS - PULM S/D: 1.2
BH CV ECHO MEAS - PULM SYS VEL: 25.2 CM/SEC
BH CV ECHO MEAS - RAP SYSTOLE: 15 MMHG
BH CV ECHO MEAS - RV MAX PG: 1.5 MMHG
BH CV ECHO MEAS - RV MEAN PG: 1 MMHG
BH CV ECHO MEAS - RV V1 MAX: 60.6 CM/SEC
BH CV ECHO MEAS - RV V1 MEAN: 40.6 CM/SEC
BH CV ECHO MEAS - RV V1 VTI: 14.7 CM
BH CV ECHO MEAS - RVSP: 64 MMHG
BH CV ECHO MEAS - SI(AO): 163.2 ML/M^2
BH CV ECHO MEAS - SI(CUBED): 25.2 ML/M^2
BH CV ECHO MEAS - SI(LVOT): 24.3 ML/M^2
BH CV ECHO MEAS - SI(MOD-SP2): 16.9 ML/M^2
BH CV ECHO MEAS - SI(MOD-SP4): 14.2 ML/M^2
BH CV ECHO MEAS - SI(TEICH): 25.1 ML/M^2
BH CV ECHO MEAS - SV(AO): 286.4 ML
BH CV ECHO MEAS - SV(CUBED): 44.2 ML
BH CV ECHO MEAS - SV(LVOT): 42.7 ML
BH CV ECHO MEAS - SV(MOD-SP2): 29.7 ML
BH CV ECHO MEAS - SV(MOD-SP4): 25 ML
BH CV ECHO MEAS - SV(TEICH): 44 ML
BH CV ECHO MEAS - TAPSE (>1.6): 1 CM
BH CV ECHO MEAS - TR MAX PG: 49 MMHG
BH CV ECHO MEAS - TR MAX VEL: 345 CM/SEC
BH CV ECHO MEASUREMENTS AVERAGE E/E' RATIO: 13.48
BH CV XLRA - RV BASE: 5 CM
BH CV XLRA - RV MID: 3.4 CM
BH CV XLRA - TDI S': 5.3 CM/SEC
BUN SERPL-MCNC: 38 MG/DL (ref 8–23)
BUN/CREAT SERPL: 25.9 (ref 7–25)
CALCIUM SPEC-SCNC: 8.6 MG/DL (ref 8.6–10.5)
CHLORIDE SERPL-SCNC: 107 MMOL/L (ref 98–107)
CO2 SERPL-SCNC: 22 MMOL/L (ref 22–29)
CREAT SERPL-MCNC: 1.47 MG/DL (ref 0.57–1)
CROSSMATCH INTERPRETATION: NORMAL
DEPRECATED RDW RBC AUTO: 53.7 FL (ref 37–54)
EGFRCR SERPLBLD CKD-EPI 2021: 34.4 ML/MIN/1.73
EOSINOPHIL # BLD AUTO: 0.08 10*3/MM3 (ref 0–0.4)
EOSINOPHIL NFR BLD AUTO: 0.8 % (ref 0.3–6.2)
ERYTHROCYTE [DISTWIDTH] IN BLOOD BY AUTOMATED COUNT: 17.1 % (ref 12.3–15.4)
GLUCOSE BLDC GLUCOMTR-MCNC: 173 MG/DL (ref 70–130)
GLUCOSE BLDC GLUCOMTR-MCNC: 176 MG/DL (ref 70–130)
GLUCOSE BLDC GLUCOMTR-MCNC: 180 MG/DL (ref 70–130)
GLUCOSE BLDC GLUCOMTR-MCNC: 191 MG/DL (ref 70–130)
GLUCOSE SERPL-MCNC: 163 MG/DL (ref 65–99)
HCT VFR BLD AUTO: 28.7 % (ref 34–46.6)
HGB BLD-MCNC: 9.2 G/DL (ref 12–15.9)
IMM GRANULOCYTES # BLD AUTO: 0.11 10*3/MM3 (ref 0–0.05)
IMM GRANULOCYTES NFR BLD AUTO: 1.1 % (ref 0–0.5)
LEFT ATRIUM VOLUME INDEX: 41.4 ML/M^2
LEFT ATRIUM VOLUME: 72.7 ML
LV EF 2D ECHO EST: 55 %
LYMPHOCYTES # BLD AUTO: 1.48 10*3/MM3 (ref 0.7–3.1)
LYMPHOCYTES NFR BLD AUTO: 15.4 % (ref 19.6–45.3)
MAGNESIUM SERPL-MCNC: 1.8 MG/DL (ref 1.6–2.4)
MAXIMAL PREDICTED HEART RATE: 133 BPM
MCH RBC QN AUTO: 27.9 PG (ref 26.6–33)
MCHC RBC AUTO-ENTMCNC: 32.1 G/DL (ref 31.5–35.7)
MCV RBC AUTO: 87 FL (ref 79–97)
MONOCYTES # BLD AUTO: 0.94 10*3/MM3 (ref 0.1–0.9)
MONOCYTES NFR BLD AUTO: 9.8 % (ref 5–12)
NEUTROPHILS NFR BLD AUTO: 7 10*3/MM3 (ref 1.7–7)
NEUTROPHILS NFR BLD AUTO: 72.6 % (ref 42.7–76)
NRBC BLD AUTO-RTO: 0.3 /100 WBC (ref 0–0.2)
PLATELET # BLD AUTO: 214 10*3/MM3 (ref 140–450)
PMV BLD AUTO: 9.8 FL (ref 6–12)
POTASSIUM SERPL-SCNC: 4.3 MMOL/L (ref 3.5–5.2)
RBC # BLD AUTO: 3.3 10*6/MM3 (ref 3.77–5.28)
SODIUM SERPL-SCNC: 142 MMOL/L (ref 136–145)
STRESS TARGET HR: 113 BPM
UNIT  ABO: NORMAL
UNIT  RH: NORMAL
WBC NRBC COR # BLD: 9.64 10*3/MM3 (ref 3.4–10.8)

## 2022-04-15 PROCEDURE — 80048 BASIC METABOLIC PNL TOTAL CA: CPT | Performed by: STUDENT IN AN ORGANIZED HEALTH CARE EDUCATION/TRAINING PROGRAM

## 2022-04-15 PROCEDURE — 0 MAGNESIUM SULFATE 4 GM/100ML SOLUTION

## 2022-04-15 PROCEDURE — 63710000001 INSULIN ASPART PER 5 UNITS: Performed by: FAMILY MEDICINE

## 2022-04-15 PROCEDURE — 91110 GI TRC IMG INTRAL ESOPH-ILE: CPT | Performed by: INTERNAL MEDICINE

## 2022-04-15 PROCEDURE — 25010000002 VANCOMYCIN 5 G RECONSTITUTED SOLUTION: Performed by: STUDENT IN AN ORGANIZED HEALTH CARE EDUCATION/TRAINING PROGRAM

## 2022-04-15 PROCEDURE — 85025 COMPLETE CBC W/AUTO DIFF WBC: CPT | Performed by: STUDENT IN AN ORGANIZED HEALTH CARE EDUCATION/TRAINING PROGRAM

## 2022-04-15 PROCEDURE — 25010000002 CEFTRIAXONE SODIUM-DEXTROSE 1-3.74 GM-%(50ML) RECONSTITUTED SOLUTION: Performed by: FAMILY MEDICINE

## 2022-04-15 PROCEDURE — 25010000002 FUROSEMIDE PER 20 MG: Performed by: FAMILY MEDICINE

## 2022-04-15 PROCEDURE — 83735 ASSAY OF MAGNESIUM: CPT

## 2022-04-15 PROCEDURE — 82962 GLUCOSE BLOOD TEST: CPT

## 2022-04-15 PROCEDURE — 99232 SBSQ HOSP IP/OBS MODERATE 35: CPT | Performed by: STUDENT IN AN ORGANIZED HEALTH CARE EDUCATION/TRAINING PROGRAM

## 2022-04-15 RX ORDER — MAGNESIUM SULFATE HEPTAHYDRATE 40 MG/ML
4 INJECTION, SOLUTION INTRAVENOUS ONCE
Status: COMPLETED | OUTPATIENT
Start: 2022-04-15 | End: 2022-04-15

## 2022-04-15 RX ADMIN — Medication 3 ML: at 21:42

## 2022-04-15 RX ADMIN — VANCOMYCIN HYDROCHLORIDE 1250 MG: 5 INJECTION, POWDER, LYOPHILIZED, FOR SOLUTION INTRAVENOUS at 12:48

## 2022-04-15 RX ADMIN — PANTOPRAZOLE SODIUM 40 MG: 40 INJECTION, POWDER, FOR SOLUTION INTRAVENOUS at 08:38

## 2022-04-15 RX ADMIN — METOPROLOL TARTRATE 100 MG: 100 TABLET, FILM COATED ORAL at 11:55

## 2022-04-15 RX ADMIN — CEFTRIAXONE 1 G: 1 INJECTION, SOLUTION INTRAVENOUS at 00:00

## 2022-04-15 RX ADMIN — METOPROLOL TARTRATE 100 MG: 100 TABLET, FILM COATED ORAL at 21:42

## 2022-04-15 RX ADMIN — PANTOPRAZOLE SODIUM 40 MG: 40 INJECTION, POWDER, FOR SOLUTION INTRAVENOUS at 21:42

## 2022-04-15 RX ADMIN — INSULIN ASPART 2 UNITS: 100 INJECTION, SOLUTION INTRAVENOUS; SUBCUTANEOUS at 16:51

## 2022-04-15 RX ADMIN — Medication 3 ML: at 08:38

## 2022-04-15 RX ADMIN — FUROSEMIDE 40 MG: 10 INJECTION, SOLUTION INTRAMUSCULAR; INTRAVENOUS at 03:59

## 2022-04-15 RX ADMIN — Medication 10 ML: at 08:39

## 2022-04-15 RX ADMIN — MUPIROCIN 1 APPLICATION: 20 OINTMENT TOPICAL at 21:42

## 2022-04-15 RX ADMIN — INSULIN ASPART 2 UNITS: 100 INJECTION, SOLUTION INTRAVENOUS; SUBCUTANEOUS at 11:56

## 2022-04-15 RX ADMIN — MAGNESIUM SULFATE HEPTAHYDRATE 4 G: 40 INJECTION, SOLUTION INTRAVENOUS at 11:58

## 2022-04-15 NOTE — NURSING NOTE
Seen for wound consult. Buttocks red blanchable with mirrored open area on right buttock from I&D wound on left buttock. Discussed using mupirocin ointment to buttocks twice daily and prn after cleansing with Dr. Feliciano. Scabs to bilateral heels, no s/s of infection.   Recommendations for care include a turning schedule, barrier cream twice daily and prn after cleansing, float heels off bed with pillows or heel lift boots, encourage increase mobility as appropriate and tolerated to reduce pressure, for dry skin apply lotion/cream and a nutrition consult for dietary needs. Staff to contact provider and re-consult wound nurse for new skin issues or lack of improvement with current recommendations. Thank you for the consult. If you have questions or concerns do not hesitate to contact me.

## 2022-04-15 NOTE — THERAPY EVALUATION
Hold OT evaluation this date per nurse due to pt has PillCom vest and pt not to bed moved.  Vest to be removed tomorrow and OT to f/u Monday 4/18/22.

## 2022-04-15 NOTE — CASE MANAGEMENT/SOCIAL WORK
Continued Stay Note  MADHAVI Hogan     Patient Name: Lynne Sanchez  MRN: [id number]  Today's Date: 4/15/2022    Admit Date: 4/13/2022     Discharge Plan     Row Name 04/15/22 0757       Plan    Plan Spoke to pt in room, states she has to swallow a pill today and they are going to take pictures to see if they can find out what is causing her to lose blood.  IMM explained and signed.  Pt plans to go home with her  at discharge.  Denies DC needs.               Discharge Codes    No documentation.               Expected Discharge Date and Time     Expected Discharge Date Expected Discharge Time    Apr 15, 2022             Lee Ann Calderón RN

## 2022-04-15 NOTE — PROGRESS NOTES
"    [facility]    Name:  Lynne Sanchez   Age:  87 y.o.  Sex:  female  :  1934  MRN:  [id number]   Visit Number:  [id number]  Admission Date:  2022  Date Of Service:  04/15/22  Primary Care Physician:  Elizabeth Easton APRN     LOS: 1 day :    Chief Complaint:      Shortness of breath, Increased Swelling    Subjective:    Patient seen and examined at bedside this morning. Chart reviewed and events noted. Patient states that she is feeling \"much much better today.\" She states that she is glad she is able to do the PillCam while here in the hospital and is hopeful that will provide some answers on why she continues to lose blood. Denies chest pain or dyspnea.    Hospital Course:    87 years old female with a past medical history of chronic iron deficiency anemia, CKD, diabetes mellitus type 2, chronic kidney disease, HFpEF(echo 2018 LVEF 60%), A. fib on Eliquis, tachybradycardia syndrome status post pacemaker, PAD status post PCI , history of DVT, hypothyroidism and recent fracture of left proximal humerus who presented to the ER with a chief complaint of shortness of breath and increased swelling in lower extremities. On arrival, she was found to have a hemoglobin of 4.4. She was given IV Lasix as well as transfused 2 units of PRBC with IV bumex administered after each transfusion.    Hemoglobin increased to 6.2 following blood transfusions. Additional 2 units of PRBCs ordered. GI consulted. Dr. Eubanks performed I&D of buttock abscess on 22 which returned as MRSA. Patient started on Vancomycin. 4/15/22 patient given PillCam per GI. Anticoagulation continues to remain held and should be discontinued at time of discharge at patient's risk of bleeding with AVMs is higher than benefit of continuing anticoagulation at this time.    Review of Systems:     All systems were reviewed and negative except as mentioned in subjective, assessment and " plan.    Vital Signs:    Temp:  [97.8 °F (36.6 °C)-99.1 °F (37.3 °C)] 98.1 °F (36.7 °C)  Heart Rate:  [69-74] 70  Resp:  [14-18] 18  BP: (123-145)/(43-66) 145/54    Intake and output:    I/O last 3 completed shifts:  In: 1717.8 [P.O.:220; I.V.:70; Blood:1327.8; IV Piggyback:100]  Out: 3350 [Urine:3350]  I/O this shift:  In: 251 [P.O.:1; IV Piggyback:250]  Out: 900 [Urine:900]    Physical Examination:    General Appearance:  Alert and cooperative. Sitting up in bed, in NAD.   Head:  Atraumatic and normocephalic.   Eyes: PERRL, EOMI.           Lungs:   Breath sounds heard bilaterally equally.  No wheezing or crackles.    Heart:  Normal S1 and S2, no murmur, no gallop, no rub. No JVD.   Abdomen:   Normal bowel sounds, no masses, no organomegaly. Soft, nontender, nondistended, no rebound tenderness.   Extremities: Supple, no edema, no cyanosis, no clubbing.   Skin: No bleeding or rash.   Neurologic: Alert and oriented x 3. No facial asymmetry. Moves all four limbs. No tremors.      Laboratory results:    Results from last 7 days   Lab Units 04/15/22  0530 04/14/22  2117 04/14/22  0605 04/13/22  1800   SODIUM mmol/L 142  --  137 131*   POTASSIUM mmol/L 4.3 4.4 3.1* 4.4   CHLORIDE mmol/L 107  --  103 96*   CO2 mmol/L 22.0  --  21.1* 15.5*   BUN mg/dL 38*  --  50* 57*   CREATININE mg/dL 1.47*  --  1.69* 1.79*   CALCIUM mg/dL 8.6  --  8.2* 8.4*   BILIRUBIN mg/dL  --   --   --  0.7   ALK PHOS U/L  --   --   --  110   ALT (SGPT) U/L  --   --   --  16   AST (SGOT) U/L  --   --   --  21   GLUCOSE mg/dL 163*  --  222* 324*     Results from last 7 days   Lab Units 04/15/22  0530 04/14/22  0605 04/13/22  1800   WBC 10*3/mm3 9.64 8.60 10.46   HEMOGLOBIN g/dL 9.2* 6.2* 4.4*   HEMATOCRIT % 28.7* 19.6* 14.6*   PLATELETS 10*3/mm3 214 211 291         Results from last 7 days   Lab Units 04/14/22  0605 04/13/22  1800   TROPONIN T ng/mL 0.053* 0.060*     Results from last 7 days   Lab Units 04/14/22  1634   WOUNDCX  Moderate growth (3+)  Staphylococcus aureus, MRSA*         I have reviewed the patient's laboratory results.    Radiology results:    Adult Transthoracic Echo Complete w/ Color, Spectral and Contrast if necessary per protocol    Result Date: 4/15/2022  · Left ventricular wall thickness is consistent with mild concentric hypertrophy. · Estimated left ventricular EF = 55% Left ventricular ejection fraction appears to be 51 - 55%. Left ventricular systolic function is normal. · Left ventricular diastolic function is consistent with (grade II w/high LAP) pseudonormalization. · The right ventricular cavity is mildly dilated. · Left atrial volume is moderately increased. · The right atrial cavity is severely dilated. · There is severe calcification of the aortic valve mainly affecting the non-coronary cusp(s). · Moderate tricuspid valve regurgitation is present. · Estimated right ventricular systolic pressure from tricuspid regurgitation is markedly elevated (>55 mmHg). Calculated right ventricular systolic pressure from tricuspid regurgitation is 64 mmHg. · Severe pulmonary hypertension is present.      XR Chest 1 View    Result Date: 4/14/2022  PROCEDURE: XR CHEST 1 VW-  HISTORY: dyspnea  COMPARISON: April 2018.  FINDINGS: There is a left subclavian pacemaker. The heart is enlarged. The mediastinum is unremarkable. There is pulmonary venous hypertension. There is no acute infiltrate. There is no pneumothorax.  There are no acute osseous abnormalities.      Impression: Cardiomegaly with pulmonary venous hypertension.  Continued followup is recommended.      Images were reviewed, interpreted, and dictated by Dr. Katelynn Culver M.D. Transcribed by Anju West PA-C  This report was signed and finalized on 4/14/2022 12:18 PM by Katelynn Culver M.D..    US Venous Doppler Upper Extremity Left (duplex)    Result Date: 4/14/2022  PROCEDURE: US VENOUS DOPPLER UPPER EXTREMITY LEFT (DUPLEX)-  HISTORY: previous DVT, holding eliquis, check for  residual clot to assess risk of holding anticoagulation; D64.9-Anemia, unspecified; N18.9-Chronic kidney disease, unspecified; R60.0-Localized edema; Z86.718-Personal history of other venous thrombosis and embolism  PROCEDURE: Sonographic images of the upper extremity were obtained with color flow, pulse Doppler and graded compression.  FINDINGS: The jugular vein, subclavian vein, axillary vein, brachial vein, cephalic vein and basilic venous system are normal, fully compressible and  demonstrate no evidence of thrombosis.      Impression: No evidence of deep venous thrombosis.   This report was signed and finalized on 4/14/2022 3:48 PM by Katelynn Culver M.D..    I have reviewed the patient's radiology reports.    Medication Review:     I have reviewed the patient's active and prn medications.     Problem List:      Acute on chronic anemia      Assessment:    1. Acute on Chronic Anemia, POA  2. Severe, Symptomatic Normocytic Anemia, POA  3. Acute Exacerbation of Chronic Diastolic Heart Failure, POA  4. Elevated Troponin, likely Type 2  5. Urinary Tract Infection, POA  6. KASH on CKD  7. Type 2 Diabetes Mellitus  8. Atrial Fibrillation, on Eliquis  9. Tachybradycardia Syndrome s/p Pacemaker  10. Peripheral Arterial Disease  11. History of DVT  12. Hypothyroidism  13. GERD  14. 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 Impaired Mobility and ADLs    Plan:    Acute on Chronic Anemia  Severe, Symptomatic Anemia  Melena  -Hemoglobin has improved to 9.2 following 4units PRBC transfusion  -GI consulted. Assistance and recommendations appreciated.  -Given patient's history of AVMs, it was recommended by GI that the patient go ahead and have the Pillcam performed while in hospital. Depending on what that shows, GI will decide on EGD and colonoscopy from there.  -Continue PPI  -Per GI, if left upper extremity is negative for DVT then would recommend discontinuing anticoagulation due to patient's  severely high risk for further bleeding.    Acute Exacerbation of Chronic Diastolic Heart Failure  -Strict I's/O's  -Echocardiogram: EF 55%, grade II diastolic dysfunction, severe calcification of the aortic valve mainly affecting the non-coronary cusps  -1.5L/day fluid restriction  -1500mg/day sodium restriction    Buttock/Sacral Wound   -s/p Incision and drainage by Dr. Feliciano  -Wound culture growing MRSA  -Patient started on Vancomycin    KASH  CKD  UTI  -Creatinine improving  -Nephrology consulted. Appreciate all assistance and recommendations  -Continue Rocephin for UTI.   -Urine culture pending    Type 2 Diabetes Mellitus  -Sliding Scale Insulin    Atrial Fibrillation  History of DVT  -Holding Eliquis in the setting of melena and symptomatic blood loss anemia  -Given ultrasound of left upper extremity without evidence of DVT, I do feel that that the patient's risk of bleeding with anticoagulation outweighs the benefit of continuing with anticoagulation  -Will recommend at discharge discontinuation of Eliquis    Hypothyroidism  -Continue Levothyroxine    Impaired Mobility and ADLs  -PT/OT consulted    DVT Prophylaxis: SCDs  Code Status: Full  Diet: Cardiac, carb consistent, renal  Discharge Plan: To be determined    Lynette Garcia DO  04/15/22  14:01 EDT    Dictated utilizing Dragon dictation.

## 2022-04-15 NOTE — PLAN OF CARE
Goal Outcome Evaluation:  Plan of Care Reviewed With: patient        Progress: improving  Outcome Evaluation: VSS. Pill cam swallowed in am. To return to diet at 1630. Patient looking forward to eating. Magnesium replaced. Vancomycin started for MRSA wound culture. Up in chair. H&H stable.

## 2022-04-15 NOTE — PROGRESS NOTES
[facility] Progress Note  [facility]. [place]        Patient Name: Lynne Sanchez  : 1934  MRN: [id number]   LOS: 1 day    Patient Care Team:  Elizabeth Easton APRN as PCP - General (Family Medicine)  Paxton Vasquez DO as PCP - Internal Medicine (intermediate Care Facility)  Vilma Méndez PA-C as PCP - Family Medicine (Long Term Care Acute)  Davian Faria MD as Consulting Physician (Nephrology)  Radha Boone RN as Registered Nurse  Courtney Laguerre RN as Ambulatory  (Population Health)    Chief Complaint:    Chief Complaint   Patient presents with   • Edema     Primary Care Physician:  Elizabeth Easton APRN  Date of admission: 2022    Subjective     Interval History:   Follow-up acute on chronic kidney disease   Events noted from last 24 hours, patient feels a lot better she is awake alert and interactive she feels like that her energy is back.  She says she did swallow a pill today and is still awaiting further plans and disposition.  She did receive another 2 units of blood.  She denies having any chest pain or shortness of breath today.  No fever or chills.    Review of Systems:   As noted above    Objective     Vitals:   Temp:  [97.8 °F (36.6 °C)-99.2 °F (37.3 °C)] 99.1 °F (37.3 °C)  Heart Rate:  [69-74] 70  Resp:  [14-18] 18  BP: (113-139)/(37-66) 125/43    Intake/Output Summary (Last 24 hours) at 4/15/2022 0910  Last data filed at 4/15/2022 0821  Gross per 24 hour   Intake 994.9 ml   Output 3750 ml   Net -2755.1 ml       Physical Exam:    General Appearance: alert, oriented x 3, no acute distress   Skin: warm and dry  HEENT: oral mucosa normal, nonicteric sclera  Neck: supple, no JVD  Lungs: CTA  Heart: RRR, normal S1 and S2  Abdomen: soft, nontender, nondistended  : no palpable bladder  Extremities: Trace edema, no cyanosis or clubbing  Neuro: normal speech and mental status     Scheduled Meds:     Current  Facility-Administered Medications   Medication Dose Route Frequency Provider Last Rate Last Admin   • acetaminophen (TYLENOL) tablet 650 mg  650 mg Oral Q4H PRN Michael Eason MD        Or   • acetaminophen (TYLENOL) 160 MG/5ML solution 650 mg  650 mg Oral Q4H PRN Michael Eason MD        Or   • acetaminophen (TYLENOL) suppository 650 mg  650 mg Rectal Q4H PRN Michael Eason MD       • cefTRIAXone (ROCEPHIN) IVPB 1 g/50ml dextrose (premix)  1 g Intravenous Q24H Michael Eason  mL/hr at 04/15/22 0000 1 g at 04/15/22 0000   • dextrose (D50W) (25 g/50 mL) IV injection 25 g  25 g Intravenous Q15 Min PRN Michael Eason MD       • dextrose (GLUTOSE) oral gel 1 tube  1 tube Oral Q15 Min PRN Michael Eaosn MD       • furosemide (LASIX) injection 40 mg  40 mg Intravenous Q12H Michael Eason MD   40 mg at 04/15/22 0359   • glucagon (human recombinant) (GLUCAGEN DIAGNOSTIC) injection 1 mg  1 mg Subcutaneous PRN Michael Eason MD       • insulin aspart (novoLOG) injection 0-9 Units  0-9 Units Subcutaneous TID AC Michael Eason MD   4 Units at 04/14/22 0645   • levothyroxine (SYNTHROID, LEVOTHROID) tablet 50 mcg  50 mcg Oral Daily Michael Eason MD   50 mcg at 04/14/22 0505   • Magnesium Sulfate 2 gram Bolus, followed by 8 gram infusion (total Mg dose 10 grams)- Mg less than or equal to 1mg/dL  2 g Intravenous PRN Lynette Garcia,         Or   • Magnesium Sulfate 2 gram / 50mL Infusion (GIVE X 3 BAGS TO EQUAL 6GM TOTAL DOSE) - Mg 1.1 - 1.5 mg/dl  2 g Intravenous PRN Lynette Garcia DO        Or   • Magnesium Sulfate 4 gram infusion- Mg 1.6-1.9 mg/dL  4 g Intravenous PRN Lynette Garcia DO       • metoprolol tartrate (LOPRESSOR) tablet 100 mg  100 mg Oral BID Michael Eason MD   100 mg at 04/14/22 2106   • ondansetron (ZOFRAN) injection 4 mg  4 mg Intravenous Q6H PRN Michael Eason MD       • pantoprazole (PROTONIX) injection 40 mg  40 mg Intravenous Q12H Michael Eason MD   40 mg at  04/15/22 0838   • potassium chloride (KLOR-CON) packet 40 mEq  40 mEq Oral PRN Bill Campos, [facility]       • potassium chloride (MICRO-K) CR capsule 40 mEq  40 mEq Oral PRN Lynette Garcia DO        Or   • potassium chloride 10 mEq in 100 mL IVPB  10 mEq Intravenous Q1H PRN Lynette Garcia, DO       • sodium chloride 0.9 % flush 10 mL  10 mL Intravenous PRN Agustin Schaefer,    10 mL at 04/15/22 0839   • sodium chloride 0.9 % flush 3 mL  3 mL Intravenous Q12H Michael Eason MD   3 mL at 04/15/22 0838   • sodium chloride 0.9 % flush 3-10 mL  3-10 mL Intravenous PRN Michael Eason MD           cefTRIAXone, 1 g, Intravenous, Q24H  furosemide, 40 mg, Intravenous, Q12H  insulin aspart, 0-9 Units, Subcutaneous, TID AC  levothyroxine, 50 mcg, Oral, Daily  metoprolol tartrate, 100 mg, Oral, BID  pantoprazole, 40 mg, Intravenous, Q12H  sodium chloride, 3 mL, Intravenous, Q12H        IV Meds:        Results Reviewed:   I have personally reviewed the results from the time of this admission to 4/15/2022 09:10 EDT     Results from last 7 days   Lab Units 04/15/22  0530 04/14/22  2117 04/14/22  0605 04/13/22  1800   SODIUM mmol/L 142  --  137 131*   POTASSIUM mmol/L 4.3 4.4 3.1* 4.4   CHLORIDE mmol/L 107  --  103 96*   CO2 mmol/L 22.0  --  21.1* 15.5*   BUN mg/dL 38*  --  50* 57*   CREATININE mg/dL 1.47*  --  1.69* 1.79*   CALCIUM mg/dL 8.6  --  8.2* 8.4*   BILIRUBIN mg/dL  --   --   --  0.7   ALK PHOS U/L  --   --   --  110   ALT (SGPT) U/L  --   --   --  16   AST (SGOT) U/L  --   --   --  21   GLUCOSE mg/dL 163*  --  222* 324*       Estimated Creatinine Clearance: 26.9 mL/min (A) (by C-G formula based on SCr of 1.47 mg/dL (H)).                Results from last 7 days   Lab Units 04/15/22  0530 04/14/22  0605 04/13/22  1800   WBC 10*3/mm3 9.64 8.60 10.46   HEMOGLOBIN g/dL 9.2* 6.2* 4.4*   PLATELETS 10*3/mm3 214 211 291             Brief Urine Lab Results  (Last result in the past 365 days)      Color   Clarity   Blood    Leuk Est   Nitrite   Protein   CREAT   Urine HCG        04/13/22 2251 Yellow   Clear   Negative   Trace   Negative   Negative                 No results found for: UTPCR    Imaging Results (Last 24 Hours)     Procedure Component Value Units Date/Time    US Venous Doppler Upper Extremity Left (duplex) [[id number]] Collected: 04/14/22 1548     Updated: 04/14/22 1550    Narrative:      PROCEDURE: US VENOUS DOPPLER UPPER EXTREMITY LEFT (DUPLEX)-     HISTORY: previous DVT, holding eliquis, check for residual clot to  assess risk of holding anticoagulation; D64.9-Anemia, unspecified;  N18.9-Chronic kidney disease, unspecified; R60.0-Localized edema;  Z86.718-Personal history of other venous thrombosis and embolism     PROCEDURE: Sonographic images of the upper extremity were obtained with  color flow, pulse Doppler and graded compression.     FINDINGS: The jugular vein, subclavian vein, axillary vein, brachial  vein, cephalic vein and basilic venous system are normal, fully  compressible and  demonstrate no evidence of thrombosis.       Impression:      No evidence of deep venous thrombosis.        This report was signed and finalized on 4/14/2022 3:48 PM by Katelynn Culver M.D..    XR Chest 1 View [[id number]] Collected: 04/14/22 0912     Updated: 04/14/22 1220    Narrative:      PROCEDURE: XR CHEST 1 VW-     HISTORY: dyspnea     COMPARISON: April 2018.     FINDINGS: There is a left subclavian pacemaker. The heart is enlarged.  The mediastinum is unremarkable. There is pulmonary venous hypertension.  There is no acute infiltrate. There is no pneumothorax.  There are no  acute osseous abnormalities.       Impression:      Cardiomegaly with pulmonary venous hypertension.     Continued followup is recommended.                 Images were reviewed, interpreted, and dictated by Dr. Katelynn Culver M.D.  Transcribed by Anju West PA-C     This report was signed and finalized on 4/14/2022 12:18 PM by  Katelynn Culver M.D..              Assessment / Plan     ASSESSMENT:  Acute kidney injury: It does appear that she does have worsening of renal function most likely secondary to hemodynamic abnormalities related to severe anemia on initial presentation and fluid overload.  Since she has received blood transfusion and diuretics renal function is starting to be more stable and improving.  CKD 3: baseline 1.48: Likely will get back to baseline.  She has diabetic hypertensive nephropathy.  Acute on chronic anemia: Does appear to have recurrent episodes of GI bleed, GI evaluation is ongoing.  Hypertension: We will continue to optimize medications she will benefit from fair amount of diuretics.  Type 2 DM: Check hemoglobin A1c.  HFpEF: LVEF 60% in 2018  Atrial fibrillation: on eliquis with recurrent bleeds.  Will need to get off the anticoagulation.  Tachybradycardia syndrome: s/p PPM  PAD: s/p PCI 2017  Recent fracture of left proximal humerus      PLAN:  Hemoglobin is stable, patient is due for her Procrit injection as an outpatient she thinks it is next week I told her daughter who was in the room to check on it and make sure it is scheduled and she can go get it.  If she is still in the hospital it may need to be postponed to the next time when it is available.  Clinically she is doing much better.  From renal standpoint she can be discharged and she needs to keep up the follow-up appointment with us in the office as well as Procrit appointments.  Details were discussed with the patient as well as family in the room.    Details were also discussed with the hospitalist service.   Continue with rest of the current treatment plan, and monitor with surveillance labs.  Further recommendations will depend on clinical course of the patient during the current hospitalization.     Thank you for involving us in the care of Lynne Sanchez.  Please feel free to call with any questions.    Raymundo Alas MD  04/15/22  09:10  EDT    [facility]  [phone]      Much of this encounter note is an electronic transcription/translation of spoken language to printed text. The electronic translation of spoken language may permit erroneous, or at times, nonsensical words or phrases to be inadvertently transcribed; Although I have reviewed the note for such errors, some may still exist.

## 2022-04-15 NOTE — PLAN OF CARE
Goal Outcome Evaluation:  Plan of Care Reviewed With: patient        Progress: no change  Outcome Evaluation: No acute events during the night. Vital Signs Stable. Patient NPO since 2200. Will Continue to monitor.

## 2022-04-15 NOTE — THERAPY EVALUATION
PT suze held this date per RN Pt on [employer] vest, states it should be removed tomorrow. PT to follow up tomorrow.

## 2022-04-16 ENCOUNTER — READMISSION MANAGEMENT (OUTPATIENT)
Dept: CALL CENTER | Facility: HOSPITAL | Age: 87
End: 2022-04-16

## 2022-04-16 VITALS
HEART RATE: 76 BPM | DIASTOLIC BLOOD PRESSURE: 60 MMHG | OXYGEN SATURATION: 95 % | HEIGHT: 66 IN | WEIGHT: 139.33 LBS | RESPIRATION RATE: 18 BRPM | BODY MASS INDEX: 22.39 KG/M2 | SYSTOLIC BLOOD PRESSURE: 125 MMHG | TEMPERATURE: 97.2 F

## 2022-04-16 LAB
ANION GAP SERPL CALCULATED.3IONS-SCNC: 11.5 MMOL/L (ref 5–15)
BACTERIA SPEC AEROBE CULT: ABNORMAL
BASOPHILS # BLD AUTO: 0.03 10*3/MM3 (ref 0–0.2)
BASOPHILS NFR BLD AUTO: 0.4 % (ref 0–1.5)
BUN SERPL-MCNC: 29 MG/DL (ref 8–23)
BUN/CREAT SERPL: 24.4 (ref 7–25)
CALCIUM SPEC-SCNC: 8.3 MG/DL (ref 8.6–10.5)
CHLORIDE SERPL-SCNC: 102 MMOL/L (ref 98–107)
CO2 SERPL-SCNC: 23.5 MMOL/L (ref 22–29)
CREAT SERPL-MCNC: 1.19 MG/DL (ref 0.57–1)
DEPRECATED RDW RBC AUTO: 54.4 FL (ref 37–54)
EGFRCR SERPLBLD CKD-EPI 2021: 44.3 ML/MIN/1.73
EOSINOPHIL # BLD AUTO: 0.13 10*3/MM3 (ref 0–0.4)
EOSINOPHIL NFR BLD AUTO: 1.7 % (ref 0.3–6.2)
ERYTHROCYTE [DISTWIDTH] IN BLOOD BY AUTOMATED COUNT: 17.2 % (ref 12.3–15.4)
GLUCOSE BLDC GLUCOMTR-MCNC: 198 MG/DL (ref 70–130)
GLUCOSE BLDC GLUCOMTR-MCNC: 221 MG/DL (ref 70–130)
GLUCOSE BLDC GLUCOMTR-MCNC: 243 MG/DL (ref 70–130)
GLUCOSE SERPL-MCNC: 194 MG/DL (ref 65–99)
GRAM STN SPEC: ABNORMAL
GRAM STN SPEC: ABNORMAL
HCT VFR BLD AUTO: 25.4 % (ref 34–46.6)
HGB BLD-MCNC: 8.1 G/DL (ref 12–15.9)
IMM GRANULOCYTES # BLD AUTO: 0.03 10*3/MM3 (ref 0–0.05)
IMM GRANULOCYTES NFR BLD AUTO: 0.4 % (ref 0–0.5)
LYMPHOCYTES # BLD AUTO: 1.22 10*3/MM3 (ref 0.7–3.1)
LYMPHOCYTES NFR BLD AUTO: 15.9 % (ref 19.6–45.3)
MAGNESIUM SERPL-MCNC: 2.7 MG/DL (ref 1.6–2.4)
MCH RBC QN AUTO: 27.8 PG (ref 26.6–33)
MCHC RBC AUTO-ENTMCNC: 31.9 G/DL (ref 31.5–35.7)
MCV RBC AUTO: 87.3 FL (ref 79–97)
MONOCYTES # BLD AUTO: 0.82 10*3/MM3 (ref 0.1–0.9)
MONOCYTES NFR BLD AUTO: 10.7 % (ref 5–12)
NEUTROPHILS NFR BLD AUTO: 5.42 10*3/MM3 (ref 1.7–7)
NEUTROPHILS NFR BLD AUTO: 70.9 % (ref 42.7–76)
NRBC BLD AUTO-RTO: 0 /100 WBC (ref 0–0.2)
PLATELET # BLD AUTO: 191 10*3/MM3 (ref 140–450)
PMV BLD AUTO: 9.9 FL (ref 6–12)
POTASSIUM SERPL-SCNC: 3.8 MMOL/L (ref 3.5–5.2)
RBC # BLD AUTO: 2.91 10*6/MM3 (ref 3.77–5.28)
SODIUM SERPL-SCNC: 137 MMOL/L (ref 136–145)
VANCOMYCIN SERPL-MCNC: 11.1 MCG/ML (ref 5–40)
WBC NRBC COR # BLD: 7.65 10*3/MM3 (ref 3.4–10.8)

## 2022-04-16 PROCEDURE — 97161 PT EVAL LOW COMPLEX 20 MIN: CPT

## 2022-04-16 PROCEDURE — 85025 COMPLETE CBC W/AUTO DIFF WBC: CPT | Performed by: STUDENT IN AN ORGANIZED HEALTH CARE EDUCATION/TRAINING PROGRAM

## 2022-04-16 PROCEDURE — 25010000002 VANCOMYCIN 5 G RECONSTITUTED SOLUTION: Performed by: STUDENT IN AN ORGANIZED HEALTH CARE EDUCATION/TRAINING PROGRAM

## 2022-04-16 PROCEDURE — 63710000001 INSULIN ASPART PER 5 UNITS: Performed by: FAMILY MEDICINE

## 2022-04-16 PROCEDURE — 25010000002 CEFTRIAXONE SODIUM-DEXTROSE 1-3.74 GM-%(50ML) RECONSTITUTED SOLUTION: Performed by: FAMILY MEDICINE

## 2022-04-16 PROCEDURE — 80048 BASIC METABOLIC PNL TOTAL CA: CPT | Performed by: STUDENT IN AN ORGANIZED HEALTH CARE EDUCATION/TRAINING PROGRAM

## 2022-04-16 PROCEDURE — 82962 GLUCOSE BLOOD TEST: CPT

## 2022-04-16 PROCEDURE — 80202 ASSAY OF VANCOMYCIN: CPT | Performed by: STUDENT IN AN ORGANIZED HEALTH CARE EDUCATION/TRAINING PROGRAM

## 2022-04-16 PROCEDURE — 83735 ASSAY OF MAGNESIUM: CPT

## 2022-04-16 PROCEDURE — 99239 HOSP IP/OBS DSCHRG MGMT >30: CPT | Performed by: STUDENT IN AN ORGANIZED HEALTH CARE EDUCATION/TRAINING PROGRAM

## 2022-04-16 RX ORDER — SACCHAROMYCES BOULARDII 250 MG
250 CAPSULE ORAL 2 TIMES DAILY
Qty: 20 CAPSULE | Refills: 0 | Status: SHIPPED | OUTPATIENT
Start: 2022-04-16 | End: 2022-04-26

## 2022-04-16 RX ORDER — CLINDAMYCIN HYDROCHLORIDE 300 MG/1
300 CAPSULE ORAL 4 TIMES DAILY
Qty: 20 CAPSULE | Refills: 0 | Status: SHIPPED | OUTPATIENT
Start: 2022-04-16 | End: 2022-04-22 | Stop reason: SDUPTHER

## 2022-04-16 RX ADMIN — Medication 3 ML: at 09:48

## 2022-04-16 RX ADMIN — CEFTRIAXONE 1 G: 1 INJECTION, SOLUTION INTRAVENOUS at 01:17

## 2022-04-16 RX ADMIN — VANCOMYCIN HYDROCHLORIDE 750 MG: 5 INJECTION, POWDER, LYOPHILIZED, FOR SOLUTION INTRAVENOUS at 11:42

## 2022-04-16 RX ADMIN — LEVOTHYROXINE SODIUM 50 MCG: 50 TABLET ORAL at 06:11

## 2022-04-16 RX ADMIN — MUPIROCIN 1 APPLICATION: 20 OINTMENT TOPICAL at 09:49

## 2022-04-16 RX ADMIN — PANTOPRAZOLE SODIUM 40 MG: 40 INJECTION, POWDER, FOR SOLUTION INTRAVENOUS at 09:48

## 2022-04-16 RX ADMIN — INSULIN ASPART 2 UNITS: 100 INJECTION, SOLUTION INTRAVENOUS; SUBCUTANEOUS at 06:46

## 2022-04-16 RX ADMIN — INSULIN ASPART 4 UNITS: 100 INJECTION, SOLUTION INTRAVENOUS; SUBCUTANEOUS at 16:59

## 2022-04-16 RX ADMIN — INSULIN ASPART 4 UNITS: 100 INJECTION, SOLUTION INTRAVENOUS; SUBCUTANEOUS at 11:42

## 2022-04-16 NOTE — DISCHARGE INSTRUCTIONS
Stop taking Eliquis until instructed otherwise. We will call you on Monday to schedule your follow up appointments.

## 2022-04-16 NOTE — THERAPY EVALUATION
Patient Name: Lynne Sanchez  : 1934    MRN: [id number]                              Today's Date: 2022       Admit Date: 2022    Visit Dx:     ICD-10-CM ICD-9-CM   1. Acute on chronic anemia  D64.9 285.9   2. Chronic kidney disease, unspecified CKD stage  N18.9 585.9   3. Bilateral lower extremity edema  R60.0 782.3   4. History of DVT (deep vein thrombosis)  Z86.718 V12.51   5. Chronic atrial fibrillation  I48.20 427.31   6. Abscess  L02.91 682.9     Patient Active Problem List   Diagnosis   • Chronic atrial fibrillation   • Valvular heart disease   • Diabetes mellitus type II, controlled ([facility])   • Normocytic anemia   • Chronic gastritis   • Peripheral arterial disease ([facility])   • Cerebrovascular accident (CVA) due to thrombosis of left anterior cerebral artery ([facility])   • Thrombocytopenia ([facility])   • Cardiac pacemaker in situ   • Chronic congestive heart failure ([facility])   • Functional heart murmur   • Glaucoma   • Hyperlipidemia   • Hypertensive disorder   • Hypothyroidism   • Mass of parotid gland   • Neuropathy   • Chronic renal impairment, stage 4 (severe) ([facility])   • Impairment of balance   • Impaired mobility   • Muscle weakness of lower extremity   • Chronic pain of both knees   • Secondary cataract of both eyes   • 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 ([facility])   • Suspected glaucoma of both eyes   • Acute deep vein thrombosis of left upper extremity ([facility])   • Deep venous thrombosis of left upper limb ([facility])   • Secondary cataract of both eyes   • Right retinal detachment   • Stable proliferative diabetic retinopathy of both eyes ([facility])   • Swelling of left extremity   • Closed nondisplaced fracture of surgical neck of left humerus with routine healing   • Debility   • Left arm swelling   • 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 ([facility])   • Secondary hyperparathyroidism of renal origin ([facility])   • Vitamin D deficiency   • Anemia  requiring transfusions   • Melena   • Acute on chronic anemia     Past Medical History:   Diagnosis Date   • Acute deep vein thrombosis of left upper extremity (HCC)    • Anemia    • Asthma    • CHF (congestive heart failure) (HCC)    • Chronic atrial fibrillation (HCC)    • Deep venous thrombosis of left upper limb (HCC)    • Diabetes mellitus, type 2 (HCC)    • Diarrhea    • GERD (gastroesophageal reflux disease)    • Glaucoma    • H/O transfusion of whole blood    • Heart attack (HCC)    • Heart murmur    • History of transfusion    • Hyperlipidemia    • Hypertension    • Kidney disease     patient not aware of what exact diagnosis is    • Osteomyelitis (HCC)    • Osteomyelitis of left foot (HCC) 10/03/2017   • Peripheral vascular disease (HCC)    • Right retinal detachment    • Secondary cataract of both eyes    • 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)    • Stroke (HCC)    • Swelling of left extremity    • Urinary tract infection    • Wears glasses      Past Surgical History:   Procedure Laterality Date   • AMPUTATION DIGIT Left 7/5/2018    Procedure: Left Great toe amputation;  Surgeon: Prakash Carrington MD;  Location:  [facility];  Service: Vascular   • AMPUTATION DIGIT Left 8/27/2018    Procedure: SECOND TOE AMPUTATION DIGIT LEFT;  Surgeon: Prakash Carrington MD;  Location:  [facility];  Service: General   • APPENDECTOMY     • BONE MARROW ASPIRATION     • CARDIAC ABLATION     • CARDIAC CATHETERIZATION N/A 10/10/2017    Procedure: Peripheral angiography;  Surgeon: Kan Pelaez MD;  Location:  [facility];  Service:    • CARDIAC CATHETERIZATION N/A 10/10/2017    Procedure: Angioplasty-peripheral;  Surgeon: Kan Pelaez MD;  Location:  [facility];  Service:    • CARDIAC CATHETERIZATION N/A 10/10/2017    Procedure: Atherectomy-peripheral;  Surgeon: Kan Pelaez MD;  Location:  [facility];  Service:    • CARDIAC  ELECTROPHYSIOLOGY PROCEDURE N/A 5/3/2018    Procedure: generator change;  Surgeon: Zan Poole MD;  Location:  [facility];  Service: Cardiovascular   • CARDIOVERSION     • COLONOSCOPY     • ENDOSCOPY N/A 10/9/2017    Procedure: ESOPHAGOGASTRODUODENOSCOPY WITH COLD FORCEP BIOPSY;  Surgeon: Clifton Hyatt MD;  Location: [facility] ENDOSCOPY;  Service:    • ENDOSCOPY N/A 3/22/2022    Procedure: ESOPHAGOGASTRODUODENOSCOPY with AVM cautery;  Surgeon: Clarisse Velez MD;  Location: [facility] ENDOSCOPY;  Service: Gastroenterology;  Laterality: N/A;   • EYE SURGERY Bilateral     CATARACTS   • INTERVENTIONAL RADIOLOGY PROCEDURE N/A 10/10/2017    Procedure: Abdominal Aortagram with Runoff;  Surgeon: Kan Pelaez MD;  Location:  [facility];  Service:    • PACEMAKER IMPLANTATION      around 2008 then replaced 2018      General Information     Row Name 04/16/22 1413          Physical Therapy Time and Intention    Document Type evaluation  -TW     Mode of Treatment physical therapy  -TW     Row Name 04/16/22 1413          General Information    Patient Profile Reviewed yes  -TW     Prior Level of Function independent:;all household mobility;min assist:;mod assist:;community mobility  pt uses a 2 wheeled walker at home. She also is receving home health PT services.  -TW     Existing Precautions/Restrictions fall  -TW     Barriers to Rehab previous functional deficit;medically complex  -TW     Row Name 04/16/22 1413          Living Environment    People in Home spouse  -TW     Row Name 04/16/22 1413          Home Main Entrance    Number of Stairs, Main Entrance two  -TW     Stair Railings, Main Entrance railings on both sides of stairs  -TW     Row Name 04/16/22 1413          Stairs Within Home, Primary    Stairs, Within Home, Primary Pt does have a full flight of steps up to her bedroom and the main bathroom. Per pt [employer] stair lift is coming to install chair lift next week and then  pt will be able to use her room upstairs.  -     Row Name 04/16/22 1413          Cognition    Orientation Status (Cognition) oriented x 4  -     Row Name 04/16/22 1413          Safety Issues, Functional Mobility    Safety Issues Affecting Function (Mobility) safety precaution awareness;safety precautions follow-through/compliance;sequencing abilities  -TW     Impairments Affecting Function (Mobility) balance;strength;endurance/activity tolerance  -TW     Comment, Safety Issues/Impairments (Mobility) Pt is very talkative and does need redirection to task at times to ensure safety.  -TW           User Key  (r) = Recorded By, (t) = Taken By, (c) = Cosigned By    Initials Name Provider Type    TW Natalya, Angelica, PT Physical Therapist               Mobility     Row Name 04/16/22 1413          Bed Mobility    Bed Mobility other (see comments)  -TW     Comment, (Bed Mobility) bed mobility not assessed as pt up in chair.  -     Row Name 04/16/22 1413          Transfers    Comment, (Transfers) cues for safety and sequencing.  -     Row Name 04/16/22 1413          Bed-Chair Transfer    Bed-Chair [place] (Transfers) contact guard;verbal cues  -TW     Assistive Device (Bed-Chair Transfers) walker, front-wheeled  -TW     Row Name 04/16/22 1413          Sit-Stand Transfer    Sit-Stand [place] (Transfers) contact guard;verbal cues  -TW     Assistive Device (Sit-Stand Transfers) walker, front-wheeled  -     Row Name 04/16/22 1413          Gait/Stairs (Locomotion)    [place] Level (Gait) contact guard;verbal cues  -TW     Assistive Device (Gait) walker, front-wheeled  -TW     Distance in Feet (Gait) 42 ft  -TW     Deviations/Abnormal Patterns (Gait) stride length decreased;roby decreased  -TW     Bilateral Gait Deviations forward flexed posture;heel strike decreased  -TW     Comment, (Gait/Stairs) In standing pt demonstrates increased thoracic kyphosis  -     Row Name 04/16/22 1413          Mobility     Extremity Weight-bearing Status left upper extremity  -TW     Left Upper Extremity (Weight-bearing Status) other (see comments)  Per pt she still is careful with LUE due to healing humerus fracture but she does not recall any wt bearing restrictions.  -TW           User Key  (r) = Recorded By, (t) = Taken By, (c) = Cosigned By    Initials Name Provider Type    TW Natalya, Angelica, PT Physical Therapist               Obj/Interventions     Row Name 04/16/22 1413          Range of Motion Comprehensive    Comment, General Range of Motion BLE grossly WFLs  -TW     Row Name 04/16/22 1413          Strength Comprehensive (MMT)    Comment, General Manual Muscle Testing (MMT) Assessment BLE grossly 3/5 to 4/5  -TW     Row Name 04/16/22 1413          Balance    Balance Assessment sitting static balance;sitting dynamic balance;standing static balance;standing dynamic balance  -TW     Static Sitting Balance modified independence  -TW     Dynamic Sitting Balance modified independence  -TW     Position, Sitting Balance sitting in chair  -TW     Static Standing Balance contact guard  -TW     Dynamic Standing Balance contact guard  -TW     Position/Device Used, Standing Balance supported;walker, front-wheeled  -TW           User Key  (r) = Recorded By, (t) = Taken By, (c) = Cosigned By    Initials Name Provider Type    TW Otho, Angelica, PT Physical Therapist               Goals/Plan     Row Name 04/16/22 1413          Bed Mobility Goal 1 (PT)    Activity/Assistive Device (Bed Mobility Goal 1, PT) bed mobility activities, all  -TW     [place] Level/Cues Needed (Bed Mobility Goal 1, PT) modified independence  -TW     Time Frame (Bed Mobility Goal 1, PT) short term goal (STG);4 days  -TW     Progress/Outcomes (Bed Mobility Goal 1, PT) goal ongoing  -TW     Row Name 04/16/22 1413          Transfer Goal 1 (PT)    Activity/Assistive Device (Transfer Goal 1, PT) sit-to-stand/stand-to-sit;bed-to-chair/chair-to-bed;toilet;walker, rolling   -TW     [place] Level/Cues Needed (Transfer Goal 1, PT) standby assist  -TW     Time Frame (Transfer Goal 1, PT) 1 week  -TW     Progress/Outcome (Transfer Goal 1, PT) goal ongoing  -TW     Row Name 04/16/22 1413          Gait Training Goal 1 (PT)    Activity/Assistive Device (Gait Training Goal 1, PT) gait (walking locomotion);decrease fall risk;increase endurance/gait distance;walker, rolling  -TW     [place] Level (Gait Training Goal 1, PT) standby assist  -TW     Distance (Gait Training Goal 1, PT) 120 ft  -TW     Time Frame (Gait Training Goal 1, PT) 1 week  -TW     Progress/Outcome (Gait Training Goal 1, PT) goal ongoing  -TW     Row Name 04/16/22 1413          Patient Education Goal (PT)    Activity (Patient Education Goal, PT) BLE ther ex 1 x 15  -TW     [place]/Cues/Accuracy (Memory Goal 2, PT) demonstrates adequately;verbalizes understanding  -TW     Time Frame (Patient Education Goal, PT) 1 week  -TW     Progress/Outcome (Patient Education Goal, PT) goal ongoing  -TW     Row Name 04/16/22 1413          Therapy Assessment/Plan (PT)    Planned Therapy Interventions (PT) balance training;bed mobility training;gait training;transfer training;strengthening  -TW           User Key  (r) = Recorded By, (t) = Taken By, (c) = Cosigned By    Initials Name Provider Type    Angelica Adame, PT Physical Therapist               Clinical Impression     Row Name 04/16/22 1413          Pain    Pretreatment Pain Rating 0/10 - no pain  -TW     Posttreatment Pain Rating 0/10 - no pain  -TW     Row Name 04/16/22 1413          Plan of Care Review    Plan of Care Reviewed With patient;spouse  -TW     Outcome Evaluation PT evaluation completed this afternoon with pt alert and oriented x 4 and cooperative thoughout evaluation. Pt did need redirection to task at times due to being very friendly and talkative. Pt needed CGA for transfers and ambulation with rolling walker 42 ft. Pt is very kyphotic in standing and  does lean heavily on walker. Pt presents with deficits in strength, balance, and endurance. She is expected to improve her functional mobility with continued PT services prior to d/c.  -TW     Row Name 04/16/22 1413          Therapy Assessment/Plan (PT)    Patient/Family Therapy Goals Statement (PT) to go home with 's assistance and [facility]  -TW     Rehab Potential (PT) good, to achieve stated therapy goals  -TW     Criteria for Skilled Interventions Met (PT) yes;meets criteria  -TW     Therapy Frequency (PT) 5 times/wk  -TW     Predicted Duration of Therapy Intervention (PT) 1 wk  -TW     Row Name 04/16/22 1413          Vital Signs    Pre SpO2 (%) 98  -TW     O2 Delivery Pre Treatment room air  -TW     Intra SpO2 (%) 99  -TW     O2 Delivery Intra Treatment room air  -TW     Post SpO2 (%) 97  -TW     O2 Delivery Post Treatment room air  -TW     Pre Patient Position Sitting  -TW     Intra Patient Position Standing  -TW     Post Patient Position Sitting  -TW     Row Name 04/16/22 1413          Positioning and Restraints    Pre-Treatment Position sitting in chair/recliner  -TW     Post Treatment Position chair  -TW     In Chair notified nsg;reclined;waffle boot/both;call light within reach;encouraged to call for assist;with family/caregiver  -TW           User Key  (r) = Recorded By, (t) = Taken By, (c) = Cosigned By    Initials Name Provider Type    Angelica Adame, PT Physical Therapist               Outcome Measures     Row Name 04/16/22 1413          How much help from another person do you currently need...    Turning from your back to your side while in flat bed without using bedrails? 3  -TW     Moving from lying on back to sitting on the side of a flat bed without bedrails? 3  -TW     Moving to and from a bed to a chair (including a wheelchair)? 3  -TW     Standing up from a chair using your arms (e.g., wheelchair, bedside chair)? 3  -TW     Climbing 3-5 steps with a railing? 3  -TW     To walk in hospital  room? 3  -TW     AM-PAC 6 Clicks Score (PT) 18  -     Row Name 04/16/22 1413          Functional Assessment    Outcome Measure Options AM-PAC 6 Clicks Basic Mobility (PT)  -           User Key  (r) = Recorded By, (t) = Taken By, (c) = Cosigned By    Initials Name Provider Type    TW Angelica Hoang PT Physical Therapist                             Physical Therapy Education                 Title: PT OT SLP Therapies (In Progress)     Topic: Physical Therapy (In Progress)     Point: Mobility training (Done)     Learning Progress Summary           Patient Acceptance, E,D, DU by TW at 4/16/2022 1413    Comment: Pt education for improving safety/sequencing with rolling walker for transfers and gait.                   Point: Home exercise program (Not Started)     Learner Progress:  Not documented in this visit.          Point: Body mechanics (Not Started)     Learner Progress:  Not documented in this visit.          Point: Precautions (Not Started)     Learner Progress:  Not documented in this visit.                      User Key     Initials Effective Dates Name Provider Type Discipline     06/16/21 -  Angelica Hoang PT Physical Therapist PT              PT Recommendation and Plan  Planned Therapy Interventions (PT): balance training, bed mobility training, gait training, transfer training, strengthening  Plan of Care Reviewed With: patient, spouse  Outcome Evaluation: PT evaluation completed this afternoon with pt alert and oriented x 4 and cooperative thoughout evaluation. Pt did need redirection to task at times due to being very friendly and talkative. Pt needed CGA for transfers and ambulation with rolling walker 42 ft. Pt is very kyphotic in standing and does lean heavily on walker. Pt presents with deficits in strength, balance, and endurance. She is expected to improve her functional mobility with continued PT services prior to d/c.     Time Calculation:    PT Charges     Row Name 04/16/22 1413              Time Calculation    Stop Time 1413  -TW      PT Received On 04/16/22 -TW      PT Goal Re-Cert Due Date 04/23/22 -TW            User Key  (r) = Recorded By, (t) = Taken By, (c) = Cosigned By    Initials Name Provider Type    Angelica Adame PT Physical Therapist              Therapy Charges for Today     Code Description Service Date Service Provider Modifiers Qty    [id number] HC PT EVAL LOW COMPLEXITY 3 4/16/2022 Angelica Hoang PT GP 1          PT G-Codes  Outcome Measure Options: AM-PAC 6 Clicks Basic Mobility (PT)  AM-PAC 6 Clicks Score (PT): 18    Angelica Hoang PT  4/16/2022

## 2022-04-16 NOTE — DISCHARGE SUMMARY
[facility]   DISCHARGE SUMMARY      Name:  Lynne Sanchez   Age:  87 y.o.  Sex:  female  :  1934  MRN:  [id number]   Visit Number:  [id number]    Admission Date:  2022  Date of Discharge:  2022  Primary Care Physician:  Elizabeth Easton APRN    Important issues to note:    -Continue Clindamycin for additional 5 days  -Home health has been ordered for wound care/dressing changes, as well as PT/OT  -Follow up with PCP in 1 week  -Follow up with Dr. Velez. His office should be in contact with you on Monday regarding further scopes/treatment  -Continue to hold Eliquis    Discharge Diagnoses:     1. Acute on Chronic Anemia, POA  2. Severe, Symptomatic Normocytic Anemia, POA  3. Acute Exacerbation of Chronic Diastolic Heart Failure, POA  4. Elevated Troponin, likely Type 2  5. Urinary Tract Infection, POA  6. KASH on CKD  7. Type 2 Diabetes Mellitus  8. Atrial Fibrillation, on Eliquis  9. Tachybradycardia Syndrome s/p Pacemaker  10. Peripheral Arterial Disease  11. History of DVT  12. Hypothyroidism  13. GERD  14. 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 Impaired Mobility and ADLs    Problem List:     Active Hospital Problems    Diagnosis  POA   • **Acute on chronic anemia [D64.9]  Yes   • Hypothyroidism [E03.9]  Yes   • Hyperlipidemia [E78.5]  Yes   • Normocytic anemia [D64.9]  Yes   • Chronic atrial fibrillation [I48.20]  Yes   • Diabetes mellitus type II, controlled (HCC) [E11.9]  Yes      Resolved Hospital Problems   No resolved problems to display.     Presenting Problem:    Chief Complaint   Patient presents with   • Edema      Consults:     Consulting Physician(s)  Chat With All Active Members    Provider Relationship Specialty    Raymundo Alas MD  Consulting Physician Nephrology    Armando Feliciano MD  Consulting Physician General Surgery    Clarisse Velez MD  Consulting Physician Gastroenterology        Procedures  Performed:        History of presenting illness/Hospital Course:    HPI (from Dr. Eason H&P):  Patient is an 87 years old female with a past medical history of chronic iron deficiency anemia, CKD, diabetes mellitus type 2, chronic kidney disease, HFpEF(echo 2018 LVEF 60%), A. fib on Eliquis, tachybradycardia syndrome status post pacemaker, PAD status post PCI 2017, history of DVT, hypothyroidism and recent fracture of left proximal humerus who presented to the ER with a chief complaint of shortness of breath and increased swelling in lower extremities.  Patient reports that she has been having significant worsening dyspnea on exertion and shortness of breath over the past few days.  Patient also reporting increased swelling in her bilateral legs over the same period. patient reporting melena but has been chronic problem for her. Patient was recently discharged from the nursing home around 2 weeks ago.  Patient has a history of needing transfusions due to her anemia and was evaluated by Dr. Dorsey with an EGD.  Patient denies any fever, chest pain, abdominal pain, nausea, vomiting.  Patient denies any hematemesis, hematuria or hemoptysis or any other source bleeding other than chronic dark stool.      Upon ER evaluation, hemodynamics were stable, labs were significant for troponin of 0.060, proBNP 7314, glucose 324, sodium 131, creatinine 1.79, BUN 57, hemoglobin 4.4, hematocrit 14.6 with otherwise normal WBC and platelets.  Chest x-ray with chronic changes and no acute cardiopulmonary process per my read.  Covid negative.  Patient received Lasix while in the ER, was ordered 2 units of PRBC along with Bumex after transfusion.  Hospitalist was consulted for admission, further evaluation treatment.      Hospital Course:  Hemoglobin increased to 6.2 following blood transfusions. Additional 2 units of PRBCs ordered. GI consulted. Dr. Feliciano performed I&D of buttock abscess on 4/14/22 which returned as MRSA. Patient  started on Vancomycin. 4/15/22 patient given PillCam per GI. GI anticipates the patient will pass the Pillcam in the coming days. Dr. Velez states that he will review the images from the PillCam on Monday and will contact the patient thereafter to discuss how he will proceed from there. Patient remains medically stable.    Anticoagulation continues to remain held and should be discontinued at time of discharge at patient's risk of bleeding with AVMs is higher than benefit of continuing anticoagulation at this time. Discussed risks vs benefits with the patient and she expressed understanding and desire to continue to hold anticoagulation at this time.    Patient received 2 days of Vancomycin while in the hospital for her MRSA abscess on buttock s/p incision and drainage. Will transition from IV antibiotic to po Clindamycin (based on wound culture sensitivities). She will be discharged home with 5 days of Clindamycin to complete a 7 day course of treatment. Home health has been ordered for skilled nursing to come out and dress/change the patient's wound. PT/OT also ordered for the patient as an outpatient.    She will need to follow up with her PCP in 1 week and have a CBC and BMP drawn. She will also need to follow up with Dr. Alas in the next 2 weeks. And will be seen by Dr. Velez following the PillCam results.     Vital Signs:    Temp:  [97.6 °F (36.4 °C)-98.7 °F (37.1 °C)] 98.4 °F (36.9 °C)  Heart Rate:  [70-76] 74  Resp:  [16-19] 18  BP: (106-126)/(41-75) 124/51    Physical Exam:    General Appearance:  Alert and cooperative. Sitting up in bedside chair, in NAD.   Head:  Atraumatic and normocephalic.   Eyes: Conjunctivae and sclerae normal, no icterus. .                   Lungs:   Breath sounds heard bilaterally equally.  No crackles or wheezing.    Heart:  Normal S1 and S2, no murmur, no gallop, no rub. No JVD.   Abdomen:   Normal bowel sounds, no masses, no organomegaly. Soft, nontender, nondistended,  no rebound tenderness.   Extremities: Supple, no edema, no cyanosis, no clubbing.   Pulses: Pulses palpable bilaterally.   Skin: Buttock/sacral wound s/p incision and drainage. No bleeding or rash.   Neurologic: Alert and oriented x 3. No facial asymmetry. Moves all four limbs. No tremors.     Pertinent Lab Results:     Results from last 7 days   Lab Units 04/16/22  0509 04/15/22  0530 04/14/22  2117 04/14/22  0605 04/13/22  1800   SODIUM mmol/L 137 142  --  137 131*   POTASSIUM mmol/L 3.8 4.3 4.4 3.1* 4.4   CHLORIDE mmol/L 102 107  --  103 96*   CO2 mmol/L 23.5 22.0  --  21.1* 15.5*   BUN mg/dL 29* 38*  --  50* 57*   CREATININE mg/dL 1.19* 1.47*  --  1.69* 1.79*   CALCIUM mg/dL 8.3* 8.6  --  8.2* 8.4*   BILIRUBIN mg/dL  --   --   --   --  0.7   ALK PHOS U/L  --   --   --   --  110   ALT (SGPT) U/L  --   --   --   --  16   AST (SGOT) U/L  --   --   --   --  21   GLUCOSE mg/dL 194* 163*  --  222* 324*     Results from last 7 days   Lab Units 04/16/22  0509 04/15/22  0530 04/14/22  0605   WBC 10*3/mm3 7.65 9.64 8.60   HEMOGLOBIN g/dL 8.1* 9.2* 6.2*   HEMATOCRIT % 25.4* 28.7* 19.6*   PLATELETS 10*3/mm3 191 214 211         Results from last 7 days   Lab Units 04/14/22  0605 04/13/22  1800   TROPONIN T ng/mL 0.053* 0.060*     Results from last 7 days   Lab Units 04/13/22  1800   PROBNP pg/mL 7,314.0*                 Results from last 7 days   Lab Units 04/14/22  1634   WOUNDCX  Moderate growth (3+) Staphylococcus aureus, MRSA*       Pertinent Radiology Results:    Imaging Results (All)     Procedure Component Value Units Date/Time    US Venous Doppler Upper Extremity Left (duplex) [[id number]] Collected: 04/14/22 1548     Updated: 04/14/22 1550    Narrative:      PROCEDURE: US VENOUS DOPPLER UPPER EXTREMITY LEFT (DUPLEX)-     HISTORY: previous DVT, holding eliquis, check for residual clot to  assess risk of holding anticoagulation; D64.9-Anemia, unspecified;  N18.9-Chronic kidney disease, unspecified; R60.0-Localized  edema;  Z86.718-Personal history of other venous thrombosis and embolism     PROCEDURE: Sonographic images of the upper extremity were obtained with  color flow, pulse Doppler and graded compression.     FINDINGS: The jugular vein, subclavian vein, axillary vein, brachial  vein, cephalic vein and basilic venous system are normal, fully  compressible and  demonstrate no evidence of thrombosis.       Impression:      No evidence of deep venous thrombosis.        This report was signed and finalized on 4/14/2022 3:48 PM by Katelynn Culver M.D..    XR Chest 1 View [[id number]] Collected: 04/14/22 0912     Updated: 04/14/22 1220    Narrative:      PROCEDURE: XR CHEST 1 VW-     HISTORY: dyspnea     COMPARISON: April 2018.     FINDINGS: There is a left subclavian pacemaker. The heart is enlarged.  The mediastinum is unremarkable. There is pulmonary venous hypertension.  There is no acute infiltrate. There is no pneumothorax.  There are no  acute osseous abnormalities.       Impression:      Cardiomegaly with pulmonary venous hypertension.     Continued followup is recommended.                 Images were reviewed, interpreted, and dictated by Dr. Katelynn Culver M.D.  Transcribed by Anju West PA-C     This report was signed and finalized on 4/14/2022 12:18 PM by Katelynn Culver M.D..          Echo:    Results for orders placed during the hospital encounter of 04/13/22    Adult Transthoracic Echo Complete w/ Color, Spectral and Contrast if necessary per protocol    Interpretation Summary  · Left ventricular wall thickness is consistent with mild concentric hypertrophy.  · Estimated left ventricular EF = 55% Left ventricular ejection fraction appears to be 51 - 55%. Left ventricular systolic function is normal.  · Left ventricular diastolic function is consistent with (grade II w/high LAP) pseudonormalization.  · The right ventricular cavity is mildly dilated.  · Left atrial volume is moderately increased.  ·  The right atrial cavity is severely dilated.  · There is severe calcification of the aortic valve mainly affecting the non-coronary cusp(s).  · Moderate tricuspid valve regurgitation is present.  · Estimated right ventricular systolic pressure from tricuspid regurgitation is markedly elevated (>55 mmHg). Calculated right ventricular systolic pressure from tricuspid regurgitation is 64 mmHg.  · Severe pulmonary hypertension is present.    Condition on Discharge:      Stable.    Code status during the hospital stay:    Code Status and Medical Interventions:   Ordered at: 04/13/22 8810     Code Status (Patient has no pulse and is not breathing):    CPR (Attempt to Resuscitate)     Medical Interventions (Patient has pulse or is breathing):    Full Support     Discharge Disposition:    Home or Self Care    Discharge Medications:       Discharge Medications      New Medications      Instructions Start Date   clindamycin 300 MG capsule  Commonly known as: Cleocin   300 mg, Oral, 4 Times Daily      saccharomyces boulardii 250 MG capsule  Commonly known as: Florastor   250 mg, Oral, 2 Times Daily         Changes to Medications      Instructions Start Date   latanoprost 0.005 % ophthalmic solution  Commonly known as: XALATAN  What changed: when to take this   1 drop, Both Eyes, Daily      linagliptin 5 MG tablet tablet  Commonly known as: Tradjenta  What changed: when to take this   5 mg, Oral, Daily         Continue These Medications      Instructions Start Date   acetaminophen 650 MG 8 hr tablet  Commonly known as: TYLENOL   650 mg, Oral, Every 6 Hours PRN      Aspir-Low 81 MG EC tablet  Generic drug: aspirin   81 mg, Oral, Daily      atorvastatin 40 MG tablet  Commonly known as: LIPITOR   40 mg, Oral, Daily      Boudreauxs Butt Paste 16 % ointment  Generic drug: Zinc Oxide   Apply externally, 2 Times Daily      furosemide 20 MG tablet  Commonly known as: LASIX   20 mg, Oral, Daily      glimepiride 2 MG tablet  Commonly  known as: AMARYL   2 mg, Oral, Every Morning Before Breakfast      glucose blood test strip   1 each, Other, Daily, Use as instructed once a day as directed, for [employer] reader      levothyroxine 50 MCG tablet  Commonly known as: SYNTHROID, LEVOTHROID   50 mcg, Oral, Daily      metoprolol tartrate 100 MG tablet  Commonly known as: LOPRESSOR   100 mg, Oral, 2 Times Daily      multivitamin with minerals tablet tablet   1 tablet, Oral, Daily      pantoprazole 40 MG EC tablet  Commonly known as: Protonix   40 mg, Oral, Daily      Potassium Gluconate 550 MG tablet   550 mg, Oral, Daily      PRO-STAT liquid oral liquid   30 mL, Oral, 2 Times Daily      Retacrit 69242 UNIT/ML injection  Generic drug: epoetin dorcas-epbx   40,000 Units, Subcutaneous, 1 ml sq per month on day 17      timolol 0.5 % ophthalmic solution  Commonly known as: TIMOPTIC   1 drop, Both Eyes, Daily      vitamin D3 125 MCG (5000 UT) capsule capsule   5,000 Units, Oral, Daily         Stop These Medications    apixaban 5 MG tablet tablet  Commonly known as: ELIQUIS          Discharge Diet:     Cardiac. 2L/day fluid restriction.     Activity at Discharge:     As tolerated, PT/OT recommended    Follow-up Appointments:    Additional Instructions for the Follow-ups that You Need to Schedule     Ambulatory Referral to Home Health (Hospital)   As directed      Face to Face Visit Date: 4/16/2022    Follow-up provider for Plan of Care?: I treated the patient in an acute care facility and will not continue treatment after discharge.    Follow-up provider: KAJAL GUPTA [[id number]]    Reason/Clinical Findings: Impaired mobility, wound care    Describe mobility limitations that make leaving home difficult: Impaired mobility, wound care    Nursing/Therapeutic Services Requested: Skilled Nursing Physical Therapy Occupational Therapy    Skilled nursing orders: Wound care dressing/changes    PT orders: Total joint pathway Therapeutic exercise Strengthening     Occupational orders: Activities of daily living Energy conservation Strengthening    Frequency: 1 Week 1            Follow-up Information     Elizabeth Easton APRN .    Specialties: Nurse Practitioner, Family Medicine  Contact information:  [address]  [place] [place] [facility] [address]  [phone]             Paxton Vasquez DO Follow up in 1 week(s).    Specialty: Internal Medicine  Contact information:  [address]  [facility] [id number] [phone]             Vilma Méndez PA-C .    Specialty: Physician Assistant  Contact information:  [address]  [facility] [id number] [phone]                       Future Appointments   Date Time Provider Department Center   4/28/2022  1:30 PM Elizabeth Easton APRN [facility]   5/2/2022  1:30 PM Demond Leon MD [facility]     Test Results Pending at Discharge:           Lynette Garcia DO  04/16/22  14:22 EDT    Time: I spent 37 minutes on this discharge activity which included: face-to-face encounter with the patient, reviewing the data in the system, coordination of the care with the nursing staff as well as consultants, documentation, and entering orders.     Dictated utilizing Dragon dictation.

## 2022-04-16 NOTE — PROGRESS NOTES
Pharmacy Consult-Vancomycin Dosing    Lynne Sanchez is a  87 y.o. female receiving vancomycin therapy.     Indication: Skin and soft tissue infection  Consulting Provider: Kira    Goal AUC: 400-600 mg/L*hr    Current Antimicrobial Therapy  Anti-Infectives (From admission, onward)      Ordered     Dose/Rate Route Frequency Start Stop    04/15/22 1153  vancomycin 750 mg/250 mL 0.9% NS IVPB        Ordering Provider: Lynette Garcia DO    750 mg  over 60 Minutes Intravenous Every 24 Hours 04/16/22 1200 04/22/22 1159    04/16/22 1421  clindamycin (Cleocin) 300 MG capsule        Ordering Provider: Lynette Garcia DO    300 mg Oral 4 Times Daily 04/16/22 0000 04/21/22 2359    04/15/22 1150  vancomycin 1250 mg/250 mL 0.9% NS IVPB        Ordering Provider: Lynette Garcia DO    1,250 mg  200 mL/hr  Intravenous Once 04/15/22 1245 04/15/22 1248    04/15/22 1139  Pharmacy to dose vancomycin        Ordering Provider: Lynette Garcia DO     Does not apply Continuous PRN 04/15/22 1139 04/22/22 1138    04/13/22 2339  cefTRIAXone (ROCEPHIN) IVPB 1 g/50ml dextrose (premix)        Ordering Provider: Michael Eason MD    1 g  100 mL/hr over 30 Minutes Intravenous Every 24 Hours 04/14/22 0030 04/19/22 0029            Labs  Results from last 7 days   Lab Units 04/16/22  0509 04/15/22  0530 04/14/22  0605   WBC 10*3/mm3 7.65 9.64 8.60   CREATININE mg/dL 1.19* 1.47* 1.69*      Estimated Creatinine Clearance: 33.2 mL/min (A) (by C-G formula based on SCr of 1.19 mg/dL (H)).  Temp Readings from Last 1 Encounters:   04/16/22 97.2 °F (36.2 °C) (Oral)       Microbiology Culture results  Microbiology Results (last 10 days)       Procedure Component Value - Date/Time    Wound Culture - Wound, Coccyx [[id number]]  (Abnormal)  (Susceptibility) Collected: 04/14/22 1634    Lab Status: Final result Specimen: Wound from Coccyx Updated: 04/16/22 0646     Wound Culture Moderate growth (3+) Staphylococcus aureus, MRSA     Comment:  Methicillin resistant Staphylococcus aureus, Patient may be an isolation risk.        Gram Stain Moderate (3+) WBCs per low power field      Few (2+) Gram positive cocci in clusters    Susceptibility        Staphylococcus aureus, MRSA      PRIYA      Clindamycin Susceptible      Erythromycin Resistant      Inducible Clindamycin Resistance Negative      Oxacillin Resistant      Rifampin Susceptible      Tetracycline Susceptible      Trimethoprim + Sulfamethoxazole Resistant      Vancomycin Susceptible                       Susceptibility Comments       Staphylococcus aureus, MRSA    This isolate does not demonstrate inducible clindamycin resistance in vitro.                 COVID PRE-OP / PRE-PROCEDURE SCREENING ORDER (NO ISOLATION) - Swab, Nasal Cavity [[id number]]  (Normal) Collected: 04/13/22 2238    Lab Status: Final result Specimen: Swab from Nasal Cavity Updated: 04/13/22 2333    Narrative:      The following orders were created for panel order COVID PRE-OP / PRE-PROCEDURE SCREENING ORDER (NO ISOLATION) - Swab, Nasal Cavity.  Procedure                               Abnormality         Status                     ---------                               -----------         ------                     COVID-19,[facility]...[[id number]]  Normal              Final result                 Please view results for these tests on the individual orders.    COVID-19,[facility] IN-HOUSE,Nasal Swab No Transport Media 3-4 HR TAT - Swab, Nasal Cavity [[id number]]  (Normal) Collected: 04/13/22 2238    Lab Status: Final result Specimen: Swab from Nasal Cavity Updated: 04/13/22 2333     COVID19 Not Detected    Narrative:      Fact sheet for providers: https://www.fda.gov/media/927945/download     Fact sheet for patients: https://www.fda.gov/media/781169/download    Test performed by PCR.    Consider negative results in combination with clinical observations, patient history, and epidemiological information.            Evaluation of  "Dosing     Last Dose Received in the ED/Outside Facility: No  Is Patient on Dialysis or Renal Replacement: No    Ht - 167.6 cm (65.98\")  Wt - 63.2 kg (139 lb 5.3 oz)    Evaluation of Level    Results from last 7 days   Lab Units 04/16/22  0509   VANCOMYCIN RM mcg/mL 11.10                   [employer] AUC Calculation    Current AUC:   452 mg/L*hr    Predicted Steady State AUC on Current Dose: 477 mg/L*hr  _________________________________    Predicted Steady State AUC on New Dose:   Continue current dosing    Assessment/Plan    Pharmacy to dose vancomycin for skin and soft tissue infection. Goal -600 mg/L*hr.  Vancomycin random level of 11.1 mg/L is within the expected range and predicts AUC target attainment. Will continue with current dosing at this time.  Next vancomycin level as indicated pending renal function, clinical progress.  Pharmacy will continue to monitor renal function, cultures and sensitivities, and clinical status to adjust regimen as necessary.    Thank you for the consult,    Kwame Pratt, KwameD, BCPS   04/16/22 15:38 EDT  "

## 2022-04-16 NOTE — OUTREACH NOTE
Prep Survey    Flowsheet Row Responses   [facility] patient discharged from? [place]   Is LACE score < 7 ? No   Emergency Room discharge w/ pulse ox? No   Eligibility [facility]   Date of Admission 04/13/22   Date of Discharge 04/16/22   Discharge Disposition Home or Self Care   Discharge diagnosis Acute on Chronic Anemia   Does the patient have one of the following disease processes/diagnoses(primary or secondary)? Other   Does the patient have Home health ordered? Yes   What is the Home health agency?   [facility] (resumption of orders)   Is there a DME ordered? No   Prep survey completed? Yes          AFRICA HARDIN - Registered Nurse

## 2022-04-16 NOTE — PLAN OF CARE
Goal Outcome Evaluation:  Plan of Care Reviewed With: patient           Outcome Evaluation: VSS, NO ACUTE EVENTS OVERNIGHT.

## 2022-04-16 NOTE — PLAN OF CARE
Goal Outcome Evaluation:  Plan of Care Reviewed With: patient, spouse           Outcome Evaluation: PT evaluation completed this afternoon with pt alert and oriented x 4 and cooperative thoughout evaluation. Pt did need redirection to task at times due to being very friendly and talkative. Pt needed CGA for transfers and ambulation with rolling walker 42 ft. Pt is very kyphotic in standing and does lean heavily on walker. Pt presents with deficits in strength, balance, and endurance. She is expected to improve her functional mobility with continued PT services prior to d/c.

## 2022-04-16 NOTE — PROGRESS NOTES
LOS: 2 days   Patient Care Team:  Elizabeth Easton APRN as PCP - General (Family Medicine)  Paxton Vasquez DO as PCP - Internal Medicine (group home Care [facility])  Vilma Méndez PA-C as PCP - Family Medicine ([facility])  Davian Faria MD as Consulting Physician (Nephrology)  Radha Boone, RN as Registered Nurse  Courtney Laguerre RN as Ambulatory  ([facility])    Chief Complaint:  Renal issues    Subjective     Interval History:     Patient Complaints: Chart reviewed.  No new issues overnight  Eating lunch  No gross bleeding  No abd pain    cefTRIAXone, 1 g, Intravenous, Q24H  insulin aspart, 0-9 Units, Subcutaneous, TID AC  levothyroxine, 50 mcg, Oral, Daily  metoprolol tartrate, 100 mg, Oral, BID  mupirocin, 1 application, Topical, Q12H  pantoprazole, 40 mg, Intravenous, Q12H  sodium chloride, 3 mL, Intravenous, Q12H  vancomycin, 750 mg, Intravenous, Q24H      Pharmacy to dose vancomycin,         Review of Systems:   General : No pain, no chills  HEENT: No headache, no nosebleed, no sore throat, no blurry vision  Chest : no chest pain, no palpitations  Respiratory: No cough, no SOA, no hemoptysis  GI:  No N/V/D, no abdominal pain  Skin : no rashes, no pruritus  Musculoskeletal : no flank pain, no joint effusions  Neuro : No weakness, no dizziness, no focal deficits  Endocrine: no heat/cold intolerance  Genitourinary: no dysuria or hematuria    Objective     Vital Signs  Temp:  [97.6 °F (36.4 °C)-98.7 °F (37.1 °C)] 98.4 °F (36.9 °C)  Heart Rate:  [70-76] 74  Resp:  [16-19] 18  BP: (106-126)/(41-75) 124/51    Intake/Output Summary (Last 24 hours) at 4/16/2022 1341  Last data filed at 4/16/2022 1247  Gross per 24 hour   Intake 790 ml   Output 1300 ml   Net -510 ml           Physical Exam:     General Appearance:    Alert,  in no acute distress   Head:    Normocephalic, without obvious abnormality, atraumatic   Throat:    no thrush, oral mucosa moist   Neck:   No  adenopathy, supple, no JVD   Musc:     No CVA tenderness to palpation   Lungs:     Clear to auscultation,respirations unlabored, no wheezes or rhonchi    Heart:    Regular rate and rhythm , normal S1 and S2, no gallop, no rub   Abdomen:     Normal bowel sounds, no masses, soft non-tender   Extremities:    No lower extremity edema, no cyanosis   :     No hematuria    Skin:   Dry, No  bruising or rash                  Results Review:    Results from last 7 days   Lab Units 04/16/22  0509 04/15/22  0530 04/14/22 2117 04/14/22  0605   SODIUM mmol/L 137 142  --  137   POTASSIUM mmol/L 3.8 4.3 4.4 3.1*   CHLORIDE mmol/L 102 107  --  103   CO2 mmol/L 23.5 22.0  --  21.1*   BUN mg/dL 29* 38*  --  50*   CREATININE mg/dL 1.19* 1.47*  --  1.69*   GLUCOSE mg/dL 194* 163*  --  222*   CALCIUM mg/dL 8.3* 8.6  --  8.2*     Results from last 7 days   Lab Units 04/16/22  0509 04/15/22  0530 04/14/22  0605   WBC 10*3/mm3 7.65 9.64 8.60   HEMOGLOBIN g/dL 8.1* 9.2* 6.2*   HEMATOCRIT % 25.4* 28.7* 19.6*   PLATELETS 10*3/mm3 191 214 211     Magnesium   Date Value Ref Range Status   04/16/2022 2.7 (H) 1.6 - 2.4 mg/dL Final   04/15/2022 1.8 1.6 - 2.4 mg/dL Final     No results found for: PHOS  No results found for: BNP  Lab Results   Component Value Date    LABPROT 15.0 (H) 02/28/2022    ALBUMIN 3.30 (L) 04/13/2022      Brief Urine Lab Results  (Last result in the past 365 days)      Color   Clarity   Blood   Leuk Est   Nitrite   Protein   CREAT   Urine HCG        04/13/22 2251 Yellow   Clear   Negative   Trace   Negative   Negative                  Adult Transthoracic Echo Complete w/ Color, Spectral and Contrast if necessary per protocol    Result Date: 4/15/2022  · Left ventricular wall thickness is consistent with mild concentric hypertrophy. · Estimated left ventricular EF = 55% Left ventricular ejection fraction appears to be 51 - 55%. Left ventricular systolic function is normal. · Left ventricular diastolic function is consistent  with (grade II w/high LAP) pseudonormalization. · The right ventricular cavity is mildly dilated. · Left atrial volume is moderately increased. · The right atrial cavity is severely dilated. · There is severe calcification of the aortic valve mainly affecting the non-coronary cusp(s). · Moderate tricuspid valve regurgitation is present. · Estimated right ventricular systolic pressure from tricuspid regurgitation is markedly elevated (>55 mmHg). Calculated right ventricular systolic pressure from tricuspid regurgitation is 64 mmHg. · Severe pulmonary hypertension is present.      US Venous Doppler Upper Extremity Left (duplex)    Result Date: 4/14/2022  PROCEDURE: US VENOUS DOPPLER UPPER EXTREMITY LEFT (DUPLEX)-  HISTORY: previous DVT, holding eliquis, check for residual clot to assess risk of holding anticoagulation; D64.9-Anemia, unspecified; N18.9-Chronic kidney disease, unspecified; R60.0-Localized edema; Z86.718-Personal history of other venous thrombosis and embolism  PROCEDURE: Sonographic images of the upper extremity were obtained with color flow, pulse Doppler and graded compression.  FINDINGS: The jugular vein, subclavian vein, axillary vein, brachial vein, cephalic vein and basilic venous system are normal, fully compressible and  demonstrate no evidence of thrombosis.      Impression: No evidence of deep venous thrombosis.   This report was signed and finalized on 4/14/2022 3:48 PM by Katelynn Culver M.D..           Assessment/Plan       Acute on chronic anemia    1. Acute kidney injury: It does appear that she does have worsening of renal function most likely secondary to hemodynamic abnormalities related to severe anemia on initial presentation and fluid overload.  Since she has received blood transfusion  renal function is starting to be more stable and improving.  2. CKD 3: baseline 1.48: Likely will get back to baseline.  She has diabetic hypertensive nephropathy.  3. Acute on chronic anemia:  Does appear to have recurrent episodes of GI bleed, GI evaluation is ongoing.  4. Hypertension: We will continue to optimize medications she will benefit from fair amount of diuretics prn  5. Type 2 DM: Check hemoglobin A1c.  6. HFpEF: LVEF 60% in 2018  7. Atrial fibrillation: on eliquis with recurrent bleeds.  Will need to get off the anticoagulation.  8. Tachybradycardia syndrome: s/p PPM  9. PAD: s/p PCI 2017  10. Recent fracture of left proximal humerus        PLAN:  Patient is due for her Procrit injection as an outpatient she thinks it is next week I told her daughter who was in the room to check on it and make sure it is scheduled and she can go get it.  If she is still in the hospital it may need to be postponed to the next time when it is available.        Monitor H/H with drop today          Please call if any questions  [phone]

## 2022-04-16 NOTE — CASE MANAGEMENT/SOCIAL WORK
Discharge Planning Assessment   [place]     Patient Name: Lynne Sanchez  MRN: [id number]  Today's Date: 4/16/2022    Admit Date: 4/13/2022     Discharge Needs Assessment    No documentation.                Discharge Plan     Row Name 04/16/22 1556       Plan    Plan Orders for home health at discharge noted  Spoke with pt and she stated she is currently using [facility] services   Resumption of HH faxed to [facility] with success   Called [facility] on call to notify them of pts discharge today and waiting for return call  Addendum  Called [facility] on call since they never returned my call to inform them that the pt was discharged today  Expecting a return call from Julie [address] Keisha returned call and I informed her of pts discharge   They will follow up with pt tomorrow               Continued Care and Services - Admitted Since 4/13/2022    Coordination has not been started for this encounter.     Selected Continued Care - Episodes Includes selections from active Coordinated Care Management episodes    High Risk Care Management Episode start date: 3/25/2022   There are no active outsourced providers for this episode.             Selected Continued Care - Prior Encounters Includes selections from prior encounters from 1/13/2022 to 4/16/2022    Discharged on 3/24/2022 Admission date: 3/21/2022 - Discharge disposition: Skilled Nursing Facility (DC - External)    Destination     Service Provider Selected Services Address Phone Fax Patient Preferred    [facility]  Skilled Nursing [address] [place] [address] [phone] --                    Expected Discharge Date and Time     Expected Discharge Date Expected Discharge Time    Apr 16, 2022          Demographic Summary    No documentation.                Functional Status    No documentation.                Psychosocial    No documentation.                Abuse/Neglect    No  documentation.                Legal    No documentation.                Substance Abuse    No documentation.                Patient Forms    No documentation.                   Gifty Aadms RN

## 2022-04-16 NOTE — DISCHARGE INSTR - APPOINTMENTS
Follow up with PCP in one week.     Follow up with Dr. Velez. His office should be in contact with you on Monday regarding further scopes/treatment.     Home health will be coming next week to perform wound care. Keep wound clean and dry. Apply Bactroban medication twice a day.

## 2022-04-18 ENCOUNTER — TRANSITIONAL CARE MANAGEMENT TELEPHONE ENCOUNTER (OUTPATIENT)
Dept: CALL CENTER | Facility: HOSPITAL | Age: 87
End: 2022-04-18

## 2022-04-18 NOTE — PAYOR COMM NOTE
"Mel Sanchez (87 y.o. Female)             Date of Birth   1934    Social Security Number       Address   [address] [place] [address]    Home Phone   [phone]    MRN   [id number]       Judaism   [facility]    Marital Status                               Admission Date   22    Admission Type   Emergency    Admitting Provider   Michael Eason MD    Attending Provider       Department, Room/Bed   [facility] MED SURG  /       Discharge Date   2022    Discharge Disposition   Home or Self Care    Discharge Destination                               Attending Provider: (none)   Allergies: Phenergan [Promethazine Hcl]    Isolation: None   Infection: MRSA (04/15/22)   Code Status: Prior   Advance Care Planning Activity    Ht: 167.6 cm (65.98\")   Wt: 63.2 kg (139 lb 5.3 oz)    Admission Cmt: None   Principal Problem: Acute on chronic anemia [D64.9]                 Active Insurance as of 2022     Primary Coverage     Payor Plan Insurance Group Employer/Plan Group    ANTH MEDICARE REPLACEMENT ANTH MEDICARE ADVANTAGE [id number]     Payor Plan Address Payor Plan Phone Number Payor Plan Fax Number Effective Dates    [address] [phone]  2022 - None Entered    [facility] [address]       Subscriber Name Subscriber Birth Date Member ID       MEL SANCHEZ 1934 [id number]                 Emergency Contacts      (Rel.) Home Phone Work Phone Mobile Phone    PEREZTAYLOR RAYMONDIE (Daughter) -- -- [phone]    Brennan Mendenhall (Spouse) [phone] -- [phone]    LauraMarco A (Son) [phone] -- [phone]    MARILU Sanchez -- -- --               Discharge Summary      Lynette Garcia DO at [address] [facility]              [facility] HOSPITALIST   DISCHARGE SUMMARY      Name:  Mel Sanchez   Age:  87 y.o.  Sex:  female  :  1934  MRN:  [id number]   Visit Number:  [id number]    Admission Date:  " 4/13/2022  Date of Discharge:  4/16/2022  Primary Care Physician:  Elizabeth Easton APRN    Important issues to note:    -Continue Clindamycin for additional 5 days  -Home health has been ordered for wound care/dressing changes, as well as PT/OT  -Follow up with PCP in 1 week  -Follow up with Dr. Velez. His office should be in contact with you on Monday regarding further scopes/treatment  -Continue to hold Eliquis    Discharge Diagnoses:     1. Acute on Chronic Anemia, POA  2. Severe, Symptomatic Normocytic Anemia, POA  3. Acute Exacerbation of Chronic Diastolic Heart Failure, POA  4. Elevated Troponin, likely Type 2  5. Urinary Tract Infection, POA  6. KASH on CKD  7. Type 2 Diabetes Mellitus  8. Atrial Fibrillation, on Eliquis  9. Tachybradycardia Syndrome s/p Pacemaker  10. Peripheral Arterial Disease  11. History of DVT  12. Hypothyroidism  13. GERD  14. 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 Impaired Mobility and ADLs    Problem List:     Active Hospital Problems    Diagnosis  POA   • **Acute on chronic anemia [D64.9]  Yes   • Hypothyroidism [E03.9]  Yes   • Hyperlipidemia [E78.5]  Yes   • Normocytic anemia [D64.9]  Yes   • Chronic atrial fibrillation [I48.20]  Yes   • Diabetes mellitus type II, controlled (HCC) [E11.9]  Yes      Resolved Hospital Problems   No resolved problems to display.     Presenting Problem:    Chief Complaint   Patient presents with   • Edema      Consults:     Consulting Physician(s)  Chat With All Active Members    Provider Relationship Specialty    Raymundo Alas MD  Consulting Physician Nephrology    Armando Feliciano MD  Consulting Physician General Surgery    Clarisse Velez MD  Consulting Physician Gastroenterology        Procedures Performed:        History of presenting illness/Hospital Course:    HPI (from Dr. Jenaro BRADFORD&P):  Patient is an 87 years old female with a past medical history of chronic iron deficiency anemia, CKD, diabetes  mellitus type 2, chronic kidney disease, HFpEF(echo 2018 LVEF 60%), A. fib on Eliquis, tachybradycardia syndrome status post pacemaker, PAD status post PCI 2017, history of DVT, hypothyroidism and recent fracture of left proximal humerus who presented to the ER with a chief complaint of shortness of breath and increased swelling in lower extremities.  Patient reports that she has been having significant worsening dyspnea on exertion and shortness of breath over the past few days.  Patient also reporting increased swelling in her bilateral legs over the same period. patient reporting melena but has been chronic problem for her. Patient was recently discharged from the nursing home around 2 weeks ago.  Patient has a history of needing transfusions due to her anemia and was evaluated by Dr. Dorsey with an EGD.  Patient denies any fever, chest pain, abdominal pain, nausea, vomiting.  Patient denies any hematemesis, hematuria or hemoptysis or any other source bleeding other than chronic dark stool.      Upon ER evaluation, hemodynamics were stable, labs were significant for troponin of 0.060, proBNP 7314, glucose 324, sodium 131, creatinine 1.79, BUN 57, hemoglobin 4.4, hematocrit 14.6 with otherwise normal WBC and platelets.  Chest x-ray with chronic changes and no acute cardiopulmonary process per my read.  Covid negative.  Patient received Lasix while in the ER, was ordered 2 units of PRBC along with Bumex after transfusion.  Hospitalist was consulted for admission, further evaluation treatment.      Hospital Course:  Hemoglobin increased to 6.2 following blood transfusions. Additional 2 units of PRBCs ordered. GI consulted. Dr. Feliciano performed I&D of buttock abscess on 4/14/22 which returned as MRSA. Patient started on Vancomycin. 4/15/22 patient given PillCam per GI. GI anticipates the patient will pass the Pillcam in the coming days. Dr. Velez states that he will review the images from the PillCam on Monday  and will contact the patient thereafter to discuss how he will proceed from there. Patient remains medically stable.    Anticoagulation continues to remain held and should be discontinued at time of discharge at patient's risk of bleeding with AVMs is higher than benefit of continuing anticoagulation at this time. Discussed risks vs benefits with the patient and she expressed understanding and desire to continue to hold anticoagulation at this time.    Patient received 2 days of Vancomycin while in the hospital for her MRSA abscess on buttock s/p incision and drainage. Will transition from IV antibiotic to po Clindamycin (based on wound culture sensitivities). She will be discharged home with 5 days of Clindamycin to complete a 7 day course of treatment. Home health has been ordered for skilled nursing to come out and dress/change the patient's wound. PT/OT also ordered for the patient as an outpatient.    She will need to follow up with her PCP in 1 week and have a CBC and BMP drawn. She will also need to follow up with Dr. Alas in the next 2 weeks. And will be seen by Dr. Velez following the PillCam results.     Vital Signs:    Temp:  [97.6 °F (36.4 °C)-98.7 °F (37.1 °C)] 98.4 °F (36.9 °C)  Heart Rate:  [70-76] 74  Resp:  [16-19] 18  BP: (106-126)/(41-75) 124/51    Physical Exam:    General Appearance:  Alert and cooperative. Sitting up in bedside chair, in NAD.   Head:  Atraumatic and normocephalic.   Eyes: Conjunctivae and sclerae normal, no icterus. .                   Lungs:   Breath sounds heard bilaterally equally.  No crackles or wheezing.    Heart:  Normal S1 and S2, no murmur, no gallop, no rub. No JVD.   Abdomen:   Normal bowel sounds, no masses, no organomegaly. Soft, nontender, nondistended, no rebound tenderness.   Extremities: Supple, no edema, no cyanosis, no clubbing.   Pulses: Pulses palpable bilaterally.   Skin: Buttock/sacral wound s/p incision and drainage. No bleeding or rash.    Neurologic: Alert and oriented x 3. No facial asymmetry. Moves all four limbs. No tremors.     Pertinent Lab Results:     Results from last 7 days   Lab Units 04/16/22  0509 04/15/22  0530 04/14/22  2117 04/14/22  0605 04/13/22  1800   SODIUM mmol/L 137 142  --  137 131*   POTASSIUM mmol/L 3.8 4.3 4.4 3.1* 4.4   CHLORIDE mmol/L 102 107  --  103 96*   CO2 mmol/L 23.5 22.0  --  21.1* 15.5*   BUN mg/dL 29* 38*  --  50* 57*   CREATININE mg/dL 1.19* 1.47*  --  1.69* 1.79*   CALCIUM mg/dL 8.3* 8.6  --  8.2* 8.4*   BILIRUBIN mg/dL  --   --   --   --  0.7   ALK PHOS U/L  --   --   --   --  110   ALT (SGPT) U/L  --   --   --   --  16   AST (SGOT) U/L  --   --   --   --  21   GLUCOSE mg/dL 194* 163*  --  222* 324*     Results from last 7 days   Lab Units 04/16/22  0509 04/15/22  0530 04/14/22  0605   WBC 10*3/mm3 7.65 9.64 8.60   HEMOGLOBIN g/dL 8.1* 9.2* 6.2*   HEMATOCRIT % 25.4* 28.7* 19.6*   PLATELETS 10*3/mm3 191 214 211         Results from last 7 days   Lab Units 04/14/22  0605 04/13/22  1800   TROPONIN T ng/mL 0.053* 0.060*     Results from last 7 days   Lab Units 04/13/22  1800   PROBNP pg/mL 7,314.0*                 Results from last 7 days   Lab Units 04/14/22  1634   WOUNDCX  Moderate growth (3+) Staphylococcus aureus, MRSA*       Pertinent Radiology Results:    Imaging Results (All)     Procedure Component Value Units Date/Time    US Venous Doppler Upper Extremity Left (duplex) [[id number]] Collected: 04/14/22 1548     Updated: 04/14/22 2480    Narrative:      PROCEDURE: US VENOUS DOPPLER UPPER EXTREMITY LEFT (DUPLEX)-     HISTORY: previous DVT, holding eliquis, check for residual clot to  assess risk of holding anticoagulation; D64.9-Anemia, unspecified;  N18.9-Chronic kidney disease, unspecified; R60.0-Localized edema;  Z86.718-Personal history of other venous thrombosis and embolism     PROCEDURE: Sonographic images of the upper extremity were obtained with  color flow, pulse Doppler and graded compression.      FINDINGS: The jugular vein, subclavian vein, axillary vein, brachial  vein, cephalic vein and basilic venous system are normal, fully  compressible and  demonstrate no evidence of thrombosis.       Impression:      No evidence of deep venous thrombosis.        This report was signed and finalized on 4/14/2022 3:48 PM by Katelynn Culver M.D..    XR Chest 1 View [[id number]] Collected: 04/14/22 0912     Updated: 04/14/22 1220    Narrative:      PROCEDURE: XR CHEST 1 VW-     HISTORY: dyspnea     COMPARISON: April 2018.     FINDINGS: There is a left subclavian pacemaker. The heart is enlarged.  The mediastinum is unremarkable. There is pulmonary venous hypertension.  There is no acute infiltrate. There is no pneumothorax.  There are no  acute osseous abnormalities.       Impression:      Cardiomegaly with pulmonary venous hypertension.     Continued followup is recommended.                 Images were reviewed, interpreted, and dictated by Dr. Katelynn Culver M.D.  Transcribed by Anju West PA-C     This report was signed and finalized on 4/14/2022 12:18 PM by Katelynn Culver M.D..          Echo:    Results for orders placed during the hospital encounter of 04/13/22    Adult Transthoracic Echo Complete w/ Color, Spectral and Contrast if necessary per protocol    Interpretation Summary  · Left ventricular wall thickness is consistent with mild concentric hypertrophy.  · Estimated left ventricular EF = 55% Left ventricular ejection fraction appears to be 51 - 55%. Left ventricular systolic function is normal.  · Left ventricular diastolic function is consistent with (grade II w/high LAP) pseudonormalization.  · The right ventricular cavity is mildly dilated.  · Left atrial volume is moderately increased.  · The right atrial cavity is severely dilated.  · There is severe calcification of the aortic valve mainly affecting the non-coronary cusp(s).  · Moderate tricuspid valve regurgitation is present.  ·  Estimated right ventricular systolic pressure from tricuspid regurgitation is markedly elevated (>55 mmHg). Calculated right ventricular systolic pressure from tricuspid regurgitation is 64 mmHg.  · Severe pulmonary hypertension is present.    Condition on Discharge:      Stable.    Code status during the hospital stay:    Code Status and Medical Interventions:   Ordered at: 04/13/22 6682     Code Status (Patient has no pulse and is not breathing):    CPR (Attempt to Resuscitate)     Medical Interventions (Patient has pulse or is breathing):    Full Support     Discharge Disposition:    Home or Self Care    Discharge Medications:       Discharge Medications      New Medications      Instructions Start Date   clindamycin 300 MG capsule  Commonly known as: Cleocin   300 mg, Oral, 4 Times Daily      saccharomyces boulardii 250 MG capsule  Commonly known as: Florastor   250 mg, Oral, 2 Times Daily         Changes to Medications      Instructions Start Date   latanoprost 0.005 % ophthalmic solution  Commonly known as: XALATAN  What changed: when to take this   1 drop, Both Eyes, Daily      linagliptin 5 MG tablet tablet  Commonly known as: Tradjenta  What changed: when to take this   5 mg, Oral, Daily         Continue These Medications      Instructions Start Date   acetaminophen 650 MG 8 hr tablet  Commonly known as: TYLENOL   650 mg, Oral, Every 6 Hours PRN      Aspir-Low 81 MG EC tablet  Generic drug: aspirin   81 mg, Oral, Daily      atorvastatin 40 MG tablet  Commonly known as: LIPITOR   40 mg, Oral, Daily      Boudreauxs Butt Paste 16 % ointment  Generic drug: Zinc Oxide   Apply externally, 2 Times Daily      furosemide 20 MG tablet  Commonly known as: LASIX   20 mg, Oral, Daily      glimepiride 2 MG tablet  Commonly known as: AMARYL   2 mg, Oral, Every Morning Before Breakfast      glucose blood test strip   1 each, Other, Daily, Use as instructed once a day as directed, for Truemetrix reader      levothyroxine 50  MCG tablet  Commonly known as: SYNTHROID, LEVOTHROID   50 mcg, Oral, Daily      metoprolol tartrate 100 MG tablet  Commonly known as: LOPRESSOR   100 mg, Oral, 2 Times Daily      multivitamin with minerals tablet tablet   1 tablet, Oral, Daily      pantoprazole 40 MG EC tablet  Commonly known as: Protonix   40 mg, Oral, Daily      Potassium Gluconate 550 MG tablet   550 mg, Oral, Daily      PRO-STAT liquid oral liquid   30 mL, Oral, 2 Times Daily      Retacrit 75177 UNIT/ML injection  Generic drug: epoetin dorcas-epbx   40,000 Units, Subcutaneous, 1 ml sq per month on day 17      timolol 0.5 % ophthalmic solution  Commonly known as: TIMOPTIC   1 drop, Both Eyes, Daily      vitamin D3 125 MCG (5000 UT) capsule capsule   5,000 Units, Oral, Daily         Stop These Medications    apixaban 5 MG tablet tablet  Commonly known as: ELIQUIS          Discharge Diet:     Cardiac. 2L/day fluid restriction.     Activity at Discharge:     As tolerated, PT/OT recommended    Follow-up Appointments:    Additional Instructions for the Follow-ups that You Need to Schedule     Ambulatory Referral to Home Health (Hospital)   As directed      Face to Face Visit Date: 4/16/2022    Follow-up provider for Plan of Care?: I treated the patient in an acute care facility and will not continue treatment after discharge.    Follow-up provider: KAJAL GUPTA [[id number]]    Reason/Clinical Findings: Impaired mobility, wound care    Describe mobility limitations that make leaving home difficult: Impaired mobility, wound care    Nursing/Therapeutic Services Requested: Skilled Nursing Physical Therapy Occupational Therapy    Skilled nursing orders: Wound care dressing/changes    PT orders: Total joint pathway Therapeutic exercise Strengthening    Occupational orders: Activities of daily living Energy conservation Strengthening    Frequency: 1 Week 1            Follow-up Information     Elizabeth Easton, APRN .    Specialties: Nurse Practitioner,  Family Medicine  Contact information:  [address]  [facility] [address]  [phone]             Paxton Vasquez DO Follow up in 1 week(s).    Specialty: Internal Medicine  Contact information:  [address] [facility] [address]  [facility] [address]  [phone]             Vilma Méndez PA-C .    Specialty: Physician Assistant  Contact information:  [address] [address] [facility] [address]  [facility] [id number] [phone]                       Future Appointments   Date Time Provider Department Center   4/28/2022  1:30 PM Elizabeth Easton APRN [facility]   5/2/2022  1:30 PM Demond Leon MD [facility]     Test Results Pending at Discharge:           Lynette Garcia DO  04/16/22  14:22 EDT    Time: I spent 37 minutes on this discharge activity which included: face-to-face encounter with the patient, reviewing the data in the system, coordination of the care with the nursing staff as well as consultants, documentation, and entering orders.     Dictated utilizing Dragon dictation.      Electronically signed by Lynette Garcia DO at 04/16/22 1442

## 2022-04-18 NOTE — OUTREACH NOTE
Call Center TCM Note    Flowsheet Row Responses   [facility] patient discharged from? [facility]   Does the patient have one of the following disease processes/diagnoses(primary or secondary)? Other   TCM attempt successful? Yes   Call start time 1607   Call end time 1615   Discharge diagnosis Acute on Chronic Anemia   Person spoke with today (if not patient) and relationship Patient   Meds reviewed with patient/caregiver? Yes   Is the patient having any side effects they believe may be caused by any medication additions or changes? No   Does the patient have all medications ordered at discharge? Yes   Is the patient taking all medications as directed (includes completed medication regime)? Yes   Does the patient have a primary care provider?  Yes   Does the patient have an appointment with their PCP within 7 days of discharge? Yes   Comments regarding PCP  hospital fu appt on 4/22/22 at 1:00 PM   Has the patient kept scheduled appointments due by today? N/A   What is the Home health agency?   [facility] (resumption of orders)   Has home health visited the patient within 72 hours of discharge? Yes   Psychosocial issues? No   Did the patient receive a copy of their discharge instructions? Yes   Nursing interventions Reviewed instructions with patient   What is the patient's perception of their health status since discharge? Improving   Is the patient/caregiver able to teach back signs and symptoms related to disease process for when to call PCP? Yes   Is the patient/caregiver able to teach back signs and symptoms related to disease process for when to call 911? Yes   Is the patient/caregiver able to teach back the hierarchy of who to call/visit for symptoms/problems? PCP, Specialist, Home health nurse, Urgent Care, ED, 911 Yes   If the patient is a current smoker, are they able to teach back resources for cessation? Not a smoker   TCM call completed? Yes   Wrap up additional comments Pt states she  is doing better. Pt verified PCP hospital fu appt on 4/22/22. No questions/concerns.          Donita Arrington RN    4/18/2022, 16:16 EDT

## 2022-04-18 NOTE — CASE MANAGEMENT/SOCIAL WORK
Continued Stay Note   [facility]     Patient Name: Lynne Sanchez  MRN: [id number]  Today's Date: 4/18/2022    Admit Date: 4/13/2022     Discharge Plan     Row Name 04/18/22 0844       Plan    Plan Called to Jennifer at [facility] and verified she is a current pt.  Informed of DC.  They have Epic access, order was faxed by [facility]RN[facility] on 4/16.               Discharge Codes    No documentation.               Expected Discharge Date and Time     Expected Discharge Date Expected Discharge Time    Apr 16, 2022             Lee Ann Calderón RN

## 2022-04-19 DIAGNOSIS — D50.0 IRON DEFICIENCY ANEMIA DUE TO CHRONIC BLOOD LOSS: Primary | ICD-10-CM

## 2022-04-19 RX ORDER — SODIUM CHLORIDE 9 MG/ML
70 INJECTION, SOLUTION INTRAVENOUS CONTINUOUS PRN
Status: CANCELLED | OUTPATIENT
Start: 2022-04-19

## 2022-04-19 RX ORDER — SODIUM, POTASSIUM,MAG SULFATES 17.5-3.13G
2 SOLUTION, RECONSTITUTED, ORAL ORAL ONCE
Qty: 354 ML | Refills: 0 | Status: SHIPPED | OUTPATIENT
Start: 2022-04-19 | End: 2022-04-19

## 2022-04-19 NOTE — PROGRESS NOTES
RD mailed DM education materials including Heart Healthy and Consistent Carbohydrate Nutrition Therapy packet from [facility], Diabetes Basics from [facility], and RD contact information due to recent discharge.

## 2022-04-21 NOTE — CASE MANAGEMENT/SOCIAL WORK
Case Management Discharge Note      Final Note: Code cahnged pt was not seen unitl 4/20    Provided Post Acute Provider List?: N/A  N/A Provider List Comment: No Needs  Provided Post Acute Provider Quality & Resource List?: N/A  N/A Quality & Resource List Comment: No Needs    Selected Continued Care - Discharged on 4/16/2022 Admission date: 4/13/2022 - Discharge disposition: Home or Self Care    Destination    No services have been selected for the patient.              Durable Medical Equipment    No services have been selected for the patient.              Dialysis/Infusion    No services have been selected for the patient.              Home Medical Care Coordination complete.    Service Provider Selected Services Address Phone Fax Patient Preferred    [facility]  Home Health Services [address] [place], [place] [address] [place] [address] [phone] --          Therapy    No services have been selected for the patient.              Community Resources    No services have been selected for the patient.              Community & DME    No services have been selected for the patient.                Selected Continued Care - Episodes Includes selections from active Coordinated Care Management episodes    High Risk Care Management Episode start date: 3/25/2022   There are no active outsourced providers for this episode.             Selected Continued Care - Prior Encounters Includes selections from prior encounters from 1/13/2022 to 4/16/2022    Discharged on 3/24/2022 Admission date: 3/21/2022 - Discharge disposition: Skilled Nursing Facility (DC - External)    Destination     Service Provider Selected Services Address Phone Fax Patient Preferred    [facility]  Skilled Nursing [address] [address] [phone] --                    Transportation Services  Private: Car    Final Discharge Disposition Code: 01 - home or self-care

## 2022-04-22 ENCOUNTER — OFFICE VISIT (OUTPATIENT)
Dept: INTERNAL MEDICINE | Facility: CLINIC | Age: 87
End: 2022-04-22

## 2022-04-22 VITALS
SYSTOLIC BLOOD PRESSURE: 118 MMHG | WEIGHT: 140.8 LBS | HEART RATE: 86 BPM | TEMPERATURE: 97.5 F | DIASTOLIC BLOOD PRESSURE: 78 MMHG | HEIGHT: 66 IN | OXYGEN SATURATION: 97 % | BODY MASS INDEX: 22.63 KG/M2

## 2022-04-22 DIAGNOSIS — Z09 HOSPITAL DISCHARGE FOLLOW-UP: Primary | ICD-10-CM

## 2022-04-22 DIAGNOSIS — R60.0 BILATERAL LOWER EXTREMITY EDEMA: ICD-10-CM

## 2022-04-22 DIAGNOSIS — D63.8 ANEMIA OF CHRONIC DISEASE: ICD-10-CM

## 2022-04-22 DIAGNOSIS — D64.9 ACUTE ON CHRONIC ANEMIA: ICD-10-CM

## 2022-04-22 DIAGNOSIS — N18.4 CHRONIC RENAL IMPAIRMENT, STAGE 4 (SEVERE): Chronic | ICD-10-CM

## 2022-04-22 DIAGNOSIS — I48.20 CHRONIC ATRIAL FIBRILLATION: ICD-10-CM

## 2022-04-22 PROCEDURE — 1111F DSCHRG MED/CURRENT MED MERGE: CPT | Performed by: NURSE PRACTITIONER

## 2022-04-22 PROCEDURE — 99495 TRANSJ CARE MGMT MOD F2F 14D: CPT | Performed by: NURSE PRACTITIONER

## 2022-04-22 RX ORDER — CLINDAMYCIN HYDROCHLORIDE 300 MG/1
300 CAPSULE ORAL
COMMUNITY
End: 2022-04-29

## 2022-04-22 NOTE — PROGRESS NOTES
Transitional Care Follow Up Visit  Subjective     Lynne Sanchez is a 87 y.o. female who presents for a transitional care management visit.    Within 48 business hours after discharge our office contacted her via telephone to coordinate her care and needs.      I reviewed and discussed the details of that call along with the discharge summary, hospital problems, inpatient lab results, inpatient diagnostic studies, and consultation reports with Lynne.     Current outpatient and discharge medications have been reconciled for the patient.  Reviewed by: BILL Richardson      Date of TCM Phone Call 4/16/2022   [facility]   Date of Admission 4/13/2022   Date of Discharge 4/16/2022   Discharge Disposition Home or Self Care     Risk for Readmission (LACE) Score: 14 (4/16/2022  6:01 AM)      History of Present Illness   Course During Hospital Stay: Presented to [facility] ED on 4/13 with acute complaints of shortness of breath, melena, and lower extremity swelling.  Symptoms had been persistently worsening over the previous 2 days.  2 weeks prior had been discharged from the nursing home and has had recent hospitalization due to to anemia requiring blood transfusions.  Hemodynamically stable in the ER, labs did show significant anemia with a hemoglobin of 4.4 and hematocrit of 14.6, glucose was also elevated at 324, and proBNP elevated at 7314.  Her COVID test was negative, she received Lasix and 2 units of packed red blood cells with Bumex after the transfusion.  Hospitalist admitted her to the hospital for evaluation.  Received an additional 2 units of packed red blood cells and Dr. Feliciano consulted for an I&D of buttock abscess on 4/14 which returned as MRSA.  She was started on vancomycin for this and finish the full course.  4/15 she was given PillCam by GI, results were not available for review prior to discharge.  Her anticoagulation was held due to risk of bleeding AVMs.  She received 2 days of  "vancomycin in the hospital and transition to p.o. clindamycin upon discharge.  She was discharged home in stable condition on 4/16 with 5 days of clindamycin to complete a 7-day course.  Home health was ordered for skilled nursing for the patient's wound and PT/OT was also ordered.  She was advised to follow-up with PCP in 1 week to have a CBC and BMP drawn as well as Dr. Alas with nephrology in 2 weeks and GI to discuss PillCam results.    She has remained the same since her discharge from the hospital.  She continues to take her clindamycin as prescribed for buttock abscess.  She states that home health has not been inasmuch as they used to and she needs help dressing her wound.  Does have stool seepage sometimes unknown still having difficulty keeping the area clean.  She has concerns about being off her Eliquis that she could have a stroke.  Has not scheduled her colonoscopy yet, PillCam results reviewed and Dr. Dr. Velez recommends colonoscopy with biopsy.  No bleeding AVMs were found.  She is not having any worsening shortness of breath or lower extremity swelling.  He followed up with nephrology yesterday and said her kidneys and \"checked out\".  She denies any further dark stools or other abnormal bleeding.  No other acute complaints today.  Taking all medication as prescribed.     The following portions of the patient's history were reviewed and updated as appropriate: allergies, current medications, past family history, past medical history, past social history, past surgical history and problem list.    Review of Systems   Constitutional: Positive for fatigue.   Respiratory: Positive for shortness of breath (On exertion, baseline). Negative for cough and wheezing.    Cardiovascular: Positive for leg swelling (Baseline). Negative for chest pain and palpitations.   Gastrointestinal: Positive for diarrhea (Loose stool). Negative for abdominal pain and blood in stool.        Stool incontinence "   Musculoskeletal: Positive for arthralgias.   Skin: Positive for wound (Buttock).   Neurological: Positive for weakness. Negative for dizziness.   Psychiatric/Behavioral: Negative for sleep disturbance.       Objective   Physical Exam  Vitals and nursing note reviewed.   Constitutional:       General: She is not in acute distress.     Appearance: Normal appearance. She is ill-appearing (Chronically ill-appearing). She is not toxic-appearing.   Eyes:      Pupils: Pupils are equal, round, and reactive to light.   Cardiovascular:      Rate and Rhythm: Normal rate. Rhythm irregular.      Heart sounds: Murmur heard.   Pulmonary:      Effort: Pulmonary effort is normal. No respiratory distress.      Breath sounds: Normal breath sounds. No wheezing.   Abdominal:      General: Bowel sounds are normal. There is no distension.      Palpations: Abdomen is soft.      Tenderness: There is no abdominal tenderness.   Musculoskeletal:         General: Normal range of motion.      Right lower le+ Pitting Edema present.      Left lower le+ Pitting Edema present.   Skin:     General: Skin is warm and dry.      Findings: No rash.   Neurological:      General: No focal deficit present.      Mental Status: She is alert.   Psychiatric:         Mood and Affect: Mood normal.         Behavior: Behavior normal.         Assessment/Plan   Diagnoses and all orders for this visit:    1. Hospital discharge follow-up (Primary)  -     CBC No Differential  -     Comprehensive metabolic panel  Stable post discharge.  Reviewed discharge medications with her and  today.  Recheck labs as above.  Advised to follow-up with Dr. Velez for colonoscopy.  Home health agency called and have been out to the house several times this week, nursing plans to return in 5 days.  2. Anemia of chronic disease  -     CBC No Differential  -     Comprehensive metabolic panel  Recheck labs today.  Schedule colonoscopy with Dr. Velez.  3. Acute on  chronic anemia  -     CBC No Differential  -     Comprehensive metabolic panel  4. Chronic atrial fibrillation (HCC)  -     CBC No Differential  -     Comprehensive metabolic panel  Rate controlled.  Continue beta-blockade therapy and continue holding apixaban for now.  Discussed risk versus benefits of anticoagulation at this time, she has had several instances of severe anemia and therefore recommend holding until colonoscopy has been performed.  Advised on the risk of stroke, heart attack, and other abnormal clotting.  ER with any acute red flag symptoms.  5. Bilateral lower extremity edema  -     CBC No Differential  -     Comprehensive metabolic panel  Baseline.  Continue furosemide 40 mg daily and elevate legs.  6. Chronic renal impairment, stage 4 (severe) (HCC)  -     CBC No Differential  -     Comprehensive metabolic panel  Managed by nephrology, seen yesterday.  7.  Buttock abscess  Keep area clean and dry.  Finish full course of clindamycin.  Home health to dress wound.

## 2022-04-23 LAB
ALBUMIN SERPL-MCNC: 3.6 G/DL (ref 3.5–5.2)
ALBUMIN/GLOB SERPL: 1.4 G/DL
ALP SERPL-CCNC: 104 U/L (ref 39–117)
ALT SERPL-CCNC: 15 U/L (ref 1–33)
AST SERPL-CCNC: 19 U/L (ref 1–32)
BILIRUB SERPL-MCNC: 0.5 MG/DL (ref 0–1.2)
BUN SERPL-MCNC: 25 MG/DL (ref 8–23)
BUN/CREAT SERPL: 18.9 (ref 7–25)
CALCIUM SERPL-MCNC: 8.9 MG/DL (ref 8.6–10.5)
CHLORIDE SERPL-SCNC: 102 MMOL/L (ref 98–107)
CO2 SERPL-SCNC: 26.8 MMOL/L (ref 22–29)
CREAT SERPL-MCNC: 1.32 MG/DL (ref 0.57–1)
EGFRCR SERPLBLD CKD-EPI 2021: 39.2 ML/MIN/1.73
ERYTHROCYTE [DISTWIDTH] IN BLOOD BY AUTOMATED COUNT: 16.1 % (ref 12.3–15.4)
GLOBULIN SER CALC-MCNC: 2.5 GM/DL
GLUCOSE SERPL-MCNC: 222 MG/DL (ref 65–99)
HCT VFR BLD AUTO: 31.8 % (ref 34–46.6)
HGB BLD-MCNC: 9.3 G/DL (ref 12–15.9)
MCH RBC QN AUTO: 26.6 PG (ref 26.6–33)
MCHC RBC AUTO-ENTMCNC: 29.2 G/DL (ref 31.5–35.7)
MCV RBC AUTO: 91.1 FL (ref 79–97)
PLATELET # BLD AUTO: 177 10*3/MM3 (ref 140–450)
POTASSIUM SERPL-SCNC: 4.4 MMOL/L (ref 3.5–5.2)
PROT SERPL-MCNC: 6.1 G/DL (ref 6–8.5)
RBC # BLD AUTO: 3.49 10*6/MM3 (ref 3.77–5.28)
SODIUM SERPL-SCNC: 139 MMOL/L (ref 136–145)
WBC # BLD AUTO: 7.03 10*3/MM3 (ref 3.4–10.8)

## 2022-04-25 ENCOUNTER — TELEPHONE (OUTPATIENT)
Dept: GASTROENTEROLOGY | Facility: CLINIC | Age: 87
End: 2022-04-25

## 2022-04-25 NOTE — TELEPHONE ENCOUNTER
----- Message from Clarisse Velez MD sent at 4/22/2022  6:01 PM EDT -----  Her left side upper extremity clot has resolved as for repeat Doppler scan.  Normally recommendation is to give her 3 to 6 months of blood thinners and stop.     She can go back on the blood thinners however she has a high risk of bleeding.   We can schedule her colonoscopy may be a little later if she wants to.           ----- Message -----  From: Rachel Wood MA  Sent: 4/22/2022   8:31 AM EDT  To: Clarisse Velez MD    Called patient regarding scheduling. She stated she has some concerns. The bed sore she has on her bottom has turned into a staph infection. Can she be scheduled or should she wait until it's healed? Also she is concerned and nervous about being off her blood thinners. Wants to see if she can go back on them    ----- Message -----  From: Clarisse Velez MD  Sent: 4/19/2022   7:36 PM EDT  To: Rachel Wood MA      Can we schedule her for colonoscopy sometime next couple of weeks time

## 2022-04-26 ENCOUNTER — READMISSION MANAGEMENT (OUTPATIENT)
Dept: CALL CENTER | Facility: HOSPITAL | Age: 87
End: 2022-04-26

## 2022-04-26 NOTE — OUTREACH NOTE
Medical Week 2 Survey    Flowsheet Row Responses   [facility] patient discharged from? [facility]   Does the patient have one of the following disease processes/diagnoses(primary or secondary)? Other   Week 2 attempt successful? Yes   Call start time 0827   Discharge diagnosis Acute on Chronic Anemia   Call end time 0839   Person spoke with today (if not patient) and relationship Patient   Meds reviewed with patient/caregiver? Yes   Is the patient taking all medications as directed (includes completed medication regime)? Yes   Medication comments Will talk with PCP or Dr Leon regarding resuming Eliquis   Has the patient kept scheduled appointments due by today? Yes   Comments Has seen PCP.  Will need colonscopy, Dr Lorenzo phone number given to patient, not listed on AVS.  Dr Leon, cardiology May 2   What is the Home health agency?   [facility] (resumption of orders)   What is the patient's perception of their health status since discharge? Improving   Week 2 Call Completed? Yes          ANIL GALLEGOS - Registered Nurse

## 2022-04-26 NOTE — TELEPHONE ENCOUNTER
Spoke with patient. She is going to follow up with her PCP this week and Cardiology next week and then she will call back to schedule colonoscopy.

## 2022-04-28 ENCOUNTER — OFFICE VISIT (OUTPATIENT)
Dept: INTERNAL MEDICINE | Facility: CLINIC | Age: 87
End: 2022-04-28

## 2022-04-28 ENCOUNTER — HOME HEALTH ADMISSION (OUTPATIENT)
Dept: HOME HEALTH SERVICES | Facility: HOME HEALTHCARE | Age: 87
End: 2022-04-28

## 2022-04-28 VITALS
TEMPERATURE: 97.2 F | SYSTOLIC BLOOD PRESSURE: 120 MMHG | DIASTOLIC BLOOD PRESSURE: 78 MMHG | WEIGHT: 143 LBS | OXYGEN SATURATION: 95 % | HEIGHT: 66 IN | HEART RATE: 71 BPM | BODY MASS INDEX: 22.98 KG/M2

## 2022-04-28 DIAGNOSIS — I10 PRIMARY HYPERTENSION: Chronic | ICD-10-CM

## 2022-04-28 DIAGNOSIS — L89.159 DECUBITUS ULCER OF COCCYX, UNSPECIFIED PRESSURE ULCER STAGE: ICD-10-CM

## 2022-04-28 DIAGNOSIS — E03.9 ACQUIRED HYPOTHYROIDISM: Primary | ICD-10-CM

## 2022-04-28 DIAGNOSIS — N18.4 CHRONIC RENAL IMPAIRMENT, STAGE 4 (SEVERE): ICD-10-CM

## 2022-04-28 DIAGNOSIS — E11.29 CONTROLLED TYPE 2 DIABETES MELLITUS WITH OTHER DIABETIC KIDNEY COMPLICATION, WITHOUT LONG-TERM CURRENT USE OF INSULIN: ICD-10-CM

## 2022-04-28 DIAGNOSIS — I48.20 CHRONIC ATRIAL FIBRILLATION: ICD-10-CM

## 2022-04-28 PROCEDURE — 99214 OFFICE O/P EST MOD 30 MIN: CPT | Performed by: NURSE PRACTITIONER

## 2022-04-28 NOTE — PROGRESS NOTES
Office Visit      Patient Name: Lynne Sanchez  : 1934   MRN: [id number]   Care Team: Patient Care Team:  Elizabeth Easton APRN as PCP - General (Family Medicine)  Paxton Vasquez DO as PCP - Internal Medicine (longterm Care Facility)  Vilma Méndez PA-C as PCP - Family Medicine (Long Term Care Acute)  Davian Faria MD as Consulting Physician (Nephrology)  Radha Boone RN as Registered Nurse  Courtney Laguerre RN as Ambulatory  (Population Health)    Chief Complaint  Diabetes (6 month f/u, A1C on 2022 was 6.2), Hypertension, Hyperlipidemia, and Hypothyroidism    Subjective     Subjective      Lynne Sanchez presents to [facility] for 6 month follow-up of chronic conditions.   No acute complaints today.  would like her coccyx re-evaluated as  She has had a recent pressure ulcer treated systemically with clindamycin and wants to be sure it is improving.   Type II diabetes-   Lab Results   Component Value Date    HGBA1C 6.20 (H) 2022     She is compliant with linagliptin and glimepiride, denies any side effects from her medication. She is adhering to a diabetic diet as recommended. Unable to get much exercise but working with physical therapy and feels as though she is getting stronger. Denies polyuria, polydipsia, polyphagia, and vision changes. No episodes of hypoglycemia that she has noticed. Not taking her blood glucose at home regularly, spot checks at times and has been as high as 200.   Hypothyroidism- taking levothyroxine as prescribed and denies any side effects. Denies change in bowel habits, heat/cold intolerance that is new, and palpitations.   Lab Results   Component Value Date    TSH 6.570 (H) 2022     HTN- does not monitor her blood pressure at home consistently. Current medication regimen includes lopressor BID and furosemide, denies any side effects. Denies chest pain, shortness of  "breath, dizziness, and major new onset fatigue. Follows with cardiology and has appointment with Dr. Leon in 1 week. She does exhibit some lower extremity swelling it is improved with elevation. Got sunburnt on the right leg and slowly going away. No calf pain, warmth, fever or chills. Stopped eliquis due to GI bleeding and severe anemia per last hospital note.   CKD is managed by nephrology, she is compliant with follow-up and currently getting iron injections.   Labs are stable, needs colonoscopy- not yet scheduled.   Lab Results   Component Value Date    WBC 7.03 04/22/2022    HGB 9.3 (L) 04/22/2022    HCT 31.8 (L) 04/22/2022    MCV 91.1 04/22/2022     04/22/2022     Lab Results   Component Value Date    GLUCOSE 222 (H) 04/22/2022    BUN 25 (H) 04/22/2022    CREATININE 1.32 (H) 04/22/2022    EGFRIFNONA 48 (L) 03/03/2022    EGFRIFAFRI 58 (L) 03/03/2022    BCR 18.9 04/22/2022    K 4.4 04/22/2022    CO2 26.8 04/22/2022    CALCIUM 8.9 04/22/2022    PROTENTOTREF 6.1 04/22/2022    ALBUMIN 3.60 04/22/2022    LABIL2 1.4 04/22/2022    AST 19 04/22/2022    ALT 15 04/22/2022     Lab Results   Component Value Date    CHOL 64 06/30/2018    CHLPL 93 10/28/2021    TRIG 46 10/28/2021    HDL 52 10/28/2021    LDL 29 10/28/2021       Objective     Objective   Vital Signs:   /78   Pulse 71   Temp 97.2 °F (36.2 °C) (Temporal)   Ht 167.6 cm (66\")   Wt 64.9 kg (143 lb)   SpO2 95%   BMI 23.08 kg/m²     Physical Exam  Vitals and nursing note reviewed.   Constitutional:       General: She is not in acute distress.     Appearance: Normal appearance. She is ill-appearing (chronically ill appearing). She is not toxic-appearing.   Eyes:      Pupils: Pupils are equal, round, and reactive to light.   Cardiovascular:      Rate and Rhythm: Normal rate. Rhythm irregular.      Heart sounds: Murmur heard.   Pulmonary:      Effort: Pulmonary effort is normal. No respiratory distress.      Breath sounds: Normal breath sounds. No " wheezing.   Abdominal:      General: Bowel sounds are normal. There is no distension.      Palpations: Abdomen is soft.      Tenderness: There is no abdominal tenderness.   Musculoskeletal:         General: Deformity present.      Cervical back: Neck supple. No tenderness.      Right lower le+ Pitting Edema (shiny and erythematous, recent sun exposure per her report) present.      Left lower le+ Pitting Edema present.      Comments: Wheelchair for ambulation but is able to stand with assistance and strong     Skin:     General: Skin is warm and dry.      Findings: Wound (stage II pressure ulcer with granulating tissue and tunneling, healing well without surrounding erythema or warmth) present. No rash.          Neurological:      General: No focal deficit present.      Mental Status: She is alert and oriented to person, place, and time.   Psychiatric:         Mood and Affect: Mood normal.         Behavior: Behavior normal.          Assessment / Plan      Assessment/Plan   Problem List Items Addressed This Visit        Cardiac and Vasculature    Chronic atrial fibrillation (Chronic)    Overview     1. Chronic atrial fibrillation, status post [employer] pacemaker implantation and AV node ablation:  a. Diagnosed 2005.  b. Status post ECV restoring normal sinus rhythm, 2005.  c. Rythmol initiated 2006.  d. Previously digoxin toxicity.  e. GXT Cardiolite, 2005:  No reversible ischemia.  LV function not performed secondary to atrial fibrillation.   f. Status post successful external cardioversion on 10/01/2008, Tessa Downey MD.  g. EKG on 10/21/2008:  Recurrence of atrial fibrillation with rate of 109.  h. Recurrent atrial fibrillation by EKG, 2008, asymptomatic.  i. Status post [employer] pacemaker implantation and AV node ablation.   j. Echocardiogram 02/15/2010:  Moderate MR, moderate TR.  RVSP 50.  EF greater than 55%, left atrial enlargement at 4.3 cm.    Managed by cardiology,  keep scheduled follow-up. Will discuss eliquis at that time, remain off for now- risk vs benefit discussion and advised ER with any signs of acute clotting. Continue lopressor at current dose.           Endocrine and Metabolic    Diabetes mellitus type II, controlled (HCC) (Chronic)    Relevant Medications    linagliptin (Tradjenta) 5 MG tablet tablet    Other Relevant Orders    Ambulatory Referral to Home Health    Stable. Discussed at her age a1c goal is <8.0. Continue linagliptin and amaryl as prescribed. Recommend diabetic diet, medication compliance, yearly eye exams, and instructed to always wear shoes. Follow-up in 6 months.     Hypertension    Stable. Continue current medications as prescribed. Recommend DASH diet, moderate-intensity exercises 4-5 times/week, medication compliance, and home blood pressure monitoring.       Acquired hypothyroidism - Primary    Overview     Formatting of this note might be different from the original.  It is being adjusted by the primary care. Patient was made aware of close follow-up with the primary care as hypothyroidism can cause multiple medical problems and complicates multiple physiologic processes causing significant disease as well as morbidity and mortality.    Avoid high doses of levothyroxine due to age and chronic conditions, continue at current dose for now. Will follow TSH/.           Genitourinary and Reproductive     Chronic renal impairment, stage 4 (severe) (HCC) (Chronic)    Overview         Managed by nephrology, keep follow-up and continue current medication regimen.          Other Visit Diagnoses     Decubitus ulcer of coccyx, unspecified pressure ulcer stage        Relevant Orders    Ambulatory Referral to Home Health    Healing well, reassurance provided. No further antibiotics necessary at this time. High protein diet and frequent position changes recommended. Needs help with wound care, home health referral placed.            Follow Up   Return in  about 6 months (around 10/28/2022) for Medicare Wellness.  Patient was given instructions and counseling regarding her condition or for health maintenance advice. Please see specific information pulled into the AVS if appropriate.     BILL Purcell  [facility]

## 2022-05-01 NOTE — PROGRESS NOTES
[facility] Cardiology  Office Progress Note  Lynne Sanchez  1934  [address] [id number]       Visit Date: 05/02/22    PCP: Elizabeth Easton APRN  [address] [facility] [address]  [facility] [address]    IDENTIFICATION: A 87 y.o. female  female. Retired.    PROBLEM LIST:   1. Chronic atrial fibrillation, status post [employer]. Laureano pacemaker implantation and AV node ablation:  a. Diagnosed June 2005.  b. Status post ECV restoring normal sinus rhythm, June 2005.  c. Rythmol initiated 05/01/2006.  d. Previously digoxin toxicity.  e. GXT Cardiolite, 09/08/2005: No reversible ischemia. LV function not performed secondary to atrial fibrillation.   f. Status post successful external cardioversion on 10/01/2008, Tessa Downey MD.  g. EKG on 10/21/2008: Recurrence of atrial fibrillation with rate of 109.  h. Status post [employer] Laureano pacemaker implantation and AV node ablation.    i. Echocardiogram 02/15/2010: Moderate MR, moderate TR. RVSP 50. EF greater than 55%, left atrial enlargement at 4.3 cm.  j. Echo 1/18/17: EF 60%, Mod-severe MR, Mod-severe TR.  k. 10/6/17 echo: EF 50-55%, mild LVH, thickened mitral leaflets with mild MR, aortic sclerosis, trace AI, moderate TR with PA SP 60 mmHg, moderate to severe RV dilation, dilated IVC  l. 5/2018 Gen change [place]lam  m. 6/30/2018 EF 60%, mild AR, moderate MR, moderate to severe TR, severe pulmonary HTN RVSP 90  n. 4/22 2D Echo: LVEF = 55%.  LV wall thickness-mild concentric hypertrophy.  LV diastolic dysfunction (grade 2) pseudonormalization.  RV cavity mildly dilated.  LA volume moderately increased.  RA cavity severely dilated.  Severe calcification of aortic valve affecting noncoronary cusp.  Moderate TR.  RVSP due to TR markedly elevated >55; = 64.  Severe pulmonary hypertension.  2. History of congestive heart failure.  3. Diabetes mellitus, type 2.  1. 4/22 A1c: 6.2  4. 2/22 [facility] Hospitalization for UE DVT.  1. 2/22 OSH Venous UE:  Evidence of non-occlusive DVT in left axillary vein and one of paired brachial veins.   5. PVD  a. - 10/17 L PTCA ant tib(Pelaez)  6. Renal impairment stage 4  1. 7/6/21 Cr: 1.52  2. 7/20/21 Cr: 1.17  7. Surgical history - appendectomy.   8. Chronic Anemia-NOS neg scopes 2017, h/o following  a. Per Dr. Michael guillen [facility]  9. 7/2018 osteomyelitis with subsequent L great toe amputation been left second toe amputations per Dr. Wade  10. 2/22 [facility] ER eval for Close fracture of humeral head  11. 3/22 and 4/22 [facility] admission for Anemia due to GI bleed - stabilized with PRBCs.        B.  EGD/colon - benign Dr Velez- pill cam discussed NOAC stopped indefinitely      CC:   Chief Complaint   Patient presents with   • Chronic atrial fibrillation       Allergies  Allergies   Allergen Reactions   • Phenergan [Promethazine Hcl] Confusion       Current Medications    Current Outpatient Medications:   •  acetaminophen (TYLENOL) 650 MG 8 hr tablet, Take 650 mg by mouth Every 6 (Six) Hours As Needed for Mild Pain ., Disp: , Rfl:   •  Amino Acids-Protein Hydrolys (PRO-STAT) liquid oral liquid, Take 30 mL by mouth 2 (Two) Times a Day., Disp: , Rfl:   •  aspirin 81 MG EC tablet, Take 81 mg by mouth Daily., Disp: , Rfl:   •  atorvastatin (LIPITOR) 40 MG tablet, Take 1 tablet by mouth Daily., Disp: 90 tablet, Rfl: 3  •  epoetin dorcas-epbx (Retacrit) 44718 UNIT/ML injection, Inject 40,000 Units under the skin into the appropriate area as directed. 1 ml sq per month on day 17, Disp: , Rfl:   •  furosemide (LASIX) 20 MG tablet, Take 1 tablet by mouth Daily. (Patient taking differently: Take 40 mg by mouth Daily.), Disp: 90 tablet, Rfl: 1  •  glimepiride (AMARYL) 2 MG tablet, Take 1 tablet by mouth Every Morning Before Breakfast. (Patient taking differently: Take 4 mg by mouth Every Morning Before Breakfast.), Disp: 30 tablet, Rfl: 5  •  glucose blood test strip, 1 each by Other route Daily. Use as instructed once a day as directed, for  Truemetrix reader, Disp: 100 each, Rfl: 3  •  latanoprost (XALATAN) 0.005 % ophthalmic solution, Administer 1 drop to both eyes Daily. (Patient taking differently: Administer 1 drop to both eyes Every Night.), Disp: 7.5 mL, Rfl: 3  •  levothyroxine (SYNTHROID, LEVOTHROID) 50 MCG tablet, Take 1 tablet by mouth Daily., Disp: 90 tablet, Rfl: 3  •  linagliptin (Tradjenta) 5 MG tablet tablet, Take 1 tablet by mouth Daily., Disp: 90 tablet, Rfl: 1  •  metoprolol tartrate (LOPRESSOR) 100 MG tablet, Take 1 tablet by mouth 2 (Two) Times a Day. (Patient taking differently: Take 50 mg by mouth 2 (Two) Times a Day.), Disp: 180 tablet, Rfl: 3  •  multivitamin with minerals tablet tablet, Take 1 tablet by mouth Daily., Disp: , Rfl:   •  pantoprazole (Protonix) 40 MG EC tablet, Take 1 tablet by mouth Daily., Disp: 30 tablet, Rfl: 5  •  Potassium Gluconate 550 MG tablet, Take 550 mg by mouth Daily., Disp: , Rfl:   •  timolol (TIMOPTIC) 0.5 % ophthalmic solution, Administer 1 drop to both eyes Daily., Disp: , Rfl:   •  vitamin D3 125 MCG (5000 UT) capsule capsule, Take 5,000 Units by mouth Daily., Disp: , Rfl:   •  Zinc Oxide (Boudreauxs Butt Paste) 16 % ointment, Apply  topically 2 (Two) Times a Day., Disp: , Rfl:       History of Present Illness   Lynne Sanchez is a 87 y.o. year old female here for follow up.    Quite debilitated over several admissions this year initially with shoulder fracture and then 2 episodes of symptomatic anemia most recent with hemoglobin down to 4.4.  She is status post transfusions but is unclear as to the etiology of her recurrent anemia.  She states that she is undergone scopes and even a bone marrow biopsy remotely and there is no unifying diagnosis.  Also recently she has had a pill camera but is data deficient regarding the findings of such.  Temporally however it is interesting that she had transition from Plavix to Eliquis following her shoulder fracture at [facility] in February.  This is  "when acute on chronic anemia occurred      OBJECTIVE:  Vitals:    05/02/22 1328   BP: 112/64   BP Location: Left arm   Patient Position: Sitting   Cuff Size: Adult   Pulse: 67   SpO2: 99%   Weight: 64.9 kg (143 lb)   Height: 167.6 cm (66\")     Body mass index is 23.08 kg/m².    Constitutional:       Appearance: Healthy appearance. Not in distress.   Neck:      Vascular: No JVR. JVD normal.   Pulmonary:      Effort: Pulmonary effort is normal.      Breath sounds: Normal breath sounds. No wheezing. No rhonchi. No rales.   Chest:      Chest wall: Not tender to palpatation.   Cardiovascular:      PMI at left midclavicular line. Normal rate. Regular rhythm. Normal S1. Normal S2.      Murmurs: There is a systolic murmur.      No gallop. No click. No rub.   Pulses:     Intact distal pulses.   Edema:     Thigh: bilateral pitting edema of the thigh.     Pretibial: bilateral pitting edema of the pretibial area.     Ankle: bilateral pitting edema of the ankle.     Feet: bilateral pitting edema of the feet.  Abdominal:      General: Bowel sounds are normal.      Palpations: Abdomen is soft.      Tenderness: There is no abdominal tenderness.   Musculoskeletal: Normal range of motion.         General: No tenderness. Skin:     General: Skin is warm and dry.   Neurological:      General: No focal deficit present.      Mental Status: Alert and oriented to person, place and time.         Diagnostic Data:  Procedures  Device check  Acceptable threshold impedances  Chronic A. Fib  Generator greater than 9-1/2 years    ASSESSMENT:   Diagnosis Plan   1. Chronic diastolic congestive heart failure (HCC)     2. Pulmonary hypertension (HCC)     3. Chronic atrial fibrillation (HCC)     4. Mixed hyperlipidemia         PLAN:  Chronic A. fib post AV node ablation device dependent.  Patient will be high risk for consideration of left atrial appendage device closure.  Unfortunately appears temporally that she is not a candidate for NOAC.    Anemia " acute on chronic ongoing evaluation pill camera pending    Mixed dyslipidemia controlled on statin therapy          Demond Leon MD, FACC

## 2022-05-02 ENCOUNTER — READMISSION MANAGEMENT (OUTPATIENT)
Dept: CALL CENTER | Facility: HOSPITAL | Age: 87
End: 2022-05-02

## 2022-05-02 ENCOUNTER — OFFICE VISIT (OUTPATIENT)
Dept: CARDIOLOGY | Facility: CLINIC | Age: 87
End: 2022-05-02

## 2022-05-02 VITALS
DIASTOLIC BLOOD PRESSURE: 64 MMHG | HEART RATE: 67 BPM | WEIGHT: 143 LBS | BODY MASS INDEX: 22.98 KG/M2 | OXYGEN SATURATION: 99 % | HEIGHT: 66 IN | SYSTOLIC BLOOD PRESSURE: 112 MMHG

## 2022-05-02 DIAGNOSIS — I27.20 PULMONARY HYPERTENSION: ICD-10-CM

## 2022-05-02 DIAGNOSIS — I48.20 CHRONIC ATRIAL FIBRILLATION: ICD-10-CM

## 2022-05-02 DIAGNOSIS — I50.32 CHRONIC DIASTOLIC CONGESTIVE HEART FAILURE: Primary | ICD-10-CM

## 2022-05-02 DIAGNOSIS — E78.2 MIXED HYPERLIPIDEMIA: ICD-10-CM

## 2022-05-02 PROCEDURE — 93279 PRGRMG DEV EVAL PM/LDLS PM: CPT | Performed by: INTERNAL MEDICINE

## 2022-05-02 PROCEDURE — 99214 OFFICE O/P EST MOD 30 MIN: CPT | Performed by: INTERNAL MEDICINE

## 2022-05-02 RX ORDER — ASPIRIN 81 MG/1
81 TABLET ORAL DAILY
COMMUNITY

## 2022-05-02 NOTE — OUTREACH NOTE
Medical Week 3 Survey    Flowsheet Row Responses   [facility] patient discharged from? [facility]   Does the patient have one of the following disease processes/diagnoses(primary or secondary)? Other   Week 3 attempt successful? Yes   Call start time 1042   Call end time 1044   Discharge diagnosis Acute on Chronic Anemia   Is patient permission given to speak with other caregiver? Yes   List who call center can speak with Spouse Rolf   Person spoke with today (if not patient) and relationship Rolf   Meds reviewed with patient/caregiver? Yes   Is the patient having any side effects they believe may be caused by any medication additions or changes? No   Does the patient have all medications ordered at discharge? Yes   Is the patient taking all medications as directed (includes completed medication regime)? Yes   Does the patient have a primary care provider?  Yes   Does the patient have an appointment with their PCP within 7 days of discharge? Yes   Has the patient kept scheduled appointments due by today? Yes   What is the Home health agency?   [facility], [facility] (resumption of orders)   Has home health visited the patient within 72 hours of discharge? Yes   Psychosocial issues? No   Did the patient receive a copy of their discharge instructions? Yes   Nursing interventions Reviewed instructions with patient   What is the patient's perception of their health status since discharge? Improving   If the patient is a current smoker, are they able to teach back resources for cessation? Not a smoker   Week 3 Call Completed? Yes   Wrap up additional comments Spoke with pt's  who states she is doing better. States she is in the shower at the moment.           MEL HARDIN - Registered Nurse

## 2022-05-03 ENCOUNTER — HOSPITAL ENCOUNTER (OUTPATIENT)
Dept: INFUSION THERAPY | Facility: HOSPITAL | Age: 87
Discharge: HOME | End: 2022-05-03
Admitting: INTERNAL MEDICINE

## 2022-05-03 VITALS — SYSTOLIC BLOOD PRESSURE: 143 MMHG | TEMPERATURE: 98.6 F | HEART RATE: 69 BPM | DIASTOLIC BLOOD PRESSURE: 66 MMHG

## 2022-05-03 DIAGNOSIS — D63.1 ANEMIA OF CHRONIC RENAL FAILURE, STAGE 3B: ICD-10-CM

## 2022-05-03 DIAGNOSIS — E11.8 DM COMPLICATION: ICD-10-CM

## 2022-05-03 DIAGNOSIS — N18.32 ANEMIA OF CHRONIC RENAL FAILURE, STAGE 3B: ICD-10-CM

## 2022-05-03 DIAGNOSIS — D64.9 NORMOCYTIC ANEMIA: ICD-10-CM

## 2022-05-03 DIAGNOSIS — N18.32 STAGE 3B CHRONIC KIDNEY DISEASE: Primary | ICD-10-CM

## 2022-05-03 PROBLEM — N18.9 ANEMIA OF CHRONIC RENAL FAILURE: Status: ACTIVE | Noted: 2022-05-03

## 2022-05-03 LAB
ALBUMIN SERPL-MCNC: 3.6 G/DL (ref 3.5–5.2)
ALBUMIN/GLOB SERPL: 1.2 G/DL
ALP SERPL-CCNC: 129 U/L (ref 39–117)
ALT SERPL W P-5'-P-CCNC: 28 U/L (ref 1–33)
ANION GAP SERPL CALCULATED.3IONS-SCNC: 12.4 MMOL/L (ref 5–15)
AST SERPL-CCNC: 45 U/L (ref 1–32)
BILIRUB SERPL-MCNC: 0.7 MG/DL (ref 0–1.2)
BUN SERPL-MCNC: 27 MG/DL (ref 8–23)
BUN/CREAT SERPL: 22.1 (ref 7–25)
CALCIUM SPEC-SCNC: 9.2 MG/DL (ref 8.6–10.5)
CHLORIDE SERPL-SCNC: 99 MMOL/L (ref 98–107)
CO2 SERPL-SCNC: 25.6 MMOL/L (ref 22–29)
CREAT SERPL-MCNC: 1.22 MG/DL (ref 0.57–1)
DEPRECATED RDW RBC AUTO: 55.8 FL (ref 37–54)
EGFRCR SERPLBLD CKD-EPI 2021: 43 ML/MIN/1.73
ERYTHROCYTE [DISTWIDTH] IN BLOOD BY AUTOMATED COUNT: 17.5 % (ref 12.3–15.4)
GLOBULIN UR ELPH-MCNC: 2.9 GM/DL
GLUCOSE SERPL-MCNC: 253 MG/DL (ref 65–99)
HCT VFR BLD AUTO: 26.5 % (ref 34–46.6)
HGB BLD-MCNC: 8.3 G/DL (ref 12–15.9)
IRON 24H UR-MRATE: 44 MCG/DL (ref 37–145)
IRON SATN MFR SERPL: 11 % (ref 20–50)
MCH RBC QN AUTO: 27.7 PG (ref 26.6–33)
MCHC RBC AUTO-ENTMCNC: 31.3 G/DL (ref 31.5–35.7)
MCV RBC AUTO: 88.3 FL (ref 79–97)
PHOSPHATE SERPL-MCNC: 3.9 MG/DL (ref 2.5–4.5)
PLATELET # BLD AUTO: 176 10*3/MM3 (ref 140–450)
PMV BLD AUTO: 9.7 FL (ref 6–12)
POTASSIUM SERPL-SCNC: 4 MMOL/L (ref 3.5–5.2)
PROT SERPL-MCNC: 6.5 G/DL (ref 6–8.5)
RBC # BLD AUTO: 3 10*6/MM3 (ref 3.77–5.28)
SODIUM SERPL-SCNC: 137 MMOL/L (ref 136–145)
TIBC SERPL-MCNC: 414 MCG/DL (ref 298–536)
TRANSFERRIN SERPL-MCNC: 278 MG/DL (ref 200–360)
WBC NRBC COR # BLD: 7.17 10*3/MM3 (ref 3.4–10.8)

## 2022-05-03 PROCEDURE — 36415 COLL VENOUS BLD VENIPUNCTURE: CPT

## 2022-05-03 PROCEDURE — 80053 COMPREHEN METABOLIC PANEL: CPT | Performed by: INTERNAL MEDICINE

## 2022-05-03 PROCEDURE — 25010000002 EPOETIN ALFA-EPBX 40000 UNIT/ML SOLUTION: Performed by: INTERNAL MEDICINE

## 2022-05-03 PROCEDURE — 84466 ASSAY OF TRANSFERRIN: CPT | Performed by: INTERNAL MEDICINE

## 2022-05-03 PROCEDURE — 84100 ASSAY OF PHOSPHORUS: CPT | Performed by: INTERNAL MEDICINE

## 2022-05-03 PROCEDURE — 85027 COMPLETE CBC AUTOMATED: CPT | Performed by: INTERNAL MEDICINE

## 2022-05-03 PROCEDURE — 96372 THER/PROPH/DIAG INJ SC/IM: CPT

## 2022-05-03 PROCEDURE — 83540 ASSAY OF IRON: CPT | Performed by: INTERNAL MEDICINE

## 2022-05-03 RX ADMIN — EPOETIN ALFA-EPBX 40000 UNITS: 40000 INJECTION, SOLUTION INTRAVENOUS; SUBCUTANEOUS at 14:00

## 2022-05-04 ENCOUNTER — TELEPHONE (OUTPATIENT)
Dept: CARDIOLOGY | Facility: CLINIC | Age: 87
End: 2022-05-04

## 2022-05-04 ENCOUNTER — TELEPHONE (OUTPATIENT)
Dept: INTERNAL MEDICINE | Facility: CLINIC | Age: 87
End: 2022-05-04

## 2022-05-04 NOTE — TELEPHONE ENCOUNTER
Pt seen in office 5/2/2022. She called to clarify if she should restart aspirin or Plavix. She was on ASA/Plavix prior to Eliquis Rx for DVT. Currently off all 3 meds.   Hx PVD, chronic AF.   Still under evaluation for ongoing anemia.     Lab Results   Component Value Date    WBC 7.17 05/03/2022    HGB 8.3 (L) 05/03/2022    HCT 26.5 (L) 05/03/2022    MCV 88.3 05/03/2022     05/03/2022      Will review with [facility]  Restart ECASA 81 mg daily per [facility] - pt aware.

## 2022-05-04 NOTE — TELEPHONE ENCOUNTER
Caller: Brennan Mendenhall    Relationship: Emergency Contact    Best call back number:     [phone]    What is the best time to reach you:     ANY TIME    Who are you requesting to speak with (clinical staff, provider,  specific staff member):     HOMA MAYA    Do you know the name of the person who called:     N/A    What was the call regarding:     CALLER STATED PATIENT JUST FINISHED AN INFUSION AT [facility]    CALLER STATED PATIENT'S HEMOGLOBIN COUNT HAS BEEN DANGEROUSLY LOW    CALLER STATED HE WAS TOLD TO ALERT HOMA MAYA THAT PATIENT MUST HAVE THE LAB CHECK HER HEMOGLOBIN PERIODICALLY    Do you require a callback:     YES    HOMA MAYA

## 2022-05-05 ENCOUNTER — HOSPITAL ENCOUNTER (OUTPATIENT)
Dept: ULTRASOUND IMAGING | Facility: HOSPITAL | Age: 87
Discharge: HOME | End: 2022-05-05
Admitting: INTERNAL MEDICINE

## 2022-05-05 DIAGNOSIS — N18.32 STAGE 3B CHRONIC KIDNEY DISEASE: ICD-10-CM

## 2022-05-05 DIAGNOSIS — D64.9 NORMOCYTIC ANEMIA: ICD-10-CM

## 2022-05-05 DIAGNOSIS — E11.8 MULTIPLE COMPLICATIONS OF TYPE 2 DIABETES MELLITUS: ICD-10-CM

## 2022-05-05 PROCEDURE — 76775 US EXAM ABDO BACK WALL LIM: CPT

## 2022-05-10 ENCOUNTER — READMISSION MANAGEMENT (OUTPATIENT)
Dept: CALL CENTER | Facility: HOSPITAL | Age: 87
End: 2022-05-10

## 2022-05-10 NOTE — OUTREACH NOTE
Medical Week 4 Survey    Flowsheet Row Responses   [facility] patient discharged from? [facility]   Does the patient have one of the following disease processes/diagnoses(primary or secondary)? Other   Week 4 attempt successful? Yes   Call start time 1450   Call end time 1456   Meds reviewed with patient/caregiver? Yes   Is the patient taking all medications as directed (includes completed medication regime)? Yes   Has the patient kept scheduled appointments due by today? Yes   Is the patient still receiving Home Health Services? Yes   What is the patient's perception of their health status since discharge? Improving   Week 4 Call Completed? Yes   Would the patient like one additional call? Yes   Wrap up additional comments Pt is improving. HH and PT continue to visit. Pt has kept all follow up appointments.          ALPHONSE HARDIN - Registered Nurse

## 2022-05-17 ENCOUNTER — READMISSION MANAGEMENT (OUTPATIENT)
Dept: CALL CENTER | Facility: HOSPITAL | Age: 87
End: 2022-05-17

## 2022-05-17 ENCOUNTER — TELEPHONE (OUTPATIENT)
Dept: INTERNAL MEDICINE | Facility: CLINIC | Age: 87
End: 2022-05-17

## 2022-05-17 DIAGNOSIS — D64.9 ACUTE ON CHRONIC ANEMIA: ICD-10-CM

## 2022-05-17 DIAGNOSIS — R53.1 WEAKNESS: Primary | ICD-10-CM

## 2022-05-17 NOTE — TELEPHONE ENCOUNTER
Called spoke to Bindu she is aware that Elizabeth has put in an order to check her Hemoglobin (blood counts) will let her know if abnormal. She is aware that she can come in anytime Monday-Friday 8:30 am to 4:30 pm.

## 2022-05-17 NOTE — TELEPHONE ENCOUNTER
Called spoke to Bindu to see how she is doing, she expressed that she is doing fairly well, still feeling really weak, she denies getting hurt. She does have a walker and a cane in which she uses everyday. Her stamina is not up to pare. She expressed that when she fell there was something in her way that made her go done.

## 2022-05-17 NOTE — OUTREACH NOTE
Medical Week 5 Survey    Flowsheet Row Responses   [facility] patient discharged from? [facility]   Does the patient have one of the following disease processes/diagnoses(primary or secondary)? Other   Week 5 attempt successful? Yes   Call start time 1603   Call end time 1605   Discharge diagnosis Acute on Chronic Anemia   Is patient permission given to speak with other caregiver? Yes   List who call center can speak with Spouse Rolf   Person spoke with today (if not patient) and relationship Rolf   Is the patient taking all medications as directed (includes completed medication regime)? Yes   Has the patient kept scheduled appointments due by today? Yes   Is the patient still receiving Home Health Services? Yes   Home health comments HH coming in may be d/c'd end of the week.    Psychosocial issues? No   What is the patient's perception of their health status since discharge? Improving   Additional teach back comments Eating and drinking well. Enc rest and good nutrition.    Graduated Yes   Is the patient interested in additional calls from an ambulatory ?  NOTE:  applies to high risk patients requiring additional follow-up. No   Did the patient feel the follow up calls were helpful during their recovery period? Yes   Was the number of calls appropriate? Yes          PACHECO BRADOFRD - Registered Nurse

## 2022-05-19 LAB
ERYTHROCYTE [DISTWIDTH] IN BLOOD BY AUTOMATED COUNT: 16.2 % (ref 12.3–15.4)
HCT VFR BLD AUTO: 25 % (ref 34–46.6)
HGB BLD-MCNC: 7.8 G/DL (ref 12–15.9)
MCH RBC QN AUTO: 26.4 PG (ref 26.6–33)
MCHC RBC AUTO-ENTMCNC: 31.2 G/DL (ref 31.5–35.7)
MCV RBC AUTO: 84.5 FL (ref 79–97)
PLATELET # BLD AUTO: 209 10*3/MM3 (ref 140–450)
RBC # BLD AUTO: 2.96 10*6/MM3 (ref 3.77–5.28)
WBC # BLD AUTO: 6.65 10*3/MM3 (ref 3.4–10.8)

## 2022-05-20 PROCEDURE — 93296 REM INTERROG EVL PM/IDS: CPT | Performed by: INTERNAL MEDICINE

## 2022-05-20 PROCEDURE — 93294 REM INTERROG EVL PM/LDLS PM: CPT | Performed by: INTERNAL MEDICINE

## 2022-05-26 DIAGNOSIS — E03.9 HYPOTHYROIDISM, UNSPECIFIED TYPE: ICD-10-CM

## 2022-05-26 RX ORDER — LEVOTHYROXINE SODIUM 0.05 MG/1
50 TABLET ORAL DAILY
Qty: 90 TABLET | Refills: 3 | Status: SHIPPED | OUTPATIENT
Start: 2022-05-26

## 2022-05-26 NOTE — TELEPHONE ENCOUNTER
Rx Refill Note  Requested Prescriptions     Pending Prescriptions Disp Refills   • levothyroxine (SYNTHROID, LEVOTHROID) 50 MCG tablet 90 tablet 3     Sig: Take 1 tablet by mouth Daily.      Last office visit with prescribing clinician: 4/28/2022      Next office visit with prescribing clinician: 11/16/2022            MEG VILLARREAL MA  05/26/22, 10:53 EDT

## 2022-05-31 ENCOUNTER — TELEPHONE (OUTPATIENT)
Dept: INTERNAL MEDICINE | Facility: CLINIC | Age: 87
End: 2022-05-31

## 2022-05-31 NOTE — TELEPHONE ENCOUNTER
We are not testing asymptomatic patients. They are more than welcome to come by and  free at home test or can go to [facility] who is doing COVID test only appointments.

## 2022-05-31 NOTE — TELEPHONE ENCOUNTER
Caller: Emmanuel Sancehz     Relationship to patient:      Best call back number: [phone] HOUSE PHONE- OK TO LEAVE DETAILED MESSAGE     Date of exposure: 5/28/22     Date of positive COVID19 test: REQUESTING TO BE TESTED- NO SYMPTOMS     Date if possible COVID19 exposure: 5/28/22     COVID19 symptoms: NO SYMPTOMS     Date of initial quarantine: YES- 5/28/22     Additional information or concerns: REQUESTING TO BE TESTED     What is the patients preferred pharmacy: [id number] - [place] - [address] - [phone]  - [phone] FX       PLEASE CALL

## 2022-06-08 ENCOUNTER — TRANSCRIBE ORDERS (OUTPATIENT)
Dept: INFUSION THERAPY | Facility: HOSPITAL | Age: 87
End: 2022-06-08

## 2022-06-08 DIAGNOSIS — D63.1 ANEMIA OF CHRONIC RENAL FAILURE, STAGE 3B: Primary | ICD-10-CM

## 2022-06-08 DIAGNOSIS — N18.32 ANEMIA OF CHRONIC RENAL FAILURE, STAGE 3B: Primary | ICD-10-CM

## 2022-06-09 ENCOUNTER — INFUSION (OUTPATIENT)
Dept: ONCOLOGY | Facility: HOSPITAL | Age: 87
End: 2022-06-09

## 2022-06-09 VITALS — SYSTOLIC BLOOD PRESSURE: 140 MMHG | TEMPERATURE: 98.6 F | DIASTOLIC BLOOD PRESSURE: 64 MMHG | HEART RATE: 70 BPM

## 2022-06-09 DIAGNOSIS — E11.8 DM COMPLICATION: ICD-10-CM

## 2022-06-09 DIAGNOSIS — D64.9 NORMOCYTIC ANEMIA: ICD-10-CM

## 2022-06-09 DIAGNOSIS — N18.32 ANEMIA OF CHRONIC RENAL FAILURE, STAGE 3B: ICD-10-CM

## 2022-06-09 DIAGNOSIS — N18.32 STAGE 3B CHRONIC KIDNEY DISEASE: Primary | ICD-10-CM

## 2022-06-09 DIAGNOSIS — D63.1 ANEMIA OF CHRONIC RENAL FAILURE, STAGE 3B: ICD-10-CM

## 2022-06-09 LAB
DEPRECATED RDW RBC AUTO: 54.6 FL (ref 37–54)
ERYTHROCYTE [DISTWIDTH] IN BLOOD BY AUTOMATED COUNT: 17.4 % (ref 12.3–15.4)
FERRITIN SERPL-MCNC: 43.61 NG/ML (ref 13–150)
HCT VFR BLD AUTO: 25.1 % (ref 34–46.6)
HGB BLD-MCNC: 7.7 G/DL (ref 12–15.9)
IRON 24H UR-MRATE: 82 MCG/DL (ref 37–145)
IRON SATN MFR SERPL: 16 % (ref 20–50)
MCH RBC QN AUTO: 26.2 PG (ref 26.6–33)
MCHC RBC AUTO-ENTMCNC: 30.7 G/DL (ref 31.5–35.7)
MCV RBC AUTO: 85.4 FL (ref 79–97)
PLATELET # BLD AUTO: 179 10*3/MM3 (ref 140–450)
PMV BLD AUTO: 9 FL (ref 6–12)
RBC # BLD AUTO: 2.94 10*6/MM3 (ref 3.77–5.28)
TIBC SERPL-MCNC: 516 MCG/DL (ref 298–536)
TRANSFERRIN SERPL-MCNC: 346 MG/DL (ref 200–360)
WBC NRBC COR # BLD: 6.52 10*3/MM3 (ref 3.4–10.8)

## 2022-06-09 PROCEDURE — 36415 COLL VENOUS BLD VENIPUNCTURE: CPT

## 2022-06-09 PROCEDURE — 84466 ASSAY OF TRANSFERRIN: CPT

## 2022-06-09 PROCEDURE — 85027 COMPLETE CBC AUTOMATED: CPT

## 2022-06-09 PROCEDURE — 82728 ASSAY OF FERRITIN: CPT

## 2022-06-09 PROCEDURE — 96372 THER/PROPH/DIAG INJ SC/IM: CPT

## 2022-06-09 PROCEDURE — 25010000002 EPOETIN ALFA-EPBX 40000 UNIT/ML SOLUTION: Performed by: INTERNAL MEDICINE

## 2022-06-09 PROCEDURE — 83540 ASSAY OF IRON: CPT

## 2022-06-09 RX ADMIN — EPOETIN ALFA-EPBX 40000 UNITS: 40000 INJECTION, SOLUTION INTRAVENOUS; SUBCUTANEOUS at 15:10

## 2022-06-23 ENCOUNTER — HOSPITAL ENCOUNTER (OUTPATIENT)
Dept: INFUSION THERAPY | Facility: HOSPITAL | Age: 87
Discharge: HOME | End: 2022-06-23
Admitting: NURSE PRACTITIONER

## 2022-06-23 VITALS
OXYGEN SATURATION: 98 % | TEMPERATURE: 97.8 F | RESPIRATION RATE: 18 BRPM | SYSTOLIC BLOOD PRESSURE: 133 MMHG | HEART RATE: 70 BPM | DIASTOLIC BLOOD PRESSURE: 59 MMHG

## 2022-06-23 DIAGNOSIS — D63.1 ANEMIA OF CHRONIC RENAL FAILURE, STAGE 3B: Primary | ICD-10-CM

## 2022-06-23 DIAGNOSIS — N18.32 STAGE 3B CHRONIC KIDNEY DISEASE: ICD-10-CM

## 2022-06-23 DIAGNOSIS — E11.8 DM COMPLICATION: ICD-10-CM

## 2022-06-23 DIAGNOSIS — N18.32 ANEMIA OF CHRONIC RENAL FAILURE, STAGE 3B: Primary | ICD-10-CM

## 2022-06-23 DIAGNOSIS — D64.9 NORMOCYTIC ANEMIA: ICD-10-CM

## 2022-06-23 LAB
DEPRECATED RDW RBC AUTO: 51.1 FL (ref 37–54)
ERYTHROCYTE [DISTWIDTH] IN BLOOD BY AUTOMATED COUNT: 17.1 % (ref 12.3–15.4)
HCT VFR BLD AUTO: 25 % (ref 34–46.6)
HGB BLD-MCNC: 7.7 G/DL (ref 12–15.9)
MCH RBC QN AUTO: 25.2 PG (ref 26.6–33)
MCHC RBC AUTO-ENTMCNC: 30.8 G/DL (ref 31.5–35.7)
MCV RBC AUTO: 81.7 FL (ref 79–97)
PLATELET # BLD AUTO: 154 10*3/MM3 (ref 140–450)
PMV BLD AUTO: 10.1 FL (ref 6–12)
RBC # BLD AUTO: 3.06 10*6/MM3 (ref 3.77–5.28)
WBC NRBC COR # BLD: 6.67 10*3/MM3 (ref 3.4–10.8)

## 2022-06-23 PROCEDURE — 96372 THER/PROPH/DIAG INJ SC/IM: CPT

## 2022-06-23 PROCEDURE — 25010000002 IRON SUCROSE PER 1 MG: Performed by: NURSE PRACTITIONER

## 2022-06-23 PROCEDURE — 96365 THER/PROPH/DIAG IV INF INIT: CPT

## 2022-06-23 PROCEDURE — 25010000002 EPOETIN ALFA-EPBX 40000 UNIT/ML SOLUTION: Performed by: INTERNAL MEDICINE

## 2022-06-23 PROCEDURE — 85027 COMPLETE CBC AUTOMATED: CPT | Performed by: NURSE PRACTITIONER

## 2022-06-23 RX ORDER — SODIUM CHLORIDE 9 MG/ML
250 INJECTION, SOLUTION INTRAVENOUS ONCE
Status: DISCONTINUED | OUTPATIENT
Start: 2022-06-23 | End: 2022-06-25 | Stop reason: HOSPADM

## 2022-06-23 RX ORDER — SODIUM CHLORIDE 9 MG/ML
250 INJECTION, SOLUTION INTRAVENOUS ONCE
Status: CANCELLED | OUTPATIENT
Start: 2022-06-30

## 2022-06-23 RX ORDER — SPIRONOLACTONE 25 MG/1
25 TABLET ORAL
COMMUNITY
Start: 2022-05-24 | End: 2022-11-16

## 2022-06-23 RX ADMIN — IRON SUCROSE 200 MG: 20 INJECTION, SOLUTION INTRAVENOUS at 13:44

## 2022-06-23 RX ADMIN — EPOETIN ALFA-EPBX 40000 UNITS: 40000 INJECTION, SOLUTION INTRAVENOUS; SUBCUTANEOUS at 14:19

## 2022-06-30 ENCOUNTER — HOSPITAL ENCOUNTER (OUTPATIENT)
Dept: INFUSION THERAPY | Facility: HOSPITAL | Age: 87
Discharge: HOME | End: 2022-06-30
Admitting: NURSE PRACTITIONER

## 2022-06-30 VITALS
HEART RATE: 80 BPM | SYSTOLIC BLOOD PRESSURE: 137 MMHG | TEMPERATURE: 98 F | DIASTOLIC BLOOD PRESSURE: 63 MMHG | RESPIRATION RATE: 16 BRPM

## 2022-06-30 DIAGNOSIS — N18.32 ANEMIA OF CHRONIC RENAL FAILURE, STAGE 3B: Primary | ICD-10-CM

## 2022-06-30 DIAGNOSIS — D63.1 ANEMIA OF CHRONIC RENAL FAILURE, STAGE 3B: Primary | ICD-10-CM

## 2022-06-30 PROCEDURE — 25010000002 IRON SUCROSE PER 1 MG: Performed by: NURSE PRACTITIONER

## 2022-06-30 PROCEDURE — 96365 THER/PROPH/DIAG IV INF INIT: CPT

## 2022-06-30 RX ORDER — SODIUM CHLORIDE 9 MG/ML
250 INJECTION, SOLUTION INTRAVENOUS ONCE
Status: DISCONTINUED | OUTPATIENT
Start: 2022-06-30 | End: 2022-07-02 | Stop reason: HOSPADM

## 2022-06-30 RX ORDER — SODIUM CHLORIDE 9 MG/ML
250 INJECTION, SOLUTION INTRAVENOUS ONCE
Status: CANCELLED | OUTPATIENT
Start: 2022-07-07

## 2022-06-30 RX ADMIN — IRON SUCROSE 200 MG: 20 INJECTION, SOLUTION INTRAVENOUS at 13:48

## 2022-07-07 ENCOUNTER — HOSPITAL ENCOUNTER (OUTPATIENT)
Dept: INFUSION THERAPY | Facility: HOSPITAL | Age: 87
Discharge: HOME | End: 2022-07-07
Admitting: NURSE PRACTITIONER

## 2022-07-07 VITALS — DIASTOLIC BLOOD PRESSURE: 79 MMHG | SYSTOLIC BLOOD PRESSURE: 131 MMHG | TEMPERATURE: 97.5 F

## 2022-07-07 DIAGNOSIS — D64.9 NORMOCYTIC ANEMIA: ICD-10-CM

## 2022-07-07 DIAGNOSIS — D63.1 ANEMIA OF CHRONIC RENAL FAILURE, STAGE 3B: Primary | ICD-10-CM

## 2022-07-07 DIAGNOSIS — E11.8 DM COMPLICATION: ICD-10-CM

## 2022-07-07 DIAGNOSIS — N18.32 ANEMIA OF CHRONIC RENAL FAILURE, STAGE 3B: Primary | ICD-10-CM

## 2022-07-07 DIAGNOSIS — N18.32 STAGE 3B CHRONIC KIDNEY DISEASE: ICD-10-CM

## 2022-07-07 LAB
BASOPHILS # BLD AUTO: 0.04 10*3/MM3 (ref 0–0.2)
BASOPHILS NFR BLD AUTO: 0.7 % (ref 0–1.5)
DEPRECATED RDW RBC AUTO: 60.7 FL (ref 37–54)
EOSINOPHIL # BLD AUTO: 0.11 10*3/MM3 (ref 0–0.4)
EOSINOPHIL NFR BLD AUTO: 1.9 % (ref 0.3–6.2)
ERYTHROCYTE [DISTWIDTH] IN BLOOD BY AUTOMATED COUNT: 20 % (ref 12.3–15.4)
HCT VFR BLD AUTO: 29.2 % (ref 34–46.6)
HGB BLD-MCNC: 8.7 G/DL (ref 12–15.9)
HYPOCHROMIA BLD QL: NORMAL
IMM GRANULOCYTES # BLD AUTO: 0.02 10*3/MM3 (ref 0–0.05)
IMM GRANULOCYTES NFR BLD AUTO: 0.3 % (ref 0–0.5)
LYMPHOCYTES # BLD AUTO: 1.66 10*3/MM3 (ref 0.7–3.1)
LYMPHOCYTES NFR BLD AUTO: 28.7 % (ref 19.6–45.3)
MCH RBC QN AUTO: 25.3 PG (ref 26.6–33)
MCHC RBC AUTO-ENTMCNC: 29.8 G/DL (ref 31.5–35.7)
MCV RBC AUTO: 84.9 FL (ref 79–97)
MONOCYTES # BLD AUTO: 0.45 10*3/MM3 (ref 0.1–0.9)
MONOCYTES NFR BLD AUTO: 7.8 % (ref 5–12)
NEUTROPHILS NFR BLD AUTO: 3.51 10*3/MM3 (ref 1.7–7)
NEUTROPHILS NFR BLD AUTO: 60.6 % (ref 42.7–76)
NRBC BLD AUTO-RTO: 0 /100 WBC (ref 0–0.2)
OVALOCYTES BLD QL SMEAR: NORMAL
PLATELET # BLD AUTO: 141 10*3/MM3 (ref 140–450)
PMV BLD AUTO: 9.8 FL (ref 6–12)
RBC # BLD AUTO: 3.44 10*6/MM3 (ref 3.77–5.28)
SMALL PLATELETS BLD QL SMEAR: ADEQUATE
WBC MORPH BLD: NORMAL
WBC NRBC COR # BLD: 5.79 10*3/MM3 (ref 3.4–10.8)

## 2022-07-07 PROCEDURE — 25010000002 IRON SUCROSE PER 1 MG: Performed by: NURSE PRACTITIONER

## 2022-07-07 PROCEDURE — 96365 THER/PROPH/DIAG IV INF INIT: CPT

## 2022-07-07 PROCEDURE — 85025 COMPLETE CBC W/AUTO DIFF WBC: CPT | Performed by: NURSE PRACTITIONER

## 2022-07-07 PROCEDURE — 85007 BL SMEAR W/DIFF WBC COUNT: CPT | Performed by: NURSE PRACTITIONER

## 2022-07-07 PROCEDURE — 25010000002 EPOETIN ALFA-EPBX 40000 UNIT/ML SOLUTION: Performed by: INTERNAL MEDICINE

## 2022-07-07 PROCEDURE — 96372 THER/PROPH/DIAG INJ SC/IM: CPT

## 2022-07-07 RX ORDER — SODIUM CHLORIDE 9 MG/ML
250 INJECTION, SOLUTION INTRAVENOUS ONCE
Status: DISCONTINUED | OUTPATIENT
Start: 2022-07-07 | End: 2022-07-09 | Stop reason: HOSPADM

## 2022-07-07 RX ORDER — SODIUM CHLORIDE 9 MG/ML
250 INJECTION, SOLUTION INTRAVENOUS ONCE
Status: CANCELLED | OUTPATIENT
Start: 2022-07-14

## 2022-07-07 RX ADMIN — EPOETIN ALFA-EPBX 40000 UNITS: 40000 INJECTION, SOLUTION INTRAVENOUS; SUBCUTANEOUS at 14:50

## 2022-07-07 RX ADMIN — IRON SUCROSE 200 MG: 20 INJECTION, SOLUTION INTRAVENOUS at 14:09

## 2022-07-08 ENCOUNTER — APPOINTMENT (OUTPATIENT)
Dept: INFUSION THERAPY | Facility: HOSPITAL | Age: 87
End: 2022-07-08

## 2022-07-13 NOTE — TELEPHONE ENCOUNTER
Caller: Brennan Mendenhall    Relationship: Emergency Contact    Best call back number: [phone]    Requested Prescriptions:   Requested Prescriptions     Pending Prescriptions Disp Refills   • furosemide (LASIX) 20 MG tablet 90 tablet 1     Sig: Take 1 tablet by mouth Daily.        Pharmacy where request should be sent: [id number] [place], [place] - [address] - [phone]  - [phone] FX     Additional details provided by patient: PATIENT IS OUT OF THIS MEDICATION.    Does the patient have less than a 3 day supply:  [x] Yes  [] No    Marina Espinosa Rep   07/13/22 13:21 EDT

## 2022-07-14 ENCOUNTER — HOSPITAL ENCOUNTER (OUTPATIENT)
Dept: INFUSION THERAPY | Facility: HOSPITAL | Age: 87
Setting detail: INFUSION SERIES
Discharge: HOME | End: 2022-07-14

## 2022-07-14 VITALS
SYSTOLIC BLOOD PRESSURE: 145 MMHG | HEART RATE: 98 BPM | RESPIRATION RATE: 16 BRPM | OXYGEN SATURATION: 94 % | DIASTOLIC BLOOD PRESSURE: 75 MMHG | TEMPERATURE: 97.8 F

## 2022-07-14 DIAGNOSIS — N18.32 ANEMIA OF CHRONIC RENAL FAILURE, STAGE 3B: Primary | ICD-10-CM

## 2022-07-14 DIAGNOSIS — D63.1 ANEMIA OF CHRONIC RENAL FAILURE, STAGE 3B: Primary | ICD-10-CM

## 2022-07-14 PROCEDURE — 25010000002 IRON SUCROSE PER 1 MG: Performed by: NURSE PRACTITIONER

## 2022-07-14 PROCEDURE — 96365 THER/PROPH/DIAG IV INF INIT: CPT

## 2022-07-14 RX ORDER — FUROSEMIDE 20 MG/1
20 TABLET ORAL DAILY
Qty: 90 TABLET | Refills: 1 | Status: SHIPPED | OUTPATIENT
Start: 2022-07-14 | End: 2022-12-02 | Stop reason: SDUPTHER

## 2022-07-14 RX ORDER — SODIUM CHLORIDE 9 MG/ML
250 INJECTION, SOLUTION INTRAVENOUS ONCE
Status: CANCELLED | OUTPATIENT
Start: 2022-07-21

## 2022-07-14 RX ORDER — FUROSEMIDE 20 MG/1
20 TABLET ORAL DAILY
Qty: 90 TABLET | Refills: 1 | Status: CANCELLED | OUTPATIENT
Start: 2022-07-14

## 2022-07-14 RX ORDER — SODIUM CHLORIDE 9 MG/ML
250 INJECTION, SOLUTION INTRAVENOUS ONCE
Status: DISCONTINUED | OUTPATIENT
Start: 2022-07-14 | End: 2022-07-16 | Stop reason: HOSPADM

## 2022-07-14 RX ADMIN — IRON SUCROSE 200 MG: 20 INJECTION, SOLUTION INTRAVENOUS at 13:47

## 2022-07-14 NOTE — TELEPHONE ENCOUNTER
Caller: Brennan Mendenhall    Relationship: Emergency Contact    Best call back number: [phone]    Requested Prescriptions:   Requested Prescriptions     Pending Prescriptions Disp Refills   • furosemide (LASIX) 20 MG tablet 90 tablet 1     Sig: Take 1 tablet by mouth Daily.        Pharmacy where request should be sent: [id number], [place] - [address] [facility] - [phone]  - [phone] FX     Additional details provided by patient: PATIENT IS COMPLETELY OUT OF THIS MEDICATION    Does the patient have less than a 3 day supply:  [x] Yes  [] No    Marina Garner Rep   07/14/22 12:24 EDT

## 2022-07-14 NOTE — TELEPHONE ENCOUNTER
Rx Refill Note  Requested Prescriptions     Pending Prescriptions Disp Refills   • furosemide (LASIX) 20 MG tablet 90 tablet 1     Sig: Take 1 tablet by mouth Daily.      Last office visit with prescribing clinician: 4/28/2022      Next office visit with prescribing clinician: 11/16/2022            LISANDRO FELIPE MA  07/14/22, 14:40 EDT

## 2022-07-21 ENCOUNTER — HOSPITAL ENCOUNTER (OUTPATIENT)
Dept: INFUSION THERAPY | Facility: HOSPITAL | Age: 87
Setting detail: INFUSION SERIES
Discharge: HOME | End: 2022-07-21

## 2022-07-21 VITALS
DIASTOLIC BLOOD PRESSURE: 70 MMHG | TEMPERATURE: 98.4 F | SYSTOLIC BLOOD PRESSURE: 151 MMHG | HEART RATE: 70 BPM | OXYGEN SATURATION: 96 % | RESPIRATION RATE: 20 BRPM

## 2022-07-21 DIAGNOSIS — N18.32 ANEMIA OF CHRONIC RENAL FAILURE, STAGE 3B: Primary | ICD-10-CM

## 2022-07-21 DIAGNOSIS — D63.1 ANEMIA OF CHRONIC RENAL FAILURE, STAGE 3B: Primary | ICD-10-CM

## 2022-07-21 PROCEDURE — 96365 THER/PROPH/DIAG IV INF INIT: CPT

## 2022-07-21 PROCEDURE — 25010000002 IRON SUCROSE PER 1 MG: Performed by: NURSE PRACTITIONER

## 2022-07-21 RX ORDER — SODIUM CHLORIDE 9 MG/ML
250 INJECTION, SOLUTION INTRAVENOUS ONCE
Status: DISCONTINUED | OUTPATIENT
Start: 2022-07-21 | End: 2022-07-23 | Stop reason: HOSPADM

## 2022-07-21 RX ORDER — SODIUM CHLORIDE 9 MG/ML
250 INJECTION, SOLUTION INTRAVENOUS ONCE
Status: CANCELLED | OUTPATIENT
Start: 2022-07-21

## 2022-07-21 RX ADMIN — IRON SUCROSE 200 MG: 20 INJECTION, SOLUTION INTRAVENOUS at 13:41

## 2022-08-04 ENCOUNTER — HOSPITAL ENCOUNTER (OUTPATIENT)
Dept: INFUSION THERAPY | Facility: HOSPITAL | Age: 87
Discharge: HOME | End: 2022-08-04
Admitting: NURSE PRACTITIONER

## 2022-08-04 VITALS
OXYGEN SATURATION: 98 % | HEART RATE: 69 BPM | SYSTOLIC BLOOD PRESSURE: 107 MMHG | RESPIRATION RATE: 16 BRPM | DIASTOLIC BLOOD PRESSURE: 67 MMHG | TEMPERATURE: 98 F

## 2022-08-04 DIAGNOSIS — D63.1 ANEMIA OF CHRONIC RENAL FAILURE, STAGE 3B: ICD-10-CM

## 2022-08-04 DIAGNOSIS — N18.32 STAGE 3B CHRONIC KIDNEY DISEASE: Primary | ICD-10-CM

## 2022-08-04 DIAGNOSIS — N18.32 ANEMIA OF CHRONIC RENAL FAILURE, STAGE 3B: ICD-10-CM

## 2022-08-04 DIAGNOSIS — E11.8 DM COMPLICATION: ICD-10-CM

## 2022-08-04 DIAGNOSIS — D64.9 NORMOCYTIC ANEMIA: ICD-10-CM

## 2022-08-04 LAB
DEPRECATED RDW RBC AUTO: 66.6 FL (ref 37–54)
ERYTHROCYTE [DISTWIDTH] IN BLOOD BY AUTOMATED COUNT: 21.3 % (ref 12.3–15.4)
HCT VFR BLD AUTO: 30.3 % (ref 34–46.6)
HGB BLD-MCNC: 9.5 G/DL (ref 12–15.9)
MCH RBC QN AUTO: 26.5 PG (ref 26.6–33)
MCHC RBC AUTO-ENTMCNC: 31.4 G/DL (ref 31.5–35.7)
MCV RBC AUTO: 84.6 FL (ref 79–97)
PLATELET # BLD AUTO: 106 10*3/MM3 (ref 140–450)
PMV BLD AUTO: 10.2 FL (ref 6–12)
RBC # BLD AUTO: 3.58 10*6/MM3 (ref 3.77–5.28)
WBC NRBC COR # BLD: 4.43 10*3/MM3 (ref 3.4–10.8)

## 2022-08-04 PROCEDURE — 96372 THER/PROPH/DIAG INJ SC/IM: CPT

## 2022-08-04 PROCEDURE — 36415 COLL VENOUS BLD VENIPUNCTURE: CPT

## 2022-08-04 PROCEDURE — 85027 COMPLETE CBC AUTOMATED: CPT | Performed by: NURSE PRACTITIONER

## 2022-08-04 PROCEDURE — 25010000002 EPOETIN ALFA-EPBX 40000 UNIT/ML SOLUTION: Performed by: INTERNAL MEDICINE

## 2022-08-04 RX ADMIN — EPOETIN ALFA-EPBX 40000 UNITS: 40000 INJECTION, SOLUTION INTRAVENOUS; SUBCUTANEOUS at 14:40

## 2022-08-10 ENCOUNTER — TELEPHONE (OUTPATIENT)
Dept: CARDIOLOGY | Facility: HOSPITAL | Age: 87
End: 2022-08-10

## 2022-08-10 NOTE — TELEPHONE ENCOUNTER
I called and spoke with Pt's  to attempt manual transmission on 8/9/2022  We reset the home monitor device x2 and made it to the tower on the device.  I told him I would call on 8/10/2022 to tell him if the transmission made it through.    I attempted to call pt today 8/10/2022 with no answer and was unable to leave a message.  I then called the pt's daughter, Janneth, and left message asking if she could assist he parents in checking connections and sending manual transmission.  I left my name and device number for assistance if needed.

## 2022-08-16 ENCOUNTER — TELEPHONE (OUTPATIENT)
Dept: CARDIOLOGY | Facility: HOSPITAL | Age: 87
End: 2022-08-16

## 2022-08-16 NOTE — TELEPHONE ENCOUNTER
I received a call from Mr Laura, Ms Lynne Story's , asking if we had received a transmission from his wife's device.  I told him we received a transmission on 8/15/2022, it has been read and waiting for approval.  I also told him that everything was normal on that transmission.  I assured him that we will continue to monitor her transmissions.

## 2022-08-19 PROCEDURE — 93294 REM INTERROG EVL PM/LDLS PM: CPT | Performed by: INTERNAL MEDICINE

## 2022-08-19 PROCEDURE — 93296 REM INTERROG EVL PM/IDS: CPT | Performed by: INTERNAL MEDICINE

## 2022-09-01 ENCOUNTER — HOSPITAL ENCOUNTER (OUTPATIENT)
Dept: INFUSION THERAPY | Facility: HOSPITAL | Age: 87
Discharge: HOME | End: 2022-09-01
Admitting: NURSE PRACTITIONER

## 2022-09-01 VITALS
DIASTOLIC BLOOD PRESSURE: 60 MMHG | TEMPERATURE: 97.8 F | OXYGEN SATURATION: 100 % | RESPIRATION RATE: 18 BRPM | HEART RATE: 71 BPM | SYSTOLIC BLOOD PRESSURE: 132 MMHG

## 2022-09-01 DIAGNOSIS — D64.9 NORMOCYTIC ANEMIA: ICD-10-CM

## 2022-09-01 DIAGNOSIS — N18.32 ANEMIA OF CHRONIC RENAL FAILURE, STAGE 3B: ICD-10-CM

## 2022-09-01 DIAGNOSIS — E11.8 DM COMPLICATION: ICD-10-CM

## 2022-09-01 DIAGNOSIS — D63.1 ANEMIA OF CHRONIC RENAL FAILURE, STAGE 3B: ICD-10-CM

## 2022-09-01 DIAGNOSIS — N18.32 STAGE 3B CHRONIC KIDNEY DISEASE: Primary | ICD-10-CM

## 2022-09-01 LAB
ALBUMIN SERPL-MCNC: 4.4 G/DL (ref 3.5–5.2)
ANION GAP SERPL CALCULATED.3IONS-SCNC: 9.1 MMOL/L (ref 5–15)
BUN SERPL-MCNC: 44 MG/DL (ref 8–23)
BUN/CREAT SERPL: 25.9 (ref 7–25)
CALCIUM SPEC-SCNC: 10.3 MG/DL (ref 8.6–10.5)
CHLORIDE SERPL-SCNC: 99 MMOL/L (ref 98–107)
CO2 SERPL-SCNC: 29.9 MMOL/L (ref 22–29)
CREAT SERPL-MCNC: 1.7 MG/DL (ref 0.57–1)
DEPRECATED RDW RBC AUTO: 63.4 FL (ref 37–54)
EGFRCR SERPLBLD CKD-EPI 2021: 28.9 ML/MIN/1.73
ERYTHROCYTE [DISTWIDTH] IN BLOOD BY AUTOMATED COUNT: 20.1 % (ref 12.3–15.4)
GLUCOSE SERPL-MCNC: 147 MG/DL (ref 65–99)
HCT VFR BLD AUTO: 33.7 % (ref 34–46.6)
HGB BLD-MCNC: 11 G/DL (ref 12–15.9)
IRON 24H UR-MRATE: 105 MCG/DL (ref 37–145)
IRON SATN MFR SERPL: 29 % (ref 20–50)
MCH RBC QN AUTO: 27.8 PG (ref 26.6–33)
MCHC RBC AUTO-ENTMCNC: 32.6 G/DL (ref 31.5–35.7)
MCV RBC AUTO: 85.1 FL (ref 79–97)
PHOSPHATE SERPL-MCNC: 4.1 MG/DL (ref 2.5–4.5)
PLATELET # BLD AUTO: 101 10*3/MM3 (ref 140–450)
PMV BLD AUTO: 10.2 FL (ref 6–12)
POTASSIUM SERPL-SCNC: 4.7 MMOL/L (ref 3.5–5.2)
RBC # BLD AUTO: 3.96 10*6/MM3 (ref 3.77–5.28)
SODIUM SERPL-SCNC: 138 MMOL/L (ref 136–145)
TIBC SERPL-MCNC: 368 MCG/DL (ref 298–536)
TRANSFERRIN SERPL-MCNC: 247 MG/DL (ref 200–360)
WBC NRBC COR # BLD: 5.75 10*3/MM3 (ref 3.4–10.8)

## 2022-09-01 PROCEDURE — 36415 COLL VENOUS BLD VENIPUNCTURE: CPT

## 2022-09-01 PROCEDURE — 96372 THER/PROPH/DIAG INJ SC/IM: CPT

## 2022-09-01 PROCEDURE — 80069 RENAL FUNCTION PANEL: CPT | Performed by: NURSE PRACTITIONER

## 2022-09-01 PROCEDURE — 84466 ASSAY OF TRANSFERRIN: CPT | Performed by: NURSE PRACTITIONER

## 2022-09-01 PROCEDURE — 25010000002 EPOETIN ALFA-EPBX 20000 UNIT/ML SOLUTION: Performed by: INTERNAL MEDICINE

## 2022-09-01 PROCEDURE — 85027 COMPLETE CBC AUTOMATED: CPT | Performed by: NURSE PRACTITIONER

## 2022-09-01 PROCEDURE — 83540 ASSAY OF IRON: CPT | Performed by: NURSE PRACTITIONER

## 2022-09-01 RX ADMIN — EPOETIN ALFA-EPBX 40000 UNITS: 20000 INJECTION, SOLUTION INTRAVENOUS; SUBCUTANEOUS at 14:58

## 2022-09-29 ENCOUNTER — HOSPITAL ENCOUNTER (OUTPATIENT)
Dept: INFUSION THERAPY | Facility: HOSPITAL | Age: 87
Discharge: HOME | End: 2022-09-29
Admitting: NURSE PRACTITIONER

## 2022-09-29 VITALS
RESPIRATION RATE: 16 BRPM | DIASTOLIC BLOOD PRESSURE: 71 MMHG | HEART RATE: 90 BPM | TEMPERATURE: 97.5 F | SYSTOLIC BLOOD PRESSURE: 128 MMHG | OXYGEN SATURATION: 98 %

## 2022-09-29 DIAGNOSIS — N18.32 ANEMIA OF CHRONIC RENAL FAILURE, STAGE 3B: ICD-10-CM

## 2022-09-29 DIAGNOSIS — D64.9 NORMOCYTIC ANEMIA: ICD-10-CM

## 2022-09-29 DIAGNOSIS — N18.32 STAGE 3B CHRONIC KIDNEY DISEASE: Primary | ICD-10-CM

## 2022-09-29 DIAGNOSIS — D63.1 ANEMIA OF CHRONIC RENAL FAILURE, STAGE 3B: ICD-10-CM

## 2022-09-29 DIAGNOSIS — E11.8 DM COMPLICATION: ICD-10-CM

## 2022-09-29 LAB
DEPRECATED RDW RBC AUTO: 60.6 FL (ref 37–54)
ERYTHROCYTE [DISTWIDTH] IN BLOOD BY AUTOMATED COUNT: 18.2 % (ref 12.3–15.4)
HCT VFR BLD AUTO: 33 % (ref 34–46.6)
HGB BLD-MCNC: 10.6 G/DL (ref 12–15.9)
MCH RBC QN AUTO: 28.9 PG (ref 26.6–33)
MCHC RBC AUTO-ENTMCNC: 32.1 G/DL (ref 31.5–35.7)
MCV RBC AUTO: 89.9 FL (ref 79–97)
PLATELET # BLD AUTO: 112 10*3/MM3 (ref 140–450)
PMV BLD AUTO: 9.9 FL (ref 6–12)
RBC # BLD AUTO: 3.67 10*6/MM3 (ref 3.77–5.28)
WBC NRBC COR # BLD: 6.06 10*3/MM3 (ref 3.4–10.8)

## 2022-09-29 PROCEDURE — 96372 THER/PROPH/DIAG INJ SC/IM: CPT

## 2022-09-29 PROCEDURE — 85027 COMPLETE CBC AUTOMATED: CPT | Performed by: NURSE PRACTITIONER

## 2022-09-29 PROCEDURE — 25010000002 EPOETIN ALFA-EPBX 40000 UNIT/ML SOLUTION: Performed by: INTERNAL MEDICINE

## 2022-09-29 PROCEDURE — 36415 COLL VENOUS BLD VENIPUNCTURE: CPT

## 2022-09-29 RX ADMIN — EPOETIN ALFA-EPBX 40000 UNITS: 40000 INJECTION, SOLUTION INTRAVENOUS; SUBCUTANEOUS at 13:44

## 2022-10-27 ENCOUNTER — HOSPITAL ENCOUNTER (OUTPATIENT)
Dept: INFUSION THERAPY | Facility: HOSPITAL | Age: 87
Discharge: HOME | End: 2022-10-27
Admitting: INTERNAL MEDICINE

## 2022-10-27 DIAGNOSIS — D64.9 NORMOCYTIC ANEMIA: ICD-10-CM

## 2022-10-27 DIAGNOSIS — E11.8 DM COMPLICATION: ICD-10-CM

## 2022-10-27 DIAGNOSIS — N18.32 STAGE 3B CHRONIC KIDNEY DISEASE: Primary | ICD-10-CM

## 2022-10-27 DIAGNOSIS — D63.1 ANEMIA OF CHRONIC RENAL FAILURE, STAGE 3B: ICD-10-CM

## 2022-10-27 DIAGNOSIS — N18.32 ANEMIA OF CHRONIC RENAL FAILURE, STAGE 3B: ICD-10-CM

## 2022-10-27 LAB
DEPRECATED RDW RBC AUTO: 54.7 FL (ref 37–54)
ERYTHROCYTE [DISTWIDTH] IN BLOOD BY AUTOMATED COUNT: 16.1 % (ref 12.3–15.4)
HCT VFR BLD AUTO: 28.6 % (ref 34–46.6)
HGB BLD-MCNC: 9.1 G/DL (ref 12–15.9)
MCH RBC QN AUTO: 29.9 PG (ref 26.6–33)
MCHC RBC AUTO-ENTMCNC: 31.8 G/DL (ref 31.5–35.7)
MCV RBC AUTO: 94.1 FL (ref 79–97)
PLATELET # BLD AUTO: 107 10*3/MM3 (ref 140–450)
PMV BLD AUTO: 9.7 FL (ref 6–12)
RBC # BLD AUTO: 3.04 10*6/MM3 (ref 3.77–5.28)
WBC NRBC COR # BLD: 5.3 10*3/MM3 (ref 3.4–10.8)

## 2022-10-27 PROCEDURE — 36415 COLL VENOUS BLD VENIPUNCTURE: CPT

## 2022-10-27 PROCEDURE — 96372 THER/PROPH/DIAG INJ SC/IM: CPT

## 2022-10-27 PROCEDURE — 85027 COMPLETE CBC AUTOMATED: CPT | Performed by: NURSE PRACTITIONER

## 2022-10-27 PROCEDURE — 25010000002 EPOETIN ALFA-EPBX 40000 UNIT/ML SOLUTION: Performed by: INTERNAL MEDICINE

## 2022-10-27 RX ADMIN — EPOETIN ALFA-EPBX 40000 UNITS: 40000 INJECTION, SOLUTION INTRAVENOUS; SUBCUTANEOUS at 14:50

## 2022-10-31 RX ORDER — PANTOPRAZOLE SODIUM 40 MG/1
TABLET, DELAYED RELEASE ORAL
Qty: 90 TABLET | Refills: 0 | Status: SHIPPED | OUTPATIENT
Start: 2022-10-31 | End: 2022-12-05

## 2022-11-02 DIAGNOSIS — E11.29 CONTROLLED TYPE 2 DIABETES MELLITUS WITH OTHER DIABETIC KIDNEY COMPLICATION, WITHOUT LONG-TERM CURRENT USE OF INSULIN: ICD-10-CM

## 2022-11-02 NOTE — TELEPHONE ENCOUNTER
Rx Refill Note  Requested Prescriptions     Pending Prescriptions Disp Refills   • linagliptin (Tradjenta) 5 MG tablet tablet 90 tablet 1     Sig: Take 1 tablet by mouth Daily.      Last office visit with prescribing clinician: 4/28/2022      Next office visit with prescribing clinician: 11/16/2022            Jaswinder Segundo MA  11/02/22, 14:31 EDT

## 2022-11-02 NOTE — TELEPHONE ENCOUNTER
Incoming Refill Request      Medication requested (name and dose): TRADJENTA 5MG    Pharmacy where request should be sent: [facility]    Additional details provided by patient: NO REFILLS    Best call back number: [phone]    Does the patient have less than a 3 day supply:  [x] Yes  [] No    Marina Caputo Rep  11/02/22, 13:42 EDT

## 2022-11-16 ENCOUNTER — OFFICE VISIT (OUTPATIENT)
Dept: INTERNAL MEDICINE | Facility: CLINIC | Age: 87
End: 2022-11-16

## 2022-11-16 VITALS
SYSTOLIC BLOOD PRESSURE: 132 MMHG | HEIGHT: 66 IN | WEIGHT: 133 LBS | OXYGEN SATURATION: 98 % | DIASTOLIC BLOOD PRESSURE: 68 MMHG | HEART RATE: 71 BPM | BODY MASS INDEX: 21.38 KG/M2 | TEMPERATURE: 97.8 F

## 2022-11-16 DIAGNOSIS — Z91.81 AT MODERATE RISK FOR FALL: ICD-10-CM

## 2022-11-16 DIAGNOSIS — E03.9 ACQUIRED HYPOTHYROIDISM: ICD-10-CM

## 2022-11-16 DIAGNOSIS — N25.81 SECONDARY HYPERPARATHYROIDISM OF RENAL ORIGIN: ICD-10-CM

## 2022-11-16 DIAGNOSIS — Z00.00 MEDICARE ANNUAL WELLNESS VISIT, SUBSEQUENT: Primary | ICD-10-CM

## 2022-11-16 DIAGNOSIS — I63.322 CEREBROVASCULAR ACCIDENT (CVA) DUE TO THROMBOSIS OF LEFT ANTERIOR CEREBRAL ARTERY: ICD-10-CM

## 2022-11-16 DIAGNOSIS — D63.8 ANEMIA OF CHRONIC DISEASE: ICD-10-CM

## 2022-11-16 DIAGNOSIS — I48.20 CHRONIC ATRIAL FIBRILLATION: Chronic | ICD-10-CM

## 2022-11-16 DIAGNOSIS — N18.32 STAGE 3B CHRONIC KIDNEY DISEASE: ICD-10-CM

## 2022-11-16 DIAGNOSIS — E78.5 HYPERLIPIDEMIA, UNSPECIFIED HYPERLIPIDEMIA TYPE: Chronic | ICD-10-CM

## 2022-11-16 DIAGNOSIS — E11.3553 STABLE PROLIFERATIVE DIABETIC RETINOPATHY OF BOTH EYES ASSOCIATED WITH TYPE 2 DIABETES MELLITUS: ICD-10-CM

## 2022-11-16 DIAGNOSIS — D69.6 THROMBOCYTOPENIA: Chronic | ICD-10-CM

## 2022-11-16 PROBLEM — I73.9 PERIPHERAL ARTERIAL DISEASE (HCC): Chronic | Status: RESOLVED | Noted: 2017-10-09 | Resolved: 2022-11-16

## 2022-11-16 PROCEDURE — 1125F AMNT PAIN NOTED PAIN PRSNT: CPT | Performed by: NURSE PRACTITIONER

## 2022-11-16 PROCEDURE — 1159F MED LIST DOCD IN RCRD: CPT | Performed by: NURSE PRACTITIONER

## 2022-11-16 PROCEDURE — 1170F FXNL STATUS ASSESSED: CPT | Performed by: NURSE PRACTITIONER

## 2022-11-16 PROCEDURE — G0439 PPPS, SUBSEQ VISIT: HCPCS | Performed by: NURSE PRACTITIONER

## 2022-11-16 NOTE — PATIENT INSTRUCTIONS
You are due for Shingrix vaccination series ( the newest shingles vaccine).  It is a two shot series spaced 2-6 months apart. Please get this vaccine series started at your earliest convenience at your local pharmacy to help avoid shingles outbreak. It is more effective than the old Zostavax vaccine and is recommended even if you have had the Zostavax vaccine in the past.  Once the Shingrix series is completed, it does not need to be repeated.   For more information, please look at the website below:  [facility] Shingrix Vaccine Information      Medicare Wellness  Personal Prevention Plan of Service     Date of Office Visit:    Encounter Provider:  BILL Richardson  Place of Service:  [facility]  Patient Name: Lynne Sanchez  :  1934    As part of the Medicare Wellness portion of your visit today, we are providing you with this personalized preventive plan of services (PPPS). This plan is based upon recommendations of the [place] Preventive Services Task Force (USPSTF) and the Advisory Committee on Immunization Practices (ACIP).    This lists the preventive care services that should be considered, and provides dates of when you are due. Items listed as completed are up-to-date and do not require any further intervention.    Health Maintenance   Topic Date Due   • COVID-19 Vaccine (1) Never done   • ZOSTER VACCINE (1 of 2) Never done   • DXA SCAN  2019   • URINE MICROALBUMIN  2022   • DIABETIC EYE EXAM  06/10/2022   • INFLUENZA VACCINE  2022   • HEMOGLOBIN A1C  10/14/2022   • ANNUAL WELLNESS VISIT  10/28/2022   • LIPID PANEL  10/28/2022   • TDAP/TD VACCINES (1 - Tdap) 2034 (Originally 1953)   • Pneumococcal Vaccine 65+  Discontinued       No orders of the defined types were placed in this encounter.      No follow-ups on file.          Fall Prevention in the Home, Adult  Falls can cause injuries and affect people of all ages. There are many  simple things that you can do to make your home safe and to help prevent falls. Ask for help when making these changes, if needed.  What actions can I take to prevent falls?  General instructions  • Use good lighting in all rooms. Replace any light bulbs that burn out, turn on lights if it is dark, and use night-lights.  • Place frequently used items in easy-to-reach places. Lower the shelves around your home if necessary.  • Set up furniture so that there are clear paths around it. Avoid moving your furniture around.  • Remove throw rugs and other tripping hazards from the floor.  • Avoid walking on wet floors.  • Fix any uneven floor surfaces.  • Add color or contrast paint or tape to grab bars and handrails in your home. Place contrasting color strips on the first and last steps of staircases.  • When you use a stepladder, make sure that it is completely opened and that the sides and supports are firmly locked. Have someone hold the ladder while you are using it. Do not climb a closed stepladder.  • Know where your pets are when moving through your home.  What can I do in the bathroom?     • Keep the floor dry. Immediately clean up any water that is on the floor.  • Remove soap buildup in the tub or shower regularly.  • Use nonskid mats or decals on the floor of the tub or shower.  • Attach bath mats securely with double-sided, nonslip rug tape.  • If you need to sit down while you are in the shower, use a plastic, nonslip stool.  • Install grab bars by the toilet and in the tub and shower. Do not use towel bars as grab bars.  What can I do in the bedroom?  • Make sure that a bedside light is easy to reach.  • Do not use oversized bedding that reaches the floor.  • Have a firm chair that has side arms to use for getting dressed.  What can I do in the kitchen?  • Clean up any spills right away.  • If you need to reach for something above you, use a sturdy step stool that has a grab bar.  • Keep electrical cables  out of the way.  • Do not use floor polish or wax that makes floors slippery. If you must use wax, make sure that it is non-skid floor wax.  What can I do with my stairs?  • Do not leave any items on the stairs.  • Make sure that you have a light switch at the top and the bottom of the stairs. Have them installed if you do not have them.  • Make sure that there are handrails on both sides of the stairs. Fix handrails that are broken or loose. Make sure that handrails are as long as the staircases.  • Install non-slip stair treads on all stairs in your home.  • Avoid having throw rugs at the top or bottom of stairs, or secure the rugs with carpet tape to prevent them from moving.  • Choose a carpet design that does not hide the edge of steps on the stairs.  • Check any carpeting to make sure that it is firmly attached to the stairs. Fix any carpet that is loose or worn.  What can I do on the outside of my home?  • Use bright outdoor lighting.  • Regularly repair the edges of walkways and driveways and fix any cracks.  • Remove high doorway thresholds.  • Trim any shrubbery on the main path into your home.  • Regularly check that handrails are securely fastened and in good repair. Both sides of all steps should have handrails.  • Install guardrails along the edges of any raised decks or porches.  • Clear walkways of debris and clutter, including tools and rocks.  • Have leaves, snow, and ice cleared regularly.  • Use sand or salt on walkways during winter months.  • In the garage, clean up any spills right away, including grease or oil spills.  What other actions can I take?  • Wear closed-toe shoes that fit well and support your feet. Wear shoes that have rubber soles or low heels.  • Use mobility aids as needed, such as canes, walkers, scooters, and crutches.  • Review your medicines with your health care provider. Some medicines can cause dizziness or changes in blood pressure, which increase your risk of  falling.  Talk with your health care provider about other ways that you can decrease your risk of falls. This may include working with a physical therapist or  to improve your strength, balance, and endurance.  Where to find more information  • Centers for Disease Control and Prevention, STEADI: www.cdc.gov  • National [place] on Aging: www.darlin.nih.gov  Contact a health care provider if:  • You are afraid of falling at home.  • You feel weak, drowsy, or dizzy at home.  • You fall at home.  Summary  • There are many simple things that you can do to make your home safe and to help prevent falls.  • Ways to make your home safe include removing tripping hazards and installing grab bars in the bathroom.  • Ask for help when making these changes in your home.  This information is not intended to replace advice given to you by your health care provider. Make sure you discuss any questions you have with your health care provider.  Document Revised: 07/21/2021 Document Reviewed: 07/21/2021  Elsevier Patient Education © 2022 [employer] Inc.

## 2022-11-16 NOTE — PROGRESS NOTES
The ABCs of the Annual Wellness Visit  Subsequent Medicare Wellness Visit    Chief Complaint   Patient presents with   • Medicare Wellness-subsequent      Subjective    History of Present Illness:  Lynne Sanchez is a 87 y.o. female who presents for a Subsequent Medicare Wellness Visit with no acute complaints today.  She is followed by nephrology Dr. Alas for her chronic kidney disease related to her diabetes and hyperparathyroidism.  Kidney function has been stable.  She is avoiding NSAIDs.  Lower extremity swelling has improved greatly.  Type 2 diabetes-she takes linagliptin and glimepiride as prescribed.  Denies any side effects from her medications.  She does not check her blood glucose regularly.  Denies polyuria, polydipsia, polyphagia, new vision changes.  Overall she feels like she is doing much better.  She does not necessarily follow a certain diet.  She is followed by cardiology for her chronic atrial fibrillation.  She is not on any Xarelto at this time or Eliquis, this was DC'd due to severe anemia.  She is taking aspirin as prescribed as well as Lopressor twice per day, she is rate controlled and denies syncopal episodes, chest pain, or shortness of breath.  She was previously on statin therapy that got discontinued, she is unsure why.  She has not had a recent lipid panel.    The following portions of the patient's history were reviewed and   updated as appropriate: allergies, current medications, past family history, past medical history, past social history, past surgical history and problem list.    Compared to one year ago, the patient feels her physical   health is better.    Compared to one year ago, the patient feels her mental   health is the same.    Recent Hospitalizations:  This patient has had a [facility] admission record on file within the last 365 days.    Current Medical Providers:  Patient Care Team:  Elizabeth Easton APRN as PCP - General (Family Medicine)  Paxton Vasquez  DO Phani as PCP - Internal Medicine ([facility])  Vilma Méndez PA-C as PCP - Family Medicine ([facility])  Davian Faria MD as Consulting Physician (Nephrology)  Radha Boone, SHAD as Registered Nurse    Outpatient Medications Prior to Visit   Medication Sig Dispense Refill   • acetaminophen (TYLENOL) 650 MG 8 hr tablet Take 650 mg by mouth Every 6 (Six) Hours As Needed for Mild Pain .     • Amino Acids-Protein Hydrolys (PRO-STAT) liquid oral liquid Take 30 mL by mouth 2 (Two) Times a Day.     • aspirin 81 MG EC tablet Take 81 mg by mouth Daily.     • epoetin dorcas-epbx (Retacrit) 77029 UNIT/ML injection Inject 40,000 Units under the skin into the appropriate area as directed. 1 ml sq per month on day 17     • furosemide (LASIX) 20 MG tablet Take 1 tablet by mouth Daily. 90 tablet 1   • glimepiride (AMARYL) 2 MG tablet Take 1 tablet by mouth Every Morning Before Breakfast. (Patient taking differently: Take 4 mg by mouth Every Morning Before Breakfast.) 30 tablet 5   • glucose blood test strip 1 each by Other route Daily. Use as instructed once a day as directed, for Truemetrix reader 100 each 3   • latanoprost (XALATAN) 0.005 % ophthalmic solution Administer 1 drop to both eyes Daily. (Patient taking differently: Administer 1 drop to both eyes Every Night.) 7.5 mL 3   • levothyroxine (SYNTHROID, LEVOTHROID) 50 MCG tablet Take 1 tablet by mouth Daily. 90 tablet 3   • linagliptin (Tradjenta) 5 MG tablet tablet Take 1 tablet by mouth Daily. 90 tablet 1   • metoprolol tartrate (LOPRESSOR) 100 MG tablet Take 1 tablet by mouth 2 (Two) Times a Day. (Patient taking differently: Take 50 mg by mouth 2 (Two) Times a Day.) 180 tablet 3   • multivitamin with minerals tablet tablet Take 1 tablet by mouth Daily.     • pantoprazole (PROTONIX) 40 MG EC tablet TAKE 1 TABLET BY MOUTH EVERY DAY 90 tablet 0   • timolol (TIMOPTIC) 0.5 % ophthalmic solution Administer 1 drop to both eyes Daily.     •  acetaminophen (TYLENOL) 650 MG 8 hr tablet Take 650 mg by mouth.     • atorvastatin (LIPITOR) 40 MG tablet Take 1 tablet by mouth Daily. 90 tablet 3   • Potassium Gluconate 550 MG tablet Take 550 mg by mouth Daily.     • spironolactone (ALDACTONE) 25 MG tablet Take 25 mg by mouth.     • vitamin D3 125 MCG (5000 UT) capsule capsule Take 5,000 Units by mouth Daily.     • Zinc Oxide (Boudreauxs Butt Paste) 16 % ointment Apply  topically 2 (Two) Times a Day.       No facility-administered medications prior to visit.       No opioid medication identified on active medication list. I have reviewed chart for other potential  high risk medication/s and harmful drug interactions in the elderly.          Aspirin is on active medication list. Aspirin use is indicated based on review of current medical condition/s. Pros and cons of this therapy have been discussed today. Benefits of this medication outweigh potential harm.  Patient has been encouraged to continue taking this medication.  .      Patient Active Problem List   Diagnosis   • Chronic atrial fibrillation   • Valvular heart disease   • Diabetes mellitus type II, controlled ([facility])   • Normocytic anemia   • Chronic gastritis   • Cerebrovascular accident (CVA) due to thrombosis of left anterior cerebral artery ([facility])   • Thrombocytopenia ([facility])   • Cardiac pacemaker in situ   • Chronic congestive heart failure ([facility])   • Functional heart murmur   • Glaucoma   • Hyperlipidemia   • Hypertensive disorder   • Acquired hypothyroidism   • Mass of parotid gland   • Neuropathy   • Chronic renal impairment, stage 4 (severe) ([facility])   • Impairment of balance   • Impaired mobility   • Muscle weakness of lower extremity   • Chronic pain of both knees   • Secondary cataract of both eyes   • 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 ([facility])   • Suspected glaucoma of both eyes   • Secondary cataract of both eyes   • Right retinal detachment  "  • 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)   • Swelling of left extremity   • Closed nondisplaced fracture of surgical neck of left humerus with routine healing   • Debility   • Left arm swelling   • 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 (HCC)   • Secondary hyperparathyroidism of renal origin (HCC)   • Vitamin D deficiency   • Anemia requiring transfusions   • Melena   • Acute on chronic anemia   • Stage 3b chronic kidney disease (HCC)   • DM complication (HCC)   • Anemia of chronic renal failure   • Type 2 diabetes mellitus with diabetic chronic kidney disease (HCC)   • Anemia of chronic renal failure   • Absolute anemia   • Secondary hyperparathyroidism of renal origin (HCC)   • Stage 3b chronic kidney disease (HCC)   • Vitamin D deficiency     Advance Care Planning  Advance Directive is on file.  ACP discussion was held with the patient during this visit. Patient has an advance directive in EMR which is still valid.           Objective    Vitals:    11/16/22 1353   BP: 132/68   Pulse: 71   Temp: 97.8 °F (36.6 °C)   SpO2: 98%   Weight: 60.3 kg (133 lb)   Height: 167.6 cm (66\")   PainSc:   3     Estimated body mass index is 21.47 kg/m² as calculated from the following:    Height as of this encounter: 167.6 cm (66\").    Weight as of this encounter: 60.3 kg (133 lb).    BMI is within normal parameters. No other follow-up for BMI required.    Does the patient have evidence of cognitive impairment? No    Physical Exam  Vitals and nursing note reviewed.   Constitutional:       General: She is not in acute distress.     Appearance: Normal appearance. She is ill-appearing (Chronically ill-appearing).   HENT:      Right Ear: Tympanic membrane and ear canal normal.      Left Ear: Tympanic membrane and ear canal normal.      Nose: Nose normal.      Mouth/Throat:      Mouth: Mucous membranes are moist.      Pharynx: Oropharynx is clear. No posterior oropharyngeal erythema.   Eyes:      " Extraocular Movements: Extraocular movements intact.      Pupils: Pupils are equal, round, and reactive to light.   Neck:      Thyroid: No thyroid mass or thyromegaly.      Vascular: No carotid bruit.   Cardiovascular:      Rate and Rhythm: Normal rate and regular rhythm.      Pulses: Normal pulses.      Heart sounds: Murmur heard.      Comments: Trace edema  Pulmonary:      Effort: Pulmonary effort is normal.      Breath sounds: Normal breath sounds. No wheezing.   Abdominal:      General: Bowel sounds are normal. There is no distension.      Palpations: Abdomen is soft. There is no mass.      Tenderness: There is no abdominal tenderness.   Musculoskeletal:         General: Deformity present.      Cervical back: Normal range of motion and neck supple. No muscular tenderness.      Comments: Wheelchair for ambulation   Lymphadenopathy:      Head:      Right side of head: No submandibular, tonsillar, preauricular or posterior auricular adenopathy.      Left side of head: No submandibular, tonsillar, preauricular or posterior auricular adenopathy.      Cervical: No cervical adenopathy.   Skin:     General: Skin is warm and dry.      Capillary Refill: Capillary refill takes less than 2 seconds.      Findings: No bruising or rash.   Neurological:      General: No focal deficit present.      Mental Status: She is alert and oriented to person, place, and time.      Deep Tendon Reflexes: Reflexes normal.   Psychiatric:         Mood and Affect: Mood normal.         Behavior: Behavior normal.             HEALTH RISK ASSESSMENT    Smoking Status:  Social History     Tobacco Use   Smoking Status Never   Smokeless Tobacco Never     Alcohol Consumption:  Social History     Substance and Sexual Activity   Alcohol Use No     Fall Risk Screen:    STEADI Fall Risk Assessment was completed, and patient is at MODERATE risk for falls. Assessment completed on:11/16/2022    Depression Screening:  PHQ-2/PHQ-9 Depression Screening  10/28/2021   Retired PHQ-9 Total Score 0   Retired Total Score 0   Little Interest or Pleasure in Doing Things -   Feeling Down, Depressed or Hopeless -   Trouble Falling or Staying Asleep, or Sleeping Too Much -   Feeling Tired or Having Little Energy -   Poor Appetite or Overeating -   Feeling Bad about Yourself - or that You are a Failure or Have Let Yourself or Your Family Down -   Trouble Concentrating on Things, Such as Reading the Newspaper or Watching Television -   Moving or Speaking So Slowly that Other People Could Have Noticed? Or the Opposite - Being So Fidgety -   Thoughts that You Would be Better Off Dead or of Hurting Yourself in Some Way -   PHQ-9: Brief Depression Severity Measure Score -       Health Habits and Functional and Cognitive Screening:  Functional & Cognitive Status 10/28/2021   Do you have difficulty preparing food and eating? No   Do you have difficulty bathing yourself, getting dressed or grooming yourself? No   Do you have difficulty using the toilet? No   Do you have difficulty moving around from place to place? No   Do you have trouble with steps or getting out of a bed or a chair? Yes   Current Diet -   Dental Exam Not up to date   Eye Exam Up to date   Exercise (times per week) 7 times per week   Current Exercises Include Walking   Current Exercise Activities Include -   Do you need help using the phone?  No   Are you deaf or do you have serious difficulty hearing?  No   Do you need help with transportation? Yes   Do you need help shopping? Yes   Do you need help preparing meals?  No   Do you need help with housework?  No   Do you need help with laundry? No   Do you need help taking your medications? No   Do you need help managing money? No   Do you ever drive or ride in a car without wearing a seat belt? No   Have you felt unusual stress, anger or loneliness in the last month? Yes   Who do you live with? Spouse   If you need help, do you have trouble finding someone available  to you? No   Have you been bothered in the last four weeks by sexual problems? No   Do you have difficulty concentrating, remembering or making decisions? No       Age-appropriate Screening Schedule:  Refer to the list below for future screening recommendations based on patient's age, sex and/or medical conditions. Orders for these recommended tests are listed in the plan section. The patient has been provided with a written plan.    Health Maintenance   Topic Date Due   • ZOSTER VACCINE (1 of 2) Never done   • DXA SCAN  01/09/2019   • URINE MICROALBUMIN  04/19/2022   • DIABETIC EYE EXAM  06/10/2022   • INFLUENZA VACCINE  08/01/2022   • HEMOGLOBIN A1C  10/14/2022   • LIPID PANEL  10/28/2022   • TDAP/TD VACCINES (1 - Tdap) 09/30/2034 (Originally 12/11/1953)              Assessment & Plan   CMS Preventative Services Quick Reference  Risk Factors Identified During Encounter  Cardiovascular Disease  Dementia/Memory   Hearing Problem  Immunizations Discussed/Encouraged (specific Immunizations; Influenza and Prevnar 20 (Pneumococcal 20-valent conjugate)  The above risks/problems have been discussed with the patient.  Follow up actions/plans if indicated are seen below in the Assessment/Plan Section.  Pertinent information has been shared with the patient in the After Visit Summary.    Diagnoses and all orders for this visit:    1. Medicare annual wellness visit, subsequent (Primary)  -     Lipid panel  -     Hemoglobin A1c  -     CBC No Differential  -     Microalbumin / Creatinine Urine Ratio - Urine, Clean Catch  -     TSH Rfx On Abnormal To Free T4  Health maintenance discussed during visit and information provided in AVS.  2. Thrombocytopenia (HCC)  -     Lipid panel  -     Hemoglobin A1c  -     CBC No Differential  -     Microalbumin / Creatinine Urine Ratio - Urine, Clean Catch  -     TSH Rfx On Abnormal To Free T4  Check CBC today, chronic problem that has been stable.  3. Hyperlipidemia, unspecified hyperlipidemia  type  -     Lipid panel  -     Hemoglobin A1c  -     CBC No Differential  -     Microalbumin / Creatinine Urine Ratio - Urine, Clean Catch  -     TSH Rfx On Abnormal To Free T4  Update lipid panel today.  Due to age do not see benefit in aggressive lowering.  Discussed risk versus benefits with her today.  If cholesterol is well controlled we will stay off of statin for now.  4. Chronic atrial fibrillation  -     Lipid panel  -     Hemoglobin A1c  -     CBC No Differential  -     Microalbumin / Creatinine Urine Ratio - Urine, Clean Catch  -     TSH Rfx On Abnormal To Free T4  Rate controlled.  Follow-up with cardiology.  Continue metoprolol and aspirin as prescribed.  5. Acquired hypothyroidism  -     Lipid panel  -     Hemoglobin A1c  -     CBC No Differential  -     Microalbumin / Creatinine Urine Ratio - Urine, Clean Catch  -     TSH Rfx On Abnormal To Free T4  Update TSH today, titrate levothyroxine as needed.  Would avoid aggressive lowering due to age.  6. At moderate risk for fall  -     Lipid panel  -     Hemoglobin A1c  -     CBC No Differential  -     Microalbumin / Creatinine Urine Ratio - Urine, Clean Catch  -     TSH Rfx On Abnormal To Free T4  Discussed fall precautions in the home.  She has already completed PT and did find it helpful.  Avoid throw rugs, use cane for ambulation with wide base, and handrails recommended.  7. 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  8. Secondary hyperparathyroidism of renal origin (HCC)  -     Lipid panel  -     Hemoglobin A1c  -     CBC No Differential  -     Microalbumin / Creatinine Urine Ratio - Urine, Clean Catch  -     TSH Rfx On Abnormal To Free T4  Stable.  Keep follow-up  with renal.  9. Cerebrovascular accident (CVA) due to thrombosis of left anterior cerebral artery (HCC)  -     Lipid panel  -     Hemoglobin A1c  -     CBC No Differential  -     Microalbumin / Creatinine Urine Ratio - Urine, Clean Catch  -     TSH Rfx On Abnormal To Free T4  Discussed signs and symptoms to seek care with.  Continue aspirin.  10. Anemia of chronic disease  Check CBC today.  Increase work-up as needed.  11. Stage 3b chronic kidney disease (HCC)  Managed by renal, keep follow-up.  Other orders  -     T4F  -     Unable To Void    Follow Up:   Return in about 6 months (around 5/16/2023) for Next scheduled follow up.     An After Visit Summary and PPPS were made available to the patient.

## 2022-11-17 PROBLEM — D64.9 ABSOLUTE ANEMIA: Status: ACTIVE | Noted: 2022-05-24

## 2022-11-17 PROBLEM — E55.9 VITAMIN D DEFICIENCY: Status: ACTIVE | Noted: 2022-01-17

## 2022-11-17 PROBLEM — N18.32 STAGE 3B CHRONIC KIDNEY DISEASE: Status: ACTIVE | Noted: 2020-02-21

## 2022-11-17 PROBLEM — N25.81 SECONDARY HYPERPARATHYROIDISM OF RENAL ORIGIN: Status: ACTIVE | Noted: 2022-01-17

## 2022-11-17 PROBLEM — D63.1 ANEMIA OF CHRONIC RENAL FAILURE: Status: ACTIVE | Noted: 2022-04-25

## 2022-11-17 PROBLEM — E11.22 TYPE 2 DIABETES MELLITUS WITH DIABETIC CHRONIC KIDNEY DISEASE: Status: ACTIVE | Noted: 2018-05-24

## 2022-11-17 PROBLEM — N18.9 ANEMIA OF CHRONIC RENAL FAILURE: Status: ACTIVE | Noted: 2022-04-25

## 2022-11-17 LAB
CHOLEST SERPL-MCNC: 137 MG/DL (ref 0–200)
ERYTHROCYTE [DISTWIDTH] IN BLOOD BY AUTOMATED COUNT: 15.2 % (ref 12.3–15.4)
HBA1C MFR BLD: 6.8 % (ref 4.8–5.6)
HCT VFR BLD AUTO: 27.9 % (ref 34–46.6)
HDLC SERPL-MCNC: 45 MG/DL (ref 40–60)
HGB BLD-MCNC: 8.8 G/DL (ref 12–15.9)
LDLC SERPL CALC-MCNC: 75 MG/DL (ref 0–100)
MCH RBC QN AUTO: 29.4 PG (ref 26.6–33)
MCHC RBC AUTO-ENTMCNC: 31.5 G/DL (ref 31.5–35.7)
MCV RBC AUTO: 93.3 FL (ref 79–97)
PLATELET # BLD AUTO: 135 10*3/MM3 (ref 140–450)
RBC # BLD AUTO: 2.99 10*6/MM3 (ref 3.77–5.28)
T4 FREE SERPL-MCNC: 1.36 NG/DL (ref 0.93–1.7)
TRIGL SERPL-MCNC: 86 MG/DL (ref 0–150)
TSH SERPL DL<=0.005 MIU/L-ACNC: 6.51 UIU/ML (ref 0.27–4.2)
UNABLE TO VOID: NORMAL
VLDLC SERPL CALC-MCNC: 17 MG/DL (ref 5–40)
WBC # BLD AUTO: 5.42 10*3/MM3 (ref 3.4–10.8)

## 2022-11-17 RX ORDER — SENNOSIDES 8.6 MG
650 CAPSULE ORAL
COMMUNITY

## 2022-11-23 ENCOUNTER — HOSPITAL ENCOUNTER (OUTPATIENT)
Dept: INFUSION THERAPY | Facility: HOSPITAL | Age: 87
Discharge: HOME | End: 2022-11-23
Admitting: NURSE PRACTITIONER

## 2022-11-23 DIAGNOSIS — D63.1 ANEMIA OF CHRONIC RENAL FAILURE, STAGE 3B: ICD-10-CM

## 2022-11-23 DIAGNOSIS — N18.32 ANEMIA OF CHRONIC RENAL FAILURE, STAGE 3B: ICD-10-CM

## 2022-11-23 DIAGNOSIS — D64.9 NORMOCYTIC ANEMIA: ICD-10-CM

## 2022-11-23 DIAGNOSIS — E11.8 DM COMPLICATION: ICD-10-CM

## 2022-11-23 DIAGNOSIS — N18.32 STAGE 3B CHRONIC KIDNEY DISEASE: Primary | ICD-10-CM

## 2022-11-23 LAB
DEPRECATED RDW RBC AUTO: 54.9 FL (ref 37–54)
ERYTHROCYTE [DISTWIDTH] IN BLOOD BY AUTOMATED COUNT: 16.1 % (ref 12.3–15.4)
HCT VFR BLD AUTO: 27.6 % (ref 34–46.6)
HGB BLD-MCNC: 8.8 G/DL (ref 12–15.9)
MCH RBC QN AUTO: 29.7 PG (ref 26.6–33)
MCHC RBC AUTO-ENTMCNC: 31.9 G/DL (ref 31.5–35.7)
MCV RBC AUTO: 93.2 FL (ref 79–97)
PLATELET # BLD AUTO: 115 10*3/MM3 (ref 140–450)
PMV BLD AUTO: 10.6 FL (ref 6–12)
RBC # BLD AUTO: 2.96 10*6/MM3 (ref 3.77–5.28)
WBC NRBC COR # BLD: 5.62 10*3/MM3 (ref 3.4–10.8)

## 2022-11-23 PROCEDURE — 36415 COLL VENOUS BLD VENIPUNCTURE: CPT

## 2022-11-23 PROCEDURE — 85027 COMPLETE CBC AUTOMATED: CPT | Performed by: NURSE PRACTITIONER

## 2022-11-23 PROCEDURE — 96372 THER/PROPH/DIAG INJ SC/IM: CPT

## 2022-11-23 PROCEDURE — 25010000002 EPOETIN ALFA-EPBX 40000 UNIT/ML SOLUTION: Performed by: INTERNAL MEDICINE

## 2022-11-23 RX ADMIN — EPOETIN ALFA-EPBX 40000 UNITS: 40000 INJECTION, SOLUTION INTRAVENOUS; SUBCUTANEOUS at 15:05

## 2022-12-02 RX ORDER — FUROSEMIDE 20 MG/1
20 TABLET ORAL DAILY
Qty: 90 TABLET | Refills: 2 | Status: SHIPPED | OUTPATIENT
Start: 2022-12-02 | End: 2023-01-20 | Stop reason: SDUPTHER

## 2022-12-05 RX ORDER — PANTOPRAZOLE SODIUM 40 MG/1
TABLET, DELAYED RELEASE ORAL
Qty: 90 TABLET | Refills: 1 | Status: SHIPPED | OUTPATIENT
Start: 2022-12-05

## 2022-12-21 ENCOUNTER — HOSPITAL ENCOUNTER (OUTPATIENT)
Dept: INFUSION THERAPY | Facility: HOSPITAL | Age: 87
Discharge: HOME | End: 2022-12-21
Admitting: INTERNAL MEDICINE

## 2022-12-21 VITALS
HEART RATE: 81 BPM | SYSTOLIC BLOOD PRESSURE: 106 MMHG | TEMPERATURE: 97.5 F | DIASTOLIC BLOOD PRESSURE: 47 MMHG | RESPIRATION RATE: 18 BRPM | OXYGEN SATURATION: 99 %

## 2022-12-21 DIAGNOSIS — N18.32 ANEMIA OF CHRONIC RENAL FAILURE, STAGE 3B: ICD-10-CM

## 2022-12-21 DIAGNOSIS — D64.9 NORMOCYTIC ANEMIA: ICD-10-CM

## 2022-12-21 DIAGNOSIS — E11.8 DM COMPLICATION: ICD-10-CM

## 2022-12-21 DIAGNOSIS — D63.1 ANEMIA OF CHRONIC RENAL FAILURE, STAGE 3B: ICD-10-CM

## 2022-12-21 DIAGNOSIS — N18.32 STAGE 3B CHRONIC KIDNEY DISEASE: Primary | ICD-10-CM

## 2022-12-21 LAB
ALBUMIN SERPL-MCNC: 4.2 G/DL (ref 3.5–5.2)
ANION GAP SERPL CALCULATED.3IONS-SCNC: 10.7 MMOL/L (ref 5–15)
BUN SERPL-MCNC: 40 MG/DL (ref 8–23)
BUN/CREAT SERPL: 27.4 (ref 7–25)
CALCIUM SPEC-SCNC: 10 MG/DL (ref 8.6–10.5)
CHLORIDE SERPL-SCNC: 104 MMOL/L (ref 98–107)
CO2 SERPL-SCNC: 27.3 MMOL/L (ref 22–29)
CREAT SERPL-MCNC: 1.46 MG/DL (ref 0.57–1)
DEPRECATED RDW RBC AUTO: 53.5 FL (ref 37–54)
EGFRCR SERPLBLD CKD-EPI 2021: 34.5 ML/MIN/1.73
ERYTHROCYTE [DISTWIDTH] IN BLOOD BY AUTOMATED COUNT: 15.6 % (ref 12.3–15.4)
GLUCOSE SERPL-MCNC: 153 MG/DL (ref 65–99)
HCT VFR BLD AUTO: 26 % (ref 34–46.6)
HGB BLD-MCNC: 8.2 G/DL (ref 12–15.9)
IRON 24H UR-MRATE: 74 MCG/DL (ref 37–145)
IRON SATN MFR SERPL: 19 % (ref 20–50)
MCH RBC QN AUTO: 29.4 PG (ref 26.6–33)
MCHC RBC AUTO-ENTMCNC: 31.5 G/DL (ref 31.5–35.7)
MCV RBC AUTO: 93.2 FL (ref 79–97)
PHOSPHATE SERPL-MCNC: 4.2 MG/DL (ref 2.5–4.5)
PLATELET # BLD AUTO: 147 10*3/MM3 (ref 140–450)
PMV BLD AUTO: 10.1 FL (ref 6–12)
POTASSIUM SERPL-SCNC: 4.3 MMOL/L (ref 3.5–5.2)
RBC # BLD AUTO: 2.79 10*6/MM3 (ref 3.77–5.28)
SODIUM SERPL-SCNC: 142 MMOL/L (ref 136–145)
TIBC SERPL-MCNC: 384 MCG/DL (ref 298–536)
TRANSFERRIN SERPL-MCNC: 258 MG/DL (ref 200–360)
WBC NRBC COR # BLD: 5.35 10*3/MM3 (ref 3.4–10.8)

## 2022-12-21 PROCEDURE — 85027 COMPLETE CBC AUTOMATED: CPT | Performed by: INTERNAL MEDICINE

## 2022-12-21 PROCEDURE — 84466 ASSAY OF TRANSFERRIN: CPT | Performed by: INTERNAL MEDICINE

## 2022-12-21 PROCEDURE — 36415 COLL VENOUS BLD VENIPUNCTURE: CPT

## 2022-12-21 PROCEDURE — 96372 THER/PROPH/DIAG INJ SC/IM: CPT

## 2022-12-21 PROCEDURE — 80069 RENAL FUNCTION PANEL: CPT | Performed by: INTERNAL MEDICINE

## 2022-12-21 PROCEDURE — 83540 ASSAY OF IRON: CPT | Performed by: INTERNAL MEDICINE

## 2022-12-21 PROCEDURE — 25010000002 EPOETIN ALFA-EPBX 40000 UNIT/ML SOLUTION: Performed by: INTERNAL MEDICINE

## 2022-12-21 RX ADMIN — EPOETIN ALFA-EPBX 40000 UNITS: 40000 INJECTION, SOLUTION INTRAVENOUS; SUBCUTANEOUS at 14:24

## 2022-12-30 DIAGNOSIS — E11.29 CONTROLLED TYPE 2 DIABETES MELLITUS WITH OTHER DIABETIC KIDNEY COMPLICATION, WITHOUT LONG-TERM CURRENT USE OF INSULIN: ICD-10-CM

## 2022-12-30 RX ORDER — METOPROLOL TARTRATE 100 MG/1
100 TABLET ORAL 2 TIMES DAILY
Qty: 180 TABLET | Refills: 3 | Status: CANCELLED | OUTPATIENT
Start: 2022-12-30

## 2022-12-30 NOTE — TELEPHONE ENCOUNTER
Rx Refill Note  Requested Prescriptions     Pending Prescriptions Disp Refills   • metoprolol tartrate (LOPRESSOR) 100 MG tablet 180 tablet 3     Sig: Take 1 tablet by mouth 2 (Two) Times a Day.   • linagliptin (Tradjenta) 5 MG tablet tablet 90 tablet 1     Sig: Take 1 tablet by mouth Daily.      Last office visit with prescribing clinician: 11/16/2022   Last telemedicine visit with prescribing clinician: 5/17/2023   Next office visit with prescribing clinician: 5/17/2023     Mamie Arteaga MA  12/30/22, 18:18 EST     Received a request from [facility] pharmacy, attempted to contact patient to confirm that she would like medication to be transferred but unable to leave message due to

## 2023-01-04 DIAGNOSIS — E11.29 CONTROLLED TYPE 2 DIABETES MELLITUS WITH OTHER DIABETIC KIDNEY COMPLICATION, WITHOUT LONG-TERM CURRENT USE OF INSULIN: ICD-10-CM

## 2023-01-04 NOTE — TELEPHONE ENCOUNTER
Caller: Lynne Sanchez    Relationship: Self    Best call back number: [phone]  Requested Prescriptions:   Requested Prescriptions     Pending Prescriptions Disp Refills   • linagliptin (Tradjenta) 5 MG tablet tablet 90 tablet 1     Sig: Take 1 tablet by mouth Daily.   • metoprolol tartrate (LOPRESSOR) 100 MG tablet 180 tablet 3     Sig: Take 1 tablet by mouth 2 (Two) Times a Day.        Pharmacy where request should be sent: [facility] - [place] [id number] [facility] - [phone] [facility] [phone] FX     Additional details provided by patient: PLEASE REFILL     Does the patient have less than a 3 day supply:  [] Yes  [x] No    Would you like a call back once the refill request has been completed: [] Yes [x] No    If the office needs to give you a call back, can they leave a voicemail: [] Yes [x] No    Marina Pereira Rep   01/04/23 13:25 EST

## 2023-01-05 RX ORDER — METOPROLOL TARTRATE 100 MG/1
100 TABLET ORAL 2 TIMES DAILY
Qty: 180 TABLET | Refills: 3 | Status: SHIPPED | OUTPATIENT
Start: 2023-01-05

## 2023-01-18 ENCOUNTER — HOSPITAL ENCOUNTER (OUTPATIENT)
Dept: INFUSION THERAPY | Facility: HOSPITAL | Age: 88
Discharge: HOME | End: 2023-01-18
Admitting: INTERNAL MEDICINE
Payer: MEDICARE

## 2023-01-18 VITALS — DIASTOLIC BLOOD PRESSURE: 69 MMHG | SYSTOLIC BLOOD PRESSURE: 131 MMHG | HEART RATE: 72 BPM | TEMPERATURE: 97.1 F

## 2023-01-18 DIAGNOSIS — N18.32 ANEMIA OF CHRONIC RENAL FAILURE, STAGE 3B: ICD-10-CM

## 2023-01-18 DIAGNOSIS — D63.1 ANEMIA OF CHRONIC RENAL FAILURE, STAGE 3B: ICD-10-CM

## 2023-01-18 DIAGNOSIS — E11.8 DM COMPLICATION: ICD-10-CM

## 2023-01-18 DIAGNOSIS — D64.9 NORMOCYTIC ANEMIA: ICD-10-CM

## 2023-01-18 DIAGNOSIS — N18.32 STAGE 3B CHRONIC KIDNEY DISEASE: Primary | ICD-10-CM

## 2023-01-18 LAB
BASOPHILS # BLD AUTO: 0.04 10*3/MM3 (ref 0–0.2)
BASOPHILS NFR BLD AUTO: 0.6 % (ref 0–1.5)
DEPRECATED RDW RBC AUTO: 54.9 FL (ref 37–54)
EOSINOPHIL # BLD AUTO: 0.16 10*3/MM3 (ref 0–0.4)
EOSINOPHIL NFR BLD AUTO: 2.6 % (ref 0.3–6.2)
ERYTHROCYTE [DISTWIDTH] IN BLOOD BY AUTOMATED COUNT: 16.4 % (ref 12.3–15.4)
HCT VFR BLD AUTO: 24.4 % (ref 34–46.6)
HGB BLD-MCNC: 7.5 G/DL (ref 12–15.9)
IMM GRANULOCYTES # BLD AUTO: 0.04 10*3/MM3 (ref 0–0.05)
IMM GRANULOCYTES NFR BLD AUTO: 0.6 % (ref 0–0.5)
LYMPHOCYTES # BLD AUTO: 1.83 10*3/MM3 (ref 0.7–3.1)
LYMPHOCYTES NFR BLD AUTO: 29.4 % (ref 19.6–45.3)
MCH RBC QN AUTO: 28.1 PG (ref 26.6–33)
MCHC RBC AUTO-ENTMCNC: 30.7 G/DL (ref 31.5–35.7)
MCV RBC AUTO: 91.4 FL (ref 79–97)
MONOCYTES # BLD AUTO: 0.6 10*3/MM3 (ref 0.1–0.9)
MONOCYTES NFR BLD AUTO: 9.6 % (ref 5–12)
NEUTROPHILS NFR BLD AUTO: 3.56 10*3/MM3 (ref 1.7–7)
NEUTROPHILS NFR BLD AUTO: 57.2 % (ref 42.7–76)
NRBC BLD AUTO-RTO: 0 /100 WBC (ref 0–0.2)
PLATELET # BLD AUTO: 153 10*3/MM3 (ref 140–450)
PMV BLD AUTO: 9.8 FL (ref 6–12)
RBC # BLD AUTO: 2.67 10*6/MM3 (ref 3.77–5.28)
WBC NRBC COR # BLD: 6.23 10*3/MM3 (ref 3.4–10.8)

## 2023-01-18 PROCEDURE — 85025 COMPLETE CBC W/AUTO DIFF WBC: CPT | Performed by: INTERNAL MEDICINE

## 2023-01-18 PROCEDURE — 96372 THER/PROPH/DIAG INJ SC/IM: CPT

## 2023-01-18 PROCEDURE — 25010000002 EPOETIN ALFA-EPBX 40000 UNIT/ML SOLUTION: Performed by: INTERNAL MEDICINE

## 2023-01-18 RX ADMIN — EPOETIN ALFA-EPBX 40000 UNITS: 40000 INJECTION, SOLUTION INTRAVENOUS; SUBCUTANEOUS at 14:03

## 2023-01-19 ENCOUNTER — TELEPHONE (OUTPATIENT)
Dept: CARDIOLOGY | Facility: CLINIC | Age: 88
End: 2023-01-19
Payer: MEDICARE

## 2023-01-19 NOTE — TELEPHONE ENCOUNTER
Dr. Elpidio Galarza called to request cardiac clearance for an upcoming full mouth teeth extraction. Asking to hold all blood thinners for a few days prior. I only see that she is on ASA 81 mg daily. Also asking approval to use epi with local anesthesia.

## 2023-01-20 DIAGNOSIS — E11.29 CONTROLLED TYPE 2 DIABETES MELLITUS WITH OTHER DIABETIC KIDNEY COMPLICATION, WITHOUT LONG-TERM CURRENT USE OF INSULIN: ICD-10-CM

## 2023-01-20 RX ORDER — GLIMEPIRIDE 2 MG/1
2 TABLET ORAL
Qty: 30 TABLET | Refills: 5 | Status: SHIPPED | OUTPATIENT
Start: 2023-01-20

## 2023-01-20 RX ORDER — FUROSEMIDE 20 MG/1
20 TABLET ORAL DAILY
Qty: 90 TABLET | Refills: 2 | Status: SHIPPED | OUTPATIENT
Start: 2023-01-20 | End: 2023-01-20

## 2023-01-20 NOTE — TELEPHONE ENCOUNTER
Incoming Refill Request      Medication requested (name and dose): FUROSEMIDE 20MG and GLIMEPIRIDE 4MG    Pharmacy where request should be sent: [facility]     Additional details provided by patient: pt is out    Best call back number: [phone]    Does the patient have less than a 3 day supply:  [x] Yes  [] No    Marina Latif Rep  01/20/23, 13:31 EST

## 2023-01-20 NOTE — TELEPHONE ENCOUNTER
Spoke with Josué at [facility], pt should continue aspirin if able. She has chronic Afib and is not anticoagulated due to chronic anemia. Would avoid use of epinephrine.

## 2023-01-20 NOTE — TELEPHONE ENCOUNTER
Rx Refill Note  Requested Prescriptions     Pending Prescriptions Disp Refills   • glimepiride (AMARYL) 2 MG tablet 30 tablet 5     Sig: Take 1 tablet by mouth Every Morning Before Breakfast.     Refused Prescriptions Disp Refills   • furosemide (LASIX) 20 MG tablet 90 tablet 2     Sig: Take 1 tablet by mouth Daily.      Last office visit with prescribing clinician: 11/16/2022   Last telemedicine visit with prescribing clinician: 5/17/2023   Next office visit with prescribing clinician: 5/17/2023     Mamie Arteaga MA  01/20/23, 17:37 EST

## 2023-01-27 ENCOUNTER — TELEPHONE (OUTPATIENT)
Dept: INTERNAL MEDICINE | Facility: CLINIC | Age: 88
End: 2023-01-27
Payer: MEDICARE

## 2023-01-27 NOTE — TELEPHONE ENCOUNTER
Not from my standpoint. She is established with cardiology but unsure if he would like for her to be on this. Not on current medication list.

## 2023-01-27 NOTE — TELEPHONE ENCOUNTER
Caller: Brennan Mendenhall    Relationship: Emergency Contact    Best call back number: [phone]    Requested Prescriptions:       FUROSEMIDE 20MG     Pharmacy where request should be sent: [facility] MA[facility]IV[facility] - [place] [id number] [facility] - [phone]  - [phone] FX     Additional details provided by patient: REQUESTING 90 DAY SUPPLY    Does the patient have less than a 3 day supply:  [x] Yes  [] No    Would you like a call back once the refill request has been completed: [x] Yes [] No    If the office needs to give you a call back, can they leave a voicemail: [x] Yes [] No    Marina Fink Rep   01/27/23 10:44 EST

## 2023-02-01 ENCOUNTER — HOSPITAL ENCOUNTER (OUTPATIENT)
Dept: INFUSION THERAPY | Facility: HOSPITAL | Age: 88
Discharge: HOME | End: 2023-02-01
Admitting: NURSE PRACTITIONER
Payer: MEDICARE

## 2023-02-01 VITALS
DIASTOLIC BLOOD PRESSURE: 48 MMHG | RESPIRATION RATE: 16 BRPM | SYSTOLIC BLOOD PRESSURE: 132 MMHG | WEIGHT: 140 LBS | BODY MASS INDEX: 22.6 KG/M2 | HEART RATE: 70 BPM | OXYGEN SATURATION: 97 % | TEMPERATURE: 96.9 F

## 2023-02-01 DIAGNOSIS — D63.1 ANEMIA OF CHRONIC RENAL FAILURE, STAGE 3B: ICD-10-CM

## 2023-02-01 DIAGNOSIS — D64.9 NORMOCYTIC ANEMIA: ICD-10-CM

## 2023-02-01 DIAGNOSIS — N18.32 ANEMIA OF CHRONIC RENAL FAILURE, STAGE 3B: ICD-10-CM

## 2023-02-01 DIAGNOSIS — E11.8 DM COMPLICATION: ICD-10-CM

## 2023-02-01 DIAGNOSIS — N18.32 STAGE 3B CHRONIC KIDNEY DISEASE: Primary | ICD-10-CM

## 2023-02-01 LAB
DEPRECATED RDW RBC AUTO: 53.4 FL (ref 37–54)
ERYTHROCYTE [DISTWIDTH] IN BLOOD BY AUTOMATED COUNT: 16.3 % (ref 12.3–15.4)
HCT VFR BLD AUTO: 24.7 % (ref 34–46.6)
HGB BLD-MCNC: 7.3 G/DL (ref 12–15.9)
MCH RBC QN AUTO: 26.7 PG (ref 26.6–33)
MCHC RBC AUTO-ENTMCNC: 29.6 G/DL (ref 31.5–35.7)
MCV RBC AUTO: 90.5 FL (ref 79–97)
PLATELET # BLD AUTO: 151 10*3/MM3 (ref 140–450)
PMV BLD AUTO: 10 FL (ref 6–12)
RBC # BLD AUTO: 2.73 10*6/MM3 (ref 3.77–5.28)
URATE SERPL-MCNC: 9 MG/DL (ref 2.4–5.7)
WBC NRBC COR # BLD: 6.39 10*3/MM3 (ref 3.4–10.8)

## 2023-02-01 PROCEDURE — 36415 COLL VENOUS BLD VENIPUNCTURE: CPT

## 2023-02-01 PROCEDURE — 25010000002 EPOETIN ALFA-EPBX 40000 UNIT/ML SOLUTION: Performed by: NURSE PRACTITIONER

## 2023-02-01 PROCEDURE — 84550 ASSAY OF BLOOD/URIC ACID: CPT | Performed by: NURSE PRACTITIONER

## 2023-02-01 PROCEDURE — 85027 COMPLETE CBC AUTOMATED: CPT | Performed by: NURSE PRACTITIONER

## 2023-02-01 PROCEDURE — 96365 THER/PROPH/DIAG IV INF INIT: CPT

## 2023-02-01 PROCEDURE — 83970 ASSAY OF PARATHORMONE: CPT | Performed by: NURSE PRACTITIONER

## 2023-02-01 PROCEDURE — 96372 THER/PROPH/DIAG INJ SC/IM: CPT

## 2023-02-01 PROCEDURE — 25010000002 IRON SUCROSE PER 1 MG: Performed by: NURSE PRACTITIONER

## 2023-02-01 RX ORDER — SODIUM CHLORIDE 9 MG/ML
250 INJECTION, SOLUTION INTRAVENOUS ONCE
Status: CANCELLED | OUTPATIENT
Start: 2023-02-08

## 2023-02-01 RX ORDER — SODIUM CHLORIDE 9 MG/ML
250 INJECTION, SOLUTION INTRAVENOUS ONCE
Status: DISCONTINUED | OUTPATIENT
Start: 2023-02-01 | End: 2023-02-03 | Stop reason: HOSPADM

## 2023-02-01 RX ADMIN — EPOETIN ALFA-EPBX 40000 UNITS: 40000 INJECTION, SOLUTION INTRAVENOUS; SUBCUTANEOUS at 15:22

## 2023-02-01 RX ADMIN — IRON SUCROSE 200 MG: 20 INJECTION, SOLUTION INTRAVENOUS at 14:45

## 2023-02-02 LAB — PTH-INTACT SERPL-MCNC: 62.4 PG/ML (ref 15–65)

## 2023-02-05 DIAGNOSIS — E11.29 CONTROLLED TYPE 2 DIABETES MELLITUS WITH OTHER DIABETIC KIDNEY COMPLICATION, WITHOUT LONG-TERM CURRENT USE OF INSULIN: ICD-10-CM

## 2023-02-06 RX ORDER — GLIMEPIRIDE 4 MG/1
TABLET ORAL
Qty: 90 TABLET | Refills: 0 | OUTPATIENT
Start: 2023-02-06

## 2023-02-08 ENCOUNTER — HOSPITAL ENCOUNTER (OUTPATIENT)
Dept: INFUSION THERAPY | Facility: HOSPITAL | Age: 88
Discharge: HOME | End: 2023-02-08
Admitting: NURSE PRACTITIONER
Payer: MEDICARE

## 2023-02-08 VITALS
DIASTOLIC BLOOD PRESSURE: 59 MMHG | OXYGEN SATURATION: 97 % | TEMPERATURE: 98.2 F | RESPIRATION RATE: 18 BRPM | SYSTOLIC BLOOD PRESSURE: 150 MMHG | HEART RATE: 73 BPM

## 2023-02-08 DIAGNOSIS — D63.1 ANEMIA OF CHRONIC RENAL FAILURE, STAGE 3B: Primary | ICD-10-CM

## 2023-02-08 DIAGNOSIS — N18.32 ANEMIA OF CHRONIC RENAL FAILURE, STAGE 3B: Primary | ICD-10-CM

## 2023-02-08 PROCEDURE — 25010000002 IRON SUCROSE PER 1 MG: Performed by: NURSE PRACTITIONER

## 2023-02-08 PROCEDURE — 96365 THER/PROPH/DIAG IV INF INIT: CPT

## 2023-02-08 RX ORDER — SODIUM CHLORIDE 9 MG/ML
250 INJECTION, SOLUTION INTRAVENOUS ONCE
Status: DISCONTINUED | OUTPATIENT
Start: 2023-02-08 | End: 2023-02-10 | Stop reason: HOSPADM

## 2023-02-08 RX ORDER — SODIUM CHLORIDE 9 MG/ML
250 INJECTION, SOLUTION INTRAVENOUS ONCE
Status: CANCELLED | OUTPATIENT
Start: 2023-02-15

## 2023-02-08 RX ADMIN — IRON SUCROSE 200 MG: 20 INJECTION, SOLUTION INTRAVENOUS at 15:20

## 2023-02-15 ENCOUNTER — HOSPITAL ENCOUNTER (OUTPATIENT)
Dept: INFUSION THERAPY | Facility: HOSPITAL | Age: 88
Discharge: HOME | End: 2023-02-15
Admitting: NURSE PRACTITIONER
Payer: MEDICARE

## 2023-02-15 VITALS
SYSTOLIC BLOOD PRESSURE: 149 MMHG | DIASTOLIC BLOOD PRESSURE: 80 MMHG | OXYGEN SATURATION: 94 % | RESPIRATION RATE: 18 BRPM | TEMPERATURE: 97.8 F | HEART RATE: 71 BPM

## 2023-02-15 DIAGNOSIS — E11.8 DM COMPLICATION: ICD-10-CM

## 2023-02-15 DIAGNOSIS — N18.32 ANEMIA OF CHRONIC RENAL FAILURE, STAGE 3B: ICD-10-CM

## 2023-02-15 DIAGNOSIS — N18.32 STAGE 3B CHRONIC KIDNEY DISEASE: Primary | ICD-10-CM

## 2023-02-15 DIAGNOSIS — D63.1 ANEMIA OF CHRONIC RENAL FAILURE, STAGE 3B: ICD-10-CM

## 2023-02-15 DIAGNOSIS — D64.9 NORMOCYTIC ANEMIA: ICD-10-CM

## 2023-02-15 LAB
DEPRECATED RDW RBC AUTO: 64.5 FL (ref 37–54)
ERYTHROCYTE [DISTWIDTH] IN BLOOD BY AUTOMATED COUNT: 19.7 % (ref 12.3–15.4)
HCT VFR BLD AUTO: 25.6 % (ref 34–46.6)
HGB BLD-MCNC: 7.6 G/DL (ref 12–15.9)
MCH RBC QN AUTO: 27.1 PG (ref 26.6–33)
MCHC RBC AUTO-ENTMCNC: 29.7 G/DL (ref 31.5–35.7)
MCV RBC AUTO: 91.4 FL (ref 79–97)
PLATELET # BLD AUTO: 136 10*3/MM3 (ref 140–450)
PMV BLD AUTO: 10.6 FL (ref 6–12)
RBC # BLD AUTO: 2.8 10*6/MM3 (ref 3.77–5.28)
WBC NRBC COR # BLD: 5.35 10*3/MM3 (ref 3.4–10.8)

## 2023-02-15 PROCEDURE — 25010000002 IRON SUCROSE PER 1 MG: Performed by: NURSE PRACTITIONER

## 2023-02-15 PROCEDURE — 25010000002 EPOETIN ALFA-EPBX 40000 UNIT/ML SOLUTION: Performed by: NURSE PRACTITIONER

## 2023-02-15 PROCEDURE — 96372 THER/PROPH/DIAG INJ SC/IM: CPT

## 2023-02-15 PROCEDURE — 85027 COMPLETE CBC AUTOMATED: CPT | Performed by: NURSE PRACTITIONER

## 2023-02-15 PROCEDURE — 96365 THER/PROPH/DIAG IV INF INIT: CPT

## 2023-02-15 RX ORDER — SODIUM CHLORIDE 9 MG/ML
250 INJECTION, SOLUTION INTRAVENOUS ONCE
Status: CANCELLED | OUTPATIENT
Start: 2023-02-22

## 2023-02-15 RX ORDER — SODIUM CHLORIDE 9 MG/ML
250 INJECTION, SOLUTION INTRAVENOUS ONCE
Status: DISCONTINUED | OUTPATIENT
Start: 2023-02-15 | End: 2023-02-17 | Stop reason: HOSPADM

## 2023-02-15 RX ADMIN — EPOETIN ALFA-EPBX 40000 UNITS: 40000 INJECTION, SOLUTION INTRAVENOUS; SUBCUTANEOUS at 14:52

## 2023-02-15 RX ADMIN — IRON SUCROSE 200 MG: 20 INJECTION, SOLUTION INTRAVENOUS at 14:09

## 2023-02-15 NOTE — TELEPHONE ENCOUNTER
Rx Refill Note  Requested Prescriptions     Pending Prescriptions Disp Refills   • furosemide (Lasix) 20 MG tablet 90 tablet 1     Sig: Take 1 tablet by mouth Daily.      Last office visit with prescribing clinician: 11/16/2022   Last telemedicine visit with prescribing clinician: 5/17/2023   Next office visit with prescribing clinician: 5/17/2023     Mamie Arteaga MA  02/15/23, 17:47 EST

## 2023-02-16 RX ORDER — FUROSEMIDE 20 MG/1
20 TABLET ORAL DAILY
Qty: 90 TABLET | Refills: 1 | OUTPATIENT
Start: 2023-02-16

## 2023-02-16 NOTE — TELEPHONE ENCOUNTER
Spoke with patient, she has a refill to  at the pharmacy from Dr. Leon. I informed her that this medication would need to continue coming from Dr. Leon.

## 2023-02-17 PROCEDURE — 93296 REM INTERROG EVL PM/IDS: CPT | Performed by: INTERNAL MEDICINE

## 2023-02-17 PROCEDURE — 93294 REM INTERROG EVL PM/LDLS PM: CPT | Performed by: INTERNAL MEDICINE

## 2023-02-21 ENCOUNTER — HOSPITAL ENCOUNTER (OUTPATIENT)
Dept: INFUSION THERAPY | Facility: HOSPITAL | Age: 88
Discharge: HOME | End: 2023-02-21
Admitting: NURSE PRACTITIONER
Payer: MEDICARE

## 2023-02-21 VITALS
RESPIRATION RATE: 18 BRPM | DIASTOLIC BLOOD PRESSURE: 45 MMHG | HEART RATE: 69 BPM | SYSTOLIC BLOOD PRESSURE: 130 MMHG | OXYGEN SATURATION: 94 % | TEMPERATURE: 97.7 F

## 2023-02-21 DIAGNOSIS — N18.32 ANEMIA OF CHRONIC RENAL FAILURE, STAGE 3B: Primary | ICD-10-CM

## 2023-02-21 DIAGNOSIS — D63.1 ANEMIA OF CHRONIC RENAL FAILURE, STAGE 3B: Primary | ICD-10-CM

## 2023-02-21 PROCEDURE — 25010000002 IRON SUCROSE PER 1 MG: Performed by: NURSE PRACTITIONER

## 2023-02-21 PROCEDURE — 96365 THER/PROPH/DIAG IV INF INIT: CPT

## 2023-02-21 RX ORDER — SODIUM CHLORIDE 9 MG/ML
250 INJECTION, SOLUTION INTRAVENOUS ONCE
Status: DISCONTINUED | OUTPATIENT
Start: 2023-02-21 | End: 2023-02-23 | Stop reason: HOSPADM

## 2023-02-21 RX ORDER — SODIUM CHLORIDE 9 MG/ML
250 INJECTION, SOLUTION INTRAVENOUS ONCE
Status: CANCELLED | OUTPATIENT
Start: 2023-02-22

## 2023-02-21 RX ORDER — FUROSEMIDE 20 MG/1
1 TABLET ORAL DAILY
COMMUNITY
Start: 2023-02-16

## 2023-02-21 RX ADMIN — IRON SUCROSE 200 MG: 20 INJECTION, SOLUTION INTRAVENOUS at 14:43

## 2023-03-02 ENCOUNTER — HOSPITAL ENCOUNTER (OUTPATIENT)
Dept: INFUSION THERAPY | Facility: HOSPITAL | Age: 88
Discharge: HOME | End: 2023-03-02
Admitting: NURSE PRACTITIONER
Payer: MEDICARE

## 2023-03-02 VITALS
SYSTOLIC BLOOD PRESSURE: 142 MMHG | TEMPERATURE: 97.6 F | RESPIRATION RATE: 18 BRPM | DIASTOLIC BLOOD PRESSURE: 55 MMHG | HEART RATE: 78 BPM | OXYGEN SATURATION: 97 %

## 2023-03-02 DIAGNOSIS — D63.1 ANEMIA OF CHRONIC RENAL FAILURE, STAGE 3B: ICD-10-CM

## 2023-03-02 DIAGNOSIS — E11.8 DM COMPLICATION: ICD-10-CM

## 2023-03-02 DIAGNOSIS — N18.32 STAGE 3B CHRONIC KIDNEY DISEASE: Primary | ICD-10-CM

## 2023-03-02 DIAGNOSIS — D64.9 NORMOCYTIC ANEMIA: ICD-10-CM

## 2023-03-02 DIAGNOSIS — N18.32 ANEMIA OF CHRONIC RENAL FAILURE, STAGE 3B: ICD-10-CM

## 2023-03-02 LAB
DEPRECATED RDW RBC AUTO: 65 FL (ref 37–54)
ERYTHROCYTE [DISTWIDTH] IN BLOOD BY AUTOMATED COUNT: 19.5 % (ref 12.3–15.4)
HCT VFR BLD AUTO: 28.9 % (ref 34–46.6)
HGB BLD-MCNC: 8.7 G/DL (ref 12–15.9)
MCH RBC QN AUTO: 27.2 PG (ref 26.6–33)
MCHC RBC AUTO-ENTMCNC: 30.1 G/DL (ref 31.5–35.7)
MCV RBC AUTO: 90.3 FL (ref 79–97)
PLATELET # BLD AUTO: 120 10*3/MM3 (ref 140–450)
PMV BLD AUTO: 9.4 FL (ref 6–12)
RBC # BLD AUTO: 3.2 10*6/MM3 (ref 3.77–5.28)
WBC NRBC COR # BLD: 5.4 10*3/MM3 (ref 3.4–10.8)

## 2023-03-02 PROCEDURE — 96365 THER/PROPH/DIAG IV INF INIT: CPT

## 2023-03-02 PROCEDURE — 96372 THER/PROPH/DIAG INJ SC/IM: CPT

## 2023-03-02 PROCEDURE — 85027 COMPLETE CBC AUTOMATED: CPT | Performed by: NURSE PRACTITIONER

## 2023-03-02 PROCEDURE — 25010000002 EPOETIN ALFA-EPBX 40000 UNIT/ML SOLUTION: Performed by: NURSE PRACTITIONER

## 2023-03-02 PROCEDURE — 25010000002 IRON SUCROSE PER 1 MG: Performed by: NURSE PRACTITIONER

## 2023-03-02 RX ORDER — SODIUM CHLORIDE 9 MG/ML
250 INJECTION, SOLUTION INTRAVENOUS ONCE
Status: CANCELLED | OUTPATIENT
Start: 2023-03-02

## 2023-03-02 RX ORDER — SODIUM CHLORIDE 9 MG/ML
250 INJECTION, SOLUTION INTRAVENOUS ONCE
Status: DISCONTINUED | OUTPATIENT
Start: 2023-03-02 | End: 2023-03-04 | Stop reason: HOSPADM

## 2023-03-02 RX ADMIN — EPOETIN ALFA-EPBX 40000 UNITS: 40000 INJECTION, SOLUTION INTRAVENOUS; SUBCUTANEOUS at 15:36

## 2023-03-02 RX ADMIN — IRON SUCROSE 200 MG: 20 INJECTION, SOLUTION INTRAVENOUS at 15:00

## 2023-03-16 ENCOUNTER — HOSPITAL ENCOUNTER (OUTPATIENT)
Dept: INFUSION THERAPY | Facility: HOSPITAL | Age: 88
Discharge: HOME | End: 2023-03-16
Admitting: NURSE PRACTITIONER
Payer: MEDICARE

## 2023-03-16 VITALS
RESPIRATION RATE: 18 BRPM | SYSTOLIC BLOOD PRESSURE: 116 MMHG | HEART RATE: 73 BPM | OXYGEN SATURATION: 94 % | TEMPERATURE: 96.9 F | DIASTOLIC BLOOD PRESSURE: 66 MMHG

## 2023-03-16 DIAGNOSIS — E11.8 DM COMPLICATION: ICD-10-CM

## 2023-03-16 DIAGNOSIS — N18.32 ANEMIA OF CHRONIC RENAL FAILURE, STAGE 3B: ICD-10-CM

## 2023-03-16 DIAGNOSIS — N18.32 STAGE 3B CHRONIC KIDNEY DISEASE: Primary | ICD-10-CM

## 2023-03-16 DIAGNOSIS — D63.1 ANEMIA OF CHRONIC RENAL FAILURE, STAGE 3B: ICD-10-CM

## 2023-03-16 DIAGNOSIS — D64.9 NORMOCYTIC ANEMIA: ICD-10-CM

## 2023-03-16 LAB
DEPRECATED RDW RBC AUTO: 60.8 FL (ref 37–54)
ERYTHROCYTE [DISTWIDTH] IN BLOOD BY AUTOMATED COUNT: 18.4 % (ref 12.3–15.4)
HCT VFR BLD AUTO: 30.3 % (ref 34–46.6)
HGB BLD-MCNC: 9.3 G/DL (ref 12–15.9)
MCH RBC QN AUTO: 27.5 PG (ref 26.6–33)
MCHC RBC AUTO-ENTMCNC: 30.7 G/DL (ref 31.5–35.7)
MCV RBC AUTO: 89.6 FL (ref 79–97)
PLATELET # BLD AUTO: 104 10*3/MM3 (ref 140–450)
PMV BLD AUTO: 10.3 FL (ref 6–12)
RBC # BLD AUTO: 3.38 10*6/MM3 (ref 3.77–5.28)
WBC NRBC COR # BLD: 5.5 10*3/MM3 (ref 3.4–10.8)

## 2023-03-16 PROCEDURE — 96372 THER/PROPH/DIAG INJ SC/IM: CPT

## 2023-03-16 PROCEDURE — 36415 COLL VENOUS BLD VENIPUNCTURE: CPT

## 2023-03-16 PROCEDURE — 85027 COMPLETE CBC AUTOMATED: CPT | Performed by: NURSE PRACTITIONER

## 2023-03-16 PROCEDURE — 25010000002 EPOETIN ALFA-EPBX 40000 UNIT/ML SOLUTION: Performed by: NURSE PRACTITIONER

## 2023-03-16 RX ADMIN — EPOETIN ALFA-EPBX 40000 UNITS: 40000 INJECTION, SOLUTION INTRAVENOUS; SUBCUTANEOUS at 15:10

## 2023-03-30 ENCOUNTER — HOSPITAL ENCOUNTER (OUTPATIENT)
Dept: INFUSION THERAPY | Facility: HOSPITAL | Age: 88
Discharge: HOME | End: 2023-03-30
Admitting: NURSE PRACTITIONER
Payer: MEDICARE

## 2023-03-30 VITALS
RESPIRATION RATE: 18 BRPM | TEMPERATURE: 98 F | OXYGEN SATURATION: 96 % | DIASTOLIC BLOOD PRESSURE: 70 MMHG | SYSTOLIC BLOOD PRESSURE: 149 MMHG | HEART RATE: 75 BPM

## 2023-03-30 DIAGNOSIS — N18.32 STAGE 3B CHRONIC KIDNEY DISEASE: Primary | ICD-10-CM

## 2023-03-30 DIAGNOSIS — D63.1 ANEMIA OF CHRONIC RENAL FAILURE, STAGE 3B: ICD-10-CM

## 2023-03-30 DIAGNOSIS — D64.9 NORMOCYTIC ANEMIA: ICD-10-CM

## 2023-03-30 DIAGNOSIS — E11.8 DM COMPLICATION: ICD-10-CM

## 2023-03-30 DIAGNOSIS — N18.32 ANEMIA OF CHRONIC RENAL FAILURE, STAGE 3B: ICD-10-CM

## 2023-03-30 LAB
ALBUMIN SERPL-MCNC: 4.3 G/DL (ref 3.5–5.2)
ANION GAP SERPL CALCULATED.3IONS-SCNC: 13.9 MMOL/L (ref 5–15)
BUN SERPL-MCNC: 25 MG/DL (ref 8–23)
BUN/CREAT SERPL: 20 (ref 7–25)
CALCIUM SPEC-SCNC: 9.5 MG/DL (ref 8.6–10.5)
CHLORIDE SERPL-SCNC: 99 MMOL/L (ref 98–107)
CO2 SERPL-SCNC: 26.1 MMOL/L (ref 22–29)
CREAT SERPL-MCNC: 1.25 MG/DL (ref 0.57–1)
DEPRECATED RDW RBC AUTO: 61.2 FL (ref 37–54)
EGFRCR SERPLBLD CKD-EPI 2021: 41.5 ML/MIN/1.73
ERYTHROCYTE [DISTWIDTH] IN BLOOD BY AUTOMATED COUNT: 18.6 % (ref 12.3–15.4)
GLUCOSE SERPL-MCNC: 105 MG/DL (ref 65–99)
HCT VFR BLD AUTO: 30.4 % (ref 34–46.6)
HGB BLD-MCNC: 9.3 G/DL (ref 12–15.9)
IRON 24H UR-MRATE: 68 MCG/DL (ref 37–145)
IRON SATN MFR SERPL: 20 % (ref 20–50)
MCH RBC QN AUTO: 27.4 PG (ref 26.6–33)
MCHC RBC AUTO-ENTMCNC: 30.6 G/DL (ref 31.5–35.7)
MCV RBC AUTO: 89.4 FL (ref 79–97)
PHOSPHATE SERPL-MCNC: 3.6 MG/DL (ref 2.5–4.5)
PLATELET # BLD AUTO: 95 10*3/MM3 (ref 140–450)
PMV BLD AUTO: 9.8 FL (ref 6–12)
POTASSIUM SERPL-SCNC: 4.4 MMOL/L (ref 3.5–5.2)
RBC # BLD AUTO: 3.4 10*6/MM3 (ref 3.77–5.28)
SODIUM SERPL-SCNC: 139 MMOL/L (ref 136–145)
TIBC SERPL-MCNC: 341 MCG/DL (ref 298–536)
TRANSFERRIN SERPL-MCNC: 229 MG/DL (ref 200–360)
URATE SERPL-MCNC: 9.7 MG/DL (ref 2.4–5.7)
WBC NRBC COR # BLD: 5.18 10*3/MM3 (ref 3.4–10.8)

## 2023-03-30 PROCEDURE — 83540 ASSAY OF IRON: CPT | Performed by: NURSE PRACTITIONER

## 2023-03-30 PROCEDURE — 25010000002 EPOETIN ALFA-EPBX 40000 UNIT/ML SOLUTION: Performed by: NURSE PRACTITIONER

## 2023-03-30 PROCEDURE — 83970 ASSAY OF PARATHORMONE: CPT | Performed by: NURSE PRACTITIONER

## 2023-03-30 PROCEDURE — 85027 COMPLETE CBC AUTOMATED: CPT | Performed by: NURSE PRACTITIONER

## 2023-03-30 PROCEDURE — 84466 ASSAY OF TRANSFERRIN: CPT | Performed by: NURSE PRACTITIONER

## 2023-03-30 PROCEDURE — 96372 THER/PROPH/DIAG INJ SC/IM: CPT

## 2023-03-30 PROCEDURE — 80069 RENAL FUNCTION PANEL: CPT | Performed by: NURSE PRACTITIONER

## 2023-03-30 PROCEDURE — 36415 COLL VENOUS BLD VENIPUNCTURE: CPT

## 2023-03-30 PROCEDURE — 84550 ASSAY OF BLOOD/URIC ACID: CPT | Performed by: NURSE PRACTITIONER

## 2023-03-30 RX ADMIN — EPOETIN ALFA-EPBX 40000 UNITS: 40000 INJECTION, SOLUTION INTRAVENOUS; SUBCUTANEOUS at 14:59

## 2023-03-31 LAB — PTH-INTACT SERPL-MCNC: 47.8 PG/ML (ref 15–65)

## 2023-04-13 ENCOUNTER — HOSPITAL ENCOUNTER (OUTPATIENT)
Dept: INFUSION THERAPY | Facility: HOSPITAL | Age: 88
Discharge: HOME | End: 2023-04-13
Admitting: NURSE PRACTITIONER
Payer: MEDICARE

## 2023-04-13 VITALS
RESPIRATION RATE: 18 BRPM | HEART RATE: 93 BPM | TEMPERATURE: 97.9 F | DIASTOLIC BLOOD PRESSURE: 52 MMHG | SYSTOLIC BLOOD PRESSURE: 136 MMHG | OXYGEN SATURATION: 99 %

## 2023-04-13 DIAGNOSIS — D64.9 NORMOCYTIC ANEMIA: ICD-10-CM

## 2023-04-13 DIAGNOSIS — N18.32 ANEMIA OF CHRONIC RENAL FAILURE, STAGE 3B: ICD-10-CM

## 2023-04-13 DIAGNOSIS — N18.32 STAGE 3B CHRONIC KIDNEY DISEASE: Primary | ICD-10-CM

## 2023-04-13 DIAGNOSIS — D63.1 ANEMIA OF CHRONIC RENAL FAILURE, STAGE 3B: ICD-10-CM

## 2023-04-13 LAB
DEPRECATED RDW RBC AUTO: 61.4 FL (ref 37–54)
ERYTHROCYTE [DISTWIDTH] IN BLOOD BY AUTOMATED COUNT: 18.3 % (ref 12.3–15.4)
HCT VFR BLD AUTO: 30.1 % (ref 34–46.6)
HGB BLD-MCNC: 9 G/DL (ref 12–15.9)
MCH RBC QN AUTO: 27.3 PG (ref 26.6–33)
MCHC RBC AUTO-ENTMCNC: 29.9 G/DL (ref 31.5–35.7)
MCV RBC AUTO: 91.2 FL (ref 79–97)
PLATELET # BLD AUTO: 103 10*3/MM3 (ref 140–450)
PMV BLD AUTO: 9.6 FL (ref 6–12)
RBC # BLD AUTO: 3.3 10*6/MM3 (ref 3.77–5.28)
WBC NRBC COR # BLD: 5.13 10*3/MM3 (ref 3.4–10.8)

## 2023-04-13 PROCEDURE — 96372 THER/PROPH/DIAG INJ SC/IM: CPT

## 2023-04-13 PROCEDURE — 85027 COMPLETE CBC AUTOMATED: CPT | Performed by: NURSE PRACTITIONER

## 2023-04-13 PROCEDURE — 25010000002 EPOETIN ALFA-EPBX 40000 UNIT/ML SOLUTION: Performed by: NURSE PRACTITIONER

## 2023-04-13 RX ADMIN — EPOETIN ALFA-EPBX 40000 UNITS: 40000 INJECTION, SOLUTION INTRAVENOUS; SUBCUTANEOUS at 15:02

## 2023-04-18 DIAGNOSIS — E03.9 HYPOTHYROIDISM, UNSPECIFIED TYPE: ICD-10-CM

## 2023-04-18 RX ORDER — LEVOTHYROXINE SODIUM 0.05 MG/1
50 TABLET ORAL
Qty: 90 TABLET | Refills: 0 | Status: SHIPPED | OUTPATIENT
Start: 2023-04-18

## 2023-04-18 NOTE — TELEPHONE ENCOUNTER
Rx Refill Note  Requested Prescriptions     Pending Prescriptions Disp Refills   • levothyroxine (SYNTHROID, LEVOTHROID) 50 MCG tablet [Pharmacy Med Name: Levothyroxine Sodium Oral Tablet 50 MCG] 90 tablet 0     Sig: TAKE 1 TABLET BY MOUTH EVERY DAY      Last office visit with prescribing clinician: 11/16/2022   Last telemedicine visit with prescribing clinician: 5/17/2023   Next office visit with prescribing clinician: 5/17/2023     Mamie Arteaga MA  04/18/23, 08:29 EDT     Last TSH: 11/16/2022- 6.510

## 2023-04-27 ENCOUNTER — HOSPITAL ENCOUNTER (OUTPATIENT)
Dept: INFUSION THERAPY | Facility: HOSPITAL | Age: 88
Discharge: HOME | End: 2023-04-27
Payer: MEDICARE

## 2023-04-27 VITALS
OXYGEN SATURATION: 100 % | TEMPERATURE: 98.7 F | RESPIRATION RATE: 20 BRPM | DIASTOLIC BLOOD PRESSURE: 62 MMHG | SYSTOLIC BLOOD PRESSURE: 144 MMHG | HEART RATE: 78 BPM

## 2023-04-27 DIAGNOSIS — D63.1 ANEMIA OF CHRONIC RENAL FAILURE, STAGE 3B: ICD-10-CM

## 2023-04-27 DIAGNOSIS — N18.32 STAGE 3B CHRONIC KIDNEY DISEASE: Primary | ICD-10-CM

## 2023-04-27 DIAGNOSIS — N18.32 ANEMIA OF CHRONIC RENAL FAILURE, STAGE 3B: ICD-10-CM

## 2023-04-27 DIAGNOSIS — D64.9 NORMOCYTIC ANEMIA: ICD-10-CM

## 2023-04-27 LAB
DEPRECATED RDW RBC AUTO: 60.3 FL (ref 37–54)
ERYTHROCYTE [DISTWIDTH] IN BLOOD BY AUTOMATED COUNT: 18 % (ref 12.3–15.4)
HCT VFR BLD AUTO: 28 % (ref 34–46.6)
HGB BLD-MCNC: 8.4 G/DL (ref 12–15.9)
MCH RBC QN AUTO: 27.5 PG (ref 26.6–33)
MCHC RBC AUTO-ENTMCNC: 30 G/DL (ref 31.5–35.7)
MCV RBC AUTO: 91.5 FL (ref 79–97)
PLATELET # BLD AUTO: 109 10*3/MM3 (ref 140–450)
PMV BLD AUTO: 10.8 FL (ref 6–12)
RBC # BLD AUTO: 3.06 10*6/MM3 (ref 3.77–5.28)
WBC NRBC COR # BLD: 5.35 10*3/MM3 (ref 3.4–10.8)

## 2023-04-27 PROCEDURE — 25010000002 EPOETIN ALFA-EPBX 40000 UNIT/ML SOLUTION: Performed by: NURSE PRACTITIONER

## 2023-04-27 PROCEDURE — 96372 THER/PROPH/DIAG INJ SC/IM: CPT

## 2023-04-27 PROCEDURE — 36415 COLL VENOUS BLD VENIPUNCTURE: CPT

## 2023-04-27 PROCEDURE — 85027 COMPLETE CBC AUTOMATED: CPT | Performed by: NURSE PRACTITIONER

## 2023-04-27 RX ADMIN — EPOETIN ALFA-EPBX 40000 UNITS: 40000 INJECTION, SOLUTION INTRAVENOUS; SUBCUTANEOUS at 14:22

## 2023-05-01 RX ORDER — METOPROLOL TARTRATE 100 MG/1
TABLET ORAL
Qty: 180 TABLET | Refills: 1 | Status: SHIPPED | OUTPATIENT
Start: 2023-05-01

## 2023-05-08 ENCOUNTER — OFFICE VISIT (OUTPATIENT)
Dept: CARDIOLOGY | Facility: CLINIC | Age: 88
End: 2023-05-08
Payer: MEDICARE

## 2023-05-08 VITALS
HEART RATE: 66 BPM | OXYGEN SATURATION: 95 % | WEIGHT: 133.4 LBS | HEIGHT: 66 IN | DIASTOLIC BLOOD PRESSURE: 64 MMHG | SYSTOLIC BLOOD PRESSURE: 134 MMHG | BODY MASS INDEX: 21.44 KG/M2

## 2023-05-08 DIAGNOSIS — I48.20 CHRONIC ATRIAL FIBRILLATION: Primary | ICD-10-CM

## 2023-05-08 DIAGNOSIS — R06.09 DOE (DYSPNEA ON EXERTION): ICD-10-CM

## 2023-05-08 DIAGNOSIS — E78.2 MIXED HYPERLIPIDEMIA: ICD-10-CM

## 2023-05-08 NOTE — PROGRESS NOTES
[facility]  Office Progress Note  Lynne Sanchez  1934  [address] [address]       Visit Date: 05/08/23    PCP: Elizabeth Easton APRN  [address]  [place] [address]    IDENTIFICATION: A 88 y.o. female  female. Retired.    PROBLEM LIST:   1. Chronic atrial fibrillation, status post [employer] Laureano pacemaker implantation and AV node ablation:  a. Diagnosed June 2005.  b. Status post ECV restoring normal sinus rhythm, June 2005.  c. Rythmol initiated 05/01/2006.  d. Previously digoxin toxicity.  e. GXT Cardiolite, 09/08/2005: No reversible ischemia. LV function not performed secondary to atrial fibrillation.   f. Status post successful external cardioversion on 10/01/2008, Tessa Downey MD.  g. EKG on 10/21/2008: Recurrence of atrial fibrillation with rate of 109.  h. Status post St. Laureano pacemaker implantation and AV node ablation.    i. Echocardiogram 02/15/2010: Moderate MR, moderate TR. RVSP 50. EF greater than 55%, left atrial enlargement at 4.3 cm.  j. Echo 1/18/17: EF 60%, Mod-severe MR, Mod-severe TR.  k. 10/6/17 echo: EF 50-55%, mild LVH, thickened mitral leaflets with mild MR, aortic sclerosis, trace AI, moderate TR with PA SP 60 mmHg, moderate to severe RV dilation, dilated IVC  l. 5/2018 Gen change Aslam  m. 6/30/2018 EF 60%, mild AR, moderate MR, moderate to severe TR, severe pulmonary HTN RVSP 90  n. 4/22 2D Echo: LVEF = 55%.  LV wall thickness-mild concentric hypertrophy.  LV diastolic dysfunction (grade 2) pseudonormalization.  RV cavity mildly dilated.  LA volume moderately increased.  RA cavity severely dilated.  Severe calcification of aortic valve affecting noncoronary cusp.  Moderate TR.  RVSP due to TR markedly elevated >55; = 64.  Severe pulmonary hypertension.  2. History of congestive heart failure.  3. Diabetes mellitus, type 2.  1. 4/22 A1c: 6.2  4. 2/22 [facility] Hospitalization for UE DVT.  1. 2/22 OSH Venous UE: Evidence  of non-occlusive DVT in left axillary vein and one of paired brachial veins.   5. PVD  a. - 10/17 L PTCA ant tib(Pelaez)  6. Renal impairment stage 4  1. 7/6/21 Cr: 1.52  2. 7/20/21 Cr: 1.17  7. Surgical history - appendectomy.   8. Chronic Anemia-NOS neg scopes [id number], h/o following  a. Per Dr. Michael guillen [facility]  9. 7/2018 osteomyelitis with subsequent L great toe amputation been left second toe amputations per Dr. Carrington  10. 2/22 [facility] ER eval for Close fracture of humeral head  11. 3/22 and 4/22 [facility] admission for Anemia due to GI bleed - stabilized with PRBCs.        B.  EGD/colon - benign Dr Velez- pill cam discussed NOAC stopped indefinitely      CC:   Chief Complaint   Patient presents with   • Chronic diastolic congestive heart failure       Allergies  Allergies   Allergen Reactions   • Phenergan [Promethazine Hcl] Confusion       Current Medications    Current Outpatient Medications:   •  acetaminophen (TYLENOL) 650 MG 8 hr tablet, Take 1 tablet by mouth., Disp: , Rfl:   •  Amino Acids-Protein Hydrolys (PRO-STAT) liquid oral liquid, Take 30 mL by mouth 2 (Two) Times a Day., Disp: , Rfl:   •  aspirin 81 MG EC tablet, Take 1 tablet by mouth Daily., Disp: , Rfl:   •  atorvastatin (LIPITOR) 40 MG tablet, Take 1 tablet by mouth Daily., Disp: 90 tablet, Rfl: 3  •  epoetin dorcas-epbx (Retacrit) 33275 UNIT/ML injection, Inject 1 mL under the skin into the appropriate area as directed. 1 ml sq per month on day 17, Disp: , Rfl:   •  furosemide (LASIX) 20 MG tablet, Take 1 tablet by mouth Daily., Disp: , Rfl:   •  glimepiride (AMARYL) 2 MG tablet, Take 1 tablet by mouth Every Morning Before Breakfast., Disp: 30 tablet, Rfl: 5  •  glucose blood test strip, 1 each by Other route Daily. Use as instructed once a day as directed, for Truemetrix reader, Disp: 100 each, Rfl: 3  •  latanoprost (XALATAN) 0.005 % ophthalmic solution, Administer 1 drop to both eyes Daily. (Patient taking differently: Administer 1 drop to  "both eyes Every Night.), Disp: 7.5 mL, Rfl: 3  •  levothyroxine (SYNTHROID, LEVOTHROID) 50 MCG tablet, Take 1 tablet by mouth Every Morning. Indications: Underactive Thyroid, Disp: 90 tablet, Rfl: 0  •  linagliptin (Tradjenta) 5 MG tablet tablet, Take 1 tablet by mouth Daily., Disp: 90 tablet, Rfl: 1  •  metoprolol tartrate (LOPRESSOR) 100 MG tablet, TAKE 1 TABLET BY MOUTH TWO TIMES A DAY, Disp: 180 tablet, Rfl: 1  •  multivitamin with minerals tablet tablet, Take 1 tablet by mouth Daily., Disp: , Rfl:   •  pantoprazole (PROTONIX) 40 MG EC tablet, TAKE 1 TABLET BY MOUTH EVERY DAY, Disp: 90 tablet, Rfl: 1      History of Present Illness   Lynne Sanchez is a 88 y.o. year old female here for follow up.  Frail elderly female with limited activities.  Continues to live independently at current        OBJECTIVE:  Vitals:    05/08/23 1030   BP: 134/64   BP Location: Left arm   Patient Position: Sitting   Pulse: 66   SpO2: 95%   Weight: 60.5 kg (133 lb 6.4 oz)   Height: 167.6 cm (66\")     Body mass index is 21.53 kg/m².    Constitutional:       Appearance: Healthy appearance. Not in distress.   Neck:      Vascular: No JVR. JVD normal.   Pulmonary:      Effort: Pulmonary effort is normal.      Breath sounds: Normal breath sounds. No wheezing. No rhonchi. No rales.   Chest:      Chest wall: Not tender to palpatation.   Cardiovascular:      PMI at left midclavicular line. Normal rate. Regular rhythm. Normal S1. Normal S2.      Murmurs: There is a systolic murmur.      No gallop. No click. No rub.   Pulses:     Intact distal pulses.   Edema:     Thigh: bilateral pitting edema of the thigh.     Pretibial: bilateral pitting edema of the pretibial area.     Ankle: bilateral pitting edema of the ankle.     Feet: bilateral pitting edema of the feet.  Abdominal:      General: Bowel sounds are normal.      Palpations: Abdomen is soft.      Tenderness: There is no abdominal tenderness.   Musculoskeletal: Normal range of motion.       "   General: No tenderness. Skin:     General: Skin is warm and dry.   Neurological:      General: No focal deficit present.      Mental Status: Alert and oriented to person, place and time.         Diagnostic Data:  Procedures  Device check  Acceptable threshold impedances  Chronic A. Fib  Generator greater than 6 years    ASSESSMENT:   Diagnosis Plan   1. Chronic atrial fibrillation        2. Mixed hyperlipidemia        3. BRAY (dyspnea on exertion)            PLAN:  Chronic A. fib post AV node ablation device dependent.  Patient will be high risk for consideration of left atrial appendage device closure.  Unfortunately appears temporally that she is not a candidate for NOAC.    Dyspnea on exertion continued low-dose furosemide with her concomitant anemia would expect third spacing lower extremity edema.  She is conscientious attempting to elevate her legs when possible    Mixed dyslipidemia controlled on statin therapy          Demond Leon MD, FACC

## 2023-05-11 ENCOUNTER — HOSPITAL ENCOUNTER (OUTPATIENT)
Dept: INFUSION THERAPY | Facility: HOSPITAL | Age: 88
Discharge: HOME | End: 2023-05-11
Admitting: NURSE PRACTITIONER
Payer: MEDICARE

## 2023-05-11 VITALS
BODY MASS INDEX: 21.47 KG/M2 | DIASTOLIC BLOOD PRESSURE: 69 MMHG | SYSTOLIC BLOOD PRESSURE: 130 MMHG | RESPIRATION RATE: 20 BRPM | OXYGEN SATURATION: 98 % | HEART RATE: 70 BPM | WEIGHT: 133 LBS | TEMPERATURE: 98 F

## 2023-05-11 DIAGNOSIS — D63.1 ANEMIA OF CHRONIC RENAL FAILURE, STAGE 3B: ICD-10-CM

## 2023-05-11 DIAGNOSIS — N18.32 STAGE 3B CHRONIC KIDNEY DISEASE: Primary | ICD-10-CM

## 2023-05-11 DIAGNOSIS — D64.9 NORMOCYTIC ANEMIA: ICD-10-CM

## 2023-05-11 DIAGNOSIS — N18.32 ANEMIA OF CHRONIC RENAL FAILURE, STAGE 3B: ICD-10-CM

## 2023-05-11 LAB
DEPRECATED RDW RBC AUTO: 59.7 FL (ref 37–54)
ERYTHROCYTE [DISTWIDTH] IN BLOOD BY AUTOMATED COUNT: 18.1 % (ref 12.3–15.4)
HCT VFR BLD AUTO: 25 % (ref 34–46.6)
HGB BLD-MCNC: 7.6 G/DL (ref 12–15.9)
MCH RBC QN AUTO: 27.6 PG (ref 26.6–33)
MCHC RBC AUTO-ENTMCNC: 30.4 G/DL (ref 31.5–35.7)
MCV RBC AUTO: 90.9 FL (ref 79–97)
PLATELET # BLD AUTO: 106 10*3/MM3 (ref 140–450)
PMV BLD AUTO: 10.2 FL (ref 6–12)
RBC # BLD AUTO: 2.75 10*6/MM3 (ref 3.77–5.28)
WBC NRBC COR # BLD: 5.37 10*3/MM3 (ref 3.4–10.8)

## 2023-05-11 PROCEDURE — 25010000002 EPOETIN ALFA-EPBX 40000 UNIT/ML SOLUTION: Performed by: NURSE PRACTITIONER

## 2023-05-11 PROCEDURE — 85027 COMPLETE CBC AUTOMATED: CPT | Performed by: INTERNAL MEDICINE

## 2023-05-11 PROCEDURE — 36415 COLL VENOUS BLD VENIPUNCTURE: CPT

## 2023-05-11 PROCEDURE — 96372 THER/PROPH/DIAG INJ SC/IM: CPT

## 2023-05-11 RX ADMIN — EPOETIN ALFA-EPBX 40000 UNITS: 40000 INJECTION, SOLUTION INTRAVENOUS; SUBCUTANEOUS at 15:28

## 2023-05-11 NOTE — CODE DOCUMENTATION
Pt hgb 7.6 , trending down, pt is asymptomatic denying dizziness fatigue, shortness of breath or chest pain.  Message left with Dr Alas's office regarding pt downward trending labs.

## 2023-05-17 ENCOUNTER — OFFICE VISIT (OUTPATIENT)
Dept: INTERNAL MEDICINE | Facility: CLINIC | Age: 88
End: 2023-05-17
Payer: MEDICARE

## 2023-05-17 VITALS
OXYGEN SATURATION: 100 % | SYSTOLIC BLOOD PRESSURE: 141 MMHG | WEIGHT: 132 LBS | DIASTOLIC BLOOD PRESSURE: 77 MMHG | HEART RATE: 70 BPM | TEMPERATURE: 97.8 F | BODY MASS INDEX: 21.21 KG/M2 | HEIGHT: 66 IN

## 2023-05-17 DIAGNOSIS — E11.29 CONTROLLED TYPE 2 DIABETES MELLITUS WITH OTHER DIABETIC KIDNEY COMPLICATION, WITHOUT LONG-TERM CURRENT USE OF INSULIN: Primary | ICD-10-CM

## 2023-05-17 DIAGNOSIS — E03.9 HYPOTHYROIDISM, UNSPECIFIED TYPE: ICD-10-CM

## 2023-05-17 DIAGNOSIS — N25.81 SECONDARY HYPERPARATHYROIDISM OF RENAL ORIGIN: ICD-10-CM

## 2023-05-17 DIAGNOSIS — I48.20 CHRONIC ATRIAL FIBRILLATION: ICD-10-CM

## 2023-05-17 DIAGNOSIS — N18.32 STAGE 3B CHRONIC KIDNEY DISEASE: ICD-10-CM

## 2023-05-17 LAB
EXPIRATION DATE: NORMAL
HBA1C MFR BLD: 6.1 %
Lab: NORMAL
POC CREATININE URINE: 50
POC MICROALBUMIN URINE: 10

## 2023-05-17 RX ORDER — PANTOPRAZOLE SODIUM 40 MG/1
40 TABLET, DELAYED RELEASE ORAL DAILY
Qty: 90 TABLET | Refills: 3 | Status: SHIPPED | OUTPATIENT
Start: 2023-05-17

## 2023-05-17 RX ORDER — LEVOTHYROXINE SODIUM 0.05 MG/1
50 TABLET ORAL
Qty: 90 TABLET | Refills: 3 | Status: SHIPPED | OUTPATIENT
Start: 2023-05-17

## 2023-05-17 RX ORDER — GLIMEPIRIDE 2 MG/1
2 TABLET ORAL
Qty: 30 TABLET | Refills: 5 | Status: SHIPPED | OUTPATIENT
Start: 2023-05-17

## 2023-05-17 NOTE — PROGRESS NOTES
"  Office Visit      Patient Name: Lynne Sanchez  : 1934   MRN: [id number]   Care Team: Patient Care Team:  Elizabeth Easton APRN as PCP - General (Family Medicine)  Paxton Vasquez DO as PCP - Internal Medicine ([facility])  Vilma Méndez PA-C as PCP - Family Medicine (Long Term Care Acute)  Davian Faria MD as Consulting Physician (Nephrology)  Radha Boone RN as Registered Nurse    Chief Complaint  Diabetes    Subjective     Subjective      Lynne Sanchez presents to [facility] for chronic disease management.   She has no acute complaints today.   Type II DM- she is compliant with glimepiride and tradjenta. Denies hypoglycemia, polyuria, polydipsia, polyphagia, vision changes, and foot ulcer/callus.   Follows a diabetic diet in moderation, limited with exercise.   Lab Results   Component Value Date    HGBA1C 6.1 2023     CKD- followed by Dr. Alas, scheduled for iron infusions. Recent CBC shows iron deficiency anemia. They also manage her diuretic therapy, currently on  20 mg lasix daily.   Hypothyroidism- she has been stable on 50 mcg, mild elevation in TSH but clinically euthyroid. She has mild fatigue.  She is due for repeat labs.   Lab Results   Component Value Date    TSH 6.510 (H) 2022     Atrial fibrillation- on aspirin and metoprolol. Denies palpitations, chest pain, shortness of breath, orthopnea, dizziness, and syncope. She has not noticed any lower extremity swelling recently and she follows a DASH diet. She is followed by Dr. Leon with cardiology.     Objective     Objective   Vital Signs:   /77   Pulse 70   Temp 97.8 °F (36.6 °C)   Ht 167.6 cm (66\")   Wt 59.9 kg (132 lb)   SpO2 100%   BMI 21.31 kg/m²     Physical Exam  Vitals and nursing note reviewed.   Constitutional:       General: She is not in acute distress.     Appearance: Normal appearance. She is not toxic-appearing.   Eyes:      Pupils: " Pupils are equal, round, and reactive to light.   Cardiovascular:      Rate and Rhythm: Normal rate. Rhythm irregular.      Heart sounds: Normal heart sounds. No murmur heard.  Pulmonary:      Effort: Pulmonary effort is normal. No respiratory distress.      Breath sounds: Normal breath sounds. No wheezing.   Abdominal:      General: Bowel sounds are normal. There is no distension.      Palpations: Abdomen is soft.      Tenderness: There is no abdominal tenderness.   Musculoskeletal:         General: Deformity present.      Cervical back: Neck supple. No tenderness.      Comments: Using rollator walker for ambulation     Skin:     General: Skin is warm and dry.      Findings: No rash.   Neurological:      General: No focal deficit present.      Mental Status: She is alert and oriented to person, place, and time.   Psychiatric:         Mood and Affect: Mood normal.         Behavior: Behavior normal.          Assessment / Plan      Assessment & Plan   Problem List Items Addressed This Visit        Cardiac and Vasculature    Chronic atrial fibrillation (Chronic)    Overview     1. Chronic atrial fibrillation, status post St. Laureano pacemaker implantation and AV node ablation:  a. Diagnosed June 2005.  b. Status post ECV restoring normal sinus rhythm, June 2005.  c. Rythmol initiated 05/01/2006.  d. Previously digoxin toxicity.  e. GXT Cardiolite, 09/08/2005:  No reversible ischemia.  LV function not performed secondary to atrial fibrillation.   f. Status post successful external cardioversion on 10/01/2008, Tessa Downey MD.  g. EKG on 10/21/2008:  Recurrence of atrial fibrillation with rate of 109.  h. Recurrent atrial fibrillation by EKG, 11/03/2008, asymptomatic.  i. Status post St. Laureano pacemaker implantation and AV node ablation.   j. Echocardiogram 02/15/2010:  Moderate MR, moderate TR.  RVSP 50.  EF greater than 55%, left atrial enlargement at 4.3 cm.           Relevant Orders    Comprehensive metabolic  panel    TSH Rfx On Abnormal To Free T4    Update labs today. Keep scheduled follow-up with cardiology. Continue beta blocker and aspirin and stay hydrated with clear decaffeinated fluids.        Endocrine and Metabolic    Diabetes mellitus type II, controlled - Primary (Chronic)    Relevant Medications    linagliptin (Tradjenta) 5 MG tablet tablet    glimepiride (AMARYL) 2 MG tablet    Other Relevant Orders    POC Glycosylated Hemoglobin (Hb A1C) (Completed)    Comprehensive metabolic panel    TSH Rfx On Abnormal To Free T4    POCT microalbumin (Completed)  Stable.   - Follow diabetic diet by decreasing carbohydrates, sugar, and processed foods.   - Exercise encouraged as tolerated-- discussed low impact walking 30 minutes/day 5 days/week.   - Continue taking all medication as prescribed. Monitor blood glucose as discussed. Fasting blood sugars should be <130 and 2 hours after meal blood sugar should be <180.   - Annual eye exam encouraged  - Check feet regularly for calluses or ulcers. Wear protective foot wear/no bare feet  - Labs UTD.        Brief Urine Lab Results  (Last result in the past 365 days)      Color   Clarity   Blood   Leuk Est   Nitrite   Protein   CREAT   Urine HCG        05/17/23 1534             50                                  Genitourinary and Reproductive     Stage 3b chronic kidney disease    Relevant Orders    Comprehensive metabolic panel    TSH Rfx On Abnormal To Free T4    Managed by nephrology, keep scheduled follow-up. Stay hydrated with clear fluids and avoid NSAIDs.   Other Visit Diagnoses     Hypothyroidism, unspecified type        Relevant Medications    levothyroxine (SYNTHROID, LEVOTHROID) 50 MCG tablet    Other Relevant Orders    Comprehensive metabolic panel    TSH Rfx On Abnormal To Free T4    Update labs today. Continue levothyroxine at current dose for now, titrate as indicated.        BMI is within normal parameters. No other follow-up for BMI required.      Follow Up    Return in about 6 months (around 11/17/2023) for Medicare Wellness.  Patient was given instructions and counseling regarding her condition or for health maintenance advice. Please see specific information pulled into the AVS if appropriate.     BILL Purcell  [facility]

## 2023-05-18 LAB
ALBUMIN SERPL-MCNC: 4.3 G/DL (ref 3.5–5.2)
ALBUMIN/GLOB SERPL: 2 G/DL
ALP SERPL-CCNC: 82 U/L (ref 39–117)
ALT SERPL-CCNC: 10 U/L (ref 1–33)
AST SERPL-CCNC: 21 U/L (ref 1–32)
BILIRUB SERPL-MCNC: 0.4 MG/DL (ref 0–1.2)
BUN SERPL-MCNC: 30 MG/DL (ref 8–23)
BUN/CREAT SERPL: 18.9 (ref 7–25)
CALCIUM SERPL-MCNC: 9.6 MG/DL (ref 8.6–10.5)
CHLORIDE SERPL-SCNC: 107 MMOL/L (ref 98–107)
CO2 SERPL-SCNC: 26.6 MMOL/L (ref 22–29)
CREAT SERPL-MCNC: 1.59 MG/DL (ref 0.57–1)
EGFRCR SERPLBLD CKD-EPI 2021: 31.1 ML/MIN/1.73
GLOBULIN SER CALC-MCNC: 2.2 GM/DL
GLUCOSE SERPL-MCNC: 127 MG/DL (ref 65–99)
POTASSIUM SERPL-SCNC: 4.7 MMOL/L (ref 3.5–5.2)
PROT SERPL-MCNC: 6.5 G/DL (ref 6–8.5)
SODIUM SERPL-SCNC: 143 MMOL/L (ref 136–145)
TSH SERPL DL<=0.005 MIU/L-ACNC: 3.59 UIU/ML (ref 0.27–4.2)

## 2023-05-25 ENCOUNTER — HOSPITAL ENCOUNTER (OUTPATIENT)
Dept: INFUSION THERAPY | Facility: HOSPITAL | Age: 88
Discharge: HOME | End: 2023-05-25
Admitting: INTERNAL MEDICINE
Payer: MEDICARE

## 2023-05-25 VITALS
TEMPERATURE: 98.2 F | SYSTOLIC BLOOD PRESSURE: 135 MMHG | HEART RATE: 72 BPM | DIASTOLIC BLOOD PRESSURE: 51 MMHG | RESPIRATION RATE: 18 BRPM | OXYGEN SATURATION: 95 %

## 2023-05-25 DIAGNOSIS — N18.32 STAGE 3B CHRONIC KIDNEY DISEASE: Primary | ICD-10-CM

## 2023-05-25 DIAGNOSIS — D63.1 ANEMIA OF CHRONIC RENAL FAILURE, STAGE 3B: ICD-10-CM

## 2023-05-25 DIAGNOSIS — D64.9 NORMOCYTIC ANEMIA: ICD-10-CM

## 2023-05-25 DIAGNOSIS — N18.32 ANEMIA OF CHRONIC RENAL FAILURE, STAGE 3B: ICD-10-CM

## 2023-05-25 LAB
ABO GROUP BLD: NORMAL
BLD GP AB SCN SERPL QL: NEGATIVE
DEPRECATED RDW RBC AUTO: 60.1 FL (ref 37–54)
ERYTHROCYTE [DISTWIDTH] IN BLOOD BY AUTOMATED COUNT: 18.1 % (ref 12.3–15.4)
HCT VFR BLD AUTO: 22.8 % (ref 34–46.6)
HGB BLD-MCNC: 6.9 G/DL (ref 12–15.9)
MCH RBC QN AUTO: 27.7 PG (ref 26.6–33)
MCHC RBC AUTO-ENTMCNC: 30.3 G/DL (ref 31.5–35.7)
MCV RBC AUTO: 91.6 FL (ref 79–97)
PLATELET # BLD AUTO: 137 10*3/MM3 (ref 140–450)
PMV BLD AUTO: 10.3 FL (ref 6–12)
RBC # BLD AUTO: 2.49 10*6/MM3 (ref 3.77–5.28)
RH BLD: POSITIVE
T&S EXPIRATION DATE: NORMAL
WBC NRBC COR # BLD: 5.71 10*3/MM3 (ref 3.4–10.8)

## 2023-05-25 PROCEDURE — 86920 COMPATIBILITY TEST SPIN: CPT

## 2023-05-25 PROCEDURE — 86900 BLOOD TYPING SEROLOGIC ABO: CPT

## 2023-05-25 PROCEDURE — 96372 THER/PROPH/DIAG INJ SC/IM: CPT

## 2023-05-25 PROCEDURE — 36415 COLL VENOUS BLD VENIPUNCTURE: CPT

## 2023-05-25 PROCEDURE — 86901 BLOOD TYPING SEROLOGIC RH(D): CPT

## 2023-05-25 PROCEDURE — 86850 RBC ANTIBODY SCREEN: CPT

## 2023-05-25 PROCEDURE — 85027 COMPLETE CBC AUTOMATED: CPT | Performed by: INTERNAL MEDICINE

## 2023-05-25 PROCEDURE — 25010000002 EPOETIN ALFA-EPBX 40000 UNIT/ML SOLUTION: Performed by: NURSE PRACTITIONER

## 2023-05-25 RX ORDER — SODIUM CHLORIDE 9 MG/ML
250 INJECTION, SOLUTION INTRAVENOUS AS NEEDED
Status: CANCELLED | OUTPATIENT
Start: 2023-05-25

## 2023-05-25 RX ADMIN — EPOETIN ALFA-EPBX 40000 UNITS: 40000 INJECTION, SOLUTION INTRAVENOUS; SUBCUTANEOUS at 15:04

## 2023-05-26 ENCOUNTER — HOSPITAL ENCOUNTER (OUTPATIENT)
Dept: INFUSION THERAPY | Facility: HOSPITAL | Age: 88
Discharge: HOME | End: 2023-05-26
Payer: MEDICARE

## 2023-05-26 VITALS
HEART RATE: 70 BPM | RESPIRATION RATE: 18 BRPM | SYSTOLIC BLOOD PRESSURE: 147 MMHG | DIASTOLIC BLOOD PRESSURE: 57 MMHG | TEMPERATURE: 97.3 F | OXYGEN SATURATION: 99 %

## 2023-05-26 DIAGNOSIS — D63.1 ANEMIA OF CHRONIC RENAL FAILURE, STAGE 3B: ICD-10-CM

## 2023-05-26 DIAGNOSIS — N18.32 ANEMIA OF CHRONIC RENAL FAILURE, STAGE 3B: ICD-10-CM

## 2023-05-26 PROCEDURE — P9016 RBC LEUKOCYTES REDUCED: HCPCS

## 2023-05-26 PROCEDURE — 36430 TRANSFUSION BLD/BLD COMPNT: CPT

## 2023-05-26 PROCEDURE — 86900 BLOOD TYPING SEROLOGIC ABO: CPT

## 2023-05-26 RX ORDER — SODIUM CHLORIDE 9 MG/ML
250 INJECTION, SOLUTION INTRAVENOUS AS NEEDED
Status: DISCONTINUED | OUTPATIENT
Start: 2023-05-26 | End: 2023-05-28 | Stop reason: HOSPADM

## 2023-06-08 ENCOUNTER — HOSPITAL ENCOUNTER (OUTPATIENT)
Dept: INFUSION THERAPY | Facility: HOSPITAL | Age: 88
Discharge: HOME | End: 2023-06-08
Admitting: INTERNAL MEDICINE
Payer: MEDICARE

## 2023-06-08 VITALS
OXYGEN SATURATION: 97 % | SYSTOLIC BLOOD PRESSURE: 122 MMHG | DIASTOLIC BLOOD PRESSURE: 35 MMHG | HEART RATE: 70 BPM | TEMPERATURE: 97.8 F

## 2023-06-08 DIAGNOSIS — N18.32 ANEMIA OF CHRONIC RENAL FAILURE, STAGE 3B: ICD-10-CM

## 2023-06-08 DIAGNOSIS — D64.9 NORMOCYTIC ANEMIA: Primary | ICD-10-CM

## 2023-06-08 DIAGNOSIS — D63.1 ANEMIA OF CHRONIC RENAL FAILURE, STAGE 3B: ICD-10-CM

## 2023-06-08 DIAGNOSIS — N18.32 STAGE 3B CHRONIC KIDNEY DISEASE: ICD-10-CM

## 2023-06-08 LAB
DEPRECATED RDW RBC AUTO: 55.9 FL (ref 37–54)
ERYTHROCYTE [DISTWIDTH] IN BLOOD BY AUTOMATED COUNT: 17.1 % (ref 12.3–15.4)
HCT VFR BLD AUTO: 26.8 % (ref 34–46.6)
HGB BLD-MCNC: 8 G/DL (ref 12–15.9)
MCH RBC QN AUTO: 26.9 PG (ref 26.6–33)
MCHC RBC AUTO-ENTMCNC: 29.9 G/DL (ref 31.5–35.7)
MCV RBC AUTO: 90.2 FL (ref 79–97)
PLATELET # BLD AUTO: 124 10*3/MM3 (ref 140–450)
PMV BLD AUTO: 9.9 FL (ref 6–12)
RBC # BLD AUTO: 2.97 10*6/MM3 (ref 3.77–5.28)
WBC NRBC COR # BLD: 6.19 10*3/MM3 (ref 3.4–10.8)

## 2023-06-08 PROCEDURE — 25010000002 EPOETIN ALFA-EPBX 40000 UNIT/ML SOLUTION: Performed by: INTERNAL MEDICINE

## 2023-06-08 PROCEDURE — 96372 THER/PROPH/DIAG INJ SC/IM: CPT

## 2023-06-08 PROCEDURE — 85027 COMPLETE CBC AUTOMATED: CPT | Performed by: INTERNAL MEDICINE

## 2023-06-08 PROCEDURE — 36415 COLL VENOUS BLD VENIPUNCTURE: CPT

## 2023-06-08 RX ADMIN — EPOETIN ALFA-EPBX 40000 UNITS: 40000 INJECTION, SOLUTION INTRAVENOUS; SUBCUTANEOUS at 15:14

## 2023-06-21 PROBLEM — D50.8 OTHER IRON DEFICIENCY ANEMIAS: Status: ACTIVE | Noted: 2023-06-21

## 2023-07-13 PROBLEM — K63.3: Status: ACTIVE | Noted: 2023-07-13

## 2023-07-13 PROBLEM — D64.9 CHRONIC ANEMIA: Status: ACTIVE | Noted: 2023-07-13

## 2023-07-13 PROBLEM — K21.9 GASTROESOPHAGEAL REFLUX DISEASE: Status: ACTIVE | Noted: 2023-07-13

## 2023-07-13 PROBLEM — R63.4 WEIGHT LOSS: Chronic | Status: ACTIVE | Noted: 2023-07-13

## 2023-07-25 PROCEDURE — 82272 OCCULT BLD FECES 1-3 TESTS: CPT | Performed by: INTERNAL MEDICINE

## 2023-07-31 RX ORDER — FUROSEMIDE 20 MG/1
TABLET ORAL
Qty: 90 TABLET | Refills: 0 | Status: SHIPPED | OUTPATIENT
Start: 2023-07-31

## 2023-08-03 ENCOUNTER — HOSPITAL ENCOUNTER (OUTPATIENT)
Dept: INFUSION THERAPY | Facility: HOSPITAL | Age: 88
Discharge: HOME | End: 2023-08-03
Admitting: INTERNAL MEDICINE
Payer: MEDICARE

## 2023-08-03 VITALS
HEART RATE: 70 BPM | RESPIRATION RATE: 18 BRPM | DIASTOLIC BLOOD PRESSURE: 58 MMHG | SYSTOLIC BLOOD PRESSURE: 129 MMHG | OXYGEN SATURATION: 98 % | TEMPERATURE: 97.9 F

## 2023-08-03 DIAGNOSIS — D63.1 ANEMIA OF CHRONIC RENAL FAILURE, STAGE 3B: ICD-10-CM

## 2023-08-03 DIAGNOSIS — D64.9 NORMOCYTIC ANEMIA: Primary | ICD-10-CM

## 2023-08-03 DIAGNOSIS — N18.32 STAGE 3B CHRONIC KIDNEY DISEASE: ICD-10-CM

## 2023-08-03 DIAGNOSIS — N18.32 ANEMIA OF CHRONIC RENAL FAILURE, STAGE 3B: ICD-10-CM

## 2023-08-03 LAB
ALBUMIN SERPL-MCNC: 4.2 G/DL (ref 3.5–5.2)
ANION GAP SERPL CALCULATED.3IONS-SCNC: 10.7 MMOL/L (ref 5–15)
BUN SERPL-MCNC: 33 MG/DL (ref 8–23)
BUN/CREAT SERPL: 27.7 (ref 7–25)
CALCIUM SPEC-SCNC: 8.9 MG/DL (ref 8.6–10.5)
CHLORIDE SERPL-SCNC: 105 MMOL/L (ref 98–107)
CO2 SERPL-SCNC: 23.3 MMOL/L (ref 22–29)
CREAT SERPL-MCNC: 1.19 MG/DL (ref 0.57–1)
DEPRECATED RDW RBC AUTO: 63.7 FL (ref 37–54)
EGFRCR SERPLBLD CKD-EPI 2021: 44.1 ML/MIN/1.73
ERYTHROCYTE [DISTWIDTH] IN BLOOD BY AUTOMATED COUNT: 19.2 % (ref 12.3–15.4)
GLUCOSE SERPL-MCNC: 135 MG/DL (ref 65–99)
HCT VFR BLD AUTO: 26.9 % (ref 34–46.6)
HGB BLD-MCNC: 7.9 G/DL (ref 12–15.9)
IRON 24H UR-MRATE: 64 MCG/DL (ref 37–145)
IRON SATN MFR SERPL: 17 % (ref 20–50)
MCH RBC QN AUTO: 26.9 PG (ref 26.6–33)
MCHC RBC AUTO-ENTMCNC: 29.4 G/DL (ref 31.5–35.7)
MCV RBC AUTO: 91.5 FL (ref 79–97)
PHOSPHATE SERPL-MCNC: 3.1 MG/DL (ref 2.5–4.5)
PLATELET # BLD AUTO: 116 10*3/MM3 (ref 140–450)
PMV BLD AUTO: 11.1 FL (ref 6–12)
POTASSIUM SERPL-SCNC: 5 MMOL/L (ref 3.5–5.2)
PTH-INTACT SERPL-MCNC: 66.3 PG/ML (ref 15–65)
RBC # BLD AUTO: 2.94 10*6/MM3 (ref 3.77–5.28)
SODIUM SERPL-SCNC: 139 MMOL/L (ref 136–145)
TIBC SERPL-MCNC: 387 MCG/DL (ref 298–536)
TRANSFERRIN SERPL-MCNC: 260 MG/DL (ref 200–360)
URATE SERPL-MCNC: 9.3 MG/DL (ref 2.4–5.7)
WBC NRBC COR # BLD: 6.23 10*3/MM3 (ref 3.4–10.8)

## 2023-08-03 PROCEDURE — 96374 THER/PROPH/DIAG INJ IV PUSH: CPT

## 2023-08-03 PROCEDURE — 25010000002 EPOETIN ALFA-EPBX 40000 UNIT/ML SOLUTION: Performed by: INTERNAL MEDICINE

## 2023-08-03 PROCEDURE — 85027 COMPLETE CBC AUTOMATED: CPT | Performed by: INTERNAL MEDICINE

## 2023-08-03 PROCEDURE — 96372 THER/PROPH/DIAG INJ SC/IM: CPT

## 2023-08-03 PROCEDURE — 80069 RENAL FUNCTION PANEL: CPT | Performed by: INTERNAL MEDICINE

## 2023-08-03 PROCEDURE — 25010000002 IRON SUCROSE PER 1 MG: Performed by: NURSE PRACTITIONER

## 2023-08-03 PROCEDURE — 83970 ASSAY OF PARATHORMONE: CPT | Performed by: INTERNAL MEDICINE

## 2023-08-03 PROCEDURE — 83540 ASSAY OF IRON: CPT | Performed by: INTERNAL MEDICINE

## 2023-08-03 PROCEDURE — 84550 ASSAY OF BLOOD/URIC ACID: CPT | Performed by: INTERNAL MEDICINE

## 2023-08-03 PROCEDURE — 84466 ASSAY OF TRANSFERRIN: CPT | Performed by: INTERNAL MEDICINE

## 2023-08-03 RX ORDER — SODIUM CHLORIDE 9 MG/ML
250 INJECTION, SOLUTION INTRAVENOUS ONCE
OUTPATIENT
Start: 2023-08-04

## 2023-08-03 RX ORDER — SODIUM CHLORIDE 9 MG/ML
250 INJECTION, SOLUTION INTRAVENOUS ONCE
Status: DISCONTINUED | OUTPATIENT
Start: 2023-08-03 | End: 2023-08-05 | Stop reason: HOSPADM

## 2023-08-03 RX ADMIN — IRON SUCROSE 200 MG: 20 INJECTION, SOLUTION INTRAVENOUS at 14:55

## 2023-08-03 RX ADMIN — EPOETIN ALFA-EPBX 40000 UNITS: 40000 INJECTION, SOLUTION INTRAVENOUS; SUBCUTANEOUS at 15:15

## 2023-08-04 ENCOUNTER — ANESTHESIA (OUTPATIENT)
Dept: GASTROENTEROLOGY | Facility: HOSPITAL | Age: 88
End: 2023-08-04
Payer: MEDICARE

## 2023-08-04 ENCOUNTER — HOSPITAL ENCOUNTER (OUTPATIENT)
Facility: HOSPITAL | Age: 88
Setting detail: HOSPITAL OUTPATIENT SURGERY
Discharge: HOME | End: 2023-08-04
Attending: INTERNAL MEDICINE | Admitting: INTERNAL MEDICINE
Payer: MEDICARE

## 2023-08-04 ENCOUNTER — ANESTHESIA EVENT (OUTPATIENT)
Dept: GASTROENTEROLOGY | Facility: HOSPITAL | Age: 88
End: 2023-08-04
Payer: MEDICARE

## 2023-08-04 VITALS
OXYGEN SATURATION: 98 % | DIASTOLIC BLOOD PRESSURE: 50 MMHG | TEMPERATURE: 97.2 F | RESPIRATION RATE: 16 BRPM | SYSTOLIC BLOOD PRESSURE: 107 MMHG | HEART RATE: 74 BPM

## 2023-08-04 DIAGNOSIS — K92.1 MELENA: ICD-10-CM

## 2023-08-04 DIAGNOSIS — E11.22 TYPE 2 DIABETES MELLITUS WITH CHRONIC KIDNEY DISEASE, WITHOUT LONG-TERM CURRENT USE OF INSULIN, UNSPECIFIED CKD STAGE: ICD-10-CM

## 2023-08-04 DIAGNOSIS — D64.9 CHRONIC ANEMIA: ICD-10-CM

## 2023-08-04 LAB — GLUCOSE BLDC GLUCOMTR-MCNC: 129 MG/DL (ref 70–130)

## 2023-08-04 PROCEDURE — 43239 EGD BIOPSY SINGLE/MULTIPLE: CPT | Performed by: INTERNAL MEDICINE

## 2023-08-04 PROCEDURE — 82948 REAGENT STRIP/BLOOD GLUCOSE: CPT

## 2023-08-04 PROCEDURE — 25010000002 PROPOFOL 1000 MG/100ML EMULSION: Performed by: NURSE ANESTHETIST, CERTIFIED REGISTERED

## 2023-08-04 RX ORDER — LIDOCAINE HYDROCHLORIDE 20 MG/ML
INJECTION, SOLUTION INTRAVENOUS AS NEEDED
Status: DISCONTINUED | OUTPATIENT
Start: 2023-08-04 | End: 2023-08-04 | Stop reason: SURG

## 2023-08-04 RX ORDER — METOPROLOL TARTRATE 100 MG/1
100 TABLET ORAL 2 TIMES DAILY
Qty: 180 TABLET | Refills: 1 | Status: SHIPPED | OUTPATIENT
Start: 2023-08-04

## 2023-08-04 RX ORDER — PROPOFOL 10 MG/ML
INJECTION, EMULSION INTRAVENOUS AS NEEDED
Status: DISCONTINUED | OUTPATIENT
Start: 2023-08-04 | End: 2023-08-04 | Stop reason: SURG

## 2023-08-04 RX ORDER — SODIUM CHLORIDE 9 MG/ML
70 INJECTION, SOLUTION INTRAVENOUS CONTINUOUS PRN
Status: DISCONTINUED | OUTPATIENT
Start: 2023-08-04 | End: 2023-08-04 | Stop reason: HOSPADM

## 2023-08-04 RX ORDER — KETAMINE HCL IN NACL, ISO-OSM 100MG/10ML
SYRINGE (ML) INJECTION AS NEEDED
Status: DISCONTINUED | OUTPATIENT
Start: 2023-08-04 | End: 2023-08-04 | Stop reason: SURG

## 2023-08-04 RX ADMIN — LIDOCAINE HYDROCHLORIDE 60 MG: 20 INJECTION, SOLUTION INTRAVENOUS at 07:36

## 2023-08-04 RX ADMIN — PROPOFOL 100 MG: 10 INJECTION, EMULSION INTRAVENOUS at 07:36

## 2023-08-04 RX ADMIN — Medication 10 MG: at 07:36

## 2023-08-04 RX ADMIN — PROPOFOL 50 MG: 10 INJECTION, EMULSION INTRAVENOUS at 07:51

## 2023-08-04 NOTE — DISCHARGE INSTRUCTIONS
No pushing, pulling, tugging,  heavy lifting, or strenuous activity.  No major decision making, driving, or drinking alcoholic beverages for 24 hours. ( due to the medications you have  received)  Always use good hand hygiene/washing techniques.  NO driving while taking pain medications.    * if you have an incision:  Check your incision area every day for signs of infection.   Check for:  * more redness, swelling, or pain  *more fluid or blood  *warmth  *pus or bad smell  To assist you in voiding:  Drink plenty of fluids  Listen to running water while attempting to void.    If you are unable to urinate and you have an uncomfortable urge to void or it has been   6 hours since you were discharged, return to the Emergency Room    - Discharge patient to home (ambulatory).   - Resume previous diet.   - Hold ASA for 48 hours   - Await pathology results.   - Repeat upper endoscopy in 2-3 years for surveillance of Fermin's esophagus.   - Colonoscopy  - Return to GI office in 8 weeks.

## 2023-08-04 NOTE — H&P
[facility]  HISTORY AND PHYSICAL    Patient Name: Lynne Sanchez  : 1934  MRN: [id number]    Chief Complaint:   For EGD    History Of Presenting Illness:    Anemia  Melena  H/o Gastric avm    Past Medical History:   Diagnosis Date    Acute deep vein thrombosis of left upper extremity     Anemia     Asthma     CHF (congestive heart failure)     Chronic atrial fibrillation     Deep venous thrombosis of left upper limb     Diabetes mellitus, type 2     Diarrhea     GERD (gastroesophageal reflux disease)     Glaucoma     H/O transfusion of whole blood     Heart attack     Heart murmur     History of transfusion     Hyperlipidemia     Hypertension     Kidney disease     patient not aware of what exact diagnosis is     Osteomyelitis     Osteomyelitis of left foot 10/03/2017    Peripheral vascular disease     Right retinal detachment     Secondary cataract of both eyes     Stable proliferative diabetic retinopathy of both eyes     Stroke     Swelling of left extremity     Urinary tract infection     Wears glasses        Past Surgical History:   Procedure Laterality Date    AMPUTATION DIGIT Left 2018    Procedure: Left Great toe amputation;  Surgeon: Prakash Carrington MD;  Location: [facility] OR;  Service: Vascular    AMPUTATION DIGIT Left 2018    Procedure: SECOND TOE AMPUTATION DIGIT LEFT;  Surgeon: Prakash Carrington MD;  Location: [facility] OR;  Service: General    APPENDECTOMY      BONE MARROW ASPIRATION      CARDIAC ABLATION      CARDIAC CATHETERIZATION N/A 10/10/2017    Procedure: Peripheral angiography;  Surgeon: Kan Pelaez MD;  Location: [facility] CATH INVASIVE LOCATION;  Service:     CARDIAC CATHETERIZATION N/A 10/10/2017    Procedure: Angioplasty-peripheral;  Surgeon: Kan Pelaez MD;  Location: [facility] CATH INVASIVE LOCATION;  Service:     CARDIAC CATHETERIZATION N/A 10/10/2017    Procedure: Atherectomy-peripheral;  Surgeon: Kan Pelaez MD;  Location: [facility]  CATH INVASIVE LOCATION;  Service:     CARDIAC ELECTROPHYSIOLOGY PROCEDURE N/A 5/3/2018    Procedure: generator change;  Surgeon: Zan Poole MD;  Location: [facility] CATH INVASIVE LOCATION;  Service: Cardiovascular    CARDIOVERSION      COLONOSCOPY      ENDOSCOPY N/A 10/9/2017    Procedure: ESOPHAGOGASTRODUODENOSCOPY WITH COLD FORCEP BIOPSY;  Surgeon: Clifton Hyatt MD;  Location: [facility] ENDOSCOPY;  Service:     ENDOSCOPY N/A 3/22/2022    Procedure: ESOPHAGOGASTRODUODENOSCOPY with AVM cautery;  Surgeon: Clarisse Velez MD;  Location: [facility] ENDOSCOPY;  Service: Gastroenterology;  Laterality: N/A;    EYE SURGERY Bilateral     CATARACTS    INTERVENTIONAL RADIOLOGY PROCEDURE N/A 10/10/2017    Procedure: Abdominal Aortagram with Runoff;  Surgeon: Kan Pelaez MD;  Location: [facility] CATH INVASIVE LOCATION;  Service:     PACEMAKER IMPLANTATION      around 2008 then replaced 2018       Social History     Socioeconomic History    Marital status:     Number of children: 3   Tobacco Use    Smoking status: Never     Passive exposure: Never    Smokeless tobacco: Never   Vaping Use    Vaping Use: Never used   Substance and Sexual Activity    Alcohol use: No    Drug use: No    Sexual activity: Defer       Family History   Problem Relation Age of Onset    No Known Problems Mother     No Known Problems Father     Arthritis Other        Prior to Admission Medications:  Medications Prior to Admission   Medication Sig Dispense Refill Last Dose    acetaminophen (TYLENOL) 650 MG 8 hr tablet Take 1 tablet by mouth.   Past Week    aspirin 81 MG EC tablet Take 1 tablet by mouth Daily.   Past Week    atorvastatin (LIPITOR) 40 MG tablet Take 1 tablet every day by oral route.   8/3/2023 at 1800    empagliflozin (JARDIANCE) 10 MG tablet tablet Take 1 tablet every day by oral route.   8/3/2023 at 0700    epoetin dorcas-epbx (Retacrit) 79516 UNIT/ML injection Inject 1 mL under the skin into the appropriate area as directed.  1 ml sq per month on day 17   Past Week    furosemide (LASIX) 20 MG tablet TAKE 1 TABLET BY MOUTH EVERY DAY 90 tablet 0 8/3/2023 at 0700    glimepiride (AMARYL) 2 MG tablet Take 1 tablet by mouth Every Morning Before Breakfast. 30 tablet 5 8/3/2023 at 0700    glucose blood test strip 1 each by Other route Daily. Use as instructed once a day as directed, for Truemetrix reader 100 each 3 8/3/2023 at 0700    latanoprost (XALATAN) 0.005 % ophthalmic solution Administer 1 drop to both eyes Daily. (Patient taking differently: Administer 1 drop to both eyes Every Night.) 7.5 mL 3 8/3/2023 at 0700    levothyroxine (SYNTHROID, LEVOTHROID) 50 MCG tablet Take 1 tablet by mouth Every Morning. Indications: Underactive Thyroid 90 tablet 3 8/3/2023 at 0700    metoprolol tartrate (LOPRESSOR) 100 MG tablet TAKE 1 TABLET BY MOUTH TWO TIMES A  tablet 1 8/3/2023 at 0700    multivitamin with minerals tablet tablet Take 1 tablet by mouth Daily.   8/3/2023 at 0700    pantoprazole (PROTONIX) 40 MG EC tablet Take 1 tablet by mouth Daily. 90 tablet 3 8/3/2023 at 0700    saccharomyces boulardii (FLORASTOR) 250 MG capsule Take by oral route.   8/3/2023 at 0700    SITagliptin (JANUVIA) 25 MG tablet Take 1 tablet every day by oral route.   8/3/2023    Sod Picosulfate-Mag Ox-Cit Acd (Clenpiq) 10-3.5-12 MG-GM -GM/175ML solution Take 1 kit by mouth Take As Directed. Take as directed for colonoscopy prep. Patient has instructions. 350 mL 0 8/3/2023 at 0700    Tradjenta 5 MG tablet tablet TAKE 1 TABLET EVERY DAY 90 tablet 3 8/3/2023 at 0700       Allergies:  Allergies   Allergen Reactions    Phenergan [Promethazine Hcl] Confusion        Vitals: Temp:  [97.2 øF (36.2 øC)-97.9 øF (36.6 øC)] 97.2 øF (36.2 øC)  Heart Rate:  [70] 70  Resp:  [17-18] 17  BP: (129-136)/(53-58) 136/53    Review Of Systems:  Constitutional:  Negative for chills, fever, and unexpected weight change.  Respiratory:  Negative for cough, chest tightness, shortness of breath,  and wheezing.  Cardiovascular:  Negative for chest pain, palpitations, and leg swelling.  Gastrointestinal:  Negative for abdominal distention, abdominal pain, nausea, vomiting.  Neurological:  Negative for weakness, numbness, and headaches.     Physical Exam:    General Appearance:  Alert, cooperative, in no acute distress.   Lungs:   Clear to auscultation, respirations regular, even and                 unlabored.   Heart:  Regular rhythm and normal rate.   Abdomen:   Normal bowel sounds, no masses, no organomegaly. Soft, nontender, nondistended   Neurologic: Alert and oriented x 3. Moves all four limbs equally       Assessment & Plan     Assessment:  Active Problems:    Melena    Chronic anemia      Plan: ESOPHAGOGASTRODUODENOSCOPY (N/A)     Clarisse Velez MD  8/4/2023

## 2023-08-07 LAB — REF LAB TEST METHOD: NORMAL

## 2023-08-14 ENCOUNTER — OFFICE VISIT (OUTPATIENT)
Dept: ONCOLOGY | Facility: CLINIC | Age: 88
End: 2023-08-14
Payer: MEDICARE

## 2023-08-14 VITALS
RESPIRATION RATE: 12 BRPM | HEART RATE: 72 BPM | SYSTOLIC BLOOD PRESSURE: 129 MMHG | DIASTOLIC BLOOD PRESSURE: 59 MMHG | OXYGEN SATURATION: 96 % | TEMPERATURE: 97.3 F | HEIGHT: 66 IN | WEIGHT: 132 LBS | BODY MASS INDEX: 21.21 KG/M2

## 2023-08-14 DIAGNOSIS — D46.9 MDS (MYELODYSPLASTIC SYNDROME): Primary | ICD-10-CM

## 2023-08-14 DIAGNOSIS — D63.1 ANEMIA OF CHRONIC RENAL FAILURE, STAGE 3B: ICD-10-CM

## 2023-08-14 DIAGNOSIS — N18.32 ANEMIA OF CHRONIC RENAL FAILURE, STAGE 3B: ICD-10-CM

## 2023-08-14 DIAGNOSIS — D72.820 MONOCLONAL B-CELL LYMPHOCYTOSIS WITH CHRONIC LYMPHOCYTIC LEUKEMIA (CLL) IMMUNOPHENOTYPE: ICD-10-CM

## 2023-08-14 DIAGNOSIS — N18.32 STAGE 3B CHRONIC KIDNEY DISEASE: ICD-10-CM

## 2023-08-14 DIAGNOSIS — C91.10 MONOCLONAL B-CELL LYMPHOCYTOSIS WITH CHRONIC LYMPHOCYTIC LEUKEMIA (CLL) IMMUNOPHENOTYPE: ICD-10-CM

## 2023-08-14 NOTE — PROGRESS NOTES
[facility] [place]  Office number [phone]    Fax number [phone]    Follow up     Date: 2023     Patient Name: Lynne Sanchez  MRN: [id number]  : 1934    Referring Physician: Madai Connor NP for Dr. Ibarra (nephrologist at [facility]    Chief Complaint: Anemia and thrombocytopenia      History of Present Illness: Lynne Sanchez is a pleasant 88 y.o. female who presents today for evaluation of chronic anemia and thrombocytopenia.    She has a longstanding history of anemia and thrombocytopenia for which she has previously undergone extensive evaluation.    She recalls that she has received blood transfusions several times in the past.  For example she recalls that she had been seen in ED in [place] for hemoglobin of 4 many years ago.   After rehab 1 year ago after arm fracture, hemoglobin dropped to 4 again.     Because of her history of chronic kidney disease she is currently receiving IV iron and Procrit through nephrology.    She has previously seen Dr. Omalley while inpatient for work-up of anemia and thrombocytopenia in 2018:      Following that admission she established care with Dr. Rodriguez who sent her to [facility] for a bone marrow biopsy.  I have requested copies of this result. From his note in 2019:      She has undergone upper and lower endoscopy several times for evaluation of blood loss anemia.  EGD 2017:  :    Pill endo 1 year ago:     Did not proceed with EGD/colon because too many other health problems ongoing  She currently endorses Black stools intermittently, happens several times per week.    Interval history:  She returns accompanied by her supportive  for test results and discussion regarding further work-up.  Has EGD/cscope pending 23  Has not been able to collect fecal occult blood testing.  She endorses ongoing black stools.  She continues with erythropoietin stimulating agent injections and iron infusions.  She had a  bad experience with prior bone marrow biopsy and is hopeful she does not have to repeat this test    Past Medical History:   Past Medical History:   Diagnosis Date    Acute deep vein thrombosis of left upper extremity     Anemia     Asthma     CHF (congestive heart failure)     Chronic atrial fibrillation     Deep venous thrombosis of left upper limb     Diabetes mellitus, type 2     Diarrhea     GERD (gastroesophageal reflux disease)     Glaucoma     H/O transfusion of whole blood     Heart attack     Heart murmur     History of transfusion     Hyperlipidemia     Hypertension     Kidney disease     patient not aware of what exact diagnosis is     Osteomyelitis     Osteomyelitis of left foot 10/03/2017    Peripheral vascular disease     Right retinal detachment     Secondary cataract of both eyes     Stable proliferative diabetic retinopathy of both eyes     Stroke     Swelling of left extremity     Urinary tract infection     Wears glasses        Past Surgical History:   Past Surgical History:   Procedure Laterality Date    AMPUTATION DIGIT Left 7/5/2018    Procedure: Left Great toe amputation;  Surgeon: Prakash Carrington MD;  Location: [facility] OR;  Service: Vascular    AMPUTATION DIGIT Left 8/27/2018    Procedure: SECOND TOE AMPUTATION DIGIT LEFT;  Surgeon: Prakash Carrington MD;  Location:  [facility] OR;  Service: General    APPENDECTOMY      BONE MARROW ASPIRATION      CARDIAC ABLATION      CARDIAC CATHETERIZATION N/A 10/10/2017    Procedure: Peripheral angiography;  Surgeon: Kan Pelaez MD;  Location: [facility] CATH INVASIVE LOCATION;  Service:     CARDIAC CATHETERIZATION N/A 10/10/2017    Procedure: Angioplasty-peripheral;  Surgeon: Kan Pelaez MD;  Location: [facility] CATH INVASIVE LOCATION;  Service:     CARDIAC CATHETERIZATION N/A 10/10/2017    Procedure: Atherectomy-peripheral;  Surgeon: Kan Pelaez MD;  Location: [facility] CATH INVASIVE LOCATION;  Service:     CARDIAC ELECTROPHYSIOLOGY  PROCEDURE N/A 5/3/2018    Procedure: generator change;  Surgeon: Zan Poole MD;  Location:  [facility] CATH INVASIVE LOCATION;  Service: Cardiovascular    CARDIOVERSION      COLONOSCOPY      ENDOSCOPY N/A 10/9/2017    Procedure: ESOPHAGOGASTRODUODENOSCOPY WITH COLD FORCEP BIOPSY;  Surgeon: Clifton Hyatt MD;  Location: [facility] ENDOSCOPY;  Service:     ENDOSCOPY N/A 3/22/2022    Procedure: ESOPHAGOGASTRODUODENOSCOPY with AVM cautery;  Surgeon: Clarisse Velez MD;  Location: [facility] ENDOSCOPY;  Service: Gastroenterology;  Laterality: N/A;    ENDOSCOPY N/A 8/4/2023    Procedure: ESOPHAGOGASTRODUODENOSCOPY WITH BIOPSY AND RUTH BRUSHING;  Surgeon: Clarisse Velez MD;  Location: [facility] ENDOSCOPY;  Service: Gastroenterology;  Laterality: N/A;    EYE SURGERY Bilateral     CATARACTS    INTERVENTIONAL RADIOLOGY PROCEDURE N/A 10/10/2017    Procedure: Abdominal Aortagram with Runoff;  Surgeon: Kan Pelaez MD;  Location: [facility] CATH INVASIVE LOCATION;  Service:     PACEMAKER IMPLANTATION      around 2008 then replaced 2018       Family History:   Family History   Problem Relation Age of Onset    No Known Problems Mother     No Known Problems Father     Arthritis Other    Sister with ET    Social History:   Social History     Socioeconomic History    Marital status:     Number of children: 3   Tobacco Use    Smoking status: Never     Passive exposure: Never    Smokeless tobacco: Never   Vaping Use    Vaping Use: Never used   Substance and Sexual Activity    Alcohol use: No    Drug use: No    Sexual activity: Defer       Medications:     Current Outpatient Medications:     acetaminophen (TYLENOL) 650 MG 8 hr tablet, Take 1 tablet by mouth., Disp: , Rfl:     aspirin 81 MG EC tablet, Take 1 tablet by mouth Daily., Disp: , Rfl:     atorvastatin (LIPITOR) 40 MG tablet, Take 1 tablet every day by oral route., Disp: , Rfl:     empagliflozin (JARDIANCE) 10 MG tablet tablet, Take 1 tablet every day by oral  route., Disp: , Rfl:     epoetin dorcas-epbx (Retacrit) 86429 UNIT/ML injection, Inject 1 mL under the skin into the appropriate area as directed. 1 ml sq per month on day 17, Disp: , Rfl:     furosemide (LASIX) 20 MG tablet, TAKE 1 TABLET BY MOUTH EVERY DAY, Disp: 90 tablet, Rfl: 0    glimepiride (AMARYL) 2 MG tablet, Take 1 tablet by mouth Every Morning Before Breakfast., Disp: 30 tablet, Rfl: 5    glucose blood test strip, 1 each by Other route Daily. Use as instructed once a day as directed, for Truemetrix reader, Disp: 100 each, Rfl: 3    latanoprost (XALATAN) 0.005 % ophthalmic solution, Administer 1 drop to both eyes Daily. (Patient taking differently: Administer 1 drop to both eyes Every Night.), Disp: 7.5 mL, Rfl: 3    levothyroxine (SYNTHROID, LEVOTHROID) 50 MCG tablet, Take 1 tablet by mouth Every Morning. Indications: Underactive Thyroid, Disp: 90 tablet, Rfl: 3    metoprolol tartrate (LOPRESSOR) 100 MG tablet, Take 1 tablet by mouth 2 (Two) Times a Day., Disp: 180 tablet, Rfl: 1    multivitamin with minerals tablet tablet, Take 1 tablet by mouth Daily., Disp: , Rfl:     pantoprazole (PROTONIX) 40 MG EC tablet, Take 1 tablet by mouth Daily., Disp: 90 tablet, Rfl: 3    saccharomyces boulardii (FLORASTOR) 250 MG capsule, Take by oral route., Disp: , Rfl:     SITagliptin (JANUVIA) 25 MG tablet, Take 1 tablet every day by oral route., Disp: , Rfl:     Sod Picosulfate-Mag Ox-Cit Acd (Clenpiq) 10-3.5-12 MG-GM -GM/175ML solution, Take 1 kit by mouth Take As Directed. Take as directed for colonoscopy prep. Patient has instructions., Disp: 350 mL, Rfl: 0    Tradjenta 5 MG tablet tablet, TAKE 1 TABLET EVERY DAY, Disp: 90 tablet, Rfl: 3    Allergies:   Allergies   Allergen Reactions    Phenergan [Promethazine Hcl] Confusion       Objective     Vital Signs:   Vitals:    08/14/23 1357   BP: 129/59   Pulse: 72   Resp: 12   Temp: 97.3 øF (36.3 øC)   TempSrc: Temporal   SpO2: 96%   Weight: 59.9 kg (132 lb)   Height: 167.6  "cm (66\")   PainSc: 0-No pain    Body mass index is 21.31 kg/mý.   Pain Score    23 1357   PainSc: 0-No pain       Physical Exam:   General: Frail, examined in wheel chair  HEENT: Normocephalic, atraumatic. Sclera anicteric.   Neck: supple, no adenopathy.   Cardiovascular: regular rate and rhythm. No murmurs.   Respiratory: Normal rate. Clear to auscultation bilaterally.  Abdomen: Soft, nontender, non distended with normoactive bowel sounds.   Lymph: no cervical, supraclavicular or axillary adenopathy.  Neuro: Alert and oriented x 3. No focal deficits.   Ext: Symmetric, no swelling.   Psych: Euthymic      Laboratory/Imaging Reviewed:   Admission on 2023, Discharged on 2023   Component Date Value Ref Range Status    Glucose 2023 129  70 - 130 mg/dL Final    Serial Number: [id number]:  [id number]    Reference Lab Report 2023    Final                    Value:[facility]  [address] [place] [address]  Phone: [phone] or [phone]  Fax: [phone]  Boone Jacobsen M.D., Medical Director    PATIENT NAME                                     LABORATORY NO.  MEL MANSFIELD                                [id number]  [id number]                                 AGE                    SEX   SSN              CLIENT REF #  [facility]                    88        1934      F     [id number]      [id number]    [address]                        REQUESTING M.D.           ATTENDING M.JOSE G.         COPY TO.   [address]                                ZULMA LINDO [address]  DATE COLLECTED            DATE RECEIVED          DATE REPORTED  2023    DIAGNOSIS:  ANTRUM AND BODY BIOPSIES:  Mild chronic inactive gastritis  No H. pylori organisms identified on                           routine " "stains  Negative for intestinal metaplasia, dysplasia, or carcinoma    CLINICAL HISTORY:  Chronic anemia  Melena    SPECIMENS RECEIVED:  ANTRUM AND BODY BIOPSIES    MICROSCOPIC DESCRIPTION:  Tissue blocks are prepared and slides are examined microscopically on all  specimens. See diagnosis for details.    Professional interpretation rendered by Chidi Carreno M.D.,RAMON at [employer], [place], [address] [place] [address].    GROSS DESCRIPTION:  Labeled as \"gastric antrum and body\", consisting of 2 pieces of tan soft tissue  measuring 0.3 x 0.3 x 0.2 cm, submitted entirely in 1 cassette.  SOG    REVIEWED, DIAGNOSED AND ELECTRONICALLY  SIGNED BY:    Chidi Carreno M.D.,DAVID.  CPT CODES:  73163     Hospital Outpatient Visit on 08/03/2023   Component Date Value Ref Range Status    WBC 08/03/2023 6.23  3.40 - 10.80 10*3/mm3 Final    RBC 08/03/2023 2.94 (L)  3.77 - 5.28 10*6/mm3 Final    Hemoglobin 08/03/2023 7.9 (L)  12.0 - 15.9 g/dL Final    Hematocrit 08/03/2023 26.9 (L)  34.0 - 46.6 % Final    MCV 08/03/2023 91.5  79.0 - 97.0 fL Final    MCH 08/03/2023 26.9  26.6 - 33.0 pg Final    MCHC 08/03/2023 29.4 (L)  31.5 - 35.7 g/dL Final    RDW 08/03/2023 19.2 (H)  12.3 - 15.4 % Final    RDW-SD 08/03/2023 63.7 (H)  37.0 - 54.0 fl Final    MPV 08/03/2023 11.1  6.0 - 12.0 fL Final    Platelets 08/03/2023 116 (L)  140 - 450 10*3/mm3 Final    Glucose 08/03/2023 135 (H)  65 - 99 mg/dL Final    BUN 08/03/2023 33 (H)  8 - 23 mg/dL Final    Creatinine 08/03/2023 1.19 (H)  0.57 - 1.00 mg/dL Final    Sodium 08/03/2023 139  136 - 145 mmol/L Final    Potassium 08/03/2023 5.0  3.5 - 5.2 mmol/L Final    Slight hemolysis detected by analyzer. Results may be affected.    Chloride 08/03/2023 105  98 - 107 mmol/L Final    CO2 08/03/2023 23.3  22.0 - 29.0 mmol/L Final    Calcium 08/03/2023 8.9  8.6 - 10.5 mg/dL Final    Albumin 08/03/2023 4.2  3.5 - 5.2 g/dL Final    Phosphorus 08/03/2023 3.1  2.5 - 4.5 mg/dL Final    Anion Gap " Controlled with current regime 08/03/2023 10.7  5.0 - 15.0 mmol/L Final    BUN/Creatinine Ratio 08/03/2023 27.7 (H)  7.0 - 25.0 Final    eGFR 08/03/2023 44.1 (L)  >60.0 mL/min/1.73 Final    Uric Acid 08/03/2023 9.3 (H)  2.4 - 5.7 mg/dL Final    PTH, Intact 08/03/2023 66.3 (H)  15.0 - 65.0 pg/mL Final    Iron 08/03/2023 64  37 - 145 mcg/dL Final    Iron Saturation (TSAT) 08/03/2023 17 (L)  20 - 50 % Final    Transferrin 08/03/2023 260  200 - 360 mg/dL Final    TIBC 08/03/2023 387  298 - 536 mcg/dL Final     Component      Latest Ref Rng 7/3/2023   WBC      3.40 - 10.80 10*3/mm3 5.04    RBC      3.77 - 5.28 10*6/mm3 3.01 (L)    Hemoglobin      12.0 - 15.9 g/dL 7.9 (L)    Hematocrit      34.0 - 46.6 % 26.9 (L)    MCV      79.0 - 97.0 fL 89.4    MCH      26.6 - 33.0 pg 26.2 (L)    MCHC      31.5 - 35.7 g/dL 29.4 (L)    RDW      12.3 - 15.4 % 18.0 (H)    RDW-SD      37.0 - 54.0 fl 57.9 (H)    MPV      6.0 - 12.0 fL 10.4    Platelets      140 - 450 10*3/mm3 145    Neutrophil Rel %      42.7 - 76.0 % 58.7    Lymphocyte Rel %      19.6 - 45.3 % 28.2    Monocyte Rel %      5.0 - 12.0 % 9.5    Eosinophil Rel %      0.3 - 6.2 % 2.4    Basophil Rel %      0.0 - 1.5 % 0.6    Immature Granulocyte Rel %      0.0 - 0.5 % 0.6 (H)    Neutrophils Absolute      1.70 - 7.00 10*3/mm3 2.96    Lymphocytes Absolute      0.70 - 3.10 10*3/mm3 1.42    Monocytes Absolute      0.10 - 0.90 10*3/mm3 0.48    Eosinophils Absolute      0.00 - 0.40 10*3/mm3 0.12    Basophils Absolute      0.00 - 0.20 10*3/mm3 0.03    Immature Grans, Absolute      0.00 - 0.05 10*3/mm3 0.03    nRBC      0.0 - 0.2 /100 WBC 0.0    IgG      586 - 1602 mg/dL 902    IgA      64 - 422 mg/dL 292    IgM      26 - 217 mg/dL 32    Total Protein      6.0 - 8.5 g/dL 6.5    Albumin      2.9 - 4.4 g/dL 3.8    Alpha-1-Globulin      0.0 - 0.4 g/dL 0.3    Alpha-2-Globulin      0.4 - 1.0 g/dL 0.7    Beta Globulin      0.7 - 1.3 g/dL 0.8    Gamma Globulin      0.4 - 1.8 g/dL 0.9    M-Bryson      Not Observed g/dL Not  Observed    Globulin      2.2 - 3.9 g/dL 2.7    A/G Ratio      0.7 - 1.7  1.5    Immunofixation Reflex, Serum Comment    Please note Comment    Kappa FLC      3.3 - 19.4 mg/L 76.8 (H)    Free Lambda Light Chains      5.7 - 26.3 mg/L 40.0 (H)    Kappa/Lambda Ratio      0.26 - 1.65  1.92 (H)    Anisocytes      None Seen  Slight/1+    Elliptocytes      None Seen  Slight/1+    Hypochromia      None Seen  Slight/1+    Poikilocytes      None Seen  Slight/1+    WBC Morphology      Normal  Normal    Platelet Estimate      Normal  Adequate    Iron      37 - 145 mcg/dL 71    Iron Saturation (TSAT)      20 - 50 % 18 (L)    Transferrin      200 - 360 mg/dL 272    TIBC      298 - 536 mcg/dL 405    Folate      4.78 - 24.20 ng/mL >20.00    Ferritin      13.00 - 150.00 ng/mL 131.00    Vitamin B-12      211 - 946 pg/mL 518    LDH      135 - 214 U/L 260 (H)    Haptoglobin      30 - 200 mg/dL 108    AANBELL IgG Negative    Antibody Screen Negative    Reference Lab Report [facility].        Assessment / Plan      Assessment/Plan:   Chronic anemia  Chronic kidney disease, stage IIIb  Chronic thrombocytopenia  Abnormal pill endoscopy 1 year ago with further work-up deferred  Melena  B-cell lymphoma on prior bone marrow biopsy from 2017 (with mantle cell and CLL being in the differential) as well as findings suggestive of early MDS..  -I reviewed multiple prior CBCs as well as her complex prior work-up as outlined above in the HPI.  She has multifactorial anemia and chronic thrombocytopenia that is at least partially explained by chronic kidney disease.  She is receiving appropriate therapy for this with erythroid stimulating agents and IV iron.  However despite this she has a persistent anemia with intermittent thrombocytopenia.  -Prior GI work-up was concerning for possible Crohn's, however she deferred upper and lower endoscopy at that time.  I reviewed her previous available GI evaluation and discussed the findings  with the patient and her .  She is endorsing melena symptoms chronically.  I have encouraged her to proceed with an upper and lower endoscopy as previously recommended by Dr. Velez.  This is pending in early September.  -We reviewed her prior bone marrow biopsy in detail today.  This showed findings suggestive of early MDS as well as a small clonal population of B cells, suggestive of possible CLL versus mantle cell, versus CD positive marginal zone lymphoma.  FISH was negative for t(11; 14) making mantle cell less likely.  Karyotype was normal.  She was also found to have low iron stores from that biopsy.    -Additionally I reviewed her prior hematology notes.  I was unable to locate a copy of her bone marrow biopsy in our system, but this has been requested from [facility].  We are still awaiting this request.  From review of her prior hematology work-up, it appears that there was concern for possible early myelodysplastic changes if we are unable to find any explanation for her worsening anemia and persistent thrombocytopenia pending work-up, she may ultimately benefit from a repeat bone marrow biopsy.  -Given the findings of early MDS and likely CLL on her prior bone marrow biopsy in combination with her progressive cytopenias, this finding is highly suggestive for progression of myelodysplasia or progression of CLL.  I recommended that we proceed with CT chest abdomen pelvis to evaluate for lymphadenopathy as well as a repeat bone marrow biopsy to evaluate for progression of her disease.  We did discuss that both MDS and CLL have potential therapy options which are low toxicity and could be considered even in patients with advanced age and these may lessen her future transfusion requirements.  We reviewed the options extensively, but at this point the patient and her  declined a repeat bone marrow biopsy or imaging.  She plans to continue with Retacrit and iron infusions which are  currently being managed by nephrology.  -She will continue to consider and will follow-up with me in 3 months.    Follow Up:   3 months     Juliane Calderon MD  Hematology and Oncology       Time spent on day of service 30 min including time before, during, and after the visit on reviewing records/results medically appropriate history and physical, counseling patient.

## 2023-08-14 NOTE — LETTER
2023       No Recipients    Patient: Lynne Sanchez   YOB: 1934   Date of Visit: 2023     Dear Raymundo Alas MD, FASN:       Thank you for referring Lynne Sanchez to me for evaluation. Below are the relevant portions of my assessment and plan of care.    If you have questions, please do not hesitate to call me. I look forward to following Lynne along with you.         Sincerely,        Juliane Calderon MD        CC:   No Recipients    Juliane Calderon MD  23 0725  Sign when Signing Visit       [facility] [place]  Office number [phone]    Fax number [phone]    Follow up     Date: 2023     Patient Name: Lynne Sanchez  MRN: [id number]  : 1934    Referring Physician: Madai Connor NP for Dr. Ibarra (nephrologist at [facility]    Chief Complaint: Anemia and thrombocytopenia      History of Present Illness: Lynne Sanchez is a pleasant 88 y.o. female who presents today for evaluation of chronic anemia and thrombocytopenia.    She has a longstanding history of anemia and thrombocytopenia for which she has previously undergone extensive evaluation.    She recalls that she has received blood transfusions several times in the past.  For example she recalls that she had been seen in ED in [place] for hemoglobin of 4 many years ago.   After rehab 1 year ago after arm fracture, hemoglobin dropped to 4 again.     Because of her history of chronic kidney disease she is currently receiving IV iron and Procrit through nephrology.    She has previously seen Dr. Omalley while inpatient for work-up of anemia and thrombocytopenia in 2018:      Following that admission she established care with Dr. Rodriguez who sent her to [facility] for a bone marrow biopsy.  I have requested copies of this result. From his note in 2019:      She has undergone upper and lower endoscopy several times for evaluation of blood loss anemia.  EGD 2017:  :    Pill endo  1 year ago:     Did not proceed with EGD/colon because too many other health problems ongoing  She currently endorses Black stools intermittently, happens several times per week.    Interval history:  She returns accompanied by her supportive  for test results and discussion regarding further work-up.  Has EGD/cscope pending 9/5/23  Has not been able to collect fecal occult blood testing.  She endorses ongoing black stools.  She continues with erythropoietin stimulating agent injections and iron infusions.  She had a bad experience with prior bone marrow biopsy and is hopeful she does not have to repeat this test    Past Medical History:   Past Medical History:   Diagnosis Date    Acute deep vein thrombosis of left upper extremity     Anemia     Asthma     CHF (congestive heart failure)     Chronic atrial fibrillation     Deep venous thrombosis of left upper limb     Diabetes mellitus, type 2     Diarrhea     GERD (gastroesophageal reflux disease)     Glaucoma     H/O transfusion of whole blood     Heart attack     Heart murmur     History of transfusion     Hyperlipidemia     Hypertension     Kidney disease     patient not aware of what exact diagnosis is     Osteomyelitis     Osteomyelitis of left foot 10/03/2017    Peripheral vascular disease     Right retinal detachment     Secondary cataract of both eyes     Stable proliferative diabetic retinopathy of both eyes     Stroke     Swelling of left extremity     Urinary tract infection     Wears glasses        Past Surgical History:   Past Surgical History:   Procedure Laterality Date    AMPUTATION DIGIT Left 7/5/2018    Procedure: Left Great toe amputation;  Surgeon: Prakash Carrington MD;  Location: [facility] OR;  Service: Vascular    AMPUTATION DIGIT Left 8/27/2018    Procedure: SECOND TOE AMPUTATION DIGIT LEFT;  Surgeon: Prakash Carrington MD;  Location: [facility] OR;  Service: General    APPENDECTOMY      BONE MARROW ASPIRATION       CARDIAC ABLATION      CARDIAC CATHETERIZATION N/A 10/10/2017    Procedure: Peripheral angiography;  Surgeon: Kan Pelaez MD;  Location: [facility] CATH INVASIVE LOCATION;  Service:     CARDIAC CATHETERIZATION N/A 10/10/2017    Procedure: Angioplasty-peripheral;  Surgeon: Kan Pelaez MD;  Location: [facility] CATH INVASIVE LOCATION;  Service:     CARDIAC CATHETERIZATION N/A 10/10/2017    Procedure: Atherectomy-peripheral;  Surgeon: Kan Pelaez MD;  Location: [facility] CATH INVASIVE LOCATION;  Service:     CARDIAC ELECTROPHYSIOLOGY PROCEDURE N/A 5/3/2018    Procedure: generator change;  Surgeon: Zan Poole MD;  Location:  [facility] CATH INVASIVE LOCATION;  Service: Cardiovascular    CARDIOVERSION      COLONOSCOPY      ENDOSCOPY N/A 10/9/2017    Procedure: ESOPHAGOGASTRODUODENOSCOPY WITH COLD FORCEP BIOPSY;  Surgeon: Clifton Hyatt MD;  Location: [facility] ENDOSCOPY;  Service:     ENDOSCOPY N/A 3/22/2022    Procedure: ESOPHAGOGASTRODUODENOSCOPY with AVM cautery;  Surgeon: Clarisse Velez MD;  Location: [facility] ENDOSCOPY;  Service: Gastroenterology;  Laterality: N/A;    ENDOSCOPY N/A 8/4/2023    Procedure: ESOPHAGOGASTRODUODENOSCOPY WITH BIOPSY AND RUTH BRUSHING;  Surgeon: Clarisse Velez MD;  Location: [facility] ENDOSCOPY;  Service: Gastroenterology;  Laterality: N/A;    EYE SURGERY Bilateral     CATARACTS    INTERVENTIONAL RADIOLOGY PROCEDURE N/A 10/10/2017    Procedure: Abdominal Aortagram with Runoff;  Surgeon: aKn Pelaez MD;  Location: [facility] CATH INVASIVE LOCATION;  Service:     PACEMAKER IMPLANTATION      around 2008 then replaced 2018       Family History:   Family History   Problem Relation Age of Onset    No Known Problems Mother     No Known Problems Father     Arthritis Other    Sister with ET    Social History:   Social History     Socioeconomic History    Marital status:     Number of children: 3   Tobacco Use    Smoking status: Never      Passive exposure: Never    Smokeless tobacco: Never   Vaping Use    Vaping Use: Never used   Substance and Sexual Activity    Alcohol use: No    Drug use: No    Sexual activity: Defer       Medications:     Current Outpatient Medications:     acetaminophen (TYLENOL) 650 MG 8 hr tablet, Take 1 tablet by mouth., Disp: , Rfl:     aspirin 81 MG EC tablet, Take 1 tablet by mouth Daily., Disp: , Rfl:     atorvastatin (LIPITOR) 40 MG tablet, Take 1 tablet every day by oral route., Disp: , Rfl:     empagliflozin (JARDIANCE) 10 MG tablet tablet, Take 1 tablet every day by oral route., Disp: , Rfl:     epoetin dorcas-epbx (Retacrit) 61947 UNIT/ML injection, Inject 1 mL under the skin into the appropriate area as directed. 1 ml sq per month on day 17, Disp: , Rfl:     furosemide (LASIX) 20 MG tablet, TAKE 1 TABLET BY MOUTH EVERY DAY, Disp: 90 tablet, Rfl: 0    glimepiride (AMARYL) 2 MG tablet, Take 1 tablet by mouth Every Morning Before Breakfast., Disp: 30 tablet, Rfl: 5    glucose blood test strip, 1 each by Other route Daily. Use as instructed once a day as directed, for Truemetrix reader, Disp: 100 each, Rfl: 3    latanoprost (XALATAN) 0.005 % ophthalmic solution, Administer 1 drop to both eyes Daily. (Patient taking differently: Administer 1 drop to both eyes Every Night.), Disp: 7.5 mL, Rfl: 3    levothyroxine (SYNTHROID, LEVOTHROID) 50 MCG tablet, Take 1 tablet by mouth Every Morning. Indications: Underactive Thyroid, Disp: 90 tablet, Rfl: 3    metoprolol tartrate (LOPRESSOR) 100 MG tablet, Take 1 tablet by mouth 2 (Two) Times a Day., Disp: 180 tablet, Rfl: 1    multivitamin with minerals tablet tablet, Take 1 tablet by mouth Daily., Disp: , Rfl:     pantoprazole (PROTONIX) 40 MG EC tablet, Take 1 tablet by mouth Daily., Disp: 90 tablet, Rfl: 3    saccharomyces boulardii (FLORASTOR) 250 MG capsule, Take by oral route., Disp: , Rfl:     SITagliptin (JANUVIA) 25 MG tablet, Take 1 tablet every day by  "oral route., Disp: , Rfl:     Sod Picosulfate-Mag Ox-Cit Acd (Clenpiq) 10-3.5-12 MG-GM -GM/175ML solution, Take 1 kit by mouth Take As Directed. Take as directed for colonoscopy prep. Patient has instructions., Disp: 350 mL, Rfl: 0    Tradjenta 5 MG tablet tablet, TAKE 1 TABLET EVERY DAY, Disp: 90 tablet, Rfl: 3    Allergies:   Allergies   Allergen Reactions    Phenergan [Promethazine Hcl] Confusion       Objective     Vital Signs:   Vitals:    23 1357   BP: 129/59   Pulse: 72   Resp: 12   Temp: 97.3 øF (36.3 øC)   TempSrc: Temporal   SpO2: 96%   Weight: 59.9 kg (132 lb)   Height: 167.6 cm (66\")   PainSc: 0-No pain    Body mass index is 21.31 kg/mý.   Pain Score    23 1357   PainSc: 0-No pain       Physical Exam:   General: Frail, examined in wheel chair  HEENT: Normocephalic, atraumatic. Sclera anicteric.   Neck: supple, no adenopathy.   Cardiovascular: regular rate and rhythm. No murmurs.   Respiratory: Normal rate. Clear to auscultation bilaterally.  Abdomen: Soft, nontender, non distended with normoactive bowel sounds.   Lymph: no cervical, supraclavicular or axillary adenopathy.  Neuro: Alert and oriented x 3. No focal deficits.   Ext: Symmetric, no swelling.   Psych: Euthymic      Laboratory/Imaging Reviewed:   Admission on 2023, Discharged on 2023   Component Date Value Ref Range Status    Glucose 2023 129  70 - 130 mg/dL Final    Serial Number: [id number]:  [id number]    Reference Lab Report 2023    Final                    Value:[facility]  [address] [place] [address]  Phone: [phone] or [phone]  Fax: [phone]  Boone Jacobsen M.D., Medical Director    PATIENT NAME                                     LABORATORY NO.  MEL MANSFIELD                                [id number]  [id number]                                 AGE                    SEX   SSN              CLIENT REF #  [facility]    " "                88        1934           [id number]      [id number]    [address]                        REQUESTING M.D.           ATTENDING M.D.         COPY TO.   [address] [place] [id number]                         BRI  DATE COLLECTED            DATE RECEIVED          DATE REPORTED  08/04/2023 08/04/2023 08/07/2023    DIAGNOSIS:  ANTRUM AND BODY BIOPSIES:  Mild chronic inactive gastritis  No H. pylori organisms identified on                           routine stains  Negative for intestinal metaplasia, dysplasia, or carcinoma    CLINICAL HISTORY:  Chronic anemia  Melena    SPECIMENS RECEIVED:  ANTRUM AND BODY BIOPSIES    MICROSCOPIC DESCRIPTION:  Tissue blocks are prepared and slides are examined microscopically on all  specimens. See diagnosis for details.    Professional interpretation rendered by Chidi Carreno M.D.,RAMON at [facility], [address] [place] [address].    GROSS DESCRIPTION:  Labeled as \"gastric antrum and body\", consisting of 2 pieces of tan soft tissue  measuring 0.3 x 0.3 x 0.2 cm, submitted entirely in 1 cassette.  SOG    REVIEWED, DIAGNOSED AND ELECTRONICALLY  SIGNED BY:    Chidi Carreno M.D.,DAVID.  CPT CODES:  57728     Hospital Outpatient Visit on 08/03/2023   Component Date Value Ref Range Status    WBC 08/03/2023 6.23  3.40 - 10.80 10*3/mm3 Final    RBC 08/03/2023 2.94 (L)  3.77 - 5.28 10*6/mm3 Final    Hemoglobin 08/03/2023 7.9 (L)  12.0 - 15.9 g/dL Final    Hematocrit 08/03/2023 26.9 (L)  34.0 - 46.6 % Final    MCV 08/03/2023 91.5  79.0 - 97.0 fL Final    MCH 08/03/2023 26.9  26.6 - 33.0 pg Final    MCHC 08/03/2023 29.4 (L)  31.5 - 35.7 g/dL Final    RDW 08/03/2023 19.2 (H)  12.3 - 15.4 % Final    RDW-SD 08/03/2023 63.7 (H)  37.0 - 54.0 fl Final    MPV 08/03/2023 11.1  6.0 - 12.0 fL Final    Platelets 08/03/2023 116 (L)  140 - 450 " 10*3/mm3 Final    Glucose 08/03/2023 135 (H)  65 - 99 mg/dL Final    BUN 08/03/2023 33 (H)  8 - 23 mg/dL Final    Creatinine 08/03/2023 1.19 (H)  0.57 - 1.00 mg/dL Final    Sodium 08/03/2023 139  136 - 145 mmol/L Final    Potassium 08/03/2023 5.0  3.5 - 5.2 mmol/L Final    Slight hemolysis detected by analyzer. Results may be affected.    Chloride 08/03/2023 105  98 - 107 mmol/L Final    CO2 08/03/2023 23.3  22.0 - 29.0 mmol/L Final    Calcium 08/03/2023 8.9  8.6 - 10.5 mg/dL Final    Albumin 08/03/2023 4.2  3.5 - 5.2 g/dL Final    Phosphorus 08/03/2023 3.1  2.5 - 4.5 mg/dL Final    Anion Gap 08/03/2023 10.7  5.0 - 15.0 mmol/L Final    BUN/Creatinine Ratio 08/03/2023 27.7 (H)  7.0 - 25.0 Final    eGFR 08/03/2023 44.1 (L)  >60.0 mL/min/1.73 Final    Uric Acid 08/03/2023 9.3 (H)  2.4 - 5.7 mg/dL Final    PTH, Intact 08/03/2023 66.3 (H)  15.0 - 65.0 pg/mL Final    Iron 08/03/2023 64  37 - 145 mcg/dL Final    Iron Saturation (TSAT) 08/03/2023 17 (L)  20 - 50 % Final    Transferrin 08/03/2023 260  200 - 360 mg/dL Final    TIBC 08/03/2023 387  298 - 536 mcg/dL Final     Component      Latest Ref Rng 7/3/2023   WBC      3.40 - 10.80 10*3/mm3 5.04    RBC      3.77 - 5.28 10*6/mm3 3.01 (L)    Hemoglobin      12.0 - 15.9 g/dL 7.9 (L)    Hematocrit      34.0 - 46.6 % 26.9 (L)    MCV      79.0 - 97.0 fL 89.4    MCH      26.6 - 33.0 pg 26.2 (L)    MCHC      31.5 - 35.7 g/dL 29.4 (L)    RDW      12.3 - 15.4 % 18.0 (H)    RDW-SD      37.0 - 54.0 fl 57.9 (H)    MPV      6.0 - 12.0 fL 10.4    Platelets      140 - 450 10*3/mm3 145    Neutrophil Rel %      42.7 - 76.0 % 58.7    Lymphocyte Rel %      19.6 - 45.3 % 28.2    Monocyte Rel %      5.0 - 12.0 % 9.5    Eosinophil Rel %      0.3 - 6.2 % 2.4    Basophil Rel %      0.0 - 1.5 % 0.6    Immature Granulocyte Rel %      0.0 - 0.5 % 0.6 (H)    Neutrophils Absolute      1.70 - 7.00 10*3/mm3 2.96    Lymphocytes Absolute      0.70 - 3.10 10*3/mm3 1.42    Monocytes  Absolute      0.10 - 0.90 10*3/mm3 0.48    Eosinophils Absolute      0.00 - 0.40 10*3/mm3 0.12    Basophils Absolute      0.00 - 0.20 10*3/mm3 0.03    Immature Grans, Absolute      0.00 - 0.05 10*3/mm3 0.03    nRBC      0.0 - 0.2 /100 WBC 0.0    IgG      586 - 1602 mg/dL 902    IgA      64 - 422 mg/dL 292    IgM      26 - 217 mg/dL 32    Total Protein      6.0 - 8.5 g/dL 6.5    Albumin      2.9 - 4.4 g/dL 3.8    Alpha-1-Globulin      0.0 - 0.4 g/dL 0.3    Alpha-2-Globulin      0.4 - 1.0 g/dL 0.7    Beta Globulin      0.7 - 1.3 g/dL 0.8    Gamma Globulin      0.4 - 1.8 g/dL 0.9    M-Bryson      Not Observed g/dL Not Observed    Globulin      2.2 - 3.9 g/dL 2.7    A/G Ratio      0.7 - 1.7  1.5    Immunofixation Reflex, Serum Comment    Please note Comment    Kappa FLC      3.3 - 19.4 mg/L 76.8 (H)    Free Lambda Light Chains      5.7 - 26.3 mg/L 40.0 (H)    Kappa/Lambda Ratio      0.26 - 1.65  1.92 (H)    Anisocytes      None Seen  Slight/1+    Elliptocytes      None Seen  Slight/1+    Hypochromia      None Seen  Slight/1+    Poikilocytes      None Seen  Slight/1+    WBC Morphology      Normal  Normal    Platelet Estimate      Normal  Adequate    Iron      37 - 145 mcg/dL 71    Iron Saturation (TSAT)      20 - 50 % 18 (L)    Transferrin      200 - 360 mg/dL 272    TIBC      298 - 536 mcg/dL 405    Folate      4.78 - 24.20 ng/mL >20.00    Ferritin      13.00 - 150.00 ng/mL 131.00    Vitamin B-12      211 - 946 pg/mL 518    LDH      135 - 214 U/L 260 (H)    Haptoglobin      30 - 200 mg/dL 108    ANABELL IgG Negative    Antibody Screen Negative    Reference Lab Report [facility].        Assessment / Plan      Assessment/Plan:   Chronic anemia  Chronic kidney disease, stage IIIb  Chronic thrombocytopenia  Abnormal pill endoscopy 1 year ago with further work-up deferred  Melena  B-cell lymphoma on prior bone marrow biopsy from 2017 (with mantle cell and CLL being in the differential) as well as findings  suggestive of early MDS..  -I reviewed multiple prior CBCs as well as her complex prior work-up as outlined above in the HPI.  She has multifactorial anemia and chronic thrombocytopenia that is at least partially explained by chronic kidney disease.  She is receiving appropriate therapy for this with erythroid stimulating agents and IV iron.  However despite this she has a persistent anemia with intermittent thrombocytopenia.  -Prior GI work-up was concerning for possible Crohn's, however she deferred upper and lower endoscopy at that time.  I reviewed her previous available GI evaluation and discussed the findings with the patient and her .  She is endorsing melena symptoms chronically.  I have encouraged her to proceed with an upper and lower endoscopy as previously recommended by Dr. Velez.  This is pending in early September.  -We reviewed her prior bone marrow biopsy in detail today.  This showed findings suggestive of early MDS as well as a small clonal population of B cells, suggestive of possible CLL versus mantle cell, versus CD positive marginal zone lymphoma.  FISH was negative for t(11; 14) making mantle cell less likely.  Karyotype was normal.  She was also found to have low iron stores from that biopsy.    -Additionally I reviewed her prior hematology notes.  I was unable to locate a copy of her bone marrow biopsy in our system, but this has been requested from [facility].  We are still awaiting this request.  From review of her prior hematology work-up, it appears that there was concern for possible early myelodysplastic changes if we are unable to find any explanation for her worsening anemia and persistent thrombocytopenia pending work-up, she may ultimately benefit from a repeat bone marrow biopsy.  -Given the findings of early MDS and likely CLL on her prior bone marrow biopsy in combination with her progressive cytopenias, this finding is highly suggestive for progression of  myelodysplasia or progression of CLL.  I recommended that we proceed with CT chest abdomen pelvis to evaluate for lymphadenopathy as well as a repeat bone marrow biopsy to evaluate for progression of her disease.  We did discuss that both MDS and CLL have potential therapy options which are low toxicity and could be considered even in patients with advanced age and these may lessen her future transfusion requirements.  We reviewed the options extensively, but at this point the patient and her  declined a repeat bone marrow biopsy or imaging.  She plans to continue with Retacrit and iron infusions which are currently being managed by nephrology.  -She will continue to consider and will follow-up with me in 3 months.    Follow Up:   3 months     Juliane Calderon MD  Hematology and Oncology       Time spent on day of service 30 min including time before, during, and after the visit on reviewing records/results medically appropriate history and physical, counseling patient.

## 2023-08-16 ENCOUNTER — TELEPHONE (OUTPATIENT)
Dept: GASTROENTEROLOGY | Facility: CLINIC | Age: 88
End: 2023-08-16
Payer: MEDICARE

## 2023-08-16 NOTE — TELEPHONE ENCOUNTER
Patient called today. She is scheduled for colonoscopy on 9/5/23. She would like to cancel and does not want to reschedule at this time.

## 2023-08-17 ENCOUNTER — HOSPITAL ENCOUNTER (OUTPATIENT)
Dept: INFUSION THERAPY | Facility: HOSPITAL | Age: 88
Discharge: HOME | End: 2023-08-17
Admitting: INTERNAL MEDICINE
Payer: MEDICARE

## 2023-08-17 VITALS
HEART RATE: 88 BPM | OXYGEN SATURATION: 100 % | DIASTOLIC BLOOD PRESSURE: 54 MMHG | TEMPERATURE: 98 F | RESPIRATION RATE: 18 BRPM | SYSTOLIC BLOOD PRESSURE: 125 MMHG

## 2023-08-17 DIAGNOSIS — D63.1 ANEMIA OF CHRONIC RENAL FAILURE, STAGE 3B: ICD-10-CM

## 2023-08-17 DIAGNOSIS — D64.9 NORMOCYTIC ANEMIA: Primary | ICD-10-CM

## 2023-08-17 DIAGNOSIS — N18.32 STAGE 3B CHRONIC KIDNEY DISEASE: ICD-10-CM

## 2023-08-17 DIAGNOSIS — N18.32 ANEMIA OF CHRONIC RENAL FAILURE, STAGE 3B: ICD-10-CM

## 2023-08-17 LAB
DEPRECATED RDW RBC AUTO: 64.6 FL (ref 37–54)
ERYTHROCYTE [DISTWIDTH] IN BLOOD BY AUTOMATED COUNT: 19.5 % (ref 12.3–15.4)
HCT VFR BLD AUTO: 24.6 % (ref 34–46.6)
HGB BLD-MCNC: 7.5 G/DL (ref 12–15.9)
MCH RBC QN AUTO: 27.7 PG (ref 26.6–33)
MCHC RBC AUTO-ENTMCNC: 30.5 G/DL (ref 31.5–35.7)
MCV RBC AUTO: 90.8 FL (ref 79–97)
PLATELET # BLD AUTO: 121 10*3/MM3 (ref 140–450)
PMV BLD AUTO: 9.6 FL (ref 6–12)
RBC # BLD AUTO: 2.71 10*6/MM3 (ref 3.77–5.28)
WBC NRBC COR # BLD: 5.67 10*3/MM3 (ref 3.4–10.8)

## 2023-08-17 PROCEDURE — 96372 THER/PROPH/DIAG INJ SC/IM: CPT

## 2023-08-17 PROCEDURE — 85027 COMPLETE CBC AUTOMATED: CPT | Performed by: INTERNAL MEDICINE

## 2023-08-17 PROCEDURE — 96365 THER/PROPH/DIAG IV INF INIT: CPT

## 2023-08-17 PROCEDURE — 96374 THER/PROPH/DIAG INJ IV PUSH: CPT

## 2023-08-17 PROCEDURE — 25010000002 EPOETIN ALFA-EPBX 40000 UNIT/ML SOLUTION: Performed by: INTERNAL MEDICINE

## 2023-08-17 PROCEDURE — 25010000002 IRON SUCROSE PER 1 MG: Performed by: NURSE PRACTITIONER

## 2023-08-17 RX ORDER — SODIUM CHLORIDE 9 MG/ML
250 INJECTION, SOLUTION INTRAVENOUS ONCE
Status: CANCELLED | OUTPATIENT
Start: 2023-08-17

## 2023-08-17 RX ORDER — SODIUM CHLORIDE 9 MG/ML
250 INJECTION, SOLUTION INTRAVENOUS ONCE
Status: DISCONTINUED | OUTPATIENT
Start: 2023-08-17 | End: 2023-08-19 | Stop reason: HOSPADM

## 2023-08-17 RX ADMIN — IRON SUCROSE 200 MG: 20 INJECTION, SOLUTION INTRAVENOUS at 15:12

## 2023-08-17 RX ADMIN — EPOETIN ALFA-EPBX 40000 UNITS: 40000 INJECTION, SOLUTION INTRAVENOUS; SUBCUTANEOUS at 15:12

## 2023-08-18 PROCEDURE — 93296 REM INTERROG EVL PM/IDS: CPT | Performed by: INTERNAL MEDICINE

## 2023-08-18 PROCEDURE — 93294 REM INTERROG EVL PM/LDLS PM: CPT | Performed by: INTERNAL MEDICINE

## 2023-08-31 ENCOUNTER — HOSPITAL ENCOUNTER (OUTPATIENT)
Dept: INFUSION THERAPY | Facility: HOSPITAL | Age: 88
Discharge: HOME | End: 2023-08-31
Payer: MEDICARE

## 2023-08-31 VITALS — SYSTOLIC BLOOD PRESSURE: 140 MMHG | RESPIRATION RATE: 18 BRPM | HEART RATE: 71 BPM | DIASTOLIC BLOOD PRESSURE: 60 MMHG

## 2023-08-31 DIAGNOSIS — D64.9 NORMOCYTIC ANEMIA: Primary | ICD-10-CM

## 2023-08-31 DIAGNOSIS — D63.1 ANEMIA OF CHRONIC RENAL FAILURE, STAGE 3B: ICD-10-CM

## 2023-08-31 DIAGNOSIS — N18.32 ANEMIA OF CHRONIC RENAL FAILURE, STAGE 3B: ICD-10-CM

## 2023-08-31 DIAGNOSIS — N18.32 STAGE 3B CHRONIC KIDNEY DISEASE: ICD-10-CM

## 2023-08-31 LAB
DEPRECATED RDW RBC AUTO: 62.6 FL (ref 37–54)
ERYTHROCYTE [DISTWIDTH] IN BLOOD BY AUTOMATED COUNT: 18.9 % (ref 12.3–15.4)
HCT VFR BLD AUTO: 25.5 % (ref 34–46.6)
HGB BLD-MCNC: 8 G/DL (ref 12–15.9)
MCH RBC QN AUTO: 28.1 PG (ref 26.6–33)
MCHC RBC AUTO-ENTMCNC: 31.4 G/DL (ref 31.5–35.7)
MCV RBC AUTO: 89.5 FL (ref 79–97)
PLATELET # BLD AUTO: 104 10*3/MM3 (ref 140–450)
PMV BLD AUTO: 9.4 FL (ref 6–12)
RBC # BLD AUTO: 2.85 10*6/MM3 (ref 3.77–5.28)
WBC NRBC COR # BLD: 5.11 10*3/MM3 (ref 3.4–10.8)

## 2023-08-31 PROCEDURE — 96372 THER/PROPH/DIAG INJ SC/IM: CPT

## 2023-08-31 PROCEDURE — 85027 COMPLETE CBC AUTOMATED: CPT | Performed by: INTERNAL MEDICINE

## 2023-08-31 PROCEDURE — 25010000002 EPOETIN ALFA-EPBX 40000 UNIT/ML SOLUTION: Performed by: INTERNAL MEDICINE

## 2023-08-31 PROCEDURE — 36415 COLL VENOUS BLD VENIPUNCTURE: CPT

## 2023-08-31 RX ADMIN — EPOETIN ALFA-EPBX 40000 UNITS: 40000 INJECTION, SOLUTION INTRAVENOUS; SUBCUTANEOUS at 14:49

## 2023-09-02 NOTE — PROGRESS NOTES
Patient presented to [facility] ED on 04/13/2022 with chief complaint of edema. Upon admission to hospital patient's hemoglobin was found to be 4.4 with acute on chronic anemia. Upon consult with GI it was decided to go forth with Pill Cam Endoscopy. Discussed risks and benefits. Received patient's consent. Patient hooked to sensor belt monitor that was paired to capsule. Patient swallowed capsule without difficulty at 0820. Abiola Ritchie PA-C returned to hospital at 1630 to unhook patient from sensor belt. Patient did not have any complaints of abdominal pain, nausea or vomiting. Pictures downloaded for review by Dr. Velez.    Rachel Wood, [facility]    
severe

## 2023-09-14 ENCOUNTER — HOSPITAL ENCOUNTER (OUTPATIENT)
Dept: INFUSION THERAPY | Facility: HOSPITAL | Age: 88
Discharge: HOME | End: 2023-09-14
Payer: MEDICARE

## 2023-09-14 VITALS
HEART RATE: 70 BPM | SYSTOLIC BLOOD PRESSURE: 128 MMHG | RESPIRATION RATE: 20 BRPM | OXYGEN SATURATION: 96 % | TEMPERATURE: 98 F | DIASTOLIC BLOOD PRESSURE: 69 MMHG

## 2023-09-14 DIAGNOSIS — D64.9 NORMOCYTIC ANEMIA: Primary | ICD-10-CM

## 2023-09-14 DIAGNOSIS — N18.32 STAGE 3B CHRONIC KIDNEY DISEASE: ICD-10-CM

## 2023-09-14 DIAGNOSIS — N18.32 ANEMIA OF CHRONIC RENAL FAILURE, STAGE 3B: ICD-10-CM

## 2023-09-14 DIAGNOSIS — D63.1 ANEMIA OF CHRONIC RENAL FAILURE, STAGE 3B: ICD-10-CM

## 2023-09-14 LAB
DEPRECATED RDW RBC AUTO: 62.4 FL (ref 37–54)
ERYTHROCYTE [DISTWIDTH] IN BLOOD BY AUTOMATED COUNT: 18.6 % (ref 12.3–15.4)
HCT VFR BLD AUTO: 25.2 % (ref 34–46.6)
HGB BLD-MCNC: 7.7 G/DL (ref 12–15.9)
MCH RBC QN AUTO: 27.6 PG (ref 26.6–33)
MCHC RBC AUTO-ENTMCNC: 30.6 G/DL (ref 31.5–35.7)
MCV RBC AUTO: 90.3 FL (ref 79–97)
PLATELET # BLD AUTO: 104 10*3/MM3 (ref 140–450)
PMV BLD AUTO: 10.1 FL (ref 6–12)
RBC # BLD AUTO: 2.79 10*6/MM3 (ref 3.77–5.28)
WBC NRBC COR # BLD: 5 10*3/MM3 (ref 3.4–10.8)

## 2023-09-14 PROCEDURE — 85027 COMPLETE CBC AUTOMATED: CPT | Performed by: INTERNAL MEDICINE

## 2023-09-14 PROCEDURE — 36415 COLL VENOUS BLD VENIPUNCTURE: CPT

## 2023-09-14 PROCEDURE — 96372 THER/PROPH/DIAG INJ SC/IM: CPT

## 2023-09-14 PROCEDURE — 25010000002 EPOETIN ALFA-EPBX 40000 UNIT/ML SOLUTION: Performed by: INTERNAL MEDICINE

## 2023-09-14 RX ADMIN — EPOETIN ALFA-EPBX 40000 UNITS: 40000 INJECTION, SOLUTION INTRAVENOUS; SUBCUTANEOUS at 15:35

## 2023-09-14 NOTE — CODE DOCUMENTATION
1450-venipuncture blood samples collected using butterfly needle in left\ AC x 1 attempt with pressure dressing intact and bleeding controlled.

## 2023-09-28 ENCOUNTER — HOSPITAL ENCOUNTER (OUTPATIENT)
Dept: INFUSION THERAPY | Facility: HOSPITAL | Age: 88
Discharge: HOME | End: 2023-09-28
Payer: MEDICARE

## 2023-09-28 VITALS — SYSTOLIC BLOOD PRESSURE: 147 MMHG | TEMPERATURE: 98.1 F | DIASTOLIC BLOOD PRESSURE: 62 MMHG | HEART RATE: 70 BPM

## 2023-09-28 DIAGNOSIS — N18.4 CHRONIC RENAL IMPAIRMENT, STAGE 4 (SEVERE): Chronic | ICD-10-CM

## 2023-09-28 DIAGNOSIS — D63.1 ANEMIA OF CHRONIC RENAL FAILURE, STAGE 3B: ICD-10-CM

## 2023-09-28 DIAGNOSIS — D64.9 NORMOCYTIC ANEMIA: Primary | ICD-10-CM

## 2023-09-28 DIAGNOSIS — N18.32 STAGE 3B CHRONIC KIDNEY DISEASE: ICD-10-CM

## 2023-09-28 DIAGNOSIS — N18.32 ANEMIA OF CHRONIC RENAL FAILURE, STAGE 3B: ICD-10-CM

## 2023-09-28 LAB
DEPRECATED RDW RBC AUTO: 59.8 FL (ref 37–54)
ERYTHROCYTE [DISTWIDTH] IN BLOOD BY AUTOMATED COUNT: 17.8 % (ref 12.3–15.4)
HCT VFR BLD AUTO: 27.3 % (ref 34–46.6)
HGB BLD-MCNC: 8 G/DL (ref 12–15.9)
MCH RBC QN AUTO: 26.8 PG (ref 26.6–33)
MCHC RBC AUTO-ENTMCNC: 29.3 G/DL (ref 31.5–35.7)
MCV RBC AUTO: 91.6 FL (ref 79–97)
PLATELET # BLD AUTO: 122 10*3/MM3 (ref 140–450)
PMV BLD AUTO: 9 FL (ref 6–12)
RBC # BLD AUTO: 2.98 10*6/MM3 (ref 3.77–5.28)
WBC NRBC COR # BLD: 6.12 10*3/MM3 (ref 3.4–10.8)

## 2023-09-28 PROCEDURE — 25010000002 EPOETIN ALFA-EPBX 40000 UNIT/ML SOLUTION: Performed by: INTERNAL MEDICINE

## 2023-09-28 PROCEDURE — 36415 COLL VENOUS BLD VENIPUNCTURE: CPT

## 2023-09-28 PROCEDURE — 85027 COMPLETE CBC AUTOMATED: CPT

## 2023-09-28 PROCEDURE — 96372 THER/PROPH/DIAG INJ SC/IM: CPT

## 2023-09-28 RX ADMIN — EPOETIN ALFA-EPBX 40000 UNITS: 40000 INJECTION, SOLUTION INTRAVENOUS; SUBCUTANEOUS at 14:55

## 2023-10-04 LAB — REF LAB TEST METHOD: NORMAL

## 2023-10-12 ENCOUNTER — HOSPITAL ENCOUNTER (OUTPATIENT)
Dept: INFUSION THERAPY | Facility: HOSPITAL | Age: 88
Discharge: HOME | End: 2023-10-12
Admitting: INTERNAL MEDICINE
Payer: MEDICARE

## 2023-10-12 VITALS
SYSTOLIC BLOOD PRESSURE: 131 MMHG | OXYGEN SATURATION: 96 % | DIASTOLIC BLOOD PRESSURE: 57 MMHG | TEMPERATURE: 97.7 F | RESPIRATION RATE: 18 BRPM | HEART RATE: 73 BPM

## 2023-10-12 DIAGNOSIS — D63.1 ANEMIA OF CHRONIC RENAL FAILURE, STAGE 3B: ICD-10-CM

## 2023-10-12 DIAGNOSIS — N18.32 ANEMIA OF CHRONIC RENAL FAILURE, STAGE 3B: ICD-10-CM

## 2023-10-12 DIAGNOSIS — N18.32 STAGE 3B CHRONIC KIDNEY DISEASE: ICD-10-CM

## 2023-10-12 DIAGNOSIS — D64.9 NORMOCYTIC ANEMIA: Primary | ICD-10-CM

## 2023-10-12 LAB
DEPRECATED RDW RBC AUTO: 56.6 FL (ref 37–54)
ERYTHROCYTE [DISTWIDTH] IN BLOOD BY AUTOMATED COUNT: 17.3 % (ref 12.3–15.4)
HCT VFR BLD AUTO: 27.4 % (ref 34–46.6)
HGB BLD-MCNC: 8.2 G/DL (ref 12–15.9)
MCH RBC QN AUTO: 26.6 PG (ref 26.6–33)
MCHC RBC AUTO-ENTMCNC: 29.9 G/DL (ref 31.5–35.7)
MCV RBC AUTO: 89 FL (ref 79–97)
PLATELET # BLD AUTO: 114 10*3/MM3 (ref 140–450)
PMV BLD AUTO: 9.3 FL (ref 6–12)
RBC # BLD AUTO: 3.08 10*6/MM3 (ref 3.77–5.28)
WBC NRBC COR # BLD: 5.37 10*3/MM3 (ref 3.4–10.8)

## 2023-10-12 PROCEDURE — 25010000002 EPOETIN ALFA-EPBX 40000 UNIT/ML SOLUTION: Performed by: INTERNAL MEDICINE

## 2023-10-12 PROCEDURE — 96372 THER/PROPH/DIAG INJ SC/IM: CPT

## 2023-10-12 PROCEDURE — 85027 COMPLETE CBC AUTOMATED: CPT | Performed by: INTERNAL MEDICINE

## 2023-10-12 RX ADMIN — EPOETIN ALFA-EPBX 40000 UNITS: 40000 INJECTION, SOLUTION INTRAVENOUS; SUBCUTANEOUS at 14:42

## 2023-10-26 ENCOUNTER — HOSPITAL ENCOUNTER (OUTPATIENT)
Dept: INFUSION THERAPY | Facility: HOSPITAL | Age: 88
Discharge: HOME | End: 2023-10-26
Admitting: INTERNAL MEDICINE
Payer: MEDICARE

## 2023-10-26 VITALS
SYSTOLIC BLOOD PRESSURE: 135 MMHG | RESPIRATION RATE: 18 BRPM | TEMPERATURE: 97.9 F | DIASTOLIC BLOOD PRESSURE: 70 MMHG | OXYGEN SATURATION: 98 % | HEART RATE: 75 BPM

## 2023-10-26 DIAGNOSIS — N18.32 STAGE 3B CHRONIC KIDNEY DISEASE: ICD-10-CM

## 2023-10-26 DIAGNOSIS — D63.1 ANEMIA OF CHRONIC RENAL FAILURE, STAGE 3B: ICD-10-CM

## 2023-10-26 DIAGNOSIS — D64.9 NORMOCYTIC ANEMIA: Primary | ICD-10-CM

## 2023-10-26 DIAGNOSIS — N18.32 ANEMIA OF CHRONIC RENAL FAILURE, STAGE 3B: ICD-10-CM

## 2023-10-26 LAB
DEPRECATED RDW RBC AUTO: 55.8 FL (ref 37–54)
ERYTHROCYTE [DISTWIDTH] IN BLOOD BY AUTOMATED COUNT: 17.3 % (ref 12.3–15.4)
HCT VFR BLD AUTO: 26.8 % (ref 34–46.6)
HGB BLD-MCNC: 8 G/DL (ref 12–15.9)
MCH RBC QN AUTO: 26.5 PG (ref 26.6–33)
MCHC RBC AUTO-ENTMCNC: 29.9 G/DL (ref 31.5–35.7)
MCV RBC AUTO: 88.7 FL (ref 79–97)
PLATELET # BLD AUTO: 149 10*3/MM3 (ref 140–450)
PMV BLD AUTO: 9.8 FL (ref 6–12)
RBC # BLD AUTO: 3.02 10*6/MM3 (ref 3.77–5.28)
WBC NRBC COR # BLD: 5.16 10*3/MM3 (ref 3.4–10.8)

## 2023-10-26 PROCEDURE — 85027 COMPLETE CBC AUTOMATED: CPT | Performed by: INTERNAL MEDICINE

## 2023-10-26 PROCEDURE — 25010000002 EPOETIN ALFA-EPBX 40000 UNIT/ML SOLUTION: Performed by: INTERNAL MEDICINE

## 2023-10-26 PROCEDURE — 96372 THER/PROPH/DIAG INJ SC/IM: CPT

## 2023-10-26 PROCEDURE — 36415 COLL VENOUS BLD VENIPUNCTURE: CPT

## 2023-10-26 RX ADMIN — EPOETIN ALFA-EPBX 40000 UNITS: 40000 INJECTION, SOLUTION INTRAVENOUS; SUBCUTANEOUS at 15:00

## 2023-11-09 ENCOUNTER — HOSPITAL ENCOUNTER (OUTPATIENT)
Dept: INFUSION THERAPY | Facility: HOSPITAL | Age: 88
Discharge: HOME | End: 2023-11-09
Admitting: INTERNAL MEDICINE
Payer: MEDICARE

## 2023-11-09 VITALS
DIASTOLIC BLOOD PRESSURE: 50 MMHG | OXYGEN SATURATION: 100 % | RESPIRATION RATE: 20 BRPM | TEMPERATURE: 97.3 F | SYSTOLIC BLOOD PRESSURE: 126 MMHG

## 2023-11-09 DIAGNOSIS — N18.32 STAGE 3B CHRONIC KIDNEY DISEASE: ICD-10-CM

## 2023-11-09 DIAGNOSIS — D63.1 ANEMIA OF CHRONIC RENAL FAILURE, STAGE 3B: ICD-10-CM

## 2023-11-09 DIAGNOSIS — N18.32 ANEMIA OF CHRONIC RENAL FAILURE, STAGE 3B: ICD-10-CM

## 2023-11-09 DIAGNOSIS — D64.9 NORMOCYTIC ANEMIA: Primary | ICD-10-CM

## 2023-11-09 LAB
DEPRECATED RDW RBC AUTO: 56.7 FL (ref 37–54)
ERYTHROCYTE [DISTWIDTH] IN BLOOD BY AUTOMATED COUNT: 17.6 % (ref 12.3–15.4)
HCT VFR BLD AUTO: 26.2 % (ref 34–46.6)
HGB BLD-MCNC: 7.9 G/DL (ref 12–15.9)
MCH RBC QN AUTO: 26.7 PG (ref 26.6–33)
MCHC RBC AUTO-ENTMCNC: 30.2 G/DL (ref 31.5–35.7)
MCV RBC AUTO: 88.5 FL (ref 79–97)
PLATELET # BLD AUTO: 118 10*3/MM3 (ref 140–450)
PMV BLD AUTO: 9.2 FL (ref 6–12)
RBC # BLD AUTO: 2.96 10*6/MM3 (ref 3.77–5.28)
WBC NRBC COR # BLD: 5.47 10*3/MM3 (ref 3.4–10.8)

## 2023-11-09 PROCEDURE — 25010000002 EPOETIN ALFA-EPBX 40000 UNIT/ML SOLUTION: Performed by: INTERNAL MEDICINE

## 2023-11-09 PROCEDURE — 96372 THER/PROPH/DIAG INJ SC/IM: CPT

## 2023-11-09 PROCEDURE — 36415 COLL VENOUS BLD VENIPUNCTURE: CPT

## 2023-11-09 PROCEDURE — 85027 COMPLETE CBC AUTOMATED: CPT | Performed by: INTERNAL MEDICINE

## 2023-11-09 RX ADMIN — EPOETIN ALFA-EPBX 40000 UNITS: 40000 INJECTION, SOLUTION INTRAVENOUS; SUBCUTANEOUS at 14:44

## 2023-11-09 NOTE — CODE DOCUMENTATION
1425-venipuncture blood samples collected using butterfly needle in left AC x 1 attempt with pressure dressing applied and bleeding controlled. Sample sent to lab.

## 2023-11-17 ENCOUNTER — OFFICE VISIT (OUTPATIENT)
Dept: INTERNAL MEDICINE | Facility: CLINIC | Age: 88
End: 2023-11-17
Payer: MEDICARE

## 2023-11-17 VITALS
HEIGHT: 66 IN | BODY MASS INDEX: 20.83 KG/M2 | WEIGHT: 129.6 LBS | HEART RATE: 71 BPM | TEMPERATURE: 97.7 F | OXYGEN SATURATION: 93 % | SYSTOLIC BLOOD PRESSURE: 110 MMHG | DIASTOLIC BLOOD PRESSURE: 58 MMHG

## 2023-11-17 DIAGNOSIS — E03.9 HYPOTHYROIDISM, UNSPECIFIED TYPE: ICD-10-CM

## 2023-11-17 DIAGNOSIS — E11.22 CONTROLLED TYPE 2 DIABETES MELLITUS WITH STAGE 4 CHRONIC KIDNEY DISEASE, WITHOUT LONG-TERM CURRENT USE OF INSULIN: Primary | Chronic | ICD-10-CM

## 2023-11-17 DIAGNOSIS — N18.4 CONTROLLED TYPE 2 DIABETES MELLITUS WITH STAGE 4 CHRONIC KIDNEY DISEASE, WITHOUT LONG-TERM CURRENT USE OF INSULIN: Primary | Chronic | ICD-10-CM

## 2023-11-17 DIAGNOSIS — Z00.00 MEDICARE ANNUAL WELLNESS VISIT, SUBSEQUENT: ICD-10-CM

## 2023-11-17 DIAGNOSIS — G89.29 CHRONIC PAIN OF BOTH KNEES: ICD-10-CM

## 2023-11-17 DIAGNOSIS — M25.561 CHRONIC PAIN OF BOTH KNEES: ICD-10-CM

## 2023-11-17 DIAGNOSIS — M25.562 CHRONIC PAIN OF BOTH KNEES: ICD-10-CM

## 2023-11-17 NOTE — PROGRESS NOTES
The ABCs of the Annual Wellness Visit  Subsequent Medicare Wellness Visit    Subjective      Lynne Sanchez is a 88 y.o. female who presents for a Subsequent Medicare Wellness Visit.    The following portions of the patient's history were reviewed and   updated as appropriate: allergies, current medications, past family history, past medical history, past social history, past surgical history, and problem list.    Compared to one year ago, the patient feels her physical   health is worse.    Compared to one year ago, the patient feels her mental   health is the same.    Recent Hospitalizations:  She was not admitted to the hospital during the last year.       Current Medical Providers:  Patient Care Team:  Elizabeth Easton APRN as PCP - General (Family Medicine)  Paxton Vasquez DO as PCP - Internal Medicine ([facility])  Vilma Méndez PA-C as PCP - Family Medicine ([facility])  Davian Faria MD (Inactive) as Consulting Physician (Nephrology)  Radha Boone, SHAD as Registered Nurse    Outpatient Medications Prior to Visit   Medication Sig Dispense Refill    acetaminophen (TYLENOL) 650 MG 8 hr tablet Take 1 tablet by mouth.      aspirin 81 MG EC tablet Take 1 tablet by mouth Daily.      atorvastatin (LIPITOR) 40 MG tablet Take 1 tablet every day by oral route.      empagliflozin (JARDIANCE) 10 MG tablet tablet Take 1 tablet every day by oral route.      epoetin dorcas-epbx (Retacrit) 49727 UNIT/ML injection Inject 1 mL under the skin into the appropriate area as directed. 1 ml sq per month on day 17      furosemide (LASIX) 20 MG tablet TAKE 1 TABLET BY MOUTH EVERY DAY 90 tablet 0    glimepiride (AMARYL) 2 MG tablet Take 1 tablet by mouth Every Morning Before Breakfast. 30 tablet 5    glucose blood test strip 1 each by Other route Daily. Use as instructed once a day as directed, for Truemetrix reader 100 each 3    latanoprost (XALATAN) 0.005 % ophthalmic solution Administer 1  drop to both eyes Daily. (Patient taking differently: Administer 1 drop to both eyes Every Night.) 7.5 mL 3    levothyroxine (SYNTHROID, LEVOTHROID) 50 MCG tablet Take 1 tablet by mouth Every Morning. Indications: Underactive Thyroid 90 tablet 3    metoprolol tartrate (LOPRESSOR) 100 MG tablet Take 1 tablet by mouth 2 (Two) Times a Day. 180 tablet 1    multivitamin with minerals tablet tablet Take 1 tablet by mouth Daily.      pantoprazole (PROTONIX) 40 MG EC tablet Take 1 tablet by mouth Daily. 90 tablet 3    saccharomyces boulardii (FLORASTOR) 250 MG capsule Take by oral route.      SITagliptin (JANUVIA) 25 MG tablet Take 1 tablet every day by oral route.      Sod Picosulfate-Mag Ox-Cit Acd (Clenpiq) 10-3.5-12 MG-GM -GM/175ML solution Take 1 kit by mouth Take As Directed. Take as directed for colonoscopy prep. Patient has instructions. 350 mL 0    Tradjenta 5 MG tablet tablet TAKE 1 TABLET EVERY DAY 90 tablet 3     No facility-administered medications prior to visit.       No opioid medication identified on active medication list. I have reviewed chart for other potential  high risk medication/s and harmful drug interactions in the elderly.        Aspirin is on active medication list. Aspirin use is indicated based on review of current medical condition/s. Pros and cons of this therapy have been discussed today. Benefits of this medication outweigh potential harm.  Patient has been encouraged to continue taking this medication.  .      Patient Active Problem List   Diagnosis    Chronic atrial fibrillation    Valvular heart disease    Diabetes mellitus type II, controlled    Normocytic anemia    Chronic gastritis    Cerebrovascular accident (CVA) due to thrombosis of left anterior cerebral artery    Thrombocytopenia    Cardiac pacemaker in situ    Chronic congestive heart failure    Functional heart murmur    Glaucoma    Hyperlipidemia    Hypertensive disorder    Acquired hypothyroidism    Mass of parotid gland     "Neuropathy    Chronic renal impairment, stage 4 (severe)    Impairment of balance    Impaired mobility    Muscle weakness of lower extremity    Chronic pain of both knees    Secondary cataract of both eyes    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    Suspected glaucoma of both eyes    Secondary cataract of both eyes    Right retinal detachment    Stable proliferative diabetic retinopathy of both eyes    Swelling of left extremity    Closed nondisplaced fracture of surgical neck of left humerus with routine healing    Debility    Left arm swelling    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    Secondary hyperparathyroidism of renal origin    Vitamin D deficiency    Anemia requiring transfusions    Melena    Acute on chronic anemia    Stage 3b chronic kidney disease    DM complication    Anemia of chronic renal failure    Type 2 diabetes mellitus with diabetic chronic kidney disease    Anemia of chronic renal failure    Absolute anemia    Secondary hyperparathyroidism of renal origin    Stage 3b chronic kidney disease    Vitamin D deficiency    Other iron deficiency anemias    Chronic anemia    Gastroesophageal reflux disease    Erosion of ileum    Weight loss     Advance Care Planning   Advance Care Planning     Advance Directive is on file.  ACP discussion was held with the patient during this visit. Patient has an advance directive in EMR which is still valid.      Objective    Vitals:    11/17/23 1510   BP: 110/58   Pulse: 71   Temp: 97.7 °F (36.5 °C)   TempSrc: Infrared   SpO2: 93%   Weight: 58.8 kg (129 lb 9.6 oz)   Height: 167.6 cm (65.98\")   PainSc:   6     Estimated body mass index is 20.93 kg/m² as calculated from the following:    Height as of this encounter: 167.6 cm (65.98\").    Weight as of this encounter: 58.8 kg (129 lb 9.6 oz).    BMI is within normal parameters. No other follow-up for BMI required.      Does the patient have evidence " of cognitive impairment?   No            HEALTH RISK ASSESSMENT    Smoking Status:  Social History     Tobacco Use   Smoking Status Never    Passive exposure: Never   Smokeless Tobacco Never     Alcohol Consumption:  Social History     Substance and Sexual Activity   Alcohol Use No     Fall Risk Screen:    CECILIAADI Fall Risk Assessment was completed, and patient is at LOW risk for falls.Assessment completed on:2023    Depression Screenin/17/2023     3:06 PM   PHQ-2/PHQ-9 Depression Screening   Little Interest or Pleasure in Doing Things 0-->not at all   Feeling Down, Depressed or Hopeless 0-->not at all   PHQ-9: Brief Depression Severity Measure Score 0       Health Habits and Functional and Cognitive Screenin/17/2023     3:07 PM   Functional & Cognitive Status   Do you have difficulty preparing food and eating? No   Do you have difficulty bathing yourself, getting dressed or grooming yourself? No   Do you have difficulty using the toilet? No   Do you have difficulty moving around from place to place? No   Do you have trouble with steps or getting out of a bed or a chair? Yes   Current Diet Well Balanced Diet   Dental Exam Up to date   Eye Exam Not up to date   Exercise (times per week) Other   Current Exercises Include Other   Do you need help using the phone?  No   Are you deaf or do you have serious difficulty hearing?  No   Do you need help to go to places out of walking distance? Yes   Do you need help shopping? No   Do you need help preparing meals?  No   Do you need help with housework?  No   Do you need help with laundry? No   Do you need help taking your medications? No   Do you need help managing money? No   Do you ever drive or ride in a car without wearing a seat belt? No   Have you felt unusual stress, anger or loneliness in the last month? Yes   Who do you live with? Spouse   If you need help, do you have trouble finding someone available to you? No   Have you been bothered in  the last four weeks by sexual problems? No   Do you have difficulty concentrating, remembering or making decisions? No       Age-appropriate Screening Schedule:  Refer to the list below for future screening recommendations based on patient's age, sex and/or medical conditions. Orders for these recommended tests are listed in the plan section. The patient has been provided with a written plan.    Health Maintenance   Topic Date Due    DIABETIC EYE EXAM  06/10/2022    ANNUAL WELLNESS VISIT  11/16/2023    LIPID PANEL  11/16/2023    HEMOGLOBIN A1C  11/17/2023    DXA SCAN  11/17/2023 (Originally 1/9/2019)    ZOSTER VACCINE (1 of 2) 11/17/2023 (Originally 12/11/1984)    COVID-19 Vaccine (1) 02/06/2024 (Originally 6/11/1935)    INFLUENZA VACCINE  03/31/2024 (Originally 8/1/2023)    TDAP/TD VACCINES (1 - Tdap) 09/30/2034 (Originally 12/11/1953)    URINE MICROALBUMIN  05/17/2024    Pneumococcal Vaccine 65+  Discontinued                  CMS Preventative Services Quick Reference  Risk Factors Identified During Encounter:    Immunizations Discussed/Encouraged: Influenza, Shingrix, and COVID19  Dental Screening Recommended  Vision Screening Recommended    The above risks/problems have been discussed with the patient.  Pertinent information has been shared with the patient in the After Visit Summary.    Diagnoses and all orders for this visit:    1. Controlled type 2 diabetes mellitus with stage 4 chronic kidney disease, without long-term current use of insulin (Primary)  -     CBC (No Diff)  -     Comprehensive Metabolic Panel  -     Hemoglobin A1c  -     Lipid Panel    2. Medicare annual wellness visit, subsequent    3. Hypothyroidism, unspecified type  -     TSH  -     T4, free  -     T3, Free    4. Chronic pain of both knees  -     Ambulatory Referral to Orthopedic Surgery        Follow Up:   Next Medicare Wellness visit to be scheduled in 1 year.      An After Visit Summary and PPPS were made available to the  patient.

## 2023-11-22 ENCOUNTER — HOSPITAL ENCOUNTER (OUTPATIENT)
Dept: INFUSION THERAPY | Facility: HOSPITAL | Age: 88
Discharge: HOME | End: 2023-11-22
Admitting: INTERNAL MEDICINE
Payer: MEDICARE

## 2023-11-22 VITALS
TEMPERATURE: 97.3 F | OXYGEN SATURATION: 92 % | RESPIRATION RATE: 18 BRPM | DIASTOLIC BLOOD PRESSURE: 56 MMHG | SYSTOLIC BLOOD PRESSURE: 139 MMHG | HEART RATE: 70 BPM

## 2023-11-22 DIAGNOSIS — N18.32 ANEMIA OF CHRONIC RENAL FAILURE, STAGE 3B: ICD-10-CM

## 2023-11-22 DIAGNOSIS — N18.32 STAGE 3B CHRONIC KIDNEY DISEASE: ICD-10-CM

## 2023-11-22 DIAGNOSIS — D63.1 ANEMIA OF CHRONIC RENAL FAILURE, STAGE 3B: ICD-10-CM

## 2023-11-22 DIAGNOSIS — D64.9 NORMOCYTIC ANEMIA: Primary | ICD-10-CM

## 2023-11-22 DIAGNOSIS — D64.9 CHRONIC ANEMIA: ICD-10-CM

## 2023-11-22 LAB
ALBUMIN SERPL-MCNC: 4.1 G/DL (ref 3.5–5.2)
ALBUMIN/GLOB SERPL: 1.5 G/DL
ALP SERPL-CCNC: 84 U/L (ref 39–117)
ALT SERPL W P-5'-P-CCNC: 7 U/L (ref 1–33)
ANION GAP SERPL CALCULATED.3IONS-SCNC: 10.1 MMOL/L (ref 5–15)
AST SERPL-CCNC: 18 U/L (ref 1–32)
BILIRUB SERPL-MCNC: 0.5 MG/DL (ref 0–1.2)
BUN SERPL-MCNC: 27 MG/DL (ref 8–23)
BUN/CREAT SERPL: 17.8 (ref 7–25)
CALCIUM SPEC-SCNC: 9.4 MG/DL (ref 8.6–10.5)
CHLORIDE SERPL-SCNC: 103 MMOL/L (ref 98–107)
CO2 SERPL-SCNC: 26.9 MMOL/L (ref 22–29)
CREAT SERPL-MCNC: 1.52 MG/DL (ref 0.57–1)
DEPRECATED RDW RBC AUTO: 57.2 FL (ref 37–54)
EGFRCR SERPLBLD CKD-EPI 2021: 32.9 ML/MIN/1.73
ERYTHROCYTE [DISTWIDTH] IN BLOOD BY AUTOMATED COUNT: 17.7 % (ref 12.3–15.4)
GLOBULIN UR ELPH-MCNC: 2.8 GM/DL
GLUCOSE SERPL-MCNC: 138 MG/DL (ref 65–99)
HBA1C MFR BLD: 6 % (ref 4.8–5.6)
HCT VFR BLD AUTO: 26.4 % (ref 34–46.6)
HGB BLD-MCNC: 7.8 G/DL (ref 12–15.9)
IRON 24H UR-MRATE: 64 MCG/DL (ref 37–145)
IRON SATN MFR SERPL: 16 % (ref 20–50)
MCH RBC QN AUTO: 26 PG (ref 26.6–33)
MCHC RBC AUTO-ENTMCNC: 29.5 G/DL (ref 31.5–35.7)
MCV RBC AUTO: 88 FL (ref 79–97)
PHOSPHATE SERPL-MCNC: 3.8 MG/DL (ref 2.5–4.5)
PLATELET # BLD AUTO: 138 10*3/MM3 (ref 140–450)
PMV BLD AUTO: 10.5 FL (ref 6–12)
POTASSIUM SERPL-SCNC: 4.5 MMOL/L (ref 3.5–5.2)
PROT SERPL-MCNC: 6.9 G/DL (ref 6–8.5)
PTH-INTACT SERPL-MCNC: 68.8 PG/ML (ref 15–65)
RBC # BLD AUTO: 3 10*6/MM3 (ref 3.77–5.28)
SODIUM SERPL-SCNC: 140 MMOL/L (ref 136–145)
T4 FREE SERPL-MCNC: 1.42 NG/DL (ref 0.93–1.7)
TIBC SERPL-MCNC: 407 MCG/DL (ref 298–536)
TRANSFERRIN SERPL-MCNC: 273 MG/DL (ref 200–360)
TSH SERPL DL<=0.05 MIU/L-ACNC: 3.5 UIU/ML (ref 0.27–4.2)
URATE SERPL-MCNC: 10 MG/DL (ref 2.4–5.7)
WBC NRBC COR # BLD AUTO: 5.81 10*3/MM3 (ref 3.4–10.8)

## 2023-11-22 PROCEDURE — 84100 ASSAY OF PHOSPHORUS: CPT | Performed by: INTERNAL MEDICINE

## 2023-11-22 PROCEDURE — 84443 ASSAY THYROID STIM HORMONE: CPT | Performed by: STUDENT IN AN ORGANIZED HEALTH CARE EDUCATION/TRAINING PROGRAM

## 2023-11-22 PROCEDURE — 25010000002 EPOETIN ALFA-EPBX 40000 UNIT/ML SOLUTION: Performed by: INTERNAL MEDICINE

## 2023-11-22 PROCEDURE — 84550 ASSAY OF BLOOD/URIC ACID: CPT | Performed by: INTERNAL MEDICINE

## 2023-11-22 PROCEDURE — 84481 FREE ASSAY (FT-3): CPT | Performed by: STUDENT IN AN ORGANIZED HEALTH CARE EDUCATION/TRAINING PROGRAM

## 2023-11-22 PROCEDURE — 83970 ASSAY OF PARATHORMONE: CPT | Performed by: INTERNAL MEDICINE

## 2023-11-22 PROCEDURE — 85027 COMPLETE CBC AUTOMATED: CPT | Performed by: INTERNAL MEDICINE

## 2023-11-22 PROCEDURE — 84439 ASSAY OF FREE THYROXINE: CPT | Performed by: STUDENT IN AN ORGANIZED HEALTH CARE EDUCATION/TRAINING PROGRAM

## 2023-11-22 PROCEDURE — 36415 COLL VENOUS BLD VENIPUNCTURE: CPT

## 2023-11-22 PROCEDURE — 83036 HEMOGLOBIN GLYCOSYLATED A1C: CPT | Performed by: STUDENT IN AN ORGANIZED HEALTH CARE EDUCATION/TRAINING PROGRAM

## 2023-11-22 PROCEDURE — 96372 THER/PROPH/DIAG INJ SC/IM: CPT

## 2023-11-22 PROCEDURE — 80053 COMPREHEN METABOLIC PANEL: CPT | Performed by: STUDENT IN AN ORGANIZED HEALTH CARE EDUCATION/TRAINING PROGRAM

## 2023-11-22 PROCEDURE — 84466 ASSAY OF TRANSFERRIN: CPT | Performed by: INTERNAL MEDICINE

## 2023-11-22 PROCEDURE — 83540 ASSAY OF IRON: CPT | Performed by: INTERNAL MEDICINE

## 2023-11-22 RX ADMIN — EPOETIN ALFA-EPBX 40000 UNITS: 40000 INJECTION, SOLUTION INTRAVENOUS; SUBCUTANEOUS at 13:07

## 2023-11-23 LAB — T3FREE SERPL-MCNC: 2.2 PG/ML (ref 2–4.4)

## 2023-12-06 ENCOUNTER — HOSPITAL ENCOUNTER (OUTPATIENT)
Dept: INFUSION THERAPY | Facility: HOSPITAL | Age: 88
Discharge: HOME | End: 2023-12-06
Admitting: INTERNAL MEDICINE
Payer: MEDICARE

## 2023-12-06 VITALS
HEART RATE: 70 BPM | SYSTOLIC BLOOD PRESSURE: 133 MMHG | TEMPERATURE: 97.7 F | OXYGEN SATURATION: 98 % | RESPIRATION RATE: 18 BRPM | DIASTOLIC BLOOD PRESSURE: 72 MMHG

## 2023-12-06 DIAGNOSIS — N18.32 ANEMIA OF CHRONIC RENAL FAILURE, STAGE 3B: ICD-10-CM

## 2023-12-06 DIAGNOSIS — D64.9 NORMOCYTIC ANEMIA: Primary | ICD-10-CM

## 2023-12-06 DIAGNOSIS — D63.1 ANEMIA OF CHRONIC RENAL FAILURE, STAGE 3B: ICD-10-CM

## 2023-12-06 DIAGNOSIS — N18.32 STAGE 3B CHRONIC KIDNEY DISEASE: ICD-10-CM

## 2023-12-06 LAB
CHOLEST SERPL-MCNC: 130 MG/DL (ref 0–200)
DEPRECATED RDW RBC AUTO: 55.8 FL (ref 37–54)
ERYTHROCYTE [DISTWIDTH] IN BLOOD BY AUTOMATED COUNT: 17.9 % (ref 12.3–15.4)
HCT VFR BLD AUTO: 28.6 % (ref 34–46.6)
HDLC SERPL-MCNC: 42 MG/DL (ref 40–60)
HGB BLD-MCNC: 8.7 G/DL (ref 12–15.9)
LDLC SERPL CALC-MCNC: 69 MG/DL (ref 0–100)
LDLC/HDLC SERPL: 1.6 {RATIO}
MCH RBC QN AUTO: 26.1 PG (ref 26.6–33)
MCHC RBC AUTO-ENTMCNC: 30.4 G/DL (ref 31.5–35.7)
MCV RBC AUTO: 85.9 FL (ref 79–97)
PLATELET # BLD AUTO: 142 10*3/MM3 (ref 140–450)
PMV BLD AUTO: 10.1 FL (ref 6–12)
RBC # BLD AUTO: 3.33 10*6/MM3 (ref 3.77–5.28)
TRIGL SERPL-MCNC: 105 MG/DL (ref 0–150)
VLDLC SERPL-MCNC: 19 MG/DL (ref 5–40)
WBC NRBC COR # BLD AUTO: 6.11 10*3/MM3 (ref 3.4–10.8)

## 2023-12-06 PROCEDURE — 25010000002 EPOETIN ALFA-EPBX 40000 UNIT/ML SOLUTION: Performed by: INTERNAL MEDICINE

## 2023-12-06 PROCEDURE — 96372 THER/PROPH/DIAG INJ SC/IM: CPT

## 2023-12-06 PROCEDURE — 85027 COMPLETE CBC AUTOMATED: CPT | Performed by: STUDENT IN AN ORGANIZED HEALTH CARE EDUCATION/TRAINING PROGRAM

## 2023-12-06 PROCEDURE — 80061 LIPID PANEL: CPT | Performed by: STUDENT IN AN ORGANIZED HEALTH CARE EDUCATION/TRAINING PROGRAM

## 2023-12-06 PROCEDURE — 36415 COLL VENOUS BLD VENIPUNCTURE: CPT

## 2023-12-06 RX ADMIN — EPOETIN ALFA-EPBX 40000 UNITS: 40000 INJECTION, SOLUTION INTRAVENOUS; SUBCUTANEOUS at 15:04

## 2023-12-06 NOTE — CODE DOCUMENTATION
Venipuncture to left antecubital for blood work; guaze and coban for hemostasis; patient tolerated well.

## 2023-12-20 ENCOUNTER — HOSPITAL ENCOUNTER (OUTPATIENT)
Dept: INFUSION THERAPY | Facility: HOSPITAL | Age: 88
Discharge: HOME | End: 2023-12-20
Admitting: INTERNAL MEDICINE
Payer: MEDICARE

## 2023-12-20 VITALS
DIASTOLIC BLOOD PRESSURE: 53 MMHG | OXYGEN SATURATION: 94 % | SYSTOLIC BLOOD PRESSURE: 133 MMHG | HEART RATE: 73 BPM | RESPIRATION RATE: 18 BRPM | TEMPERATURE: 97.7 F

## 2023-12-20 DIAGNOSIS — D63.1 ANEMIA OF CHRONIC RENAL FAILURE, STAGE 3B: ICD-10-CM

## 2023-12-20 DIAGNOSIS — N18.32 ANEMIA OF CHRONIC RENAL FAILURE, STAGE 3B: ICD-10-CM

## 2023-12-20 DIAGNOSIS — N18.32 STAGE 3B CHRONIC KIDNEY DISEASE: ICD-10-CM

## 2023-12-20 DIAGNOSIS — D64.9 NORMOCYTIC ANEMIA: Primary | ICD-10-CM

## 2023-12-20 LAB
BASOPHILS # BLD AUTO: 0.03 10*3/MM3 (ref 0–0.2)
BASOPHILS NFR BLD AUTO: 0.6 % (ref 0–1.5)
DEPRECATED RDW RBC AUTO: 58.4 FL (ref 37–54)
EOSINOPHIL # BLD AUTO: 0.09 10*3/MM3 (ref 0–0.4)
EOSINOPHIL NFR BLD AUTO: 1.7 % (ref 0.3–6.2)
ERYTHROCYTE [DISTWIDTH] IN BLOOD BY AUTOMATED COUNT: 18.6 % (ref 12.3–15.4)
HCT VFR BLD AUTO: 27.9 % (ref 34–46.6)
HGB BLD-MCNC: 8.4 G/DL (ref 12–15.9)
IMM GRANULOCYTES # BLD AUTO: 0.01 10*3/MM3 (ref 0–0.05)
IMM GRANULOCYTES NFR BLD AUTO: 0.2 % (ref 0–0.5)
LYMPHOCYTES # BLD AUTO: 1.77 10*3/MM3 (ref 0.7–3.1)
LYMPHOCYTES NFR BLD AUTO: 33.3 % (ref 19.6–45.3)
MCH RBC QN AUTO: 26.1 PG (ref 26.6–33)
MCHC RBC AUTO-ENTMCNC: 30.1 G/DL (ref 31.5–35.7)
MCV RBC AUTO: 86.6 FL (ref 79–97)
MONOCYTES # BLD AUTO: 0.44 10*3/MM3 (ref 0.1–0.9)
MONOCYTES NFR BLD AUTO: 8.3 % (ref 5–12)
NEUTROPHILS NFR BLD AUTO: 2.97 10*3/MM3 (ref 1.7–7)
NEUTROPHILS NFR BLD AUTO: 55.9 % (ref 42.7–76)
NRBC BLD AUTO-RTO: 0 /100 WBC (ref 0–0.2)
PLATELET # BLD AUTO: 135 10*3/MM3 (ref 140–450)
PMV BLD AUTO: 10.3 FL (ref 6–12)
RBC # BLD AUTO: 3.22 10*6/MM3 (ref 3.77–5.28)
WBC NRBC COR # BLD AUTO: 5.31 10*3/MM3 (ref 3.4–10.8)

## 2023-12-20 PROCEDURE — 85025 COMPLETE CBC W/AUTO DIFF WBC: CPT | Performed by: INTERNAL MEDICINE

## 2023-12-20 PROCEDURE — 25010000002 EPOETIN ALFA-EPBX 40000 UNIT/ML SOLUTION: Performed by: INTERNAL MEDICINE

## 2023-12-20 PROCEDURE — 96372 THER/PROPH/DIAG INJ SC/IM: CPT

## 2023-12-20 RX ADMIN — EPOETIN ALFA-EPBX 40000 UNITS: 40000 INJECTION, SOLUTION INTRAVENOUS; SUBCUTANEOUS at 14:05

## 2024-01-03 ENCOUNTER — HOSPITAL ENCOUNTER (OUTPATIENT)
Dept: INFUSION THERAPY | Facility: HOSPITAL | Age: 89
Discharge: HOME | End: 2024-01-03
Admitting: INTERNAL MEDICINE
Payer: MEDICARE

## 2024-01-03 VITALS
TEMPERATURE: 98 F | SYSTOLIC BLOOD PRESSURE: 151 MMHG | DIASTOLIC BLOOD PRESSURE: 66 MMHG | RESPIRATION RATE: 18 BRPM | OXYGEN SATURATION: 99 %

## 2024-01-03 DIAGNOSIS — D64.9 NORMOCYTIC ANEMIA: Primary | ICD-10-CM

## 2024-01-03 DIAGNOSIS — N18.32 ANEMIA OF CHRONIC RENAL FAILURE, STAGE 3B: ICD-10-CM

## 2024-01-03 DIAGNOSIS — D63.1 ANEMIA OF CHRONIC RENAL FAILURE, STAGE 3B: ICD-10-CM

## 2024-01-03 DIAGNOSIS — N18.32 STAGE 3B CHRONIC KIDNEY DISEASE: ICD-10-CM

## 2024-01-03 LAB
DEPRECATED RDW RBC AUTO: 61.9 FL (ref 37–54)
ERYTHROCYTE [DISTWIDTH] IN BLOOD BY AUTOMATED COUNT: 19.2 % (ref 12.3–15.4)
HCT VFR BLD AUTO: 27.2 % (ref 34–46.6)
HGB BLD-MCNC: 8 G/DL (ref 12–15.9)
MCH RBC QN AUTO: 26 PG (ref 26.6–33)
MCHC RBC AUTO-ENTMCNC: 29.4 G/DL (ref 31.5–35.7)
MCV RBC AUTO: 88.3 FL (ref 79–97)
PLATELET # BLD AUTO: 130 10*3/MM3 (ref 140–450)
PMV BLD AUTO: 10.2 FL (ref 6–12)
RBC # BLD AUTO: 3.08 10*6/MM3 (ref 3.77–5.28)
WBC NRBC COR # BLD AUTO: 5.9 10*3/MM3 (ref 3.4–10.8)

## 2024-01-03 PROCEDURE — 25010000002 EPOETIN ALFA-EPBX 40000 UNIT/ML SOLUTION: Performed by: INTERNAL MEDICINE

## 2024-01-03 PROCEDURE — 96372 THER/PROPH/DIAG INJ SC/IM: CPT

## 2024-01-03 PROCEDURE — 36415 COLL VENOUS BLD VENIPUNCTURE: CPT

## 2024-01-03 PROCEDURE — 85027 COMPLETE CBC AUTOMATED: CPT | Performed by: INTERNAL MEDICINE

## 2024-01-03 RX ADMIN — EPOETIN ALFA-EPBX 40000 UNITS: 40000 INJECTION, SOLUTION INTRAVENOUS; SUBCUTANEOUS at 15:23

## 2024-01-07 DIAGNOSIS — E11.29 CONTROLLED TYPE 2 DIABETES MELLITUS WITH OTHER DIABETIC KIDNEY COMPLICATION, WITHOUT LONG-TERM CURRENT USE OF INSULIN: ICD-10-CM

## 2024-01-08 RX ORDER — GLIMEPIRIDE 2 MG/1
2 TABLET ORAL
Qty: 30 TABLET | Refills: 0 | Status: SHIPPED | OUTPATIENT
Start: 2024-01-08

## 2024-01-17 ENCOUNTER — HOSPITAL ENCOUNTER (OUTPATIENT)
Dept: INFUSION THERAPY | Facility: HOSPITAL | Age: 89
Discharge: HOME | End: 2024-01-17
Admitting: INTERNAL MEDICINE
Payer: MEDICARE

## 2024-01-17 VITALS
OXYGEN SATURATION: 96 % | DIASTOLIC BLOOD PRESSURE: 60 MMHG | HEART RATE: 75 BPM | RESPIRATION RATE: 18 BRPM | SYSTOLIC BLOOD PRESSURE: 150 MMHG

## 2024-01-17 DIAGNOSIS — N18.32 ANEMIA OF CHRONIC RENAL FAILURE, STAGE 3B: ICD-10-CM

## 2024-01-17 DIAGNOSIS — D64.9 NORMOCYTIC ANEMIA: Primary | ICD-10-CM

## 2024-01-17 DIAGNOSIS — N18.32 STAGE 3B CHRONIC KIDNEY DISEASE: ICD-10-CM

## 2024-01-17 DIAGNOSIS — D63.1 ANEMIA OF CHRONIC RENAL FAILURE, STAGE 3B: ICD-10-CM

## 2024-01-17 LAB
DEPRECATED RDW RBC AUTO: 59.6 FL (ref 37–54)
ERYTHROCYTE [DISTWIDTH] IN BLOOD BY AUTOMATED COUNT: 18.9 % (ref 12.3–15.4)
HCT VFR BLD AUTO: 29.6 % (ref 34–46.6)
HGB BLD-MCNC: 8.9 G/DL (ref 12–15.9)
MCH RBC QN AUTO: 25.9 PG (ref 26.6–33)
MCHC RBC AUTO-ENTMCNC: 30.1 G/DL (ref 31.5–35.7)
MCV RBC AUTO: 86.3 FL (ref 79–97)
PLATELET # BLD AUTO: 192 10*3/MM3 (ref 140–450)
PMV BLD AUTO: 11 FL (ref 6–12)
RBC # BLD AUTO: 3.43 10*6/MM3 (ref 3.77–5.28)
WBC NRBC COR # BLD AUTO: 8.82 10*3/MM3 (ref 3.4–10.8)

## 2024-01-17 PROCEDURE — 96372 THER/PROPH/DIAG INJ SC/IM: CPT

## 2024-01-17 PROCEDURE — 25010000002 EPOETIN ALFA-EPBX 40000 UNIT/ML SOLUTION: Performed by: INTERNAL MEDICINE

## 2024-01-17 PROCEDURE — 85027 COMPLETE CBC AUTOMATED: CPT | Performed by: INTERNAL MEDICINE

## 2024-01-17 RX ADMIN — EPOETIN ALFA-EPBX 40000 UNITS: 40000 INJECTION, SOLUTION INTRAVENOUS; SUBCUTANEOUS at 13:55

## 2024-01-31 ENCOUNTER — INFUSION (OUTPATIENT)
Dept: ONCOLOGY | Facility: HOSPITAL | Age: 89
End: 2024-01-31
Payer: MEDICARE

## 2024-01-31 VITALS — TEMPERATURE: 98 F | SYSTOLIC BLOOD PRESSURE: 148 MMHG | HEART RATE: 69 BPM | DIASTOLIC BLOOD PRESSURE: 63 MMHG

## 2024-01-31 DIAGNOSIS — D63.1 ANEMIA OF CHRONIC RENAL FAILURE, STAGE 3B: ICD-10-CM

## 2024-01-31 DIAGNOSIS — N18.32 ANEMIA OF CHRONIC RENAL FAILURE, STAGE 3B: ICD-10-CM

## 2024-01-31 DIAGNOSIS — N18.32 STAGE 3B CHRONIC KIDNEY DISEASE: ICD-10-CM

## 2024-01-31 DIAGNOSIS — D64.9 NORMOCYTIC ANEMIA: Primary | ICD-10-CM

## 2024-01-31 LAB
HCT VFR BLD AUTO: 28.8 % (ref 34–46.6)
HGB BLD-MCNC: 8.7 G/DL (ref 12–15.9)

## 2024-01-31 PROCEDURE — 85018 HEMOGLOBIN: CPT

## 2024-01-31 PROCEDURE — 85014 HEMATOCRIT: CPT

## 2024-01-31 PROCEDURE — 25010000002 EPOETIN ALFA-EPBX 40000 UNIT/ML SOLUTION: Performed by: INTERNAL MEDICINE

## 2024-01-31 PROCEDURE — 96372 THER/PROPH/DIAG INJ SC/IM: CPT

## 2024-01-31 RX ADMIN — EPOETIN ALFA-EPBX 40000 UNITS: 40000 INJECTION, SOLUTION INTRAVENOUS; SUBCUTANEOUS at 13:51

## 2024-02-13 DIAGNOSIS — E11.29 CONTROLLED TYPE 2 DIABETES MELLITUS WITH OTHER DIABETIC KIDNEY COMPLICATION, WITHOUT LONG-TERM CURRENT USE OF INSULIN: ICD-10-CM

## 2024-02-13 RX ORDER — GLIMEPIRIDE 2 MG/1
2 TABLET ORAL
Qty: 30 TABLET | Refills: 0 | Status: SHIPPED | OUTPATIENT
Start: 2024-02-13

## 2024-02-13 RX ORDER — FUROSEMIDE 20 MG/1
TABLET ORAL
Qty: 90 TABLET | Refills: 0 | Status: SHIPPED | OUTPATIENT
Start: 2024-02-13

## 2024-02-14 ENCOUNTER — HOSPITAL ENCOUNTER (OUTPATIENT)
Dept: INFUSION THERAPY | Facility: HOSPITAL | Age: 89
Discharge: HOME | End: 2024-02-14
Admitting: INTERNAL MEDICINE
Payer: MEDICARE

## 2024-02-14 VITALS
OXYGEN SATURATION: 100 % | SYSTOLIC BLOOD PRESSURE: 170 MMHG | HEART RATE: 70 BPM | DIASTOLIC BLOOD PRESSURE: 65 MMHG | TEMPERATURE: 97.4 F

## 2024-02-14 DIAGNOSIS — N18.32 ANEMIA OF CHRONIC RENAL FAILURE, STAGE 3B: ICD-10-CM

## 2024-02-14 DIAGNOSIS — N18.32 STAGE 3B CHRONIC KIDNEY DISEASE: Primary | ICD-10-CM

## 2024-02-14 DIAGNOSIS — D64.9 NORMOCYTIC ANEMIA: ICD-10-CM

## 2024-02-14 DIAGNOSIS — D63.1 ANEMIA OF CHRONIC RENAL FAILURE, STAGE 3B: ICD-10-CM

## 2024-02-14 LAB
ALBUMIN SERPL-MCNC: 4.1 G/DL (ref 3.5–5.2)
ANION GAP SERPL CALCULATED.3IONS-SCNC: 9.5 MMOL/L (ref 5–15)
BUN SERPL-MCNC: 33 MG/DL (ref 8–23)
BUN/CREAT SERPL: 22 (ref 7–25)
CALCIUM SPEC-SCNC: 8.9 MG/DL (ref 8.6–10.5)
CHLORIDE SERPL-SCNC: 106 MMOL/L (ref 98–107)
CO2 SERPL-SCNC: 26.5 MMOL/L (ref 22–29)
CREAT SERPL-MCNC: 1.5 MG/DL (ref 0.57–1)
DEPRECATED RDW RBC AUTO: 57.1 FL (ref 37–54)
EGFRCR SERPLBLD CKD-EPI 2021: 33.2 ML/MIN/1.73
ERYTHROCYTE [DISTWIDTH] IN BLOOD BY AUTOMATED COUNT: 17.9 % (ref 12.3–15.4)
GLUCOSE SERPL-MCNC: 167 MG/DL (ref 65–99)
HCT VFR BLD AUTO: 28.2 % (ref 34–46.6)
HGB BLD-MCNC: 8.2 G/DL (ref 12–15.9)
IRON 24H UR-MRATE: 55 MCG/DL (ref 37–145)
IRON SATN MFR SERPL: 12 % (ref 20–50)
MCH RBC QN AUTO: 25 PG (ref 26.6–33)
MCHC RBC AUTO-ENTMCNC: 29.1 G/DL (ref 31.5–35.7)
MCV RBC AUTO: 86 FL (ref 79–97)
PHOSPHATE SERPL-MCNC: 3.6 MG/DL (ref 2.5–4.5)
PLATELET # BLD AUTO: 168 10*3/MM3 (ref 140–450)
PMV BLD AUTO: 9.1 FL (ref 6–12)
POTASSIUM SERPL-SCNC: 4.6 MMOL/L (ref 3.5–5.2)
PTH-INTACT SERPL-MCNC: 109 PG/ML (ref 15–65)
RBC # BLD AUTO: 3.28 10*6/MM3 (ref 3.77–5.28)
SODIUM SERPL-SCNC: 142 MMOL/L (ref 136–145)
TIBC SERPL-MCNC: 441 MCG/DL (ref 298–536)
TRANSFERRIN SERPL-MCNC: 296 MG/DL (ref 200–360)
URATE SERPL-MCNC: 10.1 MG/DL (ref 2.4–5.7)
WBC NRBC COR # BLD AUTO: 6.43 10*3/MM3 (ref 3.4–10.8)

## 2024-02-14 PROCEDURE — 25010000002 EPOETIN ALFA-EPBX 40000 UNIT/ML SOLUTION: Performed by: INTERNAL MEDICINE

## 2024-02-14 PROCEDURE — 84550 ASSAY OF BLOOD/URIC ACID: CPT | Performed by: INTERNAL MEDICINE

## 2024-02-14 PROCEDURE — 85027 COMPLETE CBC AUTOMATED: CPT | Performed by: INTERNAL MEDICINE

## 2024-02-14 PROCEDURE — 84466 ASSAY OF TRANSFERRIN: CPT | Performed by: INTERNAL MEDICINE

## 2024-02-14 PROCEDURE — 96372 THER/PROPH/DIAG INJ SC/IM: CPT

## 2024-02-14 PROCEDURE — 80069 RENAL FUNCTION PANEL: CPT | Performed by: INTERNAL MEDICINE

## 2024-02-14 PROCEDURE — 83970 ASSAY OF PARATHORMONE: CPT | Performed by: INTERNAL MEDICINE

## 2024-02-14 PROCEDURE — 83540 ASSAY OF IRON: CPT | Performed by: INTERNAL MEDICINE

## 2024-02-14 RX ADMIN — EPOETIN ALFA-EPBX 40000 UNITS: 40000 INJECTION, SOLUTION INTRAVENOUS; SUBCUTANEOUS at 14:19

## 2024-02-28 ENCOUNTER — INFUSION (OUTPATIENT)
Dept: ONCOLOGY | Facility: HOSPITAL | Age: 89
End: 2024-02-28
Payer: MEDICARE

## 2024-02-28 VITALS
HEART RATE: 73 BPM | TEMPERATURE: 97.1 F | DIASTOLIC BLOOD PRESSURE: 59 MMHG | RESPIRATION RATE: 18 BRPM | SYSTOLIC BLOOD PRESSURE: 136 MMHG

## 2024-02-28 DIAGNOSIS — N18.32 STAGE 3B CHRONIC KIDNEY DISEASE: ICD-10-CM

## 2024-02-28 DIAGNOSIS — D64.9 NORMOCYTIC ANEMIA: Primary | ICD-10-CM

## 2024-02-28 DIAGNOSIS — D63.1 ANEMIA OF CHRONIC RENAL FAILURE, STAGE 3B: ICD-10-CM

## 2024-02-28 DIAGNOSIS — N18.32 ANEMIA OF CHRONIC RENAL FAILURE, STAGE 3B: ICD-10-CM

## 2024-02-28 LAB
DEPRECATED RDW RBC AUTO: 56 FL (ref 37–54)
ERYTHROCYTE [DISTWIDTH] IN BLOOD BY AUTOMATED COUNT: 18 % (ref 12.3–15.4)
HCT VFR BLD AUTO: 27.4 % (ref 34–46.6)
HGB BLD-MCNC: 8.3 G/DL (ref 12–15.9)
MCH RBC QN AUTO: 25.6 PG (ref 26.6–33)
MCHC RBC AUTO-ENTMCNC: 30.3 G/DL (ref 31.5–35.7)
MCV RBC AUTO: 84.6 FL (ref 79–97)
PLATELET # BLD AUTO: 173 10*3/MM3 (ref 140–450)
PMV BLD AUTO: 9.2 FL (ref 6–12)
RBC # BLD AUTO: 3.24 10*6/MM3 (ref 3.77–5.28)
WBC NRBC COR # BLD AUTO: 7.39 10*3/MM3 (ref 3.4–10.8)

## 2024-02-28 PROCEDURE — 96372 THER/PROPH/DIAG INJ SC/IM: CPT

## 2024-02-28 PROCEDURE — 36415 COLL VENOUS BLD VENIPUNCTURE: CPT

## 2024-02-28 PROCEDURE — 85027 COMPLETE CBC AUTOMATED: CPT | Performed by: INTERNAL MEDICINE

## 2024-02-28 PROCEDURE — 25010000002 EPOETIN ALFA-EPBX 40000 UNIT/ML SOLUTION: Performed by: INTERNAL MEDICINE

## 2024-02-28 RX ADMIN — EPOETIN ALFA-EPBX 40000 UNITS: 40000 INJECTION, SOLUTION INTRAVENOUS; SUBCUTANEOUS at 14:55

## 2024-03-13 DIAGNOSIS — E11.29 CONTROLLED TYPE 2 DIABETES MELLITUS WITH OTHER DIABETIC KIDNEY COMPLICATION, WITHOUT LONG-TERM CURRENT USE OF INSULIN: ICD-10-CM

## 2024-03-13 RX ORDER — SODIUM CHLORIDE 9 MG/ML
20 INJECTION, SOLUTION INTRAVENOUS ONCE
Status: CANCELLED | OUTPATIENT
Start: 2024-03-14

## 2024-03-14 ENCOUNTER — HOSPITAL ENCOUNTER (OUTPATIENT)
Dept: INFUSION THERAPY | Facility: HOSPITAL | Age: 89
Discharge: HOME | End: 2024-03-14
Payer: MEDICARE

## 2024-03-14 VITALS
HEART RATE: 72 BPM | RESPIRATION RATE: 18 BRPM | SYSTOLIC BLOOD PRESSURE: 136 MMHG | OXYGEN SATURATION: 100 % | DIASTOLIC BLOOD PRESSURE: 45 MMHG | TEMPERATURE: 97.9 F

## 2024-03-14 DIAGNOSIS — D64.9 NORMOCYTIC ANEMIA: Primary | ICD-10-CM

## 2024-03-14 DIAGNOSIS — N18.32 STAGE 3B CHRONIC KIDNEY DISEASE: ICD-10-CM

## 2024-03-14 DIAGNOSIS — D63.1 ANEMIA OF CHRONIC RENAL FAILURE, STAGE 3B: ICD-10-CM

## 2024-03-14 DIAGNOSIS — N18.32 ANEMIA OF CHRONIC RENAL FAILURE, STAGE 3B: ICD-10-CM

## 2024-03-14 LAB
DEPRECATED RDW RBC AUTO: 55.3 FL (ref 37–54)
ERYTHROCYTE [DISTWIDTH] IN BLOOD BY AUTOMATED COUNT: 18 % (ref 12.3–15.4)
HCT VFR BLD AUTO: 27.6 % (ref 34–46.6)
HGB BLD-MCNC: 8.1 G/DL (ref 12–15.9)
MCH RBC QN AUTO: 24.5 PG (ref 26.6–33)
MCHC RBC AUTO-ENTMCNC: 29.3 G/DL (ref 31.5–35.7)
MCV RBC AUTO: 83.4 FL (ref 79–97)
PLATELET # BLD AUTO: 160 10*3/MM3 (ref 140–450)
PMV BLD AUTO: 10.1 FL (ref 6–12)
RBC # BLD AUTO: 3.31 10*6/MM3 (ref 3.77–5.28)
WBC NRBC COR # BLD AUTO: 6.83 10*3/MM3 (ref 3.4–10.8)

## 2024-03-14 PROCEDURE — 25010000002 FERUMOXYTOL 510 MG/17ML SOLUTION 17 ML VIAL: Performed by: PHYSICIAN ASSISTANT

## 2024-03-14 PROCEDURE — 85027 COMPLETE CBC AUTOMATED: CPT | Performed by: INTERNAL MEDICINE

## 2024-03-14 PROCEDURE — 96372 THER/PROPH/DIAG INJ SC/IM: CPT

## 2024-03-14 PROCEDURE — 96374 THER/PROPH/DIAG INJ IV PUSH: CPT

## 2024-03-14 PROCEDURE — 25010000002 EPOETIN ALFA-EPBX 40000 UNIT/ML SOLUTION: Performed by: INTERNAL MEDICINE

## 2024-03-14 RX ORDER — SODIUM CHLORIDE 9 MG/ML
20 INJECTION, SOLUTION INTRAVENOUS ONCE
Status: DISCONTINUED | OUTPATIENT
Start: 2024-03-14 | End: 2024-03-16 | Stop reason: HOSPADM

## 2024-03-14 RX ORDER — GLIMEPIRIDE 2 MG/1
2 TABLET ORAL
Qty: 90 TABLET | Refills: 1 | Status: SHIPPED | OUTPATIENT
Start: 2024-03-14

## 2024-03-14 RX ORDER — SODIUM CHLORIDE 9 MG/ML
20 INJECTION, SOLUTION INTRAVENOUS ONCE
OUTPATIENT
Start: 2024-03-28

## 2024-03-14 RX ADMIN — EPOETIN ALFA-EPBX 40000 UNITS: 40000 INJECTION, SOLUTION INTRAVENOUS; SUBCUTANEOUS at 14:58

## 2024-03-14 RX ADMIN — FERUMOXYTOL 510 MG: 510 INJECTION INTRAVENOUS at 14:39

## 2024-03-27 ENCOUNTER — HOSPITAL ENCOUNTER (OUTPATIENT)
Dept: INFUSION THERAPY | Facility: HOSPITAL | Age: 89
Discharge: HOME | End: 2024-03-27
Payer: MEDICARE

## 2024-03-27 VITALS
TEMPERATURE: 97.9 F | RESPIRATION RATE: 18 BRPM | SYSTOLIC BLOOD PRESSURE: 134 MMHG | OXYGEN SATURATION: 98 % | HEART RATE: 71 BPM | DIASTOLIC BLOOD PRESSURE: 45 MMHG

## 2024-03-27 DIAGNOSIS — N18.32 ANEMIA OF CHRONIC RENAL FAILURE, STAGE 3B: ICD-10-CM

## 2024-03-27 DIAGNOSIS — D63.1 ANEMIA OF CHRONIC RENAL FAILURE, STAGE 3B: ICD-10-CM

## 2024-03-27 DIAGNOSIS — D64.9 NORMOCYTIC ANEMIA: Primary | ICD-10-CM

## 2024-03-27 DIAGNOSIS — N18.32 STAGE 3B CHRONIC KIDNEY DISEASE: ICD-10-CM

## 2024-03-27 LAB
DEPRECATED RDW RBC AUTO: 69 FL (ref 37–54)
ERYTHROCYTE [DISTWIDTH] IN BLOOD BY AUTOMATED COUNT: 22.1 % (ref 12.3–15.4)
HCT VFR BLD AUTO: 31.3 % (ref 34–46.6)
HGB BLD-MCNC: 9.4 G/DL (ref 12–15.9)
MCH RBC QN AUTO: 26.4 PG (ref 26.6–33)
MCHC RBC AUTO-ENTMCNC: 30 G/DL (ref 31.5–35.7)
MCV RBC AUTO: 87.9 FL (ref 79–97)
PLATELET # BLD AUTO: 144 10*3/MM3 (ref 140–450)
PMV BLD AUTO: 10.3 FL (ref 6–12)
RBC # BLD AUTO: 3.56 10*6/MM3 (ref 3.77–5.28)
WBC NRBC COR # BLD AUTO: 6.14 10*3/MM3 (ref 3.4–10.8)

## 2024-03-27 PROCEDURE — 25010000002 EPOETIN ALFA-EPBX 40000 UNIT/ML SOLUTION: Performed by: INTERNAL MEDICINE

## 2024-03-27 PROCEDURE — 25810000003 SODIUM CHLORIDE 0.9 % SOLUTION: Performed by: PHYSICIAN ASSISTANT

## 2024-03-27 PROCEDURE — 96372 THER/PROPH/DIAG INJ SC/IM: CPT

## 2024-03-27 PROCEDURE — 96365 THER/PROPH/DIAG IV INF INIT: CPT

## 2024-03-27 PROCEDURE — 96374 THER/PROPH/DIAG INJ IV PUSH: CPT

## 2024-03-27 PROCEDURE — 25010000002 FERUMOXYTOL 510 MG/17ML SOLUTION 17 ML VIAL: Performed by: PHYSICIAN ASSISTANT

## 2024-03-27 PROCEDURE — 85027 COMPLETE CBC AUTOMATED: CPT | Performed by: INTERNAL MEDICINE

## 2024-03-27 RX ORDER — SODIUM CHLORIDE 9 MG/ML
20 INJECTION, SOLUTION INTRAVENOUS ONCE
Status: CANCELLED | OUTPATIENT
Start: 2024-03-27

## 2024-03-27 RX ORDER — SODIUM CHLORIDE 9 MG/ML
20 INJECTION, SOLUTION INTRAVENOUS ONCE
Status: COMPLETED | OUTPATIENT
Start: 2024-03-27 | End: 2024-03-27

## 2024-03-27 RX ADMIN — EPOETIN ALFA-EPBX 40000 UNITS: 40000 INJECTION, SOLUTION INTRAVENOUS; SUBCUTANEOUS at 14:32

## 2024-03-27 RX ADMIN — FERUMOXYTOL 510 MG: 510 INJECTION INTRAVENOUS at 14:38

## 2024-03-27 RX ADMIN — SODIUM CHLORIDE 20 ML/HR: 9 INJECTION, SOLUTION INTRAVENOUS at 14:38

## 2024-04-19 ENCOUNTER — OFFICE VISIT (OUTPATIENT)
Dept: INTERNAL MEDICINE | Facility: CLINIC | Age: 89
End: 2024-04-19
Payer: MEDICARE

## 2024-04-19 ENCOUNTER — HOME HEALTH ADMISSION (OUTPATIENT)
Dept: HOME HEALTH SERVICES | Facility: HOME HEALTHCARE | Age: 89
End: 2024-04-19
Payer: COMMERCIAL

## 2024-04-19 VITALS
HEART RATE: 73 BPM | OXYGEN SATURATION: 97 % | TEMPERATURE: 99.1 F | DIASTOLIC BLOOD PRESSURE: 56 MMHG | WEIGHT: 119.4 LBS | BODY MASS INDEX: 19.19 KG/M2 | SYSTOLIC BLOOD PRESSURE: 126 MMHG | HEIGHT: 66 IN

## 2024-04-19 DIAGNOSIS — I50.9 CHRONIC CONGESTIVE HEART FAILURE, UNSPECIFIED HEART FAILURE TYPE: Chronic | ICD-10-CM

## 2024-04-19 DIAGNOSIS — N18.4 CHRONIC RENAL IMPAIRMENT, STAGE 4 (SEVERE): Chronic | ICD-10-CM

## 2024-04-19 DIAGNOSIS — D63.1 ANEMIA OF CHRONIC RENAL FAILURE, STAGE 3B: ICD-10-CM

## 2024-04-19 DIAGNOSIS — R53.83 LACK OF STAMINA: ICD-10-CM

## 2024-04-19 DIAGNOSIS — N18.32 ANEMIA OF CHRONIC RENAL FAILURE, STAGE 3B: ICD-10-CM

## 2024-04-19 DIAGNOSIS — L03.116 CELLULITIS OF LEFT LOWER EXTREMITY: Primary | ICD-10-CM

## 2024-04-19 DIAGNOSIS — R26.89 IMPAIRMENT OF BALANCE: ICD-10-CM

## 2024-04-19 DIAGNOSIS — Z74.09 IMPAIRED MOBILITY: ICD-10-CM

## 2024-04-19 DIAGNOSIS — G62.9 NEUROPATHY: ICD-10-CM

## 2024-04-19 DIAGNOSIS — I48.20 CHRONIC ATRIAL FIBRILLATION: Chronic | ICD-10-CM

## 2024-04-19 DIAGNOSIS — N18.30 CONTROLLED TYPE 2 DIABETES MELLITUS WITH STAGE 3 CHRONIC KIDNEY DISEASE, WITHOUT LONG-TERM CURRENT USE OF INSULIN: ICD-10-CM

## 2024-04-19 DIAGNOSIS — E11.22 CONTROLLED TYPE 2 DIABETES MELLITUS WITH STAGE 3 CHRONIC KIDNEY DISEASE, WITHOUT LONG-TERM CURRENT USE OF INSULIN: ICD-10-CM

## 2024-04-19 RX ORDER — DOXYCYCLINE HYCLATE 100 MG/1
100 CAPSULE ORAL 2 TIMES DAILY
Qty: 20 CAPSULE | Refills: 0 | Status: SHIPPED | OUTPATIENT
Start: 2024-04-19 | End: 2024-04-29

## 2024-04-19 RX ORDER — CEFTRIAXONE 500 MG/1
500 INJECTION, POWDER, FOR SOLUTION INTRAMUSCULAR; INTRAVENOUS ONCE
Status: COMPLETED | OUTPATIENT
Start: 2024-04-19 | End: 2024-04-19

## 2024-04-19 RX ADMIN — CEFTRIAXONE 500 MG: 500 INJECTION, POWDER, FOR SOLUTION INTRAMUSCULAR; INTRAVENOUS at 10:53

## 2024-04-19 NOTE — PROGRESS NOTES
Office Visit      Patient Name: Lynne Sanchez  : 1934   MRN: [id number]     Chief Complaint:    Chief Complaint   Patient presents with   • Foot Problem       History of Present Illness: Lynne Sanchez is a 89 y.o. female who is here today with left foot erythema that has been present for the past 3 days.  Right great toe developed a blood blister developed at the same time; has since opened up and is draining blood and clear fluid. Has been soaking it in epsom salts. Her right great toenail is falling off.  She does not recall injury to right foot or great toe but cannot feel her feet. Feels more tired than normal with chills occasionally over the past month or longer.  No fever, malaise.      T2DM. Does not monitor glucose at home.  A1c 6 when checked in .  She is taking medication as prescribed.  Patient denies symptoms of hypoglycemia, nausea, polydipsia, polyuria, polyphagia, and weight loss.     Left great and second toe amputated by Dr Carrington, [place] surgeon, in 2018.      Afib, HTN, CHF.  Taking medications as prescribed. Does not monitor BP at home.  Eating relatively healthy diet.  Sedentary due to physical limitations.  Denies chest pain, dyspnea, orthopnea, palpitations, headaches.    CKD with associated anemia.  Contributes to fatigue, weakness.  Avoids NSAIDS.  Takes Retacrit injections monthly.      Decreased stamina.  Weakness.  In a wheelchair today, accompanied by her .  Using a walker in the home.  Fatigues quickly when walking. Has to sit often to rest when using walker.  Unable to balance, walk without using walker at home.        Subjective      I have reviewed and the following portions of the patient's history were updated as appropriate: past family history, past medical history, past social history, past surgical history and problem list.      Current Outpatient Medications:   •  acetaminophen (TYLENOL) 650 MG 8 hr tablet, Take 1 tablet by mouth., Disp: ,  Rfl:   •  aspirin 81 MG EC tablet, Take 1 tablet by mouth Daily., Disp: , Rfl:   •  atorvastatin (LIPITOR) 40 MG tablet, Take 1 tablet every day by oral route., Disp: , Rfl:   •  doxycycline (VIBRAMYCIN) 100 MG capsule, Take 1 capsule by mouth 2 (Two) Times a Day for 10 days., Disp: 20 capsule, Rfl: 0  •  empagliflozin (JARDIANCE) 10 MG tablet tablet, Take 1 tablet every day by oral route., Disp: , Rfl:   •  epoetin dorcas-epbx (Retacrit) 03960 UNIT/ML injection, Inject 1 mL under the skin into the appropriate area as directed. 1 ml sq per month on day 17, Disp: , Rfl:   •  furosemide (LASIX) 20 MG tablet, TAKE 1 TABLET BY MOUTH EVERY DAY, Disp: 90 tablet, Rfl: 0  •  glimepiride (AMARYL) 2 MG tablet, TAKE 1 TABLET BY MOUTH IN THE MORNING before breakfast, Disp: 90 tablet, Rfl: 1  •  glucose blood test strip, 1 each by Other route Daily. Use as instructed once a day as directed, for Truemetrix reader, Disp: 100 each, Rfl: 3  •  latanoprost (XALATAN) 0.005 % ophthalmic solution, Administer 1 drop to both eyes Daily. (Patient taking differently: Administer 1 drop to both eyes Every Night.), Disp: 7.5 mL, Rfl: 3  •  levothyroxine (SYNTHROID, LEVOTHROID) 50 MCG tablet, Take 1 tablet by mouth Every Morning. Indications: Underactive Thyroid, Disp: 90 tablet, Rfl: 3  •  metoprolol tartrate (LOPRESSOR) 100 MG tablet, Take 1 tablet by mouth 2 (Two) Times a Day., Disp: 180 tablet, Rfl: 1  •  multivitamin with minerals tablet tablet, Take 1 tablet by mouth Daily., Disp: , Rfl:   •  pantoprazole (PROTONIX) 40 MG EC tablet, Take 1 tablet by mouth Daily., Disp: 90 tablet, Rfl: 3  •  saccharomyces boulardii (FLORASTOR) 250 MG capsule, Take by oral route., Disp: , Rfl:   •  SITagliptin (JANUVIA) 25 MG tablet, Take 1 tablet every day by oral route., Disp: , Rfl:   •  Sod Picosulfate-Mag Ox-Cit Acd (Clenpiq) 10-3.5-12 MG-GM -GM/175ML solution, Take 1 kit by mouth Take As Directed. Take as directed for colonoscopy prep. Patient has  "instructions., Disp: 350 mL, Rfl: 0  •  Tradjenta 5 MG tablet tablet, TAKE 1 TABLET EVERY DAY, Disp: 90 tablet, Rfl: 3  No current facility-administered medications for this visit.    Allergies   Allergen Reactions   • Phenergan [Promethazine Hcl] Confusion       Objective     Physical Exam:  Vital Signs:   Vitals:    04/19/24 0947   BP: 126/56   Pulse: 73   Temp: 99.1 °F (37.3 °C)   SpO2: 97%   Weight: 54.2 kg (119 lb 6.4 oz)   Height: 167.6 cm (65.98\")     Body mass index is 19.28 kg/m².  BMI is within normal parameters. No other follow-up for BMI required.       Physical Exam  Constitutional:       Appearance: She is ill-appearing (chronically).   HENT:      Head: Normocephalic.      Right Ear: External ear normal.      Left Ear: External ear normal.   Eyes:      Conjunctiva/sclera: Conjunctivae normal.      Pupils: Pupils are equal, round, and reactive to light.   Cardiovascular:      Rate and Rhythm: Normal rate and regular rhythm.      Pulses:           Radial pulses are 2+ on the right side and 2+ on the left side.        Dorsalis pedis pulses are 2+ on the right side and 2+ on the left side.      Heart sounds: Murmur heard.   Pulmonary:      Effort: Pulmonary effort is normal.      Breath sounds: Normal breath sounds.   Musculoskeletal:      Cervical back: Normal range of motion and neck supple.      Right lower leg: No edema.      Comments: Unable to stand without assistance to receive IM injection.    Skin:     General: Skin is warm.      Capillary Refill: Capillary refill takes less than 2 seconds.      Comments: Left great toenail attached by lateral upper corner only; beneath is thick white tissue. Macerated tissue surrounding toenail and on plantar surface of left great toe with serosanguinous fluid leaking from tissue.  Single circular lesion that is dark red with shallow ulceration in the center on medial side of left toenail. Draining onto her sock and show. No odor noted.   Erythema, edema of left " toes, foot and lower leg to mid shin.       Neurological:      Mental Status: She is alert and oriented to person, place, and time.      Motor: Weakness present.      Coordination: Coordination normal.      Gait: Gait abnormal.   Psychiatric:         Mood and Affect: Mood normal.         Behavior: Behavior normal.         Thought Content: Thought content normal.             Assessment / Plan      Assessment/Plan:   Diagnoses and all orders for this visit:    1. Cellulitis of left lower extremity (Primary)  -     doxycycline (VIBRAMYCIN) 100 MG capsule; Take 1 capsule by mouth 2 (Two) Times a Day for 10 days.  Dispense: 20 capsule; Refill: 0  -     Ambulatory Referral to Home Health  -     cefTRIAXone (ROCEPHIN) injection 500 mg  -     Ambulatory Referral to Podiatry  - Should she develop thick, colored drainage from the toe/foot or fever, malaise, increased swelling or redness of leg will got ED for immediate evaluation  - Dressed wound with nonstick pad, rolled gauze.  Advised patient to keep weight off foot is much as possible, not wear shoes over dressing. She will purchase non-stick pads to apply over toe and keep foot wrapped with gauze, changing dressing daily or when soiled.      2. Chronic atrial fibrillation  3. Chronic congestive heart failure, unspecified heart failure type        - Follow heart healthy diet.  Keep sodium intake < 1500 mg per day.  Avoid processed & fast foods.          - Exercise as tolerated, with a goal of 30 minutes of moderate exercise most days.         - Take medications as prescribed.    4. Controlled type 2 diabetes mellitus with stage 3 chronic kidney disease, without long-term current use of insulin        - Continue to follow diabetic diet        - Take all medications as prescribed        - Aware infection can increase blood glucose.  Will closely monitor glucose.      5. Chronic renal impairment, stage 3b        - Continue to avoid NSAIDS        - Follow up with Nephrology  as scheduled     6. Neuropathy    7. Impaired mobility  8. Impairment of balance  9. Lack of stamina  10. Anemia of chronic renal failure, stage 3b  11.  Wheelchair, DME       - For when she swallows okay, noted also when she turns to display the patient has a mobility limitation that significantly impairs his/her ability to participate mobility related activity of daily living including toileting, dressing, grooming, and bathing as is customary within the home due to weakness, fatigue, decreased stamina and deconditioning.  Using a walker leaves her feeling tired and she often has to rest when walking from one side of home to the other.  Unable to use a cane, does not have balance or stamina to use a cane.    a manual wheelchair will significantly improve the patient's ability to participate in ADL's and the patient will use it on a regular basis with in the home and when out of the home. Patient has upper body strength and mental capability to safely propel the wheelchair in her home. When she does not have strength to propel the wheelchair she does have family that is available, willing and able to provide assistance with the wheelchair.     Follow Up:   Return in about 1 week (around 4/26/2024) for Next scheduled follow up.    Patient was given instructions and counseling regarding her condition or for health maintenance advice. Please see specific information pulled into the AVS if appropriate.       [facility] [address] [facility]     Please note that portions of this note may have been completed with a voice recognition program. Efforts were made to edit dictation, but occasionally words are mistranscribed.

## 2024-04-20 ENCOUNTER — HOME CARE VISIT (OUTPATIENT)
Dept: HOME HEALTH SERVICES | Facility: HOME HEALTHCARE | Age: 89
End: 2024-04-20
Payer: MEDICARE

## 2024-04-20 PROCEDURE — G0299 HHS/HOSPICE OF RN EA 15 MIN: HCPCS

## 2024-04-20 NOTE — Clinical Note
"Patient admitted to home health services on 4/20/24 with a visit frequency of 1w1, 2w3, 1w3. Please see SOC note below.    SOC Note: On arrival to patient's home, I was greeted outside by her spouse. He showed me into the house, where we sat while waiting on patient to ambulate to living room. Slow/unsteady gait noted. Patient was A&Ox4, she was pale and appeared frail, she was well dressed and had a clean appearance. Patient expressed gratitude for home health services to assist with wound care/dressing changes. She was tearful at times when speaking of her home in [facility]. Patient has a PMH of A-fib, HTN, CHF, DM2, CKD w/anemia, and neuropathy.    Home Health ordered for: disciplines SN. Recommend PT evaluation.    Reason for Hosp/Primary Dx/Co-morbidities: Cellulitis right great toe and right foot.    Focus of Care: T/I integumentary s/s cellulitis, wound care/dressing changes, medication education, pain management, DM2, risk for skin breakdown/pressure injury prevention, home safety and fall prevention.    Patient's goal(s): \"I hope I don't lose this toe.\"    Current Functional status/mobility/DME: Ambulates with walker. Unsteady gait. Has wheelchair and electric chair lift for going upstairs.    HB status/Living Arrangements: Patient lives with spouse in a multi-level home.    Skin Integrity/wound status: cellulitis right great toe and right foot with open wound.    Code Status: CPR.    Fall Risk/Safety concerns: High fall risk due to previous toe amputation left foot, impaired/unsteady gait, and limited endurance.    Educated on Emergency Plan, steps to take prior to going to the ER and when to Call Home Health First: Yes.     Medication issues/Concerns: None.    Additional Problems/Concerns: N/A    SDOH Barriers (i.e. caregiver concerns, social isolation, transportation, food insecurity, environment, income etc.)/Need for MSW: No    Plan for next visit: T/I integumentary s/s cellulitis, wound " care/dressing changes, medication education, pain management, DM2, risk for skin breakdown/pressure injury prevention, home safety and fall prevention.

## 2024-04-21 VITALS
SYSTOLIC BLOOD PRESSURE: 107 MMHG | OXYGEN SATURATION: 97 % | HEART RATE: 69 BPM | RESPIRATION RATE: 20 BRPM | TEMPERATURE: 98.2 F | DIASTOLIC BLOOD PRESSURE: 64 MMHG

## 2024-04-21 NOTE — HOME HEALTH
"SOC Note: On arrival to patient's home, I was greeted outside by her spouse. He showed me into the house, where we sat while waiting on patient to ambulate to living room. Slow/unsteady gait noted. Patient was A&Ox4, she was pale and appeared frail, she was well dressed and had a clean appearance. Patient expressed gratitude for home health services to assist with wound care/dressing changes. She was tearful at times when speaking of her home in [facility]. Patient has a PMH of A-fib, HTN, CHF, DM2, CKD w/anemia, and neuropathy.    Home Health ordered for: disciplines SN. Recommend PT evaluation.    Reason for Hosp/Primary Dx/Co-morbidities: Cellulitis right great toe and right foot.    Focus of Care: T/I integumentary s/s cellulitis, wound care/dressing changes, medication education, pain management, DM2, risk for skin breakdown/pressure injury prevention, home safety and fall prevention.    Patient's goal(s): \"I hope I don't lose this toe.\"    Current Functional status/mobility/DME: Ambulates with walker. Unsteady gait. Has wheelchair and electric chair lift for going upstairs.    HB status/Living Arrangements: Patient lives with spouse in a multi-level home.    Skin Integrity/wound status: cellulitis right great toe and right foot with open wound.    Code Status: CPR.    Fall Risk/Safety concerns: High fall risk due to previous toe amputation left foot, impaired/unsteady gait, and limited endurance.    Educated on Emergency Plan, steps to take prior to going to the ER and when to Call Home Health First: Yes.     Medication issues/Concerns: None.    Additional Problems/Concerns: N/A    SDOH Barriers (i.e. caregiver concerns, social isolation, transportation, food insecurity, environment, income etc.)/Need for MSW: No    Plan for next visit: T/I integumentary s/s cellulitis, wound care/dressing changes, medication education, pain management, DM2, risk for skin breakdown/pressure injury prevention, home safety " and fall prevention.

## 2024-04-22 ENCOUNTER — TELEPHONE (OUTPATIENT)
Dept: INTERNAL MEDICINE | Facility: CLINIC | Age: 89
End: 2024-04-22
Payer: MEDICARE

## 2024-04-23 ENCOUNTER — HOME CARE VISIT (OUTPATIENT)
Dept: HOME HEALTH SERVICES | Facility: HOME HEALTHCARE | Age: 89
End: 2024-04-23
Payer: COMMERCIAL

## 2024-04-23 VITALS
DIASTOLIC BLOOD PRESSURE: 62 MMHG | OXYGEN SATURATION: 98 % | RESPIRATION RATE: 16 BRPM | SYSTOLIC BLOOD PRESSURE: 158 MMHG | HEART RATE: 70 BPM | TEMPERATURE: 97.8 F

## 2024-04-23 PROCEDURE — G0299 HHS/HOSPICE OF RN EA 15 MIN: HCPCS

## 2024-04-23 PROCEDURE — G0151 HHCP-SERV OF PT,EA 15 MIN: HCPCS

## 2024-04-24 ENCOUNTER — TELEPHONE (OUTPATIENT)
Dept: INTERNAL MEDICINE | Facility: CLINIC | Age: 89
End: 2024-04-24
Payer: MEDICARE

## 2024-04-24 NOTE — HOME HEALTH
Routine Visit Note: LPN    Skill/education provided: T/I integumentary s/s cellulitis, wound care/dressing changes, medication education, pain management, DM2, risk for skin breakdown/pressure injury prevention, home safety and fall prevention.    Patient/caregiver response: Patient tolerated dressing change well     Plan for next visit: Continue with POC to include instruction on disease proces. Wound care/dressing change. T/I on medication and pain management. T/I on DM2. T/I on risk for skin breakdown and prevention. T/I on home safety and fall prevention strategies.    Other pertinent info: N/A

## 2024-04-24 NOTE — TELEPHONE ENCOUNTER
Caller: JAXON    Relationship:  HOME CARE    Best call back number:     What is the best time to reach you: ANYTIME AND CONFIDENTIAL VOICE MAIL    Who are you requesting to speak with (clinical staff, provider,  specific staff member): DR OR CLINICAL STAFF    What was the call regardin24 OFFICE NOTE CORRECTION.   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 BUT NOTE SPEAKS OF THE PROBLEM BEING WITH THE LEFT GREAT TOE.  IT SHOULD BE CHANGED TO RIGHT GREAT TOE PER JAXON.    PLEASE CALL JAXON WHEN THIS HAS BEEN CHANGED.  THE PATIENT WAS SEEN BY MEG KHAN

## 2024-04-25 VITALS
TEMPERATURE: 97.8 F | SYSTOLIC BLOOD PRESSURE: 158 MMHG | DIASTOLIC BLOOD PRESSURE: 62 MMHG | RESPIRATION RATE: 18 BRPM | OXYGEN SATURATION: 98 % | HEART RATE: 70 BPM

## 2024-04-26 ENCOUNTER — OFFICE VISIT (OUTPATIENT)
Dept: INTERNAL MEDICINE | Facility: CLINIC | Age: 89
End: 2024-04-26
Payer: MEDICARE

## 2024-04-26 ENCOUNTER — HOME CARE VISIT (OUTPATIENT)
Dept: HOME HEALTH SERVICES | Facility: HOME HEALTHCARE | Age: 89
End: 2024-04-26
Payer: MEDICARE

## 2024-04-26 ENCOUNTER — HOSPITAL ENCOUNTER (OUTPATIENT)
Dept: INFUSION THERAPY | Facility: HOSPITAL | Age: 89
Discharge: HOME | End: 2024-04-26
Payer: MEDICARE

## 2024-04-26 VITALS
SYSTOLIC BLOOD PRESSURE: 115 MMHG | HEIGHT: 66 IN | HEART RATE: 74 BPM | WEIGHT: 117.8 LBS | OXYGEN SATURATION: 96 % | TEMPERATURE: 97.7 F | DIASTOLIC BLOOD PRESSURE: 68 MMHG | BODY MASS INDEX: 18.93 KG/M2

## 2024-04-26 DIAGNOSIS — L97.512 SKIN ULCER OF RIGHT GREAT TOE WITH FAT LAYER EXPOSED: ICD-10-CM

## 2024-04-26 DIAGNOSIS — E11.22 TYPE 2 DIABETES MELLITUS WITH CHRONIC KIDNEY DISEASE, WITHOUT LONG-TERM CURRENT USE OF INSULIN, UNSPECIFIED CKD STAGE: ICD-10-CM

## 2024-04-26 DIAGNOSIS — L03.115 CELLULITIS OF RIGHT LOWER EXTREMITY: Primary | ICD-10-CM

## 2024-04-26 DIAGNOSIS — I73.9 PERIPHERAL VASCULAR DISEASE: ICD-10-CM

## 2024-04-26 DIAGNOSIS — Z74.09 IMPAIRED MOBILITY: ICD-10-CM

## 2024-04-26 NOTE — PROGRESS NOTES
"  Office Visit      Patient Name: Lynne Sanchez  : 1934   MRN: [id number]   Care Team: Patient Care Team:  Elizabeth Easton APRN as PCP - General (Family Medicine)  Paxton Vasquez DO as PCP - Internal Medicine ([facility])  Vilma Méndez PA-C as PCP - Family Medicine (Long Term Care Acute)  Davian Faria MD (Inactive) as Consulting Physician (Nephrology)  Radha Boone RN as Registered Nurse    Chief Complaint  Follow-up (Cellulitis right great toe.  )    Subjective     Subjective      Lynne Sanchez presents to [facility] for 1 week follow-up.  Accompanied today by her   Seen in the office 1 week ago by Rose and diagnosed with cellulitis of the right great toe.  She was treated with a one-time dose of Rocephin IM and a 10-day course of doxycycline which she is still taking.  Wound care has been coming into the home and dressing the wound daily and she does report improvement.  Although there is improvement she continues to have foul smell from the foot, erythema of the foot, swelling, and yellow thick drainage from the site.  She has had previous amputation of the left great toe by Dr. Wade several years ago.  She is limited for transportation.  She denies any fever, chills, worsening redness, worsening swelling, or redness that is going up the leg.  She baseline has neuropathy and has numbness of the foot.  Her last A1c was 6.0, she does not monitor her sugar at home.  He suffers from both peripheral vascular disease and type 2 diabetes.  Objective     Objective   Vital Signs:   /68   Pulse 74   Temp 97.7 °F (36.5 °C) (Tympanic)   Ht 167.6 cm (66\")   Wt 53.4 kg (117 lb 12.8 oz)   SpO2 96%   BMI 19.01 kg/m²     Physical Exam  Vitals and nursing note reviewed.   Constitutional:       General: She is not in acute distress.     Appearance: Normal appearance. She is not toxic-appearing.   Eyes:      Pupils: Pupils are equal, " round, and reactive to light.   Cardiovascular:      Rate and Rhythm: Normal rate and regular rhythm.      Heart sounds: Murmur heard.   Pulmonary:      Effort: Pulmonary effort is normal. No respiratory distress.      Breath sounds: Normal breath sounds. No wheezing.   Musculoskeletal:      Right foot: Normal range of motion.   Feet:      Right foot:      Skin integrity: Ulcer (Ulcer at tip of right great toe as well as dorsally with yellow thick drainage and foul smell.  Erythema is noted to the midfoot.  Skin is starting to peel.), skin breakdown and erythema present.   Skin:     General: Skin is warm and dry.      Findings: No rash.   Neurological:      General: No focal deficit present.      Mental Status: She is alert.   Psychiatric:         Mood and Affect: Mood normal.         Behavior: Behavior normal.          Assessment / Plan      Assessment & Plan   Problem List Items Addressed This Visit       Impaired mobility    Type 2 diabetes mellitus with diabetic chronic kidney disease    Overview     Formatting of this note might be different from the original.  Goal of HgbA1c of 7% discussed with the patient. Diet, exercise and compliance with meds also discussed and how it relates to better control of blood sugar. Risks of complications as well as details about compilations arsing from it, including coronary artery disease, peripheral arterial disease, renal failure leading to dialysis discussed. All questions were answered.  11/22-has upcoming appointment with PCP and reports she will likely check A1C at this appointment.  Reports home glucose is well controlled.   08/22-Reports glucose is well controlled.  No recent A1C.   04/22-6.2%    Complicates acute problem, recent A1c shows good control.  Continue current medication regimen for now.        Other Visit Diagnoses       Cellulitis of right lower extremity    -  Primary    Relevant Orders    Culture, Routine - Swab, Toe, Right    Skin ulcer of right great  toe with fat layer exposed        Relevant Orders    Culture, Routine - Swab, Toe, Right    Culture obtained in the office today prior to further antimicrobial therapy as she has been treated appropriately.  Treat if clinically indicated with martha quinolone.  I have placed urgent referral to wound care in [place], transportation is an issue.  Will try to get transportation services through her insurance.  She may need vascular surgery consult in the future if this is not improving with wound care.  She has been educated on return precautions and worsening, advised ER if this happens.  She reports improvement, I did not see her last week therefore cannot determine if it is improved from previous, discussed this will take time to heal based on her chronic illnesses..  Continue dressing with Xeroform gauze and nonadherent dressing.  Finish doxycycline in entirety.    Peripheral vascular disease        Complicates acute problem.  Strict return precautions discussed.          BMI is within normal parameters. No other follow-up for BMI required.    Follow Up   Return if symptoms worsen or fail to improve.  Keep scheduled follow-up with me in 3 weeks  Patient was given instructions and counseling regarding her condition or for health maintenance advice. Please see specific information pulled into the AVS if appropriate.     BILL Purcell  [facility]

## 2024-04-30 ENCOUNTER — HOME CARE VISIT (OUTPATIENT)
Dept: HOME HEALTH SERVICES | Facility: HOME HEALTHCARE | Age: 89
End: 2024-04-30
Payer: COMMERCIAL

## 2024-04-30 LAB
BACTERIA SPEC CULT: ABNORMAL
MICROORGANISM/AGENT SPEC: ABNORMAL
OTHER ANTIBIOTIC SUSC ISLT: ABNORMAL

## 2024-04-30 PROCEDURE — G0300 HHS/HOSPICE OF LPN EA 15 MIN: HCPCS

## 2024-04-30 RX ORDER — CIPROFLOXACIN 500 MG/1
500 TABLET, FILM COATED ORAL 2 TIMES DAILY
Qty: 10 TABLET | Refills: 0 | Status: SHIPPED | OUTPATIENT
Start: 2024-04-30 | End: 2024-05-05

## 2024-05-02 ENCOUNTER — HOSPITAL ENCOUNTER (OUTPATIENT)
Dept: INFUSION THERAPY | Facility: HOSPITAL | Age: 89
Discharge: HOME | End: 2024-05-02
Admitting: PHYSICIAN ASSISTANT
Payer: MEDICARE

## 2024-05-02 VITALS
HEART RATE: 60 BPM | TEMPERATURE: 98 F | SYSTOLIC BLOOD PRESSURE: 124 MMHG | RESPIRATION RATE: 18 BRPM | DIASTOLIC BLOOD PRESSURE: 62 MMHG

## 2024-05-02 DIAGNOSIS — E55.9 VITAMIN D DEFICIENCY: ICD-10-CM

## 2024-05-02 DIAGNOSIS — N18.32 STAGE 3B CHRONIC KIDNEY DISEASE: ICD-10-CM

## 2024-05-02 DIAGNOSIS — N18.32 ANEMIA OF CHRONIC RENAL FAILURE, STAGE 3B: ICD-10-CM

## 2024-05-02 DIAGNOSIS — D64.9 NORMOCYTIC ANEMIA: Primary | ICD-10-CM

## 2024-05-02 DIAGNOSIS — D63.1 ANEMIA OF CHRONIC RENAL FAILURE, STAGE 3B: ICD-10-CM

## 2024-05-02 LAB
25(OH)D3 SERPL-MCNC: 49.9 NG/ML (ref 30–100)
ALBUMIN SERPL-MCNC: 3.6 G/DL (ref 3.5–5.2)
ALBUMIN/GLOB SERPL: 1.2 G/DL
ALP SERPL-CCNC: 118 U/L (ref 39–117)
ALT SERPL W P-5'-P-CCNC: 8 U/L (ref 1–33)
ANION GAP SERPL CALCULATED.3IONS-SCNC: 13 MMOL/L (ref 5–15)
ANISOCYTOSIS BLD QL: NORMAL
AST SERPL-CCNC: 18 U/L (ref 1–32)
BASOPHILS # BLD AUTO: 0.03 10*3/MM3 (ref 0–0.2)
BASOPHILS NFR BLD AUTO: 0.3 % (ref 0–1.5)
BILIRUB SERPL-MCNC: 0.6 MG/DL (ref 0–1.2)
BUN SERPL-MCNC: 40 MG/DL (ref 8–23)
BUN/CREAT SERPL: 24.8 (ref 7–25)
CALCIUM SPEC-SCNC: 9.5 MG/DL (ref 8.6–10.5)
CHLORIDE SERPL-SCNC: 101 MMOL/L (ref 98–107)
CO2 SERPL-SCNC: 24 MMOL/L (ref 22–29)
CREAT SERPL-MCNC: 1.61 MG/DL (ref 0.57–1)
DEPRECATED RDW RBC AUTO: 65.8 FL (ref 37–54)
EGFRCR SERPLBLD CKD-EPI 2021: 30.5 ML/MIN/1.73
EOSINOPHIL # BLD AUTO: 0.02 10*3/MM3 (ref 0–0.4)
EOSINOPHIL NFR BLD AUTO: 0.2 % (ref 0.3–6.2)
ERYTHROCYTE [DISTWIDTH] IN BLOOD BY AUTOMATED COUNT: 20.2 % (ref 12.3–15.4)
FERRITIN SERPL-MCNC: 720.8 NG/ML (ref 13–150)
FOLATE SERPL-MCNC: >20 NG/ML (ref 4.78–24.2)
GLOBULIN UR ELPH-MCNC: 3 GM/DL
GLUCOSE SERPL-MCNC: 178 MG/DL (ref 65–99)
HCT VFR BLD AUTO: 31.6 % (ref 34–46.6)
HGB BLD-MCNC: 9.8 G/DL (ref 12–15.9)
IMM GRANULOCYTES # BLD AUTO: 0.07 10*3/MM3 (ref 0–0.05)
IMM GRANULOCYTES NFR BLD AUTO: 0.7 % (ref 0–0.5)
IRON 24H UR-MRATE: 55 MCG/DL (ref 37–145)
IRON SATN MFR SERPL: 27 % (ref 20–50)
LYMPHOCYTES # BLD AUTO: 1.67 10*3/MM3 (ref 0.7–3.1)
LYMPHOCYTES NFR BLD AUTO: 16.5 % (ref 19.6–45.3)
MCH RBC QN AUTO: 27.6 PG (ref 26.6–33)
MCHC RBC AUTO-ENTMCNC: 31 G/DL (ref 31.5–35.7)
MCV RBC AUTO: 89 FL (ref 79–97)
MONOCYTES # BLD AUTO: 0.61 10*3/MM3 (ref 0.1–0.9)
MONOCYTES NFR BLD AUTO: 6 % (ref 5–12)
NEUTROPHILS NFR BLD AUTO: 7.71 10*3/MM3 (ref 1.7–7)
NEUTROPHILS NFR BLD AUTO: 76.3 % (ref 42.7–76)
NRBC BLD AUTO-RTO: 0 /100 WBC (ref 0–0.2)
PHOSPHATE SERPL-MCNC: 3.8 MG/DL (ref 2.5–4.5)
PLATELET # BLD AUTO: 213 10*3/MM3 (ref 140–450)
PMV BLD AUTO: 10.1 FL (ref 6–12)
POIKILOCYTOSIS BLD QL SMEAR: NORMAL
POTASSIUM SERPL-SCNC: 4.2 MMOL/L (ref 3.5–5.2)
PROT SERPL-MCNC: 6.6 G/DL (ref 6–8.5)
RBC # BLD AUTO: 3.55 10*6/MM3 (ref 3.77–5.28)
SMALL PLATELETS BLD QL SMEAR: ADEQUATE
SODIUM SERPL-SCNC: 138 MMOL/L (ref 136–145)
TIBC SERPL-MCNC: 206 MCG/DL (ref 298–536)
TRANSFERRIN SERPL-MCNC: 138 MG/DL (ref 200–360)
URATE SERPL-MCNC: 11.2 MG/DL (ref 2.4–5.7)
VIT B12 BLD-MCNC: 632 PG/ML (ref 211–946)
WBC MORPH BLD: NORMAL
WBC NRBC COR # BLD AUTO: 10.11 10*3/MM3 (ref 3.4–10.8)

## 2024-05-02 PROCEDURE — 84550 ASSAY OF BLOOD/URIC ACID: CPT | Performed by: PHYSICIAN ASSISTANT

## 2024-05-02 PROCEDURE — 96372 THER/PROPH/DIAG INJ SC/IM: CPT

## 2024-05-02 PROCEDURE — 36415 COLL VENOUS BLD VENIPUNCTURE: CPT

## 2024-05-02 PROCEDURE — 83970 ASSAY OF PARATHORMONE: CPT | Performed by: PHYSICIAN ASSISTANT

## 2024-05-02 PROCEDURE — 83540 ASSAY OF IRON: CPT | Performed by: PHYSICIAN ASSISTANT

## 2024-05-02 PROCEDURE — 82746 ASSAY OF FOLIC ACID SERUM: CPT | Performed by: PHYSICIAN ASSISTANT

## 2024-05-02 PROCEDURE — 82306 VITAMIN D 25 HYDROXY: CPT | Performed by: PHYSICIAN ASSISTANT

## 2024-05-02 PROCEDURE — 84100 ASSAY OF PHOSPHORUS: CPT | Performed by: PHYSICIAN ASSISTANT

## 2024-05-02 PROCEDURE — 85007 BL SMEAR W/DIFF WBC COUNT: CPT | Performed by: PHYSICIAN ASSISTANT

## 2024-05-02 PROCEDURE — 82728 ASSAY OF FERRITIN: CPT | Performed by: PHYSICIAN ASSISTANT

## 2024-05-02 PROCEDURE — 82607 VITAMIN B-12: CPT | Performed by: PHYSICIAN ASSISTANT

## 2024-05-02 PROCEDURE — 84466 ASSAY OF TRANSFERRIN: CPT | Performed by: PHYSICIAN ASSISTANT

## 2024-05-02 PROCEDURE — 85025 COMPLETE CBC W/AUTO DIFF WBC: CPT | Performed by: PHYSICIAN ASSISTANT

## 2024-05-02 PROCEDURE — 80053 COMPREHEN METABOLIC PANEL: CPT | Performed by: PHYSICIAN ASSISTANT

## 2024-05-02 PROCEDURE — 25010000002 EPOETIN ALFA PER 1000 UNITS: Performed by: PHYSICIAN ASSISTANT

## 2024-05-02 RX ADMIN — ERYTHROPOIETIN 20000 UNITS: 20000 INJECTION, SOLUTION INTRAVENOUS; SUBCUTANEOUS at 15:25

## 2024-05-03 ENCOUNTER — HOME CARE VISIT (OUTPATIENT)
Dept: HOME HEALTH SERVICES | Facility: HOME HEALTHCARE | Age: 89
End: 2024-05-03
Payer: MEDICARE

## 2024-05-03 VITALS
OXYGEN SATURATION: 96 % | RESPIRATION RATE: 16 BRPM | HEART RATE: 72 BPM | DIASTOLIC BLOOD PRESSURE: 70 MMHG | SYSTOLIC BLOOD PRESSURE: 120 MMHG | TEMPERATURE: 97.7 F

## 2024-05-03 LAB — PTH-INTACT SERPL-MCNC: 39.5 PG/ML (ref 15–65)

## 2024-05-03 PROCEDURE — G0300 HHS/HOSPICE OF LPN EA 15 MIN: HCPCS

## 2024-05-03 NOTE — HOME HEALTH
Routine Visit Note: LPN    Skill/education provided: T/I integumentary s/s cellulitis. T/I diabetic foot care, wound care/dressing changes. T/I medication and pain management, DM2, risk for skin breakdown/pressure injury prevention, home safety and fall prevention.    Patient/caregiver response: Patient tolerated dressing change well.     Plan for next visit: Instruct on diabetic foot care. Instruct on disease process. Wound care/dressing change. T/I on medication and pain management. T/I on risk for skin breakdown and prevention. T/I on home safety and fall prevention strategies.    Other pertinent info: N/A

## 2024-05-06 ENCOUNTER — TELEPHONE (OUTPATIENT)
Dept: INTERNAL MEDICINE | Facility: CLINIC | Age: 89
End: 2024-05-06
Payer: MEDICARE

## 2024-05-06 DIAGNOSIS — L03.115 CELLULITIS OF RIGHT LOWER EXTREMITY: Primary | ICD-10-CM

## 2024-05-06 DIAGNOSIS — L97.512 SKIN ULCER OF RIGHT GREAT TOE WITH FAT LAYER EXPOSED: ICD-10-CM

## 2024-05-06 DIAGNOSIS — I73.9 PERIPHERAL VASCULAR DISEASE: ICD-10-CM

## 2024-05-06 DIAGNOSIS — N18.4 CHRONIC RENAL IMPAIRMENT, STAGE 4 (SEVERE): ICD-10-CM

## 2024-05-06 DIAGNOSIS — Z89.412 STATUS POST AMPUTATION OF LEFT GREAT TOE: ICD-10-CM

## 2024-05-06 DIAGNOSIS — E11.22 TYPE 2 DIABETES MELLITUS WITH CHRONIC KIDNEY DISEASE, WITHOUT LONG-TERM CURRENT USE OF INSULIN, UNSPECIFIED CKD STAGE: ICD-10-CM

## 2024-05-07 ENCOUNTER — HOME CARE VISIT (OUTPATIENT)
Dept: HOME HEALTH SERVICES | Facility: HOME HEALTHCARE | Age: 89
End: 2024-05-07
Payer: COMMERCIAL

## 2024-05-07 VITALS
TEMPERATURE: 98.7 F | DIASTOLIC BLOOD PRESSURE: 86 MMHG | RESPIRATION RATE: 20 BRPM | HEART RATE: 64 BPM | OXYGEN SATURATION: 96 % | SYSTOLIC BLOOD PRESSURE: 129 MMHG

## 2024-05-07 VITALS
RESPIRATION RATE: 16 BRPM | HEART RATE: 82 BPM | SYSTOLIC BLOOD PRESSURE: 120 MMHG | OXYGEN SATURATION: 93 % | DIASTOLIC BLOOD PRESSURE: 58 MMHG | TEMPERATURE: 97.6 F

## 2024-05-07 PROCEDURE — G0299 HHS/HOSPICE OF RN EA 15 MIN: HCPCS

## 2024-05-08 ENCOUNTER — OFFICE VISIT (OUTPATIENT)
Dept: INTERNAL MEDICINE | Facility: CLINIC | Age: 89
End: 2024-05-08
Payer: MEDICARE

## 2024-05-08 VITALS
WEIGHT: 117 LBS | BODY MASS INDEX: 18.8 KG/M2 | HEART RATE: 77 BPM | DIASTOLIC BLOOD PRESSURE: 55 MMHG | OXYGEN SATURATION: 98 % | SYSTOLIC BLOOD PRESSURE: 104 MMHG | TEMPERATURE: 97.1 F | HEIGHT: 66 IN

## 2024-05-08 DIAGNOSIS — L97.519 SKIN ULCER OF RIGHT GREAT TOE, UNSPECIFIED ULCER STAGE: Primary | ICD-10-CM

## 2024-05-08 DIAGNOSIS — L97.911 ULCER OF RIGHT LOWER EXTREMITY, LIMITED TO BREAKDOWN OF SKIN: ICD-10-CM

## 2024-05-08 DIAGNOSIS — L03.115 CELLULITIS OF RIGHT LOWER EXTREMITY: ICD-10-CM

## 2024-05-08 PROCEDURE — 1159F MED LIST DOCD IN RCRD: CPT | Performed by: NURSE PRACTITIONER

## 2024-05-08 PROCEDURE — 99213 OFFICE O/P EST LOW 20 MIN: CPT | Performed by: NURSE PRACTITIONER

## 2024-05-08 PROCEDURE — 1126F AMNT PAIN NOTED NONE PRSNT: CPT | Performed by: NURSE PRACTITIONER

## 2024-05-08 PROCEDURE — 1160F RVW MEDS BY RX/DR IN RCRD: CPT | Performed by: NURSE PRACTITIONER

## 2024-05-10 ENCOUNTER — HOME CARE VISIT (OUTPATIENT)
Dept: HOME HEALTH SERVICES | Facility: HOME HEALTHCARE | Age: 89
End: 2024-05-10
Payer: COMMERCIAL

## 2024-05-10 ENCOUNTER — TELEPHONE (OUTPATIENT)
Dept: INTERNAL MEDICINE | Facility: CLINIC | Age: 89
End: 2024-05-10
Payer: MEDICARE

## 2024-05-10 DIAGNOSIS — L97.519 SKIN ULCER OF RIGHT GREAT TOE, UNSPECIFIED ULCER STAGE: Primary | ICD-10-CM

## 2024-05-10 DIAGNOSIS — I73.9 PERIPHERAL VASCULAR DISEASE: ICD-10-CM

## 2024-05-10 PROCEDURE — G0495 RN CARE TRAIN/EDU IN HH: HCPCS

## 2024-05-10 NOTE — TELEPHONE ENCOUNTER
Caller: Brennan Mendenhall    Relationship: Emergency Contact    Best call back number: [phone]     Requested Prescriptions:   ANTIBIOTIC THAT WAS PRESCRIBED FOR INFECTED TOE       Pharmacy where request should be sent: [id number] - [place], [place] - [address] - [phone]  - [phone] FX      Last office visit with prescribing clinician: 5/8/2024   Last telemedicine visit with prescribing clinician: Visit date not found   Next office visit with prescribing clinician: 5/22/2024     Additional details provided by patient: PATIENT IS OUT OF MEDICATIONS.     PLEASE NOTE THAT THE PATIENT WAS GIVEN TWO. THEFIRST ONE MADE HER SICK AND THE SECOND IS WORKING.     Does the patient have less than a 3 day supply:  [x] Yes  [] No    Would you like a call back once the refill request has been completed: [x] Yes [] No    If the office needs to give you a call back, can they leave a voicemail: [x] Yes [] No    Marina Chiang Rep   05/10/24 12:32 EDT

## 2024-05-13 ENCOUNTER — TELEPHONE (OUTPATIENT)
Dept: INTERNAL MEDICINE | Facility: CLINIC | Age: 89
End: 2024-05-13
Payer: MEDICARE

## 2024-05-13 NOTE — TELEPHONE ENCOUNTER
Called [facility] to see if patient is scheduled with their office in [place].  Patient is scheduled with their foot specialist at [facility] in [place] for 1pm today.  They could potentially be seen in their [place] office instead, but Dr. Hartman's first available in [place] is June 5th.  [facility] in [place] will not see for this issue.  There are no other options in [place].  Please let patient know that if they would like to reschedule and wait to be seen in [place], they can contact [facility] at [phone].    Also, Elizabeth, you placed an Urgent order for Vascular Surgery for this patient as well.  Please let me know if we still need to proceed with scheduling this appointment.  I began working on this referral today, but saw the telephone message from today and felt that should probably be addressed first.

## 2024-05-13 NOTE — HOME HEALTH
"Routine Visit Note: Patient sitting in recliner on arrival to home. She was awake and alert. She had depressed mood d/t recent MD visit regarding toe amputation.    Skill/education provided: Wound care/dressing change per new MD order. T/I wound care, pain management, medication education, diabetic foot care and diabetic diet.    Patient/CG response: Patient and caregiver would benefit from ongoing education.     Plan for next visit:  Wound care/dressing change per new MD order. T/I wound care, pain management, medication education, diabetic foot care and diabetic diet.    Other pertinent info: Patient saw Sergio Coffey MD /[facility]. Per patient, \"bone\" was removed in office. Patient states she was referred to a surgeon in [place] and does not want to pursue that location. BILL Johnson provided referral to Dr. Carrington at  who previously performed toe amputations. Awaiting approval on referral.      New wound care orders per Dr. Coffey:    Wound #1 right: medial, plantar great toe: betadine moist to dry gauze to wound bed of toe, pain toe with betadine. Apply primary dressing to wound. Change dressing daily.   Wound #2 Right, lateral ankle: Iodosorb daily, apply border foam dressing. Change every other day.   Cleanse with soap and water or commercial wound cleanser. Dorco forefoot offloading shoe."

## 2024-05-13 NOTE — TELEPHONE ENCOUNTER
Caller: Brennan Mendenhall    Relationship: Emergency Contact    Best call back number: [phone]    What is the medical concern/diagnosis: INFECTED TOE THAT NEEDS TO BE REMOVED    What specialty or service is being requested: SURGEON    What is the office location: [place]    Any additional details: PATIENT SAW SPECIALIST FOR HER INFECTED TOE AND WAS TOLD IT NEEDS TO BE REMOVED. PATIENT HAS AN APPOINTMENT TODAY AT 1PM WITH A SURGEON IN [place] HOWEVER THAT IS A BIT TOO FAR AND SPOUSE WOULD LIKE A SURGEON IN [place] OR CLOSER THAN [place]. PLEASE ADVISE

## 2024-05-14 ENCOUNTER — HOME CARE VISIT (OUTPATIENT)
Dept: HOME HEALTH SERVICES | Facility: HOME HEALTHCARE | Age: 89
End: 2024-05-14
Payer: COMMERCIAL

## 2024-05-14 VITALS
TEMPERATURE: 98.6 F | DIASTOLIC BLOOD PRESSURE: 88 MMHG | OXYGEN SATURATION: 96 % | SYSTOLIC BLOOD PRESSURE: 136 MMHG | HEART RATE: 98 BPM | RESPIRATION RATE: 20 BRPM

## 2024-05-14 NOTE — TELEPHONE ENCOUNTER
Spoke with patient. Relayed info. She said she did go to the specialist yesterday and toe will be removed in a few days. She asked again about the Abx. I hadn't been able to reach her previously (see other message). I told her you did not recommend further abx's. She said she thought you wanted to. I told her I would call back if that is the case.

## 2024-05-16 ENCOUNTER — HOME CARE VISIT (OUTPATIENT)
Dept: HOME HEALTH SERVICES | Facility: HOME HEALTHCARE | Age: 89
End: 2024-05-16
Payer: COMMERCIAL

## 2024-05-16 VITALS
SYSTOLIC BLOOD PRESSURE: 120 MMHG | DIASTOLIC BLOOD PRESSURE: 60 MMHG | TEMPERATURE: 97.8 F | RESPIRATION RATE: 16 BRPM | OXYGEN SATURATION: 95 % | HEART RATE: 74 BPM

## 2024-05-16 PROCEDURE — G0300 HHS/HOSPICE OF LPN EA 15 MIN: HCPCS

## 2024-05-16 NOTE — HOME HEALTH
Routine Visit Note: LPN    Patient states that she has surgery on Wednesday 5/22/24 to amputate great toe.    Skill/education provided: Wound care/dressing change. Instructed on wound care. Wound photos and measurements obtained. Instructed on pain management, medication education, diabetic foot care.    Patient/CG response: Patient and caregiver would benefit from ongoing education.     Plan for next visit:  Wound care/dressing change. Instructed on wound care, pain management, medication education. Instruction on diabetic foot care.       Wound #1 right: medial, plantar great toe:  2.1cm L  x 7.0 cm W x 0.5cm D      Wound #2 Right, lateral ankle:  1.7cm L x 1.1cm W x 0.1cm D

## 2024-05-20 ENCOUNTER — TELEPHONE (OUTPATIENT)
Dept: INTERNAL MEDICINE | Facility: CLINIC | Age: 89
End: 2024-05-20
Payer: MEDICARE

## 2024-05-20 NOTE — TELEPHONE ENCOUNTER
Caller: Brennan Mendenhall    Relationship: Emergency Contact, SPOUSE     Best call back number: [phone] OR CALL THE MOBILE NUMBER: [phone]    Caller requesting test results: SPOUSE    What test was performed: LABS    When was the test performed: 05/20/24    Where was the test performed: [facility]    Additional notes: THE [facility] RESULT IS LOW. ASKING FOR A CALL BACK TO REVIEW THE RESULTS.    PLEASE CALL. WAS TOLD TO CALL HER PCP FOR REVIEW. [facility] IS WILLING TO SEND PAPERWORK TO [facility] IF NEEDED.    ASKING FOR A CALL BACK TODAY. SURGERY IS SCHEDULED WEDNESDAY.     PATIENT IS SCHEDULED TO SEE [facility] ON THE SAME DAY AS HER SURGERY. PLEASE ADVISE IF THIS IS CORRECT.

## 2024-05-21 ENCOUNTER — TELEPHONE (OUTPATIENT)
Dept: INTERNAL MEDICINE | Facility: CLINIC | Age: 89
End: 2024-05-21
Payer: MEDICARE

## 2024-05-21 ENCOUNTER — TELEPHONE (OUTPATIENT)
Dept: CARDIOLOGY | Facility: CLINIC | Age: 89
End: 2024-05-21
Payer: MEDICARE

## 2024-05-21 DIAGNOSIS — N18.4 CHRONIC RENAL IMPAIRMENT, STAGE 4 (SEVERE): Primary | ICD-10-CM

## 2024-05-21 DIAGNOSIS — N18.32 ANEMIA OF CHRONIC RENAL FAILURE, STAGE 3B: ICD-10-CM

## 2024-05-21 DIAGNOSIS — D63.1 ANEMIA OF CHRONIC RENAL FAILURE, STAGE 3B: ICD-10-CM

## 2024-05-21 NOTE — TELEPHONE ENCOUNTER
Printed forms for pre-op surgery.  Have given to Elizabeth to review.   
The [facility] received a fax that requires your attention. The document has been indexed to the patient’s chart for your review.      Reason for sending: EXTERNAL MEDICAL RECORD NOTIFICATION     Documents Description: PHYS ORD-[facility] CARDIAC CLEARANCE REQ-5.21.24    Name of Sender: [facility]     Date Indexed: 5.21.24    PT SURGERY (HALLUX AMPUTATION R FOOT) IS TOMORROW, 5.22.24, AT [facility]. RETURN ATTN [facility]   
[FreeTextEntry1] : Mr Roberto is a 73 y.o. M who presents for f/u\par \par To review, he was admitted to [facility] on May 31 with AGUS, urinary retention, and was found to have bilateral hydronephrosis. The patient came in with abdominal pain and decreased urine output - a chapman was placed with 2L of output. Creatinine 12.91 on admission. Downtrended to 2.07 (b/l ~1.2)\par \par He was initiated on flomax / finasteride and CIC. \par \par PSA 2.92 5/2022\par \par Renal US 6/1/2022\par IMPRESSION:\par Underlying renal parenchymal disease. Bilateral renal cysts. Bilateral perinephric fluid.\par Bilateral moderate hydroureteronephrosis. Fine echoes within the left renal collecting system. Echogenic material within the urinary bladder may represent blood products. Correlate with urinalysis.\par Prostatomegaly.\par \par He was counseled for TURP at his last visit, however, he called to report he was beginning to spontaneously void and canceled surgery. He returns today. Creatinine June 28, 2022 - 1.30. Saw nephrology 7/12/2022\par \par Currently, he caths BID - morning and night - ~10 AM and PM. Caths for ~500 cc each time. Then voiding about 3 times during the day. Having nocturia x 2-3 as well. No fevers / chills / nausea / emesis. No UTIs. No gross hematuria, no issues cathing.\par

## 2024-05-21 NOTE — TELEPHONE ENCOUNTER
Rose with [facility] left vm asking for call back from clinical to discuss patient. Call her at [phone] ext [phone].

## 2024-05-21 NOTE — TELEPHONE ENCOUNTER
Spoke with Rose, Surgery for tomorrow has been canceled. Patient needs repeat labs and cardiac clearance before surgery can be rescheduled. Patient has appointment with Elizabeth tomorrow.

## 2024-05-21 NOTE — TELEPHONE ENCOUNTER
Caller: Brennan Mendenhall    Relationship: Emergency Contact    Best call back number: [phone]      What was the call regarding: PATIENT'S  CALLED WANTING TO SPEAK WITH SOMEONE ABOUT CLARIFYING THE PATIENT'S SCHEDULE FOR TOMORROW. PATIENT IS SCHEDULED TO HAVE SURGERY TOMORROW BUT THEY RECEIVED A CALL THAT SHE IS TO HAVE AN APPOINTMENT IN THE OFFICE.

## 2024-05-22 ENCOUNTER — TELEPHONE (OUTPATIENT)
Dept: INTERNAL MEDICINE | Facility: CLINIC | Age: 89
End: 2024-05-22

## 2024-05-22 ENCOUNTER — HOME CARE VISIT (OUTPATIENT)
Dept: HOME HEALTH SERVICES | Facility: HOME HEALTHCARE | Age: 89
End: 2024-05-22
Payer: MEDICARE

## 2024-05-22 PROCEDURE — G0300 HHS/HOSPICE OF LPN EA 15 MIN: HCPCS

## 2024-05-22 RX ORDER — METOPROLOL TARTRATE 100 MG/1
100 TABLET ORAL 2 TIMES DAILY
Qty: 180 TABLET | Refills: 1 | Status: SHIPPED | OUTPATIENT
Start: 2024-05-22

## 2024-05-22 NOTE — TELEPHONE ENCOUNTER
On 5/21/24 Elizabeth called and spoke with Rose at [facility].  It was determined due to patients lab results and some current health issues that surgery for 5/22/24 needed to be canceled and rescheduled at later date.

## 2024-05-22 NOTE — TELEPHONE ENCOUNTER
Caller: Brennan Mendenhall    Relationship: Emergency Contact    Best call back number: [phone]    What was the call regarding: PATIENT'S  CALLED BACK STATING THAT [facility] DOES NOT DO SURGERY.  HE STATED THAT THEY ONLY DO THE SURGERY IN [facility]. HE WANTED TO DISREGARD THIS REQUEST.

## 2024-05-22 NOTE — HOME HEALTH
Routine Visit Note: LPN    Patient states that she her surgerym for grrat toe amputation on Wednesday 5/22/24 has been postponed. Patient has 3 new open wounds d/t blisters on her right foot. Measurements and photos were obtained. Wound care was performed.          Skill/education provided: Wound care/dressing change. Instructed on wound care. Wound photos and measurements obtained. Instructed on pain management, medication education, diabetic foot care.    Patient/CG response: Patient and caregiver would benefit from ongoing education.     Plan for next visit:  Wound care/dressing change. Instructed on wound care, pain management, medication education. Instruction on diabetic foot care.    Wound #1 Rght foot: 2.1cm L x 3.0cm W x 0.1cm D    Wound #2 Right  foot:1.5cm L x 1.5cm W x 0.1cm D    Wound #3 Right  foot: 0.7cm L x 0.4cm W x 0.1cm D

## 2024-05-22 NOTE — TELEPHONE ENCOUNTER
Called and spoke with patient.  She was concerned about what she needed to do about surgery and her lab results.  Patients HGB was low and she had questions about this.  Patient was confused about what she was doing because she had seen specialist at [facility] and also thought she was suppose to follow up with Elizabeth. Patients surgery was scheduled for 5/22/24.   I advised patient that I would speak with Elizabeth about her concerns and give her a call back to let patient know what her next steps needed to be. Patient verbalized understanding.

## 2024-05-22 NOTE — TELEPHONE ENCOUNTER
Caller: Brennan Mendenhall    Relationship: Emergency Contact    Best call back number: [phone]     What is the medical concern/diagnosis: TOE WOUND    What specialty or service is being requested: ORTHOPAEDICS     What is the provider, practice or medical service name: [facility]    What is the office location: [address] [place] [address] [place], [place]    What is the office phone number: [phone]    Any additional details:  STATES THAT TRAVELING TO [place] IS TOO MUCH ON PATIENT AND HISSELF AND WOULD LIKE TO BE REFERRED IN [place]

## 2024-05-23 ENCOUNTER — TELEPHONE (OUTPATIENT)
Dept: CARDIOLOGY | Facility: CLINIC | Age: 89
End: 2024-05-23
Payer: MEDICARE

## 2024-05-23 VITALS
DIASTOLIC BLOOD PRESSURE: 78 MMHG | OXYGEN SATURATION: 97 % | RESPIRATION RATE: 16 BRPM | TEMPERATURE: 97.7 F | HEART RATE: 90 BPM | SYSTOLIC BLOOD PRESSURE: 120 MMHG

## 2024-05-23 DIAGNOSIS — I27.20 PULMONARY HYPERTENSION: Primary | ICD-10-CM

## 2024-05-23 NOTE — TELEPHONE ENCOUNTER
Pt has appt set up for 5/29 with an echo scheduled same day . Pt aware. Order for echo is placed. No further questions concerns.

## 2024-05-23 NOTE — TELEPHONE ENCOUNTER
It has been a year since last appt. Let's move up her July appt for in office evaluation. She may need repeat echo (severe pulm HTN)  5/29 @ 315 would be ok spot - *[facility] device check as well ([place] office)

## 2024-05-23 NOTE — TELEPHONE ENCOUNTER
Return to WORK    January 16, 2020      Re:   Venu Hayes  [address] [address]           This is to certify that Venu Hayes was seen in Urgent Care today for evaluation of acute illness.  Please excuse him from work on 1/16/20 and 1/17/20 due to illness.          SIGNATURE: ___________________________________________,   1/16/2020  MD Cordelia Rogers MD  [facility]  [id number] [facility] [address]  [phone]   Pt to have surgery at [facility] with Dr Silva Hartman under MAC sedation . Procedure: hallux amputation right foot . Procedure has been put on hold temporarily due to drop in hgb from 9.8 to 7.8. pt states she's receiving IV iron monthly rather than every two weeks as previously. From a cardiac standpoint pt has no complaints . Pt taking ASA 81 mg.     Fax cardiac clearance to Rose @[phone]    Please advise

## 2024-05-29 ENCOUNTER — OFFICE VISIT (OUTPATIENT)
Dept: CARDIOLOGY | Facility: CLINIC | Age: 89
End: 2024-05-29
Payer: MEDICARE

## 2024-05-29 ENCOUNTER — HOSPITAL ENCOUNTER (OUTPATIENT)
Dept: CARDIOLOGY | Facility: HOSPITAL | Age: 89
Discharge: HOME | End: 2024-05-29
Admitting: INTERNAL MEDICINE
Payer: MEDICARE

## 2024-05-29 VITALS
BODY MASS INDEX: 20.73 KG/M2 | HEIGHT: 63 IN | DIASTOLIC BLOOD PRESSURE: 60 MMHG | WEIGHT: 117 LBS | SYSTOLIC BLOOD PRESSURE: 112 MMHG | OXYGEN SATURATION: 94 % | HEART RATE: 58 BPM

## 2024-05-29 VITALS — HEIGHT: 66 IN | WEIGHT: 117.06 LBS | BODY MASS INDEX: 18.81 KG/M2

## 2024-05-29 DIAGNOSIS — I27.20 PULMONARY HYPERTENSION: ICD-10-CM

## 2024-05-29 DIAGNOSIS — I48.20 CHRONIC ATRIAL FIBRILLATION: Primary | ICD-10-CM

## 2024-05-29 DIAGNOSIS — E78.2 MIXED HYPERLIPIDEMIA: ICD-10-CM

## 2024-05-29 DIAGNOSIS — R06.09 DOE (DYSPNEA ON EXERTION): ICD-10-CM

## 2024-05-29 LAB
BH CV ECHO MEAS - AI P1/2T: 347.6 MSEC
BH CV ECHO MEAS - AO MAX PG: 21 MMHG
BH CV ECHO MEAS - AO MEAN PG: 13 MMHG
BH CV ECHO MEAS - AO ROOT DIAM: 3.2 CM
BH CV ECHO MEAS - AO V2 MAX: 230 CM/SEC
BH CV ECHO MEAS - AO V2 VTI: 50.1 CM
BH CV ECHO MEAS - AVA(I,D): 0.99 CM2
BH CV ECHO MEAS - EDV(CUBED): 59.3 ML
BH CV ECHO MEAS - EDV(MOD-SP2): 81.5 ML
BH CV ECHO MEAS - EDV(MOD-SP4): 93.8 ML
BH CV ECHO MEAS - EF(MOD-BP): 60 %
BH CV ECHO MEAS - EF(MOD-SP2): 56.2 %
BH CV ECHO MEAS - EF(MOD-SP4): 62.3 %
BH CV ECHO MEAS - ESV(CUBED): 23.2 ML
BH CV ECHO MEAS - ESV(MOD-SP2): 35.7 ML
BH CV ECHO MEAS - ESV(MOD-SP4): 35.4 ML
BH CV ECHO MEAS - FS: 26.8 %
BH CV ECHO MEAS - IVS/LVPW: 1 CM
BH CV ECHO MEAS - IVSD: 1.2 CM
BH CV ECHO MEAS - LA DIMENSION: 4.9 CM
BH CV ECHO MEAS - LAT PEAK E' VEL: 10.1 CM/SEC
BH CV ECHO MEAS - LV DIASTOLIC VOL/BSA (35-75): 58.9 CM2
BH CV ECHO MEAS - LV MASS(C)D: 159.3 GRAMS
BH CV ECHO MEAS - LV MAX PG: 2.45 MMHG
BH CV ECHO MEAS - LV MEAN PG: 1 MMHG
BH CV ECHO MEAS - LV SYSTOLIC VOL/BSA (12-30): 22.2 CM2
BH CV ECHO MEAS - LV V1 MAX: 78 CM/SEC
BH CV ECHO MEAS - LV V1 VTI: 16.2 CM
BH CV ECHO MEAS - LVIDD: 3.9 CM
BH CV ECHO MEAS - LVIDS: 2.9 CM
BH CV ECHO MEAS - LVOT AREA: 3.1 CM2
BH CV ECHO MEAS - LVOT DIAM: 1.97 CM
BH CV ECHO MEAS - LVPWD: 1.2 CM
BH CV ECHO MEAS - MED PEAK E' VEL: 8.1 CM/SEC
BH CV ECHO MEAS - MR MAX PG: 143.9 MMHG
BH CV ECHO MEAS - MR MAX VEL: 599.7 CM/SEC
BH CV ECHO MEAS - MR MEAN PG: 92.7 MMHG
BH CV ECHO MEAS - MR MEAN VEL: 453.4 CM/SEC
BH CV ECHO MEAS - MR VTI: 204.4 CM
BH CV ECHO MEAS - MV DEC SLOPE: 481.9 CM/SEC2
BH CV ECHO MEAS - MV DEC TIME: 0.19 SEC
BH CV ECHO MEAS - MV E MAX VEL: 115 CM/SEC
BH CV ECHO MEAS - MV MAX PG: 8.2 MMHG
BH CV ECHO MEAS - MV MEAN PG: 2.9 MMHG
BH CV ECHO MEAS - MV P1/2T: 82.5 MSEC
BH CV ECHO MEAS - MV V2 VTI: 31.9 CM
BH CV ECHO MEAS - MVA(P1/2T): 2.7 CM2
BH CV ECHO MEAS - MVA(VTI): 1.55 CM2
BH CV ECHO MEAS - PA ACC TIME: 0.12 SEC
BH CV ECHO MEAS - PA V2 MAX: 97.7 CM/SEC
BH CV ECHO MEAS - RAP SYSTOLE: 15 MMHG
BH CV ECHO MEAS - RF(MV,LVOT)(1DIAM): 0.62 CM
BH CV ECHO MEAS - RVSP: 85 MMHG
BH CV ECHO MEAS - SV(LVOT): 49.6 ML
BH CV ECHO MEAS - SV(MOD-SP2): 45.8 ML
BH CV ECHO MEAS - SV(MOD-SP4): 58.4 ML
BH CV ECHO MEAS - SVI(LVOT): 31.1 ML/M2
BH CV ECHO MEAS - SVI(MOD-SP2): 28.8 ML/M2
BH CV ECHO MEAS - SVI(MOD-SP4): 36.7 ML/M2
BH CV ECHO MEAS - TAPSE (>1.6): 1.59 CM
BH CV ECHO MEAS - TR MAX PG: 70.3 MMHG
BH CV ECHO MEAS - TR MAX VEL: 419.1 CM/SEC
BH CV ECHO MEASUREMENTS AVERAGE E/E' RATIO: 12.64
BH CV XLRA - RV BASE: 3.8 CM
BH CV XLRA - RV LENGTH: 5.9 CM
BH CV XLRA - RV MID: 2.7 CM
BH CV XLRA - TDI S': 7.7 CM/SEC
LEFT ATRIUM VOLUME INDEX: 54.8 ML/M2

## 2024-05-29 PROCEDURE — 93306 TTE W/DOPPLER COMPLETE: CPT

## 2024-05-29 NOTE — PROGRESS NOTES
[facility] Cardiology  Office Progress Note  Lynne Sanchez  1934  [address] [facility] [address]       Visit Date: 05/29/24    PCP: Elizabeth Easton APRN  [address] [facility] [address]  [facility] [address]    IDENTIFICATION: A 89 y.o. female  female. Retired.    PROBLEM LIST:   Chronic atrial fibrillation, status post [employer] Laureano pacemaker implantation and AV node ablation:  Diagnosed June 2005.  Status post ECV restoring normal sinus rhythm, June 2005.  Rythmol initiated 05/01/2006.  Previously digoxin toxicity.  GXT Cardiolite, 09/08/2005: No reversible ischemia. LV function not performed secondary to atrial fibrillation.   Status post successful external cardioversion on 10/01/2008, Tessa Downey MD.  EKG on 10/21/2008: Recurrence of atrial fibrillation with rate of 109.  Status post [employer]. Laureano pacemaker implantation and AV node ablation.    Echocardiogram 02/15/2010: Moderate MR, moderate TR. RVSP 50. EF greater than 55%, left atrial enlargement at 4.3 cm.  Echo 1/18/17: EF 60%, Mod-severe MR, Mod-severe TR.  10/6/17 echo: EF 50-55%, mild LVH, thickened mitral leaflets with mild MR, aortic sclerosis, trace AI, moderate TR with PA SP 60 mmHg, moderate to severe RV dilation, dilated IVC  5/2018 Gen change Aslam  6/30/2018 EF 60%, mild AR, moderate MR, moderate to severe TR, severe pulmonary HTN RVSP 90  4/22 2D Echo: LVEF = 55%.  LV wall thickness-mild concentric hypertrophy.  LV diastolic dysfunction (grade 2) pseudonormalization.  RV cavity mildly dilated.  LA volume moderately increased.  RA cavity severely dilated.  Severe calcification of aortic valve affecting noncoronary cusp.  Moderate TR.  RVSP due to TR markedly elevated >55; = 64.  Severe pulmonary hypertension.  History of congestive heart failure.  Diabetes mellitus, type 2.  4/22 A1c: 6.2  2/22 [facility] Hospitalization for UE DVT.  2/22 OSH Venous UE: Evidence of non-occlusive DVT in left axillary vein and one of paired  brachial veins.   PVD  - 10/17 L PTCA ant tib(Pelaez)   8/18 left second toe amputation osteomyelitis Dr. Carrington  Renal impairment stage 4  7/6/21 Cr: 1.52  7/20/21 Cr: 1.17  Surgical history - appendectomy.   Chronic Anemia-NOS neg scopes [id number], h/o following  Per Dr. Michael guillen [facility]  7/2018 osteomyelitis with subsequent L great toe amputation been left second toe amputations per Dr. Carrington  2/22 [facility] ER eval for Close fracture of humeral head  3/22 and 4/22 [facility] admission for Anemia due to GI bleed - stabilized with PRBCs.        B.  EGD/colon - benign Dr Velez- pill cam discussed NOAC stopped indefinitely      CC:   Chief Complaint   Patient presents with    Chronic atrial fibrillation       Allergies  Allergies   Allergen Reactions    Phenergan [Promethazine Hcl] Confusion       Current Medications    Current Outpatient Medications:     acetaminophen (TYLENOL) 650 MG 8 hr tablet, Take 1 tablet by mouth Every 8 (Eight) Hours As Needed for Mild Pain or Moderate Pain. Indications: Pain, Disp: , Rfl:     aspirin 81 MG EC tablet, Take 1 tablet by mouth Daily., Disp: , Rfl:     atorvastatin (LIPITOR) 40 MG tablet, Take 1 tablet every day by oral route., Disp: , Rfl:     epoetin dorcas-epbx (Retacrit) 86511 UNIT/ML injection, Inject 1 mL under the skin into the appropriate area as directed. 1 ml sq per month on day 17  Indications: Anemia, Anemia associated with Chronic Kidney Failure, Disp: , Rfl:     furosemide (LASIX) 20 MG tablet, TAKE 1 TABLET BY MOUTH EVERY DAY, Disp: 90 tablet, Rfl: 0    glimepiride (AMARYL) 2 MG tablet, TAKE 1 TABLET BY MOUTH IN THE MORNING before breakfast, Disp: 90 tablet, Rfl: 1    glucose blood test strip, 1 each by Other route Daily. Use as instructed once a day as directed, for Truemetrix reader, Disp: 100 each, Rfl: 3    latanoprost (XALATAN) 0.005 % ophthalmic solution, Administer 1 drop to both eyes Daily. (Patient taking differently: Administer 1 drop to both eyes Every  "Night.), Disp: 7.5 mL, Rfl: 3    levothyroxine (SYNTHROID, LEVOTHROID) 50 MCG tablet, Take 1 tablet by mouth Every Morning. Indications: Underactive Thyroid, Disp: 90 tablet, Rfl: 3    metoprolol tartrate (LOPRESSOR) 100 MG tablet, Take 1 tablet by mouth 2 (Two) Times a Day. Indications: High Blood Pressure Disorder, Disp: 180 tablet, Rfl: 1    multivitamin with minerals tablet tablet, Take 1 tablet by mouth Daily. Indications: Supplement, Disp: , Rfl:     pantoprazole (PROTONIX) 40 MG EC tablet, Take 1 tablet by mouth Daily., Disp: 90 tablet, Rfl: 3    saccharomyces boulardii (FLORASTOR) 250 MG capsule, Take by oral route., Disp: , Rfl:     Sod Picosulfate-Mag Ox-Cit Acd (Clenpiq) 10-3.5-12 MG-GM -GM/175ML solution, Take 1 kit by mouth Take As Directed. Take as directed for colonoscopy prep. Patient has instructions., Disp: 350 mL, Rfl: 0    Tradjenta 5 MG tablet tablet, TAKE 1 TABLET EVERY DAY (Patient taking differently: No sig reported), Disp: 90 tablet, Rfl: 3      History of Present Illness   Lynne Sanchez is a 89 y.o. year old female here for follow up.  Frail elderly female with limited activities.  Continues to live independently at current.  She is been noted to have significant anemia acute on chronic.  Apparently her Epogen supplement was changed by her insurance to monthly instead of twice monthly.  She is scheduled to have a procedure on her toe but this was canceled due to her anemia      OBJECTIVE:  Vitals:    05/29/24 1513   BP: 112/60   BP Location: Right arm   Patient Position: Sitting   Cuff Size: Adult   Pulse: 58   SpO2: 94%   Weight: 53.1 kg (117 lb)   Height: 160 cm (63\")       Body mass index is 20.73 kg/m².    Constitutional:       Appearance: Healthy appearance. Not in distress.   Neck:      Vascular: No JVR. JVD normal.   Pulmonary:      Effort: Pulmonary effort is normal.      Breath sounds: Normal breath sounds. No wheezing. No rhonchi. No rales.   Chest:      Chest wall: Not tender " to palpatation.   Cardiovascular:      PMI at left midclavicular line. Normal rate. Regular rhythm. Normal S1. Normal S2.       Murmurs: There is a systolic murmur.      No gallop.  No click. No rub.   Pulses:     Intact distal pulses.   Edema:     Thigh: bilateral pitting edema of the thigh.     Pretibial: bilateral pitting edema of the pretibial area.     Ankle: bilateral pitting edema of the ankle.     Feet: bilateral pitting edema of the feet.  Abdominal:      General: Bowel sounds are normal.      Palpations: Abdomen is soft.      Tenderness: There is no abdominal tenderness.   Musculoskeletal: Normal range of motion.         General: No tenderness. Skin:     General: Skin is warm and dry.   Neurological:      General: No focal deficit present.      Mental Status: Alert and oriented to person, place and time.         Diagnostic Data:  Procedures  Device check  Acceptable threshold impedances  Chronic A. Fib  Generator greater than 5 years    ASSESSMENT:   Diagnosis Plan   1. Chronic atrial fibrillation        2. BRAY (dyspnea on exertion)        3. Mixed hyperlipidemia              PLAN:  Chronic A. fib post AV node ablation device dependent.  Patient will be high risk for consideration of left atrial appendage device closure.  Unfortunately appears temporally that she is not a candidate for NOAC.    Dyspnea on exertion continued low-dose furosemide with her concomitant anemia would expect third spacing lower extremity edema.  She is conscientiously attempting to elevate her legs when possible    Mixed dyslipidemia controlled on statin therapy          Demond Leon MD, FACC

## 2024-05-30 ENCOUNTER — TELEPHONE (OUTPATIENT)
Dept: INTERNAL MEDICINE | Facility: CLINIC | Age: 89
End: 2024-05-30

## 2024-05-30 ENCOUNTER — HOSPITAL ENCOUNTER (OUTPATIENT)
Dept: INFUSION THERAPY | Facility: HOSPITAL | Age: 89
Discharge: HOME | End: 2024-05-30
Payer: MEDICARE

## 2024-05-30 VITALS
HEART RATE: 64 BPM | RESPIRATION RATE: 18 BRPM | OXYGEN SATURATION: 96 % | SYSTOLIC BLOOD PRESSURE: 119 MMHG | DIASTOLIC BLOOD PRESSURE: 63 MMHG

## 2024-05-30 DIAGNOSIS — N18.32 ANEMIA OF CHRONIC RENAL FAILURE, STAGE 3B: ICD-10-CM

## 2024-05-30 DIAGNOSIS — D64.9 NORMOCYTIC ANEMIA: Primary | ICD-10-CM

## 2024-05-30 DIAGNOSIS — N18.32 STAGE 3B CHRONIC KIDNEY DISEASE: ICD-10-CM

## 2024-05-30 DIAGNOSIS — D63.1 ANEMIA OF CHRONIC RENAL FAILURE, STAGE 3B: ICD-10-CM

## 2024-05-30 LAB
DEPRECATED RDW RBC AUTO: 62.4 FL (ref 37–54)
ERYTHROCYTE [DISTWIDTH] IN BLOOD BY AUTOMATED COUNT: 18.7 % (ref 12.3–15.4)
HCT VFR BLD AUTO: 23.8 % (ref 34–46.6)
HGB BLD-MCNC: 7.3 G/DL (ref 12–15.9)
MCH RBC QN AUTO: 28.4 PG (ref 26.6–33)
MCHC RBC AUTO-ENTMCNC: 30.7 G/DL (ref 31.5–35.7)
MCV RBC AUTO: 92.6 FL (ref 79–97)
PLATELET # BLD AUTO: 253 10*3/MM3 (ref 140–450)
PMV BLD AUTO: 8.9 FL (ref 6–12)
RBC # BLD AUTO: 2.57 10*6/MM3 (ref 3.77–5.28)
WBC NRBC COR # BLD AUTO: 7.11 10*3/MM3 (ref 3.4–10.8)

## 2024-05-30 PROCEDURE — 25010000002 EPOETIN ALFA PER 1000 UNITS: Performed by: PHYSICIAN ASSISTANT

## 2024-05-30 PROCEDURE — 96372 THER/PROPH/DIAG INJ SC/IM: CPT

## 2024-05-30 PROCEDURE — 85027 COMPLETE CBC AUTOMATED: CPT | Performed by: NURSE PRACTITIONER

## 2024-05-30 RX ADMIN — ERYTHROPOIETIN 20000 UNITS: 20000 INJECTION, SOLUTION INTRAVENOUS; SUBCUTANEOUS at 15:17

## 2024-05-30 NOTE — TELEPHONE ENCOUNTER
Caller: Brennan Mendenhall     Relationship:     Best call [phone]    What is your medical concern? SHE WAS TO HAVE HER ULCERATED BIG TOE REMOVED. WENT TO [place] WITH [facility]. WAS DUE TO BE THERE AT 6 AM TEN DAYS AGO BUT HAD TO CANCEL DUE TO HEMOGLOBIN WAS OFF. THEY FAXED SOMETHING TO [facility] AND HAS NOT HEARD BACK.       PLEASE CALL DR COLE OFFICE TO DISCUSS THE APPROVAL FORM THEY NEED.       [facility]  DR ASHLEY OGDEN  Address: [address] , [place] [address]  Phone: [phone]

## 2024-05-31 ENCOUNTER — HOME CARE VISIT (OUTPATIENT)
Dept: HOME HEALTH SERVICES | Facility: HOME HEALTHCARE | Age: 89
End: 2024-05-31
Payer: COMMERCIAL

## 2024-05-31 VITALS
RESPIRATION RATE: 17 BRPM | TEMPERATURE: 98 F | HEART RATE: 72 BPM | DIASTOLIC BLOOD PRESSURE: 65 MMHG | OXYGEN SATURATION: 100 % | SYSTOLIC BLOOD PRESSURE: 117 MMHG

## 2024-05-31 PROCEDURE — G0299 HHS/HOSPICE OF RN EA 15 MIN: HCPCS

## 2024-05-31 NOTE — TELEPHONE ENCOUNTER
Spoke with patient she was currently with the Home Health nurse.   I advised her I had left a message and was awaiting a call from [facility] to get information on what their plans was about her upcoming possible surgery and when that may be scheduled.  I made her aware that I would call her as soon as I spoke with their office.  Patient verbalized understanding.

## 2024-05-31 NOTE — TELEPHONE ENCOUNTER
Spoke with Rose at [facility] she advised that patient will need cardiac clearance and clearance with significant anemia  before surgery can be scheduled.  I called and spoke to patient and made her aware that Surgeons office is awaiting clearance form Dr Leon.  Patient's last visit with Dr. Leon was on 5/29/24. Patient verbalized understanding.

## 2024-06-04 ENCOUNTER — HOME CARE VISIT (OUTPATIENT)
Dept: HOME HEALTH SERVICES | Facility: HOME HEALTHCARE | Age: 89
End: 2024-06-04
Payer: MEDICARE

## 2024-06-04 VITALS
RESPIRATION RATE: 16 BRPM | HEART RATE: 70 BPM | OXYGEN SATURATION: 100 % | TEMPERATURE: 98 F | DIASTOLIC BLOOD PRESSURE: 64 MMHG | SYSTOLIC BLOOD PRESSURE: 120 MMHG

## 2024-06-04 PROCEDURE — G0299 HHS/HOSPICE OF RN EA 15 MIN: HCPCS

## 2024-06-05 NOTE — HOME HEALTH
Routine Visit Note: LPN    Skill/education provided: Wound care/dressing change. Instructed on wound care. Instructed on pain management, medication education, diabetic foot care.    Patient/CG response: Patient and caregiver would benefit from ongoing education.     Plan for next visit:  Wound care/dressing change. Instructed on wound care, pain management, medication education. Instruction on diabetic foot care    Other pertinent info: Wound care supplies to be ordered. ( 2 tubes of iodosob gel )

## 2024-06-10 ENCOUNTER — HOME CARE VISIT (OUTPATIENT)
Dept: HOME HEALTH SERVICES | Facility: HOME HEALTHCARE | Age: 89
End: 2024-06-10
Payer: COMMERCIAL

## 2024-06-10 PROCEDURE — G0299 HHS/HOSPICE OF RN EA 15 MIN: HCPCS

## 2024-06-10 RX ORDER — FUROSEMIDE 20 MG/1
TABLET ORAL
Qty: 90 TABLET | Refills: 0 | Status: SHIPPED | OUTPATIENT
Start: 2024-06-10 | End: 2024-06-13 | Stop reason: SDUPTHER

## 2024-06-11 RX ORDER — FUROSEMIDE 20 MG/1
TABLET ORAL
Qty: 90 TABLET | Refills: 0 | OUTPATIENT
Start: 2024-06-11

## 2024-06-11 RX ORDER — FUROSEMIDE 20 MG/1
20 TABLET ORAL DAILY
Qty: 90 TABLET | Refills: 0 | OUTPATIENT
Start: 2024-06-11

## 2024-06-11 NOTE — TELEPHONE ENCOUNTER
Caller: Brennan Mendenhall    Relationship: Emergency Contact,     Best call back number: [phone]     Requested Prescriptions:   Requested Prescriptions     Pending Prescriptions Disp Refills    furosemide (LASIX) 20 MG tablet 90 tablet 0     Sig: Take 1 tablet by mouth Daily. Indications: Cardiac Failure     Refused Prescriptions Disp Refills    furosemide (LASIX) 20 MG tablet [Pharmacy Med Name: Furosemide Oral Tablet 20 MG] 90 tablet 0     Sig: TAKE 1 TABLET BY MOUTH EVERY DAY     Refused By: GEOVANNA BORJAS     Reason for Refusal: Duplicate renewal request        Pharmacy where request should be sent: [id number] [place], [place] - [address] - [phone]  - [phone] FX     Last office visit with prescribing clinician: 5/8/2024   Last telemedicine visit with prescribing clinician: Visit date not found   Next office visit with prescribing clinician: Visit date not found     Additional details provided by patient: PLEASE SEND 90 DAY SUPPLY WITH REFILLS    Does the patient have less than a 3 day supply:  [x] Yes  [] No OUT FOR 2 DAYS    Would you like a call back once the refill request has been completed: [] Yes [x] No    If the office needs to give you a call back, can they leave a voicemail: [x] Yes [] No    Marina Perez   06/11/24 15:39 EDT

## 2024-06-12 VITALS
OXYGEN SATURATION: 99 % | HEART RATE: 72 BPM | SYSTOLIC BLOOD PRESSURE: 120 MMHG | TEMPERATURE: 97.8 F | RESPIRATION RATE: 16 BRPM | DIASTOLIC BLOOD PRESSURE: 60 MMHG

## 2024-06-12 RX ORDER — FUROSEMIDE 20 MG/1
TABLET ORAL
Qty: 90 TABLET | Refills: 0 | OUTPATIENT
Start: 2024-06-12

## 2024-06-12 NOTE — HOME HEALTH
Routine Visit Note: LPN    Skill/education provided: Wound care/dressing change. Instructed on wound care. Instructed on pain management, medication education, diabetic foot care.    Patient/CG response: Patient and caregiver would benefit from ongoing education.     Plan for next visit:  Wound care/dressing change. Instructed on wound care, pain management, medication education. Instruction on diabetic foot care    Other pertinent info: N/A

## 2024-06-13 ENCOUNTER — HOSPITAL ENCOUNTER (OUTPATIENT)
Dept: INFUSION THERAPY | Facility: HOSPITAL | Age: 89
Discharge: HOME | End: 2024-06-13
Payer: MEDICARE

## 2024-06-13 VITALS
SYSTOLIC BLOOD PRESSURE: 147 MMHG | RESPIRATION RATE: 16 BRPM | OXYGEN SATURATION: 97 % | DIASTOLIC BLOOD PRESSURE: 69 MMHG | TEMPERATURE: 97.7 F | HEART RATE: 70 BPM

## 2024-06-13 DIAGNOSIS — N18.32 STAGE 3B CHRONIC KIDNEY DISEASE: ICD-10-CM

## 2024-06-13 DIAGNOSIS — D64.9 NORMOCYTIC ANEMIA: Primary | ICD-10-CM

## 2024-06-13 DIAGNOSIS — D63.1 ANEMIA OF CHRONIC RENAL FAILURE, STAGE 3B: ICD-10-CM

## 2024-06-13 DIAGNOSIS — N18.32 ANEMIA OF CHRONIC RENAL FAILURE, STAGE 3B: ICD-10-CM

## 2024-06-13 LAB
HCT VFR BLD AUTO: 27.1 % (ref 34–46.6)
HGB BLD-MCNC: 8.3 G/DL (ref 12–15.9)

## 2024-06-13 PROCEDURE — 25010000002 EPOETIN ALFA PER 1000 UNITS: Performed by: PHYSICIAN ASSISTANT

## 2024-06-13 PROCEDURE — 85018 HEMOGLOBIN: CPT | Performed by: PHYSICIAN ASSISTANT

## 2024-06-13 PROCEDURE — 96372 THER/PROPH/DIAG INJ SC/IM: CPT

## 2024-06-13 PROCEDURE — 85014 HEMATOCRIT: CPT | Performed by: PHYSICIAN ASSISTANT

## 2024-06-13 RX ORDER — FUROSEMIDE 20 MG/1
20 TABLET ORAL DAILY
Qty: 90 TABLET | Refills: 0 | Status: SHIPPED | OUTPATIENT
Start: 2024-06-13

## 2024-06-13 RX ADMIN — ERYTHROPOIETIN 40000 UNITS: 40000 INJECTION, SOLUTION INTRAVENOUS; SUBCUTANEOUS at 15:54

## 2024-06-13 NOTE — TELEPHONE ENCOUNTER
Riri stated that patient reported that [facility] did not have the rx for furosemide. She called the pharmacy and they had nothing since Feb. so I resent.

## 2024-06-17 ENCOUNTER — HOME CARE VISIT (OUTPATIENT)
Dept: HOME HEALTH SERVICES | Facility: HOME HEALTHCARE | Age: 89
End: 2024-06-17
Payer: COMMERCIAL

## 2024-06-17 PROCEDURE — G0299 HHS/HOSPICE OF RN EA 15 MIN: HCPCS

## 2024-06-17 NOTE — Clinical Note
60 Day Summary    Home Health need continues for: SN  Primary diagnoses/co-morbidities/recent procedures in past 60 days that impact current episode: DM2  Current level of functional ability: Patient ambulates in home with contact gaurd assist/walker  Homebound status and living arrangements: Patient lives with spouse in a multi-level home.  Goals accomplished and/or measurable progress toward unmet goals in past 60 days: No falls reported during current certification period. Patient verbalized understanding of home safety, pain management, and medications. Patient continues to have open wound right great toe. There has been some healing, but additional wound care/education, and provider f/u needed. Patient would benefit from ongoing education r/t wound care/dressing changes. Education r/t infection prevention and DM. Patient has not had a f/u since her initial wound care visit and would benefit from education r/t ongoing care with wound care provider.  Focus of care for next 60 days for each discipline ordered: Wound care/dressing changes to right great toe.  Skin integrity/wound status: Open wound right great toe.  Estimated date when home care services will end 60 days.  SDOH changes/barriers (i.e. Caregiver availability, social isolation, environment, income, transportation access, food insecurity etc.) No  Need for MSW referral? N

## 2024-06-18 VITALS
DIASTOLIC BLOOD PRESSURE: 61 MMHG | SYSTOLIC BLOOD PRESSURE: 118 MMHG | HEART RATE: 64 BPM | TEMPERATURE: 97.7 F | RESPIRATION RATE: 18 BRPM | OXYGEN SATURATION: 97 %

## 2024-06-21 ENCOUNTER — APPOINTMENT (OUTPATIENT)
Dept: ULTRASOUND IMAGING | Facility: HOSPITAL | Age: 89
End: 2024-06-21
Payer: MEDICARE

## 2024-06-21 ENCOUNTER — HOSPITAL ENCOUNTER (INPATIENT)
Facility: HOSPITAL | Age: 89
LOS: 4 days | Discharge: HOME | End: 2024-06-25
Attending: STUDENT IN AN ORGANIZED HEALTH CARE EDUCATION/TRAINING PROGRAM | Admitting: INTERNAL MEDICINE
Payer: MEDICARE

## 2024-06-21 ENCOUNTER — HOME CARE VISIT (OUTPATIENT)
Dept: HOME HEALTH SERVICES | Facility: HOME HEALTHCARE | Age: 89
End: 2024-06-21
Payer: MEDICARE

## 2024-06-21 ENCOUNTER — APPOINTMENT (OUTPATIENT)
Dept: GENERAL RADIOLOGY | Facility: HOSPITAL | Age: 89
End: 2024-06-21
Payer: MEDICARE

## 2024-06-21 ENCOUNTER — APPOINTMENT (OUTPATIENT)
Dept: CT IMAGING | Facility: HOSPITAL | Age: 89
End: 2024-06-21
Payer: MEDICARE

## 2024-06-21 ENCOUNTER — TELEPHONE (OUTPATIENT)
Dept: INTERNAL MEDICINE | Facility: CLINIC | Age: 89
End: 2024-06-21
Payer: MEDICARE

## 2024-06-21 DIAGNOSIS — M86.9 OSTEOMYELITIS, UNSPECIFIED SITE, UNSPECIFIED TYPE: ICD-10-CM

## 2024-06-21 DIAGNOSIS — Z74.09 IMPAIRED MOBILITY AND ADLS: Primary | ICD-10-CM

## 2024-06-21 DIAGNOSIS — Z78.9 IMPAIRED MOBILITY AND ADLS: Primary | ICD-10-CM

## 2024-06-21 PROBLEM — L03.115 CELLULITIS OF RIGHT LEG: Status: ACTIVE | Noted: 2024-06-21

## 2024-06-21 LAB
ANION GAP SERPL CALCULATED.3IONS-SCNC: 12.5 MMOL/L (ref 5–15)
BASOPHILS # BLD AUTO: 0.02 10*3/MM3 (ref 0–0.2)
BASOPHILS NFR BLD AUTO: 0.1 % (ref 0–1.5)
BUN SERPL-MCNC: 25 MG/DL (ref 8–23)
BUN/CREAT SERPL: 17.7 (ref 7–25)
CALCIUM SPEC-SCNC: 9.1 MG/DL (ref 8.6–10.5)
CHLORIDE SERPL-SCNC: 100 MMOL/L (ref 98–107)
CO2 SERPL-SCNC: 24.5 MMOL/L (ref 22–29)
CREAT SERPL-MCNC: 1.41 MG/DL (ref 0.57–1)
CRP SERPL-MCNC: 7.06 MG/DL (ref 0–0.5)
D-LACTATE SERPL-SCNC: 1.7 MMOL/L (ref 0.5–2)
D-LACTATE SERPL-SCNC: 2.5 MMOL/L (ref 0.5–2)
D-LACTATE SERPL-SCNC: 2.9 MMOL/L (ref 0.5–2)
DEPRECATED RDW RBC AUTO: 61.1 FL (ref 37–54)
EGFRCR SERPLBLD CKD-EPI 2021: 35.7 ML/MIN/1.73
EOSINOPHIL # BLD AUTO: 0 10*3/MM3 (ref 0–0.4)
EOSINOPHIL NFR BLD AUTO: 0 % (ref 0.3–6.2)
ERYTHROCYTE [DISTWIDTH] IN BLOOD BY AUTOMATED COUNT: 18 % (ref 12.3–15.4)
ERYTHROCYTE [SEDIMENTATION RATE] IN BLOOD: 9 MM/HR (ref 0–30)
GLUCOSE BLDC GLUCOMTR-MCNC: 291 MG/DL (ref 70–130)
GLUCOSE SERPL-MCNC: 290 MG/DL (ref 65–99)
HCT VFR BLD AUTO: 25.9 % (ref 34–46.6)
HGB BLD-MCNC: 8.1 G/DL (ref 12–15.9)
IMM GRANULOCYTES # BLD AUTO: 0.13 10*3/MM3 (ref 0–0.05)
IMM GRANULOCYTES NFR BLD AUTO: 1 % (ref 0–0.5)
LYMPHOCYTES # BLD AUTO: 1.74 10*3/MM3 (ref 0.7–3.1)
LYMPHOCYTES NFR BLD AUTO: 12.8 % (ref 19.6–45.3)
MCH RBC QN AUTO: 29.2 PG (ref 26.6–33)
MCHC RBC AUTO-ENTMCNC: 31.3 G/DL (ref 31.5–35.7)
MCV RBC AUTO: 93.5 FL (ref 79–97)
MONOCYTES # BLD AUTO: 1.01 10*3/MM3 (ref 0.1–0.9)
MONOCYTES NFR BLD AUTO: 7.4 % (ref 5–12)
NEUTROPHILS NFR BLD AUTO: 10.68 10*3/MM3 (ref 1.7–7)
NEUTROPHILS NFR BLD AUTO: 78.7 % (ref 42.7–76)
NRBC BLD AUTO-RTO: 0 /100 WBC (ref 0–0.2)
PLATELET # BLD AUTO: 173 10*3/MM3 (ref 140–450)
PMV BLD AUTO: 9.9 FL (ref 6–12)
POTASSIUM SERPL-SCNC: 4.6 MMOL/L (ref 3.5–5.2)
PROCALCITONIN SERPL-MCNC: 4.42 NG/ML (ref 0–0.25)
RBC # BLD AUTO: 2.77 10*6/MM3 (ref 3.77–5.28)
SODIUM SERPL-SCNC: 137 MMOL/L (ref 136–145)
WBC NRBC COR # BLD AUTO: 13.58 10*3/MM3 (ref 3.4–10.8)

## 2024-06-21 PROCEDURE — 82948 REAGENT STRIP/BLOOD GLUCOSE: CPT

## 2024-06-21 PROCEDURE — 83605 ASSAY OF LACTIC ACID: CPT | Performed by: STUDENT IN AN ORGANIZED HEALTH CARE EDUCATION/TRAINING PROGRAM

## 2024-06-21 PROCEDURE — 25810000003 SODIUM CHLORIDE 0.9 % SOLUTION: Performed by: STUDENT IN AN ORGANIZED HEALTH CARE EDUCATION/TRAINING PROGRAM

## 2024-06-21 PROCEDURE — 80048 BASIC METABOLIC PNL TOTAL CA: CPT | Performed by: STUDENT IN AN ORGANIZED HEALTH CARE EDUCATION/TRAINING PROGRAM

## 2024-06-21 PROCEDURE — 87040 BLOOD CULTURE FOR BACTERIA: CPT | Performed by: STUDENT IN AN ORGANIZED HEALTH CARE EDUCATION/TRAINING PROGRAM

## 2024-06-21 PROCEDURE — 25010000002 PIPERACILLIN SOD-TAZOBACTAM PER 1 G: Performed by: INTERNAL MEDICINE

## 2024-06-21 PROCEDURE — 73700 CT LOWER EXTREMITY W/O DYE: CPT

## 2024-06-21 PROCEDURE — 93926 LOWER EXTREMITY STUDY: CPT

## 2024-06-21 PROCEDURE — 25010000002 VANCOMYCIN 1 G RECONSTITUTED SOLUTION: Performed by: STUDENT IN AN ORGANIZED HEALTH CARE EDUCATION/TRAINING PROGRAM

## 2024-06-21 PROCEDURE — 99285 EMERGENCY DEPT VISIT HI MDM: CPT

## 2024-06-21 PROCEDURE — 73660 X-RAY EXAM OF TOE(S): CPT

## 2024-06-21 PROCEDURE — 63710000001 INSULIN LISPRO (HUMAN) PER 5 UNITS: Performed by: INTERNAL MEDICINE

## 2024-06-21 PROCEDURE — 25010000002 ENOXAPARIN PER 10 MG: Performed by: INTERNAL MEDICINE

## 2024-06-21 PROCEDURE — 25010000002 CEFEPIME PER 500 MG: Performed by: STUDENT IN AN ORGANIZED HEALTH CARE EDUCATION/TRAINING PROGRAM

## 2024-06-21 PROCEDURE — 99223 1ST HOSP IP/OBS HIGH 75: CPT | Performed by: INTERNAL MEDICINE

## 2024-06-21 PROCEDURE — 85652 RBC SED RATE AUTOMATED: CPT | Performed by: STUDENT IN AN ORGANIZED HEALTH CARE EDUCATION/TRAINING PROGRAM

## 2024-06-21 PROCEDURE — 85025 COMPLETE CBC W/AUTO DIFF WBC: CPT | Performed by: STUDENT IN AN ORGANIZED HEALTH CARE EDUCATION/TRAINING PROGRAM

## 2024-06-21 PROCEDURE — 84145 PROCALCITONIN (PCT): CPT | Performed by: STUDENT IN AN ORGANIZED HEALTH CARE EDUCATION/TRAINING PROGRAM

## 2024-06-21 PROCEDURE — 93971 EXTREMITY STUDY: CPT

## 2024-06-21 PROCEDURE — 86140 C-REACTIVE PROTEIN: CPT | Performed by: STUDENT IN AN ORGANIZED HEALTH CARE EDUCATION/TRAINING PROGRAM

## 2024-06-21 RX ORDER — METOPROLOL TARTRATE 50 MG/1
100 TABLET, FILM COATED ORAL 2 TIMES DAILY
Status: DISCONTINUED | OUTPATIENT
Start: 2024-06-21 | End: 2024-06-25 | Stop reason: HOSPADM

## 2024-06-21 RX ORDER — AMOXICILLIN 250 MG
2 CAPSULE ORAL 2 TIMES DAILY PRN
Status: DISCONTINUED | OUTPATIENT
Start: 2024-06-21 | End: 2024-06-25 | Stop reason: HOSPADM

## 2024-06-21 RX ORDER — LEVOTHYROXINE SODIUM 0.05 MG/1
50 TABLET ORAL
Status: DISCONTINUED | OUTPATIENT
Start: 2024-06-22 | End: 2024-06-25 | Stop reason: HOSPADM

## 2024-06-21 RX ORDER — NITROGLYCERIN 0.4 MG/1
0.4 TABLET SUBLINGUAL
Status: DISCONTINUED | OUTPATIENT
Start: 2024-06-21 | End: 2024-06-25 | Stop reason: HOSPADM

## 2024-06-21 RX ORDER — FUROSEMIDE 20 MG/1
20 TABLET ORAL DAILY
Status: DISCONTINUED | OUTPATIENT
Start: 2024-06-21 | End: 2024-06-23

## 2024-06-21 RX ORDER — INSULIN LISPRO 100 [IU]/ML
2-9 INJECTION, SOLUTION INTRAVENOUS; SUBCUTANEOUS
Status: DISCONTINUED | OUTPATIENT
Start: 2024-06-21 | End: 2024-06-25 | Stop reason: HOSPADM

## 2024-06-21 RX ORDER — SODIUM CHLORIDE 0.9 % (FLUSH) 0.9 %
10 SYRINGE (ML) INJECTION EVERY 12 HOURS SCHEDULED
Status: DISCONTINUED | OUTPATIENT
Start: 2024-06-21 | End: 2024-06-25 | Stop reason: HOSPADM

## 2024-06-21 RX ORDER — LATANOPROST 50 UG/ML
1 SOLUTION/ DROPS OPHTHALMIC NIGHTLY
Status: DISCONTINUED | OUTPATIENT
Start: 2024-06-21 | End: 2024-06-25 | Stop reason: HOSPADM

## 2024-06-21 RX ORDER — ENOXAPARIN SODIUM 100 MG/ML
30 INJECTION SUBCUTANEOUS NIGHTLY
Status: DISCONTINUED | OUTPATIENT
Start: 2024-06-21 | End: 2024-06-25 | Stop reason: HOSPADM

## 2024-06-21 RX ORDER — DEXTROSE MONOHYDRATE 25 G/50ML
25 INJECTION, SOLUTION INTRAVENOUS
Status: DISCONTINUED | OUTPATIENT
Start: 2024-06-21 | End: 2024-06-25 | Stop reason: HOSPADM

## 2024-06-21 RX ORDER — SODIUM CHLORIDE 0.9 % (FLUSH) 0.9 %
10 SYRINGE (ML) INJECTION AS NEEDED
Status: DISCONTINUED | OUTPATIENT
Start: 2024-06-21 | End: 2024-06-25 | Stop reason: HOSPADM

## 2024-06-21 RX ORDER — NICOTINE POLACRILEX 4 MG
15 LOZENGE BUCCAL
Status: DISCONTINUED | OUTPATIENT
Start: 2024-06-21 | End: 2024-06-25 | Stop reason: HOSPADM

## 2024-06-21 RX ORDER — BISACODYL 5 MG/1
5 TABLET, DELAYED RELEASE ORAL DAILY PRN
Status: DISCONTINUED | OUTPATIENT
Start: 2024-06-21 | End: 2024-06-25 | Stop reason: HOSPADM

## 2024-06-21 RX ORDER — ONDANSETRON 2 MG/ML
4 INJECTION INTRAMUSCULAR; INTRAVENOUS EVERY 6 HOURS PRN
Status: DISCONTINUED | OUTPATIENT
Start: 2024-06-21 | End: 2024-06-25 | Stop reason: HOSPADM

## 2024-06-21 RX ORDER — SACCHAROMYCES BOULARDII 250 MG
500 CAPSULE ORAL DAILY
Status: DISCONTINUED | OUTPATIENT
Start: 2024-06-21 | End: 2024-06-25 | Stop reason: HOSPADM

## 2024-06-21 RX ORDER — BISACODYL 10 MG
10 SUPPOSITORY, RECTAL RECTAL DAILY PRN
Status: DISCONTINUED | OUTPATIENT
Start: 2024-06-21 | End: 2024-06-25 | Stop reason: HOSPADM

## 2024-06-21 RX ORDER — CALCIUM CARBONATE 500 MG/1
2 TABLET, CHEWABLE ORAL 2 TIMES DAILY PRN
Status: DISCONTINUED | OUTPATIENT
Start: 2024-06-21 | End: 2024-06-25 | Stop reason: HOSPADM

## 2024-06-21 RX ORDER — ACETAMINOPHEN 325 MG/1
650 TABLET ORAL EVERY 4 HOURS PRN
Status: DISCONTINUED | OUTPATIENT
Start: 2024-06-21 | End: 2024-06-25 | Stop reason: HOSPADM

## 2024-06-21 RX ORDER — ONDANSETRON 4 MG/1
4 TABLET, ORALLY DISINTEGRATING ORAL EVERY 6 HOURS PRN
Status: DISCONTINUED | OUTPATIENT
Start: 2024-06-21 | End: 2024-06-25 | Stop reason: HOSPADM

## 2024-06-21 RX ORDER — UREA 10 %
5 LOTION (ML) TOPICAL NIGHTLY
Status: DISCONTINUED | OUTPATIENT
Start: 2024-06-21 | End: 2024-06-25 | Stop reason: HOSPADM

## 2024-06-21 RX ORDER — MULTIPLE VITAMINS W/ MINERALS TAB 9MG-400MCG
1 TAB ORAL DAILY
Status: DISCONTINUED | OUTPATIENT
Start: 2024-06-21 | End: 2024-06-25 | Stop reason: HOSPADM

## 2024-06-21 RX ORDER — ATORVASTATIN CALCIUM 40 MG/1
40 TABLET, FILM COATED ORAL NIGHTLY
Status: DISCONTINUED | OUTPATIENT
Start: 2024-06-21 | End: 2024-06-25 | Stop reason: HOSPADM

## 2024-06-21 RX ORDER — SODIUM CHLORIDE 9 MG/ML
40 INJECTION, SOLUTION INTRAVENOUS AS NEEDED
Status: DISCONTINUED | OUTPATIENT
Start: 2024-06-21 | End: 2024-06-25 | Stop reason: HOSPADM

## 2024-06-21 RX ORDER — POLYETHYLENE GLYCOL 3350 17 G/17G
17 POWDER, FOR SOLUTION ORAL DAILY PRN
Status: DISCONTINUED | OUTPATIENT
Start: 2024-06-21 | End: 2024-06-25 | Stop reason: HOSPADM

## 2024-06-21 RX ORDER — PANTOPRAZOLE SODIUM 40 MG/1
40 TABLET, DELAYED RELEASE ORAL DAILY
Status: DISCONTINUED | OUTPATIENT
Start: 2024-06-21 | End: 2024-06-25 | Stop reason: HOSPADM

## 2024-06-21 RX ORDER — ASPIRIN 81 MG/1
81 TABLET ORAL DAILY
Status: DISCONTINUED | OUTPATIENT
Start: 2024-06-21 | End: 2024-06-25 | Stop reason: HOSPADM

## 2024-06-21 RX ADMIN — SODIUM CHLORIDE 1000 MG: 900 INJECTION, SOLUTION INTRAVENOUS at 17:23

## 2024-06-21 RX ADMIN — PANTOPRAZOLE SODIUM 40 MG: 40 TABLET, DELAYED RELEASE ORAL at 21:39

## 2024-06-21 RX ADMIN — FUROSEMIDE 20 MG: 20 TABLET ORAL at 21:39

## 2024-06-21 RX ADMIN — CEFEPIME 2000 MG: 2 INJECTION, POWDER, FOR SOLUTION INTRAVENOUS at 16:35

## 2024-06-21 RX ADMIN — Medication 500 MG: at 21:40

## 2024-06-21 RX ADMIN — INSULIN LISPRO 6 UNITS: 100 INJECTION, SOLUTION INTRAVENOUS; SUBCUTANEOUS at 21:51

## 2024-06-21 RX ADMIN — ASPIRIN 81 MG: 81 TABLET, COATED ORAL at 21:38

## 2024-06-21 RX ADMIN — ATORVASTATIN CALCIUM 40 MG: 40 TABLET, FILM COATED ORAL at 21:41

## 2024-06-21 RX ADMIN — Medication 10 ML: at 21:46

## 2024-06-21 RX ADMIN — Medication 1 TABLET: at 21:38

## 2024-06-21 RX ADMIN — Medication 5 MG: at 21:41

## 2024-06-21 RX ADMIN — PIPERACILLIN SODIUM AND TAZOBACTAM SODIUM 3.38 G: 3; .375 INJECTION, POWDER, LYOPHILIZED, FOR SOLUTION INTRAVENOUS at 21:28

## 2024-06-21 RX ADMIN — ENOXAPARIN SODIUM 30 MG: 100 INJECTION SUBCUTANEOUS at 21:43

## 2024-06-21 RX ADMIN — METOPROLOL TARTRATE 100 MG: 50 TABLET, FILM COATED ORAL at 21:53

## 2024-06-21 RX ADMIN — LATANOPROST 1 DROP: 50 SOLUTION OPHTHALMIC at 21:42

## 2024-06-21 NOTE — TELEPHONE ENCOUNTER
Caller: Brennan Mendenhall    Relationship: Emergency Contact    Best call back number: [phone]     What is the medical concern/diagnosis: RIGHT BIG TOE REMOVAL    What specialty or service is being requested: SURGEON     What is the provider, practice or medical service name: [facility]     Any additional details: PLEASE CALL TO FURTHER DISCUSS

## 2024-06-21 NOTE — CASE MANAGEMENT/SOCIAL WORK
Discharge Planning Assessment   [place]     Patient Name: Lynne Sanchez  MRN: [id number]  Today's Date: 6/21/2024    Admit Date: 6/21/2024    Plan: The patient is awake and able to answer questions.  Her  is at bedside and she consents for him to be involved in her DC planning.  She is a current patient of Elizabeth Jemma.  She gets her medications from [facility] and elects to enroll in Meds to Bed.  She has a walker, wheelchair, and electric stair lift.  She denies the need for additional DME.  She is current home health patient of [facility].  The patient and her  said they would benefit from in-home assistance with household chores.  Provided them with list of private pay agencies for those services.  They say they will review them.  At the time of DC the patient plans to return to the home she shares with her  and will continue HH care with [facility].  Questions and concerns were addressed, IMM delivered at the time of this conversation.  Will provide additional resources and information upon patient request.   Discharge Needs Assessment       Row Name 06/21/24 1811       Living Environment    People in Home spouse    Name(s) of People in Home Brennan Sanchez,     Current Living Arrangements home    Duration at Residence Since 1969    Potentially Unsafe Housing Conditions none    In the past 12 months has the electric, gas, oil, or water company threatened to shut off services in your home? No    Primary Care Provided by self;homecare agency    Provides Primary Care For no one, unable/limited ability to care for self    Family Caregiver if Needed none    Quality of Family Relationships helpful;involved;supportive    Able to Return to Prior Arrangements yes       Resource/Environmental Concerns    Resource/Environmental Concerns none    Transportation Concerns none       Transportation Needs    In the past 12 months, has lack of transportation kept you from medical appointments or from  getting medications? no    In the past 12 months, has lack of transportation kept you from meetings, work, or from getting things needed for daily living? No       Food Insecurity    Within the past 12 months, you worried that your food would run out before you got the money to buy more. Never true    Within the past 12 months, the food you bought just didn't last and you didn't have money to get more. Never true       Transition Planning    Patient/Family Anticipates Transition to home with help/services    Patient/Family Anticipated Services at Transition other (see comments)  in home assistance with chores    Transportation Anticipated family or friend will provide       Discharge Needs Assessment    Readmission Within the Last 30 Days no previous admission in last 30 days    Current Outpatient/Agency/Support Group homecare agency    Equipment Currently Used at Home wheelchair;walker, rolling;other (see comments)  electric stair lift    Concerns to be Addressed other (see comments)  assistance with household chores    Anticipated Changes Related to Illness inability to care for self    Equipment Needed After Discharge none    Discharge Facility/Level of Care Needs home with home health    Provided Post Acute Provider List? N/A    N/A Provider List Comment Patient to return home with same  agency    Provided Post Acute Provider Quality & Resource List? N/A    N/A Quality & Resource List Comment Patient to return home with same HH agency    Patient's Choice of Community Agency(s) [facility]                   Discharge Plan       Row Name 06/21/24 5418       Plan    Plan The patient is awake and able to answer questions.  Her  is at bedside and she consents for him to be involved in her DC planning.  She is a current patient of Elizabeth Easton.  She gets her medications from [facility] and elects to enroll in Meds to Bed.  She has a walker, wheelchair, and electric stair lift.  She denies the need for  additional DME.  She is current home health patient of [facility].  The patient and her  said they would benefit from in-home assistance with household chores.  Provided them with list of private pay agencies for those services.  They say they will review them.  At the time of DC the patient plans to return to the home she shares with her  and will continue HH care with [facility].  Questions and concerns were addressed, IMM delivered at the time of this conversation.  Will provide additional resources and information upon patient request.    Patient/Family in Agreement with Plan yes    Provided Post Acute Provider List? N/A    N/A Provider List Comment Patient to return home with same HH agency    Provided Post Acute Provider Quality & Resource List? N/A    N/A Quality & Resource List Comment Patient to return home with same HH agency    Plan Comments The patient and her  said they would benefit from in-home assistance with household chores. Provided them with list of private pay agencies for those services.    Final Discharge Disposition Code 06 - home with home health care    Final Note Plans to return home with  and home health                  Continued Care and Services - Admitted Since 6/21/2024    No active coordination exists for this encounter.          Demographic Summary       Row Name 06/21/24 8991       General Information    Admission Type inpatient    Arrived From emergency department    Required Notices Provided Important Message from Medicare    Referral Source admission list    Reason for Consult discharge planning    Preferred Language English       Contact Information    Permission Granted to Share Info With                    Functional Status       Row Name 06/21/24 9456       Functional Status    Usual Activity Tolerance moderate    Current Activity Tolerance fair       Physical Activity    On average, how many days per week do you engage in moderate  to strenuous exercise (like a brisk walk)? 0 days    On average, how many minutes do you engage in exercise at this level? 0 min    Number of minutes of exercise per week 0       Assessment of Health Literacy    How often do you have someone help you read hospital materials? Never    How often do you have problems learning about your medical condition because of difficulty understanding written information? Never    How often do you have a problem understanding what is told to you about your medical condition? Never    How confident are you filling out medical forms by yourself? Extremely    Health Literacy Excellent       Functional Status, IADL    Medications independent    Meal Preparation assistive person    Housekeeping assistive person    Laundry assistive person    Shopping completely dependent       Mental Status    General Appearance WDL WDL       Mental Status Summary    Recent Changes in Mental Status/Cognitive Functioning no changes       Employment/    Employment Status retired    Current or Previous Occupation not applicable                   Psychosocial       Row Name 06/21/24 1811       Values/Beliefs    Spiritual, Cultural Beliefs, Zoroastrian Practices, Values that Affect Care no       Behavior WDL    Behavior WDL WDL       Emotion Mood WDL    Emotion/Mood/Affect WDL WDL       Speech WDL    Speech WDL WDL       Perceptual State WDL    Perceptual State WDL WDL       Thought Process WDL    Thought Process WDL WDL       Intellectual Performance WDL    Intellectual Performance WDL WDL                   Abuse/Neglect       Row Name 06/21/24 1811       Personal Safety    Feels Unsafe at Home or Work/School no    Feels Threatened by Someone no    Does Anyone Try to Keep You From Having Contact with Others or Doing Things Outside Your Home? no    Physical Signs of Abuse Present no                   Legal       Row Name 06/21/24 1811       Financial Resource Strain    How hard is it for you to pay  for the very basics like food, housing, medical care, and heating? Not hard                   Substance Abuse       Row Name 06/21/24 1811       Substance Use    Substance Use Status never used                   Patient Forms       Row Name 06/21/24 1821       Patient Forms    Important Message from Medicare ([facility]) Delivered    Delivered to Support person  Brennan Sanchez,     Method of delivery In person                      Anjali Rosario RN

## 2024-06-21 NOTE — TELEPHONE ENCOUNTER
I spoke to patients  Brennan.  He was asking about surgeons in [place].  He is aware that the issue is because of patient's chronic health issues due to the delay of toe being amputated.  He stated that patient had the night before nausea, chills and some swelling of her leg.  Patients daughter-in-law advised that patient was having an area in left leg that was hot to the touch and hard and she was afraid it was a blood clot.  Advised  patient and her  that patient needed to go to ER asap.   and daughter-in -law verbalized understanding. Both stated that they were putting patient in car and going straight to the ER.

## 2024-06-21 NOTE — PHARMACY RECOMMENDATION
"Pharmacy Consult - Vancomycin Dosing    Pharmacy was consulted to dose vancomycin for  Lynne Sanchez, a 89 y.o. female  160 cm (63\") 53.1 kg (117 lb)    Indication: Bone and/or Joint Infection  Consulting Provider: Dr. Ortega    Goal AUC: 400-600 mg/L*hr.  Goal Trough: 15 - 20 mcg/ml    Labs  Results from last 7 days   Lab Units 06/21/24  1358   WBC 10*3/mm3 13.58*   CREATININE mg/dL 1.41*      Estimated Creatinine Clearance: 22.7 mL/min (A) (by C-G formula based on SCr of 1.41 mg/dL (H)).  Temp Readings from Last 1 Encounters:   06/21/24 99.3 °F (37.4 °C) (Oral)                     Other Antimicrobials    Zosyn 3.375 g IV every 8 hours      Assessment/Plan    Patient received loading dose of vancomycin 1000 mg IV.  Initiate maintenance dose of vancomycin daily as needed to maintain a target trough 15 - 20 mcg/ml.   Assess clearance by vancomycin level on 6/22 @ 0600.  Pharmacy will continue to monitor renal function, cultures and sensitivities, and clinical status to adjust regimen as necessary.      Thank you,  Verónica Moody, PharmD, BCPS  06/21/24 19:07 EDT    "

## 2024-06-21 NOTE — ED PROVIDER NOTES
EMERGENCY DEPARTMENT ENCOUNTER    Pt Name: Lynne Sanchez  MRN: [id number]  Pt :   1934  Room Number:  323/1  Date of encounter:  2024  PCP: Elizabeth Easton APRN  ED Provider: Isaac Levine MD    Historian: Patient      HPI:  Chief Complaint: Leg redness        Context: Lynne Sanchez is a 89 y.o. female who presents to the ED for leg redness and swelling. Patient reports over the past few days she has developed leg redness swelling and pain to the right calf and ankle. She has a history of toe ulcers and was most recently on doxycycline for this. Denies known history of DVT she is not on blood thinners. She felt feverish yesterday.       PAST MEDICAL HISTORY  Past Medical History:   Diagnosis Date    Acute deep vein thrombosis of left upper extremity     Anemia     Asthma     CHF (congestive heart failure)     Chronic atrial fibrillation     Deep venous thrombosis of left upper limb     Diabetes mellitus, type 2     Diarrhea     GERD (gastroesophageal reflux disease)     Glaucoma     H/O transfusion of whole blood     Heart attack     Heart murmur     History of transfusion     Hyperlipidemia     Hypertension     Kidney disease     patient not aware of what exact diagnosis is     Osteomyelitis     Osteomyelitis of left foot 10/03/2017    Peripheral vascular disease     Right retinal detachment     Secondary cataract of both eyes     Stable proliferative diabetic retinopathy of both eyes     Stroke     Swelling of left extremity     Urinary tract infection     Wears glasses          PAST SURGICAL HISTORY  Past Surgical History:   Procedure Laterality Date    AMPUTATION DIGIT Left 2018    Procedure: Left Great toe amputation;  Surgeon: Prakash Carrington MD;  Location: [facility] OR;  Service: Vascular    AMPUTATION DIGIT Left 2018    Procedure: SECOND TOE AMPUTATION DIGIT LEFT;  Surgeon: Prakash Carrington MD;  Location: [facility] OR;  Service: General    APPENDECTOMY      BONE MARROW ASPIRATION       CARDIAC ABLATION      CARDIAC CATHETERIZATION N/A 10/10/2017    Procedure: Peripheral angiography;  Surgeon: Kan Pelaez MD;  Location: [facility] CATH INVASIVE LOCATION;  Service:     CARDIAC CATHETERIZATION N/A 10/10/2017    Procedure: Angioplasty-peripheral;  Surgeon: Kan Pelaez MD;  Location: [facility] CATH INVASIVE LOCATION;  Service:     CARDIAC CATHETERIZATION N/A 10/10/2017    Procedure: Atherectomy-peripheral;  Surgeon: Kan Pelaez MD;  Location: [facility] CATH INVASIVE LOCATION;  Service:     CARDIAC ELECTROPHYSIOLOGY PROCEDURE N/A 5/3/2018    Procedure: generator change;  Surgeon: Zan Poole MD;  Location:  [facility] CATH INVASIVE LOCATION;  Service: Cardiovascular    CARDIOVERSION      COLONOSCOPY      ENDOSCOPY N/A 10/9/2017    Procedure: ESOPHAGOGASTRODUODENOSCOPY WITH COLD FORCEP BIOPSY;  Surgeon: Clifton Hyatt MD;  Location: [facility] ENDOSCOPY;  Service:     ENDOSCOPY N/A 3/22/2022    Procedure: ESOPHAGOGASTRODUODENOSCOPY with AVM cautery;  Surgeon: Clarisse Velez MD;  Location: [facility] ENDOSCOPY;  Service: Gastroenterology;  Laterality: N/A;    ENDOSCOPY N/A 8/4/2023    Procedure: ESOPHAGOGASTRODUODENOSCOPY WITH BIOPSY AND RUTH BRUSHING;  Surgeon: Clarisse Velez MD;  Location: [facility] ENDOSCOPY;  Service: Gastroenterology;  Laterality: N/A;    EYE SURGERY Bilateral     CATARACTS    INTERVENTIONAL RADIOLOGY PROCEDURE N/A 10/10/2017    Procedure: Abdominal Aortagram with Runoff;  Surgeon: Kan Pelaez MD;  Location: [facility] CATH INVASIVE LOCATION;  Service:     PACEMAKER IMPLANTATION      around 2008 then replaced 2018         FAMILY HISTORY  Family History   Problem Relation Age of Onset    No Known Problems Mother     No Known Problems Father     Arthritis Other          SOCIAL HISTORY  Social History     Socioeconomic History    Marital status:     Number of children: 3   Tobacco Use    Smoking status: Never     Passive exposure: Never     Smokeless tobacco: Never   Vaping Use    Vaping status: Never Used   Substance and Sexual Activity    Alcohol use: No    Drug use: No    Sexual activity: Defer         ALLERGIES  Phenergan [promethazine hcl] and Zosyn [piperacillin-tazobactam in dex]        REVIEW OF SYSTEMS  Systems reviewed and negative      PHYSICAL EXAM    I have reviewed the triage vital signs and nursing notes.    ED Triage Vitals [06/21/24 1223]   Temp Heart Rate Resp BP SpO2   99.1 °F (37.3 °C) 70 17 141/54 96 %      Temp src Heart Rate Source Patient Position BP Location FiO2 (%)   Oral Monitor Sitting Left arm --       Physical Exam  Cardiovascular:      Rate and Rhythm: Normal rate.      Comments: Distal perfusion intact  Musculoskeletal:      Comments: Induration to the distal right calf and lower extremity with mild associated tenderness no open wounds   Skin:     Comments: Wound to right great toe foul smelling no active drainage         LAB RESULTS  Recent Results (from the past 24 hour(s))   Hemoglobin & Hematocrit, Blood    Collection Time: 06/27/24  2:50 PM    Specimen: Blood   Result Value Ref Range    Hemoglobin 7.6 (L) 12.0 - 15.9 g/dL    Hematocrit 24.1 (L) 34.0 - 46.6 %       If labs were ordered, I independently reviewed the results and considered them in treating the patient.        RADIOLOGY  No Radiology Exams Resulted Within Past 24 Hours    PROCEDURES    Procedures    Telemetry Scan   Final Result      Telemetry Scan   Final Result      Telemetry Scan   Final Result      Telemetry Scan   Final Result      Telemetry Scan   Final Result      Telemetry Scan   Final Result      Telemetry Scan   Final Result          MEDICATIONS GIVEN IN ER    Medications   vancomycin 1000 mg/250 mL 0.9% NS (vial-mate) (1,000 mg Intravenous New Bag 6/21/24 1723)   cefepime 2000 mg IVPB in 100 mL NS (VTB) (0 mg Intravenous Stopped 6/21/24 1726)   piperacillin-tazobactam (ZOSYN) IVPB 3.375 g IVPB in 100 mL NS (VTB) (3.375 g Intravenous New Bag  6/21/24 2128)   vancomycin in dextrose 5% 150 mL (VANCOCIN) IVPB 750 mg (750 mg Intravenous New Bag 6/22/24 0902)   vancomycin in dextrose 5% 150 mL (VANCOCIN) IVPB 750 mg (750 mg Intravenous New Bag 6/23/24 0907)   sodium chloride 0.9 % bolus 500 mL (500 mL Intravenous New Bag 6/23/24 1245)         MEDICAL DECISION MAKING, PROGRESS, and CONSULTS    All labs, if obtained, have been independently reviewed by me.  All radiology studies, if obtained, have been reviewed by me and the radiologist dictating the report.  All EKG's, if obtained, have been independently viewed and interpreted by me.      Discussion below represents my analysis of pertinent findings related to patient's condition, differential diagnosis, treatment plan and final disposition.                         Differential diagnosis:    Cellulitis, abscess, diabetic foot infection, DVT, others.      Additional sources:    - Discussed/ obtained information from independent historians:  Family member at bedside    - External (non-ED) record review:  Outpatient records Care Everywhere 05/10/24    - Chronic or social conditions impacting care:  CKD, DM, PVD    - Shared decision making:        Orders placed during this visit:  Orders Placed This Encounter   Procedures    Blood Culture With JUANCHO - Blood,    Blood Culture With JUANCHO - Blood,    MRSA Screen, PCR (Inpatient) - Swab, Nares    XR Toe 2+ View Right    US Venous Doppler Lower Extremity Right (duplex)    CT Lower Extremity Right Without Contrast    US Arterial Doppler Lower Extremity Right    US Renal Bilateral    Basic Metabolic Panel    C-reactive Protein    CBC Auto Differential    Lactic Acid, Plasma    Procalcitonin    Sedimentation Rate    STAT Lactic Acid, Reflex    CBC (No Diff)    Comprehensive Metabolic Panel    Vancomycin, Random    STAT Lactic Acid, Reflex    Vancomycin, Random    Renal Function Panel    Ferritin    Iron Profile    Sodium, Urine, Random - Urine, Clean Catch    Urinalysis With  Microscopic If Indicated (No Culture) - Urine, Clean Catch    Vancomycin, Random    CBC Auto Differential    Ambulatory Referral to Home Health (Hospital)    Inpatient Nephrology Consult    Inpatient Case Management  Consult    Inpatient Nutrition Consult    POC Glucose Once    POC Glucose Once    POC Glucose Once    POC Glucose Once    POC Glucose Once    POC Glucose Once    POC Glucose Once    POC Glucose Once    POC Glucose Once    Telemetry Scan    Telemetry Scan    Telemetry Scan    Telemetry Scan    Telemetry Scan    Telemetry Scan    Telemetry Scan    Wound Ostomy Eval & Treat    Inpatient Admission    Discharge patient    CBC & Differential         Additional orders considered but not ordered:      ED Course:    Patient is a 89-year-old female presenting with lower extremity redness.  She has a history of CKD as well as insulin-dependent diabetes she has had prior toe amputation.  Concern for right great toe osteomyelitis and secondary cellulitis of the right lower extremity.  Clinically does not appear septic however does have white blood cell elevation of 13.6.  CRP is elevated at 7.  Procalcitonin 4.  Renal indices are at baseline.  Mild lactic acidosis.  She was started on broad-spectrum antibiotics.  X-ray of the right great toe does show possible bony erosions on my interpretation which would be consistent with osteomyelitis.  Venous Doppler obtained shows no evidence of DVT.  Patient to be admitted to the hospital discussed with hospitalist.                Consultants:    Hospitalist          AS OF 06:15 EDT VITALS:    BP - 117/47  HR - 73  TEMP - 99 °F (37.2 °C) (Oral)  O2 SATS - 96%                  DIAGNOSIS  Final diagnoses:   Osteomyelitis, unspecified site, unspecified type         DISPOSITION  ED Disposition       ED Disposition   Decision to Admit    Condition   --    Comment   Level of Care: Telemetry [5]   Diagnosis: Osteomyelitis [633831]   Admitting Physician: JOSH ALLEN  IGOVANI [[id number]]   Certification: I Certify That Inpatient Hospital Services Are Medically Necessary For Greater Than 2 Midnights                     Please note that portions of this document were completed with voice recognition software.        Isaac Levine MD  06/28/24 0615

## 2024-06-21 NOTE — PHARMACY RECOMMENDATION
"Pharmacy Consult - Enoxaparin Dosing  Pharmacy was consulted to dose enoxaparin for  Lynne Sanchez, a 89 y.o. female  160 cm (63\") 53.1 kg (117 lb)    Indication: VTE prophylaxis    Allergies  Phenergan [promethazine hcl]    Relevant clinical data and objective history reviewed:   [Ht: 160 cm (63\"); Wt: 53.1 kg (117 lb)]  Body mass index is 20.73 kg/m².  Estimated Creatinine Clearance: 22.7 mL/min (A) (by C-G formula based on SCr of 1.41 mg/dL (H)).  Results from last 7 days   Lab Units 06/21/24  1358   HEMOGLOBIN g/dL 8.1*   HEMATOCRIT % 25.9*   PLATELETS 10*3/mm3 173   CREATININE mg/dL 1.41*       Asessment/Plan  Initiate enoxaparin 30 mg SQ every 24 hours  Pharmacy will monitor renal function and clinical status and adjust the dose and/or frequency as needed.    Thanks,   Verónica Moody, PharmD, BCPS  6/21/2024  19:05 EDT    "

## 2024-06-21 NOTE — H&P
[facility]      Name:  Lynne Sanchez   Age:  89 y.o.  Sex:  female  :  1934  MRN:  [id number]   Visit Number:  [id number]  Admission Date:  2024  Date Of Service:  24  Primary Care Physician:  Elizabeth Easton APRN    Chief Complaint:     Right leg infection    History Of Presenting Illness:      Ms. \"Tanika\" is a 89 female history DM2, history of toe amputations left 1st and second by Dr. Carrington at [facility]. Has had right 1st toe partial amputation as well >6  weeks ago. 1 day history of right leg swelling medial and posteriorly. Right 1st toe stump has been having increased swelling as well chronically. Was supposed to have surgery on it 6 weeks ago just staying bad.  Low grade fever and nausea last night. Full code.    Pertinent findings:  Cr 1.41, , procalcitonin 4.42, right toe septic arthritis/osteomyelitis, Right leg no DVT.    ED Medications:    Medications   vancomycin 1000 mg/250 mL 0.9% NS (vial-mate) (has no administration in time range)   cefepime 2000 mg IVPB in 100 mL NS (VTB) (has no administration in time range)       Edited by: Mick Ortega DO at 2024 1641     Review Of Systems:    All systems were reviewed and negative except as mentioned in history of presenting illness, assessment and plan.    Past Medical History: Patient  has a past medical history of Acute deep vein thrombosis of left upper extremity, Anemia, Asthma, CHF (congestive heart failure), Chronic atrial fibrillation, Deep venous thrombosis of left upper limb, Diabetes mellitus, type 2, Diarrhea, GERD (gastroesophageal reflux disease), Glaucoma, H/O transfusion of whole blood, Heart attack, Heart murmur, History of transfusion, Hyperlipidemia, Hypertension, Kidney disease, Osteomyelitis, Osteomyelitis of left foot (10/03/2017), Peripheral vascular disease, Right retinal detachment, Secondary cataract of both eyes, Stable proliferative " diabetic retinopathy of both eyes, Stroke, Swelling of left extremity, Urinary tract infection, and Wears glasses.    Past Surgical History: Patient  has a past surgical history that includes Appendectomy; Esophagogastroduodenoscopy (N/A, 10/9/2017); Cardiac catheterization (N/A, 10/10/2017); Interventional radiology procedure (N/A, 10/10/2017); Cardiac catheterization (N/A, 10/10/2017); Cardiac catheterization (N/A, 10/10/2017); Colonoscopy; Cardioversion; Cardiac Ablation; Pacemaker Implantation; Bone marrow aspiration; Cardiac electrophysiology procedure (N/A, 5/3/2018); Finger amputation (Left, 7/5/2018); Eye surgery (Bilateral); Finger amputation (Left, 8/27/2018); Esophagogastroduodenoscopy (N/A, 3/22/2022); and Esophagogastroduodenoscopy (N/A, 8/4/2023).    Social History: Patient  reports that she has never smoked. She has never been exposed to tobacco smoke. She has never used smokeless tobacco. She reports that she does not drink alcohol and does not use drugs.    Family History:  Patient's family history has been reviewed and found to be noncontributory.     Allergies:      Phenergan [promethazine hcl]    Home Medications:    Prior to Admission Medications       Prescriptions Last Dose Informant Patient Reported? Taking?    acetaminophen (TYLENOL) 650 MG 8 hr tablet   Yes No    Take 1 tablet by mouth Every 8 (Eight) Hours As Needed for Mild Pain or Moderate Pain. Indications: Pain    aspirin 81 MG EC tablet   Yes No    Take 1 tablet by mouth Daily.    atorvastatin (LIPITOR) 40 MG tablet   Yes No    Take 1 tablet every day by oral route.    epoetin dorcas-epbx (Retacrit) 88771 UNIT/ML injection   Yes No    Inject 1 mL under the skin into the appropriate area as directed. 1 ml sq per month on day 17  Indications: Anemia, Anemia associated with Chronic Kidney Failure    furosemide (LASIX) 20 MG tablet   No No    Take 1 tablet by mouth Daily. Indications: Cardiac Failure    glimepiride (AMARYL) 2 MG tablet   No  "No    TAKE 1 TABLET BY MOUTH IN THE MORNING before breakfast    glucose blood test strip   No No    1 each by Other route Daily. Use as instructed once a day as directed, for Truemetrix reader    latanoprost (XALATAN) 0.005 % ophthalmic solution   No No    Administer 1 drop to both eyes Daily.    Patient taking differently:  Administer 1 drop to both eyes Every Night.    levothyroxine (SYNTHROID, LEVOTHROID) 50 MCG tablet   No No    Take 1 tablet by mouth Every Morning. Indications: Underactive Thyroid    metoprolol tartrate (LOPRESSOR) 100 MG tablet   No No    Take 1 tablet by mouth 2 (Two) Times a Day. Indications: High Blood Pressure Disorder    multivitamin with minerals tablet tablet   Yes No    Take 1 tablet by mouth Daily. Indications: Supplement    pantoprazole (PROTONIX) 40 MG EC tablet   No No    Take 1 tablet by mouth Daily.    saccharomyces boulardii (FLORASTOR) 250 MG capsule   Yes No    Take by oral route.    Sod Picosulfate-Mag Ox-Cit Acd (Clenpiq) 10-3.5-12 MG-GM -GM/175ML solution   No No    Take 1 kit by mouth Take As Directed. Take as directed for colonoscopy prep. Patient has instructions.    Tradjenta 5 MG tablet tablet   No No    TAKE 1 TABLET EVERY DAY    Patient taking differently:  No sig reported              Vital Signs:  Temp:  [99.1 °F (37.3 °C)] 99.1 °F (37.3 °C)  Heart Rate:  [70] 70  Resp:  [17] 17  BP: (140-149)/(54-74) 148/74        06/21/24  1223   Weight: 53.1 kg (117 lb)     Body mass index is 20.73 kg/m².    Physical Exam:     Most recent vital Signs: /74   Pulse 70   Temp 99.1 °F (37.3 °C) (Oral)   Resp 17   Ht 160 cm (63\")   Wt 53.1 kg (117 lb)   SpO2 99%   BMI 20.73 kg/m²     Constitutional: Awake, alert  Eyes: PERRLA, sclerae anicteric, no conjunctival injection  HENT: NCAT, mucous membranes moist  Neck: Supple, no thyromegaly, no lymphadenopathy, trachea midline  Respiratory: Clear to auscultation bilaterally, nonlabored respirations   Cardiovascular: RRR, 3/6 " "SUZANNE no rubs, or gallops, palpable pedal pulses bilaterally  Gastrointestinal: Positive bowel sounds, soft, nontender, nondistended  Musculoskeletal: No bilateral ankle edema, no clubbing or cyanosis to extremities  Psychiatric: Appropriate affect, cooperative  Neurologic: Oriented x 3, speech clear  Skin: right first toe partially absent with dorsal ulceration and swelling no odor or drainage. Erythema and swelling of right leg posteromedially  Edited by: Mick Ortega DO at 6/21/2024 1641      Laboratory data:    I have reviewed the labs done in the emergency room.    Results from last 7 days   Lab Units 06/21/24  1358   SODIUM mmol/L 137   POTASSIUM mmol/L 4.6   CHLORIDE mmol/L 100   CO2 mmol/L 24.5   BUN mg/dL 25*   CREATININE mg/dL 1.41*   CALCIUM mg/dL 9.1   GLUCOSE mg/dL 290*     Results from last 7 days   Lab Units 06/21/24  1358   WBC 10*3/mm3 13.58*   HEMOGLOBIN g/dL 8.1*   HEMATOCRIT % 25.9*   PLATELETS 10*3/mm3 173                                   Invalid input(s): \"USDES\", \"NITRITITE\", \"BACT\", \"EP\"    Pain Management Panel  More data may exist         5/17/2023 4/19/2021   Pain Management Panel   Creatinine, Urine 50  76.7        EKG:      None to review    Radiology:    US Venous Doppler Lower Extremity Right (duplex)    Result Date: 6/21/2024  PROCEDURE: US VENOUS DOPPLER LOWER EXTREMITY RIGHT (DUPLEX)-  HISTORY: Right leg pain, great toe ulcer, r/o DVT  PROCEDURE: Multiple transverse and longitudinal scans were performed of the femoropopliteal deep venous system, with augmentation and compression maneuvers.  FINDINGS: Normal phasic flow was noted in the visualized deep venous system. No intraluminal increased echogenicity is noted to suggest thrombus. There is normal compression and augmentation of the venous structures. No abnormal venous collaterals are seen. No venous reflux is demonstrated.      No evidence of deep venous thrombosis.      Images were reviewed, interpreted, and dictated by " Dr. Xavier Lopez MD Transcribed by Dixie Szymanski PA-C.      XR Toe 2+ View Right    Result Date: 6/21/2024  PROCEDURE: XR TOE 2+ VW RIGHT-  HISTORY: R great toe wound, pain, eval erosion  COMPARISON: None.  FINDINGS:  A 3 view exam was obtained. There is a great toe soft tissue ulcer with soft tissue swelling. There is cortical erosion involving the first distal phalanx and first proximal phalanx with probable destruction of the interphalangeal joint. Findings are suggestive of first IP septic arthritis/osteomyelitis.      Findings suggestive of first interphalangeal joint septic arthritis/osteomyelitis.         Images were reviewed, interpreted, and dictated by Dr. Xavier Lopez MD Transcribed by Dixie Szymanski PA-C.       Assessment/Plan:      Osteomyelitis of right foot    Cellulitis of right leg  -- Patient with right foot and right leg infection complicated by diabetes and anemia.  -- Active Treatments: Vancomycin zosyn  -- Pending Results: follow up cultures, arterial duplex CT leg, Dr. Maharaj to see on Monday not available over the weekend      Stage 3b chronic kidney disease    Type 2 diabetes mellitus with diabetic chronic kidney disease  -- Chronic medical conditions  --Active Treatments: continue insulin/SSI  --Pending Results: follow up nephrology recommendations                      Edited by: Mick Ortega DO at 6/21/2024 1641           Risk Assessment: high  DVT Prophylaxis: lovenox  Code Status:   Code Status and Medical Interventions:   Ordered at: 06/21/24 1638     Code Status (Patient has no pulse and is not breathing):    CPR (Attempt to Resuscitate)     Medical Interventions (Patient has pulse or is breathing):    Full Support      Diet:  Diabetic    Advance Care Planning   ACP discussion was held with the patient during this visit. Patient has an advance directive in EMR which is still valid.            Mick Ortega DO  06/21/24  16:41 EDT    Dictated utilizing Dragon  dictation.

## 2024-06-21 NOTE — TELEPHONE ENCOUNTER
Please call and see what is needed. She has already been referred. Is he wanting a second opinion? Per our referral team Dr. Carrington will not accept these patients any longer.

## 2024-06-22 LAB
ALBUMIN SERPL-MCNC: 2.8 G/DL (ref 3.5–5.2)
ALBUMIN/GLOB SERPL: 1.2 G/DL
ALP SERPL-CCNC: 101 U/L (ref 39–117)
ALT SERPL W P-5'-P-CCNC: 5 U/L (ref 1–33)
ANION GAP SERPL CALCULATED.3IONS-SCNC: 10.5 MMOL/L (ref 5–15)
AST SERPL-CCNC: 15 U/L (ref 1–32)
BILIRUB SERPL-MCNC: 0.8 MG/DL (ref 0–1.2)
BUN SERPL-MCNC: 24 MG/DL (ref 8–23)
BUN/CREAT SERPL: 17.9 (ref 7–25)
CALCIUM SPEC-SCNC: 8.5 MG/DL (ref 8.6–10.5)
CHLORIDE SERPL-SCNC: 105 MMOL/L (ref 98–107)
CO2 SERPL-SCNC: 23.5 MMOL/L (ref 22–29)
CREAT SERPL-MCNC: 1.34 MG/DL (ref 0.57–1)
DEPRECATED RDW RBC AUTO: 62 FL (ref 37–54)
EGFRCR SERPLBLD CKD-EPI 2021: 38 ML/MIN/1.73
ERYTHROCYTE [DISTWIDTH] IN BLOOD BY AUTOMATED COUNT: 17.7 % (ref 12.3–15.4)
GLOBULIN UR ELPH-MCNC: 2.4 GM/DL
GLUCOSE BLDC GLUCOMTR-MCNC: 147 MG/DL (ref 70–130)
GLUCOSE BLDC GLUCOMTR-MCNC: 156 MG/DL (ref 70–130)
GLUCOSE BLDC GLUCOMTR-MCNC: 77 MG/DL (ref 70–130)
GLUCOSE BLDC GLUCOMTR-MCNC: 88 MG/DL (ref 70–130)
GLUCOSE SERPL-MCNC: 74 MG/DL (ref 65–99)
HCT VFR BLD AUTO: 24 % (ref 34–46.6)
HGB BLD-MCNC: 7.4 G/DL (ref 12–15.9)
MCH RBC QN AUTO: 29.1 PG (ref 26.6–33)
MCHC RBC AUTO-ENTMCNC: 30.8 G/DL (ref 31.5–35.7)
MCV RBC AUTO: 94.5 FL (ref 79–97)
MRSA DNA SPEC QL NAA+PROBE: NORMAL
PLATELET # BLD AUTO: 149 10*3/MM3 (ref 140–450)
PMV BLD AUTO: 10.2 FL (ref 6–12)
POTASSIUM SERPL-SCNC: 3.6 MMOL/L (ref 3.5–5.2)
PROT SERPL-MCNC: 5.2 G/DL (ref 6–8.5)
RBC # BLD AUTO: 2.54 10*6/MM3 (ref 3.77–5.28)
SODIUM SERPL-SCNC: 139 MMOL/L (ref 136–145)
VANCOMYCIN SERPL-MCNC: 8.8 MCG/ML (ref 5–40)
WBC NRBC COR # BLD AUTO: 8.94 10*3/MM3 (ref 3.4–10.8)

## 2024-06-22 PROCEDURE — 63710000001 INSULIN LISPRO (HUMAN) PER 5 UNITS: Performed by: INTERNAL MEDICINE

## 2024-06-22 PROCEDURE — 80053 COMPREHEN METABOLIC PANEL: CPT | Performed by: INTERNAL MEDICINE

## 2024-06-22 PROCEDURE — 97162 PT EVAL MOD COMPLEX 30 MIN: CPT

## 2024-06-22 PROCEDURE — 87641 MR-STAPH DNA AMP PROBE: CPT | Performed by: INTERNAL MEDICINE

## 2024-06-22 PROCEDURE — 25010000002 ENOXAPARIN PER 10 MG: Performed by: INTERNAL MEDICINE

## 2024-06-22 PROCEDURE — 82948 REAGENT STRIP/BLOOD GLUCOSE: CPT

## 2024-06-22 PROCEDURE — 85027 COMPLETE CBC AUTOMATED: CPT | Performed by: INTERNAL MEDICINE

## 2024-06-22 PROCEDURE — 25010000002 VANCOMYCIN PER 500 MG: Performed by: INTERNAL MEDICINE

## 2024-06-22 PROCEDURE — 25010000002 PIPERACILLIN SOD-TAZOBACTAM PER 1 G: Performed by: INTERNAL MEDICINE

## 2024-06-22 PROCEDURE — 80202 ASSAY OF VANCOMYCIN: CPT | Performed by: INTERNAL MEDICINE

## 2024-06-22 PROCEDURE — 82948 REAGENT STRIP/BLOOD GLUCOSE: CPT | Performed by: INTERNAL MEDICINE

## 2024-06-22 PROCEDURE — 99232 SBSQ HOSP IP/OBS MODERATE 35: CPT | Performed by: FAMILY MEDICINE

## 2024-06-22 RX ORDER — VANCOMYCIN HYDROCHLORIDE 750 MG/150ML
750 INJECTION, SOLUTION INTRAVENOUS ONCE
Status: COMPLETED | OUTPATIENT
Start: 2024-06-22 | End: 2024-06-22

## 2024-06-22 RX ADMIN — FUROSEMIDE 20 MG: 20 TABLET ORAL at 09:07

## 2024-06-22 RX ADMIN — LEVOTHYROXINE SODIUM 50 MCG: 50 TABLET ORAL at 06:06

## 2024-06-22 RX ADMIN — METOPROLOL TARTRATE 100 MG: 50 TABLET, FILM COATED ORAL at 21:05

## 2024-06-22 RX ADMIN — Medication 500 MG: at 09:02

## 2024-06-22 RX ADMIN — ENOXAPARIN SODIUM 30 MG: 100 INJECTION SUBCUTANEOUS at 21:07

## 2024-06-22 RX ADMIN — Medication 10 ML: at 21:00

## 2024-06-22 RX ADMIN — PIPERACILLIN SODIUM AND TAZOBACTAM SODIUM 3.38 G: 3; .375 INJECTION, POWDER, LYOPHILIZED, FOR SOLUTION INTRAVENOUS at 12:02

## 2024-06-22 RX ADMIN — PANTOPRAZOLE SODIUM 40 MG: 40 TABLET, DELAYED RELEASE ORAL at 09:02

## 2024-06-22 RX ADMIN — Medication 1 TABLET: at 09:02

## 2024-06-22 RX ADMIN — VANCOMYCIN HYDROCHLORIDE 750 MG: 750 INJECTION, SOLUTION INTRAVENOUS at 09:02

## 2024-06-22 RX ADMIN — INSULIN LISPRO 2 UNITS: 100 INJECTION, SOLUTION INTRAVENOUS; SUBCUTANEOUS at 12:01

## 2024-06-22 RX ADMIN — Medication 10 ML: at 09:01

## 2024-06-22 RX ADMIN — LATANOPROST 1 DROP: 50 SOLUTION OPHTHALMIC at 20:59

## 2024-06-22 RX ADMIN — Medication 5 MG: at 20:57

## 2024-06-22 RX ADMIN — METOPROLOL TARTRATE 100 MG: 50 TABLET, FILM COATED ORAL at 09:07

## 2024-06-22 RX ADMIN — ASPIRIN 81 MG: 81 TABLET, COATED ORAL at 09:02

## 2024-06-22 RX ADMIN — PIPERACILLIN SODIUM AND TAZOBACTAM SODIUM 3.38 G: 3; .375 INJECTION, POWDER, LYOPHILIZED, FOR SOLUTION INTRAVENOUS at 18:37

## 2024-06-22 RX ADMIN — PIPERACILLIN SODIUM AND TAZOBACTAM SODIUM 3.38 G: 3; .375 INJECTION, POWDER, LYOPHILIZED, FOR SOLUTION INTRAVENOUS at 02:16

## 2024-06-22 RX ADMIN — ATORVASTATIN CALCIUM 40 MG: 40 TABLET, FILM COATED ORAL at 20:57

## 2024-06-22 NOTE — PLAN OF CARE
Goal Outcome Evaluation:  Plan of Care Reviewed With: patient        Progress: no change       Pt admitted to floor during shift.

## 2024-06-22 NOTE — PLAN OF CARE
Goal Outcome Evaluation:  Plan of Care Reviewed With: patient        Progress: no change     BP soft, MD aware, no changes. Toe open to air, painted with betaine per MD. IV abx infusing. Waiting podiatry consult.

## 2024-06-22 NOTE — CONSULTS
[facility] Consult Note      Patient Name: Lynne Sanchez  : 1934  MRN: [id number]  Primary Care Physician:  Elizabeth Easton APRN  Referring Physician: No ref. provider found  Date of admission: 2024    Subjective     Reason for Consult:  CKD     HPI:   Lynne Sanchez is a 89 y.o. female known to have history of type 2 diabetes mellitus complicated by diabetic neuropathy, history of diabetic foot infection status post left first and second toe amputation, history of chronic kidney disease stage IIIb with baseline creatinine between 1.3 and 1.6 depending on her volume status, history of hypertension, and history of atrial fibrillation who presented to the hospital with swelling of the first toe stump and erythema associated with low-grade fever.  She was admitted with concern for osteomyelitis.  Noted to have procalcitonin of 4.4, creatinine 1.4.  So nephrology was consulted for management.  Denies any nausea or vomiting or fever or chills.  No chest pain.  Not short of breath    Review of Systems:   14 point review of systems is otherwise negative except for mentioned above on HPI    Personal History     Past Medical History:   Diagnosis Date    Acute deep vein thrombosis of left upper extremity     Anemia     Asthma     CHF (congestive heart failure)     Chronic atrial fibrillation     Deep venous thrombosis of left upper limb     Diabetes mellitus, type 2     Diarrhea     GERD (gastroesophageal reflux disease)     Glaucoma     H/O transfusion of whole blood     Heart attack     Heart murmur     History of transfusion     Hyperlipidemia     Hypertension     Kidney disease     patient not aware of what exact diagnosis is     Osteomyelitis     Osteomyelitis of left foot 10/03/2017    Peripheral vascular disease     Right retinal detachment     Secondary cataract of both eyes     Stable proliferative diabetic retinopathy of both eyes     Stroke     Swelling of left  extremity     Urinary tract infection     Wears glasses        Past Surgical History:   Procedure Laterality Date    AMPUTATION DIGIT Left 7/5/2018    Procedure: Left Great toe amputation;  Surgeon: Prakash Carrington MD;  Location:  [facility];  Service: Vascular    AMPUTATION DIGIT Left 8/27/2018    Procedure: SECOND TOE AMPUTATION DIGIT LEFT;  Surgeon: Prakash Carrington MD;  Location:  [facility];  Service: General    APPENDECTOMY      BONE MARROW ASPIRATION      CARDIAC ABLATION      CARDIAC CATHETERIZATION N/A 10/10/2017    Procedure: Peripheral angiography;  Surgeon: Kan Pelaez MD;  Location:  [facility];  Service:     CARDIAC CATHETERIZATION N/A 10/10/2017    Procedure: Angioplasty-peripheral;  Surgeon: Kan Pelaez MD;  Location:  [facility];  Service:     CARDIAC CATHETERIZATION N/A 10/10/2017    Procedure: Atherectomy-peripheral;  Surgeon: Kan Pelaez MD;  Location: [facility] CATH INVASIVE LOCATION;  Service:     CARDIAC ELECTROPHYSIOLOGY PROCEDURE N/A 5/3/2018    Procedure: generator change;  Surgeon: Zan Poole MD;  Location:  [facility];  Service: Cardiovascular    CARDIOVERSION      COLONOSCOPY      ENDOSCOPY N/A 10/9/2017    Procedure: ESOPHAGOGASTRODUODENOSCOPY WITH COLD FORCEP BIOPSY;  Surgeon: Clifton Hyatt MD;  Location: [facility] ENDOSCOPY;  Service:     ENDOSCOPY N/A 3/22/2022    Procedure: ESOPHAGOGASTRODUODENOSCOPY with AVM cautery;  Surgeon: Clarisse Velez MD;  Location: [facility] ENDOSCOPY;  Service: Gastroenterology;  Laterality: N/A;    ENDOSCOPY N/A 8/4/2023    Procedure: ESOPHAGOGASTRODUODENOSCOPY WITH BIOPSY AND RUTH BRUSHING;  Surgeon: Clarisse Velez MD;  Location: [facility] ENDOSCOPY;  Service: Gastroenterology;  Laterality: N/A;    EYE SURGERY Bilateral     CATARACTS    INTERVENTIONAL RADIOLOGY PROCEDURE N/A 10/10/2017    Procedure: Abdominal Aortagram with Runoff;  Surgeon: Kan Pelaez,  MD;  Location: [facility] INV[facility];  Service:     PACEMAKER IMPLANTATION      around 2008 then replaced 2018       Family History: family history includes Arthritis in an other family member; No Known Problems in her father and mother.    Social History:  reports that she has never smoked. She has never been exposed to tobacco smoke. She has never used smokeless tobacco. She reports that she does not drink alcohol and does not use drugs.    Home Medications:  Prior to Admission medications    Medication Sig Start Date End Date Taking? Authorizing Provider   acetaminophen (TYLENOL) 650 MG 8 hr tablet Take 1 tablet by mouth Every 8 (Eight) Hours As Needed for Mild Pain or Moderate Pain. Indications: Pain 4/20/24  Yes ProviderBrigid MD   aspirin 81 MG EC tablet Take 1 tablet by mouth Daily.   Yes Brigid Armstrong MD   atorvastatin (LIPITOR) 40 MG tablet Take 1 tablet every day by oral route. 4/20/24  Yes Brigid Armstrong MD   epoetin dorcas-epbx (Retacrit) 99917 UNIT/ML injection Inject 1 mL under the skin into the appropriate area as directed. 1 ml sq per month on day 17  Indications: Anemia, Anemia associated with Chronic Kidney Failure 4/20/24  Yes Brigid Armstrong MD   furosemide (LASIX) 20 MG tablet Take 1 tablet by mouth Daily. Indications: Cardiac Failure 6/13/24  Yes Elizabeth Easton APRN   glimepiride (AMARYL) 2 MG tablet TAKE 1 TABLET BY MOUTH IN THE MORNING before breakfast 3/14/24  Yes Elizabeth Easton APRN   glucose blood test strip 1 each by Other route Daily. Use as instructed once a day as directed, for Truemetrix reader 1/4/21  Yes Dia Main PA-C   latanoprost (XALATAN) 0.005 % ophthalmic solution Administer 1 drop to both eyes Daily.  Patient taking differently: Administer 1 drop to both eyes Every Night. 4/24/20  Yes Dia Main PA-C   levothyroxine (SYNTHROID, LEVOTHROID) 50 MCG tablet Take 1 tablet by mouth Every Morning. Indications:  Underactive Thyroid 5/17/23  Yes Elizabeth Easton APRN   metoprolol tartrate (LOPRESSOR) 100 MG tablet Take 1 tablet by mouth 2 (Two) Times a Day. Indications: High Blood Pressure Disorder 5/22/24  Yes Elizabeth Easton APRN   multivitamin with minerals tablet tablet Take 1 tablet by mouth Daily. Indications: Supplement 4/20/24  Yes Provider, Historical, MD   pantoprazole (PROTONIX) 40 MG EC tablet Take 1 tablet by mouth Daily. 5/17/23  Yes Elizabeth Easton APRN   Sod Picosulfate-Mag Ox-Cit Acd (Clenpiq) 10-3.5-12 MG-GM -GM/175ML solution Take 1 kit by mouth Take As Directed. Take as directed for colonoscopy prep. Patient has instructions.  Patient taking differently: Take 1 kit by mouth Take As Directed. Take as directed for colonoscopy prep. Patient has instructions.    Pt not taking 7/13/23  Yes Isabel Hightower APRN   Tradjenta 5 MG tablet tablet TAKE 1 TABLET EVERY DAY  Patient taking differently: No sig reported 7/5/23  Yes Elizabeth Easton APRN   saccharomyces boulardii (FLORASTOR) 250 MG capsule Take by oral route.    Provider, Historical, MD       Allergies:  Allergies   Allergen Reactions    Phenergan [Promethazine Hcl] Confusion       Objective     Vitals:   Temp:  [97.3 °F (36.3 °C)-99.3 °F (37.4 °C)] 97.3 °F (36.3 °C)  Heart Rate:  [70-76] 73  Resp:  [12-18] 12  BP: (106-151)/(44-75) 106/44    Intake/Output Summary (Last 24 hours) at 6/22/2024 1239  Last data filed at 6/21/2024 1726  Gross per 24 hour   Intake 100 ml   Output --   Net 100 ml       Physical Exam:   Constitutional: Alert awake oriented pleasant  HEENT: Sclera anicteric, no conjunctival injection  Neck: Supple, no thyromegaly, no lymphadenopathy, trachea at midline, no JVD  Respiratory: Clear to auscultation bilaterally, nonlabored respiration  Cardiovascular: RRR, no murmurs, no rubs or gallops, no carotid bruit  Gastrointestinal: Positive bowel sounds, abdomen is soft, nontender and nondistended  : No palpable  bladder  Musculoskeletal: No edema, no clubbing or cyanosis  Psychiatric: Appropriate affect, cooperative  Neurologic: Oriented x3, moving all extremities, normal speech and mental status  Skin: Warm and dry       Scheduled Meds:     aspirin, 81 mg, Oral, Daily  atorvastatin, 40 mg, Oral, Nightly  enoxaparin, 30 mg, Subcutaneous, Nightly  furosemide, 20 mg, Oral, Daily  insulin lispro, 2-9 Units, Subcutaneous, 4x Daily AC & at Bedtime  latanoprost, 1 drop, Both Eyes, Nightly  levothyroxine, 50 mcg, Oral, Q AM  melatonin, 5 mg, Oral, Nightly  metoprolol tartrate, 100 mg, Oral, BID  multivitamin with minerals, 1 tablet, Oral, Daily  pantoprazole, 40 mg, Oral, Daily  piperacillin-tazobactam, 3.375 g, Intravenous, Q8H  saccharomyces boulardii, 500 mg, Oral, Daily  sodium chloride, 10 mL, Intravenous, Q12H  vancomycin (dosing per levels), , Does not apply, Daily      IV Meds:   Pharmacy to Dose enoxaparin (LOVENOX),   Pharmacy to dose vancomycin,   Pharmacy to Dose Zosyn,         Results Reviewed:   I have personally reviewed the results from the time of this admission to 6/22/2024 12:39 EDT     Lab Results   Component Value Date    GLUCOSE 74 06/22/2024    CALCIUM 8.5 (L) 06/22/2024     06/22/2024    K 3.6 06/22/2024    CO2 23.5 06/22/2024     06/22/2024    BUN 24 (H) 06/22/2024    CREATININE 1.34 (H) 06/22/2024    EGFRIFAFRI 58 (L) 03/03/2022    EGFRIFNONA 48 (L) 03/03/2022    BCR 17.9 06/22/2024    ANIONGAP 10.5 06/22/2024      Lab Results   Component Value Date    MG 2.7 (H) 04/16/2022    PHOS 3.8 05/02/2024    ALBUMIN 2.8 (L) 06/22/2024           Assessment / Plan     ASSESSMENT:  CKD stage IIIb with baseline creatinine around 1.3-1.6 creatinine has been  currently at baseline.  Etiology of chronic kidney disease is likely secondary to hypertension and diabetes in addition to history of NSAID use.  Hypertension with CKD: Blood pressure is well-controlled  Type 2 diabetes mellitus with  CKD  Osteomyelitis  Anemia of CKD      PLAN:  Kidney function continues to be at baseline.  Instructed patient to increase fluid intake  Continue to monitor will check labs in a.m.  Check iron panel      Thank you for involving us in the care of Lynne Sanchez.  Please feel free to call with any questions.    Jack Rowe MD  06/22/24  12:39 EDT    [facility]  [phone]    Parts of this note may be an electronic transcription/translation of spoken language to printed text using the Dragon dictation system.

## 2024-06-22 NOTE — PLAN OF CARE
Goal Outcome Evaluation:  Plan of Care Reviewed With: patient           Outcome Evaluation: Patient did well overnight. Vital signs stable and will continue to monitor. Wound orders in. No overnight events to report. Nephrology to follow.

## 2024-06-22 NOTE — PROGRESS NOTES
"Pharmacy Consult - Vancomycin Dosing    Pharmacy was consulted to dose vancomycin for  Lynne Sanchez, a 89 y.o. female  160 cm (63\") 53.1 kg (117 lb)    Indication: Bone and/or Joint Infection  Consulting Provider: Dr. Ortega    Goal AUC: 400-600 mg/L*hr.  Goal Trough: 15 - 20 mcg/ml    Labs  Results from last 7 days   Lab Units 06/22/24  0618 06/21/24  1358   WBC 10*3/mm3 8.94 13.58*   CREATININE mg/dL 1.34* 1.41*      Estimated Creatinine Clearance: 23.9 mL/min (A) (by C-G formula based on SCr of 1.34 mg/dL (H)).  Temp Readings from Last 1 Encounters:   06/22/24 99.1 °F (37.3 °C) (Oral)       Results from last 7 days   Lab Units 06/22/24  0618   VANCOMYCIN RM mcg/mL 8.80               Other Antimicrobials    Zosyn 3.375 g IV every 8 hours      Assessment/Plan    Patient received loading dose of vancomycin 1000 mg IV.  Initiate maintenance dose of vancomycin daily as needed to maintain a target trough 15 - 20 mcg/ml. Will give vancomycin 15mg/kg, 750 mg IV, x 1.  Assess clearance by vancomycin level on 6/23 @ 0600.  Pharmacy will continue to monitor renal function, cultures and sensitivities, and clinical status to adjust regimen as necessary.      Thank you,  Lynne Friedman, PharmD  06/22/24 08:09 EDT      "

## 2024-06-22 NOTE — THERAPY EVALUATION
Patient Name: Lynne Sanchez  : 1934    MRN: [id number]                              Today's Date: 2024       Admit Date: 2024    Visit Dx: No diagnosis found.  Patient Active Problem List   Diagnosis    Chronic atrial fibrillation    Valvular heart disease    Diabetes mellitus type II, controlled    Osteomyelitis of right foot    Normocytic anemia    Chronic gastritis    Cerebrovascular accident (CVA) due to thrombosis of left anterior cerebral artery    Thrombocytopenia    Cardiac pacemaker in situ    Chronic congestive heart failure    Functional heart murmur    Glaucoma    Hyperlipidemia    Hypertensive disorder    Acquired hypothyroidism    Mass of parotid gland    Neuropathy    Chronic renal impairment, stage 4 (severe)    Impairment of balance    Impaired mobility    Muscle weakness of lower extremity    Chronic pain of both knees    Secondary cataract of both eyes    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    Suspected glaucoma of both eyes    Secondary cataract of both eyes    Right retinal detachment    Stable proliferative diabetic retinopathy of both eyes    Swelling of left extremity    Closed nondisplaced fracture of surgical neck of left humerus with routine healing    Debility    Left arm swelling    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    Secondary hyperparathyroidism of renal origin    Vitamin D deficiency    Anemia requiring transfusions    Melena    Acute on chronic anemia    Stage 3b chronic kidney disease    DM complication    Anemia of chronic renal failure    Type 2 diabetes mellitus with diabetic chronic kidney disease    Anemia of chronic renal failure    Absolute anemia    Secondary hyperparathyroidism of renal origin    Stage 3b chronic kidney disease    Vitamin D deficiency    Other iron deficiency anemias    Chronic anemia    Gastroesophageal reflux disease    Erosion of ileum    Weight loss     Cellulitis of right leg    Osteomyelitis     Past Medical History:   Diagnosis Date    Acute deep vein thrombosis of left upper extremity     Anemia     Asthma     CHF (congestive heart failure)     Chronic atrial fibrillation     Deep venous thrombosis of left upper limb     Diabetes mellitus, type 2     Diarrhea     GERD (gastroesophageal reflux disease)     Glaucoma     H/O transfusion of whole blood     Heart attack     Heart murmur     History of transfusion     Hyperlipidemia     Hypertension     Kidney disease     patient not aware of what exact diagnosis is     Osteomyelitis     Osteomyelitis of left foot 10/03/2017    Peripheral vascular disease     Right retinal detachment     Secondary cataract of both eyes     Stable proliferative diabetic retinopathy of both eyes     Stroke     Swelling of left extremity     Urinary tract infection     Wears glasses      Past Surgical History:   Procedure Laterality Date    AMPUTATION DIGIT Left 7/5/2018    Procedure: Left Great toe amputation;  Surgeon: Prakash Carrington MD;  Location:  [facility];  Service: Vascular    AMPUTATION DIGIT Left 8/27/2018    Procedure: SECOND TOE AMPUTATION DIGIT LEFT;  Surgeon: Prakash Carrington MD;  Location:  [facility];  Service: General    APPENDECTOMY      BONE MARROW ASPIRATION      CARDIAC ABLATION      CARDIAC CATHETERIZATION N/A 10/10/2017    Procedure: Peripheral angiography;  Surgeon: Kan Pelaez MD;  Location:  [facility];  Service:     CARDIAC CATHETERIZATION N/A 10/10/2017    Procedure: Angioplasty-peripheral;  Surgeon: Kan Pelaez MD;  Location:  [facility];  Service:     CARDIAC CATHETERIZATION N/A 10/10/2017    Procedure: Atherectomy-peripheral;  Surgeon: Kan Pelaez MD;  Location:  [facility];  Service:     CARDIAC ELECTROPHYSIOLOGY PROCEDURE N/A 5/3/2018    Procedure: generator change;  Surgeon: Zan Poole MD;  Location:  [facility];  Service: Cardiovascular    CARDIOVERSION      COLONOSCOPY      ENDOSCOPY N/A 10/9/2017    Procedure: ESOPHAGOGASTRODUODENOSCOPY WITH COLD FORCEP BIOPSY;  Surgeon: Clifotn Hyatt MD;  Location: [facility];  Service:     ENDOSCOPY N/A 3/22/2022    Procedure: ESOPHAGOGASTRODUODENOSCOPY with AVM cautery;  Surgeon: Clarisse Velez MD;  Location: [facility];  Service: Gastroenterology;  Laterality: N/A;    ENDOSCOPY N/A 8/4/2023    Procedure: ESOPHAGOGASTRODUODENOSCOPY WITH BIOPSY AND RUTH BRUSHING;  Surgeon: Clarisse Velez MD;  Location: [facility];  Service: Gastroenterology;  Laterality: N/A;    EYE SURGERY Bilateral     CATARACTS    INTERVENTIONAL RADIOLOGY PROCEDURE N/A 10/10/2017    Procedure: Abdominal Aortagram with Runoff;  Surgeon: Kan Pelaez MD;  Location: [facility];  Service:     PACEMAKER IMPLANTATION      around 2008 then replaced 2018      General Information       Row Name 06/22/24 1249          Physical Therapy Time and Intention    Document Type evaluation  -     Mode of Treatment physical therapy  -       Row Name 06/22/24 1249          General Information    Patient Profile Reviewed yes  -     Prior Level of Function mod assist:;ADL's;all household mobility;community mobility  -     Existing Precautions/Restrictions fall  -     Barriers to Rehab medically complex;previous functional deficit  -       Row Name 06/22/24 1249          Living Environment    People in Home spouse  -       Row Name 06/22/24 1249          Home Main Entrance    Number of Stairs, Main Entrance two  -     Stair Railings, Main Entrance railings safe and in good condition  -       Row Name 06/22/24 1249          Stairs Within Home, Primary    Number of Stairs, Within Home, Primary other (see comments)  stair lift  -       Row Name 06/22/24 1249          Cognition    Orientation Status (Cognition) oriented x 4  -       Row  Name 06/22/24 1249          Safety Issues, Functional Mobility    Safety Issues Affecting Function (Mobility) awareness of need for assistance;friction/shear risk;insight into deficits/self-awareness;positioning of assistive device  -     Impairments Affecting Function (Mobility) endurance/activity tolerance  -               User Key  (r) = Recorded By, (t) = Taken By, (c) = Cosigned By      Initials Name Provider Type    Jaswinder Beltran, PT Physical Therapist                   Mobility       Row Name 06/22/24 1252          Bed Mobility    Bed Mobility bed mobility (all) activities  -     All Activities, [place] (Bed Mobility) modified independence  -     Assistive Device (Bed Mobility) bed rails;head of bed elevated  -       Row Name 06/22/24 1252          Sit-Stand Transfer    Sit-Stand [place] (Transfers) minimum assist (75% patient effort)  -     Assistive Device (Sit-Stand Transfers) walker, front-wheeled  -       Row Name 06/22/24 1252          Gait/Stairs (Locomotion)    [place] Level (Gait) contact guard  -     Assistive Device (Gait) walker, front-wheeled  -     Patient was able to Ambulate yes  -     Distance in Feet (Gait) 40  -MK     Deviations/Abnormal Patterns (Gait) festinating/shuffling;gait speed decreased  -     Bilateral Gait Deviations forward flexed posture  -               User Key  (r) = Recorded By, (t) = Taken By, (c) = Cosigned By      Initials Name Provider Type    Jaswinder Beltran PT Physical Therapist                   Obj/Interventions       Row Name 06/22/24 1253          Range of Motion Comprehensive    General Range of Motion no range of motion deficits identified  -       Row Name 06/22/24 1253          Strength Comprehensive (MMT)    General Manual Muscle Testing (MMT) Assessment lower extremity strength deficits identified  -     Comment, General Manual Muscle Testing (MMT) Assessment B LE 4/5  -       Row Name 06/22/24 125           Balance    Balance Assessment sitting static balance;sitting dynamic balance;sit to stand dynamic balance;standing static balance;standing dynamic balance  -MK     Static Sitting Balance modified independence  -MK     Dynamic Sitting Balance modified independence  -MK     Position, Sitting Balance unsupported;sitting edge of bed  -MK     Sit to Stand Dynamic Balance minimal assist  -MK     Static Standing Balance contact guard  -MK     Dynamic Standing Balance contact guard  -MK     Position/Device Used, Standing Balance walker, front-wheeled  -MK     Balance Interventions sitting;standing;sit to stand  -MK               User Key  (r) = Recorded By, (t) = Taken By, (c) = Cosigned By      Initials Name Provider Type    MK Jaswinder Barry, PT Physical Therapist                   Goals/Plan       Row Name 06/22/24 1302          Transfer Goal 1 (PT)    Activity/Assistive Device (Transfer Goal 1, PT) transfers, all  -MK     [place] Level/Cues Needed (Transfer Goal 1, PT) independent  -MK     Time Frame (Transfer Goal 1, PT) long term goal (LTG);10 days  -MK     Progress/Outcome (Transfer Goal 1, PT) goal ongoing  -       Row Name 06/22/24 1302          Gait Training Goal 1 (PT)    Activity/Assistive Device (Gait Training Goal 1, PT) gait (walking locomotion)  -     [place] Level (Gait Training Goal 1, PT) independent  -MK     Distance (Gait Training Goal 1, PT) 100 ft  -MK     Time Frame (Gait Training Goal 1, PT) long term goal (LTG);10 days  -MK     Progress/Outcome (Gait Training Goal 1, PT) goal ongoing  -       Row Name 06/22/24 1302          Patient Education Goal (PT)    Activity (Patient Education Goal, PT) Pt to participate in B LE ther ex program at least 3x per week  -MK     [place]abel/Cues/Accuracy (Memory Goal 2, PT) demonstrates adequately  -MK     Time Frame (Patient Education Goal, PT) long term goal (LTG);10 days  -MK     Progress/Outcome (Patient Education Goal, PT) goal ongoing  -        Row Name 06/22/24 1302          Therapy Assessment/Plan (PT)    Planned Therapy Interventions (PT) balance training;bed mobility training;gait training;home exercise program;manual therapy techniques;neuromuscular re-education;patient/family education;strengthening;transfer training  -               User Key  (r) = Recorded By, (t) = Taken By, (c) = Cosigned By      Initials Name Provider Type     Jaswinder Barry, PT Physical Therapist                   Clinical Impression       Row Name 06/22/24 1252          Pain    Pretreatment Pain Rating 0/10 - no pain  -     Posttreatment Pain Rating 0/10 - no pain  -     Additional Documentation Pain Scale: Numbers Pre/Post-Treatment (Group)  -       Row Name 06/22/24 1251          Plan of Care Review    Plan of Care Reviewed With patient  -     Progress no change  -     Outcome Evaluation Pt participated well in PT evaluation. Pt supine upon arrival, agreeable to PT. Pt on bed pan with PCT. Pt rolled B to get off bed pan, dependent for perineal hygeine. Pt came from supine to sitting EOB mod I. Pt sat EOB mod I. Pt completed STS min A using RW and ambulated 40 ft CGA using RW. Pt demonstrates extremely forward flexed posture and needed cues to get closer to RW. Pt returned to room and was left supine with all needs met. Pt expected to benefit from skilled PT during this inpatient stay to maximize functional mobility and independence.  -       Row Name 06/22/24 5278          Therapy Assessment/Plan (PT)    Patient/Family Therapy Goals Statement (PT) go home  -     Rehab Potential (PT) good, to achieve stated therapy goals  -     Criteria for Skilled Interventions Met (PT) yes;skilled treatment is necessary  -     Therapy Frequency (PT) 5 times/wk  -     Predicted Duration of Therapy Intervention (PT) 2 weeks  -       Row Name 06/22/24 1256          Vital Signs    O2 Delivery Pre Treatment room air  -     O2 Delivery Intra Treatment room air  -      O2 Delivery Post Treatment room air  -     Pre Patient Position Supine  -     Intra Patient Position Standing  -MK     Post Patient Position Supine  -       Row Name 06/22/24 1254          Positioning and Restraints    Pre-Treatment Position in bed  -MK     Post Treatment Position bed  -MK     In Bed supine;call light within reach;encouraged to call for assist  -               User Key  (r) = Recorded By, (t) = Taken By, (c) = Cosigned By      Initials Name Provider Type    Jaswinder Beltran, OSMAR Physical Therapist                   Outcome Measures       Row Name 06/22/24 1303          How much help from another person do you currently need...    Turning from your back to your side while in flat bed without using bedrails? 4  -MK     Moving from lying on back to sitting on the side of a flat bed without bedrails? 4  -MK     Moving to and from a bed to a chair (including a wheelchair)? 3  -MK     Standing up from a chair using your arms (e.g., wheelchair, bedside chair)? 3  -MK     Climbing 3-5 steps with a railing? 2  -MK     To walk in hospital room? 3  -     AM-PAC 6 Clicks Score (PT) 19  -     Highest Level of Mobility Goal 6 --> Walk 10 steps or more  -       Row Name 06/22/24 1303          Functional Assessment    Outcome Measure Options AM-PAC 6 Clicks Basic Mobility (PT)  -               User Key  (r) = Recorded By, (t) = Taken By, (c) = Cosigned By      Initials Name Provider Type    Jaswinder Beltran, PT Physical Therapist                                 Physical Therapy Education       Title: PT OT SLP Therapies (In Progress)       Topic: Physical Therapy (In Progress)       Point: Mobility training (Done)       Learning Progress Summary             Patient Acceptance, E,D, DU,VU by  at 6/22/2024 1304                         Point: Home exercise program (Not Started)       Learner Progress:  Not documented in this visit.              Point: Body mechanics (Done)       Learning Progress  Summary             Patient Acceptance, E,D, DU,VU by  at 6/22/2024 1304                         Point: Precautions (Not Started)       Learner Progress:  Not documented in this visit.                              User Key       Initials Effective Dates Name Provider Type Discipline     06/13/23 -  Jaswinder Barry, PT Physical Therapist PT                  PT Recommendation and Plan  Planned Therapy Interventions (PT): balance training, bed mobility training, gait training, home exercise program, manual therapy techniques, neuromuscular re-education, patient/family education, strengthening, transfer training  Plan of Care Reviewed With: patient  Progress: no change  Outcome Evaluation: Pt participated well in PT evaluation. Pt supine upon arrival, agreeable to PT. Pt on bed pan with PCT. Pt rolled B to get off bed pan, dependent for perineal hygeine. Pt came from supine to sitting EOB mod I. Pt sat EOB mod I. Pt completed STS min A using RW and ambulated 40 ft CGA using RW. Pt demonstrates extremely forward flexed posture and needed cues to get closer to RW. Pt returned to room and was left supine with all needs met. Pt expected to benefit from skilled PT during this inpatient stay to maximize functional mobility and independence.     Time Calculation:   PT Evaluation Complexity  History, PT Evaluation Complexity: 3 or more personal factors and/or comorbidities  Examination of Body Systems (PT Eval Complexity): total of 3 or more elements  Clinical Presentation (PT Evaluation Complexity): evolving  Clinical Decision Making (PT Evaluation Complexity): moderate complexity  Overall Complexity (PT Evaluation Complexity): moderate complexity     PT Charges       Row Name 06/22/24 1056             Time Calculation    Start Time 1056  -MK      PT Received On 06/22/24  -      PT Goal Re-Cert Due Date 07/02/24  -                User Key  (r) = Recorded By, (t) = Taken By, (c) = Cosigned By      Initials Name Provider  Type    MK Jaswinder Barry, PT Physical Therapist                  Therapy Charges for Today       Code Description Service Date Service Provider Modifiers Qty    44727200571 HC PT EVAL MOD COMPLEXITY 4 6/22/2024 Jaswinder Barry, PT GP 1            PT G-Codes  Outcome Measure Options: AM-PAC 6 Clicks Basic Mobility (PT)  AM-PAC 6 Clicks Score (PT): 19  PT Discharge Summary  Anticipated Discharge Disposition (PT): home with home health    Jaswinder Barry PT  6/22/2024

## 2024-06-22 NOTE — PLAN OF CARE
Goal Outcome Evaluation:  Plan of Care Reviewed With: patient        Progress: no change  Outcome Evaluation: Pt participated well in PT evaluation. Pt supine upon arrival, agreeable to PT. Pt on bed pan with PCT. Pt rolled B to get off bed pan, dependent for perineal hygeine. Pt came from supine to sitting EOB mod I. Pt sat EOB mod I. Pt completed STS min A using RW and ambulated 40 ft CGA using RW. Pt demonstrates extremely forward flexed posture and needed cues to get closer to RW. Pt returned to room and was left supine with all needs met. Pt expected to benefit from skilled PT during this inpatient stay to maximize functional mobility and independence.      Anticipated Discharge Disposition (PT): home with home health

## 2024-06-22 NOTE — PAYOR COMM NOTE
"  INPATIENT NOTIFICATION  UR MANAGER; BRAD TRUJILLO, [phone] AND [phone]        Mel Sanchez \"Bindu\" (89 y.o. Female)       Date of Birth   1934    Social Security Number       Address   [address] [place] [address]    Home Phone   [phone]    MRN   [id number]       [facility]    Marital Status                               Admission Date   6/21/24    Admission Type   Emergency    Admitting Provider   Mick Ortega DO    Attending Provider   Yannick Rojas DO    Department, Room/Bed   [facility] TELEMETRY 3, 323/1       Discharge Date       Discharge Disposition       Discharge Destination                                 Attending Provider: Yannick Rojas DO    Allergies: Phenergan [Promethazine Hcl]    Isolation: None   Infection: None   Code Status: CPR    Ht: 160 cm (63\")   Wt: 53.1 kg (117 lb)    Admission Cmt: None   Principal Problem: Osteomyelitis of right foot [M86.9]                   Active Insurance as of 6/21/2024       Primary Coverage       Payor Plan Insurance Group Employer/Plan Group    [employer]NA MEDICARE REPLACEMENT AETNA MED ADV PPO [id number]       Payor Plan Address Payor Plan Phone Number Payor Plan Fax Number Effective Dates    [address] [phone]  1/1/2024 - None Entered    [facility] [id number]         Subscriber Name Subscriber Birth Date Member ID       MEL SANCHEZ 1934 [id number]                     Emergency Contacts        (Rel.) Home Phone Work Phone Mobile Phone    LISA PEREZ (Daughter) -- -- [phone]    rBennan Mendenhall (Spouse) [phone] -- [phone]    Marco A Sanchez (Son) [phone] -- [phone]    MARILU SANCHEZ (Son) [phone] -- --              Physician Progress Notes (last 24 hours)  Notes from 06/21/24 1009 through 06/22/24 1009   No notes of this type exist for this encounter.       "

## 2024-06-22 NOTE — PROGRESS NOTES
[facility]    Name:  Lynne Sanchez   Age:  89 y.o.  Sex:  female  :  1934  MRN:  [id number]   Visit Number:  [id number]  Admission Date:  2024  Date Of Service:  24  Primary Care Physician:  Elizabeth Easton APRN     LOS: 1 day :    Chief Complaint:      Right leg infection    Subjective:    Patient seen and evaluated.  No acute complaints.  IV antibiotics currently ongoing.  Surgical evaluation pending on Monday.    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 Carrington at [facility]. Has had right 1st toe partial amputation as well >6  weeks ago. 1 day history of right leg swelling medial and posteriorly. Right 1st toe stump has been having increased swelling as well chronically. Was supposed to have surgery on it 6 weeks ago just staying bad.  Low grade fever and nausea last night. Full code.     Pertinent findings:  Cr 1.41, , procalcitonin 4.42, right toe septic arthritis/osteomyelitis, Right leg no DVT.    Review of Systems:     All systems were reviewed and negative except as mentioned in subjective, assessment and plan.    Vital Signs:    Temp:  [97.3 °F (36.3 °C)-99.3 °F (37.4 °C)] 97.3 °F (36.3 °C)  Heart Rate:  [70-76] 73  Resp:  [12-18] 12  BP: (106-151)/(44-75) 106/44    Intake and output:    I/O last 3 completed shifts:  In: 100 [IV Piggyback:100]  Out: -   No intake/output data recorded.    Physical Examination:    General Appearance:  Alert and cooperative.  Chronically ill-appearing.   Head:  Atraumatic and normocephalic.   Eyes: Conjunctivae and sclerae normal, no icterus. No pallor.   Throat: No oral lesions, no thrush, oral mucosa moist.   Neck: Supple, trachea midline, no thyromegaly.   Lungs:   Breath sounds heard bilaterally equally.  No wheezing or crackles. No Pleural rub or bronchial breathing.   Heart:  Normal S1 and S2, no murmur, no gallop, no rub.  "No JVD.   Abdomen:   Normal bowel sounds, no masses, no organomegaly. Soft, nontender, nondistended, no rebound tenderness.   Extremities: Supple, no edema, no cyanosis, no clubbing.   Skin: No bleeding or rash.  Right first toe partially absent, significant ulceration/swelling/purulent drainage noted on affected first toe.   Neurologic: Alert and oriented x 3. No facial asymmetry. Moves all four limbs. No tremors.      Laboratory results:    Results from last 7 days   Lab Units 06/22/24  0618 06/21/24  1358   SODIUM mmol/L 139 137   POTASSIUM mmol/L 3.6 4.6   CHLORIDE mmol/L 105 100   CO2 mmol/L 23.5 24.5   BUN mg/dL 24* 25*   CREATININE mg/dL 1.34* 1.41*   CALCIUM mg/dL 8.5* 9.1   BILIRUBIN mg/dL 0.8  --    ALK PHOS U/L 101  --    ALT (SGPT) U/L 5  --    AST (SGOT) U/L 15  --    GLUCOSE mg/dL 74 290*     Results from last 7 days   Lab Units 06/22/24  0618 06/21/24  1358   WBC 10*3/mm3 8.94 13.58*   HEMOGLOBIN g/dL 7.4* 8.1*   HEMATOCRIT % 24.0* 25.9*   PLATELETS 10*3/mm3 149 173             Results from last 7 days   Lab Units 06/21/24  1619   BLOODCX  No growth at less than 24 hours  No growth at less than 24 hours     No results for input(s): \"PHART\", \"DDW1HLD\", \"PO2ART\", \"EWV6FPQ\", \"BASEEXCESS\" in the last 8760 hours.   I have reviewed the patient's laboratory results.    Radiology results:    US Arterial Doppler Lower Extremity Right    Result Date: 6/21/2024  FINAL REPORT CLINICAL HISTORY: osteomyelitis right foot FINDINGS: ARTERIAL DOPPLER OF THE RIGHT LOWER EXTREMITY     Spectral and color Doppler exam of major artery branches     There is monophasic flow throughout most of the right lower extremity consistent with significant peripheral vascular disease.  CTA is recommended.  CONCLUSION: Monophasic flow throughout most of the right lower extremity consistent with significant peripheral vascular disease.  CTA is recommended.     Impression: Authenticated and Electronically Signed by Heena Mata MD on " 06/21/2024 07:15:54 PM    CT Lower Extremity Right Without Contrast    Result Date: 6/21/2024  FINAL REPORT CLINICAL HISTORY: assess for abscess  wound on foot, rt big toe FINDINGS: CT RIGHT LOWER EXTREMITY WITHOUT CONTRAST  Technique: Axial images through the right lower extremity were performed by computed tomography.  Sagittal and coronal reconstruction images were performed. This study was performed with techniques to keep radiation doses as low as reasonably achievable (ALARA). Individualized dose reduction techniques using automated exposure control or adjustment of mA and/or kV according to the patient's size were employed.  There is a soft tissue deformity at the tip of the right great toe extending into the distal phalanx.  There appears to be air in the distal phalanx concerning for osteomyelitis.  There is diffuse soft tissue swelling of the great toe.  No definite fluid collection is visualized but no contrast was administered.  There is mild soft tissue swelling of the dorsum of the foot.  There is narrowing and irregularity of multiple tarsal joints.  There are vascular calcifications.  There are hammertoes deformities of the second through fifth toes.  No fracture is identified.     Impression: Soft tissue deformity at the tip of the right great toe extending into the distal phalanx.  No johnny fluid collection, no contrast administered.  Degenerative changes and soft tissue swelling. Authenticated and Electronically Signed by Heena Mata MD on 06/21/2024 06:22:18 PM    XR Toe 2+ View Right    Result Date: 6/21/2024  PROCEDURE: XR TOE 2+ VW RIGHT-  HISTORY: R great toe wound, pain, eval erosion  COMPARISON: None.  FINDINGS:  A 3 view exam was obtained. There is a great toe soft tissue ulcer with soft tissue swelling. There is cortical erosion involving the first distal phalanx and first proximal phalanx with probable destruction of the interphalangeal joint. Findings are suggestive of first IP septic  arthritis/osteomyelitis.      Impression: Findings suggestive of first interphalangeal joint septic arthritis/osteomyelitis.         Images were reviewed, interpreted, and dictated by Dr. Xavier Lopez MD Transcribed by Dixie Szymanski PA-C.  This report was signed and finalized on 6/21/2024 4:51 PM by Xavier Lopez MD.      US Venous Doppler Lower Extremity Right (duplex)    Result Date: 6/21/2024  PROCEDURE: US VENOUS DOPPLER LOWER EXTREMITY RIGHT (DUPLEX)-  HISTORY: Right leg pain, great toe ulcer, r/o DVT  PROCEDURE: Multiple transverse and longitudinal scans were performed of the femoropopliteal deep venous system, with augmentation and compression maneuvers.  FINDINGS: Normal phasic flow was noted in the visualized deep venous system. No intraluminal increased echogenicity is noted to suggest thrombus. There is normal compression and augmentation of the venous structures. No abnormal venous collaterals are seen. No venous reflux is demonstrated.      Impression: No evidence of deep venous thrombosis.      Images were reviewed, interpreted, and dictated by Dr. Xavier Lopez MD Transcribed by Dixie Szymanski PA-C.  This report was signed and finalized on 6/21/2024 4:50 PM by Xavier Lopez MD.     I have reviewed the patient's radiology reports.    Medication Review:     I have reviewed the patient's active and prn medications.     Problem List:      Osteomyelitis of right foot    Stage 3b chronic kidney disease    Type 2 diabetes mellitus with diabetic chronic kidney disease    Cellulitis of right leg    Osteomyelitis      Assessment/Plan:     Osteomyelitis of right foot    Cellulitis of right leg  -- Patient with right foot and right leg infection complicated by diabetes and anemia.  -- Active Treatments: Vancomycin zosyn  -- Pending Results: follow up cultures, arterial duplex CT leg, Dr. Maharaj to see on Monday not available over the weekend       Stage 3b chronic kidney disease    Type 2  diabetes mellitus with diabetic chronic kidney disease  -- Chronic medical conditions  --Active Treatments: continue insulin/SSI  --Pending Results: follow up nephrology recommendations    DVT Prophylaxis: Heparin  Code Status: Full code  Diet: Cardiac  Discharge Plan: Home in 3 to 4 days    Yannick Rojas DO  06/22/24  12:23 EDT    Dictated utilizing Dragon dictation.

## 2024-06-23 ENCOUNTER — APPOINTMENT (OUTPATIENT)
Dept: ULTRASOUND IMAGING | Facility: HOSPITAL | Age: 89
End: 2024-06-23
Payer: MEDICARE

## 2024-06-23 LAB
ALBUMIN SERPL-MCNC: 2.6 G/DL (ref 3.5–5.2)
ANION GAP SERPL CALCULATED.3IONS-SCNC: 9.5 MMOL/L (ref 5–15)
BILIRUB UR QL STRIP: NEGATIVE
BUN SERPL-MCNC: 25 MG/DL (ref 8–23)
BUN/CREAT SERPL: 11.5 (ref 7–25)
CALCIUM SPEC-SCNC: 8.3 MG/DL (ref 8.6–10.5)
CHLORIDE SERPL-SCNC: 105 MMOL/L (ref 98–107)
CLARITY UR: CLEAR
CO2 SERPL-SCNC: 23.5 MMOL/L (ref 22–29)
COLOR UR: YELLOW
CREAT SERPL-MCNC: 2.18 MG/DL (ref 0.57–1)
EGFRCR SERPLBLD CKD-EPI 2021: 21.2 ML/MIN/1.73
FERRITIN SERPL-MCNC: 140.5 NG/ML (ref 13–150)
GLUCOSE BLDC GLUCOMTR-MCNC: 162 MG/DL (ref 70–130)
GLUCOSE BLDC GLUCOMTR-MCNC: 179 MG/DL (ref 70–130)
GLUCOSE BLDC GLUCOMTR-MCNC: 204 MG/DL (ref 70–130)
GLUCOSE BLDC GLUCOMTR-MCNC: 88 MG/DL (ref 70–130)
GLUCOSE SERPL-MCNC: 85 MG/DL (ref 65–99)
GLUCOSE UR STRIP-MCNC: NEGATIVE MG/DL
HGB UR QL STRIP.AUTO: NEGATIVE
IRON 24H UR-MRATE: 17 MCG/DL (ref 37–145)
IRON SATN MFR SERPL: 10 % (ref 20–50)
KETONES UR QL STRIP: NEGATIVE
LEUKOCYTE ESTERASE UR QL STRIP.AUTO: NEGATIVE
NITRITE UR QL STRIP: NEGATIVE
PH UR STRIP.AUTO: 5.5 [PH] (ref 5–8)
PHOSPHATE SERPL-MCNC: 3 MG/DL (ref 2.5–4.5)
POTASSIUM SERPL-SCNC: 3.5 MMOL/L (ref 3.5–5.2)
PROT UR QL STRIP: NEGATIVE
SODIUM SERPL-SCNC: 138 MMOL/L (ref 136–145)
SODIUM UR-SCNC: 88 MMOL/L
SP GR UR STRIP: 1.01 (ref 1–1.03)
TIBC SERPL-MCNC: 179 MCG/DL (ref 298–536)
TRANSFERRIN SERPL-MCNC: 120 MG/DL (ref 200–360)
UROBILINOGEN UR QL STRIP: NORMAL
VANCOMYCIN SERPL-MCNC: 13.7 MCG/ML (ref 5–40)

## 2024-06-23 PROCEDURE — 25010000002 ENOXAPARIN PER 10 MG: Performed by: INTERNAL MEDICINE

## 2024-06-23 PROCEDURE — 82948 REAGENT STRIP/BLOOD GLUCOSE: CPT

## 2024-06-23 PROCEDURE — 84300 ASSAY OF URINE SODIUM: CPT | Performed by: HOSPITALIST

## 2024-06-23 PROCEDURE — 81003 URINALYSIS AUTO W/O SCOPE: CPT | Performed by: HOSPITALIST

## 2024-06-23 PROCEDURE — 83540 ASSAY OF IRON: CPT | Performed by: HOSPITALIST

## 2024-06-23 PROCEDURE — 76775 US EXAM ABDO BACK WALL LIM: CPT

## 2024-06-23 PROCEDURE — 25010000002 VANCOMYCIN PER 500 MG: Performed by: FAMILY MEDICINE

## 2024-06-23 PROCEDURE — 25010000002 PIPERACILLIN SOD-TAZOBACTAM PER 1 G: Performed by: INTERNAL MEDICINE

## 2024-06-23 PROCEDURE — 25010000002 PIPERACILLIN SOD-TAZOBACTAM PER 1 G: Performed by: FAMILY MEDICINE

## 2024-06-23 PROCEDURE — 25810000003 SODIUM CHLORIDE 0.9 % SOLUTION: Performed by: HOSPITALIST

## 2024-06-23 PROCEDURE — 97116 GAIT TRAINING THERAPY: CPT

## 2024-06-23 PROCEDURE — 82948 REAGENT STRIP/BLOOD GLUCOSE: CPT | Performed by: INTERNAL MEDICINE

## 2024-06-23 PROCEDURE — 63710000001 INSULIN LISPRO (HUMAN) PER 5 UNITS: Performed by: INTERNAL MEDICINE

## 2024-06-23 PROCEDURE — 97530 THERAPEUTIC ACTIVITIES: CPT

## 2024-06-23 PROCEDURE — 82728 ASSAY OF FERRITIN: CPT | Performed by: HOSPITALIST

## 2024-06-23 PROCEDURE — 80069 RENAL FUNCTION PANEL: CPT | Performed by: HOSPITALIST

## 2024-06-23 PROCEDURE — 80202 ASSAY OF VANCOMYCIN: CPT | Performed by: INTERNAL MEDICINE

## 2024-06-23 PROCEDURE — 84466 ASSAY OF TRANSFERRIN: CPT | Performed by: HOSPITALIST

## 2024-06-23 PROCEDURE — 99232 SBSQ HOSP IP/OBS MODERATE 35: CPT | Performed by: FAMILY MEDICINE

## 2024-06-23 RX ORDER — VANCOMYCIN HYDROCHLORIDE 750 MG/150ML
750 INJECTION, SOLUTION INTRAVENOUS ONCE
Status: COMPLETED | OUTPATIENT
Start: 2024-06-23 | End: 2024-06-23

## 2024-06-23 RX ADMIN — Medication 10 ML: at 22:16

## 2024-06-23 RX ADMIN — VANCOMYCIN HYDROCHLORIDE 750 MG: 750 INJECTION, SOLUTION INTRAVENOUS at 09:07

## 2024-06-23 RX ADMIN — ATORVASTATIN CALCIUM 40 MG: 40 TABLET, FILM COATED ORAL at 21:10

## 2024-06-23 RX ADMIN — INSULIN LISPRO 2 UNITS: 100 INJECTION, SOLUTION INTRAVENOUS; SUBCUTANEOUS at 17:00

## 2024-06-23 RX ADMIN — INSULIN LISPRO 4 UNITS: 100 INJECTION, SOLUTION INTRAVENOUS; SUBCUTANEOUS at 12:07

## 2024-06-23 RX ADMIN — METOPROLOL TARTRATE 100 MG: 50 TABLET, FILM COATED ORAL at 21:10

## 2024-06-23 RX ADMIN — METOPROLOL TARTRATE 100 MG: 50 TABLET, FILM COATED ORAL at 08:00

## 2024-06-23 RX ADMIN — Medication 10 ML: at 08:01

## 2024-06-23 RX ADMIN — LEVOTHYROXINE SODIUM 50 MCG: 50 TABLET ORAL at 07:47

## 2024-06-23 RX ADMIN — Medication 500 MG: at 08:01

## 2024-06-23 RX ADMIN — PANTOPRAZOLE SODIUM 40 MG: 40 TABLET, DELAYED RELEASE ORAL at 08:00

## 2024-06-23 RX ADMIN — Medication 1 TABLET: at 08:00

## 2024-06-23 RX ADMIN — FUROSEMIDE 20 MG: 20 TABLET ORAL at 08:00

## 2024-06-23 RX ADMIN — ENOXAPARIN SODIUM 30 MG: 100 INJECTION SUBCUTANEOUS at 21:14

## 2024-06-23 RX ADMIN — Medication 5 MG: at 21:10

## 2024-06-23 RX ADMIN — PIPERACILLIN SODIUM AND TAZOBACTAM SODIUM 3.38 G: 3; .375 INJECTION, POWDER, LYOPHILIZED, FOR SOLUTION INTRAVENOUS at 14:55

## 2024-06-23 RX ADMIN — PIPERACILLIN SODIUM AND TAZOBACTAM SODIUM 3.38 G: 3; .375 INJECTION, POWDER, LYOPHILIZED, FOR SOLUTION INTRAVENOUS at 03:09

## 2024-06-23 RX ADMIN — LATANOPROST 1 DROP: 50 SOLUTION OPHTHALMIC at 22:15

## 2024-06-23 RX ADMIN — SODIUM CHLORIDE 500 ML: 9 INJECTION, SOLUTION INTRAVENOUS at 12:45

## 2024-06-23 RX ADMIN — ASPIRIN 81 MG: 81 TABLET, COATED ORAL at 08:00

## 2024-06-23 NOTE — PROGRESS NOTES
[facility] Progress Note      Patient Name: Lynne Sanchez  : 1934  MRN: [id number]  Primary Care Physician:  Elizabeth Easton APRN  Date of admission: 2024    Subjective     Interval History:   The patient was seen and examined today for follow-up on acute kidney injury    Review of Systems:   As noted above    Objective     Vitals:   Temp:  [98.1 °F (36.7 °C)-98.6 °F (37 °C)] 98.6 °F (37 °C)  Heart Rate:  [69-76] 76  Resp:  [12-18] 12  BP: ()/(37-46) 110/46    Intake/Output Summary (Last 24 hours) at 2024 1229  Last data filed at 2024 0928  Gross per 24 hour   Intake 750 ml   Output --   Net 750 ml       Physical Exam:    General Appearance: alert, oriented x 3, no acute distress   Skin: warm and dry  HEENT: oral mucosa normal, nonicteric sclera  Neck: supple, no JVD  Lungs: CTA  Heart: RRR, normal S1 and S2  Abdomen: soft, nontender, nondistended  : no palpable bladder  Extremities: no edema, cyanosis or clubbing  Neuro: normal speech and mental status     Scheduled Meds:     aspirin, 81 mg, Oral, Daily  atorvastatin, 40 mg, Oral, Nightly  enoxaparin, 30 mg, Subcutaneous, Nightly  insulin lispro, 2-9 Units, Subcutaneous, 4x Daily AC & at Bedtime  latanoprost, 1 drop, Both Eyes, Nightly  levothyroxine, 50 mcg, Oral, Q AM  melatonin, 5 mg, Oral, Nightly  metoprolol tartrate, 100 mg, Oral, BID  multivitamin with minerals, 1 tablet, Oral, Daily  pantoprazole, 40 mg, Oral, Daily  piperacillin-tazobactam, 3.375 g, Intravenous, Q12H  saccharomyces boulardii, 500 mg, Oral, Daily  sodium chloride, 10 mL, Intravenous, Q12H  vancomycin (dosing per levels), , Does not apply, Daily      IV Meds:   Pharmacy to Dose enoxaparin (LOVENOX),   Pharmacy to dose vancomycin,   Pharmacy to Dose Zosyn,         Results Reviewed:   I have personally reviewed the results from the time of this admission to 2024 12:29 EDT     Results from last 7 days   Lab Units  06/23/24  0524 06/22/24  0618 06/21/24  1358   SODIUM mmol/L 138 139 137   POTASSIUM mmol/L 3.5 3.6 4.6   CHLORIDE mmol/L 105 105 100   CO2 mmol/L 23.5 23.5 24.5   BUN mg/dL 25* 24* 25*   CREATININE mg/dL 2.18* 1.34* 1.41*   CALCIUM mg/dL 8.3* 8.5* 9.1   BILIRUBIN mg/dL  --  0.8  --    ALK PHOS U/L  --  101  --    ALT (SGPT) U/L  --  5  --    AST (SGOT) U/L  --  15  --    GLUCOSE mg/dL 85 74 290*       Estimated Creatinine Clearance: 15.1 mL/min (A) (by C-G formula based on SCr of 2.18 mg/dL (H)).    Results from last 7 days   Lab Units 06/23/24  0524   PHOSPHORUS mg/dL 3.0             Results from last 7 days   Lab Units 06/22/24  0618 06/21/24  1358   WBC 10*3/mm3 8.94 13.58*   HEMOGLOBIN g/dL 7.4* 8.1*   PLATELETS 10*3/mm3 149 173             Assessment / Plan     ASSESSMENT:  Acute kidney injury on top of CKD stage IIIb with baseline creatinine around 1.3-1.6.  Etiology of acute kidney injury likely secondary to prerenal/ATN due to mild hypovolemia in the setting of possible sepsis in addition to vancomycin induced nephrotoxicity. Etiology of chronic kidney disease is likely secondary to hypertension and diabetes in addition to history of NSAID use.  Hypertension with CKD: Blood pressure is well-controlled  Type 2 diabetes mellitus with CKD  Osteomyelitis  Anemia of CKD          PLAN:  Given worsening kidney function we DC Lasix  Recommend alternative to vancomycin/Zosyn if possible  Will give small bolus of IV fluid.  Will check urinalysis and urine protein to creatinine ratio.  Check renal ultrasound  Labs in a.m.      Thank you for involving us in the care of Lynne Sanchez.  Please feel free to call with any questions.    Jack Rowe MD  06/23/24  12:29 EDT    [facility]  [phone]    Parts of this note may be an electronic transcription/translation of spoken language to printed text using the Dragon dictation system.

## 2024-06-23 NOTE — PLAN OF CARE
Goal Outcome Evaluation:  Plan of Care Reviewed With: patient, spouse, son        Progress: no change   No reports of pain.  Eating well.  Vitals unremarkable.  Up to chair and ambulation in [place].

## 2024-06-23 NOTE — THERAPY TREATMENT NOTE
Patient Name: Lynne Sanchez  : 1934    MRN: [id number]                              Today's Date: 2024       Admit Date: 2024    Visit Dx: No diagnosis found.  Patient Active Problem List   Diagnosis    Chronic atrial fibrillation    Valvular heart disease    Diabetes mellitus type II, controlled    Osteomyelitis of right foot    Normocytic anemia    Chronic gastritis    Cerebrovascular accident (CVA) due to thrombosis of left anterior cerebral artery    Thrombocytopenia    Cardiac pacemaker in situ    Chronic congestive heart failure    Functional heart murmur    Glaucoma    Hyperlipidemia    Hypertensive disorder    Acquired hypothyroidism    Mass of parotid gland    Neuropathy    Chronic renal impairment, stage 4 (severe)    Impairment of balance    Impaired mobility    Muscle weakness of lower extremity    Chronic pain of both knees    Secondary cataract of both eyes    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    Suspected glaucoma of both eyes    Secondary cataract of both eyes    Right retinal detachment    Stable proliferative diabetic retinopathy of both eyes    Swelling of left extremity    Closed nondisplaced fracture of surgical neck of left humerus with routine healing    Debility    Left arm swelling    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    Secondary hyperparathyroidism of renal origin    Vitamin D deficiency    Anemia requiring transfusions    Melena    Acute on chronic anemia    Stage 3b chronic kidney disease    DM complication    Anemia of chronic renal failure    Type 2 diabetes mellitus with diabetic chronic kidney disease    Anemia of chronic renal failure    Absolute anemia    Secondary hyperparathyroidism of renal origin    Stage 3b chronic kidney disease    Vitamin D deficiency    Other iron deficiency anemias    Chronic anemia    Gastroesophageal reflux disease    Erosion of ileum    Weight loss     Cellulitis of right leg    Osteomyelitis     Past Medical History:   Diagnosis Date    Acute deep vein thrombosis of left upper extremity     Anemia     Asthma     CHF (congestive heart failure)     Chronic atrial fibrillation     Deep venous thrombosis of left upper limb     Diabetes mellitus, type 2     Diarrhea     GERD (gastroesophageal reflux disease)     Glaucoma     H/O transfusion of whole blood     Heart attack     Heart murmur     History of transfusion     Hyperlipidemia     Hypertension     Kidney disease     patient not aware of what exact diagnosis is     Osteomyelitis     Osteomyelitis of left foot 10/03/2017    Peripheral vascular disease     Right retinal detachment     Secondary cataract of both eyes     Stable proliferative diabetic retinopathy of both eyes     Stroke     Swelling of left extremity     Urinary tract infection     Wears glasses      Past Surgical History:   Procedure Laterality Date    AMPUTATION DIGIT Left 7/5/2018    Procedure: Left Great toe amputation;  Surgeon: Prakash Carrington MD;  Location:  [facility];  Service: Vascular    AMPUTATION DIGIT Left 8/27/2018    Procedure: SECOND TOE AMPUTATION DIGIT LEFT;  Surgeon: Prakash Carrington MD;  Location:  [facility];  Service: General    APPENDECTOMY      BONE MARROW ASPIRATION      CARDIAC ABLATION      CARDIAC CATHETERIZATION N/A 10/10/2017    Procedure: Peripheral angiography;  Surgeon: Kan Pelaez MD;  Location:  [facility];  Service:     CARDIAC CATHETERIZATION N/A 10/10/2017    Procedure: Angioplasty-peripheral;  Surgeon: Kan Pelaez MD;  Location:  [facility];  Service:     CARDIAC CATHETERIZATION N/A 10/10/2017    Procedure: Atherectomy-peripheral;  Surgeon: Kan Pelaez MD;  Location:  [facility];  Service:     CARDIAC ELECTROPHYSIOLOGY PROCEDURE N/A 5/3/2018    Procedure: generator change;  Surgeon: Zan Poole MD;  Location:  [facility];  Service: Cardiovascular    CARDIOVERSION      COLONOSCOPY      ENDOSCOPY N/A 10/9/2017    Procedure: ESOPHAGOGASTRODUODENOSCOPY WITH COLD FORCEP BIOPSY;  Surgeon: Clifton Hyatt MD;  Location: [facility] ENDOSCOPY;  Service:     ENDOSCOPY N/A 3/22/2022    Procedure: ESOPHAGOGASTRODUODENOSCOPY with AVM cautery;  Surgeon: Clarisse Velez MD;  Location: [facility] ENDOSCOPY;  Service: Gastroenterology;  Laterality: N/A;    ENDOSCOPY N/A 8/4/2023    Procedure: ESOPHAGOGASTRODUODENOSCOPY WITH BIOPSY AND RUTH BRUSHING;  Surgeon: Clarisse Velez MD;  Location: [facility] ENDOSCOPY;  Service: Gastroenterology;  Laterality: N/A;    EYE SURGERY Bilateral     CATARACTS    INTERVENTIONAL RADIOLOGY PROCEDURE N/A 10/10/2017    Procedure: Abdominal Aortagram with Runoff;  Surgeon: Kan Pelaez MD;  Location: [facility] CATH INVASIVE LOCATION;  Service:     PACEMAKER IMPLANTATION      around 2008 then replaced 2018      General Information       Row Name 06/23/24 1257          Physical Therapy Time and Intention    Document Type therapy note (daily note)  -CC     Mode of Treatment physical therapy  -CC       Row Name 06/23/24 1257          General Information    Patient Profile Reviewed yes  -CC     Existing Precautions/Restrictions fall  -CC       Row Name 06/23/24 1257          Safety Issues, Functional Mobility    Safety Issues Affecting Function (Mobility) awareness of need for assistance;insight into deficits/self-awareness;positioning of assistive device;safety precautions follow-through/compliance  -CC     Impairments Affecting Function (Mobility) endurance/activity tolerance;balance  -CC               User Key  (r) = Recorded By, (t) = Taken By, (c) = Cosigned By      Initials Name Provider Type    CC Anjali Collins, PTA Physical Therapist Assistant                   Mobility       Row Name 06/23/24 1259          Bed Mobility    Bed Mobility bed mobility (all) activities  -CC     All  Activities, [place] (Bed Mobility) modified independence  -     Assistive Device (Bed Mobility) bed rails;head of bed elevated  -     Comment, (Bed Mobility) Pt sleeps in recliner at home  -       Row Name 06/23/24 1259          Sit-Stand Transfer    Sit-Stand [place] (Transfers) contact guard;verbal cues  -     Assistive Device (Sit-Stand Transfers) walker, front-wheeled  -       Row Name 06/23/24 1259          Gait/Stairs (Locomotion)    [place] Level (Gait) standby assist;verbal cues  -     Assistive Device (Gait) walker, front-wheeled  -     Patient was able to Ambulate yes  -     Distance in Feet (Gait) 40  +105  -CC     Deviations/Abnormal Patterns (Gait) gait speed decreased;roby decreased  -     Bilateral Gait Deviations forward flexed posture  -               User Key  (r) = Recorded By, (t) = Taken By, (c) = Cosigned By      Initials Name Provider Type     Anjali Collins, PTA Physical Therapist Assistant                   Obj/Interventions    No documentation.                  Goals/Plan    No documentation.                  Clinical Impression       Row Name 06/23/24 1302          Pain    Pretreatment Pain Rating 0/10 - no pain  -     Posttreatment Pain Rating 0/10 - no pain  -     Additional Documentation Pain Scale: Numbers Pre/Post-Treatment (Group)  -       Row Name 06/23/24 1302          Plan of Care Review    Plan of Care Reviewed With patient  -CC     Progress improving  -     Outcome Evaluation Pt agreeable to physical therapy. Performed supine to sit mod I, sit <->stand SBA and VC for hand placement, amb with RW 40 feet and incontinent of bladder and amb back to room for brief change with PTA assisting for brief change then amb with  feet with SBA and flexed posture and decreased step length and heel strike. Pt requires increased time to perform tasks. Static standing with 1 UE support on RW for balance and no LOB or unsteadines noted.  Con't with PT POC and progress as tolerated  -CC       Row Name 06/23/24 1302          Positioning and Restraints    Pre-Treatment Position in bed  -CC     Post Treatment Position chair  -CC     In Chair notified nsg;reclined;call light within reach;encouraged to call for assist  -CC               User Key  (r) = Recorded By, (t) = Taken By, (c) = Cosigned By      Initials Name Provider Type     Anjali Collins PTA Physical Therapist Assistant                   Outcome Measures       Row Name 06/23/24 1316 06/23/24 0806       How much help from another person do you currently need...    Turning from your back to your side while in flat bed without using bedrails? 4  -CC 4  -PK    Moving from lying on back to sitting on the side of a flat bed without bedrails? 4  -CC 4  -PK    Moving to and from a bed to a chair (including a wheelchair)? 3  -CC 3  -PK    Standing up from a chair using your arms (e.g., wheelchair, bedside chair)? 4  -CC 3  -PK    Climbing 3-5 steps with a railing? 3  -CC 2  -PK    To walk in hospital room? 3  -CC 3  -PK    AM-PAC 6 Clicks Score (PT) 21  -CC 19  -PK    Highest Level of Mobility Goal 6 --> Walk 10 steps or more  -CC 6 --> Walk 10 steps or more  -PK              User Key  (r) = Recorded By, (t) = Taken By, (c) = Cosigned By      Initials Name Provider Type    CC Anjali Collins PTA Physical Therapist Assistant    Tessa Bagley RN Registered Nurse                                 Physical Therapy Education       Title: PT OT SLP Therapies (In Progress)       Topic: Physical Therapy (In Progress)       Point: Mobility training (Done)       Learning Progress Summary             Patient Acceptance, E,D, DU,VU by  at 6/22/2024 1304                         Point: Home exercise program (Not Started)       Learner Progress:  Not documented in this visit.              Point: Body mechanics (Done)       Learning Progress Summary             Patient Acceptance, E,TB, VU by CC at  6/23/2024 1316    Comment: Upright posture during amb    Acceptance, E,D, DU,VU by  at 6/22/2024 1304                         Point: Precautions (Not Started)       Learner Progress:  Not documented in this visit.                              User Key       Initials Effective Dates Name Provider Type Discipline    CC 06/16/21 -  Anjali Collins PTA Physical Therapist Assistant PT    JULY 06/13/23 -  Jaswinder Barry, PT Physical Therapist PT                  PT Recommendation and Plan     Plan of Care Reviewed With: patient  Progress: improving  Outcome Evaluation: Pt agreeable to physical therapy. Performed supine to sit mod I, sit <->stand SBA and VC for hand placement, amb with RW 40 feet and incontinent of bladder and amb back to room for brief change with PTA assisting for brief change then amb with  feet with SBA and flexed posture and decreased step length and heel strike. Pt requires increased time to perform tasks. Static standing with 1 UE support on RW for balance and no LOB or unsteadines noted. Con't with PT POC and progress as tolerated     Time Calculation:         PT Charges       Row Name 06/23/24 1317             Time Calculation    PT Received On 06/23/24  -CC      PT Goal Re-Cert Due Date 07/02/24  -CC         Time Calculation- PT    Total Timed Code Minutes- PT 39 minute(s)  -CC         Timed Charges    79833 - Gait Training Minutes  23  -CC      32280 - PT Therapeutic Activity Minutes 16  -CC         Total Minutes    Timed Charges Total Minutes 39  -CC       Total Minutes 39  -CC                User Key  (r) = Recorded By, (t) = Taken By, (c) = Cosigned By      Initials Name Provider Type    CC Anjali Collins PTA Physical Therapist Assistant                  Therapy Charges for Today       Code Description Service Date Service Provider Modifiers Qty    10730776692 HC GAIT TRAINING EA 15 MIN 6/23/2024 Anjali Collins PTA GP 2    39239306965 HC PT THERAPEUTIC ACT EA 15 MIN 6/23/2024  Anjali Collins, PTA GP 1            PT G-Codes  Outcome Measure Options: AM-PAC 6 Clicks Basic Mobility (PT)  AM-PAC 6 Clicks Score (PT): 21       Anjali Collins, FUENTES  6/23/2024

## 2024-06-23 NOTE — PLAN OF CARE
Goal Outcome Evaluation:  Plan of Care Reviewed With: patient        Progress: improving  Outcome Evaluation: Pt agreeable to physical therapy. Performed supine to sit mod I, sit <->stand SBA and VC for hand placement, amb with RW 40 feet and incontinent of bladder and amb back to room for brief change with PTA assisting for brief change then amb with  feet with SBA and flexed posture and decreased step length and heel strike. Pt requires increased time to perform tasks. Static standing with 1 UE support on RW for balance and no LOB or unsteadines noted. Con't with PT POC and progress as tolerated

## 2024-06-23 NOTE — PROGRESS NOTES
"Pharmacy Consult - Vancomycin Dosing    Pharmacy was consulted to dose vancomycin for  Lynne Sanchez, a 89 y.o. female  160 cm (63\") 54.6 kg (120 lb 5.9 oz)    Indication: Bone and/or Joint Infection  Consulting Provider: Dr. Ortega    Goal AUC: 400-600 mg/L*hr.  Goal Trough: 15 - 20 mcg/ml    Labs  Results from last 7 days   Lab Units 06/23/24  0524 06/22/24  0618 06/21/24  1358   WBC 10*3/mm3  --  8.94 13.58*   CREATININE mg/dL 2.18* 1.34* 1.41*      Estimated Creatinine Clearance: 15.1 mL/min (A) (by C-G formula based on SCr of 2.18 mg/dL (H)).  Temp Readings from Last 1 Encounters:   06/23/24 98.6 °F (37 °C) (Oral)       Results from last 7 days   Lab Units 06/23/24  0524 06/22/24  0618   VANCOMYCIN RM mcg/mL 13.70 8.80               Other Antimicrobials    Zosyn 3.375 g IV every 8 hours      Assessment/Plan    Patient received loading dose of vancomycin 1000 mg IV.  Initiate maintenance dose of vancomycin daily as needed to maintain a target trough 15 - 20 mcg/ml.   Level on 6/23 resulted at 13.70. Will give vancomycin 15mg/kg, 750 mg IV, x 1.  Assess clearance by vancomycin level on 6/24 @ 0600.  Pharmacy will continue to monitor renal function, cultures and sensitivities, and clinical status to adjust regimen as necessary.      Thank you,  Lynne Friedman, PharmD  06/23/24 08:05 EDT        "

## 2024-06-23 NOTE — PROGRESS NOTES
[facility]    Name:  Lynne Sanchez   Age:  89 y.o.  Sex:  female  :  1934  MRN:  [id number]   Visit Number:  [id number]  Admission Date:  2024  Date Of Service:  24  Primary Care Physician:  Elizabeth Easton APRN     LOS: 2 days :    Chief Complaint:      Right leg infection    Subjective:    Patient seen and evaluated.  No acute complaints.  Continuing IV antibiotics.  Surgical eval tomorrow.    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 Carrington at [facility]. Has had right 1st toe partial amputation as well >6  weeks ago. 1 day history of right leg swelling medial and posteriorly. Right 1st toe stump has been having increased swelling as well chronically. Was supposed to have surgery on it 6 weeks ago just staying bad.  Low grade fever and nausea last night. Full code.     Pertinent findings:  Cr 1.41, , procalcitonin 4.42, right toe septic arthritis/osteomyelitis, Right leg no DVT.    Review of Systems:     All systems were reviewed and negative except as mentioned in subjective, assessment and plan.    Vital Signs:    Temp:  [97.3 °F (36.3 °C)-98.6 °F (37 °C)] 98.6 °F (37 °C)  Heart Rate:  [69-76] 76  Resp:  [12-18] 12  BP: ()/(37-46) 110/46    Intake and output:    I/O last 3 completed shifts:  In: 240 [P.O.:240]  Out: -   I/O this shift:  In: 510 [P.O.:360; IV Piggyback:150]  Out: -     Physical Examination:    General Appearance:  Alert and cooperative.  Chronically ill-appearing.   Head:  Atraumatic and normocephalic.   Eyes: Conjunctivae and sclerae normal, no icterus. No pallor.   Throat: No oral lesions, no thrush, oral mucosa moist.   Neck: Supple, trachea midline, no thyromegaly.   Lungs:   Breath sounds heard bilaterally equally.  No wheezing or crackles. No Pleural rub or bronchial breathing.   Heart:  Normal S1 and S2, no murmur, no gallop, no rub. No  "JVD.   Abdomen:   Normal bowel sounds, no masses, no organomegaly. Soft, nontender, nondistended, no rebound tenderness.   Extremities: Supple, no edema, no cyanosis, no clubbing.  Right first toe partially absent, ulceration/swelling/purulent drainage noted but improved.   Skin: No bleeding or rash.   Neurologic: Alert and oriented x 3. No facial asymmetry. Moves all four limbs. No tremors.      Laboratory results:    Results from last 7 days   Lab Units 06/23/24  0524 06/22/24  0618 06/21/24  1358   SODIUM mmol/L 138 139 137   POTASSIUM mmol/L 3.5 3.6 4.6   CHLORIDE mmol/L 105 105 100   CO2 mmol/L 23.5 23.5 24.5   BUN mg/dL 25* 24* 25*   CREATININE mg/dL 2.18* 1.34* 1.41*   CALCIUM mg/dL 8.3* 8.5* 9.1   BILIRUBIN mg/dL  --  0.8  --    ALK PHOS U/L  --  101  --    ALT (SGPT) U/L  --  5  --    AST (SGOT) U/L  --  15  --    GLUCOSE mg/dL 85 74 290*     Results from last 7 days   Lab Units 06/22/24  0618 06/21/24  1358   WBC 10*3/mm3 8.94 13.58*   HEMOGLOBIN g/dL 7.4* 8.1*   HEMATOCRIT % 24.0* 25.9*   PLATELETS 10*3/mm3 149 173             Results from last 7 days   Lab Units 06/21/24  1619   BLOODCX  No growth at 24 hours  No growth at 24 hours     No results for input(s): \"PHART\", \"CLQ0IRF\", \"PO2ART\", \"TLT2OTI\", \"BASEEXCESS\" in the last 8760 hours.   I have reviewed the patient's laboratory results.    Radiology results:    US Arterial Doppler Lower Extremity Right    Result Date: 6/21/2024  FINAL REPORT CLINICAL HISTORY: osteomyelitis right foot FINDINGS: ARTERIAL DOPPLER OF THE RIGHT LOWER EXTREMITY     Spectral and color Doppler exam of major artery branches     There is monophasic flow throughout most of the right lower extremity consistent with significant peripheral vascular disease.  CTA is recommended.  CONCLUSION: Monophasic flow throughout most of the right lower extremity consistent with significant peripheral vascular disease.  CTA is recommended.     Impression: Authenticated and Electronically Signed " by Heena Mata MD on 06/21/2024 07:15:54 PM    CT Lower Extremity Right Without Contrast    Result Date: 6/21/2024  FINAL REPORT CLINICAL HISTORY: assess for abscess  wound on foot, rt big toe FINDINGS: CT RIGHT LOWER EXTREMITY WITHOUT CONTRAST  Technique: Axial images through the right lower extremity were performed by computed tomography.  Sagittal and coronal reconstruction images were performed. This study was performed with techniques to keep radiation doses as low as reasonably achievable (ALARA). Individualized dose reduction techniques using automated exposure control or adjustment of mA and/or kV according to the patient's size were employed.  There is a soft tissue deformity at the tip of the right great toe extending into the distal phalanx.  There appears to be air in the distal phalanx concerning for osteomyelitis.  There is diffuse soft tissue swelling of the great toe.  No definite fluid collection is visualized but no contrast was administered.  There is mild soft tissue swelling of the dorsum of the foot.  There is narrowing and irregularity of multiple tarsal joints.  There are vascular calcifications.  There are hammertoes deformities of the second through fifth toes.  No fracture is identified.     Impression: Soft tissue deformity at the tip of the right great toe extending into the distal phalanx.  No johnny fluid collection, no contrast administered.  Degenerative changes and soft tissue swelling. Authenticated and Electronically Signed by Heena Mata MD on 06/21/2024 06:22:18 PM    XR Toe 2+ View Right    Result Date: 6/21/2024  PROCEDURE: XR TOE 2+ VW RIGHT-  HISTORY: R great toe wound, pain, eval erosion  COMPARISON: None.  FINDINGS:  A 3 view exam was obtained. There is a great toe soft tissue ulcer with soft tissue swelling. There is cortical erosion involving the first distal phalanx and first proximal phalanx with probable destruction of the interphalangeal joint. Findings are  suggestive of first IP septic arthritis/osteomyelitis.      Impression: Findings suggestive of first interphalangeal joint septic arthritis/osteomyelitis.         Images were reviewed, interpreted, and dictated by Dr. Xavier Lopez MD Transcribed by Dixie Szymanski PA-C.  This report was signed and finalized on 6/21/2024 4:51 PM by Xavier Lopez MD.      US Venous Doppler Lower Extremity Right (duplex)    Result Date: 6/21/2024  PROCEDURE: US VENOUS DOPPLER LOWER EXTREMITY RIGHT (DUPLEX)-  HISTORY: Right leg pain, great toe ulcer, r/o DVT  PROCEDURE: Multiple transverse and longitudinal scans were performed of the femoropopliteal deep venous system, with augmentation and compression maneuvers.  FINDINGS: Normal phasic flow was noted in the visualized deep venous system. No intraluminal increased echogenicity is noted to suggest thrombus. There is normal compression and augmentation of the venous structures. No abnormal venous collaterals are seen. No venous reflux is demonstrated.      Impression: No evidence of deep venous thrombosis.      Images were reviewed, interpreted, and dictated by Dr. Xavier Lopez MD Transcribed by Dixie Szymanski PA-C.  This report was signed and finalized on 6/21/2024 4:50 PM by Xavier Lopez MD.     I have reviewed the patient's radiology reports.    Medication Review:     I have reviewed the patient's active and prn medications.     Problem List:      Osteomyelitis of right foot    Stage 3b chronic kidney disease    Type 2 diabetes mellitus with diabetic chronic kidney disease    Cellulitis of right leg    Osteomyelitis      Assessment/Plan:     Osteomyelitis of right foot    Cellulitis of right leg  -- Patient with right foot and right leg infection complicated by diabetes and anemia.  -- Active Treatments: Vancomycin zosyn  -- Pending Results: follow up cultures, arterial duplex CT leg, Dr. Maharaj to see on Monday not available over the weekend       Stage 3b  chronic kidney disease    Type 2 diabetes mellitus with diabetic chronic kidney disease  -- Chronic medical conditions  --Active Treatments: continue insulin/SSI  --Pending Results: follow up nephrology recommendations    DVT Prophylaxis: Heparin  Code Status: Full code  Diet: Cardiac  Discharge Plan: Home in 2 to 3 days.    Yannick Rojas DO  06/23/24  09:45 EDT    Dictated utilizing Dragon dictation.

## 2024-06-24 ENCOUNTER — HOME CARE VISIT (OUTPATIENT)
Dept: HOME HEALTH SERVICES | Facility: HOME HEALTHCARE | Age: 89
End: 2024-06-24
Payer: COMMERCIAL

## 2024-06-24 LAB
ANION GAP SERPL CALCULATED.3IONS-SCNC: 11.7 MMOL/L (ref 5–15)
BASOPHILS # BLD AUTO: 0.02 10*3/MM3 (ref 0–0.2)
BASOPHILS NFR BLD AUTO: 0.2 % (ref 0–1.5)
BUN SERPL-MCNC: 23 MG/DL (ref 8–23)
BUN/CREAT SERPL: 12.5 (ref 7–25)
CALCIUM SPEC-SCNC: 8 MG/DL (ref 8.6–10.5)
CHLORIDE SERPL-SCNC: 107 MMOL/L (ref 98–107)
CO2 SERPL-SCNC: 20.3 MMOL/L (ref 22–29)
CREAT SERPL-MCNC: 1.84 MG/DL (ref 0.57–1)
DEPRECATED RDW RBC AUTO: 59.7 FL (ref 37–54)
EGFRCR SERPLBLD CKD-EPI 2021: 26 ML/MIN/1.73
EOSINOPHIL # BLD AUTO: 0.18 10*3/MM3 (ref 0–0.4)
EOSINOPHIL NFR BLD AUTO: 2.2 % (ref 0.3–6.2)
ERYTHROCYTE [DISTWIDTH] IN BLOOD BY AUTOMATED COUNT: 17.2 % (ref 12.3–15.4)
GLUCOSE BLDC GLUCOMTR-MCNC: 122 MG/DL (ref 70–130)
GLUCOSE BLDC GLUCOMTR-MCNC: 127 MG/DL (ref 70–130)
GLUCOSE BLDC GLUCOMTR-MCNC: 210 MG/DL (ref 70–130)
GLUCOSE BLDC GLUCOMTR-MCNC: 220 MG/DL (ref 70–130)
GLUCOSE SERPL-MCNC: 127 MG/DL (ref 65–99)
HCT VFR BLD AUTO: 25.2 % (ref 34–46.6)
HGB BLD-MCNC: 7.6 G/DL (ref 12–15.9)
IMM GRANULOCYTES # BLD AUTO: 0.05 10*3/MM3 (ref 0–0.05)
IMM GRANULOCYTES NFR BLD AUTO: 0.6 % (ref 0–0.5)
LYMPHOCYTES # BLD AUTO: 1.26 10*3/MM3 (ref 0.7–3.1)
LYMPHOCYTES NFR BLD AUTO: 15.5 % (ref 19.6–45.3)
MAGNESIUM SERPL-MCNC: 1.7 MG/DL (ref 1.6–2.4)
MCH RBC QN AUTO: 28.4 PG (ref 26.6–33)
MCHC RBC AUTO-ENTMCNC: 30.2 G/DL (ref 31.5–35.7)
MCV RBC AUTO: 94 FL (ref 79–97)
MONOCYTES # BLD AUTO: 0.48 10*3/MM3 (ref 0.1–0.9)
MONOCYTES NFR BLD AUTO: 5.9 % (ref 5–12)
NEUTROPHILS NFR BLD AUTO: 6.16 10*3/MM3 (ref 1.7–7)
NEUTROPHILS NFR BLD AUTO: 75.6 % (ref 42.7–76)
NRBC BLD AUTO-RTO: 0 /100 WBC (ref 0–0.2)
PHOSPHATE SERPL-MCNC: 2.9 MG/DL (ref 2.5–4.5)
PLATELET # BLD AUTO: 156 10*3/MM3 (ref 140–450)
PMV BLD AUTO: 9.9 FL (ref 6–12)
POTASSIUM SERPL-SCNC: 3.6 MMOL/L (ref 3.5–5.2)
RBC # BLD AUTO: 2.68 10*6/MM3 (ref 3.77–5.28)
SODIUM SERPL-SCNC: 139 MMOL/L (ref 136–145)
VANCOMYCIN SERPL-MCNC: 18.6 MCG/ML (ref 5–40)
WBC NRBC COR # BLD AUTO: 8.15 10*3/MM3 (ref 3.4–10.8)

## 2024-06-24 PROCEDURE — 82948 REAGENT STRIP/BLOOD GLUCOSE: CPT

## 2024-06-24 PROCEDURE — 99232 SBSQ HOSP IP/OBS MODERATE 35: CPT | Performed by: FAMILY MEDICINE

## 2024-06-24 PROCEDURE — 25010000002 ENOXAPARIN PER 10 MG: Performed by: INTERNAL MEDICINE

## 2024-06-24 PROCEDURE — 80202 ASSAY OF VANCOMYCIN: CPT | Performed by: FAMILY MEDICINE

## 2024-06-24 PROCEDURE — 83735 ASSAY OF MAGNESIUM: CPT | Performed by: FAMILY MEDICINE

## 2024-06-24 PROCEDURE — 97165 OT EVAL LOW COMPLEX 30 MIN: CPT

## 2024-06-24 PROCEDURE — 85025 COMPLETE CBC W/AUTO DIFF WBC: CPT | Performed by: FAMILY MEDICINE

## 2024-06-24 PROCEDURE — 82948 REAGENT STRIP/BLOOD GLUCOSE: CPT | Performed by: INTERNAL MEDICINE

## 2024-06-24 PROCEDURE — 84100 ASSAY OF PHOSPHORUS: CPT | Performed by: FAMILY MEDICINE

## 2024-06-24 PROCEDURE — 63710000001 INSULIN LISPRO (HUMAN) PER 5 UNITS: Performed by: INTERNAL MEDICINE

## 2024-06-24 PROCEDURE — 63710000001 DIPHENHYDRAMINE PER 50 MG: Performed by: FAMILY MEDICINE

## 2024-06-24 PROCEDURE — 25010000002 PIPERACILLIN SOD-TAZOBACTAM PER 1 G: Performed by: FAMILY MEDICINE

## 2024-06-24 PROCEDURE — 80048 BASIC METABOLIC PNL TOTAL CA: CPT | Performed by: FAMILY MEDICINE

## 2024-06-24 RX ORDER — IRON POLYSACCHARIDE COMPLEX 150 MG
150 CAPSULE ORAL DAILY
Status: DISCONTINUED | OUTPATIENT
Start: 2024-06-24 | End: 2024-06-25 | Stop reason: HOSPADM

## 2024-06-24 RX ORDER — DIPHENHYDRAMINE HCL 25 MG
25 CAPSULE ORAL EVERY 6 HOURS PRN
Status: DISCONTINUED | OUTPATIENT
Start: 2024-06-24 | End: 2024-06-25 | Stop reason: HOSPADM

## 2024-06-24 RX ADMIN — PIPERACILLIN SODIUM AND TAZOBACTAM SODIUM 3.38 G: 3; .375 INJECTION, POWDER, LYOPHILIZED, FOR SOLUTION INTRAVENOUS at 04:26

## 2024-06-24 RX ADMIN — INSULIN LISPRO 4 UNITS: 100 INJECTION, SOLUTION INTRAVENOUS; SUBCUTANEOUS at 17:17

## 2024-06-24 RX ADMIN — LATANOPROST 1 DROP: 50 SOLUTION OPHTHALMIC at 22:18

## 2024-06-24 RX ADMIN — ENOXAPARIN SODIUM 30 MG: 100 INJECTION SUBCUTANEOUS at 22:11

## 2024-06-24 RX ADMIN — Medication 150 MG: at 15:49

## 2024-06-24 RX ADMIN — METOPROLOL TARTRATE 100 MG: 50 TABLET, FILM COATED ORAL at 08:39

## 2024-06-24 RX ADMIN — PIPERACILLIN SODIUM AND TAZOBACTAM SODIUM 3.38 G: 3; .375 INJECTION, POWDER, LYOPHILIZED, FOR SOLUTION INTRAVENOUS at 14:53

## 2024-06-24 RX ADMIN — Medication 10 ML: at 22:18

## 2024-06-24 RX ADMIN — Medication 5 MG: at 22:16

## 2024-06-24 RX ADMIN — ATORVASTATIN CALCIUM 40 MG: 40 TABLET, FILM COATED ORAL at 22:16

## 2024-06-24 RX ADMIN — DIPHENHYDRAMINE HYDROCHLORIDE 25 MG: 25 CAPSULE ORAL at 15:49

## 2024-06-24 RX ADMIN — INSULIN LISPRO 4 UNITS: 100 INJECTION, SOLUTION INTRAVENOUS; SUBCUTANEOUS at 22:11

## 2024-06-24 RX ADMIN — METOPROLOL TARTRATE 100 MG: 50 TABLET, FILM COATED ORAL at 22:16

## 2024-06-24 NOTE — CASE MANAGEMENT/SOCIAL WORK
Met  with pt and family, IMM obtained. Pt states she is being transferred to [facility] for surgery.

## 2024-06-24 NOTE — PROGRESS NOTES
[facility] Progress Note  [facility]. [place]        Patient Name: Lynne Sanchez  : 1934  MRN: [id number]   LOS: 3 days    Patient Care Team:  Elizabeth Easton APRN as PCP - General (Family Medicine)  Paxton Vasquez DO as PCP - Internal Medicine (long-term Care Facility)  Vilma Méndez PA-C as PCP - Family Medicine (Long Term Care Acute)  Davian Faria MD (Inactive) as Consulting Physician (Nephrology)  Radha Boone RN as Registered Nurse    Chief Complaint:    Chief Complaint   Patient presents with    Leg Pain     Primary Care Physician:  Elizabeth Easton APRN  Date of admission: 2024    Subjective     Interval History:   Follow-up acute on chronic kidney disease stage III.  Events noted from last 24 hours.    I reviewed the chart and other providers notes, labs and procedures done since my last note.  She appears to be at about her baseline awake alert and interactive, she is still awaiting podiatry evaluation.    Review of Systems:   As noted above.    Objective     Vitals:   Temp:  [97.4 °F (36.3 °C)-98.6 °F (37 °C)] 98.4 °F (36.9 °C)  Heart Rate:  [69-75] 75  Resp:  [12-16] 16  BP: (101-129)/(43-58) 129/49    Intake/Output Summary (Last 24 hours) at 2024 0747  Last data filed at 2024 1455  Gross per 24 hour   Intake 1230 ml   Output --   Net 1230 ml       Physical Exam:    General Appearance: alert, oriented x 3, no acute distress   Skin: warm and dry  HEENT: oral mucosa normal, nonicteric sclera  Neck: supple, no JVD  Lungs: CTA  Heart: RRR, normal S1 and S2  Abdomen: obese, soft, nontender, non distended and positive bowel sounds.  : no palpable bladder  Extremities: Trace edema, no cyanosis or clubbing  Neuro: normal speech and mental status     Scheduled Meds:     Current Facility-Administered Medications   Medication Dose Route Frequency Provider Last Rate Last Admin    acetaminophen (TYLENOL) tablet 650 mg  650 mg  Oral Q4H PRN Mick Ortega DO        aspirin EC tablet 81 mg  81 mg Oral Daily Mick Ortega DO   81 mg at 06/23/24 0800    atorvastatin (LIPITOR) tablet 40 mg  40 mg Oral Nightly Mick Ortega DO   40 mg at 06/23/24 2110    sennosides-docusate (PERICOLACE) 8.6-50 MG per tablet 2 tablet  2 tablet Oral BID PRN Mick Ortega, DO        And    polyethylene glycol (MIRALAX) packet 17 g  17 g Oral Daily PRN Mick Ortega DO        And    bisacodyl (DULCOLAX) EC tablet 5 mg  5 mg Oral Daily PRN Mick Ortega DO        And    bisacodyl (DULCOLAX) suppository 10 mg  10 mg Rectal Daily PRN Mick Ortega DO        calcium carbonate (TUMS) chewable tablet 500 mg (200 mg elemental)  2 tablet Oral BID PRN Mick Ortega, DO        Calcium Replacement - Follow Nurse / BPA Driven Protocol   Does not apply PRN Mick Ortega DO        dextrose (D50W) (25 g/50 mL) IV injection 25 g  25 g Intravenous Q15 Min PRN Mick Ortega DO        dextrose (GLUTOSE) oral gel 15 g  15 g Oral Q15 Min PRN Mick Ortega DO        Enoxaparin Sodium (LOVENOX) syringe 30 mg  30 mg Subcutaneous Nightly Mick Ortega DO   30 mg at 06/23/24 2114    glucagon (GLUCAGEN) injection 1 mg  1 mg Intramuscular Q15 Min PRN Mick Ortega DO        Insulin Lispro (humaLOG) injection 2-9 Units  2-9 Units Subcutaneous 4x Daily AC & at Bedtime Mick Ortega DO   2 Units at 06/23/24 1700    latanoprost (XALATAN) 0.005 % ophthalmic solution 1 drop  1 drop Both Eyes Nightly Mick Ortega DO   1 drop at 06/23/24 2215    levothyroxine (SYNTHROID, LEVOTHROID) tablet 50 mcg  50 mcg Oral Q AM Mick Ortega, DO   50 mcg at 06/23/24 0747    Magnesium Standard Dose Replacement - Follow Nurse / BPA Driven Protocol   Does not apply PRN Mick Ortega DO        melatonin tablet 5 mg  5 mg Oral Nightly Mick Ortega,    5 mg at 06/23/24 2110    metoprolol tartrate  (LOPRESSOR) tablet 100 mg  100 mg Oral BID Mick Ortega, DO   100 mg at 06/23/24 2110    multivitamin with minerals 1 tablet  1 tablet Oral Daily Mick Ortega, DO   1 tablet at 06/23/24 0800    nitroglycerin (NITROSTAT) SL tablet 0.4 mg  0.4 mg Sublingual Q5 Min PRN Mick Ortega, DO        ondansetron ODT (ZOFRAN-ODT) disintegrating tablet 4 mg  4 mg Oral Q6H PRN Mick Oretga,         Or    ondansetron (ZOFRAN) injection 4 mg  4 mg Intravenous Q6H PRN Mick Ortega, DO        pantoprazole (PROTONIX) EC tablet 40 mg  40 mg Oral Daily Mick Ortega, DO   40 mg at 06/23/24 0800    Pharmacy to Dose enoxaparin (LOVENOX)   Does not apply Continuous PRN Mick Ortega,         Pharmacy to dose vancomycin   Does not apply Continuous PRN Mick Ortega,         Pharmacy to Dose Zosyn   Does not apply Continuous PRN Mick Ortega, DO        Phosphorus Replacement - Follow Nurse / BPA Driven Protocol   Does not apply PRN Mick Ortega, DO        piperacillin-tazobactam (ZOSYN) IVPB 3.375 g IVPB in 100 mL NS (VTB)  3.375 g Intravenous Q12H Yannick Rojas DO   3.375 g at 06/24/24 0426    Potassium Replacement - Follow Nurse / BPA Driven Protocol   Does not apply PRN Mick Ortega, DO        saccharomyces boulardii (FLORASTOR) capsule 500 mg  500 mg Oral Daily Mick Ortega, DO   500 mg at 06/23/24 0801    sodium chloride 0.9 % flush 10 mL  10 mL Intravenous Q12H Mick Ortega, DO   10 mL at 06/23/24 2216    sodium chloride 0.9 % flush 10 mL  10 mL Intravenous PRN Mick Ortega,         sodium chloride 0.9 % infusion 40 mL  40 mL Intravenous PRN Mick Ortega, DO        vancomycin (dosing per levels)   Does not apply Daily Mick Ortega, DO           aspirin, 81 mg, Oral, Daily  atorvastatin, 40 mg, Oral, Nightly  enoxaparin, 30 mg, Subcutaneous, Nightly  insulin lispro, 2-9 Units, Subcutaneous, 4x Daily AC & at  "Bedtime  latanoprost, 1 drop, Both Eyes, Nightly  levothyroxine, 50 mcg, Oral, Q AM  melatonin, 5 mg, Oral, Nightly  metoprolol tartrate, 100 mg, Oral, BID  multivitamin with minerals, 1 tablet, Oral, Daily  pantoprazole, 40 mg, Oral, Daily  piperacillin-tazobactam, 3.375 g, Intravenous, Q12H  saccharomyces boulardii, 500 mg, Oral, Daily  sodium chloride, 10 mL, Intravenous, Q12H  vancomycin (dosing per levels), , Does not apply, Daily        IV Meds:   Pharmacy to Dose enoxaparin (LOVENOX),   Pharmacy to dose vancomycin,   Pharmacy to Dose Zosyn,         Results Reviewed:   I have personally reviewed the results from the time of this admission to 6/24/2024 07:47 EDT     Results from last 7 days   Lab Units 06/24/24  0610 06/23/24  0524 06/22/24  0618   SODIUM mmol/L 139 138 139   POTASSIUM mmol/L 3.6 3.5 3.6   CHLORIDE mmol/L 107 105 105   CO2 mmol/L 20.3* 23.5 23.5   BUN mg/dL 23 25* 24*   CREATININE mg/dL 1.84* 2.18* 1.34*   CALCIUM mg/dL 8.0* 8.3* 8.5*   BILIRUBIN mg/dL  --   --  0.8   ALK PHOS U/L  --   --  101   ALT (SGPT) U/L  --   --  5   AST (SGOT) U/L  --   --  15   GLUCOSE mg/dL 127* 85 74       Estimated Creatinine Clearance: 17.9 mL/min (A) (by C-G formula based on SCr of 1.84 mg/dL (H)).    Results from last 7 days   Lab Units 06/24/24  0610 06/23/24  0524   MAGNESIUM mg/dL 1.7  --    PHOSPHORUS mg/dL 2.9 3.0             Results from last 7 days   Lab Units 06/24/24  0610 06/22/24  0618 06/21/24  1358   WBC 10*3/mm3 8.15 8.94 13.58*   HEMOGLOBIN g/dL 7.6* 7.4* 8.1*   PLATELETS 10*3/mm3 156 149 173             Brief Urine Lab Results  (Last result in the past 365 days)        Color   Clarity   Blood   Leuk Est   Nitrite   Protein   CREAT   Urine HCG        06/23/24 1556 Yellow   Clear   Negative   Negative   Negative   Negative                   No results found for: \"UTPCR\"    Imaging Results (Last 24 Hours)       Procedure Component Value Units Date/Time    US Renal Bilateral [[id number]] Collected: " 06/23/24 2056     Updated: 06/23/24 2159    Narrative:      FINAL REPORT    TECHNIQUE:  Sonographic images of the kidneys were obtained.    CLINICAL HISTORY:  KASH    FINDINGS:  Right kidney: The right kidney is small, atrophic and measures 8  cm in length.  There is no hydronephrosis.  Left kidney: The  left kidney is normal in size and measures 11 cm in length.  There is no hydronephrosis.      Impression:      No acute abnormality.    Authenticated and Electronically Signed by Rishabh Cuevas MD on  06/23/2024 09:58:50 PM                Assessment / Plan     ASSESSMENT:    Osteomyelitis of right foot    Stage 3b chronic kidney disease    Type 2 diabetes mellitus with diabetic chronic kidney disease    Cellulitis of right leg    Osteomyelitis    Acute kidney injury on top of CKD stage IIIb with baseline creatinine around 1.3-1.6.  Etiology of acute kidney injury likely secondary to prerenal/ATN due to mild hypovolemia in the setting of possible sepsis in addition to vancomycin induced nephrotoxicity. Etiology of chronic kidney disease is likely secondary to hypertension and diabetes in addition to history of NSAID use.  Renal function is starting to improve.  Hypertension with CKD: Blood pressure is well-controlled, continue the same medicines.  Type 2 diabetes mellitus with CKD  Osteomyelitis: Podiatry evaluation is in progress  Anemia of CKD: History of iron deficiency and ALIN use.        PLAN:  Still awaiting podiatry evaluation, low iron stores noted, because of the infection we will hold off giving any IV iron.  Start oral supplements.  If hemoglobin drops any further will end up needing blood transfusion.  Details were discussed with the patient as well as family in the room.   at the bedside.  Details were also discussed with the hospitalist service and or other providers as needed.   Continue with rest of the current treatment plan, and monitor with surveillance labs.  Further recommendations will  depend on clinical course of the patient during the current hospitalization.   I have reviewed the copied text to this note, it was edited and the changes made as needed.  It is accurate to the point, when the note was signed today.     Thank you for involving us in the care of Lynne Sanchez.  Please feel free to call with any questions.    Raymundo Alas MD, CALLI  06/24/24  07:47 EDT    [facility]  [phone]      Part of this note may be an electronic transcription/translation of spoken language to printed text using the Dragon Dictation System.

## 2024-06-24 NOTE — HOME HEALTH
Transfer Summary:    - Patient transferred to [facility]  - Reason for transfer: right leg infection  - Report called to NA- patient went to the hospital after calling MD office to report symptoms and being told to report to the ER    - Summary of Care, treatment or services provided to the patient including disciplines seeing the patient: SN for wound care, disease process, safety, and medication education.     - Patient progress toward goals: progressing    - Communicable Disease? No

## 2024-06-24 NOTE — THERAPY EVALUATION
Patient Name: Lynne Sanchez  : 1934    MRN: [id number]                              Today's Date: 2024       Admit Date: 2024    Visit Dx: No diagnosis found.  Patient Active Problem List   Diagnosis    Chronic atrial fibrillation    Valvular heart disease    Diabetes mellitus type II, controlled    Osteomyelitis of right foot    Normocytic anemia    Chronic gastritis    Cerebrovascular accident (CVA) due to thrombosis of left anterior cerebral artery    Thrombocytopenia    Cardiac pacemaker in situ    Chronic congestive heart failure    Functional heart murmur    Glaucoma    Hyperlipidemia    Hypertensive disorder    Acquired hypothyroidism    Mass of parotid gland    Neuropathy    Chronic renal impairment, stage 4 (severe)    Impairment of balance    Impaired mobility    Muscle weakness of lower extremity    Chronic pain of both knees    Secondary cataract of both eyes    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    Suspected glaucoma of both eyes    Secondary cataract of both eyes    Right retinal detachment    Stable proliferative diabetic retinopathy of both eyes    Swelling of left extremity    Closed nondisplaced fracture of surgical neck of left humerus with routine healing    Debility    Left arm swelling    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    Secondary hyperparathyroidism of renal origin    Vitamin D deficiency    Anemia requiring transfusions    Melena    Acute on chronic anemia    Stage 3b chronic kidney disease    DM complication    Anemia of chronic renal failure    Type 2 diabetes mellitus with diabetic chronic kidney disease    Anemia of chronic renal failure    Absolute anemia    Secondary hyperparathyroidism of renal origin    Stage 3b chronic kidney disease    Vitamin D deficiency    Other iron deficiency anemias    Chronic anemia    Gastroesophageal reflux disease    Erosion of ileum    Weight loss     Cellulitis of right leg    Osteomyelitis     Past Medical History:   Diagnosis Date    Acute deep vein thrombosis of left upper extremity     Anemia     Asthma     CHF (congestive heart failure)     Chronic atrial fibrillation     Deep venous thrombosis of left upper limb     Diabetes mellitus, type 2     Diarrhea     GERD (gastroesophageal reflux disease)     Glaucoma     H/O transfusion of whole blood     Heart attack     Heart murmur     History of transfusion     Hyperlipidemia     Hypertension     Kidney disease     patient not aware of what exact diagnosis is     Osteomyelitis     Osteomyelitis of left foot 10/03/2017    Peripheral vascular disease     Right retinal detachment     Secondary cataract of both eyes     Stable proliferative diabetic retinopathy of both eyes     Stroke     Swelling of left extremity     Urinary tract infection     Wears glasses      Past Surgical History:   Procedure Laterality Date    AMPUTATION DIGIT Left 7/5/2018    Procedure: Left Great toe amputation;  Surgeon: Prakash Carrington MD;  Location:  [facility];  Service: Vascular    AMPUTATION DIGIT Left 8/27/2018    Procedure: SECOND TOE AMPUTATION DIGIT LEFT;  Surgeon: Prakash Carrington MD;  Location:  [facility];  Service: General    APPENDECTOMY      BONE MARROW ASPIRATION      CARDIAC ABLATION      CARDIAC CATHETERIZATION N/A 10/10/2017    Procedure: Peripheral angiography;  Surgeon: Kan Pelaez MD;  Location:  [facility];  Service:     CARDIAC CATHETERIZATION N/A 10/10/2017    Procedure: Angioplasty-peripheral;  Surgeon: Kan Pelaez MD;  Location:  [facility];  Service:     CARDIAC CATHETERIZATION N/A 10/10/2017    Procedure: Atherectomy-peripheral;  Surgeon: Kan Pelaez MD;  Location:  [facility];  Service:     CARDIAC ELECTROPHYSIOLOGY PROCEDURE N/A 5/3/2018    Procedure: generator change;  Surgeon: Zan Poole MD;  Location:  [facility];  Service: Cardiovascular    CARDIOVERSION      COLONOSCOPY      ENDOSCOPY N/A 10/9/2017    Procedure: ESOPHAGOGASTRODUODENOSCOPY WITH COLD FORCEP BIOPSY;  Surgeon: Clifton Hyatt MD;  Location: [facility] ENDOSCOPY;  Service:     ENDOSCOPY N/A 3/22/2022    Procedure: ESOPHAGOGASTRODUODENOSCOPY with AVM cautery;  Surgeon: Clarisse Velez MD;  Location: [facility] ENDOSCOPY;  Service: Gastroenterology;  Laterality: N/A;    ENDOSCOPY N/A 8/4/2023    Procedure: ESOPHAGOGASTRODUODENOSCOPY WITH BIOPSY AND RUTH BRUSHING;  Surgeon: Clarisse Velez MD;  Location: [facility] ENDOSCOPY;  Service: Gastroenterology;  Laterality: N/A;    EYE SURGERY Bilateral     CATARACTS    INTERVENTIONAL RADIOLOGY PROCEDURE N/A 10/10/2017    Procedure: Abdominal Aortagram with Runoff;  Surgeon: Kan Pelaez MD;  Location: [facility] CATH INVASIVE LOCATION;  Service:     PACEMAKER IMPLANTATION      around 2008 then replaced 2018      General Information       Row Name 06/24/24 1315          OT Time and Intention    Document Type evaluation  -SD     Mode of Treatment occupational therapy  -SD       Row Name 06/24/24 1315          General Information    Patient Profile Reviewed other (see comments)  -SD     Prior Level of Function independent:;all household mobility;ADL's  Patient lives with her . Walks with a RW, has a stair lift to 2nd floor. Also has a WC, raised commode and shower chair. DIL drives, neighbor takes spouse to grocery. Patient performs IADLs.  -SD     Barriers to Rehab medically complex  -SD       Row Name 06/24/24 1312          Living Environment    People in Home spouse  -SD       Row Name 06/24/24 1315          Home Main Entrance    Number of Stairs, Main Entrance two  -SD     Stair Railings, Main Entrance railings safe and in good condition  -SD       Row Name 06/24/24 1317          Stairs Within Home, Primary    Stairs, Within Home, Primary stair lift  -SD       Row Name 06/24/24 1793           Cognition    Orientation Status (Cognition) oriented x 4  -SD       [facility] Name 06/24/24 1315          Safety Issues, Functional Mobility    Safety Issues Affecting Function (Mobility) safety precaution awareness;safety precautions follow-through/compliance  -SD     Impairments Affecting Function (Mobility) endurance/activity tolerance;strength;balance  -SD               User Key  (r) = Recorded By, (t) = Taken By, (c) = Cosigned By      Initials Name Provider Type    SD Kat Quiros OT Occupational Therapist                     Mobility/ADL's       [facility] 06/24/24 1317          Bed Mobility    Bed Mobility supine-sit  -SD     Supine-Sit [place] (Bed Mobility) modified independence  -SD     Assistive Device (Bed Mobility) bed rails;head of bed elevated  -SD       Row Name 06/24/24 1317          Transfers    Transfers sit-stand transfer  -SD       Row Name 06/24/24 1317          Sit-Stand Transfer    Sit-Stand [place] (Transfers) contact guard  -SD     Assistive Device (Sit-Stand Transfers) walker, front-wheeled  -SD       Row Name 06/24/24 1317          Functional Mobility    Functional Mobility- Ind. Level contact guard assist  -SD     Functional Mobility- Device walker, front-wheeled  -SD     Functional Mobility-Distance (Feet) 85  -SD     Functional Mobility- Safety Issues balance decreased during turns  -SD       [facility] Name 06/24/24 1317          Activities of Daily Living    BADL Assessment/Intervention bathing;upper body dressing;lower body dressing;grooming;feeding;toileting  -SD       Row Name 06/24/24 1317          Bathing Assessment/Intervention    [place] Level (Bathing) minimum assist (75% patient effort)  -SD       Row Name 06/24/24 1317          Upper Body Dressing Assessment/Training    [place] Level (Upper Body Dressing) standby assist  -SD       Row Name 06/24/24 1317          Lower Body Dressing Assessment/Training    [place] Level (Lower Body Dressing) minimum  assist (75% patient effort)  -SD       Row Name 06/24/24 1317          Grooming Assessment/Training    [place] Level (Grooming) standby assist  -SD       Row Name 06/24/24 1317          Self-Feeding Assessment/Training    [place] Level (Feeding) supervision  -SD       Row Name 06/24/24 1317          Toileting Assessment/Training    [place] Level (Toileting) minimum assist (75% patient effort)  -SD     Comment, (Toileting) encouraged use of commode  -SD               User Key  (r) = Recorded By, (t) = Taken By, (c) = Cosigned By      Initials Name Provider Type    Kat Medellin OT Occupational Therapist                   Obj/Interventions       [facility] Name 06/24/24 1318          Range of Motion Comprehensive    General Range of Motion bilateral upper extremity ROM WFL  -SD       Row Name 06/24/24 1318          Strength Comprehensive (MMT)    General Manual Muscle Testing (MMT) Assessment upper extremity strength deficits identified  -SD     Comment, General Manual Muscle Testing (MMT) Assessment UB 4-/5  -SD               User Key  (r) = Recorded By, (t) = Taken By, (c) = Cosigned By      Initials Name Provider Type    Kat Medellin OT Occupational Therapist                   Goals/Plan       Row Name 06/24/24 1321          Transfer Goal 1 (OT)    Activity/Assistive Device (Transfer Goal 1, OT) sit-to-stand/stand-to-sit;walker, rolling  -SD     [place] Level/Cues Needed (Transfer Goal 1, OT) modified independence  -SD     Time Frame (Transfer Goal 1, OT) long term goal (LTG)  -SD     Progress/Outcome (Transfer Goal 1, OT) goal ongoing  -SD       Row Name 06/24/24 1321          Dressing Goal 1 (OT)    Activity/Device (Dressing Goal 1, OT) lower body dressing  -SD     [place]/Cues Needed (Dressing Goal 1, OT) standby assist  -SD     Time Frame (Dressing Goal 1, OT) 2 weeks  -SD     Progress/Outcome (Dressing Goal 1, OT) goal ongoing  -SD       Row Name 06/24/24 1321           Toileting Goal 1 (OT)    Activity/Device (Toileting Goal 1, OT) toileting skills, all;raised toilet seat  -SD     [place] Level/Cues Needed (Toileting Goal 1, OT) standby assist  -SD     Time Frame (Toileting Goal 1, OT) 2 weeks  -SD     Progress/Outcome (Toileting Goal 1, OT) goal ongoing  -SD       Row Name 06/24/24 1321          Strength Goal 1 (OT)    Strength Goal 1 (OT) Patient to perform UB ther ex as tolerated  -SD     Time Frame (Strength Goal 1, OT) long term goal (LTG)  -SD     Progress/Outcome (Strength Goal 1, OT) goal ongoing  -SD       Row Name 06/24/24 1321          Therapy Assessment/Plan (OT)    Planned Therapy Interventions (OT) activity tolerance training;adaptive equipment training;BADL retraining;patient/caregiver education/training;ROM/therapeutic exercise;strengthening exercise;transfer/mobility retraining  -SD               User Key  (r) = Recorded By, (t) = Taken By, (c) = Cosigned By      Initials Name Provider Type    Kat Medellin OT Occupational Therapist                   Clinical Impression       Row Name 06/24/24 1318          Pain Assessment    Pretreatment Pain Rating 0/10 - no pain  -SD     Posttreatment Pain Rating 0/10 - no pain  -SD       Row Name 06/24/24 1318          Plan of Care Review    Plan of Care Reviewed With patient;spouse;daughter  -SD     Progress no change  -SD     Outcome Evaluation OT eval completed. Patient is supine in bed, DIL and spouse present. Patient denies pain at rest, is Ox4. Patient lives with her  in a 2 level home, has a stair lift to access 2nd floor. Patient walks with a RW, is ind with ADLs; has a raised commode and shower chair. DIL drives, neighbors take her spouse to the grocery store and patient reports she is ind with IADLs. Patient performed supine to sit with modified ind, sit to stand with CGA and walked 85' using RW with CGA. Patient requires min A for bathing, LBD and toileting. Patient is expected to benefit from  continued OT services prior to DC, plans to return home.  -SD       Row Name 06/24/24 1318          Therapy Assessment/Plan (OT)    Patient/Family Therapy Goal Statement (OT) home  -SD     Rehab Potential (OT) good, to achieve stated therapy goals  -SD     Criteria for Skilled Therapeutic Interventions Met (OT) skilled treatment is necessary  -SD     Therapy Frequency (OT) 3 times/wk  5 times if indicated  -SD       Row Name 06/24/24 1318          Therapy Plan Review/Discharge Plan (OT)    Anticipated Discharge Disposition (OT) home with home health  -SD       Row Name 06/24/24 1318          Vital Signs    O2 Delivery Pre Treatment room air  -SD     O2 Delivery Intra Treatment room air  -SD     O2 Delivery Post Treatment room air  -SD       Row Name 06/24/24 1318          Positioning and Restraints    Pre-Treatment Position in bed  -SD     Post Treatment Position chair  -SD     In Chair reclined;call light within reach;encouraged to call for assist;exit alarm on  -SD               User Key  (r) = Recorded By, (t) = Taken By, (c) = Cosigned By      Initials Name Provider Type    Kat Medellin OT Occupational Therapist                   Outcome Measures       Row Name 06/24/24 1322          How much help from another is currently needed...    Putting on and taking off regular lower body clothing? 3  -SD     Bathing (including washing, rinsing, and drying) 3  -SD     Toileting (which includes using toilet bed pan or urinal) 3  -SD     Putting on and taking off regular upper body clothing 3  -SD     Taking care of personal grooming (such as brushing teeth) 3  -SD     Eating meals 4  -SD     AM-PAC 6 Clicks Score (OT) 19  -SD       Row Name 06/24/24 0800          How much help from another person do you currently need...    Turning from your back to your side while in flat bed without using bedrails? 4  -PK     Moving from lying on back to sitting on the side of a flat bed without bedrails? 4  -PK     Moving to  and from a bed to a chair (including a wheelchair)? 3  -PK     Standing up from a chair using your arms (e.g., wheelchair, bedside chair)? 4  -PK     Climbing 3-5 steps with a railing? 3  -PK     To walk in hospital room? 3  -PK     AM-PAC 6 Clicks Score (PT) 21  -PK     Highest Level of Mobility Goal 6 --> Walk 10 steps or more  -PK       Row Name 06/24/24 1322          Functional Assessment    Outcome Measure Options AM-PAC 6 Clicks Daily Activity (OT)  -SD               User Key  (r) = Recorded By, (t) = Taken By, (c) = Cosigned By      Initials Name Provider Type    Kat Medellin OT Occupational Therapist    Tessa Bagley, RN Registered Nurse                    Occupational Therapy Education       Title: PT OT SLP Therapies (In Progress)       Topic: Occupational Therapy (In Progress)       Point: ADL training (Done)       Description:   Instruct learner(s) on proper safety adaptation and remediation techniques during self care or transfers.   Instruct in proper use of assistive devices.                  Learning Progress Summary             Patient Acceptance, E,TB, VU by SD at 6/24/2024 1322    Comment: OT POC                         Point: Home exercise program (Not Started)       Description:   Instruct learner(s) on appropriate technique for monitoring, assisting and/or progressing therapeutic exercises/activities.                  Learner Progress:  Not documented in this visit.              Point: Precautions (Not Started)       Description:   Instruct learner(s) on prescribed precautions during self-care and functional transfers.                  Learner Progress:  Not documented in this visit.              Point: Body mechanics (Not Started)       Description:   Instruct learner(s) on proper positioning and spine alignment during self-care, functional mobility activities and/or exercises.                  Learner Progress:  Not documented in this visit.                              User Key        Initials Effective Dates Name Provider Type Discipline    SD 06/16/21 -  Kat Quiros OT Occupational Therapist OT                  OT Recommendation and Plan  Planned Therapy Interventions (OT): activity tolerance training, adaptive equipment training, BADL retraining, patient/caregiver education/training, ROM/therapeutic exercise, strengthening exercise, transfer/mobility retraining  Therapy Frequency (OT): 3 times/wk (5 times if indicated)  Plan of Care Review  Plan of Care Reviewed With: patient, spouse, daughter  Progress: no change  Outcome Evaluation: OT eval completed. Patient is supine in bed, DIL and spouse present. Patient denies pain at rest, is Ox4. Patient lives with her  in a 2 level home, has a stair lift to access 2nd floor. Patient walks with a RW, is ind with ADLs; has a raised commode and shower chair. DIL drives, neighbors take her spouse to the grocery store and patient reports she is ind with IADLs. Patient performed supine to sit with modified ind, sit to stand with CGA and walked 85' using RW with CGA. Patient requires min A for bathing, LBD and toileting. Patient is expected to benefit from continued OT services prior to DC, plans to return home.     Time Calculation:   Evaluation Complexity (OT)  Review Occupational Profile/Medical/Therapy History Complexity: brief/low complexity  Assessment, Occupational Performance/Identification of Deficit Complexity: 1-3 performance deficits  Clinical Decision Making Complexity (OT): problem focused assessment/low complexity  Overall Complexity of Evaluation (OT): low complexity     Time Calculation- OT       Row Name 06/24/24 1323             Time Calculation- OT    OT Start Time 0950  -SD      OT Received On 06/24/24  -SD      OT Goal Re-Cert Due Date 07/04/24  -SD         Untimed Charges    OT Eval/Re-eval Minutes 45  -SD         Total Minutes    Untimed Charges Total Minutes 45  -SD       Total Minutes 45  -SD                User  Key  (r) = Recorded By, (t) = Taken By, (c) = Cosigned By      Initials Name Provider Type    Kat Medellin OT Occupational Therapist                  Therapy Charges for Today       Code Description Service Date Service Provider Modifiers Qty    [id number]  OT EVAL LOW COMPLEXITY 3 6/24/2024 Kat Quiros OT GO 1                 Kat Quiros OT  6/24/2024

## 2024-06-24 NOTE — THERAPY TREATMENT NOTE
Pt requested PTA to return after lunch d/t amb with OT earlier and wanted to rest a bit. PTA to f/u after lunch

## 2024-06-24 NOTE — THERAPY TREATMENT NOTE
Patient Name: Lynne Sanchez  : 1934    MRN: [id number]                              Today's Date: 2024       Admit Date: 2024    Visit Dx: No diagnosis found.  Patient Active Problem List   Diagnosis    Chronic atrial fibrillation    Valvular heart disease    Diabetes mellitus type II, controlled    Osteomyelitis of right foot    Normocytic anemia    Chronic gastritis    Cerebrovascular accident (CVA) due to thrombosis of left anterior cerebral artery    Thrombocytopenia    Cardiac pacemaker in situ    Chronic congestive heart failure    Functional heart murmur    Glaucoma    Hyperlipidemia    Hypertensive disorder    Acquired hypothyroidism    Mass of parotid gland    Neuropathy    Chronic renal impairment, stage 4 (severe)    Impairment of balance    Impaired mobility    Muscle weakness of lower extremity    Chronic pain of both knees    Secondary cataract of both eyes    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    Suspected glaucoma of both eyes    Secondary cataract of both eyes    Right retinal detachment    Stable proliferative diabetic retinopathy of both eyes    Swelling of left extremity    Closed nondisplaced fracture of surgical neck of left humerus with routine healing    Debility    Left arm swelling    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    Secondary hyperparathyroidism of renal origin    Vitamin D deficiency    Anemia requiring transfusions    Melena    Acute on chronic anemia    Stage 3b chronic kidney disease    DM complication    Anemia of chronic renal failure    Type 2 diabetes mellitus with diabetic chronic kidney disease    Anemia of chronic renal failure    Absolute anemia    Secondary hyperparathyroidism of renal origin    Stage 3b chronic kidney disease    Vitamin D deficiency    Other iron deficiency anemias    Chronic anemia    Gastroesophageal reflux disease    Erosion of ileum    Weight loss     Cellulitis of right leg    Osteomyelitis     Past Medical History:   Diagnosis Date    Acute deep vein thrombosis of left upper extremity     Anemia     Asthma     CHF (congestive heart failure)     Chronic atrial fibrillation     Deep venous thrombosis of left upper limb     Diabetes mellitus, type 2     Diarrhea     GERD (gastroesophageal reflux disease)     Glaucoma     H/O transfusion of whole blood     Heart attack     Heart murmur     History of transfusion     Hyperlipidemia     Hypertension     Kidney disease     patient not aware of what exact diagnosis is     Osteomyelitis     Osteomyelitis of left foot 10/03/2017    Peripheral vascular disease     Right retinal detachment     Secondary cataract of both eyes     Stable proliferative diabetic retinopathy of both eyes     Stroke     Swelling of left extremity     Urinary tract infection     Wears glasses      Past Surgical History:   Procedure Laterality Date    AMPUTATION DIGIT Left 7/5/2018    Procedure: Left Great toe amputation;  Surgeon: Prakash Carrington MD;  Location:  [facility];  Service: Vascular    AMPUTATION DIGIT Left 8/27/2018    Procedure: SECOND TOE AMPUTATION DIGIT LEFT;  Surgeon: Prakash Carrington MD;  Location:  [facility];  Service: General    APPENDECTOMY      BONE MARROW ASPIRATION      CARDIAC ABLATION      CARDIAC CATHETERIZATION N/A 10/10/2017    Procedure: Peripheral angiography;  Surgeon: Kan Pelaez MD;  Location:  [facility];  Service:     CARDIAC CATHETERIZATION N/A 10/10/2017    Procedure: Angioplasty-peripheral;  Surgeon: Kan Pelaez MD;  Location:  [facility];  Service:     CARDIAC CATHETERIZATION N/A 10/10/2017    Procedure: Atherectomy-peripheral;  Surgeon: Kan Pelaez MD;  Location:  [facility];  Service:     CARDIAC ELECTROPHYSIOLOGY PROCEDURE N/A 5/3/2018    Procedure: generator change;  Surgeon: Zan Poole MD;  Location:  [facility];  Service: Cardiovascular    CARDIOVERSION      COLONOSCOPY      ENDOSCOPY N/A 10/9/2017    Procedure: ESOPHAGOGASTRODUODENOSCOPY WITH COLD FORCEP BIOPSY;  Surgeon: Clifton Hyatt MD;  Location: [facility] ENDOSCOPY;  Service:     ENDOSCOPY N/A 3/22/2022    Procedure: ESOPHAGOGASTRODUODENOSCOPY with AVM cautery;  Surgeon: Clarisse Velez MD;  Location: [facility] ENDOSCOPY;  Service: Gastroenterology;  Laterality: N/A;    ENDOSCOPY N/A 8/4/2023    Procedure: ESOPHAGOGASTRODUODENOSCOPY WITH BIOPSY AND RUTH BRUSHING;  Surgeon: Clarisse Velez MD;  Location: [facility] ENDOSCOPY;  Service: Gastroenterology;  Laterality: N/A;    EYE SURGERY Bilateral     CATARACTS    INTERVENTIONAL RADIOLOGY PROCEDURE N/A 10/10/2017    Procedure: Abdominal Aortagram with Runoff;  Surgeon: Kan Pelaez MD;  Location: [facility] CATH INVASIVE LOCATION;  Service:     PACEMAKER IMPLANTATION      around 2008 then replaced 2018      General Information    No documentation.                  Mobility    No documentation.                  Obj/Interventions    No documentation.                  Goals/Plan    No documentation.                  Clinical Impression    No documentation.                  Outcome Measures       Row Name 06/24/24 0800          How much help from another person do you currently need...    Turning from your back to your side while in flat bed without using bedrails? 4  -PK     Moving from lying on back to sitting on the side of a flat bed without bedrails? 4  -PK     Moving to and from a bed to a chair (including a wheelchair)? 3  -PK     Standing up from a chair using your arms (e.g., wheelchair, bedside chair)? 4  -PK     Climbing 3-5 steps with a railing? 3  -PK     To walk in hospital room? 3  -PK     AM-PAC 6 Clicks Score (PT) 21  -PK     Highest Level of Mobility Goal 6 --> Walk 10 steps or more  -PK       Row Name 06/24/24 1322          Functional Assessment    Outcome Measure Options  AM-PAC 6 Clicks Daily Activity (OT)  -SD               User Key  (r) = Recorded By, (t) = Taken By, (c) = Cosigned By      Initials Name Provider Type    SD Kat Quiros OT Occupational Therapist    Tessa Bagley, RN Registered Nurse                                 Physical Therapy Education       Title: PT OT SLP Therapies (In Progress)       Topic: Physical Therapy (In Progress)       Point: Mobility training (Done)       Learning Progress Summary             Patient Acceptance, E,D, DU,VU by  at 6/22/2024 1304                         Point: Home exercise program (Not Started)       Learner Progress:  Not documented in this visit.              Point: Body mechanics (Done)       Learning Progress Summary             Patient Acceptance, E,TB, VU by  at 6/23/2024 1316    Comment: Upright posture during amb    Acceptance, E,D, DU,VU by  at 6/22/2024 1304                         Point: Precautions (Not Started)       Learner Progress:  Not documented in this visit.                              User Key       Initials Effective Dates Name Provider Type Discipline     06/16/21 -  Anjali Collins PTA Physical Therapist Assistant PT     06/13/23 -  Jaswinder Barry PT Physical Therapist PT                  PT Recommendation and Plan     Plan of Care Reviewed With: patient  Progress: improving  Outcome Evaluation: Pt agreeable to physical therapy. Performed supine to sit mod I, sit <->stand SBA and VC for hand placement, amb with RW 40 feet and incontinent of bladder and amb back to room for brief change with PTA assisting for brief change then amb with  feet with SBA and flexed posture and decreased step length and heel strike. Pt requires increased time to perform tasks. Static standing with 1 UE support on RW for balance and no LOB or unsteadines noted. Con't with PT POC and progress as tolerated     Time Calculation:          Therapy Charges for Today       Code Description Service Date Service  Provider Modifiers Qty    26974218915 HC GAIT TRAINING EA 15 MIN 6/23/2024 Anjali Collins, FUENTES GP 2    86545475759 HC PT THERAPEUTIC ACT EA 15 MIN 6/23/2024 Anjali Collins, FUENTES GP 1            PT G-Codes  Outcome Measure Options: AM-PAC 6 Clicks Daily Activity (OT)  AM-PAC 6 Clicks Score (PT): 21  AM-PAC 6 Clicks Score (OT): 19       Anjali Collins PTA  6/24/2024

## 2024-06-24 NOTE — PLAN OF CARE
Goal Outcome Evaluation:  Plan of Care Reviewed With: patient, spouse, daughter        Progress: no change  Outcome Evaluation: OT eval completed. Patient is supine in bed, DIL and spouse present. Patient denies pain at rest, is Ox4. Patient lives with her  in a 2 level home, has a stair lift to access 2nd floor. Patient walks with a RW, is ind with ADLs; has a raised commode and shower chair. DIL drives, neighbors take her spouse to the grocery store and patient reports she is ind with IADLs. Patient performed supine to sit with modified ind, sit to stand with CGA and walked 85' using RW with CGA. Patient requires min A for bathing, LBD and toileting. Patient is expected to benefit from continued OT services prior to DC, plans to return home.      Anticipated Discharge Disposition (OT): home with home health

## 2024-06-24 NOTE — THERAPY TREATMENT NOTE
Pt just received lunch tray d/t pt being NPO for possible procedure today however canc and able to eat. Pt declined and stated if not transferred to [facility] she would be glad to participate with PT. PT to f/u at later date.

## 2024-06-24 NOTE — CONSULTS
Dietitian Assessment    Patient Name: Lynne Sanchez  YOB: 1934  MRN: [id number]  Admission date: 6/21/2024    Comment:    Pt with increased nutrient needs r/t wound healing. Will order ONS once diet advances.     Clinical Nutrition Assessment      Reason for Assessment Consult per RN screen, MST=1   H&P  Past Medical History:   Diagnosis Date    Acute deep vein thrombosis of left upper extremity     Anemia     Asthma     CHF (congestive heart failure)     Chronic atrial fibrillation     Deep venous thrombosis of left upper limb     Diabetes mellitus, type 2     Diarrhea     GERD (gastroesophageal reflux disease)     Glaucoma     H/O transfusion of whole blood     Heart attack     Heart murmur     History of transfusion     Hyperlipidemia     Hypertension     Kidney disease     patient not aware of what exact diagnosis is     Osteomyelitis     Osteomyelitis of left foot 10/03/2017    Peripheral vascular disease     Right retinal detachment     Secondary cataract of both eyes     Stable proliferative diabetic retinopathy of both eyes     Stroke     Swelling of left extremity     Urinary tract infection     Wears glasses        Past Surgical History:   Procedure Laterality Date    AMPUTATION DIGIT Left 7/5/2018    Procedure: Left Great toe amputation;  Surgeon: Prakash Carrington MD;  Location:  [facility];  Service: Vascular    AMPUTATION DIGIT Left 8/27/2018    Procedure: SECOND TOE AMPUTATION DIGIT LEFT;  Surgeon: Prakash Carrington MD;  Location:  [facility];  Service: General    APPENDECTOMY      BONE MARROW ASPIRATION      CARDIAC ABLATION      CARDIAC CATHETERIZATION N/A 10/10/2017    Procedure: Peripheral angiography;  Surgeon: Kan Pelaez MD;  Location: [facility] CATH INVASIVE LOCATION;  Service:     CARDIAC CATHETERIZATION N/A 10/10/2017    Procedure: Angioplasty-peripheral;  Surgeon: Kan Pelaez MD;  Location: [facility] CATH INVASIVE LOCATION;  Service:     CARDIAC CATHETERIZATION N/A  "10/10/2017    Procedure: Atherectomy-peripheral;  Surgeon: Kan Pelaez MD;  Location: [facility] CATH INVASIVE LOCATION;  Service:     CARDIAC ELECTROPHYSIOLOGY PROCEDURE N/A 5/3/2018    Procedure: generator change;  Surgeon: Zan Poole MD;  Location:  [facility] INVAS[facility];  Service: Cardiovascular    CARDIOVERSION      COLONOSCOPY      ENDOSCOPY N/A 10/9/2017    Procedure: ESOPHAGOGASTRODUODENOSCOPY WITH COLD FORCEP BIOPSY;  Surgeon: Clifton Hyatt MD;  Location: [facility] ENDOSCOPY;  Service:     ENDOSCOPY N/A 3/22/2022    Procedure: ESOPHAGOGASTRODUODENOSCOPY with AVM cautery;  Surgeon: Clarisse Velez MD;  Location: [facility] ENDOSCOPY;  Service: Gastroenterology;  Laterality: N/A;    ENDOSCOPY N/A 8/4/2023    Procedure: ESOPHAGOGASTRODUODENOSCOPY WITH BIOPSY AND RUTH BRUSHING;  Surgeon: Clarisse Velez MD;  Location: [facility] ENDOSCOPY;  Service: Gastroenterology;  Laterality: N/A;    EYE SURGERY Bilateral     CATARACTS    INTERVENTIONAL RADIOLOGY PROCEDURE N/A 10/10/2017    Procedure: Abdominal Aortagram with Runoff;  Surgeon: Kan Pelaez MD;  Location: [facility] CATH INVASIVE LOCATION;  Service:     PACEMAKER IMPLANTATION      around 2008 then replaced 2018            Current Problems        Encounter Information        Trending Narrative     6/24: Pt admitted d/t osteomyelitis of R foot. Pt was on CCD diet, average PO intake 68%. Currently NPO. Once diet advances, will order appropriate ONS to promote wound healing.      Anthropometrics        Current Height, Weight Height: 160 cm (63\")  Weight: 54.6 kg (120 lb 5.9 oz) (06/23/24 0500)   Trending Weight Hx     This admission:              PTA:     Wt Readings from Last 30 Encounters:   06/23/24 0500 54.6 kg (120 lb 5.9 oz)   06/21/24 1223 53.1 kg (117 lb)   05/29/24 1353 53.1 kg (117 lb 1 oz)   05/29/24 1513 53.1 kg (117 lb)   05/08/24 1201 53.1 kg (117 lb)   04/26/24 0839 53.4 kg (117 lb 12.8 oz)   04/19/24 0947 54.2 kg " (119 lb 6.4 oz)   11/17/23 1510 58.8 kg (129 lb 9.6 oz)   08/14/23 1357 59.9 kg (132 lb)   07/17/23 1422 59 kg (130 lb)   07/13/23 1415 58.1 kg (128 lb)   07/03/23 1056 59 kg (130 lb)   05/17/23 1403 59.9 kg (132 lb)   05/11/23 1443 60.3 kg (133 lb)   05/08/23 1030 60.5 kg (133 lb 6.4 oz)   02/01/23 1438 63.5 kg (140 lb)   11/16/22 1353 60.3 kg (133 lb)   05/02/22 1328 64.9 kg (143 lb)   04/28/22 1338 64.9 kg (143 lb)   04/22/22 1251 63.9 kg (140 lb 12.8 oz)   04/15/22 0430 63.2 kg (139 lb 5.3 oz)   04/14/22 1405 66.9 kg (147 lb 7.8 oz)   04/14/22 0925 66.9 kg (147 lb 7.8 oz)   04/14/22 0600 66.9 kg (147 lb 7.8 oz)   04/13/22 2108 65.2 kg (143 lb 11.8 oz)   04/13/22 1749 55.8 kg (123 lb)   04/15/22 0916 63 kg (139 lb)   03/25/22 1205 58.7 kg (129 lb 8 oz)   03/24/22 0506 60.3 kg (132 lb 15 oz)   03/22/22 1005 60.3 kg (132 lb 15 oz)   03/21/22 2032 60.3 kg (132 lb 15 oz)   03/21/22 1946 60.3 kg (132 lb 15 oz)   03/21/22 1744 55.8 kg (123 lb)   03/17/22 1618 59 kg (130 lb)   03/10/22 1415 63.2 kg (139 lb 4.8 oz)   01/05/22 1137 57.3 kg (126 lb 6.4 oz)   10/28/21 1300 64.9 kg (143 lb)   09/20/21 1423 65.8 kg (145 lb)   08/31/21 1336 65.8 kg (145 lb)   07/21/21 1315 66.5 kg (146 lb 9.6 oz)      BMI kg/m2 Body mass index is 21.32 kg/m².     Labs        Pertinent Labs     Results from last 7 days   Lab Units 06/24/24  0610 06/23/24  0524 06/22/24  0618   SODIUM mmol/L 139 138 139   POTASSIUM mmol/L 3.6 3.5 3.6   CHLORIDE mmol/L 107 105 105   CO2 mmol/L 20.3* 23.5 23.5   BUN mg/dL 23 25* 24*   CREATININE mg/dL 1.84* 2.18* 1.34*   CALCIUM mg/dL 8.0* 8.3* 8.5*   BILIRUBIN mg/dL  --   --  0.8   ALK PHOS U/L  --   --  101   ALT (SGPT) U/L  --   --  5   AST (SGOT) U/L  --   --  15   GLUCOSE mg/dL 127* 85 74       Results from last 7 days   Lab Units 06/24/24  0610   MAGNESIUM mg/dL 1.7   PHOSPHORUS mg/dL 2.9   HEMOGLOBIN g/dL 7.6*   HEMATOCRIT % 25.2*       Lab Results   Component Value Date    HGBA1C 6.00 (H) 11/22/2023             Medications       Scheduled Medications aspirin, 81 mg, Oral, Daily  atorvastatin, 40 mg, Oral, Nightly  enoxaparin, 30 mg, Subcutaneous, Nightly  insulin lispro, 2-9 Units, Subcutaneous, 4x Daily AC & at Bedtime  latanoprost, 1 drop, Both Eyes, Nightly  levothyroxine, 50 mcg, Oral, Q AM  melatonin, 5 mg, Oral, Nightly  metoprolol tartrate, 100 mg, Oral, BID  multivitamin with minerals, 1 tablet, Oral, Daily  pantoprazole, 40 mg, Oral, Daily  piperacillin-tazobactam, 3.375 g, Intravenous, Q12H  saccharomyces boulardii, 500 mg, Oral, Daily  sodium chloride, 10 mL, Intravenous, Q12H  vancomycin (dosing per levels), , Does not apply, Daily        Infusions Pharmacy to Dose enoxaparin (LOVENOX),   Pharmacy to dose vancomycin,   Pharmacy to Dose Zosyn,          PRN Medications   acetaminophen    senna-docusate sodium **AND** polyethylene glycol **AND** bisacodyl **AND** bisacodyl    calcium carbonate    Calcium Replacement - Follow Nurse / BPA Driven Protocol    dextrose    dextrose    glucagon (human recombinant)    Magnesium Standard Dose Replacement - Follow Nurse / BPA Driven Protocol    nitroglycerin    ondansetron ODT **OR** ondansetron    Pharmacy to Dose enoxaparin (LOVENOX)    Pharmacy to dose vancomycin    Pharmacy to Dose Zosyn    Phosphorus Replacement - Follow Nurse / BPA Driven Protocol    Potassium Replacement - Follow Nurse / BPA Driven Protocol    sodium chloride    sodium chloride     Physical Findings        Trending Physical   Appearance, NFPE    --  Edema  1-2+     Bowel Function LBM: 6/22     Tubes Peripheral IV     Chewing/Swallowing WNL     Skin Stage 2 PI bilateral upper coccyx        Estimated/Assessed Needs       Energy Requirements    EST Needs, Method, Wt used 5092-2850 kcal (30-35 kcal/kg CBW)       Protein Requirements    EST Needs, Method, Wt used 44-55 g pro (.8-1 g pro/kg CBW)       Fluid Requirements     Estimated Needs (mL/day) 0180-0540 mL       Current Nutrition Orders &  Evaluation of Intake       Oral Nutrition     Food Allergies NKFA   Current PO Diet NPO Diet NPO Type: Strict NPO   Supplement    PO Evaluation     Trending % PO Intake 68% x 4 meals       Nutrition Diagnosis         Nutrition Dx Problem 1 Increased nutrient needs r/t wound healing AEB Stage 2 PI bilateral upper coccyx/need for ONS.      Nutrition Dx Problem 2        Intervention Goal         Intervention Goal(s) Maintain CBW  PO intake meet >50% of estimated needs  Blood glucose WNL     Nutrition Intervention        RD Action Will order ONS once diet advances     Nutrition Prescription          Diet Prescription NPO   Supplement Prescription      Enteral Prescription        TPN Prescription      Monitor/Evaluation        Monitor Per protocol, I&O, PO intake, Supplement intake, Pertinent labs, Weight, Skin status, GI status, Symptoms, POC/GOC, Swallow function, Hemodynamic stability     RD to f/up    Electronically signed by:  Madai Moses RD  06/24/24 08:50 EDT

## 2024-06-24 NOTE — NURSING NOTE
Seen for wound consult. Pleasant and cooperative with assessment. Tessa KULKARNI assisted. Reports patient may be transferring to [place] for vascular assessment. Wound assessment was brief as patient was having difficulties with IV and suspected allergic reaction. Patient was walking back to bed after using the toilet. Her back, chest and pelvic area are all bright red. Family at bedside reports this is a new development.   Coccyx stage 2 pressure injury. Daughter at bedside reports patient has had previous pressure injury. Skin is currently red non blanchable, small open areas. Stage 2 pressure injury standing order already in place.   Right calf with dry scabbed ulceration. Orders to paint with betadine daily. If starts to drain apply betadine dressing daily.    Right great toe to be evaluated by Dr. Maharaj.   Standing orders for care include a turning schedule, barrier cream twice daily and prn after cleansing, float heels off bed with pillows or heel lift boots, encourage increase mobility as appropriate and tolerated to reduce pressure, and a nutrition consult for dietary needs. Staff to contact provider and re-consult wound nurse for new skin issues or lack of improvement with current orders. Thank you for the consult. If you have questions or concerns do not hesitate to contact me.

## 2024-06-24 NOTE — PROGRESS NOTES
[facility] HOSPITALIST    PROGRESS NOTE    Name:  Lynne Sanchez   Age:  89 y.o.  Sex:  female  :  1934  MRN:  [id number]   Visit Number:  [id number]  Admission Date:  2024  Date Of Service:  24  Primary Care Physician:  Elizabeth Easton APRN     LOS: 3 days :    Chief Complaint:      Follow-up osteomyelitis    Subjective:    Patient seen multiple times today.  Had initially discussed possible transfer for vascular surgery, however due to no bed availability, Dr. NASSAR with vascular surgery is recommending an outpatient clinic follow-up tomorrow in [place].  Family including daughter-in-law at bedside agreeable to this plan so patient can be seen as soon as possible for treatment.    Hospital Course:    89-year-old with a history of prior amputations, peripheral vascular disease, renal failure, diabetes, who presented from home due to issues with right foot and leg swelling primarily of the right great toe.  Apparently been on antibiotics for this outpatient had not improved.    Upon initial workup showed concern for elevated procalcitonin.  Right leg with no DVT.  X-rays concerning for osteomyelitis of the right great toe.  She was started on empiric antibiotics she was seen by nephrology.  She underwent Doppler studies concerning for severe peripheral vascular disease of the right lower extremity.    Patient has now been stable.  I discussed the case with vascular surgery at [facility] in [place], initially plan for possible transfer, however no bed availability.  After further discussion Dr. NASSAR will see her in clinic tomorrow morning to discuss further treatment and operative plan at that time.    Review of Systems:     All systems were reviewed and negative except as mentioned in subjective, assessment and plan.    Vital Signs:    Temp:  [98.1 °F (36.7 °C)-98.6 °F (37 °C)] 98.2 °F (36.8 °C)  Heart Rate:  [69-75] 70  Resp:  [14-16] 16  BP: (101-142)/(43-64)  "138/43    Intake and output:    I/O last 3 completed shifts:  In: 1230 [P.O.:480; IV Piggyback:750]  Out: -   No intake/output data recorded.    Physical Examination:    General Appearance:  Alert and cooperative.  NAD   Head:  Atraumatic and normocephalic.   Eyes: Conjunctivae and sclerae normal, no icterus. No pallor.   Throat: No oral lesions, no thrush, oral mucosa moist.   Neck: Supple, trachea midline, no thyromegaly.   Lungs:   Breath sounds heard bilaterally equally.  No wheezing or crackles. No Pleural rub or bronchial breathing.   Heart:  Normal S1 and S2, no murmur, no gallop, no rub. No JVD.   Abdomen:   Normal bowel sounds, no masses, no organomegaly. Soft, nontender, nondistended, no rebound tenderness.   Extremities: Status post left great toe second toe amputation.  Right foot with no severe edema, edema of the great toe noted   Skin: No bleeding or rash.  Hives noted   Neurologic: Alert and oriented x 3. No facial asymmetry. Moves all four limbs. No tremors.      Laboratory results:    Results from last 7 days   Lab Units 06/24/24  0610 06/23/24  0524 06/22/24  0618   SODIUM mmol/L 139 138 139   POTASSIUM mmol/L 3.6 3.5 3.6   CHLORIDE mmol/L 107 105 105   CO2 mmol/L 20.3* 23.5 23.5   BUN mg/dL 23 25* 24*   CREATININE mg/dL 1.84* 2.18* 1.34*   CALCIUM mg/dL 8.0* 8.3* 8.5*   BILIRUBIN mg/dL  --   --  0.8   ALK PHOS U/L  --   --  101   ALT (SGPT) U/L  --   --  5   AST (SGOT) U/L  --   --  15   GLUCOSE mg/dL 127* 85 74     Results from last 7 days   Lab Units 06/24/24  0610 06/22/24  0618 06/21/24  1358   WBC 10*3/mm3 8.15 8.94 13.58*   HEMOGLOBIN g/dL 7.6* 7.4* 8.1*   HEMATOCRIT % 25.2* 24.0* 25.9*   PLATELETS 10*3/mm3 156 149 173             Results from last 7 days   Lab Units 06/21/24  1619   BLOODCX  No growth at 3 days  No growth at 3 days     No results for input(s): \"PHART\", \"LJX0UQP\", \"PO2ART\", \"PNC4OUA\", \"BASEEXCESS\" in the last 8760 hours.   I have reviewed the patient's laboratory " results.    Radiology results:    US Renal Bilateral    Result Date: 6/23/2024  FINAL REPORT TECHNIQUE: Sonographic images of the kidneys were obtained. CLINICAL HISTORY: KASH FINDINGS: Right kidney: The right kidney is small, atrophic and measures 8 cm in length.  There is no hydronephrosis.  Left kidney: The left kidney is normal in size and measures 11 cm in length. There is no hydronephrosis.     Impression: No acute abnormality. Authenticated and Electronically Signed by Rishabh Cuevas MD on 06/23/2024 09:58:50 PM   I have reviewed the patient's radiology reports.    Medication Review:     I have reviewed the patient's active and prn medications.     Problem List:      Osteomyelitis of right foot    Stage 3b chronic kidney disease    Type 2 diabetes mellitus with diabetic chronic kidney disease    Cellulitis of right leg    Osteomyelitis      Assessment:    Osteomyelitis of right foot    Stage 3b chronic kidney disease    Type 2 diabetes mellitus with diabetic chronic kidney disease    Cellulitis of right leg    Osteomyelitis    Plan:    Osteomyelitis of right foot    Cellulitis of right leg  Patient with longstanding osteomyelitis, failed outpatient treatment with antibiotics.  On vascular study she has severe peripheral vascular disease.  Had previously seen Dr. Carrington with vascular surgery in [place] with her prior amputations.  I did reach out to Dr. NASSAR with [facility] in [place], had tentatively plan for possible transfer, now will arrange for an outpatient visit in clinic tomorrow to discuss procedure/surgery.    Patient and family were agreeable to this.       Stage 3b chronic kidney disease  Kidney function is at baseline.      Will tentatively plan on discharge in the morning as patient can make her appointment after 9:00 tomorrow with Dr. NASSAR at [facility] in [place]    DVT Prophylaxis: Heparin  Code Status: Full  Diet: Cardiac/renal/diabetic  Discharge Plan: Discharge in morning for clinic  follow-up    May Nicholas DO  06/24/24  16:37 EDT    Dictated utilizing Dragon dictation.

## 2024-06-24 NOTE — PLAN OF CARE
Goal Outcome Evaluation:  Plan of Care Reviewed With: patient, family        Progress: no change     Up to chair and walking in [place] today.  No reports of pain.  Good appetite.  A bright red rash noted to back, chest, and groin after starting Zosyn.  Physician notified.  Benadryl given with decreased redness.  Plan to be discharged tomorrow and to go to vascular surgeon clinic for further treatment.                               Problem: Impaired Wound Healing  Goal: Optimal Wound Healing  Outcome: Unable to Meet, Plan Revised

## 2024-06-25 ENCOUNTER — READMISSION MANAGEMENT (OUTPATIENT)
Dept: CALL CENTER | Facility: HOSPITAL | Age: 89
End: 2024-06-25
Payer: MEDICARE

## 2024-06-25 VITALS
RESPIRATION RATE: 16 BRPM | HEART RATE: 73 BPM | DIASTOLIC BLOOD PRESSURE: 47 MMHG | WEIGHT: 123.24 LBS | OXYGEN SATURATION: 96 % | SYSTOLIC BLOOD PRESSURE: 117 MMHG | TEMPERATURE: 99 F | HEIGHT: 63 IN | BODY MASS INDEX: 21.84 KG/M2

## 2024-06-25 LAB
ANION GAP SERPL CALCULATED.3IONS-SCNC: 12 MMOL/L (ref 5–15)
BUN SERPL-MCNC: 19 MG/DL (ref 8–23)
BUN/CREAT SERPL: 14.8 (ref 7–25)
CALCIUM SPEC-SCNC: 8.5 MG/DL (ref 8.6–10.5)
CHLORIDE SERPL-SCNC: 106 MMOL/L (ref 98–107)
CO2 SERPL-SCNC: 22 MMOL/L (ref 22–29)
CREAT SERPL-MCNC: 1.28 MG/DL (ref 0.57–1)
EGFRCR SERPLBLD CKD-EPI 2021: 40.1 ML/MIN/1.73
GLUCOSE BLDC GLUCOMTR-MCNC: 101 MG/DL (ref 70–130)
GLUCOSE SERPL-MCNC: 85 MG/DL (ref 65–99)
POTASSIUM SERPL-SCNC: 3.5 MMOL/L (ref 3.5–5.2)
SODIUM SERPL-SCNC: 140 MMOL/L (ref 136–145)

## 2024-06-25 PROCEDURE — 99238 HOSP IP/OBS DSCHRG MGMT 30/<: CPT | Performed by: FAMILY MEDICINE

## 2024-06-25 PROCEDURE — 80048 BASIC METABOLIC PNL TOTAL CA: CPT | Performed by: FAMILY MEDICINE

## 2024-06-25 PROCEDURE — 82948 REAGENT STRIP/BLOOD GLUCOSE: CPT

## 2024-06-25 RX ORDER — DOXYCYCLINE HYCLATE 100 MG/1
100 CAPSULE ORAL 2 TIMES DAILY
Qty: 8 CAPSULE | Refills: 0 | Status: SHIPPED | OUTPATIENT
Start: 2024-06-25 | End: 2024-06-29

## 2024-06-25 RX ORDER — FERROUS GLUCONATE 324(38)MG
324 TABLET ORAL
Qty: 30 TABLET | Refills: 0 | Status: SHIPPED | OUTPATIENT
Start: 2024-06-25 | End: 2024-07-25

## 2024-06-25 RX ADMIN — Medication 500 MG: at 08:01

## 2024-06-25 RX ADMIN — Medication 1 TABLET: at 08:01

## 2024-06-25 RX ADMIN — LEVOTHYROXINE SODIUM 50 MCG: 50 TABLET ORAL at 06:41

## 2024-06-25 RX ADMIN — PANTOPRAZOLE SODIUM 40 MG: 40 TABLET, DELAYED RELEASE ORAL at 08:01

## 2024-06-25 RX ADMIN — Medication 150 MG: at 08:01

## 2024-06-25 RX ADMIN — ASPIRIN 81 MG: 81 TABLET, COATED ORAL at 08:01

## 2024-06-25 NOTE — CASE MANAGEMENT/SOCIAL WORK
Case Management Discharge Note      Final Note: Plans to return home with  and home health    Provided Post Acute Provider List?: N/A  N/A Provider List Comment: Patient to return home with same HH agency  Provided Post Acute Provider Quality & Resource List?: N/A  N/A Quality & Resource List Comment: Patient to return home with same HH agency    Selected Continued Care - Discharged on 6/25/2024 Admission date: 6/21/2024 - Discharge disposition: Home or Self Care      Destination    No services have been selected for the patient.                Durable Medical Equipment    No services have been selected for the patient.                Dialysis/Infusion    No services have been selected for the patient.                Home Medical Care Coordination complete.      Service Provider Selected Services Address Phone Fax Patient Preferred    [facility] [address] [facility] [address] [phone] --       Internal Comment last updated by Brionna Schaefer RN 6/24/[id number]    Current with services                         Therapy    No services have been selected for the patient.                Community Resources    No services have been selected for the patient.                Community & DME    No services have been selected for the patient.                    Transportation Services  Private: Car    Final Discharge Disposition Code: 06 - home with home health care

## 2024-06-25 NOTE — DISCHARGE SUMMARY
[facility]   DISCHARGE SUMMARY      Name:  Lynne Sanchez   Age:  89 y.o.  Sex:  female  :  1934  MRN:  [id number]   Visit Number:  [id number]    Admission Date:  2024  Date of Discharge:  2024  Primary Care Physician:  Elizabeth Easton APRN    Important issues to note:    Discharge today to make her appt with Dr. Hilario Zambrano at [facility] this morning at 9 am for treatment of right great toe osteomyelitis    Discharge Diagnoses:     Osteomyelitis of right foot    Stage 3b chronic kidney disease    Type 2 diabetes mellitus with diabetic chronic kidney disease    Cellulitis of right leg    Osteomyelitis    Problem List:     Active Hospital Problems    Diagnosis  POA    **Osteomyelitis of right foot [M86.9]  Yes    Cellulitis of right leg [L03.115]  Yes    Osteomyelitis [M86.9]  Yes    Stage 3b chronic kidney disease [N18.32]  Yes    Type 2 diabetes mellitus with diabetic chronic kidney disease [E11.22]  Yes      Resolved Hospital Problems   No resolved problems to display.     Presenting Problem:    Chief Complaint   Patient presents with    Leg Pain      Consults:     Consulting Physician(s)         Provider   Role Specialty     Jazmyn Phillips MD      Consulting Physician Nephrology     Veena Maharaj DPM      Consulting Physician Podiatry          Procedures Performed:        History of presenting illness/Hospital Course:    89-year-old with a history of prior amputations, peripheral vascular disease, renal failure, diabetes, who presented from home due to issues with right foot and leg swelling primarily of the right great toe.  Apparently been on antibiotics for this outpatient had not improved.     Upon initial workup showed concern for elevated procalcitonin.  Right leg with no DVT.  X-rays concerning for osteomyelitis of the right great toe.  She was started on empiric antibiotics she was seen by nephrology.  She  underwent Doppler studies concerning for severe peripheral vascular disease of the right lower extremity.     Patient has now been stable.  I discussed the case with vascular surgery at [facility] in [place], initially plan for possible transfer, however no bed availability.  After further discussion Dr. Zambrano with vascular surgery, he will see her in clinic this morning at [facility] in [place] in order to expedite her care. Patient and family agreeable to this. Blood cultures negative and labs remain stable this morning. Depending upon vascular surgery eval and treatment plan, she may need further antibiotics or ID follow up.     Vital Signs:    Temp:  [98.2 °F (36.8 °C)-99 °F (37.2 °C)] 99 °F (37.2 °C)  Heart Rate:  [69-75] 70  Resp:  [16] 16  BP: (114-142)/(42-64) 122/45    Physical Exam:    General Appearance:  Alert and cooperative. NAD   Head:  Atraumatic and normocephalic.   Eyes: Conjunctivae and sclerae normal, no icterus. No pallor.   Ears:  Ears with no abnormalities noted.   Throat: No oral lesions, no thrush, oral mucosa moist.   Neck: Supple, trachea midline, no thyromegaly.   Back:   No kyphoscoliosis present. No tenderness to palpation.   Lungs:   Breath sounds heard bilaterally equally.  No crackles or wheezing. No Pleural rub or bronchial breathing.   Heart:  Normal S1 and S2, no murmur, no gallop, no rub. No JVD.   Abdomen:   Normal bowel sounds, no masses, no organomegaly. Soft, nontender, nondistended, no rebound tenderness.   Extremities: Supple, no edema, no cyanosis, no clubbing. Prior left great toe and 2nd toe amputation. Right great toe mild edema, mild edema of the lower extremity   Pulses: Pulses palpable bilaterally.   Skin: No bleeding or rash.   Neurologic: Alert and oriented x 3. No facial asymmetry. Moves all four limbs. No tremors.     Pertinent Lab Results:     Results from last 7 days   Lab Units 06/24/24  0610 06/23/24  0524 06/22/24  0618    SODIUM mmol/L 139 138 139   POTASSIUM mmol/L 3.6 3.5 3.6   CHLORIDE mmol/L 107 105 105   CO2 mmol/L 20.3* 23.5 23.5   BUN mg/dL 23 25* 24*   CREATININE mg/dL 1.84* 2.18* 1.34*   CALCIUM mg/dL 8.0* 8.3* 8.5*   BILIRUBIN mg/dL  --   --  0.8   ALK PHOS U/L  --   --  101   ALT (SGPT) U/L  --   --  5   AST (SGOT) U/L  --   --  15   GLUCOSE mg/dL 127* 85 74     Results from last 7 days   Lab Units 06/24/24  0610 06/22/24  0618 06/21/24  1358   WBC 10*3/mm3 8.15 8.94 13.58*   HEMOGLOBIN g/dL 7.6* 7.4* 8.1*   HEMATOCRIT % 25.2* 24.0* 25.9*   PLATELETS 10*3/mm3 156 149 173                             Results from last 7 days   Lab Units 06/21/24  1619   BLOODCX  No growth at 3 days  No growth at 3 days       Pertinent Radiology Results:    Imaging Results (All)       Procedure Component Value Units Date/Time    US Renal Bilateral [[id number]] Collected: 06/23/24 2056     Updated: 06/23/24 2159    Narrative:      FINAL REPORT    TECHNIQUE:  Sonographic images of the kidneys were obtained.    CLINICAL HISTORY:  KASH    FINDINGS:  Right kidney: The right kidney is small, atrophic and measures 8  cm in length.  There is no hydronephrosis.  Left kidney: The  left kidney is normal in size and measures 11 cm in length.  There is no hydronephrosis.      Impression:      No acute abnormality.    Authenticated and Electronically Signed by Rishabh Cuevas MD on  06/23/2024 09:58:50 PM    US Arterial Doppler Lower Extremity Right [[id number]] Collected: 06/21/24 1905     Updated: 06/21/24 1916    Narrative:      FINAL REPORT    CLINICAL HISTORY:  osteomyelitis right foot    FINDINGS:  ARTERIAL DOPPLER OF THE RIGHT LOWER EXTREMITY     Spectral and  color Doppler exam of major artery branches     There is  monophasic flow throughout most of the right lower extremity  consistent with significant peripheral vascular disease.  CTA is  recommended.  CONCLUSION: Monophasic flow throughout most of the  right lower extremity consistent with  significant peripheral  vascular disease.  CTA is recommended.      Impression:      Authenticated and Electronically Signed by Heena Mata MD on  06/21/2024 07:15:54 PM    CT Lower Extremity Right Without Contrast [[id number]] Collected: 06/21/24 1815     Updated: 06/21/24 1823    Narrative:      FINAL REPORT    CLINICAL HISTORY:  assess for abscess  wound on foot, rt big toe    FINDINGS:  CT RIGHT LOWER EXTREMITY WITHOUT CONTRAST  Technique: Axial  images through the right lower extremity were performed by  computed tomography.  Sagittal and coronal reconstruction images  were performed. This study was performed with techniques to keep  radiation doses as low as reasonably achievable (ALARA).  Individualized dose reduction techniques using automated  exposure control or adjustment of mA and/or kV according to the  patient's size were employed.  There is a soft tissue deformity  at the tip of the right great toe extending into the distal  phalanx.  There appears to be air in the distal phalanx  concerning for osteomyelitis.  There is diffuse soft tissue  swelling of the great toe.  No definite fluid collection is  visualized but no contrast was administered.  There is mild soft  tissue swelling of the dorsum of the foot.  There is narrowing  and irregularity of multiple tarsal joints.  There are vascular  calcifications.  There are hammertoes deformities of the second  through fifth toes.  No fracture is identified.      Impression:      Soft tissue deformity at the tip of the right great toe  extending into the distal phalanx.  No johnny fluid collection,  no contrast administered.  Degenerative changes and soft tissue  swelling.    Authenticated and Electronically Signed by Heena Mata MD on  06/21/2024 06:22:18 PM    XR Toe 2+ View Right [[id number]] Collected: 06/21/24 1524     Updated: 06/21/24 1653    Narrative:      PROCEDURE: XR TOE 2+ VW RIGHT-     HISTORY: R great toe wound, pain, eval erosion      COMPARISON: None.     FINDINGS:  A 3 view exam was obtained. There is a great toe soft tissue  ulcer with soft tissue swelling. There is cortical erosion involving the  first distal phalanx and first proximal phalanx with probable  destruction of the interphalangeal joint. Findings are suggestive of  first IP septic arthritis/osteomyelitis.       Impression:      Findings suggestive of first interphalangeal joint septic  arthritis/osteomyelitis.                          Images were reviewed, interpreted, and dictated by Dr. Xavier Lopez MD  Transcribed by Dixie Szymanski PA-C.     This report was signed and finalized on 6/21/2024 4:51 PM by Xavier Lopez MD.       US Venous Doppler Lower Extremity Right (duplex) [[id number]] Collected: 06/21/24 1523     Updated: 06/21/24 1652    Narrative:      PROCEDURE: US VENOUS DOPPLER LOWER EXTREMITY RIGHT (DUPLEX)-     HISTORY: Right leg pain, great toe ulcer, r/o DVT     PROCEDURE: Multiple transverse and longitudinal scans were performed of  the femoropopliteal deep venous system, with augmentation and  compression maneuvers.     FINDINGS: Normal phasic flow was noted in the visualized deep venous  system. No intraluminal increased echogenicity is noted to suggest  thrombus. There is normal compression and augmentation of the venous  structures. No abnormal venous collaterals are seen. No venous reflux is  demonstrated.       Impression:      No evidence of deep venous thrombosis.                 Images were reviewed, interpreted, and dictated by Dr. Xavier Lopez MD  Transcribed by Dixie Szymanski PA-C.     This report was signed and finalized on 6/21/2024 4:50 PM by Xavier Lopez MD.               Echo:    Results for orders placed during the hospital encounter of 05/29/24    Adult Transthoracic Echo Complete W/ Cont if Necessary Per Protocol    Interpretation Summary    Left ventricular systolic function is normal. Calculated left ventricular  EF = 60% Left ventricular ejection fraction appears to be 56 - 60%.    Left ventricular wall thickness is consistent with hypertrophy.    Left ventricular diastolic function was indeterminate.    The left atrial cavity is dilated.    Left atrial volume is severely increased.    The right atrial cavity is dilated.    Mild aortic valve stenosis is present.    Peak velocity of the flow distal to the aortic valve is 230 cm/s. Aortic valve maximum pressure gradient is 21 mmHg. Aortic valve mean pressure gradient is 13 mmHg.    Moderate to severe mitral valve regurgitation is present.    Moderate tricuspid valve regurgitation is present.    Estimated right ventricular systolic pressure from tricuspid regurgitation is markedly elevated (>55 mmHg). Calculated right ventricular systolic pressure from tricuspid regurgitation is 85 mmHg.    Condition on Discharge:      Stable.    Code status during the hospital stay:    Code Status and Medical Interventions:   Ordered at: 06/21/24 1638     Code Status (Patient has no pulse and is not breathing):    CPR (Attempt to Resuscitate)     Medical Interventions (Patient has pulse or is breathing):    Full Support     Discharge Disposition:    Home or Self Care    Discharge Medications:       Discharge Medications        New Medications        Instructions Start Date   doxycycline 100 MG capsule  Commonly known as: VIBRAMYCIN   100 mg, Oral, 2 Times Daily      ferrous gluconate 324 MG tablet  Commonly known as: FERGON   324 mg, Oral, Daily With Breakfast             Changes to Medications        Instructions Start Date   Tradjenta 5 MG tablet tablet  Generic drug: linagliptin  What changed: how much to take   TAKE 1 TABLET EVERY DAY             Continue These Medications        Instructions Start Date   acetaminophen 650 MG 8 hr tablet  Commonly known as: TYLENOL   650 mg, Oral, Every 8 Hours PRN      aspirin 81 MG EC tablet   81 mg, Oral, Daily      furosemide 20 MG tablet  Commonly  known as: LASIX   20 mg, Oral, Daily      glimepiride 2 MG tablet  Commonly known as: AMARYL   2 mg, Oral, Every Morning Before Breakfast      glucose blood test strip   1 each, Other, Daily, Use as instructed once a day as directed, for [employer] reader      levothyroxine 50 MCG tablet  Commonly known as: SYNTHROID, LEVOTHROID   50 mcg, Oral, Every Early Morning      metoprolol tartrate 100 MG tablet  Commonly known as: LOPRESSOR   100 mg, Oral, 2 Times Daily      multivitamin with minerals tablet tablet   1 tablet, Oral, Daily      Retacrit 53938 UNIT/ML injection  Generic drug: epoetin dorcas-epbx   1 mL, Subcutaneous, 1 ml sq per month on day 17             Stop These Medications      Clenpiq 10-3.5-12 MG-GM -GM/175ML solution  Generic drug: Sod Picosulfate-Mag Ox-Cit Acd     latanoprost 0.005 % ophthalmic solution  Commonly known as: XALATAN            ASK your doctor about these medications        Instructions Start Date   atorvastatin 40 MG tablet  Commonly known as: LIPITOR   Take 1 tablet every day by oral route.      pantoprazole 40 MG EC tablet  Commonly known as: PROTONIX   40 mg, Oral, Daily      saccharomyces boulardii 250 MG capsule  Commonly known as: FLORASTOR   Take by oral route.             Discharge Diet:   Renal    Activity at Discharge:   As tolerated    Follow-up Appointments:    Additional Instructions for the Follow-ups that You Need to Schedule       Ambulatory Referral to Home Health (Hospital)   As directed      Face to Face Visit Date: 6/25/2024   Follow-up provider for Plan of Care?: I treated the patient in an acute care facility and will not continue treatment after discharge.   Follow-up provider: HOMA MAYA [[id number]]   Reason/Clinical Findings: weakness, impaired mobility   Describe mobility limitations that make leaving home difficult: weakness, impaired mobility   Nursing/Therapeutic Services Requested: Skilled Nursing Physical Therapy Occupational Therapy   Skilled nursing  orders: Medication education   PT orders: Therapeutic exercise Strengthening   Occupational orders: Activities of daily living Strengthening   Frequency: 1 Week 1               Follow-up Information       Elizabeth Easton, APRN .    Specialties: Nurse Practitioner, Family Medicine  Contact information:  [address]  [place] [address]  [phone]               Paxton Vasquez DO .    Specialty: Internal Medicine  Contact information:  [address]  [place] [address]  [phone]               Raymundo Alas MD, FASN Follow up in 1 month(s).    Specialty: Nephrology  Contact information:  [address] [place] [address]  [phone]               Hilario Zambrano MD Follow up.    Specialty: Vascular Surgery  Why: has appt this morning, needs to show up after 9 am  Contact information:  [place] [address]  [phone]                           Future Appointments   Date Time Provider Department Center   6/27/2024  2:30 PM RM 3 - BED 1 [facility]   7/25/2024  2:30 PM RM 4 - CHAIR 1 [facility]   8/22/2024  2:00 PM RM 4 - CHAIR 1 [facility]   9/19/2024  1:30 PM RM 1 - CHAIR 1 [facility]   10/17/2024  1:30 PM RM 4 - CHAIR 1 [facility]   11/14/2024  1:30 PM RM 4 - CHAIR 1 [facility]   12/12/2024  2:00 PM RM 4 - CHAIR 1 [facility]   8/25/2025  2:30 PM Demond Leon MD [facility]     Test Results Pending at Discharge:    Pending Labs       Order Current Status    Basic Metabolic Panel Collected (06/25/24 0708)    Blood Culture With JUANCHO - Blood, Arm, Left Preliminary result    Blood Culture With JUANCHO - Blood, Hand, Left Preliminary result               May Nicholas DO  06/25/24  07:18 EDT    Time: I spent 24 minutes on this discharge activity which included: face-to-face encounter with the patient,  reviewing the data in the system, coordination of the care with the nursing staff as well as consultants, documentation, and entering orders.     Dictated utilizing Dragon dictation.

## 2024-06-25 NOTE — PROGRESS NOTES
[facility] Progress Note  [facility]. [place]        Patient Name: Lynne Sanchez  : 1934  MRN: [id number]   LOS: 4 days    Patient Care Team:  Elizabeth Easton APRN as PCP - General (Family Medicine)  Paxton Vasquez DO as PCP - Internal Medicine ([facility])  Davian Faria MD (Inactive) as Consulting Physician (Nephrology)  Radha Boone RN as Registered Nurse    Chief Complaint:    Chief Complaint   Patient presents with    Leg Pain     Primary Care Physician:  Elizabeth Easton APRN  Date of admission: 2024    Subjective     Interval History:   Follow-up acute on chronic kidney disease stage III.  Events noted from last 24 hours.    I reviewed the chart and other providers notes, labs and procedures done since my last note.  She appears to be at about her baseline awake alert and interactive, she is getting discharged to go follow-up with her vascular surgeon this morning.    Review of Systems:   As noted above.    Objective     Vitals:   Temp:  [98.2 °F (36.8 °C)-99 °F (37.2 °C)] 99 °F (37.2 °C)  Heart Rate:  [69-75] 70  Resp:  [16] 16  BP: (114-142)/(42-64) 114/42  No intake or output data in the 24 hours ending 24 0643      Physical Exam:    General Appearance: alert, oriented x 3, no acute distress   Skin: warm and dry  HEENT: oral mucosa normal, nonicteric sclera  Neck: supple, no JVD  Lungs: CTA  Heart: RRR, normal S1 and S2  Abdomen: obese, soft, nontender, non distended and positive bowel sounds.  : no palpable bladder  Extremities: Trace edema, no cyanosis or clubbing  Neuro: normal speech and mental status     Scheduled Meds:     Current Facility-Administered Medications   Medication Dose Route Frequency Provider Last Rate Last Admin    acetaminophen (TYLENOL) tablet 650 mg  650 mg Oral Q4H PRN Mick Ortega DO        aspirin EC tablet 81 mg  81 mg Oral Daily Mick Ortega DO   81 mg at 24  0800    atorvastatin (LIPITOR) tablet 40 mg  40 mg Oral Nightly Mick Ortega, DO   40 mg at 06/24/24 2216    sennosides-docusate (PERICOLACE) 8.6-50 MG per tablet 2 tablet  2 tablet Oral BID PRN Mick Ortega, DO        And    polyethylene glycol (MIRALAX) packet 17 g  17 g Oral Daily PRN Mick Ortega, DO        And    bisacodyl (DULCOLAX) EC tablet 5 mg  5 mg Oral Daily PRN Mick Ortega DO        And    bisacodyl (DULCOLAX) suppository 10 mg  10 mg Rectal Daily PRN Mick Ortega DO        calcium carbonate (TUMS) chewable tablet 500 mg (200 mg elemental)  2 tablet Oral BID PRN Mick Ortega DO        Calcium Replacement - Follow Nurse / BPA Driven Protocol   Does not apply PRN Mick Ortega,         dextrose (D50W) (25 g/50 mL) IV injection 25 g  25 g Intravenous Q15 Min PRN Mick Ortega DO        dextrose (GLUTOSE) oral gel 15 g  15 g Oral Q15 Min PRN Mick Ortega, DO        diphenhydrAMINE (BENADRYL) capsule 25 mg  25 mg Oral Q6H PRN May Nicholas DO   25 mg at 06/24/24 1549    Enoxaparin Sodium (LOVENOX) syringe 30 mg  30 mg Subcutaneous Nightly Mick Ortega, DO   30 mg at 06/24/24 2211    glucagon (GLUCAGEN) injection 1 mg  1 mg Intramuscular Q15 Min PRN Mick Ortega DO        Insulin Lispro (humaLOG) injection 2-9 Units  2-9 Units Subcutaneous 4x Daily AC & at Bedtime Mick Ortega DO   4 Units at 06/24/24 2211    iron polysaccharides (NIFEREX) capsule 150 mg  150 mg Oral Daily Raymundo Alas MD, FASN   150 mg at 06/24/24 1549    latanoprost (XALATAN) 0.005 % ophthalmic solution 1 drop  1 drop Both Eyes Nightly Mick Ortega, DO   1 drop at 06/24/24 2218    levothyroxine (SYNTHROID, LEVOTHROID) tablet 50 mcg  50 mcg Oral Q AM Mick Ortega DO   50 mcg at 06/25/24 0641    Magnesium Standard Dose Replacement - Follow Nurse / BPA Driven Protocol   Does not apply PRN Mick Ortega DO         melatonin tablet 5 mg  5 mg Oral Nightly Mick Ortega, DO   5 mg at 06/24/24 2216    metoprolol tartrate (LOPRESSOR) tablet 100 mg  100 mg Oral BID Mick Ortega, DO   100 mg at 06/24/24 2216    multivitamin with minerals 1 tablet  1 tablet Oral Daily Mick Ortega, DO   1 tablet at 06/23/24 0800    nitroglycerin (NITROSTAT) SL tablet 0.4 mg  0.4 mg Sublingual Q5 Min PRN Mick Ortega, DO        ondansetron ODT (ZOFRAN-ODT) disintegrating tablet 4 mg  4 mg Oral Q6H PRN Mick Ortega,         Or    ondansetron (ZOFRAN) injection 4 mg  4 mg Intravenous Q6H PRN Mick Ortega,         pantoprazole (PROTONIX) EC tablet 40 mg  40 mg Oral Daily Mick Ortega, DO   40 mg at 06/23/24 0800    Pharmacy to Dose enoxaparin (LOVENOX)   Does not apply Continuous PRN Mick Ortega,         Phosphorus Replacement - Follow Nurse / BPA Driven Protocol   Does not apply PRN Mick Ortega,         Potassium Replacement - Follow Nurse / BPA Driven Protocol   Does not apply PRN Mick Ortega, DO        saccharomyces boulardii (FLORASTOR) capsule 500 mg  500 mg Oral Daily Mick Ortega, DO   500 mg at 06/23/24 0801    sodium chloride 0.9 % flush 10 mL  10 mL Intravenous Q12H Mick Ortega, DO   10 mL at 06/24/24 2218    sodium chloride 0.9 % flush 10 mL  10 mL Intravenous PRN Mikc Ortega,         sodium chloride 0.9 % infusion 40 mL  40 mL Intravenous PRN Mick Ortega, DO           aspirin, 81 mg, Oral, Daily  atorvastatin, 40 mg, Oral, Nightly  enoxaparin, 30 mg, Subcutaneous, Nightly  insulin lispro, 2-9 Units, Subcutaneous, 4x Daily AC & at Bedtime  iron polysaccharides, 150 mg, Oral, Daily  latanoprost, 1 drop, Both Eyes, Nightly  levothyroxine, 50 mcg, Oral, Q AM  melatonin, 5 mg, Oral, Nightly  metoprolol tartrate, 100 mg, Oral, BID  multivitamin with minerals, 1 tablet, Oral, Daily  pantoprazole, 40 mg, Oral, Daily  saccharomyces  "boulardii, 500 mg, Oral, Daily  sodium chloride, 10 mL, Intravenous, Q12H        IV Meds:   Pharmacy to Dose enoxaparin (LOVENOX),         Results Reviewed:   I have personally reviewed the results from the time of this admission to 6/25/2024 06:43 EDT     Results from last 7 days   Lab Units 06/24/24  0610 06/23/24  0524 06/22/24  0618   SODIUM mmol/L 139 138 139   POTASSIUM mmol/L 3.6 3.5 3.6   CHLORIDE mmol/L 107 105 105   CO2 mmol/L 20.3* 23.5 23.5   BUN mg/dL 23 25* 24*   CREATININE mg/dL 1.84* 2.18* 1.34*   CALCIUM mg/dL 8.0* 8.3* 8.5*   BILIRUBIN mg/dL  --   --  0.8   ALK PHOS U/L  --   --  101   ALT (SGPT) U/L  --   --  5   AST (SGOT) U/L  --   --  15   GLUCOSE mg/dL 127* 85 74       Estimated Creatinine Clearance: 17.9 mL/min (A) (by C-G formula based on SCr of 1.84 mg/dL (H)).    Results from last 7 days   Lab Units 06/24/24  0610 06/23/24  0524   MAGNESIUM mg/dL 1.7  --    PHOSPHORUS mg/dL 2.9 3.0             Results from last 7 days   Lab Units 06/24/24  0610 06/22/24  0618 06/21/24  1358   WBC 10*3/mm3 8.15 8.94 13.58*   HEMOGLOBIN g/dL 7.6* 7.4* 8.1*   PLATELETS 10*3/mm3 156 149 173             Brief Urine Lab Results  (Last result in the past 365 days)        Color   Clarity   Blood   Leuk Est   Nitrite   Protein   CREAT   Urine HCG        06/23/24 1556 Yellow   Clear   Negative   Negative   Negative   Negative                   No results found for: \"UTPCR\"    Imaging Results (Last 24 Hours)       ** No results found for the last 24 hours. **                Assessment / Plan     ASSESSMENT:    Osteomyelitis of right foot    Stage 3b chronic kidney disease    Type 2 diabetes mellitus with diabetic chronic kidney disease    Cellulitis of right leg    Osteomyelitis    Acute kidney injury on top of CKD stage IIIb with baseline creatinine around 1.3-1.6.  Etiology of acute kidney injury likely secondary to prerenal/ATN due to mild hypovolemia in the setting of possible sepsis in addition to vancomycin " induced nephrotoxicity. Etiology of chronic kidney disease is likely secondary to hypertension and diabetes in addition to history of NSAID use.  Renal function is starting to improve.  Hypertension with CKD: Blood pressure is well-controlled, continue the same medicines.  Type 2 diabetes mellitus with CKD  Osteomyelitis: Podiatry evaluation is in progress  Anemia of CKD: History of iron deficiency and ALIN use.        PLAN:  Patient is being discharged to follow-up with the vascular surgery in [place].  I told her to call the office when she is done with all her issues with the foot.  We do manage her iron and ALIN.  Likely will need iron infusion, after the infection is cleared up.  Details were discussed with the patient as well as family in the room.   at the bedside.  Details were also discussed with the hospitalist service and or other providers as needed.   Continue with rest of the current treatment plan, and monitor with surveillance labs.  Further recommendations will depend on clinical course of the patient during the current hospitalization.   I have reviewed the copied text to this note, it was edited and the changes made as needed.  It is accurate to the point, when the note was signed today.     Thank you for involving us in the care of Lynne Sanchez.  Please feel free to call with any questions.    Raymundo Alas MD, FASN  06/25/24  06:43 EDT    [facility]  [phone]      Part of this note may be an electronic transcription/translation of spoken language to printed text using the Dragon Dictation System.

## 2024-06-25 NOTE — OUTREACH NOTE
Prep Survey      Flowsheet Row Responses   [facility] patient discharged from? [place]   Is LACE score < 7 ? No   Eligibility [facility]   Date of Admission 06/21/24   Date of Discharge 06/25/24   [place] Dis[place] [facility]   Discharge diagnosis Osteomyelitis of right foot   Does the patient have one of the following disease processes/diagnoses(primary or secondary)? Other   Does the patient have Home health ordered? Yes   What is the Home health agency?  [facility]   Is there a DME ordered? No   Medication alerts for this patient see AVS   Prep survey completed? Yes            Carolina WOLF - Registered Nurse

## 2024-06-25 NOTE — PLAN OF CARE
Goal Outcome Evaluation:  Plan of Care Reviewed With: patient           Very pleasant patient. She did well overnight. Vital signs have remained stable and will continue to monitor. No overnight events to report. Discharge this morning.

## 2024-06-25 NOTE — DISCHARGE INSTRUCTIONS
[facility]  [address],  [address]  [place] [id number]  [phone]     Needs to go to Vascular Center after discharge and after 9 am today.

## 2024-06-26 ENCOUNTER — HOME CARE VISIT (OUTPATIENT)
Dept: HOME HEALTH SERVICES | Facility: HOME HEALTHCARE | Age: 89
End: 2024-06-26
Payer: MEDICARE

## 2024-06-26 ENCOUNTER — TRANSITIONAL CARE MANAGEMENT TELEPHONE ENCOUNTER (OUTPATIENT)
Dept: CALL CENTER | Facility: HOSPITAL | Age: 89
End: 2024-06-26
Payer: MEDICARE

## 2024-06-26 LAB
BACTERIA SPEC AEROBE CULT: NORMAL
BACTERIA SPEC AEROBE CULT: NORMAL

## 2024-06-26 PROCEDURE — G0495 RN CARE TRAIN/EDU IN HH: HCPCS

## 2024-06-26 NOTE — OUTREACH NOTE
Call Center TCM Note      Flowsheet Row Responses   [facility] patient discharged from? [place]   Does the patient have one of the following disease processes/diagnoses(primary or secondary)? Other   TCM attempt successful? Yes   Call start time 1547   Call end time 1551   Discharge diagnosis Osteomyelitis of right foot   Is patient permission given to speak with other caregiver? Yes   Person spoke with today (if not patient) and relationship daughter, Janneth Villafuerte reviewed with patient/caregiver? Yes   Does the patient have all medications ordered at discharge? Yes   Is the patient taking all medications as directed (includes completed medication regime)? Yes   Comments PCP Elizabeth Easton. Declined to schedule appt with call today. Reports patient following up with vascular surgeon and will contact PCP office at a later date. Waiting to hear when right great toe removal surgery will be.   Does the patient have an appointment with their PCP within 7-14 days of discharge? No   Nursing Interventions Patient declined scheduling/rescheduling appointment at this time, Routed TCM call to PCP office, Patient desires to follow up with specialty only   What is the Home health agency?  [facility]   Has home health visited the patient within 72 hours of discharge? Yes   Psychosocial issues? No   Did the patient receive a copy of their discharge instructions? Yes   Nursing interventions Reviewed instructions with patient  [daughter]   What is the patient's perception of their health status since discharge? Same  [Daughter reports that patient saw surgeon and that the toe is going to be removed. Awaiting to hear from  on when surgery will be.]   Is the patient/caregiver able to teach back signs and symptoms related to disease process for when to call PCP? Yes   Is the patient/caregiver able to teach back signs and symptoms related to disease process for when to call 911? Yes   Is the patient/caregiver able to  teach back the hierarchy of who to call/visit for symptoms/problems? PCP, Specialist, Home health nurse, Urgent Care, ED, 911 Yes   If the patient is a current smoker, are they able to teach back resources for cessation? Not a smoker   TCM call completed? Yes   Call end time 1551   Would this patient benefit from a Referral to [facility] Social Work? No   Is the patient interested in additional calls from an ambulatory ? No            Lynne Porter RN    6/26/2024, 15:54 EDT

## 2024-06-26 NOTE — Clinical Note
"Resumption of Care Note: SN will plan to see patient 2x/wk for 1 week, 3x/wk for 2 weeks, then 2x/wk for 2 weeks, 1x/wk for 2 weeks based on current findings and patient condition.    Reason for hospitalization/new problems: Osteomyelitis right great toe and cellulitis RLE.    ROYCE Barling Findings:  Patient admitted to [facility] on 6/21/2024 through 6/25/2024 d/t failed outpatient treatment of non-healing wound right great toe. She was treated with IV antibiotics. She was previously treated for cellulitis and antibiotics. She was referred to [facility]. Patient had in office procedure to remove \"bone\" from her right great toe. She was to continue to f/u with wound clinic; however, she wanted to follow with Dr. Wade who had performed previous toe amputations on left foot. Referral was sent; however, she was unable to follow with Dr. Wade. She did not have f/u with wound clinic in [place] and subsequently had increased pain and edema in RLE. While inpatient, she was referred to [facility] for transfer, but d/t bed availability, she was d/c'd home with  services and scheduled to f/u with Dr. Ivon Zambrano with [facility] (vascular surgery). Plan is to have right great toe amputation; however, this will be dependent on vascular assessment.  She is to continue with wound care to right great toe. She is on oral antibiotics.     Important issues to note:     New/changed medications: New medications - Doxycycline and Fergon. Dose of Tradjenta changed.    New/changed orders: Continue wound care as previously ordered.    Educated on Emergency Plan, steps to take prior to going to the ER and when to Call Home Health First:  Yes    Plan/Focus of Care and Skilled need: Educate/SN to perform wound care/dressing changes. Provided education r/t medication changes and new medications. Instruct and educate patient on f/u with providers and when to notify providers/HHA."

## 2024-06-26 NOTE — PAYOR COMM NOTE
"    Discharge notification on 6/25/24  UR Manager; [employer], rn [phone] and fax [phone]        Mel Sanchez \"Bindu\" (89 y.o. Female)       Date of Birth   1934    Social Security Number       Address   [address]  [place] [address]    Home Phone   [phone]    MRN   [id number]       [place]   [employer]    Marital Status                               Admission Date   6/21/24    Admission Type   Emergency    Admitting Provider   Mick Ortega DO    Attending Provider       Department, Room/Bed   [facility] TELEMETRY 3, 323/1       Discharge Date   6/25/2024    Discharge Disposition   Home or Self Care    Discharge Destination   Home                              Attending Provider: (none)   Allergies: Phenergan [Promethazine Hcl], Zosyn [Piperacillin-tazobactam In Dex]    Isolation: None   Infection: None   Code Status: Prior    Ht: 160 cm (63\")   Wt: 55.9 kg (123 lb 3.8 oz)    Admission Cmt: None   Principal Problem: Osteomyelitis of right foot [M86.9]                   Active Insurance as of 6/21/2024       Primary Coverage       Payor Plan Insurance Group Employer/Plan Group    AETNA MEDICARE REPLACEMENT AETNA MED ADV PPO [id number]       Payor Plan Address Payor Plan Phone Number Payor Plan Fax Number Effective Dates    [address] [phone]  1/1/2024 - None Entered    [facility] [id number]         Subscriber Name Subscriber Birth Date Member ID       MEL SANCHEZ 1934 [id number]                     Emergency Contacts        (Rel.) Home Phone Work Phone Mobile Phone    LISA PEREZ (Daughter) -- -- [phone]    Brennan Mendenhall (Spouse) [phone] -- [phone]    Marco A Sanchez (Son) [phone] -- [phone]    MARILU SANCHEZ (Son) [phone] -- --              Physician Progress Notes (last 24 hours)  Notes from 06/25/24 1008 through 06/26/24 1008   No notes of this type exist for this encounter.       "

## 2024-06-27 ENCOUNTER — HOSPITAL ENCOUNTER (OUTPATIENT)
Dept: INFUSION THERAPY | Facility: HOSPITAL | Age: 89
Discharge: HOME | End: 2024-06-27
Payer: MEDICARE

## 2024-06-27 ENCOUNTER — HOME CARE VISIT (OUTPATIENT)
Dept: HOME HEALTH SERVICES | Facility: HOME HEALTHCARE | Age: 89
End: 2024-06-27
Payer: MEDICARE

## 2024-06-27 VITALS
OXYGEN SATURATION: 96 % | DIASTOLIC BLOOD PRESSURE: 64 MMHG | SYSTOLIC BLOOD PRESSURE: 119 MMHG | HEART RATE: 86 BPM | TEMPERATURE: 98 F | RESPIRATION RATE: 16 BRPM

## 2024-06-27 VITALS
HEART RATE: 70 BPM | SYSTOLIC BLOOD PRESSURE: 124 MMHG | TEMPERATURE: 98.2 F | DIASTOLIC BLOOD PRESSURE: 76 MMHG | RESPIRATION RATE: 16 BRPM | OXYGEN SATURATION: 98 %

## 2024-06-27 VITALS
TEMPERATURE: 97.9 F | OXYGEN SATURATION: 98 % | RESPIRATION RATE: 18 BRPM | DIASTOLIC BLOOD PRESSURE: 70 MMHG | HEART RATE: 70 BPM | SYSTOLIC BLOOD PRESSURE: 152 MMHG

## 2024-06-27 DIAGNOSIS — N18.32 ANEMIA OF CHRONIC RENAL FAILURE, STAGE 3B: ICD-10-CM

## 2024-06-27 DIAGNOSIS — D63.1 ANEMIA OF CHRONIC RENAL FAILURE, STAGE 3B: ICD-10-CM

## 2024-06-27 DIAGNOSIS — N18.32 STAGE 3B CHRONIC KIDNEY DISEASE: ICD-10-CM

## 2024-06-27 DIAGNOSIS — D64.9 NORMOCYTIC ANEMIA: Primary | ICD-10-CM

## 2024-06-27 LAB
HCT VFR BLD AUTO: 24.1 % (ref 34–46.6)
HGB BLD-MCNC: 7.6 G/DL (ref 12–15.9)

## 2024-06-27 PROCEDURE — G0151 HHCP-SERV OF PT,EA 15 MIN: HCPCS

## 2024-06-27 PROCEDURE — 36415 COLL VENOUS BLD VENIPUNCTURE: CPT

## 2024-06-27 PROCEDURE — 25010000002 EPOETIN ALFA PER 1000 UNITS: Performed by: PHYSICIAN ASSISTANT

## 2024-06-27 PROCEDURE — 96372 THER/PROPH/DIAG INJ SC/IM: CPT

## 2024-06-27 PROCEDURE — 85018 HEMOGLOBIN: CPT | Performed by: PHYSICIAN ASSISTANT

## 2024-06-27 PROCEDURE — 85014 HEMATOCRIT: CPT | Performed by: PHYSICIAN ASSISTANT

## 2024-06-27 RX ADMIN — ERYTHROPOIETIN 40000 UNITS: 40000 INJECTION, SOLUTION INTRAVENOUS; SUBCUTANEOUS at 15:42

## 2024-06-27 NOTE — CODE DOCUMENTATION
1500) Lab specimen via venipuncture to right anterior forearm and taken to inpatient lab for processing.  Patient tolerated procedure well.

## 2024-06-27 NOTE — HOME HEALTH
"Physical Therapy Initial Evaluation:     Patient Presentation and Current Problem(s): Patient admitted to [facility] on 6/21/2024 through 6/25/2024 d/t failed outpatient treatment of non-healing wound right great toe. She was treated with IV antibiotics. She was previously treated for cellulitis and antibiotics. She was referred to [facility]. Patient had in office procedure to remove \"bone\" from her right great toe. She was to continue to f/u with wound clinic; however, she wanted to follow with Dr. Wade who had performed previous toe amputations on left foot. Referral was sent; however, she was unable to follow with Dr. Wade. She did not have f/u with wound clinic in [place] and subsequently had increased pain and edema in RLE. While inpatient, she was referred to [facility] for transfer, but d/t bed availability, she was d/c'd home with  services and scheduled to f/u with Dr. Ivon Zambrano with [facility] (vascular surgery). Plan is to have right great toe amputation; however, this will be dependent on vascular assessment. She is to continue with wound care to right great toe. She is on oral antibiotics. Struggles with gt, balance, strength, endurance and functional mobility.     Past Medical History: A-fib, HTN, CHF, DM2, CKD w/anemia, and neuropathy.    Current Level of Function: Mod-Max A with ADLs and function.     Prior Level of Function: Min-Mod A with ADLs and function    Pt/CG Goal(s): \"I would truly love to be able to walk.\" \"Prepare food in the kitchen. I have to help my .\"    Weight Bearing Status and Activity Precautions: WBAT with R LE and with RW. Mod A with ADLs    Home environment/Caregiver Status: Lives in 2 story home with chair lift to second floor.     DME: grab bars, manual WC, RW, chair lift to 2nd floor.    Code Status:  CPR    Fall Risk: High    Skin integrity/Wounds: Big toe on R    HH-PT Focus of Care: Increase strength, endurance, balance and functional " mobility. PT to focus on pts debility and rehabilitation from CLOF to PLOF keeping with pts stated goals.   Planned PT Interventions: therapeutic activities, gait training, therapeutic exercise, transfer training, neuromuscular re-education, manual therapy, modalities prn for pain, pt/CG education (HEP, symptom management, fall risk reduction), assist with DME as needed for safe mobility.     PT Frequency and Duration: 1w1, 2w2, 1w5    Plan of Care Reviewed with:  care team and Harika Pollock APRN

## 2024-06-27 NOTE — Clinical Note
"Physical Therapy Initial Evaluation:     Patient Presentation and Current Problem(s): Patient admitted to [facility] on 6/21/2024 through 6/25/2024 d/t failed outpatient treatment of non-healing wound right great toe. She was treated with IV antibiotics. She was previously treated for cellulitis and antibiotics. She was referred to [facility]. Patient had in office procedure to remove \"bone\" from her right great toe. She was to continue to f/u with wound clinic; however, she wanted to follow with Dr. Wade who had performed previous toe amputations on left foot. Referral was sent; however, she was unable to follow with Dr. Wade. She did not have f/u with wound clinic in [place] and subsequently had increased pain and edema in RLE. While inpatient, she was referred to [facility] for transfer, but d/t bed availability, she was d/c'd home with  services and scheduled to f/u with Dr. Ivon Zambrano with [facility] (vascular surgery). Plan is to have right great toe amputation; however, this will be dependent on vascular assessment. She is to continue with wound care to right great toe. She is on oral antibiotics. Struggles with gt, balance, strength, endurance and functional mobility.     Past Medical History: A-fib, HTN, CHF, DM2, CKD w/anemia, and neuropathy.    Current Level of Function: Mod-Max A with ADLs and function.     Prior Level of Function: Min-Mod A with ADLs and function    Pt/CG Goal(s): \"I would truly love to be able to walk.\" \"Prepare food in the kitchen. I have to help my .\"    Weight Bearing Status and Activity Precautions: WBAT with R LE and with RW. Mod A with ADLs    Home environment/Caregiver Status: Lives in 2 story home with chair lift to second floor.     DME: grab bars, manual WC, RW, chair lift to 2nd floor.    Code Status:  CPR    Fall Risk: High    Skin integrity/Wounds: Big toe on R    HH-PT Focus of Care: Increase strength, endurance, balance and functional " mobility. PT to focus on pts debility and rehabilitation from CLOF to PLOF keeping with pts stated goals.   Planned PT Interventions: therapeutic activities, gait training, therapeutic exercise, transfer training, neuromuscular re-education, manual therapy, modalities prn for pain, pt/CG education (HEP, symptom management, fall risk reduction), assist with DME as needed for safe mobility.     PT Frequency and Duration: 1w1, 2w2, 1w5    Plan of Care Reviewed with:  care team and Harika Pollock APRN

## 2024-06-27 NOTE — HOME HEALTH
"Resumption of Care Note:     Reason for hospitalization/new problems: Osteomyelitis right great toe and cellulitis RLE d/t DM ulcer.    ROYCE Cartago Findings:  Patient admitted to [facility] on 6/21/2024 through 6/25/2024 d/t failed outpatient treatment of non-healing wound right great toe. She was treated with IV antibiotics. She was previously treated for cellulitis and antibiotics. She was referred to [facility]. Patient had in office procedure to remove \"bone\" from her right great toe. She was to continue to f/u with wound clinic; however, she wanted to follow with Dr. Wade who had performed previous toe amputations on left foot. Referral was sent; however, she was unable to follow with Dr. Wade. She did not have f/u with wound clinic in [place] and subsequently had increased pain and edema in RLE. While inpatient, she was referred to [facility] for transfer, but d/t bed availability, she was d/c'd home with HH services and scheduled to f/u with Dr. Ivon Zambrano with [facility] (vascular surgery). Plan is to have right great toe amputation; however, this will be dependent on vascular assessment.  She is to continue with wound care to right great toe. She is on oral antibiotics.     Important issues to note: Dressing CDI. Unable to assess wound. Wound care performed prior to arrival of SN. Patient request wound care on next SNV.    New/changed medications: New medications - Doxycycline and Fergon. Dose of Tradjenta changed.    New/changed orders: Continue wound care as previously ordered.    Educated on Emergency Plan, steps to take prior to going to the ER and when to Call [facility]:  Yes    Plan/Focus of Care and Skilled need: Educate/SN to perform wound care/dressing changes to right great toe d/t DM ulcer. Provided education r/t medication changes and new medications. Instruct and educate patient on f/u with providers and when to notify providers/HHA."

## 2024-06-27 NOTE — CODE DOCUMENTATION
"Spoke with DEEPTHI Rodriguez for [facility] regarding Lynne's recent labwork.  The following was reported:  Hemoglobin today 7.6, one month ago Hgb = 7.3; Ferritin on 6/23/24 = 140.5, one month ago = 720.80; Iron Saturation on 6/23/24 = 10, one month ago = 27; and Iron Level on 6/23/24 = 17, one month ago = 55.  Also reported Lynne having a \"hard time\" taking oral Iron at home as prescribed per Dr. Alas.  Jennifer stated she would relay results.  No orders received at this time.  "

## 2024-06-28 ENCOUNTER — HOME CARE VISIT (OUTPATIENT)
Dept: HOME HEALTH SERVICES | Facility: HOME HEALTHCARE | Age: 89
End: 2024-06-28
Payer: MEDICARE

## 2024-06-28 ENCOUNTER — TRANSCRIBE ORDERS (OUTPATIENT)
Dept: LAB | Facility: HOSPITAL | Age: 89
End: 2024-06-28
Payer: MEDICARE

## 2024-06-28 ENCOUNTER — TRANSCRIBE ORDERS (OUTPATIENT)
Dept: INFUSION THERAPY | Facility: HOSPITAL | Age: 89
End: 2024-06-28
Payer: MEDICARE

## 2024-06-28 VITALS
DIASTOLIC BLOOD PRESSURE: 60 MMHG | SYSTOLIC BLOOD PRESSURE: 126 MMHG | HEART RATE: 77 BPM | RESPIRATION RATE: 16 BRPM | TEMPERATURE: 98.4 F | OXYGEN SATURATION: 97 %

## 2024-06-28 DIAGNOSIS — Z91.81 AT HIGH RISK FOR FALLS: ICD-10-CM

## 2024-06-28 DIAGNOSIS — N18.32 STAGE 3B CHRONIC KIDNEY DISEASE: Primary | ICD-10-CM

## 2024-06-28 DIAGNOSIS — I70.221 CRITICAL LIMB ISCHEMIA OF RIGHT LOWER EXTREMITY: Primary | ICD-10-CM

## 2024-06-28 DIAGNOSIS — I70.90 ATHEROSCLEROSIS: ICD-10-CM

## 2024-06-28 DIAGNOSIS — M86.9 TOE OSTEOMYELITIS, RIGHT: ICD-10-CM

## 2024-06-28 PROCEDURE — G0299 HHS/HOSPICE OF RN EA 15 MIN: HCPCS

## 2024-07-01 ENCOUNTER — TELEPHONE (OUTPATIENT)
Dept: INTERNAL MEDICINE | Facility: CLINIC | Age: 89
End: 2024-07-01
Payer: MEDICARE

## 2024-07-01 ENCOUNTER — HOSPITAL ENCOUNTER (EMERGENCY)
Facility: HOSPITAL | Age: 89
Discharge: HOME | End: 2024-07-01
Attending: STUDENT IN AN ORGANIZED HEALTH CARE EDUCATION/TRAINING PROGRAM | Admitting: STUDENT IN AN ORGANIZED HEALTH CARE EDUCATION/TRAINING PROGRAM
Payer: MEDICARE

## 2024-07-01 ENCOUNTER — APPOINTMENT (OUTPATIENT)
Dept: GENERAL RADIOLOGY | Facility: HOSPITAL | Age: 89
End: 2024-07-01
Payer: MEDICARE

## 2024-07-01 ENCOUNTER — HOME CARE VISIT (OUTPATIENT)
Dept: HOME HEALTH SERVICES | Facility: HOME HEALTHCARE | Age: 89
End: 2024-07-01
Payer: MEDICARE

## 2024-07-01 VITALS
SYSTOLIC BLOOD PRESSURE: 146 MMHG | HEART RATE: 74 BPM | TEMPERATURE: 98.6 F | DIASTOLIC BLOOD PRESSURE: 57 MMHG | OXYGEN SATURATION: 96 % | BODY MASS INDEX: 21.79 KG/M2 | RESPIRATION RATE: 18 BRPM | WEIGHT: 123 LBS | HEIGHT: 63 IN

## 2024-07-01 DIAGNOSIS — M79.89 LEG SWELLING: Primary | ICD-10-CM

## 2024-07-01 DIAGNOSIS — I50.9 ACUTE ON CHRONIC CONGESTIVE HEART FAILURE, UNSPECIFIED HEART FAILURE TYPE: ICD-10-CM

## 2024-07-01 LAB
ALBUMIN SERPL-MCNC: 3.4 G/DL (ref 3.5–5.2)
ALBUMIN/GLOB SERPL: 1.2 G/DL
ALP SERPL-CCNC: 91 U/L (ref 39–117)
ALT SERPL W P-5'-P-CCNC: 11 U/L (ref 1–33)
ANION GAP SERPL CALCULATED.3IONS-SCNC: 14.5 MMOL/L (ref 5–15)
AST SERPL-CCNC: 18 U/L (ref 1–32)
BASOPHILS # BLD AUTO: 0.05 10*3/MM3 (ref 0–0.2)
BASOPHILS NFR BLD AUTO: 0.4 % (ref 0–1.5)
BILIRUB SERPL-MCNC: 0.7 MG/DL (ref 0–1.2)
BUN SERPL-MCNC: 25 MG/DL (ref 8–23)
BUN/CREAT SERPL: 17.1 (ref 7–25)
CALCIUM SPEC-SCNC: 8.8 MG/DL (ref 8.6–10.5)
CHLORIDE SERPL-SCNC: 106 MMOL/L (ref 98–107)
CO2 SERPL-SCNC: 22.5 MMOL/L (ref 22–29)
CREAT SERPL-MCNC: 1.46 MG/DL (ref 0.57–1)
DEPRECATED RDW RBC AUTO: 60 FL (ref 37–54)
EGFRCR SERPLBLD CKD-EPI 2021: 34.3 ML/MIN/1.73
EOSINOPHIL # BLD AUTO: 0.03 10*3/MM3 (ref 0–0.4)
EOSINOPHIL NFR BLD AUTO: 0.2 % (ref 0.3–6.2)
ERYTHROCYTE [DISTWIDTH] IN BLOOD BY AUTOMATED COUNT: 17.7 % (ref 12.3–15.4)
GEN 5 2HR TROPONIN T REFLEX: 97 NG/L
GLOBULIN UR ELPH-MCNC: 2.8 GM/DL
GLUCOSE SERPL-MCNC: 222 MG/DL (ref 65–99)
HCT VFR BLD AUTO: 23.2 % (ref 34–46.6)
HGB BLD-MCNC: 7.1 G/DL (ref 12–15.9)
HOLD SPECIMEN: NORMAL
HOLD SPECIMEN: NORMAL
IMM GRANULOCYTES # BLD AUTO: 0.23 10*3/MM3 (ref 0–0.05)
IMM GRANULOCYTES NFR BLD AUTO: 1.9 % (ref 0–0.5)
LYMPHOCYTES # BLD AUTO: 2.09 10*3/MM3 (ref 0.7–3.1)
LYMPHOCYTES NFR BLD AUTO: 17.4 % (ref 19.6–45.3)
MAGNESIUM SERPL-MCNC: 1.6 MG/DL (ref 1.6–2.4)
MCH RBC QN AUTO: 28.4 PG (ref 26.6–33)
MCHC RBC AUTO-ENTMCNC: 30.6 G/DL (ref 31.5–35.7)
MCV RBC AUTO: 92.8 FL (ref 79–97)
MONOCYTES # BLD AUTO: 0.76 10*3/MM3 (ref 0.1–0.9)
MONOCYTES NFR BLD AUTO: 6.3 % (ref 5–12)
NEUTROPHILS NFR BLD AUTO: 73.8 % (ref 42.7–76)
NEUTROPHILS NFR BLD AUTO: 8.85 10*3/MM3 (ref 1.7–7)
NRBC BLD AUTO-RTO: 0 /100 WBC (ref 0–0.2)
NT-PROBNP SERPL-MCNC: ABNORMAL PG/ML (ref 0–1800)
PLATELET # BLD AUTO: 244 10*3/MM3 (ref 140–450)
PMV BLD AUTO: 9.6 FL (ref 6–12)
POTASSIUM SERPL-SCNC: 4.1 MMOL/L (ref 3.5–5.2)
PROT SERPL-MCNC: 6.2 G/DL (ref 6–8.5)
RBC # BLD AUTO: 2.5 10*6/MM3 (ref 3.77–5.28)
SODIUM SERPL-SCNC: 143 MMOL/L (ref 136–145)
TROPONIN T DELTA: -1 NG/L
TROPONIN T SERPL HS-MCNC: 98 NG/L
WBC NRBC COR # BLD AUTO: 12.01 10*3/MM3 (ref 3.4–10.8)
WHOLE BLOOD HOLD COAG: NORMAL
WHOLE BLOOD HOLD SPECIMEN: NORMAL

## 2024-07-01 PROCEDURE — 83735 ASSAY OF MAGNESIUM: CPT

## 2024-07-01 PROCEDURE — 80053 COMPREHEN METABOLIC PANEL: CPT

## 2024-07-01 PROCEDURE — 85025 COMPLETE CBC W/AUTO DIFF WBC: CPT

## 2024-07-01 PROCEDURE — 84484 ASSAY OF TROPONIN QUANT: CPT

## 2024-07-01 PROCEDURE — 83880 ASSAY OF NATRIURETIC PEPTIDE: CPT

## 2024-07-01 PROCEDURE — 99284 EMERGENCY DEPT VISIT MOD MDM: CPT

## 2024-07-01 PROCEDURE — 96374 THER/PROPH/DIAG INJ IV PUSH: CPT

## 2024-07-01 PROCEDURE — 25010000002 FUROSEMIDE PER 20 MG: Performed by: PHYSICIAN ASSISTANT

## 2024-07-01 PROCEDURE — 36415 COLL VENOUS BLD VENIPUNCTURE: CPT

## 2024-07-01 PROCEDURE — 93005 ELECTROCARDIOGRAM TRACING: CPT

## 2024-07-01 PROCEDURE — 71045 X-RAY EXAM CHEST 1 VIEW: CPT

## 2024-07-01 RX ORDER — SODIUM CHLORIDE 0.9 % (FLUSH) 0.9 %
10 SYRINGE (ML) INJECTION AS NEEDED
Status: DISCONTINUED | OUTPATIENT
Start: 2024-07-01 | End: 2024-07-01 | Stop reason: HOSPADM

## 2024-07-01 RX ORDER — FUROSEMIDE 10 MG/ML
40 INJECTION INTRAMUSCULAR; INTRAVENOUS ONCE
Status: COMPLETED | OUTPATIENT
Start: 2024-07-01 | End: 2024-07-01

## 2024-07-01 RX ADMIN — FUROSEMIDE 40 MG: 10 INJECTION, SOLUTION INTRAMUSCULAR; INTRAVENOUS at 12:26

## 2024-07-01 NOTE — DISCHARGE INSTRUCTIONS
Double your Lasix dose for the next few days.  Follow-up with Dr. Leon, Cardiologist, for further outpatient evaluation and definitive care of CHF exacerbation.  Follow-up with your vascular surgeon for appointment tomorrow for definitive care of leg swelling.  Follow-up with your PCP as needed.  Return to the ER for new or worsening symptoms or acute concerns.

## 2024-07-01 NOTE — ED PROVIDER NOTES
Subjective:  Chief Complaint:  Leg swelling    History of Present Illness:  Patient is an 89-year-old female with history of CHF, diabetes, GERD, hyperlipidemia, among others presenting to the ER with complaints of leg swelling and shortness of breath that occurred last night but has now resolved.  Denies any chest pain or shortness of breath at this time.  Patient was recently admitted on 6- until 6-.  Was discharged to immediately see vascular surgery.  States that she did see them and they want to do stents in her leg and she is post to follow-up tomorrow but states she cannot get anyone to answer the phone there.  Patient does not use oxygen.  She is in the mid to high 90s on room air during evaluation.      Nurses Notes reviewed and agree, including vitals, allergies, social history and prior medical history.     REVIEW OF SYSTEMS: All systems reviewed and not pertinent unless noted.  Review of Systems   Respiratory:  Positive for shortness of breath (last night, now resolved).    Cardiovascular:  Positive for leg swelling.   All other systems reviewed and are negative.      Past Medical History:   Diagnosis Date    Acute deep vein thrombosis of left upper extremity     Anemia     Asthma     CHF (congestive heart failure)     Chronic atrial fibrillation     Deep venous thrombosis of left upper limb     Diabetes mellitus, type 2     Diarrhea     GERD (gastroesophageal reflux disease)     Glaucoma     H/O transfusion of whole blood     Heart attack     Heart murmur     History of transfusion     Hyperlipidemia     Hypertension     Kidney disease     patient not aware of what exact diagnosis is     Osteomyelitis     Osteomyelitis of left foot 10/03/2017    Peripheral vascular disease     Right retinal detachment     Secondary cataract of both eyes     Stable proliferative diabetic retinopathy of both eyes     Stroke     Swelling of left extremity     Urinary tract infection     Wears glasses         Allergies:    Phenergan [promethazine hcl] and Zosyn [piperacillin-tazobactam in dex]      Past Surgical History:   Procedure Laterality Date    AMPUTATION DIGIT Left 7/5/2018    Procedure: Left Great toe amputation;  Surgeon: Prakash Carrington MD;  Location:  [facility];  Service: Vascular    AMPUTATION DIGIT Left 8/27/2018    Procedure: SECOND TOE AMPUTATION DIGIT LEFT;  Surgeon: Prakash Carrington MD;  Location:  [facility];  Service: General    APPENDECTOMY      BONE MARROW ASPIRATION      CARDIAC ABLATION      CARDIAC CATHETERIZATION N/A 10/10/2017    Procedure: Peripheral angiography;  Surgeon: Kan Pelaez MD;  Location: [facility] CATH INVASIVE LOCATION;  Service:     CARDIAC CATHETERIZATION N/A 10/10/2017    Procedure: Angioplasty-peripheral;  Surgeon: Kan Pelaez MD;  Location: [facility] CATH INVASIVE LOCATION;  Service:     CARDIAC CATHETERIZATION N/A 10/10/2017    Procedure: Atherectomy-peripheral;  Surgeon: Kan Pelaez MD;  Location: [facility] CATH INVASIVE LOCATION;  Service:     CARDIAC ELECTROPHYSIOLOGY PROCEDURE N/A 5/3/2018    Procedure: generator change;  Surgeon: Zan Poole MD;  Location:  [facility] CATH INVASIVE LOCATION;  Service: Cardiovascular    CARDIOVERSION      COLONOSCOPY      ENDOSCOPY N/A 10/9/2017    Procedure: ESOPHAGOGASTRODUODENOSCOPY WITH COLD FORCEP BIOPSY;  Surgeon: Clifton Hyatt MD;  Location: [facility] ENDOSCOPY;  Service:     ENDOSCOPY N/A 3/22/2022    Procedure: ESOPHAGOGASTRODUODENOSCOPY with AVM cautery;  Surgeon: Clarisse Velez MD;  Location: [facility] ENDOSCOPY;  Service: Gastroenterology;  Laterality: N/A;    ENDOSCOPY N/A 8/4/2023    Procedure: ESOPHAGOGASTRODUODENOSCOPY WITH BIOPSY AND RUTH BRUSHING;  Surgeon: Clarisse Velez MD;  Location: [facility] ENDOSCOPY;  Service: Gastroenterology;  Laterality: N/A;    EYE SURGERY Bilateral     CATARACTS    INTERVENTIONAL RADIOLOGY PROCEDURE N/A 10/10/2017    Procedure: Abdominal Aortagram with Runoff;  " Surgeon: Kan Pelaez MD;  Location: [facility] CATH INVASIVE LOCATION;  Service:     PACEMAKER IMPLANTATION      around 2008 then replaced 2018         Social History     Socioeconomic History    Marital status:     Number of children: 3   Tobacco Use    Smoking status: Never     Passive exposure: Never    Smokeless tobacco: Never   Vaping Use    Vaping status: Never Used   Substance and Sexual Activity    Alcohol use: No    Drug use: No    Sexual activity: Defer         Family History   Problem Relation Age of Onset    No Known Problems Mother     No Known Problems Father     Arthritis Other        Objective  Physical Exam:  /57   Pulse 74   Temp 98.6 °F (37 °C) (Oral)   Resp 18   Ht 160 cm (63\")   Wt 55.8 kg (123 lb)   SpO2 96%   BMI 21.79 kg/m²      Physical Exam  Vitals and nursing note reviewed.   Constitutional:       General: She is not in acute distress.     Appearance: She is not toxic-appearing.   HENT:      Head: Normocephalic and atraumatic.      Right Ear: External ear normal.      Left Ear: External ear normal.      Nose: Nose normal.   Eyes:      Extraocular Movements: Extraocular movements intact.      Conjunctiva/sclera: Conjunctivae normal.   Cardiovascular:      Rate and Rhythm: Normal rate.   Pulmonary:      Effort: Pulmonary effort is normal. No respiratory distress.   Abdominal:      General: There is no distension.      Palpations: Abdomen is soft.   Musculoskeletal:         General: Normal range of motion.      Cervical back: Normal range of motion and neck supple.   Skin:     General: Skin is warm and dry.   Neurological:      General: No focal deficit present.      Mental Status: She is alert and oriented to person, place, and time.   Psychiatric:         Mood and Affect: Mood normal.         Behavior: Behavior normal.         Procedures    ED Course:    ED Course as of 07/01/24 1401   Mon Jul 01, 2024   1118 EKG interpreted by me, normal sinus rhythm with " first-degree AV block, V paced rhythm with no concerning ST changes noted, rate of 76, abnormal EKG [JE]      ED Course User Index  [JE] Sergio Montoya MD       Lab Results (last 24 hours)       Procedure Component Value Units Date/Time    CBC & Differential [[id number]]  (Abnormal) Collected: 07/01/24 1058    Specimen: Blood Updated: 07/01/24 1112    Narrative:      The following orders were created for panel order CBC & Differential.  Procedure                               Abnormality         Status                     ---------                               -----------         ------                     CBC Auto Differential[[id number]]        Abnormal            Final result                 Please view results for these tests on the individual orders.    Comprehensive Metabolic Panel [[id number]]  (Abnormal) Collected: 07/01/24 1058    Specimen: Blood Updated: 07/01/24 1129     Glucose 222 mg/dL      Comment: Glucose >180, Hemoglobin A1C recommended.        BUN 25 mg/dL      Creatinine 1.46 mg/dL      Sodium 143 mmol/L      Potassium 4.1 mmol/L      Chloride 106 mmol/L      CO2 22.5 mmol/L      Calcium 8.8 mg/dL      Total Protein 6.2 g/dL      Albumin 3.4 g/dL      ALT (SGPT) 11 U/L      AST (SGOT) 18 U/L      Alkaline Phosphatase 91 U/L      Total Bilirubin 0.7 mg/dL      Globulin 2.8 gm/dL      A/G Ratio 1.2 g/dL      BUN/Creatinine Ratio 17.1     Anion Gap 14.5 mmol/L      eGFR 34.3 mL/min/1.73     Narrative:      GFR Normal >60  Chronic Kidney Disease <60  Kidney Failure <15    The GFR formula is only valid for adults with stable renal function between ages 18 and 70.    Single High Sensitivity Troponin T [[id number]]  (Abnormal) Collected: 07/01/24 1058    Specimen: Blood Updated: 07/01/24 1141     HS Troponin T 98 ng/L     Narrative:      High Sensitive Troponin T Reference Range:  <14.0 ng/L- Negative Female for AMI  <22.0 ng/L- Negative Male for AMI  >=14 - Abnormal Female indicating possible  myocardial injury.  >=22 - Abnormal Male indicating possible myocardial injury.   Clinicians would have to utilize clinical acumen, EKG, Troponin, and serial changes to determine if it is an Acute Myocardial Infarction or myocardial injury due to an underlying chronic condition.         Magnesium [[id number]]  (Normal) Collected: 07/01/24 1058    Specimen: Blood Updated: 07/01/24 1129     Magnesium 1.6 mg/dL     BNP [[id number]]  (Abnormal) Collected: 07/01/24 1058    Specimen: Blood Updated: 07/01/24 1129     proBNP 14,618.0 pg/mL     Narrative:      This assay is used as an aid in the diagnosis of individuals suspected of having heart failure. It can be used as an aid in the diagnosis of acute decompensated heart failure (ADHF) in patients presenting with signs and symptoms of ADHF to the emergency department (ED). In addition, NT-proBNP of <300 pg/mL indicates ADHF is not likely.    Age Range Result Interpretation  NT-proBNP Concentration (pg/mL:      <50             Positive            >450                   Gray                 300-450                    Negative             <300    50-75           Positive            >900                  Gray                300-900                  Negative            <300      >75             Positive            >1800                  Gray                300-1800                  Negative            <300    CBC Auto Differential [[id number]]  (Abnormal) Collected: 07/01/24 1058    Specimen: Blood Updated: 07/01/24 1112     WBC 12.01 10*3/mm3      RBC 2.50 10*6/mm3      Hemoglobin 7.1 g/dL      Hematocrit 23.2 %      MCV 92.8 fL      MCH 28.4 pg      MCHC 30.6 g/dL      RDW 17.7 %      RDW-SD 60.0 fl      MPV 9.6 fL      Platelets 244 10*3/mm3      Neutrophil % 73.8 %      Lymphocyte % 17.4 %      Monocyte % 6.3 %      Eosinophil % 0.2 %      Basophil % 0.4 %      Immature Grans % 1.9 %      Neutrophils, Absolute 8.85 10*3/mm3      Lymphocytes, Absolute 2.09 10*3/mm3       Monocytes, Absolute 0.76 10*3/mm3      Eosinophils, Absolute 0.03 10*3/mm3      Basophils, Absolute 0.05 10*3/mm3      Immature Grans, Absolute 0.23 10*3/mm3      nRBC 0.0 /100 WBC     High Sensitivity Troponin T 2Hr [[id number]]  (Abnormal) Collected: 07/01/24 1253    Specimen: Blood Updated: 07/01/24 1342     HS Troponin T 97 ng/L      Troponin T Delta -1 ng/L     Narrative:      High Sensitive Troponin T Reference Range:  <14.0 ng/L- Negative Female for AMI  <22.0 ng/L- Negative Male for AMI  >=14 - Abnormal Female indicating possible myocardial injury.  >=22 - Abnormal Male indicating possible myocardial injury.   Clinicians would have to utilize clinical acumen, EKG, Troponin, and serial changes to determine if it is an Acute Myocardial Infarction or myocardial injury due to an underlying chronic condition.                  XR Chest 1 View    Result Date: 7/1/2024  PROCEDURE: XR CHEST 1 VW-  HISTORY: Weak/Dizzy/AMS triage protocol  COMPARISON: 4/13/2022.  FINDINGS: There is stable cardiomegaly. A 2-lead pacemaker overlies the left chest.. The mediastinum is unremarkable. There is new blunting of the right costophrenic angle which may represent small pleural effusion versus scarring. There is no pneumothorax.  There are no acute osseous abnormalities.      Impression: New small right pleural effusion versus scarring. Otherwise stable chest.     Images were reviewed, interpreted, and dictated by Dr. Heena Mata MD Transcribed by Will Petre PA-C.  This report was signed and finalized on 7/1/2024 1:32 PM by Heena Mata MD.          MDM  Patient evaluated in the ER for shortness of breath last night that is now resolved as well as leg swelling.  Patient recently admitted and diagnosed with osteomyelitis, negative ultrasound for DVT at that time.  No acute shortness of breath or chest pain at time of evaluation.  In the 90s on room air with no tachycardia noted.  Patient discharged after admission on 6-  and immediately followed up with vascular surgery who plans to place stents in her leg per patient.  She is post to have an appointment with him tomorrow but came to the ER because she cannot get up with the clinic evidently.  Differential diagnosis includes but is not limited to CHF exacerbation, electrolyte abnormalities, venous stasis, acute on chronic renal failure, ACS, cardiac arrhythmia, among others.  Triage orders placed for troponin, BNP, CBC, CMP, magnesium, EKG, chest x-ray.    Workup most consistent with CHF exacerbation with elevated BNP.  Creatinine consistent with chronic kidney disease.  BNP 14,618.  Chest x-ray with some pulmonary edema per my interpretation.  Troponin noted to be elevated at 98.  No baseline to compare to.  Patient without acute chest pain or shortness of breath.  Will obtain 2-hour troponin to ensure no significant increase.  Patient was given 40 mg IV Lasix.  Will advise that she double her Lasix for a few days.  Anticipate discharge.    Discussed results with patient and daughter at bedside.  Daughter has been in touch with vascular surgery office and they are keeping her appointment for tomorrow.  She states patient initially did not want to go.  She advised patient that she has to go to this appointment.  All parties in agreement with discharge.  Patient advised to double her Lasix for the next few days.  Precautions were given for return to the ER for any new or worsening symptoms.    Final diagnoses:   Leg swelling   Acute on chronic congestive heart failure, unspecified heart failure type          Lynette Victoria, PAKATINA  07/01/24 1409

## 2024-07-01 NOTE — TELEPHONE ENCOUNTER
Caller: [facility]    Relationship: [facility]    Best call back number: [phone]    What orders are you requesting (i.e. lab or imaging): EXTENSION FOR OCCUPATION THERAPY EVALUATION ON FRIDAY.

## 2024-07-02 ENCOUNTER — HOME CARE VISIT (OUTPATIENT)
Dept: HOME HEALTH SERVICES | Facility: HOME HEALTHCARE | Age: 89
End: 2024-07-02
Payer: MEDICARE

## 2024-07-03 ENCOUNTER — HOME CARE VISIT (OUTPATIENT)
Dept: HOME HEALTH SERVICES | Facility: HOME HEALTHCARE | Age: 89
End: 2024-07-03
Payer: COMMERCIAL

## 2024-07-03 PROCEDURE — G0299 HHS/HOSPICE OF RN EA 15 MIN: HCPCS

## 2024-07-05 ENCOUNTER — HOME CARE VISIT (OUTPATIENT)
Dept: HOME HEALTH SERVICES | Facility: HOME HEALTHCARE | Age: 89
End: 2024-07-05
Payer: MEDICARE

## 2024-07-05 VITALS
OXYGEN SATURATION: 96 % | TEMPERATURE: 97.8 F | RESPIRATION RATE: 16 BRPM | HEART RATE: 70 BPM | DIASTOLIC BLOOD PRESSURE: 76 MMHG | SYSTOLIC BLOOD PRESSURE: 122 MMHG

## 2024-07-05 VITALS
HEART RATE: 70 BPM | SYSTOLIC BLOOD PRESSURE: 122 MMHG | DIASTOLIC BLOOD PRESSURE: 76 MMHG | TEMPERATURE: 97.8 F | OXYGEN SATURATION: 96 %

## 2024-07-05 VITALS
SYSTOLIC BLOOD PRESSURE: 118 MMHG | RESPIRATION RATE: 16 BRPM | OXYGEN SATURATION: 97 % | DIASTOLIC BLOOD PRESSURE: 70 MMHG | TEMPERATURE: 97.9 F | HEART RATE: 84 BPM

## 2024-07-05 DIAGNOSIS — N18.4 CHRONIC RENAL IMPAIRMENT, STAGE 4 (SEVERE): Primary | ICD-10-CM

## 2024-07-05 PROCEDURE — G0151 HHCP-SERV OF PT,EA 15 MIN: HCPCS

## 2024-07-05 PROCEDURE — G0152 HHCP-SERV OF OT,EA 15 MIN: HCPCS

## 2024-07-05 PROCEDURE — G0299 HHS/HOSPICE OF RN EA 15 MIN: HCPCS

## 2024-07-05 NOTE — Clinical Note
"Eval Note:  Patient Presentation and Current Problem(s): Patient admitted to [facility] on 6/21/2024 through 6/25/2024 d/t failed outpatient treatment of non-healing wound right great toe. She was treated with IV antibiotics. She was previously treated for cellulitis and antibiotics. She was referred to [facility]. Patient had in office procedure to remove \"bone\" from her right great toe. She was to continue to f/u with wound clinic; however, she wanted to follow with Dr. Wade who had performed previous toe amputations on left foot. Referral was sent; however, she was unable to follow with Dr. Wade. She did not have f/u with wound clinic in [place] and subsequently had increased pain and edema in RLE. While inpatient, she was referred to [facility] for transfer, but d/t bed availability, she was d/c'd home with  services and scheduled to f/u with Dr. Ivon Zambrano with [facility] (vascular surgery). Plan is to have right great toe amputation; however, this will be dependent on vascular assessment. She is to continue with wound care to right great toe. She is on oral antibiotics. Struggles with gt, balance, strength, endurance and functional mobility.      Past Medical History: A-fib, HTN, CHF, DM2, CKD w/anemia, and neuropathy.    Patient's goal(s):  Pt states desire to complete dressing, toileting, bathing independently.    Services required to achieve goals: Skilled OT services are needed to improve energy conservation, safety and participation with functional mobility and transfers, ADL retraining, therapeutic exercises, and therapeutic activities to improve dynamic standing balance.    Potential Issues for goal attainment: Pain and generalized weakness.    Problems identified: Decreased functional mobility, decreased transfer independence, decreased ADL participation, decreased energy.    Describe the Functional status and safety: Pt requiring assist for LB ADL activity and has " bilateral LE weakness.    Describe any environmental issues:  None noted    Any equipment needs: None noted    POC confirmed with Pt and significant other on 7/5    Pt is currently limited to home activity due to limited mobility/weakness/poor balance leading to taxing effort to leave home.    OT educated pt on fall prevention strategies and safety within the home.

## 2024-07-05 NOTE — HOME HEALTH
"Eval Note:  Patient Presentation and Current Problem(s): Patient admitted to [facility] on 6/21/2024 through 6/25/2024 d/t failed outpatient treatment of non-healing wound right great toe. She was treated with IV antibiotics. She was previously treated for cellulitis and antibiotics. She was referred to [facility]. Patient had in office procedure to remove \"bone\" from her right great toe. She was to continue to f/u with wound clinic; however, she wanted to follow with Dr. Wade who had performed previous toe amputations on left foot. Referral was sent; however, she was unable to follow with Dr. Wade. She did not have f/u with wound clinic in [place] and subsequently had increased pain and edema in RLE. While inpatient, she was referred to [facility] for transfer, but d/t bed availability, she was d/c'd home with  services and scheduled to f/u with Dr. Ivon Zambrano with [facility] (vascular surgery). Plan is to have right great toe amputation; however, this will be dependent on vascular assessment. She is to continue with wound care to right great toe. She is on oral antibiotics. Struggles with gt, balance, strength, endurance and functional mobility.      Past Medical History: A-fib, HTN, CHF, DM2, CKD w/anemia, and neuropathy.    Patient's goal(s):  Pt states desire to complete dressing, toileting, bathing independently.    Services required to achieve goals: Skilled OT services are needed to improve energy conservation, safety and participation with functional mobility and transfers, ADL retraining, therapeutic exercises, and therapeutic activities to improve dynamic standing balance.    Potential Issues for goal attainment: Pain and generalized weakness.    Problems identified: Decreased functional mobility, decreased transfer independence, decreased ADL participation, decreased energy.    Describe the Functional status and safety: Pt requiring assist for LB ADL activity and has " bilateral LE weakness.    Describe any environmental issues:  None noted    Any equipment needs: None noted    POC confirmed with Pt and significant other on 7/5    Pt is currently limited to home activity due to limited mobility/weakness/poor balance leading to taxing effort to leave home.    OT educated pt on fall prevention strategies and safety within the home.

## 2024-07-08 ENCOUNTER — HOME CARE VISIT (OUTPATIENT)
Dept: HOME HEALTH SERVICES | Facility: HOME HEALTHCARE | Age: 89
End: 2024-07-08
Payer: MEDICARE

## 2024-07-08 VITALS
DIASTOLIC BLOOD PRESSURE: 50 MMHG | TEMPERATURE: 98.3 F | HEART RATE: 65 BPM | OXYGEN SATURATION: 97 % | RESPIRATION RATE: 17 BRPM | SYSTOLIC BLOOD PRESSURE: 110 MMHG

## 2024-07-08 VITALS
RESPIRATION RATE: 16 BRPM | SYSTOLIC BLOOD PRESSURE: 126 MMHG | DIASTOLIC BLOOD PRESSURE: 76 MMHG | OXYGEN SATURATION: 95 % | TEMPERATURE: 98.6 F | HEART RATE: 71 BPM

## 2024-07-08 PROCEDURE — G0299 HHS/HOSPICE OF RN EA 15 MIN: HCPCS

## 2024-07-08 PROCEDURE — G0157 HHC PT ASSISTANT EA 15: HCPCS

## 2024-07-09 VITALS
HEART RATE: 60 BPM | RESPIRATION RATE: 18 BRPM | OXYGEN SATURATION: 94 % | DIASTOLIC BLOOD PRESSURE: 67 MMHG | TEMPERATURE: 98.6 F | SYSTOLIC BLOOD PRESSURE: 109 MMHG

## 2024-07-10 ENCOUNTER — HOME CARE VISIT (OUTPATIENT)
Dept: HOME HEALTH SERVICES | Facility: HOME HEALTHCARE | Age: 89
End: 2024-07-10
Payer: COMMERCIAL

## 2024-07-10 VITALS
SYSTOLIC BLOOD PRESSURE: 92 MMHG | HEART RATE: 77 BPM | OXYGEN SATURATION: 99 % | DIASTOLIC BLOOD PRESSURE: 46 MMHG | TEMPERATURE: 97.3 F

## 2024-07-10 VITALS
SYSTOLIC BLOOD PRESSURE: 120 MMHG | RESPIRATION RATE: 16 BRPM | HEART RATE: 70 BPM | TEMPERATURE: 97.7 F | DIASTOLIC BLOOD PRESSURE: 78 MMHG | OXYGEN SATURATION: 99 %

## 2024-07-10 PROCEDURE — G0152 HHCP-SERV OF OT,EA 15 MIN: HCPCS

## 2024-07-10 PROCEDURE — G0300 HHS/HOSPICE OF LPN EA 15 MIN: HCPCS

## 2024-07-10 NOTE — HOME HEALTH
Routine Visit Note:  OT provided skilled intervention involving functional mobility, transfers, UE exercises, and dynamic standing balance activity.    Skill/education provided:  Pt completed light theraband this date including horizontal shoulder abduction, shoulder flexion, diagonal shoulder flexion, biceps flexion and triceps extension to be completed outside of therapy session.  Pt completed 15 reps of each exercise this date.  Pt deferring standing this date due to edema and fatigue from completing AM ADL's.  Pt reports dressing herself and going to bathroom independently this AM with great effort.      Patient/caregiver response: Pt participated well in session with no adverse response to intervention.    Plan for next visit: Improve endurance for activity, UE strength, ADL participation, and dynamic standing balance.    Other pertinent info: None noted.

## 2024-07-11 ENCOUNTER — HOSPITAL ENCOUNTER (OUTPATIENT)
Dept: INFUSION THERAPY | Facility: HOSPITAL | Age: 89
Discharge: HOME | End: 2024-07-11
Payer: MEDICARE

## 2024-07-11 ENCOUNTER — HOME CARE VISIT (OUTPATIENT)
Dept: HOME HEALTH SERVICES | Facility: HOME HEALTHCARE | Age: 89
End: 2024-07-11
Payer: COMMERCIAL

## 2024-07-11 ENCOUNTER — HOSPITAL ENCOUNTER (EMERGENCY)
Facility: HOSPITAL | Age: 89
Discharge: HOME | End: 2024-07-11
Attending: STUDENT IN AN ORGANIZED HEALTH CARE EDUCATION/TRAINING PROGRAM
Payer: MEDICARE

## 2024-07-11 VITALS
BODY MASS INDEX: 16.07 KG/M2 | WEIGHT: 100 LBS | DIASTOLIC BLOOD PRESSURE: 66 MMHG | OXYGEN SATURATION: 100 % | HEIGHT: 66 IN | HEART RATE: 72 BPM | TEMPERATURE: 98.1 F | RESPIRATION RATE: 18 BRPM | SYSTOLIC BLOOD PRESSURE: 134 MMHG

## 2024-07-11 VITALS
HEART RATE: 69 BPM | DIASTOLIC BLOOD PRESSURE: 56 MMHG | TEMPERATURE: 97.5 F | RESPIRATION RATE: 17 BRPM | OXYGEN SATURATION: 95 % | SYSTOLIC BLOOD PRESSURE: 102 MMHG

## 2024-07-11 VITALS
HEART RATE: 92 BPM | RESPIRATION RATE: 18 BRPM | TEMPERATURE: 98 F | DIASTOLIC BLOOD PRESSURE: 70 MMHG | SYSTOLIC BLOOD PRESSURE: 124 MMHG | OXYGEN SATURATION: 94 %

## 2024-07-11 DIAGNOSIS — N18.32 STAGE 3B CHRONIC KIDNEY DISEASE: ICD-10-CM

## 2024-07-11 DIAGNOSIS — D63.1 ANEMIA OF CHRONIC RENAL FAILURE, STAGE 3B: ICD-10-CM

## 2024-07-11 DIAGNOSIS — M86.9 TOE OSTEOMYELITIS, RIGHT: ICD-10-CM

## 2024-07-11 DIAGNOSIS — D64.9 ANEMIA, UNSPECIFIED TYPE: Primary | ICD-10-CM

## 2024-07-11 DIAGNOSIS — D64.9 NORMOCYTIC ANEMIA: Primary | ICD-10-CM

## 2024-07-11 DIAGNOSIS — N18.32 ANEMIA OF CHRONIC RENAL FAILURE, STAGE 3B: ICD-10-CM

## 2024-07-11 DIAGNOSIS — I70.221 CRITICAL LIMB ISCHEMIA OF RIGHT LOWER EXTREMITY: ICD-10-CM

## 2024-07-11 DIAGNOSIS — I70.90 ATHEROSCLEROSIS: ICD-10-CM

## 2024-07-11 DIAGNOSIS — Z91.81 AT HIGH RISK FOR FALLS: ICD-10-CM

## 2024-07-11 LAB
ABO GROUP BLD: NORMAL
ANION GAP SERPL CALCULATED.3IONS-SCNC: 12.2 MMOL/L (ref 5–15)
BASOPHILS # BLD AUTO: 0.05 10*3/MM3 (ref 0–0.2)
BASOPHILS NFR BLD AUTO: 0.5 % (ref 0–1.5)
BLD GP AB SCN SERPL QL: NEGATIVE
BUN SERPL-MCNC: 41 MG/DL (ref 8–23)
BUN/CREAT SERPL: 25.6 (ref 7–25)
CALCIUM SPEC-SCNC: 9 MG/DL (ref 8.6–10.5)
CHLORIDE SERPL-SCNC: 105 MMOL/L (ref 98–107)
CO2 SERPL-SCNC: 25.8 MMOL/L (ref 22–29)
CREAT SERPL-MCNC: 1.6 MG/DL (ref 0.57–1)
DEPRECATED RDW RBC AUTO: 59.4 FL (ref 37–54)
EGFRCR SERPLBLD CKD-EPI 2021: 30.7 ML/MIN/1.73
EOSINOPHIL # BLD AUTO: 0.05 10*3/MM3 (ref 0–0.4)
EOSINOPHIL NFR BLD AUTO: 0.5 % (ref 0.3–6.2)
ERYTHROCYTE [DISTWIDTH] IN BLOOD BY AUTOMATED COUNT: 17.7 % (ref 12.3–15.4)
GLUCOSE SERPL-MCNC: 150 MG/DL (ref 65–99)
HCT VFR BLD AUTO: 21.8 % (ref 34–46.6)
HGB BLD-MCNC: 6.5 G/DL (ref 12–15.9)
HYPOCHROMIA BLD QL: NORMAL
IMM GRANULOCYTES # BLD AUTO: 0.07 10*3/MM3 (ref 0–0.05)
IMM GRANULOCYTES NFR BLD AUTO: 0.7 % (ref 0–0.5)
LYMPHOCYTES # BLD AUTO: 2 10*3/MM3 (ref 0.7–3.1)
LYMPHOCYTES NFR BLD AUTO: 21 % (ref 19.6–45.3)
MCH RBC QN AUTO: 27.4 PG (ref 26.6–33)
MCHC RBC AUTO-ENTMCNC: 29.8 G/DL (ref 31.5–35.7)
MCV RBC AUTO: 92 FL (ref 79–97)
MONOCYTES # BLD AUTO: 0.74 10*3/MM3 (ref 0.1–0.9)
MONOCYTES NFR BLD AUTO: 7.8 % (ref 5–12)
NEUTROPHILS NFR BLD AUTO: 6.63 10*3/MM3 (ref 1.7–7)
NEUTROPHILS NFR BLD AUTO: 69.5 % (ref 42.7–76)
NRBC BLD AUTO-RTO: 0 /100 WBC (ref 0–0.2)
PLATELET # BLD AUTO: 249 10*3/MM3 (ref 140–450)
PMV BLD AUTO: 9.9 FL (ref 6–12)
POTASSIUM SERPL-SCNC: 4.1 MMOL/L (ref 3.5–5.2)
RBC # BLD AUTO: 2.37 10*6/MM3 (ref 3.77–5.28)
RH BLD: POSITIVE
SMALL PLATELETS BLD QL SMEAR: ADEQUATE
SODIUM SERPL-SCNC: 143 MMOL/L (ref 136–145)
T&S EXPIRATION DATE: NORMAL
WBC MORPH BLD: NORMAL
WBC NRBC COR # BLD AUTO: 9.54 10*3/MM3 (ref 3.4–10.8)

## 2024-07-11 PROCEDURE — 96374 THER/PROPH/DIAG INJ IV PUSH: CPT

## 2024-07-11 PROCEDURE — 86900 BLOOD TYPING SEROLOGIC ABO: CPT | Performed by: STUDENT IN AN ORGANIZED HEALTH CARE EDUCATION/TRAINING PROGRAM

## 2024-07-11 PROCEDURE — 36430 TRANSFUSION BLD/BLD COMPNT: CPT

## 2024-07-11 PROCEDURE — 86901 BLOOD TYPING SEROLOGIC RH(D): CPT | Performed by: STUDENT IN AN ORGANIZED HEALTH CARE EDUCATION/TRAINING PROGRAM

## 2024-07-11 PROCEDURE — 25010000002 FERUMOXYTOL 510 MG/17ML SOLUTION 17 ML VIAL: Performed by: PHYSICIAN ASSISTANT

## 2024-07-11 PROCEDURE — 85025 COMPLETE CBC W/AUTO DIFF WBC: CPT | Performed by: SURGERY

## 2024-07-11 PROCEDURE — 85007 BL SMEAR W/DIFF WBC COUNT: CPT | Performed by: SURGERY

## 2024-07-11 PROCEDURE — 25010000002 EPOETIN ALFA PER 1000 UNITS: Performed by: PHYSICIAN ASSISTANT

## 2024-07-11 PROCEDURE — 96372 THER/PROPH/DIAG INJ SC/IM: CPT

## 2024-07-11 PROCEDURE — 86850 RBC ANTIBODY SCREEN: CPT | Performed by: STUDENT IN AN ORGANIZED HEALTH CARE EDUCATION/TRAINING PROGRAM

## 2024-07-11 PROCEDURE — 99285 EMERGENCY DEPT VISIT HI MDM: CPT

## 2024-07-11 PROCEDURE — 86900 BLOOD TYPING SEROLOGIC ABO: CPT

## 2024-07-11 PROCEDURE — P9016 RBC LEUKOCYTES REDUCED: HCPCS

## 2024-07-11 PROCEDURE — 96365 THER/PROPH/DIAG IV INF INIT: CPT

## 2024-07-11 PROCEDURE — 36415 COLL VENOUS BLD VENIPUNCTURE: CPT

## 2024-07-11 PROCEDURE — G0157 HHC PT ASSISTANT EA 15: HCPCS

## 2024-07-11 PROCEDURE — 80048 BASIC METABOLIC PNL TOTAL CA: CPT | Performed by: SURGERY

## 2024-07-11 PROCEDURE — 86920 COMPATIBILITY TEST SPIN: CPT

## 2024-07-11 RX ORDER — SODIUM CHLORIDE 9 MG/ML
20 INJECTION, SOLUTION INTRAVENOUS ONCE
OUTPATIENT
Start: 2024-07-25

## 2024-07-11 RX ORDER — SODIUM CHLORIDE 9 MG/ML
20 INJECTION, SOLUTION INTRAVENOUS ONCE
Status: DISCONTINUED | OUTPATIENT
Start: 2024-07-11 | End: 2024-07-13 | Stop reason: HOSPADM

## 2024-07-11 RX ORDER — SODIUM CHLORIDE 0.9 % (FLUSH) 0.9 %
10 SYRINGE (ML) INJECTION AS NEEDED
Status: DISCONTINUED | OUTPATIENT
Start: 2024-07-11 | End: 2024-07-11 | Stop reason: HOSPADM

## 2024-07-11 RX ADMIN — ERYTHROPOIETIN 40000 UNITS: 20000 INJECTION, SOLUTION INTRAVENOUS; SUBCUTANEOUS at 15:22

## 2024-07-11 RX ADMIN — FERUMOXYTOL 510 MG: 510 INJECTION INTRAVENOUS at 14:20

## 2024-07-11 NOTE — ED PROVIDER NOTES
EMERGENCY DEPARTMENT ENCOUNTER    Pt Name: Lynne Sanchez  MRN: [id number]  Pt :   1934  Room Number:    Date of encounter:  2024  PCP: Elizabeth Easton APRN  ED Physician: Craig Stark DO    HPI:  Lynne Sanchez is a 89 y.o. female who presents to the ED with chief complaint of abnormal labs.  Patient is accompanied by her daughter and  contribute HPI.  Longstanding history of anemia.  Was receiving iron transfusion this morning upstairs and had CBC obtained.  Hemoglobin was noted to be 6.5.  This prompted ED evaluation for blood transfusion.  Patient denies any active bleeding.  Denies any other associated symptoms including headache, fever, chills, lightheadedness, dizziness, chest pain or shortness of breath, abdominal pain.    PAST MEDICAL HISTORY  Past Medical History:   Diagnosis Date    Acute deep vein thrombosis of left upper extremity     Anemia     Asthma     CHF (congestive heart failure)     Chronic atrial fibrillation     Deep venous thrombosis of left upper limb     Diabetes mellitus, type 2     Diarrhea     GERD (gastroesophageal reflux disease)     Glaucoma     H/O transfusion of whole blood     Heart attack     Heart murmur     History of transfusion     Hyperlipidemia     Hypertension     Kidney disease     patient not aware of what exact diagnosis is     Osteomyelitis     Osteomyelitis of left foot 10/03/2017    Peripheral vascular disease     Right retinal detachment     Secondary cataract of both eyes     Stable proliferative diabetic retinopathy of both eyes     Stroke     Swelling of left extremity     Urinary tract infection     Wears glasses      Current Outpatient Medications   Medication Instructions    acetaminophen (TYLENOL) 650 mg, Oral, Every 8 Hours PRN    aspirin 81 mg, Oral, Daily    atorvastatin (LIPITOR) 40 MG tablet     clopidogrel (PLAVIX) 75 mg, Oral, Daily    epoetin dorcas-epbx (Retacrit) 62150 UNIT/ML injection 1 mL, Subcutaneous, 1 ml sq per  month on day 17    ferrous gluconate (FERGON) 324 mg, Oral, Daily With Breakfast    furosemide (LASIX) 20 mg, Oral, Daily    glimepiride (AMARYL) 2 mg, Oral, Every Morning Before Breakfast    glucose blood test strip 1 each, Other, Daily, Use as instructed once a day as directed, for Truemetrix reader    levothyroxine (SYNTHROID, LEVOTHROID) 50 mcg, Oral, Every Early Morning    metoprolol tartrate (LOPRESSOR) 100 mg, Oral, 2 Times Daily    multivitamin with minerals tablet tablet 1 tablet, Oral, Daily    pantoprazole (PROTONIX) 40 mg, Oral, Daily    saccharomyces boulardii (FLORASTOR) 250 MG capsule     Tradjenta 5 MG tablet tablet TAKE 1 TABLET EVERY DAY      PAST SURGICAL HISTORY  Past Surgical History:   Procedure Laterality Date    AMPUTATION DIGIT Left 7/5/2018    Procedure: Left Great toe amputation;  Surgeon: Prakash Carrington MD;  Location:  [facility];  Service: Vascular    AMPUTATION DIGIT Left 8/27/2018    Procedure: SECOND TOE AMPUTATION DIGIT LEFT;  Surgeon: Prakash Carrington MD;  Location:  [facility];  Service: General    APPENDECTOMY      BONE MARROW ASPIRATION      CARDIAC ABLATION      CARDIAC CATHETERIZATION N/A 10/10/2017    Procedure: Peripheral angiography;  Surgeon: Kan Pelaez MD;  Location:  [facility];  Service:     CARDIAC CATHETERIZATION N/A 10/10/2017    Procedure: Angioplasty-peripheral;  Surgeon: Kan Pelaez MD;  Location:  [facility];  Service:     CARDIAC CATHETERIZATION N/A 10/10/2017    Procedure: Atherectomy-peripheral;  Surgeon: Kan Pelaez MD;  Location:  [facility];  Service:     CARDIAC ELECTROPHYSIOLOGY PROCEDURE N/A 5/3/2018    Procedure: generator change;  Surgeon: Zan Poole MD;  Location:  [facility];  Service: Cardiovascular    CARDIOVERSION      COLONOSCOPY      ENDOSCOPY N/A 10/9/2017    Procedure: ESOPHAGOGASTRODUODENOSCOPY WITH COLD FORCEP BIOPSY;  Surgeon: Clifton CHRISTIANSON  MD Olena;  Location: [facility] ENDOSCOPY;  Service:     ENDOSCOPY N/A 3/22/2022    Procedure: ESOPHAGOGASTRODUODENOSCOPY with AVM cautery;  Surgeon: Clarisse Velez MD;  Location: [facility] ENDOSCOPY;  Service: Gastroenterology;  Laterality: N/A;    ENDOSCOPY N/A 8/4/2023    Procedure: ESOPHAGOGASTRODUODENOSCOPY WITH BIOPSY AND RUTH BRUSHING;  Surgeon: Clarisse Velez MD;  Location: [facility] ENDOSCOPY;  Service: Gastroenterology;  Laterality: N/A;    EYE SURGERY Bilateral     CATARACTS    INTERVENTIONAL RADIOLOGY PROCEDURE N/A 10/10/2017    Procedure: Abdominal Aortagram with Runoff;  Surgeon: Kan Pelaez MD;  Location: [facility] CATH INVASIVE LOCATION;  Service:     PACEMAKER IMPLANTATION      around 2008 then replaced 2018       FAMILY HISTORY  Family History   Problem Relation Age of Onset    No Known Problems Mother     No Known Problems Father     Arthritis Other        SOCIAL HISTORY  Social History     Socioeconomic History    Marital status:     Number of children: 3   Tobacco Use    Smoking status: Never     Passive exposure: Never    Smokeless tobacco: Never   Vaping Use    Vaping status: Never Used   Substance and Sexual Activity    Alcohol use: No    Drug use: No    Sexual activity: Defer     ALLERGIES  Phenergan [promethazine hcl] and Zosyn [piperacillin-tazobactam in dex]    REVIEW OF SYSTEMS  All systems reviewed and negative except for those discussed in HPI.     PHYSICAL EXAM  ED Triage Vitals   Temp Pulse Resp BP SpO2   -- -- -- -- --      Temp src Heart Rate Source Patient Position BP Location FiO2 (%)   -- -- -- -- --     I have reviewed the triage vital signs and nursing notes.    General: Alert.  Appears chronically ill, but nontoxic.  No acute distress.  Head: Normocephalic.  Atraumatic.  Eyes: No scleral icterus.  ENT: Moist mucous membranes.  Cardiovascular: Regular rate and rhythm.  No murmurs.  No rubs.  2+ distal pulses bilaterally.  Respiratory: Equal breath  sounds bilaterally.  No rales.  No rhonchi.  No wheezing.  GI: Abdomen is soft.  Nondistended.  Nontender to palpation.  No rebound.  No guarding.  No CVA tenderness.  MSK: Surgically amputated left great and second toe.  Open wound to the right superior foot.  Neurologic: Oriented x 3.  No focal deficits.  Skin: No pallor. No cyanosis.  Psych: Normal mood.  Pleasant affect.    LAB RESULTS  Recent Results (from the past 24 hour(s))   Basic Metabolic Panel    Collection Time: 07/11/24  2:12 PM    Specimen: Blood   Result Value Ref Range    Glucose 150 (H) 65 - 99 mg/dL    BUN 41 (H) 8 - 23 mg/dL    Creatinine 1.60 (H) 0.57 - 1.00 mg/dL    Sodium 143 136 - 145 mmol/L    Potassium 4.1 3.5 - 5.2 mmol/L    Chloride 105 98 - 107 mmol/L    CO2 25.8 22.0 - 29.0 mmol/L    Calcium 9.0 8.6 - 10.5 mg/dL    BUN/Creatinine Ratio 25.6 (H) 7.0 - 25.0    Anion Gap 12.2 5.0 - 15.0 mmol/L    eGFR 30.7 (L) >60.0 mL/min/1.73   CBC Auto Differential    Collection Time: 07/11/24  2:12 PM    Specimen: Blood   Result Value Ref Range    WBC 9.54 3.40 - 10.80 10*3/mm3    RBC 2.37 (L) 3.77 - 5.28 10*6/mm3    Hemoglobin 6.5 (C) 12.0 - 15.9 g/dL    Hematocrit 21.8 (L) 34.0 - 46.6 %    MCV 92.0 79.0 - 97.0 fL    MCH 27.4 26.6 - 33.0 pg    MCHC 29.8 (L) 31.5 - 35.7 g/dL    RDW 17.7 (H) 12.3 - 15.4 %    RDW-SD 59.4 (H) 37.0 - 54.0 fl    MPV 9.9 6.0 - 12.0 fL    Platelets 249 140 - 450 10*3/mm3    Neutrophil % 69.5 42.7 - 76.0 %    Lymphocyte % 21.0 19.6 - 45.3 %    Monocyte % 7.8 5.0 - 12.0 %    Eosinophil % 0.5 0.3 - 6.2 %    Basophil % 0.5 0.0 - 1.5 %    Immature Grans % 0.7 (H) 0.0 - 0.5 %    Neutrophils, Absolute 6.63 1.70 - 7.00 10*3/mm3    Lymphocytes, Absolute 2.00 0.70 - 3.10 10*3/mm3    Monocytes, Absolute 0.74 0.10 - 0.90 10*3/mm3    Eosinophils, Absolute 0.05 0.00 - 0.40 10*3/mm3    Basophils, Absolute 0.05 0.00 - 0.20 10*3/mm3    Immature Grans, Absolute 0.07 (H) 0.00 - 0.05 10*3/mm3    nRBC 0.0 0.0 - 0.2 /100 WBC   Scan Slide     Collection Time: 07/11/24  2:12 PM    Specimen: Blood   Result Value Ref Range    Hypochromia Mod/2+ None Seen    WBC Morphology Normal Normal    Platelet Estimate Adequate Normal   Type & Screen    Collection Time: 07/11/24  3:58 PM    Specimen: Blood   Result Value Ref Range    ABO Type O     RH type Positive     Antibody Screen Negative     T&S Expiration Date 7/14/2024 11:59:59 PM    Prepare RBC, 1 Units    Collection Time: 07/11/24  6:10 PM   Result Value Ref Range    Product Code Z0473F18     Unit Number [id number]     UNIT  ABO O     UNIT  RH POS     Crossmatch Interpretation Compatible     Dispense Status IS     Blood Expiration Date [id number]     Blood Type Barcode 5100        RADIOLOGY  No Radiology Exams Resulted Within Past 24 Hours    PROCEDURES  Critical Care    Performed by: Craig Stark DO  Authorized by: Craig Stark DO    Comments:      CRITICAL CARE PROCEDURE NOTE  Authorized and performed by: Dr. Stark  Total critical care time: Approximately 35 minutes.  Patient was critically ill due to: Anemia  Interventions: Blood transfusion, close airway/mental status/hemodynamic monitoring    Due to a high probability of clinically significant, life threatening deterioration, the patient required my highest level of preparedness to intervene emergently and I personally spent this critical care time directly and personally managing the patient.  This critical care time included obtaining a history; examining the patient; pulse oximetry; ordering and review of studies; arranging urgent treatment with development of a management plan; evaluation of patient's response to treatment; frequent reassessment; and, discussions with other providers.    This critical care time was performed to assess and manage the high probability of imminent, life-threatening deterioration that could result in multiorgan failure.  It was exclusive of separately billable procedures and treating other patients and  teaching time.    Please see MDM section and the rest of the note for further information on patient assessment and interventions.      RISK STRATIFICATION    MEDICATIONS GIVEN IN ER  Medications   sodium chloride 0.9 % flush 10 mL (has no administration in time range)     MEDICAL DECISION MAKING  89 y.o. female with past medical history listed above who presents with low hemoglobin.    Vital signs within normal limits.    External notes reviewed.  Labs from today at 14:12 reviewed.  CBC shows normocytic anemia with hemoglobin 6.5 and MCV 92.  No leukocytosis or thrombocytopenia.  Serum creatinine 1.6 and stable from baseline. No metabolic derangement. Baseline hemoglobin appears to be low 7.    Clinical presentation and physical exam consistent with acute on chronic anemia of chronic disease. No clinical evidence of active hemorrhage or shock.     Type and screen. No clinical indication for repeat labs or imaging.    Patient received 1 unit pRBC in the ED.     Patient was re-evaluated multiple times during her ED course.    On re-evaluation, patient resting comfortably. Vital signs remained stable on room air.  Patient was ambulatory in the ED with steady gait.  Able to tolerate oral intake appropriately.    I discussed the findings of the ED workup with the patient and her family at bedside.  No clinical indication for admission.  I recommended outpatient follow-up with PCP.  Patient was deemed medically stable for discharge with close outpatient follow-up and strict ED return precautions. Patient agreeable with plan and disposition.    Chronic conditions affecting care: Anemia    Social determinants of health impacting treatment or disposition: None    REPEAT VITAL SIGNS  AS OF 21:32 EDT VITALS:  BP - 136/54  HR - 70  TEMP - 98.1 °F (36.7 °C) (Oral)  O2 SATS - 98%    DIAGNOSIS  Final diagnoses:   Anemia, unspecified type     DISPOSITION  ED Disposition       ED Disposition   Discharge    Condition   Stable     Comment   --                     Please note that portions of this document were completed with voice recognition software.        Craig Stark DO  07/12/24 1590

## 2024-07-11 NOTE — ED NOTES
Pt receiving first unit of PRBCs at this time. Pt tolerating infusion, VSS at this time, respirations equal and unlabored. No S/S of distress at this time.

## 2024-07-11 NOTE — CODE DOCUMENTATION
Nelly from [facility] was notified of Patient's Hgb of 6.5.   Nelly spoke with ANN Harp and orders were given to send patient to ER for further evaluation and blood transfusion.   RN called ER Charge and relayed patient plan. Patient sent to ER room 14

## 2024-07-12 ENCOUNTER — HOME CARE VISIT (OUTPATIENT)
Dept: HOME HEALTH SERVICES | Facility: HOME HEALTHCARE | Age: 89
End: 2024-07-12
Payer: MEDICARE

## 2024-07-12 ENCOUNTER — TELEPHONE (OUTPATIENT)
Dept: INTERNAL MEDICINE | Facility: CLINIC | Age: 89
End: 2024-07-12
Payer: MEDICARE

## 2024-07-12 PROCEDURE — G0300 HHS/HOSPICE OF LPN EA 15 MIN: HCPCS

## 2024-07-12 NOTE — TELEPHONE ENCOUNTER
Caller: AYAH    Relationship: Home Health    Best call back number: [phone]     What medication are you requesting: ANTI NAUSEA MEDICATION FOR NAUSEA AND VOMITING    How long have you been experiencing symptoms: TODAY, FOLLOWING HER SURGERY    If a prescription is needed, what is your preferred pharmacy and phone number: [id number] - [place], [place] - [address] - [phone] [facility] [phone]      Additional notes:  PLEASE NOTIFY PATIENT'S HOME HEALTH NURSE WHEN THIS PRESCRIPTION HAS BEEN CALLED IN OR IF THERE ARE ANY QUESTIONS/CONCERNS.

## 2024-07-12 NOTE — ED NOTES
Pt receiving first unit of PRBCs at this time. Pt tolerating infusion, VSS. Respirations equal and unlabored. No S/S of distress at this time.

## 2024-07-12 NOTE — ED NOTES
Pt infusion of PRBCs completed at this time. VSS, respirations equal and unlabored, no S/S of distress at this time.

## 2024-07-12 NOTE — HOME HEALTH
Pt admitted to [facility] secondary to great toe cellulitis of R foot. PT order obtained. Pt states she was instructed to have minimal activity/weightbearing on R foot while healing. At this point, pt would be more appropriate to begin skilled PT once toe is healed to improve balance, strength and overall functional mobility. Pt states she has w/c ordered. PT adjusted height of walker this date for improved ambulation. SN notified and will request new PT order once appropriate. Patient seen for follow up for psychosis and SI. Patient seen for follow up for psychosis and SI. Patient seen for follow up for psychosis and SI. Patient seen for follow up for psychosis and SI. Patient seen for follow up for psychosis and SI. Patient seen for follow up for psychosis and SI. Patient seen for follow up for psychosis and SI.

## 2024-07-15 ENCOUNTER — HOME CARE VISIT (OUTPATIENT)
Dept: HOME HEALTH SERVICES | Facility: HOME HEALTHCARE | Age: 89
End: 2024-07-15
Payer: MEDICARE

## 2024-07-15 VITALS — OXYGEN SATURATION: 98 % | DIASTOLIC BLOOD PRESSURE: 70 MMHG | SYSTOLIC BLOOD PRESSURE: 130 MMHG

## 2024-07-15 DIAGNOSIS — E11.29 CONTROLLED TYPE 2 DIABETES MELLITUS WITH OTHER DIABETIC KIDNEY COMPLICATION, WITHOUT LONG-TERM CURRENT USE OF INSULIN: ICD-10-CM

## 2024-07-15 PROCEDURE — G0157 HHC PT ASSISTANT EA 15: HCPCS

## 2024-07-15 RX ORDER — GLIMEPIRIDE 2 MG/1
2 TABLET ORAL
Qty: 90 TABLET | Refills: 1 | Status: ON HOLD | OUTPATIENT
Start: 2024-07-15

## 2024-07-15 RX ORDER — PANTOPRAZOLE SODIUM 40 MG/1
40 TABLET, DELAYED RELEASE ORAL DAILY
Qty: 90 TABLET | Refills: 3 | Status: ON HOLD | OUTPATIENT
Start: 2024-07-15

## 2024-07-15 NOTE — TELEPHONE ENCOUNTER
Caller: Brennan Mendenhall    Relationship: Emergency Contact    Best call back number:[phone]     Requested Prescriptions:   Requested Prescriptions     Pending Prescriptions Disp Refills    glimepiride (AMARYL) 2 MG tablet 90 tablet 1     Sig: Take 1 tablet by mouth Every Morning Before Breakfast. Indications: Type 2 Diabetes    pantoprazole (PROTONIX) 40 MG EC tablet 90 tablet 3     Sig: Take 1 tablet by mouth Daily. Indications: Gastroesophageal Reflux Disease        Pharmacy where request should be sent: [id number] [place], [place] - [address] - [phone] [facility] [phone]      Last office visit with prescribing clinician: 5/8/2024   Last telemedicine visit with prescribing clinician: Visit date not found   Next office visit with prescribing clinician: Visit date not found     Additional details provided by patient: PATIENT OUT OF MEDICATIONS    Does the patient have less than a 3 day supply:  [x] Yes  [] No    Would you like a call back once the refill request has been completed: [x] Yes [] No    If the office needs to give you a call back, can they leave a voicemail: [x] Yes [] No    Marina Chiang Rep   07/15/24 14:12 EDT

## 2024-07-15 NOTE — TELEPHONE ENCOUNTER
Rx Refill Note  Requested Prescriptions     Pending Prescriptions Disp Refills    glimepiride (AMARYL) 2 MG tablet 90 tablet 1     Sig: Take 1 tablet by mouth Every Morning Before Breakfast. Indications: Type 2 Diabetes    pantoprazole (PROTONIX) 40 MG EC tablet 90 tablet 3     Sig: Take 1 tablet by mouth Daily. Indications: Gastroesophageal Reflux Disease      Last office visit with prescribing clinician: 5/8/2024   Last telemedicine visit with prescribing clinician: Visit date not found   Next office visit with prescribing clinician: Visit date not found       Brad Geiger MA  07/15/24, 16:20 EDT

## 2024-07-15 NOTE — TELEPHONE ENCOUNTER
Patient had toe amputation Friday and she keep vomiting.Romi advised to contact surgeons office for medication. Romi informed Elizabeth is out of the office on Monday's. She will and she will check with the patient also.

## 2024-07-16 ENCOUNTER — READMISSION MANAGEMENT (OUTPATIENT)
Dept: CALL CENTER | Facility: HOSPITAL | Age: 89
End: 2024-07-16
Payer: MEDICARE

## 2024-07-16 ENCOUNTER — HOME CARE VISIT (OUTPATIENT)
Dept: HOME HEALTH SERVICES | Facility: HOME HEALTHCARE | Age: 89
End: 2024-07-16
Payer: MEDICARE

## 2024-07-16 ENCOUNTER — TELEPHONE (OUTPATIENT)
Dept: INTERNAL MEDICINE | Facility: CLINIC | Age: 89
End: 2024-07-16
Payer: MEDICARE

## 2024-07-16 ENCOUNTER — HOME CARE VISIT (OUTPATIENT)
Dept: HOME HEALTH SERVICES | Facility: HOME HEALTHCARE | Age: 89
End: 2024-07-16
Payer: COMMERCIAL

## 2024-07-16 VITALS — RESPIRATION RATE: 16 BRPM | SYSTOLIC BLOOD PRESSURE: 124 MMHG | DIASTOLIC BLOOD PRESSURE: 70 MMHG

## 2024-07-16 VITALS
TEMPERATURE: 98.7 F | DIASTOLIC BLOOD PRESSURE: 62 MMHG | RESPIRATION RATE: 18 BRPM | OXYGEN SATURATION: 96 % | HEART RATE: 61 BPM | SYSTOLIC BLOOD PRESSURE: 130 MMHG

## 2024-07-16 PROCEDURE — G0300 HHS/HOSPICE OF LPN EA 15 MIN: HCPCS

## 2024-07-16 NOTE — TELEPHONE ENCOUNTER
Caller: LISA PEREZ  (ON HIPAA)    Relationship: Emergency Contact    Best call back number: [phone]     What is the best time to reach you: ANY    Who are you requesting to speak with (clinical staff, provider,  specific staff member):  NURSE    Do you know the name of the person who called: DAUGHTER    What was the call regarding: PATIENT HAD TOE REMOVED LAST WEEK.  THEY NEED HOME HEALTH.  THEY PREVIOUSLY HAD A NURSE NAMED AYAH, DO WE HAVE THOSE RECORDS TO KNOW WHAT AGENCY?  POSSIBLY [facility] PER MEDIA.  NEEDED THURSDAY FOR WOUND CARE.    Is it okay if the provider responds through MyChart: PHONE CALL PLEASE

## 2024-07-16 NOTE — OUTREACH NOTE
Medical Week 3 Survey      Flowsheet Row Responses   [facility] facility patient discharged from? [facility]   Does the patient have one of the following disease processes/diagnoses(primary or secondary)? Other   Week 3 attempt successful? No   Unsuccessful attempts Attempt 1            Fransisca WHITE - Registered Nurse

## 2024-07-18 ENCOUNTER — APPOINTMENT (OUTPATIENT)
Dept: CT IMAGING | Facility: HOSPITAL | Age: 89
End: 2024-07-18
Payer: MEDICARE

## 2024-07-18 ENCOUNTER — TELEPHONE (OUTPATIENT)
Dept: INTERNAL MEDICINE | Facility: CLINIC | Age: 89
End: 2024-07-18
Payer: MEDICARE

## 2024-07-18 ENCOUNTER — HOME CARE VISIT (OUTPATIENT)
Dept: HOME HEALTH SERVICES | Facility: HOME HEALTHCARE | Age: 89
End: 2024-07-18
Payer: COMMERCIAL

## 2024-07-18 ENCOUNTER — APPOINTMENT (OUTPATIENT)
Dept: GENERAL RADIOLOGY | Facility: HOSPITAL | Age: 89
End: 2024-07-18
Payer: MEDICARE

## 2024-07-18 ENCOUNTER — HOSPITAL ENCOUNTER (EMERGENCY)
Facility: HOSPITAL | Age: 89
Discharge: HOME | End: 2024-07-18
Attending: EMERGENCY MEDICINE
Payer: MEDICARE

## 2024-07-18 VITALS
WEIGHT: 117 LBS | HEART RATE: 75 BPM | TEMPERATURE: 98.4 F | SYSTOLIC BLOOD PRESSURE: 152 MMHG | DIASTOLIC BLOOD PRESSURE: 57 MMHG | OXYGEN SATURATION: 98 % | HEIGHT: 66 IN | BODY MASS INDEX: 18.8 KG/M2 | RESPIRATION RATE: 18 BRPM

## 2024-07-18 VITALS
SYSTOLIC BLOOD PRESSURE: 130 MMHG | HEART RATE: 75 BPM | DIASTOLIC BLOOD PRESSURE: 80 MMHG | TEMPERATURE: 98.5 F | OXYGEN SATURATION: 94 %

## 2024-07-18 DIAGNOSIS — L03.113 CELLULITIS OF RIGHT UPPER EXTREMITY: ICD-10-CM

## 2024-07-18 DIAGNOSIS — M10.9 ACUTE GOUT OF RIGHT HAND, UNSPECIFIED CAUSE: Primary | ICD-10-CM

## 2024-07-18 LAB
ALBUMIN SERPL-MCNC: 3.1 G/DL (ref 3.5–5.2)
ALBUMIN/GLOB SERPL: 1.1 G/DL
ALP SERPL-CCNC: 119 U/L (ref 39–117)
ALT SERPL W P-5'-P-CCNC: <5 U/L (ref 1–33)
ANION GAP SERPL CALCULATED.3IONS-SCNC: 16.3 MMOL/L (ref 5–15)
AST SERPL-CCNC: 18 U/L (ref 1–32)
BASOPHILS # BLD AUTO: 0.03 10*3/MM3 (ref 0–0.2)
BASOPHILS NFR BLD AUTO: 0.2 % (ref 0–1.5)
BILIRUB SERPL-MCNC: 0.8 MG/DL (ref 0–1.2)
BUN SERPL-MCNC: 20 MG/DL (ref 8–23)
BUN/CREAT SERPL: 13.9 (ref 7–25)
CALCIUM SPEC-SCNC: 8.7 MG/DL (ref 8.6–10.5)
CHLORIDE SERPL-SCNC: 101 MMOL/L (ref 98–107)
CO2 SERPL-SCNC: 22.7 MMOL/L (ref 22–29)
CREAT SERPL-MCNC: 1.44 MG/DL (ref 0.57–1)
CRP SERPL-MCNC: 12.9 MG/DL (ref 0–0.5)
DEPRECATED RDW RBC AUTO: 62.7 FL (ref 37–54)
EGFRCR SERPLBLD CKD-EPI 2021: 34.8 ML/MIN/1.73
EOSINOPHIL # BLD AUTO: 0.01 10*3/MM3 (ref 0–0.4)
EOSINOPHIL NFR BLD AUTO: 0.1 % (ref 0.3–6.2)
ERYTHROCYTE [DISTWIDTH] IN BLOOD BY AUTOMATED COUNT: 19.7 % (ref 12.3–15.4)
ERYTHROCYTE [SEDIMENTATION RATE] IN BLOOD: 12 MM/HR (ref 0–30)
GLOBULIN UR ELPH-MCNC: 2.9 GM/DL
GLUCOSE SERPL-MCNC: 167 MG/DL (ref 65–99)
HCT VFR BLD AUTO: 27.3 % (ref 34–46.6)
HGB BLD-MCNC: 8.3 G/DL (ref 12–15.9)
IMM GRANULOCYTES # BLD AUTO: 0.17 10*3/MM3 (ref 0–0.05)
IMM GRANULOCYTES NFR BLD AUTO: 1.2 % (ref 0–0.5)
LYMPHOCYTES # BLD AUTO: 1.22 10*3/MM3 (ref 0.7–3.1)
LYMPHOCYTES NFR BLD AUTO: 8.5 % (ref 19.6–45.3)
MCH RBC QN AUTO: 28.1 PG (ref 26.6–33)
MCHC RBC AUTO-ENTMCNC: 30.4 G/DL (ref 31.5–35.7)
MCV RBC AUTO: 92.5 FL (ref 79–97)
MONOCYTES # BLD AUTO: 1.11 10*3/MM3 (ref 0.1–0.9)
MONOCYTES NFR BLD AUTO: 7.7 % (ref 5–12)
NEUTROPHILS NFR BLD AUTO: 11.82 10*3/MM3 (ref 1.7–7)
NEUTROPHILS NFR BLD AUTO: 82.3 % (ref 42.7–76)
NRBC BLD AUTO-RTO: 0 /100 WBC (ref 0–0.2)
PLATELET # BLD AUTO: 236 10*3/MM3 (ref 140–450)
PMV BLD AUTO: 9.4 FL (ref 6–12)
POTASSIUM SERPL-SCNC: 3.8 MMOL/L (ref 3.5–5.2)
PROCALCITONIN SERPL-MCNC: 0.81 NG/ML (ref 0–0.25)
PROT SERPL-MCNC: 6 G/DL (ref 6–8.5)
RBC # BLD AUTO: 2.95 10*6/MM3 (ref 3.77–5.28)
SODIUM SERPL-SCNC: 140 MMOL/L (ref 136–145)
URATE SERPL-MCNC: 12.4 MG/DL (ref 2.4–5.7)
WBC NRBC COR # BLD AUTO: 14.36 10*3/MM3 (ref 3.4–10.8)

## 2024-07-18 PROCEDURE — 86140 C-REACTIVE PROTEIN: CPT | Performed by: EMERGENCY MEDICINE

## 2024-07-18 PROCEDURE — 25010000002 CEFAZOLIN PER 500 MG: Performed by: EMERGENCY MEDICINE

## 2024-07-18 PROCEDURE — 96375 TX/PRO/DX INJ NEW DRUG ADDON: CPT

## 2024-07-18 PROCEDURE — 99284 EMERGENCY DEPT VISIT MOD MDM: CPT

## 2024-07-18 PROCEDURE — 36415 COLL VENOUS BLD VENIPUNCTURE: CPT

## 2024-07-18 PROCEDURE — 96374 THER/PROPH/DIAG INJ IV PUSH: CPT

## 2024-07-18 PROCEDURE — 85025 COMPLETE CBC W/AUTO DIFF WBC: CPT | Performed by: EMERGENCY MEDICINE

## 2024-07-18 PROCEDURE — 25010000002 DEXAMETHASONE SODIUM PHOSPHATE 10 MG/ML SOLUTION: Performed by: EMERGENCY MEDICINE

## 2024-07-18 PROCEDURE — 84550 ASSAY OF BLOOD/URIC ACID: CPT | Performed by: EMERGENCY MEDICINE

## 2024-07-18 PROCEDURE — 84145 PROCALCITONIN (PCT): CPT | Performed by: EMERGENCY MEDICINE

## 2024-07-18 PROCEDURE — G0300 HHS/HOSPICE OF LPN EA 15 MIN: HCPCS

## 2024-07-18 PROCEDURE — 72128 CT CHEST SPINE W/O DYE: CPT

## 2024-07-18 PROCEDURE — 80053 COMPREHEN METABOLIC PANEL: CPT | Performed by: EMERGENCY MEDICINE

## 2024-07-18 PROCEDURE — 85652 RBC SED RATE AUTOMATED: CPT | Performed by: EMERGENCY MEDICINE

## 2024-07-18 PROCEDURE — 73130 X-RAY EXAM OF HAND: CPT

## 2024-07-18 PROCEDURE — 72131 CT LUMBAR SPINE W/O DYE: CPT

## 2024-07-18 RX ORDER — CEPHALEXIN 500 MG/1
500 CAPSULE ORAL 3 TIMES DAILY
Qty: 28 CAPSULE | Refills: 0 | Status: ON HOLD | OUTPATIENT
Start: 2024-07-18 | End: 2024-07-27

## 2024-07-18 RX ORDER — BACITRACIN ZINC 500 [USP'U]/G
1 OINTMENT TOPICAL ONCE
Status: COMPLETED | OUTPATIENT
Start: 2024-07-18 | End: 2024-07-18

## 2024-07-18 RX ORDER — DEXAMETHASONE SODIUM PHOSPHATE 10 MG/ML
10 INJECTION, SOLUTION INTRAMUSCULAR; INTRAVENOUS ONCE
Status: COMPLETED | OUTPATIENT
Start: 2024-07-18 | End: 2024-07-18

## 2024-07-18 RX ORDER — NAPROXEN 375 MG/1
375 TABLET, DELAYED RELEASE ORAL 2 TIMES DAILY WITH MEALS
Qty: 14 TABLET | Refills: 0 | Status: ON HOLD | OUTPATIENT
Start: 2024-07-18 | End: 2024-07-25

## 2024-07-18 RX ADMIN — DEXAMETHASONE SODIUM PHOSPHATE 10 MG: 10 INJECTION INTRAMUSCULAR; INTRAVENOUS at 20:53

## 2024-07-18 RX ADMIN — CEFAZOLIN 1000 MG: 1 INJECTION, POWDER, FOR SOLUTION INTRAMUSCULAR; INTRAVENOUS at 20:53

## 2024-07-18 RX ADMIN — BACITRACIN ZINC 1 APPLICATION: 500 OINTMENT TOPICAL at 21:15

## 2024-07-18 NOTE — TELEPHONE ENCOUNTER
Romi with Psychiatric called to note patient fell on Tuesday but patient did not tell them until they came to see patient today. She has bruising on back that she sent pictures on thru epic. She states it is an open wound that is still bleeding and needs wound care orders.    She also notes patient is having some swelling in fingers and also documented that with a picture sent thru [facility]. She would like a call back asap. Call her at [phone]

## 2024-07-18 NOTE — TELEPHONE ENCOUNTER
Romi called back. She said the patient was using her walker and managed to fall backwards injuring back and hand. Patient was on speaker phone. She stated that she has no pain at the moment except for her fingers. They hurt all night. Patient has been taking hydrocodone though. I located the photos in Romi's note and advised ER. I showed these photos to Elizabeth and told her my recommendation.

## 2024-07-19 ENCOUNTER — HOME CARE VISIT (OUTPATIENT)
Dept: HOME HEALTH SERVICES | Facility: HOME HEALTHCARE | Age: 89
End: 2024-07-19
Payer: MEDICARE

## 2024-07-19 NOTE — ED PROVIDER NOTES
EMERGENCY DEPARTMENT ENCOUNTER    Pt Name: Lynne Sanchez  MRN: [id number]  Pt :   1934  Room Number:    Date of encounter:  2024  PCP: Elizabeth Easton APRN  ED Provider: Deep Ferris MD    Historian: Patient      HPI:  Chief Complaint   Patient presents with    Fall          Context: Lynne Sanchez is a 89 y.o. female who presents to the ED c/o fall, the patient several days ago was walking with her walker, hand felt the walker she slipped back hitting her back.  She denies head injury at the time, reports that since that time she had increasing pain in her mid and low back, as well as pain in her right hand.  She still has been ambulating with her walker but having some difficulty due to the pain in her right hand.  Her son came and saw her today and noticed the swelling and worsening pain in the hand and brought her to the ER.      PAST MEDICAL HISTORY  Past Medical History:   Diagnosis Date    Acute deep vein thrombosis of left upper extremity     Anemia     Asthma     CHF (congestive heart failure)     Chronic atrial fibrillation     Deep venous thrombosis of left upper limb     Diabetes mellitus, type 2     Diarrhea     GERD (gastroesophageal reflux disease)     Glaucoma     H/O transfusion of whole blood     Heart attack     Heart murmur     History of transfusion     Hyperlipidemia     Hypertension     Kidney disease     patient not aware of what exact diagnosis is     Osteomyelitis     Osteomyelitis of left foot 10/03/2017    Peripheral vascular disease     Right retinal detachment     Secondary cataract of both eyes     Stable proliferative diabetic retinopathy of both eyes     Stroke     Swelling of left extremity     Urinary tract infection     Wears glasses          PAST SURGICAL HISTORY  Past Surgical History:   Procedure Laterality Date    AMPUTATION DIGIT Left 2018    Procedure: Left Great toe amputation;  Surgeon: Prakash Carrington MD;  Location: [facility] OR;   Service: Vascular    AMPUTATION DIGIT Left 8/27/2018    Procedure: SECOND TOE AMPUTATION DIGIT LEFT;  Surgeon: Prakash Carrington MD;  Location: [facility] OR;  Service: General    APPENDECTOMY      BONE MARROW ASPIRATION      CARDIAC ABLATION      CARDIAC CATHETERIZATION N/A 10/10/2017    Procedure: Peripheral angiography;  Surgeon: Kan Pelaez MD;  Location: [facility] CATH INVASIVE LOCATION;  Service:     CARDIAC CATHETERIZATION N/A 10/10/2017    Procedure: Angioplasty-peripheral;  Surgeon: Kan Pelaez MD;  Location: [facility] CATH INVASIVE LOCATION;  Service:     CARDIAC CATHETERIZATION N/A 10/10/2017    Procedure: Atherectomy-peripheral;  Surgeon: Kan Pelaez MD;  Location: [facility] CATH INVASIVE LOCATION;  Service:     CARDIAC ELECTROPHYSIOLOGY PROCEDURE N/A 5/3/2018    Procedure: generator change;  Surgeon: Zan Poole MD;  Location:  [facility] CATH INVASIVE LOCATION;  Service: Cardiovascular    CARDIOVERSION      COLONOSCOPY      ENDOSCOPY N/A 10/9/2017    Procedure: ESOPHAGOGASTRODUODENOSCOPY WITH COLD FORCEP BIOPSY;  Surgeon: Clifton Hyatt MD;  Location: [facility] ENDOSCOPY;  Service:     ENDOSCOPY N/A 3/22/2022    Procedure: ESOPHAGOGASTRODUODENOSCOPY with AVM cautery;  Surgeon: Clarisse Velez MD;  Location: [facility] ENDOSCOPY;  Service: Gastroenterology;  Laterality: N/A;    ENDOSCOPY N/A 8/4/2023    Procedure: ESOPHAGOGASTRODUODENOSCOPY WITH BIOPSY AND RUTH BRUSHING;  Surgeon: Clarisse Velez MD;  Location: [facility] ENDOSCOPY;  Service: Gastroenterology;  Laterality: N/A;    EYE SURGERY Bilateral     CATARACTS    INTERVENTIONAL RADIOLOGY PROCEDURE N/A 10/10/2017    Procedure: Abdominal Aortagram with Runoff;  Surgeon: Kan Pelaez MD;  Location: [facility] CATH INVASIVE LOCATION;  Service:     PACEMAKER IMPLANTATION      around 2008 then replaced 2018         FAMILY HISTORY  Family History   Problem Relation Age of Onset    No Known Problems Mother     No Known Problems  Father     Arthritis Other          SOCIAL HISTORY  Social History     Socioeconomic History    Marital status:     Number of children: 3   Tobacco Use    Smoking status: Never     Passive exposure: Never    Smokeless tobacco: Never   Vaping Use    Vaping status: Never Used   Substance and Sexual Activity    Alcohol use: No    Drug use: No    Sexual activity: Defer         ALLERGIES  Phenergan [promethazine hcl] and Zosyn [piperacillin-tazobactam in dex]        REVIEW OF SYSTEMS  Review of Systems   Constitutional:  Negative for chills and fever.   HENT:  Negative for sore throat and trouble swallowing.    Eyes:  Negative for pain and redness.   Respiratory:  Negative for cough and shortness of breath.    Cardiovascular:  Negative for chest pain and leg swelling.   Gastrointestinal:  Negative for abdominal pain, nausea and vomiting.   Genitourinary:  Negative for dysuria and urgency.   Musculoskeletal:  Positive for back pain. Negative for neck pain.        Right hand pain   Skin:  Negative for rash and wound.   Neurological:  Negative for dizziness and weakness.        All systems reviewed and negative except for those discussed in HPI.       PHYSICAL EXAM    I have reviewed the triage vital signs and nursing notes.    ED Triage Vitals   Temp Heart Rate Resp BP SpO2   07/18/24 1822 07/18/24 1821 07/18/24 1821 07/18/24 1822 07/18/24 1821   98.5 °F (36.9 °C) 72 18 (!) 167/137 96 %      Temp src Heart Rate Source Patient Position BP Location FiO2 (%)   -- 07/18/24 1931 07/18/24 1830 07/18/24 1931 --    Monitor Sitting Left arm        Physical Exam  Constitutional:       Appearance: Normal appearance. She is not ill-appearing.   HENT:      Head: Normocephalic and atraumatic.      Right Ear: External ear normal.      Left Ear: External ear normal.      Nose: Nose normal.      Mouth/Throat:      Mouth: Mucous membranes are moist.      Pharynx: Oropharynx is clear.   Eyes:      Extraocular Movements: Extraocular  movements intact.      Conjunctiva/sclera: Conjunctivae normal.      Pupils: Pupils are equal, round, and reactive to light.   Cardiovascular:      Rate and Rhythm: Normal rate and regular rhythm.      Pulses:           Radial pulses are 2+ on the right side and 2+ on the left side.   Pulmonary:      Effort: Pulmonary effort is normal.      Breath sounds: Normal breath sounds.   Abdominal:      General: There is no distension.      Palpations: Abdomen is soft.      Tenderness: There is no abdominal tenderness.   Musculoskeletal:         General: No tenderness or deformity. Normal range of motion.      Right hand: Swelling, tenderness and bony tenderness present.      Left hand: Normal.        Hands:       Cervical back: Normal range of motion and neck supple.      Thoracic back: Tenderness and bony tenderness present.      Lumbar back: Tenderness and bony tenderness present.        Back:       Right lower leg: No edema.      Left lower leg: No edema.   Skin:     General: Skin is warm and dry.      Capillary Refill: Capillary refill takes less than 2 seconds.   Neurological:      General: No focal deficit present.      Mental Status: She is alert and oriented to person, place, and time.            LAB RESULTS  Recent Results (from the past 24 hour(s))   Comprehensive Metabolic Panel    Collection Time: 07/18/24  7:02 PM    Specimen: Blood   Result Value Ref Range    Glucose 167 (H) 65 - 99 mg/dL    BUN 20 8 - 23 mg/dL    Creatinine 1.44 (H) 0.57 - 1.00 mg/dL    Sodium 140 136 - 145 mmol/L    Potassium 3.8 3.5 - 5.2 mmol/L    Chloride 101 98 - 107 mmol/L    CO2 22.7 22.0 - 29.0 mmol/L    Calcium 8.7 8.6 - 10.5 mg/dL    Total Protein 6.0 6.0 - 8.5 g/dL    Albumin 3.1 (L) 3.5 - 5.2 g/dL    ALT (SGPT) <5 1 - 33 U/L    AST (SGOT) 18 1 - 32 U/L    Alkaline Phosphatase 119 (H) 39 - 117 U/L    Total Bilirubin 0.8 0.0 - 1.2 mg/dL    Globulin 2.9 gm/dL    A/G Ratio 1.1 g/dL    BUN/Creatinine Ratio 13.9 7.0 - 25.0    Anion Gap  16.3 (H) 5.0 - 15.0 mmol/L    eGFR 34.8 (L) >60.0 mL/min/1.73   Sedimentation Rate    Collection Time: 07/18/24  7:02 PM    Specimen: Blood   Result Value Ref Range    Sed Rate 12 0 - 30 mm/hr   C-reactive Protein    Collection Time: 07/18/24  7:02 PM    Specimen: Blood   Result Value Ref Range    C-Reactive Protein 12.90 (H) 0.00 - 0.50 mg/dL   Uric Acid    Collection Time: 07/18/24  7:02 PM    Specimen: Blood   Result Value Ref Range    Uric Acid 12.4 (H) 2.4 - 5.7 mg/dL   Procalcitonin    Collection Time: 07/18/24  7:02 PM    Specimen: Blood   Result Value Ref Range    Procalcitonin 0.81 (H) 0.00 - 0.25 ng/mL   CBC Auto Differential    Collection Time: 07/18/24  7:02 PM    Specimen: Blood   Result Value Ref Range    WBC 14.36 (H) 3.40 - 10.80 10*3/mm3    RBC 2.95 (L) 3.77 - 5.28 10*6/mm3    Hemoglobin 8.3 (L) 12.0 - 15.9 g/dL    Hematocrit 27.3 (L) 34.0 - 46.6 %    MCV 92.5 79.0 - 97.0 fL    MCH 28.1 26.6 - 33.0 pg    MCHC 30.4 (L) 31.5 - 35.7 g/dL    RDW 19.7 (H) 12.3 - 15.4 %    RDW-SD 62.7 (H) 37.0 - 54.0 fl    MPV 9.4 6.0 - 12.0 fL    Platelets 236 140 - 450 10*3/mm3    Neutrophil % 82.3 (H) 42.7 - 76.0 %    Lymphocyte % 8.5 (L) 19.6 - 45.3 %    Monocyte % 7.7 5.0 - 12.0 %    Eosinophil % 0.1 (L) 0.3 - 6.2 %    Basophil % 0.2 0.0 - 1.5 %    Immature Grans % 1.2 (H) 0.0 - 0.5 %    Neutrophils, Absolute 11.82 (H) 1.70 - 7.00 10*3/mm3    Lymphocytes, Absolute 1.22 0.70 - 3.10 10*3/mm3    Monocytes, Absolute 1.11 (H) 0.10 - 0.90 10*3/mm3    Eosinophils, Absolute 0.01 0.00 - 0.40 10*3/mm3    Basophils, Absolute 0.03 0.00 - 0.20 10*3/mm3    Immature Grans, Absolute 0.17 (H) 0.00 - 0.05 10*3/mm3    nRBC 0.0 0.0 - 0.2 /100 WBC       If labs were ordered, I independently reviewed the results and considered them in treating the patient.        RADIOLOGY  XR Hand 3+ View Right    Result Date: 7/18/2024  PROCEDURE: XR HAND 3+ VW RIGHT-  4 VIEW  HISTORY:swelling, pain, redness  FINDINGS:  Four views show no evidence of an  acute, displaced fracture or dislocation of the visualized bony architecture. 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 Generalized osteopenia is present.      Arthritic changes.  No acute bony abnormality.     This report was signed and finalized on 7/18/2024 8:32 PM by Chidi Costello MD.      CT Thoracic Spine Without Contrast    Result Date: 7/18/2024  FINAL REPORT TECHNIQUE: Axial images through the thoracic spine were performed. Reconstruction images were performed. This study was performed with techniques to keep radiation doses as low as reasonably achievable, (ALARA). Individualized dose reduction techniques using automated exposure control or adjustment of mA and/or kV according to the patient's size were employed. CLINICAL HISTORY: trauma, fall, back pain FINDINGS: There is a moderate right and small left pleural effusion. There is age indeterminate mild wedge compression of an upper thoracic vertebral body and moderate age-indeterminate wedge compression of a mid thoracic vertebral body.  There is no retropulsion.  These are likely chronic.     Diffuse degenerative disc disease.  Age-indeterminate wedge compression fractures are mild and moderate and likely chronic. Authenticated and Electronically Signed by Gerardo Chappell MD on 07/18/2024 08:00:03 PM    CT Lumbar Spine Without Contrast    Result Date: 7/18/2024  FINAL REPORT TECHNIQUE: Axial images were performed through the lumbar spine by computed tomography.  Sagittal reconstruction images were also performed. This study was performed with techniques to keep radiation doses as low as reasonably achievable, (ALARA). Individualized dose reduction techniques using automated exposure control or adjustment of mA and/or kV according to the patient''s size were employed. CLINICAL HISTORY: trauma, fall, back pain FINDINGS: There is a small left and moderate right pleural effusion.  The uterus is midline.  There  is diverticular change of the sigmoid colon.  The aorta is calcified but normal in caliber.  There is protrusio deformity of the right hip.  There is severe diffuse degenerative disc disease.  Edema and anasarca is present over the left buttock.  Endplate irregularity is present at L3-4 and L4-5.  There is facet hypertrophy and a disc bulge throughout the lower lumbar spine with resultant central canal and bilateral neuroforaminal stenosis.  There is scoliosis.  There is an age-indeterminate fracture of the upper endplate of L5, likely chronic.     Severe degenerative disc disease.  Age-indeterminate L5 upper endplate compression fracture is likely chronic. Authenticated and Electronically Signed by Gerardo Chappell MD on 07/18/2024 07:57:11 PM         PROCEDURES    Procedures    Interpretations    O2 Sat: The patients oxygen saturation was 95% on Room Air.  This was independently interpreted by me as Normal      Cardiac Monitoring: I reviewed and independently interpreted the Rhythm Strip as Normal Sinus rhythm rate of 70    Radiology: I ordered and independently reviewed the above noted radiographic studies.  I viewed images of Xray of the right hand  which showed  severe arthritic changes  per my independent interpretation. See radiologist's dictation for official interpretation.       Radiology: I ordered and independently reviewed the above noted radiographic studies.  I viewed images of CT T and L-spine which showed degenerative changes, old compression fractures per my independent interpretation. See radiologist's dictation for official interpretation      MEDICATIONS GIVEN IN ER    Medications   ceFAZolin 1000 mg IVPB in 100 mL NS (VTB) (1,000 mg Intravenous New Bag 7/18/24 2053)   bacitracin ointment 1 Application (has no administration in time range)   dexAMETHasone sodium phosphate injection 10 mg (10 mg Intravenous Given 7/18/24 2053)         MEDICAL DECISION MAKING, PROGRESS, and CONSULTS    All labs,  if obtained, have been independently reviewed by me.  All radiology studies, if obtained, have been reviewed by me and the radiologist dictating the report.  All EKG's, if obtained, have been independently viewed and interpreted by me      Discussion below represents my analysis of pertinent findings related to patient's condition, differential diagnosis, treatment plan and final disposition.      Differential diagnosis:    89-year-old female presenting to the ED with complaint of right hand pain, her hand is significant fusiform swelling, no signs of tenosynovitis, she is still has good range of motion.  I think this is likely gouty swelling the patient does have a history of gout as well as chronic arthritis.  She may also have fractures as she did fall and hit her hand.  Will obtain x-ray of the right hand.  Additionally she has skin tears and pain in her lower back.  Will obtain CT of the T and L-spine to rule out acute fracture there.  No head injury, fall was several days ago and patient is not anticoagulated we will forego head CT at this present time    Additional Sources:  External (non-ED) record review:  Vascular surgery note from 7/12/2024 regarding her first and second toe amputation       Orders placed during this visit:  Orders Placed This Encounter   Procedures    CT Thoracic Spine Without Contrast    CT Lumbar Spine Without Contrast    XR Hand 3+ View Right    Comprehensive Metabolic Panel    Sedimentation Rate    C-reactive Protein    Uric Acid    Procalcitonin    CBC Auto Differential    CBC & Differential         Additional orders considered but not ordered:  None    ED Course:    Consultants:  None    ED Course as of 07/18/24 2100   Thu Jul 18, 2024 2044 Patient has mildly elevated white blood cell count, although was elevated previously, all of her blood counts seem to be elevated, could be hemoconcentration, her uric acid is elevated higher than prior draws, I think this is likely a gouty  flare, CRP is elevated but sed rate is normal.  Will treat with low-dose NSAIDs, 1 dose of steroids here, will also give antibiotics with concern for cellulitis.  The patient spinal imaging shows chronic compression fractures.  I discussed with the patient and her son who are agreeable to plan for discharge home [CS]   2059 Confirmed the patient she does have home health nurse, who will check on her, and can watch her hand for improvement, can also redress the wounds on her back. [CS]      ED Course User Index  [CS] Deep Ferris MD           After my consideration of clinical presentation and any laboratory/radiology studies obtained, I discussed the findings with the patient/patient representative who is in agreement with the treatment plan and the final disposition. Risks and benefits of discharge were discussed.     AS OF 21:00 EDT VITALS:    BP - 145/50  HR - 70  TEMP - 98.5 °F (36.9 °C)  O2 SATS - 95%    I reviewed the patients prescription monitoring report if available prior to discharge    DIAGNOSIS  Final diagnoses:   Acute gout of right hand, unspecified cause   Cellulitis of right upper extremity         DISPOSITION  ED Disposition       ED Disposition   Discharge    Condition   Stable    Comment   --                   Please note that portions of this document were completed with voice recognition software.        Deep Ferris MD  07/18/24 [id number]

## 2024-07-20 ENCOUNTER — HOME CARE VISIT (OUTPATIENT)
Dept: HOME HEALTH SERVICES | Facility: HOME HEALTHCARE | Age: 89
End: 2024-07-20
Payer: COMMERCIAL

## 2024-07-20 PROCEDURE — G0493 RN CARE EA 15 MIN HH/HOSPICE: HCPCS

## 2024-07-21 ENCOUNTER — HOME CARE VISIT (OUTPATIENT)
Dept: HOME HEALTH SERVICES | Facility: HOME HEALTHCARE | Age: 89
End: 2024-07-21
Payer: COMMERCIAL

## 2024-07-21 VITALS
SYSTOLIC BLOOD PRESSURE: 122 MMHG | RESPIRATION RATE: 16 BRPM | DIASTOLIC BLOOD PRESSURE: 66 MMHG | TEMPERATURE: 98.1 F | HEART RATE: 71 BPM | OXYGEN SATURATION: 97 %

## 2024-07-22 ENCOUNTER — HOME CARE VISIT (OUTPATIENT)
Dept: HOME HEALTH SERVICES | Facility: HOME HEALTHCARE | Age: 89
End: 2024-07-22
Payer: MEDICARE

## 2024-07-22 ENCOUNTER — APPOINTMENT (OUTPATIENT)
Dept: GENERAL RADIOLOGY | Facility: HOSPITAL | Age: 89
End: 2024-07-22
Payer: MEDICARE

## 2024-07-22 ENCOUNTER — APPOINTMENT (OUTPATIENT)
Dept: ULTRASOUND IMAGING | Facility: HOSPITAL | Age: 89
End: 2024-07-22
Payer: MEDICARE

## 2024-07-22 ENCOUNTER — HOSPITAL ENCOUNTER (INPATIENT)
Facility: HOSPITAL | Age: 89
LOS: 4 days | Discharge: HOSPICE | End: 2024-07-31
Attending: STUDENT IN AN ORGANIZED HEALTH CARE EDUCATION/TRAINING PROGRAM | Admitting: INTERNAL MEDICINE
Payer: MEDICARE

## 2024-07-22 ENCOUNTER — HOME CARE VISIT (OUTPATIENT)
Dept: HOME HEALTH SERVICES | Facility: HOME HEALTHCARE | Age: 89
End: 2024-07-22
Payer: COMMERCIAL

## 2024-07-22 VITALS
HEART RATE: 73 BPM | TEMPERATURE: 97.7 F | SYSTOLIC BLOOD PRESSURE: 101 MMHG | DIASTOLIC BLOOD PRESSURE: 57 MMHG | RESPIRATION RATE: 12 BRPM | OXYGEN SATURATION: 96 %

## 2024-07-22 DIAGNOSIS — D50.9 IRON DEFICIENCY ANEMIA, UNSPECIFIED IRON DEFICIENCY ANEMIA TYPE: ICD-10-CM

## 2024-07-22 DIAGNOSIS — K62.5 RECTAL BLEEDING: ICD-10-CM

## 2024-07-22 DIAGNOSIS — I82.611 SUPERFICIAL VENOUS THROMBOSIS OF ARM, RIGHT: ICD-10-CM

## 2024-07-22 DIAGNOSIS — L03.113 CELLULITIS OF RIGHT UPPER EXTREMITY: Primary | ICD-10-CM

## 2024-07-22 DIAGNOSIS — L03.113 CELLULITIS OF RIGHT ARM: ICD-10-CM

## 2024-07-22 LAB
ALBUMIN SERPL-MCNC: 3.2 G/DL (ref 3.5–5.2)
ALBUMIN/GLOB SERPL: 1.1 G/DL
ALP SERPL-CCNC: 104 U/L (ref 39–117)
ALT SERPL W P-5'-P-CCNC: 7 U/L (ref 1–33)
ANION GAP SERPL CALCULATED.3IONS-SCNC: 11.6 MMOL/L (ref 5–15)
AST SERPL-CCNC: 28 U/L (ref 1–32)
BASOPHILS # BLD AUTO: 0.01 10*3/MM3 (ref 0–0.2)
BASOPHILS NFR BLD AUTO: 0.1 % (ref 0–1.5)
BILIRUB SERPL-MCNC: 0.4 MG/DL (ref 0–1.2)
BUN SERPL-MCNC: 31 MG/DL (ref 8–23)
BUN/CREAT SERPL: 22.3 (ref 7–25)
CALCIUM SPEC-SCNC: 8.7 MG/DL (ref 8.6–10.5)
CHLORIDE SERPL-SCNC: 99 MMOL/L (ref 98–107)
CO2 SERPL-SCNC: 25.4 MMOL/L (ref 22–29)
CREAT SERPL-MCNC: 1.39 MG/DL (ref 0.57–1)
CRP SERPL-MCNC: 4.75 MG/DL (ref 0–0.5)
D-LACTATE SERPL-SCNC: 1.7 MMOL/L (ref 0.5–2)
DEPRECATED RDW RBC AUTO: 63.2 FL (ref 37–54)
EGFRCR SERPLBLD CKD-EPI 2021: 36.3 ML/MIN/1.73
EOSINOPHIL # BLD AUTO: 0.04 10*3/MM3 (ref 0–0.4)
EOSINOPHIL NFR BLD AUTO: 0.3 % (ref 0.3–6.2)
ERYTHROCYTE [DISTWIDTH] IN BLOOD BY AUTOMATED COUNT: 19.2 % (ref 12.3–15.4)
ERYTHROCYTE [SEDIMENTATION RATE] IN BLOOD: 9 MM/HR (ref 0–30)
GEN 5 2HR TROPONIN T REFLEX: 78 NG/L
GLOBULIN UR ELPH-MCNC: 2.9 GM/DL
GLUCOSE SERPL-MCNC: 65 MG/DL (ref 65–99)
HCT VFR BLD AUTO: 29.5 % (ref 34–46.6)
HGB BLD-MCNC: 8.9 G/DL (ref 12–15.9)
HOLD SPECIMEN: NORMAL
HOLD SPECIMEN: NORMAL
IMM GRANULOCYTES # BLD AUTO: 0.1 10*3/MM3 (ref 0–0.05)
IMM GRANULOCYTES NFR BLD AUTO: 0.8 % (ref 0–0.5)
LYMPHOCYTES # BLD AUTO: 1.13 10*3/MM3 (ref 0.7–3.1)
LYMPHOCYTES NFR BLD AUTO: 8.6 % (ref 19.6–45.3)
MCH RBC QN AUTO: 27.9 PG (ref 26.6–33)
MCHC RBC AUTO-ENTMCNC: 30.2 G/DL (ref 31.5–35.7)
MCV RBC AUTO: 92.5 FL (ref 79–97)
MONOCYTES # BLD AUTO: 0.51 10*3/MM3 (ref 0.1–0.9)
MONOCYTES NFR BLD AUTO: 3.9 % (ref 5–12)
NEUTROPHILS NFR BLD AUTO: 11.4 10*3/MM3 (ref 1.7–7)
NEUTROPHILS NFR BLD AUTO: 86.3 % (ref 42.7–76)
NRBC BLD AUTO-RTO: 0 /100 WBC (ref 0–0.2)
NT-PROBNP SERPL-MCNC: ABNORMAL PG/ML (ref 0–1800)
PLATELET # BLD AUTO: 253 10*3/MM3 (ref 140–450)
PMV BLD AUTO: 9.2 FL (ref 6–12)
POTASSIUM SERPL-SCNC: 4.5 MMOL/L (ref 3.5–5.2)
PROCALCITONIN SERPL-MCNC: 0.35 NG/ML (ref 0–0.25)
PROT SERPL-MCNC: 6.1 G/DL (ref 6–8.5)
RBC # BLD AUTO: 3.19 10*6/MM3 (ref 3.77–5.28)
SODIUM SERPL-SCNC: 136 MMOL/L (ref 136–145)
TROPONIN T DELTA: -13 NG/L
TROPONIN T SERPL HS-MCNC: 91 NG/L
WBC NRBC COR # BLD AUTO: 13.19 10*3/MM3 (ref 3.4–10.8)
WHOLE BLOOD HOLD COAG: NORMAL
WHOLE BLOOD HOLD SPECIMEN: NORMAL

## 2024-07-22 PROCEDURE — 93971 EXTREMITY STUDY: CPT

## 2024-07-22 PROCEDURE — 86140 C-REACTIVE PROTEIN: CPT | Performed by: PHYSICIAN ASSISTANT

## 2024-07-22 PROCEDURE — 25010000002 CEFEPIME PER 500 MG: Performed by: STUDENT IN AN ORGANIZED HEALTH CARE EDUCATION/TRAINING PROGRAM

## 2024-07-22 PROCEDURE — G0378 HOSPITAL OBSERVATION PER HR: HCPCS

## 2024-07-22 PROCEDURE — 87641 MR-STAPH DNA AMP PROBE: CPT | Performed by: STUDENT IN AN ORGANIZED HEALTH CARE EDUCATION/TRAINING PROGRAM

## 2024-07-22 PROCEDURE — 84145 PROCALCITONIN (PCT): CPT | Performed by: STUDENT IN AN ORGANIZED HEALTH CARE EDUCATION/TRAINING PROGRAM

## 2024-07-22 PROCEDURE — G0299 HHS/HOSPICE OF RN EA 15 MIN: HCPCS

## 2024-07-22 PROCEDURE — 87040 BLOOD CULTURE FOR BACTERIA: CPT | Performed by: STUDENT IN AN ORGANIZED HEALTH CARE EDUCATION/TRAINING PROGRAM

## 2024-07-22 PROCEDURE — 25810000003 SODIUM CHLORIDE 0.9 % SOLUTION 250 ML FLEX CONT: Performed by: STUDENT IN AN ORGANIZED HEALTH CARE EDUCATION/TRAINING PROGRAM

## 2024-07-22 PROCEDURE — 99285 EMERGENCY DEPT VISIT HI MDM: CPT

## 2024-07-22 PROCEDURE — 84484 ASSAY OF TROPONIN QUANT: CPT | Performed by: PHYSICIAN ASSISTANT

## 2024-07-22 PROCEDURE — 99223 1ST HOSP IP/OBS HIGH 75: CPT | Performed by: INTERNAL MEDICINE

## 2024-07-22 PROCEDURE — 80053 COMPREHEN METABOLIC PANEL: CPT | Performed by: STUDENT IN AN ORGANIZED HEALTH CARE EDUCATION/TRAINING PROGRAM

## 2024-07-22 PROCEDURE — 93005 ELECTROCARDIOGRAM TRACING: CPT | Performed by: STUDENT IN AN ORGANIZED HEALTH CARE EDUCATION/TRAINING PROGRAM

## 2024-07-22 PROCEDURE — 85652 RBC SED RATE AUTOMATED: CPT | Performed by: PHYSICIAN ASSISTANT

## 2024-07-22 PROCEDURE — 73130 X-RAY EXAM OF HAND: CPT

## 2024-07-22 PROCEDURE — 25010000002 VANCOMYCIN 1 G RECONSTITUTED SOLUTION 1 EACH VIAL: Performed by: STUDENT IN AN ORGANIZED HEALTH CARE EDUCATION/TRAINING PROGRAM

## 2024-07-22 PROCEDURE — 83880 ASSAY OF NATRIURETIC PEPTIDE: CPT | Performed by: PHYSICIAN ASSISTANT

## 2024-07-22 PROCEDURE — 85025 COMPLETE CBC W/AUTO DIFF WBC: CPT | Performed by: STUDENT IN AN ORGANIZED HEALTH CARE EDUCATION/TRAINING PROGRAM

## 2024-07-22 PROCEDURE — 71045 X-RAY EXAM CHEST 1 VIEW: CPT

## 2024-07-22 PROCEDURE — 83605 ASSAY OF LACTIC ACID: CPT | Performed by: STUDENT IN AN ORGANIZED HEALTH CARE EDUCATION/TRAINING PROGRAM

## 2024-07-22 RX ORDER — L.ACID,PARA/B.BIFIDUM/S.THERM 8B CELL
1 CAPSULE ORAL 2 TIMES DAILY
Status: DISCONTINUED | OUTPATIENT
Start: 2024-07-22 | End: 2024-07-31 | Stop reason: HOSPADM

## 2024-07-22 RX ORDER — POLYETHYLENE GLYCOL 3350 17 G/17G
17 POWDER, FOR SOLUTION ORAL DAILY PRN
Status: DISCONTINUED | OUTPATIENT
Start: 2024-07-22 | End: 2024-07-31 | Stop reason: HOSPADM

## 2024-07-22 RX ORDER — ONDANSETRON 2 MG/ML
4 INJECTION INTRAMUSCULAR; INTRAVENOUS EVERY 6 HOURS PRN
Status: DISCONTINUED | OUTPATIENT
Start: 2024-07-22 | End: 2024-07-31 | Stop reason: HOSPADM

## 2024-07-22 RX ORDER — HYDROCODONE BITARTRATE AND ACETAMINOPHEN 5; 325 MG/1; MG/1
1 TABLET ORAL EVERY 8 HOURS PRN
Status: ON HOLD | COMMUNITY
End: 2024-07-31

## 2024-07-22 RX ORDER — SODIUM CHLORIDE 0.9 % (FLUSH) 0.9 %
10 SYRINGE (ML) INJECTION EVERY 12 HOURS SCHEDULED
Status: DISCONTINUED | OUTPATIENT
Start: 2024-07-22 | End: 2024-07-29

## 2024-07-22 RX ORDER — ACETAMINOPHEN 650 MG/1
650 SUPPOSITORY RECTAL EVERY 4 HOURS PRN
Status: DISCONTINUED | OUTPATIENT
Start: 2024-07-22 | End: 2024-07-31 | Stop reason: HOSPADM

## 2024-07-22 RX ORDER — BISACODYL 5 MG/1
5 TABLET, DELAYED RELEASE ORAL DAILY PRN
Status: DISCONTINUED | OUTPATIENT
Start: 2024-07-22 | End: 2024-07-31 | Stop reason: HOSPADM

## 2024-07-22 RX ORDER — SODIUM CHLORIDE 0.9 % (FLUSH) 0.9 %
10 SYRINGE (ML) INJECTION AS NEEDED
Status: DISCONTINUED | OUTPATIENT
Start: 2024-07-22 | End: 2024-07-30

## 2024-07-22 RX ORDER — SODIUM CHLORIDE 9 MG/ML
40 INJECTION, SOLUTION INTRAVENOUS AS NEEDED
Status: DISCONTINUED | OUTPATIENT
Start: 2024-07-22 | End: 2024-07-30

## 2024-07-22 RX ORDER — ACETAMINOPHEN 325 MG/1
650 TABLET ORAL EVERY 4 HOURS PRN
Status: DISCONTINUED | OUTPATIENT
Start: 2024-07-22 | End: 2024-07-31 | Stop reason: HOSPADM

## 2024-07-22 RX ORDER — ENOXAPARIN SODIUM 100 MG/ML
30 INJECTION SUBCUTANEOUS EVERY 24 HOURS
Status: DISCONTINUED | OUTPATIENT
Start: 2024-07-23 | End: 2024-07-23

## 2024-07-22 RX ORDER — ACETAMINOPHEN 160 MG/5ML
650 SOLUTION ORAL EVERY 4 HOURS PRN
Status: DISCONTINUED | OUTPATIENT
Start: 2024-07-22 | End: 2024-07-31 | Stop reason: HOSPADM

## 2024-07-22 RX ORDER — BISACODYL 10 MG
10 SUPPOSITORY, RECTAL RECTAL DAILY PRN
Status: DISCONTINUED | OUTPATIENT
Start: 2024-07-22 | End: 2024-07-31 | Stop reason: HOSPADM

## 2024-07-22 RX ORDER — AMOXICILLIN 250 MG
2 CAPSULE ORAL 2 TIMES DAILY
Status: DISCONTINUED | OUTPATIENT
Start: 2024-07-22 | End: 2024-07-31 | Stop reason: HOSPADM

## 2024-07-22 RX ADMIN — Medication 1 CAPSULE: at 23:52

## 2024-07-22 RX ADMIN — VANCOMYCIN HYDROCHLORIDE 1000 MG: 1 INJECTION, POWDER, LYOPHILIZED, FOR SOLUTION INTRAVENOUS at 19:34

## 2024-07-22 RX ADMIN — CEFEPIME 2000 MG: 2 INJECTION, POWDER, FOR SOLUTION INTRAVENOUS at 18:51

## 2024-07-22 NOTE — ED NOTES
pt has generalized edema +2. pt has wound to the posterior chest. pt has multiple toe amputations. pt has area of blanchable and nonblanchable skin on and above her coccyx. pt right arm radiating down into her hand is swollen and red. pt has multiple bruises generalized

## 2024-07-22 NOTE — PROGRESS NOTES
"Pharmacy Consult-Vancomycin Dosing  Lynne Sanchez is a  89 y.o. female receiving vancomycin therapy.     Indication: [facility]  Consulting Provider: Dr. Stark    Goal Trough:    Current Antimicrobial Therapy  Anti-Infectives (From admission, onward)      Ordered     Dose/Rate Route Frequency Start Stop    07/22/24 1831  vancomycin (VANCOCIN) 1,000 mg in sodium chloride 0.9 % 250 mL IVPB-VTB        Ordering Provider: Craig Stark DO   Placed in \"And\" Linked Group    1,000 mg  250 mL/hr over 60 Minutes Intravenous Once 07/22/24 1847 07/22/24 1831  cefepime 2000 mg IVPB in 100 mL NS (VTB)        Ordering Provider: Craig Stark DO    2,000 mg  over 30 Minutes Intravenous Once 07/22/24 1847 07/22/24 1831  Pharmacy to dose vancomycin        Ordering Provider: Craig Stark DO   Placed in \"And\" Linked Group     Does not apply Continuous PRN 07/22/24 1830 07/27/24 1829            Allergies  Allergies as of 07/22/2024 - Reviewed 07/22/2024   Allergen Reaction Noted    Phenergan [promethazine hcl] Confusion 10/09/2017    Zosyn [piperacillin-tazobactam in dex] Rash 06/24/2024       Labs    Results from last 7 days   Lab Units 07/22/24  1607 07/18/24  1902   BUN mg/dL 31* 20   CREATININE mg/dL 1.39* 1.44*       Results from last 7 days   Lab Units 07/22/24  1607 07/18/24  1902   WBC 10*3/mm3 13.19* 14.36*       Evaluation of Dosing     Last Dose Received in the ED/Outside Facility: Vancomycin 1000mg  Is Patient on Dialysis or Renal Replacement: No    Ht - 167.6 cm (66\")  Wt - 53.1 kg (117 lb)    Estimated Creatinine Clearance: 23 mL/min (A) (by C-G formula based on SCr of 1.39 mg/dL (H)).    Intake & Output (last 3 days)       None            Microbiology and Radiology  Microbiology Results (last 10 days)       ** No results found for the last 240 hours. **            Vancomycin Levels:                      Assessment/Plan    Pharmacy to dose vancomycin for [facility]. Goal trough 15-20 mcg/mL.  Patient currently " receiving vancomycin dosed per level. Will order vancomycin 1000 mg for one dose.  Assess clearance by vancomycin random level on 7/23 @ 0600.  Pharmacy will continue to monitor renal function, cultures and sensitivities, and clinical status to adjust regimen as necessary.    Thank you,  Lynne Friedman, PharmD  07/22/24 18:32 EDT

## 2024-07-22 NOTE — ED PROVIDER NOTES
Subjective:  Chief Complaint:  Arm pain    History of Present Illness:  Patient is an 89-year-old female with history of CHF, diabetes, DVT, GERD, hyperlipidemia, hypertension, stroke, among others presenting to the ER with complaints of right hand pain and swelling.  Patient was seen here 4 days ago and thought to have gout.  Had elevated uric acid at that time.  Also treated for cellulitis with Keflex.  Was given NSAIDs and a dose of steroids in the ER for treatment of gout.  She does have swelling to multiple interphalangeal joints and what appears to be tophi.  She has tenderness to her hand and erythema going up the right arm.  She denies any improvement in symptoms since discharge 4 days ago from the ER.  Family also reports significant weakness to the point where they state patient is now having difficulty ambulating.  Denies any known fevers and additional symptoms or complaints at this time.      Nurses Notes reviewed and agree, including vitals, allergies, social history and prior medical history.     REVIEW OF SYSTEMS: All systems reviewed and not pertinent unless noted.  Review of Systems   Musculoskeletal:         Erythema and swelling to right upper extremity    All other systems reviewed and are negative.      Past Medical History:   Diagnosis Date    Acute deep vein thrombosis of left upper extremity     Anemia     Asthma     CHF (congestive heart failure)     Chronic atrial fibrillation     Deep venous thrombosis of left upper limb     Diabetes mellitus, type 2     Diarrhea     GERD (gastroesophageal reflux disease)     Glaucoma     H/O transfusion of whole blood     Heart attack     Heart murmur     History of transfusion     Hyperlipidemia     Hypertension     Kidney disease     patient not aware of what exact diagnosis is     Osteomyelitis     Osteomyelitis of left foot 10/03/2017    Peripheral vascular disease     Right retinal detachment     Secondary cataract of both eyes     Stable  proliferative diabetic retinopathy of both eyes     Stroke     Swelling of left extremity     Urinary tract infection     Wears glasses        Allergies:    Phenergan [promethazine hcl] and Zosyn [piperacillin-tazobactam in dex]      Past Surgical History:   Procedure Laterality Date    AMPUTATION DIGIT Left 7/5/2018    Procedure: Left Great toe amputation;  Surgeon: Prakash Carrington MD;  Location:  [facility];  Service: Vascular    AMPUTATION DIGIT Left 8/27/2018    Procedure: SECOND TOE AMPUTATION DIGIT LEFT;  Surgeon: Prakash Carrington MD;  Location:  [facility];  Service: General    APPENDECTOMY      BONE MARROW ASPIRATION      CARDIAC ABLATION      CARDIAC CATHETERIZATION N/A 10/10/2017    Procedure: Peripheral angiography;  Surgeon: Kan Pelaez MD;  Location: [facility] CATH INVASIVE LOCATION;  Service:     CARDIAC CATHETERIZATION N/A 10/10/2017    Procedure: Angioplasty-peripheral;  Surgeon: Kan Pelaez MD;  Location: [facility] CATH INVASIVE LOCATION;  Service:     CARDIAC CATHETERIZATION N/A 10/10/2017    Procedure: Atherectomy-peripheral;  Surgeon: Kan Pelaez MD;  Location: [facility] CATH INVASIVE LOCATION;  Service:     CARDIAC ELECTROPHYSIOLOGY PROCEDURE N/A 5/3/2018    Procedure: generator change;  Surgeon: Zan Poole MD;  Location:  [facility]ATH INVASIVE LOCATION;  Service: Cardiovascular    CARDIOVERSION      COLONOSCOPY      ENDOSCOPY N/A 10/9/2017    Procedure: ESOPHAGOGASTRODUODENOSCOPY WITH COLD FORCEP BIOPSY;  Surgeon: Clifton Hyatt MD;  Location: [facility] ENDOSCOPY;  Service:     ENDOSCOPY N/A 3/22/2022    Procedure: ESOPHAGOGASTRODUODENOSCOPY with AVM cautery;  Surgeon: Clarisse Velez MD;  Location: [facility] ENDOSCOPY;  Service: Gastroenterology;  Laterality: N/A;    ENDOSCOPY N/A 8/4/2023    Procedure: ESOPHAGOGASTRODUODENOSCOPY WITH BIOPSY AND RUTH BRUSHING;  Surgeon: Clarisse Velez MD;  Location: [facility] ENDOSCOPY;  Service: Gastroenterology;  Laterality: N/A;  "   EYE SURGERY Bilateral     CATARACTS    INTERVENTIONAL RADIOLOGY PROCEDURE N/A 10/10/2017    Procedure: Abdominal Aortagram with Runoff;  Surgeon: Kan Pelaez MD;  Location: [facility] CATH INVASIVE LOCATION;  Service:     PACEMAKER IMPLANTATION      around 2008 then replaced 2018         Social History     Socioeconomic History    Marital status:     Number of children: 3   Tobacco Use    Smoking status: Never     Passive exposure: Never    Smokeless tobacco: Never   Vaping Use    Vaping status: Never Used   Substance and Sexual Activity    Alcohol use: No    Drug use: No    Sexual activity: Defer         Family History   Problem Relation Age of Onset    No Known Problems Mother     No Known Problems Father     Arthritis Other        Objective  Physical Exam:  /45   Pulse 70   Temp 97.7 °F (36.5 °C) (Oral)   Resp 18   Ht 167.6 cm (66\")   Wt 53.1 kg (117 lb)   SpO2 98%   BMI 18.88 kg/m²      Physical Exam  Vitals and nursing note reviewed.   Constitutional:       General: She is not in acute distress.     Appearance: She is not toxic-appearing.   HENT:      Head: Normocephalic and atraumatic.      Right Ear: External ear normal.      Left Ear: External ear normal.      Nose: Nose normal.   Eyes:      Extraocular Movements: Extraocular movements intact.      Conjunctiva/sclera: Conjunctivae normal.   Cardiovascular:      Rate and Rhythm: Normal rate.   Pulmonary:      Effort: Pulmonary effort is normal. No respiratory distress.   Abdominal:      General: There is no distension.   Musculoskeletal:      Cervical back: Normal range of motion and neck supple.      Comments: Right upper extremity: 2+ pulses, multiple swollen interphalangeal joints with tophi, erythema extending from hand up the arm with tenderness noted consistent with inflammatory process with overlying cellulitis   Skin:     General: Skin is warm and dry.   Neurological:      General: No focal deficit present.      Mental " Status: She is alert and oriented to person, place, and time.   Psychiatric:         Mood and Affect: Mood normal.         Behavior: Behavior normal.         Procedures    ED Course:    ED Course as of 07/22/24 2037 Mon Jul 22, 2024 1832 I have interviewed and examined the patient FACE-TO-FACE.  I reviewed the mid-level provider(s) note and agree with the clinical impression, plan, and disposition unless otherwise stated in the MDM below.    ATTENDING ATTESTATION  HPI: 89 year old female presents with generalized weakness and pain, redness, swelling to the right upper extremity for the past several days.  Was seen in the ED several days ago after sustaining a ground-level fall.  Right hand was read at that time.  Patient was started on oral antibiotics.  Symptoms persisted which prompted ED reevaluation.  Patient has been sleeping most of the day and extremely fatigued.  HPI per patient, her , and adult children at bedside.    EXAM:   General: Alert.  Appears chronically ill, but nontoxic.  No acute distress.  Head: Normocephalic.  Atraumatic.  Eyes: No scleral icterus.  ENT: Moist mucous membranes.  Cardiovascular: Regular rate and rhythm.  No murmurs.  No rubs.  2+ distal pulses bilaterally.  Respiratory: Equal breath sounds bilaterally.  No rales.  No rhonchi.  No wheezing.  GI: Abdomen is soft.  Nondistended.  Nontender to palpation.  No rebound.  No guarding.  No CVA tenderness.  MSK: Gout tophi right middle finger. No bony tenderness to the right upper extremity. Full range of motion of right wrist and elbow joints.  Neurologic: Oriented x 3.  No focal deficits.  Skin: Streaking erythema from the right hand along the medial aspect of the forearm to the right elbow. + Soft tissue edema. No pallor. No cyanosis. Stage 1 decubitus ulcer thoracic back.       [JS]      ED Course User Index  [JS] Craig Stark DO       Lab Results (last 24 hours)       Procedure Component Value Units Date/Time    CBC &  Differential [[id number]]  (Abnormal) Collected: 07/22/24 1607    Specimen: Blood Updated: 07/22/24 1615    Narrative:      The following orders were created for panel order CBC & Differential.  Procedure                               Abnormality         Status                     ---------                               -----------         ------                     CBC Auto Differential[[id number]]        Abnormal            Final result                 Please view results for these tests on the individual orders.    Comprehensive Metabolic Panel [[id number]]  (Abnormal) Collected: 07/22/24 1607    Specimen: Blood Updated: 07/22/24 1634     Glucose 65 mg/dL      BUN 31 mg/dL      Creatinine 1.39 mg/dL      Sodium 136 mmol/L      Potassium 4.5 mmol/L      Chloride 99 mmol/L      CO2 25.4 mmol/L      Calcium 8.7 mg/dL      Total Protein 6.1 g/dL      Albumin 3.2 g/dL      ALT (SGPT) 7 U/L      AST (SGOT) 28 U/L      Alkaline Phosphatase 104 U/L      Total Bilirubin 0.4 mg/dL      Globulin 2.9 gm/dL      A/G Ratio 1.1 g/dL      BUN/Creatinine Ratio 22.3     Anion Gap 11.6 mmol/L      eGFR 36.3 mL/min/1.73     Narrative:      GFR Normal >60  Chronic Kidney Disease <60  Kidney Failure <15    The GFR formula is only valid for adults with stable renal function between ages 18 and 70.    CBC Auto Differential [[id number]]  (Abnormal) Collected: 07/22/24 1607    Specimen: Blood Updated: 07/22/24 1615     WBC 13.19 10*3/mm3      RBC 3.19 10*6/mm3      Hemoglobin 8.9 g/dL      Hematocrit 29.5 %      MCV 92.5 fL      MCH 27.9 pg      MCHC 30.2 g/dL      RDW 19.2 %      RDW-SD 63.2 fl      MPV 9.2 fL      Platelets 253 10*3/mm3      Neutrophil % 86.3 %      Lymphocyte % 8.6 %      Monocyte % 3.9 %      Eosinophil % 0.3 %      Basophil % 0.1 %      Immature Grans % 0.8 %      Neutrophils, Absolute 11.40 10*3/mm3      Lymphocytes, Absolute 1.13 10*3/mm3      Monocytes, Absolute 0.51 10*3/mm3      Eosinophils, Absolute 0.04 10*3/mm3       Basophils, Absolute 0.01 10*3/mm3      Immature Grans, Absolute 0.10 10*3/mm3      nRBC 0.0 /100 WBC     C-reactive Protein [[id number]]  (Abnormal) Collected: 07/22/24 1607    Specimen: Blood Updated: 07/22/24 1642     C-Reactive Protein 4.75 mg/dL     Single High Sensitivity Troponin T [[id number]]  (Abnormal) Collected: 07/22/24 1607    Specimen: Blood Updated: 07/22/24 1658     HS Troponin T 91 ng/L     Narrative:      High Sensitive Troponin T Reference Range:  <14.0 ng/L- Negative Female for AMI  <22.0 ng/L- Negative Male for AMI  >=14 - Abnormal Female indicating possible myocardial injury.  >=22 - Abnormal Male indicating possible myocardial injury.   Clinicians would have to utilize clinical acumen, EKG, Troponin, and serial changes to determine if it is an Acute Myocardial Infarction or myocardial injury due to an underlying chronic condition.         BNP [[id number]]  (Abnormal) Collected: 07/22/24 1607    Specimen: Blood Updated: 07/22/24 1648     proBNP 16,085.0 pg/mL     Narrative:      This assay is used as an aid in the diagnosis of individuals suspected of having heart failure. It can be used as an aid in the diagnosis of acute decompensated heart failure (ADHF) in patients presenting with signs and symptoms of ADHF to the emergency department (ED). In addition, NT-proBNP of <300 pg/mL indicates ADHF is not likely.    Age Range Result Interpretation  NT-proBNP Concentration (pg/mL:      <50             Positive            >450                   Gray                 300-450                    Negative             <300    50-75           Positive            >900                  Gray                300-900                  Negative            <300      >75             Positive            >1800                  Gray                300-1800                  Negative            <300    Procalcitonin [[id number]]  (Abnormal) Collected: 07/22/24 1607    Specimen: Blood Updated: 07/22/24 1862      "Procalcitonin 0.35 ng/mL     Narrative:      As a Marker for Sepsis (Non-Neonates):    1. <0.5 ng/mL represents a low risk of severe sepsis and/or septic shock.  2. >2 ng/mL represents a high risk of severe sepsis and/or septic shock.    As a Marker for Lower Respiratory Tract Infections that require antibiotic therapy:    PCT on Admission    Antibiotic Therapy       6-12 Hrs later    >0.5                Strongly Recommended  >0.25 - <0.5        Recommended   0.1 - 0.25          Discouraged              Remeasure/reassess PCT  <0.1                Strongly Discouraged     Remeasure/reassess PCT    As 28 day mortality risk marker: \"Change in Procalcitonin Result\" (>80% or <=80%) if Day 0 (or Day 1) and Day 4 values are available. Refer to http://www.[email or web address]pct-calculator.[email or web address]    Change in PCT <=80%  A decrease of PCT levels below or equal to 80% defines a positive change in PCT test result representing a higher risk for 28-day all-cause mortality of patients diagnosed with severe sepsis for septic shock.    Change in PCT >80%  A decrease of PCT levels of more than 80% defines a negative change in PCT result representing a lower risk for 28-day all-cause mortality of patients diagnosed with severe sepsis or septic shock.       Sedimentation Rate [[id number]]  (Normal) Collected: 07/22/24 1621    Specimen: Blood Updated: 07/22/24 1629     Sed Rate 9 mm/hr     Lactic Acid, Plasma [[id number]]  (Normal) Collected: 07/22/24 1621    Specimen: Blood Updated: 07/22/24 1903     Lactate 1.7 mmol/L     High Sensitivity Troponin T 2Hr [[id number]]  (Abnormal) Collected: 07/22/24 1817    Specimen: Blood Updated: 07/22/24 1852     HS Troponin T 78 ng/L      Troponin T Delta -13 ng/L     Narrative:      High Sensitive Troponin T Reference Range:  <14.0 ng/L- Negative Female for AMI  <22.0 ng/L- Negative Male for AMI  >=14 - Abnormal Female indicating possible myocardial injury.  >=22 - Abnormal Male indicating possible myocardial " injury.   Clinicians would have to utilize clinical acumen, EKG, Troponin, and serial changes to determine if it is an Acute Myocardial Infarction or myocardial injury due to an underlying chronic condition.         Blood Culture With JUANCHO - Blood, Hand, Left [[id number]] Collected: 07/22/24 1850    Specimen: Blood from Hand, Left Updated: 07/22/24 1855    Blood Culture With JUANCHO - Blood, Hand, Right [[id number]] Collected: 07/22/24 1850    Specimen: Blood from Hand, Right Updated: 07/22/24 1855    MRSA Screen, PCR (Inpatient) - Swab, Nares [[id number]] Collected: 07/22/24 2023    Specimen: Swab from Nares Updated: 07/22/24 2027             US Venous Doppler Upper Extremity Right (duplex)    Result Date: 7/22/2024  FINAL REPORT TECHNIQUE: Multiple transverse and longitudinal images were performed of the deep venous system with compression maneuvers. CLINICAL HISTORY: RUE pain, redness, swelling FINDINGS: Right upper extremity duplex ultrasound demonstrates normal flow and compressibility in the deep venous system.  There is lack of compressibility within the cephalic vein consistent with superficial venous thrombus.     Impression: SVT involving a portion of the cephalic vein.  No evidence of DVT. Authenticated and Electronically Signed by Moisés Zamora M.D. on 07/22/2024 08:16:00 PM        MDM     Amount and/or Complexity of Data Reviewed  Clinical lab tests: reviewed  Tests in the medicine section of CPT®: reviewed  Decide to obtain previous medical records or to obtain history from someone other than the patient: yes    Patient evaluated in the ER for erythema and tenderness to right upper extremity extending from the hand up the arm as well as generalized weakness over the last few days.  Was seen 4 days ago and started on Keflex, NSAIDs, given a dose of steroids.  Patient denies any improvement.  She is hemodynamically stable, afebrile, nontoxic-appearing on exam.  Differential diagnosis includes was not limited to  cellulitis, gout, erosive arthritis, DVT, among others.  Initial plan includes CBC, CMP, CRP, ESR, BMP, troponin, lactate, x-ray of right hand and chest x-ray.  Spoke with ED attending, Dr. Stark, regarding the patient.    Dr. Stark evaluated the patient and ordered an ultrasound as well as IV antibiotics and blood cultures.  Plan to admit the patient after ultrasound is back.    Ultrasound showed SVT but no DVT.  Discussed with Dr. Mccallum, hospitalist, who has agreed to accept the patient for admission for further evaluation and management.    Final diagnoses:   Cellulitis of right upper extremity   Superficial venous thrombosis of arm, right          Lynette Victoria, AMANDA  07/22/24 2037

## 2024-07-23 ENCOUNTER — HOME CARE VISIT (OUTPATIENT)
Dept: HOME HEALTH SERVICES | Facility: HOME HEALTHCARE | Age: 89
End: 2024-07-23
Payer: COMMERCIAL

## 2024-07-23 PROBLEM — E44.0 MODERATE MALNUTRITION: Status: ACTIVE | Noted: 2024-07-23

## 2024-07-23 LAB
ANION GAP SERPL CALCULATED.3IONS-SCNC: 13.1 MMOL/L (ref 5–15)
BUN SERPL-MCNC: 28 MG/DL (ref 8–23)
BUN/CREAT SERPL: 23 (ref 7–25)
CALCIUM SPEC-SCNC: 8 MG/DL (ref 8.6–10.5)
CHLORIDE SERPL-SCNC: 105 MMOL/L (ref 98–107)
CO2 SERPL-SCNC: 23.9 MMOL/L (ref 22–29)
CREAT SERPL-MCNC: 1.22 MG/DL (ref 0.57–1)
DEPRECATED RDW RBC AUTO: 64.4 FL (ref 37–54)
EGFRCR SERPLBLD CKD-EPI 2021: 42.5 ML/MIN/1.73
ERYTHROCYTE [DISTWIDTH] IN BLOOD BY AUTOMATED COUNT: 19.6 % (ref 12.3–15.4)
GLUCOSE BLDC GLUCOMTR-MCNC: 113 MG/DL (ref 70–130)
GLUCOSE BLDC GLUCOMTR-MCNC: 123 MG/DL (ref 70–130)
GLUCOSE BLDC GLUCOMTR-MCNC: 145 MG/DL (ref 70–130)
GLUCOSE BLDC GLUCOMTR-MCNC: 185 MG/DL (ref 70–130)
GLUCOSE BLDC GLUCOMTR-MCNC: 293 MG/DL (ref 70–130)
GLUCOSE SERPL-MCNC: 71 MG/DL (ref 65–99)
HBA1C MFR BLD: 5.3 % (ref 4.8–5.6)
HCT VFR BLD AUTO: 26.4 % (ref 34–46.6)
HGB BLD-MCNC: 8.1 G/DL (ref 12–15.9)
MCH RBC QN AUTO: 28.2 PG (ref 26.6–33)
MCHC RBC AUTO-ENTMCNC: 30.7 G/DL (ref 31.5–35.7)
MCV RBC AUTO: 92 FL (ref 79–97)
MRSA DNA SPEC QL NAA+PROBE: NORMAL
PLATELET # BLD AUTO: 221 10*3/MM3 (ref 140–450)
PMV BLD AUTO: 9.8 FL (ref 6–12)
POTASSIUM SERPL-SCNC: 3.8 MMOL/L (ref 3.5–5.2)
RBC # BLD AUTO: 2.87 10*6/MM3 (ref 3.77–5.28)
SODIUM SERPL-SCNC: 142 MMOL/L (ref 136–145)
VANCOMYCIN SERPL-MCNC: 10.1 MCG/ML (ref 5–40)
WBC NRBC COR # BLD AUTO: 11.95 10*3/MM3 (ref 3.4–10.8)

## 2024-07-23 PROCEDURE — 25010000002 ENOXAPARIN PER 10 MG: Performed by: INTERNAL MEDICINE

## 2024-07-23 PROCEDURE — 80202 ASSAY OF VANCOMYCIN: CPT | Performed by: STUDENT IN AN ORGANIZED HEALTH CARE EDUCATION/TRAINING PROGRAM

## 2024-07-23 PROCEDURE — 80048 BASIC METABOLIC PNL TOTAL CA: CPT | Performed by: INTERNAL MEDICINE

## 2024-07-23 PROCEDURE — 25010000002 CEFTRIAXONE PER 250 MG: Performed by: INTERNAL MEDICINE

## 2024-07-23 PROCEDURE — 82948 REAGENT STRIP/BLOOD GLUCOSE: CPT

## 2024-07-23 PROCEDURE — 97166 OT EVAL MOD COMPLEX 45 MIN: CPT

## 2024-07-23 PROCEDURE — 82948 REAGENT STRIP/BLOOD GLUCOSE: CPT | Performed by: INTERNAL MEDICINE

## 2024-07-23 PROCEDURE — 99232 SBSQ HOSP IP/OBS MODERATE 35: CPT | Performed by: INTERNAL MEDICINE

## 2024-07-23 PROCEDURE — 85027 COMPLETE CBC AUTOMATED: CPT | Performed by: INTERNAL MEDICINE

## 2024-07-23 PROCEDURE — 63710000001 INSULIN LISPRO (HUMAN) PER 5 UNITS: Performed by: INTERNAL MEDICINE

## 2024-07-23 PROCEDURE — G0378 HOSPITAL OBSERVATION PER HR: HCPCS

## 2024-07-23 PROCEDURE — 97162 PT EVAL MOD COMPLEX 30 MIN: CPT

## 2024-07-23 PROCEDURE — 83036 HEMOGLOBIN GLYCOSYLATED A1C: CPT | Performed by: INTERNAL MEDICINE

## 2024-07-23 RX ORDER — METOPROLOL TARTRATE 50 MG/1
100 TABLET, FILM COATED ORAL 2 TIMES DAILY
Status: DISCONTINUED | OUTPATIENT
Start: 2024-07-23 | End: 2024-07-28

## 2024-07-23 RX ORDER — ASPIRIN 81 MG/1
81 TABLET ORAL DAILY
Status: DISCONTINUED | OUTPATIENT
Start: 2024-07-23 | End: 2024-07-24

## 2024-07-23 RX ORDER — ARGININE/GLUTAMINE/CALCIUM BMB 7G-7G-1.5G
1 POWDER IN PACKET (EA) ORAL 2 TIMES DAILY
Status: DISCONTINUED | OUTPATIENT
Start: 2024-07-23 | End: 2024-07-31 | Stop reason: HOSPADM

## 2024-07-23 RX ORDER — CLOPIDOGREL BISULFATE 75 MG/1
75 TABLET ORAL DAILY
Status: DISCONTINUED | OUTPATIENT
Start: 2024-07-23 | End: 2024-07-26

## 2024-07-23 RX ORDER — PANTOPRAZOLE SODIUM 40 MG/1
40 TABLET, DELAYED RELEASE ORAL DAILY
Status: DISCONTINUED | OUTPATIENT
Start: 2024-07-23 | End: 2024-07-26

## 2024-07-23 RX ORDER — LEVOTHYROXINE SODIUM 0.05 MG/1
50 TABLET ORAL
Status: DISCONTINUED | OUTPATIENT
Start: 2024-07-23 | End: 2024-07-31 | Stop reason: HOSPADM

## 2024-07-23 RX ORDER — NICOTINE POLACRILEX 4 MG
15 LOZENGE BUCCAL
Status: DISCONTINUED | OUTPATIENT
Start: 2024-07-23 | End: 2024-07-31 | Stop reason: HOSPADM

## 2024-07-23 RX ORDER — DEXTROSE MONOHYDRATE 25 G/50ML
25 INJECTION, SOLUTION INTRAVENOUS
Status: DISCONTINUED | OUTPATIENT
Start: 2024-07-23 | End: 2024-07-31 | Stop reason: HOSPADM

## 2024-07-23 RX ORDER — ATORVASTATIN CALCIUM 40 MG/1
40 TABLET, FILM COATED ORAL DAILY
Status: DISCONTINUED | OUTPATIENT
Start: 2024-07-23 | End: 2024-07-31 | Stop reason: HOSPADM

## 2024-07-23 RX ORDER — HYDROCODONE BITARTRATE AND ACETAMINOPHEN 5; 325 MG/1; MG/1
1 TABLET ORAL EVERY 8 HOURS PRN
Status: DISCONTINUED | OUTPATIENT
Start: 2024-07-23 | End: 2024-07-31 | Stop reason: HOSPADM

## 2024-07-23 RX ORDER — INSULIN LISPRO 100 [IU]/ML
2-7 INJECTION, SOLUTION INTRAVENOUS; SUBCUTANEOUS
Status: DISCONTINUED | OUTPATIENT
Start: 2024-07-23 | End: 2024-07-31 | Stop reason: HOSPADM

## 2024-07-23 RX ADMIN — ATORVASTATIN CALCIUM 40 MG: 40 TABLET, FILM COATED ORAL at 10:14

## 2024-07-23 RX ADMIN — APIXABAN 10 MG: 5 TABLET, FILM COATED ORAL at 21:50

## 2024-07-23 RX ADMIN — Medication 10 ML: at 05:05

## 2024-07-23 RX ADMIN — Medication 10 ML: at 21:37

## 2024-07-23 RX ADMIN — SENNOSIDES AND DOCUSATE SODIUM 2 TABLET: 50; 8.6 TABLET ORAL at 21:37

## 2024-07-23 RX ADMIN — LEVOTHYROXINE SODIUM 50 MCG: 50 TABLET ORAL at 05:05

## 2024-07-23 RX ADMIN — Medication 1 CAPSULE: at 21:37

## 2024-07-23 RX ADMIN — INSULIN LISPRO 2 UNITS: 100 INJECTION, SOLUTION INTRAVENOUS; SUBCUTANEOUS at 17:59

## 2024-07-23 RX ADMIN — Medication 10 ML: at 10:13

## 2024-07-23 RX ADMIN — METOPROLOL TARTRATE 100 MG: 50 TABLET, FILM COATED ORAL at 10:15

## 2024-07-23 RX ADMIN — INSULIN LISPRO 4 UNITS: 100 INJECTION, SOLUTION INTRAVENOUS; SUBCUTANEOUS at 21:37

## 2024-07-23 RX ADMIN — SENNOSIDES AND DOCUSATE SODIUM 2 TABLET: 50; 8.6 TABLET ORAL at 10:14

## 2024-07-23 RX ADMIN — CLOPIDOGREL BISULFATE 75 MG: 75 TABLET ORAL at 10:14

## 2024-07-23 RX ADMIN — Medication 1 PACKET: at 21:37

## 2024-07-23 RX ADMIN — Medication 1 CAPSULE: at 10:14

## 2024-07-23 RX ADMIN — PANTOPRAZOLE SODIUM 40 MG: 40 TABLET, DELAYED RELEASE ORAL at 10:14

## 2024-07-23 RX ADMIN — ENOXAPARIN SODIUM 30 MG: 100 INJECTION SUBCUTANEOUS at 10:13

## 2024-07-23 RX ADMIN — ASPIRIN 81 MG: 81 TABLET, COATED ORAL at 10:14

## 2024-07-23 RX ADMIN — CEFTRIAXONE SODIUM 2000 MG: 2 INJECTION, POWDER, FOR SOLUTION INTRAMUSCULAR; INTRAVENOUS at 10:13

## 2024-07-23 RX ADMIN — Medication 1 PACKET: at 10:39

## 2024-07-23 RX ADMIN — APIXABAN 10 MG: 5 TABLET, FILM COATED ORAL at 12:08

## 2024-07-23 NOTE — PLAN OF CARE
Goal Outcome Evaluation:  Plan of Care Reviewed With: patient     Patient is resting comfortably at this time, no acute distress noted, no c/o voiced. Will continue to monitor

## 2024-07-23 NOTE — PHARMACY RECOMMENDATION
Pharmacy Antimicrobial Stewardship Recommendation:     Lynne Sanchez is a 89 y.o. female is receiving vancomycin for the treatment of SSTI.    Microbiology Culture results  Microbiology Results (last 10 days)       Procedure Component Value - Date/Time    MRSA Screen, PCR (Inpatient) - Swab, Nares [[id number]]  (Normal) Collected: 07/22/24 2023    Lab Status: Final result Specimen: Swab from Nares Updated: 07/23/24 0315     MRSA PCR No MRSA Detected    Narrative:      The negative predictive value of this diagnostic test is high and should only be used to consider de-escalating anti-MRSA therapy. A positive result may indicate colonization with MRSA and must be correlated clinically.    Blood Culture With JUANCHO - Blood, Hand, Left [[id number]]  (Normal) Collected: 07/22/24 1850    Lab Status: Preliminary result Specimen: Blood from Hand, Left Updated: 07/23/24 0700     Blood Culture No growth at less than 24 hours    Blood Culture With JUANCHO - Blood, Hand, Right [[id number]]  (Normal) Collected: 07/22/24 1850    Lab Status: Preliminary result Specimen: Blood from Hand, Right Updated: 07/23/24 0700     Blood Culture No growth at less than 24 hours            Labs  Results from last 7 days   Lab Units 07/23/24  0624 07/22/24  1607 07/18/24  1902   WBC 10*3/mm3 11.95* 13.19* 14.36*   CREATININE mg/dL 1.22* 1.39* 1.44*   PROCALCITONIN ng/mL  --  0.35* 0.81*   CRP mg/dL  --  4.75* 12.90*      Estimated Creatinine Clearance: 29.8 mL/min (A) (by C-G formula based on SCr of 1.22 mg/dL (H)).  Temp Readings from Last 1 Encounters:   07/23/24 98.1 °F (36.7 °C) (Oral)       Pharmacy Recommendation:    Recommend to discontinue vancomycin, based on the result of the MRSA screen (nares) and its high negative-predictive value in settings including SSTI.      Thank you.    Uriel Franks [facility], Pharm.D.  07/23/24  08:10 EDT

## 2024-07-23 NOTE — PLAN OF CARE
Problem: Adult Inpatient Plan of Care  Goal: Plan of Care Review  Outcome: Ongoing, Progressing  Goal: Patient-Specific Goal (Individualized)  Outcome: Ongoing, Progressing  Goal: Absence of Hospital-Acquired Illness or Injury  Outcome: Ongoing, Progressing  Intervention: Identify and Manage Fall Risk  Recent Flowsheet Documentation  Taken 7/23/2024 0400 by Crow Escamilla RN  Safety Promotion/Fall Prevention: safety round/check completed  Taken 7/23/2024 0200 by Crow Escamilla RN  Safety Promotion/Fall Prevention: safety round/check completed  Taken 7/23/2024 0000 by Crow Escamilla RN  Safety Promotion/Fall Prevention: safety round/check completed  Taken 7/22/2024 2135 by Crow Escamilla RN  Safety Promotion/Fall Prevention:   activity supervised   assistive device/personal items within reach   fall prevention program maintained   lighting adjusted   room organization consistent   safety round/check completed  Intervention: Prevent Skin Injury  Recent Flowsheet Documentation  Taken 7/23/2024 0400 by Crow Escamilla RN  Body Position:   turned   right  Taken 7/23/2024 0200 by Crow Escamilla RN  Body Position:   tilted   left   patient/family refused  Taken 7/23/2024 0000 by Crow Escamilla RN  Body Position:   turned   left  Taken 7/22/2024 2135 by Crow Escamilla RN  Body Position:   turned   right   legs elevated  Intervention: Prevent and Manage VTE (Venous Thromboembolism) Risk  Recent Flowsheet Documentation  Taken 7/23/2024 0400 by Crow Escamilla RN  Activity Management: activity minimized  Taken 7/22/2024 2135 by Crow Escamilla RN  Activity Management: activity minimized  VTE Prevention/Management: (eliquis) patient refused intervention  Range of Motion: active ROM (range of motion) encouraged  Intervention: Prevent Infection  Recent Flowsheet Documentation  Taken 7/23/2024 0400 by Crow Escamilla RN  Infection Prevention:   environmental surveillance performed   hand hygiene promoted   rest/sleep promoted    single patient room provided  Taken 7/23/2024 0000 by Crow Escamilla RN  Infection Prevention:   hand hygiene promoted   personal protective equipment utilized   rest/sleep promoted   single patient room provided  Taken 7/22/2024 2135 by Crow Escamilla RN  Infection Prevention:   environmental surveillance performed   hand hygiene promoted   rest/sleep promoted   single patient room provided  Goal: Optimal Comfort and Wellbeing  Outcome: Ongoing, Progressing  Intervention: Monitor Pain and Promote Comfort  Recent Flowsheet Documentation  Taken 7/22/2024 2135 by Crow Escamilla RN  Pain Management Interventions:   pillow support provided   position adjusted  Intervention: Provide Person-Centered Care  Recent Flowsheet Documentation  Taken 7/22/2024 2135 by Crow Escamilla RN  Trust Relationship/Rapport:   care explained   choices provided   questions answered   questions encouraged   thoughts/feelings acknowledged   reassurance provided  Goal: Readiness for Transition of Care  Outcome: Ongoing, Progressing  Intervention: Mutually Develop Transition Plan  Recent Flowsheet Documentation  Taken 7/22/2024 2211 by Crow Escamilla RN  Transportation Anticipated: health plan transportation  Patient/Family Anticipated Services at Transition:   home health care   outpatient care  Patient/Family Anticipates Transition to: home with help/services  Taken 7/22/2024 2208 by Crow Escamilla RN  Equipment Currently Used at Home: wound care supplies     Problem: Fall Injury Risk  Goal: Absence of Fall and Fall-Related Injury  Outcome: Ongoing, Progressing  Intervention: Identify and Manage Contributors  Recent Flowsheet Documentation  Taken 7/22/2024 2135 by Crow Escamilla RN  Medication Review/Management: medications reviewed  Intervention: Promote Injury-Free Environment  Recent Flowsheet Documentation  Taken 7/23/2024 0400 by Crow Escamilla RN  Safety Promotion/Fall Prevention: safety round/check completed  Taken 7/23/2024 0200 by  Escamilla, [place], RN  Safety Promotion/Fall Prevention: safety round/check completed  Taken 7/23/2024 0000 by Crow Escamilla RN  Safety Promotion/Fall Prevention: safety round/check completed  Taken 7/22/2024 2135 by Crow Escamilla RN  Safety Promotion/Fall Prevention:   activity supervised   assistive device/personal items within reach   fall prevention program maintained   lighting adjusted   room organization consistent   safety round/check completed     Problem: Skin Injury Risk Increased  Goal: Skin Health and Integrity  Outcome: Ongoing, Progressing  Intervention: Optimize Skin Protection  Recent Flowsheet Documentation  Taken 7/23/2024 0400 by Crow Escamilla RN  Head of Bed (HOB) Positioning: HOB lowered  Taken 7/23/2024 0200 by Crow Escamilla RN  Head of Bed (HOB) Positioning: HOB lowered  Taken 7/23/2024 0000 by Crow Escamilla RN  Head of Bed (HOB) Positioning: HOB lowered  Taken 7/22/2024 2135 by Crow Escamilla RN  Head of Bed (HOB) Positioning: HOB lowered   Goal Outcome Evaluation:

## 2024-07-23 NOTE — PROGRESS NOTES
Dietitian Assessment    Patient Name: Lynne Sanchez  YOB: 1934  MRN: [id number]  Admission date: 7/22/2024    Comment:        Clinical Nutrition Assessment      Reason for Assessment PI   H&P  Past Medical History:   Diagnosis Date    Acute deep vein thrombosis of left upper extremity     Anemia     Asthma     CHF (congestive heart failure)     Chronic atrial fibrillation     Deep venous thrombosis of left upper limb     Diabetes mellitus, type 2     Diarrhea     GERD (gastroesophageal reflux disease)     Glaucoma     H/O transfusion of whole blood     Heart attack     Heart murmur     History of transfusion     Hyperlipidemia     Hypertension     Kidney disease     patient not aware of what exact diagnosis is     Osteomyelitis     Osteomyelitis of left foot 10/03/2017    Peripheral vascular disease     Right retinal detachment     Secondary cataract of both eyes     Stable proliferative diabetic retinopathy of both eyes     Stroke     Swelling of left extremity     Urinary tract infection     Wears glasses        Past Surgical History:   Procedure Laterality Date    AMPUTATION DIGIT Left 7/5/2018    Procedure: Left Great toe amputation;  Surgeon: Prakash Carrington MD;  Location:  [facility];  Service: Vascular    AMPUTATION DIGIT Left 8/27/2018    Procedure: SECOND TOE AMPUTATION DIGIT LEFT;  Surgeon: Prakash Carrington MD;  Location:  [facility];  Service: General    APPENDECTOMY      BONE MARROW ASPIRATION      CARDIAC ABLATION      CARDIAC CATHETERIZATION N/A 10/10/2017    Procedure: Peripheral angiography;  Surgeon: Kan Pelaez MD;  Location: [facility] CATH INVASIVE LOCATION;  Service:     CARDIAC CATHETERIZATION N/A 10/10/2017    Procedure: Angioplasty-peripheral;  Surgeon: Kan Pelaez MD;  Location: [facility] CATH INVASIVE LOCATION;  Service:     CARDIAC CATHETERIZATION N/A 10/10/2017    Procedure: Atherectomy-peripheral;  Surgeon: Kan Pelaez MD;  Location: [facility] CATH  "INVASIVE LOCATION;  Service:     CARDIAC ELECTROPHYSIOLOGY PROCEDURE N/A 5/3/2018    Procedure: generator change;  Surgeon: Zan Poole MD;  Location:  [facility] CATH INVASIVE LOCATION;  Service: Cardiovascular    CARDIOVERSION      COLONOSCOPY      ENDOSCOPY N/A 10/9/2017    Procedure: ESOPHAGOGASTRODUODENOSCOPY WITH COLD FORCEP BIOPSY;  Surgeon: Clifton Hyatt MD;  Location: [facility] ENDOSCOPY;  Service:     ENDOSCOPY N/A 3/22/2022    Procedure: ESOPHAGOGASTRODUODENOSCOPY with AVM cautery;  Surgeon: Clarisse Velez MD;  Location: [facility] ENDOSCOPY;  Service: Gastroenterology;  Laterality: N/A;    ENDOSCOPY N/A 8/4/2023    Procedure: ESOPHAGOGASTRODUODENOSCOPY WITH BIOPSY AND RUTH BRUSHING;  Surgeon: Clarisse Velez MD;  Location: [facility] ENDOSCOPY;  Service: Gastroenterology;  Laterality: N/A;    EYE SURGERY Bilateral     CATARACTS    INTERVENTIONAL RADIOLOGY PROCEDURE N/A 10/10/2017    Procedure: Abdominal Aortagram with Runoff;  Surgeon: Kan Pelaez MD;  Location: [facility] CATH INVASIVE LOCATION;  Service:     PACEMAKER IMPLANTATION      around 2008 then replaced 2018            Current Problems        Encounter Information        Trending Narrative     7/23: Pt admitted with R upper extremity cellulitis. Pt with multiple wounds noted. RD will add José Miguel BID to help with wound healing.      Anthropometrics        Current Height, Weight Height: 167.6 cm (66\")  Weight: 60.4 kg (133 lb 2.5 oz) (07/22/24 2259)   Trending Weight Hx     This admission:              PTA:     Wt Readings from Last 30 Encounters:   07/22/24 2259 60.4 kg (133 lb 2.5 oz)   07/22/24 1516 53.1 kg (117 lb)   07/18/24 1821 53.1 kg (117 lb)   07/11/24 1543 45.4 kg (100 lb)   07/01/24 1030 55.8 kg (123 lb)   06/25/24 0500 55.9 kg (123 lb 3.8 oz)   06/23/24 0500 54.6 kg (120 lb 5.9 oz)   06/21/24 1223 53.1 kg (117 lb)   05/29/24 1353 53.1 kg (117 lb 1 oz)   05/29/24 1513 53.1 kg (117 lb)   05/08/24 1201 53.1 kg (117 lb) "   04/26/24 0839 53.4 kg (117 lb 12.8 oz)   04/19/24 0947 54.2 kg (119 lb 6.4 oz)   11/17/23 1510 58.8 kg (129 lb 9.6 oz)   08/14/23 1357 59.9 kg (132 lb)   07/17/23 1422 59 kg (130 lb)   07/13/23 1415 58.1 kg (128 lb)   07/03/23 1056 59 kg (130 lb)   05/17/23 1403 59.9 kg (132 lb)   05/11/23 1443 60.3 kg (133 lb)   05/08/23 1030 60.5 kg (133 lb 6.4 oz)   02/01/23 1438 63.5 kg (140 lb)   11/16/22 1353 60.3 kg (133 lb)   05/02/22 1328 64.9 kg (143 lb)   04/28/22 1338 64.9 kg (143 lb)   04/22/22 1251 63.9 kg (140 lb 12.8 oz)   04/15/22 0430 63.2 kg (139 lb 5.3 oz)   04/14/22 1405 66.9 kg (147 lb 7.8 oz)   04/14/22 0925 66.9 kg (147 lb 7.8 oz)   04/14/22 0600 66.9 kg (147 lb 7.8 oz)   04/13/22 2108 65.2 kg (143 lb 11.8 oz)   04/13/22 1749 55.8 kg (123 lb)   04/15/22 0916 63 kg (139 lb)   03/25/22 1205 58.7 kg (129 lb 8 oz)   03/24/22 0506 60.3 kg (132 lb 15 oz)   03/22/22 1005 60.3 kg (132 lb 15 oz)   03/21/22 2032 60.3 kg (132 lb 15 oz)   03/21/22 1946 60.3 kg (132 lb 15 oz)   03/21/22 1744 55.8 kg (123 lb)   03/17/22 1618 59 kg (130 lb)   03/10/22 1415 63.2 kg (139 lb 4.8 oz)   01/05/22 1137 57.3 kg (126 lb 6.4 oz)      BMI kg/m2 Body mass index is 21.49 kg/m².     Labs        Pertinent Labs     Results from last 7 days   Lab Units 07/23/24  0624 07/22/24  1607 07/18/24  1902   SODIUM mmol/L 142 136 140   POTASSIUM mmol/L 3.8 4.5 3.8   CHLORIDE mmol/L 105 99 101   CO2 mmol/L 23.9 25.4 22.7   BUN mg/dL 28* 31* 20   CREATININE mg/dL 1.22* 1.39* 1.44*   CALCIUM mg/dL 8.0* 8.7 8.7   BILIRUBIN mg/dL  --  0.4 0.8   ALK PHOS U/L  --  104 119*   ALT (SGPT) U/L  --  7 <5   AST (SGOT) U/L  --  28 18   GLUCOSE mg/dL 71 65 167*       Results from last 7 days   Lab Units 07/23/24  0624   HEMOGLOBIN g/dL 8.1*   HEMATOCRIT % 26.4*       Lab Results   Component Value Date    HGBA1C 6.00 (H) 11/22/2023            Medications       Scheduled Medications aspirin, 81 mg, Oral, Daily  atorvastatin, 40 mg, Oral, Daily  cefTRIAXone, 2,000  mg, Intravenous, Q24H  clopidogrel, 75 mg, Oral, Daily  enoxaparin, 30 mg, Subcutaneous, Q24H  insulin lispro, 2-7 Units, Subcutaneous, 4x Daily AC & at Bedtime  lactobacillus acidophilus, 1 capsule, Oral, BID  levothyroxine, 50 mcg, Oral, Q AM  metoprolol tartrate, 100 mg, Oral, BID  pantoprazole, 40 mg, Oral, Daily  senna-docusate sodium, 2 tablet, Oral, BID  sodium chloride, 10 mL, Intravenous, Q12H  vancomycin (dosing per levels), , Does not apply, Daily  vancomycin, 750 mg, Intravenous, Q24H        Infusions Pharmacy to Dose enoxaparin (LOVENOX),   Pharmacy to dose vancomycin,          PRN Medications   acetaminophen **OR** acetaminophen **OR** acetaminophen    senna-docusate sodium **AND** polyethylene glycol **AND** bisacodyl **AND** bisacodyl    dextrose    dextrose    glucagon (human recombinant)    HYDROcodone-acetaminophen    ondansetron    Pharmacy to Dose enoxaparin (LOVENOX)    [COMPLETED] vancomycin **AND** Pharmacy to dose vancomycin    sodium chloride    sodium chloride    sodium chloride     Physical Findings        Trending Physical   Appearance, NFPE    --  Edema  1-3+     Bowel Function LBM: 7/21     Tubes Peripheral IV     Chewing/Swallowing WNL     Skin Sacral spine PI       Estimated/Assessed Needs       Energy Requirements    EST Needs, Method, Wt used 1022-0631 kcal (25-30 kcal/kg CBW)       Protein Requirements    EST Needs, Method, Wt used 48-60 g pro (.8-1 g pro/kg CBW)       Fluid Requirements     Estimated Needs (mL/day) 3901-0401 mL       Current Nutrition Orders & Evaluation of Intake       Oral Nutrition     Food Allergies NKFA   Current PO Diet Diet: Diabetic; Consistent Carbohydrate; Fluid Consistency: Thin (IDDSI 0)   Supplement    PO Evaluation     Trending % PO Intake      Nutrition Diagnosis         Nutrition Dx Problem 1 Increased nutrient needs r/t skin integrity AEB Sacral spine PI.      Nutrition Dx Problem 2        Intervention Goal         Intervention Goal(s) Maintain  CBW  PO intake meet >50% of estimated needs  Adhere to ONS     Nutrition Intervention        RD Action Will order José Miguel BID     Nutrition Prescription          Diet Prescription CCD   Supplement Prescription José Miguel BID     Enteral Prescription        TPN Prescription      Monitor/Evaluation        Monitor Per protocol, I&O, PO intake, Supplement intake, Pertinent labs, Weight, Skin status, GI status, Symptoms, POC/GOC, Swallow function, Hemodynamic stability     RD to f/up    Electronically signed by:  Madai Moses RD  07/23/24 07:58 EDT

## 2024-07-23 NOTE — NURSING NOTE
Seen for wound consult. Pleasant and cooperative with assessment. Maddie VALE assisted. Care discussed with Lynne KULKARNI.   Right foot great toe and part of 2nd toe amputated, sutures intact, incision well approximated. Right anterior foot with maroon/purple discoloration and skin tear. Right lateral foot has maroon/purple discolorations without skin tears. Plantar surface of foot has blister below 2nd-3rd toe area on ball of foot. Unable to determine if discolorations are suspected deep tissue injuries vs vascular injuries. Orders to clean with wound cleanser, paint incision with betadine, cover with dry dressing, place petroleum gauze on skin tear on anterior foot then wrap with dry gauze and secure with elastic bandage daily.   Thoracic spine unstageable pressure injury and right back ulceration. Orders to Clean with wound cleanser, barrier wipe around wound, multidex gel to wound bed, opticel rope, border dressing every other day.    Right hand swollen red, nodules evident consistent with arthritis.   Skin generalized red appearance. Poor skin turgor. High risk for further skin breakdown. Pressure injury prevention measures discussed.   Standing orders for care include a turning schedule, barrier cream twice daily and prn after cleansing, float heels off bed with pillows or heel lift boots, encourage increase mobility as appropriate and tolerated to reduce pressure, and a nutrition consult for dietary needs. Staff to contact provider and re-consult wound nurse for new skin issues or lack of improvement with current orders. Thank you for the consult. If you have questions or concerns do not hesitate to contact me.

## 2024-07-23 NOTE — THERAPY EVALUATION
Patient Name: Lynne Sanchez  : 1934    MRN: [id number]                              Today's Date: 2024       Admit Date: 2024    Visit Dx:     ICD-10-CM ICD-9-CM   1. Cellulitis of right upper extremity  L03.113 682.3   2. Superficial venous thrombosis of arm, right  I82.611 453.81     Patient Active Problem List   Diagnosis    Chronic atrial fibrillation    Valvular heart disease    Diabetes mellitus type II, controlled    Osteomyelitis of right foot    Normocytic anemia    Chronic gastritis    Cerebrovascular accident (CVA) due to thrombosis of left anterior cerebral artery    Thrombocytopenia    Cardiac pacemaker in situ    Chronic congestive heart failure    Functional heart murmur    Glaucoma    Hyperlipidemia    Hypertensive disorder    Acquired hypothyroidism    Mass of parotid gland    Neuropathy    Chronic renal impairment, stage 4 (severe)    Impairment of balance    Impaired mobility    Muscle weakness of lower extremity    Chronic pain of both knees    Secondary cataract of both eyes    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    Suspected glaucoma of both eyes    Secondary cataract of both eyes    Right retinal detachment    Stable proliferative diabetic retinopathy of both eyes    Swelling of left extremity    Closed nondisplaced fracture of surgical neck of left humerus with routine healing    Debility    Left arm swelling    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    Secondary hyperparathyroidism of renal origin    Vitamin D deficiency    Anemia requiring transfusions    Melena    Acute on chronic anemia    Stage 3b chronic kidney disease    DM complication    Anemia of chronic renal failure    Type 2 diabetes mellitus with diabetic chronic kidney disease    Anemia of chronic renal failure    Absolute anemia    Secondary hyperparathyroidism of renal origin    Stage 3b chronic kidney disease    Vitamin D deficiency     Other iron deficiency anemias    Chronic anemia    Gastroesophageal reflux disease    Erosion of ileum    Weight loss    Cellulitis of right leg    Osteomyelitis    Right arm cellulitis     Past Medical History:   Diagnosis Date    Acute deep vein thrombosis of left upper extremity     Anemia     Asthma     CHF (congestive heart failure)     Chronic atrial fibrillation     Deep venous thrombosis of left upper limb     Diabetes mellitus, type 2     Diarrhea     GERD (gastroesophageal reflux disease)     Glaucoma     H/O transfusion of whole blood     Heart attack     Heart murmur     History of transfusion     Hyperlipidemia     Hypertension     Kidney disease     patient not aware of what exact diagnosis is     Osteomyelitis     Osteomyelitis of left foot 10/03/2017    Peripheral vascular disease     Right retinal detachment     Secondary cataract of both eyes     Stable proliferative diabetic retinopathy of both eyes     Stroke     Swelling of left extremity     Urinary tract infection     Wears glasses      Past Surgical History:   Procedure Laterality Date    AMPUTATION DIGIT Left 7/5/2018    Procedure: Left Great toe amputation;  Surgeon: Prakash Carrington MD;  Location:  [facility];  Service: Vascular    AMPUTATION DIGIT Left 8/27/2018    Procedure: SECOND TOE AMPUTATION DIGIT LEFT;  Surgeon: Prakash Carrington MD;  Location:  [facility];  Service: General    APPENDECTOMY      BONE MARROW ASPIRATION      CARDIAC ABLATION      CARDIAC CATHETERIZATION N/A 10/10/2017    Procedure: Peripheral angiography;  Surgeon: Kan Pelaez MD;  Location: [facility] CATH INVASIVE LOCATION;  Service:     CARDIAC CATHETERIZATION N/A 10/10/2017    Procedure: Angioplasty-peripheral;  Surgeon: Kan Pelaez MD;  Location: [facility] CATH INVASIVE LOCATION;  Service:     CARDIAC CATHETERIZATION N/A 10/10/2017    Procedure: Atherectomy-peripheral;  Surgeon: Kan Pelaez MD;  Location: [facility] CATH INVASIVE LOCATION;   Service:     CARDIAC ELECTROPHYSIOLOGY PROCEDURE N/A 5/3/2018    Procedure: generator change;  Surgeon: Zan Poole MD;  Location:  [facility] CATH INVASIVE LOCATION;  Service: Cardiovascular    CARDIOVERSION      COLONOSCOPY      ENDOSCOPY N/A 10/9/2017    Procedure: ESOPHAGOGASTRODUODENOSCOPY WITH COLD FORCEP BIOPSY;  Surgeon: Clifton Hyatt MD;  Location: [facility] ENDOSCOPY;  Service:     ENDOSCOPY N/A 3/22/2022    Procedure: ESOPHAGOGASTRODUODENOSCOPY with AVM cautery;  Surgeon: Clarisse Velez MD;  Location: [facility] ENDOSCOPY;  Service: Gastroenterology;  Laterality: N/A;    ENDOSCOPY N/A 8/4/2023    Procedure: ESOPHAGOGASTRODUODENOSCOPY WITH BIOPSY AND RUTH BRUSHING;  Surgeon: Clarisse Velez MD;  Location: [facility] ENDOSCOPY;  Service: Gastroenterology;  Laterality: N/A;    EYE SURGERY Bilateral     CATARACTS    INTERVENTIONAL RADIOLOGY PROCEDURE N/A 10/10/2017    Procedure: Abdominal Aortagram with Runoff;  Surgeon: Kan Pelaez MD;  Location: [facility] CATH INVASIVE LOCATION;  Service:     PACEMAKER IMPLANTATION      around 2008 then replaced 2018      General Information       Row Name 07/23/24 1411          OT Time and Intention    Document Type evaluation  -     Mode of Treatment occupational therapy  -       Row Name 07/23/24 1411          General Information    Patient Profile Reviewed yes  -AH     Prior Level of Function independent:;ADL's;all household mobility  uses RW for household mobility, w/c for community mobility; pt has stair lift, walk in shower, shower chair, RW, w/c  -     Existing Precautions/Restrictions fall  -     Barriers to Rehab medically complex;previous functional deficit  -       Row Name 07/23/24 1411          Occupational Profile    Reason for Services/Referral (Occupational Profile) ADL decline  -       Row Name 07/23/24 1411          Living Environment    People in Home spouse  -       Row Name 07/23/24 1411          Stairs Within Home, Primary     Stairs, Within Home, Primary has 2nd floor where her bedroom and bathroom is located, has stair lift  -     Number of Stairs, Within Home, Primary other (see comments)  full flight  -[facility] Name 07/23/24 1411          Cognition    Orientation Status (Cognition) oriented x 4  -[facility] Name 07/23/24 1411          Safety Issues, Functional Mobility    Safety Issues Affecting Function (Mobility) safety precaution awareness;safety precautions follow-through/compliance  -     Impairments Affecting Function (Mobility) balance;endurance/activity tolerance;strength  -               User Key  (r) = Recorded By, (t) = Taken By, (c) = Cosigned By      Initials Name Provider Type     Silva Alexander Occupational Therapist                     Mobility/ADL's       [facility] Name 07/23/24 1416          Bed Mobility    Bed Mobility bed mobility (all) activities  -     All Activities, [place] (Bed Mobility) minimum assist (75% patient effort)  -     Bed Mobility, Safety Issues decreased use of arms for pushing/pulling;decreased use of legs for bridging/pushing  -     Assistive Device (Bed Mobility) head of bed elevated;draw sheet;bed rails  -[facility] Name 07/23/24 1416          Transfers    Transfers sit-stand transfer  -AH       Row Name 07/23/24 1416          Sit-Stand Transfer    Sit-Stand [place] (Transfers) minimum assist (75% patient effort)  -     Assistive Device (Sit-Stand Transfers) walker, front-wheeled  -[facility] Name 07/23/24 1416          Functional Mobility    Functional Mobility- Ind. Level minimum assist (75% patient effort)  -     Functional Mobility- Device walker, front-wheeled  -     Functional Mobility-Distance (Feet) 3  -     Patient was able to Ambulate yes  -[facility] Name 07/23/24 1416          Activities of Daily Living    BADL Assessment/Intervention bathing;upper body dressing;lower body dressing;grooming;feeding;toileting  -[facility] Name 07/23/24 1416           Bathing Assessment/Intervention    [place] Level (Bathing) minimum assist (75% patient effort)  -[facility] Name 07/23/24 1416          Upper Body Dressing Assessment/Training    [place] Level (Upper Body Dressing) minimum assist (75% patient effort)  -AH       Row Name 07/23/24 1416          Lower Body Dressing Assessment/Training    [place] Level (Lower Body Dressing) minimum assist (75% patient effort)  -AH       Row Name 07/23/24 1416          Grooming Assessment/Training    [place] Level (Grooming) set up  -AH       Row Name 07/23/24 1416          Self-Feeding Assessment/Training    [place] Level (Feeding) set up  -AH       Row Name 07/23/24 1416          Toileting Assessment/Training    [place] Level (Toileting) moderate assist (50% patient effort)  -     Comment, (Toileting) pt incontinent urine  -               User Key  (r) = Recorded By, (t) = Taken By, (c) = Cosigned By      Initials Name Provider Type    Silva Echeverria Occupational Therapist                   Obj/Interventions       Row Name 07/23/24 1417          Sensory Assessment (Somatosensory)    Sensory Assessment (Somatosensory) sensation intact  -AH       Row Name 07/23/24 1417          Vision Assessment/Intervention    Visual Impairment/Limitations WFL;corrective lenses full-time  -AH       Row Name 07/23/24 1417          Range of Motion Comprehensive    General Range of Motion bilateral upper extremity ROM WFL  -AH       Row Name 07/23/24 1417          Strength Comprehensive (MMT)    Comment, General Manual Muscle Testing (MMT) Assessment BUE WFL  -               User Key  (r) = Recorded By, (t) = Taken By, (c) = Cosigned By      Initials Name Provider Type    Silva Echeverria Occupational Therapist                   Goals/Plan       Row Name 07/23/24 1428          Bed Mobility Goal 1 (OT)    Activity/Assistive Device (Bed Mobility Goal 1, OT) bed mobility activities, all  -     [place]  Level/Cues Needed (Bed Mobility Goal 1, OT) supervision required  -     Time Frame (Bed Mobility Goal 1, OT) by discharge  -     Progress/Outcomes (Bed Mobility Goal 1, OT) goal ongoing  -       Row Name 07/23/24 1428          Transfer Goal 1 (OT)    Activity/Assistive Device (Transfer Goal 1, OT) sit-to-stand/stand-to-sit;walker, rolling  -     [place] Level/Cues Needed (Transfer Goal 1, OT) contact guard required  -     Time Frame (Transfer Goal 1, OT) long term goal (LTG);1 week  -     Progress/Outcome (Transfer Goal 1, OT) goal ongoing  -       Row Name 07/23/24 1428          Dressing Goal 1 (OT)    Activity/Device (Dressing Goal 1, OT) lower body dressing  -     [place]/Cues Needed (Dressing Goal 1, OT) set-up required  -     Progress/Outcome (Dressing Goal 1, OT) goal ongoing  -       Row Name 07/23/24 1428          Toileting Goal 1 (OT)    Activity/Device (Toileting Goal 1, OT) adjust/manage clothing;perform perineal hygiene  -     [place] Level/Cues Needed (Toileting Goal 1, OT) contact guard required  -     Time Frame (Toileting Goal 1, OT) long term goal (LTG);1 week  -     Progress/Outcome (Toileting Goal 1, OT) goal ongoing  -       Row Name 07/23/24 1428          Strength Goal 1 (OT)    Strength Goal 1 (OT) Pt will perform UB strengthening ex using theraband for resistance.  -     Time Frame (Strength Goal 1, OT) by discharge  -     Progress/Outcome (Strength Goal 1, OT) goal ongoing  -       Row Name 07/23/24 1428          Therapy Assessment/Plan (OT)    Planned Therapy Interventions (OT) activity tolerance training;BADL retraining;patient/caregiver education/training;transfer/mobility retraining;strengthening exercise  -               User Key  (r) = Recorded By, (t) = Taken By, (c) = Cosigned By      Initials Name Provider Type    Silva Echeverria Occupational Therapist                   Clinical Impression       Row Name 07/23/24 1415          Pain  Assessment    Pretreatment Pain Rating 0/10 - no pain  -     Posttreatment Pain Rating 0/10 - no pain  -     Pain Intervention(s) Repositioned;Ambulation/increased activity  -[facility] Name 07/23/24 1417          Plan of Care Review    Plan of Care Reviewed With patient  -     Progress no change  -     Outcome Evaluation Pt seen for OT evaluation today.  Pt presents with weakness and decreased independence with self care tasks.  Pt min assist to sit eob, min assist to stand and min assist to walk 3' using RW.  Pt min/mod assist for bathing, dressing and toileting.  Pt is expected to benefit from skilled OT to improve her strength, activity tolerance and independence with ADL tasks.  -[facility] Name 07/23/24 1417          Therapy Assessment/Plan (OT)    Patient/Family Therapy Goal Statement (OT) d/c home  -     Rehab Potential (OT) good, to achieve stated therapy goals  -     Criteria for Skilled Therapeutic Interventions Met (OT) yes;skilled treatment is necessary  -     Therapy Frequency (OT) 5 times/wk  -[facility] Name 07/23/24 1417          Therapy Plan Review/Discharge Plan (OT)    Anticipated Discharge Disposition (OT) home with home health  -[facility] Name 07/23/24 1417          Positioning and Restraints    Pre-Treatment Position in bed  -     Post Treatment Position chair  -     In Chair reclined;call light within reach;encouraged to call for assist;with family/caregiver;notified [facility]               User Key  (r) = Recorded By, (t) = Taken By, (c) = Cosigned By      Initials Name Provider Type     Silva Alexander Occupational Therapist                   Outcome Measures       [facility] Name 07/23/24 1437          How much help from another is currently needed...    Putting on and taking off regular lower body clothing? 2  -AH     Bathing (including washing, rinsing, and drying) 2  -AH     Toileting (which includes using toilet bed pan or urinal) 2  -AH     Putting on and taking  off regular upper body clothing 3  -     Taking care of personal grooming (such as brushing teeth) 3  -     Eating meals 4  -     AM-PAC 6 Clicks Score (OT) 16  -       Row Name 07/23/24 1430          Functional Assessment    Outcome Measure Options AM-PAC 6 Clicks Daily Activity (OT)  -               User Key  (r) = Recorded By, (t) = Taken By, (c) = Cosigned By      Initials Name Provider Type     Silva Alexander Occupational Therapist                    Occupational Therapy Education       Title: PT OT SLP Therapies (In Progress)       Topic: Occupational Therapy (In Progress)       Point: ADL training (Done)       Description:   Instruct learner(s) on proper safety adaptation and remediation techniques during self care or transfers.   Instruct in proper use of assistive devices.                  Learning Progress Summary             Patient Acceptance, E,TB, VU by  at 7/23/2024 1431    Comment: Role of OT/POC                         Point: Home exercise program (Not Started)       Description:   Instruct learner(s) on appropriate technique for monitoring, assisting and/or progressing therapeutic exercises/activities.                  Learner Progress:  Not documented in this visit.              Point: Precautions (Not Started)       Description:   Instruct learner(s) on prescribed precautions during self-care and functional transfers.                  Learner Progress:  Not documented in this visit.              Point: Body mechanics (Not Started)       Description:   Instruct learner(s) on proper positioning and spine alignment during self-care, functional mobility activities and/or exercises.                  Learner Progress:  Not documented in this visit.                              User Key       Initials Effective Dates Name Provider Type [facility] 06/16/21 -  Silva Alexander Occupational Therapist OT                  OT Recommendation and Plan  Planned Therapy Interventions (OT):  activity tolerance training, BADL retraining, patient/caregiver education/training, transfer/mobility retraining, strengthening exercise  Therapy Frequency (OT): 5 times/wk  Plan of Care Review  Plan of Care Reviewed With: patient  Progress: no change  Outcome Evaluation: Pt seen for OT evaluation today.  Pt presents with weakness and decreased independence with self care tasks.  Pt min assist to sit eob, min assist to stand and min assist to walk 3' using RW.  Pt min/mod assist for bathing, dressing and toileting.  Pt is expected to benefit from skilled OT to improve her strength, activity tolerance and independence with ADL tasks.     Time Calculation:   Evaluation Complexity (OT)  Review Occupational Profile/Medical/Therapy History Complexity: expanded/moderate complexity  Assessment, Occupational Performance/Identification of Deficit Complexity: 3-5 performance deficits  Clinical Decision Making Complexity (OT): detailed assessment/moderate complexity  Overall Complexity of Evaluation (OT): moderate complexity     Time Calculation- OT       Row Name 07/23/24 1432             Time Calculation- OT    OT Start Time 1046  -AH      OT Received On 07/23/24  -      OT Goal Re-Cert Due Date 08/02/24  -         Untimed Charges    OT Eval/Re-eval Minutes 54  -AH         Total Minutes    Untimed Charges Total Minutes 54  -AH       Total Minutes 54  -AH                User Key  (r) = Recorded By, (t) = Taken By, (c) = Cosigned By      Initials Name Provider Type    Silva Echeverria Occupational Therapist                  Therapy Charges for Today       Code Description Service Date Service Provider Modifiers Qty    [id number]  OT EVAL MOD COMPLEXITY 4 7/23/2024 Silva Alexander GO 1                 Silva Alexander  7/23/2024

## 2024-07-23 NOTE — PROGRESS NOTES
[facility]    Name:  Lynne Sanchez   Age:  89 y.o.  Sex:  female  :  1934  MRN:  [id number]   Visit Number:  [id number]  Admission Date:  2024  Date Of Service:  24  Primary Care Physician:  Elizabeth Easton APRN     LOS: 0 days :    Chief Complaint:      Generalized weakness, right upper extremity pain and swelling.    Subjective:    Patient evaluated today.  Son at bedside.  Patient resting comfortably in bedside chair.  Very pleasant.  Denies active complaints.  Still with swelling to right upper extremity though noted improvement.    Hospital Course:    Lynne Sanchez is an 89-year-old female with history of hypertension, diabetes mellitus type 2, hypothyroidism, sick sinus syndrome status post pacemaker was brought to the emergency room by family with multiple complaints including right hand pain and swelling.  Patient was in the emergency room on 2024 secondary to a fall and hitting her back and hands.  At that time, she was started to have cellulitis of the hand and possibly gouty arthritis.  She was given NSAIDs, steroids, dose of cefazolin and was discharged on Keflex.  According to family, patient has not improved since then and has become more weak and is unable to ambulate at this time.  Patient states that he lives with her .     In the emergency room, she was afebrile and hemodynamically stable saturating at 96% on room air.  She does have chronic elevated troponin levels.  proBNP 16,085.  CMP was unremarkable except for a creatinine of 1.39 (baseline).  Lactic acid was within normal limits but elevated C-reactive protein of 4.75 and procalcitonin of 0.35.  WBC 13, hemoglobin 9 (baseline around 8).  ESR was 9.  Blood cultures were drawn in the emergency room.  Portable chest x-ray showed chronic appearing parenchymal changes without any acute infiltrate.  Venous duplex of the right upper extremity showed superficial  venous thrombosis involving a portion of the cephalic vein.  No evidence of DVT noted.  Patient was given cefepime and vancomycin in the emergency room for suspected right upper extremity cellulitis and was subsequently admitted to the medical floor with telemetry.    Review of Systems:     All systems were reviewed and negative except as mentioned in subjective, assessment and plan.    Vital Signs:    Temp:  [97.1 °F (36.2 °C)-98.1 °F (36.7 °C)] 97.7 °F (36.5 °C)  Heart Rate:  [70-76] 71  Resp:  [14-18] 18  BP: (101-129)/(41-85) 106/48    Intake and output:    No intake/output data recorded.  I/O this shift:  In: 600 [P.O.:600]  Out: -     Physical Examination:    General Appearance:  Alert and cooperative.    Head:  Atraumatic and normocephalic.   Eyes: Conjunctivae and sclerae normal, no icterus. No pallor.   Throat: No oral lesions, no thrush, oral mucosa moist.   Neck: Supple, trachea midline, no thyromegaly.   Lungs:   Breath sounds heard bilaterally equally.  No wheezing or crackles. No Pleural rub or bronchial breathing.   Heart:  Normal S1 and S2, no murmur, No JVD.   Abdomen:   Normal bowel sounds, no masses, no organomegaly. Soft, nontender, nondistended, no rebound tenderness.   Extremities: Right upper extremity redness and swelling   Skin: No bleeding or rash.   Neurologic: Alert and oriented x 3. No facial asymmetry. Moves all four limbs. No tremors.      Laboratory results:    Results from last 7 days   Lab Units 07/23/24  0624 07/22/24  1607 07/18/24  1902   SODIUM mmol/L 142 136 140   POTASSIUM mmol/L 3.8 4.5 3.8   CHLORIDE mmol/L 105 99 101   CO2 mmol/L 23.9 25.4 22.7   BUN mg/dL 28* 31* 20   CREATININE mg/dL 1.22* 1.39* 1.44*   CALCIUM mg/dL 8.0* 8.7 8.7   BILIRUBIN mg/dL  --  0.4 0.8   ALK PHOS U/L  --  104 119*   ALT (SGPT) U/L  --  7 <5   AST (SGOT) U/L  --  28 18   GLUCOSE mg/dL 71 65 167*     Results from last 7 days   Lab Units 07/23/24  0624 07/22/24  1607 07/18/24  1902   WBC 10*3/mm3  "11.95* 13.19* 14.36*   HEMOGLOBIN g/dL 8.1* 8.9* 8.3*   HEMATOCRIT % 26.4* 29.5* 27.3*   PLATELETS 10*3/mm3 221 253 236         Results from last 7 days   Lab Units 07/22/24  1817 07/22/24  1607   HSTROP T ng/L 78* 91*     Results from last 7 days   Lab Units 07/22/24  1850   BLOODCX  No growth at less than 24 hours  No growth at less than 24 hours     No results for input(s): \"PHART\", \"CFF3XCQ\", \"PO2ART\", \"LQV4MAZ\", \"BASEEXCESS\" in the last 8760 hours.   I have reviewed the patient's laboratory results.    Radiology results:    XR Hand 3+ View Right    Result Date: 7/23/2024  PROCEDURE: XR HAND 3+ VW RIGHT-  History: swelling, erythema, tenderness  COMPARISON: July 18, 2024.  FINDINGS:  A 3 view exam demonstrates no acute fracture or dislocation. 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 There is severe narrowing of the first carpometacarpal joint. There is periarticular osteopenia. There is ulnar deviation of the second through fifth digits. Vascular calcifications are present. There is significant soft tissue swelling of the second digit, similar to the prior exam. Overall, no significant interval change.      Impression: No significant interval change since the prior exam.       Images were reviewed, interpreted, and dictated by Dr. Heena Mata MD Transcribed by Dixie Szymanski PA-C.  This report was signed and finalized on 7/23/2024 9:28 AM by Heena Mata MD.      XR Chest 1 View    Result Date: 7/23/2024  PROCEDURE: XR CHEST 1 VW-    HISTORY: weakness  COMPARISON: July 1, 2024.  FINDINGS: The heart is mildly enlarged, but stable. The mediastinum is unremarkable. There is blunting of the left costophrenic angle which is stable. There is a small right pleural effusion which has mildly increased. New right basilar atelectasis or infiltrate is seen. There is no pneumothorax. There are no acute osseous abnormalities.    A pacemaker overlies the left chest.       Impression: New right " basilar atelectasis or infiltrate with small right pleural effusion is possibly due to pneumonia. Continued follow-up is recommended..    Images were reviewed, interpreted, and dictated by Dr. Heena Mata MD Transcribed by Dixie Szymanski PA-C.  This report was signed and finalized on 7/23/2024 9:23 AM by Heena Mata MD.      US Venous Doppler Upper Extremity Right (duplex)    Result Date: 7/22/2024  FINAL REPORT TECHNIQUE: Multiple transverse and longitudinal images were performed of the deep venous system with compression maneuvers. CLINICAL HISTORY: RUE pain, redness, swelling FINDINGS: Right upper extremity duplex ultrasound demonstrates normal flow and compressibility in the deep venous system.  There is lack of compressibility within the cephalic vein consistent with superficial venous thrombus.     Impression: SVT involving a portion of the cephalic vein.  No evidence of DVT. Authenticated and Electronically Signed by Moisés Zamora M.D. on 07/22/2024 08:16:00 PM   I have reviewed the patient's radiology reports.    Medication Review:     I have reviewed the patient's active and prn medications.     Problem List:      Right arm cellulitis    Acquired hypothyroidism    Anemia of chronic renal failure    Stage 3b chronic kidney disease      Assessment:    Right upper extremity cellulitis, POA.  Right upper extremity superficial vein thrombosis, POA.  Diabetes mellitus type 2 with nephropathy.  Chronic kidney disease stage III.  Chronic elevated troponin secondary to #4.  Sick sinus syndrome status post pacemaker.  Essential hypertension.  Hypothyroidism.       Plan:    Right upper extremity cellulitis.  Right cephalic venous thrombosis   - Patient does seem to have redness in the right upper extremity with swelling.  - We will treated with IV antibiotics therapy with ceftriaxone.  - MRSA negative.  - Follow blood cultures.  So far negative.  - Lactobacillus supplements.  -Ultrasound venous Doppler revealed SVT  involving a portion of the cephalic vein.  No evidence of DVT.  Given risk for development of DVT and superimposed cellulitis, will anticoagulate with Eliquis for 4 weeks.     CKD stage III.  - Renal function is at baseline.  - She has chronic elevated troponin secondary to chronic kidney disease.     Diabetes mellitus type 2.  - Continue subcutaneous insulin protocol for coverage.    DVT Prophylaxis: Enoxaparin  Code Status: Full  Diet: Diabetic  Discharge Plan: Home tomorrow    Jaswinder Andrews DO  07/23/24  17:35 EDT    Dictated utilizing Dragon dictation.

## 2024-07-23 NOTE — THERAPY EVALUATION
Patient Name: Lynne Sanchez  : 1934    MRN: [id number]                              Today's Date: 2024       Admit Date: 2024    Visit Dx:     ICD-10-CM ICD-9-CM   1. Cellulitis of right upper extremity  L03.113 682.3   2. Superficial venous thrombosis of arm, right  I82.611 453.81     Patient Active Problem List   Diagnosis    Chronic atrial fibrillation    Valvular heart disease    Diabetes mellitus type II, controlled    Osteomyelitis of right foot    Normocytic anemia    Chronic gastritis    Cerebrovascular accident (CVA) due to thrombosis of left anterior cerebral artery    Thrombocytopenia    Cardiac pacemaker in situ    Chronic congestive heart failure    Functional heart murmur    Glaucoma    Hyperlipidemia    Hypertensive disorder    Acquired hypothyroidism    Mass of parotid gland    Neuropathy    Chronic renal impairment, stage 4 (severe)    Impairment of balance    Impaired mobility    Muscle weakness of lower extremity    Chronic pain of both knees    Secondary cataract of both eyes    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    Suspected glaucoma of both eyes    Secondary cataract of both eyes    Right retinal detachment    Stable proliferative diabetic retinopathy of both eyes    Swelling of left extremity    Closed nondisplaced fracture of surgical neck of left humerus with routine healing    Debility    Left arm swelling    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    Secondary hyperparathyroidism of renal origin    Vitamin D deficiency    Anemia requiring transfusions    Melena    Acute on chronic anemia    Stage 3b chronic kidney disease    DM complication    Anemia of chronic renal failure    Type 2 diabetes mellitus with diabetic chronic kidney disease    Anemia of chronic renal failure    Absolute anemia    Secondary hyperparathyroidism of renal origin    Stage 3b chronic kidney disease    Vitamin D deficiency     Other iron deficiency anemias    Chronic anemia    Gastroesophageal reflux disease    Erosion of ileum    Weight loss    Cellulitis of right leg    Osteomyelitis    Right arm cellulitis    Moderate malnutrition     Past Medical History:   Diagnosis Date    Acute deep vein thrombosis of left upper extremity     Anemia     Asthma     CHF (congestive heart failure)     Chronic atrial fibrillation     Deep venous thrombosis of left upper limb     Diabetes mellitus, type 2     Diarrhea     GERD (gastroesophageal reflux disease)     Glaucoma     H/O transfusion of whole blood     Heart attack     Heart murmur     History of transfusion     Hyperlipidemia     Hypertension     Impaired functional mobility, balance, gait, and endurance     Kidney disease     patient not aware of what exact diagnosis is     Osteomyelitis     Osteomyelitis of left foot 10/03/2017    Peripheral vascular disease     Right retinal detachment     Secondary cataract of both eyes     Stable proliferative diabetic retinopathy of both eyes     Stroke     Swelling of left extremity     Urinary tract infection     Wears glasses      Past Surgical History:   Procedure Laterality Date    AMPUTATION DIGIT Left 7/5/2018    Procedure: Left Great toe amputation;  Surgeon: Prakash Carrington MD;  Location:  [facility];  Service: Vascular    AMPUTATION DIGIT Left 8/27/2018    Procedure: SECOND TOE AMPUTATION DIGIT LEFT;  Surgeon: Prakash Carrington MD;  Location:  [facility];  Service: General    APPENDECTOMY      BONE MARROW ASPIRATION      CARDIAC ABLATION      CARDIAC CATHETERIZATION N/A 10/10/2017    Procedure: Peripheral angiography;  Surgeon: Kan Pelaez MD;  Location:  [facility];  Service:     CARDIAC CATHETERIZATION N/A 10/10/2017    Procedure: Angioplasty-peripheral;  Surgeon: Kan Pelaez MD;  Location:  [facility];  Service:     CARDIAC CATHETERIZATION N/A 10/10/2017    Procedure:  Atherectomy-peripheral;  Surgeon: Kan Pelaez MD;  Location: [facility] CATH INVASIVE LOCATION;  Service:     CARDIAC ELECTROPHYSIOLOGY PROCEDURE N/A 5/3/2018    Procedure: generator change;  Surgeon: Zan Poole MD;  Location:  [facility];  Service: Cardiovascular    CARDIOVERSION      COLONOSCOPY      ENDOSCOPY N/A 10/9/2017    Procedure: ESOPHAGOGASTRODUODENOSCOPY WITH COLD FORCEP BIOPSY;  Surgeon: Clifton Hyatt MD;  Location: [facility] ENDOSCOPY;  Service:     ENDOSCOPY N/A 3/22/2022    Procedure: ESOPHAGOGASTRODUODENOSCOPY with AVM cautery;  Surgeon: Clarisse Velez MD;  Location: [facility] ENDOSCOPY;  Service: Gastroenterology;  Laterality: N/A;    ENDOSCOPY N/A 8/4/2023    Procedure: ESOPHAGOGASTRODUODENOSCOPY WITH BIOPSY AND RUTH BRUSHING;  Surgeon: Clarisse Velez MD;  Location: [facility] ENDOSCOPY;  Service: Gastroenterology;  Laterality: N/A;    EYE SURGERY Bilateral     CATARACTS    INTERVENTIONAL RADIOLOGY PROCEDURE N/A 10/10/2017    Procedure: Abdominal Aortagram with Runoff;  Surgeon: Kan Pelaez MD;  Location: [facility] CATH INVASIVE LOCATION;  Service:     PACEMAKER IMPLANTATION      around 2008 then replaced 2018      General Information       Row Name 07/23/24 1500          Physical Therapy Time and Intention    Document Type evaluation  -JR     Mode of Treatment physical therapy  -JR       Row Name 07/23/24 1500          General Information    Patient Profile Reviewed yes  -JR     Prior Level of Function independent:;all household mobility  -JR     Existing Precautions/Restrictions fall  -JR     Barriers to Rehab medically complex;previous functional deficit  -JR       Row Name 07/23/24 1500          Living Environment    People in Home spouse  -JR       Row Name 07/23/24 1500          Home Main Entrance    Number of Stairs, Main Entrance none  -JR       Row Name 07/23/24 1500          Stairs Within Home, Primary    Stairs, Within Home, Primary flight  of steps to the bedroom, there is a chair stair lift  -JR     Number of Stairs, Within Home, Primary other (see comments)  flight  -JR       Row Name 07/23/24 1500          Cognition    Orientation Status (Cognition) oriented x 4  -JR       Row Name 07/23/24 1500          Safety Issues, Functional Mobility    Safety Issues Affecting Function (Mobility) safety precautions follow-through/compliance;awareness of need for assistance;insight into deficits/self-awareness  -     Impairments Affecting Function (Mobility) balance;endurance/activity tolerance;strength;postural/trunk control  -               User Key  (r) = Recorded By, (t) = Taken By, (c) = Cosigned By      Initials Name Provider Type    Keisha Sood PT Physical Therapist                   Mobility       Row Name 07/23/24 1500          Bed Mobility    Bed Mobility bed mobility (all) activities  -     All Activities, [place] (Bed Mobility) minimum assist (75% patient effort)  -     Assistive Device (Bed Mobility) head of bed elevated;draw sheet;bed rails  -       Row Name 07/23/24 1500          Sit-Stand Transfer    Sit-Stand [place] (Transfers) minimum assist (75% patient effort)  -     Assistive Device (Sit-Stand Transfers) walker, front-wheeled  -JR       Row Name 07/23/24 1500          Gait/Stairs (Locomotion)    [place] Level (Gait) minimum assist (75% patient effort)  -     Assistive Device (Gait) walker, front-wheeled  -JR     Patient was able to Ambulate yes  -JR     Distance in Feet (Gait) 22  -JR     Deviations/Abnormal Patterns (Gait) antalgic;base of support, wide;festinating/shuffling;stride length decreased  -               User Key  (r) = Recorded By, (t) = Taken By, (c) = Cosigned By      Initials Name Provider Type    Keisha Sood, PT Physical Therapist                   Obj/Interventions       Row Name 07/23/24 1500          Range of Motion Comprehensive    Comment, General Range of Motion BLE grossly WFL   -JR       Row Name 07/23/24 1500          Strength Comprehensive (MMT)    Comment, General Manual Muscle Testing (MMT) Assessment BLE grossly 3+/5  -JR       Row Name 07/23/24 1500          Balance    Balance Assessment sitting static balance;sitting dynamic balance;sit to stand dynamic balance;standing static balance;standing dynamic balance  -JR     Static Sitting Balance contact guard  -JR     Dynamic Sitting Balance minimal assist  -JR     Position, Sitting Balance unsupported;sitting edge of bed  -JR     Sit to Stand Dynamic Balance minimal assist  -JR     Static Standing Balance minimal assist  -JR     Dynamic Standing Balance minimal assist  -JR     Position/Device Used, Standing Balance walker, rolling  -JR               User Key  (r) = Recorded By, (t) = Taken By, (c) = Cosigned By      Initials Name Provider Type    Keisha Sood, PT Physical Therapist                   Goals/Plan       Row Name 07/23/24 1500          Bed Mobility Goal 1 (PT)    Activity/Assistive Device (Bed Mobility Goal 1, PT) bed mobility activities, all  -JR     [place] Level/Cues Needed (Bed Mobility Goal 1, PT) contact guard required  -JR     Time Frame (Bed Mobility Goal 1, PT) short term goal (STG)  -JR     Progress/Outcomes (Bed Mobility Goal 1, PT) goal ongoing  -       Row Name 07/23/24 1500          Transfer Goal 1 (PT)    Activity/Assistive Device (Transfer Goal 1, PT) sit-to-stand/stand-to-sit;bed-to-chair/chair-to-bed  -JR     [place] Level/Cues Needed (Transfer Goal 1, PT) contact guard required  -JR     Time Frame (Transfer Goal 1, PT) long term goal (LTG)  -JR     Progress/Outcome (Transfer Goal 1, PT) goal ongoing  -       Row Name 07/23/24 1500          Gait Training Goal 1 (PT)    Activity/Assistive Device (Gait Training Goal 1, PT) gait (walking locomotion);assistive device use;walker, rolling;increase endurance/gait distance;improve balance and speed;decrease fall risk  -JR     [place] Level  (Gait Training Goal 1, PT) contact guard required  -JR     Time Frame (Gait Training Goal 1, PT) long term goal (LTG)  -JR     Progress/Outcome (Gait Training Goal 1, PT) goal ongoing  -JR       Row Name 07/23/24 1500          Patient Education Goal (PT)    Activity (Patient Education Goal, PT) Perform BLE x 20  -JR     [place]/Cues/Accuracy (Memory Goal 2, PT) demonstrates adequately  -JR     Time Frame (Patient Education Goal, PT) 5 days  -JR     Progress/Outcome (Patient Education Goal, PT) goal ongoing  -JR       Row Name 07/23/24 1500          Therapy Assessment/Plan (PT)    Planned Therapy Interventions (PT) strengthening;balance training;bed mobility training;transfer training;gait training;home exercise program;patient/family education  -               User Key  (r) = Recorded By, (t) = Taken By, (c) = Cosigned By      Initials Name Provider Type    Keisha Sood, PT Physical Therapist                   Clinical Impression       Row Name 07/23/24 1500          Pain    Pretreatment Pain Rating 0/10 - no pain  -JR     Posttreatment Pain Rating 0/10 - no pain  -JR     Pain Intervention(s) Repositioned;Ambulation/increased activity  -JR     Additional Documentation Pain Scale: Numbers Pre/Post-Treatment (Group)  -       Row Name 07/23/24 1500          Plan of Care Review    Plan of Care Reviewed With patient  -JR     Progress no change  -JR     Outcome Evaluation Patient participated well in PT evaluation and demonstrated decreased strength, balance and overall mobility.  She required minimal assistance to perform bed mobility, transfers and gait x 22 feet with RW and verbal cues for posture and hand placement.  She is expected to benefit from additional PT services while hospitalized and upon discharge to home with home health care  -       Row Name 07/23/24 1500          Therapy Assessment/Plan (PT)    Patient/Family Therapy Goals Statement (PT) Patient wants to go home  -JR     Rehab Potential  (PT) good, to achieve stated therapy goals  -     Criteria for Skilled Interventions Met (PT) yes;meets criteria;skilled treatment is necessary  -JR     Therapy Frequency (PT) 5 times/wk  -JR       Row Name 07/23/24 1500          Positioning and Restraints    Pre-Treatment Position in bed  -JR     Post Treatment Position chair  -JR     In Chair reclined;call light within reach;encouraged to call for assist;with family/caregiver;notified nsg  -JR               User Key  (r) = Recorded By, (t) = Taken By, (c) = Cosigned By      Initials Name Provider Type    Keisha Sood, PT Physical Therapist                   Outcome Measures       Row Name 07/23/24 1500 07/23/24 1000       How much help from another person do you currently need...    Turning from your back to your side while in flat bed without using bedrails? 3  -JR --  -ML    Moving from lying on back to sitting on the side of a flat bed without bedrails? 3  -JR --  -ML    Moving to and from a bed to a chair (including a wheelchair)? 3  -JR --  -ML    Standing up from a chair using your arms (e.g., wheelchair, bedside chair)? 2  -JR --  -ML    Climbing 3-5 steps with a railing? 2  -JR --  -ML    To walk in hospital room? 2  -JR --  -ML    AM-PAC 6 Clicks Score (PT) 15  -JR --  -ML    Highest Level of Mobility Goal 4 --> Transfer to chair/commode  -JR --  -ML      Row Name 07/23/24 0800          How much help from another person do you currently need...    Turning from your back to your side while in flat bed without using bedrails? 2  -ML     Moving from lying on back to sitting on the side of a flat bed without bedrails? 2  -ML     Moving to and from a bed to a chair (including a wheelchair)? 1  -ML     Standing up from a chair using your arms (e.g., wheelchair, bedside chair)? 1  -ML     Climbing 3-5 steps with a railing? 1  -ML     To walk in hospital room? 1  -ML     AM-PAC 6 Clicks Score (PT) 8  -ML     Highest Level of Mobility Goal 3 --> Sit at edge  of bed  -       Row Name 07/23/24 1500 07/23/24 1430       Functional Assessment    Outcome Measure Options AM-PAC 6 Clicks Basic Mobility (PT)  - AM-PAC 6 Clicks Daily Activity (OT)  -              User Key  (r) = Recorded By, (t) = Taken By, (c) = Cosigned By      Initials Name Provider Type     Silva Alexander Occupational Therapist    Keisha Sood, PT Physical Therapist    Lynne Workman RN Registered Nurse                                 Physical Therapy Education       Title: PT OT SLP Therapies (In Progress)       Topic: Physical Therapy (In Progress)       Point: Mobility training (Done)       Learning Progress Summary             Patient Acceptance, E,TB, VU,NR by  at 7/23/2024 1911    Comment: Role of PT and POC                         Point: Home exercise program (Not Started)       Learner Progress:  Not documented in this visit.              Point: Body mechanics (Not Started)       Learner Progress:  Not documented in this visit.              Point: Precautions (Not Started)       Learner Progress:  Not documented in this visit.                              User Key       Initials Effective Dates Name Provider Type Discipline     08/22/23 -  Keisha Isidro, PT Physical Therapist PT                  PT Recommendation and Plan  Planned Therapy Interventions (PT): strengthening, balance training, bed mobility training, transfer training, gait training, home exercise program, patient/family education  Plan of Care Reviewed With: patient  Progress: no change  Outcome Evaluation: Patient participated well in PT evaluation and demonstrated decreased strength, balance and overall mobility.  She required minimal assistance to perform bed mobility, transfers and gait x 22 feet with RW and verbal cues for posture and hand placement.  She is expected to benefit from additional PT services while hospitalized and upon discharge to home with home health care     Time Calculation:   PT Evaluation  Complexity  History, PT Evaluation Complexity: 1-2 personal factors and/or comorbidities  Examination of Body Systems (PT Eval Complexity): total of 3 or more elements  Clinical Presentation (PT Evaluation Complexity): evolving  Clinical Decision Making (PT Evaluation Complexity): moderate complexity  Overall Complexity (PT Evaluation Complexity): moderate complexity     PT Charges       Row Name 07/23/24 1500             Time Calculation    Start Time 1500  -JR      PT Received On 07/23/24  -JR      PT Goal Re-Cert Due Date 08/02/24  -JR         Untimed Charges    PT Eval/Re-eval Minutes 55  -JR         Total Minutes    Untimed Charges Total Minutes 55  -JR       Total Minutes 55  -JR                User Key  (r) = Recorded By, (t) = Taken By, (c) = Cosigned By      Initials Name Provider Type    Keisha Sood, PT Physical Therapist                  Therapy Charges for Today       Code Description Service Date Service Provider Modifiers Qty    50272591611 HC PT EVAL MOD COMPLEXITY 4 7/23/2024 Keisha Isidro, PT GP 1            PT G-Codes  Outcome Measure Options: AM-PAC 6 Clicks Basic Mobility (PT)  AM-PAC 6 Clicks Score (PT): 15  AM-PAC 6 Clicks Score (OT): 16  PT Discharge Summary  Anticipated Discharge Disposition (PT): home with home health    Keisha Isidro, PT  7/23/2024

## 2024-07-23 NOTE — PHARMACY RECOMMENDATION
"Pharmacy Consult-Vancomycin Dosing  Lynne Sanchez is a  89 y.o. female receiving vancomycin therapy.     Indication: SSTI  Consulting Provider: Dr. RODNEY Stark    Goal Trough: 15 to 20 mcg/mL  Goal AUC: 400 to 600 (mg/L)×hr    Current Antimicrobial Therapy  Anti-Infectives (From admission, onward)      Ordered     Dose/Rate Route Frequency Start Stop    07/22/24 1835  vancomycin (dosing per levels)        Ordering Provider: Criag Satrk DO     Does not apply Daily 07/23/24 0900 07/28/24 0859    07/22/24 2257  cefTRIAXone (ROCEPHIN) 2,000 mg in sodium chloride 0.9 % 100 mL IVPB-VTB        Ordering Provider: Isaías Mccallum MD    2,000 mg  200 mL/hr over 30 Minutes Intravenous Every 24 Hours 07/23/24 0900 07/27/24 0859    07/23/24 0757  vancomycin 750 mg in sodium chloride 0.9 % 250 mL IVPB-VTB        Ordering Provider: Craig Stark DO    750 mg  333.3 mL/hr over 45 Minutes Intravenous Every 24 Hours 07/23/24 0900 07/28/24 0859    07/22/24 1831  vancomycin (VANCOCIN) 1,000 mg in sodium chloride 0.9 % 250 mL IVPB-VTB        Ordering Provider: Craig Stark DO   Placed in \"And\" Linked Group    1,000 mg  250 mL/hr over 60 Minutes Intravenous Once 07/22/24 1847 07/22/24 2051 07/22/24 1831  cefepime 2000 mg IVPB in 100 mL NS (VTB)        Ordering Provider: Craig Stark DO    2,000 mg  over 30 Minutes Intravenous Once 07/22/24 1847 07/1934    07/22/24 1831  Pharmacy to dose vancomycin        Ordering Provider: Craig Stark DO   Placed in \"And\" Linked Group     Does not apply Continuous PRN 07/22/24 1830 07/27/24 1829            Allergies  Allergies as of 07/22/2024 - Reviewed 07/22/2024   Allergen Reaction Noted    Phenergan [promethazine hcl] Confusion 10/09/2017    Zosyn [piperacillin-tazobactam in dex] Rash 06/24/2024       Labs    Results from last 7 days   Lab Units 07/23/24  0624 07/22/24  1607 07/18/24  1902   BUN mg/dL 28* 31* 20   CREATININE mg/dL 1.22* 1.39* 1.44*       Results from last " "7 days   Lab Units 07/23/24  0624 07/22/24  1607 07/18/24  1902   WBC 10*3/mm3 11.95* 13.19* 14.36*       Evaluation of Dosing     Last Dose Received in the ED/Outside Facility: Vancomycin 1000mg  Is Patient on Dialysis or Renal Replacement: No    Ht - 167.6 cm (66\")  Wt - 60.4 kg (133 lb 2.5 oz)    Estimated Creatinine Clearance: 29.8 mL/min (A) (by C-G formula based on SCr of 1.22 mg/dL (H)).    Intake & Output (last 3 days)       None            Microbiology and Radiology  Microbiology Results (last 10 days)       Procedure Component Value - Date/Time    MRSA Screen, PCR (Inpatient) - Swab, Nares [[id number]]  (Normal) Collected: 07/22/24 2023    Lab Status: Final result Specimen: Swab from Nares Updated: 07/23/24 0315     MRSA PCR No MRSA Detected    Narrative:      The negative predictive value of this diagnostic test is high and should only be used to consider de-escalating anti-MRSA therapy. A positive result may indicate colonization with MRSA and must be correlated clinically.    Blood Culture With JUANCHO - Blood, Hand, Left [[id number]]  (Normal) Collected: 07/22/24 1850    Lab Status: Preliminary result Specimen: Blood from Hand, Left Updated: 07/23/24 0700     Blood Culture No growth at less than 24 hours    Blood Culture With JUANCHO - Blood, Hand, Right [[id number]]  (Normal) Collected: 07/22/24 1850    Lab Status: Preliminary result Specimen: Blood from Hand, Right Updated: 07/23/24 0700     Blood Culture No growth at less than 24 hours            Vancomycin Levels:    Results from last 7 days   Lab Units 07/23/24  0624   VANCOMYCIN RM mcg/mL 10.10                   [facility] AUC Calculation     New dose/frequency: 750 mg q 24 hrs     New AUC:   498 (mg/L)×hr     New C(trough): 16.2 mcg/mL      Assessment/Plan    Pharmacy to dose vancomycin for SSTI. Goal trough 15-20 mcg/mL. Goal  to 600 (mg/L)×hr.  Patient currently receiving vancomycin dosed per level.  Renal function has improved to the point of " being able to give vancomycin based on scheduled dosing.  Will initiate vancomycin 750 mg IV q 24 hrs.  Assess clearance by vancomycin random level on 7/24 0600.  Pharmacy will continue to monitor renal function, cultures and sensitivities, and clinical status to adjust regimen as necessary.      Thank you for the opportunity to consult on this patient.    Uriel Franks [facility], Pharm.D.  07/23/24  07:58 EDT

## 2024-07-23 NOTE — PLAN OF CARE
Goal Outcome Evaluation:  Plan of Care Reviewed With: patient        Progress: no change  Outcome Evaluation: Pt seen for OT evaluation today.  Pt presents with weakness and decreased independence with self care tasks.  Pt min assist to sit eob, min assist to stand and min assist to walk 3' using RW.  Pt min/mod assist for bathing, dressing and toileting.  Pt is expected to benefit from skilled OT to improve her strength, activity tolerance and independence with ADL tasks.      Anticipated Discharge Disposition (OT): home with home health

## 2024-07-23 NOTE — H&P
[facility]   HISTORY AND PHYSICAL      Name:  Lynne Sanchez   Age:  89 y.o.  Sex:  female  :  1934  MRN:  [id number]   Visit Number:  [id number]  Admission Date:  2024  Date Of Service:  24  Primary Care Physician:  Elizabeth Easton APRN    Chief Complaint:     Generalized weakness, right upper extremity pain and swelling.    History Of Presenting Illness:      Lynne Sanchez is an 89-year-old female with history of hypertension, diabetes mellitus type 2, hypothyroidism, sick sinus syndrome status post pacemaker was brought to the emergency room by family with multiple complaints including right hand pain and swelling.  Patient was in the emergency room on 2024 secondary to a fall and hitting her back and hands.  At that time, she was started to have cellulitis of the hand and possibly gouty arthritis.  She was given NSAIDs, steroids, dose of cefazolin and was discharged on Keflex.  According to family, patient has not improved since then and has become more weak and is unable to ambulate at this time.  Patient states that he lives with her .    In the emergency room, she was afebrile and hemodynamically stable saturating at 96% on room air.  She does have chronic elevated troponin levels.  proBNP 16,085.  CMP was unremarkable except for a creatinine of 1.39 (baseline).  Lactic acid was within normal limits but elevated C-reactive protein of 4.75 and procalcitonin of 0.35.  WBC 13, hemoglobin 9 (baseline around 8).  ESR was 9.  Blood cultures were drawn in the emergency room.  Portable chest x-ray showed chronic appearing parenchymal changes without any acute infiltrate.  Venous duplex of the right upper extremity showed superficial venous thrombosis involving a portion of the cephalic vein.  No evidence of DVT noted.  Patient was given cefepime and vancomycin in the emergency room for suspected right upper extremity cellulitis and was subsequently  admitted to the medical floor with telemetry.    Review Of Systems:    All systems were reviewed and negative except as mentioned in history of presenting illness, assessment and plan.    Past Medical History: Patient  has a past medical history of Acute deep vein thrombosis of left upper extremity, Anemia, Asthma, CHF (congestive heart failure), Chronic atrial fibrillation, Deep venous thrombosis of left upper limb, Diabetes mellitus, type 2, Diarrhea, GERD (gastroesophageal reflux disease), Glaucoma, H/O transfusion of whole blood, Heart attack, Heart murmur, History of transfusion, Hyperlipidemia, Hypertension, Kidney disease, Osteomyelitis, Osteomyelitis of left foot (10/03/2017), Peripheral vascular disease, Right retinal detachment, Secondary cataract of both eyes, Stable proliferative diabetic retinopathy of both eyes, Stroke, Swelling of left extremity, Urinary tract infection, and Wears glasses.    Past Surgical History: Patient  has a past surgical history that includes Appendectomy; Esophagogastroduodenoscopy (N/A, 10/9/2017); Cardiac catheterization (N/A, 10/10/2017); Interventional radiology procedure (N/A, 10/10/2017); Cardiac catheterization (N/A, 10/10/2017); Cardiac catheterization (N/A, 10/10/2017); Colonoscopy; Cardioversion; Cardiac Ablation; Pacemaker Implantation; Bone marrow aspiration; Cardiac electrophysiology procedure (N/A, 5/3/2018); Finger amputation (Left, 7/5/2018); Eye surgery (Bilateral); Finger amputation (Left, 8/27/2018); Esophagogastroduodenoscopy (N/A, 3/22/2022); and Esophagogastroduodenoscopy (N/A, 8/4/2023).    Social History: Patient  reports that she has never smoked. She has never been exposed to tobacco smoke. She has never used smokeless tobacco. She reports that she does not drink alcohol and does not use drugs.    Family History:  Patient family history includes Arthritis in an other family member; No Known Problems in her father and mother.     Allergies:       Phenergan [promethazine hcl] and Zosyn [piperacillin-tazobactam in dex]    Home Medications:    Prior to Admission Medications       Prescriptions Last Dose Informant Patient Reported? Taking?    acetaminophen (TYLENOL) 650 MG 8 hr tablet   Yes No    Take 1 tablet by mouth Every 8 (Eight) Hours As Needed for Mild Pain or Moderate Pain. Indications: Pain    aspirin 81 MG EC tablet   Yes No    Take 1 tablet by mouth Daily.    atorvastatin (LIPITOR) 40 MG tablet   Yes No    1 tab daily by mouth    cephalexin (KEFLEX) 500 MG capsule   No No    Take 1 capsule by mouth 3 (Three) Times a Day for 9 days.    clopidogrel (PLAVIX) 75 MG tablet   Yes No    Take 75 mg by mouth Daily. Indications: anti-platelet    epoetin dorcas-epbx (Retacrit) 44108 UNIT/ML injection   Yes No    Inject 1 mL under the skin into the appropriate area as directed. 1 ml sq per month on day 17  Indications: Anemia, Anemia associated with Chronic Kidney Failure    ferrous gluconate (FERGON) 324 MG tablet   No No    Take 1 tablet by mouth Daily With Breakfast for 30 days.    furosemide (LASIX) 20 MG tablet   No No    Take 1 tablet by mouth Daily. Indications: Cardiac Failure    glimepiride (AMARYL) 2 MG tablet   No No    Take 1 tablet by mouth Every Morning Before Breakfast. Indications: Type 2 Diabetes    glucose blood test strip   No No    1 each by Other route Daily. Use as instructed once a day as directed, for Truemetrix reader    levothyroxine (SYNTHROID, LEVOTHROID) 50 MCG tablet   No No    Take 1 tablet by mouth Every Morning. Indications: Underactive Thyroid    metoprolol tartrate (LOPRESSOR) 100 MG tablet   No No    Take 1 tablet by mouth 2 (Two) Times a Day. Indications: High Blood Pressure Disorder    multivitamin with minerals tablet tablet   Yes No    Take 1 tablet by mouth Daily. Indications: Supplement    naproxen (EC NAPROSYN) 375 MG tablet delayed-release EC tablet   No No    Take 1 tablet by mouth 2 (Two) Times a Day With Meals for  "7 days.    pantoprazole (PROTONIX) 40 MG EC tablet   No No    Take 1 tablet by mouth Daily. Indications: Gastroesophageal Reflux Disease    saccharomyces boulardii (FLORASTOR) 250 MG capsule   Yes No    Tradjenta 5 MG tablet tablet   No No    TAKE 1 TABLET EVERY DAY    Patient taking differently:  Take 1 tablet by mouth Daily.     ED Medications:    Medications   sodium chloride 0.9 % flush 10 mL (has no administration in time range)   vancomycin (VANCOCIN) 1,000 mg in sodium chloride 0.9 % 250 mL IVPB-VTB (0 mg Intravenous Stopped 7/22/24 2051)     And   Pharmacy to dose vancomycin (has no administration in time range)   vancomycin (dosing per levels) (has no administration in time range)   cefepime 2000 mg IVPB in 100 mL NS (VTB) (0 mg Intravenous Stopped 7/1934)     Vital Signs:  Temp:  [97.7 °F (36.5 °C)] 97.7 °F (36.5 °C)  Heart Rate:  [69-78] 70  Resp:  [12-18] 18  BP: (101-145)/(42-85) 114/48        07/22/24  1516   Weight: 53.1 kg (117 lb)     Body mass index is 18.88 kg/m².    Physical Exam:     Most recent vital Signs: /48   Pulse 70   Temp 97.7 °F (36.5 °C) (Oral)   Resp 18   Ht 167.6 cm (66\")   Wt 53.1 kg (117 lb)   SpO2 98%   BMI 18.88 kg/m²     Physical Exam  Constitutional:       General: She is not in acute distress.     Appearance: She is ill-appearing.      Comments: Elderly frail female.   HENT:      Head: Normocephalic and atraumatic.      Right Ear: External ear normal.      Left Ear: External ear normal.      Nose: Nose normal.      Mouth/Throat:      Mouth: Mucous membranes are moist.   Eyes:      Extraocular Movements: Extraocular movements intact.      Conjunctiva/sclera: Conjunctivae normal.   Cardiovascular:      Rate and Rhythm: Normal rate and regular rhythm.      Pulses: Normal pulses.      Heart sounds: Normal heart sounds.      Comments: Pacemaker palpated on the left upper chest.  Pulmonary:      Effort: Pulmonary effort is normal.      Breath sounds: No wheezing " or rales.   Abdominal:      General: Bowel sounds are normal.      Palpations: Abdomen is soft.      Tenderness: There is no abdominal tenderness. There is no guarding or rebound.   Musculoskeletal:      Cervical back: Neck supple.      Right lower leg: No edema.      Left lower leg: No edema.      Comments: Moves all 4 limbs equally but does have generalized weakness.  Surgical bandage noted on the right foot.  Patient does have recent history of toe amputations.   Skin:     General: Skin is warm.      Findings: Bruising and erythema present.      Comments: Redness/erythema noted in the right upper extremity with edema.  Bilateral upper extremity ecchymotic patches noted.  Large ecchymotic patch noted on the left leg anteriorly.   Neurological:      Mental Status: She is alert. Mental status is at baseline.      Comments: Alert and answering questions appropriately.  Moves all 4 limbs equally but does have generalized weakness.   Psychiatric:         Mood and Affect: Mood normal.         Behavior: Behavior normal.       Laboratory data:    I have reviewed the labs done in the emergency room.    Results from last 7 days   Lab Units 07/22/24  1607 07/18/24  1902   SODIUM mmol/L 136 140   POTASSIUM mmol/L 4.5 3.8   CHLORIDE mmol/L 99 101   CO2 mmol/L 25.4 22.7   BUN mg/dL 31* 20   CREATININE mg/dL 1.39* 1.44*   CALCIUM mg/dL 8.7 8.7   BILIRUBIN mg/dL 0.4 0.8   ALK PHOS U/L 104 119*   ALT (SGPT) U/L 7 <5   AST (SGOT) U/L 28 18   GLUCOSE mg/dL 65 167*     Results from last 7 days   Lab Units 07/22/24  1607 07/18/24  1902   WBC 10*3/mm3 13.19* 14.36*   HEMOGLOBIN g/dL 8.9* 8.3*   HEMATOCRIT % 29.5* 27.3*   PLATELETS 10*3/mm3 253 236         Results from last 7 days   Lab Units 07/22/24  1817 07/22/24  1607   HSTROP T ng/L 78* 91*     Results from last 7 days   Lab Units 07/22/24  1607   PROBNP pg/mL 16,085.0*     EKG:      EKG done in the emergency room was reviewed by me.  It shows ventricular paced rhythm at 69 bpm.   Right axis deviation noted with broad QRS complexes.  Nonspecific ST-T changes were noted.    Radiology:    Portable chest x-ray done in the emergency room was reviewed by me.  It shows chronic appearing parenchymal changes without any acute infiltrates.  Pacemaker noted on the left upper chest.  Official report is currently pending.  X-ray of the right hand was reviewed by me and I did not see any obvious fractures but an official report is currently pending.    US Venous Doppler Upper Extremity Right (duplex)    Result Date: 7/22/2024  FINAL REPORT TECHNIQUE: Multiple transverse and longitudinal images were performed of the deep venous system with compression maneuvers. CLINICAL HISTORY: RUE pain, redness, swelling FINDINGS: Right upper extremity duplex ultrasound demonstrates normal flow and compressibility in the deep venous system.  There is lack of compressibility within the cephalic vein consistent with superficial venous thrombus.     SVT involving a portion of the cephalic vein.  No evidence of DVT. Authenticated and Electronically Signed by Moisés Zamora M.D. on 07/22/2024 08:16:00 PM     Assessment:    Right upper extremity cellulitis, POA.  Right upper extremity superficial vein thrombosis, POA.  Diabetes mellitus type 2 with nephropathy.  Chronic kidney disease stage III.  Chronic elevated troponin secondary to #4.  Sick sinus syndrome status post pacemaker.  Essential hypertension.  Hypothyroidism.    Plan:    Right upper extremity cellulitis.  - Patient does seem to have redness in the right upper extremity with swelling.  - We will treated with IV antibiotics therapy with ceftriaxone.  - Hold off on MRSA coverage at this time.  - Obtain nasal swab for MRSA.  - Blood cultures have been done in the emergency room.  - Lactobacillus supplements.    CKD stage III.  - Renal function is at baseline.  - She has chronic elevated troponin secondary to chronic kidney disease.    Diabetes mellitus type 2.  -  Continue subcutaneous insulin protocol for coverage.    Consult physical and Occupational Therapy due to generalized weakness.    Risk Assessment: Moderate  DVT Prophylaxis: Enoxaparin  Code Status: Full  Diet: Diabetic      Isaías Mccallum MD  07/22/24  20:59 EDT    Dictated utilizing Dragon dictation.

## 2024-07-23 NOTE — CASE MANAGEMENT/SOCIAL WORK
Discharge Planning Assessment  [facility]     Patient Name: Lynne Sanchez  MRN: [id number]  Today's Date: 7/23/2024    Admit Date: 7/22/2024    Plan: DCP is to return home with family and HH. Pt is opent to STR if provider feels she needs it. Spoke with pt at bedside. Permission given to discuss DCP with  Brennan and daughter Janneth. Pt confirmed demographics. States yes to Living Will/POA on file. PCP; Elizabeth KHAN, pharmacy is [facility]. Agreed to meds to bed. Pt has a walker, wheelchair and shower at home, no new DME needs anticipated at this time. Pt states is needs some assitance with ADL's and mobility. Lives with , daughter assists as needed. Pt denies financial strain or food insecurity. Pt states she could use a little more help at home. Information given to pt on local agencies that can provide self pay private caregivers. Family to transport home when medically stable for discharge.   Discharge Needs Assessment       Row Name 07/23/24 1508       Living Environment    People in Home spouse    Name(s) of People in Home Brennan    Current Living Arrangements home    Duration at Residence 54 years    Potentially Unsafe Housing Conditions none    In the past 12 months has the electric, gas, oil, or water company threatened to shut off services in your home? No    Primary Care Provided by self    Provides Primary Care For no one    Family Caregiver if Needed spouse;child(cathie), adult    Family Caregiver Names Daughter Janneth and  Brennan    Quality of Family Relationships helpful;involved;supportive    Able to Return to Prior Arrangements yes       Resource/Environmental Concerns    Resource/Environmental Concerns none    Transportation Concerns none       Transportation Needs    In the past 12 months, has lack of transportation kept you from medical appointments or from getting medications? no    In the past 12 months, has lack of transportation kept you from meetings, work, or  from getting things needed for daily living? No       Food Insecurity    Within the past 12 months, you worried that your food would run out before you got the money to buy more. Never true    Within the past 12 months, the food you bought just didn't last and you didn't have money to get more. Never true       Transition Planning    Patient/Family Anticipates Transition to home with help/services    Patient/Family Anticipated Services at Transition home health care    Transportation Anticipated health plan transportation       Discharge Needs Assessment    Readmission Within the Last 30 Days no previous admission in last 30 days    Equipment Currently Used at Home walker, standard;shower chair;wheelchair    Concerns to be Addressed discharge planning    Anticipated Changes Related to Illness none    Equipment Needed After Discharge none    Outpatient/Agency/Support Group Needs homecare agency                   Discharge Plan       Row Name 07/23/24 6035       Plan    Plan DCP is to return home with family and HH.Pt has no preference for HH agencies. Pt is open to STR if provider feels she needs it. Spoke with pt at bedside. Permission given to discuss DCP with  Brennan and daughter Janneth. Pt confirmed demographics. States yes to Living Will/POA on file. PCP; Elizabeth KHAN, pharmacy is [facility]. Agreed to meds to bed. Pt has a walker, wheelchair and shower at home, no new DME needs anticipated at this time. Pt states is needs some assitance with ADL's and mobility. Lives with , daughter assists as needed. Pt denies financial strain or food insecurity. Pt states she could use a little more help at home. Information given to pt on local agencies that can provide self pay private caregivers. Family to transport home when medically stable for discharge.                  Continued Care and Services - Admitted Since 7/22/2024    No active coordination exists for this encounter.       Selected Continued  Care - Prior Encounters Includes continued care and service providers with selected services from prior encounters from 4/23/2024 to 7/23/2024      Discharged on 6/25/2024 Admission date: 6/21/2024 - Discharge disposition: Home-Health Care Svc      Home Medical Care       Service Provider Selected Services Address Phone Fax Patient Preferred     [facility] [address] [facility] [id number] [phone] --       Internal Comment last updated by Brionna Schaefer, RN 6/24/2024 1326    Current with services                                        Demographic Summary    No documentation.                  Functional Status       Row Name 07/23/24 1506       Functional Status    Usual Activity Tolerance moderate    Current Activity Tolerance fair       Physical Activity    On average, how many days per week do you engage in moderate to strenuous exercise (like a brisk walk)? 0 days    On average, how many minutes do you engage in exercise at this level? 0 min    Number of minutes of exercise per week 0       Assessment of Health Literacy    How often do you have someone help you read hospital materials? Never    How often do you have problems learning about your medical condition because of difficulty understanding written information? Never    How often do you have a problem understanding what is told to you about your medical condition? Never    How confident are you filling out medical forms by yourself? Extremely    Health Literacy Excellent       Functional Status, IADL    Meal Preparation independent    Housekeeping independent    Laundry independent    Shopping independent       Mental Status    General Appearance WDL WDL       Mental Status Summary    Recent Changes in Mental Status/Cognitive Functioning no changes       Employment/    Employment Status retired                   Psychosocial    No documentation.                  Abuse/Neglect    No  documentation.                  Legal    No documentation.                  Substance Abuse    No documentation.                  Patient Forms    No documentation.                     Lisbeth Sierra, SHAD

## 2024-07-23 NOTE — PLAN OF CARE
Goal Outcome Evaluation:  Plan of Care Reviewed With: patient        Progress: no change  Outcome Evaluation: Patient participated well in PT evaluation and demonstrated decreased strength, balance and overall mobility.  She required minimal assistance to perform bed mobility, transfers and gait x 22 feet with RW and verbal cues for posture and hand placement.  She is expected to benefit from additional PT services while hospitalized and upon discharge to home with home health care      Anticipated Discharge Disposition (PT): home with home health

## 2024-07-23 NOTE — PROGRESS NOTES
Malnutrition Severity Assessment      Patient meets criteria for : Moderate (non-severe) Malnutrition    Pt meets malnutrition criteria based on NFPE, insufficient PO intake, moderate muscle wasting, moderate subcutaneous fat loss, and fluid accumulation. Pt reports having wt loss over the last month. Pt reports weighing 126# and getting down to 117#. Wt loss is most likely masked by fluid accumulation. RD will add mighty shake to help promote PO intake.   Malnutrition Type (Last 8 Hours)       Malnutrition Severity Assessment       Row Name 07/23/24 1052       Malnutrition Severity Assessment    Malnutrition Type Chronic Disease - Related Malnutrition      Row Name 07/23/24 1052       Insufficient Energy Intake     Insufficient Energy Intake Findings Moderate    Insufficient Energy Intake  <75% of est. energy requirement for > or equal to 1 month      Row Name 07/23/24 1052       Muscle Loss    Loss of Muscle Mass Findings Moderate    Latter day Region Moderate - slight depression    Clavicle Bone Region Moderate - some protrusion in females, visible in males    Acromion Bone Region Moderate - acromion may slightly protrude    Dorsal Hand Region Severe - prominent depression    Patellar Region Moderate - patella more prominent, less muscle definition around patella    Anterior Thigh Region Moderate - mild depression on inner thigh      Row Name 07/23/24 1052       Fat Loss    Subcutaneous Fat Loss Findings Moderate    Orbital Region  Severe - pronounced hollowness/depression, dark circles, loose saggy skin    Upper Arm Region Moderate - some fat tissue, not ample      Row Name 07/23/24 1052       Fluid Accumulation (Edema)    Fluid Acumulation Findings Severe    Fluid Accumulation  Severe equals 3+ or 4+ pitting edema      Row Name 07/23/24 1052       Criteria Met (Must meet criteria for severity in at least 2 of these categories: M Wasting, Fat Loss, Fluid, Secondary Signs, Wt. Status, Intake)    Patient meets  criteria for  Moderate (non-severe) Malnutrition

## 2024-07-24 ENCOUNTER — HOME CARE VISIT (OUTPATIENT)
Dept: HOME HEALTH SERVICES | Facility: HOME HEALTHCARE | Age: 89
End: 2024-07-24
Payer: MEDICARE

## 2024-07-24 VITALS
TEMPERATURE: 97.3 F | DIASTOLIC BLOOD PRESSURE: 70 MMHG | SYSTOLIC BLOOD PRESSURE: 124 MMHG | OXYGEN SATURATION: 98 % | RESPIRATION RATE: 16 BRPM

## 2024-07-24 VITALS
HEART RATE: 75 BPM | RESPIRATION RATE: 16 BRPM | OXYGEN SATURATION: 94 % | DIASTOLIC BLOOD PRESSURE: 80 MMHG | TEMPERATURE: 98.5 F | SYSTOLIC BLOOD PRESSURE: 130 MMHG

## 2024-07-24 LAB
ANION GAP SERPL CALCULATED.3IONS-SCNC: 10.3 MMOL/L (ref 5–15)
BUN SERPL-MCNC: 35 MG/DL (ref 8–23)
BUN/CREAT SERPL: 23.3 (ref 7–25)
CALCIUM SPEC-SCNC: 8 MG/DL (ref 8.6–10.5)
CHLORIDE SERPL-SCNC: 105 MMOL/L (ref 98–107)
CO2 SERPL-SCNC: 22.7 MMOL/L (ref 22–29)
CREAT SERPL-MCNC: 1.5 MG/DL (ref 0.57–1)
DEPRECATED RDW RBC AUTO: 65.7 FL (ref 37–54)
EGFRCR SERPLBLD CKD-EPI 2021: 33.2 ML/MIN/1.73
ERYTHROCYTE [DISTWIDTH] IN BLOOD BY AUTOMATED COUNT: 19.9 % (ref 12.3–15.4)
GLUCOSE BLDC GLUCOMTR-MCNC: 205 MG/DL (ref 70–130)
GLUCOSE BLDC GLUCOMTR-MCNC: 219 MG/DL (ref 70–130)
GLUCOSE BLDC GLUCOMTR-MCNC: 266 MG/DL (ref 70–130)
GLUCOSE BLDC GLUCOMTR-MCNC: 89 MG/DL (ref 70–130)
GLUCOSE SERPL-MCNC: 78 MG/DL (ref 65–99)
HCT VFR BLD AUTO: 24.9 % (ref 34–46.6)
HGB BLD-MCNC: 7.4 G/DL (ref 12–15.9)
MCH RBC QN AUTO: 27.5 PG (ref 26.6–33)
MCHC RBC AUTO-ENTMCNC: 29.7 G/DL (ref 31.5–35.7)
MCV RBC AUTO: 92.6 FL (ref 79–97)
PLATELET # BLD AUTO: 198 10*3/MM3 (ref 140–450)
PMV BLD AUTO: 9.8 FL (ref 6–12)
POTASSIUM SERPL-SCNC: 4 MMOL/L (ref 3.5–5.2)
RBC # BLD AUTO: 2.69 10*6/MM3 (ref 3.77–5.28)
SODIUM SERPL-SCNC: 138 MMOL/L (ref 136–145)
WBC NRBC COR # BLD AUTO: 15.96 10*3/MM3 (ref 3.4–10.8)

## 2024-07-24 PROCEDURE — 82948 REAGENT STRIP/BLOOD GLUCOSE: CPT | Performed by: INTERNAL MEDICINE

## 2024-07-24 PROCEDURE — G0378 HOSPITAL OBSERVATION PER HR: HCPCS

## 2024-07-24 PROCEDURE — 99232 SBSQ HOSP IP/OBS MODERATE 35: CPT | Performed by: INTERNAL MEDICINE

## 2024-07-24 PROCEDURE — 80048 BASIC METABOLIC PNL TOTAL CA: CPT | Performed by: INTERNAL MEDICINE

## 2024-07-24 PROCEDURE — 97110 THERAPEUTIC EXERCISES: CPT

## 2024-07-24 PROCEDURE — 82948 REAGENT STRIP/BLOOD GLUCOSE: CPT

## 2024-07-24 PROCEDURE — 63710000001 INSULIN LISPRO (HUMAN) PER 5 UNITS: Performed by: INTERNAL MEDICINE

## 2024-07-24 PROCEDURE — 97116 GAIT TRAINING THERAPY: CPT

## 2024-07-24 PROCEDURE — 85027 COMPLETE CBC AUTOMATED: CPT | Performed by: INTERNAL MEDICINE

## 2024-07-24 PROCEDURE — 25010000002 CEFTRIAXONE PER 250 MG: Performed by: INTERNAL MEDICINE

## 2024-07-24 RX ORDER — FUROSEMIDE 20 MG/1
20 TABLET ORAL DAILY
Status: DISCONTINUED | OUTPATIENT
Start: 2024-07-24 | End: 2024-07-28

## 2024-07-24 RX ADMIN — APIXABAN 10 MG: 5 TABLET, FILM COATED ORAL at 10:28

## 2024-07-24 RX ADMIN — PANTOPRAZOLE SODIUM 40 MG: 40 TABLET, DELAYED RELEASE ORAL at 09:48

## 2024-07-24 RX ADMIN — INSULIN LISPRO 4 UNITS: 100 INJECTION, SOLUTION INTRAVENOUS; SUBCUTANEOUS at 21:15

## 2024-07-24 RX ADMIN — CEFTRIAXONE SODIUM 2000 MG: 2 INJECTION, POWDER, FOR SOLUTION INTRAMUSCULAR; INTRAVENOUS at 09:50

## 2024-07-24 RX ADMIN — Medication 10 ML: at 21:16

## 2024-07-24 RX ADMIN — INSULIN LISPRO 3 UNITS: 100 INJECTION, SOLUTION INTRAVENOUS; SUBCUTANEOUS at 11:50

## 2024-07-24 RX ADMIN — LEVOTHYROXINE SODIUM 50 MCG: 50 TABLET ORAL at 05:28

## 2024-07-24 RX ADMIN — Medication 1 PACKET: at 10:09

## 2024-07-24 RX ADMIN — Medication 1 CAPSULE: at 21:16

## 2024-07-24 RX ADMIN — CLOPIDOGREL BISULFATE 75 MG: 75 TABLET ORAL at 10:28

## 2024-07-24 RX ADMIN — Medication 10 ML: at 09:50

## 2024-07-24 RX ADMIN — ATORVASTATIN CALCIUM 40 MG: 40 TABLET, FILM COATED ORAL at 09:49

## 2024-07-24 RX ADMIN — APIXABAN 10 MG: 5 TABLET, FILM COATED ORAL at 21:16

## 2024-07-24 RX ADMIN — Medication 1 PACKET: at 21:16

## 2024-07-24 RX ADMIN — SENNOSIDES AND DOCUSATE SODIUM 2 TABLET: 50; 8.6 TABLET ORAL at 21:16

## 2024-07-24 RX ADMIN — POLYETHYLENE GLYCOL 3350 17 G: 17 POWDER, FOR SOLUTION ORAL at 21:16

## 2024-07-24 RX ADMIN — Medication 1 CAPSULE: at 09:56

## 2024-07-24 RX ADMIN — INSULIN LISPRO 3 UNITS: 100 INJECTION, SOLUTION INTRAVENOUS; SUBCUTANEOUS at 17:09

## 2024-07-24 RX ADMIN — SENNOSIDES AND DOCUSATE SODIUM 2 TABLET: 50; 8.6 TABLET ORAL at 09:49

## 2024-07-24 NOTE — PROGRESS NOTES
[facility]    Name:  Lynne Sanchez   Age:  89 y.o.  Sex:  female  :  1934  MRN:  [id number]   Visit Number:  [id number]  Admission Date:  2024  Date Of Service:  24  Primary Care Physician:  Elizabeth Easton APRN     LOS: 0 days :    Chief Complaint:      Generalized weakness, right upper extremity pain and swelling.    Subjective:    Patient evaluated today. Patient resting comfortably in bedside chair.  Very pleasant.  Denies active complaints.      Hospital Course:    Lynne Sanchez is an 89-year-old female with history of hypertension, diabetes mellitus type 2, hypothyroidism, sick sinus syndrome status post pacemaker was brought to the emergency room by family with multiple complaints including right hand pain and swelling.  Patient was in the emergency room on 2024 secondary to a fall and hitting her back and hands.  At that time, she was started to have cellulitis of the hand and possibly gouty arthritis.  She was given NSAIDs, steroids, dose of cefazolin and was discharged on Keflex.  According to family, patient has not improved since then and has become more weak and is unable to ambulate at this time.  Patient states that he lives with her .     In the emergency room, she was afebrile and hemodynamically stable saturating at 96% on room air.  She does have chronic elevated troponin levels.  proBNP 16,085.  CMP was unremarkable except for a creatinine of 1.39 (baseline).  Lactic acid was within normal limits but elevated C-reactive protein of 4.75 and procalcitonin of 0.35.  WBC 13, hemoglobin 9 (baseline around 8).  ESR was 9.  Blood cultures were drawn in the emergency room.  Portable chest x-ray showed chronic appearing parenchymal changes without any acute infiltrate.  Venous duplex of the right upper extremity showed superficial venous thrombosis involving a portion of the cephalic vein.  No evidence of DVT noted.   Patient was given cefepime and vancomycin in the emergency room for suspected right upper extremity cellulitis and was subsequently admitted to the medical floor with telemetry.    Review of Systems:     All systems were reviewed and negative except as mentioned in subjective, assessment and plan.    Vital Signs:    Temp:  [97.6 °F (36.4 °C)-98.6 °F (37 °C)] 98.2 °F (36.8 °C)  Heart Rate:  [70-77] 71  Resp:  [18] 18  BP: (100-110)/(36-43) 108/42    Intake and output:    I/O last 3 completed shifts:  In: 840 [P.O.:840]  Out: -   I/O this shift:  In: 600 [P.O.:600]  Out: -     Physical Examination: Examined again 7/24/24    General Appearance:  Alert and cooperative.    Head:  Atraumatic and normocephalic.   Eyes: Conjunctivae and sclerae normal, no icterus. No pallor.   Throat: No oral lesions, no thrush, oral mucosa moist.   Neck: Supple, trachea midline, no thyromegaly.   Lungs:   Breath sounds heard bilaterally equally.  No wheezing or crackles. No Pleural rub or bronchial breathing.   Heart:  Normal S1 and S2, no murmur, No JVD.   Abdomen:   Normal bowel sounds, no masses, no organomegaly. Soft, nontender, nondistended, no rebound tenderness.   Extremities: Right upper extremity redness and swelling   Skin: No bleeding or rash.   Neurologic: Alert and oriented x 3. No facial asymmetry. Moves all four limbs. No tremors.      Laboratory results:    Results from last 7 days   Lab Units 07/24/24  0540 07/23/24  0624 07/22/24  1607 07/18/24  1902   SODIUM mmol/L 138 142 136 140   POTASSIUM mmol/L 4.0 3.8 4.5 3.8   CHLORIDE mmol/L 105 105 99 101   CO2 mmol/L 22.7 23.9 25.4 22.7   BUN mg/dL 35* 28* 31* 20   CREATININE mg/dL 1.50* 1.22* 1.39* 1.44*   CALCIUM mg/dL 8.0* 8.0* 8.7 8.7   BILIRUBIN mg/dL  --   --  0.4 0.8   ALK PHOS U/L  --   --  104 119*   ALT (SGPT) U/L  --   --  7 <5   AST (SGOT) U/L  --   --  28 18   GLUCOSE mg/dL 78 71 65 167*     Results from last 7 days   Lab Units 07/24/24  0540 07/23/24  0624  "07/22/24  1607   WBC 10*3/mm3 15.96* 11.95* 13.19*   HEMOGLOBIN g/dL 7.4* 8.1* 8.9*   HEMATOCRIT % 24.9* 26.4* 29.5*   PLATELETS 10*3/mm3 198 221 253         Results from last 7 days   Lab Units 07/22/24  1817 07/22/24  1607   HSTROP T ng/L 78* 91*     Results from last 7 days   Lab Units 07/22/24  1850   BLOODCX  No growth at 24 hours  No growth at 24 hours     No results for input(s): \"PHART\", \"GGM5LXU\", \"PO2ART\", \"IZM2MUJ\", \"BASEEXCESS\" in the last 8760 hours.   I have reviewed the patient's laboratory results.    Radiology results:    XR Hand 3+ View Right    Result Date: 7/23/2024  PROCEDURE: XR HAND 3+ VW RIGHT-  History: swelling, erythema, tenderness  COMPARISON: July 18, 2024.  FINDINGS:  A 3 view exam demonstrates no acute fracture or dislocation. 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 There is severe narrowing of the first carpometacarpal joint. There is periarticular osteopenia. There is ulnar deviation of the second through fifth digits. Vascular calcifications are present. There is significant soft tissue swelling of the second digit, similar to the prior exam. Overall, no significant interval change.      Impression: No significant interval change since the prior exam.       Images were reviewed, interpreted, and dictated by Dr. Heena Mata MD Transcribed by Dixie Szymanski PA-C.  This report was signed and finalized on 7/23/2024 9:28 AM by Heena Mata MD.      XR Chest 1 View    Result Date: 7/23/2024  PROCEDURE: XR CHEST 1 VW-    HISTORY: weakness  COMPARISON: July 1, 2024.  FINDINGS: The heart is mildly enlarged, but stable. The mediastinum is unremarkable. There is blunting of the left costophrenic angle which is stable. There is a small right pleural effusion which has mildly increased. New right basilar atelectasis or infiltrate is seen. There is no pneumothorax. There are no acute osseous abnormalities.    A pacemaker overlies the left chest.       Impression: New " right basilar atelectasis or infiltrate with small right pleural effusion is possibly due to pneumonia. Continued follow-up is recommended..    Images were reviewed, interpreted, and dictated by Dr. Heena Mata MD Transcribed by Dixie Szymanski PA-C.  This report was signed and finalized on 7/23/2024 9:23 AM by Heena Mata MD.      US Venous Doppler Upper Extremity Right (duplex)    Result Date: 7/22/2024  FINAL REPORT TECHNIQUE: Multiple transverse and longitudinal images were performed of the deep venous system with compression maneuvers. CLINICAL HISTORY: RUE pain, redness, swelling FINDINGS: Right upper extremity duplex ultrasound demonstrates normal flow and compressibility in the deep venous system.  There is lack of compressibility within the cephalic vein consistent with superficial venous thrombus.     Impression: SVT involving a portion of the cephalic vein.  No evidence of DVT. Authenticated and Electronically Signed by Moisés Zamora M.D. on 07/22/2024 08:16:00 PM   I have reviewed the patient's radiology reports.    Medication Review:     I have reviewed the patient's active and prn medications.     Problem List:      Right arm cellulitis    Acquired hypothyroidism    Anemia of chronic renal failure    Stage 3b chronic kidney disease    Moderate malnutrition      Assessment:    Right upper extremity cellulitis, POA.  Right upper extremity superficial vein thrombosis, POA.  Diabetes mellitus type 2 with nephropathy.  Chronic kidney disease stage III.  Chronic elevated troponin secondary to #4.  Sick sinus syndrome status post pacemaker.  Essential hypertension.  Hypothyroidism.       Plan:    Right upper extremity cellulitis.  Right cephalic venous thrombosis   - Patient does seem to have redness in the right upper extremity with swelling.  - We will treated with IV antibiotics therapy with ceftriaxone.  - MRSA negative.  - Follow blood cultures.  So far negative.  - Lactobacillus supplements.  -Ultrasound  venous Doppler revealed SVT involving a portion of the cephalic vein.  No evidence of DVT.  Given risk for development of DVT and superimposed cellulitis, will anticoagulate with Eliquis for 4 weeks.  Discontinue aspirin     CKD stage III.  - Renal function is at baseline.  - She has chronic elevated troponin secondary to chronic kidney disease.     Diabetes mellitus type 2.  - Continue subcutaneous insulin protocol for coverage.    DVT Prophylaxis: Enoxaparin  Code Status: Full  Diet: Diabetic  Discharge Plan: Home tomorrow with BAIRON Andrews DO  07/24/24  15:49 EDT    Dictated utilizing Dragon dictation.

## 2024-07-24 NOTE — PLAN OF CARE
Goal Outcome Evaluation:  Plan of Care Reviewed With: patient        Progress: no change  Outcome Evaluation: Pt supine in bed and willing to participate with treatment. Pt performs bed mobility with min a as well as STS with rw.  Pt performed ambulation 12' min a with rw. Pt also performed B LE seated exercises. See flowsheet for details. Cont PT per POC progressing to goals as pt tolerates.

## 2024-07-24 NOTE — THERAPY TREATMENT NOTE
Patient Name: Lynne Sanchez  : 1934    MRN: [id number]                              Today's Date: 2024       Admit Date: 2024    Visit Dx:     ICD-10-CM ICD-9-CM   1. Cellulitis of right upper extremity  L03.113 682.3   2. Superficial venous thrombosis of arm, right  I82.611 453.81     Patient Active Problem List   Diagnosis    Chronic atrial fibrillation    Valvular heart disease    Diabetes mellitus type II, controlled    Osteomyelitis of right foot    Normocytic anemia    Chronic gastritis    Cerebrovascular accident (CVA) due to thrombosis of left anterior cerebral artery    Thrombocytopenia    Cardiac pacemaker in situ    Chronic congestive heart failure    Functional heart murmur    Glaucoma    Hyperlipidemia    Hypertensive disorder    Acquired hypothyroidism    Mass of parotid gland    Neuropathy    Chronic renal impairment, stage 4 (severe)    Impairment of balance    Impaired mobility    Muscle weakness of lower extremity    Chronic pain of both knees    Secondary cataract of both eyes    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    Suspected glaucoma of both eyes    Secondary cataract of both eyes    Right retinal detachment    Stable proliferative diabetic retinopathy of both eyes    Swelling of left extremity    Closed nondisplaced fracture of surgical neck of left humerus with routine healing    Debility    Left arm swelling    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    Secondary hyperparathyroidism of renal origin    Vitamin D deficiency    Anemia requiring transfusions    Melena    Acute on chronic anemia    Stage 3b chronic kidney disease    DM complication    Anemia of chronic renal failure    Type 2 diabetes mellitus with diabetic chronic kidney disease    Anemia of chronic renal failure    Absolute anemia    Secondary hyperparathyroidism of renal origin    Stage 3b chronic kidney disease    Vitamin D deficiency     Other iron deficiency anemias    Chronic anemia    Gastroesophageal reflux disease    Erosion of ileum    Weight loss    Cellulitis of right leg    Osteomyelitis    Right arm cellulitis    Moderate malnutrition     Past Medical History:   Diagnosis Date    Acute deep vein thrombosis of left upper extremity     Anemia     Asthma     CHF (congestive heart failure)     Chronic atrial fibrillation     Deep venous thrombosis of left upper limb     Diabetes mellitus, type 2     Diarrhea     GERD (gastroesophageal reflux disease)     Glaucoma     H/O transfusion of whole blood     Heart attack     Heart murmur     History of transfusion     Hyperlipidemia     Hypertension     Impaired functional mobility, balance, gait, and endurance     Kidney disease     patient not aware of what exact diagnosis is     Osteomyelitis     Osteomyelitis of left foot 10/03/2017    Peripheral vascular disease     Right retinal detachment     Secondary cataract of both eyes     Stable proliferative diabetic retinopathy of both eyes     Stroke     Swelling of left extremity     Urinary tract infection     Wears glasses      Past Surgical History:   Procedure Laterality Date    AMPUTATION DIGIT Left 7/5/2018    Procedure: Left Great toe amputation;  Surgeon: Prakash Carrington MD;  Location:  [facility];  Service: Vascular    AMPUTATION DIGIT Left 8/27/2018    Procedure: SECOND TOE AMPUTATION DIGIT LEFT;  Surgeon: Prakash Carrington MD;  Location:  [facility];  Service: General    APPENDECTOMY      BONE MARROW ASPIRATION      CARDIAC ABLATION      CARDIAC CATHETERIZATION N/A 10/10/2017    Procedure: Peripheral angiography;  Surgeon: Kan Pelaez MD;  Location:  [facility];  Service:     CARDIAC CATHETERIZATION N/A 10/10/2017    Procedure: Angioplasty-peripheral;  Surgeon: Kan Pelaez MD;  Location:  [facility];  Service:     CARDIAC CATHETERIZATION N/A 10/10/2017    Procedure:  Atherectomy-peripheral;  Surgeon: Kan Pelaez MD;  Location: [facility] CATH INVASIVE LOCATION;  Service:     CARDIAC ELECTROPHYSIOLOGY PROCEDURE N/A 5/3/2018    Procedure: generator change;  Surgeon: Zan Poole MD;  Location:  [facility];  Service: Cardiovascular    CARDIOVERSION      COLONOSCOPY      ENDOSCOPY N/A 10/9/2017    Procedure: ESOPHAGOGASTRODUODENOSCOPY WITH COLD FORCEP BIOPSY;  Surgeon: Clifton Hyatt MD;  Location: [facility] ENDOSCOPY;  Service:     ENDOSCOPY N/A 3/22/2022    Procedure: ESOPHAGOGASTRODUODENOSCOPY with AVM cautery;  Surgeon: Clarisse Velez MD;  Location: [facility] ENDOSCOPY;  Service: Gastroenterology;  Laterality: N/A;    ENDOSCOPY N/A 8/4/2023    Procedure: ESOPHAGOGASTRODUODENOSCOPY WITH BIOPSY AND RUTH BRUSHING;  Surgeon: Clarisse Velez MD;  Location: [facility] ENDOSCOPY;  Service: Gastroenterology;  Laterality: N/A;    EYE SURGERY Bilateral     CATARACTS    INTERVENTIONAL RADIOLOGY PROCEDURE N/A 10/10/2017    Procedure: Abdominal Aortagram with Runoff;  Surgeon: Kan Pelaez MD;  Location: [facility] CATH INVASIVE LOCATION;  Service:     PACEMAKER IMPLANTATION      around 2008 then replaced 2018      General Information       Row Name 07/24/24 1606          Physical Therapy Time and Intention    Document Type therapy note (daily note)  -RM     Mode of Treatment physical therapy  -RM       Row Name 07/24/24 1606          General Information    Patient Profile Reviewed yes  -RM     Existing Precautions/Restrictions fall  -RM       Row Name 07/24/24 1606          Cognition    Orientation Status (Cognition) oriented x 4  -RM       Row Name 07/24/24 1606          Safety Issues, Functional Mobility    Safety Issues Affecting Function (Mobility) safety precaution awareness;safety precautions follow-through/compliance;positioning of assistive device  -RM     Impairments Affecting Function (Mobility) balance;endurance/activity tolerance;strength   -RM               User Key  (r) = Recorded By, (t) = Taken By, (c) = Cosigned By      Initials Name Provider Type    Kalyan Lui, FUENTES Physical Therapist Assistant                   Mobility       Row Name 07/24/24 1607          Bed Mobility    Bed Mobility supine-sit-supine  -RM     Supine-Sit-Supine [place] (Bed Mobility) contact guard;minimum assist (75% patient effort);verbal cues  -RM     Assistive Device (Bed Mobility) head of bed elevated;bed rails;draw sheet  -RM       Row Name 07/24/24 1607          Sit-Stand Transfer    Sit-Stand [place] (Transfers) minimum assist (75% patient effort)  -RM     Assistive Device (Sit-Stand Transfers) walker, front-wheeled  -RM       Row Name 07/24/24 1607          Gait/Stairs (Locomotion)    [place] Level (Gait) minimum assist (75% patient effort)  -RM     Assistive Device (Gait) walker, front-wheeled  -RM     Patient was able to Ambulate yes  -RM     Distance in Feet (Gait) 12  -RM     Deviations/Abnormal Patterns (Gait) antalgic;base of support, wide;stride length decreased  -RM               User Key  (r) = Recorded By, (t) = Taken By, (c) = Cosigned By      Initials Name Provider Type    Kalyan Lui, FUENTES Physical Therapist Assistant                   Obj/Interventions       Row Name 07/24/24 1624          Motor Skills    Therapeutic Exercise hip;knee;ankle  -RM       Row Name 07/24/24 1624          Hip (Therapeutic Exercise)    Hip (Therapeutic Exercise) strengthening exercise  -RM     Hip Strengthening (Therapeutic Exercise) bilateral;marching while seated;10 repetitions  -RM       Row Name 07/24/24 1624          Knee (Therapeutic Exercise)    Knee (Therapeutic Exercise) strengthening exercise  -RM     Knee Strengthening (Therapeutic Exercise) bilateral;LAQ (long arc quad);10 repetitions  -RM       Row Name 07/24/24 1624          Ankle (Therapeutic Exercise)    Ankle (Therapeutic Exercise) AROM (active range of motion)  -RM     Ankle AROM  (Therapeutic Exercise) bilateral;dorsiflexion;plantarflexion;10 repetitions  -RM               User Key  (r) = Recorded By, (t) = Taken By, (c) = Cosigned By      Initials Name Provider Type    Kalyan Lui, FUENTES Physical Therapist Assistant                   Goals/Plan    No documentation.                  Clinical Impression       Row Name 07/24/24 1625          Pain    Pretreatment Pain Rating 0/10 - no pain  -RM     Posttreatment Pain Rating 0/10 - no pain  -RM       Row Name 07/24/24 1625          Plan of Care Review    Plan of Care Reviewed With patient  -RM     Progress no change  -RM     Outcome Evaluation Pt supine in bed and willing to participate with treatment. Pt performs bed mobility with min a as well as STS with rw.  Pt performed ambulation 12' min a with rw. Pt also performed B LE seated exercises. See flowsheet for details. Cont PT per POC progressing to goals as pt tolerates.  -RM       Row Name 07/24/24 1621          Positioning and Restraints    Pre-Treatment Position in bed  -RM     Post Treatment Position chair  -RM     In Chair reclined;call light within reach;encouraged to call for assist;exit alarm on;notified nsg  -RM               User Key  (r) = Recorded By, (t) = Taken By, (c) = Cosigned By      Initials Name Provider Type    Kalyan Lui, FUENTES Physical Therapist Assistant                   Outcome Measures       Row Name 07/24/24 1625          How much help from another person do you currently need...    Turning from your back to your side while in flat bed without using bedrails? 3  -RM     Moving from lying on back to sitting on the side of a flat bed without bedrails? 3  -RM     Moving to and from a bed to a chair (including a wheelchair)? 3  -RM     Standing up from a chair using your arms (e.g., wheelchair, bedside chair)? 3  -RM     Climbing 3-5 steps with a railing? 2  -RM     To walk in hospital room? 3  -RM     AM-PAC 6 Clicks Score (PT) 17  -RM     Highest Level  of Mobility Goal 5 --> Static standing  -RM       Row Name 07/24/24 1629          Functional Assessment    Outcome Measure Options AM-PAC 6 Clicks Basic Mobility (PT)  -               User Key  (r) = Recorded By, (t) = Taken By, (c) = Cosigned By      Initials Name Provider Type     Kalyan Ruano, PTA Physical Therapist Assistant                                 Physical Therapy Education       Title: PT OT SLP Therapies (In Progress)       Topic: Physical Therapy (In Progress)       Point: Mobility training (Done)       Learning Progress Summary             Patient Acceptance, E,TB,D, VU,NR by  at 7/24/2024 1629    Comment: safety with mobility  exercise technique    Acceptance, E,TB, VU,NR by  at 7/23/2024 1911    Comment: Role of PT and POC                         Point: Home exercise program (Done)       Learning Progress Summary             Patient Acceptance, E,TB,D, VU,NR by  at 7/24/2024 1629    Comment: safety with mobility  exercise technique                         Point: Body mechanics (Not Started)       Learner Progress:  Not documented in this visit.              Point: Precautions (Not Started)       Learner Progress:  Not documented in this visit.                              User Key       Initials Effective Dates Name Provider Type Discipline     08/22/23 -  Keisha Isidro, PT Physical Therapist PT     06/16/21 -  Kalyan Ruano, FUENTES Physical Therapist Assistant PT                  PT Recommendation and Plan     Plan of Care Reviewed With: patient  Progress: no change  Outcome Evaluation: Pt supine in bed and willing to participate with treatment. Pt performs bed mobility with min a as well as STS with rw.  Pt performed ambulation 12' min a with rw. Pt also performed B LE seated exercises. See flowsheet for details. Cont PT per POC progressing to goals as pt tolerates.     Time Calculation:         PT Charges       Row Name 07/24/24 1631             Time Calculation    Start Time  1102  -RM      Stop Time 1130  -RM      Time Calculation (min) 28 min  -RM      PT Received On 07/24/24  -RM      PT Goal Re-Cert Due Date 08/02/24  -RM         Time Calculation- PT    Total Timed Code Minutes- PT 28 minute(s)  -RM         Timed Charges    37612 - PT Therapeutic Exercise Minutes 10  -RM      25131 - Gait Training Minutes  18  -RM         Total Minutes    Timed Charges Total Minutes 28  -RM       Total Minutes 28  -RM                User Key  (r) = Recorded By, (t) = Taken By, (c) = Cosigned By      Initials Name Provider Type    Kalyan Lui, FUENTES Physical Therapist Assistant                  Therapy Charges for Today       Code Description Service Date Service Provider Modifiers Qty    87916240595 HC PT THER PROC EA 15 MIN 7/24/2024 Kalyan Ruano, PTA GP 1    [id number] HC GAIT TRAINING EA 15 MIN 7/24/2024 Kalyan Ruano, PTA GP 1            PT G-Codes  Outcome Measure Options: AM-PAC 6 Clicks Basic Mobility (PT)  AM-PAC 6 Clicks Score (PT): 17  AM-PAC 6 Clicks Score (OT): 16       Kalyan Ruano PTA  7/24/2024

## 2024-07-24 NOTE — PLAN OF CARE
"Goal Outcome Evaluation:              Outcome Evaluation: Patient's blood pressure has been soft this shift, MD aware. Patient also placed on 2L NC due to oxygen dropping <90%. When RN went to give patient her night medications, it was noted that patient had her home medications with her at bedside. The patient then stated that \"her son gave her a green pill\" from those medications. RN called son to clarify which medication was given to her. Patient's son stated that he \"gave her medication for her gout\" and that it was the only medication that he gave her. After looking at the medications, the patient's son had written \"gout\" above Cefalexin bottle. Explained hospital policy to patient's son about bringing at-home medications to the hospital. MD notified. Most of patient's medications were locked up with hospital security and a couple of them are in the patient's cubby in the medication room.                               "

## 2024-07-24 NOTE — CASE MANAGEMENT/SOCIAL WORK
Case Management/Social Work    Patient Name:  Lynne Sanchez  YOB: 1934  MRN: [id number]  Admit Date:  7/22/2024        10:34 EDT  Met with patient at bedside. Patient plans to discharge home with home health once medically ready. States she has no preference for home health company. CM will continue to follow.    15:00 EDT  Talked to patient at request of MD concerning STR. Patient states she wants to go home and thinks she will be fine once she's at home.      Electronically signed by:  Elio Demarco RN  07/24/24 10:34 EDT

## 2024-07-25 ENCOUNTER — HOME CARE VISIT (OUTPATIENT)
Dept: HOME HEALTH SERVICES | Facility: HOME HEALTHCARE | Age: 89
End: 2024-07-25
Payer: COMMERCIAL

## 2024-07-25 LAB
ANION GAP SERPL CALCULATED.3IONS-SCNC: 15.8 MMOL/L (ref 5–15)
BUN SERPL-MCNC: 54 MG/DL (ref 8–23)
BUN/CREAT SERPL: 36 (ref 7–25)
CALCIUM SPEC-SCNC: 8.1 MG/DL (ref 8.6–10.5)
CHLORIDE SERPL-SCNC: 101 MMOL/L (ref 98–107)
CO2 SERPL-SCNC: 21.2 MMOL/L (ref 22–29)
CREAT SERPL-MCNC: 1.5 MG/DL (ref 0.57–1)
CRP SERPL-MCNC: 8.24 MG/DL (ref 0–0.5)
DEPRECATED RDW RBC AUTO: 65.1 FL (ref 37–54)
EGFRCR SERPLBLD CKD-EPI 2021: 33.2 ML/MIN/1.73
ERYTHROCYTE [DISTWIDTH] IN BLOOD BY AUTOMATED COUNT: 19.9 % (ref 12.3–15.4)
GLUCOSE BLDC GLUCOMTR-MCNC: 120 MG/DL (ref 70–130)
GLUCOSE BLDC GLUCOMTR-MCNC: 122 MG/DL (ref 70–130)
GLUCOSE BLDC GLUCOMTR-MCNC: 128 MG/DL (ref 70–130)
GLUCOSE BLDC GLUCOMTR-MCNC: 152 MG/DL (ref 70–130)
GLUCOSE SERPL-MCNC: 160 MG/DL (ref 65–99)
HCT VFR BLD AUTO: 23.1 % (ref 34–46.6)
HGB BLD-MCNC: 7.1 G/DL (ref 12–15.9)
MCH RBC QN AUTO: 27.6 PG (ref 26.6–33)
MCHC RBC AUTO-ENTMCNC: 30.7 G/DL (ref 31.5–35.7)
MCV RBC AUTO: 89.9 FL (ref 79–97)
PLATELET # BLD AUTO: 221 10*3/MM3 (ref 140–450)
PMV BLD AUTO: 9.7 FL (ref 6–12)
POTASSIUM SERPL-SCNC: 4.2 MMOL/L (ref 3.5–5.2)
RBC # BLD AUTO: 2.57 10*6/MM3 (ref 3.77–5.28)
SODIUM SERPL-SCNC: 138 MMOL/L (ref 136–145)
WBC NRBC COR # BLD AUTO: 21.62 10*3/MM3 (ref 3.4–10.8)

## 2024-07-25 PROCEDURE — 86140 C-REACTIVE PROTEIN: CPT | Performed by: INTERNAL MEDICINE

## 2024-07-25 PROCEDURE — 63710000001 INSULIN LISPRO (HUMAN) PER 5 UNITS: Performed by: INTERNAL MEDICINE

## 2024-07-25 PROCEDURE — 82948 REAGENT STRIP/BLOOD GLUCOSE: CPT | Performed by: INTERNAL MEDICINE

## 2024-07-25 PROCEDURE — 25010000002 CEFTRIAXONE PER 250 MG: Performed by: INTERNAL MEDICINE

## 2024-07-25 PROCEDURE — 82948 REAGENT STRIP/BLOOD GLUCOSE: CPT

## 2024-07-25 PROCEDURE — G0378 HOSPITAL OBSERVATION PER HR: HCPCS

## 2024-07-25 PROCEDURE — 85027 COMPLETE CBC AUTOMATED: CPT | Performed by: INTERNAL MEDICINE

## 2024-07-25 PROCEDURE — 80048 BASIC METABOLIC PNL TOTAL CA: CPT | Performed by: INTERNAL MEDICINE

## 2024-07-25 PROCEDURE — 99233 SBSQ HOSP IP/OBS HIGH 50: CPT | Performed by: INTERNAL MEDICINE

## 2024-07-25 RX ORDER — ALLOPURINOL 100 MG/1
100 TABLET ORAL DAILY
Status: DISCONTINUED | OUTPATIENT
Start: 2024-07-25 | End: 2024-07-31 | Stop reason: HOSPADM

## 2024-07-25 RX ORDER — DOXYCYCLINE 100 MG/1
100 CAPSULE ORAL EVERY 12 HOURS SCHEDULED
Status: DISCONTINUED | OUTPATIENT
Start: 2024-07-25 | End: 2024-07-25

## 2024-07-25 RX ADMIN — FUROSEMIDE 20 MG: 20 TABLET ORAL at 09:39

## 2024-07-25 RX ADMIN — DOXYCYCLINE 100 MG: 100 INJECTION, POWDER, LYOPHILIZED, FOR SOLUTION INTRAVENOUS at 12:33

## 2024-07-25 RX ADMIN — HYDROCODONE BITARTRATE AND ACETAMINOPHEN 1 TABLET: 5; 325 TABLET ORAL at 09:39

## 2024-07-25 RX ADMIN — Medication 1 PACKET: at 10:14

## 2024-07-25 RX ADMIN — Medication 1 CAPSULE: at 09:39

## 2024-07-25 RX ADMIN — Medication 1 PACKET: at 20:53

## 2024-07-25 RX ADMIN — APIXABAN 10 MG: 5 TABLET, FILM COATED ORAL at 09:39

## 2024-07-25 RX ADMIN — CLOPIDOGREL BISULFATE 75 MG: 75 TABLET ORAL at 09:39

## 2024-07-25 RX ADMIN — Medication 1 CAPSULE: at 20:53

## 2024-07-25 RX ADMIN — PANTOPRAZOLE SODIUM 40 MG: 40 TABLET, DELAYED RELEASE ORAL at 09:39

## 2024-07-25 RX ADMIN — CEFTRIAXONE SODIUM 2000 MG: 2 INJECTION, POWDER, FOR SOLUTION INTRAMUSCULAR; INTRAVENOUS at 09:40

## 2024-07-25 RX ADMIN — Medication 10 ML: at 20:53

## 2024-07-25 RX ADMIN — Medication 10 ML: at 10:14

## 2024-07-25 RX ADMIN — ALLOPURINOL 100 MG: 100 TABLET ORAL at 12:32

## 2024-07-25 RX ADMIN — METOPROLOL TARTRATE 100 MG: 50 TABLET, FILM COATED ORAL at 09:39

## 2024-07-25 RX ADMIN — APIXABAN 10 MG: 5 TABLET, FILM COATED ORAL at 20:53

## 2024-07-25 RX ADMIN — ACETAMINOPHEN 650 MG: 325 TABLET, FILM COATED ORAL at 20:53

## 2024-07-25 RX ADMIN — INSULIN LISPRO 2 UNITS: 100 INJECTION, SOLUTION INTRAVENOUS; SUBCUTANEOUS at 09:40

## 2024-07-25 RX ADMIN — LEVOTHYROXINE SODIUM 50 MCG: 50 TABLET ORAL at 05:08

## 2024-07-25 RX ADMIN — SENNOSIDES AND DOCUSATE SODIUM 2 TABLET: 50; 8.6 TABLET ORAL at 09:39

## 2024-07-25 RX ADMIN — ATORVASTATIN CALCIUM 40 MG: 40 TABLET, FILM COATED ORAL at 09:40

## 2024-07-25 NOTE — PROGRESS NOTES
[facility]    PROGRESS NOTE    Name:  Lynne Sanchez   Age:  89 y.o.  Sex:  female  :  1934  MRN:  [id number]   Visit Number:  [id number]  Admission Date:  2024  Date Of Service:  24  Primary Care Physician:  Elizabeth Easton APRN     LOS: 0 days :    Chief Complaint:      Generalized weakness, right upper extremity pain and swelling.    Subjective:    Patient evaluated today. Patient resting comfortably in bedside chair.  Very pleasant.  Denies active complaints.      Hospital Course:    Lynne Sanchez is an 89-year-old female with history of hypertension, diabetes mellitus type 2, hypothyroidism, sick sinus syndrome status post pacemaker was brought to the emergency room by family with multiple complaints including right hand pain and swelling.  Patient was in the emergency room on 2024 secondary to a fall and hitting her back and hands.  At that time, she was started to have cellulitis of the hand and possibly gouty arthritis.  She was given NSAIDs, steroids, dose of cefazolin and was discharged on Keflex.  According to family, patient has not improved since then and has become more weak and is unable to ambulate at this time.  Patient states that he lives with her .     In the emergency room, she was afebrile and hemodynamically stable saturating at 96% on room air.  She does have chronic elevated troponin levels.  proBNP 16,085.  CMP was unremarkable except for a creatinine of 1.39 (baseline).  Lactic acid was within normal limits but elevated C-reactive protein of 4.75 and procalcitonin of 0.35.  WBC 13, hemoglobin 9 (baseline around 8).  ESR was 9.  Blood cultures were drawn in the emergency room.  Portable chest x-ray showed chronic appearing parenchymal changes without any acute infiltrate.  Venous duplex of the right upper extremity showed superficial venous thrombosis involving a portion of the cephalic vein.  No evidence of DVT noted.   Patient was given cefepime and vancomycin in the emergency room for suspected right upper extremity cellulitis and was subsequently admitted to the medical floor with telemetry.    Review of Systems:     All systems were reviewed and negative except as mentioned in subjective, assessment and plan.    Vital Signs:    Temp:  [98.4 °F (36.9 °C)-99.6 °F (37.6 °C)] 98.7 °F (37.1 °C)  Heart Rate:  [70-74] 70  Resp:  [16-20] 16  BP: (107-127)/(35-65) 107/39    Intake and output:    I/O last 3 completed shifts:  In: 840 [P.O.:840]  Out: -   I/O this shift:  In: 360 [P.O.:360]  Out: -     Physical Examination: Examined again 7/25/24    General Appearance:  Alert and cooperative.    Head:  Atraumatic and normocephalic.   Eyes: Conjunctivae and sclerae normal, no icterus. No pallor.   Throat: No oral lesions, no thrush, oral mucosa moist.   Neck: Supple, trachea midline, no thyromegaly.   Lungs:   Breath sounds heard bilaterally equally.  No wheezing or crackles. No Pleural rub or bronchial breathing.   Heart:  Normal S1 and S2, no murmur, No JVD.   Abdomen:   Normal bowel sounds, no masses, no organomegaly. Soft, nontender, nondistended, no rebound tenderness.   Extremities: Right upper extremity redness and swelling.  Tophi present on bilateral hands   Skin: No bleeding or rash.   Neurologic: Alert and oriented x 3. No facial asymmetry. Moves all four limbs. No tremors.      Laboratory results:    Results from last 7 days   Lab Units 07/25/24  0857 07/24/24  0540 07/23/24  0624 07/22/24  1607 07/18/24  1902   SODIUM mmol/L 138 138 142 136 140   POTASSIUM mmol/L 4.2 4.0 3.8 4.5 3.8   CHLORIDE mmol/L 101 105 105 99 101   CO2 mmol/L 21.2* 22.7 23.9 25.4 22.7   BUN mg/dL 54* 35* 28* 31* 20   CREATININE mg/dL 1.50* 1.50* 1.22* 1.39* 1.44*   CALCIUM mg/dL 8.1* 8.0* 8.0* 8.7 8.7   BILIRUBIN mg/dL  --   --   --  0.4 0.8   ALK PHOS U/L  --   --   --  104 119*   ALT (SGPT) U/L  --   --   --  7 <5   AST (SGOT) U/L  --   --   --  28 18  "  GLUCOSE mg/dL 160* 78 71 65 167*     Results from last 7 days   Lab Units 07/25/24  0857 07/24/24  0540 07/23/24  0624   WBC 10*3/mm3 21.62* 15.96* 11.95*   HEMOGLOBIN g/dL 7.1* 7.4* 8.1*   HEMATOCRIT % 23.1* 24.9* 26.4*   PLATELETS 10*3/mm3 221 198 221         Results from last 7 days   Lab Units 07/22/24  1817 07/22/24  1607   HSTROP T ng/L 78* 91*     Results from last 7 days   Lab Units 07/22/24  1850   BLOODCX  No growth at 2 days  No growth at 2 days     No results for input(s): \"PHART\", \"GVA2PGL\", \"PO2ART\", \"PSP4DAS\", \"BASEEXCESS\" in the last 8760 hours.   I have reviewed the patient's laboratory results.    Radiology results:    No radiology results from the last 24 hrs  I have reviewed the patient's radiology reports.    Medication Review:     I have reviewed the patient's active and prn medications.     Problem List:      Right arm cellulitis    Acquired hypothyroidism    Anemia of chronic renal failure    Stage 3b chronic kidney disease    Moderate malnutrition      Assessment:    Right upper extremity cellulitis, POA.  Right upper extremity superficial vein thrombosis, POA.  Diabetes mellitus type 2 with nephropathy.  Chronic kidney disease stage III.  Chronic elevated troponin secondary to #4.  Sick sinus syndrome status post pacemaker.  Essential hypertension.  Gout  Severe pulmonary hypertension  Hypothyroidism.       Plan:    Right upper extremity cellulitis.  Right cephalic venous thrombosis   - Patient does seem to have redness in the right upper extremity with swelling.  - We will treated with IV antibiotics therapy with ceftriaxone.  - MRSA negative.  - Follow blood cultures.  So far negative.  - Lactobacillus supplements.  -Ultrasound venous Doppler revealed SVT involving a portion of the cephalic vein.  No evidence of DVT.  Given risk for development of DVT and superimposed cellulitis, will anticoagulate with Eliquis for 4 weeks.  Discontinue aspirin     CKD stage III.  - Renal function is " at baseline.  - She has chronic elevated troponin secondary to chronic kidney disease.     Diabetes mellitus type 2.  - Continue subcutaneous insulin protocol for coverage.    Gout with tophi  -Supportive care  -Pain medication as indicated  -Initiate allopurinol  -Uric acid level elevated    Severe pulmonary hypertension  -Revealed upon chart review  -Patient has never followed up with pulmonary hypertensive clinic  -Complicates all aspects of care    I do long conversation with patient's son, Marco A about her multiple chronic comorbidities which complicate her overall plan of care.  At this point in time, concerned about patient's ability to return home to her baseline level of functioning.  Patient refusing short-term rehab or long-term care.  Marco A and family arranging for daily sitter in addition to home health.  I also recommended palliative care.  Patient's son agreed.  Consult placed.  Will follow-up.    DVT Prophylaxis: Enoxaparin  Code Status: Full  Diet: Diabetic  Discharge Plan: Home tomorrow with BAIRON Andrews DO  07/25/24  15:58 EDT    Dictated utilizing Dragon dictation.

## 2024-07-25 NOTE — PLAN OF CARE
"Goal Outcome Evaluation:              Outcome Evaluation: Patient's diastolic continues to be low, otherwise VSS on RA. Patient requested to be disimpacted, MD notified, order placed. When RN went to disimpact patient, patient had disimpacted herself. Patient was covered in stool, patient was then cleaned up and stated that she felt \"better and no longer hurts.\"                               "

## 2024-07-25 NOTE — PLAN OF CARE
Goal Outcome Evaluation:  Plan of Care Reviewed With: patient        Problem: Adult Inpatient Plan of Care  Goal: Plan of Care Review  Outcome: Ongoing, Progressing  Flowsheets (Taken 7/24/2024 2024)  Plan of Care Reviewed With: patient  Goal: Patient-Specific Goal (Individualized)  Outcome: Ongoing, Progressing  Goal: Absence of Hospital-Acquired Illness or Injury  Outcome: Ongoing, Progressing  Intervention: Identify and Manage Fall Risk  Recent Flowsheet Documentation  Taken 7/24/2024 1800 by Courtney Waller RN  Safety Promotion/Fall Prevention:   activity supervised   assistive device/personal items within reach   clutter free environment maintained   toileting scheduled   safety round/check completed   room organization consistent   fall prevention program maintained   lighting adjusted   nonskid shoes/slippers when out of bed  Taken 7/24/2024 1400 by Courtney Waller RN  Safety Promotion/Fall Prevention:   activity supervised   clutter free environment maintained   assistive device/personal items within reach   toileting scheduled   safety round/check completed   room organization consistent   fall prevention program maintained   lighting adjusted   nonskid shoes/slippers when out of bed  Taken 7/24/2024 1200 by Courtney Waller RN  Safety Promotion/Fall Prevention:   activity supervised   assistive device/personal items within reach   clutter free environment maintained   toileting scheduled   safety round/check completed   room organization consistent   fall prevention program maintained   lighting adjusted   nonskid shoes/slippers when out of bed  Taken 7/24/2024 0800 by Courtney Waller, SHAD  Safety Promotion/Fall Prevention:   activity supervised   assistive device/personal items within reach   clutter free environment maintained   toileting scheduled   safety round/check completed   room organization consistent   fall prevention program maintained   lighting adjusted   nonskid shoes/slippers when out of  bed  Intervention: Prevent Skin Injury  Recent Flowsheet Documentation  Taken 7/24/2024 1800 by Courtney Waller RN  Body Position: supine  Taken 7/24/2024 1600 by Courtney Waller RN  Body Position:   sitting up in bed   position changed independently  Taken 7/24/2024 1200 by Courtney Waller RN  Body Position:   tilted   right  Intervention: Prevent and Manage VTE (Venous Thromboembolism) Risk  Recent Flowsheet Documentation  Taken 7/24/2024 1800 by Courtney Waller RN  Activity Management: activity encouraged  Taken 7/24/2024 1600 by Courtney Waller RN  Activity Management: activity encouraged  Taken 7/24/2024 1400 by Courtney Waller RN  Activity Management: activity encouraged  Taken 7/24/2024 1200 by Courtney Waller RN  Activity Management: activity encouraged  Taken 7/24/2024 0800 by Courtney Waller RN  Activity Management: activity encouraged  VTE Prevention/Management: (See MAR: eliquis)   bilateral   sequential compression devices off   patient refused intervention   other (see comments)  Intervention: Prevent Infection  Recent Flowsheet Documentation  Taken 7/24/2024 1800 by Courtney Waller RN  Infection Prevention:   single patient room provided   rest/sleep promoted   hand hygiene promoted   equipment surfaces disinfected   environmental surveillance performed  Taken 7/24/2024 1600 by Courtney Waller RN  Infection Prevention:   single patient room provided   rest/sleep promoted   hand hygiene promoted   equipment surfaces disinfected   environmental surveillance performed  Taken 7/24/2024 1400 by Courtney Waller RN  Infection Prevention:   single patient room provided   rest/sleep promoted   hand hygiene promoted   equipment surfaces disinfected   environmental surveillance performed  Taken 7/24/2024 1200 by Courtney Waller RN  Infection Prevention:   single patient room provided   rest/sleep promoted   hand hygiene promoted   equipment surfaces disinfected   environmental surveillance performed  Taken 7/24/2024 0800 by  Waller, Courtney, RN  Infection Prevention:   single patient room provided   rest/sleep promoted   hand hygiene promoted   equipment surfaces disinfected   environmental surveillance performed  Goal: Optimal Comfort and Wellbeing  Outcome: Ongoing, Progressing  Intervention: Monitor Pain and Promote Comfort  Recent Flowsheet Documentation  Taken 7/24/2024 0800 by Courtney Waller RN  Pain Management Interventions:   pillow support provided   position adjusted  Intervention: Provide Person-Centered Care  Recent Flowsheet Documentation  Taken 7/24/2024 0800 by Courtney Waller RN  Trust Relationship/Rapport:   care explained   choices provided   emotional support provided   empathic listening provided   questions answered   questions encouraged   reassurance provided   thoughts/feelings acknowledged  Goal: Readiness for Transition of Care  Outcome: Ongoing, Progressing     Problem: Fall Injury Risk  Goal: Absence of Fall and Fall-Related Injury  Outcome: Ongoing, Progressing  Intervention: Identify and Manage Contributors  Recent Flowsheet Documentation  Taken 7/24/2024 1800 by Courtney Waller RN  Medication Review/Management: medications reviewed  Taken 7/24/2024 1600 by Courtney Waller RN  Self-Care Promotion:   independence encouraged   BADL personal objects within reach   BADL personal routines maintained   meal set-up provided  Taken 7/24/2024 1400 by Courtney Wallre RN  Medication Review/Management: medications reviewed  Taken 7/24/2024 1200 by Courtney Waller RN  Medication Review/Management: medications reviewed  Self-Care Promotion:   independence encouraged   BADL personal routines maintained   BADL personal objects within reach   meal set-up provided  Taken 7/24/2024 0800 by Courtney Waller RN  Medication Review/Management: medications reviewed  Self-Care Promotion:   independence encouraged   BADL personal objects within reach   meal set-up provided   BADL personal routines maintained  Intervention: Promote  Injury-Free Environment  Recent Flowsheet Documentation  Taken 7/24/2024 1800 by Courtney Waller RN  Safety Promotion/Fall Prevention:   activity supervised   assistive device/personal items within reach   clutter free environment maintained   toileting scheduled   safety round/check completed   room organization consistent   fall prevention program maintained   lighting adjusted   nonskid shoes/slippers when out of bed  Taken 7/24/2024 1400 by Courtney Waller RN  Safety Promotion/Fall Prevention:   activity supervised   clutter free environment maintained   assistive device/personal items within reach   toileting scheduled   safety round/check completed   room organization consistent   fall prevention program maintained   lighting adjusted   nonskid shoes/slippers when out of bed  Taken 7/24/2024 1200 by Courtney Waller RN  Safety Promotion/Fall Prevention:   activity supervised   assistive device/personal items within reach   clutter free environment maintained   toileting scheduled   safety round/check completed   room organization consistent   fall prevention program maintained   lighting adjusted   nonskid shoes/slippers when out of bed  Taken 7/24/2024 0800 by Courtney Waller RN  Safety Promotion/Fall Prevention:   activity supervised   assistive device/personal items within reach   clutter free environment maintained   toileting scheduled   safety round/check completed   room organization consistent   fall prevention program maintained   lighting adjusted   nonskid shoes/slippers when out of bed     Problem: Skin Injury Risk Increased  Goal: Skin Health and Integrity  Outcome: Ongoing, Progressing  Intervention: Optimize Skin Protection  Recent Flowsheet Documentation  Taken 7/24/2024 1800 by Courtney Waller RN  Head of Bed (HOB) Positioning: HOB elevated  Taken 7/24/2024 1600 by Courtney Waller RN  Head of Bed (HOB) Positioning: HOB elevated  Taken 7/24/2024 1400 by Courtney Waller RN  Head of Bed (HOB)  Positioning: HOB elevated  Taken 7/24/2024 1200 by oCurtney Waller RN  Head of Bed (HOB) Positioning: HOB elevated  Taken 7/24/2024 0800 by Courtney Waller RN  Head of Bed (HOB) Positioning: HOB elevated     Problem: Malnutrition  Goal: Improved Nutritional Intake  Outcome: Ongoing, Progressing     Problem: Adjustment to Illness (Sepsis/Septic Shock)  Goal: Optimal Coping  Outcome: Ongoing, Progressing  Intervention: Optimize Psychosocial Adjustment to Illness  Recent Flowsheet Documentation  Taken 7/24/2024 0800 by Courtney Waller RN  Family/Support System Care:   support provided   self-care encouraged     Problem: Bleeding (Sepsis/Septic Shock)  Goal: Absence of Bleeding  Outcome: Ongoing, Progressing     Problem: Glycemic Control Impaired (Sepsis/Septic Shock)  Goal: Blood Glucose Level Within Desired Range  Outcome: Ongoing, Progressing  Intervention: Optimize Glycemic Control  Recent Flowsheet Documentation  Taken 7/24/2024 0800 by Courtney Waller RN  Glycemic Management: blood glucose monitored     Problem: Infection Progression (Sepsis/Septic Shock)  Goal: Absence of Infection Signs and Symptoms  Outcome: Ongoing, Progressing  Intervention: Initiate Sepsis Management  Recent Flowsheet Documentation  Taken 7/24/2024 1800 by Courtney Waller RN  Infection Prevention:   single patient room provided   rest/sleep promoted   hand hygiene promoted   equipment surfaces disinfected   environmental surveillance performed  Taken 7/24/2024 1600 by Courtney Waller RN  Infection Prevention:   single patient room provided   rest/sleep promoted   hand hygiene promoted   equipment surfaces disinfected   environmental surveillance performed  Taken 7/24/2024 1400 by Courtney Waller RN  Infection Prevention:   single patient room provided   rest/sleep promoted   hand hygiene promoted   equipment surfaces disinfected   environmental surveillance performed  Taken 7/24/2024 1200 by Courtney Waller RN  Infection Prevention:   single patient  room provided   rest/sleep promoted   hand hygiene promoted   equipment surfaces disinfected   environmental surveillance performed  Taken 7/24/2024 0800 by Courtney Waller, SHAD  Infection Prevention:   single patient room provided   rest/sleep promoted   hand hygiene promoted   equipment surfaces disinfected   environmental surveillance performed  Intervention: Promote Recovery  Recent Flowsheet Documentation  Taken 7/24/2024 1800 by Courtney Waller, RN  Activity Management: activity encouraged  Taken 7/24/2024 1600 by Courtney Waller, RN  Activity Management: activity encouraged  Taken 7/24/2024 1400 by Courtney Waller, RN  Activity Management: activity encouraged  Taken 7/24/2024 1200 by Courtney Waller, RN  Activity Management: activity encouraged  Taken 7/24/2024 0800 by Courtney Waller RN  Activity Management: activity encouraged     Problem: Nutrition Impaired (Sepsis/Septic Shock)  Goal: Optimal Nutrition Intake  Outcome: Ongoing, Progressing

## 2024-07-26 ENCOUNTER — HOME CARE VISIT (OUTPATIENT)
Dept: HOME HEALTH SERVICES | Facility: HOME HEALTHCARE | Age: 89
End: 2024-07-26
Payer: MEDICARE

## 2024-07-26 ENCOUNTER — ANESTHESIA (OUTPATIENT)
Dept: GASTROENTEROLOGY | Facility: HOSPITAL | Age: 89
End: 2024-07-26
Payer: MEDICARE

## 2024-07-26 ENCOUNTER — ANESTHESIA EVENT (OUTPATIENT)
Dept: GASTROENTEROLOGY | Facility: HOSPITAL | Age: 89
End: 2024-07-26
Payer: MEDICARE

## 2024-07-26 LAB
ABO GROUP BLD: NORMAL
ANION GAP SERPL CALCULATED.3IONS-SCNC: 11.1 MMOL/L (ref 5–15)
BASOPHILS # BLD AUTO: 0.03 10*3/MM3 (ref 0–0.2)
BASOPHILS NFR BLD AUTO: 0.2 % (ref 0–1.5)
BLD GP AB SCN SERPL QL: NEGATIVE
BUN SERPL-MCNC: 64 MG/DL (ref 8–23)
BUN/CREAT SERPL: 38.1 (ref 7–25)
CALCIUM SPEC-SCNC: 7.7 MG/DL (ref 8.6–10.5)
CHLORIDE SERPL-SCNC: 102 MMOL/L (ref 98–107)
CO2 SERPL-SCNC: 20.9 MMOL/L (ref 22–29)
CREAT SERPL-MCNC: 1.68 MG/DL (ref 0.57–1)
CRP SERPL-MCNC: 13.74 MG/DL (ref 0–0.5)
DEPRECATED RDW RBC AUTO: 63.5 FL (ref 37–54)
EGFRCR SERPLBLD CKD-EPI 2021: 29 ML/MIN/1.73
EOSINOPHIL # BLD AUTO: 0.05 10*3/MM3 (ref 0–0.4)
EOSINOPHIL NFR BLD AUTO: 0.3 % (ref 0.3–6.2)
ERYTHROCYTE [DISTWIDTH] IN BLOOD BY AUTOMATED COUNT: 19.9 % (ref 12.3–15.4)
GLUCOSE BLDC GLUCOMTR-MCNC: 118 MG/DL (ref 70–130)
GLUCOSE BLDC GLUCOMTR-MCNC: 127 MG/DL (ref 70–130)
GLUCOSE BLDC GLUCOMTR-MCNC: 151 MG/DL (ref 70–130)
GLUCOSE BLDC GLUCOMTR-MCNC: 154 MG/DL (ref 70–130)
GLUCOSE BLDC GLUCOMTR-MCNC: 169 MG/DL (ref 70–130)
GLUCOSE SERPL-MCNC: 101 MG/DL (ref 65–99)
HCT VFR BLD AUTO: 18.7 % (ref 34–46.6)
HCT VFR BLD AUTO: 19.3 % (ref 34–46.6)
HCT VFR BLD AUTO: 21.7 % (ref 34–46.6)
HEMOCCULT STL QL: POSITIVE
HGB BLD-MCNC: 5.9 G/DL (ref 12–15.9)
HGB BLD-MCNC: 6.1 G/DL (ref 12–15.9)
HGB BLD-MCNC: 6.9 G/DL (ref 12–15.9)
IMM GRANULOCYTES # BLD AUTO: 0.18 10*3/MM3 (ref 0–0.05)
IMM GRANULOCYTES NFR BLD AUTO: 1.1 % (ref 0–0.5)
LYMPHOCYTES # BLD AUTO: 1.58 10*3/MM3 (ref 0.7–3.1)
LYMPHOCYTES NFR BLD AUTO: 10 % (ref 19.6–45.3)
MCH RBC QN AUTO: 28.5 PG (ref 26.6–33)
MCHC RBC AUTO-ENTMCNC: 31.6 G/DL (ref 31.5–35.7)
MCV RBC AUTO: 90.3 FL (ref 79–97)
MONOCYTES # BLD AUTO: 0.62 10*3/MM3 (ref 0.1–0.9)
MONOCYTES NFR BLD AUTO: 3.9 % (ref 5–12)
NEUTROPHILS NFR BLD AUTO: 13.28 10*3/MM3 (ref 1.7–7)
NEUTROPHILS NFR BLD AUTO: 84.5 % (ref 42.7–76)
NRBC BLD AUTO-RTO: 0 /100 WBC (ref 0–0.2)
PLATELET # BLD AUTO: 200 10*3/MM3 (ref 140–450)
PMV BLD AUTO: 9.8 FL (ref 6–12)
POTASSIUM SERPL-SCNC: 4 MMOL/L (ref 3.5–5.2)
RBC # BLD AUTO: 2.07 10*6/MM3 (ref 3.77–5.28)
RH BLD: POSITIVE
SODIUM SERPL-SCNC: 134 MMOL/L (ref 136–145)
T&S EXPIRATION DATE: NORMAL
WBC NRBC COR # BLD AUTO: 15.74 10*3/MM3 (ref 3.4–10.8)

## 2024-07-26 PROCEDURE — 86900 BLOOD TYPING SEROLOGIC ABO: CPT | Performed by: FAMILY MEDICINE

## 2024-07-26 PROCEDURE — 86920 COMPATIBILITY TEST SPIN: CPT

## 2024-07-26 PROCEDURE — G0378 HOSPITAL OBSERVATION PER HR: HCPCS

## 2024-07-26 PROCEDURE — 82948 REAGENT STRIP/BLOOD GLUCOSE: CPT

## 2024-07-26 PROCEDURE — 99233 SBSQ HOSP IP/OBS HIGH 50: CPT | Performed by: INTERNAL MEDICINE

## 2024-07-26 PROCEDURE — 82948 REAGENT STRIP/BLOOD GLUCOSE: CPT | Performed by: INTERNAL MEDICINE

## 2024-07-26 PROCEDURE — 25810000003 SODIUM CHLORIDE 0.9 % SOLUTION: Performed by: INTERNAL MEDICINE

## 2024-07-26 PROCEDURE — 85025 COMPLETE CBC W/AUTO DIFF WBC: CPT | Performed by: INTERNAL MEDICINE

## 2024-07-26 PROCEDURE — 80048 BASIC METABOLIC PNL TOTAL CA: CPT | Performed by: INTERNAL MEDICINE

## 2024-07-26 PROCEDURE — 25010000002 MORPHINE PER 10 MG: Performed by: INTERNAL MEDICINE

## 2024-07-26 PROCEDURE — 85014 HEMATOCRIT: CPT | Performed by: FAMILY MEDICINE

## 2024-07-26 PROCEDURE — 82272 OCCULT BLD FECES 1-3 TESTS: CPT | Performed by: INTERNAL MEDICINE

## 2024-07-26 PROCEDURE — 36430 TRANSFUSION BLD/BLD COMPNT: CPT

## 2024-07-26 PROCEDURE — 63710000001 INSULIN LISPRO (HUMAN) PER 5 UNITS: Performed by: INTERNAL MEDICINE

## 2024-07-26 PROCEDURE — 86901 BLOOD TYPING SEROLOGIC RH(D): CPT | Performed by: FAMILY MEDICINE

## 2024-07-26 PROCEDURE — 86850 RBC ANTIBODY SCREEN: CPT | Performed by: FAMILY MEDICINE

## 2024-07-26 PROCEDURE — 25010000002 CEFTRIAXONE PER 250 MG: Performed by: INTERNAL MEDICINE

## 2024-07-26 PROCEDURE — 86140 C-REACTIVE PROTEIN: CPT | Performed by: INTERNAL MEDICINE

## 2024-07-26 PROCEDURE — 0W3P8ZZ CONTROL BLEEDING IN GASTROINTESTINAL TRACT, VIA NATURAL OR ARTIFICIAL OPENING ENDOSCOPIC: ICD-10-PCS | Performed by: INTERNAL MEDICINE

## 2024-07-26 PROCEDURE — 99214 OFFICE O/P EST MOD 30 MIN: CPT | Performed by: INTERNAL MEDICINE

## 2024-07-26 PROCEDURE — 43255 EGD CONTROL BLEEDING ANY: CPT | Performed by: INTERNAL MEDICINE

## 2024-07-26 PROCEDURE — 25010000002 PROPOFOL 10 MG/ML EMULSION: Performed by: NURSE ANESTHETIST, CERTIFIED REGISTERED

## 2024-07-26 PROCEDURE — 86900 BLOOD TYPING SEROLOGIC ABO: CPT

## 2024-07-26 PROCEDURE — 25010000002 EPINEPHRINE 1 MG/10ML SOLUTION PREFILLED SYRINGE: Performed by: INTERNAL MEDICINE

## 2024-07-26 PROCEDURE — P9016 RBC LEUKOCYTES REDUCED: HCPCS

## 2024-07-26 PROCEDURE — 85018 HEMOGLOBIN: CPT | Performed by: FAMILY MEDICINE

## 2024-07-26 DEVICE — DEV CLIP ENDO RESOLUTION360 CONTRL ROT 235CM: Type: IMPLANTABLE DEVICE | Site: DUODENUM | Status: FUNCTIONAL

## 2024-07-26 RX ORDER — SODIUM CHLORIDE 9 MG/ML
70 INJECTION, SOLUTION INTRAVENOUS CONTINUOUS PRN
Status: DISCONTINUED | OUTPATIENT
Start: 2024-07-26 | End: 2024-07-30

## 2024-07-26 RX ORDER — SODIUM CHLORIDE 9 MG/ML
70 INJECTION, SOLUTION INTRAVENOUS CONTINUOUS PRN
OUTPATIENT
Start: 2024-07-26

## 2024-07-26 RX ORDER — PANTOPRAZOLE SODIUM 40 MG/10ML
40 INJECTION, POWDER, LYOPHILIZED, FOR SOLUTION INTRAVENOUS ONCE
Status: COMPLETED | OUTPATIENT
Start: 2024-07-26 | End: 2024-07-26

## 2024-07-26 RX ORDER — MORPHINE SULFATE 2 MG/ML
1 INJECTION, SOLUTION INTRAMUSCULAR; INTRAVENOUS EVERY 4 HOURS PRN
Status: DISCONTINUED | OUTPATIENT
Start: 2024-07-26 | End: 2024-07-28

## 2024-07-26 RX ORDER — SIMETHICONE 40MG/0.6ML
SUSPENSION, DROPS(FINAL DOSAGE FORM)(ML) ORAL AS NEEDED
Status: DISCONTINUED | OUTPATIENT
Start: 2024-07-26 | End: 2024-07-26 | Stop reason: HOSPADM

## 2024-07-26 RX ORDER — PROPOFOL 10 MG/ML
VIAL (ML) INTRAVENOUS AS NEEDED
Status: DISCONTINUED | OUTPATIENT
Start: 2024-07-26 | End: 2024-07-26 | Stop reason: SURG

## 2024-07-26 RX ADMIN — PROPOFOL 100 MG: 10 INJECTION, EMULSION INTRAVENOUS at 14:30

## 2024-07-26 RX ADMIN — SODIUM CHLORIDE 70 ML/HR: 9 INJECTION, SOLUTION INTRAVENOUS at 13:47

## 2024-07-26 RX ADMIN — MORPHINE SULFATE 1 MG: 2 INJECTION, SOLUTION INTRAMUSCULAR; INTRAVENOUS at 16:10

## 2024-07-26 RX ADMIN — CEFTRIAXONE SODIUM 2000 MG: 2 INJECTION, POWDER, FOR SOLUTION INTRAMUSCULAR; INTRAVENOUS at 09:22

## 2024-07-26 RX ADMIN — LEVOTHYROXINE SODIUM 50 MCG: 50 TABLET ORAL at 05:44

## 2024-07-26 RX ADMIN — ALLOPURINOL 100 MG: 100 TABLET ORAL at 09:20

## 2024-07-26 RX ADMIN — PANTOPRAZOLE SODIUM 40 MG: 40 TABLET, DELAYED RELEASE ORAL at 09:20

## 2024-07-26 RX ADMIN — FUROSEMIDE 20 MG: 20 TABLET ORAL at 09:20

## 2024-07-26 RX ADMIN — HYDROCODONE BITARTRATE AND ACETAMINOPHEN 1 TABLET: 5; 325 TABLET ORAL at 09:20

## 2024-07-26 RX ADMIN — ATORVASTATIN CALCIUM 40 MG: 40 TABLET, FILM COATED ORAL at 09:20

## 2024-07-26 RX ADMIN — Medication 1 CAPSULE: at 09:20

## 2024-07-26 RX ADMIN — INSULIN LISPRO 2 UNITS: 100 INJECTION, SOLUTION INTRAVENOUS; SUBCUTANEOUS at 21:48

## 2024-07-26 RX ADMIN — PANTOPRAZOLE SODIUM 8 MG/HR: 40 INJECTION, POWDER, FOR SOLUTION INTRAVENOUS at 11:13

## 2024-07-26 RX ADMIN — Medication 10 ML: at 21:48

## 2024-07-26 RX ADMIN — PANTOPRAZOLE SODIUM 8 MG/HR: 40 INJECTION, POWDER, FOR SOLUTION INTRAVENOUS at 11:05

## 2024-07-26 RX ADMIN — Medication 10 ML: at 09:26

## 2024-07-26 RX ADMIN — DOXYCYCLINE 100 MG: 100 INJECTION, POWDER, LYOPHILIZED, FOR SOLUTION INTRAVENOUS at 00:27

## 2024-07-26 RX ADMIN — PANTOPRAZOLE SODIUM 40 MG: 40 INJECTION, POWDER, FOR SOLUTION INTRAVENOUS at 11:13

## 2024-07-26 NOTE — ANESTHESIA PREPROCEDURE EVALUATION
Anesthesia Evaluation     Patient summary reviewed and Nursing notes reviewed   no history of anesthetic complications:   NPO Solid Status: > 8 hours  NPO Liquid Status: > 8 hours           Airway   Mallampati: I  TM distance: >3 FB  Neck ROM: full  no difficulty expected  Dental - normal exam     Pulmonary - normal exam   (+) asthma,  Cardiovascular - normal exam    (+) hypertension, valvular problems/murmurs, past MI , dysrhythmias, CHF , PVD, DVT, hyperlipidemia      Neuro/Psych  (+) CVA  GI/Hepatic/Renal/Endo    (+) GERD, renal disease-, diabetes mellitus, thyroid problem hypothyroidism    Musculoskeletal (-) negative ROS    Abdominal    Substance History - negative use     OB/GYN negative ob/gyn ROS         Other - negative ROS                   Anesthesia Plan    ASA 3     MAC     intravenous induction     Anesthetic plan, risks, benefits, and alternatives have been provided, discussed and informed consent has been obtained with: patient.    CODE STATUS:    Code Status (Patient has no pulse and is not breathing): CPR (Attempt to Resuscitate)  Medical Interventions (Patient has pulse or is breathing): Full Support

## 2024-07-26 NOTE — THERAPY TREATMENT NOTE
Pt unavailable for therapy as she is off the floor for a procedure.  Will follow up with pt another day.

## 2024-07-26 NOTE — PROGRESS NOTES
"Dietitian Follow-up    Patient Name: Lynne Sanchez  YOB: 1934  MRN: [id number]  Admission date: 7/22/2024    Comment:      Clinical Nutrition Follow-up   Encounter Information        Trending Narrative     7/26: Pt is currently NPO. Average PO intake 58%. Pt is receiving Mighty shake daily and José Miguel BID to help with wound healing and promote PO intake.     7/23: Pt admitted with R upper extremity cellulitis. Pt with multiple wounds noted. RD will add José Miguel BID to help with wound healing.      Anthropometrics        Current Height, Weight Height: 167.6 cm (66\")  Weight: 60.4 kg (133 lb 2.5 oz) (07/22/24 2259)       Trending Weight Hx     This admission:              PTA:     Wt Readings from Last 30 Encounters:   07/22/24 2259 60.4 kg (133 lb 2.5 oz)   07/22/24 1516 53.1 kg (117 lb)   07/18/24 1821 53.1 kg (117 lb)   07/11/24 1543 45.4 kg (100 lb)   07/01/24 1030 55.8 kg (123 lb)   06/25/24 0500 55.9 kg (123 lb 3.8 oz)   06/23/24 0500 54.6 kg (120 lb 5.9 oz)   06/21/24 1223 53.1 kg (117 lb)   05/29/24 1353 53.1 kg (117 lb 1 oz)   05/29/24 1513 53.1 kg (117 lb)   05/08/24 1201 53.1 kg (117 lb)   04/26/24 0839 53.4 kg (117 lb 12.8 oz)   04/19/24 0947 54.2 kg (119 lb 6.4 oz)   11/17/23 1510 58.8 kg (129 lb 9.6 oz)   08/14/23 1357 59.9 kg (132 lb)   07/17/23 1422 59 kg (130 lb)   07/13/23 1415 58.1 kg (128 lb)   07/03/23 1056 59 kg (130 lb)   05/17/23 1403 59.9 kg (132 lb)   05/11/23 1443 60.3 kg (133 lb)   05/08/23 1030 60.5 kg (133 lb 6.4 oz)   02/01/23 1438 63.5 kg (140 lb)   11/16/22 1353 60.3 kg (133 lb)   05/02/22 1328 64.9 kg (143 lb)   04/28/22 1338 64.9 kg (143 lb)   04/22/22 1251 63.9 kg (140 lb 12.8 oz)   04/15/22 0430 63.2 kg (139 lb 5.3 oz)   04/14/22 1405 66.9 kg (147 lb 7.8 oz)   04/14/22 0925 66.9 kg (147 lb 7.8 oz)   04/14/22 0600 66.9 kg (147 lb 7.8 oz)   04/13/22 2108 65.2 kg (143 lb 11.8 oz)   04/13/22 1749 55.8 kg (123 lb)   04/15/22 0916 63 kg (139 lb)   03/25/22 1205 58.7 kg (129 " lb 8 oz)   03/24/22 0506 60.3 kg (132 lb 15 oz)   03/22/22 1005 60.3 kg (132 lb 15 oz)   03/21/22 2032 60.3 kg (132 lb 15 oz)   03/21/22 1946 60.3 kg (132 lb 15 oz)   03/21/22 1744 55.8 kg (123 lb)   03/17/22 1618 59 kg (130 lb)   03/10/22 1415 63.2 kg (139 lb 4.8 oz)   01/05/22 1137 57.3 kg (126 lb 6.4 oz)      BMI kg/m2 Body mass index is 21.49 kg/m².     Labs        Pertinent Labs Results from last 7 days   Lab Units 07/26/24  0540 07/25/24  0857 07/24/24  0540 07/23/24  0624 07/22/24  1607   SODIUM mmol/L 134* 138 138   < > 136   POTASSIUM mmol/L 4.0 4.2 4.0   < > 4.5   CHLORIDE mmol/L 102 101 105   < > 99   CO2 mmol/L 20.9* 21.2* 22.7   < > 25.4   BUN mg/dL 64* 54* 35*   < > 31*   CREATININE mg/dL 1.68* 1.50* 1.50*   < > 1.39*   CALCIUM mg/dL 7.7* 8.1* 8.0*   < > 8.7   BILIRUBIN mg/dL  --   --   --   --  0.4   ALK PHOS U/L  --   --   --   --  104   ALT (SGPT) U/L  --   --   --   --  7   AST (SGOT) U/L  --   --   --   --  28   GLUCOSE mg/dL 101* 160* 78   < > 65    < > = values in this interval not displayed.     Results from last 7 days   Lab Units 07/26/24  1202   HEMOGLOBIN g/dL 6.9*   HEMATOCRIT % 21.7*         Medications    Scheduled Medications allopurinol, 100 mg, Oral, Daily  atorvastatin, 40 mg, Oral, Daily  cefTRIAXone, 2,000 mg, Intravenous, Q24H  doxycycline, 100 mg, Intravenous, Q12H  furosemide, 20 mg, Oral, Daily  insulin lispro, 2-7 Units, Subcutaneous, 4x Daily AC & at Bedtime  José Miguel, 1 packet, Oral, BID  lactobacillus acidophilus, 1 capsule, Oral, BID  levothyroxine, 50 mcg, Oral, Q AM  metoprolol tartrate, 100 mg, Oral, BID  senna-docusate sodium, 2 tablet, Oral, BID  sodium chloride, 10 mL, Intravenous, Q12H        Infusions pantoprazole, 8 mg/hr, Last Rate: 8 mg/hr (07/26/24 1113)        PRN Medications   acetaminophen **OR** acetaminophen **OR** acetaminophen    senna-docusate sodium **AND** polyethylene glycol **AND** bisacodyl **AND** bisacodyl    dextrose    dextrose    glucagon (human  recombinant)    HYDROcodone-acetaminophen    ondansetron    sodium chloride    sodium chloride    sodium chloride     Physical Findings        Trending Physical   Appearance, NFPE    --  Edema  2-3+     Bowel Function LBM: 7/26     Tubes Peripheral IV     Chewing/Swallowing NPO     Skin Wounds noted     --  Current Nutrition Orders & Evaluation of Intake       Oral Nutrition     Food Allergies NKFA   Current PO Diet NPO Diet NPO Type: Strict NPO   Supplement Mighty shake daily, José Miguel BID   PO Evaluation     Trending % PO Intake 58% x 6 meals     Nutrition Diagnosis         Nutrition Dx Problem 1 Increased nutrient needs r/t skin integrity AEB Sacral spine PI.       Nutrition Dx Problem 2        Intervention Goal         Intervention Goal(s) Maintain CBW  PO intake meet >50% of estimated needs  Adhere to ONS     Nutrition Intervention        RD Action No action at this time     Nutrition Prescription          Diet Prescription NPO   Supplement Prescription    Enteral Nutrition Prescription     TPN Prescription       Monitor/Evaluation        Monitor Per protocol, I&O, PO intake, Supplement intake, Pertinent labs, Weight, Skin status, GI status, Symptoms, POC/GOC, Swallow function, Hemodynamic stability     RD to f/up    Electronically signed by:  Madai Moses RD  07/26/24 13:32 EDT

## 2024-07-26 NOTE — PLAN OF CARE
Goal Outcome Evaluation:              Outcome Evaluation: Patient's blood pressure continues to be soft, otherwise VSS on RA. Patient had large loose bowel movement this shift. Discharge pending.

## 2024-07-26 NOTE — ANESTHESIA POSTPROCEDURE EVALUATION
Patient: Lynne Sanchez    Procedure Summary       Date: 07/26/24 [facility] / [facility]    Anesthesia Start: 1410 Anesthesia Stop: 1434    Procedure: ESOPHAGOGASTRODUODENOSCOPY WITH CONTROL OF BLEEDING WITH INJECTION OF EPINEPHRINE AND RESOLUTION CLIP PLACEMENT X1 (Esophagus) Diagnosis:       Iron deficiency anemia, unspecified iron deficiency anemia type      (Iron deficiency anemia, unspecified iron deficiency anemia type [D50.9])    Surgeons: Clarisse Velez MD Provider: Jutso Laguerre CRNA    Anesthesia Type: MAC ASA Status: 3            Anesthesia Type: MAC    Vitals  No vitals data found for the desired time range.          Post Anesthesia Care and Evaluation    Patient location during evaluation: bedside  Patient participation: complete - patient participated  Level of consciousness: awake  Pain score: 0  Pain management: adequate    Airway patency: patent  Anesthetic complications: No anesthetic complications  PONV Status: controlled  Cardiovascular status: acceptable and stable  Respiratory status: acceptable and room air  Hydration status: acceptable    Comments: See nursing documentation for post op vital signs

## 2024-07-26 NOTE — PROGRESS NOTES
[facility]    Name:  Lynne Sanchez   Age:  89 y.o.  Sex:  female  :  1934  MRN:  [id number]   Visit Number:  [id number]  Admission Date:  2024  Date Of Service:  24  Primary Care Physician:  Elizabeth Easton APRN     LOS: 0 days :    Chief Complaint:      Generalized weakness, right upper extremity pain and swelling.    Subjective:    Patient evaluated today.  Overnight patient noted to have 2 large bloody bowel movements.  Hemoglobin significant drop this morning.  Patient currently receiving IV PRBC transfusion.  Denies abdominal pain.    Hospital Course:    Lynne Sanchez is an 89-year-old female with history of hypertension, diabetes mellitus type 2, hypothyroidism, sick sinus syndrome status post pacemaker was brought to the emergency room by family with multiple complaints including right hand pain and swelling.  Patient was in the emergency room on 2024 secondary to a fall and hitting her back and hands.  At that time, she was started to have cellulitis of the hand and possibly gouty arthritis.  She was given NSAIDs, steroids, dose of cefazolin and was discharged on Keflex.  According to family, patient has not improved since then and has become more weak and is unable to ambulate at this time.  Patient states that he lives with her .     In the emergency room, she was afebrile and hemodynamically stable saturating at 96% on room air.  She does have chronic elevated troponin levels.  proBNP 16,085.  CMP was unremarkable except for a creatinine of 1.39 (baseline).  Lactic acid was within normal limits but elevated C-reactive protein of 4.75 and procalcitonin of 0.35.  WBC 13, hemoglobin 9 (baseline around 8).  ESR was 9.  Blood cultures were drawn in the emergency room.  Portable chest x-ray showed chronic appearing parenchymal changes without any acute infiltrate.  Venous duplex of the right upper extremity showed superficial  venous thrombosis involving a portion of the cephalic vein.  No evidence of DVT noted.  Patient was given cefepime and vancomycin in the emergency room for suspected right upper extremity cellulitis and was subsequently admitted to the medical floor with telemetry.    Review of Systems:     All systems were reviewed and negative except as mentioned in subjective, assessment and plan.    Vital Signs:    Temp:  [97.1 °F (36.2 °C)-99.6 °F (37.6 °C)] 98.4 °F (36.9 °C)  Heart Rate:  [69-76] 70  Resp:  [16-20] 16  BP: ()/(40-54) 104/48    Intake and output:    I/O last 3 completed shifts:  In: 360 [P.O.:360]  Out: -   I/O this shift:  In: 647.9 [P.O.:240; Blood:407.9]  Out: -     Physical Examination: Examined again 7/26/24    General Appearance:  Alert and cooperative.    Head:  Atraumatic and normocephalic.   Eyes: Conjunctivae and sclerae normal, no icterus. No pallor.   Throat: No oral lesions, no thrush, oral mucosa moist.   Neck: Supple, trachea midline, no thyromegaly.   Lungs:   Breath sounds heard bilaterally equally.  No wheezing or crackles. No Pleural rub or bronchial breathing.   Heart:  Normal S1 and S2, no murmur, No JVD.   Abdomen:   Normal bowel sounds, no masses, no organomegaly. Soft, nontender, nondistended, no rebound tenderness.   Extremities: Right upper extremity redness and swelling.  Tophi present on bilateral hands   Skin: No bleeding or rash.   Neurologic: Alert and oriented x 3. No facial asymmetry. Moves all four limbs. No tremors.      Laboratory results:    Results from last 7 days   Lab Units 07/26/24  0540 07/25/24  0857 07/24/24  0540 07/23/24  0624 07/22/24  1607   SODIUM mmol/L 134* 138 138   < > 136   POTASSIUM mmol/L 4.0 4.2 4.0   < > 4.5   CHLORIDE mmol/L 102 101 105   < > 99   CO2 mmol/L 20.9* 21.2* 22.7   < > 25.4   BUN mg/dL 64* 54* 35*   < > 31*   CREATININE mg/dL 1.68* 1.50* 1.50*   < > 1.39*   CALCIUM mg/dL 7.7* 8.1* 8.0*   < > 8.7   BILIRUBIN mg/dL  --   --   --   --   "0.4   ALK PHOS U/L  --   --   --   --  104   ALT (SGPT) U/L  --   --   --   --  7   AST (SGOT) U/L  --   --   --   --  28   GLUCOSE mg/dL 101* 160* 78   < > 65    < > = values in this interval not displayed.     Results from last 7 days   Lab Units 07/26/24  1202 07/26/24  0540 07/25/24  0857 07/24/24  0540   WBC 10*3/mm3  --  15.74* 21.62* 15.96*   HEMOGLOBIN g/dL 6.9* 5.9* 7.1* 7.4*   HEMATOCRIT % 21.7* 18.7* 23.1* 24.9*   PLATELETS 10*3/mm3  --  200 221 198         Results from last 7 days   Lab Units 07/22/24  1817 07/22/24  1607   HSTROP T ng/L 78* 91*     Results from last 7 days   Lab Units 07/22/24  1850   BLOODCX  No growth at 3 days  No growth at 3 days     No results for input(s): \"PHART\", \"ZCV5LRV\", \"PO2ART\", \"WFB5EXU\", \"BASEEXCESS\" in the last 8760 hours.   I have reviewed the patient's laboratory results.    Radiology results:    No radiology results from the last 24 hrs  I have reviewed the patient's radiology reports.    Medication Review:     I have reviewed the patient's active and prn medications.     Problem List:      Right arm cellulitis    Acquired hypothyroidism    Anemia of chronic renal failure    Absolute anemia    Stage 3b chronic kidney disease    Moderate malnutrition      Assessment:    Right upper extremity cellulitis, POA.  Right upper extremity superficial vein thrombosis, POA.  Diabetes mellitus type 2 with nephropathy.  Chronic kidney disease stage III.  Chronic elevated troponin secondary to #4.  Sick sinus syndrome status post pacemaker.  Essential hypertension.  Gout  Severe pulmonary hypertension  Hypothyroidism.  Upper GI bleed       Plan:    Upper GI Bleed  -Hemoglobin with significant drop this morning.  Also nursing reported overnight to bloody bowel movements.  -Transfuse 1 unit PRBC  -IV Protonix  -GI consult, Dr Velez.   -EGD performed 7/26.  Hematin found in the stomach.  Blood in the duodenal bulb.  A single bleeding angio ectasia in the duodenum.  Clip " placed.  -Recommend n.p.o. for remainder of today with clear liquids tomorrow.  -Hold Eliquis and antiplatelet medication    Right upper extremity cellulitis.  Right cephalic venous thrombosis   - Patient does seem to have redness in the right upper extremity with swelling.  - We will treated with IV antibiotics therapy with ceftriaxone.  - MRSA negative.  - Follow blood cultures.  So far negative.  - Lactobacillus supplements.  -Ultrasound venous Doppler revealed SVT involving a portion of the cephalic vein.  No evidence of DVT.  Given risk for development of DVT and superimposed cellulitis, will anticoagulate with Eliquis for 4 weeks.  Discontinue aspirin     CKD stage III.  - Renal function is at baseline.  - She has chronic elevated troponin secondary to chronic kidney disease.     Diabetes mellitus type 2.  - Continue subcutaneous insulin protocol for coverage.    Gout with tophi  -Supportive care  -Pain medication as indicated  -Initiate allopurinol  -Uric acid level elevated    Severe pulmonary hypertension  -Revealed upon chart review  -Patient has never followed up with pulmonary hypertensive clinic  -Complicates all aspects of care    Palliative consulted.     DVT Prophylaxis: Enoxaparin  Code Status: Full  Diet: Diabetic  Discharge Plan: Pending - needs several more days    Jaswinder Andrews DO  07/26/24  17:19 EDT    Dictated utilizing Dragon dictation.

## 2024-07-26 NOTE — CONSULTS
In Patient Consult      Date of Consultation: 2024  Patient Name: Lynne Sanchez  MRN: [id number]  : 1934     Referring provider: Jaswinder Andrews DO    Primary care provider:  Elizabeth Easton APRN    Reason for consultation: Rectal bleeding, blood loss anemia    History of Present Illness: This is a 89-year-old elderly female patient with a prior history of hypertension, hyperlipidemia, CHF, CKD, valvular heart disease, chronic atrial fibrillation, currently status post PPM, peripheral vascular disease, diabetes mellitus, hypothyroidism, chronic anemia, upper GI bleed associated with the gastric and duodenal AVMs, who was admitted on 2024 with cellulitis rt upper extremity.    Patient was getting a therapy for the cellulitis however earlier this morning she had a multiple bowel movements which were solid mixed with the loose and blood mixed looking as per nursing staff.  As per RN she had an issue with constipation on admission and patient was taking herself the stool ball and apparently had a bowel movement earlier this morning.  Dropped from 7.1 to 6 g/dL this morning.  GI was consulted for further evaluation and management.    Patient has a long history of anemia.  Dr. ACKERMAN and PillCam study in the past.  She did have a prior gastric and duodenal AVMs which were ablated.  PillCam study was incomplete but no obvious AVMs identified within the view available up to distal ileum.  She has a remote history of colonoscopy.    Subjective     Past Medical History:   Diagnosis Date    Acute deep vein thrombosis of left upper extremity     Anemia     Asthma     CHF (congestive heart failure)     Chronic atrial fibrillation     Deep venous thrombosis of left upper limb     Diabetes mellitus, type 2     Diarrhea     GERD (gastroesophageal reflux disease)     Glaucoma     H/O transfusion of whole blood     Heart attack     Heart murmur     History of transfusion     Hyperlipidemia      Hypertension     Impaired functional mobility, balance, gait, and endurance     Kidney disease     patient not aware of what exact diagnosis is     Osteomyelitis     Osteomyelitis of left foot 10/03/2017    Peripheral vascular disease     Right retinal detachment     Secondary cataract of both eyes     Stable proliferative diabetic retinopathy of both eyes     Stroke     Swelling of left extremity     Urinary tract infection     Wears glasses        Past Surgical History:   Procedure Laterality Date    AMPUTATION DIGIT Left 7/5/2018    Procedure: Left Great toe amputation;  Surgeon: Prakash Carrington MD;  Location:  [facility];  Service: Vascular    AMPUTATION DIGIT Left 8/27/2018    Procedure: SECOND TOE AMPUTATION DIGIT LEFT;  Surgeon: Prakash Carrington MD;  Location:  [facility];  Service: General    APPENDECTOMY      BONE MARROW ASPIRATION      CARDIAC ABLATION      CARDIAC CATHETERIZATION N/A 10/10/2017    Procedure: Peripheral angiography;  Surgeon: Kan Pelaez MD;  Location: [facility] CATH INVASIVE LOCATION;  Service:     CARDIAC CATHETERIZATION N/A 10/10/2017    Procedure: Angioplasty-peripheral;  Surgeon: Kan Pelaez MD;  Location: [facility] CATH INVASIVE LOCATION;  Service:     CARDIAC CATHETERIZATION N/A 10/10/2017    Procedure: Atherectomy-peripheral;  Surgeon: Kan Pelaez MD;  Location: [facility] CATH INVASIVE LOCATION;  Service:     CARDIAC ELECTROPHYSIOLOGY PROCEDURE N/A 5/3/2018    Procedure: generator change;  Surgeon: Zan Poole MD;  Location:  [facility] CATH INVASIVE LOCATION;  Service: Cardiovascular    CARDIOVERSION      COLONOSCOPY      ENDOSCOPY N/A 10/9/2017    Procedure: ESOPHAGOGASTRODUODENOSCOPY WITH COLD FORCEP BIOPSY;  Surgeon: Clifton Hyatt MD;  Location: [facility] ENDOSCOPY;  Service:     ENDOSCOPY N/A 3/22/2022    Procedure: ESOPHAGOGASTRODUODENOSCOPY with AVM cautery;  Surgeon: Clarisse Velez MD;  Location: [facility] ENDOSCOPY;  Service: Gastroenterology;   Laterality: N/A;    ENDOSCOPY N/A 8/4/2023    Procedure: ESOPHAGOGASTRODUODENOSCOPY WITH BIOPSY AND RUTH BRUSHING;  Surgeon: Clarisse Velez MD;  Location: [facility] ENDOSCOPY;  Service: Gastroenterology;  Laterality: N/A;    EYE SURGERY Bilateral     CATARACTS    INTERVENTIONAL RADIOLOGY PROCEDURE N/A 10/10/2017    Procedure: Abdominal Aortagram with Runoff;  Surgeon: Kan Pelaez MD;  Location: [facility] CATH INVASIVE LOCATION;  Service:     PACEMAKER IMPLANTATION      around 2008 then replaced 2018       Family History   Problem Relation Age of Onset    No Known Problems Mother     No Known Problems Father     Arthritis Other        Social History     Socioeconomic History    Marital status:     Number of children: 3   Tobacco Use    Smoking status: Never     Passive exposure: Never    Smokeless tobacco: Never   Vaping Use    Vaping status: Never Used   Substance and Sexual Activity    Alcohol use: No    Drug use: No    Sexual activity: Defer         Current Facility-Administered Medications:     acetaminophen (TYLENOL) tablet 650 mg, 650 mg, Oral, Q4H PRN, 650 mg at 07/25/24 2053 **OR** acetaminophen (TYLENOL) 160 MG/5ML oral solution 650 mg, 650 mg, Oral, Q4H PRN **OR** acetaminophen (TYLENOL) suppository 650 mg, 650 mg, Rectal, Q4H PRN, Isaías Mccallum MD    allopurinol (ZYLOPRIM) tablet 100 mg, 100 mg, Oral, Daily, Jaswinder Andrews, , 100 mg at 07/25/24 1232    atorvastatin (LIPITOR) tablet 40 mg, 40 mg, Oral, Daily, Isaías Mccallum MD, 40 mg at 07/25/24 0940    sennosides-docusate (PERICOLACE) 8.6-50 MG per tablet 2 tablet, 2 tablet, Oral, BID, 2 tablet at 07/25/24 0939 **AND** polyethylene glycol (MIRALAX) packet 17 g, 17 g, Oral, Daily PRN, 17 g at 07/24/24 2116 **AND** bisacodyl (DULCOLAX) EC tablet 5 mg, 5 mg, Oral, Daily PRN **AND** bisacodyl (DULCOLAX) suppository 10 mg, 10 mg, Rectal, Daily PRN, Isaías Mccallum MD    cefTRIAXone (ROCEPHIN) 2,000 mg in sodium chloride 0.9 % 100 mL  IVPB-VTB, 2,000 mg, Intravenous, Q24H, Jaswinder Andrews DO, Last Rate: 200 mL/hr at 07/25/24 0940, 2,000 mg at 07/25/24 0940    dextrose (D50W) (25 g/50 mL) IV injection 25 g, 25 g, Intravenous, Q15 Min PRN, Isaías Mccallum MD    dextrose (GLUTOSE) oral gel 15 g, 15 g, Oral, Q15 Min PRN, Isaías Mccallum MD    doxycycline (VIBRAMYCIN) 100 mg in sodium chloride 0.9 % 100 mL IVPB-VTB, 100 mg, Intravenous, Q12H, Jaswinder Andrews DO, Stopped at 07/26/24 0130    furosemide (LASIX) tablet 20 mg, 20 mg, Oral, Daily, Jaswinder Andrews DO, 20 mg at 07/25/24 0939    glucagon (GLUCAGEN) injection 1 mg, 1 mg, Intramuscular, Q15 Min PRN, Isaías Mccallum MD    HYDROcodone-acetaminophen (NORCO) 5-325 MG per tablet 1 tablet, 1 tablet, Oral, Q8H PRN, Isaías Mccallum MD, 1 tablet at 07/25/24 0939    Insulin Lispro (humaLOG) injection 2-7 Units, 2-7 Units, Subcutaneous, 4x Daily AC & at Bedtime, Isaías Mccallum MD, 2 Units at 07/25/24 0940    José Miguel pack 1 packet, 1 packet, Oral, BID, Jaswinder Andrews DO, 1 packet at 07/25/24 2053    lactobacillus acidophilus (RISAQUAD) capsule 1 capsule, 1 capsule, Oral, BID, Isaías Mccallum MD, 1 capsule at 07/25/24 2053    levothyroxine (SYNTHROID, LEVOTHROID) tablet 50 mcg, 50 mcg, Oral, Q AM, Isaías Mccallum MD, 50 mcg at 07/26/24 0544    metoprolol tartrate (LOPRESSOR) tablet 100 mg, 100 mg, Oral, BID, Isaías Mccallum MD, 100 mg at 07/25/24 0939    ondansetron (ZOFRAN) injection 4 mg, 4 mg, Intravenous, Q6H PRN, Isaías Mccallum MD    pantoprazole (PROTONIX) EC tablet 40 mg, 40 mg, Oral, Daily, Isaías Mccallum MD, 40 mg at 07/25/24 0939    sodium chloride 0.9 % flush 10 mL, 10 mL, Intravenous, PRN, Craig Stark DO    sodium chloride 0.9 % flush 10 mL, 10 mL, Intravenous, Q12H, Isaías Mccallum MD, 10 mL at 07/25/24 2053    sodium chloride 0.9 % flush 10 mL, 10 mL, Intravenous, PRN, Isaías Mccallum MD    sodium chloride 0.9 % infusion 40 mL, 40 mL, Intravenous, PRN, Isaías Mccallum MD    Allergies   Allergen Reactions     Phenergan [Promethazine Hcl] Confusion    Zosyn [Piperacillin-Tazobactam In Dex] Rash       Review of Systems   Constitutional:  Negative for appetite change, fatigue, fever and unexpected weight loss.   HENT:  Negative for trouble swallowing.    Gastrointestinal:  Positive for blood in stool and constipation. Negative for abdominal distention, abdominal pain, anal bleeding, diarrhea, nausea, rectal pain, vomiting, GERD and indigestion.        The following portions of the patient's history were reviewed and updated as appropriate: allergies, current medications, past family history, past medical history, past social history, past surgical history and problem list.    Objective     Vitals:    07/26/24 0400 07/26/24 0744 07/26/24 0804 07/26/24 0815   BP: 91/48 99/40 106/51 101/45   BP Location: Right arm Right arm     Patient Position: Lying Lying     Pulse: 69  73 69   Resp: 20 18 16 16   Temp: 98.4 °F (36.9 °C) 97.9 °F (36.6 °C) 98.2 °F (36.8 °C) 98.2 °F (36.8 °C)   TempSrc: Oral Axillary Oral Oral   SpO2: 94%  98% 97%   Weight:       Height:           Physical Exam  Constitutional:       Appearance: Normal appearance.   HENT:      Head: Normocephalic and atraumatic.   Eyes:      Comments: Pallor present   Abdominal:      General: Abdomen is flat. There is no distension.      Palpations: There is no mass.      Tenderness: There is no abdominal tenderness. There is no guarding or rebound.      Hernia: No hernia is present.   Musculoskeletal:      Cervical back: Normal range of motion and neck supple.   Neurological:      Mental Status: She is alert.         Results from last 7 days   Lab Units 07/26/24  0540 07/25/24  0857 07/24/24  0540 07/23/24  0624 07/22/24  1607   SODIUM mmol/L 134* 138 138   < > 136   POTASSIUM mmol/L 4.0 4.2 4.0   < > 4.5   CHLORIDE mmol/L 102 101 105   < > 99   CO2 mmol/L 20.9* 21.2* 22.7   < > 25.4   BUN mg/dL 64* 54* 35*   < > 31*   CREATININE mg/dL 1.68* 1.50* 1.50*   < > 1.39*   CALCIUM  mg/dL 7.7* 8.1* 8.0*   < > 8.7   ALBUMIN g/dL  --   --   --   --  3.2*   BILIRUBIN mg/dL  --   --   --   --  0.4   ALK PHOS U/L  --   --   --   --  104   ALT (SGPT) U/L  --   --   --   --  7   AST (SGOT) U/L  --   --   --   --  28   GLUCOSE mg/dL 101* 160* 78   < > 65   WBC 10*3/mm3 15.74* 21.62* 15.96*   < > 13.19*   HEMOGLOBIN g/dL 5.9* 7.1* 7.4*   < > 8.9*   PLATELETS 10*3/mm3 200 221 198   < > 253    < > = values in this interval not displayed.       Imaging Results (Last 24 Hours)       ** No results found for the last 24 hours. **          EGD on 3/22/2022 by Dr. Velez:  - The oropharynx was normal.  - The Z-line was irregular and was found 35 cm from the incisors.  - A 2 cm hiatal hernia was present.  - The esophagus and gastroesophageal junction were examined with white light and narrow band imaging (NBI). 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 These changes involved the mucosa extending to the Z-line (35 cm from the incisors). Circumferential salmon-colored mucosa was present from 20 to 35 cm and no visible abnormalities were present. The maximum longitudinal extent of these esophageal mucosal changes was 15 cm in length. Not biopsied as pt is on anticoagulation.  - Patchy mildly erythematous mucosa without bleeding was found on the posterior wall of the stomach and in the gastric antrum.  - A single small angioectasia with stigmata of recent bleeding was found in the gastric fundus. Vaporization for hemostasis of bleeding caused by the procedure using argon plasma at 2 liters/minute and 20 santa was successful.  - Red blood was found in the first portion of the duodenum.  - Two small angioectasias with bleeding were found in the first portion of the duodenum and in the second portion of the duodenum. Vaporization for hemostasis using argon plasma at  2 liters/minute and 20 santa was successful. Estimated blood loss was minimal.  - The second portion of the duodenum was normal.  - A few diminutive polyps were found in the gastric fundus and in the gastric body.  No specimens collected. Not biopsied as pt is on anticoagulation.     PillCam dated 4/15/2022  She had a delayed motility with passage of Pill cam only up to distal ileum in 8 hours. No complete distal and terminal ileum views available. Within the view available no small bowel AVMs identified. Multiple areas of what appears to be a healed erosion/ulcer scar in the mid/distal ileum. This can be seen in Crohn's disease patients on remission, however no active ulcerations or erosions identified.  Assessment / Plan      Assessment/Recommendations:   GI bleeding  Suspected upper GI versus rectal bleeding  Acute blood loss anemia  History of chronic iron deficiency anemia  History of gastric and duodenal AVMs  Constipation with suspected fecal impaction  Patient history is concerning for fecal impaction with possible stercoral ulcer and bleeding.  However as per nursing staff stool looks more likely melanotic stool.  Rectal bleeding.  Patient also had a prior history of upper GI bleeding.  Hemoglobin dropped from 7.1 to 5.9 g/dL.    She needs an EGD for further evaluation with possible flexible sigmoidoscopy.  Discussion with family they are agreeable to proceed with the procedure.    Keep n.p.o.  IV fluid hydration  IV Protonix twice daily  Transfuse 2 units of PRBC  Recommend further after EGD colonoscopy      RT Upper extremity cellulitis  CHF/cardiac arrhythmia status post PPM  CKD  Diabetes mellitus      Thank you very much for letting me participate in the care of this patient.  Please do not hesitate to call me if you have any questions.    Clarisse Velez MD  [facility]  7/26/2024  09:03 EDT    Please note that portions of this note may have been completed with a voice recognition  program.

## 2024-07-26 NOTE — H&P (VIEW-ONLY)
In Patient Consult      Date of Consultation: 2024  Patient Name: Lynne Sanchez  MRN: [id number]  : 1934     Referring provider: Jaswinder Andrews DO    Primary care provider:  Elizabeth Easton APRN    Reason for consultation: Rectal bleeding, blood loss anemia    History of Present Illness: This is a 89-year-old elderly female patient with a prior history of hypertension, hyperlipidemia, CHF, CKD, valvular heart disease, chronic atrial fibrillation, currently status post PPM, peripheral vascular disease, diabetes mellitus, hypothyroidism, chronic anemia, upper GI bleed associated with the gastric and duodenal AVMs, who was admitted on 2024 with cellulitis rt upper extremity.    Patient was getting a therapy for the cellulitis however earlier this morning she had a multiple bowel movements which were solid mixed with the loose and blood mixed looking as per nursing staff.  As per RN she had an issue with constipation on admission and patient was taking herself the stool ball and apparently had a bowel movement earlier this morning.  Dropped from 7.1 to 6 g/dL this morning.  GI was consulted for further evaluation and management.    Patient has a long history of anemia.  Dr. ACKERMAN and PillCam study in the past.  She did have a prior gastric and duodenal AVMs which were ablated.  PillCam study was incomplete but no obvious AVMs identified within the view available up to distal ileum.  She has a remote history of colonoscopy.    Subjective     Past Medical History:   Diagnosis Date    Acute deep vein thrombosis of left upper extremity     Anemia     Asthma     CHF (congestive heart failure)     Chronic atrial fibrillation     Deep venous thrombosis of left upper limb     Diabetes mellitus, type 2     Diarrhea     GERD (gastroesophageal reflux disease)     Glaucoma     H/O transfusion of whole blood     Heart attack     Heart murmur     History of transfusion     Hyperlipidemia      Hypertension     Impaired functional mobility, balance, gait, and endurance     Kidney disease     patient not aware of what exact diagnosis is     Osteomyelitis     Osteomyelitis of left foot 10/03/2017    Peripheral vascular disease     Right retinal detachment     Secondary cataract of both eyes     Stable proliferative diabetic retinopathy of both eyes     Stroke     Swelling of left extremity     Urinary tract infection     Wears glasses        Past Surgical History:   Procedure Laterality Date    AMPUTATION DIGIT Left 7/5/2018    Procedure: Left Great toe amputation;  Surgeon: Prakash Carrington MD;  Location:  [facility];  Service: Vascular    AMPUTATION DIGIT Left 8/27/2018    Procedure: SECOND TOE AMPUTATION DIGIT LEFT;  Surgeon: Prakash Carrington MD;  Location:  [facility];  Service: General    APPENDECTOMY      BONE MARROW ASPIRATION      CARDIAC ABLATION      CARDIAC CATHETERIZATION N/A 10/10/2017    Procedure: Peripheral angiography;  Surgeon: Kan Pelaez MD;  Location: [facility] CATH INVASIVE LOCATION;  Service:     CARDIAC CATHETERIZATION N/A 10/10/2017    Procedure: Angioplasty-peripheral;  Surgeon: Kan Pelaez MD;  Location: [facility] CATH INVASIVE LOCATION;  Service:     CARDIAC CATHETERIZATION N/A 10/10/2017    Procedure: Atherectomy-peripheral;  Surgeon: Kan Pelaez MD;  Location: [facility] CATH INVASIVE LOCATION;  Service:     CARDIAC ELECTROPHYSIOLOGY PROCEDURE N/A 5/3/2018    Procedure: generator change;  Surgeon: Zan Poole MD;  Location:  [facility] CATH INVASIVE LOCATION;  Service: Cardiovascular    CARDIOVERSION      COLONOSCOPY      ENDOSCOPY N/A 10/9/2017    Procedure: ESOPHAGOGASTRODUODENOSCOPY WITH COLD FORCEP BIOPSY;  Surgeon: Clifton Hyatt MD;  Location: [facility] ENDOSCOPY;  Service:     ENDOSCOPY N/A 3/22/2022    Procedure: ESOPHAGOGASTRODUODENOSCOPY with AVM cautery;  Surgeon: Clarisse Velez MD;  Location: [facility] ENDOSCOPY;  Service: Gastroenterology;   Laterality: N/A;    ENDOSCOPY N/A 8/4/2023    Procedure: ESOPHAGOGASTRODUODENOSCOPY WITH BIOPSY AND RUTH BRUSHING;  Surgeon: Clarisse Velez MD;  Location: [facility] ENDOSCOPY;  Service: Gastroenterology;  Laterality: N/A;    EYE SURGERY Bilateral     CATARACTS    INTERVENTIONAL RADIOLOGY PROCEDURE N/A 10/10/2017    Procedure: Abdominal Aortagram with Runoff;  Surgeon: Kan Pelaez MD;  Location: [facility] CATH INVASIVE LOCATION;  Service:     PACEMAKER IMPLANTATION      around 2008 then replaced 2018       Family History   Problem Relation Age of Onset    No Known Problems Mother     No Known Problems Father     Arthritis Other        Social History     Socioeconomic History    Marital status:     Number of children: 3   Tobacco Use    Smoking status: Never     Passive exposure: Never    Smokeless tobacco: Never   Vaping Use    Vaping status: Never Used   Substance and Sexual Activity    Alcohol use: No    Drug use: No    Sexual activity: Defer         Current Facility-Administered Medications:     acetaminophen (TYLENOL) tablet 650 mg, 650 mg, Oral, Q4H PRN, 650 mg at 07/25/24 2053 **OR** acetaminophen (TYLENOL) 160 MG/5ML oral solution 650 mg, 650 mg, Oral, Q4H PRN **OR** acetaminophen (TYLENOL) suppository 650 mg, 650 mg, Rectal, Q4H PRN, Isaías Mccallum MD    allopurinol (ZYLOPRIM) tablet 100 mg, 100 mg, Oral, Daily, Jaswinder Andrews, , 100 mg at 07/25/24 1232    atorvastatin (LIPITOR) tablet 40 mg, 40 mg, Oral, Daily, Isaías Mccallum MD, 40 mg at 07/25/24 0940    sennosides-docusate (PERICOLACE) 8.6-50 MG per tablet 2 tablet, 2 tablet, Oral, BID, 2 tablet at 07/25/24 0939 **AND** polyethylene glycol (MIRALAX) packet 17 g, 17 g, Oral, Daily PRN, 17 g at 07/24/24 2116 **AND** bisacodyl (DULCOLAX) EC tablet 5 mg, 5 mg, Oral, Daily PRN **AND** bisacodyl (DULCOLAX) suppository 10 mg, 10 mg, Rectal, Daily PRN, Isaías Mccallum MD    cefTRIAXone (ROCEPHIN) 2,000 mg in sodium chloride 0.9 % 100 mL  IVPB-VTB, 2,000 mg, Intravenous, Q24H, Jaswinder Andrews DO, Last Rate: 200 mL/hr at 07/25/24 0940, 2,000 mg at 07/25/24 0940    dextrose (D50W) (25 g/50 mL) IV injection 25 g, 25 g, Intravenous, Q15 Min PRN, Isaías Mccallum MD    dextrose (GLUTOSE) oral gel 15 g, 15 g, Oral, Q15 Min PRN, Isaías Mccallum MD    doxycycline (VIBRAMYCIN) 100 mg in sodium chloride 0.9 % 100 mL IVPB-VTB, 100 mg, Intravenous, Q12H, Jaswinder Andrews DO, Stopped at 07/26/24 0130    furosemide (LASIX) tablet 20 mg, 20 mg, Oral, Daily, Jaswinder Andrews DO, 20 mg at 07/25/24 0939    glucagon (GLUCAGEN) injection 1 mg, 1 mg, Intramuscular, Q15 Min PRN, Isaías Mccallum MD    HYDROcodone-acetaminophen (NORCO) 5-325 MG per tablet 1 tablet, 1 tablet, Oral, Q8H PRN, Isaías Mccallum MD, 1 tablet at 07/25/24 0939    Insulin Lispro (humaLOG) injection 2-7 Units, 2-7 Units, Subcutaneous, 4x Daily AC & at Bedtime, Isaías Mccallum MD, 2 Units at 07/25/24 0940    José Miguel pack 1 packet, 1 packet, Oral, BID, Jaswinder Andrews DO, 1 packet at 07/25/24 2053    lactobacillus acidophilus (RISAQUAD) capsule 1 capsule, 1 capsule, Oral, BID, Isaías Mccallum MD, 1 capsule at 07/25/24 2053    levothyroxine (SYNTHROID, LEVOTHROID) tablet 50 mcg, 50 mcg, Oral, Q AM, Isaías Mccallum MD, 50 mcg at 07/26/24 0544    metoprolol tartrate (LOPRESSOR) tablet 100 mg, 100 mg, Oral, BID, Isaías Mccallum MD, 100 mg at 07/25/24 0939    ondansetron (ZOFRAN) injection 4 mg, 4 mg, Intravenous, Q6H PRN, Isaías Mccallum MD    pantoprazole (PROTONIX) EC tablet 40 mg, 40 mg, Oral, Daily, Isaías Mccallum MD, 40 mg at 07/25/24 0939    sodium chloride 0.9 % flush 10 mL, 10 mL, Intravenous, PRN, Craig Stark DO    sodium chloride 0.9 % flush 10 mL, 10 mL, Intravenous, Q12H, Isaías Mccallum MD, 10 mL at 07/25/24 2053    sodium chloride 0.9 % flush 10 mL, 10 mL, Intravenous, PRN, Isaías Mccallum MD    sodium chloride 0.9 % infusion 40 mL, 40 mL, Intravenous, PRN, Isaías Mccallum MD    Allergies   Allergen Reactions     Phenergan [Promethazine Hcl] Confusion    Zosyn [Piperacillin-Tazobactam In Dex] Rash       Review of Systems   Constitutional:  Negative for appetite change, fatigue, fever and unexpected weight loss.   HENT:  Negative for trouble swallowing.    Gastrointestinal:  Positive for blood in stool and constipation. Negative for abdominal distention, abdominal pain, anal bleeding, diarrhea, nausea, rectal pain, vomiting, GERD and indigestion.        The following portions of the patient's history were reviewed and updated as appropriate: allergies, current medications, past family history, past medical history, past social history, past surgical history and problem list.    Objective     Vitals:    07/26/24 0400 07/26/24 0744 07/26/24 0804 07/26/24 0815   BP: 91/48 99/40 106/51 101/45   BP Location: Right arm Right arm     Patient Position: Lying Lying     Pulse: 69  73 69   Resp: 20 18 16 16   Temp: 98.4 °F (36.9 °C) 97.9 °F (36.6 °C) 98.2 °F (36.8 °C) 98.2 °F (36.8 °C)   TempSrc: Oral Axillary Oral Oral   SpO2: 94%  98% 97%   Weight:       Height:           Physical Exam  Constitutional:       Appearance: Normal appearance.   HENT:      Head: Normocephalic and atraumatic.   Eyes:      Comments: Pallor present   Abdominal:      General: Abdomen is flat. There is no distension.      Palpations: There is no mass.      Tenderness: There is no abdominal tenderness. There is no guarding or rebound.      Hernia: No hernia is present.   Musculoskeletal:      Cervical back: Normal range of motion and neck supple.   Neurological:      Mental Status: She is alert.         Results from last 7 days   Lab Units 07/26/24  0540 07/25/24  0857 07/24/24  0540 07/23/24  0624 07/22/24  1607   SODIUM mmol/L 134* 138 138   < > 136   POTASSIUM mmol/L 4.0 4.2 4.0   < > 4.5   CHLORIDE mmol/L 102 101 105   < > 99   CO2 mmol/L 20.9* 21.2* 22.7   < > 25.4   BUN mg/dL 64* 54* 35*   < > 31*   CREATININE mg/dL 1.68* 1.50* 1.50*   < > 1.39*   CALCIUM  mg/dL 7.7* 8.1* 8.0*   < > 8.7   ALBUMIN g/dL  --   --   --   --  3.2*   BILIRUBIN mg/dL  --   --   --   --  0.4   ALK PHOS U/L  --   --   --   --  104   ALT (SGPT) U/L  --   --   --   --  7   AST (SGOT) U/L  --   --   --   --  28   GLUCOSE mg/dL 101* 160* 78   < > 65   WBC 10*3/mm3 15.74* 21.62* 15.96*   < > 13.19*   HEMOGLOBIN g/dL 5.9* 7.1* 7.4*   < > 8.9*   PLATELETS 10*3/mm3 200 221 198   < > 253    < > = values in this interval not displayed.       Imaging Results (Last 24 Hours)       ** No results found for the last 24 hours. **          EGD on 3/22/2022 by Dr. Velez:  - The oropharynx was normal.  - The Z-line was irregular and was found 35 cm from the incisors.  - A 2 cm hiatal hernia was present.  - The esophagus and gastroesophageal junction were examined with white light and narrow band imaging (NBI). There were esophageal mucosal changes suspicious for long-segment Fermin's esophagus, consistent with long-segment Fermin's esophagus, classified as Fermin's stage C13-M15 per [place] criteria. These changes involved the mucosa extending to the Z-line (35 cm from the incisors). Circumferential salmon-colored mucosa was present from 20 to 35 cm and no visible abnormalities were present. The maximum longitudinal extent of these esophageal mucosal changes was 15 cm in length. Not biopsied as pt is on anticoagulation.  - Patchy mildly erythematous mucosa without bleeding was found on the posterior wall of the stomach and in the gastric antrum.  - A single small angioectasia with stigmata of recent bleeding was found in the gastric fundus. Vaporization for hemostasis of bleeding caused by the procedure using argon plasma at 2 liters/minute and 20 santa was successful.  - Red blood was found in the first portion of the duodenum.  - Two small angioectasias with bleeding were found in the first portion of the duodenum and in the second portion of the duodenum. Vaporization for hemostasis using argon plasma at  2 liters/minute and 20 santa was successful. Estimated blood loss was minimal.  - The second portion of the duodenum was normal.  - A few diminutive polyps were found in the gastric fundus and in the gastric body.  No specimens collected. Not biopsied as pt is on anticoagulation.     PillCam dated 4/15/2022  She had a delayed motility with passage of Pill cam only up to distal ileum in 8 hours. No complete distal and terminal ileum views available. Within the view available no small bowel AVMs identified. Multiple areas of what appears to be a healed erosion/ulcer scar in the mid/distal ileum. This can be seen in Crohn's disease patients on remission, however no active ulcerations or erosions identified.  Assessment / Plan      Assessment/Recommendations:   GI bleeding  Suspected upper GI versus rectal bleeding  Acute blood loss anemia  History of chronic iron deficiency anemia  History of gastric and duodenal AVMs  Constipation with suspected fecal impaction  Patient history is concerning for fecal impaction with possible stercoral ulcer and bleeding.  However as per nursing staff stool looks more likely melanotic stool.  Rectal bleeding.  Patient also had a prior history of upper GI bleeding.  Hemoglobin dropped from 7.1 to 5.9 g/dL.    She needs an EGD for further evaluation with possible flexible sigmoidoscopy.  Discussion with family they are agreeable to proceed with the procedure.    Keep n.p.o.  IV fluid hydration  IV Protonix twice daily  Transfuse 2 units of PRBC  Recommend further after EGD colonoscopy      RT Upper extremity cellulitis  CHF/cardiac arrhythmia status post PPM  CKD  Diabetes mellitus      Thank you very much for letting me participate in the care of this patient.  Please do not hesitate to call me if you have any questions.    Clarisse Velez MD  [facility]  7/26/2024  09:03 EDT    Please note that portions of this note may have been completed with a voice recognition  program.

## 2024-07-26 NOTE — CASE MANAGEMENT/SOCIAL WORK
DCP; Home with HH, no preferences for agencies. Pt is not medically stable for discharge at this time. No immediate needs. CM continues to follow.   Follow up final pathology for plan; woundhealing See Dr Cedeno in about 2 weeks; Bring a new chest X-ray.  Call for an appointment Follow up final pathology for plan; wound healing

## 2024-07-27 ENCOUNTER — ANESTHESIA (OUTPATIENT)
Dept: GASTROENTEROLOGY | Facility: HOSPITAL | Age: 89
End: 2024-07-27
Payer: MEDICARE

## 2024-07-27 ENCOUNTER — ANESTHESIA EVENT (OUTPATIENT)
Dept: GASTROENTEROLOGY | Facility: HOSPITAL | Age: 89
End: 2024-07-27
Payer: MEDICARE

## 2024-07-27 PROBLEM — L03.113 CELLULITIS OF RIGHT ARM: Status: ACTIVE | Noted: 2024-07-27

## 2024-07-27 LAB
ANION GAP SERPL CALCULATED.3IONS-SCNC: 15.4 MMOL/L (ref 5–15)
BACTERIA SPEC AEROBE CULT: NORMAL
BACTERIA SPEC AEROBE CULT: NORMAL
BASOPHILS # BLD AUTO: 0.03 10*3/MM3 (ref 0–0.2)
BASOPHILS NFR BLD AUTO: 0.2 % (ref 0–1.5)
BH BB BLOOD EXPIRATION DATE: NORMAL
BH BB BLOOD TYPE BARCODE: 5100
BH BB DISPENSE STATUS: NORMAL
BH BB PRODUCT CODE: NORMAL
BH BB UNIT NUMBER: NORMAL
BUN SERPL-MCNC: 65 MG/DL (ref 8–23)
BUN/CREAT SERPL: 37.4 (ref 7–25)
CALCIUM SPEC-SCNC: 7.7 MG/DL (ref 8.6–10.5)
CHLORIDE SERPL-SCNC: 101 MMOL/L (ref 98–107)
CO2 SERPL-SCNC: 19.6 MMOL/L (ref 22–29)
CREAT SERPL-MCNC: 1.74 MG/DL (ref 0.57–1)
CROSSMATCH INTERPRETATION: NORMAL
CRP SERPL-MCNC: 23.62 MG/DL (ref 0–0.5)
DEPRECATED RDW RBC AUTO: 54.5 FL (ref 37–54)
EGFRCR SERPLBLD CKD-EPI 2021: 27.8 ML/MIN/1.73
EOSINOPHIL # BLD AUTO: 0.14 10*3/MM3 (ref 0–0.4)
EOSINOPHIL NFR BLD AUTO: 1.1 % (ref 0.3–6.2)
ERYTHROCYTE [DISTWIDTH] IN BLOOD BY AUTOMATED COUNT: 17.5 % (ref 12.3–15.4)
GLUCOSE BLDC GLUCOMTR-MCNC: 113 MG/DL (ref 70–130)
GLUCOSE BLDC GLUCOMTR-MCNC: 137 MG/DL (ref 70–130)
GLUCOSE BLDC GLUCOMTR-MCNC: 141 MG/DL (ref 70–130)
GLUCOSE BLDC GLUCOMTR-MCNC: 85 MG/DL (ref 70–130)
GLUCOSE SERPL-MCNC: 73 MG/DL (ref 65–99)
HCT VFR BLD AUTO: 30.2 % (ref 34–46.6)
HCT VFR BLD AUTO: 31.3 % (ref 34–46.6)
HGB BLD-MCNC: 10 G/DL (ref 12–15.9)
HGB BLD-MCNC: 9.9 G/DL (ref 12–15.9)
IMM GRANULOCYTES # BLD AUTO: 0.2 10*3/MM3 (ref 0–0.05)
IMM GRANULOCYTES NFR BLD AUTO: 1.5 % (ref 0–0.5)
LYMPHOCYTES # BLD AUTO: 1.81 10*3/MM3 (ref 0.7–3.1)
LYMPHOCYTES NFR BLD AUTO: 13.6 % (ref 19.6–45.3)
MCH RBC QN AUTO: 28.6 PG (ref 26.6–33)
MCHC RBC AUTO-ENTMCNC: 31.9 G/DL (ref 31.5–35.7)
MCV RBC AUTO: 89.4 FL (ref 79–97)
MONOCYTES # BLD AUTO: 0.68 10*3/MM3 (ref 0.1–0.9)
MONOCYTES NFR BLD AUTO: 5.1 % (ref 5–12)
NEUTROPHILS NFR BLD AUTO: 10.45 10*3/MM3 (ref 1.7–7)
NEUTROPHILS NFR BLD AUTO: 78.5 % (ref 42.7–76)
NRBC BLD AUTO-RTO: 0 /100 WBC (ref 0–0.2)
PLATELET # BLD AUTO: 205 10*3/MM3 (ref 140–450)
PMV BLD AUTO: 9.7 FL (ref 6–12)
POTASSIUM SERPL-SCNC: 4.3 MMOL/L (ref 3.5–5.2)
RBC # BLD AUTO: 3.5 10*6/MM3 (ref 3.77–5.28)
SODIUM SERPL-SCNC: 136 MMOL/L (ref 136–145)
UNIT  ABO: NORMAL
UNIT  RH: NORMAL
WBC NRBC COR # BLD AUTO: 13.31 10*3/MM3 (ref 3.4–10.8)

## 2024-07-27 PROCEDURE — 43255 EGD CONTROL BLEEDING ANY: CPT | Performed by: INTERNAL MEDICINE

## 2024-07-27 PROCEDURE — 82948 REAGENT STRIP/BLOOD GLUCOSE: CPT | Performed by: INTERNAL MEDICINE

## 2024-07-27 PROCEDURE — 0W3P8ZZ CONTROL BLEEDING IN GASTROINTESTINAL TRACT, VIA NATURAL OR ARTIFICIAL OPENING ENDOSCOPIC: ICD-10-PCS | Performed by: INTERNAL MEDICINE

## 2024-07-27 PROCEDURE — 86140 C-REACTIVE PROTEIN: CPT | Performed by: INTERNAL MEDICINE

## 2024-07-27 PROCEDURE — 85014 HEMATOCRIT: CPT | Performed by: INTERNAL MEDICINE

## 2024-07-27 PROCEDURE — 85018 HEMOGLOBIN: CPT | Performed by: INTERNAL MEDICINE

## 2024-07-27 PROCEDURE — 82948 REAGENT STRIP/BLOOD GLUCOSE: CPT

## 2024-07-27 PROCEDURE — 25010000002 PROPOFOL 10 MG/ML EMULSION: Performed by: NURSE ANESTHETIST, CERTIFIED REGISTERED

## 2024-07-27 PROCEDURE — P9047 ALBUMIN (HUMAN), 25%, 50ML: HCPCS | Performed by: INTERNAL MEDICINE

## 2024-07-27 PROCEDURE — 99232 SBSQ HOSP IP/OBS MODERATE 35: CPT | Performed by: INTERNAL MEDICINE

## 2024-07-27 PROCEDURE — 86900 BLOOD TYPING SEROLOGIC ABO: CPT

## 2024-07-27 PROCEDURE — 25010000002 FUROSEMIDE PER 20 MG: Performed by: INTERNAL MEDICINE

## 2024-07-27 PROCEDURE — 25010000002 DIPHENHYDRAMINE PER 50 MG: Performed by: FAMILY MEDICINE

## 2024-07-27 PROCEDURE — 25010000002 ALBUMIN HUMAN 25% PER 50 ML: Performed by: INTERNAL MEDICINE

## 2024-07-27 PROCEDURE — 85025 COMPLETE CBC W/AUTO DIFF WBC: CPT | Performed by: INTERNAL MEDICINE

## 2024-07-27 PROCEDURE — P9016 RBC LEUKOCYTES REDUCED: HCPCS

## 2024-07-27 PROCEDURE — 80048 BASIC METABOLIC PNL TOTAL CA: CPT | Performed by: INTERNAL MEDICINE

## 2024-07-27 PROCEDURE — 36430 TRANSFUSION BLD/BLD COMPNT: CPT

## 2024-07-27 PROCEDURE — 25010000002 CEFTRIAXONE PER 250 MG: Performed by: INTERNAL MEDICINE

## 2024-07-27 RX ORDER — DIPHENHYDRAMINE HYDROCHLORIDE 50 MG/ML
50 INJECTION INTRAMUSCULAR; INTRAVENOUS EVERY 8 HOURS PRN
Status: DISCONTINUED | OUTPATIENT
Start: 2024-07-27 | End: 2024-07-31 | Stop reason: HOSPADM

## 2024-07-27 RX ORDER — FUROSEMIDE 10 MG/ML
40 INJECTION INTRAMUSCULAR; INTRAVENOUS ONCE
Status: COMPLETED | OUTPATIENT
Start: 2024-07-27 | End: 2024-07-27

## 2024-07-27 RX ORDER — ALBUMIN (HUMAN) 12.5 G/50ML
25 SOLUTION INTRAVENOUS ONCE
Status: COMPLETED | OUTPATIENT
Start: 2024-07-27 | End: 2024-07-27

## 2024-07-27 RX ORDER — PROPOFOL 10 MG/ML
VIAL (ML) INTRAVENOUS AS NEEDED
Status: DISCONTINUED | OUTPATIENT
Start: 2024-07-27 | End: 2024-07-27 | Stop reason: SURG

## 2024-07-27 RX ORDER — SIMETHICONE 40MG/0.6ML
SUSPENSION, DROPS(FINAL DOSAGE FORM)(ML) ORAL AS NEEDED
Status: DISCONTINUED | OUTPATIENT
Start: 2024-07-27 | End: 2024-07-27 | Stop reason: HOSPADM

## 2024-07-27 RX ORDER — LIDOCAINE HCL/PF 100 MG/5ML
SYRINGE (ML) INJECTION AS NEEDED
Status: DISCONTINUED | OUTPATIENT
Start: 2024-07-27 | End: 2024-07-27 | Stop reason: SURG

## 2024-07-27 RX ADMIN — LEVOTHYROXINE SODIUM 50 MCG: 50 TABLET ORAL at 05:49

## 2024-07-27 RX ADMIN — Medication 10 ML: at 10:32

## 2024-07-27 RX ADMIN — Medication 1 CAPSULE: at 10:30

## 2024-07-27 RX ADMIN — PANTOPRAZOLE SODIUM 8 MG/HR: 40 INJECTION, POWDER, FOR SOLUTION INTRAVENOUS at 05:29

## 2024-07-27 RX ADMIN — DIPHENHYDRAMINE HYDROCHLORIDE 50 MG: 50 INJECTION INTRAMUSCULAR; INTRAVENOUS at 06:32

## 2024-07-27 RX ADMIN — DOXYCYCLINE 100 MG: 100 INJECTION, POWDER, LYOPHILIZED, FOR SOLUTION INTRAVENOUS at 04:46

## 2024-07-27 RX ADMIN — PANTOPRAZOLE SODIUM 8 MG/HR: 40 INJECTION, POWDER, FOR SOLUTION INTRAVENOUS at 17:02

## 2024-07-27 RX ADMIN — TOPICAL ANESTHETIC 1 SPRAY: 200 SPRAY DENTAL; PERIODONTAL at 08:07

## 2024-07-27 RX ADMIN — PROPOFOL 20 MG: 10 INJECTION, EMULSION INTRAVENOUS at 08:09

## 2024-07-27 RX ADMIN — PANTOPRAZOLE SODIUM 8 MG/HR: 40 INJECTION, POWDER, FOR SOLUTION INTRAVENOUS at 23:24

## 2024-07-27 RX ADMIN — ALLOPURINOL 100 MG: 100 TABLET ORAL at 10:30

## 2024-07-27 RX ADMIN — CEFTRIAXONE SODIUM 2000 MG: 2 INJECTION, POWDER, FOR SOLUTION INTRAMUSCULAR; INTRAVENOUS at 10:31

## 2024-07-27 RX ADMIN — Medication 60 MG: at 08:07

## 2024-07-27 RX ADMIN — FUROSEMIDE 40 MG: 10 INJECTION, SOLUTION INTRAMUSCULAR; INTRAVENOUS at 15:08

## 2024-07-27 RX ADMIN — PANTOPRAZOLE SODIUM 8 MG/HR: 40 INJECTION, POWDER, FOR SOLUTION INTRAVENOUS at 10:32

## 2024-07-27 RX ADMIN — ALBUMIN (HUMAN) 25 G: 0.25 INJECTION, SOLUTION INTRAVENOUS at 17:02

## 2024-07-27 RX ADMIN — DOXYCYCLINE 100 MG: 100 INJECTION, POWDER, LYOPHILIZED, FOR SOLUTION INTRAVENOUS at 17:33

## 2024-07-27 RX ADMIN — Medication 1 CAPSULE: at 21:47

## 2024-07-27 RX ADMIN — FUROSEMIDE 20 MG: 20 TABLET ORAL at 10:30

## 2024-07-27 RX ADMIN — Medication 10 ML: at 21:47

## 2024-07-27 RX ADMIN — ATORVASTATIN CALCIUM 40 MG: 40 TABLET, FILM COATED ORAL at 10:30

## 2024-07-27 RX ADMIN — PROPOFOL 20 MG: 10 INJECTION, EMULSION INTRAVENOUS at 08:15

## 2024-07-27 RX ADMIN — Medication 1 PACKET: at 22:04

## 2024-07-27 NOTE — PLAN OF CARE
Goal Outcome Evaluation:  Plan of Care Reviewed With: patient        Progress: no change         Problem: Adult Inpatient Plan of Care  Goal: Plan of Care Review  Outcome: Ongoing, Progressing  Flowsheets (Taken 7/27/2024 0739)  Progress: no change  Plan of Care Reviewed With: patient  Goal: Patient-Specific Goal (Individualized)  Outcome: Ongoing, Progressing  Goal: Absence of Hospital-Acquired Illness or Injury  Outcome: Ongoing, Progressing  Intervention: Identify and Manage Fall Risk  Recent Flowsheet Documentation  Taken 7/27/2024 0600 by Tincher, Hairka, LPN  Safety Promotion/Fall Prevention:   activity supervised   assistive device/personal items within reach   clutter free environment maintained   fall prevention program maintained   nonskid shoes/slippers when out of bed   room organization consistent   safety round/check completed  Taken 7/27/2024 0429 by Tincher, Harika, LPN  Safety Promotion/Fall Prevention:   activity supervised   clutter free environment maintained   assistive device/personal items within reach   fall prevention program maintained   nonskid shoes/slippers when out of bed   room organization consistent   safety round/check completed  Taken 7/27/2024 0200 by Harika Sewell LPN  Safety Promotion/Fall Prevention:   activity supervised   assistive device/personal items within reach   clutter free environment maintained   fall prevention program maintained   nonskid shoes/slippers when out of bed   room organization consistent   safety round/check completed  Taken 7/27/2024 0041 by Tincher, Harika, LPN  Safety Promotion/Fall Prevention:   activity supervised   assistive device/personal items within reach   clutter free environment maintained   fall prevention program maintained   nonskid shoes/slippers when out of bed   room organization consistent   safety round/check completed  Taken 7/26/2024 2200 by Tincher, Harika, LPN  Safety Promotion/Fall Prevention:   activity  supervised   assistive device/personal items within reach   clutter free environment maintained   fall prevention program maintained   nonskid shoes/slippers when out of bed   room organization consistent   safety round/check completed  Taken 7/26/2024 2001 by Tincher, Harika, LPN  Safety Promotion/Fall Prevention:   activity supervised   assistive device/personal items within reach   clutter free environment maintained   fall prevention program maintained   nonskid shoes/slippers when out of bed   room organization consistent   safety round/check completed  Intervention: Prevent Skin Injury  Recent Flowsheet Documentation  Taken 7/27/2024 0600 by Harika Sewell LPN  Body Position:   tilted   left  Taken 7/27/2024 0429 by Harika Sewell LPN  Body Position: supine  Taken 7/27/2024 0200 by Harika Sewell LPN  Body Position: supine, legs elevated  Taken 7/27/2024 0041 by Harika Sewell LPN  Body Position: supine, legs elevated  Taken 7/26/2024 2200 by Harika Sewell LPN  Body Position:   patient/family refused   turned   right  Taken 7/26/2024 2001 by Harika Sewell LPN  Body Position:   tilted   right   patient/family refused  Intervention: Prevent and Manage VTE (Venous Thromboembolism) Risk  Recent Flowsheet Documentation  Taken 7/27/2024 0600 by Harika Sewell LPN  Activity Management: activity encouraged  Taken 7/27/2024 0429 by Harika Sewell LPN  Activity Management: activity encouraged  Taken 7/27/2024 0200 by Harika Sewell LPN  Activity Management: activity encouraged  Taken 7/27/2024 0041 by Harika Sewell LPN  Activity Management: activity encouraged  Taken 7/26/2024 2200 by Harika Sewell LPN  Activity Management: activity encouraged  Taken 7/26/2024 2001 by Harika Sewell LPN  Activity Management: activity minimized  VTE Prevention/Management:   bilateral   sequential compression devices off  Range of Motion: active ROM (range of motion)  encouraged  Intervention: Prevent Infection  Recent Flowsheet Documentation  Taken 7/27/2024 0600 by Harika Sewell LPN  Infection Prevention:   environmental surveillance performed   rest/sleep promoted   single patient room provided  Taken 7/27/2024 0429 by Harika Sewell LPN  Infection Prevention:   environmental surveillance performed   rest/sleep promoted   single patient room provided  Taken 7/27/2024 0041 by Harika Sewell LPN  Infection Prevention:   environmental surveillance performed   rest/sleep promoted   single patient room provided  Taken 7/26/2024 2200 by Harika Sewell LPN  Infection Prevention:   environmental surveillance performed   rest/sleep promoted   single patient room provided  Taken 7/26/2024 2001 by Harika Sewell LPN  Infection Prevention:   environmental surveillance performed   single patient room provided   rest/sleep promoted  Goal: Optimal Comfort and Wellbeing  Outcome: Ongoing, Progressing  Intervention: Provide Person-Centered Care  Recent Flowsheet Documentation  Taken 7/26/2024 2001 by Harika Sewell LPN  Trust Relationship/Rapport:   care explained   choices provided   emotional support provided  Goal: Readiness for Transition of Care  Outcome: Ongoing, Progressing     Problem: Fall Injury Risk  Goal: Absence of Fall and Fall-Related Injury  Outcome: Ongoing, Progressing  Intervention: Promote Injury-Free Environment  Recent Flowsheet Documentation  Taken 7/27/2024 0600 by Tincher, Harika, LPN  Safety Promotion/Fall Prevention:   activity supervised   assistive device/personal items within reach   clutter free environment maintained   fall prevention program maintained   nonskid shoes/slippers when out of bed   room organization consistent   safety round/check completed  Taken 7/27/2024 0429 by Tincher, Harika, LPN  Safety Promotion/Fall Prevention:   activity supervised   clutter free environment maintained   assistive device/personal items  within reach   fall prevention program maintained   nonskid shoes/slippers when out of bed   room organization consistent   safety round/check completed  Taken 7/27/2024 0200 by Harika Sewell LPN  Safety Promotion/Fall Prevention:   activity supervised   assistive device/personal items within reach   clutter free environment maintained   fall prevention program maintained   nonskid shoes/slippers when out of bed   room organization consistent   safety round/check completed  Taken 7/27/2024 0041 by Tincher, Harika, LPN  Safety Promotion/Fall Prevention:   activity supervised   assistive device/personal items within reach   clutter free environment maintained   fall prevention program maintained   nonskid shoes/slippers when out of bed   room organization consistent   safety round/check completed  Taken 7/26/2024 2200 by Tincher, Harika, LPN  Safety Promotion/Fall Prevention:   activity supervised   assistive device/personal items within reach   clutter free environment maintained   fall prevention program maintained   nonskid shoes/slippers when out of bed   room organization consistent   safety round/check completed  Taken 7/26/2024 2001 by Tincher, Harika, LPN  Safety Promotion/Fall Prevention:   activity supervised   assistive device/personal items within reach   clutter free environment maintained   fall prevention program maintained   nonskid shoes/slippers when out of bed   room organization consistent   safety round/check completed     Problem: Skin Injury Risk Increased  Goal: Skin Health and Integrity  Outcome: Ongoing, Progressing  Intervention: Optimize Skin Protection  Recent Flowsheet Documentation  Taken 7/27/2024 0600 by Harika Sewell LPN  Head of Bed (HOB) Positioning: HOB elevated  Taken 7/27/2024 0429 by Harika Sewell LPN  Head of Bed (HOB) Positioning: HOB elevated  Taken 7/27/2024 0200 by Harika Sewell LPN  Head of Bed (HOB) Positioning: HOB elevated  Taken  7/27/2024 0041 by Harika Sewell LPN  Head of Bed (HOB) Positioning: HOB elevated  Taken 7/26/2024 2200 by Harika Sewell LPN  Head of Bed (HOB) Positioning: HOB elevated  Taken 7/26/2024 2001 by Harika Sewell LPN  Head of Bed (HOB) Positioning: HOB elevated     Problem: Malnutrition  Goal: Improved Nutritional Intake  Outcome: Ongoing, Progressing     Problem: Adjustment to Illness (Sepsis/Septic Shock)  Goal: Optimal Coping  Outcome: Ongoing, Progressing     Problem: Bleeding (Sepsis/Septic Shock)  Goal: Absence of Bleeding  Outcome: Ongoing, Progressing     Problem: Glycemic Control Impaired (Sepsis/Septic Shock)  Goal: Blood Glucose Level Within Desired Range  Outcome: Ongoing, Progressing     Problem: Infection Progression (Sepsis/Septic Shock)  Goal: Absence of Infection Signs and Symptoms  Outcome: Ongoing, Progressing  Intervention: Initiate Sepsis Management  Recent Flowsheet Documentation  Taken 7/27/2024 0600 by Harika Sewell LPN  Infection Prevention:   environmental surveillance performed   rest/sleep promoted   single patient room provided  Taken 7/27/2024 0429 by Hariak Sewell LPN  Infection Prevention:   environmental surveillance performed   rest/sleep promoted   single patient room provided  Taken 7/27/2024 0041 by Harika Sewell LPN  Infection Prevention:   environmental surveillance performed   rest/sleep promoted   single patient room provided  Taken 7/26/2024 2200 by Harika Sewell LPN  Infection Prevention:   environmental surveillance performed   rest/sleep promoted   single patient room provided  Taken 7/26/2024 2001 by Harika Sewell LPN  Infection Prevention:   environmental surveillance performed   single patient room provided   rest/sleep promoted  Intervention: Promote Recovery  Recent Flowsheet Documentation  Taken 7/27/2024 0600 by Harika Sewell LPN  Activity Management: activity encouraged  Taken 7/27/2024 0429 by Harika Sewell  LPN  Activity Management: activity encouraged  Taken 7/27/2024 0200 by Harika Sewell LPN  Activity Management: activity encouraged  Taken 7/27/2024 0041 by Harika Sewell LPN  Activity Management: activity encouraged  Taken 7/26/2024 2200 by Harika Sewell LPN  Activity Management: activity encouraged  Taken 7/26/2024 2001 by Harika Sewell LPN  Activity Management: activity minimized     Problem: Nutrition Impaired (Sepsis/Septic Shock)  Goal: Optimal Nutrition Intake  Outcome: Ongoing, Progressing

## 2024-07-27 NOTE — PROGRESS NOTES
[facility]    Name:  Lynne Sanchez   Age:  89 y.o.  Sex:  female  :  1934  MRN:  [id number]   Visit Number:  [id number]  Admission Date:  2024  Date Of Service:  24  Primary Care Physician:  Elizabeth Easton APRN     LOS: 0 days :    Chief Complaint:      Generalized weakness, right upper extremity pain and swelling.    Subjective:    Patient evaluated today.  Hemoglobin dropped overnight.  Required 2 more unit PRBC transfusion.  Taken for EGD today.  Seen postprocedure.  Resting comfortably in bed.  Noted edema to bilateral upper extremities.  Denied active complaints.    Hospital Course:    Lynne Sanchez is an 89-year-old female with history of hypertension, diabetes mellitus type 2, hypothyroidism, sick sinus syndrome status post pacemaker was brought to the emergency room by family with multiple complaints including right hand pain and swelling.  Patient was in the emergency room on 2024 secondary to a fall and hitting her back and hands.  At that time, she was started to have cellulitis of the hand and possibly gouty arthritis.  She was given NSAIDs, steroids, dose of cefazolin and was discharged on Keflex.  According to family, patient has not improved since then and has become more weak and is unable to ambulate at this time.  Patient states that he lives with her .     In the emergency room, she was afebrile and hemodynamically stable saturating at 96% on room air.  She does have chronic elevated troponin levels.  proBNP 16,085.  CMP was unremarkable except for a creatinine of 1.39 (baseline).  Lactic acid was within normal limits but elevated C-reactive protein of 4.75 and procalcitonin of 0.35.  WBC 13, hemoglobin 9 (baseline around 8).  ESR was 9.  Blood cultures were drawn in the emergency room.  Portable chest x-ray showed chronic appearing parenchymal changes without any acute infiltrate.  Venous duplex of the right  upper extremity showed superficial venous thrombosis involving a portion of the cephalic vein.  No evidence of DVT noted.  Patient was given cefepime and vancomycin in the emergency room for suspected right upper extremity cellulitis and was subsequently admitted to the medical floor with telemetry.    Review of Systems:     All systems were reviewed and negative except as mentioned in subjective, assessment and plan.    Vital Signs:    Temp:  [97.1 °F (36.2 °C)-98.5 °F (36.9 °C)] 97.4 °F (36.3 °C)  Heart Rate:  [69-76] 70  Resp:  [12-20] 12  BP: ()/(21-59) 116/38    Intake and output:    I/O last 3 completed shifts:  In: 1357.4 [P.O.:240; Blood:1117.4]  Out: -   I/O this shift:  In: 200 [I.V.:200]  Out: -     Physical Examination: Examined again 7/27/24    General Appearance:  Alert and cooperative.    Head:  Atraumatic and normocephalic.   Eyes: Conjunctivae and sclerae normal, no icterus. No pallor.   Throat: No oral lesions, no thrush, oral mucosa moist.   Neck: Supple, trachea midline, no thyromegaly.   Lungs:   Breath sounds heard bilaterally equally.  No wheezing or crackles. No Pleural rub or bronchial breathing.   Heart:  Normal S1 and S2, no murmur, No JVD.   Abdomen:   Normal bowel sounds, no masses, no organomegaly. Soft, nontender, nondistended, no rebound tenderness.   Extremities: Right upper extremity redness and swelling.  Tophi present on bilateral hands   Skin: No bleeding or rash.   Neurologic: Alert and oriented x 3. No facial asymmetry. Moves all four limbs. No tremors.      Laboratory results:    Results from last 7 days   Lab Units 07/27/24  0705 07/26/24  0540 07/25/24  0857 07/23/24  0624 07/22/24  1607   SODIUM mmol/L 136 134* 138   < > 136   POTASSIUM mmol/L 4.3 4.0 4.2   < > 4.5   CHLORIDE mmol/L 101 102 101   < > 99   CO2 mmol/L 19.6* 20.9* 21.2*   < > 25.4   BUN mg/dL 65* 64* 54*   < > 31*   CREATININE mg/dL 1.74* 1.68* 1.50*   < > 1.39*   CALCIUM mg/dL 7.7* 7.7* 8.1*   < > 8.7  "  BILIRUBIN mg/dL  --   --   --   --  0.4   ALK PHOS U/L  --   --   --   --  104   ALT (SGPT) U/L  --   --   --   --  7   AST (SGOT) U/L  --   --   --   --  28   GLUCOSE mg/dL 73 101* 160*   < > 65    < > = values in this interval not displayed.     Results from last 7 days   Lab Units 07/27/24  0705 07/26/24  2059 07/26/24  1202 07/26/24  0540 07/25/24  0857   WBC 10*3/mm3 13.31*  --   --  15.74* 21.62*   HEMOGLOBIN g/dL 10.0* 6.1* 6.9* 5.9* 7.1*   HEMATOCRIT % 31.3* 19.3* 21.7* 18.7* 23.1*   PLATELETS 10*3/mm3 205  --   --  200 221         Results from last 7 days   Lab Units 07/22/24  1817 07/22/24  1607   HSTROP T ng/L 78* 91*     Results from last 7 days   Lab Units 07/22/24  1850   BLOODCX  No growth at 4 days  No growth at 4 days     No results for input(s): \"PHART\", \"ABN1ESM\", \"PO2ART\", \"EHN0NDN\", \"BASEEXCESS\" in the last 8760 hours.   I have reviewed the patient's laboratory results.    Radiology results:    No radiology results from the last 24 hrs  I have reviewed the patient's radiology reports.    Medication Review:     I have reviewed the patient's active and prn medications.     Problem List:      Right arm cellulitis    Acquired hypothyroidism    Anemia of chronic renal failure    Absolute anemia    Stage 3b chronic kidney disease    Moderate malnutrition    Cellulitis of right arm      Assessment:    Right upper extremity cellulitis, POA.  Right upper extremity superficial vein thrombosis, POA.  Diabetes mellitus type 2 with nephropathy.  Chronic kidney disease stage III.  Chronic elevated troponin secondary to #4.  Sick sinus syndrome status post pacemaker.  Essential hypertension.  Gout  Severe pulmonary hypertension  Hypothyroidism.  Upper GI bleed       Plan:    Upper GI Bleed  -Hemoglobin with significant drop this morning.  Also nursing reported overnight to bloody bowel movements.  -Transfuse 1 unit PRBC  -IV Protonix  -GI consult, Dr Velez.   -EGD performed 7/26.  Hematin found in the " stomach.  Blood in the duodenal bulb.  A single bleeding angio ectasia in the duodenum.  Clip placed.  -7/27: Overnight patient had drop in hemoglobin requiring 2 additional unit PRBC transfusion.  Patient taken for EGD again, 3 nonbleeding angioectasias found in the duodenum treated with APC and clips.  -Hold Eliquis and antiplatelet medication    Right upper extremity cellulitis.  Right cephalic venous thrombosis   - Patient does seem to have redness in the right upper extremity with swelling.  - We will treated with IV antibiotics therapy with ceftriaxone.  - MRSA negative.  - Follow blood cultures.  So far negative.  - Lactobacillus supplements.  -Ultrasound venous Doppler revealed SVT involving a portion of the cephalic vein.  No evidence of DVT.  Given risk for development of DVT and superimposed cellulitis, had planned to anticoagulate with Eliquis for 4 weeks.  Discontinue aspirin.  -Eliquis now on hold for upper GI bleed     CKD stage III.  - Renal function is at baseline.  - She has chronic elevated troponin secondary to chronic kidney disease.     Diabetes mellitus type 2.  - Continue subcutaneous insulin protocol for coverage.    Gout with tophi  -Supportive care  -Pain medication as indicated  -Initiate allopurinol  -Uric acid level elevated    Severe pulmonary hypertension  -Revealed upon chart review  -Patient has never followed up with pulmonary hypertensive clinic  -Complicates all aspects of care    Palliative consulted.     DVT Prophylaxis: Enoxaparin  Code Status: Full  Diet: Diabetic  Discharge Plan: Pending - needs several more days    Jaswinder Andrews DO  07/27/24  14:04 EDT    Dictated utilizing Dragon dictation.

## 2024-07-27 NOTE — PLAN OF CARE
Goal Outcome Evaluation:  Plan of Care Reviewed With: patient, family        Progress: improving         Problem: Adult Inpatient Plan of Care  Goal: Plan of Care Review  7/27/2024 1829 by Harika Vallejo RN  Outcome: Ongoing, Progressing  Flowsheets (Taken 7/27/2024 1829)  Progress: improving  Plan of Care Reviewed With:   patient   family  7/27/2024 1829 by Harika Vallejo RN  Outcome: Ongoing, Progressing  Flowsheets (Taken 7/27/2024 1829)  Progress: improving  Plan of Care Reviewed With:   patient   family  Goal: Patient-Specific Goal (Individualized)  7/27/2024 1829 by Harika Vallejo RN  Outcome: Ongoing, Progressing  7/27/2024 1829 by Harika Vallejo RN  Outcome: Ongoing, Progressing  Goal: Absence of Hospital-Acquired Illness or Injury  7/27/2024 1829 by Harika Vallejo RN  Outcome: Ongoing, Progressing  7/27/2024 1829 by Harika Vallejo RN  Outcome: Ongoing, Progressing  Intervention: Identify and Manage Fall Risk  Description: Perform standard risk assessment on admission using a validated tool or comprehensive approach appropriate to the patient; reassess fall risk frequently, with change in status or transfer to another level of care.  Communicate fall injury risk to interprofessional healthcare team.  Determine need for increased observation, equipment and environmental modification, such as low bed, signage and supportive, nonskid footwear.  Adjust safety measures to individual developmental age, stage and identified risk factors.  Reinforce the importance of safety and physical activity with patient and family.  Perform regular intentional rounding to assess need for position change, pain assessment and personal needs, including assistance with toileting.  Recent Flowsheet Documentation  Taken 7/27/2024 1800 by Harika Vallejo RN  Safety Promotion/Fall Prevention:   safety round/check completed   room organization consistent   nonskid shoes/slippers when out of bed    fall prevention program maintained   clutter free environment maintained   assistive device/personal items within reach   activity supervised  Taken 7/27/2024 1600 by Harika Vallejo RN  Safety Promotion/Fall Prevention:   safety round/check completed   room organization consistent   nonskid shoes/slippers when out of bed   fall prevention program maintained   clutter free environment maintained   assistive device/personal items within reach   activity supervised  Taken 7/27/2024 1400 by Harika Vallejo RN  Safety Promotion/Fall Prevention:   safety round/check completed   room organization consistent   nonskid shoes/slippers when out of bed   fall prevention program maintained   clutter free environment maintained   assistive device/personal items within reach   activity supervised  Taken 7/27/2024 1200 by Harika Vallejo RN  Safety Promotion/Fall Prevention:   safety round/check completed   room organization consistent   nonskid shoes/slippers when out of bed   fall prevention program maintained   clutter free environment maintained   assistive device/personal items within reach   activity supervised  Taken 7/27/2024 1000 by Harika Vallejo RN  Safety Promotion/Fall Prevention:   safety round/check completed   room organization consistent   nonskid shoes/slippers when out of bed   fall prevention program maintained   clutter free environment maintained   assistive device/personal items within reach   activity supervised  Intervention: Prevent Skin Injury  Description: Perform a screening for skin injury risk, such as pressure or moisture associated skin damage on admission and at regular intervals throughout hospital stay.  Keep all areas of skin (especially folds) clean and dry.  Maintain adequate skin hydration.  Relieve and redistribute pressure and protect bony prominences; implement measures based on patient-specific risk factors.  Match turning and repositioning schedule to clinical  condition.  Encourage weight shift frequently; assist with reposition if unable to complete independently.  Float heels off bed; avoid pressure on the Achilles tendon.  Keep skin free from extended contact with medical devices.  Encourage functional activity and mobility, as early as tolerated.  Use aids (e.g., slide boards, mechanical lift) during transfer.  Recent Flowsheet Documentation  Taken 7/27/2024 1800 by Harika Vallejo RN  Body Position:   supine, legs elevated   turned   left  Taken 7/27/2024 1600 by Harika Vallejo RN  Body Position:   supine, legs elevated   turned   right  Taken 7/27/2024 1400 by Harika Vallejo RN  Body Position:   supine, legs elevated   turned   left  Taken 7/27/2024 1200 by Harika Vallejo RN  Body Position:   supine, legs elevated   turned   right  Taken 7/27/2024 1000 by Harika Vallejo RN  Body Position: supine, legs elevated  Intervention: Prevent and Manage VTE (Venous Thromboembolism) Risk  Description: Assess for VTE (venous thromboembolism) risk.  Encourage and assist with early ambulation.  Initiate and maintain compression or other therapy, as indicated, based on identified risk in accordance with organizational protocol and provider order.  Encourage both active and passive leg exercises while in bed, if unable to ambulate.  Recent Flowsheet Documentation  Taken 7/27/2024 1800 by Harika Vallejo RN  Activity Management: activity encouraged  Taken 7/27/2024 1600 by Harika Vallejo RN  Activity Management: activity encouraged  Taken 7/27/2024 1400 by Harika Vallejo RN  Activity Management: activity encouraged  Taken 7/27/2024 1200 by Harika Vallejo RN  Activity Management: activity encouraged  Taken 7/27/2024 1000 by Harika Vallejo RN  Activity Management: activity minimized  Intervention: Prevent Infection  Description: Maintain skin and mucous membrane integrity; promote hand, oral and pulmonary hygiene.  Optimize  fluid balance, nutrition, sleep and glycemic control to maximize infection resistance.  Identify potential sources of infection early to prevent or mitigate progression of infection (e.g., wound, lines, devices).  Evaluate ongoing need for invasive devices; remove promptly when no longer indicated.  Recent Flowsheet Documentation  Taken 7/27/2024 1800 by Harika Vallejo RN  Infection Prevention:   environmental surveillance performed   equipment surfaces disinfected   hand hygiene promoted   personal protective equipment utilized   rest/sleep promoted   single patient room provided   visitors restricted/screened  Taken 7/27/2024 1600 by Harika Vallejo RN  Infection Prevention:   environmental surveillance performed   equipment surfaces disinfected   hand hygiene promoted   personal protective equipment utilized   rest/sleep promoted   single patient room provided   visitors restricted/screened  Taken 7/27/2024 1400 by Harika Vallejo RN  Infection Prevention:   environmental surveillance performed   equipment surfaces disinfected   hand hygiene promoted   personal protective equipment utilized   rest/sleep promoted   single patient room provided   visitors restricted/screened  Taken 7/27/2024 1200 by Harika Vallejo RN  Infection Prevention:   environmental surveillance performed   equipment surfaces disinfected   hand hygiene promoted   personal protective equipment utilized   rest/sleep promoted   single patient room provided   visitors restricted/screened  Taken 7/27/2024 1000 by Harika Vallejo RN  Infection Prevention:   environmental surveillance performed   equipment surfaces disinfected   hand hygiene promoted   personal protective equipment utilized   rest/sleep promoted   single patient room provided   visitors restricted/screened  Goal: Optimal Comfort and Wellbeing  7/27/2024 1829 by Harika Vallejo RN  Outcome: Ongoing, Progressing  7/27/2024 1829 by Harika Vallejo,  RN  Outcome: Ongoing, Progressing  Intervention: Provide Person-Centered Care  Description: Use a family-focused approach to care.  Develop trust and rapport by proactively providing information, encouraging questions, addressing concerns and offering reassurance.  Acknowledge emotional response to hospitalization.  Recognize and utilize personal coping strategies.  Honor spiritual and cultural preferences.  Recent Flowsheet Documentation  Taken 7/27/2024 1000 by Harika Vallejo RN  Trust Relationship/Rapport:   care explained   choices provided   questions answered   questions encouraged   thoughts/feelings acknowledged  Goal: Readiness for Transition of Care  7/27/2024 1829 by Harika Vallejo RN  Outcome: Ongoing, Progressing  7/27/2024 1829 by Harika Vallejo RN  Outcome: Ongoing, Progressing     Problem: Fall Injury Risk  Goal: Absence of Fall and Fall-Related Injury  7/27/2024 1829 by Harika Vallejo RN  Outcome: Ongoing, Progressing  7/27/2024 1829 by Harika Vallejo RN  Outcome: Ongoing, Progressing  Intervention: Identify and Manage Contributors  Description: Develop a fall prevention plan with the patient and caregiver/family.  Provide reorientation, appropriate sensory stimulation and routines with changes in mental status to decrease risk of fall.  Promote use of personal vision and auditory aids.  Assess assistance level required for safe and effective self-care; provide support as needed, such as toileting, mobilization. For age 65 and older, implement timed toileting with assistance.  Encourage physical activity, such as performance of mobility and self-care at highest level of patient ability, multicomponent exercise program and provision of appropriate assistive devices.  If fall occurs, assess the severity of injury; implement fall injury protocol. Determine the cause and revise fall injury prevention plan.  Regularly review medication contribution to fall risk; adjust  medication administration times to minimize risk of falling.  Consider risk related to polypharmacy and age.  Balance adequate pain management with potential for oversedation.  Recent Flowsheet Documentation  Taken 7/27/2024 1800 by Harika Vallejo RN  Medication Review/Management: medications reviewed  Taken 7/27/2024 1600 by Harika Vallejo RN  Medication Review/Management: medications reviewed  Taken 7/27/2024 1400 by Harika Vallejo RN  Medication Review/Management: medications reviewed  Taken 7/27/2024 1200 by Harika Vallejo RN  Medication Review/Management: medications reviewed  Taken 7/27/2024 1000 by Harika Vallejo RN  Medication Review/Management: medications reviewed  Intervention: Promote Injury-Free Environment  Description: Provide a safe, barrier-free environment that encourages independent activity.  Keep care area uncluttered and well-lighted.  Determine need for increased observation or monitoring.  Avoid use of devices that minimize mobility, such as restraints or indwelling urinary catheter.  Recent Flowsheet Documentation  Taken 7/27/2024 1800 by Harika Vallejo RN  Safety Promotion/Fall Prevention:   safety round/check completed   room organization consistent   nonskid shoes/slippers when out of bed   fall prevention program maintained   clutter free environment maintained   assistive device/personal items within reach   activity supervised  Taken 7/27/2024 1600 by Harika Vallejo RN  Safety Promotion/Fall Prevention:   safety round/check completed   room organization consistent   nonskid shoes/slippers when out of bed   fall prevention program maintained   clutter free environment maintained   assistive device/personal items within reach   activity supervised  Taken 7/27/2024 1400 by Harika Vallejo RN  Safety Promotion/Fall Prevention:   safety round/check completed   room organization consistent   nonskid shoes/slippers when out of bed   fall  prevention program maintained   clutter free environment maintained   assistive device/personal items within reach   activity supervised  Taken 7/27/2024 1200 by Harika Vallejo RN  Safety Promotion/Fall Prevention:   safety round/check completed   room organization consistent   nonskid shoes/slippers when out of bed   fall prevention program maintained   clutter free environment maintained   assistive device/personal items within reach   activity supervised  Taken 7/27/2024 1000 by Harika Vallejo RN  Safety Promotion/Fall Prevention:   safety round/check completed   room organization consistent   nonskid shoes/slippers when out of bed   fall prevention program maintained   clutter free environment maintained   assistive device/personal items within reach   activity supervised     Problem: Skin Injury Risk Increased  Goal: Skin Health and Integrity  7/27/2024 1829 by Harika Vallejo RN  Outcome: Ongoing, Progressing  7/27/2024 1829 by Harika Vallejo RN  Outcome: Ongoing, Progressing  Intervention: Optimize Skin Protection  Description: Perform a full pressure injury risk assessment, as indicated by screening, upon admission to care unit.  Reassess skin (injury risk, full inspection) frequently (e.g., scheduled interval, with change in condition) to provide optimal early detection and prevention.  Maintain adequate tissue perfusion (e.g., encourage fluid balance; avoid crossing legs, constrictive clothing or devices) to promote tissue oxygenation.  Maintain head of bed at lowest degree of elevation tolerated, considering medical condition and other restrictions.  Avoid positioning onto an area that remains reddened.  Minimize incontinence and moisture (e.g., toileting schedule; moisture-wicking pad, diaper or incontinence collection device; skin moisture barrier).  Cleanse skin promptly and gently when soiled utilizing a pH-balanced cleanser.  Relieve and redistribute pressure (e.g., scheduled  position changes, weight shifts, use of support surface, medical device repositioning, protective dressing application, use of positioning device, microclimate control, use of pressure-injury-monitor  Encourage increased activity, such as sitting in a chair at the bedside or early mobilization, when able to tolerate.  Recent Flowsheet Documentation  Taken 7/27/2024 1800 by Harika Vallejo RN  Head of Bed (HOB) Positioning: HOB at 45 degrees  Taken 7/27/2024 1600 by Harika Vallejo RN  Head of Bed (HOB) Positioning: HOB at 30 degrees  Taken 7/27/2024 1400 by Harika Vallejo RN  Head of Bed (HOB) Positioning: HOB at 30 degrees  Taken 7/27/2024 1200 by Harika Vallejo RN  Head of Bed (HOB) Positioning: HOB at 45 degrees  Taken 7/27/2024 1000 by Harika Vallejo RN  Head of Bed (HOB) Positioning: HOB at 30 degrees     Problem: Malnutrition  Goal: Improved Nutritional Intake  7/27/2024 1829 by Harika Vallejo RN  Outcome: Ongoing, Progressing  7/27/2024 1829 by Harika Vallejo RN  Outcome: Ongoing, Progressing     Problem: Adjustment to Illness (Sepsis/Septic Shock)  Goal: Optimal Coping  7/27/2024 1829 by Harika Vallejo RN  Outcome: Ongoing, Progressing  7/27/2024 1829 by Harika Vallejo RN  Outcome: Ongoing, Progressing     Problem: Bleeding (Sepsis/Septic Shock)  Goal: Absence of Bleeding  7/27/2024 1829 by Harika Vallejo RN  Outcome: Ongoing, Progressing  7/27/2024 1829 by Harika Vallejo RN  Outcome: Ongoing, Progressing  Intervention: Monitor and Manage Bleeding  Description: Maintain bleeding precautions; provide safe environment and gentle care activities, such as positioning, oral and skin care.  Avoid invasive procedures and medication that increase risk of bleeding; monitor for signs of bleeding frequently.  Anticipate need for fluid volume replacement (e.g., intravenous fluid, blood products); use weight-based calculations.  Consider adjunctive  supportive therapy, such as platelet infusion or heparin.  Provide protective hemostasis by applying direct pressure to a visible bleeding site.  Recent Flowsheet Documentation  Taken 7/27/2024 1800 by Harika Vallejo RN  Bleeding Precautions:   blood pressure closely monitored   foot protection facilitated   monitored for signs of bleeding  Bleeding Management: dressing monitored  Taken 7/27/2024 1600 by Harika Vallejo RN  Bleeding Precautions:   blood pressure closely monitored   foot protection facilitated   monitored for signs of bleeding  Bleeding Management: dressing monitored  Taken 7/27/2024 1400 by Harika Vallejo RN  Bleeding Precautions:   blood pressure closely monitored   foot protection facilitated   monitored for signs of bleeding  Bleeding Management: dressing monitored  Taken 7/27/2024 1200 by Harika Vallejo RN  Bleeding Precautions:   blood pressure closely monitored   foot protection facilitated   monitored for signs of bleeding  Bleeding Management: dressing monitored  Taken 7/27/2024 1000 by Harika Vallejo RN  Bleeding Precautions:   blood pressure closely monitored   foot protection facilitated   monitored for signs of bleeding  Bleeding Management: dressing monitored     Problem: Glycemic Control Impaired (Sepsis/Septic Shock)  Goal: Blood Glucose Level Within Desired Range  7/27/2024 1829 by Harika Vallejo RN  Outcome: Ongoing, Progressing  7/27/2024 1829 by Harika Vallejo RN  Outcome: Ongoing, Progressing     Problem: Infection Progression (Sepsis/Septic Shock)  Goal: Absence of Infection Signs and Symptoms  7/27/2024 1829 by Harika Vallejo RN  Outcome: Ongoing, Progressing  7/27/2024 1829 by Harika Vallejo RN  Outcome: Ongoing, Progressing  Intervention: Initiate Sepsis Management  Description: Provide fluid therapy, such as crystalloid or albumin, to increase intravascular volume, organ perfusion and oxygen delivery.  Provide respiratory  support, such as oxygen therapy, noninvasive or invasive positive pressure ventilation, to achieve oxygenation and ventilation goal; avoid hyperoxemia.  Obtain cultures prior to initiating antimicrobial therapy when possible. Do not delay for laboratory results in the presence of high suspicion or clinical indicators.  Administer intravenous broad-spectrum antimicrobial therapy promptly.  Implement hemodynamic monitoring to guide intravascular support based on individual targeted parameters.  Determine and address underlying source of infection aggressively; implement transmission-based precautions and isolation, as indicated.  Recent Flowsheet Documentation  Taken 7/27/2024 1800 by Harika Vallejo RN  Stabilization Measures: legs elevated  Infection Management: aseptic technique maintained  Infection Prevention:   environmental surveillance performed   equipment surfaces disinfected   hand hygiene promoted   personal protective equipment utilized   rest/sleep promoted   single patient room provided   visitors restricted/screened  Taken 7/27/2024 1600 by Harika Vallejo RN  Stabilization Measures: legs elevated  Infection Management: aseptic technique maintained  Infection Prevention:   environmental surveillance performed   equipment surfaces disinfected   hand hygiene promoted   personal protective equipment utilized   rest/sleep promoted   single patient room provided   visitors restricted/screened  Taken 7/27/2024 1400 by Harika Vallejo RN  Stabilization Measures: legs elevated  Infection Management: aseptic technique maintained  Infection Prevention:   environmental surveillance performed   equipment surfaces disinfected   hand hygiene promoted   personal protective equipment utilized   rest/sleep promoted   single patient room provided   visitors restricted/screened  Taken 7/27/2024 1200 by Harika Vallejo RN  Stabilization Measures: legs elevated  Infection Management: aseptic technique  maintained  Infection Prevention:   environmental surveillance performed   equipment surfaces disinfected   hand hygiene promoted   personal protective equipment utilized   rest/sleep promoted   single patient room provided   visitors restricted/screened  Taken 7/27/2024 1000 by Harika Vallejo RN  Stabilization Measures: legs elevated  Infection Management: aseptic technique maintained  Infection Prevention:   environmental surveillance performed   equipment surfaces disinfected   hand hygiene promoted   personal protective equipment utilized   rest/sleep promoted   single patient room provided   visitors restricted/screened  Intervention: Promote Recovery  Description: Encourage pulmonary hygiene, such as cough-enhancement and airway-clearance techniques, that may include use of incentive spirometry, deep breathing and cough.  Encourage early rehabilitation and physical activity to optimize functional ability and activity tolerance, as well as minimize delirium.  Promote energy conservation; minimize oxygen demand and consumption by adjusting environment, decreasing stimulation, maintaining normothermia and treating pain.  Optimize fluid balance, nutrition intake, sleep and glycemic control to maintain tissue perfusion and enhance immune response.  Recent Flowsheet Documentation  Taken 7/27/2024 1800 by Harika Vallejo, RN  Activity Management: activity encouraged  Taken 7/27/2024 1600 by Harika Vallejo RN  Activity Management: activity encouraged  Taken 7/27/2024 1400 by Harika Vallejo, RN  Activity Management: activity encouraged  Taken 7/27/2024 1200 by Harika Vallejo, RN  Activity Management: activity encouraged  Taken 7/27/2024 1000 by Harika Vallejo RN  Activity Management: activity minimized     Problem: Nutrition Impaired (Sepsis/Septic Shock)  Goal: Optimal Nutrition Intake  7/27/2024 1829 by Harika Vallejo, RN  Outcome: Ongoing, Progressing  7/27/2024 1829 by Yoni  SHAD Shabazz  Outcome: Ongoing, Progressing     Problem: Adjustment to Illness (Gastrointestinal Bleeding)  Goal: Optimal Coping with Acute Illness  Outcome: Ongoing, Progressing     Problem: Bleeding (Gastrointestinal Bleeding)  Goal: Hemostasis  Outcome: Ongoing, Progressing  Intervention: Manage Gastrointestinal Bleeding  Description: Determine risk for rebleeding; monitor vital signs, laboratory values and bleeding characteristics frequently and trend change over time.  Anticipate need for respiratory support and fluid volume replacement to restore and maintain tissue perfusion and oxygenation; consider need for vasopressor therapy.  Administer blood products, as ordered, to maintain target hemoglobin and hematocrit levels or to replace ongoing blood loss.  Monitor coagulation status and provide treatment.  Maintain body temperature within desired range to optimize clotting ability.  Anticipate use of pharmacologic therapy alone or in combination with endoscopic hemostasis based on cause and source of bleeding; monitor efficacy and manage side effects.  Anticipate and prepare for therapeutic endoscopic procedure, such as injection, cautery or mechanical intervention, to control bleeding.  Anticipate need for surgical intervention with continued bleeding and circulatory instability.  Elevate head of bed to a semi-recumbent position or turn to side during vomiting; keep suction equipment at hand.  Provide comfort measures (e.g., oral care, cool cloth; decreased environmental stimuli including light, sound and smell); lessen highly odiferous smell of bloody stools or emesis.  Maintain perineal skin integrity when patient has frequent, bloody stools; cleanse gently and thoroughly; avoid alcohol-containing wipes.  When resuming oral intake, observe for symptoms of nausea, vomiting and recurrent bleeding; gradually reintroduce foods.  Recent Flowsheet Documentation  Taken 7/27/2024 1800 by Harika Vallejo  RN  Stabilization Measures: legs elevated  Bleeding Management: dressing monitored  Taken 7/27/2024 1600 by Harika Vallejo RN  Stabilization Measures: legs elevated  Bleeding Management: dressing monitored  Taken 7/27/2024 1400 by Harika Vallejo RN  Stabilization Measures: legs elevated  Bleeding Management: dressing monitored  Taken 7/27/2024 1200 by Harika Vallejo RN  Stabilization Measures: legs elevated  Bleeding Management: dressing monitored  Taken 7/27/2024 1000 by Harika Vallejo RN  Stabilization Measures: legs elevated  Bleeding Management: dressing monitored

## 2024-07-27 NOTE — PROGRESS NOTES
Patient had a 2 more large bowel movement at night which was melanotic.  HGB dropped to 6 g/dL.  She had a 2 units of PRBC transfused.  No bowel movement since midnight.  Given her significant drop and further melena we will have 1 more look to see whether patient still has ongoing bleeding.  I have discussed the risk and benefits involved with her daughter and she is agreeable.

## 2024-07-27 NOTE — ANESTHESIA POSTPROCEDURE EVALUATION
Patient: Lynne Sanchez    Procedure Summary       Date: 07/27/24 Room / Location: [facility] / [facility]    Anesthesia Start: 0807 Anesthesia Stop: 0841    Procedure: ESOPHAGOGASTRODUODENOSCOPYwith resolution clip and APC (Esophagus) Diagnosis: (GI bleed)    Surgeons: Clarisse Velez MD Provider: Justo Laguerre CRNA    Anesthesia Type: MAC ASA Status: 3            Anesthesia Type: MAC    Vitals  Vitals Value Taken Time   BP     Temp     Pulse     Resp     SpO2 100 % 07/27/24 0841   Vitals shown include unfiled device data.        Post Anesthesia Care and Evaluation    Patient location during evaluation: bedside  Patient participation: complete - patient participated  Level of consciousness: awake  Pain score: 0  Pain management: adequate    Airway patency: patent  Anesthetic complications: No anesthetic complications  PONV Status: controlled  Cardiovascular status: acceptable and stable  Respiratory status: acceptable and room air  Hydration status: acceptable    Comments: See nursing documentation for post op vital signs

## 2024-07-28 LAB
ANION GAP SERPL CALCULATED.3IONS-SCNC: 21.4 MMOL/L (ref 5–15)
BH BB BLOOD EXPIRATION DATE: NORMAL
BH BB BLOOD EXPIRATION DATE: NORMAL
BH BB BLOOD TYPE BARCODE: 5100
BH BB BLOOD TYPE BARCODE: 5100
BH BB DISPENSE STATUS: NORMAL
BH BB DISPENSE STATUS: NORMAL
BH BB PRODUCT CODE: NORMAL
BH BB PRODUCT CODE: NORMAL
BH BB UNIT NUMBER: NORMAL
BH BB UNIT NUMBER: NORMAL
BUN SERPL-MCNC: 63 MG/DL (ref 8–23)
BUN/CREAT SERPL: 40.1 (ref 7–25)
CALCIUM SPEC-SCNC: 7.4 MG/DL (ref 8.6–10.5)
CHLORIDE SERPL-SCNC: 105 MMOL/L (ref 98–107)
CO2 SERPL-SCNC: 15.6 MMOL/L (ref 22–29)
CREAT SERPL-MCNC: 1.57 MG/DL (ref 0.57–1)
CROSSMATCH INTERPRETATION: NORMAL
CROSSMATCH INTERPRETATION: NORMAL
EGFRCR SERPLBLD CKD-EPI 2021: 31.4 ML/MIN/1.73
GLUCOSE BLDC GLUCOMTR-MCNC: 118 MG/DL (ref 70–130)
GLUCOSE BLDC GLUCOMTR-MCNC: 123 MG/DL (ref 70–130)
GLUCOSE BLDC GLUCOMTR-MCNC: 141 MG/DL (ref 70–130)
GLUCOSE BLDC GLUCOMTR-MCNC: 190 MG/DL (ref 70–130)
GLUCOSE SERPL-MCNC: 105 MG/DL (ref 65–99)
HCT VFR BLD AUTO: 24.3 % (ref 34–46.6)
HCT VFR BLD AUTO: 26.2 % (ref 34–46.6)
HGB BLD-MCNC: 8 G/DL (ref 12–15.9)
HGB BLD-MCNC: 8.1 G/DL (ref 12–15.9)
POTASSIUM SERPL-SCNC: 4 MMOL/L (ref 3.5–5.2)
SODIUM SERPL-SCNC: 142 MMOL/L (ref 136–145)
UNIT  ABO: NORMAL
UNIT  ABO: NORMAL
UNIT  RH: NORMAL
UNIT  RH: NORMAL

## 2024-07-28 PROCEDURE — 25010000002 LORAZEPAM PER 2 MG: Performed by: FAMILY MEDICINE

## 2024-07-28 PROCEDURE — 25010000002 FUROSEMIDE PER 20 MG: Performed by: INTERNAL MEDICINE

## 2024-07-28 PROCEDURE — 85014 HEMATOCRIT: CPT | Performed by: INTERNAL MEDICINE

## 2024-07-28 PROCEDURE — 85018 HEMOGLOBIN: CPT | Performed by: INTERNAL MEDICINE

## 2024-07-28 PROCEDURE — 82948 REAGENT STRIP/BLOOD GLUCOSE: CPT

## 2024-07-28 PROCEDURE — 25010000002 CEFTRIAXONE PER 250 MG: Performed by: INTERNAL MEDICINE

## 2024-07-28 PROCEDURE — 82948 REAGENT STRIP/BLOOD GLUCOSE: CPT | Performed by: INTERNAL MEDICINE

## 2024-07-28 PROCEDURE — 99232 SBSQ HOSP IP/OBS MODERATE 35: CPT | Performed by: INTERNAL MEDICINE

## 2024-07-28 PROCEDURE — 80048 BASIC METABOLIC PNL TOTAL CA: CPT | Performed by: INTERNAL MEDICINE

## 2024-07-28 PROCEDURE — 63710000001 INSULIN LISPRO (HUMAN) PER 5 UNITS: Performed by: INTERNAL MEDICINE

## 2024-07-28 RX ORDER — PANTOPRAZOLE SODIUM 40 MG/1
40 TABLET, DELAYED RELEASE ORAL
Status: DISCONTINUED | OUTPATIENT
Start: 2024-07-28 | End: 2024-07-31 | Stop reason: HOSPADM

## 2024-07-28 RX ORDER — METOPROLOL TARTRATE 50 MG/1
50 TABLET, FILM COATED ORAL 2 TIMES DAILY
Status: DISCONTINUED | OUTPATIENT
Start: 2024-07-28 | End: 2024-07-31 | Stop reason: HOSPADM

## 2024-07-28 RX ORDER — ALPRAZOLAM 0.5 MG/1
0.5 TABLET ORAL ONCE
Status: COMPLETED | OUTPATIENT
Start: 2024-07-28 | End: 2024-07-28

## 2024-07-28 RX ORDER — FUROSEMIDE 10 MG/ML
40 INJECTION INTRAMUSCULAR; INTRAVENOUS ONCE
Status: DISCONTINUED | OUTPATIENT
Start: 2024-07-28 | End: 2024-07-31 | Stop reason: HOSPADM

## 2024-07-28 RX ORDER — LORAZEPAM 2 MG/ML
0.5 INJECTION INTRAMUSCULAR ONCE AS NEEDED
Status: COMPLETED | OUTPATIENT
Start: 2024-07-28 | End: 2024-07-28

## 2024-07-28 RX ADMIN — Medication 1 CAPSULE: at 20:49

## 2024-07-28 RX ADMIN — LEVOTHYROXINE SODIUM 50 MCG: 50 TABLET ORAL at 05:59

## 2024-07-28 RX ADMIN — ALPRAZOLAM 0.5 MG: 0.5 TABLET ORAL at 16:11

## 2024-07-28 RX ADMIN — LORAZEPAM 0.5 MG: 2 INJECTION, SOLUTION INTRAMUSCULAR; INTRAVENOUS at 23:44

## 2024-07-28 RX ADMIN — Medication 1 PACKET: at 20:50

## 2024-07-28 RX ADMIN — SENNOSIDES AND DOCUSATE SODIUM 2 TABLET: 50; 8.6 TABLET ORAL at 08:22

## 2024-07-28 RX ADMIN — Medication 10 ML: at 20:49

## 2024-07-28 RX ADMIN — PANTOPRAZOLE SODIUM 40 MG: 40 TABLET, DELAYED RELEASE ORAL at 10:56

## 2024-07-28 RX ADMIN — PANTOPRAZOLE SODIUM 40 MG: 40 TABLET, DELAYED RELEASE ORAL at 17:53

## 2024-07-28 RX ADMIN — DOXYCYCLINE 100 MG: 100 INJECTION, POWDER, LYOPHILIZED, FOR SOLUTION INTRAVENOUS at 16:07

## 2024-07-28 RX ADMIN — PANTOPRAZOLE SODIUM 8 MG/HR: 40 INJECTION, POWDER, FOR SOLUTION INTRAVENOUS at 04:14

## 2024-07-28 RX ADMIN — CEFTRIAXONE SODIUM 2000 MG: 2 INJECTION, POWDER, FOR SOLUTION INTRAMUSCULAR; INTRAVENOUS at 08:23

## 2024-07-28 RX ADMIN — ATORVASTATIN CALCIUM 40 MG: 40 TABLET, FILM COATED ORAL at 08:22

## 2024-07-28 RX ADMIN — Medication 10 ML: at 08:24

## 2024-07-28 RX ADMIN — Medication 1 PACKET: at 10:56

## 2024-07-28 RX ADMIN — METOPROLOL TARTRATE 100 MG: 50 TABLET, FILM COATED ORAL at 08:22

## 2024-07-28 RX ADMIN — FUROSEMIDE 20 MG: 20 TABLET ORAL at 08:23

## 2024-07-28 RX ADMIN — SENNOSIDES AND DOCUSATE SODIUM 2 TABLET: 50; 8.6 TABLET ORAL at 20:49

## 2024-07-28 RX ADMIN — Medication 1 CAPSULE: at 08:22

## 2024-07-28 RX ADMIN — DOXYCYCLINE 100 MG: 100 INJECTION, POWDER, LYOPHILIZED, FOR SOLUTION INTRAVENOUS at 05:59

## 2024-07-28 RX ADMIN — ALLOPURINOL 100 MG: 100 TABLET ORAL at 08:22

## 2024-07-28 RX ADMIN — INSULIN LISPRO 2 UNITS: 100 INJECTION, SOLUTION INTRAVENOUS; SUBCUTANEOUS at 20:55

## 2024-07-28 NOTE — PROGRESS NOTES
[facility]    Name:  Lynne Sanchez   Age:  89 y.o.  Sex:  female  :  1934  MRN:  [id number]   Visit Number:  [id number]  Admission Date:  2024  Date Of Service:  24  Primary Care Physician:  Elizabeth Easton APRN     LOS: 1 day :    Chief Complaint:      Generalized weakness, right upper extremity pain and swelling.    Subjective:    Patient evaluated today. Son, Marco A at bedside. H&H stable. Still with significant swelling, but improved from yesterday. Denies active complaints.     Hospital Course:    Lynne Sanchez is an 89-year-old female with history of hypertension, diabetes mellitus type 2, hypothyroidism, sick sinus syndrome status post pacemaker was brought to the emergency room by family with multiple complaints including right hand pain and swelling.  Patient was in the emergency room on 2024 secondary to a fall and hitting her back and hands.  At that time, she was started to have cellulitis of the hand and possibly gouty arthritis.  She was given NSAIDs, steroids, dose of cefazolin and was discharged on Keflex.  According to family, patient has not improved since then and has become more weak and is unable to ambulate at this time.  Patient states that he lives with her .     In the emergency room, she was afebrile and hemodynamically stable saturating at 96% on room air.  She does have chronic elevated troponin levels.  proBNP 16,085.  CMP was unremarkable except for a creatinine of 1.39 (baseline).  Lactic acid was within normal limits but elevated C-reactive protein of 4.75 and procalcitonin of 0.35.  WBC 13, hemoglobin 9 (baseline around 8).  ESR was 9.  Blood cultures were drawn in the emergency room.  Portable chest x-ray showed chronic appearing parenchymal changes without any acute infiltrate.  Venous duplex of the right upper extremity showed superficial venous thrombosis involving a portion of the cephalic vein.   No evidence of DVT noted.  Patient was given cefepime and vancomycin in the emergency room for suspected right upper extremity cellulitis and was subsequently admitted to the medical floor with telemetry.    Review of Systems:     All systems were reviewed and negative except as mentioned in subjective, assessment and plan.    Vital Signs:    Temp:  [97.4 °F (36.3 °C)-98.6 °F (37 °C)] 97.8 °F (36.6 °C)  Heart Rate:  [70-76] 76  Resp:  [12-20] 18  BP: ()/(36-65) 120/44    Intake and output:    I/O last 3 completed shifts:  In: 1029.5 [P.O.:120; I.V.:200; Blood:709.5]  Out: 1250 [Urine:1250]  I/O this shift:  In: 120 [P.O.:120]  Out: -     Physical Examination: Examined again 7/28/24    General Appearance:  Alert and cooperative.    Head:  Atraumatic and normocephalic.   Eyes: Conjunctivae and sclerae normal, no icterus. No pallor.   Throat: No oral lesions, no thrush, oral mucosa moist.   Neck: Supple, trachea midline, no thyromegaly.   Lungs:   Breath sounds heard bilaterally equally.  No wheezing or crackles. No Pleural rub or bronchial breathing.   Heart:  Normal S1 and S2, no murmur, No JVD.   Abdomen:   Normal bowel sounds, no masses, no organomegaly. Soft, nontender, nondistended, no rebound tenderness.   Extremities: Right upper extremity redness and swelling.  Tophi present on bilateral hands   Skin: No bleeding or rash.   Neurologic: Alert and oriented x 3. No facial asymmetry. Moves all four limbs. No tremors.      Laboratory results:    Results from last 7 days   Lab Units 07/28/24  0649 07/27/24  0705 07/26/24  0540 07/23/24  0624 07/22/24  1607   SODIUM mmol/L 142 136 134*   < > 136   POTASSIUM mmol/L 4.0 4.3 4.0   < > 4.5   CHLORIDE mmol/L 105 101 102   < > 99   CO2 mmol/L 15.6* 19.6* 20.9*   < > 25.4   BUN mg/dL 63* 65* 64*   < > 31*   CREATININE mg/dL 1.57* 1.74* 1.68*   < > 1.39*   CALCIUM mg/dL 7.4* 7.7* 7.7*   < > 8.7   BILIRUBIN mg/dL  --   --   --   --  0.4   ALK PHOS U/L  --   --   --   --  " 104   ALT (SGPT) U/L  --   --   --   --  7   AST (SGOT) U/L  --   --   --   --  28   GLUCOSE mg/dL 105* 73 101*   < > 65    < > = values in this interval not displayed.     Results from last 7 days   Lab Units 07/28/24  0649 07/28/24  0020 07/27/24  1635 07/27/24  0705 07/26/24  1202 07/26/24  0540 07/25/24  0857   WBC 10*3/mm3  --   --   --  13.31*  --  15.74* 21.62*   HEMOGLOBIN g/dL 8.1* 8.0* 9.9* 10.0*   < > 5.9* 7.1*   HEMATOCRIT % 26.2* 24.3* 30.2* 31.3*   < > 18.7* 23.1*   PLATELETS 10*3/mm3  --   --   --  205  --  200 221    < > = values in this interval not displayed.         Results from last 7 days   Lab Units 07/22/24  1817 07/22/24  1607   HSTROP T ng/L 78* 91*     Results from last 7 days   Lab Units 07/22/24  1850   BLOODCX  No growth at 5 days  No growth at 5 days     No results for input(s): \"PHART\", \"IES3LSI\", \"PO2ART\", \"AZQ0PTR\", \"BASEEXCESS\" in the last 8760 hours.   I have reviewed the patient's laboratory results.    Radiology results:    No radiology results from the last 24 hrs  I have reviewed the patient's radiology reports.    Medication Review:     I have reviewed the patient's active and prn medications.     Problem List:      Right arm cellulitis    Acquired hypothyroidism    Anemia of chronic renal failure    Absolute anemia    Stage 3b chronic kidney disease    Moderate malnutrition    Cellulitis of right arm      Assessment:    Right upper extremity cellulitis, POA.  Right upper extremity superficial vein thrombosis, POA.  Diabetes mellitus type 2 with nephropathy.  Chronic kidney disease stage III.  Chronic elevated troponin secondary to #4.  Sick sinus syndrome status post pacemaker.  Essential hypertension.  Gout  Severe pulmonary hypertension  Hypothyroidism.  Upper GI bleed       Plan:    Upper GI Bleed  -Hemoglobin with significant drop this morning.  Also nursing reported overnight to bloody bowel movements.  -Transfuse 1 unit PRBC  -IV Protonix  -GI consult, Dr Velez. "   -EGD performed 7/26.  Hematin found in the stomach.  Blood in the duodenal bulb.  A single bleeding angio ectasia in the duodenum.  Clip placed.  -7/27: Overnight patient had drop in hemoglobin requiring 2 additional unit PRBC transfusion.  Patient taken for EGD again, 3 nonbleeding angioectasias found in the duodenum treated with APC and clips.  -Hold Eliquis and antiplatelet medication    Right upper extremity cellulitis.  Right cephalic venous thrombosis   - Patient does seem to have redness in the right upper extremity with swelling.  - We will treated with IV antibiotics therapy with ceftriaxone.  - MRSA negative.  - Follow blood cultures.  So far negative.  - Lactobacillus supplements.  -Ultrasound venous Doppler revealed SVT involving a portion of the cephalic vein.  No evidence of DVT.  Given risk for development of DVT and superimposed cellulitis, had planned to anticoagulate with Eliquis for 4 weeks.  Discontinue aspirin.  -Eliquis now on hold for upper GI bleed     CKD stage III.  - Renal function is at baseline.  - She has chronic elevated troponin secondary to chronic kidney disease.     Diabetes mellitus type 2.  - Continue subcutaneous insulin protocol for coverage.    Gout with tophi  -Supportive care  -Pain medication as indicated  -Initiate allopurinol  -Uric acid level elevated    Severe pulmonary hypertension  -Revealed upon chart review  -Patient has never followed up with pulmonary hypertensive clinic  -Complicates all aspects of care    Palliative consulted.   Plan of care reviewed, no changes 7/28/24    DVT Prophylaxis: Enoxaparin  Code Status: Full  Diet: Diabetic  Discharge Plan: Likely home with hospice in 1-2 days    Jaswinder Andrews DO  07/28/24  11:07 EDT    Dictated utilizing Dragon dictation.

## 2024-07-28 NOTE — PROGRESS NOTES
In Patient Consult      Date of Consultation: 2024  Patient Name: Lynne Sanchez  MRN: [id number]  : 1934     Referring provider: Jaswinder Andrews DO    Primary care provider:  Elizabeth Easton APRN    Reason for consultation: GI bleeding    Interval history:   2024  Patient is clinically doing well.  She had a bowel movement overnight which was tarry black.  No red blood or clots.    2024  This is a 89-year-old elderly female patient with a prior history of hypertension, hyperlipidemia, CHF, CKD, valvular heart disease, chronic atrial fibrillation, currently status post PPM, peripheral vascular disease, diabetes mellitus, hypothyroidism, chronic anemia, upper GI bleed associated with the gastric and duodenal AVMs, who was admitted on 2024 with cellulitis rt upper extremity.     Patient was getting a therapy for the cellulitis however earlier this morning she had a multiple bowel movements which were solid mixed with the loose and blood mixed looking as per nursing staff.  As per RN she had an issue with constipation on admission and patient was taking herself the stool ball and apparently had a bowel movement earlier this morning.  Dropped from 7.1 to 6 g/dL this morning.  GI was consulted for further evaluation and management.     Patient has a long history of anemia.  Dr. ACKERMAN and PillCam study in the past.  She did have a prior gastric and duodenal AVMs which were ablated.  PillCam study was incomplete but no obvious AVMs identified within the view available up to distal ileum.  She has a remote history of colonoscopy.    Subjective     Past Medical History:   Diagnosis Date    Acute deep vein thrombosis of left upper extremity     Anemia     Asthma     CHF (congestive heart failure)     Chronic atrial fibrillation     Deep venous thrombosis of left upper limb     Diabetes mellitus, type 2     Diarrhea     GERD (gastroesophageal reflux disease)     Glaucoma     H/O  transfusion of whole blood     Heart attack     Heart murmur     History of transfusion     Hyperlipidemia     Hypertension     Impaired functional mobility, balance, gait, and endurance     Kidney disease     patient not aware of what exact diagnosis is     Osteomyelitis     Osteomyelitis of left foot 10/03/2017    Peripheral vascular disease     Right retinal detachment     Secondary cataract of both eyes     Stable proliferative diabetic retinopathy of both eyes     Stroke     Swelling of left extremity     Urinary tract infection     Wears glasses        Past Surgical History:   Procedure Laterality Date    AMPUTATION DIGIT Left 7/5/2018    Procedure: Left Great toe amputation;  Surgeon: Prakash Carrington MD;  Location:  [facility];  Service: Vascular    AMPUTATION DIGIT Left 8/27/2018    Procedure: SECOND TOE AMPUTATION DIGIT LEFT;  Surgeon: Prakash Carrington MD;  Location:  [facility];  Service: General    APPENDECTOMY      BONE MARROW ASPIRATION      CARDIAC ABLATION      CARDIAC CATHETERIZATION N/A 10/10/2017    Procedure: Peripheral angiography;  Surgeon: Kan Pelaez MD;  Location: [facility] CATH INVASIVE LOCATION;  Service:     CARDIAC CATHETERIZATION N/A 10/10/2017    Procedure: Angioplasty-peripheral;  Surgeon: Kan Pelaez MD;  Location: [facility] CATH INVASIVE LOCATION;  Service:     CARDIAC CATHETERIZATION N/A 10/10/2017    Procedure: Atherectomy-peripheral;  Surgeon: Kan Pelaez MD;  Location: [facility] CATH INVASIVE LOCATION;  Service:     CARDIAC ELECTROPHYSIOLOGY PROCEDURE N/A 5/3/2018    Procedure: generator change;  Surgeon: Zan Poole MD;  Location:  [facility] CATH INVASIVE LOCATION;  Service: Cardiovascular    CARDIOVERSION      COLONOSCOPY      ENDOSCOPY N/A 10/9/2017    Procedure: ESOPHAGOGASTRODUODENOSCOPY WITH COLD FORCEP BIOPSY;  Surgeon: Clifton Hyatt MD;  Location: [facility] ENDOSCOPY;  Service:     ENDOSCOPY N/A 3/22/2022    Procedure: ESOPHAGOGASTRODUODENOSCOPY with AVM  cautery;  Surgeon: Clarisse Velez MD;  Location: [facility] ENDOSCOPY;  Service: Gastroenterology;  Laterality: N/A;    ENDOSCOPY N/A 8/4/2023    Procedure: ESOPHAGOGASTRODUODENOSCOPY WITH BIOPSY AND RUTH BRUSHING;  Surgeon: Clarisse Velez MD;  Location: [facility] ENDOSCOPY;  Service: Gastroenterology;  Laterality: N/A;    EYE SURGERY Bilateral     CATARACTS    INTERVENTIONAL RADIOLOGY PROCEDURE N/A 10/10/2017    Procedure: Abdominal Aortagram with Runoff;  Surgeon: Kan Pelaez MD;  Location: [facility] CATH INVASIVE LOCATION;  Service:     PACEMAKER IMPLANTATION      around 2008 then replaced 2018       Family History   Problem Relation Age of Onset    No Known Problems Mother     No Known Problems Father     Arthritis Other        Social History     Socioeconomic History    Marital status:     Number of children: 3   Tobacco Use    Smoking status: Never     Passive exposure: Never    Smokeless tobacco: Never   Vaping Use    Vaping status: Never Used   Substance and Sexual Activity    Alcohol use: No    Drug use: No    Sexual activity: Defer         Current Facility-Administered Medications:     acetaminophen (TYLENOL) tablet 650 mg, 650 mg, Oral, Q4H PRN, 650 mg at 07/25/24 2053 **OR** acetaminophen (TYLENOL) 160 MG/5ML oral solution 650 mg, 650 mg, Oral, Q4H PRN **OR** acetaminophen (TYLENOL) suppository 650 mg, 650 mg, Rectal, Q4H PRN, Clarisse Velez MD    allopurinol (ZYLOPRIM) tablet 100 mg, 100 mg, Oral, Daily, Clarisse Velez MD, 100 mg at 07/28/24 0822    atorvastatin (LIPITOR) tablet 40 mg, 40 mg, Oral, Daily, Clarisse Velez MD, 40 mg at 07/28/24 0822    sennosides-docusate (PERICOLACE) 8.6-50 MG per tablet 2 tablet, 2 tablet, Oral, BID, 2 tablet at 07/28/24 0822 **AND** polyethylene glycol (MIRALAX) packet 17 g, 17 g, Oral, Daily PRN, 17 g at 07/24/24 2116 **AND** bisacodyl (DULCOLAX) EC tablet 5 mg, 5 mg, Oral, Daily PRN **AND** bisacodyl (DULCOLAX)  suppository 10 mg, 10 mg, Rectal, Daily PRN, Clarisse Velez MD    cefTRIAXone (ROCEPHIN) 2,000 mg in sodium chloride 0.9 % 100 mL IVPB-VTB, 2,000 mg, Intravenous, Q24H, Clarisse Velez MD, Last Rate: 200 mL/hr at 07/28/24 0823, 2,000 mg at 07/28/24 0823    dextrose (D50W) (25 g/50 mL) IV injection 25 g, 25 g, Intravenous, Q15 Min PRN, Clarisse Velez MD    dextrose (GLUTOSE) oral gel 15 g, 15 g, Oral, Q15 Min PRN, Clarisse Velez MD    diphenhydrAMINE (BENADRYL) injection 50 mg, 50 mg, Intravenous, Q8H PRN, Clarisse Velez MD, 50 mg at 07/27/24 0632    doxycycline (VIBRAMYCIN) 100 mg in sodium chloride 0.9 % 100 mL IVPB-VTB, 100 mg, Intravenous, Q12H, Clarisse Velez MD, Last Rate: 0 mL/hr at 07/27/24 0700, 100 mg at 07/28/24 0559    furosemide (LASIX) injection 40 mg, 40 mg, Intravenous, Once, Jaswinder Andrews DO    glucagon (GLUCAGEN) injection 1 mg, 1 mg, Intramuscular, Q15 Min PRN, Clarisse Velez MD    HYDROcodone-acetaminophen (NORCO) 5-325 MG per tablet 1 tablet, 1 tablet, Oral, Q8H PRN, Clarisse Velez MD, 1 tablet at 07/26/24 0920    Insulin Lispro (humaLOG) injection 2-7 Units, 2-7 Units, Subcutaneous, 4x Daily AC & at Bedtime, Clarisse Velez MD, 2 Units at 07/26/24 2148    José Miguel pack 1 packet, 1 packet, Oral, BID, Jaswinder Andrews DO, 1 packet at 07/28/24 1056    lactobacillus acidophilus (RISAQUAD) capsule 1 capsule, 1 capsule, Oral, BID, Clarisse Velez MD, 1 capsule at 07/28/24 0822    levothyroxine (SYNTHROID, LEVOTHROID) tablet 50 mcg, 50 mcg, Oral, Q AM, Clarisse Velez MD, 50 mcg at 07/28/24 0559    metoprolol tartrate (LOPRESSOR) tablet 50 mg, 50 mg, Oral, BID, Jawsinder Andrews DO    ondansetron (ZOFRAN) injection 4 mg, 4 mg, Intravenous, Q6H PRN, Clarisse Velez MD    pantoprazole (PROTONIX) EC tablet 40 mg, 40 mg, Oral, BID AC, Jaswinder Andrews DO, 40 mg at 07/28/24 1056    sodium chloride 0.9 % flush 10 mL, 10  mL, Intravenous, PRN, Clarisse Velez MD    sodium chloride 0.9 % flush 10 mL, 10 mL, Intravenous, Q12H, Clarisse Velez MD, 10 mL at 07/28/24 0824    sodium chloride 0.9 % flush 10 mL, 10 mL, Intravenous, PRN, Clarisse Velez MD    sodium chloride 0.9 % infusion 40 mL, 40 mL, Intravenous, PRN, Clarisse Velez MD    sodium chloride 0.9 % infusion, 70 mL/hr, Intravenous, Continuous PRN, Clarisse Velez MD, Last Rate: 70 mL/hr at 07/27/24 1058, 70 mL/hr at 07/27/24 1058    Allergies   Allergen Reactions    Phenergan [Promethazine Hcl] Confusion    Zosyn [Piperacillin-Tazobactam In Dex] Rash       Review of Systems   Constitutional:  Negative for appetite change, fatigue, fever and unexpected weight loss.   HENT:  Negative for trouble swallowing.    Gastrointestinal:  Negative for abdominal distention, abdominal pain, anal bleeding, blood in stool, constipation, diarrhea, nausea, rectal pain, vomiting, GERD and indigestion.        Tarry stools       The following portions of the patient's history were reviewed and updated as appropriate: allergies, current medications, past family history, past medical history, past social history, past surgical history and problem list.    Objective     Vitals:    07/27/24 2335 07/28/24 0354 07/28/24 0702 07/28/24 1116   BP: 132/41 108/65 120/44 93/45   BP Location: Right arm Right arm Right arm Right leg   Patient Position: Lying Lying Lying Lying   Pulse: 70 70 76 70   Resp: 16 18 18 16   Temp: 98.3 °F (36.8 °C) 98.6 °F (37 °C) 97.8 °F (36.6 °C) 98.1 °F (36.7 °C)   TempSrc: Oral Oral Oral Oral   SpO2: 95% 97% 100% 98%   Weight:       Height:           Physical Exam  Constitutional:       Comments: Very frail elderly lady   HENT:      Head: Normocephalic and atraumatic.   Eyes:      Comments: Pallor present   Abdominal:      General: Abdomen is flat. There is no distension.      Palpations: There is no mass.      Tenderness: There is no abdominal  tenderness. There is no guarding or rebound.      Hernia: No hernia is present.   Musculoskeletal:      Cervical back: Normal range of motion and neck supple.   Neurological:      Mental Status: She is alert.         Results from last 7 days   Lab Units 07/28/24  0649 07/28/24  0020 07/27/24  1635 07/27/24  0705 07/26/24  1202 07/26/24  0540 07/25/24  0857 07/23/24  0624 07/22/24  1607   SODIUM mmol/L 142  --   --  136  --  134* 138   < > 136   POTASSIUM mmol/L 4.0  --   --  4.3  --  4.0 4.2   < > 4.5   CHLORIDE mmol/L 105  --   --  101  --  102 101   < > 99   CO2 mmol/L 15.6*  --   --  19.6*  --  20.9* 21.2*   < > 25.4   BUN mg/dL 63*  --   --  65*  --  64* 54*   < > 31*   CREATININE mg/dL 1.57*  --   --  1.74*  --  1.68* 1.50*   < > 1.39*   CALCIUM mg/dL 7.4*  --   --  7.7*  --  7.7* 8.1*   < > 8.7   ALBUMIN g/dL  --   --   --   --   --   --   --   --  3.2*   BILIRUBIN mg/dL  --   --   --   --   --   --   --   --  0.4   ALK PHOS U/L  --   --   --   --   --   --   --   --  104   ALT (SGPT) U/L  --   --   --   --   --   --   --   --  7   AST (SGOT) U/L  --   --   --   --   --   --   --   --  28   GLUCOSE mg/dL 105*  --   --  73  --  101* 160*   < > 65   WBC 10*3/mm3  --   --   --  13.31*  --  15.74* 21.62*   < > 13.19*   HEMOGLOBIN g/dL 8.1* 8.0* 9.9* 10.0*   < > 5.9* 7.1*   < > 8.9*   PLATELETS 10*3/mm3  --   --   --  205  --  200 221   < > 253    < > = values in this interval not displayed.       Imaging Results (Last 24 Hours)       ** No results found for the last 24 hours. **           EGD on 3/22/2022 by Dr. Velez:  - The oropharynx was normal.  - The Z-line was irregular and was found 35 cm from the incisors.  - A 2 cm hiatal hernia was present.  - The esophagus and gastroesophageal junction were examined with white light and narrow band imaging (NBI). There were esophageal mucosal changes suspicious for long-segment Fermin's esophagus, consistent with long-segment Fermin's esophagus, classified as  Fermin's stage C13-M15 per [place] criteria. These changes involved the mucosa extending to the Z-line (35 cm from the incisors). Circumferential salmon-colored mucosa was present from 20 to 35 cm and no visible abnormalities were present. The maximum longitudinal extent of these esophageal mucosal changes was 15 cm in length. Not biopsied as pt is on anticoagulation.  - Patchy mildly erythematous mucosa without bleeding was found on the posterior wall of the stomach and in the gastric antrum.  - A single small angioectasia with stigmata of recent bleeding was found in the gastric fundus. Vaporization for hemostasis of bleeding caused by the procedure using argon plasma at 2 liters/minute and 20 santa was successful.  - Red blood was found in the first portion of the duodenum.  - Two small angioectasias with bleeding were found in the first portion of the duodenum and in the second portion of the duodenum. Vaporization for hemostasis using argon plasma at 2 liters/minute and 20 santa was successful. Estimated blood loss was minimal.  - The second portion of the duodenum was normal.  - A few diminutive polyps were found in the gastric fundus and in the gastric body.  No specimens collected. Not biopsied as pt is on anticoagulation.     PillCam dated 4/15/2022  She had a delayed motility with passage of Pill cam only up to distal ileum in 8 hours. No complete distal and terminal ileum views available. Within the view available no small bowel AVMs identified. Multiple areas of what appears to be a healed erosion/ulcer scar in the mid/distal ileum. This can be seen in Crohn's disease patients on remission, however no active ulcerations or erosions identified.    EGD 7/26/2024  - Normal oropharynx.   - Z-line irregular, 35 cm from the incisors.   - Esophageal mucosal changes secondary to established long-segment Fermin's disease.   - Hematin (altered blood/coffee-ground-like material) in the stomach. - Blood in the  duodenal bulb, in the second portion of the duodenum and in the third portion of the duodenum.   - A single bleeding angioectasia in the duodenum. Injected. Clip (MR conditional) was placed. Clip : [employer].   - No specimens collected.   - Views suboptimal    EGD 7/27/2024  Normal oropharynx.   - Colonoscope was used   - Z-line irregular, 35 cm from the incisors.   - No gross lesions in the entire esophagus.   - Normal stomach. - Duodenal Hemoclip.   - Three non-bleeding angioectasias in the duodenum. Treated with argon plasma coagulation (APC). Clips were placed. Clip : [employer].   - Normal examined proximal jejunum.   - No specimens collected.   - No current active bleeding    Assessment / Plan    Assessment/Recommendations:  Bleeding duodenal AVM status post APC  Acute blood loss anemia  History of chronic iron deficiency anemia  History of gastric and duodenal AVMs  Constipation with suspected fecal impaction  7/28/2024  Patient had a EGD done on 7/26 and 7/27/2024 with the control of bleeding duodenal AVMs.  No further signs of any ongoing GI bleed.  Postprocedure hemoglobin appears to be spurious.  Expected posttransfusion hemoglobin was around 8-9. Hemoglobin appears to be stable.   Advance the diet  Due to her significant bleeding issues in the past and now patient may not be a candidate for further anticoagulation therapy.  Protonix 40 mg p.o. daily    7/26/2024  Patient history is concerning for fecal impaction with possible stercoral ulcer and bleeding.  However as per nursing staff stool looks more likely melanotic stool.  Rectal bleeding.  Patient also had a prior history of upper GI bleeding.  Hemoglobin dropped from 7.1 to 5.9 g/dL.  She needs an EGD for further evaluation with possible flexible sigmoidoscopy.  Discussion with family they are agreeable to proceed with the procedure.     RT Upper extremity cellulitis  CHF/cardiac arrhythmia status post  PPM  CKD  Diabetes mellitus    Thank you very much for letting me participate in the care of this patient.  Please do not hesitate to call me if you have any questions.    Clarisse Velez MD  [facility]  7/28/2024  11:31 EDT    Please note that portions of this note may have been completed with a voice recognition program.

## 2024-07-28 NOTE — SIGNIFICANT NOTE
07/28/24 1924   Vital Signs   Temp 97.7 °F (36.5 °C)   Temp src Oral   Heart Rate 70   Heart Rate Source Monitor   Resp 18   Resp Rate Source Visual   BP 92/43   Noninvasive MAP (mmHg) 72   BP Location Left arm   BP Method Automatic   Patient Position Lying   Oxygen Therapy   SpO2 99 %   Device (Oxygen Therapy) room air     MD aware of soft bp's

## 2024-07-28 NOTE — PLAN OF CARE
Goal Outcome Evaluation:  Plan of Care Reviewed With: patient        Progress: no change       Problem: Adult Inpatient Plan of Care  Goal: Plan of Care Review  Outcome: Ongoing, Progressing  Flowsheets (Taken 7/28/2024 0747)  Progress: no change  Plan of Care Reviewed With: patient  Goal: Patient-Specific Goal (Individualized)  Outcome: Ongoing, Progressing  Goal: Absence of Hospital-Acquired Illness or Injury  Outcome: Ongoing, Progressing  Intervention: Identify and Manage Fall Risk  Recent Flowsheet Documentation  Taken 7/28/2024 0421 by Tincher, Harika, LPN  Safety Promotion/Fall Prevention:   activity supervised   assistive device/personal items within reach   clutter free environment maintained   fall prevention program maintained   nonskid shoes/slippers when out of bed   room organization consistent   safety round/check completed  Taken 7/28/2024 0200 by Harika Sewell LPN  Safety Promotion/Fall Prevention:   activity supervised   assistive device/personal items within reach   clutter free environment maintained   fall prevention program maintained   nonskid shoes/slippers when out of bed   room organization consistent   safety round/check completed  Taken 7/28/2024 0000 by Tincher, Harika, LPN  Safety Promotion/Fall Prevention:   activity supervised   assistive device/personal items within reach   clutter free environment maintained   fall prevention program maintained   nonskid shoes/slippers when out of bed   room organization consistent   safety round/check completed  Taken 7/27/2024 2200 by Tincher, Harika, LPN  Safety Promotion/Fall Prevention:   activity supervised   assistive device/personal items within reach   clutter free environment maintained   fall prevention program maintained   nonskid shoes/slippers when out of bed   room organization consistent   safety round/check completed  Taken 7/27/2024 2000 by Tincher, Harika, LPN  Safety Promotion/Fall Prevention:   activity  supervised   assistive device/personal items within reach   clutter free environment maintained   fall prevention program maintained   nonskid shoes/slippers when out of bed   room organization consistent   safety round/check completed  Intervention: Prevent Skin Injury  Recent Flowsheet Documentation  Taken 7/28/2024 0600 by Harika Sewell LPN  Body Position:   tilted   left  Taken 7/28/2024 0421 by Harika Sewell LPN  Body Position:   turned   right  Taken 7/28/2024 0200 by Harika Sewell LPN  Body Position:   supine   tilted   right  Taken 7/28/2024 0000 by Harika Sewell LPN  Body Position: supine, legs elevated  Taken 7/27/2024 2200 by Harika Sewell LPN  Body Position:   supine   tilted   left  Taken 7/27/2024 2000 by Harika Sewell LPN  Body Position:   supine   tilted   right  Intervention: Prevent and Manage VTE (Venous Thromboembolism) Risk  Recent Flowsheet Documentation  Taken 7/28/2024 0600 by Harika Sewell LPN  Activity Management: activity minimized  Taken 7/28/2024 0421 by Harika Sewell LPN  Activity Management: activity minimized  Taken 7/28/2024 0200 by Harika Sewell LPN  Activity Management: activity minimized  Taken 7/28/2024 0000 by Harika Sewell LPN  Activity Management: activity minimized  Taken 7/27/2024 2200 by Harika Sewell LPN  Activity Management: activity minimized  Taken 7/27/2024 2000 by Harika Sewell LPN  Activity Management: activity minimized  VTE Prevention/Management:   bilateral   sequential compression devices off  Intervention: Prevent Infection  Recent Flowsheet Documentation  Taken 7/28/2024 0421 by Harika Sewell LPN  Infection Prevention:   environmental surveillance performed   rest/sleep promoted   single patient room provided  Taken 7/28/2024 0200 by Harika Sewell LPN  Infection Prevention:   environmental surveillance performed   rest/sleep promoted   single patient room provided  Taken  7/28/2024 0000 by Harika Sewell LPN  Infection Prevention:   environmental surveillance performed   rest/sleep promoted   single patient room provided  Taken 7/27/2024 2200 by Harika Sewell LPN  Infection Prevention:   environmental surveillance performed   rest/sleep promoted   single patient room provided  Taken 7/27/2024 2000 by Harika Sewell LPN  Infection Prevention:   environmental surveillance performed   rest/sleep promoted   single patient room provided  Goal: Optimal Comfort and Wellbeing  Outcome: Ongoing, Progressing  Intervention: Provide Person-Centered Care  Recent Flowsheet Documentation  Taken 7/27/2024 2000 by Harika Sewell LPN  Trust Relationship/Rapport:   care explained   choices provided   emotional support provided  Goal: Readiness for Transition of Care  Outcome: Ongoing, Progressing     Problem: Fall Injury Risk  Goal: Absence of Fall and Fall-Related Injury  Outcome: Ongoing, Progressing  Intervention: Promote Injury-Free Environment  Recent Flowsheet Documentation  Taken 7/28/2024 0421 by Tincher, Harika, LPN  Safety Promotion/Fall Prevention:   activity supervised   assistive device/personal items within reach   clutter free environment maintained   fall prevention program maintained   nonskid shoes/slippers when out of bed   room organization consistent   safety round/check completed  Taken 7/28/2024 0200 by Harika Sewell LPN  Safety Promotion/Fall Prevention:   activity supervised   assistive device/personal items within reach   clutter free environment maintained   fall prevention program maintained   nonskid shoes/slippers when out of bed   room organization consistent   safety round/check completed  Taken 7/28/2024 0000 by Tincher, Harika, LPN  Safety Promotion/Fall Prevention:   activity supervised   assistive device/personal items within reach   clutter free environment maintained   fall prevention program maintained   nonskid shoes/slippers when  out of bed   room organization consistent   safety round/check completed  Taken 7/27/2024 2200 by Tincher, Harika, LPN  Safety Promotion/Fall Prevention:   activity supervised   assistive device/personal items within reach   clutter free environment maintained   fall prevention program maintained   nonskid shoes/slippers when out of bed   room organization consistent   safety round/check completed  Taken 7/27/2024 2000 by Tincher, Harika, LPN  Safety Promotion/Fall Prevention:   activity supervised   assistive device/personal items within reach   clutter free environment maintained   fall prevention program maintained   nonskid shoes/slippers when out of bed   room organization consistent   safety round/check completed     Problem: Skin Injury Risk Increased  Goal: Skin Health and Integrity  Outcome: Ongoing, Progressing  Intervention: Optimize Skin Protection  Recent Flowsheet Documentation  Taken 7/28/2024 0600 by Harika Sewell LPN  Head of Bed (HOB) Positioning: HOB elevated  Taken 7/28/2024 0421 by Harika Sewell LPN  Head of Bed (HOB) Positioning: HOB elevated  Taken 7/28/2024 0200 by Harika Sewell LPN  Head of Bed (HOB) Positioning: HOB elevated  Taken 7/28/2024 0000 by Harika Sewell LPN  Head of Bed (HOB) Positioning: HOB elevated  Taken 7/27/2024 2200 by Harika Sewell LPN  Head of Bed (HOB) Positioning: HOB elevated  Taken 7/27/2024 2000 by Harika Sewell LPN  Head of Bed (HOB) Positioning: HOB elevated     Problem: Malnutrition  Goal: Improved Nutritional Intake  Outcome: Ongoing, Progressing     Problem: Adjustment to Illness (Sepsis/Septic Shock)  Goal: Optimal Coping  Outcome: Ongoing, Progressing  Intervention: Optimize Psychosocial Adjustment to Illness  Recent Flowsheet Documentation  Taken 7/27/2024 2000 by Harika Sewell LPN  Family/Support System Care: support provided     Problem: Bleeding (Sepsis/Septic Shock)  Goal: Absence of Bleeding  Outcome:  Ongoing, Progressing     Problem: Glycemic Control Impaired (Sepsis/Septic Shock)  Goal: Blood Glucose Level Within Desired Range  Outcome: Ongoing, Progressing     Problem: Infection Progression (Sepsis/Septic Shock)  Goal: Absence of Infection Signs and Symptoms  Outcome: Ongoing, Progressing  Intervention: Initiate Sepsis Management  Recent Flowsheet Documentation  Taken 7/28/2024 0421 by Harika Sewell LPN  Infection Prevention:   environmental surveillance performed   rest/sleep promoted   single patient room provided  Taken 7/28/2024 0200 by Harika Sewell LPN  Infection Prevention:   environmental surveillance performed   rest/sleep promoted   single patient room provided  Taken 7/28/2024 0000 by Harika Sewell LPN  Infection Prevention:   environmental surveillance performed   rest/sleep promoted   single patient room provided  Taken 7/27/2024 2200 by Harika Sewell LPN  Infection Prevention:   environmental surveillance performed   rest/sleep promoted   single patient room provided  Taken 7/27/2024 2000 by Harika Sewell LPN  Infection Prevention:   environmental surveillance performed   rest/sleep promoted   single patient room provided  Intervention: Promote Recovery  Recent Flowsheet Documentation  Taken 7/28/2024 0600 by Harika Sewell LPN  Activity Management: activity minimized  Taken 7/28/2024 0421 by Harika Sewell LPN  Activity Management: activity minimized  Taken 7/28/2024 0200 by Harika Sewell LPN  Activity Management: activity minimized  Taken 7/28/2024 0000 by Harika Sewell LPN  Activity Management: activity minimized  Taken 7/27/2024 2200 by Harika Sewell LPN  Activity Management: activity minimized  Taken 7/27/2024 2000 by Harika Sewell LPN  Activity Management: activity minimized     Problem: Nutrition Impaired (Sepsis/Septic Shock)  Goal: Optimal Nutrition Intake  Outcome: Ongoing, Progressing     Problem: Adjustment to Illness  (Gastrointestinal Bleeding)  Goal: Optimal Coping with Acute Illness  Outcome: Ongoing, Progressing     Problem: Bleeding (Gastrointestinal Bleeding)  Goal: Hemostasis  Outcome: Ongoing, Progressing

## 2024-07-28 NOTE — PLAN OF CARE
Problem: Adult Inpatient Plan of Care  Goal: Plan of Care Review  Outcome: Ongoing, Progressing  Goal: Patient-Specific Goal (Individualized)  Outcome: Ongoing, Progressing  Goal: Absence of Hospital-Acquired Illness or Injury  Outcome: Ongoing, Progressing  Intervention: Identify and Manage Fall Risk  Recent Flowsheet Documentation  Taken 7/28/2024 1200 by Verónica Cline RN  Safety Promotion/Fall Prevention:   safety round/check completed   room organization consistent  Intervention: Prevent Skin Injury  Recent Flowsheet Documentation  Taken 7/28/2024 1200 by Verónica Cline RN  Body Position:   right   turned  Goal: Optimal Comfort and Wellbeing  Outcome: Ongoing, Progressing  Intervention: Provide Person-Centered Care  Recent Flowsheet Documentation  Taken 7/28/2024 0800 by Verónica Cline RN  Trust Relationship/Rapport:   care explained   choices provided  Goal: Readiness for Transition of Care  Outcome: Ongoing, Progressing     Problem: Fall Injury Risk  Goal: Absence of Fall and Fall-Related Injury  Outcome: Ongoing, Progressing  Intervention: Promote Injury-Free Environment  Recent Flowsheet Documentation  Taken 7/28/2024 1200 by Verónica Cline RN  Safety Promotion/Fall Prevention:   safety round/check completed   room organization consistent     Problem: Skin Injury Risk Increased  Goal: Skin Health and Integrity  Outcome: Ongoing, Progressing  Intervention: Optimize Skin Protection  Recent Flowsheet Documentation  Taken 7/28/2024 1200 by Verónica Cline RN  Head of Bed (HOB) Positioning: HOB elevated     Problem: Malnutrition  Goal: Improved Nutritional Intake  Outcome: Ongoing, Progressing     Problem: Adjustment to Illness (Sepsis/Septic Shock)  Goal: Optimal Coping  Outcome: Ongoing, Progressing     Problem: Bleeding (Sepsis/Septic Shock)  Goal: Absence of Bleeding  Outcome: Ongoing, Progressing     Problem: Glycemic Control Impaired (Sepsis/Septic Shock)  Goal: Blood Glucose Level Within  Desired Range  Outcome: Ongoing, Progressing     Problem: Infection Progression (Sepsis/Septic Shock)  Goal: Absence of Infection Signs and Symptoms  Outcome: Ongoing, Progressing     Problem: Nutrition Impaired (Sepsis/Septic Shock)  Goal: Optimal Nutrition Intake  Outcome: Ongoing, Progressing     Problem: Adjustment to Illness (Gastrointestinal Bleeding)  Goal: Optimal Coping with Acute Illness  Outcome: Ongoing, Progressing     Problem: Bleeding (Gastrointestinal Bleeding)  Goal: Hemostasis  Outcome: Ongoing, Progressing   Goal Outcome Evaluation:

## 2024-07-29 ENCOUNTER — HOME CARE VISIT (OUTPATIENT)
Dept: HOME HEALTH SERVICES | Facility: HOME HEALTHCARE | Age: 89
End: 2024-07-29
Payer: COMMERCIAL

## 2024-07-29 ENCOUNTER — HOME CARE VISIT (OUTPATIENT)
Dept: HOME HEALTH SERVICES | Facility: HOME HEALTHCARE | Age: 89
End: 2024-07-29
Payer: MEDICARE

## 2024-07-29 LAB
ANION GAP SERPL CALCULATED.3IONS-SCNC: 15.1 MMOL/L (ref 5–15)
BUN SERPL-MCNC: 53 MG/DL (ref 8–23)
BUN/CREAT SERPL: 33.3 (ref 7–25)
CALCIUM SPEC-SCNC: 7.7 MG/DL (ref 8.6–10.5)
CHLORIDE SERPL-SCNC: 106 MMOL/L (ref 98–107)
CO2 SERPL-SCNC: 15.9 MMOL/L (ref 22–29)
CREAT SERPL-MCNC: 1.59 MG/DL (ref 0.57–1)
DEPRECATED RDW RBC AUTO: 63.2 FL (ref 37–54)
EGFRCR SERPLBLD CKD-EPI 2021: 30.9 ML/MIN/1.73
ERYTHROCYTE [DISTWIDTH] IN BLOOD BY AUTOMATED COUNT: 18.8 % (ref 12.3–15.4)
GLUCOSE BLDC GLUCOMTR-MCNC: 114 MG/DL (ref 70–130)
GLUCOSE BLDC GLUCOMTR-MCNC: 187 MG/DL (ref 70–130)
GLUCOSE BLDC GLUCOMTR-MCNC: 199 MG/DL (ref 70–130)
GLUCOSE BLDC GLUCOMTR-MCNC: 98 MG/DL (ref 70–130)
GLUCOSE SERPL-MCNC: 94 MG/DL (ref 65–99)
HCT VFR BLD AUTO: 27.1 % (ref 34–46.6)
HGB BLD-MCNC: 8.4 G/DL (ref 12–15.9)
MCH RBC QN AUTO: 29 PG (ref 26.6–33)
MCHC RBC AUTO-ENTMCNC: 31 G/DL (ref 31.5–35.7)
MCV RBC AUTO: 93.4 FL (ref 79–97)
PLATELET # BLD AUTO: 174 10*3/MM3 (ref 140–450)
PMV BLD AUTO: 9.6 FL (ref 6–12)
POTASSIUM SERPL-SCNC: 3.5 MMOL/L (ref 3.5–5.2)
RBC # BLD AUTO: 2.9 10*6/MM3 (ref 3.77–5.28)
SODIUM SERPL-SCNC: 137 MMOL/L (ref 136–145)
WBC NRBC COR # BLD AUTO: 8.81 10*3/MM3 (ref 3.4–10.8)

## 2024-07-29 PROCEDURE — 80048 BASIC METABOLIC PNL TOTAL CA: CPT | Performed by: INTERNAL MEDICINE

## 2024-07-29 PROCEDURE — 63710000001 INSULIN LISPRO (HUMAN) PER 5 UNITS: Performed by: INTERNAL MEDICINE

## 2024-07-29 PROCEDURE — 82948 REAGENT STRIP/BLOOD GLUCOSE: CPT

## 2024-07-29 PROCEDURE — 82948 REAGENT STRIP/BLOOD GLUCOSE: CPT | Performed by: INTERNAL MEDICINE

## 2024-07-29 PROCEDURE — 85027 COMPLETE CBC AUTOMATED: CPT | Performed by: INTERNAL MEDICINE

## 2024-07-29 RX ADMIN — Medication 1 CAPSULE: at 21:07

## 2024-07-29 RX ADMIN — METOPROLOL TARTRATE 50 MG: 50 TABLET, FILM COATED ORAL at 21:07

## 2024-07-29 RX ADMIN — DOXYCYCLINE 100 MG: 100 INJECTION, POWDER, LYOPHILIZED, FOR SOLUTION INTRAVENOUS at 05:18

## 2024-07-29 RX ADMIN — SENNOSIDES AND DOCUSATE SODIUM 2 TABLET: 50; 8.6 TABLET ORAL at 21:07

## 2024-07-29 RX ADMIN — PANTOPRAZOLE SODIUM 40 MG: 40 TABLET, DELAYED RELEASE ORAL at 18:01

## 2024-07-29 RX ADMIN — INSULIN LISPRO 2 UNITS: 100 INJECTION, SOLUTION INTRAVENOUS; SUBCUTANEOUS at 21:07

## 2024-07-29 RX ADMIN — HYDROCODONE BITARTRATE AND ACETAMINOPHEN 1 TABLET: 5; 325 TABLET ORAL at 21:07

## 2024-07-29 NOTE — PLAN OF CARE
Goal Outcome Evaluation:      Members of the Palliative Care Team spoke with pt and her daughter at pt's bedside to complete a new Living Will Directive.  The pt was awake awake and conversational.  The form was completed designating the pt's daughter, Janneth and son, Marco A, as her surrogate decision makers.

## 2024-07-29 NOTE — PLAN OF CARE
Goal Outcome Evaluation:  Plan of Care Reviewed With: patient        Progress: declining  Outcome Evaluation: Pt became very confused and agitated overnight, unable to redirect. Medications given as needed per MD order to reduce agitation and anxiety. Plan of care ongoing.

## 2024-07-29 NOTE — CONSULTS
[facility]    INPATIENT PALLIATIVE CARE CONSULT      Name:  Lynne Sanchez   Age:  89 y.o.  Sex:  female  :  1934  MRN:  [id number]   Visit Number:  [id number]  Date of Service:  24  Date of Admission:  2024  Primary Care Physician:  Elizabeth Easton APRN    Consulting Physician:  Dr. Andrews    Reason For Consult:  Introduction to Palliative services, Goals of care discussions , Support during serious illness, Hospice information , and Symptom management     History of Present Illness:  Lynne Sanchez is a 89 y.o. female with past medical history of hypertension, T2DM, CKD stg III, heart failure, hypothyroidism, sick sinus syndrome s/p pacemaker, PVD with history of amputation, anemia, malnutrition.  Patient presented to [facility] on 2024 secondary to weakness, progressive over the course of several days.  Patient had recent ED evaluation on  secondary to mechanical fall.  Patient diagnosed with cellulitis of the hand and possibly gouty arthritis, given NSAIDs, steroids, a dose of IV cefazolin and discharged on p.o. Keflex.  Family reporting that her symptoms of weakness ultimately progressed prompting an additional evaluation.  Upon presentation to the ED patient afebrile and hemodynamically stable.  Laboratory evaluation noted chronic elevation of troponins, proBNP 16,000.  Creatinine 1.39, CRP 4.75, Pro-Jamar 0.35, WBCs 13, hemoglobin 9, ESR 9.  Venous duplex of the right upper extremity noted superficial venous thrombosis involving a portion of the cephalic vein.  No DVT.  She was provided cefepime and vancomycin for suspected right upper extremity cellulitis, admitted to the primary medicine service for continued evaluation and management.  Given patient's high risk for development of DVT and superimposed cellulitis, she was initiated on Eliquis and aspirin discontinued.  PT/OT consulted.  2024 patient developed symptoms of GI bleeding with  associated drop in hemoglobin from 7.1-5.9.  GI consulted, expressing concerns for fecal impaction with possible stercoral ulcer and bleeding, further recommending EGD.  Patient underwent EGD on 7/26 for single bleeding angioectasia in the duodenum noted and clip placed.  Hospital course complicated by further drop in hemoglobin for which she required an additional 2 units PRBC on 7/27, ultimately taken for EGD again for 3 nonbleeding angioectasias found in the duodenum, hemoglobin subsequently stabilized.  Palliative care consulted to further review goals of care in the setting of complex medical decision making.  After speaking directly with the primary medicine service, palliative care team met with patient and several family members at bedside.    Patient is alert and oriented with some mild confusion however able to supplement HPI.  She has limited memory regarding events immediatly prior to her hospitalization.  She notes that prior to this acute illness, she was living at home with her spouse, with the assistance of private caregivers.  Her daughter at bedside, Janneth, notes that for the past few weeks her mobility had been failing.  She had previously been able to utilize a walker, however had become largely bedridden.  Patient notes that she has had declining health over the past several months, chart review noting multiple ED evaluations with last admission June 2024.  She is incontinent of bowel/bladder at baseline.  Appetite labile, ranging from 25-50% most meals, episodic nausea.  Albumin 2.6 on 6/23/2024.  Patient denies any pain/dyspnea symptoms.  She notes she has had poor sleep since admission, however hopeful this will improve once she is home.  Patient/family agreeable to continued palliative care follow up and discussions regarding goals of care and advance care planning.     Review of Systems   Constitutional:  Positive for activity change, appetite change and fatigue.   Musculoskeletal:   Positive for arthralgias.   Neurological:  Positive for weakness.        Medical History:  Past Medical History:   Diagnosis Date    Acute deep vein thrombosis of left upper extremity     Anemia     Asthma     CHF (congestive heart failure)     Chronic atrial fibrillation     Deep venous thrombosis of left upper limb     Diabetes mellitus, type 2     Diarrhea     GERD (gastroesophageal reflux disease)     Glaucoma     H/O transfusion of whole blood     Heart attack     Heart murmur     History of transfusion     Hyperlipidemia     Hypertension     Impaired functional mobility, balance, gait, and endurance     Kidney disease     patient not aware of what exact diagnosis is     Osteomyelitis     Osteomyelitis of left foot 10/03/2017    Peripheral vascular disease     Right retinal detachment     Secondary cataract of both eyes     Stable proliferative diabetic retinopathy of both eyes     Stroke     Swelling of left extremity     Urinary tract infection     Wears glasses       Past Surgical History:   Procedure Laterality Date    AMPUTATION DIGIT Left 7/5/2018    Procedure: Left Great toe amputation;  Surgeon: Prakash Carrington MD;  Location: [facility] OR;  Service: Vascular    AMPUTATION DIGIT Left 8/27/2018    Procedure: SECOND TOE AMPUTATION DIGIT LEFT;  Surgeon: Prakash Carrington MD;  Location: [facility] OR;  Service: General    APPENDECTOMY      BONE MARROW ASPIRATION      CARDIAC ABLATION      CARDIAC CATHETERIZATION N/A 10/10/2017    Procedure: Peripheral angiography;  Surgeon: Kan Pelaez MD;  Location: [facility] CATH INVASIVE LOCATION;  Service:     CARDIAC CATHETERIZATION N/A 10/10/2017    Procedure: Angioplasty-peripheral;  Surgeon: Kan Pelaez MD;  Location: [facility] CATH INVASIVE LOCATION;  Service:     CARDIAC CATHETERIZATION N/A 10/10/2017    Procedure: Atherectomy-peripheral;  Surgeon: Kan Pelaez MD;  Location: [facility] CATH INVASIVE LOCATION;  Service:     CARDIAC ELECTROPHYSIOLOGY  PROCEDURE N/A 5/3/2018    Procedure: generator change;  Surgeon: Zan Pooel MD;  Location:  [facility] CATH INVASIVE LOCATION;  Service: Cardiovascular    CARDIOVERSION      COLONOSCOPY      ENDOSCOPY N/A 10/9/2017    Procedure: ESOPHAGOGASTRODUODENOSCOPY WITH COLD FORCEP BIOPSY;  Surgeon: Clifton Hyatt MD;  Location: [facility] ENDOSCOPY;  Service:     ENDOSCOPY N/A 3/22/2022    Procedure: ESOPHAGOGASTRODUODENOSCOPY with AVM cautery;  Surgeon: Clarisse Velez MD;  Location: [facility] ENDOSCOPY;  Service: Gastroenterology;  Laterality: N/A;    ENDOSCOPY N/A 8/4/2023    Procedure: ESOPHAGOGASTRODUODENOSCOPY WITH BIOPSY AND RUTH BRUSHING;  Surgeon: Clarisse Velez MD;  Location: [facility] ENDOSCOPY;  Service: Gastroenterology;  Laterality: N/A;    ENDOSCOPY N/A 7/26/2024    Procedure: ESOPHAGOGASTRODUODENOSCOPY WITH CONTROL OF BLEEDING WITH INJECTION OF EPINEPHRINE AND RESOLUTION CLIP PLACEMENT X1;  Surgeon: Clarisse Velez MD;  Location: [facility] ENDOSCOPY;  Service: Gastroenterology;  Laterality: N/A;    ENDOSCOPY N/A 7/27/2024    Procedure: ESOPHAGOGASTRODUODENOSCOPYwith resolution clip and APC;  Surgeon: Clarisse Velez MD;  Location: [facility] ENDOSCOPY;  Service: Gastroenterology;  Laterality: N/A;    EYE SURGERY Bilateral     CATARACTS    INTERVENTIONAL RADIOLOGY PROCEDURE N/A 10/10/2017    Procedure: Abdominal Aortagram with Runoff;  Surgeon: Kan Pelaez MD;  Location: [facility] CATH INVASIVE LOCATION;  Service:     PACEMAKER IMPLANTATION      around 2008 then replaced 2018     Family History   Problem Relation Age of Onset    No Known Problems Mother     No Known Problems Father     Arthritis Other      Allergies: Phenergan [promethazine hcl] and Zosyn [piperacillin-tazobactam in dex]    Hospital Scheduled Medications:    Current Facility-Administered Medications:     acetaminophen (TYLENOL) tablet 650 mg, 650 mg, Oral, Q4H PRN, 650 mg at 07/25/24 2053 **OR** acetaminophen (TYLENOL) 160  MG/5ML oral solution 650 mg, 650 mg, Oral, Q4H PRN **OR** acetaminophen (TYLENOL) suppository 650 mg, 650 mg, Rectal, Q4H PRN, Clarisse Velez MD    allopurinol (ZYLOPRIM) tablet 100 mg, 100 mg, Oral, Daily, Clarisse Velez MD, 100 mg at 07/28/24 0822    atorvastatin (LIPITOR) tablet 40 mg, 40 mg, Oral, Daily, Clarisse Velez MD, 40 mg at 07/28/24 0822    sennosides-docusate (PERICOLACE) 8.6-50 MG per tablet 2 tablet, 2 tablet, Oral, BID, 2 tablet at 07/28/24 2049 **AND** polyethylene glycol (MIRALAX) packet 17 g, 17 g, Oral, Daily PRN, 17 g at 07/24/24 2116 **AND** bisacodyl (DULCOLAX) EC tablet 5 mg, 5 mg, Oral, Daily PRN **AND** bisacodyl (DULCOLAX) suppository 10 mg, 10 mg, Rectal, Daily PRN, Clarisse Velez MD    dextrose (D50W) (25 g/50 mL) IV injection 25 g, 25 g, Intravenous, Q15 Min PRN, Clarisse Velez MD    dextrose (GLUTOSE) oral gel 15 g, 15 g, Oral, Q15 Min PRN, Clarisse Velez MD    diphenhydrAMINE (BENADRYL) injection 50 mg, 50 mg, Intravenous, Q8H PRN, Clarisse Velez MD, 50 mg at 07/27/24 0632    doxycycline (VIBRAMYCIN) 100 mg in sodium chloride 0.9 % 100 mL IVPB-VTB, 100 mg, Intravenous, Q12H, Clarisse Velez MD, Last Rate: 0 mL/hr at 07/27/24 0700, 100 mg at 07/29/24 0518    furosemide (LASIX) injection 40 mg, 40 mg, Intravenous, Once, Jaswinder Andrews DO    glucagon (GLUCAGEN) injection 1 mg, 1 mg, Intramuscular, Q15 Min PRN, Clarisse Velez MD    HYDROcodone-acetaminophen (NORCO) 5-325 MG per tablet 1 tablet, 1 tablet, Oral, Q8H PRN, Clarisse Velez MD, 1 tablet at 07/26/24 0920    Insulin Lispro (humaLOG) injection 2-7 Units, 2-7 Units, Subcutaneous, 4x Daily AC & at Bedtime, Clarisse Velez MD, 2 Units at 07/28/24 2055    José Miguel pack 1 packet, 1 packet, Oral, BID, Jaswinder Andrews DO, 1 packet at 07/28/24 2050    lactobacillus acidophilus (RISAQUAD) capsule 1 capsule, 1 capsule, Oral, BID, Clarisse Velez MD,  "1 capsule at 07/28/24 2049    levothyroxine (SYNTHROID, LEVOTHROID) tablet 50 mcg, 50 mcg, Oral, Q AM, Clarisse Velez MD, 50 mcg at 07/28/24 0559    metoprolol tartrate (LOPRESSOR) tablet 50 mg, 50 mg, Oral, BID, Jaswinder Andrews DO    ondansetron (ZOFRAN) injection 4 mg, 4 mg, Intravenous, Q6H PRN, Clarisse Velez MD    pantoprazole (PROTONIX) EC tablet 40 mg, 40 mg, Oral, BID AC, Jaswinder Andrews DO, 40 mg at 07/28/24 1753    sodium chloride 0.9 % flush 10 mL, 10 mL, Intravenous, PRN, Clarisse Velez MD    sodium chloride 0.9 % flush 10 mL, 10 mL, Intravenous, Q12H, Clarisse Velez MD, 10 mL at 07/28/24 2049    sodium chloride 0.9 % flush 10 mL, 10 mL, Intravenous, PRN, Clarisse Velez MD    sodium chloride 0.9 % infusion 40 mL, 40 mL, Intravenous, PRN, Clarisse Velez MD    sodium chloride 0.9 % infusion, 70 mL/hr, Intravenous, Continuous PRN, Clarisse Velez MD, Last Rate: 70 mL/hr at 07/27/24 1058, 70 mL/hr at 07/27/24 1058  I have utilized all immediate resources to obtain, update, or review the patient's current medications (including all prescription, over-the-counter products, herbals, and/or nutritional supplements).      Vitals: /45 (BP Location: Right arm, Patient Position: Sitting)   Pulse 70   Temp 97.9 °F (36.6 °C) (Axillary)   Resp 18   Ht 167.6 cm (66\")   Wt 60.4 kg (133 lb 2.5 oz)   SpO2 91%   BMI 21.49 kg/m²     Intake/Output Summary (Last 24 hours) at 7/29/2024 1650  Last data filed at 7/29/2024 1317  Gross per 24 hour   Intake 350 ml   Output 500 ml   Net -150 ml       Physical Exam  Vitals and nursing note reviewed.   Constitutional:       General: She is not in acute distress.     Appearance: She is underweight. She is ill-appearing. She is not diaphoretic.      Comments: Frail, chronically ill appearing female lying in bed, NAD   HENT:      Head: Normocephalic and atraumatic.      Comments: Temporal wasting     Mouth/Throat:      " Mouth: Mucous membranes are dry.      Pharynx: Oropharynx is clear.   Eyes:      Conjunctiva/sclera: Conjunctivae normal.      Pupils: Pupils are equal, round, and reactive to light.      Comments: Orbits with sunken appearance    Cardiovascular:      Rate and Rhythm: Normal rate and regular rhythm.   Pulmonary:      Effort: Pulmonary effort is normal. No respiratory distress.      Breath sounds: Normal breath sounds.   Abdominal:      General: Bowel sounds are normal. There is no distension.      Palpations: Abdomen is soft.      Tenderness: There is no abdominal tenderness.   Genitourinary:     Comments: FC noted with clear/yellow UOP  Musculoskeletal:         General: Normal range of motion.      Cervical back: Normal range of motion and neck supple.      Comments: BUE with erythema, slight edema to left.  Chronic deformities noted to bilateral hands    Skin:     General: Skin is warm and dry.      Capillary Refill: Capillary refill takes less than 2 seconds.   Neurological:      Mental Status: She is alert and oriented to person, place, and time.   Psychiatric:         Mood and Affect: Mood normal.         Behavior: Behavior normal.          Diagnostics Review:  Laboratory Data:  Results from last 7 days   Lab Units 07/29/24  0645 07/28/24  0649 07/27/24  0705   SODIUM mmol/L 137 142 136   POTASSIUM mmol/L 3.5 4.0 4.3   CHLORIDE mmol/L 106 105 101   CO2 mmol/L 15.9* 15.6* 19.6*   BUN mg/dL 53* 63* 65*   CREATININE mg/dL 1.59* 1.57* 1.74*   CALCIUM mg/dL 7.7* 7.4* 7.7*   GLUCOSE mg/dL 94 105* 73     Results from last 7 days   Lab Units 07/29/24  0645 07/28/24  0649 07/28/24  0020 07/27/24  1635 07/27/24  0705 07/26/24  1202 07/26/24  0540   WBC 10*3/mm3 8.81  --   --   --  13.31*  --  15.74*   HEMOGLOBIN g/dL 8.4* 8.1* 8.0*   < > 10.0*   < > 5.9*   HEMATOCRIT % 27.1* 26.2* 24.3*   < > 31.3*   < > 18.7*   PLATELETS 10*3/mm3 174  --   --   --  205  --  200    < > = values in this interval not displayed.            "        Invalid input(s): \"USDES\", \"NITRITITE\", \"BACT\", \"EP\"  Pain Management Panel  More data may exist         5/17/2023 4/19/2021   Pain Management Panel   Creatinine, Urine 50  76.7       Details                 Results from last 7 days   Lab Units 07/22/24  1850   BLOODCX  No growth at 5 days  No growth at 5 days     Nutritional Status:       Body mass index is 21.49 kg/m².  Documented weights    07/22/24 1516 07/22/24 2259   Weight: 53.1 kg (117 lb) 60.4 kg (133 lb 2.5 oz)        Radiology:  No radiology results for the last 3 days      Goals of Care/Advance Care Planning:  Advance Care Planning     1. Determination of Decisional Capacity:  Decisional capacity: YES     2. Advanced Directives:  I have personally reviewed Advance Directives on file in the form of Living Will.  I reviewed this with patient/family, who wish to update living will and appoint HCS as her oldest son, Marco A Sanchez  Healthcare surrogate/legal decision maker: Marco A Sanchez  Relationship to individual: son  Surrogate/decision maker understands role and will honor individual's decisions: YES    3. Patient & Family Meeting:  Members present at meeting Patient, spouse, corina (dtr), Pan (son), grandson x2, and son in law, Raquel Brown Vilma Dev  Meeting took place at [facility] 3rd floor     4. Summary of the discussion:  After generalized introductions, introduced the role of palliative care in assisting with complex medical decision making, goals of care discussions, and symptom management/supportive care.  Patient recounts her marriage of 60+ years, for which they share 3 children.  She lovingly recounts her grandchildren and great grandchildren.  She notes that her family has been her number one concern along with her love for the Lord.  I reviewed patient's acute and chronic illness, she reflects fair insight regarding the same.  She notes progressive decline in health, feeling that her body is slowing down.  She understands that " many of these conditions are chronic and considered life long.  I reviewed generalized trajectory associated with chronic illness, which notes a stair-stepping pattern.  I encouraged patient to reflect on what is important to her, what gives her quality to her life.  She notes that she has been a Godly woman, holding importance on being kind and giving to others.  She enjoys spending time with her family and loved ones.  After a lengthy discussion, I reviewed patient's wishes regarding end of life preferences.  She reflects on time spent with her aunt, who had cancer and was able to pass away in the home setting.  I reviewed resuscitation, and interventions associated with the same.  Ultimately, patient expressing desire for natural dying process, electing DO NOT RESUSCITATE/DO NOT INTUBATE.  I reviewed previously completed Living Will in place, for which patient wishes to update, appointing her eldest son as HCS.  Lastly, we discussed palliative versus hospice care services, and the goals associated with the same.  Patient and family expressing desire to proceed with home hospice referral, as this seems to align best with her GOC.    CODE STATUS reviewed/confirmed: DNR / DNI   Baseline Functional Status: Palliative Performance Scale Score: Performance 50% based on the following measures: Ambulation: Mainly sit or lie down, Activity and Evidence of Disease: Unable to do any work, extensive evidence of disease, Self-Care: Considerable assistance required,  Intake: Normal or reduced, LOC: Full or confusion           Palliative Care Assessment:  Severe pulmonary hypertension  Congestive heart failure  Sick sinus syndrome s/p pacemaker  CKD stg III  Upper GI bleeding  Right upper extremity cellulitis   Right upper extremity superficial vein thrombosis  Impaired mobility and ADLs    Recommendations/Plan:  - CODE STATUS reviewed/confirmed: Patient elects DO NOT RESUSCITATE/DO NOT INTUBATE, will update record to reflect  the same  - Goal at the present is to continue with medical plan in place with goal to optimize and discharge home with hospice referral  - Patient family reports in home caregivers will continue upon discharge, with additional assistance from hospice they are hopeful patient can remain in the home setting   - I discussed plan in detail with daughter Janneth, who reports that Marco A (HCS/POA) is in agreement with plan  - I called and confirmed through [employer], patient has pacemaker through [facility], does not have defibrillator   - Patient likely qualifies for hospice under general criteria:  - Life limiting condition  - Treatment goals are for comfort rather than cure  - Documented terminal disease   - Decline in nutritional status  - Clinical progression of disease  - Repeated inpatient admission/ED evaluation  - Functional status decline  - KPS/PPS <70  - Patient desired to update living will which was completed at bedside per , faxed to HIM with request to remove previously completed living will  - Current Functional Status: Palliative Performance Scale Score: Performance 40% based on the following measures: Ambulation: Mainly in bed, Activity and Evidence of Disease: Unable to do any work, extensive evidence of disease, Self-Care: Mainly assistance required,  Intake: Normal or reduced, LOC: Full, drowsy or confusion  - Palliative care will continue to follow/support patient and family    Recommendations:  - PC RN to place referral to [facility] for home hospice services, patient likely will need hospital bed delivered prior to discharge; HCP will follow with Marco A regarding all equipment needs   - May continue FC for comfort if desired  - Recommend discontinuing statin therapy to better align with GOC and decrease pill burden  - Agree with hydrocodone 5/325 PRN, use has been sparring; would continue at discharge  - Continue scheduled bowel regimen given concurrent opiate use       I appreciate the  opportunity to participate in the care of Ms. Lynne Sanchez.  Please do not hesitate to contact me with any questions or concerns.      I spent 120 total minutes conducting chart review for relevant information, face to face assessment, counseling patient and/or family, and coordination of care.  35 minutes spent discussing advanced care planning.  Part of this note may be an electronic transcription/translation of spoken language to printed text using the Dragon Dictation System.       Vilma Méndez PA-C  07/29/24  16:50 EDT

## 2024-07-29 NOTE — ACP (ADVANCE CARE PLANNING)
A new Living Will was completed naming pt's daughter, Janneth, and son, Marco A, as her surrogate decision makers.

## 2024-07-29 NOTE — PLAN OF CARE
"Goal Outcome Evaluation:      Palliative Care Team Consult received for Goals of Care Discussion/ACP, Support for Pt/Family, Symptom management    At time of consult pt was CODE Status of CPR, Full Support.   She has a Living Will (2020) in the EMR indicating wanting NO Interventions HELD re: Medical Treatment.      Pt admitted from home with c/o Generalized Weakness, RUE pain and swelling.Reported had been in the ED 7/18/2024 s/p fall hitting back and hands.  She was started on antibiotics for cellulitis of hand and possibly gouty arthritis.    Family reported pt not improving and becoming weaker.      Admission assessment:  RUE cellulities RUE, RUE superficial vein thrombosis, DM-2 with nephropathy, CKD-3, Chronic elevated troponin, SSS s/p pacemaker, Essential HTN, Hypothyroidism.    Venous duplex of RUE was negative for DVT.  Did show superficial venous thrombosis of cephalic vein.  Antibiotics initiated and admitted to Telemetry.    PMHx:  Acute DVT of LUE, Anemia, Asthma, CHF, Chronic A. Fib, DM-2, GERD, glaucoma, Transfusion, Heart atack HTN, Kidney disease. Osteomyelitis left foot, PVD, CVA.    Vilma PA/PC and Raquel RN/PC met with pt and family in her room.   Family present included pt's spouse, daughter (Janneth), son (Pan), pt's son in law and two grandsons.      Vilma explained we were the Palliative Team and were visiting to find out a little more about the pt and to also discuss her \"wishes\" regarding future medical treatment.  Vilma encouraged pt to tell us about herself so we would get to know her better.    Pt was able to relate times in her life from when she was young.      Vilma then addressed her current admission as well as increases episodes of illness as the body begins to slow down.    She then addressed pt's wishes re: resuscitation should at some time her heart stops or she stops breathing.   Pt related having a very strong lavern and trust in God to decide whether she \"stays or time " "to go\".  After a long discussion, with family's input, pt related if death is imminent she wants to let nature take it's course.  She does not want to be kept alive on a ventilator or have chest compressions.   She was in agreement with changing her Code Status to DNR/DNI .     Vilma discussed Hospice services with pt and family.  She explained the services they provide.  After discussion, pt and family were in agreement with Home Hospice services.    We then discussed her current Living Will which named her spouse as HC surrogate and indicated she wanted all treatment initiated  and nothing held.  Based on the current conversation, pt is agreeable to completing a new Living Will.      Vilma explained the sections of the Living Will and pt wanted to list her oldest son, Marco A Sanchez, as her first HC surrogate  and her daughter, Janneth Main, as second HC surrogate.  Mrs Main added that decisions would be made with pt's spouse as well.      Lynne Godwin,  and tataary, reviewed sections of the Living Will, pt made her wishes known and initialled the appropriate sections and signed the Living Will.  The form was notarized, faxed to [facility] with notification of existing Living Will being replaced by today's Living Will.  The original and copies of Living Will were given to pt's daughter.       EMS/DNR was explained to pt.  Pt signed form and daughter co-signed.  It was witnessed and placed in chart.      1638--[facility] was notified of Home Hospice Referral with anticipated DC 7/30/24 or 7/31/24.  Demographics were provided with update tomorrow.     Pt's primary nurse, Verónica KULKARNI, updated                                            "

## 2024-07-29 NOTE — HOME HEALTH
Transfer Summary:    - Patient transferred to [facility]  - Reason for transfer: weakness, right upper extremity pain and weakness. Patient was admitted for right upper extremity cellulitis, right upper extremity superficial vein thrombosis, CKD  - Report called to NA- patient went to the hospital with no call to home health    - Summary of Care, treatment or services provided to the patient including disciplines seeing the patient: SN for wound care, medication education, fall, and safety teaching, PT for balance and strength, OT for ADL's and IADL's, and upper body strengthening.     - Patient progress toward goals: progressing    - Communicable Disease? No

## 2024-07-29 NOTE — THERAPY TREATMENT NOTE
OT tx held d/t declining medical status and awaiting palliative consult. Will follow up tomorrow.

## 2024-07-29 NOTE — PROGRESS NOTES
[facility]    Name:  Lynne Sanchez   Age:  89 y.o.  Sex:  female  :  1934  MRN:  [id number]   Visit Number:  [id number]  Admission Date:  2024  Date Of Service:  24  Primary Care Physician:  Elizabeth Easton APRN     LOS: 2 days :    Chief Complaint:      Generalized weakness, right upper extremity pain and swelling.    Subjective:    Patient evaluated today.  No family present at the time my exam.  H&H remaining stable.  Patient with significant confusion overnight.  Minimally responsive today.  Remains confused.    Hospital Course:    Lynne Sanchez is an 89-year-old female with history of hypertension, diabetes mellitus type 2, hypothyroidism, sick sinus syndrome status post pacemaker was brought to the emergency room by family with multiple complaints including right hand pain and swelling.  Patient was in the emergency room on 2024 secondary to a fall and hitting her back and hands.  At that time, she was started to have cellulitis of the hand and possibly gouty arthritis.  She was given NSAIDs, steroids, dose of cefazolin and was discharged on Keflex.  According to family, patient has not improved since then and has become more weak and is unable to ambulate at this time.  Patient states that he lives with her .     In the emergency room, she was afebrile and hemodynamically stable saturating at 96% on room air.  She does have chronic elevated troponin levels.  proBNP 16,085.  CMP was unremarkable except for a creatinine of 1.39 (baseline).  Lactic acid was within normal limits but elevated C-reactive protein of 4.75 and procalcitonin of 0.35.  WBC 13, hemoglobin 9 (baseline around 8).  ESR was 9.  Blood cultures were drawn in the emergency room.  Portable chest x-ray showed chronic appearing parenchymal changes without any acute infiltrate.  Venous duplex of the right upper extremity showed superficial venous thrombosis  involving a portion of the cephalic vein.  No evidence of DVT noted.  Patient was given cefepime and vancomycin in the emergency room for suspected right upper extremity cellulitis and was subsequently admitted to the medical floor with telemetry.    Review of Systems:     All systems were reviewed and negative except as mentioned in subjective, assessment and plan.    Vital Signs:    Temp:  [94.1 °F (34.5 °C)-97.7 °F (36.5 °C)] 94.1 °F (34.5 °C)  Heart Rate:  [69-72] 70  Resp:  [18-24] 24  BP: ()/(43-65) 100/65    Intake and output:    I/O last 3 completed shifts:  In: 600 [P.O.:600]  Out: 2050 [Urine:2050]  I/O this shift:  In: 110 [P.O.:110]  Out: -     Physical Examination    General Appearance:  Lethargic, chronically ill.   Head:  Atraumatic and normocephalic.   Eyes: Conjunctivae and sclerae normal, no icterus. No pallor.   Throat: No oral lesions, no thrush, oral mucosa moist.   Neck: Supple, trachea midline, no thyromegaly.   Lungs:   Breath sounds heard bilaterally equally.  No wheezing or crackles. No Pleural rub or bronchial breathing.   Heart:  Normal S1 and S2, + murmur   Abdomen:   Normal bowel sounds, Soft, nontender, nondistended, no rebound tenderness.   Extremities: Swelling to all extremities though improved from yesterday.  Multiple areas of ecchymosis and skin tears.   Skin: No bleeding or rash.   Neurologic: .  Confused today.  Generalized weakness.     Laboratory results:    Results from last 7 days   Lab Units 07/29/24  0645 07/28/24  0649 07/27/24  0705 07/23/24  0624 07/22/24  1607   SODIUM mmol/L 137 142 136   < > 136   POTASSIUM mmol/L 3.5 4.0 4.3   < > 4.5   CHLORIDE mmol/L 106 105 101   < > 99   CO2 mmol/L 15.9* 15.6* 19.6*   < > 25.4   BUN mg/dL 53* 63* 65*   < > 31*   CREATININE mg/dL 1.59* 1.57* 1.74*   < > 1.39*   CALCIUM mg/dL 7.7* 7.4* 7.7*   < > 8.7   BILIRUBIN mg/dL  --   --   --   --  0.4   ALK PHOS U/L  --   --   --   --  104   ALT (SGPT) U/L  --   --   --   --  7   AST  "(SGOT) U/L  --   --   --   --  28   GLUCOSE mg/dL 94 105* 73   < > 65    < > = values in this interval not displayed.     Results from last 7 days   Lab Units 07/29/24  0645 07/28/24  0649 07/28/24  0020 07/27/24  1635 07/27/24  0705 07/26/24  1202 07/26/24  0540   WBC 10*3/mm3 8.81  --   --   --  13.31*  --  15.74*   HEMOGLOBIN g/dL 8.4* 8.1* 8.0*   < > 10.0*   < > 5.9*   HEMATOCRIT % 27.1* 26.2* 24.3*   < > 31.3*   < > 18.7*   PLATELETS 10*3/mm3 174  --   --   --  205  --  200    < > = values in this interval not displayed.         Results from last 7 days   Lab Units 07/22/24  1817 07/22/24  1607   HSTROP T ng/L 78* 91*     Results from last 7 days   Lab Units 07/22/24  1850   BLOODCX  No growth at 5 days  No growth at 5 days     No results for input(s): \"PHART\", \"MOO5LFH\", \"PO2ART\", \"WKE8ATU\", \"BASEEXCESS\" in the last 8760 hours.   I have reviewed the patient's laboratory results.    Radiology results:    No radiology results from the last 24 hrs  I have reviewed the patient's radiology reports.    Medication Review:     I have reviewed the patient's active and prn medications.     Problem List:      Right arm cellulitis    Acquired hypothyroidism    Anemia of chronic renal failure    Absolute anemia    Stage 3b chronic kidney disease    Moderate malnutrition    Cellulitis of right arm      Assessment:    Right upper extremity cellulitis, POA.  Right upper extremity superficial vein thrombosis, POA.  Diabetes mellitus type 2 with nephropathy.  Chronic kidney disease stage III.  Chronic elevated troponin secondary to #4.  Sick sinus syndrome status post pacemaker.  Essential hypertension.  Gout  Severe pulmonary hypertension  Hypothyroidism.  Upper GI bleed       Plan:    Upper GI Bleed  -Hemoglobin with significant drop this morning.  Also nursing reported overnight to bloody bowel movements.  -Transfuse 1 unit PRBC  -IV Protonix  -GI consult, Dr Velez.   -EGD performed 7/26.  Hematin found in the stomach.  " Blood in the duodenal bulb.  A single bleeding angio ectasia in the duodenum.  Clip placed.  -7/27: Overnight patient had drop in hemoglobin requiring 2 additional unit PRBC transfusion.  Patient taken for EGD again, 3 nonbleeding angioectasias found in the duodenum treated with APC and clips.  -Hold Eliquis and antiplatelet medication    Right upper extremity cellulitis.  Right cephalic venous thrombosis   - Patient does seem to have redness in the right upper extremity with swelling.  - We will treated with IV antibiotics therapy with ceftriaxone.  - MRSA negative.  - Follow blood cultures.  So far negative.  - Lactobacillus supplements.  -Ultrasound venous Doppler revealed SVT involving a portion of the cephalic vein.  No evidence of DVT.  Given risk for development of DVT and superimposed cellulitis, had planned to anticoagulate with Eliquis for 4 weeks.  Discontinue aspirin.  -Eliquis now on hold for upper GI bleed     CKD stage III.  - Renal function is at baseline.  - She has chronic elevated troponin secondary to chronic kidney disease.     Diabetes mellitus type 2.  - Continue subcutaneous insulin protocol for coverage.    Gout with tophi  -Supportive care  -Pain medication as indicated  -Initiate allopurinol  -Uric acid level elevated    Severe pulmonary hypertension  -Revealed upon chart review  -Patient has never followed up with pulmonary hypertensive clinic  -Complicates all aspects of care    Palliative consulted.     DVT Prophylaxis: SCD  Code Status: DNR/DNI  Diet: As tolerated  Discharge Plan: Home with hospice tomorrow    Jaswinder Andrews DO  07/29/24  15:55 EDT    Dictated utilizing Dragon dictation.

## 2024-07-30 ENCOUNTER — DOCUMENTATION (OUTPATIENT)
Dept: HOME HEALTH SERVICES | Facility: HOME HEALTHCARE | Age: 89
End: 2024-07-30
Payer: MEDICARE

## 2024-07-30 LAB
GLUCOSE BLDC GLUCOMTR-MCNC: 214 MG/DL (ref 70–130)
GLUCOSE BLDC GLUCOMTR-MCNC: 226 MG/DL (ref 70–130)
GLUCOSE BLDC GLUCOMTR-MCNC: 243 MG/DL (ref 70–130)
GLUCOSE BLDC GLUCOMTR-MCNC: 274 MG/DL (ref 70–130)

## 2024-07-30 PROCEDURE — 63710000001 INSULIN LISPRO (HUMAN) PER 5 UNITS: Performed by: INTERNAL MEDICINE

## 2024-07-30 PROCEDURE — 82948 REAGENT STRIP/BLOOD GLUCOSE: CPT

## 2024-07-30 PROCEDURE — 82948 REAGENT STRIP/BLOOD GLUCOSE: CPT | Performed by: INTERNAL MEDICINE

## 2024-07-30 RX ADMIN — METOPROLOL TARTRATE 50 MG: 50 TABLET, FILM COATED ORAL at 20:35

## 2024-07-30 RX ADMIN — Medication 1 CAPSULE: at 08:57

## 2024-07-30 RX ADMIN — INSULIN LISPRO 3 UNITS: 100 INJECTION, SOLUTION INTRAVENOUS; SUBCUTANEOUS at 11:57

## 2024-07-30 RX ADMIN — Medication 1 CAPSULE: at 20:35

## 2024-07-30 RX ADMIN — ALLOPURINOL 100 MG: 100 TABLET ORAL at 08:57

## 2024-07-30 RX ADMIN — INSULIN LISPRO 3 UNITS: 100 INJECTION, SOLUTION INTRAVENOUS; SUBCUTANEOUS at 20:36

## 2024-07-30 RX ADMIN — PANTOPRAZOLE SODIUM 40 MG: 40 TABLET, DELAYED RELEASE ORAL at 18:04

## 2024-07-30 RX ADMIN — Medication 1 PACKET: at 08:59

## 2024-07-30 RX ADMIN — ATORVASTATIN CALCIUM 40 MG: 40 TABLET, FILM COATED ORAL at 08:57

## 2024-07-30 RX ADMIN — INSULIN LISPRO 4 UNITS: 100 INJECTION, SOLUTION INTRAVENOUS; SUBCUTANEOUS at 17:45

## 2024-07-30 RX ADMIN — SENNOSIDES AND DOCUSATE SODIUM 2 TABLET: 50; 8.6 TABLET ORAL at 20:35

## 2024-07-30 RX ADMIN — INSULIN LISPRO 3 UNITS: 100 INJECTION, SOLUTION INTRAVENOUS; SUBCUTANEOUS at 08:57

## 2024-07-30 RX ADMIN — Medication 1 PACKET: at 20:35

## 2024-07-30 RX ADMIN — PANTOPRAZOLE SODIUM 40 MG: 40 TABLET, DELAYED RELEASE ORAL at 08:57

## 2024-07-30 NOTE — PLAN OF CARE
Problem: Adult Inpatient Plan of Care  Goal: Plan of Care Review  Outcome: Ongoing, Progressing  Goal: Patient-Specific Goal (Individualized)  Outcome: Ongoing, Progressing  Goal: Absence of Hospital-Acquired Illness or Injury  Outcome: Ongoing, Progressing  Intervention: Identify and Manage Fall Risk  Recent Flowsheet Documentation  Taken 7/29/2024 2000 by Brittny Odom RN  Safety Promotion/Fall Prevention: activity supervised  Intervention: Prevent Skin Injury  Recent Flowsheet Documentation  Taken 7/29/2024 2000 by Brittny Odom RN  Body Position:   weight shifting   upper extremity elevated  Intervention: Prevent and Manage VTE (Venous Thromboembolism) Risk  Recent Flowsheet Documentation  Taken 7/29/2024 2000 by Brittny Odom RN  Activity Management: bedrest  VTE Prevention/Management:   bilateral   sequential compression devices off  Range of Motion: active ROM (range of motion) encouraged  Intervention: Prevent Infection  Recent Flowsheet Documentation  Taken 7/29/2024 2000 by Brittny Odom RN  Infection Prevention:   equipment surfaces disinfected   hand hygiene promoted  Goal: Optimal Comfort and Wellbeing  Outcome: Ongoing, Progressing  Intervention: Monitor Pain and Promote Comfort  Recent Flowsheet Documentation  Taken 7/29/2024 2000 by Brittny Odom RN  Pain Management Interventions: pain management plan reviewed with patient/caregiver  Intervention: Provide Person-Centered Care  Recent Flowsheet Documentation  Taken 7/29/2024 2000 by Brittny Odom RN  Trust Relationship/Rapport: care explained  Goal: Readiness for Transition of Care  Outcome: Ongoing, Progressing     Problem: Fall Injury Risk  Goal: Absence of Fall and Fall-Related Injury  Outcome: Ongoing, Progressing  Intervention: Identify and Manage Contributors  Recent Flowsheet Documentation  Taken 7/29/2024 2000 by Brittny Odom RN  Medication Review/Management: medications reviewed  Self-Care  Promotion: independence encouraged  Intervention: Promote Injury-Free Environment  Recent Flowsheet Documentation  Taken 7/29/2024 2000 by Brittny Odom RN  Safety Promotion/Fall Prevention: activity supervised     Problem: Skin Injury Risk Increased  Goal: Skin Health and Integrity  Outcome: Ongoing, Progressing  Intervention: Optimize Skin Protection  Recent Flowsheet Documentation  Taken 7/29/2024 2000 by Brittny Odom RN  Head of Bed (HOB) Positioning: HOB at 20-30 degrees     Problem: Malnutrition  Goal: Improved Nutritional Intake  Outcome: Ongoing, Progressing     Problem: Adjustment to Illness (Sepsis/Septic Shock)  Goal: Optimal Coping  Outcome: Ongoing, Progressing  Intervention: Optimize Psychosocial Adjustment to Illness  Recent Flowsheet Documentation  Taken 7/29/2024 2000 by Brittny Odom RN  Family/Support System Care:   presence promoted   self-care encouraged   support provided     Problem: Bleeding (Sepsis/Septic Shock)  Goal: Absence of Bleeding  Outcome: Ongoing, Progressing  Intervention: Monitor and Manage Bleeding  Recent Flowsheet Documentation  Taken 7/29/2024 2000 by Brittny Odom RN  Bleeding Precautions: blood pressure closely monitored     Problem: Glycemic Control Impaired (Sepsis/Septic Shock)  Goal: Blood Glucose Level Within Desired Range  Outcome: Ongoing, Progressing  Intervention: Optimize Glycemic Control  Recent Flowsheet Documentation  Taken 7/29/2024 2000 by Brittny Odom RN  Glycemic Management: blood glucose monitored     Problem: Infection Progression (Sepsis/Septic Shock)  Goal: Absence of Infection Signs and Symptoms  Outcome: Ongoing, Progressing  Intervention: Initiate Sepsis Management  Recent Flowsheet Documentation  Taken 7/29/2024 2000 by Brittny Odom RN  Stabilization Measures: legs elevated  Infection Prevention:   equipment surfaces disinfected   hand hygiene promoted  Intervention: Promote Recovery  Recent Flowsheet  Documentation  Taken 7/29/2024 2000 by Brittny Odom, RN  Activity Management: bedrest  Sleep/Rest Enhancement:   awakenings minimized   consistent schedule promoted     Problem: Nutrition Impaired (Sepsis/Septic Shock)  Goal: Optimal Nutrition Intake  Outcome: Ongoing, Progressing     Problem: Adjustment to Illness (Gastrointestinal Bleeding)  Goal: Optimal Coping with Acute Illness  Outcome: Ongoing, Progressing     Problem: Bleeding (Gastrointestinal Bleeding)  Goal: Hemostasis  Outcome: Ongoing, Progressing  Intervention: Manage Gastrointestinal Bleeding  Recent Flowsheet Documentation  Taken 7/29/2024 2000 by Brittny Odom, RN  Stabilization Measures: legs elevated   Goal Outcome Evaluation:

## 2024-07-30 NOTE — PLAN OF CARE
Goal Outcome Evaluation:      Received call from Aracelis RN/HCP re: Home Hospice referral.  She reported HCP would be contacting pt's son, Marco A (POA), to discuss equipment needs and Hospice.      Aracelis reported son needed clarification of Palliative Care vs Hospice Care.  Pt's son, Marco A was not at the family meeting yesterday.  Aracelis related pt's son, Marco A, who is in agreement with Hospice Care referral.      She added son requested pt not be discharged until tomorrow to allow time for family to move furniture for hospital bed.   Informed her I would relay message to Dr Ortega.  Message sent via Secure Chat.    I had been informed of possible leakage from FC.  Visited pt this am.  Pt awake and alert.  Pt answered some questions with vague answers.  Informed her I was informed her catheter may be leaking.  She responded she was not aware of that.  Informed her we may need to change the catheter before she is discharged.  She was agreeable but I cannot confirm she understood.      Pt appeared comfortable.  No family present at time of visit.  PC will continue to follow.

## 2024-07-30 NOTE — PROGRESS NOTES
"    [facility]     PALLIATIVE CARE FOLLOW UP NOTE    Name:  Lynne Sanchez   Age:  89 y.o.  Sex:  female  :  1934  MRN:  [id number]   Visit Number:  [id number]  Date Of Service:  24  Primary Care Physician:  Elizabeth Easton APRN    Chief Complaint: Generalized weakness    Interval History:  Patient seen today during palliative care team rounds.  Record reviewed and case discussed with staff; FC leaking, attempting to reposition versus exchange.  Utilized 1 dose of hydrocodone yesterday.  Smear documented .  No other acute changes overnight.  Upon assessment, patient is sitting upright in bed eating her lunch.  She is alert and interactive, denies any acute complaints.      Review of Systems   Constitutional:  Positive for activity change and fatigue.   Neurological:  Positive for weakness (generalized).          Pain Assessment  Nonverbal Indicators of Pain: nonverbal indicators absent  Pain Location: extremity  Pain Description: intermittent  Vitals: /65 (BP Location: Right arm, Patient Position: Lying)   Pulse 75   Temp 97.4 °F (36.3 °C) (Axillary)   Resp 18   Ht 167.6 cm (66\")   Wt 60.4 kg (133 lb 2.5 oz)   SpO2 97%   BMI 21.49 kg/m²     Physical Exam  Vitals and nursing note reviewed.   Constitutional:       General: She is not in acute distress.     Appearance: She is not diaphoretic.      Comments: Frail, chronically ill appearing female lying in bed, NAD   HENT:      Head: Normocephalic and atraumatic.      Mouth/Throat:      Mouth: Mucous membranes are moist.      Pharynx: Oropharynx is clear.   Eyes:      Conjunctiva/sclera: Conjunctivae normal.      Pupils: Pupils are equal, round, and reactive to light.   Cardiovascular:      Comments: Appears well perfused   Pulmonary:      Effort: Pulmonary effort is normal. No respiratory distress.   Musculoskeletal:      Cervical back: Normal range of motion and neck supple.      Comments: Moves extremities " independently.  BUE with erythema, interval improvements    Skin:     General: Skin is warm and dry.      Capillary Refill: Capillary refill takes less than 2 seconds.   Neurological:      Mental Status: She is alert and oriented to person, place, and time.   Psychiatric:         Mood and Affect: Mood normal.         Behavior: Behavior normal.          Results Reviewed:    Intake/Output Summary (Last 24 hours) at 7/30/2024 1105  Last data filed at 7/30/2024 0440  Gross per 24 hour   Intake 110 ml   Output 225 ml   Net -115 ml     Results from last 7 days   Lab Units 07/29/24  0645   SODIUM mmol/L 137   POTASSIUM mmol/L 3.5   CHLORIDE mmol/L 106   CO2 mmol/L 15.9*   BUN mg/dL 53*   CREATININE mg/dL 1.59*   CALCIUM mg/dL 7.7*   GLUCOSE mg/dL 94     Results from last 7 days   Lab Units 07/29/24  0645   WBC 10*3/mm3 8.81   HEMOGLOBIN g/dL 8.4*   HEMATOCRIT % 27.1*   PLATELETS 10*3/mm3 174       Medication Review:   I have reviewed the patients active and prn medications.     Palliative Care Assessment:  Severe pulmonary hypertension  Congestive heart failure  Sick sinus syndrome s/p pacemaker  CKD stg III  Upper GI bleeding  Right upper extremity cellulitis   Right upper extremity superficial vein thrombosis  Impaired mobility and ADLs    Recommendations/Plan:  - Goal at the present is to continue with medical plan in place with goal to optimize and discharge home with hospice referral  - Patient family reports in home caregivers will continue upon discharge, with additional assistance from hospice they are hopeful patient can remain in the home setting   - [facility] in contact with Marco A (POA/HCS) this morning, coordinating equipment delivery (slated for tomorrow) prior to patient discharge  - Palliative care will continue to follow/support patient and family    Recommendations:  - May continue FC for comfort if desired  - Recommend discontinuing statin therapy to better align with GOC and decrease pill  burden  - Agree with hydrocodone 5/325 PRN, use has been sparring; would continue at discharge  - Continue scheduled bowel regimen given concurrent opiate use     CODE STATUS:   Code Status and Medical Interventions: No CPR (Do Not Attempt to Resuscitate); Limited Support; No intubation (DNI)   Ordered at: 07/29/24 1537     Medical Intervention Limits:    No intubation (DNI)     Level Of Support Discussed With:    Patient     Code Status (Patient has no pulse and is not breathing):    No CPR (Do Not Attempt to Resuscitate)     Medical Interventions (Patient has pulse or is breathing):    Limited Support         I spent 25 total minutes, including face to face assessment, record review, coordination of care with staff, and counseling patient and/or family  Part of this note may be an electronic transcription/translation of spoken language to printed text using the Dragon Dictation System.    Vilma Méndez PA-C  07/30/24  11:05 EDT

## 2024-07-30 NOTE — PROGRESS NOTES
[facility]    Name:  Lynne Sanchez   Age:  89 y.o.  Sex:  female  :  1934  MRN:  [id number]   Visit Number:  [id number]  Admission Date:  2024  Date Of Service:  24  Primary Care Physician:  Elizabeth Easton APRN     LOS: 3 days :    Chief Complaint:      weakness    Subjective:    24: Attempted to call daughter Janneth no answer. D/w Palliative anticipate home with hospice tomorrrow. Patient has no new complaints requests her daughter bring her peanut butter.    Hospital Course:     Lynne Sanchez is an 89-year-old female with history of hypertension, diabetes mellitus type 2, hypothyroidism, sick sinus syndrome status post pacemaker was brought to the emergency room by family with multiple complaints including right hand pain and swelling.  Patient was in the emergency room on 2024 secondary to a fall and hitting her back and hands.  At that time, she was started to have cellulitis of the hand and possibly gouty arthritis.  She was given NSAIDs, steroids, dose of cefazolin and was discharged on Keflex.  According to family, patient has not improved since then and has become more weak and is unable to ambulate at this time.  Patient states that he lives with her .     In the emergency room, she was afebrile and hemodynamically stable saturating at 96% on room air.  She does have chronic elevated troponin levels.  proBNP 16,085.  CMP was unremarkable except for a creatinine of 1.39 (baseline).  Lactic acid was within normal limits but elevated C-reactive protein of 4.75 and procalcitonin of 0.35.  WBC 13, hemoglobin 9 (baseline around 8).  ESR was 9.  Blood cultures were drawn in the emergency room.  Portable chest x-ray showed chronic appearing parenchymal changes without any acute infiltrate.  Venous duplex of the right upper extremity showed superficial venous thrombosis involving a portion of the cephalic vein.  No evidence  of DVT noted.  Patient was given cefepime and vancomycin in the emergency room for suspected right upper extremity cellulitis and was subsequently admitted to the medical floor with telemetry.  Edited by: Mick Ortega DO at 7/30/2024 5983     Review of Systems:     All systems were reviewed and negative except as mentioned in subjective, assessment and plan.    Vital Signs:    Temp:  [97.2 °F (36.2 °C)-97.9 °F (36.6 °C)] 97.2 °F (36.2 °C)  Heart Rate:  [69-80] 75  Resp:  [18] 18  BP: ()/(30-65) 128/60    Intake and output:    I/O last 3 completed shifts:  In: 350 [P.O.:350]  Out: 725 [Urine:725]  I/O this shift:  In: 120 [P.O.:120]  Out: 200 [Urine:200]    Physical Examination:    General Appearance:  Awake, chronically ill.   Head:  Atraumatic and normocephalic.   Eyes: Conjunctivae and sclerae normal, no icterus. No pallor.   Throat: No oral lesions, no thrush, oral mucosa moist.   Neck: Supple, trachea midline, no thyromegaly.   Lungs:   Breath sounds heard bilaterally equally.  No wheezing or crackles. No Pleural rub or bronchial breathing.   Heart:  Normal S1 and S2, + murmur   Abdomen:   Normal bowel sounds, Soft, nontender, nondistended, no rebound tenderness.   Extremities: Swelling to all extremities though improved from yesterday.  Multiple areas of ecchymosis and skin tears.   Skin: No bleeding or rash.   Neurologic: Confused today.  Generalized weakness.     Edited by: Mick Ortega DO at 7/30/2024 0067     Laboratory results:    Results from last 7 days   Lab Units 07/29/24  0645 07/28/24  0649 07/27/24  0705   SODIUM mmol/L 137 142 136   POTASSIUM mmol/L 3.5 4.0 4.3   CHLORIDE mmol/L 106 105 101   CO2 mmol/L 15.9* 15.6* 19.6*   BUN mg/dL 53* 63* 65*   CREATININE mg/dL 1.59* 1.57* 1.74*   CALCIUM mg/dL 7.7* 7.4* 7.7*   GLUCOSE mg/dL 94 105* 73     Results from last 7 days   Lab Units 07/29/24  0645 07/28/24  0649 07/28/24  0020 07/27/24  1635 07/27/24  0705 07/26/24  1202  "07/26/24  0540   WBC 10*3/mm3 8.81  --   --   --  13.31*  --  15.74*   HEMOGLOBIN g/dL 8.4* 8.1* 8.0*   < > 10.0*   < > 5.9*   HEMATOCRIT % 27.1* 26.2* 24.3*   < > 31.3*   < > 18.7*   PLATELETS 10*3/mm3 174  --   --   --  205  --  200    < > = values in this interval not displayed.                 No results for input(s): \"PHART\", \"IQX6YBD\", \"PO2ART\", \"JUV4WJE\", \"BASEEXCESS\" in the last 8760 hours.   I have reviewed the patient's laboratory results.    Radiology results:    No radiology results from the last 24 hrs  I have reviewed the patient's radiology reports.    Medication Review:     I have reviewed the patient's active and prn medications.     Problem List:      Right arm cellulitis    Acquired hypothyroidism    Anemia of chronic renal failure    Absolute anemia    Stage 3b chronic kidney disease    Moderate malnutrition    Cellulitis of right arm      Assessment/Plan:    Right upper extremity cellulitis, POA.  Right upper extremity superficial vein thrombosis, POA.  Diabetes mellitus type 2 with nephropathy.  Chronic kidney disease stage III.  Chronic elevated troponin secondary to #4.  Sick sinus syndrome status post pacemaker.  Essential hypertension.  Gout  Severe pulmonary hypertension  Hypothyroidism.  Upper GI bleed        Plan:     Upper GI Bleed, resolved  -Hemoglobin with significant drop this morning.  Also nursing reported overnight to bloody bowel movements.  -Transfuse 1 unit PRBC  -IV Protonix  -GI consult, Dr Velez.   -EGD performed 7/26.  Hematin found in the stomach.  Blood in the duodenal bulb.  A single bleeding angio ectasia in the duodenum.  Clip placed.  -7/27: Overnight patient had drop in hemoglobin requiring 2 additional unit PRBC transfusion.  Patient taken for EGD again, 3 nonbleeding angioectasias found in the duodenum treated with APC and clips.  -Hold Eliquis and antiplatelet medication.     Right upper extremity cellulitis.  Right cephalic venous thrombosis   - Patient does " seem to have redness in the right upper extremity with swelling.  - We will treated with IV antibiotics therapy with ceftriaxone.  - MRSA negative.  - Follow blood cultures.  So far negative.  - Lactobacillus supplements.  -Ultrasound venous Doppler revealed SVT involving a portion of the cephalic vein.  No evidence of DVT.  Given risk for development of DVT and superimposed cellulitis, had planned to anticoagulate with Eliquis for 4 weeks.  Discontinue aspirin.  -Eliquis now on hold for upper GI bleed     CKD stage III.  - Renal function is at baseline.  - She has chronic elevated troponin secondary to chronic kidney disease.     Diabetes mellitus type 2.  - Continue subcutaneous insulin protocol for coverage.     Gout with tophi  -Supportive care  -Pain medication as indicated  -Initiate allopurinol  -Uric acid level elevated     Severe pulmonary hypertension  -Revealed upon chart review  -Patient has never followed up with pulmonary hypertensive clinic  -Complicates all aspects of care     Palliative consulted.      DVT Prophylaxis: SCD  Code Status: DNR/DNI  Diet: As tolerated  Discharge Plan: Home with hospice tomorrow  Edited by: Mick Ortega DO at 7/30/2024 1633           Mick Ortega DO  07/30/24  16:33 EDT    Dictated utilizing Dragon dictation.

## 2024-07-30 NOTE — PROGRESS NOTES
Current with Mandaen .  If inpatient stay is longer than 24 hours, will need resumption of care orders.

## 2024-07-30 NOTE — PLAN OF CARE
Problem: Adult Inpatient Plan of Care  Goal: Plan of Care Review  Outcome: Adequate for Care Transition  Goal: Patient-Specific Goal (Individualized)  Outcome: Adequate for Care Transition  Goal: Absence of Hospital-Acquired Illness or Injury  Outcome: Adequate for Care Transition  Intervention: Identify and Manage Fall Risk  Recent Flowsheet Documentation  Taken 7/30/2024 1600 by Verónica Cline RN  Safety Promotion/Fall Prevention:   nonskid shoes/slippers when out of bed   safety round/check completed  Taken 7/30/2024 1400 by Verónica Cline RN  Safety Promotion/Fall Prevention:   nonskid shoes/slippers when out of bed   safety round/check completed  Taken 7/30/2024 1000 by Verónica Cline RN  Safety Promotion/Fall Prevention:   nonskid shoes/slippers when out of bed   safety round/check completed  Taken 7/30/2024 0830 by Verónica Cline RN  Safety Promotion/Fall Prevention:   safety round/check completed   room organization consistent  Intervention: Prevent Skin Injury  Recent Flowsheet Documentation  Taken 7/30/2024 1600 by Verónica Cline RN  Body Position:   left   turned  Taken 7/30/2024 1400 by Verónica Cline RN  Body Position:   left   turned  Taken 7/30/2024 1000 by Verónica Cline RN  Body Position:   left   turned  Taken 7/30/2024 0830 by Verónica Cline RN  Body Position:   right   turned  Goal: Optimal Comfort and Wellbeing  Outcome: Adequate for Care Transition  Goal: Readiness for Transition of Care  Outcome: Adequate for Care Transition     Problem: Fall Injury Risk  Goal: Absence of Fall and Fall-Related Injury  Outcome: Adequate for Care Transition  Intervention: Promote Injury-Free Environment  Recent Flowsheet Documentation  Taken 7/30/2024 1600 by Verónica Cline RN  Safety Promotion/Fall Prevention:   nonskid shoes/slippers when out of bed   safety round/check completed  Taken 7/30/2024 1400 by Verónica Cline RN  Safety Promotion/Fall Prevention:   nonskid  shoes/slippers when out of bed   safety round/check completed  Taken 7/30/2024 1000 by Verónica Cline RN  Safety Promotion/Fall Prevention:   nonskid shoes/slippers when out of bed   safety round/check completed  Taken 7/30/2024 0830 by Verónica Cline RN  Safety Promotion/Fall Prevention:   safety round/check completed   room organization consistent     Problem: Skin Injury Risk Increased  Goal: Skin Health and Integrity  Outcome: Adequate for Care Transition  Intervention: Optimize Skin Protection  Recent Flowsheet Documentation  Taken 7/30/2024 1600 by Verónica Cline RN  Head of Bed (HOB) Positioning: HOB elevated  Taken 7/30/2024 1400 by Verónica Cline RN  Head of Bed (HOB) Positioning: HOB elevated  Taken 7/30/2024 1000 by Verónica Cline RN  Head of Bed (HOB) Positioning: HOB elevated  Taken 7/30/2024 0830 by Verónica Cline RN  Head of Bed (HOB) Positioning: HOB elevated     Problem: Malnutrition  Goal: Improved Nutritional Intake  Outcome: Adequate for Care Transition     Problem: Adjustment to Illness (Sepsis/Septic Shock)  Goal: Optimal Coping  Outcome: Adequate for Care Transition     Problem: Bleeding (Sepsis/Septic Shock)  Goal: Absence of Bleeding  Outcome: Adequate for Care Transition     Problem: Glycemic Control Impaired (Sepsis/Septic Shock)  Goal: Blood Glucose Level Within Desired Range  Outcome: Adequate for Care Transition     Problem: Infection Progression (Sepsis/Septic Shock)  Goal: Absence of Infection Signs and Symptoms  Outcome: Adequate for Care Transition     Problem: Nutrition Impaired (Sepsis/Septic Shock)  Goal: Optimal Nutrition Intake  Outcome: Adequate for Care Transition     Problem: Adjustment to Illness (Gastrointestinal Bleeding)  Goal: Optimal Coping with Acute Illness  Outcome: Adequate for Care Transition     Problem: Bleeding (Gastrointestinal Bleeding)  Goal: Hemostasis  Outcome: Adequate for Care Transition   Goal Outcome Evaluation:

## 2024-07-30 NOTE — PROGRESS NOTES
"Dietitian Follow-up    Patient Name: Lynne Sanchez  YOB: 1934  MRN: [id number]  Admission date: 7/22/2024    Comment:      Clinical Nutrition Follow-up   Encounter Information        Trending Narrative     7/30: Average PO intake 41%. Pt is receiving Mighty Shake daily and José Miguel BID to help meet estimated needs. Pt has plans to d/c with hospice.    7/26: Pt is currently NPO. Average PO intake 58%. Pt is receiving Mighty shake daily and José Miguel BID to help with wound healing and promote PO intake.      7/23: Pt admitted with R upper extremity cellulitis. Pt with multiple wounds noted. RD will add José Miguel BID to help with wound healing.      Anthropometrics        Current Height, Weight Height: 167.6 cm (66\")  Weight: 60.4 kg (133 lb 2.5 oz) (07/22/24 2259)       Trending Weight Hx     This admission:              PTA:     Wt Readings from Last 30 Encounters:   07/22/24 2259 60.4 kg (133 lb 2.5 oz)   07/22/24 1516 53.1 kg (117 lb)   07/18/24 1821 53.1 kg (117 lb)   07/11/24 1543 45.4 kg (100 lb)   07/01/24 1030 55.8 kg (123 lb)   06/25/24 0500 55.9 kg (123 lb 3.8 oz)   06/23/24 0500 54.6 kg (120 lb 5.9 oz)   06/21/24 1223 53.1 kg (117 lb)   05/29/24 1353 53.1 kg (117 lb 1 oz)   05/29/24 1513 53.1 kg (117 lb)   05/08/24 1201 53.1 kg (117 lb)   04/26/24 0839 53.4 kg (117 lb 12.8 oz)   04/19/24 0947 54.2 kg (119 lb 6.4 oz)   11/17/23 1510 58.8 kg (129 lb 9.6 oz)   08/14/23 1357 59.9 kg (132 lb)   07/17/23 1422 59 kg (130 lb)   07/13/23 1415 58.1 kg (128 lb)   07/03/23 1056 59 kg (130 lb)   05/17/23 1403 59.9 kg (132 lb)   05/11/23 1443 60.3 kg (133 lb)   05/08/23 1030 60.5 kg (133 lb 6.4 oz)   02/01/23 1438 63.5 kg (140 lb)   11/16/22 1353 60.3 kg (133 lb)   05/02/22 1328 64.9 kg (143 lb)   04/28/22 1338 64.9 kg (143 lb)   04/22/22 1251 63.9 kg (140 lb 12.8 oz)   04/15/22 0430 63.2 kg (139 lb 5.3 oz)   04/14/22 1405 66.9 kg (147 lb 7.8 oz)   04/14/22 0925 66.9 kg (147 lb 7.8 oz)   04/14/22 0600 66.9 kg (147 " lb 7.8 oz)   04/13/22 2108 65.2 kg (143 lb 11.8 oz)   04/13/22 1749 55.8 kg (123 lb)   04/15/22 0916 63 kg (139 lb)   03/25/22 1205 58.7 kg (129 lb 8 oz)   03/24/22 0506 60.3 kg (132 lb 15 oz)   03/22/22 1005 60.3 kg (132 lb 15 oz)   03/21/22 2032 60.3 kg (132 lb 15 oz)   03/21/22 1946 60.3 kg (132 lb 15 oz)   03/21/22 1744 55.8 kg (123 lb)   03/17/22 1618 59 kg (130 lb)   03/10/22 1415 63.2 kg (139 lb 4.8 oz)   01/05/22 1137 57.3 kg (126 lb 6.4 oz)      BMI kg/m2 Body mass index is 21.49 kg/m².     Labs        Pertinent Labs Results from last 7 days   Lab Units 07/29/24  0645 07/28/24  0649 07/27/24  0705   SODIUM mmol/L 137 142 136   POTASSIUM mmol/L 3.5 4.0 4.3   CHLORIDE mmol/L 106 105 101   CO2 mmol/L 15.9* 15.6* 19.6*   BUN mg/dL 53* 63* 65*   CREATININE mg/dL 1.59* 1.57* 1.74*   CALCIUM mg/dL 7.7* 7.4* 7.7*   GLUCOSE mg/dL 94 105* 73     Results from last 7 days   Lab Units 07/29/24  0645   HEMOGLOBIN g/dL 8.4*   HEMATOCRIT % 27.1*         Medications    Scheduled Medications allopurinol, 100 mg, Oral, Daily  atorvastatin, 40 mg, Oral, Daily  furosemide, 40 mg, Intravenous, Once  insulin lispro, 2-7 Units, Subcutaneous, 4x Daily AC & at Bedtime  José Miguel, 1 packet, Oral, BID  lactobacillus acidophilus, 1 capsule, Oral, BID  levothyroxine, 50 mcg, Oral, Q AM  metoprolol tartrate, 50 mg, Oral, BID  pantoprazole, 40 mg, Oral, BID AC  senna-docusate sodium, 2 tablet, Oral, BID        Infusions      PRN Medications   acetaminophen **OR** acetaminophen **OR** acetaminophen    senna-docusate sodium **AND** polyethylene glycol **AND** bisacodyl **AND** bisacodyl    dextrose    dextrose    diphenhydrAMINE    glucagon (human recombinant)    HYDROcodone-acetaminophen    ondansetron     --  Current Nutrition Orders & Evaluation of Intake       Oral Nutrition     Food Allergies NKFA   Current PO Diet Diet: Gastrointestinal; Fiber-Restricted; Texture: Soft to Chew (NDD 3); Soft to Chew: Whole Meat; Fluid Consistency: Thin  (IDDSI 0)   Supplement José Miguel BID, Mighty shake daily   PO Evaluation     Trending % PO Intake 41% x 3 meals     Nutrition Diagnosis         Nutrition Dx Problem 1 Increased nutrient needs r/t skin integrity AEB Sacral spine PI.       Nutrition Dx Problem 2        Intervention Goal         Intervention Goal(s) Maintain CBW  PO intake meet >50% of estimated needs  Adhere to ONS     Nutrition Intervention        RD Action No action at this time     Nutrition Prescription          Diet Prescription GI, soft to chew   Supplement Prescription José Miguel BID, Mighty shake daily   Enteral Nutrition Prescription     TPN Prescription       Monitor/Evaluation        Monitor Per protocol, I&O, PO intake, Supplement intake, Pertinent labs, Weight, Skin status, GI status, Symptoms, POC/GOC, Swallow function, Hemodynamic stability     RD to f/up    Electronically signed by:  Madai Moses RD  07/30/24 08:02 EDT

## 2024-07-31 VITALS
OXYGEN SATURATION: 97 % | WEIGHT: 133.16 LBS | DIASTOLIC BLOOD PRESSURE: 40 MMHG | TEMPERATURE: 97.8 F | HEIGHT: 66 IN | HEART RATE: 72 BPM | SYSTOLIC BLOOD PRESSURE: 117 MMHG | RESPIRATION RATE: 18 BRPM | BODY MASS INDEX: 21.4 KG/M2

## 2024-07-31 PROCEDURE — 63710000001 INSULIN LISPRO (HUMAN) PER 5 UNITS: Performed by: INTERNAL MEDICINE

## 2024-07-31 RX ORDER — HYDROCODONE BITARTRATE AND ACETAMINOPHEN 5; 325 MG/1; MG/1
1 TABLET ORAL EVERY 4 HOURS PRN
Qty: 20 TABLET | Refills: 0 | Status: SHIPPED | OUTPATIENT
Start: 2024-07-31

## 2024-07-31 RX ORDER — AMOXICILLIN 250 MG
2 CAPSULE ORAL 2 TIMES DAILY
Qty: 120 TABLET | Refills: 0 | Status: SHIPPED | OUTPATIENT
Start: 2024-07-31 | End: 2024-08-30

## 2024-07-31 RX ORDER — ONDANSETRON 4 MG/1
4 TABLET, ORALLY DISINTEGRATING ORAL EVERY 8 HOURS PRN
Qty: 20 TABLET | Refills: 0 | Status: SHIPPED | OUTPATIENT
Start: 2024-07-31

## 2024-07-31 RX ADMIN — Medication 1 CAPSULE: at 08:54

## 2024-07-31 RX ADMIN — ATORVASTATIN CALCIUM 40 MG: 40 TABLET, FILM COATED ORAL at 08:55

## 2024-07-31 RX ADMIN — LEVOTHYROXINE SODIUM 50 MCG: 50 TABLET ORAL at 08:55

## 2024-07-31 RX ADMIN — INSULIN LISPRO 2 UNITS: 100 INJECTION, SOLUTION INTRAVENOUS; SUBCUTANEOUS at 12:27

## 2024-07-31 RX ADMIN — SENNOSIDES AND DOCUSATE SODIUM 2 TABLET: 50; 8.6 TABLET ORAL at 08:54

## 2024-07-31 RX ADMIN — PANTOPRAZOLE SODIUM 40 MG: 40 TABLET, DELAYED RELEASE ORAL at 08:54

## 2024-07-31 RX ADMIN — METOPROLOL TARTRATE 50 MG: 50 TABLET, FILM COATED ORAL at 08:54

## 2024-07-31 RX ADMIN — Medication 1 PACKET: at 08:54

## 2024-07-31 RX ADMIN — ALLOPURINOL 100 MG: 100 TABLET ORAL at 08:54

## 2024-07-31 NOTE — CASE MANAGEMENT/SOCIAL WORK
Case Management/Social Work    Patient Name:  Lynne Sanchez  YOB: 1934  MRN: [id number]  Admit Date:  7/22/2024        09:17 EDT  Met with patient at bedside. Plan for patient to discharge home with hospice. Palliative coordinating equipment delivery. CM will continue to follow.      Electronically signed by:  Elio Demarco RN  07/31/24 09:17 EDT

## 2024-07-31 NOTE — PROGRESS NOTES
"    [facility]     PALLIATIVE CARE FOLLOW UP NOTE    Name:  Lynne Sanchez   Age:  89 y.o.  Sex:  female  :  1934  MRN:  [id number]   Visit Number:  [id number]  Date Of Service:  24  Primary Care Physician:  Elizabeth Easton APRN    Chief Complaint: Generalized weakness    Interval History:  Patient seen today during palliative care team rounds.  Record reviewed and case discussed with staff, no acute events overnight.  25% documented intake at dinner/lunch.  Upon assessment this morning, patient somewhat confused, anxious about speaking with her family.  She is reoriented easily, as I reviewed plan is to discharge home with hospice services today.  We called Janneth (dtr) while at bedside, patient able to speak with her by phone, reassured of plans in place for today.        Review of Systems   Constitutional:  Positive for activity change and fatigue.   Neurological:  Positive for weakness (generalized).   Psychiatric/Behavioral:  The patient is nervous/anxious.           Pain Assessment  Nonverbal Indicators of Pain: nonverbal indicators absent  CPOT Facial Expression: 0-->relaxed, neutral  CPOT Body Movements: 0-->absence of movements  CPOT Muscle Tension: 0-->relaxed  Ventilator Compliance/Vocalization: 0-->talking in normal tone or no sound  CPOT Score: 0  Pain Location: extremity  Pain Description: intermittent  Vitals: /40 (BP Location: Left arm, Patient Position: Lying)   Pulse 72   Temp 97.8 °F (36.6 °C) (Oral)   Resp 18   Ht 167.6 cm (66\")   Wt 60.4 kg (133 lb 2.5 oz)   SpO2 97%   BMI 21.49 kg/m²     Physical Exam  Vitals and nursing note reviewed.   Constitutional:       General: She is not in acute distress.     Appearance: She is not diaphoretic.      Comments: Frail, chronically ill appearing female lying in bed, NAD   HENT:      Head: Normocephalic and atraumatic.      Mouth/Throat:      Mouth: Mucous membranes are moist.      Pharynx: Oropharynx is clear. "   Eyes:      Conjunctiva/sclera: Conjunctivae normal.      Pupils: Pupils are equal, round, and reactive to light.   Cardiovascular:      Comments: Appears well perfused   Pulmonary:      Effort: Pulmonary effort is normal. No respiratory distress.   Musculoskeletal:         General: Swelling present.      Cervical back: Normal range of motion and neck supple.      Comments: Moves extremities independently.  BUE with erythema, interval improvements    Skin:     General: Skin is warm and dry.      Capillary Refill: Capillary refill takes less than 2 seconds.   Neurological:      Mental Status: She is alert and oriented to person, place, and time.   Psychiatric:         Mood and Affect: Mood is anxious.         Behavior: Behavior normal.          Results Reviewed:    Intake/Output Summary (Last 24 hours) at 7/31/2024 0905  Last data filed at 7/31/2024 0432  Gross per 24 hour   Intake 230 ml   Output 500 ml   Net -270 ml     Results from last 7 days   Lab Units 07/29/24  0645   SODIUM mmol/L 137   POTASSIUM mmol/L 3.5   CHLORIDE mmol/L 106   CO2 mmol/L 15.9*   BUN mg/dL 53*   CREATININE mg/dL 1.59*   CALCIUM mg/dL 7.7*   GLUCOSE mg/dL 94     Results from last 7 days   Lab Units 07/29/24  0645   WBC 10*3/mm3 8.81   HEMOGLOBIN g/dL 8.4*   HEMATOCRIT % 27.1*   PLATELETS 10*3/mm3 174       Medication Review:   I have reviewed the patients active and prn medications.     Palliative Care Assessment:  Severe pulmonary hypertension  Congestive heart failure  Sick sinus syndrome s/p pacemaker  CKD stg III  Upper GI bleeding  Right upper extremity cellulitis   Right upper extremity superficial vein thrombosis  Impaired mobility and ADLs    Recommendations/Plan:  - Goal at the present is to continue with medical plan in place with goal to optimize and discharge home with hospice, slated for today  - Patient family reports in home caregivers will continue upon discharge, with additional assistance from hospice they are hopeful  patient can remain in the home setting   - Spoke with Marco A (POA/HCS) this morning, equipment delivery is slated for this afternoon  - Palliative care will continue to follow/support patient and family    Recommendations:  - Recommend continuing FC upon discharge for comfort   - Recommend discontinuing statin therapy, supplements, to better align with GOC and decrease pill burden  - Recommend not restarting glimepiride at discharge secondary to increased risk for hypoglycemia  - A1c 5.30 on 7/23/2024, would allow for relaxed glycemic control given advanced age and overall GOC  - Agree with hydrocodone 5/325 PRN, use has been sparring; recommend to continue at discharge  - Continue scheduled bowel regimen given concurrent opiate use     CODE STATUS:   Code Status and Medical Interventions: No CPR (Do Not Attempt to Resuscitate); Limited Support; No intubation (DNI)   Ordered at: 07/29/24 8877     Medical Intervention Limits:    No intubation (DNI)     Level Of Support Discussed With:    Patient     Code Status (Patient has no pulse and is not breathing):    No CPR (Do Not Attempt to Resuscitate)     Medical Interventions (Patient has pulse or is breathing):    Limited Support         I spent 30 total minutes, including face to face assessment, record review, coordination of care with staff, and counseling patient and/or family  Part of this note may be an electronic transcription/translation of spoken language to printed text using the Dragon Dictation System.    Vilma Méndez PA-C  07/31/24  09:05 EDT

## 2024-07-31 NOTE — NURSING NOTE
Patient is discharged as ordered by MD, no PIV in place. Patient verbalizes understanding discharge instructions, no acute distress noted, no c/o voiced. Pt left the facility via ambulance accompanied by 2 attendants for homed.

## 2024-07-31 NOTE — DISCHARGE SUMMARY
[facility]   DISCHARGE SUMMARY      Name:  Lynne Sanchez   Age:  89 y.o.  Sex:  female  :  1934  MRN:  [id number]   Visit Number:  [id number]    Admission Date:  2024  Date of Discharge:  2024  Primary Care Physician:  Elizabeth Easton APRN    Important issues to note:    Start: hydrocodone, senna  Stop: statin, supplements  Follow up: PCP   Brief Summary: Presented with right arm swelling due to cellulitis and thrombophlebitis. Developed GI bleeding. Initially had been on blood thinners which were stopped. Given burden of chronic illness was set up for hospice at home.      Discharge Diagnoses:       Right arm cellulitis    Acquired hypothyroidism    Anemia of chronic renal failure    Absolute anemia    Stage 3b chronic kidney disease    Moderate malnutrition    Cellulitis of right arm        Problem List:     Active Hospital Problems    Diagnosis  POA    **Right arm cellulitis [L03.113]  Yes    Cellulitis of right arm [L03.113]  Yes    Moderate malnutrition [E44.0]  Yes    Absolute anemia [D64.9]  Unknown    Anemia of chronic renal failure [N18.9, D63.1]  Yes    Stage 3b chronic kidney disease [N18.32]  Yes    Acquired hypothyroidism [E03.9]  Yes      Resolved Hospital Problems   No resolved problems to display.     Presenting Problem:    Chief Complaint   Patient presents with    Arm Pain      Consults:     Consulting Physician(s)         Provider   Role Specialty     Clarisse Velez MD      Consulting Physician Gastroenterology          Procedures Performed:    Procedure(s):  ESOPHAGOGASTRODUODENOSCOPYwith resolution clip and APC        24: Attempted to call daughter Janneth no answer. D/w Palliative anticipate home with hospice tomorrrow.    Hospital Course:     Lynne Sanchez is an 89-year-old female with history of hypertension, diabetes mellitus type 2, hypothyroidism, sick sinus syndrome status post pacemaker was brought to the emergency room  by family with multiple complaints including right hand pain and swelling.  Patient was in the emergency room on 7/18/2024 secondary to a fall and hitting her back and hands.  At that time, she was started to have cellulitis of the hand and possibly gouty arthritis.  She was given NSAIDs, steroids, dose of cefazolin and was discharged on Keflex.  According to family, patient has not improved since then and has become more weak and is unable to ambulate at this time.  Patient states that he lives with her .     In the emergency room, she was afebrile and hemodynamically stable saturating at 96% on room air.  She does have chronic elevated troponin levels.  proBNP 16,085.  CMP was unremarkable except for a creatinine of 1.39 (baseline).  Lactic acid was within normal limits but elevated C-reactive protein of 4.75 and procalcitonin of 0.35.  WBC 13, hemoglobin 9 (baseline around 8).  ESR was 9.  Blood cultures were drawn in the emergency room.  Portable chest x-ray showed chronic appearing parenchymal changes without any acute infiltrate.  Venous duplex of the right upper extremity showed superficial venous thrombosis involving a portion of the cephalic vein.  No evidence of DVT noted.  Patient was given cefepime and vancomycin in the emergency room for suspected right upper extremity cellulitis and was subsequently admitted to the medical floor with telemetry.  Edited by: Mick Ortega, DO at 7/30/2024 1633  Right upper extremity cellulitis, POA.  Right upper extremity superficial vein thrombosis, POA.  Diabetes mellitus type 2 with nephropathy.  Chronic kidney disease stage III.  Chronic elevated troponin secondary to #4.  Sick sinus syndrome status post pacemaker.  Essential hypertension.  Gout  Severe pulmonary hypertension  Hypothyroidism.  Upper GI bleed        Plan:     Upper GI Bleed, resolved  -Hemoglobin with significant drop this morning.  Also nursing reported overnight to bloody bowel  movements.  -Transfuse 1 unit PRBC  -IV Protonix  -GI consult, Dr Velez.   -EGD performed 7/26.  Hematin found in the stomach.  Blood in the duodenal bulb.  A single bleeding angio ectasia in the duodenum.  Clip placed.  -7/27: Overnight patient had drop in hemoglobin requiring 2 additional unit PRBC transfusion.  Patient taken for EGD again, 3 nonbleeding angioectasias found in the duodenum treated with APC and clips.  -Hold Eliquis and antiplatelet medication.     Right upper extremity cellulitis.  Right cephalic venous thrombosis   - Patient does seem to have redness in the right upper extremity with swelling.  - We will treated with IV antibiotics therapy with ceftriaxone.  - MRSA negative.  - Follow blood cultures.  So far negative.  - Lactobacillus supplements.  -Ultrasound venous Doppler revealed SVT involving a portion of the cephalic vein.  No evidence of DVT.  Given risk for development of DVT and superimposed cellulitis, had planned to anticoagulate with Eliquis for 4 weeks.  Discontinue aspirin.  -Eliquis now on hold for upper GI bleed     CKD stage III.  - Renal function is at baseline.  - She has chronic elevated troponin secondary to chronic kidney disease.     Diabetes mellitus type 2.  - Continue subcutaneous insulin protocol for coverage. BG stable not prescribing any antidiabetic medications at NH.     Gout with tophi  -Supportive care  -Pain medication as indicated  -was given allopurinol may consider treatment at home with this in the future based on clinical course  -Uric acid level elevated     Severe pulmonary hypertension  -Revealed upon chart review  -Patient has never followed up with pulmonary hypertensive clinic  -Complicates all aspects of care     Palliative consulted. Home hospice arranged       Vital Signs:    Temp:  [97.2 °F (36.2 °C)-98.3 °F (36.8 °C)] 97.8 °F (36.6 °C)  Heart Rate:  [69-72] 72  Resp:  [18] 18  BP: (117-138)/(40-80) 117/40    Physical Exam:    General  Appearance:  Awake, chronically ill.   Head:  Atraumatic and normocephalic.   Eyes: Conjunctivae and sclerae normal, no icterus. No pallor.   Throat: No oral lesions, no thrush, oral mucosa moist.   Neck: Supple, trachea midline, no thyromegaly.   Lungs:   Breath sounds heard bilaterally equally.  No wheezing or crackles. No Pleural rub or bronchial breathing.   Heart:  Normal S1 and S2, + murmur   Abdomen:   Normal bowel sounds, Soft, nontender, nondistended, no rebound tenderness.   Extremities: Swelling to all extremities though improved from yesterday.  Multiple areas of ecchymosis and skin tears.   Skin: No bleeding or rash.   Neurologic: More oriented today.  Generalized weakness.     Exam stable 7/31/24    Pertinent Lab Results:     Results from last 7 days   Lab Units 07/29/24  0645 07/28/24  0649 07/27/24  0705   SODIUM mmol/L 137 142 136   POTASSIUM mmol/L 3.5 4.0 4.3   CHLORIDE mmol/L 106 105 101   CO2 mmol/L 15.9* 15.6* 19.6*   BUN mg/dL 53* 63* 65*   CREATININE mg/dL 1.59* 1.57* 1.74*   CALCIUM mg/dL 7.7* 7.4* 7.7*   GLUCOSE mg/dL 94 105* 73     Results from last 7 days   Lab Units 07/29/24  0645 07/28/24  0649 07/28/24  0020 07/27/24  1635 07/27/24  0705 07/26/24  1202 07/26/24  0540   WBC 10*3/mm3 8.81  --   --   --  13.31*  --  15.74*   HEMOGLOBIN g/dL 8.4* 8.1* 8.0*   < > 10.0*   < > 5.9*   HEMATOCRIT % 27.1* 26.2* 24.3*   < > 31.3*   < > 18.7*   PLATELETS 10*3/mm3 174  --   --   --  205  --  200    < > = values in this interval not displayed.                                   Pertinent Radiology Results:    Imaging Results (All)       Procedure Component Value Units Date/Time    XR Hand 3+ View Right [[id number]] Collected: 07/23/24 0924     Updated: 07/23/24 0930    Narrative:      PROCEDURE: XR HAND 3+ VW RIGHT-     History: swelling, erythema, tenderness     COMPARISON: July 18, 2024.     FINDINGS:  A 3 view exam demonstrates no acute fracture or dislocation.  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 There  is severe narrowing of the first carpometacarpal joint. There is  periarticular osteopenia. There is ulnar deviation of the second through  fifth digits. Vascular calcifications are present. There is significant  soft tissue swelling of the second digit, similar to the prior exam.  Overall, no significant interval change.       Impression:      No significant interval change since the prior exam.                    Images were reviewed, interpreted, and dictated by Dr. Heena Mata MD  Transcribed by Dixie Szymanski PA-C.     This report was signed and finalized on 7/23/2024 9:28 AM by Heena Mata MD.       XR Chest 1 View [[id number]] Collected: 07/23/24 0923     Updated: 07/23/24 0925    Narrative:      PROCEDURE: XR CHEST 1 VW-        HISTORY: weakness     COMPARISON: July 1, 2024.     FINDINGS: The heart is mildly enlarged, but stable. The mediastinum is  unremarkable. There is blunting of the left costophrenic angle which is  stable. There is a small right pleural effusion which has mildly  increased. New right basilar atelectasis or infiltrate is seen. There is  no pneumothorax. There are no acute osseous abnormalities.    A  pacemaker overlies the left chest.           Impression:      New right basilar atelectasis or infiltrate with small right  pleural effusion is possibly due to pneumonia. Continued follow-up is  recommended..           Images were reviewed, interpreted, and dictated by Dr. Heena Mata MD  Transcribed by Dixie Szymanski PA-C.     This report was signed and finalized on 7/23/2024 9:23 AM by Heena Mata MD.       US Venous Doppler Upper Extremity Right (duplex) [[id number]] Collected: 07/22/24 1958     Updated: 07/22/24 2016    Narrative:      FINAL REPORT    TECHNIQUE:  Multiple transverse and longitudinal images were performed of  the deep venous system with compression maneuvers.    CLINICAL HISTORY:  RUE pain, redness,  swelling    FINDINGS:  Right upper extremity duplex ultrasound demonstrates normal flow  and compressibility in the deep venous system.  There is lack of  compressibility within the cephalic vein consistent with  superficial venous thrombus.      Impression:      SVT involving a portion of the cephalic vein.  No evidence of  DVT.    Authenticated and Electronically Signed by Moisés Zamora M.D. on  07/22/2024 08:16:00 PM            Echo:    Results for orders placed during the hospital encounter of 05/29/24    Adult Transthoracic Echo Complete W/ Cont if Necessary Per Protocol    Interpretation Summary    Left ventricular systolic function is normal. Calculated left ventricular EF = 60% Left ventricular ejection fraction appears to be 56 - 60%.    Left ventricular wall thickness is consistent with hypertrophy.    Left ventricular diastolic function was indeterminate.    The left atrial cavity is dilated.    Left atrial volume is severely increased.    The right atrial cavity is dilated.    Mild aortic valve stenosis is present.    Peak velocity of the flow distal to the aortic valve is 230 cm/s. Aortic valve maximum pressure gradient is 21 mmHg. Aortic valve mean pressure gradient is 13 mmHg.    Moderate to severe mitral valve regurgitation is present.    Moderate tricuspid valve regurgitation is present.    Estimated right ventricular systolic pressure from tricuspid regurgitation is markedly elevated (>55 mmHg). Calculated right ventricular systolic pressure from tricuspid regurgitation is 85 mmHg.    Condition on Discharge:      Stable.    Code status during the hospital stay:    Code Status and Medical Interventions: No CPR (Do Not Attempt to Resuscitate); Limited Support; No intubation (DNI)   Ordered at: 07/29/24 1537     Medical Intervention Limits:    No intubation (DNI)     Level Of Support Discussed With:    Patient     Code Status (Patient has no pulse and is not breathing):    No CPR (Do Not Attempt to  Resuscitate)     Medical Interventions (Patient has pulse or is breathing):    Limited Support     Discharge Disposition:    Hospice/Home    Discharge Medications:       Discharge Medications        New Medications        Instructions Start Date   naloxone 4 MG/0.1ML nasal spray  Commonly known as: NARCAN   Call 911. Don't prime. Belle in 1 nostril for overdose. Repeat in 2-3 minutes in other nostril if no or minimal breathing/responsiveness.      ondansetron ODT 4 MG disintegrating tablet  Commonly known as: ZOFRAN-ODT   4 mg, Translingual, Every 8 Hours PRN      sennosides-docusate 8.6-50 MG per tablet  Commonly known as: PERICOLACE   2 tablets, Oral, 2 Times Daily             Changes to Medications        Instructions Start Date   HYDROcodone-acetaminophen 5-325 MG per tablet  Commonly known as: NORCO  What changed:   when to take this  reasons to take this   1 tablet, Oral, Every 4 Hours PRN             Continue These Medications        Instructions Start Date   acetaminophen 650 MG 8 hr tablet  Commonly known as: TYLENOL   650 mg, Oral, Every 8 Hours PRN      furosemide 20 MG tablet  Commonly known as: LASIX   20 mg, Oral, Daily      levothyroxine 50 MCG tablet  Commonly known as: SYNTHROID, LEVOTHROID   50 mcg, Oral, Every Early Morning      metoprolol tartrate 100 MG tablet  Commonly known as: LOPRESSOR   100 mg, Oral, 2 Times Daily      pantoprazole 40 MG EC tablet  Commonly known as: PROTONIX   40 mg, Oral, Daily      Retacrit 61811 UNIT/ML injection  Generic drug: epoetin dorcas-epbx   1 mL, Subcutaneous, 1 ml sq per month on day 17             Stop These Medications      aspirin 81 MG EC tablet     atorvastatin 40 MG tablet  Commonly known as: LIPITOR     clopidogrel 75 MG tablet  Commonly known as: PLAVIX     ferrous gluconate 324 MG tablet  Commonly known as: FERGON     glimepiride 2 MG tablet  Commonly known as: AMARYL     glucose blood test strip     multivitamin with minerals tablet tablet     naproxen  375 MG tablet delayed-release EC tablet  Commonly known as: EC NAPROSYN     saccharomyces boulardii 250 MG capsule  Commonly known as: FLORASTOR     Tradjenta 5 MG tablet tablet  Generic drug: linagliptin            ASK your doctor about these medications        Instructions Start Date   cephalexin 500 MG capsule  Commonly known as: KEFLEX  Ask about: Should I take this medication?   500 mg, Oral, 3 Times Daily             Discharge Diet:     Diet Instructions       Advance Diet As Tolerated -Target Diet: regular      Target Diet: regular          Activity at Discharge:       Follow-up Appointments:    Additional Instructions for the Follow-ups that You Need to Schedule       Discharge Follow-up with PCP   As directed       Currently Documented PCP:    Elizabeth Easton APRN    PCP Phone Number:    [phone]     Follow Up Details: as needed or with hospice director               Follow-up Information       Elizabeth Easton APRN .    Specialties: Nurse Practitioner, Family Medicine  Why: as needed or with hospice director  Contact information:  [address]  [facility] [address]  [phone]               Paxton Vasquez DO .    Specialty: Internal Medicine  Why: as needed or with hospice director  Contact information:  [address] [facility] [address]  [facility] [address]  [phone]                           Future Appointments   Date Time Provider Department Center   8/12/2024 To Be Determined Josesito Wade, PT [facility] None   8/22/2024  3:00 PM RM 2 - BED 1  [facility]   9/19/2024  2:30 PM RM 4 - CHAIR 1  [facility]   10/17/2024  1:30 PM RM 4 - CHAIR 1 [facility]   11/14/2024  1:30 PM RM 4 - CHAIR 1  [facility]   12/12/2024  2:00 PM RM 4 - CHAIR 1  [facility]   8/25/2025  2:30 PM Demond Leon MD [facility]     Test Results Pending at Discharge:           Mick Ortega DO  07/31/24  14:11  EDT    Time: I spent 45 minutes on this discharge activity which included: face-to-face encounter with the patient, reviewing the data in the system, coordination of the care with the nursing staff as well as consultants, documentation, and entering orders.     Dictated utilizing Dragon dictation.

## 2024-07-31 NOTE — PLAN OF CARE
Goal Outcome Evaluation: Patient being discharged home with hospice

## 2024-07-31 NOTE — PLAN OF CARE
Problem: Skin Injury Risk Increased  Goal: Skin Health and Integrity  Outcome: Ongoing, Progressing  Intervention: Optimize Skin Protection  Recent Flowsheet Documentation  Taken 7/31/2024 0400 by Walker Saldana RN  Head of Bed (HOB) Positioning: HOB elevated  Taken 7/31/2024 0200 by Walker Saldana RN  Head of Bed (HOB) Positioning: HOB elevated  Taken 7/31/2024 0000 by Walker Saldana RN  Head of Bed (HOB) Positioning: HOB elevated  Taken 7/30/2024 2200 by Walker Saldana RN  Head of Bed (HOB) Positioning: HOB elevated  Taken 7/30/2024 2000 by Walker Saldana RN  Head of Bed (HOB) Positioning: HOB elevated     Problem: Fall Injury Risk  Goal: Absence of Fall and Fall-Related Injury  Outcome: Ongoing, Progressing  Intervention: Promote Injury-Free Environment  Recent Flowsheet Documentation  Taken 7/31/2024 0400 by Walker Saldana RN  Safety Promotion/Fall Prevention:   nonskid shoes/slippers when out of bed   safety round/check completed  Taken 7/31/2024 0200 by Walker Saldana RN  Safety Promotion/Fall Prevention:   nonskid shoes/slippers when out of bed   safety round/check completed  Taken 7/31/2024 0000 by Walker Saldana RN  Safety Promotion/Fall Prevention:   nonskid shoes/slippers when out of bed   safety round/check completed  Taken 7/30/2024 2200 by Walker Saldana RN  Safety Promotion/Fall Prevention:   nonskid shoes/slippers when out of bed   safety round/check completed  Taken 7/30/2024 2000 by Walker Saldana RN  Safety Promotion/Fall Prevention:   nonskid shoes/slippers when out of bed   safety round/check completed   Goal Outcome Evaluation:

## 2024-07-31 NOTE — PLAN OF CARE
Goal Outcome Evaluation:    Palliative care followed up with patient this am, we discussed plan to d/c home with hospice today. I am in communication with [facility] regarding DME delivery, estimated this afternoon. Hospice should have and RN available for admission to evening. Palliative care continuing to follow.

## 2024-07-31 NOTE — CASE MANAGEMENT/SOCIAL WORK
Case Management Discharge Note                Selected Continued Care - Admitted Since 7/22/2024       Destination    No services have been selected for the patient.                Durable Medical Equipment    No services have been selected for the patient.                Dialysis/Infusion    No services have been selected for the patient.                Home Medical Care Coordination complete.      Service Provider Selected Services Address Phone Fax Patient Preferred    [facility] [address] [facility] [address] [address] [phone] --              Therapy    No services have been selected for the patient.                Community Resources    No services have been selected for the patient.                Community & DME    No services have been selected for the patient.                    Selected Continued Care - Prior Encounters Includes continued care and service providers with selected services from prior encounters from 4/23/2024 to 7/31/2024      Discharged on 6/25/2024 Admission date: 6/21/2024 - Discharge disposition: Home-Health Care Svc      Home Medical Care       Service Provider Selected Services Address Phone Fax Patient Preferred    [facility] [address] [facility] [address] [phone] --       Internal Comment last updated by Brionna Schaefer, RN 6/24/2024 1326    Current with services                                     Transportation Services  Ambulance: [place] [facility]    Final Discharge Disposition Code: 50 - home with hospice

## 2024-08-01 NOTE — PAYOR COMM NOTE
"To:  [employer]  From: Yoli Leal RN  Phone: [phone]  Fax: [phone]  NPI: [id number]  TIN: [id number]  Member ID: [id number]   MRN: [id number]    Mel Sanchez \"Bindu\" (89 y.o. Female)       Date of Birth   1934    Social Security Number       Address   [address] [place] [address] [address] [place] [address]    Home Phone   [phone]    MRN   [id number]       [facility]    Marital Status                               Admission Date   7/22/24    Admission Type   Emergency    Admitting Provider   Jaswinder Andrews DO    Attending Provider       Department, Room/Bed   [facility] TELEMETRY 3, 326/1       Discharge Date   7/31/2024    Discharge Disposition   Hospice/Home    Discharge Destination                                 Attending Provider: (none)   Allergies: Phenergan [Promethazine Hcl], Zosyn [Piperacillin-tazobactam In Dex]    Isolation: None   Infection: None   Code Status: Prior    Ht: 167.6 cm (66\")   Wt: 60.4 kg (133 lb 2.5 oz)    Admission Cmt: None   Principal Problem: Right arm cellulitis [L03.113]                   Active Insurance as of 7/22/2024       Primary Coverage       Payor Plan Insurance Group Employer/Plan Group    [employer] MEDICARE REPLACEMENT [employer] MED ADV [place] [id number]       Payor Plan Address Payor Plan Phone Number Payor Plan Fax Number Effective Dates    [address] [phone]  1/1/2024 - None Entered    [facility] [address]         Subscriber Name Subscriber Birth Date Member ID       MEL SANCHEZ 1934 [id number]                     Emergency Contacts        (Rel.) Home Phone Work Phone Mobile Phone    Marco A Sanchez (Son) [phone] -- [phone]    LISA PEREZ (Daughter) -- -- [phone]    Brennan Mendenhall (Spouse) [phone] -- [phone]    MARILU SANCHEZ (Son) [phone] -- --    MANUEL SANCHEZ (Daughter) -- -- [phone]                 Discharge Summary        Mick Ortega DO at 07/31/24 1411        "       [facility]   DISCHARGE SUMMARY      Name:  Lynne Sanchez   Age:  89 y.o.  Sex:  female  :  1934  MRN:  [id number]   Visit Number:  [id number]    Admission Date:  2024  Date of Discharge:  2024  Primary Care Physician:  Elizabeth Easton APRN    Important issues to note:    Start: hydrocodone, senna  Stop: statin, supplements  Follow up: PCP   Brief Summary: Presented with right arm swelling due to cellulitis and thrombophlebitis. Developed GI bleeding. Initially had been on blood thinners which were stopped. Given burden of chronic illness was set up for hospice at home.      Discharge Diagnoses:       Right arm cellulitis    Acquired hypothyroidism    Anemia of chronic renal failure    Absolute anemia    Stage 3b chronic kidney disease    Moderate malnutrition    Cellulitis of right arm        Problem List:     Active Hospital Problems    Diagnosis  POA    **Right arm cellulitis [L03.113]  Yes    Cellulitis of right arm [L03.113]  Yes    Moderate malnutrition [E44.0]  Yes    Absolute anemia [D64.9]  Unknown    Anemia of chronic renal failure [N18.9, D63.1]  Yes    Stage 3b chronic kidney disease [N18.32]  Yes    Acquired hypothyroidism [E03.9]  Yes      Resolved Hospital Problems   No resolved problems to display.     Presenting Problem:    Chief Complaint   Patient presents with    Arm Pain      Consults:     Consulting Physician(s)         Provider   Role Specialty     Clarisse Velez MD      Consulting Physician Gastroenterology          Procedures Performed:    Procedure(s):  ESOPHAGOGASTRODUODENOSCOPYwith resolution clip and APC        24: Attempted to call daughter Janneth no answer. D/w Palliative anticipate home with hospice tomorrrow.    Hospital Course:     Lynne Sanchez is an 89-year-old female with history of hypertension, diabetes mellitus type 2, hypothyroidism, sick sinus syndrome status post pacemaker was brought to the emergency  room by family with multiple complaints including right hand pain and swelling.  Patient was in the emergency room on 7/18/2024 secondary to a fall and hitting her back and hands.  At that time, she was started to have cellulitis of the hand and possibly gouty arthritis.  She was given NSAIDs, steroids, dose of cefazolin and was discharged on Keflex.  According to family, patient has not improved since then and has become more weak and is unable to ambulate at this time.  Patient states that he lives with her .     In the emergency room, she was afebrile and hemodynamically stable saturating at 96% on room air.  She does have chronic elevated troponin levels.  proBNP 16,085.  CMP was unremarkable except for a creatinine of 1.39 (baseline).  Lactic acid was within normal limits but elevated C-reactive protein of 4.75 and procalcitonin of 0.35.  WBC 13, hemoglobin 9 (baseline around 8).  ESR was 9.  Blood cultures were drawn in the emergency room.  Portable chest x-ray showed chronic appearing parenchymal changes without any acute infiltrate.  Venous duplex of the right upper extremity showed superficial venous thrombosis involving a portion of the cephalic vein.  No evidence of DVT noted.  Patient was given cefepime and vancomycin in the emergency room for suspected right upper extremity cellulitis and was subsequently admitted to the medical floor with telemetry.  Edited by: Mick Ortega, DO at 7/30/2024 1633  Right upper extremity cellulitis, POA.  Right upper extremity superficial vein thrombosis, POA.  Diabetes mellitus type 2 with nephropathy.  Chronic kidney disease stage III.  Chronic elevated troponin secondary to #4.  Sick sinus syndrome status post pacemaker.  Essential hypertension.  Gout  Severe pulmonary hypertension  Hypothyroidism.  Upper GI bleed        Plan:     Upper GI Bleed, resolved  -Hemoglobin with significant drop this morning.  Also nursing reported overnight to bloody bowel  movements.  -Transfuse 1 unit PRBC  -IV Protonix  -GI consult, Dr Velez.   -EGD performed 7/26.  Hematin found in the stomach.  Blood in the duodenal bulb.  A single bleeding angio ectasia in the duodenum.  Clip placed.  -7/27: Overnight patient had drop in hemoglobin requiring 2 additional unit PRBC transfusion.  Patient taken for EGD again, 3 nonbleeding angioectasias found in the duodenum treated with APC and clips.  -Hold Eliquis and antiplatelet medication.     Right upper extremity cellulitis.  Right cephalic venous thrombosis   - Patient does seem to have redness in the right upper extremity with swelling.  - We will treated with IV antibiotics therapy with ceftriaxone.  - MRSA negative.  - Follow blood cultures.  So far negative.  - Lactobacillus supplements.  -Ultrasound venous Doppler revealed SVT involving a portion of the cephalic vein.  No evidence of DVT.  Given risk for development of DVT and superimposed cellulitis, had planned to anticoagulate with Eliquis for 4 weeks.  Discontinue aspirin.  -Eliquis now on hold for upper GI bleed     CKD stage III.  - Renal function is at baseline.  - She has chronic elevated troponin secondary to chronic kidney disease.     Diabetes mellitus type 2.  - Continue subcutaneous insulin protocol for coverage. BG stable not [facility] any antidi[facility].     Gout with tophi  -Supportive care  -Pain medication as indicated  -was given allopurinol may consider treatment at home with this in the future based on clinical course  -Uric acid level elevated     Severe pulmonary hypertension  -Revealed upon chart review  -Patient has never followed up with pulmonary hypertensive clinic  -Complicates all aspects of care     Palliative consulted. Home hospice arranged       Vital Signs:    Temp:  [97.2 °F (36.2 °C)-98.3 °F (36.8 °C)] 97.8 °F (36.6 °C)  Heart Rate:  [69-72] 72  Resp:  [18] 18  BP: (117-138)/(40-80) 117/40    Physical Exam:    General  Appearance:  Awake, chronically ill.   Head:  Atraumatic and normocephalic.   Eyes: Conjunctivae and sclerae normal, no icterus. No pallor.   Throat: No oral lesions, no thrush, oral mucosa moist.   Neck: Supple, trachea midline, no thyromegaly.   Lungs:   Breath sounds heard bilaterally equally.  No wheezing or crackles. No Pleural rub or bronchial breathing.   Heart:  Normal S1 and S2, + murmur   Abdomen:   Normal bowel sounds, Soft, nontender, nondistended, no rebound tenderness.   Extremities: Swelling to all extremities though improved from yesterday.  Multiple areas of ecchymosis and skin tears.   Skin: No bleeding or rash.   Neurologic: More oriented today.  Generalized weakness.     Exam stable 7/31/24    Pertinent Lab Results:     Results from last 7 days   Lab Units 07/29/24  0645 07/28/24  0649 07/27/24  0705   SODIUM mmol/L 137 142 136   POTASSIUM mmol/L 3.5 4.0 4.3   CHLORIDE mmol/L 106 105 101   CO2 mmol/L 15.9* 15.6* 19.6*   BUN mg/dL 53* 63* 65*   CREATININE mg/dL 1.59* 1.57* 1.74*   CALCIUM mg/dL 7.7* 7.4* 7.7*   GLUCOSE mg/dL 94 105* 73     Results from last 7 days   Lab Units 07/29/24  0645 07/28/24  0649 07/28/24  0020 07/27/24  1635 07/27/24  0705 07/26/24  1202 07/26/24  0540   WBC 10*3/mm3 8.81  --   --   --  13.31*  --  15.74*   HEMOGLOBIN g/dL 8.4* 8.1* 8.0*   < > 10.0*   < > 5.9*   HEMATOCRIT % 27.1* 26.2* 24.3*   < > 31.3*   < > 18.7*   PLATELETS 10*3/mm3 174  --   --   --  205  --  200    < > = values in this interval not displayed.                                   Pertinent Radiology Results:    Imaging Results (All)       Procedure Component Value Units Date/Time    XR Hand 3+ View Right [[id number]] Collected: 07/23/24 0924     Updated: 07/23/24 0930    Narrative:      PROCEDURE: XR HAND 3+ VW RIGHT-     History: swelling, erythema, tenderness     COMPARISON: July 18, 2024.     FINDINGS:  A 3 view exam demonstrates no acute fracture or dislocation.  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 There  is severe narrowing of the first carpometacarpal joint. There is  periarticular osteopenia. There is ulnar deviation of the second through  fifth digits. Vascular calcifications are present. There is significant  soft tissue swelling of the second digit, similar to the prior exam.  Overall, no significant interval change.       Impression:      No significant interval change since the prior exam.                    Images were reviewed, interpreted, and dictated by Dr. Heena Mata MD  Transcribed by Dixie Szymanski PA-C.     This report was signed and finalized on 7/23/2024 9:28 AM by Heena Mata MD.       XR Chest 1 View [[id number]] Collected: 07/23/24 0923     Updated: 07/23/24 0925    Narrative:      PROCEDURE: XR CHEST 1 VW-        HISTORY: weakness     COMPARISON: July 1, 2024.     FINDINGS: The heart is mildly enlarged, but stable. The mediastinum is  unremarkable. There is blunting of the left costophrenic angle which is  stable. There is a small right pleural effusion which has mildly  increased. New right basilar atelectasis or infiltrate is seen. There is  no pneumothorax. There are no acute osseous abnormalities.    A  pacemaker overlies the left chest.           Impression:      New right basilar atelectasis or infiltrate with small right  pleural effusion is possibly due to pneumonia. Continued follow-up is  recommended..           Images were reviewed, interpreted, and dictated by Dr. Heena Mata MD  Transcribed by Dixie Szymanski PA-C.     This report was signed and finalized on 7/23/2024 9:23 AM by Heena Mata MD.       US Venous Doppler Upper Extremity Right (duplex) [[id number]] Collected: 07/22/24 1958     Updated: 07/22/24 2016    Narrative:      FINAL REPORT    TECHNIQUE:  Multiple transverse and longitudinal images were performed of  the deep venous system with compression maneuvers.    CLINICAL HISTORY:  RUE pain, redness,  swelling    FINDINGS:  Right upper extremity duplex ultrasound demonstrates normal flow  and compressibility in the deep venous system.  There is lack of  compressibility within the cephalic vein consistent with  superficial venous thrombus.      Impression:      SVT involving a portion of the cephalic vein.  No evidence of  DVT.    Authenticated and Electronically Signed by Moisés Zamora M.D. on  07/22/2024 08:16:00 PM            Echo:    Results for orders placed during the hospital encounter of 05/29/24    Adult Transthoracic Echo Complete W/ Cont if Necessary Per Protocol    Interpretation Summary    Left ventricular systolic function is normal. Calculated left ventricular EF = 60% Left ventricular ejection fraction appears to be 56 - 60%.    Left ventricular wall thickness is consistent with hypertrophy.    Left ventricular diastolic function was indeterminate.    The left atrial cavity is dilated.    Left atrial volume is severely increased.    The right atrial cavity is dilated.    Mild aortic valve stenosis is present.    Peak velocity of the flow distal to the aortic valve is 230 cm/s. Aortic valve maximum pressure gradient is 21 mmHg. Aortic valve mean pressure gradient is 13 mmHg.    Moderate to severe mitral valve regurgitation is present.    Moderate tricuspid valve regurgitation is present.    Estimated right ventricular systolic pressure from tricuspid regurgitation is markedly elevated (>55 mmHg). Calculated right ventricular systolic pressure from tricuspid regurgitation is 85 mmHg.    Condition on Discharge:      Stable.    Code status during the hospital stay:    Code Status and Medical Interventions: No CPR (Do Not Attempt to Resuscitate); Limited Support; No intubation (DNI)   Ordered at: 07/29/24 1537     Medical Intervention Limits:    No intubation (DNI)     Level Of Support Discussed With:    Patient     Code Status (Patient has no pulse and is not breathing):    No CPR (Do Not Attempt to  Resuscitate)     Medical Interventions (Patient has pulse or is breathing):    Limited Support     Discharge Disposition:    Hospice/Home    Discharge Medications:       Discharge Medications        New Medications        Instructions Start Date   naloxone 4 MG/0.1ML nasal spray  Commonly known as: NARCAN   Call 911. Don't prime. [place] in 1 nostril for overdose. Repeat in 2-3 minutes in other nostril if no or minimal breathing/responsiveness.      ondansetron ODT 4 MG disintegrating tablet  Commonly known as: ZOFRAN-ODT   4 mg, Translingual, Every 8 Hours PRN      sennosides-docusate 8.6-50 MG per tablet  Commonly known as: PERICOLACE   2 tablets, Oral, 2 Times Daily             Changes to Medications        Instructions Start Date   HYDROcodone-acetaminophen 5-325 MG per tablet  Commonly known as: NORCO  What changed:   when to take this  reasons to take this   1 tablet, Oral, Every 4 Hours PRN             Continue These Medications        Instructions Start Date   acetaminophen 650 MG 8 hr tablet  Commonly known as: TYLENOL   650 mg, Oral, Every 8 Hours PRN      furosemide 20 MG tablet  Commonly known as: LASIX   20 mg, Oral, Daily      levothyroxine 50 MCG tablet  Commonly known as: SYNTHROID, LEVOTHROID   50 mcg, Oral, Every Early Morning      metoprolol tartrate 100 MG tablet  Commonly known as: LOPRESSOR   100 mg, Oral, 2 Times Daily      pantoprazole 40 MG EC tablet  Commonly known as: PROTONIX   40 mg, Oral, Daily      Retacrit 98874 UNIT/ML injection  Generic drug: epoetin dorcas-epbx   1 mL, Subcutaneous, 1 ml sq per month on day 17             Stop These Medications      aspirin 81 MG EC tablet     atorvastatin 40 MG tablet  Commonly known as: LIPITOR     clopidogrel 75 MG tablet  Commonly known as: PLAVIX     ferrous gluconate 324 MG tablet  Commonly known as: FERGON     glimepiride 2 MG tablet  Commonly known as: AMARYL     glucose blood test strip     multivitamin with minerals tablet tablet     naproxen  375 MG tablet delayed-release EC tablet  Commonly known as: EC NAPROSYN     saccharomyces boulardii 250 MG capsule  Commonly known as: FLORASTOR     Tradjenta 5 MG tablet tablet  Generic drug: linagliptin            ASK your doctor about these medications        Instructions Start Date   cephalexin 500 MG capsule  Commonly known as: KEFLEX  Ask about: Should I take this medication?   500 mg, Oral, 3 Times Daily             Discharge Diet:     Diet Instructions       Advance Diet As Tolerated -Target Diet: regular      Target Diet: regular          Activity at Discharge:       Follow-up Appointments:    Additional Instructions for the Follow-ups that You Need to Schedule       Discharge Follow-up with PCP   As directed       Currently Documented PCP:    Elizabeth Easton APRN    PCP Phone Number:    [phone]     Follow Up Details: as needed or with hospice director               Follow-up Information       Elizabeth Easton APRN .    Specialties: Nurse Practitioner, Family Medicine  Why: as needed or with hospice director  Contact information:  [address]  [facility] [address]  [phone]               Paxton Vasquez DO .    Specialty: Internal Medicine  Why: as needed or with hospice director  Contact information:  [address] [facility] [address] [facility] [address] [facility] [address]  [facility] [address]  [phone]                           Future Appointments   Date Time Provider Department Center   8/12/2024 To Be Determined Josesito Wade, PT [facility] None   8/22/2024  3:00 PM RM 2 - BED 1  [facility]   9/19/2024  2:30 PM RM 4 - CHAIR 1  [facility]   10/17/2024  1:30 PM RM 4 - CHAIR 1 [facility]   11/14/2024  1:30 PM RM 4 - CHAIR 1  [facility]   12/12/2024  2:00 PM RM 4 - CHAIR 1  [facility]   8/25/2025  2:30 PM Demond Leon MD [facility]     Test Results Pending at Discharge:           Mick Ortega DO  07/31/24  14:11  EDT    Time: I spent 45 minutes on this discharge activity which included: face-to-face encounter with the patient, reviewing the data in the system, coordination of the care with the nursing staff as well as consultants, documentation, and entering orders.     Dictated utilizing Dragon dictation.        Electronically signed by Mick Ortega DO at 07/31/24 5059

## 2024-08-08 ENCOUNTER — TELEPHONE (OUTPATIENT)
Dept: INTERNAL MEDICINE | Facility: CLINIC | Age: 89
End: 2024-08-08
Payer: MEDICARE

## 2024-08-08 RX ORDER — BLOOD-GLUCOSE METER
1 KIT MISCELLANEOUS DAILY
Qty: 1 EACH | Refills: 0 | Status: SHIPPED | OUTPATIENT
Start: 2024-08-08

## 2024-08-08 NOTE — TELEPHONE ENCOUNTER
Caller: MANUEL BARONE    Relationship to patient: Emergency Contact    Best call back number: [phone]     PATIENT'S DAUGHTER IS CALLING TO STATE THAT SHE NEEDS A NEW GLUCOMETER.  THE PRESCRIPTION NEEDS TO GO TO [facility], [place].

## 2024-08-09 ENCOUNTER — TELEPHONE (OUTPATIENT)
Dept: INTERNAL MEDICINE | Facility: CLINIC | Age: 89
End: 2024-08-09
Payer: MEDICARE

## 2024-08-09 NOTE — TELEPHONE ENCOUNTER
"Relay     \"  I would recommend sticking with hospice. They will be able to make other suggestions if indicated.   \"                 "

## 2024-08-09 NOTE — TELEPHONE ENCOUNTER
Caller: LISA PEREZ    Relationship: Emergency Contact    Best call back number: [phone]     What is the medical concern/diagnosis: LOW IRON-SWELLING IN ARMS AND LEGS    What specialty or service is being requested: HOME HEALTH    Any additional details: PATIENT WAS SENT HOME FROM THE HOSPITAL AND SET UP FOR HOSPICE CARE. SINCE SHE GOT HOME SHE'S BEEN DOING BETTER. LISA WOULD LIKE TO KNOW IF HOME HEALTH WOULD BE OK OR IF SHE SHOULD STICK WITH HOSPICE.

## 2024-08-10 RX ORDER — LINAGLIPTIN 5 MG/1
5 TABLET, FILM COATED ORAL DAILY
Qty: 90 TABLET | Refills: 2 | OUTPATIENT
Start: 2024-08-10

## 2024-08-12 ENCOUNTER — TELEPHONE (OUTPATIENT)
Dept: INTERNAL MEDICINE | Facility: CLINIC | Age: 89
End: 2024-08-12
Payer: MEDICARE

## 2024-08-12 ENCOUNTER — HOME CARE VISIT (OUTPATIENT)
Dept: HOME HEALTH SERVICES | Facility: HOME HEALTHCARE | Age: 89
End: 2024-08-12
Payer: COMMERCIAL

## 2024-08-12 NOTE — TELEPHONE ENCOUNTER
Spoke with Janneth. She stated that patient and her  do not want Hospice anymore. She is a little better than when she came home. Wants her meds back.Janneth stated that she has no quality of life though. Is agreeable to the home health. Janneth has an interview this afternoon with a retired nurse to have some additional help. Trying to keep her out of the nursing home for as long as possible. Is aware that you are out today.

## 2024-08-12 NOTE — TELEPHONE ENCOUNTER
Name: LISA PEREZ    Relationship: Emergency Contact    Best Callback Number: [phone]    HUB PROVIDED THE RELAY MESSAGE FROM THE OFFICE   PATIENT HAS FURTHER QUESTIONS AND WOULD LIKE A CALL BACK AT THE FOLLOWING PHONE NUMBER [phone]    ADDITIONAL INFORMATION:

## 2024-08-12 NOTE — TELEPHONE ENCOUNTER
Caller: Brennan Mendenhall    Relationship: Emergency Contact    Best call back number: [phone]    What is the best time to reach you: ANY     What was the call regarding:   WANTS TO COME BACK TO [facility].    GET PERIODIC INFUSION OF RED BLOOD CELLS.      ALSO NEEDS APPT FOR RED BLOOD CELLS, APPT AT INFUSION CENTER.    ALSO REDICRIT HORMONE  [phone]    Is it okay if the provider responds through MyChart: CALL

## 2024-08-14 ENCOUNTER — TELEPHONE (OUTPATIENT)
Dept: INTERNAL MEDICINE | Facility: CLINIC | Age: 89
End: 2024-08-14
Payer: MEDICARE

## 2024-08-14 DIAGNOSIS — E11.29 CONTROLLED TYPE 2 DIABETES MELLITUS WITH OTHER DIABETIC KIDNEY COMPLICATION, WITHOUT LONG-TERM CURRENT USE OF INSULIN: ICD-10-CM

## 2024-08-14 DIAGNOSIS — Z78.9 IMPAIRED MOBILITY AND ADLS: ICD-10-CM

## 2024-08-14 DIAGNOSIS — Z74.09 IMPAIRED MOBILITY AND ADLS: ICD-10-CM

## 2024-08-14 DIAGNOSIS — N18.4 CHRONIC RENAL IMPAIRMENT, STAGE 4 (SEVERE): Primary | ICD-10-CM

## 2024-08-14 DIAGNOSIS — I27.20 SEVERE PULMONARY HYPERTENSION: ICD-10-CM

## 2024-08-14 NOTE — TELEPHONE ENCOUNTER
Caller: LISA PEREZ    Relationship: Emergency Contact, DAUGHTER    Best call back number: [phone]     What is the best time to reach you: NOW    Who are you requesting to speak with (clinical staff, provider,  specific staff member): CLINICAL    Do you know the name of the person who called: HAS NOT HEARD BACK    What was the call regarding: PATIENT IS A MONTH A ARREARS FOR THE INJECTION. IS NOW HOME AND NO LONGER IN HOSPICE CARE. ASKING FOR HOME HEALTH TO DO INJECTION FOR RETACRIT. PLEASE ADVISE IF HOME HEALTH CAN BE ORDERED TO CONTINUE THE INJECTIONS.

## 2024-08-14 NOTE — TELEPHONE ENCOUNTER
Caller: ABISAI MANUEL (NOT ON [facility])    Relationship: Other Relative     Best call back number: [phone]     What is the best time to reach you: ASAP    Who are you requesting to speak with (clinical staff, provider,  specific staff member): CLINICAL      What was the call regarding: PATIENTS DAUGHTER IN LAW MANUEL (BRINGS PATIENT TO APPOINTMENTS) CALLED STATING PATIENT GETS INJECTIONS IN OUR OFFICE BUT SHE IS NOW HOME BOUND. SHE NEEDS TO KNOW HOW SHE CAN GET THE INJECTIONS AT HOME. PLEASE ADVISE AND CALL TO DISCUSS OPTIONS   No complaints

## 2024-08-14 NOTE — TELEPHONE ENCOUNTER
Caller: LISA PEREZ    Relationship: Emergency Contact, DAUGHTER    Best call back number: [phone]     What is the best time to reach you: NOW    Who are you requesting to speak with (clinical staff, provider,  specific staff member): CLINICAL    Do you know the name of the person who called: HAS NOT HEARD BACK    What was the call regarding: ASKING FOR A CALL BACK TO ADVISE ABOUT REINSTATING HOME HEALTH.

## 2024-08-15 ENCOUNTER — TELEPHONE (OUTPATIENT)
Dept: INTERNAL MEDICINE | Facility: CLINIC | Age: 89
End: 2024-08-15
Payer: MEDICARE

## 2024-08-15 NOTE — TELEPHONE ENCOUNTER
Caller: Marco A Sanchez    Relationship: Emergency Contact    Best call back number: [phone]     What is the best time to reach you: ANY    Who are you requesting to speak with (clinical staff, provider,  specific staff member): CLINICAL       What was the call regarding: PATIENT WAS IN HOSPICE CARE AND THEY HAVE DISCONTINUED THAT DUE TO NOT NEEDING IT NOW THAT MOM HAS REBOUNDED A LITTLE FROM WHEN SHE WAS HOSPITALIZED IN JULY .   PATIENT IS REQUESTING THAT THE PATIENT GET HOME CARE BACK IN THE HOME.   PATIENT HAS BEEN PRESCRIBED 2 INJECTIONS. PATIENT WOULD LIKE TO HAVE HOME CARE COME OUT AND ADMINISTER THOSE INJECTIONS TO THE PATIENT ONCE 2-3 WEEKS OR AS NEEDED.     PLEASE CALL BACK TO DISCUSS THIS WITH THE PATIENT AND SON TO FIND OUT IF THIS CAN BE DONE.

## 2024-08-15 NOTE — TELEPHONE ENCOUNTER
Spoke with Janneth, states that she does not think that they can get Bindu to the office as she is completely immobile, is also concerned about possible Covid exposure if they bring her in. Would like to do telehealth visit if that is possible. States that they have hired a private nurse to come in and help out as well.

## 2024-08-15 NOTE — TELEPHONE ENCOUNTER
Caller: [facility] [employer]    Relationship: Other    Best call back number: [phone]     What form or medical record are you requesting: LAST OFFICE NOTES    Who is requesting this form or medical record from you: [facility] [employer]    How would you like to receive the form or medical records (pick-up, mail, fax): FAX  If fax, what is the fax number: [phone]  If mail, what is the address:   If pick-up, provide patient with address and location details    Timeframe paperwork needed: AS SOON AS POSSIBLE    Additional notes: PATIENT WAS DISCHARGED FROM HOSPICE, BUT THEY ARE LETTING HER KEEP THE BED. OFFICE NOTES NEEDED TO COMPLETE PAPERWORK FOR INSURANCE

## 2024-08-16 NOTE — TELEPHONE ENCOUNTER
Relayed to Janneth about the appt. and injection. She asked if her phone can be used for the telehealth visit. The number we have for patient is a home phone. How can we do this? Please let daughter know ot I can.

## 2024-08-16 NOTE — TELEPHONE ENCOUNTER
I spoke with Carolina. The last appointment was in May - Carolina states that it is okay to send that office note.     The office note is being faxed to : [phone]

## 2024-08-22 ENCOUNTER — HOSPITAL ENCOUNTER (OUTPATIENT)
Dept: INFUSION THERAPY | Facility: HOSPITAL | Age: 89
Discharge: HOME | End: 2024-08-22
Payer: MEDICARE

## 2024-08-22 VITALS
SYSTOLIC BLOOD PRESSURE: 140 MMHG | RESPIRATION RATE: 18 BRPM | TEMPERATURE: 97.5 F | OXYGEN SATURATION: 90 % | HEART RATE: 72 BPM | DIASTOLIC BLOOD PRESSURE: 70 MMHG

## 2024-08-22 DIAGNOSIS — D63.1 ANEMIA OF CHRONIC RENAL FAILURE, STAGE 3B: ICD-10-CM

## 2024-08-22 DIAGNOSIS — D64.9 NORMOCYTIC ANEMIA: Primary | ICD-10-CM

## 2024-08-22 DIAGNOSIS — N18.32 ANEMIA OF CHRONIC RENAL FAILURE, STAGE 3B: ICD-10-CM

## 2024-08-22 DIAGNOSIS — N18.32 STAGE 3B CHRONIC KIDNEY DISEASE: ICD-10-CM

## 2024-08-22 LAB
HCT VFR BLD AUTO: 31.8 % (ref 34–46.6)
HGB BLD-MCNC: 9.8 G/DL (ref 12–15.9)

## 2024-08-22 PROCEDURE — 85014 HEMATOCRIT: CPT | Performed by: PHYSICIAN ASSISTANT

## 2024-08-22 PROCEDURE — 85018 HEMOGLOBIN: CPT | Performed by: PHYSICIAN ASSISTANT

## 2024-08-22 PROCEDURE — 36415 COLL VENOUS BLD VENIPUNCTURE: CPT

## 2024-08-22 PROCEDURE — 96372 THER/PROPH/DIAG INJ SC/IM: CPT

## 2024-08-22 PROCEDURE — 25010000002 EPOETIN ALFA PER 1000 UNITS: Performed by: PHYSICIAN ASSISTANT

## 2024-08-22 RX ORDER — GLIMEPIRIDE 1 MG/1
TABLET ORAL
Status: ON HOLD | COMMUNITY
Start: 2024-08-07

## 2024-08-22 RX ADMIN — ERYTHROPOIETIN 40000 UNITS: 40000 INJECTION, SOLUTION INTRAVENOUS; SUBCUTANEOUS at 15:23

## 2024-08-23 ENCOUNTER — TELEPHONE (OUTPATIENT)
Dept: INTERNAL MEDICINE | Facility: CLINIC | Age: 89
End: 2024-08-23

## 2024-08-23 ENCOUNTER — TELEMEDICINE (OUTPATIENT)
Dept: INTERNAL MEDICINE | Facility: CLINIC | Age: 89
End: 2024-08-23
Payer: MEDICARE

## 2024-08-23 VITALS
BODY MASS INDEX: 19.77 KG/M2 | HEART RATE: 69 BPM | SYSTOLIC BLOOD PRESSURE: 117 MMHG | DIASTOLIC BLOOD PRESSURE: 59 MMHG | WEIGHT: 123 LBS | HEIGHT: 66 IN

## 2024-08-23 DIAGNOSIS — L97.519 SKIN ULCER OF RIGHT GREAT TOE, UNSPECIFIED ULCER STAGE: ICD-10-CM

## 2024-08-23 DIAGNOSIS — Z74.09 IMPAIRED MOBILITY AND ADLS: ICD-10-CM

## 2024-08-23 DIAGNOSIS — I27.20 SEVERE PULMONARY HYPERTENSION: ICD-10-CM

## 2024-08-23 DIAGNOSIS — N18.4 CHRONIC RENAL IMPAIRMENT, STAGE 4 (SEVERE): Primary | ICD-10-CM

## 2024-08-23 DIAGNOSIS — Z78.9 IMPAIRED MOBILITY AND ADLS: ICD-10-CM

## 2024-08-23 DIAGNOSIS — E11.29 CONTROLLED TYPE 2 DIABETES MELLITUS WITH OTHER DIABETIC KIDNEY COMPLICATION, WITHOUT LONG-TERM CURRENT USE OF INSULIN: ICD-10-CM

## 2024-08-23 PROCEDURE — 99214 OFFICE O/P EST MOD 30 MIN: CPT | Performed by: NURSE PRACTITIONER

## 2024-08-23 PROCEDURE — 1159F MED LIST DOCD IN RCRD: CPT | Performed by: NURSE PRACTITIONER

## 2024-08-23 PROCEDURE — 1160F RVW MEDS BY RX/DR IN RCRD: CPT | Performed by: NURSE PRACTITIONER

## 2024-08-23 PROCEDURE — 1126F AMNT PAIN NOTED NONE PRSNT: CPT | Performed by: NURSE PRACTITIONER

## 2024-08-23 RX ORDER — DIPHENOXYLATE HYDROCHLORIDE AND ATROPINE SULFATE 2.5; .025 MG/1; MG/1
1 TABLET ORAL DAILY
Status: ON HOLD | COMMUNITY

## 2024-08-23 NOTE — TELEPHONE ENCOUNTER
callled and her care taker answered the phone and said to call back sooner to appointment time when daughter gets to her house.

## 2024-08-23 NOTE — PROGRESS NOTES
Office Visit      Patient Name: Lynne Sanchez  : 1934   MRN: [id number]   Care Team: Patient Care Team:  Elizabeth Easton APRN as PCP - General (Family Medicine)  Paxton Vasquez DO as PCP - Internal Medicine ([facility] Care Facility)  Davian Faria MD (Inactive) as Consulting Physician (Nephrology)  Radha Boone, SHAD as Registered Nurse    Chief Complaint  Home Health Care (Wants to know about home health)    Subjective     Subjective      Lynne Sanchez presents to [facility] for home health discussion.   Bindu is located at her home in [place], [place] during today's visit and I am located at my office in [place], [place].  Mrs. Sanchez had recent hospitalization at [facility] for weakness. She is chronically ill with multiple chronic conditions. Recent superficial clot of the right arm and upper GI bleeding.   Palliative care was consulted during hospitalization and she was discharged home with diaz catheter for comfort. When she got home patient and family decided they did not want to proceed with palliative care and have made the visit today to discuss home health instead.   They decided they did not want this service as they want her to have her medications and feels as though she is not at the end of life.   She has a private nurse who comes in to help with her care Monday-Friday 9am - 5pm. She is non-weight bearing at this time and has a hospital bed.   She has several wounds the private nurse, Brooklynn is concerned with. This includes a decubitus ulcer of the left calf, pressure injury of bilateral heels with eschar, and concerns for yeast on the back and buttocks with significant erythema .   Brooklynn is requesting diflucan.   , daughter, patient, and Brooklynn are all present during the visit today helping with the history . Patient had recent amputation of right 1st and 2nd toe due to osteomyelitis. She actually still has sutures from this procedure.  "They were told to discontinue in 1 month.     Objective     Objective   Vital Signs:   /59   Pulse 69   Ht 167.6 cm (66\")   Wt 55.8 kg (123 lb)   BMI 19.85 kg/m²     Physical Exam   Constitutional:   Chronically ill frail pleasant woman, lying in hospital bed     Pulmonary/Chest: Effort normal.   Musculoskeletal:         General: Deformity present.   Neurological: She is alert.   Oriented x 3     Skin: There is erythema (erythema on back and buttock consistent with shearing injury).   Unable to visualize reported ulcer of calf r/t poor service with video. Described as 2 open lesions of right upper calf above the knee 2x2 and 0.5 in of tunneling. lL calf with healing 3.5 in x 1.5 in ulceration, and bilateral heels with black eschar. These findings are all reported as I was not able to visualize during our visit.      Psychiatric: She has a normal mood and affect.   Vitals reviewed.       Assessment / Plan         Assessment/Plan  Problem List Items Addressed This Visit       Diabetes mellitus type II, controlled (Chronic)    Controlled, continue glipizide with meals. Previously discontinued. Stopped tradjenta and blood gluocse is stable.     Chronic renal impairment, stage 4 (severe) - Primary (Chronic)    Managed by nephrology. Discussed with patient and family would not be able to get retacrit and PRBC through home health.     Overview     Managed by [facility].           Other Visit Diagnoses       Impaired mobility and ADLs        Referral to home health placed. Mrs. Sanchez condition has progressed and she is now unable to leave her home. I have discussed my concerns and recommendations to her and the family in regards to palliative medicine. Discussed palliative care can manage her strictly at home and would not have to stop her medications or do anything she is not comfortable doing and would be able to get resources she needs at home. They decline this service and would rather have home health. " I have previously placed this referral and is pending scheduling.     Skin ulcer of right great toe, unspecified ulcer stage        Needs sutures removed, will provide order for home health when this is available. Skin on back is consistent with shearing, do not recommend diflucan for her. Continued wound care.     Severe pulmonary hypertension        See above plan.           I spent 40 minutes caring for Lynne on this date of service. This time includes time spent by me in the following activities:preparing for the visit, reviewing tests, obtaining and/or reviewing a separately obtained history, performing a medically appropriate examination and/or evaluation , counseling and educating the patient/family/caregiver, and documenting information in the medical record    Patient was given instructions and counseling regarding her condition or for health maintenance advice. Please see specific information pulled into the AVS if appropriate.     You have chosen to receive care through a telehealth visit.  Do you consent to use a video/audio connection for your medical care today? Yes     BILL Purcell  [facility]

## 2024-08-23 NOTE — TELEPHONE ENCOUNTER
Called and spoke with Miranda  for [facility] they havent received referral for home health. Sent referral over today.

## 2024-08-26 ENCOUNTER — HOSPITAL ENCOUNTER (INPATIENT)
Facility: HOSPITAL | Age: 89
LOS: 3 days | Discharge: HOSPICE | DRG: 377 | End: 2024-08-29
Attending: STUDENT IN AN ORGANIZED HEALTH CARE EDUCATION/TRAINING PROGRAM | Admitting: STUDENT IN AN ORGANIZED HEALTH CARE EDUCATION/TRAINING PROGRAM
Payer: MEDICARE

## 2024-08-26 ENCOUNTER — TELEPHONE (OUTPATIENT)
Dept: INTERNAL MEDICINE | Facility: CLINIC | Age: 89
End: 2024-08-26
Payer: MEDICARE

## 2024-08-26 DIAGNOSIS — Z51.5 HOSPICE CARE: ICD-10-CM

## 2024-08-26 DIAGNOSIS — K92.1 MELENA: ICD-10-CM

## 2024-08-26 DIAGNOSIS — R23.4 ESCHAR OF FOOT: ICD-10-CM

## 2024-08-26 DIAGNOSIS — I50.9 CHRONIC CONGESTIVE HEART FAILURE, UNSPECIFIED HEART FAILURE TYPE: Chronic | ICD-10-CM

## 2024-08-26 DIAGNOSIS — K55.21 AVM (ARTERIOVENOUS MALFORMATION) OF SMALL BOWEL, ACQUIRED WITH HEMORRHAGE: ICD-10-CM

## 2024-08-26 DIAGNOSIS — K92.2 GASTROINTESTINAL HEMORRHAGE, UNSPECIFIED GASTROINTESTINAL HEMORRHAGE TYPE: Primary | ICD-10-CM

## 2024-08-26 DIAGNOSIS — D64.9 ANEMIA, UNSPECIFIED TYPE: ICD-10-CM

## 2024-08-26 LAB
ABO GROUP BLD: NORMAL
ALBUMIN SERPL-MCNC: 2.6 G/DL (ref 3.5–5.2)
ALBUMIN/GLOB SERPL: 0.9 G/DL
ALP SERPL-CCNC: 103 U/L (ref 39–117)
ALT SERPL W P-5'-P-CCNC: 10 U/L (ref 1–33)
ANION GAP SERPL CALCULATED.3IONS-SCNC: 9.6 MMOL/L (ref 5–15)
APTT PPP: 37.3 SECONDS (ref 23–35)
AST SERPL-CCNC: 20 U/L (ref 1–32)
BACTERIA UR QL AUTO: ABNORMAL /HPF
BASOPHILS # BLD AUTO: 0.04 10*3/MM3 (ref 0–0.2)
BASOPHILS NFR BLD AUTO: 0.3 % (ref 0–1.5)
BILIRUB SERPL-MCNC: 0.4 MG/DL (ref 0–1.2)
BILIRUB UR QL STRIP: ABNORMAL
BLD GP AB SCN SERPL QL: NEGATIVE
BUN SERPL-MCNC: 21 MG/DL (ref 8–23)
BUN/CREAT SERPL: 20.2 (ref 7–25)
CALCIUM SPEC-SCNC: 8.5 MG/DL (ref 8.6–10.5)
CHLORIDE SERPL-SCNC: 98 MMOL/L (ref 98–107)
CLARITY UR: ABNORMAL
CO2 SERPL-SCNC: 30.4 MMOL/L (ref 22–29)
COLOR UR: ABNORMAL
CREAT SERPL-MCNC: 1.04 MG/DL (ref 0.57–1)
DEPRECATED RDW RBC AUTO: 63.6 FL (ref 37–54)
EGFRCR SERPLBLD CKD-EPI 2021: 51.5 ML/MIN/1.73
EOSINOPHIL # BLD AUTO: 0.01 10*3/MM3 (ref 0–0.4)
EOSINOPHIL NFR BLD AUTO: 0.1 % (ref 0.3–6.2)
ERYTHROCYTE [DISTWIDTH] IN BLOOD BY AUTOMATED COUNT: 19.3 % (ref 12.3–15.4)
GLOBULIN UR ELPH-MCNC: 2.9 GM/DL
GLUCOSE BLDC GLUCOMTR-MCNC: 134 MG/DL (ref 70–130)
GLUCOSE SERPL-MCNC: 149 MG/DL (ref 65–99)
GLUCOSE UR STRIP-MCNC: NEGATIVE MG/DL
HCT VFR BLD AUTO: 30 % (ref 34–46.6)
HEMOCCULT STL QL: NEGATIVE
HGB BLD-MCNC: 9.1 G/DL (ref 12–15.9)
HGB UR QL STRIP.AUTO: NEGATIVE
HYALINE CASTS UR QL AUTO: ABNORMAL /LPF
IMM GRANULOCYTES # BLD AUTO: 0.15 10*3/MM3 (ref 0–0.05)
IMM GRANULOCYTES NFR BLD AUTO: 1.1 % (ref 0–0.5)
INR PPP: 1.23 (ref 0.9–1.1)
KETONES UR QL STRIP: ABNORMAL
LEUKOCYTE ESTERASE UR QL STRIP.AUTO: ABNORMAL
LYMPHOCYTES # BLD AUTO: 1.78 10*3/MM3 (ref 0.7–3.1)
LYMPHOCYTES NFR BLD AUTO: 13.4 % (ref 19.6–45.3)
MCH RBC QN AUTO: 28.5 PG (ref 26.6–33)
MCHC RBC AUTO-ENTMCNC: 30.3 G/DL (ref 31.5–35.7)
MCV RBC AUTO: 94 FL (ref 79–97)
MONOCYTES # BLD AUTO: 0.71 10*3/MM3 (ref 0.1–0.9)
MONOCYTES NFR BLD AUTO: 5.3 % (ref 5–12)
NEUTROPHILS NFR BLD AUTO: 10.61 10*3/MM3 (ref 1.7–7)
NEUTROPHILS NFR BLD AUTO: 79.8 % (ref 42.7–76)
NITRITE UR QL STRIP: POSITIVE
NRBC BLD AUTO-RTO: 0.2 /100 WBC (ref 0–0.2)
PH UR STRIP.AUTO: <=5 [PH] (ref 5–8)
PLATELET # BLD AUTO: 267 10*3/MM3 (ref 140–450)
PMV BLD AUTO: 8.8 FL (ref 6–12)
POTASSIUM SERPL-SCNC: 5.1 MMOL/L (ref 3.5–5.2)
PROT SERPL-MCNC: 5.5 G/DL (ref 6–8.5)
PROT UR QL STRIP: ABNORMAL
PROTHROMBIN TIME: 16 SECONDS (ref 12.3–15.1)
RBC # BLD AUTO: 3.19 10*6/MM3 (ref 3.77–5.28)
RBC # UR STRIP: ABNORMAL /HPF
REF LAB TEST METHOD: ABNORMAL
RH BLD: POSITIVE
SODIUM SERPL-SCNC: 138 MMOL/L (ref 136–145)
SP GR UR STRIP: 1.02 (ref 1–1.03)
SQUAMOUS #/AREA URNS HPF: ABNORMAL /HPF
T&S EXPIRATION DATE: NORMAL
UROBILINOGEN UR QL STRIP: ABNORMAL
WBC # UR STRIP: ABNORMAL /HPF
WBC NRBC COR # BLD AUTO: 13.3 10*3/MM3 (ref 3.4–10.8)

## 2024-08-26 PROCEDURE — 82948 REAGENT STRIP/BLOOD GLUCOSE: CPT

## 2024-08-26 PROCEDURE — 85025 COMPLETE CBC W/AUTO DIFF WBC: CPT | Performed by: STUDENT IN AN ORGANIZED HEALTH CARE EDUCATION/TRAINING PROGRAM

## 2024-08-26 PROCEDURE — 86900 BLOOD TYPING SEROLOGIC ABO: CPT | Performed by: STUDENT IN AN ORGANIZED HEALTH CARE EDUCATION/TRAINING PROGRAM

## 2024-08-26 PROCEDURE — 99285 EMERGENCY DEPT VISIT HI MDM: CPT

## 2024-08-26 PROCEDURE — 87077 CULTURE AEROBIC IDENTIFY: CPT | Performed by: FAMILY MEDICINE

## 2024-08-26 PROCEDURE — 82272 OCCULT BLD FECES 1-3 TESTS: CPT | Performed by: STUDENT IN AN ORGANIZED HEALTH CARE EDUCATION/TRAINING PROGRAM

## 2024-08-26 PROCEDURE — 86850 RBC ANTIBODY SCREEN: CPT | Performed by: STUDENT IN AN ORGANIZED HEALTH CARE EDUCATION/TRAINING PROGRAM

## 2024-08-26 PROCEDURE — 86901 BLOOD TYPING SEROLOGIC RH(D): CPT | Performed by: STUDENT IN AN ORGANIZED HEALTH CARE EDUCATION/TRAINING PROGRAM

## 2024-08-26 PROCEDURE — 85018 HEMOGLOBIN: CPT | Performed by: INTERNAL MEDICINE

## 2024-08-26 PROCEDURE — 85610 PROTHROMBIN TIME: CPT | Performed by: STUDENT IN AN ORGANIZED HEALTH CARE EDUCATION/TRAINING PROGRAM

## 2024-08-26 PROCEDURE — 81001 URINALYSIS AUTO W/SCOPE: CPT | Performed by: STUDENT IN AN ORGANIZED HEALTH CARE EDUCATION/TRAINING PROGRAM

## 2024-08-26 PROCEDURE — 87186 SC STD MICRODIL/AGAR DIL: CPT | Performed by: FAMILY MEDICINE

## 2024-08-26 PROCEDURE — 85730 THROMBOPLASTIN TIME PARTIAL: CPT | Performed by: STUDENT IN AN ORGANIZED HEALTH CARE EDUCATION/TRAINING PROGRAM

## 2024-08-26 PROCEDURE — 87086 URINE CULTURE/COLONY COUNT: CPT | Performed by: FAMILY MEDICINE

## 2024-08-26 PROCEDURE — 80053 COMPREHEN METABOLIC PANEL: CPT | Performed by: STUDENT IN AN ORGANIZED HEALTH CARE EDUCATION/TRAINING PROGRAM

## 2024-08-26 PROCEDURE — 99222 1ST HOSP IP/OBS MODERATE 55: CPT | Performed by: INTERNAL MEDICINE

## 2024-08-26 RX ORDER — PANTOPRAZOLE SODIUM 40 MG/10ML
40 INJECTION, POWDER, LYOPHILIZED, FOR SOLUTION INTRAVENOUS ONCE
Status: COMPLETED | OUTPATIENT
Start: 2024-08-26 | End: 2024-08-26

## 2024-08-26 RX ORDER — PANTOPRAZOLE SODIUM 40 MG/10ML
40 INJECTION, POWDER, LYOPHILIZED, FOR SOLUTION INTRAVENOUS EVERY 12 HOURS SCHEDULED
Status: DISCONTINUED | OUTPATIENT
Start: 2024-08-26 | End: 2024-08-29 | Stop reason: HOSPADM

## 2024-08-26 RX ORDER — SODIUM CHLORIDE 0.9 % (FLUSH) 0.9 %
10 SYRINGE (ML) INJECTION EVERY 12 HOURS SCHEDULED
Status: DISCONTINUED | OUTPATIENT
Start: 2024-08-26 | End: 2024-08-29 | Stop reason: HOSPADM

## 2024-08-26 RX ORDER — NITROGLYCERIN 0.4 MG/1
0.4 TABLET SUBLINGUAL
Status: DISCONTINUED | OUTPATIENT
Start: 2024-08-26 | End: 2024-08-29 | Stop reason: HOSPADM

## 2024-08-26 RX ORDER — LEVOTHYROXINE SODIUM 50 UG/1
50 TABLET ORAL
Status: DISCONTINUED | OUTPATIENT
Start: 2024-08-27 | End: 2024-08-29 | Stop reason: HOSPADM

## 2024-08-26 RX ORDER — ONDANSETRON 2 MG/ML
4 INJECTION INTRAMUSCULAR; INTRAVENOUS EVERY 6 HOURS PRN
Status: DISCONTINUED | OUTPATIENT
Start: 2024-08-26 | End: 2024-08-29 | Stop reason: HOSPADM

## 2024-08-26 RX ORDER — ACETAMINOPHEN 650 MG/1
650 SUPPOSITORY RECTAL EVERY 4 HOURS PRN
Status: DISCONTINUED | OUTPATIENT
Start: 2024-08-26 | End: 2024-08-29 | Stop reason: HOSPADM

## 2024-08-26 RX ORDER — SODIUM CHLORIDE 9 MG/ML
40 INJECTION, SOLUTION INTRAVENOUS AS NEEDED
Status: DISCONTINUED | OUTPATIENT
Start: 2024-08-26 | End: 2024-08-29 | Stop reason: HOSPADM

## 2024-08-26 RX ORDER — ACETAMINOPHEN 160 MG/5ML
650 SOLUTION ORAL EVERY 4 HOURS PRN
Status: DISCONTINUED | OUTPATIENT
Start: 2024-08-26 | End: 2024-08-29 | Stop reason: HOSPADM

## 2024-08-26 RX ORDER — SODIUM CHLORIDE 0.9 % (FLUSH) 0.9 %
10 SYRINGE (ML) INJECTION AS NEEDED
Status: DISCONTINUED | OUTPATIENT
Start: 2024-08-26 | End: 2024-08-29 | Stop reason: HOSPADM

## 2024-08-26 RX ORDER — ACETAMINOPHEN 325 MG/1
650 TABLET ORAL EVERY 4 HOURS PRN
Status: DISCONTINUED | OUTPATIENT
Start: 2024-08-26 | End: 2024-08-29 | Stop reason: HOSPADM

## 2024-08-26 RX ADMIN — Medication 10 ML: at 20:32

## 2024-08-26 RX ADMIN — PANTOPRAZOLE SODIUM 40 MG: 40 INJECTION, POWDER, FOR SOLUTION INTRAVENOUS at 14:38

## 2024-08-26 RX ADMIN — PANTOPRAZOLE SODIUM 40 MG: 40 INJECTION, POWDER, FOR SOLUTION INTRAVENOUS at 20:31

## 2024-08-26 NOTE — TELEPHONE ENCOUNTER
Caller: MANUEL BARONE    Relationship: Emergency Contact    Best call back number: [phone]     What orders are you requesting (i.e. lab or imaging): ORDERS FOR HOME HEALTHCARE    In what timeframe would the patient need to come in: AS SOON AS POSSIBLE    Where will you receive your lab/imaging services: HOME HEALTH    Additional notes: PATIENT'S DAUGHTER-IN-LAW STATES THE PATIENT'S PEREZ CATHETER NEEDS TO BE CHANGED IT HAS BEEN IN SINCE SHE'S COME HOME FROM THE HOSPITAL (OVER A MONTH AGO), SHE HAD THE SAME CATHETER IN THE HOSPITAL. SHE IS VERY CONCERNED ABOUT POSSIBLE INFECTION. THE PATIENT DOES HAVE CLOUDY URINE AND IS OUT OF IT ACCORDING TO MANUEL. SHE ALSO STATES THE PATIENT'S SKIN IS BREAKING DOWN AND IS NOT EATING VERY WELL. PATIENT IS ALSO INCONTINENT.THEY ALSO NEED TO HAVE GENERAL HOME HEALTHCARE.

## 2024-08-26 NOTE — ED PROVIDER NOTES
[facility] 3  Emergency Department Encounter  Emergency Medicine Physician Note       Pt Name: Lynne Sanchez  MRN: [id number]  Pt :   1934  Room Number:  319/1  Date of encounter:  2024  PCP: Elizabeth Easton APRN  ED Provider: Isaac Levine MD    Historian: Patient, family      HPI:  Chief Complaint: Vomiting        Context: Lynne Sanchez is a 89 y.o. female who presents to the ED for vomiting.  Patient accompanied by caretaker and family members who contribute.  They state she had an episode of coffee-ground emesis this morning.  In addition she had dark-colored stool.  She has prior history of anemia as well as GI bleeding and they were concerned for the same.  He also reports she has generally been unwell.  She has evidence of skin breakdown on the back and redness to the skin for which the caretaker has been applying Lotrimin.  No fevers.  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      PAST MEDICAL HISTORY  Past Medical History:   Diagnosis Date    Acute deep vein thrombosis of left upper extremity     Anemia     Asthma     CHF (congestive heart failure)     Chronic atrial fibrillation     Deep venous thrombosis of left upper limb     Diabetes mellitus, type 2     Diarrhea     GERD (gastroesophageal reflux disease)     Glaucoma     H/O transfusion of whole blood     Heart attack     Heart murmur     History of transfusion     Hyperlipidemia     Hypertension     Impaired functional mobility, balance, gait, and endurance     Kidney disease     patient not aware of what exact diagnosis is     Osteomyelitis     Osteomyelitis of left foot 10/03/2017    Peripheral vascular disease     Right retinal detachment     Secondary cataract of both eyes     Stable proliferative diabetic retinopathy of both eyes     Stroke     Swelling of left extremity     Urinary tract infection     Wears glasses          PAST SURGICAL HISTORY  Past Surgical History:    Procedure Laterality Date    AMPUTATION DIGIT Left 7/5/2018    Procedure: Left Great toe amputation;  Surgeon: Prakash Carrington MD;  Location:  [facility];  Service: Vascular    AMPUTATION DIGIT Left 8/27/2018    Procedure: SECOND TOE AMPUTATION DIGIT LEFT;  Surgeon: Prakash Carrington MD;  Location:  [facility];  Service: General    APPENDECTOMY      BONE MARROW ASPIRATION      CARDIAC ABLATION      CARDIAC CATHETERIZATION N/A 10/10/2017    Procedure: Peripheral angiography;  Surgeon: Kan Pelaez MD;  Location: [facility] CATH INVASIVE LOCATION;  Service:     CARDIAC CATHETERIZATION N/A 10/10/2017    Procedure: Angioplasty-peripheral;  Surgeon: Kan Pelaez MD;  Location: [facility] CATH INVASIVE LOCATION;  Service:     CARDIAC CATHETERIZATION N/A 10/10/2017    Procedure: Atherectomy-peripheral;  Surgeon: Kan Pelaez MD;  Location: [facility] CATH INVASIVE LOCATION;  Service:     CARDIAC ELECTROPHYSIOLOGY PROCEDURE N/A 5/3/2018    Procedure: generator change;  Surgeon: Zan Poole MD;  Location:  [facility] CATH INVASIVE LOCATION;  Service: Cardiovascular    CARDIOVERSION      COLONOSCOPY      ENDOSCOPY N/A 10/9/2017    Procedure: ESOPHAGOGASTRODUODENOSCOPY WITH COLD FORCEP BIOPSY;  Surgeon: Clifton Hyatt MD;  Location: [facility] ENDOSCOPY;  Service:     ENDOSCOPY N/A 3/22/2022    Procedure: ESOPHAGOGASTRODUODENOSCOPY with AVM cautery;  Surgeon: Clarisse Velez MD;  Location: [facility] ENDOSCOPY;  Service: Gastroenterology;  Laterality: N/A;    ENDOSCOPY N/A 8/4/2023    Procedure: ESOPHAGOGASTRODUODENOSCOPY WITH BIOPSY AND RUTH BRUSHING;  Surgeon: Clarisse Velez MD;  Location: [facility] ENDOSCOPY;  Service: Gastroenterology;  Laterality: N/A;    ENDOSCOPY N/A 7/26/2024    Procedure: ESOPHAGOGASTRODUODENOSCOPY WITH CONTROL OF BLEEDING WITH INJECTION OF EPINEPHRINE AND RESOLUTION CLIP PLACEMENT X1;  Surgeon: Clarisse Velez MD;  Location: [facility] ENDOSCOPY;  Service: Gastroenterology;   Laterality: N/A;    ENDOSCOPY N/A 7/27/2024    Procedure: ESOPHAGOGASTRODUODENOSCOPYwith resolution clip and APC;  Surgeon: Clarisse Velez MD;  Location: [facility] ENDOSCOPY;  Service: Gastroenterology;  Laterality: N/A;    EYE SURGERY Bilateral     CATARACTS    INTERVENTIONAL RADIOLOGY PROCEDURE N/A 10/10/2017    Procedure: Abdominal Aortagram with Runoff;  Surgeon: Kan Pelaez MD;  Location: [facility] CATH INVASIVE LOCATION;  Service:     PACEMAKER IMPLANTATION      around 2008 then replaced 2018         FAMILY HISTORY  Family History   Problem Relation Age of Onset    No Known Problems Mother     No Known Problems Father     Arthritis Other          SOCIAL HISTORY  Social History     Socioeconomic History    Marital status:     Number of children: 3   Tobacco Use    Smoking status: Never     Passive exposure: Never    Smokeless tobacco: Never   Vaping Use    Vaping status: Never Used   Substance and Sexual Activity    Alcohol use: No    Drug use: No    Sexual activity: Defer         ALLERGIES  Phenergan [promethazine hcl] and Zosyn [piperacillin-tazobactam in dex]        REVIEW OF SYSTEMS  Systems reviewed and negative      PHYSICAL EXAM    I have reviewed the triage vital signs and nursing notes.    ED Triage Vitals [08/26/24 1214]   Temp Heart Rate Resp BP SpO2   97.7 °F (36.5 °C) 70 16 121/56 96 %      Temp src Heart Rate Source Patient Position BP Location FiO2 (%)   Axillary Monitor -- -- --       Physical Exam  Constitutional:       Comments: Chronically ill-appearing   Cardiovascular:      Rate and Rhythm: Normal rate.   Pulmonary:      Effort: Pulmonary effort is normal. No respiratory distress.   Abdominal:      Palpations: Abdomen is soft.      Tenderness: There is no abdominal tenderness.   Musculoskeletal:      Cervical back: Neck supple.   Skin:     Comments: Eschar formations to the bilateral lower extremities, prior tarsal amputations on right   Neurological:      Mental  Status: She is alert.         LAB RESULTS  Recent Results (from the past 24 hour(s))   Comprehensive Metabolic Panel    Collection Time: 08/26/24 12:28 PM    Specimen: Blood   Result Value Ref Range    Glucose 149 (H) 65 - 99 mg/dL    BUN 21 8 - 23 mg/dL    Creatinine 1.04 (H) 0.57 - 1.00 mg/dL    Sodium 138 136 - 145 mmol/L    Potassium 5.1 3.5 - 5.2 mmol/L    Chloride 98 98 - 107 mmol/L    CO2 30.4 (H) 22.0 - 29.0 mmol/L    Calcium 8.5 (L) 8.6 - 10.5 mg/dL    Total Protein 5.5 (L) 6.0 - 8.5 g/dL    Albumin 2.6 (L) 3.5 - 5.2 g/dL    ALT (SGPT) 10 1 - 33 U/L    AST (SGOT) 20 1 - 32 U/L    Alkaline Phosphatase 103 39 - 117 U/L    Total Bilirubin 0.4 0.0 - 1.2 mg/dL    Globulin 2.9 gm/dL    A/G Ratio 0.9 g/dL    BUN/Creatinine Ratio 20.2 7.0 - 25.0    Anion Gap 9.6 5.0 - 15.0 mmol/L    eGFR 51.5 (L) >60.0 mL/min/1.73   Protime-INR    Collection Time: 08/26/24 12:28 PM    Specimen: Blood   Result Value Ref Range    Protime 16.0 (H) 12.3 - 15.1 Seconds    INR 1.23 (H) 0.90 - 1.10   aPTT    Collection Time: 08/26/24 12:28 PM    Specimen: Blood   Result Value Ref Range    PTT 37.3 (H) 23.0 - 35.0 seconds   CBC Auto Differential    Collection Time: 08/26/24 12:28 PM    Specimen: Blood   Result Value Ref Range    WBC 13.30 (H) 3.40 - 10.80 10*3/mm3    RBC 3.19 (L) 3.77 - 5.28 10*6/mm3    Hemoglobin 9.1 (L) 12.0 - 15.9 g/dL    Hematocrit 30.0 (L) 34.0 - 46.6 %    MCV 94.0 79.0 - 97.0 fL    MCH 28.5 26.6 - 33.0 pg    MCHC 30.3 (L) 31.5 - 35.7 g/dL    RDW 19.3 (H) 12.3 - 15.4 %    RDW-SD 63.6 (H) 37.0 - 54.0 fl    MPV 8.8 6.0 - 12.0 fL    Platelets 267 140 - 450 10*3/mm3    Neutrophil % 79.8 (H) 42.7 - 76.0 %    Lymphocyte % 13.4 (L) 19.6 - 45.3 %    Monocyte % 5.3 5.0 - 12.0 %    Eosinophil % 0.1 (L) 0.3 - 6.2 %    Basophil % 0.3 0.0 - 1.5 %    Immature Grans % 1.1 (H) 0.0 - 0.5 %    Neutrophils, Absolute 10.61 (H) 1.70 - 7.00 10*3/mm3    Lymphocytes, Absolute 1.78 0.70 - 3.10 10*3/mm3    Monocytes, Absolute 0.71 0.10 - 0.90  10*3/mm3    Eosinophils, Absolute 0.01 0.00 - 0.40 10*3/mm3    Basophils, Absolute 0.04 0.00 - 0.20 10*3/mm3    Immature Grans, Absolute 0.15 (H) 0.00 - 0.05 10*3/mm3    nRBC 0.2 0.0 - 0.2 /100 WBC   Type & Screen    Collection Time: 08/26/24  2:41 PM    Specimen: Blood   Result Value Ref Range    ABO Type O     RH type Positive     Antibody Screen Negative     T&S Expiration Date 8/29/2024 11:59:59 PM    Urinalysis With Microscopic If Indicated (No Culture) - Urine, Catheter    Collection Time: 08/26/24  4:54 PM    Specimen: Urine, Catheter   Result Value Ref Range    Color, UA Dark Yellow (A) Yellow, Straw    Appearance, UA Turbid (A) Clear    pH, UA <=5.0 5.0 - 8.0    Specific Gravity, UA 1.017 1.005 - 1.030    Glucose, UA Negative Negative    Ketones, UA Trace (A) Negative    Bilirubin, UA Small (1+) (A) Negative    Blood, UA Negative Negative    Protein, UA Trace (A) Negative    Leuk Esterase, UA Large (3+) (A) Negative    Nitrite, UA Positive (A) Negative    Urobilinogen, UA 0.2 E.U./dL 0.2 - 1.0 E.U./dL   Urinalysis, Microscopic Only - Urine, Catheter    Collection Time: 08/26/24  4:54 PM    Specimen: Urine, Catheter   Result Value Ref Range    RBC, UA 0-2 None Seen, 0-2 /HPF    WBC, UA 21-50 (A) None Seen, 0-2 /HPF    Bacteria, UA 1+ (A) None Seen /HPF    Squamous Epithelial Cells, UA 0-2 None Seen, 0-2 /HPF    Hyaline Casts, UA 3-6 None Seen /LPF    Methodology Manual Light Microscopy    Occult Blood X 1, Stool - Stool, Per Rectum    Collection Time: 08/26/24  4:55 PM    Specimen: Per Rectum; Stool   Result Value Ref Range    Fecal Occult Blood Negative Negative   Hemoglobin    Collection Time: 08/26/24  4:57 PM    Specimen: Blood   Result Value Ref Range    Hemoglobin 9.1 (L) 12.0 - 15.9 g/dL   POC Glucose Once    Collection Time: 08/26/24  5:04 PM    Specimen: Blood   Result Value Ref Range    Glucose 134 (H) 70 - 130 mg/dL   Hemoglobin    Collection Time: 08/26/24  6:15 PM    Specimen: Blood   Result  Value Ref Range    Hemoglobin 9.1 (L) 12.0 - 15.9 g/dL   Hemoglobin    Collection Time: 08/27/24 12:00 AM    Specimen: Blood   Result Value Ref Range    Hemoglobin 8.6 (L) 12.0 - 15.9 g/dL   Basic Metabolic Panel    Collection Time: 08/27/24  5:27 AM    Specimen: Blood   Result Value Ref Range    Glucose 62 (L) 65 - 99 mg/dL    BUN 19 8 - 23 mg/dL    Creatinine 0.94 0.57 - 1.00 mg/dL    Sodium 139 136 - 145 mmol/L    Potassium 4.8 3.5 - 5.2 mmol/L    Chloride 101 98 - 107 mmol/L    CO2 31.2 (H) 22.0 - 29.0 mmol/L    Calcium 8.6 8.6 - 10.5 mg/dL    BUN/Creatinine Ratio 20.2 7.0 - 25.0    Anion Gap 6.8 5.0 - 15.0 mmol/L    eGFR 58.1 (L) >60.0 mL/min/1.73   CBC Auto Differential    Collection Time: 08/27/24  5:27 AM    Specimen: Blood   Result Value Ref Range    WBC 10.78 3.40 - 10.80 10*3/mm3    RBC 2.92 (L) 3.77 - 5.28 10*6/mm3    Hemoglobin 8.4 (L) 12.0 - 15.9 g/dL    Hematocrit 27.7 (L) 34.0 - 46.6 %    MCV 94.9 79.0 - 97.0 fL    MCH 28.8 26.6 - 33.0 pg    MCHC 30.3 (L) 31.5 - 35.7 g/dL    RDW 19.1 (H) 12.3 - 15.4 %    RDW-SD 63.2 (H) 37.0 - 54.0 fl    MPV 8.9 6.0 - 12.0 fL    Platelets 241 140 - 450 10*3/mm3    Neutrophil % 73.3 42.7 - 76.0 %    Lymphocyte % 18.2 (L) 19.6 - 45.3 %    Monocyte % 6.1 5.0 - 12.0 %    Eosinophil % 0.8 0.3 - 6.2 %    Basophil % 0.4 0.0 - 1.5 %    Immature Grans % 1.2 (H) 0.0 - 0.5 %    Neutrophils, Absolute 7.90 (H) 1.70 - 7.00 10*3/mm3    Lymphocytes, Absolute 1.96 0.70 - 3.10 10*3/mm3    Monocytes, Absolute 0.66 0.10 - 0.90 10*3/mm3    Eosinophils, Absolute 0.09 0.00 - 0.40 10*3/mm3    Basophils, Absolute 0.04 0.00 - 0.20 10*3/mm3    Immature Grans, Absolute 0.13 (H) 0.00 - 0.05 10*3/mm3    nRBC 0.2 0.0 - 0.2 /100 WBC   Protime-INR    Collection Time: 08/27/24  5:27 AM    Specimen: Blood   Result Value Ref Range    Protime 15.9 (H) 12.3 - 15.1 Seconds    INR 1.22 (H) 0.90 - 1.10   aPTT    Collection Time: 08/27/24  5:27 AM    Specimen: Blood   Result Value Ref Range    PTT 35.9 (H)  23.0 - 35.0 seconds       If labs were ordered, I independently reviewed the results and considered them in treating the patient.        RADIOLOGY  No Radiology Exams Resulted Within Past 24 Hours    PROCEDURES    Procedures    No orders to display       MEDICATIONS GIVEN IN ER    Medications   levothyroxine (SYNTHROID, LEVOTHROID) tablet 50 mcg (50 mcg Oral Given 8/27/24 0510)   sodium chloride 0.9 % flush 10 mL (10 mL Intravenous Given 8/26/24 2032)   sodium chloride 0.9 % flush 10 mL (has no administration in time range)   sodium chloride 0.9 % infusion 40 mL (has no administration in time range)   ondansetron (ZOFRAN) injection 4 mg (has no administration in time range)   nitroglycerin (NITROSTAT) SL tablet 0.4 mg (has no administration in time range)   acetaminophen (TYLENOL) tablet 650 mg (has no administration in time range)     Or   acetaminophen (TYLENOL) 160 MG/5ML oral solution 650 mg (has no administration in time range)     Or   acetaminophen (TYLENOL) suppository 650 mg (has no administration in time range)   melatonin tablet 5 mg (has no administration in time range)   pantoprazole (PROTONIX) injection 40 mg (40 mg Intravenous Given 8/26/24 2031)   pantoprazole (PROTONIX) injection 40 mg (40 mg Intravenous Given 8/26/24 1438)         MEDICAL DECISION MAKING, PROGRESS, and CONSULTS    All labs, if obtained, have been independently reviewed by me.  All radiology studies, if obtained, have been reviewed by me and the radiologist dictating the report.  All EKG's, if obtained, have been independently viewed and interpreted by me.      Discussion below represents my analysis of pertinent findings related to patient's condition, differential diagnosis, treatment plan and final disposition.                         Differential diagnosis:    Upper GI bleeding, lower GI bleeding, skin ulcer, anemia, coagulopathy, UTI, others.      Additional sources:    - Discussed/ obtained information from independent  historians: Family members at bedside, caretaker    - External (non-ED) record review: Outpatient progress note 8/23/2024    - Chronic or social conditions impacting care: Chronic debility, impaired mobility    - Shared decision making:        Orders placed during this visit:  Orders Placed This Encounter   Procedures    Comprehensive Metabolic Panel    Protime-INR    aPTT    Occult Blood X 1, Stool - Stool, Per Rectum    CBC Auto Differential    Urinalysis With Microscopic If Indicated (No Culture) - Urine, Catheter    Basic Metabolic Panel    CBC Auto Differential    Hemoglobin    Protime-INR    aPTT    Urinalysis, Microscopic Only - Urine, Clean Catch    NPO Diet NPO Type: Strict NPO    Replace Current Indwelling Urinary Catheter Prior to Collecting Urine Specimen    Assess Need for Indwelling Urinary Catheter - Follow Removal Protocol    Urinary Catheter Care    Vital Signs    Intake & Output    Weigh Patient    Oral Care    Place Sequential Compression Device    Maintain Sequential Compression Device    Maintain IV Access    Telemetry - Place Orders & Notify Provider of Results When Patient Experiences Acute Chest Pain, Dysrhythmia or Respiratory Distress    May Be Off Telemetry for Tests    Continuous Pulse Oximetry    Up With Assistance    Vital Signs    Strict Intake & Output    Daily Weights    Notify Provider of Gross GI Bleeding    Verify Informed Consent for Blood Product Administration    Code Status and Medical Interventions: No CPR (Do Not Attempt to Resuscitate); Limited Support; No intubation (DNI)    Inpatient Gastroenterology Consult    Inpatient Palliative Care Team Consult    Incentive Spirometry    POC Glucose Once    Type & Screen    Insert Peripheral IV    Inpatient Admission    CBC & Differential         Additional orders considered but not ordered:      ED Course/MDM Discussion:    Patient is a 89-year-old female who presented for the evaluation of nausea and vomiting with coffee-ground  emesis and dark stools.  Chart reviewed patient was seen last month for similar complaints.  Discharge summary of 7/31/2024 reviewed.  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  She was on Eliquis this was held.  On arrival to the emergency department she is hemodynamically stable in no acute distress.  No gross melena on examination.  She has chronic indwelling Main catheter.  This was exchanged.  Urine sample pending.  Family concerned for UTI.  Hemoglobin is 9.1 which is stable from previous.  BUN 21.  IV Protonix was administered.  GI was consulted.  Plan for hospital medicine admission for serial H&H monitoring possible EGD.  Discussed with family at bedside.                    Consultants:    Hospitalist  GI Dr. Rivera        AS OF 08:17 EDT VITALS:    BP - 97/41  HR - 70  TEMP - 97.6 °F (36.4 °C) (Oral)  O2 SATS - 99%                  DIAGNOSIS  Final diagnoses:   Eschar of foot   Gastrointestinal hemorrhage, unspecified gastrointestinal hemorrhage type   Anemia, unspecified type         DISPOSITION  ED Disposition       ED Disposition   Decision to Admit    Condition   --    Comment   Level of Care: Telemetry [5]   Diagnosis: GIB (gastrointestinal bleeding) [199523]   Certification: I Certify That Inpatient Hospital Services Are Medically Necessary For Greater Than 2 Midnights                     Please note that portions of this document were completed with voice recognition software.        Isaac Levine MD  08/27/24 8779

## 2024-08-26 NOTE — CONSULTS
Should be on an IV PPI.  N.p.o. midnight.  Clear liquid diet for now.  Plan for enteroscopy tomorrow with MAC.  If there is any overt significant hematemesis, GI blood loss overnight, may need more urgent endoscopy.  Full consult to follow.

## 2024-08-26 NOTE — H&P
[facility]   [facility]      Name:  Lynne Sanchez   Age:  89 y.o.  Sex:  female  :  1934  MRN:  [id number]   Visit Number:  [id number]  Admission Date:  2024  Date Of Service:  24  Primary Care Physician:  Elizabeth Easton APRN    Chief Complaint:     Vomiting with coffee-ground emesis    History Of Presenting Illness:      Patient is a chronically ill 89-year-old female with history significant for severe pulmonary hypertension, CKD stage III, type 2 diabetes, sick sinus syndrome status post pacemaker and previous upper GI bleed who presents to the emergency room from home due to progressive nausea, vomiting with coffee-ground emesis.  Patient extremely lethargic and confused on my exam.  History provided by her sons at bedside.  States that she has been unable to eat anything over the past 2 days.  Noticed that she was vomiting today, first appeared bilious and then coffee-ground emesis.  Denies any recent falls, fever, complaints of abdominal pain or diarrhea.  Patient has a history of chronic iron deficiency anemia and receives outpatient PRBC and iron transfusions.  She was treated at our facility  - 2024 for multiple complaints.  While hospitalized, patient developed upper GI bleeding.  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  Patient was ultimately discharged home with hospice care.  However, family has elected to dismiss hospice .  Confirmed that patient remains DNR/DNI.    ED summary: Patient afebrile, hemodynamically stable and nonhypoxic upon arrival.  Creatinine 1.04 (improved from baseline of 1.5), BUN 21, glucose 149, and albumin 2.6.  PT PTT elevated, INR 1.23.  WBC 13.  Hemoglobin 9.1 hematocrit 30.  Platelets normal.  With concern for GI bleed given recent history of bleeding, hospitalist asked to admit.  GI contacted, Dr Salinas agreed to consult with plans for endoscopy tomorrow.      Review Of Systems:    All systems were reviewed and negative except as mentioned in history of presenting illness, assessment and plan.    Past Medical History: Patient  has a past medical history of Acute deep vein thrombosis of left upper extremity, Anemia, Asthma, CHF (congestive heart failure), Chronic atrial fibrillation, Deep venous thrombosis of left upper limb, Diabetes mellitus, type 2, Diarrhea, GERD (gastroesophageal reflux disease), Glaucoma, H/O transfusion of whole blood, Heart attack, Heart murmur, History of transfusion, Hyperlipidemia, Hypertension, Impaired functional mobility, balance, gait, and endurance, Kidney disease, Osteomyelitis, Osteomyelitis of left foot (10/03/2017), Peripheral vascular disease, Right retinal detachment, Secondary cataract of both eyes, Stable proliferative diabetic retinopathy of both eyes, Stroke, Swelling of left extremity, Urinary tract infection, and Wears glasses.    Past Surgical History: Patient  has a past surgical history that includes Appendectomy; Esophagogastroduodenoscopy (N/A, 10/9/2017); Cardiac catheterization (N/A, 10/10/2017); Interventional radiology procedure (N/A, 10/10/2017); Cardiac catheterization (N/A, 10/10/2017); Cardiac catheterization (N/A, 10/10/2017); Colonoscopy; Cardioversion; Cardiac Ablation; Pacemaker Implantation; Bone marrow aspiration; Cardiac electrophysiology procedure (N/A, 5/3/2018); Finger amputation (Left, 7/5/2018); Eye surgery (Bilateral); Finger amputation (Left, 8/27/2018); Esophagogastroduodenoscopy (N/A, 3/22/2022); Esophagogastroduodenoscopy (N/A, 8/4/2023); Esophagogastroduodenoscopy (N/A, 7/26/2024); and Esophagogastroduodenoscopy (N/A, 7/27/2024).    Social History: Patient  reports that she has never smoked. She has never been exposed to tobacco smoke. She has never used smokeless tobacco. She reports that she does not drink alcohol and does not use drugs.    Family History:  Patient's family history has been  reviewed and found to be noncontributory.     Allergies:      Phenergan [promethazine hcl] and Zosyn [piperacillin-tazobactam in dex]    Home Medications:    Prior to Admission Medications       Prescriptions Last Dose Informant Patient Reported? Taking?    acetaminophen (TYLENOL) 650 MG 8 hr tablet   Yes No    Take 1 tablet by mouth Every 8 (Eight) Hours As Needed for Mild Pain or Moderate Pain. Indications: Pain    epoetin dorcas-epbx (Retacrit) 38079 UNIT/ML injection   Yes No    Inject 1 mL under the skin into the appropriate area as directed. 1 ml sq per month on day 17  Indications: Anemia, Anemia associated with Chronic Kidney Failure    furosemide (LASIX) 20 MG tablet   No No    Take 1 tablet by mouth Daily. Indications: Cardiac Failure    glimepiride (AMARYL) 1 MG tablet   Yes No    glucose blood test strip   No No    Test daily.    glucose monitor monitoring kit   No No    Use 1 each Daily.    HYDROcodone-acetaminophen (NORCO) 5-325 MG per tablet   No No    Take 1 tablet by mouth Every 4 (Four) Hours As Needed for Severe Pain. Indications: Pain    Patient not taking:  Reported on 8/23/2024    levothyroxine (SYNTHROID, LEVOTHROID) 50 MCG tablet   No No    Take 1 tablet by mouth Every Morning. Indications: Underactive Thyroid    Patient taking differently:  Take 1 tablet by mouth Every Morning.    metoprolol tartrate (LOPRESSOR) 100 MG tablet   No No    Take 1 tablet by mouth 2 (Two) Times a Day. Indications: High Blood Pressure Disorder    multivitamin (THERAGRAN) tablet tablet   Yes No    Take 1 tablet by mouth Daily.    naloxone (NARCAN) 4 MG/0.1ML nasal spray   No No    Call 911. Don't prime. [place] in 1 nostril for overdose. Repeat in 2-3 minutes in other nostril if no or minimal breathing/responsiveness.    Patient not taking:  Reported on 8/23/2024    ondansetron ODT (ZOFRAN-ODT) 4 MG disintegrating tablet   No No    Place 1 tablet on the tongue Every 8 (Eight) Hours As Needed for Nausea or Vomiting.     "pantoprazole (PROTONIX) 40 MG EC tablet   No No    Take 1 tablet by mouth Daily. Indications: Gastroesophageal Reflux Disease    Patient taking differently:  Take 1 tablet by mouth Daily.    sennosides-docusate (PERICOLACE) 8.6-50 MG per tablet   No No    Take 2 tablets by mouth 2 (Two) Times a Day for 30 days.          ED Medications:    Medications   pantoprazole (PROTONIX) injection 40 mg (has no administration in time range)   pantoprazole (PROTONIX) injection 40 mg (40 mg Intravenous Given 8/26/24 1438)     Vital Signs:  Temp:  [97.7 °F (36.5 °C)] 97.7 °F (36.5 °C)  Heart Rate:  [70] 70  Resp:  [16] 16  BP: ()/(56-70) 90/70        08/26/24  1214   Weight: 55.8 kg (123 lb 0.3 oz)     Body mass index is 19.86 kg/m².    Physical Exam:     Most recent vital Signs: BP 90/70   Pulse 70   Temp 97.7 °F (36.5 °C) (Axillary)   Resp 16   Ht 167.6 cm (66\")   Wt 55.8 kg (123 lb 0.3 oz)   SpO2 99%   BMI 19.86 kg/m²     Physical Exam  Vitals reviewed.   Constitutional:       Appearance: She is ill-appearing.      Comments: Frail, chronically ill-appearing elderly female.   HENT:      Head: Normocephalic and atraumatic.      Right Ear: External ear normal.      Left Ear: External ear normal.      Mouth/Throat:      Mouth: Mucous membranes are dry.      Pharynx: Oropharynx is clear.   Eyes:      Extraocular Movements: Extraocular movements intact.      Conjunctiva/sclera: Conjunctivae normal.   Cardiovascular:      Rate and Rhythm: Normal rate and regular rhythm.      Heart sounds: Murmur heard.   Pulmonary:      Effort: Pulmonary effort is normal.      Breath sounds: Normal breath sounds.   Abdominal:      General: Bowel sounds are normal. There is no distension.      Palpations: Abdomen is soft.      Tenderness: There is no abdominal tenderness.   Skin:     Comments: Edema, venous stasis   Neurological:      Mental Status: She is disoriented.      Comments: Patient thought her son was her ; very drowsy " "        Laboratory data:    I have reviewed the labs done in the emergency room.    Results from last 7 days   Lab Units 08/26/24  1228   SODIUM mmol/L 138   POTASSIUM mmol/L 5.1   CHLORIDE mmol/L 98   CO2 mmol/L 30.4*   BUN mg/dL 21   CREATININE mg/dL 1.04*   CALCIUM mg/dL 8.5*   BILIRUBIN mg/dL 0.4   ALK PHOS U/L 103   ALT (SGPT) U/L 10   AST (SGOT) U/L 20   GLUCOSE mg/dL 149*     Results from last 7 days   Lab Units 08/26/24  1228 08/22/24  1509   WBC 10*3/mm3 13.30*  --    HEMOGLOBIN g/dL 9.1* 9.8*   HEMATOCRIT % 30.0* 31.8*   PLATELETS 10*3/mm3 267  --      Results from last 7 days   Lab Units 08/26/24  1228   INR  1.23*                               Invalid input(s): \"USDES\", \"NITRITITE\", \"BACT\", \"EP\"    Pain Management Panel  More data may exist         5/17/2023 4/19/2021   Pain Management Panel   Creatinine, Urine 50  76.7       Details                   EKG:          Radiology:    No radiology results for the last 3 days    Assessment:    GI bleed, suspect upper   CKD stage III  Type 2 diabetes with nephropathy  Severe pulmonary hypertension  Sick sinus syndrome status post pacemaker  Hypertension  Gout  Hypothyroidism  Impaired mobility and ADL    Plan:    Suspect upper GI bleed  H&H every 6 hours: Currently anemia is baseline.  No indication for PRBC.  Will transfuse for goal greater than 7.  GI consulted, Dr. Salinas  N.p.o.   Possible endoscopy in the morning  IV Protonix 40 mg twice daily    Patient has multiple chronic comorbidities which will complicate overall prognosis and plan of care.  Confirmed with her sons that she remains DNR.    Risk Assessment: High  DVT Prophylaxis: SCD  Code Status: DNR/DNI  Diet: N.p.o.             Jaswinder Andrews DO  08/26/24  15:10 EDT    Dictated utilizing Dragon dictation.    "

## 2024-08-26 NOTE — CASE MANAGEMENT/SOCIAL WORK
Discharge Planning Assessment  [facility]     Patient Name: Lynne Sanchez  MRN: [id number]  Today's Date: 8/26/2024    Admit Date: 8/26/2024    Plan: Patient is unable to answer questions at this time. Patient's healthcare surrogate, Marco A Sanchez, is at [facility] and is able to assist with assessment.  She is a current patient of clari Easton and gets her medications from [facility].  She has a wheelchair, hospital bed, and bedside commode at home.  The patient was recently a patient with [facility], but family was unhappy that some of her medications were DC'd so they decided to stop Hospice services.  They were looking into home health options but were told that HH wouldn't manage the patient's FC.  The family is asking for Palliative care at home for the patient.   Discharge Needs Assessment       Row Name 08/26/24 1735       Living Environment    People in Home spouse    Name(s) of People in Home Brennan Sanchez,     Unique Family Situation Patient has in home care giver 8 hours per day for 5 days in the week    Current Living Arrangements home    Potentially Unsafe Housing Conditions none    In the past 12 months has the electric, gas, oil, or water company threatened to shut off services in your home? Pt Unable    Primary Care Provided by spouse/significant other;child(cathie);other (see comments)  In-home care giver    Provides Primary Care For no one, unable/limited ability to care for self    Family Caregiver if Needed none    Quality of Family Relationships helpful;involved;supportive    Able to Return to Prior Arrangements yes       Resource/Environmental Concerns    Resource/Environmental Concerns none    Transportation Concerns none       Transportation Needs    In the past 12 months, has lack of transportation kept you from medical appointments or from getting medications? Pt Unable    In the past 12 months, has lack of transportation kept you from meetings, work, or from getting things needed for  daily living? Pt Unable       Food Insecurity    Within the past 12 months, you worried that your food would run out before you got the money to buy more. Pt Unable    Within the past 12 months, the food you bought just didn't last and you didn't have money to get more. Pt Unable       Transition Planning    Patient/Family Anticipates Transition to home with help/services    Patient/Family Anticipated Services at Transition other (see comments)  palliative care    Transportation Anticipated other (see comments)  EMS       Discharge Needs Assessment    Readmission Within the Last 30 Days current reason for admission unrelated to previous admission    Equipment Currently Used at Home wheelchair;hospital bed;commode    Concerns to be Addressed discharge planning    Anticipated Changes Related to Illness none    Equipment Needed After Discharge none    Outpatient/Agency/Support Group Needs other (see comments)  palliative care    Discharge Facility/Level of Care Needs other (see comments)  home with palliative care    Current Discharge Risk chronically ill;dependent with mobility/activities of daily living;physical impairment                   Discharge Plan       Row Name 08/26/24 7051       Plan    Plan Patient is unable to answer questions at this time. Patient's healthcare surrogate, Marco A Sanchez, is at [facility] and is able to assist with assessment.  She is a current patient of clari Easton and gets her medications from [facility].  She has a wheelchair, hospital bed, and bedside commode at home.  The patient was recently a patient with [facility], but family was unhappy that some of her medications were DC'd so they decided to stop Hospice services.  They were looking into home health options but were told that HH wouldn't manage the patient's FC.  The family is asking for Palliative care at home for the patient.    Patient/Family in Agreement with Plan yes    Plan Comments Family is requesting palliative care at  home    Final Discharge Disposition Code 01 - home or self-care    Final Note Family is requesting palliative care at home                  Continued Care and Services - Admitted Since 8/26/2024    No active coordination exists for this encounter.       Selected Continued Care - Prior Encounters Includes continued care and service providers with selected services from prior encounters from 5/28/2024 to 8/26/2024      Discharged on 7/31/2024 Admission date: 7/22/2024 - Discharge disposition: Hospice/Home      Home Medical Care       Service Provider Selected Services Address Phone Fax Patient Preferred    [facility] [address] [facility] [address] [facility] [address] [phone] --                      Discharged on 6/25/2024 Admission date: 6/21/2024 - Discharge disposition: [place] [facility]      [place]       Service Provider Selected Services Address Phone Fax Patient Preferred    [facility] [address] [address] [phone] --       Internal Comment last updated by Brionna Schaefer RN 6/24/2024 1326    Current with services                                        Demographic Summary       Row Name 08/26/24 1722       General Information    Admission Type inpatient    Arrived From emergency department    Required Notices Provided Important Message from Medicare    Referral Source admission list    Reason for Consult discharge planning    Preferred Language English       Contact Information    Contact Information Comments Patient is unable to answer questions at this time.  Patient's healthcare surrogate, Marco A Sanchez, is at [facility] and is able to assist with assessment.                   Functional Status       Row Name 08/26/24 172       Functional Status    Usual Activity Tolerance fair    Current Activity Tolerance poor       Physical Activity    On average, how many days per week do you engage in  moderate to strenuous exercise (like a brisk walk)? 0 days    On average, how many minutes do you engage in exercise at this level? 0 min    Number of minutes of exercise per week 0       Assessment of Health Literacy    How often do you have someone help you read hospital materials? --  Patient is unable to answer questions at this time. [facility] able to assist with assessment.       Functional Status, IADL    Medications completely dependent    Meal Preparation completely dependent    Housekeeping completely dependent    Laundry completely dependent    Shopping completely dependent    IADL Comments Patient has had an overall decline in physical functioning in the last few months       Mental Status    General Appearance WDL WDL       Mental Status Summary    Recent Changes in Mental Status/Cognitive Functioning unable to assess    Mental Status Comments Patient is unable to answer questions at this time. Patient's healthcare surrogate, Marco A Sanchez, is at [facility] and is able to assist with assessment.                   Psychosocial    No documentation.                  Abuse/Neglect       Row Name 08/26/24 1724       Personal Safety    Feels Unsafe at Home or Work/School unable to answer (comment required)    Feels Threatened by Someone unable to answer (comment required)    Does Anyone Try to Keep You From Having Contact with Others or Doing Things Outside Your Home? unable to answer (comment required)    Physical Signs of Abuse Present no                   Legal       Row Name 08/26/24 1724       Financial Resource Strain    How hard is it for you to pay for the very basics like food, housing, medical care, and heating? Pt Unable                   Substance Abuse       Row Name 08/26/24 1724       Substance Use    Substance Use Status never used                   Patient Forms       Row Name 08/26/24 7063       Patient Forms    Important Message from  Medicare ([facility]) Delivered    Delivered to Support person  Alex Moyer    Method of delivery In person                      Anajli Rosario RN

## 2024-08-26 NOTE — TELEPHONE ENCOUNTER
SOPHIA: Spoke with Nikole, advised that referral was faxed on Friday. States that as far as she knows catheter has not been flushed or checked since hospital stay. Patient does not have a fever at this time. Will monitor for fever or change in mental status.

## 2024-08-27 ENCOUNTER — APPOINTMENT (OUTPATIENT)
Dept: GENERAL RADIOLOGY | Facility: HOSPITAL | Age: 89
DRG: 377 | End: 2024-08-27
Payer: MEDICARE

## 2024-08-27 LAB
ANION GAP SERPL CALCULATED.3IONS-SCNC: 6.8 MMOL/L (ref 5–15)
APTT PPP: 35.9 SECONDS (ref 23–35)
BASOPHILS # BLD AUTO: 0.04 10*3/MM3 (ref 0–0.2)
BASOPHILS NFR BLD AUTO: 0.4 % (ref 0–1.5)
BUN SERPL-MCNC: 19 MG/DL (ref 8–23)
BUN/CREAT SERPL: 20.2 (ref 7–25)
CALCIUM SPEC-SCNC: 8.6 MG/DL (ref 8.6–10.5)
CHLORIDE SERPL-SCNC: 101 MMOL/L (ref 98–107)
CO2 SERPL-SCNC: 31.2 MMOL/L (ref 22–29)
CREAT SERPL-MCNC: 0.94 MG/DL (ref 0.57–1)
DEPRECATED RDW RBC AUTO: 63.2 FL (ref 37–54)
EGFRCR SERPLBLD CKD-EPI 2021: 58.1 ML/MIN/1.73
EOSINOPHIL # BLD AUTO: 0.09 10*3/MM3 (ref 0–0.4)
EOSINOPHIL NFR BLD AUTO: 0.8 % (ref 0.3–6.2)
ERYTHROCYTE [DISTWIDTH] IN BLOOD BY AUTOMATED COUNT: 19.1 % (ref 12.3–15.4)
GLUCOSE BLDC GLUCOMTR-MCNC: 65 MG/DL (ref 70–130)
GLUCOSE BLDC GLUCOMTR-MCNC: 84 MG/DL (ref 70–130)
GLUCOSE SERPL-MCNC: 62 MG/DL (ref 65–99)
HCT VFR BLD AUTO: 27.7 % (ref 34–46.6)
HGB BLD-MCNC: 8.4 G/DL (ref 12–15.9)
HGB BLD-MCNC: 8.6 G/DL (ref 12–15.9)
IMM GRANULOCYTES # BLD AUTO: 0.13 10*3/MM3 (ref 0–0.05)
IMM GRANULOCYTES NFR BLD AUTO: 1.2 % (ref 0–0.5)
INR PPP: 1.22 (ref 0.9–1.1)
LYMPHOCYTES # BLD AUTO: 1.96 10*3/MM3 (ref 0.7–3.1)
LYMPHOCYTES NFR BLD AUTO: 18.2 % (ref 19.6–45.3)
MCH RBC QN AUTO: 28.8 PG (ref 26.6–33)
MCHC RBC AUTO-ENTMCNC: 30.3 G/DL (ref 31.5–35.7)
MCV RBC AUTO: 94.9 FL (ref 79–97)
MONOCYTES # BLD AUTO: 0.66 10*3/MM3 (ref 0.1–0.9)
MONOCYTES NFR BLD AUTO: 6.1 % (ref 5–12)
NEUTROPHILS NFR BLD AUTO: 7.9 10*3/MM3 (ref 1.7–7)
NEUTROPHILS NFR BLD AUTO: 73.3 % (ref 42.7–76)
NRBC BLD AUTO-RTO: 0.2 /100 WBC (ref 0–0.2)
PLATELET # BLD AUTO: 241 10*3/MM3 (ref 140–450)
PMV BLD AUTO: 8.9 FL (ref 6–12)
POTASSIUM SERPL-SCNC: 4.8 MMOL/L (ref 3.5–5.2)
PROTHROMBIN TIME: 15.9 SECONDS (ref 12.3–15.1)
RBC # BLD AUTO: 2.92 10*6/MM3 (ref 3.77–5.28)
SODIUM SERPL-SCNC: 139 MMOL/L (ref 136–145)
WBC NRBC COR # BLD AUTO: 10.78 10*3/MM3 (ref 3.4–10.8)

## 2024-08-27 PROCEDURE — 71045 X-RAY EXAM CHEST 1 VIEW: CPT

## 2024-08-27 PROCEDURE — 85730 THROMBOPLASTIN TIME PARTIAL: CPT | Performed by: INTERNAL MEDICINE

## 2024-08-27 PROCEDURE — 85025 COMPLETE CBC W/AUTO DIFF WBC: CPT | Performed by: INTERNAL MEDICINE

## 2024-08-27 PROCEDURE — 85018 HEMOGLOBIN: CPT | Performed by: INTERNAL MEDICINE

## 2024-08-27 PROCEDURE — 25010000002 MORPHINE PER 10 MG: Performed by: FAMILY MEDICINE

## 2024-08-27 PROCEDURE — 85610 PROTHROMBIN TIME: CPT | Performed by: INTERNAL MEDICINE

## 2024-08-27 PROCEDURE — 80048 BASIC METABOLIC PNL TOTAL CA: CPT | Performed by: INTERNAL MEDICINE

## 2024-08-27 PROCEDURE — 99232 SBSQ HOSP IP/OBS MODERATE 35: CPT | Performed by: FAMILY MEDICINE

## 2024-08-27 PROCEDURE — 99223 1ST HOSP IP/OBS HIGH 75: CPT | Performed by: INTERNAL MEDICINE

## 2024-08-27 PROCEDURE — 25010000002 CEFTRIAXONE PER 250 MG: Performed by: FAMILY MEDICINE

## 2024-08-27 PROCEDURE — 82948 REAGENT STRIP/BLOOD GLUCOSE: CPT

## 2024-08-27 RX ORDER — POLYETHYLENE GLYCOL 3350 17 G/17G
17 POWDER, FOR SOLUTION ORAL DAILY PRN
Status: DISCONTINUED | OUTPATIENT
Start: 2024-08-27 | End: 2024-08-29 | Stop reason: HOSPADM

## 2024-08-27 RX ORDER — SODIUM CHLORIDE, SODIUM LACTATE, POTASSIUM CHLORIDE, CALCIUM CHLORIDE 600; 310; 30; 20 MG/100ML; MG/100ML; MG/100ML; MG/100ML
1000 INJECTION, SOLUTION INTRAVENOUS CONTINUOUS
OUTPATIENT
Start: 2024-08-27

## 2024-08-27 RX ORDER — MORPHINE SULFATE 2 MG/ML
1 INJECTION, SOLUTION INTRAMUSCULAR; INTRAVENOUS ONCE AS NEEDED
Status: COMPLETED | OUTPATIENT
Start: 2024-08-27 | End: 2024-08-27

## 2024-08-27 RX ORDER — BISACODYL 5 MG/1
5 TABLET, DELAYED RELEASE ORAL DAILY PRN
Status: DISCONTINUED | OUTPATIENT
Start: 2024-08-27 | End: 2024-08-29 | Stop reason: HOSPADM

## 2024-08-27 RX ORDER — HYDROCODONE BITARTRATE AND ACETAMINOPHEN 5; 325 MG/1; MG/1
1 TABLET ORAL EVERY 8 HOURS PRN
Status: DISCONTINUED | OUTPATIENT
Start: 2024-08-27 | End: 2024-08-29 | Stop reason: HOSPADM

## 2024-08-27 RX ORDER — BISACODYL 10 MG
10 SUPPOSITORY, RECTAL RECTAL DAILY PRN
Status: DISCONTINUED | OUTPATIENT
Start: 2024-08-27 | End: 2024-08-29 | Stop reason: HOSPADM

## 2024-08-27 RX ORDER — AMOXICILLIN 250 MG
2 CAPSULE ORAL 2 TIMES DAILY PRN
Status: DISCONTINUED | OUTPATIENT
Start: 2024-08-27 | End: 2024-08-29 | Stop reason: HOSPADM

## 2024-08-27 RX ORDER — NICOTINE POLACRILEX 4 MG
1 LOZENGE BUCCAL
Status: DISCONTINUED | OUTPATIENT
Start: 2024-08-27 | End: 2024-08-29 | Stop reason: HOSPADM

## 2024-08-27 RX ORDER — SODIUM CHLORIDE 0.9 % (FLUSH) 0.9 %
10 SYRINGE (ML) INJECTION AS NEEDED
OUTPATIENT
Start: 2024-08-27

## 2024-08-27 RX ORDER — DEXTROSE MONOHYDRATE 25 G/50ML
25 INJECTION, SOLUTION INTRAVENOUS
Status: DISCONTINUED | OUTPATIENT
Start: 2024-08-27 | End: 2024-08-29 | Stop reason: HOSPADM

## 2024-08-27 RX ADMIN — MORPHINE SULFATE 1 MG: 2 INJECTION, SOLUTION INTRAMUSCULAR; INTRAVENOUS at 10:24

## 2024-08-27 RX ADMIN — Medication 10 ML: at 20:02

## 2024-08-27 RX ADMIN — PANTOPRAZOLE SODIUM 40 MG: 40 INJECTION, POWDER, FOR SOLUTION INTRAVENOUS at 20:02

## 2024-08-27 RX ADMIN — Medication 10 ML: at 09:01

## 2024-08-27 RX ADMIN — HYDROCODONE BITARTRATE AND ACETAMINOPHEN 1 TABLET: 5; 325 TABLET ORAL at 14:17

## 2024-08-27 RX ADMIN — PANTOPRAZOLE SODIUM 40 MG: 40 INJECTION, POWDER, FOR SOLUTION INTRAVENOUS at 09:01

## 2024-08-27 RX ADMIN — LEVOTHYROXINE SODIUM 50 MCG: 50 TABLET ORAL at 05:10

## 2024-08-27 RX ADMIN — CEFTRIAXONE 1000 MG: 1 INJECTION, POWDER, FOR SOLUTION INTRAMUSCULAR; INTRAVENOUS at 11:55

## 2024-08-27 NOTE — PROGRESS NOTES
[facility]    Name:  Lynne Sanchez   Age:  89 y.o.  Sex:  female  :  1934  MRN:  [id number]   Visit Number:  [id number]  Admission Date:  2024  Date Of Service:  24  Primary Care Physician:  Elizabeth Easton APRN     LOS: 1 day :    Chief Complaint:      Vomiting with coffee-ground emesis     Subjective:    Patient was seen and examined bedside today.  Nursing at bedside helping the patient.  Patient appears AO x 2 otherwise disoriented.  Patient reports hurting all over especially on her bottom.  Patient is n.p.o. for planned EGD later today.  Vitals are stable, afebrile on room air.  No family at bedside.    Hospital Course:    Patient is a chronically ill 89-year-old female with history significant for severe pulmonary hypertension, CKD stage III, type 2 diabetes, sick sinus syndrome status post pacemaker and previous upper GI bleed who presents to the emergency room from home due to progressive nausea, vomiting with coffee-ground emesis.  Patient extremely lethargic and confused on my exam.  History provided by her sons at bedside.  States that she has been unable to eat anything over the past 2 days.  Noticed that she was vomiting today, first appeared bilious and then coffee-ground emesis.  Denies any recent falls, fever, complaints of abdominal pain or diarrhea.    Patient has a history of chronic iron deficiency anemia and receives outpatient PRBC and iron transfusions.  She was treated at our facility  - 2024 for multiple complaints.  While hospitalized, patient developed upper GI bleeding.  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  Patient was ultimately discharged home with hospice care.  However, family has elected to dismiss hospice .  Confirmed that patient remains DNR/DNI.     ED summary: Patient afebrile, hemodynamically stable and nonhypoxic upon arrival.  Creatinine 1.04  (improved from baseline of 1.5), BUN 21, glucose 149, and albumin 2.6.  PT PTT elevated, INR 1.23.  WBC 13.  Hemoglobin 9.1 hematocrit 30.  Platelets normal.  With concern for GI bleed given recent history of bleeding, hospitalist asked to admit.  GI contacted, Dr Salinas agreed to consult with plans for endoscopy    Review of Systems:     All systems were reviewed and negative except as mentioned in subjective, assessment and plan.    Vital Signs:    Temp:  [96.6 °F (35.9 °C)-97.9 °F (36.6 °C)] 97.6 °F (36.4 °C)  Heart Rate:  [70-76] 70  Resp:  [14-16] 16  BP: ()/(41-82) 97/41    Intake and output:    I/O last 3 completed shifts:  In: -   Out: 30 [Urine:30]  No intake/output data recorded.    Physical Examination:    General Appearance:  Alert and cooperative.  Chronically ill-appearing.  Frail.   Head:  Atraumatic and normocephalic.   Eyes: Conjunctivae and sclerae normal, no icterus. No pallor.   Throat: No oral lesions, no thrush, oral mucosa moist.   Neck: Supple, trachea midline, no thyromegaly.   Lungs:   Breath sounds heard bilaterally equally.  No wheezing or crackles. No Pleural rub or bronchial breathing.   Heart:  Normal S1 and S2, no murmur, no gallop, no rub. No JVD.   Abdomen:   Normal bowel sounds, no masses, no organomegaly. Soft, nontender, nondistended, no rebound tenderness.   Extremities: Supple, no edema, no cyanosis, no clubbing.   Skin: No bleeding.    Neurologic: Alert and oriented x 2. No facial asymmetry. Moves all four limbs. No tremors.      Laboratory results:    Results from last 7 days   Lab Units 08/27/24  0527 08/26/24  1228   SODIUM mmol/L 139 138   POTASSIUM mmol/L 4.8 5.1   CHLORIDE mmol/L 101 98   CO2 mmol/L 31.2* 30.4*   BUN mg/dL 19 21   CREATININE mg/dL 0.94 1.04*   CALCIUM mg/dL 8.6 8.5*   BILIRUBIN mg/dL  --  0.4   ALK PHOS U/L  --  103   ALT (SGPT) U/L  --  10   AST (SGOT) U/L  --  20   GLUCOSE mg/dL 62* 149*     Results from last 7 days   Lab Units 08/27/24  0519  "08/27/24  0000 08/26/24  1815 08/26/24  1657 08/26/24  1228 08/22/24  1509   WBC 10*3/mm3 10.78  --   --   --  13.30*  --    HEMOGLOBIN g/dL 8.4* 8.6* 9.1*   < > 9.1* 9.8*   HEMATOCRIT % 27.7*  --   --   --  30.0* 31.8*   PLATELETS 10*3/mm3 241  --   --   --  267  --     < > = values in this interval not displayed.     Results from last 7 days   Lab Units 08/27/24  0527 08/26/24  1228   INR  1.22* 1.23*             No results for input(s): \"PHART\", \"CWV4BGO\", \"PO2ART\", \"DZI1EZN\", \"BASEEXCESS\" in the last 8760 hours.   I have reviewed the patient's laboratory results.    Radiology results:    No radiology results from the last 24 hrs  I have reviewed the patient's radiology reports.    Medication Review:     I have reviewed the patient's active and prn medications.     Problem List:      GIB (gastrointestinal bleeding)    Melena      Assessment:    GI bleed, suspect upper   CKD stage III  Type 2 diabetes with nephropathy  Severe pulmonary hypertension  Sick sinus syndrome status post pacemaker  Hypertension  Gout  Hypothyroidism  Impaired mobility and ADL    Plan:    Suspect upper GI bleed  -H&H every 6 hours: Currently anemia is baseline.  No indication for PRBC.  Will transfuse for goal greater than 7.  -GI consulted, Dr. Salinas  -IV Protonix 40 mg twice daily  -Plan was initially for getting EGD, however after discussing with the son he wanted to hold off on any invasive testing/procedure at this time and monitor to see if her hemoglobin drops then.    -Per nursing, a family member has provided a protein shake to the patient while she was n.p.o. for planned EGD.    -On family arrival, I personally discussed with the son and the daughter-in-law who are at bedside, discussed management and treatment plan, they would like to hold off on EGD at this time and monitor for any further bleeding.  He reports that she vomited dark material once prior to arrival to the hospital.  He reports that this is a recurrent thing " for her and he is trying to avoid invasive testing if at all possible.  If her hemoglobin drops then he will reconsider EGD.  I discussed with Dr. Salinas, recommended clear liquids today and n.p.o. after midnight in case she ended up needing EGD.    -Patient had failed bedside swallow evaluation by nursing, high risk for aspiration.  Family is concerned about her p.o. intake and understands that she is at high risk of aspiration.  Family was requesting to feed the patient despite risks possible complications discussion.  Family has been giving the patient liquids/protein shakes despite counseling. I met with the family along with palliative care.  Will start patient on clear liquids as planned with family understanding risks at this point.    -Will order swallow evaluation  -Will check chest x-ray to rule out aspiration with reported vomiting and difficulty swallowing.    Urinary tract infection  -Will send urine for culture  -Cover with Rocephin for now    Patient has multiple chronic comorbidities which will complicate overall prognosis and plan of care.  Confirmed with her sons that she remains DNR.     I have reviewed the copied text and it is accurate as of 8/27/2024   Palliative care following and will meet with the patient's family tomorrow  PT/OT consult       DVT Prophylaxis: SCD  Code Status: DNR/DNI  Diet: Clear liquids as tolerated  Discharge Plan: To be determined, likely needs to 3 days in the hospital    Michael Eason MD  08/27/24  09:24 EDT    Dictated utilizing Dragon dictation.

## 2024-08-27 NOTE — PAYOR COMM NOTE
"TO:AETNA  FROM:CHERI DAVENPORT, RN PHONE [phone]  CLINICALS REF# [id number]  Mel Sanchez \"Bindu\" (89 y.o. Female)       Date of Birth   1934    Social Security Number       Address   [address] [place] [address]    Home Phone   [phone]    MRN   [id number]       Restorationist   [facility]    Marital Status                               Admission Date   8/26/24    Admission Type   Emergency    Admitting Provider   Kerley, Brian Joseph, DO    Attending Provider   Michael Eason MD    Department, Room/Bed   [facility] TELEMETRY 3, 319/1       Discharge Date       Discharge Disposition       Discharge Destination                                 Attending Provider: Michael Eason MD    Allergies: Phenergan [Promethazine Hcl], Zosyn [Piperacillin-tazobactam In Dex]    Isolation: None   Infection: None   Code Status: No CPR    Ht: 167.6 cm (66\")   Wt: 59.3 kg (130 lb 11.7 oz)    Admission Cmt: None   Principal Problem: GIB (gastrointestinal bleeding) [K92.2]                   Active Insurance as of 8/26/2024       Primary Coverage       Payor Plan Insurance Group Employer/Plan Group    [employer]NA MEDICARE REPLACEMENT AETNA MED ADV PPO [id number]       Payor Plan Address Payor Plan Phone Number Payor Plan Fax Number Effective Dates    [address] [phone]  1/1/2024 - None Entered    [facility] [id number]         Subscriber Name Subscriber Birth Date Member ID       MEL SANCHEZ 1934 [id number]                     Emergency Contacts        (Rel.) Home Phone Work Phone Mobile Phone    Marco A Sanchez (Son) [phone] -- [phone]    LISA PEREZ (Daughter) -- -- [phone]    Brennan Mendenhall (Spouse) [phone] -- [phone]    MARILU SANCHEZ (Son) [phone] -- --    MANUEL SANCHEZ (Daughter) -- -- [phone]                 History & Physical        Jaswinder Andrews DO at 08/26/24 [facility]0            [facility] " HOSPITALIST   HISTORY AND PHYSICAL      Name:  Lynne Sanchez   Age:  89 y.o.  Sex:  female  :  1934  MRN:  [id number]   Visit Number:  [id number]  Admission Date:  2024  Date Of Service:  24  Primary Care Physician:  Elizabeth Easton APRN    Chief Complaint:     Vomiting with coffee-ground emesis    History Of Presenting Illness:      Patient is a chronically ill 89-year-old female with history significant for severe pulmonary hypertension, CKD stage III, type 2 diabetes, sick sinus syndrome status post pacemaker and previous upper GI bleed who presents to the emergency room from home due to progressive nausea, vomiting with coffee-ground emesis.  Patient extremely lethargic and confused on my exam.  History provided by her sons at bedside.  States that she has been unable to eat anything over the past 2 days.  Noticed that she was vomiting today, first appeared bilious and then coffee-ground emesis.  Denies any recent falls, fever, complaints of abdominal pain or diarrhea.  Patient has a history of chronic iron deficiency anemia and receives outpatient PRBC and iron transfusions.  She was treated at our facility  - 2024 for multiple complaints.  While hospitalized, patient developed upper GI bleeding.  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  Patient was ultimately discharged home with hospice care.  However, family has elected to dismiss hospice .  Confirmed that patient remains DNR/DNI.    ED summary: Patient afebrile, hemodynamically stable and nonhypoxic upon arrival.  Creatinine 1.04 (improved from baseline of 1.5), BUN 21, glucose 149, and albumin 2.6.  PT PTT elevated, INR 1.23.  WBC 13.  Hemoglobin 9.1 hematocrit 30.  Platelets normal.  With concern for GI bleed given recent history of bleeding, hospitalist asked to admit.  GI contacted, Dr Salinas agreed to consult with plans for endoscopy tomorrow.     Review Of  Systems:    All systems were reviewed and negative except as mentioned in history of presenting illness, assessment and plan.    Past Medical History: Patient  has a past medical history of Acute deep vein thrombosis of left upper extremity, Anemia, Asthma, CHF (congestive heart failure), Chronic atrial fibrillation, Deep venous thrombosis of left upper limb, Diabetes mellitus, type 2, Diarrhea, GERD (gastroesophageal reflux disease), Glaucoma, H/O transfusion of whole blood, Heart attack, Heart murmur, History of transfusion, Hyperlipidemia, Hypertension, Impaired functional mobility, balance, gait, and endurance, Kidney disease, Osteomyelitis, Osteomyelitis of left foot (10/03/2017), Peripheral vascular disease, Right retinal detachment, Secondary cataract of both eyes, Stable proliferative diabetic retinopathy of both eyes, Stroke, Swelling of left extremity, Urinary tract infection, and Wears glasses.    Past Surgical History: Patient  has a past surgical history that includes Appendectomy; Esophagogastroduodenoscopy (N/A, 10/9/2017); Cardiac catheterization (N/A, 10/10/2017); Interventional radiology procedure (N/A, 10/10/2017); Cardiac catheterization (N/A, 10/10/2017); Cardiac catheterization (N/A, 10/10/2017); Colonoscopy; Cardioversion; Cardiac Ablation; Pacemaker Implantation; Bone marrow aspiration; Cardiac electrophysiology procedure (N/A, 5/3/2018); Finger amputation (Left, 7/5/2018); Eye surgery (Bilateral); Finger amputation (Left, 8/27/2018); Esophagogastroduodenoscopy (N/A, 3/22/2022); Esophagogastroduodenoscopy (N/A, 8/4/2023); Esophagogastroduodenoscopy (N/A, 7/26/2024); and Esophagogastroduodenoscopy (N/A, 7/27/2024).    Social History: Patient  reports that she has never smoked. She has never been exposed to tobacco smoke. She has never used smokeless tobacco. She reports that she does not drink alcohol and does not use drugs.    Family History:  Patient's family history has been reviewed and  found to be noncontributory.     Allergies:      Phenergan [promethazine hcl] and Zosyn [piperacillin-tazobactam in dex]    Home Medications:    Prior to Admission Medications       Prescriptions Last Dose Informant Patient Reported? Taking?    acetaminophen (TYLENOL) 650 MG 8 hr tablet   Yes No    Take 1 tablet by mouth Every 8 (Eight) Hours As Needed for Mild Pain or Moderate Pain. Indications: Pain    epoetin dorcas-epbx (Retacrit) 67887 UNIT/ML injection   Yes No    Inject 1 mL under the skin into the appropriate area as directed. 1 ml sq per month on day 17  Indications: Anemia, Anemia associated with Chronic Kidney Failure    furosemide (LASIX) 20 MG tablet   No No    Take 1 tablet by mouth Daily. Indications: Cardiac Failure    glimepiride (AMARYL) 1 MG tablet   Yes No    glucose blood test strip   No No    Test daily.    glucose monitor monitoring kit   No No    Use 1 each Daily.    HYDROcodone-acetaminophen (NORCO) 5-325 MG per tablet   No No    Take 1 tablet by mouth Every 4 (Four) Hours As Needed for Severe Pain. Indications: Pain    Patient not taking:  Reported on 8/23/2024    levothyroxine (SYNTHROID, LEVOTHROID) 50 MCG tablet   No No    Take 1 tablet by mouth Every Morning. Indications: Underactive Thyroid    Patient taking differently:  Take 1 tablet by mouth Every Morning.    metoprolol tartrate (LOPRESSOR) 100 MG tablet   No No    Take 1 tablet by mouth 2 (Two) Times a Day. Indications: High Blood Pressure Disorder    multivitamin (THERAGRAN) tablet tablet   Yes No    Take 1 tablet by mouth Daily.    naloxone (NARCAN) 4 MG/0.1ML nasal spray   No No    Call 911. Don't prime. [place] in 1 nostril for overdose. Repeat in 2-3 minutes in other nostril if no or minimal breathing/responsiveness.    Patient not taking:  Reported on 8/23/2024    ondansetron ODT (ZOFRAN-ODT) 4 MG disintegrating tablet   No No    Place 1 tablet on the tongue Every 8 (Eight) Hours As Needed for Nausea or Vomiting.    pantoprazole  "(PROTONIX) 40 MG EC tablet   No No    Take 1 tablet by mouth Daily. Indications: Gastroesophageal Reflux Disease    Patient taking differently:  Take 1 tablet by mouth Daily.    sennosides-docusate (PERICOLACE) 8.6-50 MG per tablet   No No    Take 2 tablets by mouth 2 (Two) Times a Day for 30 days.          ED Medications:    Medications   pantoprazole (PROTONIX) injection 40 mg (has no administration in time range)   pantoprazole (PROTONIX) injection 40 mg (40 mg Intravenous Given 8/26/24 1438)     Vital Signs:  Temp:  [97.7 °F (36.5 °C)] 97.7 °F (36.5 °C)  Heart Rate:  [70] 70  Resp:  [16] 16  BP: ()/(56-70) 90/70        08/26/24  1214   Weight: 55.8 kg (123 lb 0.3 oz)     Body mass index is 19.86 kg/m².    Physical Exam:     Most recent vital Signs: BP 90/70   Pulse 70   Temp 97.7 °F (36.5 °C) (Axillary)   Resp 16   Ht 167.6 cm (66\")   Wt 55.8 kg (123 lb 0.3 oz)   SpO2 99%   BMI 19.86 kg/m²     Physical Exam  Vitals reviewed.   Constitutional:       Appearance: She is ill-appearing.      Comments: Frail, chronically ill-appearing elderly female.   HENT:      Head: Normocephalic and atraumatic.      Right Ear: External ear normal.      Left Ear: External ear normal.      Mouth/Throat:      Mouth: Mucous membranes are dry.      Pharynx: Oropharynx is clear.   Eyes:      Extraocular Movements: Extraocular movements intact.      Conjunctiva/sclera: Conjunctivae normal.   Cardiovascular:      Rate and Rhythm: Normal rate and regular rhythm.      Heart sounds: Murmur heard.   Pulmonary:      Effort: Pulmonary effort is normal.      Breath sounds: Normal breath sounds.   Abdominal:      General: Bowel sounds are normal. There is no distension.      Palpations: Abdomen is soft.      Tenderness: There is no abdominal tenderness.   Skin:     Comments: Edema, venous stasis   Neurological:      Mental Status: She is disoriented.      Comments: Patient thought her son was her ; very drowsy " "        Laboratory data:    I have reviewed the labs done in the emergency room.    Results from last 7 days   Lab Units 08/26/24  1228   SODIUM mmol/L 138   POTASSIUM mmol/L 5.1   CHLORIDE mmol/L 98   CO2 mmol/L 30.4*   BUN mg/dL 21   CREATININE mg/dL 1.04*   CALCIUM mg/dL 8.5*   BILIRUBIN mg/dL 0.4   ALK PHOS U/L 103   ALT (SGPT) U/L 10   AST (SGOT) U/L 20   GLUCOSE mg/dL 149*     Results from last 7 days   Lab Units 08/26/24  1228 08/22/24  1509   WBC 10*3/mm3 13.30*  --    HEMOGLOBIN g/dL 9.1* 9.8*   HEMATOCRIT % 30.0* 31.8*   PLATELETS 10*3/mm3 267  --      Results from last 7 days   Lab Units 08/26/24  1228   INR  1.23*                               Invalid input(s): \"USDES\", \"NITRITITE\", \"BACT\", \"EP\"    Pain Management Panel  More data may exist         5/17/2023 4/19/2021   Pain Management Panel   Creatinine, Urine 50  76.7       Details                   EKG:          Radiology:    No radiology results for the last 3 days    Assessment:    GI bleed, suspect upper   CKD stage III  Type 2 diabetes with nephropathy  Severe pulmonary hypertension  Sick sinus syndrome status post pacemaker  Hypertension  Gout  Hypothyroidism  Impaired mobility and ADL    Plan:    Suspect upper GI bleed  H&H every 6 hours: Currently anemia is baseline.  No indication for PRBC.  Will transfuse for goal greater than 7.  GI consulted, Dr. Salinas  N.p.o.   Possible endoscopy in the morning  IV Protonix 40 mg twice daily    Patient has multiple chronic comorbidities which will complicate overall prognosis and plan of care.  Confirmed with her sons that she remains DNR.    Risk Assessment: High  DVT Prophylaxis: SCD  Code Status: DNR/DNI  Diet: N.p.o.             Jaswinder Andrews DO  08/26/24  15:10 EDT    Dictated utilizing Dragon dictation.      Electronically signed by Jaswinder Andrews DO at 08/26/24 1616          Emergency Department Notes        Isaac Levine MD at 08/26/24 1408                   [facility]  Emergency Department Encounter  Emergency Medicine Physician Note       Pt Name: Lynne Sanchez  MRN: [id number]  Pt :   1934  Room Number:  319/1  Date of encounter:  2024  PCP: Elizabeth Easton APRN  ED Provider: Isaac Levine MD    Historian: Patient, family      HPI:  Chief Complaint: Vomiting        Context: Lynne Sanchez is a 89 y.o. female who presents to the ED for vomiting.  Patient accompanied by caretaker and family members who contribute.  They state she had an episode of coffee-ground emesis this morning.  In addition she had dark-colored stool.  She has prior history of anemia as well as GI bleeding and they were concerned for the same.  He also reports she has generally been unwell.  She has evidence of skin breakdown on the back and redness to the skin for which the caretaker has been applying Lotrimin.  No fevers.  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      PAST MEDICAL HISTORY  Past Medical History:   Diagnosis Date    Acute deep vein thrombosis of left upper extremity     Anemia     Asthma     CHF (congestive heart failure)     Chronic atrial fibrillation     Deep venous thrombosis of left upper limb     Diabetes mellitus, type 2     Diarrhea     GERD (gastroesophageal reflux disease)     Glaucoma     H/O transfusion of whole blood     Heart attack     Heart murmur     History of transfusion     Hyperlipidemia     Hypertension     Impaired functional mobility, balance, gait, and endurance     Kidney disease     patient not aware of what exact diagnosis is     Osteomyelitis     Osteomyelitis of left foot 10/03/2017    Peripheral vascular disease     Right retinal detachment     Secondary cataract of both eyes     Stable proliferative diabetic retinopathy of both eyes     Stroke     Swelling of left extremity     Urinary tract infection     Wears glasses          PAST SURGICAL HISTORY  Past Surgical History:   Procedure Laterality Date    AMPUTATION DIGIT  Left 7/5/2018    Procedure: Left Great toe amputation;  Surgeon: Prakash Carrington MD;  Location:  [facility];  Service: Vascular    AMPUTATION DIGIT Left 8/27/2018    Procedure: SECOND TOE AMPUTATION DIGIT LEFT;  Surgeon: Prakash Carrington MD;  Location:  [facility];  Service: General    APPENDECTOMY      BONE MARROW ASPIRATION      CARDIAC ABLATION      CARDIAC CATHETERIZATION N/A 10/10/2017    Procedure: Peripheral angiography;  Surgeon: Kan Pelaez MD;  Location: [facility] CATH INVASIVE LOCATION;  Service:     CARDIAC CATHETERIZATION N/A 10/10/2017    Procedure: Angioplasty-peripheral;  Surgeon: Kan Pelaez MD;  Location:  [facility] CATH INVASIVE LOCATION;  Service:     CARDIAC CATHETERIZATION N/A 10/10/2017    Procedure: Atherectomy-peripheral;  Surgeon: Kan Pelaez MD;  Location: [facility] CATH INVASIVE LOCATION;  Service:     CARDIAC ELECTROPHYSIOLOGY PROCEDURE N/A 5/3/2018    Procedure: generator change;  Surgeon: Zan Poole MD;  Location:  [facility] CATH INVASIVE LOCATION;  Service: Cardiovascular    CARDIOVERSION      COLONOSCOPY      ENDOSCOPY N/A 10/9/2017    Procedure: ESOPHAGOGASTRODUODENOSCOPY WITH COLD FORCEP BIOPSY;  Surgeon: Clifton Hyatt MD;  Location: [facility] ENDOSCOPY;  Service:     ENDOSCOPY N/A 3/22/2022    Procedure: ESOPHAGOGASTRODUODENOSCOPY with AVM cautery;  Surgeon: Clarisse Velez MD;  Location: [facility] ENDOSCOPY;  Service: Gastroenterology;  Laterality: N/A;    ENDOSCOPY N/A 8/4/2023    Procedure: ESOPHAGOGASTRODUODENOSCOPY WITH BIOPSY AND RUTH BRUSHING;  Surgeon: Clarisse Velez MD;  Location: [facility] ENDOSCOPY;  Service: Gastroenterology;  Laterality: N/A;    ENDOSCOPY N/A 7/26/2024    Procedure: ESOPHAGOGASTRODUODENOSCOPY WITH CONTROL OF BLEEDING WITH INJECTION OF EPINEPHRINE AND RESOLUTION CLIP PLACEMENT X1;  Surgeon: Clarisse Velez MD;  Location: [facility] ENDOSCOPY;  Service: Gastroenterology;  Laterality: N/A;    ENDOSCOPY N/A 7/27/2024     Procedure: ESOPHAGOGASTRODUODENOSCOPYwith resolution clip and APC;  Surgeon: Clarisse Velez MD;  Location: [facility] ENDOSCOPY;  Service: Gastroenterology;  Laterality: N/A;    EYE SURGERY Bilateral     CATARACTS    INTERVENTIONAL RADIOLOGY PROCEDURE N/A 10/10/2017    Procedure: Abdominal Aortagram with Runoff;  Surgeon: Kan Pelaez MD;  Location: [facility] CATH INVASIVE LOCATION;  Service:     PACEMAKER IMPLANTATION      around 2008 then replaced 2018         FAMILY HISTORY  Family History   Problem Relation Age of Onset    No Known Problems Mother     No Known Problems Father     Arthritis Other          SOCIAL HISTORY  Social History     Socioeconomic History    Marital status:     Number of children: 3   Tobacco Use    Smoking status: Never     Passive exposure: Never    Smokeless tobacco: Never   Vaping Use    Vaping status: Never Used   Substance and Sexual Activity    Alcohol use: No    Drug use: No    Sexual activity: Defer         ALLERGIES  Phenergan [promethazine hcl] and Zosyn [piperacillin-tazobactam in dex]        REVIEW OF SYSTEMS  Systems reviewed and negative      PHYSICAL EXAM    I have reviewed the triage vital signs and nursing notes.    ED Triage Vitals [08/26/24 1214]   Temp Heart Rate Resp BP SpO2   97.7 °F (36.5 °C) 70 16 121/56 96 %      Temp src Heart Rate Source Patient Position BP Location FiO2 (%)   Axillary Monitor -- -- --       Physical Exam  Constitutional:       Comments: Chronically ill-appearing   Cardiovascular:      Rate and Rhythm: Normal rate.   Pulmonary:      Effort: Pulmonary effort is normal. No respiratory distress.   Abdominal:      Palpations: Abdomen is soft.      Tenderness: There is no abdominal tenderness.   Musculoskeletal:      Cervical back: Neck supple.   Skin:     Comments: Eschar formations to the bilateral lower extremities, prior tarsal amputations on right   Neurological:      Mental Status: She is alert.         LAB RESULTS  Recent  Results (from the past 24 hour(s))   Comprehensive Metabolic Panel    Collection Time: 08/26/24 12:28 PM    Specimen: Blood   Result Value Ref Range    Glucose 149 (H) 65 - 99 mg/dL    BUN 21 8 - 23 mg/dL    Creatinine 1.04 (H) 0.57 - 1.00 mg/dL    Sodium 138 136 - 145 mmol/L    Potassium 5.1 3.5 - 5.2 mmol/L    Chloride 98 98 - 107 mmol/L    CO2 30.4 (H) 22.0 - 29.0 mmol/L    Calcium 8.5 (L) 8.6 - 10.5 mg/dL    Total Protein 5.5 (L) 6.0 - 8.5 g/dL    Albumin 2.6 (L) 3.5 - 5.2 g/dL    ALT (SGPT) 10 1 - 33 U/L    AST (SGOT) 20 1 - 32 U/L    Alkaline Phosphatase 103 39 - 117 U/L    Total Bilirubin 0.4 0.0 - 1.2 mg/dL    Globulin 2.9 gm/dL    A/G Ratio 0.9 g/dL    BUN/Creatinine Ratio 20.2 7.0 - 25.0    Anion Gap 9.6 5.0 - 15.0 mmol/L    eGFR 51.5 (L) >60.0 mL/min/1.73   Protime-INR    Collection Time: 08/26/24 12:28 PM    Specimen: Blood   Result Value Ref Range    Protime 16.0 (H) 12.3 - 15.1 Seconds    INR 1.23 (H) 0.90 - 1.10   aPTT    Collection Time: 08/26/24 12:28 PM    Specimen: Blood   Result Value Ref Range    PTT 37.3 (H) 23.0 - 35.0 seconds   CBC Auto Differential    Collection Time: 08/26/24 12:28 PM    Specimen: Blood   Result Value Ref Range    WBC 13.30 (H) 3.40 - 10.80 10*3/mm3    RBC 3.19 (L) 3.77 - 5.28 10*6/mm3    Hemoglobin 9.1 (L) 12.0 - 15.9 g/dL    Hematocrit 30.0 (L) 34.0 - 46.6 %    MCV 94.0 79.0 - 97.0 fL    MCH 28.5 26.6 - 33.0 pg    MCHC 30.3 (L) 31.5 - 35.7 g/dL    RDW 19.3 (H) 12.3 - 15.4 %    RDW-SD 63.6 (H) 37.0 - 54.0 fl    MPV 8.8 6.0 - 12.0 fL    Platelets 267 140 - 450 10*3/mm3    Neutrophil % 79.8 (H) 42.7 - 76.0 %    Lymphocyte % 13.4 (L) 19.6 - 45.3 %    Monocyte % 5.3 5.0 - 12.0 %    Eosinophil % 0.1 (L) 0.3 - 6.2 %    Basophil % 0.3 0.0 - 1.5 %    Immature Grans % 1.1 (H) 0.0 - 0.5 %    Neutrophils, Absolute 10.61 (H) 1.70 - 7.00 10*3/mm3    Lymphocytes, Absolute 1.78 0.70 - 3.10 10*3/mm3    Monocytes, Absolute 0.71 0.10 - 0.90 10*3/mm3    Eosinophils, Absolute 0.01 0.00 - 0.40  10*3/mm3    Basophils, Absolute 0.04 0.00 - 0.20 10*3/mm3    Immature Grans, Absolute 0.15 (H) 0.00 - 0.05 10*3/mm3    nRBC 0.2 0.0 - 0.2 /100 WBC   Type & Screen    Collection Time: 08/26/24  2:41 PM    Specimen: Blood   Result Value Ref Range    ABO Type O     RH type Positive     Antibody Screen Negative     T&S Expiration Date 8/29/2024 11:59:59 PM    Urinalysis With Microscopic If Indicated (No Culture) - Urine, Catheter    Collection Time: 08/26/24  4:54 PM    Specimen: Urine, Catheter   Result Value Ref Range    Color, UA Dark Yellow (A) Yellow, Straw    Appearance, UA Turbid (A) Clear    pH, UA <=5.0 5.0 - 8.0    Specific Gravity, UA 1.017 1.005 - 1.030    Glucose, UA Negative Negative    Ketones, UA Trace (A) Negative    Bilirubin, UA Small (1+) (A) Negative    Blood, UA Negative Negative    Protein, UA Trace (A) Negative    Leuk Esterase, UA Large (3+) (A) Negative    Nitrite, UA Positive (A) Negative    Urobilinogen, UA 0.2 E.U./dL 0.2 - 1.0 E.U./dL   Urinalysis, Microscopic Only - Urine, Catheter    Collection Time: 08/26/24  4:54 PM    Specimen: Urine, Catheter   Result Value Ref Range    RBC, UA 0-2 None Seen, 0-2 /HPF    WBC, UA 21-50 (A) None Seen, 0-2 /HPF    Bacteria, UA 1+ (A) None Seen /HPF    Squamous Epithelial Cells, UA 0-2 None Seen, 0-2 /HPF    Hyaline Casts, UA 3-6 None Seen /LPF    Methodology Manual Light Microscopy    Occult Blood X 1, Stool - Stool, Per Rectum    Collection Time: 08/26/24  4:55 PM    Specimen: Per Rectum; Stool   Result Value Ref Range    Fecal Occult Blood Negative Negative   Hemoglobin    Collection Time: 08/26/24  4:57 PM    Specimen: Blood   Result Value Ref Range    Hemoglobin 9.1 (L) 12.0 - 15.9 g/dL   POC Glucose Once    Collection Time: 08/26/24  5:04 PM    Specimen: Blood   Result Value Ref Range    Glucose 134 (H) 70 - 130 mg/dL   Hemoglobin    Collection Time: 08/26/24  6:15 PM    Specimen: Blood   Result Value Ref Range    Hemoglobin 9.1 (L) 12.0 - 15.9 g/dL    Hemoglobin    Collection Time: 08/27/24 12:00 AM    Specimen: Blood   Result Value Ref Range    Hemoglobin 8.6 (L) 12.0 - 15.9 g/dL   Basic Metabolic Panel    Collection Time: 08/27/24  5:27 AM    Specimen: Blood   Result Value Ref Range    Glucose 62 (L) 65 - 99 mg/dL    BUN 19 8 - 23 mg/dL    Creatinine 0.94 0.57 - 1.00 mg/dL    Sodium 139 136 - 145 mmol/L    Potassium 4.8 3.5 - 5.2 mmol/L    Chloride 101 98 - 107 mmol/L    CO2 31.2 (H) 22.0 - 29.0 mmol/L    Calcium 8.6 8.6 - 10.5 mg/dL    BUN/Creatinine Ratio 20.2 7.0 - 25.0    Anion Gap 6.8 5.0 - 15.0 mmol/L    eGFR 58.1 (L) >60.0 mL/min/1.73   CBC Auto Differential    Collection Time: 08/27/24  5:27 AM    Specimen: Blood   Result Value Ref Range    WBC 10.78 3.40 - 10.80 10*3/mm3    RBC 2.92 (L) 3.77 - 5.28 10*6/mm3    Hemoglobin 8.4 (L) 12.0 - 15.9 g/dL    Hematocrit 27.7 (L) 34.0 - 46.6 %    MCV 94.9 79.0 - 97.0 fL    MCH 28.8 26.6 - 33.0 pg    MCHC 30.3 (L) 31.5 - 35.7 g/dL    RDW 19.1 (H) 12.3 - 15.4 %    RDW-SD 63.2 (H) 37.0 - 54.0 fl    MPV 8.9 6.0 - 12.0 fL    Platelets 241 140 - 450 10*3/mm3    Neutrophil % 73.3 42.7 - 76.0 %    Lymphocyte % 18.2 (L) 19.6 - 45.3 %    Monocyte % 6.1 5.0 - 12.0 %    Eosinophil % 0.8 0.3 - 6.2 %    Basophil % 0.4 0.0 - 1.5 %    Immature Grans % 1.2 (H) 0.0 - 0.5 %    Neutrophils, Absolute 7.90 (H) 1.70 - 7.00 10*3/mm3    Lymphocytes, Absolute 1.96 0.70 - 3.10 10*3/mm3    Monocytes, Absolute 0.66 0.10 - 0.90 10*3/mm3    Eosinophils, Absolute 0.09 0.00 - 0.40 10*3/mm3    Basophils, Absolute 0.04 0.00 - 0.20 10*3/mm3    Immature Grans, Absolute 0.13 (H) 0.00 - 0.05 10*3/mm3    nRBC 0.2 0.0 - 0.2 /100 WBC   Protime-INR    Collection Time: 08/27/24  5:27 AM    Specimen: Blood   Result Value Ref Range    Protime 15.9 (H) 12.3 - 15.1 Seconds    INR 1.22 (H) 0.90 - 1.10   aPTT    Collection Time: 08/27/24  5:27 AM    Specimen: Blood   Result Value Ref Range    PTT 35.9 (H) 23.0 - 35.0 seconds       If labs were ordered, I  independently reviewed the results and considered them in treating the patient.        RADIOLOGY  No Radiology Exams Resulted Within Past 24 Hours    PROCEDURES    Procedures    No orders to display       MEDICATIONS GIVEN IN ER    Medications   levothyroxine (SYNTHROID, LEVOTHROID) tablet 50 mcg (50 mcg Oral Given 8/27/24 0510)   sodium chloride 0.9 % flush 10 mL (10 mL Intravenous Given 8/26/24 2032)   sodium chloride 0.9 % flush 10 mL (has no administration in time range)   sodium chloride 0.9 % infusion 40 mL (has no administration in time range)   ondansetron (ZOFRAN) injection 4 mg (has no administration in time range)   nitroglycerin (NITROSTAT) SL tablet 0.4 mg (has no administration in time range)   acetaminophen (TYLENOL) tablet 650 mg (has no administration in time range)     Or   acetaminophen (TYLENOL) 160 MG/5ML oral solution 650 mg (has no administration in time range)     Or   acetaminophen (TYLENOL) suppository 650 mg (has no administration in time range)   melatonin tablet 5 mg (has no administration in time range)   pantoprazole (PROTONIX) injection 40 mg (40 mg Intravenous Given 8/26/24 2031)   pantoprazole (PROTONIX) injection 40 mg (40 mg Intravenous Given 8/26/24 1438)         MEDICAL DECISION MAKING, PROGRESS, and CONSULTS    All labs, if obtained, have been independently reviewed by me.  All radiology studies, if obtained, have been reviewed by me and the radiologist dictating the report.  All EKG's, if obtained, have been independently viewed and interpreted by me.      Discussion below represents my analysis of pertinent findings related to patient's condition, differential diagnosis, treatment plan and final disposition.                         Differential diagnosis:    Upper GI bleeding, lower GI bleeding, skin ulcer, anemia, coagulopathy, UTI, others.      Additional sources:    - Discussed/ obtained information from independent historians: Family members at bedside, caretaker    -  External (non-ED) record review: Outpatient progress note 8/23/2024    - Chronic or social conditions impacting care: Chronic debility, impaired mobility    - Shared decision making:        Orders placed during this visit:  Orders Placed This Encounter   Procedures    Comprehensive Metabolic Panel    Protime-INR    aPTT    Occult Blood X 1, Stool - Stool, Per Rectum    CBC Auto Differential    Urinalysis With Microscopic If Indicated (No Culture) - Urine, Catheter    Basic Metabolic Panel    CBC Auto Differential    Hemoglobin    Protime-INR    aPTT    Urinalysis, Microscopic Only - Urine, Clean Catch    NPO Diet NPO Type: Strict NPO    Replace Current Indwelling Urinary Catheter Prior to Collecting Urine Specimen    Assess Need for Indwelling Urinary Catheter - Follow Removal Protocol    Urinary Catheter Care    Vital Signs    Intake & Output    Weigh Patient    Oral Care    Place Sequential Compression Device    Maintain Sequential Compression Device    Maintain IV Access    Telemetry - Place Orders & Notify Provider of Results When Patient Experiences Acute Chest Pain, Dysrhythmia or Respiratory Distress    May Be Off Telemetry for Tests    Continuous Pulse Oximetry    Up With Assistance    Vital Signs    Strict Intake & Output    Daily Weights    Notify Provider of Gross GI Bleeding    Verify Informed Consent for Blood Product Administration    Code Status and Medical Interventions: No CPR (Do Not Attempt to Resuscitate); Limited Support; No intubation (DNI)    Inpatient Gastroenterology Consult    Inpatient Palliative Care Team Consult    Incentive Spirometry    POC Glucose Once    Type & Screen    Insert Peripheral IV    Inpatient Admission    CBC & Differential         Additional orders considered but not ordered:      ED Course/MDM Discussion:    Patient is a 89-year-old female who presented for the evaluation of nausea and vomiting with coffee-ground emesis and dark stools.  Chart reviewed patient was seen  last month for similar complaints.  Discharge summary of 7/31/2024 reviewed.  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  She was on Eliquis this was held.  On arrival to the emergency department she is hemodynamically stable in no acute distress.  No gross melena on examination.  She has chronic indwelling Main catheter.  This was exchanged.  Urine sample pending.  Family concerned for UTI.  Hemoglobin is 9.1 which is stable from previous.  BUN 21.  IV Protonix was administered.  GI was consulted.  Plan for hospital medicine admission for serial H&H monitoring possible EGD.  Discussed with family at bedside.                    Consultants:    Hospitalist  GI Dr. Rivera        AS OF 08:17 EDT VITALS:    BP - 97/41  HR - 70  TEMP - 97.6 °F (36.4 °C) (Oral)  O2 SATS - 99%                  DIAGNOSIS  Final diagnoses:   Eschar of foot   Gastrointestinal hemorrhage, unspecified gastrointestinal hemorrhage type   Anemia, unspecified type         DISPOSITION  ED Disposition       ED Disposition   Decision to Admit    Condition   --    Comment   Level of Care: Telemetry [5]   Diagnosis: GIB (gastrointestinal bleeding) [489924]   Certification: I Certify That Inpatient Hospital Services Are Medically Necessary For Greater Than 2 Midnights                     Please note that portions of this document were completed with voice recognition software.        Isaac Levine MD  08/27/24 0818      Electronically signed by Isaac Levine MD at 08/27/24 0818       Vital Signs (last day)       Date/Time Temp Temp src Pulse Resp BP Patient Position SpO2    08/27/24 0722 97.6 (36.4) Oral -- 16 97/41 Lying 99    08/27/24 0350 97.9 (36.6) Oral 70 16 99/44 Lying 98    08/26/24 2355 97.6 (36.4) Oral 70 14 90/45 Lying 93    08/26/24 1930 97.3 (36.3) Oral 76 16 101/48 Lying 100    08/26/24 1630 96.6 (35.9) Axillary 70 16 -- Lying --    08/26/24 1458 -- -- -- -- 110/82 -- 96    08/26/24  1428 -- -- -- -- 112/55 -- 92    08/26/24 1358 -- -- -- -- 109/53 -- --    08/26/24 1331 -- -- -- -- 90/70 -- 99    08/26/24 1301 -- -- -- -- 125/70 -- 95    08/26/24 1231 -- -- -- -- 129/57 -- --    08/26/24 1226 -- -- -- -- -- -- 92    08/26/24 1216 -- -- -- -- 121/56 -- 96    08/26/24 1214 97.7 (36.5) Axillary 70 16 121/56 -- 96          Current Facility-Administered Medications   Medication Dose Route Frequency Provider Last Rate Last Admin    acetaminophen (TYLENOL) tablet 650 mg  650 mg Oral Q4H PRN Jaswinder Andrews DO        Or    acetaminophen (TYLENOL) 160 MG/5ML oral solution 650 mg  650 mg Oral Q4H PRN Jaswinder Andrews DO        Or    acetaminophen (TYLENOL) suppository 650 mg  650 mg Rectal Q4H PRN Jaswinder Andrews DO        levothyroxine (SYNTHROID, LEVOTHROID) tablet 50 mcg  50 mcg Oral Q AM Jaswinder Andrews DO   50 mcg at 08/27/24 0510    melatonin tablet 5 mg  5 mg Oral Nightly PRN Jaswinder Andrews DO        nitroglycerin (NITROSTAT) SL tablet 0.4 mg  0.4 mg Sublingual Q5 Min PRN Jaswinder Andrews DO        ondansetron (ZOFRAN) injection 4 mg  4 mg Intravenous Q6H PRN Jaswinder Andrews DO        pantoprazole (PROTONIX) injection 40 mg  40 mg Intravenous Q12H Jaswinder Andrews DO   40 mg at 08/27/24 0901    sodium chloride 0.9 % flush 10 mL  10 mL Intravenous Q12H Jaswinder Andrews DO   10 mL at 08/27/24 0901    sodium chloride 0.9 % flush 10 mL  10 mL Intravenous PRN Jaswinder Andrews DO        sodium chloride 0.9 % infusion 40 mL  40 mL Intravenous PRN Jaswinder Andrews DO         Lab Results (last 24 hours)       Procedure Component Value Units Date/Time    POC Glucose Once [[id number]]  (Abnormal) Collected: 08/27/24 0820    Specimen: Blood Updated: 08/27/24 0823     Glucose 65 mg/dL      Comment: Serial Number: [id number]:  [id number]       Basic Metabolic Panel [[id number]]  (Abnormal) Collected: 08/27/24 0527    Specimen: Blood Updated: 08/27/24 0644     Glucose 62 mg/dL      BUN 19 mg/dL       Creatinine 0.94 mg/dL      Sodium 139 mmol/L      Potassium 4.8 mmol/L      Chloride 101 mmol/L      CO2 31.2 mmol/L      Calcium 8.6 mg/dL      BUN/Creatinine Ratio 20.2     Anion Gap 6.8 mmol/L      eGFR 58.1 mL/min/1.73     Narrative:      GFR Normal >60  Chronic Kidney Disease <60  Kidney Failure <15    The GFR formula is only valid for adults with stable renal function between ages 18 and 70.    Protime-INR [[id number]]  (Abnormal) Collected: 08/27/24 0527    Specimen: Blood Updated: 08/27/24 0631     Protime 15.9 Seconds      INR 1.22    Narrative:      Suggested INR therapeutic range for stable oral anticoagulant therapy:    Low Intensity therapy:   1.5-2.0  Moderate Intensity therapy:   2.0-3.0  High Intensity therapy:   2.5-4.0    aPTT [[id number]]  (Abnormal) Collected: 08/27/24 0527    Specimen: Blood Updated: 08/27/24 0631     PTT 35.9 seconds     CBC Auto Differential [[id number]]  (Abnormal) Collected: 08/27/24 0527    Specimen: Blood Updated: 08/27/24 0627     WBC 10.78 10*3/mm3      RBC 2.92 10*6/mm3      Hemoglobin 8.4 g/dL      Hematocrit 27.7 %      MCV 94.9 fL      MCH 28.8 pg      MCHC 30.3 g/dL      RDW 19.1 %      RDW-SD 63.2 fl      MPV 8.9 fL      Platelets 241 10*3/mm3      Neutrophil % 73.3 %      Lymphocyte % 18.2 %      Monocyte % 6.1 %      Eosinophil % 0.8 %      Basophil % 0.4 %      Immature Grans % 1.2 %      Neutrophils, Absolute 7.90 10*3/mm3      Lymphocytes, Absolute 1.96 10*3/mm3      Monocytes, Absolute 0.66 10*3/mm3      Eosinophils, Absolute 0.09 10*3/mm3      Basophils, Absolute 0.04 10*3/mm3      Immature Grans, Absolute 0.13 10*3/mm3      nRBC 0.2 /100 WBC     Hemoglobin [[id number]]  (Abnormal) Collected: 08/27/24 0000    Specimen: Blood Updated: 08/27/24 0006     Hemoglobin 8.6 g/dL     Hemoglobin [[id number]]  (Abnormal) Collected: 08/26/24 1815    Specimen: Blood Updated: 08/26/24 1910     Hemoglobin 9.1 g/dL     Occult Blood X 1, Stool - Stool, Per Rectum [[id number]]   (Normal) Collected: 08/26/24 1655    Specimen: Stool from Per Rectum Updated: 08/26/24 1828     Fecal Occult Blood Negative    Urinalysis, Microscopic Only - Urine, Catheter [[id number]]  (Abnormal) Collected: 08/26/24 1654    Specimen: Urine, Catheter Updated: 08/26/24 1752     RBC, UA 0-2 /HPF      WBC, UA 21-50 /HPF      Bacteria, UA 1+ /HPF      Squamous Epithelial Cells, UA 0-2 /HPF      Hyaline Casts, UA 3-6 /LPF      Methodology Manual Light Microscopy    Hemoglobin [[id number]]  (Abnormal) Collected: 08/26/24 1657    Specimen: Blood Updated: 08/26/24 1748     Hemoglobin 9.1 g/dL     POC Glucose Once [[id number]]  (Abnormal) Collected: 08/26/24 1704    Specimen: Blood Updated: 08/26/24 1716     Glucose 134 mg/dL      Comment: Serial Number: [id number]:  [id number]       Urinalysis With Microscopic If Indicated (No Culture) - Urine, Catheter [[id number]]  (Abnormal) Collected: 08/26/24 1654    Specimen: Urine, Catheter Updated: 08/26/24 1704     Color, UA Dark Yellow     Appearance, UA Turbid     pH, UA <=5.0     Specific Gravity, UA 1.017     Glucose, UA Negative     Ketones, UA Trace     Bilirubin, UA Small (1+)     Blood, UA Negative     Protein, UA Trace     Leuk Esterase, UA Large (3+)     Nitrite, UA Positive     Urobilinogen, UA 0.2 E.U./dL    aPTT [[id number]]  (Abnormal) Collected: 08/26/24 1228    Specimen: Blood Updated: 08/26/24 1257     PTT 37.3 seconds     Protime-INR [[id number]]  (Abnormal) Collected: 08/26/24 1228    Specimen: Blood Updated: 08/26/24 1257     Protime 16.0 Seconds      INR 1.23    Narrative:      Suggested INR therapeutic range for stable oral anticoagulant therapy:    Low Intensity therapy:   1.5-2.0  Moderate Intensity therapy:   2.0-3.0  High Intensity therapy:   2.5-4.0    Comprehensive Metabolic Panel [[id number]]  (Abnormal) Collected: 08/26/24 1228    Specimen: Blood Updated: 08/26/24 1251     Glucose 149 mg/dL      BUN 21 mg/dL      Creatinine 1.04 mg/dL      Sodium 138  mmol/L      Potassium 5.1 mmol/L      Chloride 98 mmol/L      CO2 30.4 mmol/L      Calcium 8.5 mg/dL      Total Protein 5.5 g/dL      Albumin 2.6 g/dL      ALT (SGPT) 10 U/L      AST (SGOT) 20 U/L      Alkaline Phosphatase 103 U/L      Total Bilirubin 0.4 mg/dL      Globulin 2.9 gm/dL      A/G Ratio 0.9 g/dL      BUN/Creatinine Ratio 20.2     Anion Gap 9.6 mmol/L      eGFR 51.5 mL/min/1.73     Narrative:      GFR Normal >60  Chronic Kidney Disease <60  Kidney Failure <15    The GFR formula is only valid for adults with stable renal function between ages 18 and 70.    CBC & Differential [[id number]]  (Abnormal) Collected: 08/26/24 1228    Specimen: Blood Updated: 08/26/24 1236    Narrative:      The following orders were created for panel order CBC & Differential.  Procedure                               Abnormality         Status                     ---------                               -----------         ------                     CBC Auto Differential[[id number]]        Abnormal            Final result                 Please view results for these tests on the individual orders.    CBC Auto Differential [[id number]]  (Abnormal) Collected: 08/26/24 1228    Specimen: Blood Updated: 08/26/24 1236     WBC 13.30 10*3/mm3      RBC 3.19 10*6/mm3      Hemoglobin 9.1 g/dL      Hematocrit 30.0 %      MCV 94.0 fL      MCH 28.5 pg      MCHC 30.3 g/dL      RDW 19.3 %      RDW-SD 63.6 fl      MPV 8.8 fL      Platelets 267 10*3/mm3      Neutrophil % 79.8 %      Lymphocyte % 13.4 %      Monocyte % 5.3 %      Eosinophil % 0.1 %      Basophil % 0.3 %      Immature Grans % 1.1 %      Neutrophils, Absolute 10.61 10*3/mm3      Lymphocytes, Absolute 1.78 10*3/mm3      Monocytes, Absolute 0.71 10*3/mm3      Eosinophils, Absolute 0.01 10*3/mm3      Basophils, Absolute 0.04 10*3/mm3      Immature Grans, Absolute 0.15 10*3/mm3      nRBC 0.2 /100 WBC           Imaging Results (Last 24 Hours)       ** No results found for the last 24 hours.  **          Physician Progress Notes (last 24 hours)  Notes from 08/26/24 0911 through 08/27/24 0911   No notes of this type exist for this encounter.          Consult Notes (last 24 hours)        Raul Salinas MD at 08/26/24 1435          Should be on an IV PPI.  N.p.o. midnight.  Clear liquid diet for now.  Plan for enteroscopy tomorrow with MAC.  If there is any overt significant hematemesis, GI blood loss overnight, may need more urgent endoscopy.  Full consult to follow.    Electronically signed by Raul Salinas MD at 08/26/24 1439

## 2024-08-27 NOTE — NURSING NOTE
Seen for wound consult. Cooperative with assessment. Melba KULKARNI assisted.   Left heel, right heel, right lateral foot, right medial foot all covered with black eschar. Right heel is malodorous with scant drainage. Orders to paint eschar with betadine. Place betadine dressing on right heel. Leave others open to air. Heel lift boots in use.   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 Caregiver requesting sutures to be removed. Message sent to Dr. Eason.   Thoracic spine, lumbar spine, coccyx stage 3 pressure injuries with white tissue evident in base of wounds. Bright red appearance with satellite lesions suggests could have a fungal component. Orders to  Apply miconazole cream (floor stock) to thoracic, lumbar and coccyx areas twice daily and prn after cleaning. Perineal dermatitis orders placed. Main has been placed, patient is still incontinent of stool.   Skin tear left elbow. Skin tear standing order placed.   Stage 1 pressure injury discovered on occipital region of head. Staff to relieve pressure.   Dried abrasion to left calf left open to air. Healing wound right leg orders to clean with wound cleanser, apply multidex gel and cover with border dressing daily.  Scattered bruising on body. Very poor skin turgor. Anticipate will have more skin breakdown. Discussed with caregiver that patient's skin is failing and is a symptom of end of life issues.   Standing orders for care include a turning schedule, barrier cream twice daily and prn after cleansing, float heels off bed with pillows or heel lift boots, encourage increase mobility as appropriate and tolerated to reduce pressure, and a nutrition consult for dietary needs. Staff to contact provider and re-consult wound nurse for new skin issues or lack of improvement with current orders. Thank you for the consult. If you have questions or concerns do not hesitate to contact me.

## 2024-08-27 NOTE — SIGNIFICANT NOTE
Palliative care called to bedside this evening, family expressing concerns with NPO status.  Palliative team joined by primary medicine service (Dr. Eason), patient's son/HCS (Marco A) at bedside.  He expresses concerns with patient not being able to eat, Dr. Eason presenting medical plan in place, including concerns regarding aspiration risk.  Palliative spoke with son privately, as he is not able to be present tomorrow for planned GOC discussion.  Ultimately, he desires that his mother have nutrition offered, understanding the risk for aspiration, open to diet modifications as recommended.  He reflects on discussions with GI today, noting that EGD will be on hold, agreeable to trending H/H.  He expresses that he feels his mother is entering her final stages of life, however does not want to hasten death.  I provided ongoing education, supportive/empathetic listening, encouraging ongoing reflection on his mother's wishes.  He is agreeable to PC team proceeding with planned family meeting tomorrow morning, noting I would call and update him after.  I provided PC team's contact information for any future questions/concerns.  I spoke with nursing staff regarding diet.  Full consult note to follow in the morning as GOC at the present are unclear.

## 2024-08-27 NOTE — PROGRESS NOTES
Pharmacokinetic Consult - Ceftriaxone Dosing  Lynne Sanchez is a 89 y.o. female who has been consulted to dose Ceftriaxone for UTI.    Current Antimicrobial Therapy    Anti-Infectives (From admission, onward)      None            Microbiology Results (last 10 days)       ** No results found for the last 240 hours. **             Allergies  Phenergan [promethazine hcl] and Zosyn [piperacillin-tazobactam in dex]    Relevant clinical data and objective history reviewed:  Creatinine   Date Value Ref Range Status   08/27/2024 0.94 0.57 - 1.00 mg/dL Final   03/03/2022 1.08 0.60 - 1.10 mg/dL Final     Estimated Creatinine Clearance: 38 mL/min (by C-G formula based on SCr of 0.94 mg/dL).  I/O last 3 completed shifts:  In: -   Out: 30 [Urine:30]  Patient weight: 59.3 kg (130 lb 11.7 oz)    Asessment/Plan  Initiate Ceftriaxone 1g IV every 24 hours x 3 days  Pharmacy will monitor Ms. Sanchez's renal function and clinical status and adjust the Ceftriaxone dose and/or frequency as needed.    Thanks,   Lynne Friedman, PharmD  8/27/2024  10:29 EDT

## 2024-08-27 NOTE — PROGRESS NOTES
Dietitian Assessment    Patient Name: Lynne Sanchez  YOB: 1934  MRN: [id number]  Admission date: 8/26/2024    Comment:      Clinical Nutrition Assessment      Reason for Assessment MST   H&P  Past Medical History:   Diagnosis Date    Acute deep vein thrombosis of left upper extremity     Anemia     Asthma     CHF (congestive heart failure)     Chronic atrial fibrillation     Deep venous thrombosis of left upper limb     Diabetes mellitus, type 2     Diarrhea     GERD (gastroesophageal reflux disease)     Glaucoma     H/O transfusion of whole blood     Heart attack     Heart murmur     History of transfusion     Hyperlipidemia     Hypertension     Impaired functional mobility, balance, gait, and endurance     Kidney disease     patient not aware of what exact diagnosis is     Osteomyelitis     Osteomyelitis of left foot 10/03/2017    Peripheral vascular disease     Right retinal detachment     Secondary cataract of both eyes     Stable proliferative diabetic retinopathy of both eyes     Stroke     Swelling of left extremity     Urinary tract infection     Wears glasses        Past Surgical History:   Procedure Laterality Date    AMPUTATION DIGIT Left 7/5/2018    Procedure: Left Great toe amputation;  Surgeon: Prakash Carrington MD;  [facility];  Service: Vascular    AMPUTATION DIGIT Left 8/27/2018    Procedure: SECOND TOE AMPUTATION DIGIT LEFT;  Surgeon: Prakash Carrington MD;  Location:  [facility] OR;  Service: General    APPENDECTOMY      BONE MARROW ASPIRATION      CARDIAC ABLATION      CARDIAC CATHETERIZATION N/A 10/10/2017    Procedure: Peripheral angiography;  Surgeon: Kan Pelaez MD;  Location: [facility] CATH INVASIVE LOCATION;  Service:     CARDIAC CATHETERIZATION N/A 10/10/2017    Procedure: Angioplasty-peripheral;  Surgeon: Kan Pelaez MD;  Location: [facility] CATH INVASIVE LOCATION;  Service:     CARDIAC CATHETERIZATION N/A 10/10/2017    Procedure: Atherectomy-peripheral;   Surgeon: Kan Pelaez MD;  Location: [facility] CATH INVASIVE LOCATION;  Service:     CARDIAC ELECTROPHYSIOLOGY PROCEDURE N/A 5/3/2018    Procedure: generator change;  Surgeon: Zan Poole MD;  Location:  [facility] CATH INVASIVE LOCATION;  Service: Cardiovascular    CARDIOVERSION      COLONOSCOPY      ENDOSCOPY N/A 10/9/2017    Procedure: ESOPHAGOGASTRODUODENOSCOPY WITH COLD FORCEP BIOPSY;  Surgeon: Clifton Hyatt MD;  Location: [facility] ENDOSCOPY;  Service:     ENDOSCOPY N/A 3/22/2022    Procedure: ESOPHAGOGASTRODUODENOSCOPY with AVM cautery;  Surgeon: Clarisse Velez MD;  Location: [facility] ENDOSCOPY;  Service: Gastroenterology;  Laterality: N/A;    ENDOSCOPY N/A 8/4/2023    Procedure: ESOPHAGOGASTRODUODENOSCOPY WITH BIOPSY AND RUTH BRUSHING;  Surgeon: Clarisse Velez MD;  Location: [facility] ENDOSCOPY;  Service: Gastroenterology;  Laterality: N/A;    ENDOSCOPY N/A 7/26/2024    Procedure: ESOPHAGOGASTRODUODENOSCOPY WITH CONTROL OF BLEEDING WITH INJECTION OF EPINEPHRINE AND RESOLUTION CLIP PLACEMENT X1;  Surgeon: Clarisse Velez MD;  Location: [facility] ENDOSCOPY;  Service: Gastroenterology;  Laterality: N/A;    ENDOSCOPY N/A 7/27/2024    Procedure: ESOPHAGOGASTRODUODENOSCOPYwith resolution clip and APC;  Surgeon: Clarisse Velez MD;  Location: [facility] ENDOSCOPY;  Service: Gastroenterology;  Laterality: N/A;    EYE SURGERY Bilateral     CATARACTS    INTERVENTIONAL RADIOLOGY PROCEDURE N/A 10/10/2017    Procedure: Abdominal Aortagram with Runoff;  Surgeon: Kan Pelaez MD;  Location: [facility] CATH INVASIVE LOCATION;  Service:     PACEMAKER IMPLANTATION      around 2008 then replaced 2018            Current Problems   GI bleed  CKD stage III  T2DM  HTN  Gout     Encounter Information        Trending Narrative     8/27: Pt w/ MST score of 3 and reviewed weight history - no recent wt loss noted. Pt w/ multiple pressure injuries and need for supplementation to support healing. Pt w/  "suspected GI bleed and currently NPO. Will follow-up and monitor diet status.      Anthropometrics        Current Height, Weight Height: 167.6 cm (66\")  Weight: 59.3 kg (130 lb 11.7 oz) (08/27/24 0350)   Trending Weight Hx     This admission:              PTA:     Wt Readings from Last 30 Encounters:   08/27/24 0350 59.3 kg (130 lb 11.7 oz)   08/26/24 1214 55.8 kg (123 lb 0.3 oz)   08/23/24 1246 55.8 kg (123 lb)   07/22/24 2259 60.4 kg (133 lb 2.5 oz)   07/22/24 1516 53.1 kg (117 lb)   07/18/24 1821 53.1 kg (117 lb)   07/11/24 1543 45.4 kg (100 lb)   07/01/24 1030 55.8 kg (123 lb)   06/25/24 0500 55.9 kg (123 lb 3.8 oz)   06/23/24 0500 54.6 kg (120 lb 5.9 oz)   06/21/24 1223 53.1 kg (117 lb)   05/29/24 1353 53.1 kg (117 lb 1 oz)   05/29/24 1513 53.1 kg (117 lb)   05/08/24 1201 53.1 kg (117 lb)   04/26/24 0839 53.4 kg (117 lb 12.8 oz)   04/19/24 0947 54.2 kg (119 lb 6.4 oz)   11/17/23 1510 58.8 kg (129 lb 9.6 oz)   08/14/23 1357 59.9 kg (132 lb)   07/17/23 1422 59 kg (130 lb)   07/13/23 1415 58.1 kg (128 lb)   07/03/23 1056 59 kg (130 lb)   05/17/23 1403 59.9 kg (132 lb)   05/11/23 1443 60.3 kg (133 lb)   05/08/23 1030 60.5 kg (133 lb 6.4 oz)   02/01/23 1438 63.5 kg (140 lb)   11/16/22 1353 60.3 kg (133 lb)   05/02/22 1328 64.9 kg (143 lb)   04/28/22 1338 64.9 kg (143 lb)   04/22/22 1251 63.9 kg (140 lb 12.8 oz)   04/15/22 0430 63.2 kg (139 lb 5.3 oz)   04/14/22 1405 66.9 kg (147 lb 7.8 oz)   04/14/22 0925 66.9 kg (147 lb 7.8 oz)   04/14/22 0600 66.9 kg (147 lb 7.8 oz)   04/13/22 2108 65.2 kg (143 lb 11.8 oz)   04/13/22 1749 55.8 kg (123 lb)   04/15/22 0916 63 kg (139 lb)   03/25/22 1205 58.7 kg (129 lb 8 oz)   03/24/22 0506 60.3 kg (132 lb 15 oz)   03/22/22 1005 60.3 kg (132 lb 15 oz)   03/21/22 2032 60.3 kg (132 lb 15 oz)   03/21/22 1946 60.3 kg (132 lb 15 oz)   03/21/22 1744 55.8 kg (123 lb)   03/17/22 1618 59 kg (130 lb)      BMI kg/m2 Body mass index is 21.1 kg/m².     Labs        Pertinent Labs     Results from " last 7 days   Lab Units 08/27/24 0527 08/26/24  1228   SODIUM mmol/L 139 138   POTASSIUM mmol/L 4.8 5.1   CHLORIDE mmol/L 101 98   CO2 mmol/L 31.2* 30.4*   BUN mg/dL 19 21   CREATININE mg/dL 0.94 1.04*   CALCIUM mg/dL 8.6 8.5*   BILIRUBIN mg/dL  --  0.4   ALK PHOS U/L  --  103   ALT (SGPT) U/L  --  10   AST (SGOT) U/L  --  20   GLUCOSE mg/dL 62* 149*       Results from last 7 days   Lab Units 08/27/24 0527   HEMOGLOBIN g/dL 8.4*   HEMATOCRIT % 27.7*       Lab Results   Component Value Date    HGBA1C 5.30 07/23/2024            Medications       Scheduled Medications levothyroxine, 50 mcg, Oral, Q AM  pantoprazole, 40 mg, Intravenous, Q12H  sodium chloride, 10 mL, Intravenous, Q12H        Infusions       PRN Medications   acetaminophen **OR** acetaminophen **OR** acetaminophen    melatonin    nitroglycerin    ondansetron    sodium chloride    sodium chloride     Physical Findings        Trending Physical   Appearance, NFPE    --  Edema  None reported   Bowel Function 8/27   Tubes Peripheral IV    Chewing/Swallowing NPO   Skin Wounds noted      Estimated/Assessed Needs       Energy Requirements    EST Needs, Method, Wt used 1482-1779kcals/day using 25-30kcals/kg       Protein Requirements    EST Needs, Method, Wt used 71-89g protein per day using 1.2-1.5g/kg       Fluid Requirements     Estimated Needs (mL/day) 1779mL per day        Current Nutrition Orders & Evaluation of Intake       Oral Nutrition     Food Allergies    Current PO Diet NPO Diet NPO Type: Strict NPO   Supplement    PO Evaluation     Trending % PO Intake NPO     Enteral Nutrition    Enteral Route    Order, Modulars, Flushes    Residual/Tolerance    TF Observation         Parenteral Nutrition     TPN Route    Total # Days on TPN    TPN Order, Lipid Details    MVI & Trace Element Freq    TPN Observation       Nutrition Diagnosis         Nutrition Dx Problem 1 Altered GI function r/t suspected GI bleed as evidenced by NPO diet order      Nutrition Dx  Problem 2        Intervention Goal         Intervention Goal(s) Diet advancement when medically appropriate  Maintain current body weight      Nutrition Intervention        RD Action No action at this time      Nutrition Prescription          Diet Prescription NPO   Supplement Prescription      Enteral Prescription        TPN Prescription      Monitor/Evaluation        Monitor Per protocol, PO intake, Pertinent labs, Weight, Skin status, GI status, Symptoms, Swallow function, Hemodynamic stability     RD to follow-up.     Electronically signed by:  Florina Jeronimo RD  08/27/24 08:12 EDT

## 2024-08-27 NOTE — PLAN OF CARE
"Goal Outcome Evaluation:  Consult received to discuss palliative services with family. Pt has a living will on file listing son Marco A as her first choice for surrogate and pts granddaughter Janneth as her second choice. Spoke to pts son Marco A over the phone to arrange a family meeting. He is unable to make it to [place] until late. He gave permission to have Janneth come to the meeting instead. I spoke with Janneth on the phone, she will be here tomorrow morning at 9 am to further discuss palliative service.     16:00 Received a message from the nurse that family was at bedside and wanted to talk with the palliative team. Vilma JUAREZ, Luis Eduardo Perera RN and this nurse met with pt, her son Marco A and his wife Nikole at the bedside. Dr. Eason joined in the conversation at bedside. Marco A expressed concerns that his mother was not being fed. They offered her a drink of liquids and she began to cough. He stated she had some sips earlier and drank it fine. He also mentioned the nurses said she \"got choked\" on some applesauce.  The palliative team and son Marco A moved the discussion to the family meeting room to offer privacy and allow pt to rest. Goals of care were discussed. Marco A stated \"I know she's dying but I will not do anything to hasten the process.\" Clarifying that he felt by not feeding her we were hastening her death. Vilma reassured him that the staff was not trying to harm her but rather trying to protect her from aspiration as well as keeping her from eating for a possible EGD in case of further bleeding. He did explain that her recent quality of life had declined, they have been able to get her up to the wheelchair to bring her in for her doctors appointment but she was now mostly bed bound. He expressed his frustration with hospice  taking away her diabetic medications and that no one was maintaining her diaz catheter. He did mention someone suggested her diabetic medication was taken away due to her " poor kidney function, but again voice frustration on why they didn't just cut it back. Vilma clarified what maintaining a diaz catheter in the setting of hospice looks like for example changing it once a month or less and that daily maintenance was done by family or caregivers when given daily personal care. Vilma reassured him that his frustrations would be passed along to pts team team so that she would be allowed to eat. Palliative team will continue goals of care discussion tomorrow morning with daughter Janneth as planned.Marco A is unable to attend but agreed to be updated by phone.

## 2024-08-27 NOTE — CONSULTS
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    History of Present Illness: 89-year-old female seen as an inpatient consultation for the above.    She was brought into the emergency room by her caregivers for nausea and vomiting.  Was found on her back with some apparent emesis nearby.  Coffee ground like.  Denies melena.    Fairly recently, she was admitted to the hospital and was found to have a GI bleed, from some AVMs in the duodenum which were ablated with argon plasma coagulation.    She presents again with a small drop in hemoglobin in the above context GI consultation was requested.  We had tentatively planned for a enteroscopy today.    Upon further history however she was discharged home on hospice.  Remains DNR/DNI.  I spoke to the patient's son Marco A over the phone, and he would like for us to be more conservative if possible.    He would like to hold off on EGD/enteroscopy until absolutely necessary.    Subjective     Past Medical History:   Diagnosis Date    Acute deep vein thrombosis of left upper extremity     Anemia     Asthma     CHF (congestive heart failure)     Chronic atrial fibrillation     Deep venous thrombosis of left upper limb     Diabetes mellitus, type 2     Diarrhea     GERD (gastroesophageal reflux disease)     Glaucoma     H/O transfusion of whole blood     Heart attack     Heart murmur     History of transfusion     Hyperlipidemia     Hypertension     Impaired functional mobility, balance, gait, and endurance     Kidney disease     patient not aware of what exact diagnosis is     Osteomyelitis     Osteomyelitis of left foot 10/03/2017    Peripheral vascular disease     Right retinal detachment      Secondary cataract of both eyes     Stable proliferative diabetic retinopathy of both eyes     Stroke     Swelling of left extremity     Urinary tract infection     Wears glasses        Past Surgical History:   Procedure Laterality Date    AMPUTATION DIGIT Left 7/5/2018    Procedure: Left Great toe amputation;  Surgeon: Prakash Carrington MD;  Location:  [facility] OR;  Service: Vascular    AMPUTATION DIGIT Left 8/27/2018    Procedure: SECOND TOE AMPUTATION DIGIT LEFT;  Surgeon: Prakash Carrington MD;  Location:  [facility] OR;  Service: General    APPENDECTOMY      BONE MARROW ASPIRATION      CARDIAC ABLATION      CARDIAC CATHETERIZATION N/A 10/10/2017    Procedure: Peripheral angiography;  Surgeon: Kan Pelaez MD;  Location: [facility] CATH INVASIVE LOCATION;  Service:     CARDIAC CATHETERIZATION N/A 10/10/2017    Procedure: Angioplasty-peripheral;  Surgeon: Kan Pelaez MD;  Location: [facility] CATH INVASIVE LOCATION;  Service:     CARDIAC CATHETERIZATION N/A 10/10/2017    Procedure: Atherectomy-peripheral;  Surgeon: Kan Pelaez MD;  Location: [facility] CATH INVASIVE LOCATION;  Service:     CARDIAC ELECTROPHYSIOLOGY PROCEDURE N/A 5/3/2018    Procedure: generator change;  Surgeon: Zan Poole MD;  Location:  [facility] CATH INVASIVE LOCATION;  Service: Cardiovascular    CARDIOVERSION      COLONOSCOPY      ENDOSCOPY N/A 10/9/2017    Procedure: ESOPHAGOGASTRODUODENOSCOPY WITH COLD FORCEP BIOPSY;  Surgeon: Clifton Hyatt MD;  Location: [facility] ENDOSCOPY;  Service:     ENDOSCOPY N/A 3/22/2022    Procedure: ESOPHAGOGASTRODUODENOSCOPY with AVM cautery;  Surgeon: Clarisse Velez MD;  Location: [facility] ENDOSCOPY;  Service: Gastroenterology;  Laterality: N/A;    ENDOSCOPY N/A 8/4/2023    Procedure: ESOPHAGOGASTRODUODENOSCOPY WITH BIOPSY AND RUTH BRUSHING;  Surgeon: Clarisse Velez MD;  Location: [facility] ENDOSCOPY;  Service: Gastroenterology;  Laterality: N/A;    ENDOSCOPY N/A 7/26/2024    Procedure:  ESOPHAGOGASTRODUODENOSCOPY WITH CONTROL OF BLEEDING WITH INJECTION OF EPINEPHRINE AND RESOLUTION CLIP PLACEMENT X1;  Surgeon: Clarisse Velez MD;  Location: [facility] ENDOSCOPY;  Service: Gastroenterology;  Laterality: N/A;    ENDOSCOPY N/A 7/27/2024    Procedure: ESOPHAGOGASTRODUODENOSCOPYwith resolution clip and APC;  Surgeon: Clarisse Velez MD;  Location: [facility] ENDOSCOPY;  Service: Gastroenterology;  Laterality: N/A;    EYE SURGERY Bilateral     CATARACTS    INTERVENTIONAL RADIOLOGY PROCEDURE N/A 10/10/2017    Procedure: Abdominal Aortagram with Runoff;  Surgeon: Kan Pelaez MD;  Location: [facility] CATH INVASIVE LOCATION;  Service:     PACEMAKER IMPLANTATION      around 2008 then replaced 2018       Family History   Problem Relation Age of Onset    No Known Problems Mother     No Known Problems Father     Arthritis Other        Social History     Socioeconomic History    Marital status:     Number of children: 3   Tobacco Use    Smoking status: Never     Passive exposure: Never    Smokeless tobacco: Never   Vaping Use    Vaping status: Never Used   Substance and Sexual Activity    Alcohol use: No    Drug use: No    Sexual activity: Defer         Current Facility-Administered Medications:     acetaminophen (TYLENOL) tablet 650 mg, 650 mg, Oral, Q4H PRN **OR** acetaminophen (TYLENOL) 160 MG/5ML oral solution 650 mg, 650 mg, Oral, Q4H PRN **OR** acetaminophen (TYLENOL) suppository 650 mg, 650 mg, Rectal, Q4H PRN, Jaswinder Andrews DO    sennosides-docusate (PERICOLACE) 8.6-50 MG per tablet 2 tablet, 2 tablet, Oral, BID PRN **AND** polyethylene glycol (MIRALAX) packet 17 g, 17 g, Oral, Daily PRN **AND** bisacodyl (DULCOLAX) EC tablet 5 mg, 5 mg, Oral, Daily PRN **AND** bisacodyl (DULCOLAX) suppository 10 mg, 10 mg, Rectal, Daily PRN, Michael Eason MD    cefTRIAXone (ROCEPHIN) 1,000 mg in sodium chloride 0.9 % 100 mL IVPB-VTB, 1,000 mg, Intravenous, Q24H, Michael Eason MD, Last Rate:  200 mL/hr at 08/27/24 1155, 1,000 mg at 08/27/24 1155    dextrose (D50W) (25 g/50 mL) IV injection 25 g, 25 g, Intravenous, Q15 Min PRN, Michael Eason MD    dextrose (GLUTOSE) oral gel 1 tube, 1 tube, Oral, Q15 Min PRN, Michael Eason MD    glucagon (GLUCAGEN) injection 1 mg, 1 mg, Subcutaneous, Q15 Min PRN, Michael Eason MD    HYDROcodone-acetaminophen (NORCO) 5-325 MG per tablet 1 tablet, 1 tablet, Oral, Q8H PRN, Michael Eason MD, 1 tablet at 08/27/24 1417    levothyroxine (SYNTHROID, LEVOTHROID) tablet 50 mcg, 50 mcg, Oral, Q AM, Jaswinder Andrews DO, 50 mcg at 08/27/24 0510    melatonin tablet 5 mg, 5 mg, Oral, Nightly PRN, Jaswinder Andrews DO    nitroglycerin (NITROSTAT) SL tablet 0.4 mg, 0.4 mg, Sublingual, Q5 Min PRN, Jaswinder Andrews DO    ondansetron (ZOFRAN) injection 4 mg, 4 mg, Intravenous, Q6H PRN, Jaswinder Andrews DO    pantoprazole (PROTONIX) injection 40 mg, 40 mg, Intravenous, Q12H, Jaswinder Andrews DO, 40 mg at 08/27/24 0901    Pharmacy Consult - Pharmacy to dose Ceftriaxone, , Does not apply, Continuous PRN, Michael Eason MD    sodium chloride 0.9 % flush 10 mL, 10 mL, Intravenous, Q12H, Jaswinder Andrews DO, 10 mL at 08/27/24 0901    sodium chloride 0.9 % flush 10 mL, 10 mL, Intravenous, PRN, Jaswinder Andrews DO    sodium chloride 0.9 % infusion 40 mL, 40 mL, Intravenous, PRN, Jaswinder Andrews DO    Allergies   Allergen Reactions    Phenergan [Promethazine Hcl] Confusion    Zosyn [Piperacillin-Tazobactam In Dex] Rash       Review of Systems  Coffee-ground emesis.  See HPI.  The patient does not contribute much to her HPI.    The following portions of the patient's history were reviewed and updated as appropriate: allergies, current medications, past family history, past medical history, past social history, past surgical history and problem list.    Objective     Vitals:    08/27/24 0350 08/27/24 0722 08/27/24 1209 08/27/24 1501   BP: 99/44 97/41 114/59 (!) 103/39   BP Location: Right  arm Right arm Right arm Right arm   Patient Position: Lying Lying Lying Lying   Pulse: 70      Resp: 16 16 14 18   Temp: 97.9 °F (36.6 °C) 97.6 °F (36.4 °C) 98.6 °F (37 °C) 97.9 °F (36.6 °C)   TempSrc: Oral Oral Axillary Oral   SpO2: 98% 99%     Weight: 59.3 kg (130 lb 11.7 oz)      Height:           Physical Exam  Constitutional:       General: She is not in acute distress.     Appearance: She is ill-appearing.   HENT:      Mouth/Throat:      Mouth: Mucous membranes are dry.   Eyes:      General: No scleral icterus.  Cardiovascular:      Rate and Rhythm: Normal rate.   Pulmonary:      Effort: No respiratory distress.   Abdominal:      General: There is no distension.      Tenderness: There is no abdominal tenderness.   Musculoskeletal:         General: No deformity or signs of injury.      Cervical back: Neck supple.   Skin:     Coloration: Skin is not jaundiced.   Neurological:      Comments: Eyes open.  Not very communicative.  Slightly somnolent.   Psychiatric:      Comments: Difficult to assess.         Results from last 7 days   Lab Units 08/27/24  0527 08/27/24  0000 08/26/24  1815 08/26/24  1657 08/26/24  1228   SODIUM mmol/L 139  --   --   --  138   POTASSIUM mmol/L 4.8  --   --   --  5.1   CHLORIDE mmol/L 101  --   --   --  98   CO2 mmol/L 31.2*  --   --   --  30.4*   BUN mg/dL 19  --   --   --  21   CREATININE mg/dL 0.94  --   --   --  1.04*   CALCIUM mg/dL 8.6  --   --   --  8.5*   ALBUMIN g/dL  --   --   --   --  2.6*   BILIRUBIN mg/dL  --   --   --   --  0.4   ALK PHOS U/L  --   --   --   --  103   ALT (SGPT) U/L  --   --   --   --  10   AST (SGOT) U/L  --   --   --   --  20   GLUCOSE mg/dL 62*  --   --   --  149*   WBC 10*3/mm3 10.78  --   --   --  13.30*   HEMOGLOBIN g/dL 8.4* 8.6* 9.1*   < > 9.1*   PLATELETS 10*3/mm3 241  --   --   --  267   INR  1.22*  --   --   --  1.23*    < > = values in this interval not displayed.       Imaging Results (Last 24 Hours)       ** No results found for the last 24  hours. 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    See HPI above for details.    At the time of my evaluation this morning, she had also been given some p.o. Ensure, despite a tentative n.p.o. plan.    After conversation with the patient's son Marco A, we would like to hold off on any invasive testing and procedures for now.    If there is any significant change in her overall status such as large-volume hematemesis or coffee-ground emesis, large-volume melena.  Significant drop in hemoglobin, we may consider endoscopic evaluation, but Marco A would like to be contacted in those situations first. This is reasonable.    Also, palliative care will see the patient and readdress goals of care as she was sent home on hospice after her last hospital admission.    Thank you very much for letting me participate in the care of this patient.  Please do not hesitate to call me if you have any questions.    Raul Salinas MD  [facility]  8/27/2024  16:32 EDT    Part of this note may be an electronic transcription/translation of spoken language to printed text using the Dragon Dictation System.

## 2024-08-27 NOTE — CASE MANAGEMENT/SOCIAL WORK
Case Management/Social Work    Patient Name:  Lynne Sanchez  YOB: 1934  MRN: [id number]  Admit Date:  8/26/2024        08:48 EDT  Met with patient at bedside. Patient plans to discharge home with family and caregivers once medically ready. CM will continue to follow.      Electronically signed by:  Elio Dmearco RN  08/27/24 08:48 EDT

## 2024-08-27 NOTE — PLAN OF CARE
Problem: Fall Injury Risk  Goal: Absence of Fall and Fall-Related Injury  Outcome: Ongoing, Not Progressing  Intervention: Promote Injury-Free Environment  Recent Flowsheet Documentation  Taken 8/27/2024 0200 by Walker Saldana RN  Safety Promotion/Fall Prevention: safety round/check completed  Taken 8/27/2024 0000 by Walker Saldana RN  Safety Promotion/Fall Prevention: safety round/check completed  Taken 8/26/2024 2200 by Walker Saldana RN  Safety Promotion/Fall Prevention:   nonskid shoes/slippers when out of bed   safety round/check completed  Taken 8/26/2024 2000 by Walker Saldana RN  Safety Promotion/Fall Prevention: safety round/check completed     Problem: Adult Inpatient Plan of Care  Goal: Plan of Care Review  Outcome: Ongoing, Not Progressing  Goal: Patient-Specific Goal (Individualized)  Outcome: Ongoing, Not Progressing  Goal: Absence of Hospital-Acquired Illness or Injury  Outcome: Ongoing, Not Progressing  Intervention: Identify and Manage Fall Risk  Recent Flowsheet Documentation  Taken 8/27/2024 0200 by Walker Saldana RN  Safety Promotion/Fall Prevention: safety round/check completed  Taken 8/27/2024 0000 by Walker Saldana RN  Safety Promotion/Fall Prevention: safety round/check completed  Taken 8/26/2024 2200 by Walker Saldana RN  Safety Promotion/Fall Prevention:   nonskid shoes/slippers when out of bed   safety round/check completed  Taken 8/26/2024 2000 by Walker Saldana RN  Safety Promotion/Fall Prevention: safety round/check completed  Intervention: Prevent Skin Injury  Recent Flowsheet Documentation  Taken 8/27/2024 0200 by Walker Saldana RN  Body Position: position changed independently  Taken 8/27/2024 0000 by Walker Saldana RN  Body Position: position changed independently  Taken 8/26/2024 2200 by Walker Saldana RN  Body Position: position changed independently  Taken 8/26/2024 2000 by Walker Saldana RN  Body Position: position changed independently  Intervention: Prevent and  Manage VTE (Venous Thromboembolism) Risk  Recent Flowsheet Documentation  Taken 8/27/2024 0200 by Walker Saldana RN  Activity Management: activity encouraged  Taken 8/27/2024 0000 by Walker Saldana RN  Activity Management: activity encouraged  Taken 8/26/2024 2200 by Walker Saldana RN  Activity Management: activity encouraged  Taken 8/26/2024 2000 by Walker Saldana RN  Activity Management: activity encouraged  Intervention: Prevent Infection  Recent Flowsheet Documentation  Taken 8/27/2024 0200 by Walker Saldana RN  Infection Prevention: environmental surveillance performed  Taken 8/26/2024 2200 by Walker Saldana RN  Infection Prevention: environmental surveillance performed  Taken 8/26/2024 2000 by Walker Saldana RN  Infection Prevention: environmental surveillance performed  Goal: Optimal Comfort and Wellbeing  Outcome: Ongoing, Not Progressing  Goal: Readiness for Transition of Care  Outcome: Ongoing, Not Progressing     Problem: Skin Injury Risk Increased  Goal: Skin Health and Integrity  Outcome: Ongoing, Not Progressing  Intervention: Optimize Skin Protection  Recent Flowsheet Documentation  Taken 8/27/2024 0200 by Walker Saldana RN  Head of Bed (HOB) Positioning: HOB at 30 degrees  Taken 8/27/2024 0000 by Walker Saldana RN  Head of Bed (HOB) Positioning: HOB at 30 degrees  Taken 8/26/2024 2200 by Walker Saldana RN  Head of Bed (HOB) Positioning: HOB elevated  Taken 8/26/2024 2000 by Walker Saldana RN  Head of Bed (HOB) Positioning: HOB at 30 degrees     Problem: Adjustment to Illness (Gastrointestinal Bleeding)  Goal: Optimal Coping with Acute Illness  Outcome: Ongoing, Not Progressing     Problem: Bleeding (Gastrointestinal Bleeding)  Goal: Hemostasis  Outcome: Ongoing, Not Progressing   Goal Outcome Evaluation:

## 2024-08-28 ENCOUNTER — APPOINTMENT (OUTPATIENT)
Dept: GENERAL RADIOLOGY | Facility: HOSPITAL | Age: 89
DRG: 377 | End: 2024-08-28
Payer: MEDICARE

## 2024-08-28 LAB
ANION GAP SERPL CALCULATED.3IONS-SCNC: 4.8 MMOL/L (ref 5–15)
BUN SERPL-MCNC: 19 MG/DL (ref 8–23)
BUN/CREAT SERPL: 18.8 (ref 7–25)
CALCIUM SPEC-SCNC: 8 MG/DL (ref 8.6–10.5)
CHLORIDE SERPL-SCNC: 103 MMOL/L (ref 98–107)
CO2 SERPL-SCNC: 31.2 MMOL/L (ref 22–29)
CREAT SERPL-MCNC: 1.01 MG/DL (ref 0.57–1)
DEPRECATED RDW RBC AUTO: 65.1 FL (ref 37–54)
EGFRCR SERPLBLD CKD-EPI 2021: 53.3 ML/MIN/1.73
ERYTHROCYTE [DISTWIDTH] IN BLOOD BY AUTOMATED COUNT: 19.5 % (ref 12.3–15.4)
GLUCOSE BLDC GLUCOMTR-MCNC: 103 MG/DL (ref 70–130)
GLUCOSE BLDC GLUCOMTR-MCNC: 128 MG/DL (ref 70–130)
GLUCOSE BLDC GLUCOMTR-MCNC: 141 MG/DL (ref 70–130)
GLUCOSE BLDC GLUCOMTR-MCNC: 69 MG/DL (ref 70–130)
GLUCOSE BLDC GLUCOMTR-MCNC: 77 MG/DL (ref 70–130)
GLUCOSE BLDC GLUCOMTR-MCNC: 81 MG/DL (ref 70–130)
GLUCOSE SERPL-MCNC: 73 MG/DL (ref 65–99)
HCT VFR BLD AUTO: 26.8 % (ref 34–46.6)
HGB BLD-MCNC: 7.9 G/DL (ref 12–15.9)
MCH RBC QN AUTO: 28.3 PG (ref 26.6–33)
MCHC RBC AUTO-ENTMCNC: 29.5 G/DL (ref 31.5–35.7)
MCV RBC AUTO: 96.1 FL (ref 79–97)
PLATELET # BLD AUTO: 208 10*3/MM3 (ref 140–450)
PMV BLD AUTO: 8.6 FL (ref 6–12)
POTASSIUM SERPL-SCNC: 4.8 MMOL/L (ref 3.5–5.2)
RBC # BLD AUTO: 2.79 10*6/MM3 (ref 3.77–5.28)
SODIUM SERPL-SCNC: 139 MMOL/L (ref 136–145)
WBC NRBC COR # BLD AUTO: 10.71 10*3/MM3 (ref 3.4–10.8)

## 2024-08-28 PROCEDURE — 85027 COMPLETE CBC AUTOMATED: CPT | Performed by: FAMILY MEDICINE

## 2024-08-28 PROCEDURE — 82948 REAGENT STRIP/BLOOD GLUCOSE: CPT | Performed by: FAMILY MEDICINE

## 2024-08-28 PROCEDURE — 99232 SBSQ HOSP IP/OBS MODERATE 35: CPT | Performed by: FAMILY MEDICINE

## 2024-08-28 PROCEDURE — 82948 REAGENT STRIP/BLOOD GLUCOSE: CPT

## 2024-08-28 PROCEDURE — 25010000002 CEFTRIAXONE PER 250 MG: Performed by: FAMILY MEDICINE

## 2024-08-28 PROCEDURE — 92610 EVALUATE SWALLOWING FUNCTION: CPT

## 2024-08-28 PROCEDURE — 80048 BASIC METABOLIC PNL TOTAL CA: CPT | Performed by: FAMILY MEDICINE

## 2024-08-28 PROCEDURE — 71045 X-RAY EXAM CHEST 1 VIEW: CPT

## 2024-08-28 PROCEDURE — 25010000002 MORPHINE PER 10 MG: Performed by: FAMILY MEDICINE

## 2024-08-28 RX ORDER — MORPHINE SULFATE 2 MG/ML
1 INJECTION, SOLUTION INTRAMUSCULAR; INTRAVENOUS EVERY 4 HOURS PRN
Status: DISCONTINUED | OUTPATIENT
Start: 2024-08-28 | End: 2024-08-29 | Stop reason: HOSPADM

## 2024-08-28 RX ADMIN — LEVOTHYROXINE SODIUM 50 MCG: 50 TABLET ORAL at 06:10

## 2024-08-28 RX ADMIN — Medication 10 ML: at 20:46

## 2024-08-28 RX ADMIN — Medication 10 ML: at 08:05

## 2024-08-28 RX ADMIN — Medication 10 ML: at 12:08

## 2024-08-28 RX ADMIN — MORPHINE SULFATE 1 MG: 2 INJECTION, SOLUTION INTRAMUSCULAR; INTRAVENOUS at 12:07

## 2024-08-28 RX ADMIN — CEFTRIAXONE 1000 MG: 1 INJECTION, POWDER, FOR SOLUTION INTRAMUSCULAR; INTRAVENOUS at 11:34

## 2024-08-28 RX ADMIN — PANTOPRAZOLE SODIUM 40 MG: 40 INJECTION, POWDER, FOR SOLUTION INTRAVENOUS at 08:04

## 2024-08-28 RX ADMIN — PANTOPRAZOLE SODIUM 40 MG: 40 INJECTION, POWDER, FOR SOLUTION INTRAVENOUS at 20:52

## 2024-08-28 RX ADMIN — Medication 1 TUBE: at 07:32

## 2024-08-28 NOTE — CASE MANAGEMENT/SOCIAL WORK
Family meeting planned today with palliative care. Dcp is to return home with family and caregivers. CM following.

## 2024-08-28 NOTE — THERAPY EVALUATION
Acute Care - Speech Language Pathology   Swallow Initial Evaluation MADHAVI Hogan     Patient Name: Lynne Sanchez  : 1934  MRN: [id number]  Today's Date: 2024               Admit Date: 2024    Visit Dx:     ICD-10-CM ICD-9-CM   1. Gastrointestinal hemorrhage, unspecified gastrointestinal hemorrhage type  K92.2 578.9   2. Eschar of foot  R23.4 782.8   3. Anemia, unspecified type  D64.9 285.9   4. Melena  K92.1 578.1   5. AVM (arteriovenous malformation) of small bowel, acquired with hemorrhage  K55.21 569.85     Patient Active Problem List   Diagnosis    Chronic atrial fibrillation    Valvular heart disease    Diabetes mellitus type II, controlled    Osteomyelitis of right foot    Normocytic anemia    Chronic gastritis    Cerebrovascular accident (CVA) due to thrombosis of left anterior cerebral artery    Thrombocytopenia    Cardiac pacemaker in situ    Chronic congestive heart failure    Functional heart murmur    Glaucoma    Hyperlipidemia    Hypertensive disorder    Acquired hypothyroidism    Mass of parotid gland    Neuropathy    Chronic renal impairment, stage 4 (severe)    Impairment of balance    Impaired mobility    Muscle weakness of lower extremity    Chronic pain of both knees    Secondary cataract of both eyes    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    Suspected glaucoma of both eyes    Secondary cataract of both eyes    Right retinal detachment    Stable proliferative diabetic retinopathy of both eyes    Swelling of left extremity    Closed nondisplaced fracture of surgical neck of left humerus with routine healing    Debility    Left arm swelling    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    Secondary hyperparathyroidism of renal origin    Vitamin D deficiency    Anemia requiring transfusions    Melena    Acute on chronic anemia    Stage 3b chronic kidney disease    DM complication    Anemia of chronic renal failure     Type 2 diabetes mellitus with diabetic chronic kidney disease    Anemia of chronic renal failure    Absolute anemia    Secondary hyperparathyroidism of renal origin    Stage 3b chronic kidney disease    Vitamin D deficiency    Other iron deficiency anemias    Chronic anemia    Gastroesophageal reflux disease    Erosion of ileum    Weight loss    Cellulitis of right leg    Osteomyelitis    Right arm cellulitis    Moderate malnutrition    Cellulitis of right arm    GIB (gastrointestinal bleeding)     Past Medical History:   Diagnosis Date    Acute deep vein thrombosis of left upper extremity     Anemia     Asthma     CHF (congestive heart failure)     Chronic atrial fibrillation     Deep venous thrombosis of left upper limb     Diabetes mellitus, type 2     Diarrhea     GERD (gastroesophageal reflux disease)     Glaucoma     H/O transfusion of whole blood     Heart attack     Heart murmur     History of transfusion     Hyperlipidemia     Hypertension     Impaired functional mobility, balance, gait, and endurance     Kidney disease     patient not aware of what exact diagnosis is     Osteomyelitis     Osteomyelitis of left foot 10/03/2017    Peripheral vascular disease     Right retinal detachment     Secondary cataract of both eyes     Stable proliferative diabetic retinopathy of both eyes     Stroke     Swelling of left extremity     Urinary tract infection     Wears glasses      Past Surgical History:   Procedure Laterality Date    AMPUTATION DIGIT Left 7/5/2018    Procedure: Left Great toe amputation;  Surgeon: Prakash Carrington MD;  Location: [facility] OR;  Service: Vascular    AMPUTATION DIGIT Left 8/27/2018    Procedure: SECOND TOE AMPUTATION DIGIT LEFT;  Surgeon: Prakash Carrington MD;  Location:  [facility] OR;  Service: General    APPENDECTOMY      BONE MARROW ASPIRATION      CARDIAC ABLATION      CARDIAC CATHETERIZATION N/A 10/10/2017    Procedure: Peripheral angiography;  Surgeon: Kan ePlaez MD;  Location:  [facility] CATH INVASIVE LOCATION;  Service:     CARDIAC CATHETERIZATION N/A 10/10/2017    Procedure: Angioplasty-peripheral;  Surgeon: Kan Pelaez MD;  Location: [facility] CATH INVASIVE LOCATION;  Service:     CARDIAC CATHETERIZATION N/A 10/10/2017    Procedure: Atherectomy-peripheral;  Surgeon: Kan Pelaez MD;  Location: [facility] CATH INVASIVE LOCATION;  Service:     CARDIAC ELECTROPHYSIOLOGY PROCEDURE N/A 5/3/2018    Procedure: generator change;  Surgeon: Zan Poole MD;  Location: [facility] CATH INVASIVE LOCATION;  Service: Cardiovascular    CARDIOVERSION      COLONOSCOPY      ENDOSCOPY N/A 10/9/2017    Procedure: ESOPHAGOGASTRODUODENOSCOPY WITH COLD FORCEP BIOPSY;  Surgeon: Clifton Hyatt MD;  Location: [facility] ENDOSCOPY;  Service:     ENDOSCOPY N/A 3/22/2022    Procedure: ESOPHAGOGASTRODUODENOSCOPY with AVM cautery;  Surgeon: Clarisse Velez MD;  Location: [facility] ENDOSCOPY;  Service: Gastroenterology;  Laterality: N/A;    ENDOSCOPY N/A 8/4/2023    Procedure: ESOPHAGOGASTRODUODENOSCOPY WITH BIOPSY AND RUTH BRUSHING;  Surgeon: Clarisse Velez MD;  Location: [facility] ENDOSCOPY;  Service: Gastroenterology;  Laterality: N/A;    ENDOSCOPY N/A 7/26/2024    Procedure: ESOPHAGOGASTRODUODENOSCOPY WITH CONTROL OF BLEEDING WITH INJECTION OF EPINEPHRINE AND RESOLUTION CLIP PLACEMENT X1;  Surgeon: Clarisse Velez MD;  Location: [facility] ENDOSCOPY;  Service: Gastroenterology;  Laterality: N/A;    ENDOSCOPY N/A 7/27/2024    Procedure: ESOPHAGOGASTRODUODENOSCOPYwith resolution clip and APC;  Surgeon: Clarisse Velez MD;  Location: [facility] ENDOSCOPY;  Service: Gastroenterology;  Laterality: N/A;    EYE SURGERY Bilateral     CATARACTS    INTERVENTIONAL RADIOLOGY PROCEDURE N/A 10/10/2017    Procedure: Abdominal Aortagram with Runoff;  Surgeon: Kan Pelaez MD;  Location: [facility] CATH INVASIVE LOCATION;  Service:     PACEMAKER IMPLANTATION      around 2008 then replaced 2018       SLP  Recommendation and Plan  SLP Swallowing Diagnosis: suspected pharyngeal dysphagia, esophageal dysphagia (08/28/24 0855)  SLP Diet Recommendation: comfort diet, per physician discretion, other (see comments) (comfort feedings upon pt. request of puree and honey-thick in 1/2 teaspoon amounts with pacing/slow rate monitoring closely for harry) (08/28/24 0855)  Recommended Precautions and Strategies: upright posture during/after eating, small bites of food and sips of liquid, general aspiration precautions, other (see comments), reflux precautions, fatigue precautions, assist with feeding (1/2 teaspoon amounts, pacing/slow rate, careful monitoring for harry) (08/28/24 0855)  SLP Rec. for Method of Medication Administration: meds via alternate route (08/28/24 0855)     Monitor for Signs of Aspiration: yes, cough, gurgly voice, throat clearing (08/28/24 0855)     Swallow Criteria for Skilled Therapeutic Interventions Met: no significant expected improvement in functional status (08/28/24 0855)  Anticipated Discharge Disposition (SLP): unknown (08/28/24 0855)  Rehab Potential/Prognosis, Swallowing: other (see comments) (poor considering medical decline) (08/28/24 0855)  Therapy Frequency (Swallow): other (see comments) (will follow for potential consult need) (08/28/24 0855)  Predicted Duration Therapy Intervention (Days): until discharge (08/28/24 0855)  Oral Care Recommendations: Oral Care BID/PRN, Swab (08/28/24 0855)  Demonstrates Need for Referral to Another Service: palliative care services (08/28/24 0855)  Swallowing Considerations per Physician Discretion: medical management of suspected esophageal dysphagia, as indicated (08/28/24 0855)                                  Plan of Care Reviewed With: patient, daughter  Progress: declining  Outcome Evaluation: Bedside eval of swallow completed with pt. seated upright in bed for po trials with daughter present. Pt. was pleasant and cooperative. She acknowledged having  difficulty swallowing at times and RN reported episode of choing with meds crushed with applesauce earlier this morning. She was given trials of puree, honey-thick, and ice chips in no more than 1/2 teaspoon sized amounts with careful pacing to determine safety. Oral phase was adequate with trials presented. Suspect pharyngeal phase dysphagia with delayed pharyngeal swallow,  pharyngeal strength with multiple swallows, and decreased laryngeal elevation per palpation with coughing frequent post swallows, most significantly with ice chips. Pt. did exhibit productive coughing up of phlegm and foamy secretions with suctioning needed at times. She is at risk of malnutrition as stated in charted notes and poor intake but also very high risk of aspiration or choking with any po at this time due to medical complexity and dysphagia. Pt. also has a dx of GERD but no reported symptoms of difficulty. Recommend: 1. only comfort/pleasure feeds as pt. requests it of about 1/2 teaspoon sized amounts of puree and honey-thick liq with pacing/slow rate between each bite, monitoring closely for harry, 2. via IV as appropriate, 3. aspiration precautions, 4. reflux precautions. Thoroughly discussed risks of aspiration and potential fatality with po intake, with consideration of pt. and family interests in some po for quality of life. Discussed with palliative team as well. Further discussion is to be held with pt., family, MD, and palliative team regarding pt.'s further goals of care.      SWALLOW EVALUATION (Last 72 Hours)       [facility] Adult Swallow Evaluation       Row Name 24 0855                   Rehab Evaluation    Document Type evaluation  -TM        Subjective Information no complaints  -TM        Patient Observations alert;cooperative  -TM        Patient/Family/Caregiver Comments/Observations daughter present  -TM        Patient Effort adequate  -TM           General Information    Patient Profile Reviewed yes  -TM         Pertinent History Of Current Problem GI bleed, GERD, DMII, cardiac, hx CVA, malnutrition  -TM        Current Method of Nutrition NPO  -TM        Precautions/Limitations, Vision WFL with corrective lenses  -TM        Precautions/Limitations, Hearing WFL;for purposes of eval  -TM        Prior Level of Function-Communication WFL  -TM        Prior Level of Function-Swallowing no diet consistency restrictions  -TM        Plans/Goals Discussed with patient and family;other (see comments)  RN, palliative team  -TM        Barriers to Rehab medically complex  -TM        Patient's Goals for Discharge patient did not state  -TM           Pain    Additional Documentation Pain Scale: Numbers Pre/Post-Treatment (Group)  -TM           Pain Scale: Numbers Pre/Post-Treatment    Pretreatment Pain Rating 0/10 - no pain  -TM        Posttreatment Pain Rating 0/10 - no pain  -TM           Oral Motor Structure and Function    Oral Lesions or Structural Abnormalities and/or variants none identified  -TM        Dentition Assessment edentulous  -TM        Secretion Management other (see comments);requires suctioning to control secretions  foamy secretions at times requiring suctioning  -TM        Volitional Cough weak  -TM           Oral Musculature and Cranial Nerve Assessment    Oral Motor General Assessment generalized oral motor weakness  -TM           General Eating/Swallowing Observations    Respiratory Support Currently in Use nasal cannula  -TM        O2 Liters 2L  -TM        Positioning During Eating upright in bed  -TM        Utensils Used spoon;other (see comments)  no more than 1/2 teaspoon sized amounts at a time, paced out  -TM        Consistencies Trialed pureed;honey-thick liquids;ice chips  -TM        Pre SpO2 (%) 95  -TM        Post SpO2 (%) 94  -TM           Respiratory    Respiratory Status reduction in SpO2;other (see comments)  from 95 to 91% during trials intermittently  -TM           Clinical Swallow Eval    Oral  Prep Phase WFL  adequate for trials presented  -TM        Oral Transit WFL  -TM        Oral Residue WFL  -TM        Pharyngeal Phase suspected pharyngeal impairment  -TM        Esophageal Phase suspected esophageal impairment  dx of GERD  -TM        Clinical Swallow Evaluation Summary Bedside eval of swallow completed with pt. seated upright in bed for po trials with daughter present. Pt. was pleasant and cooperative. She acknowledged having difficulty swallowing at times and RN reported episode of choing with meds crushed with applesauce earlier this morning.  She was given trials of puree, honey-thick, and ice chips in no more than 1/2 teaspoon sized amounts with careful pacing to determine safety.  Oral phase was adequate with trials presented. Suspect pharyngeal phase dysphagia with delayed pharyngeal swallow,  pharyngeal strength with multiple swallows, and decreased laryngeal elevation per palpation with coughing frequent post swallows, most significantly with ice chips.  Pt. did exhibit productive coughing up of phlegm and foamy secretions with suctioning needed at times. She is at risk of malnutrition as stated in charted notes and poor intake but also very high risk of aspiration or choking with any po at this time due to medical complexity and dysphagia. Pt. also has a dx of GERD but no reported symptoms of difficulty.   Recommend: 1. only comfort/pleasure feeds as pt. requests it of about 1/2 teaspoon sized amounts of puree and honey-thick liq with pacing/slow rate between each bite, monitoring closely for harry, 2. via IV as appropriate, 3. aspiration precautions, 4. reflux precautions. Thoroughly discussed risks of aspiration and  potential fatality with po intake, with consideration of pt. and family interests in some po for quality of life. Discussed with palliative team as well.  Further discussion is to be held with pt., family, MD, and palliative team regarding pt.'s further goals of care.   -TM           Pharyngeal Phase Concerns    Pharyngeal Phase Concerns multiple swallows;cough  -TM        Multiple Swallows all consistencies  -TM        Cough all consistencies  -TM           Esophageal Phase Concerns    Esophageal Phase Concerns other (see comments)  dx of GERD  -TM           SLP Evaluation Clinical Impression    SLP Swallowing Diagnosis suspected pharyngeal dysphagia;esophageal dysphagia  -TM        Functional Impact risk of aspiration/pneumonia;risk of malnutrition;risk of dehydration  -TM        Rehab Potential/Prognosis, Swallowing other (see comments)  poor considering medical decline  -TM        Swallow Criteria for Skilled Therapeutic Interventions Met no significant expected improvement in functional status  -TM           SLP Treatment Clinical Impressions    Barriers to Overall Progress (SLP) Medically complex;Advanced age  -TM           Recommendations    Therapy Frequency (Swallow) other (see comments)  will follow for potential consult need  -TM        Predicted Duration Therapy Intervention (Days) until discharge  -TM        SLP Diet Recommendation comfort diet, per physician discretion;other (see comments)  comfort feedings upon pt. request of puree and honey-thick in 1/2 teaspoon amounts with pacing/slow rate monitoring closely for harry  -TM        Recommended Precautions and Strategies upright posture during/after eating;small bites of food and sips of liquid;general aspiration precautions;other (see comments);reflux precautions;fatigue precautions;assist with feeding  1/2 teaspoon amounts, pacing/slow rate, careful monitoring for harry  -TM        Oral Care Recommendations Oral Care BID/PRN;Swab  -TM        SLP Rec. for Method of Medication Administration meds via alternate route  -TM        Monitor for Signs of Aspiration yes;cough;gurgly voice;throat clearing  -TM        Anticipated Discharge Disposition (SLP) unknown  -TM        Demonstrates Need for Referral to Another Service  palliative care services  -        Swallowing Considerations per Physician Discretion medical management of suspected esophageal dysphagia, as indicated  -                  User Key  (r) = Recorded By, (t) = Taken By, (c) = Cosigned By      Initials Name Effective Dates     Becki Camejo 06/16/21 -                     EDUCATION  The patient has been educated in the following areas:   Dysphagia (Swallowing Impairment) Oral Care/Hydration Modified Diet Instruction.                Time Calculation:    Time Calculation- SLP       Row Name 08/28/24 1139             Time Calculation- SLP    SLP Start Time 0855  -      SLP Received On 08/28/24  -         Untimed Charges    SLP Eval/Re-eval  ST Eval Oral Pharyng Swallow - 17998  -                User Key  (r) = Recorded By, (t) = Taken By, (c) = Cosigned By      Initials Name Provider Type     Becki Camejo Speech and Language Pathologist                    Therapy Charges for Today       Code Description Service Date Service Provider Modifiers Qty    [id number] HC ST EVAL ORAL PHARYNG SWALLOW 4 8/28/2024 Becki Camejo GN 1                 Becki Camejo  8/28/2024

## 2024-08-28 NOTE — PLAN OF CARE
"Goal Outcome Evaluation:    After continued goals of care conversations the family initially decided to proceed with hospice. Daughter; Janneth voiced concerns about previous experience with Hospice and ask that the term \"hospice\" not be mentioned to the patient's spouse.    This information was relayed to hospice care [facility] when the referral was placed; Hospice stated if the spouse directly asks their affiliation they must be honest but they will take steps to support the family and their reservations towards Hospice. Patient's goals are clear and she understands end of life care, she is agreeable with hospice services.     Family still debating hospice decision at this time, plan to meet and discuss this evening. Palliative care will continue to follow to assist with all aspects of care as needed.                                               "

## 2024-08-28 NOTE — PLAN OF CARE
Problem: Fall Injury Risk  Goal: Absence of Fall and Fall-Related Injury  Outcome: Ongoing, Progressing  Intervention: Promote Injury-Free Environment  Recent Flowsheet Documentation  Taken 8/28/2024 0200 by Walker Saldana RN  Safety Promotion/Fall Prevention: safety round/check completed  Taken 8/28/2024 0000 by Walker Saldana RN  Safety Promotion/Fall Prevention: safety round/check completed  Taken 8/27/2024 2200 by Walker Saldana RN  Safety Promotion/Fall Prevention: safety round/check completed  Taken 8/27/2024 2000 by Walker Saldana RN  Safety Promotion/Fall Prevention: safety round/check completed     Problem: Adult Inpatient Plan of Care  Goal: Plan of Care Review  Outcome: Ongoing, Progressing  Goal: Patient-Specific Goal (Individualized)  Outcome: Ongoing, Progressing  Goal: Absence of Hospital-Acquired Illness or Injury  Outcome: Ongoing, Progressing  Intervention: Identify and Manage Fall Risk  Recent Flowsheet Documentation  Taken 8/28/2024 0200 by Walker Saldana RN  Safety Promotion/Fall Prevention: safety round/check completed  Taken 8/28/2024 0000 by Walker Saldana RN  Safety Promotion/Fall Prevention: safety round/check completed  Taken 8/27/2024 2200 by Walker Saldana RN  Safety Promotion/Fall Prevention: safety round/check completed  Taken 8/27/2024 2000 by Walker Saldana RN  Safety Promotion/Fall Prevention: safety round/check completed  Intervention: Prevent Skin Injury  Recent Flowsheet Documentation  Taken 8/28/2024 0200 by Walker Saldana RN  Body Position: position changed independently  Taken 8/28/2024 0000 by Walker Saldana RN  Body Position: position changed independently  Taken 8/27/2024 2200 by Walker Saldana RN  Body Position: position changed independently  Taken 8/27/2024 2000 by Walker Saldana RN  Body Position: position changed independently  Intervention: Prevent and Manage VTE (Venous Thromboembolism) Risk  Recent Flowsheet Documentation  Taken 8/28/2024 0200 by  Utphall, Walker, RN  Activity Management: activity encouraged  Taken 8/28/2024 0000 by Walker Saldana RN  Activity Management: activity encouraged  Taken 8/27/2024 2200 by Walker Saldana RN  Activity Management: activity encouraged  Taken 8/27/2024 2000 by Walker Saldana RN  Activity Management: activity minimized  Goal: Optimal Comfort and Wellbeing  Outcome: Ongoing, Progressing  Goal: Readiness for Transition of Care  Outcome: Ongoing, Progressing     Problem: Skin Injury Risk Increased  Goal: Skin Health and Integrity  Outcome: Ongoing, Progressing  Intervention: Optimize Skin Protection  Recent Flowsheet Documentation  Taken 8/28/2024 0200 by Walker Saldana RN  Head of Bed (HOB) Positioning: HOB at 30 degrees  Taken 8/28/2024 0000 by Walker Saldana RN  Head of Bed (HOB) Positioning: HOB at 30 degrees  Taken 8/27/2024 2200 by Walker Saldana RN  Head of Bed (HOB) Positioning: HOB at 30 degrees  Taken 8/27/2024 2000 by Walker Saldana RN  Head of Bed (HOB) Positioning: HOB at 30 degrees     Problem: Adjustment to Illness (Gastrointestinal Bleeding)  Goal: Optimal Coping with Acute Illness  Outcome: Ongoing, Progressing     Problem: Bleeding (Gastrointestinal Bleeding)  Goal: Hemostasis  Outcome: Ongoing, Progressing   Goal Outcome Evaluation:

## 2024-08-28 NOTE — PLAN OF CARE
Problem: Fall Injury Risk  Goal: Absence of Fall and Fall-Related Injury  Intervention: Identify and Manage Contributors  Recent Flowsheet Documentation  Taken 8/28/2024 1600 by Gracy Chen RN  Medication Review/Management: medications reviewed  Taken 8/28/2024 1400 by Gracy Chen RN  Medication Review/Management: medications reviewed  Taken 8/28/2024 1200 by Gracy Chen RN  Medication Review/Management: medications reviewed  Taken 8/28/2024 1000 by Gracy Chen RN  Medication Review/Management: medications reviewed  Taken 8/28/2024 0800 by Gracy Chen RN  Medication Review/Management: medications reviewed  Self-Care Promotion: BADL personal routines maintained  Intervention: Promote Injury-Free Environment  Recent Flowsheet Documentation  Taken 8/28/2024 1600 by Gracy Chen RN  Safety Promotion/Fall Prevention:   clutter free environment maintained   assistive device/personal items within reach   activity supervised   fall prevention program maintained   room organization consistent   safety round/check completed  Taken 8/28/2024 1400 by Gracy Chen RN  Safety Promotion/Fall Prevention:   clutter free environment maintained   assistive device/personal items within reach   activity supervised   fall prevention program maintained   room organization consistent   safety round/check completed  Taken 8/28/2024 1200 by Gracy Chen RN  Safety Promotion/Fall Prevention:   activity supervised   assistive device/personal items within reach   clutter free environment maintained   fall prevention program maintained   safety round/check completed   room organization consistent  Taken 8/28/2024 1000 by Gracy Chen RN  Safety Promotion/Fall Prevention:   clutter free environment maintained   activity supervised   assistive device/personal items within reach   fall prevention program maintained   safety round/check completed   room organization consistent  Taken 8/28/2024 0800 by Gracy Chen RN  Safety  Promotion/Fall Prevention:   activity supervised   assistive device/personal items within reach   clutter free environment maintained   fall prevention program maintained   room organization consistent   safety round/check completed     Problem: Adult Inpatient Plan of Care  Goal: Absence of Hospital-Acquired Illness or Injury  Intervention: Identify and Manage Fall Risk  Recent Flowsheet Documentation  Taken 8/28/2024 1600 by Gracy Chen RN  Safety Promotion/Fall Prevention:   clutter free environment maintained   assistive device/personal items within reach   activity supervised   fall prevention program maintained   room organization consistent   safety round/check completed  Taken 8/28/2024 1400 by Gracy Chen RN  Safety Promotion/Fall Prevention:   clutter free environment maintained   assistive device/personal items within reach   activity supervised   fall prevention program maintained   room organization consistent   safety round/check completed  Taken 8/28/2024 1200 by Gracy Chen RN  Safety Promotion/Fall Prevention:   activity supervised   assistive device/personal items within reach   clutter free environment maintained   fall prevention program maintained   safety round/check completed   room organization consistent  Taken 8/28/2024 1000 by Gracy Chen RN  Safety Promotion/Fall Prevention:   clutter free environment maintained   activity supervised   assistive device/personal items within reach   fall prevention program maintained   safety round/check completed   room organization consistent  Taken 8/28/2024 0800 by Gracy Chen RN  Safety Promotion/Fall Prevention:   activity supervised   assistive device/personal items within reach   clutter free environment maintained   fall prevention program maintained   room organization consistent   safety round/check completed  Intervention: Prevent Skin Injury  Recent Flowsheet Documentation  Taken 8/28/2024 1600 by Gracy Chen RN  Body Position:    turned   left   sitting up in bed  Taken 8/28/2024 1400 by Gracy Chen RN  Body Position:   right   turned   sitting up in bed  Taken 8/28/2024 1200 by Gracy Chen RN  Body Position:   sitting up in bed   neutral body alignment   neutral head position  Taken 8/28/2024 1000 by Gracy Chen RN  Body Position:   turned   left   sitting up in bed  Taken 8/28/2024 0800 by Gracy Chen RN  Body Position:   turned   right   sitting up in bed  Skin Protection:   adhesive use limited   incontinence pads utilized   tubing/devices free from skin contact   skin-to-skin areas padded   skin-to-device areas padded   skin sealant/moisture barrier applied   pulse oximeter probe site changed  Intervention: Prevent and Manage VTE (Venous Thromboembolism) Risk  Recent Flowsheet Documentation  Taken 8/28/2024 1600 by Gracy Chen RN  Activity Management: bedrest  Taken 8/28/2024 1400 by Gracy Chen RN  Activity Management: bedrest  Taken 8/28/2024 1200 by Gracy Chen RN  Activity Management: bedrest  Taken 8/28/2024 1000 by Gracy Chen RN  Activity Management: bedrest  Taken 8/28/2024 0800 by Gracy Chen RN  Activity Management: bedrest  Range of Motion:   ROM (range of motion) performed   active ROM (range of motion) encouraged  Intervention: Prevent Infection  Recent Flowsheet Documentation  Taken 8/28/2024 1600 by Gracy Chen RN  Infection Prevention:   environmental surveillance performed   single patient room provided  Taken 8/28/2024 1400 by Gracy Chen RN  Infection Prevention:   environmental surveillance performed   single patient room provided  Taken 8/28/2024 1200 by Gracy Chen RN  Infection Prevention:   environmental surveillance performed   single patient room provided  Taken 8/28/2024 1000 by Gracy Chen RN  Infection Prevention:   environmental surveillance performed   single patient room provided  Taken 8/28/2024 0800 by Gracy Chen RN  Infection Prevention:   environmental surveillance  performed   single patient room provided  Goal: Optimal Comfort and Wellbeing  Intervention: Monitor Pain and Promote Comfort  Recent Flowsheet Documentation  Taken 8/28/2024 1200 by Gracy Chen RN  Pain Management Interventions:   see MAR   position adjusted   pillow support provided   quiet environment facilitated  Intervention: Provide Person-Centered Care  Recent Flowsheet Documentation  Taken 8/28/2024 0800 by Gracy Chen RN  Trust Relationship/Rapport:   care explained   thoughts/feelings acknowledged   reassurance provided   questions encouraged   questions answered     Problem: Skin Injury Risk Increased  Goal: Skin Health and Integrity  Intervention: Optimize Skin Protection  Recent Flowsheet Documentation  Taken 8/28/2024 1600 by Gracy Chen RN  Head of Bed (HOB) Positioning:   HOB elevated   HOB at 30-45 degrees  Taken 8/28/2024 1400 by Gracy Chen RN  Head of Bed (HOB) Positioning: HOB at 30-45 degrees  Taken 8/28/2024 1200 by Gracy Chen RN  Head of Bed (HOB) Positioning: HOB at 30-45 degrees  Taken 8/28/2024 1000 by Gracy Chen RN  Head of Bed (HOB) Positioning: HOB at 30-45 degrees  Taken 8/28/2024 0800 by Gracy Chen RN  Pressure Reduction Techniques:   frequent weight shift encouraged   weight shift assistance provided   rest period provided between sit times   heels elevated off bed   positioned off wounds   pressure points protected  Head of Bed (HOB) Positioning: HOB at 30-45 degrees  Pressure Reduction Devices:   positioning supports utilized   heel offloading device utilized   foam padding utilized  Skin Protection:   adhesive use limited   incontinence pads utilized   tubing/devices free from skin contact   skin-to-skin areas padded   skin-to-device areas padded   skin sealant/moisture barrier applied   pulse oximeter probe site changed     Problem: Adjustment to Illness (Gastrointestinal Bleeding)  Goal: Optimal Coping with Acute Illness  Intervention: Optimize Psychosocial  Response  Recent Flowsheet Documentation  Taken 8/28/2024 0800 by Gracy Chen RN  Supportive Measures: active listening utilized     Problem: Bleeding (Gastrointestinal Bleeding)  Goal: Hemostasis  Intervention: Manage Gastrointestinal Bleeding  Recent Flowsheet Documentation  Taken 8/28/2024 0800 by Gracy Chen RN  Environmental Support:   calm environment promoted   rest periods encouraged   personal routine supported   caregiver consistency promoted   environmental consistency promoted  Bleeding Management: dressing monitored   Goal Outcome Evaluation:              Outcome Evaluation: Patient diet upgraded for comfort feeds per Dr. Eason. Diastolic blood pressures low this afternoon; Dr. Eason aware. Wound care completed twice during shift r/t drainage from wounds. Patient repositioned in bed at least every couple hours with heels elevated. FSBG monitored.

## 2024-08-28 NOTE — PLAN OF CARE
Goal Outcome Evaluation:  Plan of Care Reviewed With: patient, daughter        Progress: declining  Outcome Evaluation: Bedside eval of swallow completed with pt. seated upright in bed for po trials with daughter present. Pt. was pleasant and cooperative. She acknowledged having difficulty swallowing at times and RN reported episode of choing with meds crushed with applesauce earlier this morning. She was given trials of puree, honey-thick, and ice chips in no more than 1/2 teaspoon sized amounts with careful pacing to determine safety. Oral phase was adequate with trials presented. Suspect pharyngeal phase dysphagia with delayed pharyngeal swallow,  pharyngeal strength with multiple swallows, and decreased laryngeal elevation per palpation with coughing frequent post swallows, most significantly with ice chips. Pt. did exhibit productive coughing up of phlegm and foamy secretions with suctioning needed at times. She is at risk of malnutrition as stated in charted notes and poor intake but also very high risk of aspiration or choking with any po at this time due to medical complexity and dysphagia. Pt. also has a dx of GERD but no reported symptoms of difficulty. Recommend: 1. only comfort/pleasure feeds as pt. requests it of about 1/2 teaspoon sized amounts of puree and honey-thick liq with pacing/slow rate between each bite, monitoring closely for harry, 2. via IV as appropriate, 3. aspiration precautions, 4. reflux precautions. Thoroughly discussed risks of aspiration and potential fatality with po intake, with consideration of pt. and family interests in some po for quality of life. Discussed with palliative team as well. Further discussion is to be held with pt., family, MD, and palliative team regarding pt.'s further goals of care.      Anticipated Discharge Disposition (SLP): unknown          SLP Swallowing Diagnosis: suspected pharyngeal dysphagia, esophageal dysphagia ([id number])

## 2024-08-28 NOTE — CONSULTS
[facility]    INPATIENT PALLIATIVE CARE CONSULT      Name:  Lynne Sanchez   Age:  89 y.o.  Sex:  female  :  1934  MRN:  [id number]   Visit Number:  [id number]  Date of Service:  24  Date of Admission:  2024  Primary Care Physician:  Elizabeth Easton APRN    Consulting Physician:  Dr. Eason    Reason For Consult:  Goals of care discussions  and Support during serious illness    History of Present Illness:  Lynne Sanchez is a 89 y.o. female with past medical history of hypertension, T2DM, CKD stg III, heart failure, hypothyroidism, sick sinus syndrome s/p pacemaker, PVD with history of amputation, anemia, malnutrition.patient is known to the palliative care team from initial consultation on 2024.  During this admission goals of care yielded desire for patient to pursue home hospice services, discharged on 2024.  Patient presented to [facility] on 2024 secondary to vomiting with coffee-ground emesis.  Patient with history of chronic iron deficiency anemia, receives outpatient PRBC and iron transfusions.  She has remote history GI bleeding with previous endoscopy during prior July admission.  Upon ED evaluation patient afebrile, hemodynamically stable and nonhypoxic on room air.  Laboratory evaluation noted creatinine 1.04, BUN 21, glucose 149, albumin 2.6.  INR 1.23.  WBCs 13.  Hemoglobin 9.1.  Patient was admitted to the primary medicine service for continued evaluation and management.  GI consulted, initially recommending to proceed with EGD.  However, upon further discussions family expressed desire to pursue more conservative measures and hold on endoscopic evaluation.  Agreeable to trending H&H with close monitoring for any alarming symptoms.  Palliative care consulted to further review GOC in the setting of complex medical management.      As anticipated, palliative met with patient and daughter and patient, SLP services at bedside.  Patient  coughing from PO trials, requiring suctioning.  She appears fatigued, unable to have lengthy discussions.  I offered to have discussions with her daughter/HCS, for which she was agreeable.  Janneth reports that prior to patient's acute hospitalization, she was living at home with assistance of private caregivers.   She reflects that initially patient had improvement following hospitalization in July, however she has had subsequent decline recently.  She is bedridden, with declining appetite.  They note sleeping more frequently throughout the day.  She has maintained FC, incontinent of bowel.  Janneth reviewing the previous decision to elect hospice services, however these were discontinued a few weeks prior.  Family agreeable to continued palliative care follow up and discussions regarding goals of care and advance care planning.     Review of Systems   Constitutional:  Positive for fatigue.   HENT:  Positive for trouble swallowing.    Respiratory:  Positive for cough and choking.    Skin:  Positive for wound.   Neurological:  Positive for weakness.   Psychiatric/Behavioral:  Positive for confusion.         Medical History:  Past Medical History:   Diagnosis Date    Acute deep vein thrombosis of left upper extremity     Anemia     Asthma     CHF (congestive heart failure)     Chronic atrial fibrillation     Deep venous thrombosis of left upper limb     Diabetes mellitus, type 2     Diarrhea     GERD (gastroesophageal reflux disease)     Glaucoma     H/O transfusion of whole blood     Heart attack     Heart murmur     History of transfusion     Hyperlipidemia     Hypertension     Impaired functional mobility, balance, gait, and endurance     Kidney disease     patient not aware of what exact diagnosis is     Osteomyelitis     Osteomyelitis of left foot 10/03/2017    Peripheral vascular disease     Right retinal detachment     Secondary cataract of both eyes     Stable proliferative diabetic retinopathy of both eyes      Stroke     Swelling of left extremity     Urinary tract infection     Wears glasses       Past Surgical History:   Procedure Laterality Date    AMPUTATION DIGIT Left 7/5/2018    Procedure: Left Great toe amputation;  Surgeon: Prakash Carrington MD;  Location:  [facility];  Service: Vascular    AMPUTATION DIGIT Left 8/27/2018    Procedure: SECOND TOE AMPUTATION DIGIT LEFT;  Surgeon: Prakash Carrington MD;  Location:  [facility];  Service: General    APPENDECTOMY      BONE MARROW ASPIRATION      CARDIAC ABLATION      CARDIAC CATHETERIZATION N/A 10/10/2017    Procedure: Peripheral angiography;  Surgeon: Kan Pelaez MD;  Location: [facility] CATH INVASIVE LOCATION;  Service:     CARDIAC CATHETERIZATION N/A 10/10/2017    Procedure: Angioplasty-peripheral;  Surgeon: Kan Pelaez MD;  Location: [facility] CATH INVASIVE LOCATION;  Service:     CARDIAC CATHETERIZATION N/A 10/10/2017    Procedure: Atherectomy-peripheral;  Surgeon: Kan Pelaez MD;  Location: [facility] CATH INVASIVE LOCATION;  Service:     CARDIAC ELECTROPHYSIOLOGY PROCEDURE N/A 5/3/2018    Procedure: generator change;  Surgeon: Zan Poole MD;  Location:  [facility] CATH INVASIVE LOCATION;  Service: Cardiovascular    CARDIOVERSION      COLONOSCOPY      ENDOSCOPY N/A 10/9/2017    Procedure: ESOPHAGOGASTRODUODENOSCOPY WITH COLD FORCEP BIOPSY;  Surgeon: Clifton Hyatt MD;  Location: [facility] ENDOSCOPY;  Service:     ENDOSCOPY N/A 3/22/2022    Procedure: ESOPHAGOGASTRODUODENOSCOPY with AVM cautery;  Surgeon: Clarisse Velez MD;  Location: [facility] ENDOSCOPY;  Service: Gastroenterology;  Laterality: N/A;    ENDOSCOPY N/A 8/4/2023    Procedure: ESOPHAGOGASTRODUODENOSCOPY WITH BIOPSY AND RUTH BRUSHING;  Surgeon: Clarisse Velez MD;  Location: [facility] ENDOSCOPY;  Service: Gastroenterology;  Laterality: N/A;    ENDOSCOPY N/A 7/26/2024    Procedure: ESOPHAGOGASTRODUODENOSCOPY WITH CONTROL OF BLEEDING WITH INJECTION OF EPINEPHRINE AND RESOLUTION CLIP  PLACEMENT X1;  Surgeon: Clarisse Velez MD;  Location: [facility] ENDOSCOPY;  Service: Gastroenterology;  Laterality: N/A;    ENDOSCOPY N/A 7/27/2024    Procedure: ESOPHAGOGASTRODUODENOSCOPYwith resolution clip and APC;  Surgeon: Clarisse Velze MD;  Location: [facility] ENDOSCOPY;  Service: Gastroenterology;  Laterality: N/A;    EYE SURGERY Bilateral     CATARACTS    INTERVENTIONAL RADIOLOGY PROCEDURE N/A 10/10/2017    Procedure: Abdominal Aortagram with Runoff;  Surgeon: Kan Pelaez MD;  Location: [facility] CATH INVASIVE LOCATION;  Service:     PACEMAKER IMPLANTATION      around 2008 then replaced 2018     Family History   Problem Relation Age of Onset    No Known Problems Mother     No Known Problems Father     Arthritis Other      Allergies: Phenergan [promethazine hcl] and Zosyn [piperacillin-tazobactam in dex]    Hospital Scheduled Medications:    Current Facility-Administered Medications:     acetaminophen (TYLENOL) tablet 650 mg, 650 mg, Oral, Q4H PRN **OR** acetaminophen (TYLENOL) 160 MG/5ML oral solution 650 mg, 650 mg, Oral, Q4H PRN **OR** acetaminophen (TYLENOL) suppository 650 mg, 650 mg, Rectal, Q4H PRN, Jaswinder Andrews DO    sennosides-docusate (PERICOLACE) 8.6-50 MG per tablet 2 tablet, 2 tablet, Oral, BID PRN **AND** polyethylene glycol (MIRALAX) packet 17 g, 17 g, Oral, Daily PRN **AND** bisacodyl (DULCOLAX) EC tablet 5 mg, 5 mg, Oral, Daily PRN **AND** bisacodyl (DULCOLAX) suppository 10 mg, 10 mg, Rectal, Daily PRN, Michael Eason MD    cefTRIAXone (ROCEPHIN) 1,000 mg in sodium chloride 0.9 % 100 mL IVPB-VTB, 1,000 mg, Intravenous, Q24H, Michael Eason MD, Last Rate: 200 mL/hr at 08/28/24 1134, 1,000 mg at 08/28/24 1134    dextrose (D50W) (25 g/50 mL) IV injection 25 g, 25 g, Intravenous, Q15 Min PRN, Du Easonm, MD    dextrose (GLUTOSE) oral gel 1 tube, 1 tube, Oral, Q15 Min PRNJenaro Hossam, MD, 1 tube at 08/28/24 0732    glucagon (GLUCAGEN) injection 1 mg, 1 mg,  "Subcutaneous, Q15 Min PRN, Michael Eason MD    HYDROcodone-acetaminophen (NORCO) 5-325 MG per tablet 1 tablet, 1 tablet, Oral, Q8H PRN, Michael Eason MD, 1 tablet at 08/27/24 1417    levothyroxine (SYNTHROID, LEVOTHROID) tablet 50 mcg, 50 mcg, Oral, Q AM, Jaswinder Andrews DO, 50 mcg at 08/28/24 0610    melatonin tablet 5 mg, 5 mg, Oral, Nightly PRN, Jaswinder Andrews DO    Morphine sulfate (PF) injection 1 mg, 1 mg, Intravenous, Q4H PRN, Michael Eason MD, 1 mg at 08/28/24 1207    nitroglycerin (NITROSTAT) SL tablet 0.4 mg, 0.4 mg, Sublingual, Q5 Min PRN, Jaswinder Andrews DO    ondansetron (ZOFRAN) injection 4 mg, 4 mg, Intravenous, Q6H PRN, Jaswinder Andrews DO    pantoprazole (PROTONIX) injection 40 mg, 40 mg, Intravenous, Q12H, Jaswinder Andrews DO, 40 mg at 08/28/24 0804    Pharmacy Consult - Pharmacy to dose Ceftriaxone, , Does not apply, Continuous PRN, Michael Eason MD    sodium chloride 0.9 % flush 10 mL, 10 mL, Intravenous, Q12H, Jaswinder Andrews DO, 10 mL at 08/28/24 0805    sodium chloride 0.9 % flush 10 mL, 10 mL, Intravenous, PRN, Jaswinder Andrews DO, 10 mL at 08/28/24 1208    sodium chloride 0.9 % infusion 40 mL, 40 mL, Intravenous, PRN, Jaswinder Andrews DO  I have utilized all immediate resources to obtain, update, or review the patient's current medications (including all prescription, over-the-counter products, herbals, and/or nutritional supplements).      Vitals: BP (!) 105/35 (BP Location: Right arm)   Pulse 73   Temp 99.3 °F (37.4 °C) (Axillary)   Resp 12   Ht 167.6 cm (66\")   Wt 57.5 kg (126 lb 11.2 oz)   SpO2 99%   BMI 20.45 kg/m²     Intake/Output Summary (Last 24 hours) at 8/28/2024 1525  Last data filed at 8/28/2024 0329  Gross per 24 hour   Intake --   Output 200 ml   Net -200 ml       Physical Exam  Vitals and nursing note reviewed.   Constitutional:       General: She is not in acute distress.     Appearance: She is cachectic. She is ill-appearing. She is not diaphoretic. "   HENT:      Head: Normocephalic and atraumatic.      Comments: Temporal wasting noted     Mouth/Throat:      Mouth: Mucous membranes are moist.      Pharynx: Oropharynx is clear.   Eyes:      Conjunctiva/sclera: Conjunctivae normal.      Pupils: Pupils are equal, round, and reactive to light.   Cardiovascular:      Rate and Rhythm: Normal rate and regular rhythm.   Pulmonary:      Effort: No respiratory distress.      Breath sounds: Rhonchi present.      Comments: Coughing, no acute respiratory distress  Abdominal:      General: There is no distension.      Palpations: Abdomen is soft.   Musculoskeletal:         General: No swelling.      Cervical back: Neck supple.      Comments: Diffuse muscle wasting noted to all extremities    Skin:     General: Skin is warm and dry.      Capillary Refill: Capillary refill takes less than 2 seconds.      Coloration: Skin is pale.      Comments: Bilateral feet with multiple areas of eschar, malodorous with scan drainage noted to the Right heel.  There are sutures noted to the previous right toe amputation.  Multiple areas of skin breakdown noted, associated with bony prominences including thoracic spine, coccyx, and occipital region of head.  Please refer to wound care documentation for detailed skin assessment.    Neurological:      Mental Status: She is alert.      Comments: Oriented to person/place   Psychiatric:         Mood and Affect: Mood normal.         Behavior: Behavior normal.          Diagnostics Review:  Laboratory Data:  Results from last 7 days   Lab Units 08/28/24  0555 08/27/24  0527 08/26/24  1228   SODIUM mmol/L 139 139 138   POTASSIUM mmol/L 4.8 4.8 5.1   CHLORIDE mmol/L 103 101 98   CO2 mmol/L 31.2* 31.2* 30.4*   BUN mg/dL 19 19 21   CREATININE mg/dL 1.01* 0.94 1.04*   CALCIUM mg/dL 8.0* 8.6 8.5*   ALBUMIN g/dL  --   --  2.6*   BILIRUBIN mg/dL  --   --  0.4   ALK PHOS U/L  --   --  103   ALT (SGPT) U/L  --   --  10   AST (SGOT) U/L  --   --  20   GLUCOSE  mg/dL 73 62* 149*     Results from last 7 days   Lab Units 08/28/24  0555 08/27/24  0527 08/27/24  0000 08/26/24  1657 08/26/24  1228   WBC 10*3/mm3 10.71 10.78  --   --  13.30*   HEMOGLOBIN g/dL 7.9* 8.4* 8.6*   < > 9.1*   HEMATOCRIT % 26.8* 27.7*  --   --  30.0*   PLATELETS 10*3/mm3 208 241  --   --  267    < > = values in this interval not displayed.     Results from last 7 days   Lab Units 08/27/24  0527 08/26/24  1228   INR  1.22* 1.23*         Results from last 7 days   Lab Units 08/26/24  1654   COLOR UA  Dark Yellow*   GLUCOSE UA  Negative   KETONES UA  Trace*   BLOOD UA  Negative   LEUKOCYTES UA  Large (3+)*   PH, URINE  <=5.0   BILIRUBIN UA  Small (1+)*   UROBILINOGEN UA  0.2 E.U./dL     Pain Management Panel  More data may exist         5/17/2023 4/19/2021   Pain Management Panel   Creatinine, Urine 50  76.7       Details                 Results from last 7 days   Lab Units 08/26/24  1654   URINECX  Culture in progress     Nutritional Status:   Results from last 7 days   Lab Units 08/26/24  1228   ALBUMIN g/dL 2.6*     Body mass index is 20.45 kg/m².  Documented weights    08/26/24 1214 08/27/24 0350 08/28/24 0414 08/28/24 0500   Weight: 55.8 kg (123 lb 0.3 oz) 59.3 kg (130 lb 11.7 oz) 59.3 kg (130 lb 11.7 oz) 57.5 kg (126 lb 11.2 oz)        Radiology:  XR Chest 1 View    Result Date: 8/28/2024  PROCEDURE: XR CHEST 1 VW-  HISTORY: Cough; K92.2-Gastrointestinal hemorrhage, unspecified; R23.4-Changes in skin texture; D64.9-Anemia, unspecified; K92.1-Melena; K55.21-Angiodysplasia of colon with hemorrhage  COMPARISON: 8/27/2020  FINDINGS:  Portable view of the chest demonstrates moderate right pleural effusion, similar from prior exam. Small left pleural effusion is noted. Bibasilar atelectasis and/or pneumonia is noted. Left-sided pacer is present. The mediastinum is unremarkable.  The heart size is normal.      Stable moderate right and small left pleural effusions. Underlying atelectasis and/or pneumonia  unchanged.    This report was signed and finalized on 8/28/2024 8:22 AM by Chidi Costello MD.      XR Chest 1 View    Result Date: 8/27/2024  FINAL REPORT TECHNIQUE: null CLINICAL HISTORY: Vomiting, coughing, rule out aspiration COMPARISON: null FINDINGS: Exam: 1 view of the chest Comparison: None Findings: Left-sided pacemaker Cardiac silhouette is enlarged. No pneumothorax. Right pleural fluid collection. Bilateral pulmonary opacities larger on the right than the left may represent pneumonia or atelectasis.     Impression: 1. Right pleural fluid collection. Bilateral pulmonary opacities larger on the right than the left may represent pneumonia or atelectasis. Authenticated and Electronically Signed by Dixie Love MD on 08/27/2024 09:51:30 PM       Goals of Care/Advance Care Planning:  Advance Care Planning     1. Determination of Decisional Capacity:  Decisional capacity: YES however confused at times and fatigued from coughing/aspiration, would benefit from HCS regarding complex medical decision making, she is agreeable to PC speaking with her daughter.  If no, name healthcare surrogate/legal decision maker: Marco A Sanchez and Janneth Main  Relationship to individual: Adult children  Surrogate/decision maker understands role and will honor individual's decisions: YES    2. Advanced Directives:  I have personally reviewed Advance Directives on file    Healthcare surrogate/legal decision maker: Please see above    3. Patient & Family Meeting:  Members present at meeting Janneth Main, Luis Eduardo Perera, Vilma Méndez  Meeting took place at [facility] ICU waiting space     4. Summary of the discussion:  After generalized introductions, introduced the role of palliative care in assisting with complex medical decision making, goals of care discussions, and symptom management/supportive care.  I reviewed patient's acute and chronic illness, Janneth expressing insight regarding the same.  She shares that she understands  her mother is nearing the end of her life, reflecting that her and her brothers have been discussing this together.  I reviewed hospice services and the goals associated with the same.  Additionally, I reviewed medications previously discussed with hospice services, noting that given patient's current clinical presentation would not recommend restarting her hypoglycemics.  I also reviewed previous management for chronic anemia noting that aggressive interventions such as transfusion, would be largely dependent upon their mother's GOC.  I encouraged reflection on what the patient would desire.  Janneth feels her mother would desire to be home, noting that they all wish for her to be home during the final stages of life if possible.  She feels that their father has had a difficult time coping with patient continued decline, noting he had difficulty with accepting hospice services in the home.   She would like to have a private discussion with her mother, hopeful that this will also assist with their father's grief.  I offered my personal contact information for any future questions/concerns. Janneth noting she will contact the PC team to provide update following their private discussions.   CODE STATUS reviewed/confirmed: DNR / DNI   Baseline Functional Status: Palliative Performance Scale Score: Performance 30% based on the following measures: Ambulation: Totally bed bound, Activity and Evidence of Disease: Unable to do any work, extensive evidence of disease, Self-Care: Total care required,  Intake: Reduced, LOC: Full, drowsy or confusion           Palliative Care Assessment:  Suspected GI bleeding   Dysphagia   Severe pulmonary hypertension  Congestive heart failure  Sick sinus syndrome s/p pacemaker  Chronic anemia  CKD stg III  Impaired mobility and ADLs    Recommendations/Plan:  - CODE STATUS reviewed/confirmed: DNR / DNI   - Janneth contact PC team following her private discussion with patient, wishing to  proceed with hospice referral, however concerned about their father's view regarding hospice services; I encouraged ongoing education regarding Hospice and EOL trajectory, offering my assistance as they desire  - PC RN to place referral with HCP, will inquire regarding procedures/policies in place to support family members who have reservations towards hospice services  - Patient goals are clear, she desires to have comfort focused care as it is anticipated she is transitioning to EOL and desires hospice services   - Patient likely qualifies for hospice under general criteria:  - Life limiting condition  - Treatment goals are for comfort rather than cure  - Documented terminal disease   - Decline in nutritional status  - Clinical progression of disease  - Repeated inpatient admission/ED evaluation  - Functional status decline  - KPS/PPS <70  - I previously discussed patient's pacemaker directly with [employer], no defibrillator in place  - Current Functional Status: Palliative Performance Scale Score: Performance 20% based on the following measures: Ambulation: Totally bed bound, Activity and Evidence of Disease: Unable to do any work, extensive evidence of disease, Self-Care: Total care required,  Intake: Minimal sips, LOC: Full, drowsy or confusion  - Palliative care will continue to follow/support patient and family    Recommendations:  - PC RN to facilitate hospice referral with goal for home services, hopefully home tomorrow   - Recommend maintaining FC in place for comfort  - Continue localized wound care as recommended, offloading/pressure reduction; however despite this patient will be at high risk for ongoing skin breakdown  - Placed order for comfort feeding as desired, patient is high risk for aspiration, modify diet as tolerated   - Would not recommend restarting hyperglycemic home medications, as she has been borderline hypoglycemic inpatient      I appreciate the opportunity to participate in the care of  Ms. Lynne ROSY Sanchez.  Please do not hesitate to contact me with any questions or concerns.      I spent 95 total minutes conducting chart review for relevant information, face to face assessment, counseling patient and/or family, and coordination of care.  20 minutes spent discussing advanced care planning.  Part of this note may be an electronic transcription/translation of spoken language to printed text using the Dragon Dictation System.       Vilma Méndez PA-C  08/28/24  15:25 EDT

## 2024-08-28 NOTE — PROGRESS NOTES
[facility]    Name:  Lynne Sanchez   Age:  89 y.o.  Sex:  female  :  1934  MRN:  [id number]   Visit Number:  [id number]  Admission Date:  2024  Date Of Service:  24  Primary Care Physician:  Elizabeth Easton APRN     LOS: 2 days :    Chief Complaint:      Vomiting with coffee-ground emesis     Subjective:    Patient was seen and examined bedside today.  Family at bedside including the patient's  and her caregiver.  Patient laying in bed and appears extremely generally weak.  She is awake and alert and oriented x 2.  She denies any pain or difficulty breathing however she appears mildly labored on her breathing with audible crackles.  She was requiring 3 L of oxygen through nasal cannula.  Afebrile.  Discussed with the family and nursing about management and treatment plan.    Hospital Course:    Patient is a chronically ill 89-year-old female with history significant for severe pulmonary hypertension, CKD stage III, type 2 diabetes, sick sinus syndrome status post pacemaker and previous upper GI bleed who presents to the emergency room from home due to progressive nausea, vomiting with coffee-ground emesis.  Patient extremely lethargic and confused on my exam.  History provided by her sons at bedside.  States that she has been unable to eat anything over the past 2 days.  Noticed that she was vomiting today, first appeared bilious and then coffee-ground emesis.  Denies any recent falls, fever, complaints of abdominal pain or diarrhea.    Patient has a history of chronic iron deficiency anemia and receives outpatient PRBC and iron transfusions.  She was treated at our facility  - 2024 for multiple complaints.  While hospitalized, patient developed upper GI bleeding.  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  Patient was ultimately discharged home with hospice care.  However, family has  elected to dismiss hospice 8/12.  Confirmed that patient remains DNR/DNI.     ED summary: Patient afebrile, hemodynamically stable and nonhypoxic upon arrival.  Creatinine 1.04 (improved from baseline of 1.5), BUN 21, glucose 149, and albumin 2.6.  PT PTT elevated, INR 1.23.  WBC 13.  Hemoglobin 9.1 hematocrit 30.  Platelets normal.  With concern for GI bleed given recent history of bleeding, hospitalist asked to admit.  GI contacted, Dr Salinas agreed to consult with plans for endoscopy    Review of Systems:     All systems were reviewed and negative except as mentioned in subjective, assessment and plan.    Vital Signs:    Temp:  [97.7 °F (36.5 °C)-99.3 °F (37.4 °C)] 99.3 °F (37.4 °C)  Heart Rate:  [70-73] 73  Resp:  [12-18] 12  BP: ()/(35-47) 105/35    Intake and output:    I/O last 3 completed shifts:  In: -   Out: 400 [Urine:400]  No intake/output data recorded.    Physical Examination:    General Appearance:  Alert and cooperative.  Chronically ill-appearing.  Frail.   Head:  Atraumatic and normocephalic.   Eyes: Conjunctivae and sclerae normal, no icterus. No pallor.   Throat: No oral lesions, no thrush, oral mucosa moist.   Neck: Supple, trachea midline, no thyromegaly.   Lungs:   Breath sounds heard bilaterally equally.  No wheezing or crackles. No Pleural rub or bronchial breathing.   Heart:  Normal S1 and S2, no murmur, no gallop, no rub. No JVD.   Abdomen:   Normal bowel sounds, no masses, no organomegaly. Soft, nontender, nondistended, no rebound tenderness.   Extremities: Supple, no edema, no cyanosis, no clubbing.   Skin: No bleeding.    Neurologic: Alert and oriented x 2. No facial asymmetry. Moves all four limbs. No tremors.      Laboratory results:    Results from last 7 days   Lab Units 08/28/24  0555 08/27/24  0527 08/26/24  1228   SODIUM mmol/L 139 139 138   POTASSIUM mmol/L 4.8 4.8 5.1   CHLORIDE mmol/L 103 101 98   CO2 mmol/L 31.2* 31.2* 30.4*   BUN mg/dL 19 19 21   CREATININE mg/dL  "1.01* 0.94 1.04*   CALCIUM mg/dL 8.0* 8.6 8.5*   BILIRUBIN mg/dL  --   --  0.4   ALK PHOS U/L  --   --  103   ALT (SGPT) U/L  --   --  10   AST (SGOT) U/L  --   --  20   GLUCOSE mg/dL 73 62* 149*     Results from last 7 days   Lab Units 08/28/24  0555 08/27/24  0527 08/27/24  0000 08/26/24  1657 08/26/24  1228   WBC 10*3/mm3 10.71 10.78  --   --  13.30*   HEMOGLOBIN g/dL 7.9* 8.4* 8.6*   < > 9.1*   HEMATOCRIT % 26.8* 27.7*  --   --  30.0*   PLATELETS 10*3/mm3 208 241  --   --  267    < > = values in this interval not displayed.     Results from last 7 days   Lab Units 08/27/24  0527 08/26/24  1228   INR  1.22* 1.23*         Results from last 7 days   Lab Units 08/26/24  1654   URINECX  Culture in progress     No results for input(s): \"PHART\", \"DRW7OWO\", \"PO2ART\", \"XNC5MBL\", \"BASEEXCESS\" in the last 8760 hours.   I have reviewed the patient's laboratory results.    Radiology results:    XR Chest 1 View    Result Date: 8/28/2024  PROCEDURE: XR CHEST 1 VW-  HISTORY: Cough; K92.2-Gastrointestinal hemorrhage, unspecified; R23.4-Changes in skin texture; D64.9-Anemia, unspecified; K92.1-Melena; K55.21-Angiodysplasia of colon with hemorrhage  COMPARISON: 8/27/2020  FINDINGS:  Portable view of the chest demonstrates moderate right pleural effusion, similar from prior exam. Small left pleural effusion is noted. Bibasilar atelectasis and/or pneumonia is noted. Left-sided pacer is present. The mediastinum is unremarkable.  The heart size is normal.      Impression: Stable moderate right and small left pleural effusions. Underlying atelectasis and/or pneumonia unchanged.    This report was signed and finalized on 8/28/2024 8:22 AM by Chidi Costello MD.      XR Chest 1 View    Result Date: 8/27/2024  FINAL REPORT TECHNIQUE: null CLINICAL HISTORY: Vomiting, coughing, rule out aspiration COMPARISON: null FINDINGS: Exam: 1 view of the chest Comparison: None Findings: Left-sided pacemaker Cardiac silhouette is enlarged. No " pneumothorax. Right pleural fluid collection. Bilateral pulmonary opacities larger on the right than the left may represent pneumonia or atelectasis.     Impression: Impression: 1. Right pleural fluid collection. Bilateral pulmonary opacities larger on the right than the left may represent pneumonia or atelectasis. Authenticated and Electronically Signed by Dixie Love MD on 08/27/2024 09:51:30 PM   I have reviewed the patient's radiology reports.    Medication Review:     I have reviewed the patient's active and prn medications.     Problem List:      GIB (gastrointestinal bleeding)    Melena      Assessment:    GI bleed, suspect upper   CKD stage III  Type 2 diabetes with nephropathy  Severe pulmonary hypertension  Sick sinus syndrome status post pacemaker  Hypertension  Gout  Hypothyroidism  Impaired mobility and ADL    Plan:    Suspect upper GI bleed  Dysphagia  -H&H every 6 hours: Currently anemia is baseline.  No indication for PRBC.  Will transfuse for goal greater than 7.  -GI consulted, Dr. Salinas  -IV Protonix 40 mg twice daily  -Plan was initially for getting EGD, however after discussing with the son he wanted to hold off on any invasive testing/procedure at this time and monitor to see if her hemoglobin drops.  -Speech has evaluated the patient and recommended comfort/pleasure feeds only.  -high risk of aspiration, family in agreement for pleasure feeding at this point.    Urinary tract infection  Possible aspiration pneumonia  -Will send urine for culture  -Discussed with pharmacy, no indication for anaerobic's coverage at this time, continue coverage with Rocephin for now.    -Patient has multiple chronic comorbidities which will complicate overall prognosis and plan of care.  Confirmed with her sons that she remains DNR.    -Prognosis poor unfortunately with multiple morbidities, patient is at high risk of clinical deterioration and death.    -I discussed with the family of the  at  bedside and her caregiver.  They understand that patient is critically ill and unable to swallow safely.   is clear about not wanting invasive procedures or feeding tube at this point.    -Palliative care team met with the family, family have decided on going home with hospice.  Will plan on home with hospice in AM.    -I have reviewed the copied text and it is accurate as of 8/28/2024     DVT Prophylaxis: SCD  Code Status: DNR/DNI  Diet: Clear liquids as tolerated  Discharge Plan: Home with hospice in a.m.    Michael Eason MD  08/28/24  16:22 EDT    Dictated utilizing Dragon dictation.

## 2024-08-29 VITALS
RESPIRATION RATE: 16 BRPM | OXYGEN SATURATION: 98 % | SYSTOLIC BLOOD PRESSURE: 111 MMHG | TEMPERATURE: 98.2 F | WEIGHT: 126.7 LBS | HEIGHT: 66 IN | HEART RATE: 70 BPM | BODY MASS INDEX: 20.36 KG/M2 | DIASTOLIC BLOOD PRESSURE: 42 MMHG

## 2024-08-29 LAB — GLUCOSE BLDC GLUCOMTR-MCNC: 109 MG/DL (ref 70–130)

## 2024-08-29 PROCEDURE — 25010000002 MORPHINE PER 10 MG: Performed by: FAMILY MEDICINE

## 2024-08-29 PROCEDURE — 82948 REAGENT STRIP/BLOOD GLUCOSE: CPT | Performed by: FAMILY MEDICINE

## 2024-08-29 PROCEDURE — 99239 HOSP IP/OBS DSCHRG MGMT >30: CPT | Performed by: FAMILY MEDICINE

## 2024-08-29 RX ORDER — MORPHINE SULFATE 100 MG/5ML
5 SOLUTION ORAL
Qty: 30 ML | Refills: 0 | Status: SHIPPED | OUTPATIENT
Start: 2024-08-29 | End: 2024-09-13

## 2024-08-29 RX ORDER — CEFDINIR 300 MG/1
300 CAPSULE ORAL 2 TIMES DAILY
Qty: 14 CAPSULE | Refills: 0 | Status: SHIPPED | OUTPATIENT
Start: 2024-08-29 | End: 2024-09-05

## 2024-08-29 RX ORDER — LORAZEPAM 2 MG/ML
0.5 CONCENTRATE ORAL EVERY 8 HOURS PRN
Qty: 30 ML | Refills: 0 | Status: SHIPPED | OUTPATIENT
Start: 2024-08-29 | End: 2024-10-08

## 2024-08-29 RX ADMIN — Medication 10 ML: at 08:34

## 2024-08-29 RX ADMIN — PANTOPRAZOLE SODIUM 40 MG: 40 INJECTION, POWDER, FOR SOLUTION INTRAVENOUS at 08:34

## 2024-08-29 RX ADMIN — MORPHINE SULFATE 1 MG: 2 INJECTION, SOLUTION INTRAMUSCULAR; INTRAVENOUS at 10:18

## 2024-08-29 NOTE — PLAN OF CARE
Goal Outcome Evaluation:      Discharged to  home with Hospice referral via [facility] 8/29/2024 @ 1101

## 2024-08-29 NOTE — PROGRESS NOTES
"    [facility]     PALLIATIVE CARE FOLLOW UP NOTE    Name:  Lynne Sanchez   Age:  89 y.o.  Sex:  female  :  1934  MRN:  [id number]   Visit Number:  [id number]  Date Of Service:  24  Primary Care Physician:  Elizabeth Easton APRN    Chief Complaint: Fatigue/weakness    Interval History:  Patient seen today during palliative care team rounds.  Record reviewed and case discussed with staff.  25% PO intake at breakfast.  Last BM .  Utilized two doses of opiate x24 hours.  No acute changes overnight, continues on 3L NC.  She appears comfortable upon assessment, somewhat sleepy.  Spouse and caregiver at bedside express no acute concerns.       Review of Systems   Constitutional:  Positive for activity change, appetite change and fatigue.   Skin:  Positive for wound.   Neurological:  Positive for weakness.   Psychiatric/Behavioral:  Positive for confusion.           Pain Assessment  Nonverbal Indicators of Pain: crying, grimace, moaning  Vitals: /42 (BP Location: Right arm, Patient Position: Lying)   Pulse 70   Temp 98.2 °F (36.8 °C) (Oral)   Resp 16   Ht 167.6 cm (66\")   Wt 57.5 kg (126 lb 11.2 oz)   SpO2 98%   BMI 20.45 kg/m²     Physical Exam  Vitals and nursing note reviewed.   Constitutional:       General: She is not in acute distress.     Appearance: She is cachectic. She is not diaphoretic.      Comments: Frail, chronically ill appearing female, NAD   HENT:      Head: Normocephalic and atraumatic.      Mouth/Throat:      Mouth: Mucous membranes are dry.      Pharynx: Oropharynx is clear.   Eyes:      Conjunctiva/sclera: Conjunctivae normal.      Pupils: Pupils are equal, round, and reactive to light.   Cardiovascular:      Rate and Rhythm: Normal rate.      Comments: Appears well perfused   Pulmonary:      Effort: Pulmonary effort is normal. No respiratory distress.   Musculoskeletal:         General: No swelling.      Cervical back: Neck supple.      Comments: " Diffuse muscle wasting noted to extremities   Skin:     General: Skin is warm and dry.      Capillary Refill: Capillary refill takes less than 2 seconds.      Comments: Dressing noted to RLE   Neurological:      Mental Status: She is alert.      Comments: Oriented to person   Psychiatric:         Mood and Affect: Mood normal.         Behavior: Behavior normal.          Results Reviewed:    Intake/Output Summary (Last 24 hours) at 8/29/2024 0845  Last data filed at 8/29/2024 0839  Gross per 24 hour   Intake 120 ml   Output 300 ml   Net -180 ml     Results from last 7 days   Lab Units 08/28/24  0555 08/27/24  0527 08/26/24  1228   SODIUM mmol/L 139   < > 138   POTASSIUM mmol/L 4.8   < > 5.1   CHLORIDE mmol/L 103   < > 98   CO2 mmol/L 31.2*   < > 30.4*   BUN mg/dL 19   < > 21   CREATININE mg/dL 1.01*   < > 1.04*   CALCIUM mg/dL 8.0*   < > 8.5*   BILIRUBIN mg/dL  --   --  0.4   ALK PHOS U/L  --   --  103   ALT (SGPT) U/L  --   --  10   AST (SGOT) U/L  --   --  20   GLUCOSE mg/dL 73   < > 149*    < > = values in this interval not displayed.     Results from last 7 days   Lab Units 08/28/24  0555   WBC 10*3/mm3 10.71   HEMOGLOBIN g/dL 7.9*   HEMATOCRIT % 26.8*   PLATELETS 10*3/mm3 208       Medication Review:   I have reviewed the patients active and prn medications.     Palliative Care Assessment:  Suspected GI bleeding   Dysphagia   Severe pulmonary hypertension  Congestive heart failure  Sick sinus syndrome s/p pacemaker  Chronic anemia  CKD stg III  Impaired mobility and ADLs    Recommendations/Plan:  - Patient goal is for comfort and desires to have hospice services upon discharge; PC RN coordinating transition to home   - Palliative care will continue to follow/support patient and family    Recommendations:  - Recommend meds to beds comfort medications (Concentrate given dysphagia concerns)  - Morphine 20mg/mL, 5mg (0.25mL) Q4 hrs PRN for pain/dyspnea  - Ativan 2mg/mL, 0.5mg (0.25mL) Q6hrs PRN for  agitation/restlessness   - Recommend maintaining FC in place for comfort  - Continue localized wound care as recommended, offloading/pressure reduction; however despite this patient will be at high risk for ongoing skin breakdown  - Continue comfort feeding as desired, patient is high risk for aspiration, modify diet as tolerated   - Would not recommend restarting hyperglycemic home medications, as she has been borderline hypoglycemic inpatient    CODE STATUS:   Code Status and Medical Interventions: No CPR (Do Not Attempt to Resuscitate); Limited Support; No intubation (DNI)   Ordered at: 08/26/24 1503     Medical Intervention Limits:    No intubation (DNI)     Code Status (Patient has no pulse and is not breathing):    No CPR (Do Not Attempt to Resuscitate)     Medical Interventions (Patient has pulse or is breathing):    Limited Support       I spent 35 total minutes, including face to face assessment, record review, coordination of care with staff, and counseling patient and/or family  Part of this note may be an electronic transcription/translation of spoken language to printed text using the Dragon Dictation System.    Vilma Méndez PA-C  08/29/24  08:45 EDT

## 2024-08-29 NOTE — CASE MANAGEMENT/SOCIAL WORK
Case Management Discharge Note      Final Note: Pt discharged home to family and personal care giver via [facility]. [facility] will be out to her house to admit her today at 5pm.[facility] states has all the DME needed at this time.         Selected Continued Care - Discharged on 8/29/2024 Admission date: 8/26/2024 - Discharge disposition: Hospice/Home      Destination    No services have been selected for the patient.                Durable Medical Equipment    No services have been selected for the patient.                Dialysis/Infusion    No services have been selected for the patient.                Home Medical Care Coordination complete.      Service Provider Selected Services Address Phone Fax Patient Preferred    [facility] [address], [facility] [address] [phone] --       Internal Comment last updated by Lisbeth Sierra RN 8/29/2024 1118    Pt will be admitted to home hospice this evening at 5 pm                         Therapy    No services have been selected for the patient.                Community Resources    No services have been selected for the patient.                Community & DME    No services have been selected for the patient.                    Selected Continued Care - Prior Encounters Includes continued care and service providers with selected services from prior encounters from 5/28/2024 to 8/29/2024      Discharged on 7/31/2024 Admission date: 7/22/2024 - Discharge disposition: Hospice/Home      Home Medical Care       Service Provider Selected Services Address Phone Fax Patient Preferred    [facility] [address], [facility] [address] [phone] --                      Discharged on 6/25/2024 Admission date: 6/21/2024 - Discharge disposition: Home-Health Care [facility]      Home Medical Care       Service Provider Selected Services Address Phone Fax Patient Preferred     [facility] [address], [facility] [phone] --       Internal Comment last updated by Brionna Schaefer, RN 6/24/2024 1326    Current with services                                     Transportation Services  Ambulance: [facility]    Final Discharge Disposition Code: 50 - home with hospice

## 2024-08-29 NOTE — PLAN OF CARE
Goal Outcome Evaluation:       Spoke with pt's daughter, Janneth, this am.  Confirmed with her that family is in agreement with Hospice services at [facility].  She confirmed pt's spouse had been informed Hospice would be visiting pt.  She also confirmed at this time that pt had all the equipment she needs.     PC team --Vilma JUAREZ and Raquel Brown RN visited pt's room today.  Pt awake, appeared comfortable.   Pt's spouse and caregiver at bedside.  Reviewed with pt's spouse and caregiver of plan for Hospice to admit pt to their services this evening after 5p.   Vilma explained the medications that have been ordered.        Vilma spoke with pt's daughter, Janneth, while in room.  She confirmed with Janneth that she was agreeable to pt's spouse signing the EMS/DNR.    EMS/DNR explained to pt's spouse.  He signed the form, it was witnessed and placed on the chart.      HCP telephone number for family/caregiver to call when pt reaches home written on take home instructions form and instructed caregiver to call when pt arrives home.

## 2024-08-29 NOTE — DISCHARGE SUMMARY
[facility]   DISCHARGE SUMMARY      Name:  Lynne Sanchez   Age:  89 y.o.  Sex:  female  :  1934  MRN:  [id number]   Visit Number:  [id number]    Admission Date:  2024  Date of Discharge:  2024  Primary Care Physician:  Elizabeth Easton APRN    Important issues to note:    Patient was discharged home with hospice per family wishes.    Discharge Diagnoses:     GI bleed, suspect upper   CKD stage III  Type 2 diabetes with nephropathy  Severe pulmonary hypertension  Sick sinus syndrome status post pacemaker  Hypertension  Gout  Hypothyroidism  Impaired mobility and AD    Problem List:     Active Hospital Problems    Diagnosis  POA    **GIB (gastrointestinal bleeding) [K92.2]  Yes    Melena [K92.1]  Unknown      Resolved Hospital Problems   No resolved problems to display.     Presenting Problem:    Chief Complaint   Patient presents with    Vomiting      Consults:     Consulting Physician(s)         Provider   Role Specialty     Raul Salinas MD      Consulting Physician Gastroenterology          Procedures Performed:    Procedure(s):  ENTEROSCOPY SMALL BOWEL    History of presenting illness/Hospital Course:    Patient is a chronically ill 89-year-old female with history significant for severe pulmonary hypertension, CKD stage III, type 2 diabetes, sick sinus syndrome status post pacemaker and previous upper GI bleed who presents to the emergency room from home due to progressive nausea, vomiting with coffee-ground emesis.  Patient extremely lethargic and confused on my exam.  History provided by her sons at bedside.  States that she has been unable to eat anything over the past 2 days.  Noticed that she was vomiting today, first appeared bilious and then coffee-ground emesis.  Denies any recent falls, fever, complaints of abdominal pain or diarrhea.     Patient has a history of chronic iron deficiency anemia and receives outpatient PRBC and iron transfusions.  She  was treated at our facility 7/22 - 7/31/2024 for multiple complaints.  While hospitalized, patient developed upper GI bleeding.  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  Patient was ultimately discharged home with hospice care.  However, family has elected to dismiss hospice 8/12.  Confirmed that patient remains DNR/DNI.     ED summary: Patient afebrile, hemodynamically stable and nonhypoxic upon arrival.  Creatinine 1.04 (improved from baseline of 1.5), BUN 21, glucose 149, and albumin 2.6.  PT PTT elevated, INR 1.23.  WBC 13.  Hemoglobin 9.1 hematocrit 30.  Platelets normal.  With concern for GI bleed given recent history of bleeding, hospitalist asked to admit.  GI contacted, Dr Salinas agreed to consult with plans for endoscopy    Suspect upper GI bleed  Dysphagia  -H&H every 6 hours: Currently anemia is baseline.  No indication for PRBC.  Will transfuse for goal greater than 7.  -GI consulted, Dr. Salinas  -IV Protonix 40 mg twice daily  -Plan was initially for getting EGD, however after discussing with the son he wanted to hold off on any invasive testing/procedure at this time and monitor to see if her hemoglobin drops.  -Speech has evaluated the patient and recommended comfort/pleasure feeds only.  -high risk of aspiration, family in agreement for pleasure feeding at this point.     Urinary tract infection  Possible aspiration pneumonia, unable to specify further  -seny urine for culture  -Discussed with pharmacy, no indication for anaerobic's coverage at this time, continued coverage with Rocephin.  -Will discharge patient on Omnicef to finish treatment     -Prognosis poor unfortunately with multiple morbidities, patient is at high risk of clinical deterioration and death.    -Multiple discussions and meetings with the family, palliative care consulted.  Family decided on taking the patient home with hospice as previously.     Vital Signs:    Temp:  [97.9 °F  (36.6 °C)-99.3 °F (37.4 °C)] 98.2 °F (36.8 °C)  Heart Rate:  [70-73] 70  Resp:  [12-16] 16  BP: (100-119)/(35-53) 111/42    Physical Exam:    General Appearance:  Alert and cooperative.  Chronically ill-appearing.  Frail.  Cachectic.   Head:  Atraumatic and normocephalic.   Eyes: Conjunctivae and sclerae normal, no icterus. No pallor.   Ears:  Ears with no abnormalities noted.   Throat: No oral lesions, no thrush, oral mucosa moist.   Neck: Supple, trachea midline, no thyromegaly.   Back:   No tenderness to palpation.   Lungs:   Breath sounds heard bilaterally equally.  Bibasilar crackles heard. No wheezing. No Pleural rub or bronchial breathing.   Heart:  Normal S1 and S2, no murmur, no gallop, no rub. No JVD.   Abdomen:   Normal bowel sounds, no masses, no organomegaly. Soft, nontender, nondistended, no rebound tenderness.   Extremities: Supple, no edema, no cyanosis, no clubbing.   Pulses: Pulses palpable bilaterally.   Skin: No bleeding.  Warm.  Multiple ecchymosis noted in bilateral upper extremities.   Neurologic: Alert and oriented x 2. No facial asymmetry. Moves all four limbs. No tremors.     Pertinent Lab Results:     Results from last 7 days   Lab Units 08/28/24  0555 08/27/24  0527 08/26/24  1228   SODIUM mmol/L 139 139 138   POTASSIUM mmol/L 4.8 4.8 5.1   CHLORIDE mmol/L 103 101 98   CO2 mmol/L 31.2* 31.2* 30.4*   BUN mg/dL 19 19 21   CREATININE mg/dL 1.01* 0.94 1.04*   CALCIUM mg/dL 8.0* 8.6 8.5*   BILIRUBIN mg/dL  --   --  0.4   ALK PHOS U/L  --   --  103   ALT (SGPT) U/L  --   --  10   AST (SGOT) U/L  --   --  20   GLUCOSE mg/dL 73 62* 149*     Results from last 7 days   Lab Units 08/28/24  0555 08/27/24  0527 08/27/24  0000 08/26/24  1657 08/26/24  1228   WBC 10*3/mm3 10.71 10.78  --   --  13.30*   HEMOGLOBIN g/dL 7.9* 8.4* 8.6*   < > 9.1*   HEMATOCRIT % 26.8* 27.7*  --   --  30.0*   PLATELETS 10*3/mm3 208 241  --   --  267    < > = values in this interval not displayed.     Results from last 7 days    Lab Units 08/27/24  0527 08/26/24  1228   INR  1.22* 1.23*                         Results from last 7 days   Lab Units 08/26/24  1654   URINECX  Culture in progress       Pertinent Radiology Results:    Imaging Results (All)       Procedure Component Value Units Date/Time    XR Chest 1 View [[id number]] Collected: 08/28/24 0821     Updated: 08/28/24 0824    Narrative:      PROCEDURE: XR CHEST 1 VW-     HISTORY: Cough; K92.2-Gastrointestinal hemorrhage, unspecified;  R23.4-Changes in skin texture; D64.9-Anemia, unspecified; K92.1-Melena;  K55.21-Angiodysplasia of colon with hemorrhage     COMPARISON: 8/27/2020     FINDINGS:  Portable view of the chest demonstrates moderate right  pleural effusion, similar from prior exam. Small left pleural effusion  is noted. Bibasilar atelectasis and/or pneumonia is noted. Left-sided  pacer is present. The mediastinum is unremarkable.     The heart size is normal.       Impression:      Stable moderate right and small left pleural effusions.  Underlying atelectasis and/or pneumonia unchanged.           This report was signed and finalized on 8/28/2024 8:22 AM by Chidi Costello MD.       XR Chest 1 View [[id number]] Collected: 08/27/24 2151     Updated: 08/27/24 2153    Narrative:      FINAL REPORT    TECHNIQUE:  null    CLINICAL HISTORY:  Vomiting, coughing, rule out aspiration    COMPARISON:  null    FINDINGS:  Exam: 1 view of the chest    Comparison: None    Findings:    Left-sided pacemaker    Cardiac silhouette is enlarged.    No pneumothorax.    Right pleural fluid collection.    Bilateral pulmonary opacities larger on the right than the left may represent pneumonia or atelectasis.      Impression:      Impression:    1. Right pleural fluid collection. Bilateral pulmonary opacities larger on the right than the left may represent pneumonia or atelectasis.    Authenticated and Electronically Signed by Dixie Love MD  on 08/27/2024 09:51:30 PM            Echo:    Results  for orders placed during the hospital encounter of 05/29/24    Adult Transthoracic Echo Complete W/ Cont if Necessary Per Protocol    Interpretation Summary    Left ventricular systolic function is normal. Calculated left ventricular EF = 60% Left ventricular ejection fraction appears to be 56 - 60%.    Left ventricular wall thickness is consistent with hypertrophy.    Left ventricular diastolic function was indeterminate.    The left atrial cavity is dilated.    Left atrial volume is severely increased.    The right atrial cavity is dilated.    Mild aortic valve stenosis is present.    Peak velocity of the flow distal to the aortic valve is 230 cm/s. Aortic valve maximum pressure gradient is 21 mmHg. Aortic valve mean pressure gradient is 13 mmHg.    Moderate to severe mitral valve regurgitation is present.    Moderate tricuspid valve regurgitation is present.    Estimated right ventricular systolic pressure from tricuspid regurgitation is markedly elevated (>55 mmHg). Calculated right ventricular systolic pressure from tricuspid regurgitation is 85 mmHg.    Condition on Discharge:      Stable.    Code status during the hospital stay:    Code Status and Medical Interventions: No CPR (Do Not Attempt to Resuscitate); Limited Support; No intubation (DNI)   Ordered at: 08/26/24 1503     Medical Intervention Limits:    No intubation (DNI)     Code Status (Patient has no pulse and is not breathing):    No CPR (Do Not Attempt to Resuscitate)     Medical Interventions (Patient has pulse or is breathing):    Limited Support     Discharge Disposition:    Hospice/Home    Discharge Medications:       Discharge Medications        New Medications        Instructions Start Date   LORazepam 2 MG/ML concentrated solution  Commonly known as: LORazepam Intensol   0.5 mg, Oral, Every 8 Hours PRN      morphine 20 MG/ML concentrated solution   5 mg, Oral, Every 3 Hours PRN             Continue These Medications        Instructions Start  Date   acetaminophen 650 MG 8 hr tablet  Commonly known as: TYLENOL   650 mg, Oral, Every 8 Hours PRN      furosemide 20 MG tablet  Commonly known as: LASIX   20 mg, Oral, Daily      glucose blood test strip   Test daily.      glucose monitor monitoring kit   1 each, Does not apply, Daily      levothyroxine 50 MCG tablet  Commonly known as: SYNTHROID, LEVOTHROID   50 mcg, Oral, Every Early Morning      metoprolol tartrate 100 MG tablet  Commonly known as: LOPRESSOR   100 mg, Oral, 2 Times Daily      multivitamin tablet tablet   1 tablet, Oral, Daily      ondansetron ODT 4 MG disintegrating tablet  Commonly known as: ZOFRAN-ODT   4 mg, Translingual, Every 8 Hours PRN      Retacrit 79710 UNIT/ML injection  Generic drug: epoetin dorcas-epbx   1 mL, Subcutaneous, 1 ml sq per month on day 17      sennosides-docusate 8.6-50 MG per tablet  Commonly known as: PERICOLACE   2 tablets, Oral, 2 Times Daily             Stop These Medications      glimepiride 1 MG tablet  Commonly known as: AMARYL            ASK your doctor about these medications        Instructions Start Date   naloxone 4 MG/0.1ML nasal spray  Commonly known as: NARCAN   Call 911. Don't prime. [place] in 1 nostril for overdose. Repeat in 2-3 minutes in other nostril if no or minimal breathing/responsiveness.      pantoprazole 40 MG EC tablet  Commonly known as: PROTONIX   40 mg, Oral, Daily             Discharge Diet:   Pleasure feeding as tolerated    Activity at Discharge:   As tolerated    Follow-up Appointments:     Follow-up Information       Paxton Vasquez DO Follow up on 9/13/2024.    Specialty: Internal Medicine  Why: @ 4:00 pm  Contact information:  [address] [place] [address] [phone]                           Future Appointments   Date Time Provider Department Center   9/5/2024  3:00 PM TREATMENT RM 10  [facility]   9/13/2024  4:00 PM Paxton Vasquez DO [facility]   9/19/2024  2:30 PM TREATMENT   14 [facility]   10/3/2024  3:15 PM TREATMENT RM 13  [facility]   10/17/2024  1:30 PM TREATMENT RM 14 [facility]  [facility]   10/31/2024  2:30 PM TREATMENT RM 12 [facility]  [facility]   11/14/2024  1:30 PM TREATMENT RM 14  [facility]  [facility]   11/27/2024  3:00 PM TREATMENT RM 14  [facility]  [facility]   12/12/2024  2:00 PM TREATMENT RM 14  [facility]  [facility]   8/25/2025  2:30 PM Demond Leon MD [facility]     Test Results Pending at Discharge:    Pending Labs       Order Current Status    Urine Culture - Urine, Urine, Catheter Preliminary result               Michael Eason MD  08/29/24  09:59 EDT    Time: I spent 32 minutes on this discharge activity which included: face-to-face encounter with the patient, reviewing the data in the system, coordination of the care with the nursing staff as well as consultants, documentation, and entering orders.     Dictated utilizing Dragon dictation.

## 2024-08-29 NOTE — PLAN OF CARE
Goal Outcome Evaluation:  Plan of Care Reviewed With: patient        Progress: declining       Problem: Fall Injury Risk  Goal: Absence of Fall and Fall-Related Injury  Outcome: Ongoing, Progressing  Intervention: Promote Injury-Free Environment  Recent Flowsheet Documentation  Taken 8/29/2024 0400 by Harika Sewell LPN  Safety Promotion/Fall Prevention:   activity supervised   assistive device/personal items within reach   clutter free environment maintained   fall prevention program maintained   nonskid shoes/slippers when out of bed   room organization consistent   safety round/check completed  Taken 8/29/2024 0200 by Harika Sewell LPN  Safety Promotion/Fall Prevention:   activity supervised   assistive device/personal items within reach   clutter free environment maintained   fall prevention program maintained   nonskid shoes/slippers when out of bed   room organization consistent   safety round/check completed  Taken 8/29/2024 0000 by Tincher, Harika, LPN  Safety Promotion/Fall Prevention:   activity supervised   assistive device/personal items within reach   clutter free environment maintained   fall prevention program maintained   nonskid shoes/slippers when out of bed   room organization consistent   safety round/check completed  Taken 8/28/2024 2200 by Tincher, Harika, LPN  Safety Promotion/Fall Prevention:   activity supervised   assistive device/personal items within reach   clutter free environment maintained   fall prevention program maintained   nonskid shoes/slippers when out of bed   room organization consistent   safety round/check completed  Taken 8/28/2024 2000 by Tincher, Harika, LPN  Safety Promotion/Fall Prevention:   activity supervised   assistive device/personal items within reach   clutter free environment maintained   fall prevention program maintained   nonskid shoes/slippers when out of bed   room organization consistent   safety round/check completed     Problem:  Adult Inpatient Plan of Care  Goal: Plan of Care Review  Outcome: Ongoing, Progressing  Flowsheets (Taken 8/29/2024 0500)  Progress: declining  Plan of Care Reviewed With: patient  Goal: Patient-Specific Goal (Individualized)  Outcome: Ongoing, Progressing  Goal: Absence of Hospital-Acquired Illness or Injury  Outcome: Ongoing, Progressing  Intervention: Identify and Manage Fall Risk  Recent Flowsheet Documentation  Taken 8/29/2024 0400 by Harika Sewell LPN  Safety Promotion/Fall Prevention:   activity supervised   assistive device/personal items within reach   clutter free environment maintained   fall prevention program maintained   nonskid shoes/slippers when out of bed   room organization consistent   safety round/check completed  Taken 8/29/2024 0200 by Harika Sewell LPN  Safety Promotion/Fall Prevention:   activity supervised   assistive device/personal items within reach   clutter free environment maintained   fall prevention program maintained   nonskid shoes/slippers when out of bed   room organization consistent   safety round/check completed  Taken 8/29/2024 0000 by Tincher, Harika, LPN  Safety Promotion/Fall Prevention:   activity supervised   assistive device/personal items within reach   clutter free environment maintained   fall prevention program maintained   nonskid shoes/slippers when out of bed   room organization consistent   safety round/check completed  Taken 8/28/2024 2200 by Tincher, Harika, LPN  Safety Promotion/Fall Prevention:   activity supervised   assistive device/personal items within reach   clutter free environment maintained   fall prevention program maintained   nonskid shoes/slippers when out of bed   room organization consistent   safety round/check completed  Taken 8/28/2024 2000 by Tincher, Harika, LPN  Safety Promotion/Fall Prevention:   activity supervised   assistive device/personal items within reach   clutter free environment maintained   fall  prevention program maintained   nonskid shoes/slippers when out of bed   room organization consistent   safety round/check completed  Intervention: Prevent Skin Injury  Recent Flowsheet Documentation  Taken 8/29/2024 0400 by Harika Sewell LPN  Body Position: supine  Taken 8/29/2024 0200 by Harika Sewell LPN  Body Position:   supine   tilted   right  Taken 8/29/2024 0100 by Harika eSwell LPN  Body Position: supine, legs elevated  Taken 8/29/2024 0000 by Harika Sewell LPN  Body Position:   tilted   right  Taken 8/28/2024 2200 by Harika Sewell LPN  Body Position:   tilted   right  Taken 8/28/2024 2000 by Harika Sewell LPN  Body Position:   tilted   left  Skin Protection:   adhesive use limited   incontinence pads utilized   skin sealant/moisture barrier applied   skin-to-device areas padded   skin-to-skin areas padded   transparent dressing maintained   tubing/devices free from skin contact  Intervention: Prevent and Manage VTE (Venous Thromboembolism) Risk  Recent Flowsheet Documentation  Taken 8/29/2024 0400 by Harika Sewell LPN  Activity Management: activity encouraged  Taken 8/29/2024 0200 by Harika Sewell LPN  Activity Management: activity encouraged  Taken 8/29/2024 0100 by Harika Sewell LPN  Activity Management: activity encouraged  Taken 8/29/2024 0000 by Harika Sewell LPN  Activity Management: activity encouraged  Taken 8/28/2024 2200 by Harika Sewell LPN  Activity Management: activity encouraged  Taken 8/28/2024 2000 by Harika Sewell LPN  Activity Management: bedrest  Range of Motion: ROM (range of motion) performed  Intervention: Prevent Infection  Recent Flowsheet Documentation  Taken 8/29/2024 0400 by Harika Sewell LPN  Infection Prevention:   environmental surveillance performed   rest/sleep promoted   single patient room provided  Taken 8/29/2024 0200 by Harika Sewell LPN  Infection Prevention:   environmental  surveillance performed   rest/sleep promoted   single patient room provided  Taken 8/29/2024 0100 by Harika Sewell LPN  Infection Prevention:   environmental surveillance performed   rest/sleep promoted   single patient room provided  Taken 8/29/2024 0000 by Harika Sewell LPN  Infection Prevention:   rest/sleep promoted   single patient room provided  Taken 8/28/2024 2200 by Harika Sewell LPN  Infection Prevention:   environmental surveillance performed   rest/sleep promoted   single patient room provided  Taken 8/28/2024 2000 by Harika Sewell LPN  Infection Prevention:   environmental surveillance performed   rest/sleep promoted   single patient room provided  Goal: Optimal Comfort and Wellbeing  Outcome: Ongoing, Progressing  Intervention: Provide Person-Centered Care  Recent Flowsheet Documentation  Taken 8/28/2024 2000 by Harika Sewell LPN  Trust Relationship/Rapport:   care explained   choices provided   emotional support provided  Goal: Readiness for Transition of Care  Outcome: Ongoing, Progressing     Problem: Skin Injury Risk Increased  Goal: Skin Health and Integrity  Outcome: Ongoing, Progressing  Intervention: Optimize Skin Protection  Recent Flowsheet Documentation  Taken 8/29/2024 0400 by Harika Sewell LPN  Head of Bed (HOB) Positioning: HOB elevated  Taken 8/29/2024 0200 by Harika Sewell LPN  Head of Bed (HOB) Positioning: HOB elevated  Taken 8/29/2024 0100 by Harika Sewell LPN  Head of Bed (HOB) Positioning: HOB elevated  Taken 8/29/2024 0000 by Harika Sewell LPN  Head of Bed (HOB) Positioning: HOB elevated  Taken 8/28/2024 2200 by Harika Sewell LPN  Head of Bed (HOB) Positioning: HOB elevated  Taken 8/28/2024 2000 by Harika Sewell LPN  Pressure Reduction Techniques:   frequent weight shift encouraged   heels elevated off bed   positioned off wounds   pressure points protected   weight shift assistance provided  Head of Bed (HOB)  Positioning: HOB elevated  Pressure Reduction Devices:   positioning supports utilized   heel offloading device utilized  Skin Protection:   adhesive use limited   incontinence pads utilized   skin sealant/moisture barrier applied   skin-to-device areas padded   skin-to-skin areas padded   transparent dressing maintained   tubing/devices free from skin contact     Problem: Adjustment to Illness (Gastrointestinal Bleeding)  Goal: Optimal Coping with Acute Illness  Outcome: Ongoing, Progressing  Intervention: Optimize Psychosocial Response  Recent Flowsheet Documentation  Taken 8/28/2024 2000 by Harika Sewell LPN  Supportive Measures: verbalization of feelings encouraged     Problem: Bleeding (Gastrointestinal Bleeding)  Goal: Hemostasis  Outcome: Ongoing, Progressing  Intervention: Manage Gastrointestinal Bleeding  Recent Flowsheet Documentation  Taken 8/28/2024 2000 by Harika Sewell LPN  Environmental Support: calm environment promoted

## 2024-08-30 LAB
BACTERIA SPEC AEROBE CULT: ABNORMAL
BACTERIA SPEC AEROBE CULT: ABNORMAL

## 2024-08-30 NOTE — PAYOR COMM NOTE
"To:  [employer]  From: Yoli Leal RN  Phone: [phone]  Fax: [phone]  NPI: [id number]  TIN: [id number]  Member ID: [id number]   MRN: [id number]    Mel Sanchez \"Bindu\" (89 y.o. Female)       Date of Birth   1934    Social Security Number       Address   [address] [place] [address]    Home Phone   [phone]    MRN   [id number]       [facility]    Marital Status                               Admission Date   8/26/24    Admission Type   Emergency    Admitting Provider   Kerley, Brian Joseph, DO    Attending Provider       Department, Room/Bed   [facility] TELEMETRY 3, 319/1       Discharge Date   8/29/2024    Discharge Disposition   Hospice/Home    Discharge Destination                                 Attending Provider: (none)   Allergies: Phenergan [Promethazine Hcl], Zosyn [Piperacillin-tazobactam In Dex]    Isolation: None   Infection: None   Code Status: Prior    Ht: 167.6 cm (66\")   Wt: 57.5 kg (126 lb 11.2 oz)    Admission Cmt: None   Principal Problem: GIB (gastrointestinal bleeding) [K92.2]                   Active Insurance as of 8/26/2024       Primary Coverage       Payor Plan Insurance Group Employer/Plan Group    [employer] MEDICARE REPLACEMENT [employer] MED ADV PPO [id number]       Payor Plan Address Payor Plan Phone Number Payor Plan Fax Number Effective Dates    [address] [phone]  1/1/2024 - None Entered    [facility] [id number]         Subscriber Name Subscriber Birth Date Member ID       MEL SANCHEZ 1934 [id number]                     Emergency Contacts        (Rel.) Home Phone Work Phone Mobile Phone    Marco A Sanchez (Son) [phone] -- [phone]    LISA PEREZ (Daughter) -- -- [phone]    Brennan Mendenhall (Spouse) [phone] -- [phone]    MARILU SANCHEZ (Son) [phone] -- --    MANUEL SANCHEZ (Daughter) -- -- [phone]                 Discharge Summary        Michael Eason MD at 08/29/24 " 0959              [facility]   DISCHARGE SUMMARY      Name:  Lynne Sanchez   Age:  89 y.o.  Sex:  female  :  1934  MRN:  [id number]   Visit Number:  [id number]    Admission Date:  2024  Date of Discharge:  2024  Primary Care Physician:  Elizabeth Easton APRN    Important issues to note:    Patient was discharged home with hospice per family wishes.    Discharge Diagnoses:     GI bleed, suspect upper   CKD stage III  Type 2 diabetes with nephropathy  Severe pulmonary hypertension  Sick sinus syndrome status post pacemaker  Hypertension  Gout  Hypothyroidism  Impaired mobility and AD    Problem List:     Active Hospital Problems    Diagnosis  POA    **GIB (gastrointestinal bleeding) [K92.2]  Yes    Melena [K92.1]  Unknown      Resolved Hospital Problems   No resolved problems to display.     Presenting Problem:    Chief Complaint   Patient presents with    Vomiting      Consults:     Consulting Physician(s)         Provider   Role Specialty     Raul Salinas MD      Consulting Physician Gastroenterology          Procedures Performed:    Procedure(s):  ENTEROSCOPY SMALL BOWEL    History of presenting illness/Hospital Course:    Patient is a chronically ill 89-year-old female with history significant for severe pulmonary hypertension, CKD stage III, type 2 diabetes, sick sinus syndrome status post pacemaker and previous upper GI bleed who presents to the emergency room from home due to progressive nausea, vomiting with coffee-ground emesis.  Patient extremely lethargic and confused on my exam.  History provided by her sons at bedside.  States that she has been unable to eat anything over the past 2 days.  Noticed that she was vomiting today, first appeared bilious and then coffee-ground emesis.  Denies any recent falls, fever, complaints of abdominal pain or diarrhea.     Patient has a history of chronic iron deficiency anemia and receives outpatient PRBC and iron  transfusions.  She was treated at our facility 7/22 - 7/31/2024 for multiple complaints.  While hospitalized, patient developed upper GI bleeding.  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  Patient was ultimately discharged home with hospice care.  However, family has elected to dismiss hospice 8/12.  Confirmed that patient remains DNR/DNI.     ED summary: Patient afebrile, hemodynamically stable and nonhypoxic upon arrival.  Creatinine 1.04 (improved from baseline of 1.5), BUN 21, glucose 149, and albumin 2.6.  PT PTT elevated, INR 1.23.  WBC 13.  Hemoglobin 9.1 hematocrit 30.  Platelets normal.  With concern for GI bleed given recent history of bleeding, hospitalist asked to admit.  GI contacted, Dr Salinas agreed to consult with plans for endoscopy    Suspect upper GI bleed  Dysphagia  -H&H every 6 hours: Currently anemia is baseline.  No indication for PRBC.  Will transfuse for goal greater than 7.  -GI consulted, Dr. Salinas  -IV Protonix 40 mg twice daily  -Plan was initially for getting EGD, however after discussing with the son he wanted to hold off on any invasive testing/procedure at this time and monitor to see if her hemoglobin drops.  -Speech has evaluated the patient and recommended comfort/pleasure feeds only.  -high risk of aspiration, family in agreement for pleasure feeding at this point.     Urinary tract infection  Possible aspiration pneumonia, unable to specify further  -seny urine for culture  -Discussed with pharmacy, no indication for anaerobic's coverage at this time, continued coverage with Rocephin.  -Will discharge patient on Omnicef to finish treatment     -Prognosis poor unfortunately with multiple morbidities, patient is at high risk of clinical deterioration and death.    -Multiple discussions and meetings with the family, palliative care consulted.  Family decided on taking the patient home with hospice as previously.     Vital  Signs:    Temp:  [97.9 °F (36.6 °C)-99.3 °F (37.4 °C)] 98.2 °F (36.8 °C)  Heart Rate:  [70-73] 70  Resp:  [12-16] 16  BP: (100-119)/(35-53) 111/42    Physical Exam:    General Appearance:  Alert and cooperative.  Chronically ill-appearing.  Frail.  Cachectic.   Head:  Atraumatic and normocephalic.   Eyes: Conjunctivae and sclerae normal, no icterus. No pallor.   Ears:  Ears with no abnormalities noted.   Throat: No oral lesions, no thrush, oral mucosa moist.   Neck: Supple, trachea midline, no thyromegaly.   Back:   No tenderness to palpation.   Lungs:   Breath sounds heard bilaterally equally.  Bibasilar crackles heard. No wheezing. No Pleural rub or bronchial breathing.   Heart:  Normal S1 and S2, no murmur, no gallop, no rub. No JVD.   Abdomen:   Normal bowel sounds, no masses, no organomegaly. Soft, nontender, nondistended, no rebound tenderness.   Extremities: Supple, no edema, no cyanosis, no clubbing.   Pulses: Pulses palpable bilaterally.   Skin: No bleeding.  Warm.  Multiple ecchymosis noted in bilateral upper extremities.   Neurologic: Alert and oriented x 2. No facial asymmetry. Moves all four limbs. No tremors.     Pertinent Lab Results:     Results from last 7 days   Lab Units 08/28/24  0555 08/27/24  0527 08/26/24  1228   SODIUM mmol/L 139 139 138   POTASSIUM mmol/L 4.8 4.8 5.1   CHLORIDE mmol/L 103 101 98   CO2 mmol/L 31.2* 31.2* 30.4*   BUN mg/dL 19 19 21   CREATININE mg/dL 1.01* 0.94 1.04*   CALCIUM mg/dL 8.0* 8.6 8.5*   BILIRUBIN mg/dL  --   --  0.4   ALK PHOS U/L  --   --  103   ALT (SGPT) U/L  --   --  10   AST (SGOT) U/L  --   --  20   GLUCOSE mg/dL 73 62* 149*     Results from last 7 days   Lab Units 08/28/24  0555 08/27/24  0527 08/27/24  0000 08/26/24  1657 08/26/24  1228   WBC 10*3/mm3 10.71 10.78  --   --  13.30*   HEMOGLOBIN g/dL 7.9* 8.4* 8.6*   < > 9.1*   HEMATOCRIT % 26.8* 27.7*  --   --  30.0*   PLATELETS 10*3/mm3 208 241  --   --  267    < > = values in this interval not displayed.      Results from last 7 days   Lab Units 08/27/24  0527 08/26/24  1228   INR  1.22* 1.23*                         Results from last 7 days   Lab Units 08/26/24  1654   URINECX  Culture in progress       Pertinent Radiology Results:    Imaging Results (All)       Procedure Component Value Units Date/Time    XR Chest 1 View [[id number]] Collected: 08/28/24 0821     Updated: 08/28/24 0824    Narrative:      PROCEDURE: XR CHEST 1 VW-     HISTORY: Cough; K92.2-Gastrointestinal hemorrhage, unspecified;  R23.4-Changes in skin texture; D64.9-Anemia, unspecified; K92.1-Melena;  K55.21-Angiodysplasia of colon with hemorrhage     COMPARISON: 8/27/2020     FINDINGS:  Portable view of the chest demonstrates moderate right  pleural effusion, similar from prior exam. Small left pleural effusion  is noted. Bibasilar atelectasis and/or pneumonia is noted. Left-sided  pacer is present. The mediastinum is unremarkable.     The heart size is normal.       Impression:      Stable moderate right and small left pleural effusions.  Underlying atelectasis and/or pneumonia unchanged.           This report was signed and finalized on 8/28/2024 8:22 AM by Chidi Costello MD.       XR Chest 1 View [[id number]] Collected: 08/27/24 2151     Updated: 08/27/24 2153    Narrative:      FINAL REPORT    TECHNIQUE:  null    CLINICAL HISTORY:  Vomiting, coughing, rule out aspiration    COMPARISON:  null    FINDINGS:  Exam: 1 view of the chest    Comparison: None    Findings:    Left-sided pacemaker    Cardiac silhouette is enlarged.    No pneumothorax.    Right pleural fluid collection.    Bilateral pulmonary opacities larger on the right than the left may represent pneumonia or atelectasis.      Impression:      Impression:    1. Right pleural fluid collection. Bilateral pulmonary opacities larger on the right than the left may represent pneumonia or atelectasis.    Authenticated and Electronically Signed by Dixie Love MD  on 08/27/2024 09:51:30 PM             Echo:    Results for orders placed during the hospital encounter of 05/29/24    Adult Transthoracic Echo Complete W/ Cont if Necessary Per Protocol    Interpretation Summary    Left ventricular systolic function is normal. Calculated left ventricular EF = 60% Left ventricular ejection fraction appears to be 56 - 60%.    Left ventricular wall thickness is consistent with hypertrophy.    Left ventricular diastolic function was indeterminate.    The left atrial cavity is dilated.    Left atrial volume is severely increased.    The right atrial cavity is dilated.    Mild aortic valve stenosis is present.    Peak velocity of the flow distal to the aortic valve is 230 cm/s. Aortic valve maximum pressure gradient is 21 mmHg. Aortic valve mean pressure gradient is 13 mmHg.    Moderate to severe mitral valve regurgitation is present.    Moderate tricuspid valve regurgitation is present.    Estimated right ventricular systolic pressure from tricuspid regurgitation is markedly elevated (>55 mmHg). Calculated right ventricular systolic pressure from tricuspid regurgitation is 85 mmHg.    Condition on Discharge:      Stable.    Code status during the hospital stay:    Code Status and Medical Interventions: No CPR (Do Not Attempt to Resuscitate); Limited Support; No intubation (DNI)   Ordered at: 08/26/24 1503     Medical Intervention Limits:    No intubation (DNI)     Code Status (Patient has no pulse and is not breathing):    No CPR (Do Not Attempt to Resuscitate)     Medical Interventions (Patient has pulse or is breathing):    Limited Support     Discharge Disposition:    Hospice/Home    Discharge Medications:       Discharge Medications        New Medications        Instructions Start Date   LORazepam 2 MG/ML concentrated solution  Commonly known as: LORazepam Intensol   0.5 mg, Oral, Every 8 Hours PRN      morphine 20 MG/ML concentrated solution   5 mg, Oral, Every 3 Hours PRN             Continue These  Medications        Instructions Start Date   acetaminophen 650 MG 8 hr tablet  Commonly known as: TYLENOL   650 mg, Oral, Every 8 Hours PRN      furosemide 20 MG tablet  Commonly known as: LASIX   20 mg, Oral, Daily      glucose blood test strip   Test daily.      glucose monitor monitoring kit   1 each, Does not apply, Daily      levothyroxine 50 MCG tablet  Commonly known as: SYNTHROID, LEVOTHROID   50 mcg, Oral, Every Early Morning      metoprolol tartrate 100 MG tablet  Commonly known as: LOPRESSOR   100 mg, Oral, 2 Times Daily      multivitamin tablet tablet   1 tablet, Oral, Daily      ondansetron ODT 4 MG disintegrating tablet  Commonly known as: ZOFRAN-ODT   4 mg, Translingual, Every 8 Hours PRN      Retacrit 41147 UNIT/ML injection  Generic drug: epoetin dorcas-epbx   1 mL, Subcutaneous, 1 ml sq per month on day 17      sennosides-docusate 8.6-50 MG per tablet  Commonly known as: PERICOLACE   2 tablets, Oral, 2 Times Daily             Stop These Medications      glimepiride 1 MG tablet  Commonly known as: AMARYL            ASK your doctor about these medications        Instructions Start Date   naloxone 4 MG/0.1ML nasal spray  Commonly known as: NARCAN   Call 911. Don't prime. Hyrum in 1 nostril for overdose. Repeat in 2-3 minutes in other nostril if no or minimal breathing/responsiveness.      pantoprazole 40 MG EC tablet  Commonly known as: PROTONIX   40 mg, Oral, Daily             Discharge Diet:   Pleasure feeding as tolerated    Activity at Discharge:   As tolerated    Follow-up Appointments:     Follow-up Information       Paxton Vasquez DO Follow up on 9/13/2024.    Specialty: Internal Medicine  Why: @ 4:00 pm  Contact information:  [address] [place] [address] [phone]                           Future Appointments   Date Time Provider Department Center   9/5/2024  3:00 PM TREATMENT RM 10  [facility]   9/13/2024  4:00 PM Paxton Vasquez DO [facility] MR  Home [facility]   9/19/2024  2:30 PM TREATMENT RM 14 [facility]FUS  [facility]   10/3/2024  3:15 PM TREATMENT RM 13 [facility]FUS  [facility]   10/17/2024  1:30 PM TREATMENT RM 14 [facility] INFUS  [facility]   10/31/2024  2:30 PM TREATMENT RM 12 [facility] INFUS  [facility]   11/14/2024  1:30 PM TREATMENT RM 14 [facility] INFUS  [facility]   11/27/2024  3:00 PM TREATMENT RM 14 [facility] OP INFUS  [facility]   12/12/2024  2:00 PM TREATMENT RM 14 [facility] OP INFUS  [facility] O[facility]   8/25/2025  2:30 PM Demond Leon MD [facility]     Test Results Pending at Discharge:    Pending Labs       Order Current Status    Urine Culture - Urine, Urine, Catheter Preliminary result               Michael Eason MD  08/29/24  09:59 EDT    Time: I spent 32 minutes on this discharge activity which included: face-to-face encounter with the patient, reviewing the data in the system, coordination of the care with the nursing staff as well as consultants, documentation, and entering orders.     Dictated utilizing Dragon dictation.      Electronically signed by Michael Eason MD at 08/29/24 1006

## 2024-08-31 RX ORDER — LEVOFLOXACIN 750 MG/1
750 TABLET, FILM COATED ORAL EVERY OTHER DAY
Qty: 4 TABLET | Refills: 0 | Status: SHIPPED | OUTPATIENT
Start: 2024-08-31 | End: 2024-09-08

## 2024-08-31 NOTE — PROGRESS NOTES
I called and discussed the results of her urine cultures with the daughter Janneth.  Sent a prescription for Levaquin for treatment of UTI per her urine cultures sensitivities.  Janneth reports patient is doing okay. All questions were answered to her satisfaction.

## 2024-09-11 ENCOUNTER — TELEPHONE (OUTPATIENT)
Dept: INTERNAL MEDICINE | Facility: CLINIC | Age: 89
End: 2024-09-11
Payer: MEDICARE

## 2024-09-11 NOTE — TELEPHONE ENCOUNTER
Caller: [facility]     Relationship: HOSPICE NURSE     Best call back number: [phone]     What is your medical concern? DISCHARGING HER FROM HOSPICE CARE TODAY

## 2024-09-11 NOTE — TELEPHONE ENCOUNTER
[place]ia returned call, states that she is with patient at this time and she is trying to talk the family into holding off on the discharge at this time and transferring patient to the compassionate care center. States that patient looks like she is nearing end of life and does not think that discharging is in the patients best interest. Will call back if she is unable to reason with the family and they decide to continue with discharge.

## 2024-09-11 NOTE — TELEPHONE ENCOUNTER
Patient has appointment Friday with . Attempted to contact [place] with [facility] to get the reasoning for discharge but she was not in the office. Will update once she returns call.

## 2024-09-12 NOTE — TELEPHONE ENCOUNTER
Virginia left VM advising that patient has been transferred to the [facility] care [place]. Will cancel Friday appointment with .

## 2025-03-23 NOTE — CONSULTS
Diabetes education consult noted. Due to scheduled surgery today will see when more appropriate.    Principal Discharge DX:	GERD (gastroesophageal reflux disease)   1

## (undated) DEVICE — CONMED SCOPE SAVER BITE BLOCK, 20X27 MM: Brand: SCOPE SAVER

## (undated) DEVICE — Device: Brand: SINGLE USE INJECTOR

## (undated) DEVICE — HYBRID CO2 TUBING/CAP SET FOR OLYMPUS® SCOPES & CO2 SOURCE: Brand: ERBE

## (undated) DEVICE — SPNG GZ WOVN 4X4IN 12PLY 10/BX STRL

## (undated) DEVICE — RADIFOCUS GLIDEWIRE ADVANTAGE GUIDEWIRE: Brand: GLIDEWIRE ADVANTAGE

## (undated) DEVICE — VLV SXN AIR/H2O ORCAPOD3 1P/U STRL

## (undated) DEVICE — 3M™ STERI-DRAPE™ ISOLATION BAG, 10 PER CARTON / 4 CARTONS PER CASE, 1003: Brand: 3M™ STERI-DRAPE™

## (undated) DEVICE — PATIENT RETURN ELECTRODE, SINGLE-USE, CONTACT QUALITY MONITORING, ADULT, WITH 9FT CORD, FOR PATIENTS WEIGING OVER 33LBS. (15KG): Brand: MEGADYNE

## (undated) DEVICE — FRCP BX RADJAW4 NDL 2.8 240 STD OG

## (undated) DEVICE — CANN NASL CO2 DIVIDED A/

## (undated) DEVICE — NDL ART WING 18G 7CM

## (undated) DEVICE — HIGH FLOW TIP

## (undated) DEVICE — AMD ANTIMICROBIAL GAUZE SPONGES,12 PLY USP TYPE VII, 0.2% POLYHEXAMETHYLENE BIGUANIDE HCI (PHMB): Brand: CURITY

## (undated) DEVICE — JELLY,LUBE,STERILE,FLIP TOP,TUBE,2-OZ: Brand: MEDLINE

## (undated) DEVICE — DRSNG ADAPTIC 3X8

## (undated) DEVICE — PK EXTREM LOWR 10

## (undated) DEVICE — DRSNG ELAS NET STRCH SZ8 31IN 25YD LF

## (undated) DEVICE — Device

## (undated) DEVICE — DRSNG WND GZ CURAD OIL EMULSION 3X3IN STRL

## (undated) DEVICE — HANDPIECE SET WITH HIGH FLOW TIP AND SUCTION TUBE: Brand: INTERPULSE

## (undated) DEVICE — ENCORE® LATEX MICRO SIZE 7, STERILE LATEX POWDER-FREE SURGICAL GLOVE: Brand: ENCORE

## (undated) DEVICE — GLV SURG TRIUMPH MICRO PF LTX 7.5 STRL

## (undated) DEVICE — ANGIOGRAPHIC CATHETER: Brand: EXPO™

## (undated) DEVICE — HYBRID TUBING/CAP SET FOR OLYMPUS® SCOPES: Brand: ERBE

## (undated) DEVICE — SYR CONTRL LUERLOK 10CC

## (undated) DEVICE — CVR HNDL LT SURG ACCSSRY BLU STRL

## (undated) DEVICE — MEDI-VAC NON-CONDUCTIVE SUCTION TUBING: Brand: CARDINAL HEALTH

## (undated) DEVICE — CATH F6 ST JR 4 100CM: Brand: SUPERTORQUE

## (undated) DEVICE — ENDOSCOPY PORT CONNECTOR FOR OLYMPUS® SCOPES: Brand: ERBE

## (undated) DEVICE — INFLATION DEVICE: Brand: ENCORE™ 26

## (undated) DEVICE — SUT ETHLN 4/0 PS2 18IN 1667H

## (undated) DEVICE — FRCP BIOP COLD ENDOJAW ALLGTR W/NDL 2.8X2300MM BLU

## (undated) DEVICE — BANDAGE,GAUZE,BULKEE II,4.5"X4.1YD,STRL: Brand: MEDLINE

## (undated) DEVICE — COVER,LIGHT HANDLE,FLX,1/PK: Brand: MEDLINE INDUSTRIES, INC.

## (undated) DEVICE — SOL LR 1000ML

## (undated) DEVICE — QUICK-CROSS™ SUPPORT CATHETER: Brand: QUICK-CROSS™

## (undated) DEVICE — PTA BALLOON DILATATION CATHETER: Brand: COYOTE™ ES

## (undated) DEVICE — 3M™ IOBAN™ 2 ANTIMICROBIAL INCISE DRAPE 6650EZ: Brand: IOBAN™ 2

## (undated) DEVICE — LEX CATH LAB MINOR: Brand: MEDLINE INDUSTRIES, INC.

## (undated) DEVICE — SYR LUER SLPTP 50ML

## (undated) DEVICE — SUCTION CANISTER, 1500CC, RIGID: Brand: DEROYAL

## (undated) DEVICE — NDL HYPO ECLPS SFTY 22G 1 1/2IN

## (undated) DEVICE — FIAPC® PROBE W/ FILTER 2200 C OD 2.3MM/6.9FR; L 2.2M/7.2FT: Brand: ERBE

## (undated) DEVICE — STEALTH PERIPHERAL, CLASSIC CROWN, 1.50MM, 145CM SHAFT: Brand: STEALTH

## (undated) DEVICE — CAUTERY TIP POLISHER: Brand: DEVON

## (undated) DEVICE — DESTINATION CAROTID GUIDING SHEATH: Brand: DESTINATION

## (undated) DEVICE — TP SXN YANKR BULB STRL

## (undated) DEVICE — GUIDEWIRE: Brand: PT²™

## (undated) DEVICE — PRECISION THIN (9.0 X 0.38 X 31.0MM)

## (undated) DEVICE — DIAMONDBACK PERIPHERAL, MICRO CROWN, 1.25MM, 145CM SHAFT: Brand: DIAMONDBACK PERIPHERAL

## (undated) DEVICE — ENDOGATOR AUXILIARY WATER JET CONNECTOR: Brand: ENDOGATOR

## (undated) DEVICE — ANGIOGRAPHIC CATHETER: Brand: IMAGER™ II

## (undated) DEVICE — BANDAGE ROLL,100% COTTON, 6 PLY, SMALL: Brand: KERLIX

## (undated) DEVICE — NDL HYPO ECLPS SFTY 25G 1 1/2IN

## (undated) DEVICE — ST PRIM GRVTY NDLESS 3 INJ PORT 105IN

## (undated) DEVICE — PTA BALLOON DILATATION CATHETER: Brand: COYOTE™

## (undated) DEVICE — ANGIO-SEAL VIP VASCULAR CLOSURE DEVICE: Brand: ANGIO-SEAL

## (undated) DEVICE — MEDI-VAC YANKAUER SUCTION HANDLE W/BULBOUS TIP: Brand: CARDINAL HEALTH

## (undated) DEVICE — SI AVANTI+ 6F STD W/GW  NO OBT: Brand: AVANTI

## (undated) DEVICE — VIPERWIRE, ADVANCE PERIPHERAL, .014" DIA SPRING TIP, 335CM, 5 PACK: Brand: VIPERWIRE, ADVANCE